# Patient Record
Sex: FEMALE | Race: BLACK OR AFRICAN AMERICAN | NOT HISPANIC OR LATINO | Employment: OTHER | URBAN - METROPOLITAN AREA
[De-identification: names, ages, dates, MRNs, and addresses within clinical notes are randomized per-mention and may not be internally consistent; named-entity substitution may affect disease eponyms.]

---

## 2017-02-06 ENCOUNTER — TELEPHONE (OUTPATIENT)
Dept: NEUROLOGY | Facility: CLINIC | Age: 42
End: 2017-02-06

## 2017-02-06 NOTE — TELEPHONE ENCOUNTER
We received a fax from [facility] (phone [phone] fax [phone]) attention Carolina Neri asking for records on [facility] from June 1st, 2016 and on; There is a signed release; Office visit notes from September when she saw Dr Castaneda faxed accordingly.    Mitzy Collazo MS RN Care Coordinator

## 2017-04-27 ENCOUNTER — OFFICE VISIT (OUTPATIENT)
Dept: NEUROLOGY | Facility: CLINIC | Age: 42
End: 2017-04-27
Attending: PSYCHIATRY & NEUROLOGY
Payer: COMMERCIAL

## 2017-04-27 VITALS
OXYGEN SATURATION: 99 % | BODY MASS INDEX: 41.15 KG/M2 | RESPIRATION RATE: 20 BRPM | HEART RATE: 96 BPM | WEIGHT: 247 LBS | HEIGHT: 65 IN | DIASTOLIC BLOOD PRESSURE: 73 MMHG | SYSTOLIC BLOOD PRESSURE: 114 MMHG

## 2017-04-27 DIAGNOSIS — E55.9 VITAMIN D DEFICIENCY: ICD-10-CM

## 2017-04-27 DIAGNOSIS — G35 MULTIPLE SCLEROSIS (H): Primary | ICD-10-CM

## 2017-04-27 DIAGNOSIS — M62.838 MUSCLE SPASM: ICD-10-CM

## 2017-04-27 DIAGNOSIS — G43.009 MIGRAINE WITHOUT AURA AND WITHOUT STATUS MIGRAINOSUS, NOT INTRACTABLE: ICD-10-CM

## 2017-04-27 DIAGNOSIS — G35 MULTIPLE SCLEROSIS (H): ICD-10-CM

## 2017-04-27 LAB
BASOPHILS # BLD AUTO: 0.1 10E9/L (ref 0–0.2)
BASOPHILS NFR BLD AUTO: 0.6 %
DIFFERENTIAL METHOD BLD: ABNORMAL
EOSINOPHIL # BLD AUTO: 0.3 10E9/L (ref 0–0.7)
EOSINOPHIL NFR BLD AUTO: 3.6 %
ERYTHROCYTE [DISTWIDTH] IN BLOOD BY AUTOMATED COUNT: 13.1 % (ref 10–15)
HCT VFR BLD AUTO: 36.1 % (ref 35–47)
HGB BLD-MCNC: 11.6 G/DL (ref 11.7–15.7)
IMM GRANULOCYTES # BLD: 0 10E9/L (ref 0–0.4)
IMM GRANULOCYTES NFR BLD: 0.3 %
LYMPHOCYTES # BLD AUTO: 3.7 10E9/L (ref 0.8–5.3)
LYMPHOCYTES NFR BLD AUTO: 39.3 %
MCH RBC QN AUTO: 29.5 PG (ref 26.5–33)
MCHC RBC AUTO-ENTMCNC: 32.1 G/DL (ref 31.5–36.5)
MCV RBC AUTO: 92 FL (ref 78–100)
MONOCYTES # BLD AUTO: 0.6 10E9/L (ref 0–1.3)
MONOCYTES NFR BLD AUTO: 6.3 %
NEUTROPHILS # BLD AUTO: 4.7 10E9/L (ref 1.6–8.3)
NEUTROPHILS NFR BLD AUTO: 49.9 %
NRBC # BLD AUTO: 0 10*3/UL
NRBC BLD AUTO-RTO: 0 /100
PLATELET # BLD AUTO: 406 10E9/L (ref 150–450)
RBC # BLD AUTO: 3.93 10E12/L (ref 3.8–5.2)
WBC # BLD AUTO: 9.4 10E9/L (ref 4–11)

## 2017-04-27 PROCEDURE — 82306 VITAMIN D 25 HYDROXY: CPT | Performed by: PSYCHIATRY & NEUROLOGY

## 2017-04-27 PROCEDURE — 85025 COMPLETE CBC W/AUTO DIFF WBC: CPT

## 2017-04-27 PROCEDURE — 40000809 ZZH STATISTIC NO DOCUMENTATION TO SUPPORT CHARGE

## 2017-04-27 RX ORDER — NORTRIPTYLINE HCL 25 MG
CAPSULE ORAL
Qty: 60 CAPSULE | Refills: 5 | Status: SHIPPED | OUTPATIENT
Start: 2017-04-27 | End: 2018-07-09

## 2017-04-27 NOTE — MR AVS SNAPSHOT
After Visit Summary   4/27/2017    Josefina Aldrich    MRN: [id number]           Patient Information     Date Of Birth          1975        Visit Information        Provider Department      4/27/2017 4:30 PM Radames Castaneda MD [facility] Multiple [facility]        Today's Diagnoses     Migraine without aura and without status migrainosus, not intractable    -  1    Vitamin D deficiency        Multiple sclerosis (H)          Care Instructions    1. We will try nortriptyline at 25 mg at bedtime nightly for 2 weeks, can increase to 2 capsules (50 mg) thereafter if headaches persist    2. Labs today (blood work)    3. MRI scans of brain and cervical spine in 6 months and return to clinic after        Follow-ups after your visit        Follow-up notes from your care team     Return in about 6 months (around 10/27/2017).      Your next 10 appointments already scheduled     Apr 27, 2017  6:30 PM CDT   LAB with [facility] ([facility])    [address] [place] [id number] [phone]           Patient must bring picture ID.  Patient should be prepared to give a urine specimen  Please do not eat 10-12 hours before your appointment if you are coming in fasting for labs on lipids, cholesterol, or glucose (sugar).  Pregnant women should follow their Care Team instructions. Water with medications is okay. Do not drink coffee or other fluids.   If you have concerns about taking  your medications, please ask at office or if scheduling via [employer], send a message by clicking on Secure Messaging, Message Your Care Team.            Oct 26, 2017 11:30 AM CDT   (Arrive by 11:15 AM)   MR BRAIN W/O & W CONTRAST with [facility] ([facility])    [address] [place] [address] [phone]           Take your medicines as usual, unless your doctor tells you not to. Bring a  list of your current medicines to your exam (including vitamins, minerals and over-the-counter drugs).  You will be given intravenous contrast for this exam. To prepare:   The day before your exam, drink extra fluids at least six 8-ounce glasses (unless your doctor tells you to restrict your fluids).   Have a blood test (creatinine test) within 30 days of your exam. Go to your clinic or Diagnostic Imaging Department for this test.  The MRI machine uses a strong magnet. Please wear clothes without metal (snaps, zippers). A sweatsuit works well, or we may give you a hospital gown.  Please remove any body piercings and hair extensions before you arrive. You will also remove watches, jewelry, hairpins, wallets, dentures, partial dental plates and hearing aids. You may wear contact lenses, and you may be able to wear your rings. We have a safe place to keep your personal items, but it is safer to leave them at home.   **IMPORTANT** THE INSTRUCTIONS BELOW ARE ONLY FOR THOSE PATIENTS WHO HAVE BEEN TOLD THEY WILL RECEIVE SEDATION OR GENERAL ANESTHESIA DURING THEIR MRI PROCEDURE:  IF YOU WILL RECEIVE SEDATION (take medicine to help you relax during your exam):   You must get the medicine from your doctor before you arrive. Bring the medicine to the exam. Do not take it at home.   Arrive one hour early. Bring someone who can take you home after the test. Your medicine will make you sleepy. After the exam, you may not drive, take a bus or take a taxi by yourself.   No eating 8 hours before your exam. You may have clear liquids up until 4 hours before your exam. (Clear liquids include water, clear tea, black coffee and fruit juice without pulp.)  IF YOU WILL RECEIVE ANESTHESIA (be asleep for your exam):   Arrive 1 1/2 hours early. Bring someone who can take you home after the test. You may not drive, take a bus or take a taxi by yourself.   No eating 8 hours before your exam. You may have clear liquids up until 4 hours before  your exam. (Clear liquids include water, clear tea, black coffee and fruit juice without pulp.)  Please call the Imaging Department at your exam site with any questions.            Oct 26, 2017 12:15 PM CDT   (Arrive by 12:00 PM)   MR CERVICAL SPINE W/O & W CONTRAST with [facility] ([facility])    [address] [facility]  [address] [facility] [address] [phone]           Take your medicines as usual, unless your doctor tells you not to. Bring a list of your current medicines to your exam (including vitamins, minerals and over-the-counter drugs).  You will be given intravenous contrast for this exam. To prepare:   The day before your exam, drink extra fluids at least six 8-ounce glasses (unless your doctor tells you to restrict your fluids).   Have a blood test (creatinine test) within 30 days of your exam. Go to your clinic or Diagnostic Imaging Department for this test.  The MRI machine uses a strong magnet. Please wear clothes without metal (snaps, zippers). A sweatsuit works well, or we may give you a hospital gown.  Please remove any body piercings and hair extensions before you arrive. You will also remove watches, jewelry, hairpins, wallets, dentures, partial dental plates and hearing aids. You may wear contact lenses, and you may be able to wear your rings. We have a safe place to keep your personal items, but it is safer to leave them at home.   **IMPORTANT** THE INSTRUCTIONS BELOW ARE ONLY FOR THOSE PATIENTS WHO HAVE BEEN TOLD THEY WILL RECEIVE SEDATION OR GENERAL ANESTHESIA DURING THEIR MRI PROCEDURE:  IF YOU WILL RECEIVE SEDATION (take medicine to help you relax during your exam):   You must get the medicine from your doctor before you arrive. Bring the medicine to the exam. Do not take it at home.   Arrive one hour early. Bring someone who can take you home after the test. Your medicine will make you sleepy. After the exam, you may not drive,  take a bus or take a taxi by yourself.   No eating 8 hours before your exam. You may have clear liquids up until 4 hours before your exam. (Clear liquids include water, clear tea, black coffee and fruit juice without pulp.)  IF YOU WILL RECEIVE ANESTHESIA (be asleep for your exam):   Arrive 1 1/2 hours early. Bring someone who can take you home after the test. You may not drive, take a bus or take a taxi by yourself.   No eating 8 hours before your exam. You may have clear liquids up until 4 hours before your exam. (Clear liquids include water, clear tea, black coffee and fruit juice without pulp.)  Please call the Imaging Department at your exam site with any questions.            Oct 26, 2017  2:00 PM CDT   (Arrive by 1:45 PM)   Return Multiple Sclerosis with Radames Castaneda MD   [facility] Multiple Sclerosis ([facility])    [address] [place] [address] [phone]              Future tests that were ordered for you today     Open Future Orders        Priority Expected Expires Ordered    MRI Brain w & w/o contrast Routine 10/26/2017 4/27/2018 4/27/2017    MRI Cervical spine w & w/o contrast Routine 10/26/2017 4/27/2018 4/27/2017    Vitamin D Deficiency Screening Routine 4/27/2017 7/27/2017 4/27/2017    CBC with platelets differential Routine 4/27/2017 7/27/2017 4/27/2017            Who to contact     If you have questions or need follow up information about today's clinic visit or your schedule please contact [facility] MULTIPLE SCL[facility]OSIS directly at [phone].  Normal or non-critical lab and imaging results will be communicated to you by MyChart, letter or phone within 4 business days after the clinic has received the results. If you do not hear from us within 7 days, please contact the clinic through MyChart or phone. If you have a critical or abnormal lab result, we will notify you by phone as soon as possible.  Submit refill requests through [employer]  "or call your pharmacy and they will forward the refill request to us. Please allow 3 business days for your refill to be completed.          Additional Information About Your Visit        MyChart Information     In [employer] lets you send messages to your doctor, view your test results, renew your prescriptions, schedule appointments and more. To sign up, go to [email or web address] . Click on \"Log in\" on the left side of the screen, which will take you to the Welcome page. Then click on \"Sign up Now\" on the right side of the page.     You will be asked to enter the access code listed below, as well as some personal information. Please follow the directions to create your username and password.     Your access code is: [email or web address]  Expires: 2017  6:31 AM     Your access code will  in 90 days. If you need help or a new code, please call your [place] clinic or [phone].        Care EveryWhere ID     This is your Care EveryWhere ID. This could be used by other organizations to access your [place] medical records  [id number]        Your Vitals Were     Pulse Respirations Height Pulse Oximetry Breastfeeding? BMI (Body Mass Index)    96 20 1.651 m (5' 5\") 99% No 41.1 kg/m2       Blood Pressure from Last 3 Encounters:   17 114/73   09/15/16 125/44   16 102/67    Weight from Last 3 Encounters:   17 112 kg (247 lb)   09/15/16 127 kg (280 lb)                 Today's Medication Changes          These changes are accurate as of: 17  6:28 PM.  If you have any questions, ask your nurse or doctor.               Start taking these medicines.        Dose/Directions    nortriptyline 25 MG capsule   Commonly known as:  PAMELOR   Used for:  Migraine without aura and without status migrainosus, not intractable   Started by:  Radames Castaneda MD        Take one capsule at bedtime for 2 weeks, can increase to 2 capsules as needed   Quantity:  60 capsule   Refills:  5            Where to get " your medicines      These medications were sent to [facility] - [place] [address]  [address], [facility] [address]     Phone:  [phone]     nortriptyline 25 MG capsule                Primary Care Provider Office Phone # Fax #    Ten Norton [phone]       [facility] [address]  [facility] [address]        Thank you!     Thank you for choosing [facility]ULT[facility] S[facility]  for your care. Our goal is always to provide you with excellent care. Hearing back from our patients is one way we can continue to improve our services. Please take a few minutes to complete the written survey that you may receive in the mail after your visit with us. Thank you!             Your Updated Medication List - Protect others around you: Learn how to safely use, store and throw away your medicines at www.disposemymeds.org.          This list is accurate as of: 4/27/17  6:28 PM.  Always use your most recent med list.                   Brand Name Dispense Instructions for use    * VENTOLIN  (90 BASE) MCG/ACT Inhaler   Generic drug:  albuterol      Inhale 2 puffs into the lungs       * albuterol (2.5 MG/3ML) 0.083% neb solution      Inhale 2.5 mg into the lungs       furosemide 20 MG tablet    LASIX     Take 20 mg by mouth daily       HYDROXYZINE HCL PO      Take 25 mg by mouth       IBUPROFEN PO      Take 800 mg by mouth every 4 hours as needed for moderate pain       lisinopril-hydrochlorothiazide 10-12.5 MG per tablet    PRINZIDE/ZESTORETIC     Take 1 tablet by mouth daily       nortriptyline 25 MG capsule    PAMELOR    60 capsule    Take one capsule at bedtime for 2 weeks, can increase to 2 capsules as needed       SUMAtriptan 50 MG tablet    IMITREX    30 tablet    Take one at onset of severe migraine; can repeat after one hour       TOPAMAX 100 MG tablet   Generic drug:  topiramate      Take 100 mg by mouth daily       VITAMIN  D (CHOLECALCIFEROL) PO      Take 5,000 Units by mouth every 3 days       * Notice:  This list has 2 medication(s) that are the same as other medications prescribed for you. Read the directions carefully, and ask your doctor or other care provider to review them with you.

## 2017-04-27 NOTE — PATIENT INSTRUCTIONS
1. We will try nortriptyline at 25 mg at bedtime nightly for 2 weeks, can increase to 2 capsules (50 mg) thereafter if headaches persist    2. Labs today (blood work)    3. MRI scans of brain and cervical spine in 6 months and return to clinic after

## 2017-04-27 NOTE — LETTER
2017      RE: Josefina Aldrich  [address]  [facility] [address]       Referral source: Established patient      Chief complaint: Multiple sclerosis     History of the Present Illness: Ms. Josefina Aldrich is a 41-year-old woman who returns to the Multiple Sclerosis Clinic today for regularly scheduled follow-up.  This is a patient whom I previously saw in 2016 with what appeared to be new onset of multiple sclerosis.  We initiated disease-modifying therapy with glatiramer acetate.  Overall, she appears to be tolerating this reasonably well.  She does note burning at injection sites but is having relatively minimal injection site reactions.      She denies any new episodic changes in vision, balance, strength or sensation suggestive of new relapse of multiple sclerosis since she was last seen in this clinic.      She is concerned about difficulties with leg cramps and says that she actually fell on a couple of occasions when she developed cramping of leg muscles.  She also has some concerns about muscle tightness at the base of the neck.      She does continue to have bothersome migraine headaches, particularly over the last month.  She did try sumatriptan, but tells me that this was sedating, so she has not been using it.      Past Medical History:  1.  Migraine.   2.  Depression.   3.  Hypertension.   4.  Obstructive sleep apnea.   5.  Asthma.   6.  Status post gastric bypass surgery.   7.  Status post hysterectomy.   8.  Status post  section.      PHYSICAL EXAMINATION:   VITAL SIGNS:  Blood pressure 114/73; pulse 96; weight 112 kg; height 1.65 meters.   GENERAL:  Obese woman who presents to the evaluation alone, awake and alert and in no acute distress.   NEUROLOGIC:   MENTAL STATUS:  Alert and oriented times four.   CRANIAL NERVES:  Visual fields are full to confrontation.  Extraocular movements are intact with no internuclear ophthalmoplegia.  Pupils are round and react to  light and there is no Anish Tanika pupil.  Facial strength and hearing are normal.  Palate elevation and tongue protrusion are normal.   POWER:  Motor examination is characterized by variable persistence with give-way type quality.  With maximal effort, strength appears to be normal bilaterally in deltoids, biceps, triceps, finger interossei, hip flexors, hamstrings and anterior tibialis.  I cannot demonstrate definite normal strength at the right wrist extensors and finger extensors but here again, there is prominent giveway quality.   REFLEXES:  Reflexes are symmetric and within normal limits at biceps, triceps, brachioradialis, knees and ankles.   MOTOR/CEREBELLAR:  There are no tremors, myoclonus or other abnormal movements.  Tone is grossly normal in the limbs.  There is no appendicular ataxia on finger-to-nose testing and rapid alternating movements are normal in the extremities.   GAIT:  The patient is able to ambulate on a flat, level surface without gross loss of postural stability.  Tandem gait is mildly impaired for age.      Assessment/plan:    1.  Multiple sclerosis  She will continue disease-modifying therapy with glatiramer acetate.  We are going to check a vitamin D level today and I will then have the patient return with an MRI scan of the brain in approximately 6 months for follow-up of the radiologic stability of her condition.      2.  Muscle spasms  I instructed the patient to be very diligent about a program of stretching of muscles that are tight or prone to spasms.  Ideally, I would like to see her doing stretching first thing in the morning as well as before bed.  If she is continuing to have this problem, addition of an anti-spasmodic can be considered, but these medications can have side effects including sedation and even muscle weakness.      3.  Migraine  She is currently on topiramate at 100 mg at bedtime for migraine prevention.  I am going to add nortriptyline at 25 mg at bedtime and  increase to 50 mg after 2 weeks as needed and as tolerated.      Radames Castaneda MD   of Neurology  [facility]    Cc:  Ten Norton MD (PCP)  Patient

## 2017-04-28 LAB — DEPRECATED CALCIDIOL+CALCIFEROL SERPL-MC: 24 UG/L (ref 20–75)

## 2017-05-01 NOTE — PROGRESS NOTES
Referral source: Established patient      Chief complaint: Multiple sclerosis     History of the Present Illness: Ms. Josefina Aldrich is a 41-year-old woman who returns to the Multiple Sclerosis Clinic today for regularly scheduled follow-up.  This is a patient whom I previously saw in 2016 with what appeared to be new onset of multiple sclerosis.  We initiated disease-modifying therapy with glatiramer acetate.  Overall, she appears to be tolerating this reasonably well.  She does note burning at injection sites but is having relatively minimal injection site reactions.      She denies any new episodic changes in vision, balance, strength or sensation suggestive of new relapse of multiple sclerosis since she was last seen in this clinic.      She is concerned about difficulties with leg cramps and says that she actually fell on a couple of occasions when she developed cramping of leg muscles.  She also has some concerns about muscle tightness at the base of the neck.      She does continue to have bothersome migraine headaches, particularly over the last month.  She did try sumatriptan, but tells me that this was sedating, so she has not been using it.      Past Medical History:  1.  Migraine.   2.  Depression.   3.  Hypertension.   4.  Obstructive sleep apnea.   5.  Asthma.   6.  Status post gastric bypass surgery.   7.  Status post hysterectomy.   8.  Status post  section.      PHYSICAL EXAMINATION:   VITAL SIGNS:  Blood pressure 114/73; pulse 96; weight 112 kg; height 1.65 meters.   GENERAL:  Obese woman who presents to the evaluation alone, awake and alert and in no acute distress.   NEUROLOGIC:   MENTAL STATUS:  Alert and oriented times four.   CRANIAL NERVES:  Visual fields are full to confrontation.  Extraocular movements are intact with no internuclear ophthalmoplegia.  Pupils are round and react to light and there is no Anish Tanika pupil.  Facial strength and hearing are normal.   Palate elevation and tongue protrusion are normal.   POWER:  Motor examination is characterized by variable persistence with give-way type quality.  With maximal effort, strength appears to be normal bilaterally in deltoids, biceps, triceps, finger interossei, hip flexors, hamstrings and anterior tibialis.  I cannot demonstrate definite normal strength at the right wrist extensors and finger extensors but here again, there is prominent giveway quality.   REFLEXES:  Reflexes are symmetric and within normal limits at biceps, triceps, brachioradialis, knees and ankles.   MOTOR/CEREBELLAR:  There are no tremors, myoclonus or other abnormal movements.  Tone is grossly normal in the limbs.  There is no appendicular ataxia on finger-to-nose testing and rapid alternating movements are normal in the extremities.   GAIT:  The patient is able to ambulate on a flat, level surface without gross loss of postural stability.  Tandem gait is mildly impaired for age.      Assessment/plan:    1.  Multiple sclerosis  She will continue disease-modifying therapy with glatiramer acetate.  We are going to check a vitamin D level today and I will then have the patient return with an MRI scan of the brain in approximately 6 months for follow-up of the radiologic stability of her condition.      2.  Muscle spasms  I instructed the patient to be very diligent about a program of stretching of muscles that are tight or prone to spasms.  Ideally, I would like to see her doing stretching first thing in the morning as well as before bed.  If she is continuing to have this problem, addition of an anti-spasmodic can be considered, but these medications can have side effects including sedation and even muscle weakness.      3.  Migraine  She is currently on topiramate at 100 mg at bedtime for migraine prevention.  I am going to add nortriptyline at 25 mg at bedtime and increase to 50 mg after 2 weeks as needed and as tolerated.         ZACHARY  MD LEW             D: 2017 14:21   T: 2017 14:59   MT: nh      Name:     RACHEL YANG   MRN:      [id number]        Account:      [id number]   :      1975           Service Date: 2017      Document: [id number]

## 2017-09-22 DIAGNOSIS — G35 MULTIPLE SCLEROSIS (H): Primary | ICD-10-CM

## 2017-09-22 RX ORDER — GLATIRAMER 40 MG/ML
40 INJECTION, SOLUTION SUBCUTANEOUS
Qty: 12 SYRINGE | Refills: 11 | Status: SHIPPED | OUTPATIENT
Start: 2017-09-22 | End: 2017-10-18

## 2017-09-22 NOTE — TELEPHONE ENCOUNTER
Received refill request for copaxone from [facility]; Patient was last seen in April and has follow up appointment in October with Dr. Castaneda. There was no active order for copaxone. Order entered and Rx refilled for 1 year per [facility] refill protocol.    Cami Francisco, RN Care Coordinator

## 2017-10-16 ENCOUNTER — TELEPHONE (OUTPATIENT)
Dept: NEUROLOGY | Facility: CLINIC | Age: 42
End: 2017-10-16

## 2017-10-16 DIAGNOSIS — G35 MULTIPLE SCLEROSIS (H): Primary | ICD-10-CM

## 2017-10-16 RX ORDER — DIAZEPAM 5 MG
TABLET ORAL
Qty: 2 TABLET | Refills: 0
Start: 2017-10-16 | End: 2018-01-03

## 2017-10-16 NOTE — TELEPHONE ENCOUNTER
I had a self-reminder to call in sedation for Josefina's MRI for next week; I tried calling her to verify her pharmacy and that she needed the medication, but her number is no longer in service; Dr. Castaneda, are you okay with me calling a prescription for diazepam per protocol to the pharmacy she has previously used? I'm solely asking since it is a controlled substance; Thank you.    Mitzy Collazo, MS RN Care Coordinator

## 2017-10-16 NOTE — TELEPHONE ENCOUNTER
It's fine to prescribe--hopefully she will call if she is not aware of the prescription and finds it necessary.

## 2017-10-18 ENCOUNTER — TELEPHONE (OUTPATIENT)
Dept: NEUROLOGY | Facility: CLINIC | Age: 42
End: 2017-10-18

## 2017-10-18 DIAGNOSIS — G35 MULTIPLE SCLEROSIS (H): ICD-10-CM

## 2017-10-18 RX ORDER — GLATIRAMER 40 MG/ML
40 INJECTION, SOLUTION SUBCUTANEOUS
Qty: 12 SYRINGE | Refills: 11 | Status: SHIPPED | OUTPATIENT
Start: 2017-10-18 | End: 2017-10-23

## 2017-10-18 NOTE — TELEPHONE ENCOUNTER
Received the following message from the triage nurse:    Caller name: Timothy from [facility] [phone]     Description of issue/question:   patient changed insurance [employer] [place] -referral transferred to [facility] specialty pharmacy  [phone]   [facility] is no longer contracted for service     This is regarding Copaxone 40 mg. Checked with our pharmacy and no PA is required. They have requested a new Rx be sent to [facility]. This was done.     Cami Francisco, RN Care Coordinator

## 2017-10-19 DIAGNOSIS — G35 MULTIPLE SCLEROSIS (H): ICD-10-CM

## 2017-10-23 RX ORDER — GLATIRAMER 40 MG/ML
40 INJECTION, SOLUTION SUBCUTANEOUS
Qty: 12 SYRINGE | Refills: 11 | Status: SHIPPED | OUTPATIENT
Start: 2017-10-23 | End: 2018-10-29

## 2017-10-23 NOTE — TELEPHONE ENCOUNTER
Received fax for Copaxone 40mg to be sent to [facility]; This has been sent per [facility] refill protocol.    Mitzy Collazo MS RN Care Coordinator

## 2017-10-26 ENCOUNTER — OFFICE VISIT (OUTPATIENT)
Dept: NEUROLOGY | Facility: CLINIC | Age: 42
End: 2017-10-26
Attending: PSYCHIATRY & NEUROLOGY
Payer: COMMERCIAL

## 2017-10-26 VITALS
SYSTOLIC BLOOD PRESSURE: 94 MMHG | RESPIRATION RATE: 20 BRPM | HEART RATE: 101 BPM | HEIGHT: 65 IN | DIASTOLIC BLOOD PRESSURE: 61 MMHG | BODY MASS INDEX: 44.82 KG/M2 | WEIGHT: 269 LBS | TEMPERATURE: 98.2 F

## 2017-10-26 DIAGNOSIS — R52 DIFFUSE PAIN: ICD-10-CM

## 2017-10-26 DIAGNOSIS — G35 MS (MULTIPLE SCLEROSIS) (H): Primary | ICD-10-CM

## 2017-10-26 DIAGNOSIS — R42 DIZZINESS: ICD-10-CM

## 2017-10-26 DIAGNOSIS — G35 MS (MULTIPLE SCLEROSIS) (H): ICD-10-CM

## 2017-10-26 PROCEDURE — 36415 COLL VENOUS BLD VENIPUNCTURE: CPT | Performed by: PSYCHIATRY & NEUROLOGY

## 2017-10-26 PROCEDURE — 82306 VITAMIN D 25 HYDROXY: CPT | Performed by: PSYCHIATRY & NEUROLOGY

## 2017-10-26 PROCEDURE — 99213 OFFICE O/P EST LOW 20 MIN: CPT | Mod: ZF

## 2017-10-26 RX ORDER — CYCLOBENZAPRINE HCL 10 MG
10 TABLET ORAL 3 TIMES DAILY PRN
COMMUNITY
Start: 2016-11-03 | End: 2018-11-27

## 2017-10-26 RX ORDER — MECLIZINE HYDROCHLORIDE 25 MG/1
25 TABLET ORAL DAILY PRN
COMMUNITY
Start: 2017-10-18 | End: 2018-07-25

## 2017-10-26 ASSESSMENT — PAIN SCALES - GENERAL: PAINLEVEL: EXTREME PAIN (8)

## 2017-10-26 NOTE — LETTER
"10/26/2017      RE: Josefina Adlrich  [address]  [place] [address]       Referral source: Established patient     Chief complaint: Multiple sclerosis     History of the Present Illness:  Ms. Josefina Aldrich is a 42-year-old right-handed woman who is evaluated in the Multiple Sclerosis Clinic today for regularly scheduled follow-up after MRI scans of the brain and cervical spine.     Her history is as per my previous notes.  She was diagnosed with multiple sclerosis in 2016 and started disease-modifying therapy with glatiramer acetate shortly thereafter.  She has had some difficulty with the medication recently as her insurance company required her to transition to the generic glatiramer acetate product, but the drug company did not send the auto-injector that is compatible with that formulation.  Accordingly, she has not taken the medication in about 2 weeks.  We will be sure to get the necessary equipment approved for her.      Otherwise, she reports good compliance with glatiramer acetate.      The patient has had a number of symptoms, particularly over the past 2 weeks.  She was working at a job as a  and began having increased difficulty with posterior neck pain that was precipitated by lifting heavy cans or boxes.  She also describes dizziness that she endorses as \"feeling like her eyes go wobbly\" with associated nausea.  She reports that at times her knees feel that they are \"going out.\"  She complains of spasms in the left arm and a sense of generalized weakness.  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      She continues on topiramate at 125 mg at bedtime for migraine prevention.  I had also given her a prescription for a trial of nortriptyline at her last visit, but she reports that she had bad dreams on this medication and accordingly did not increase the dose " beyond 25 mg.  She is not taking this at present.      On review of systems, she does endorse poor sleep quality.  She has to use multiple pillows to get comfortable.  She does not get much aerobic exercise or physical activity apart from her work.        Past Medical History:  1.  Migraine.   2.  Depression.   3.  Hypertension.   4.  Obstructive sleep apnea.   5.  Asthma.   6.  Status post gastric bypass surgery.   7.  Status post hysterectomy.   8.  Status post  section.      PHYSICAL EXAMINATION:   VITAL SIGNS:  Blood pressure 94/61; pulse 101; height 1.65 meters; weight 122 kg.     GENERAL:  Obese woman who presents to the examination alone, awake and alert and in no acute distress.     MUSCULOSKELETAL:  She is tender to pressure over essentially all of the traditional fibromyalgia trigger points that I tested, including the head of the radius, medial knee and trapezius muscles bilaterally.     NEUROLOGIC:   MENTAL STATUS:  Alert and oriented times four.   CRANIAL NERVES:  Visual fields are full to confrontation.  Extraocular movements are intact with no internuclear ophthalmoplegia.  Facial strength is normal.  Hearing is normal for conversational purposes.  Palate elevation and tongue protrusion are normal.   POWER:  Strength is normal (5/5) in the following muscle/groups bilaterally:  deltoids, biceps, triceps, wrist extensors, hip flexors, hamstrings and anterior tibialis.  The right finger extensors and first dorsal interosseous grade 4/5 with these muscles being normal on the left.   REFLEXES:  Reflexes are roughly symmetric and within normal limits at biceps, triceps, brachioradialis, knees and ankles.   MOTOR/CEREBELLAR:  There are no tremors, myoclonus or other abnormal movements.  Tone is grossly normal in limbs.  There is no appendicular ataxia on finger-to-nose testing and rapid alternating movements are normal in the extremities.   GAIT:  The patient is able to ambulate on a flat, level  surface without loss of postural stability.  She can walk on her toes.  She is able to walk on heels from a standpoint of strength but is moderately unsteady in this posture.  Tandem gait is mildly to moderately impaired for age.      Investigations: I reviewed MRI scans of the brain and cervical spine performed earlier today.  Again, seen are several periventricular and juxtacortical foci of T2 hyperintensity in the white matter of the cerebral hemispheres bilaterally, most prominently posterior to the right lateral ventricle in the parietal deep white matter.  These are unchanged in comparison to earlier MRI of 05/19/2016 and there is no abnormal enhancement pattern with contrast.  MRI scan of the cervical spine continues to show no evidence of demyelinating disease in the cervical cord parenchyma and there is no abnormal enhancement with contrast.      Assessment/plan:    1.  Relapsing-remitting multiple sclerosis  The patient's MRI imaging is entirely stable in comparison to last year.  Accordingly, I do not see any objective evidence that her several complaints are secondary to new active inflammatory demyelination.  She will continue glatiramer acetate and we will see her back in 6 months for a review.     She currently reports that she is taking vitamin D 50,000 international units 3 times weekly.  Vitamin D level today was 37 mcg/L.  I suspect that there is an element of malabsorption in the setting of her previous gastric bypass surgery.  We will advise her to increase her supplement to 4 times weekly.     2.  Diffuse pain  Her history and examination would be consistent with a myofascial pain syndrome in the fibromyalgia spectrum.  As with most patients with this type of presentation, she is physically inactive and has significant sleep disturbance.  I explained to the patient that improvement in this type of discomfort typically requires a graded program of increase in physical activity and treating any  sleep disturbance that is present.  Medications for neuropathic pain can also be helpful as adjuncts, but rarely resolve the problem in isolation.     I have recommended a physical therapy evaluation to the patient to address her neck pain and work on designing an appropriate home maintenance activity program.  I have also suggested to her that she try restarting nortriptyline at 25 mg at bedtime and then increasing to 50 mg after a couple weeks as this may be beneficial in consolidating her sleep.  If she is again unable to tolerate this, other medications such as duloxetine or pregabalin can be considered.      Radames Castaneda MD   of Neurology  [facility]    Cc:  Ten Norton MD (PCP)  Patient

## 2017-10-26 NOTE — PATIENT INSTRUCTIONS
1. We will refer you to physical therapy for evaluation of neck pain and muscle spasms    2. Retry nortriptyline at 25 mg at bedtime nightly for two weeks, then increase to 50 mg thereafter    3. Labs (blood tests) today    4. Return to clinic in 6 months

## 2017-10-26 NOTE — NURSING NOTE
"Chief Complaint   Patient presents with     RECHECK     [facility]- MULTIPLE SCLEROSIS F/U       Initial BP 94/61  Pulse 101  Temp 98.2  F (36.8  C)  Resp 20  Ht 1.651 m (5' 5\")  Wt 122 kg (269 lb)  Breastfeeding? No  BMI 44.76 kg/m2 Estimated body mass index is 44.76 kg/(m^2) as calculated from the following:    Height as of this encounter: 1.651 m (5' 5\").    Weight as of this encounter: 122 kg (269 lb).  Medication Reconciliation: complete     Terry Ascencio, CMA      "

## 2017-10-26 NOTE — MR AVS SNAPSHOT
"              After Visit Summary   10/26/2017    Josefina Aldrich    MRN: [id number]           Patient Information     Date Of Birth          1975        Visit Information        Provider Department      10/26/2017 2:00 PM Radames Castaneda MD [facility] Multiple Sclerosis        Today's Diagnoses     MS (multiple sclerosis) (H)    -  1    Diffuse pain        Dizziness          Care Instructions    1. We will refer you to physical therapy for evaluation of neck pain and muscle spasms    2. Retry nortriptyline at 25 mg at bedtime nightly for two weeks, then increase to 50 mg thereafter    3. Labs (blood tests) today    4. Return to clinic in 6 months          Follow-ups after your visit        Additional Services     PT Referral ([facility])       *This therapy referral will be filtered to a centralized scheduling office at [facility] and the patient will receive a call to schedule an appointment at a [facility] location most convenient for them. *     [facility] provides Physical Therapy evaluation and treatment and many specialty services across the [facility] system.  If requesting a specialty program, please choose from the list below.    If you have not heard from the scheduling office within 2 business days, please call [phone] for all locations, with the exception of [place], please call [phone].  Treatment: Evaluation & Treatment  Special Instructions/Modalities:   Special Programs: None    Please be aware that coverage of these services is subject to the terms and limitations of your health insurance plan.  Call member services at your health plan with any benefit or coverage questions.      **Note to Provider:  If you are referring outside of [facility] for the therapy appointment, please list the name of the location in the \"special instructions\" above, print the referral and give to the patient to schedule the appointment.                " "  Follow-up notes from your care team     Return in about 6 months (around 4/26/2018).      Your next 10 appointments already scheduled     Oct 26, 2017  3:00 PM CDT   Lab with [facility]   [facility] ([facility])    [address] [place] [address]  [address]st [facility] [id number] [phone]            Apr 19, 2018  4:30 PM CDT   (Arrive by 4:15 PM)   Return Multiple Sclerosis with Radames Castaneda MD   [facility] ([facility])    [address] [place] [address]  3rd [facility] [id number] [phone]              Future tests that were ordered for you today     Open Future Orders        Priority Expected Expires Ordered    Vitamin D Deficiency Screening Routine 10/26/2017 1/26/2018 10/26/2017    PT Referral ([place]) Routine 10/26/2017 10/26/2018 10/26/2017            Who to contact     If you have questions or need follow up information about today's clinic visit or your schedule please contact [facility] MULTIPLE SCLEROSIS directly at [phone].  Normal or non-critical lab and imaging results will be communicated to you by tab ticketbrokerhart, letter or phone within 4 business days after the clinic has received the results. If you do not hear from us within 7 days, please contact the clinic through Xamarint or phone. If you have a critical or abnormal lab result, we will notify you by phone as soon as possible.  Submit refill requests through Convrrt or call your pharmacy and they will forward the refill request to us. Please allow 3 business days for your refill to be completed.          Additional Information About Your Visit        Convrrt Information     Convrrt lets you send messages to your doctor, view your test results, renew your prescriptions, schedule appointments and more. To sign up, go to [email or web address] . Click on \"Log in\" on the left side of the screen, which will take you to the Welcome page. Then click on \"Sign up Now\" on the " "right side of the page.     You will be asked to enter the access code listed below, as well as some personal information. Please follow the directions to create your username and password.     Your access code is: [email or web address]  Expires: 1/10/2018  6:31 AM     Your access code will  in 90 days. If you need help or a new code, please call your [place] clinic or [phone].        Care EveryWhere ID     This is your Care EveryWhere ID. This could be used by other organizations to access your [place] medical records  [id number]        Your Vitals Were     Pulse Temperature Respirations Height Breastfeeding? BMI (Body Mass Index)    101 98.2  F (36.8  C) 20 1.651 m (5' 5\") No 44.76 kg/m2       Blood Pressure from Last 3 Encounters:   10/26/17 94/61   17 114/73   09/15/16 125/44    Weight from Last 3 Encounters:   10/26/17 122 kg (269 lb)   17 112 kg (247 lb)   09/15/16 127 kg (280 lb)               Primary Care Provider Office Phone # Fax #    Ten Norton [phone]       [place] [address]        Equal Access to Services     RISHI MORENO AH: Hadii jimmie ku hadasho Soomaali, waaxda luqadaha, qaybta kaalmada adeegyada, waxay fabian patterson. So [facility] [phone].    ATENCIÓN: Si habla español, tiene a barrera disposición servicios gratuitos de asistencia lingüística. Karen al [phone].    We comply with applicable federal civil rights laws and [place] laws. We do not discriminate on the basis of race, color, national origin, age, disability, sex, sexual orientation, or gender identity.            Thank you!     Thank you for choosing [facility] MULTIPLE SCLEROSIS  for your care. Our goal is always to provide you with excellent care. Hearing back from our patients is one way we can continue to improve our services. Please take a few minutes to complete the written survey that you may receive in the mail after your visit with " us. Thank you!             Your Updated Medication List - Protect others around you: Learn how to safely use, store and throw away your medicines at www.disposemymeds.org.          This list is accurate as of: 10/26/17  2:56 PM.  Always use your most recent med list.                   Brand Name Dispense Instructions for use Diagnosis    * VENTOLIN  (90 BASE) MCG/ACT Inhaler   Generic drug:  albuterol      Inhale 2 puffs into the lungs        * albuterol (2.5 MG/3ML) 0.083% neb solution      Inhale 2.5 mg into the lungs        cyclobenzaprine 10 MG tablet    FLEXERIL     Take 10 mg by mouth 3 times daily as needed        diazepam 5 MG tablet    VALIUM    2 tablet    Take 1 tablet 30 minutes prior to MRI, then take 1 tablet at time of MRI if needed. Must have .    Multiple sclerosis (H)       furosemide 20 MG tablet    LASIX     Take 20 mg by mouth daily        Glatiramer Acetate 40 MG/ML Sosy    COPAXONE    12 Syringe    Inject 40 mg Subcutaneous three times a week    Multiple sclerosis (H)       IBUPROFEN PO      Take 800 mg by mouth every 4 hours as needed for moderate pain        lisinopril-hydrochlorothiazide 10-12.5 MG per tablet    PRINZIDE/ZESTORETIC     Take 1 tablet by mouth daily        meclizine 25 MG tablet    ANTIVERT     Take 25 mg by mouth daily as needed        nortriptyline 25 MG capsule    PAMELOR    60 capsule    Take one capsule at bedtime for 2 weeks, can increase to 2 capsules as needed    Migraine without aura and without status migrainosus, not intractable       TOPAMAX 100 MG tablet   Generic drug:  topiramate      Take 100 mg by mouth daily        VITAMIN D (CHOLECALCIFEROL) PO      Take 5,000 Units by mouth every 3 days        * Notice:  This list has 2 medication(s) that are the same as other medications prescribed for you. Read the directions carefully, and ask your doctor or other care provider to review them with you.

## 2017-10-27 LAB — DEPRECATED CALCIDIOL+CALCIFEROL SERPL-MC: 37 UG/L (ref 20–75)

## 2017-10-31 ENCOUNTER — HOSPITAL ENCOUNTER (OUTPATIENT)
Dept: PHYSICAL THERAPY | Facility: CLINIC | Age: 42
Setting detail: THERAPIES SERIES
End: 2017-10-31
Attending: PSYCHIATRY & NEUROLOGY
Payer: COMMERCIAL

## 2017-10-31 DIAGNOSIS — R52 DIFFUSE PAIN: ICD-10-CM

## 2017-10-31 DIAGNOSIS — R42 DIZZINESS: ICD-10-CM

## 2017-10-31 DIAGNOSIS — G35 MS (MULTIPLE SCLEROSIS) (H): ICD-10-CM

## 2017-10-31 PROCEDURE — 97110 THERAPEUTIC EXERCISES: CPT | Mod: GP | Performed by: PHYSICAL THERAPIST

## 2017-10-31 PROCEDURE — 40000719 ZZHC STATISTIC PT DEPARTMENT NEURO VISIT: Performed by: PHYSICAL THERAPIST

## 2017-10-31 PROCEDURE — 97162 PT EVAL MOD COMPLEX 30 MIN: CPT | Mod: GP | Performed by: PHYSICAL THERAPIST

## 2017-10-31 ASSESSMENT — 6 MINUTE WALK TEST (6MWT): TOTAL DISTANCE WALKED (FT): 1293

## 2017-11-01 NOTE — PROGRESS NOTES
10/31/17 0900   Quick Adds   Type of Visit Initial OP PT Evaluation   General Information   Start of Care Date 10/31/17   Referring Physician Dr. Radames Castaneda   Orders Evaluate and Treat as Indicated   Order Date 10/31/17   Medical Diagnosis Multiple Sclerosis, Diffuse pain, Dizziness   Onset of illness/injury or Date of Surgery (June 2015)   Surgical/Medical history reviewed Yes   Pertinent history of current problem (include personal factors and/or comorbidities that impact the POC) In June 2015 was thought to have a CVA but was not recovering so further testing was done and was diagnosed with relapsing remitting MS September of 2015. Found to have lesion in the ponsHas noticed balance has diminished since diagnosis. Reports getting dizzy alot. Used to do tate 3 days a week, used to walk, hot yoga, and was very active. Now gets tired a lot, dizzy with bending over movements. Has difficulties with doing hair due to fatigue in arms and pain in neck and arms. Loses balance when trying to do tate at home, able to complete for about 10-15 mins, but knees will give out and feel off balance after that. Gets shooting pain up legs w/ walking. Single mom for last 20 years. Colpaxzone shots 3x a week for MS management. Was prescribed meclezine for the dizziness, but has been difficult to take due to working and feeling drowziness while on it.  After working a lot recently as a  she had increased pain all over and was also per the patient dx with Fibromyalgia on top of the MS.  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    Prior level of function comment Independent with funcitonal mobility. Works full time. Tries to do some Tate or exercise at home but limited. Has not been doing it recently due to increased work schedule.    Patient role/Employment history Employed   Living environment  Apartment/condo   Home/Community Accessibility Comments Single mom with 2 kids( 20 yr. old and 1 in high school)    Patient/Family Goals Statement improve balance, decrease pain,    General Information Comments There is a workout room in [facility] which has a TM.  Has a stationary bike.    Fall Risk Screen   Fall screen completed by PT   Per patient - Fall 2 or more times in past year? Yes   Per patient - Fall with injury in past year? No   Timed Up and Go score (seconds) 7.15   Is patient a fall risk? Yes   Fall screen comments According to FGA she is at increased risk for falls.    Pain   Pain comments Pain in neck and shoulders, and low back   Cognitive Status Examination   Level of Consciousness alert   Follows Commands and Answers Questions 100% of the time   Palpation   Palpation B upper traps very painful to light palpation R a little tighter than L,  mid traps painful to palpation too but less so compared to upper traps, lumbar paraspinals are sensitive to pal pation   Range of Motion (ROM)   ROM Comment CROM: Rotation to L has full ROM put pain with overpressure, R rotates actively 45 degrees,  Sidebending R limited  and painful and L limited slightly but not painful,  Flexion and extension WNL but patient c/o tightness   Strength   Strength Comments B shoulder abd 4-/5, B shoulder flexion 4-/5 with pain,  B hip flexion R 4-/5 and L 4/5, Knee extension R 4/5 with give L 5/5,Dorsiflexion R 4/5 and L 5/5, Hip abduction R 4-/5 with pain L 4-/5 , Knee R 3+/5 with give/catch and L 4+/5, B hip extension less than 3/5   Bed Mobility   Bed Mobility Comments Independent   Transfer Skills   Transfer Comments Independent   Gait   Gait Comments Ambulates without an AD. Imbalance noted with dynamic balance challengeson the FGA. Mildly increased EDWARD and steps out at times due to imbalance   Gait Special Tests   Gait Special Tests FUNCTIONAL GAIT ASSESSMENT;SIX MINUTE WALK TEST   Gait Special Tests Functional Gait Assessment  Score out of 30   Score out of 30 17   Comments demonstrates at increased risk for falls   Gait Special Tests Six Minute Walk Test   Feet 1293 Feet   Comments For age this is low.    Balance Special Tests   Balance Special Tests Modified CTSIB Conditions   Balance Special Tests Modified CTSIB Conditions   Condition 1, seconds 30 Seconds   Condition 2, seconds 30 Seconds   Condition 4, seconds 20 Seconds   Condition 5, seconds 23 Seconds   Sensory Examination   Sensory Perception Comments Reports some numbness and tingling in B hands.    Muscle Tone   Muscle Tone Comments Assessed tone on R and was normal   Planned Therapy Interventions   Planned Therapy Interventions balance training;gait training;neuromuscular re-education;ROM;strengthening;stretching;manual therapy   Clinical Impression   Criteria for Skilled Therapeutic Interventions Met yes, treatment indicated   PT Diagnosis Pain, Weakness, Decreased standing balance, Decreased activity tolerance   Influenced by the following impairments decreased strength, pain, decreased standing balance   Functional limitations due to impairments impaired balance putting her at increased risk for falls, limited endurance for working on feet, limited endurance for daily activities and exercise.    Clinical Presentation Evolving/Changing   Clinical Presentation Rationale energy level varies with MS dx and pain from fibromyalgia, mCTSIB, FGA, MMT, 6MWT   Clinical Decision Making (Complexity) Moderate complexity   Therapy Frequency 1 time/week   Predicted Duration of Therapy Intervention (days/wks) 90 days   Risk & Benefits of therapy have been explained Yes   Patient, Family & other staff in agreement with plan of care Yes   Clinical Impression Comments Due to lack of time will need to assess dizziness further at next session.    GOALS   PT Eval Goals 1;2;3   Goal 1   Goal Identifier 6MWT   Goal Description Client will demonstrate improved endurance/activity tolreance by  improving socre on 6MWT by 150 ft.    Target Date 01/29/18   Goal 2   Goal Identifier FGA   Goal Description Client will demonstrate improved balance on FGA scoring at least a 24/30 to demonstrate that she is not at increased risk for falls.    Target Date 01/29/18   Goal 3   Goal Identifier HEP    Goal Description Client will have a HEP to address neck pain, general weakness and activity tolerance that she can be performing 4-5x/week.   Target Date 01/29/18   Total Evaluation Time   Total Evaluation Time (Minutes) 50

## 2017-11-02 ENCOUNTER — HOSPITAL ENCOUNTER (OUTPATIENT)
Dept: PHYSICAL THERAPY | Facility: CLINIC | Age: 42
Setting detail: THERAPIES SERIES
End: 2017-11-02
Attending: PSYCHIATRY & NEUROLOGY
Payer: COMMERCIAL

## 2017-11-02 PROCEDURE — 40000719 ZZHC STATISTIC PT DEPARTMENT NEURO VISIT: Performed by: PHYSICAL THERAPIST

## 2017-11-02 PROCEDURE — 97112 NEUROMUSCULAR REEDUCATION: CPT | Mod: GP | Performed by: PHYSICAL THERAPIST

## 2017-11-02 PROCEDURE — 97110 THERAPEUTIC EXERCISES: CPT | Mod: GP | Performed by: PHYSICAL THERAPIST

## 2017-11-02 NOTE — PROGRESS NOTES
"Referral source: Established patient     Chief complaint: Multiple sclerosis     History of the Present Illness:  Ms. Josefina Aldrich is a 42-year-old right-handed woman who is evaluated in the [facility] today for regularly scheduled follow-up after MRI scans of the brain and cervical spine.     Her history is as per my previous notes.  She was diagnosed with multiple sclerosis in 2016 and started disease-modifying therapy with glatiramer acetate shortly thereafter.  She has had some difficulty with the medication recently as her insurance company required her to transition to the generic glatiramer acetate product, but the drug company did not send the auto-injector that is compatible with that formulation.  Accordingly, she has not taken the medication in about 2 weeks.  We will be sure to get the necessary equipment approved for her.      Otherwise, she reports good compliance with glatiramer acetate.      The patient has had a number of symptoms, particularly over the past 2 weeks.  She was working at a job as a  and began having increased difficulty with posterior neck pain that was precipitated by lifting heavy cans or boxes.  She also describes dizziness that she endorses as \"feeling like her eyes go wobbly\" with associated nausea.  She reports that at times her knees feel that they are \"going out.\"  She complains of spasms in the left arm and a sense of generalized weakness.  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      She continues on topiramate at 125 mg at bedtime for migraine prevention.  I had also given her a prescription for a trial of nortriptyline at her last visit, but she reports that she had bad dreams on this medication and accordingly did not increase the dose beyond 25 mg.  She is not taking this at present.      On review of systems, she does endorse poor " sleep quality.  She has to use multiple pillows to get comfortable.  She does not get much aerobic exercise or physical activity apart from her work.      Past Medical History:  1.  Migraine.   2.  Depression.   3.  Hypertension.   4.  Obstructive sleep apnea.   5.  Asthma.   6.  Status post gastric bypass surgery.   7.  Status post hysterectomy.   8.  Status post  section.      PHYSICAL EXAMINATION:   VITAL SIGNS:  Blood pressure 94/61; pulse 101; height 1.65 meters; weight 122 kg.     GENERAL:  Obese woman who presents to the examination alone, awake and alert and in no acute distress.     MUSCULOSKELETAL:  She is tender to pressure over essentially all of the traditional fibromyalgia trigger points that I tested, including the head of the radius, medial knee and trapezius muscles bilaterally.     NEUROLOGIC:   MENTAL STATUS:  Alert and oriented times four.   CRANIAL NERVES:  Visual fields are full to confrontation.  Extraocular movements are intact with no internuclear ophthalmoplegia.  Facial strength is normal.  Hearing is normal for conversational purposes.  Palate elevation and tongue protrusion are normal.   POWER:  Strength is normal (5/5) in the following muscle/groups bilaterally:  deltoids, biceps, triceps, wrist extensors, hip flexors, hamstrings and anterior tibialis.  The right finger extensors and first dorsal interosseous grade 4/5 with these muscles being normal on the left.   REFLEXES:  Reflexes are roughly symmetric and within normal limits at biceps, triceps, brachioradialis, knees and ankles.   MOTOR/CEREBELLAR:  There are no tremors, myoclonus or other abnormal movements.  Tone is grossly normal in limbs.  There is no appendicular ataxia on finger-to-nose testing and rapid alternating movements are normal in the extremities.   GAIT:  The patient is able to ambulate on a flat, level surface without loss of postural stability.  She can walk on her toes.  She is able to walk on heels from  a standpoint of strength but is moderately unsteady in this posture.  Tandem gait is mildly to moderately impaired for age.      Investigations: I reviewed MRI scans of the brain and cervical spine performed earlier today.  Again, seen are several periventricular and juxtacortical foci of T2 hyperintensity in the white matter of the cerebral hemispheres bilaterally, most prominently posterior to the right lateral ventricle in the parietal deep white matter.  These are unchanged in comparison to earlier MRI of 05/19/2016 and there is no abnormal enhancement pattern with contrast.  MRI scan of the cervical spine continues to show no evidence of demyelinating disease in the cervical cord parenchyma and there is no abnormal enhancement with contrast.      Assessment/plan:    1.  Relapsing-remitting multiple sclerosis  The patient's MRI imaging is entirely stable in comparison to last year.  Accordingly, I do not see any objective evidence that her several complaints are secondary to new active inflammatory demyelination.  She will continue glatiramer acetate and we will see her back in 6 months for a review.     She currently reports that she is taking vitamin D 50,000 international units 3 times weekly.  Vitamin D level today was 37 mcg/L.  I suspect that there is an element of malabsorption in the setting of her previous gastric bypass surgery.  We will advise her to increase her supplement to 4 times weekly.     2.  Diffuse pain  Her history and examination would be consistent with a myofascial pain syndrome in the fibromyalgia spectrum.  As with most patients with this type of presentation, she is physically inactive and has significant sleep disturbance.  I explained to the patient that improvement in this type of discomfort typically requires a graded program of increase in physical activity and treating any sleep disturbance that is present.  Medications for neuropathic pain can also be helpful as adjuncts, but  rarely resolve the problem in isolation.     I have recommended a physical therapy evaluation to the patient to address her neck pain and work on designing an appropriate home maintenance activity program.  I have also suggested to her that she try restarting nortriptyline at 25 mg at bedtime and then increasing to 50 mg after a couple weeks as this may be beneficial in consolidating her sleep.  If she is again unable to tolerate this, other medications such as duloxetine or pregabalin can be considered.         ZACHARY HACKETT MD             D: 2017 08:38   T: 2017 09:02   MT: NADYA      Name:     RACHEL YANG   MRN:      [id number]        Account:      [id number]   :      1975           Service Date: 10/26/2017      Document: [id number]

## 2018-01-03 ENCOUNTER — OFFICE VISIT (OUTPATIENT)
Dept: FAMILY MEDICINE | Facility: CLINIC | Age: 43
End: 2018-01-03
Payer: COMMERCIAL

## 2018-01-03 VITALS
WEIGHT: 266.25 LBS | TEMPERATURE: 98.7 F | DIASTOLIC BLOOD PRESSURE: 68 MMHG | SYSTOLIC BLOOD PRESSURE: 104 MMHG | HEART RATE: 115 BPM | OXYGEN SATURATION: 99 % | HEIGHT: 65 IN | BODY MASS INDEX: 44.36 KG/M2

## 2018-01-03 DIAGNOSIS — D51.8 OTHER VITAMIN B12 DEFICIENCY ANEMIA: ICD-10-CM

## 2018-01-03 DIAGNOSIS — Z98.84 BARIATRIC SURGERY STATUS: ICD-10-CM

## 2018-01-03 DIAGNOSIS — G35 MS (MULTIPLE SCLEROSIS) (H): ICD-10-CM

## 2018-01-03 DIAGNOSIS — E55.9 HYPOVITAMINOSIS D: ICD-10-CM

## 2018-01-03 DIAGNOSIS — L30.9 ECZEMA, UNSPECIFIED TYPE: ICD-10-CM

## 2018-01-03 DIAGNOSIS — D64.9 ANEMIA, NORMOCYTIC NORMOCHROMIC: ICD-10-CM

## 2018-01-03 DIAGNOSIS — B00.9 HSV (HERPES SIMPLEX VIRUS) INFECTION: ICD-10-CM

## 2018-01-03 DIAGNOSIS — N28.9 DECREASED RENAL FUNCTION: ICD-10-CM

## 2018-01-03 DIAGNOSIS — D84.9 IMMUNOSUPPRESSION (H): Primary | ICD-10-CM

## 2018-01-03 DIAGNOSIS — Z23 NEED FOR PROPHYLACTIC VACCINATION AND INOCULATION AGAINST INFLUENZA: ICD-10-CM

## 2018-01-03 DIAGNOSIS — E83.41 HYPERMAGNESEMIA: ICD-10-CM

## 2018-01-03 DIAGNOSIS — R25.2 CRAMP OF LIMB: ICD-10-CM

## 2018-01-03 LAB
ALBUMIN SERPL-MCNC: 3.9 G/DL (ref 3.4–5)
ALP SERPL-CCNC: 61 U/L (ref 40–150)
ALT SERPL W P-5'-P-CCNC: 20 U/L (ref 0–50)
ANION GAP SERPL CALCULATED.3IONS-SCNC: 6 MMOL/L (ref 3–14)
AST SERPL W P-5'-P-CCNC: 14 U/L (ref 0–45)
BASOPHILS # BLD AUTO: 0 10E9/L (ref 0–0.2)
BASOPHILS NFR BLD AUTO: 0.4 %
BILIRUB SERPL-MCNC: 0.2 MG/DL (ref 0.2–1.3)
BUN SERPL-MCNC: 22 MG/DL (ref 7–30)
CALCIUM SERPL-MCNC: 9 MG/DL (ref 8.5–10.1)
CHLORIDE SERPL-SCNC: 105 MMOL/L (ref 94–109)
CO2 SERPL-SCNC: 25 MMOL/L (ref 20–32)
CREAT SERPL-MCNC: 1.3 MG/DL (ref 0.52–1.04)
DEPRECATED CALCIDIOL+CALCIFEROL SERPL-MC: 31 UG/L (ref 20–75)
DIFFERENTIAL METHOD BLD: ABNORMAL
EOSINOPHIL # BLD AUTO: 0.3 10E9/L (ref 0–0.7)
EOSINOPHIL NFR BLD AUTO: 5.6 %
ERYTHROCYTE [DISTWIDTH] IN BLOOD BY AUTOMATED COUNT: 12.3 % (ref 10–15)
FERRITIN SERPL-MCNC: 78 NG/ML (ref 12–150)
GFR SERPL CREATININE-BSD FRML MDRD: 45 ML/MIN/1.7M2
GLUCOSE SERPL-MCNC: 96 MG/DL (ref 70–99)
HCT VFR BLD AUTO: 33.1 % (ref 35–47)
HGB BLD-MCNC: 10.6 G/DL (ref 11.7–15.7)
IRON SATN MFR SERPL: 24 % (ref 15–46)
IRON SERPL-MCNC: 70 UG/DL (ref 35–180)
LYMPHOCYTES # BLD AUTO: 1.9 10E9/L (ref 0.8–5.3)
LYMPHOCYTES NFR BLD AUTO: 33.5 %
MAGNESIUM SERPL-MCNC: 2.6 MG/DL (ref 1.6–2.3)
MCH RBC QN AUTO: 30.3 PG (ref 26.5–33)
MCHC RBC AUTO-ENTMCNC: 32 G/DL (ref 31.5–36.5)
MCV RBC AUTO: 95 FL (ref 78–100)
MONOCYTES # BLD AUTO: 0.5 10E9/L (ref 0–1.3)
MONOCYTES NFR BLD AUTO: 9.1 %
NEUTROPHILS # BLD AUTO: 2.9 10E9/L (ref 1.6–8.3)
NEUTROPHILS NFR BLD AUTO: 51.4 %
PHOSPHATE SERPL-MCNC: 2.6 MG/DL (ref 2.5–4.5)
PLATELET # BLD AUTO: 412 10E9/L (ref 150–450)
POTASSIUM SERPL-SCNC: 4.5 MMOL/L (ref 3.4–5.3)
PROT SERPL-MCNC: 8.1 G/DL (ref 6.8–8.8)
PTH-INTACT SERPL-MCNC: 66 PG/ML (ref 12–72)
RBC # BLD AUTO: 3.5 10E12/L (ref 3.8–5.2)
SODIUM SERPL-SCNC: 136 MMOL/L (ref 133–144)
TIBC SERPL-MCNC: 296 UG/DL (ref 240–430)
TSH SERPL DL<=0.005 MIU/L-ACNC: 2.83 MU/L (ref 0.4–4)
VIT B12 SERPL-MCNC: 3122 PG/ML (ref 193–986)
WBC # BLD AUTO: 5.7 10E9/L (ref 4–11)

## 2018-01-03 PROCEDURE — 82607 VITAMIN B-12: CPT | Performed by: PHYSICIAN ASSISTANT

## 2018-01-03 PROCEDURE — 82306 VITAMIN D 25 HYDROXY: CPT | Performed by: PHYSICIAN ASSISTANT

## 2018-01-03 PROCEDURE — 83735 ASSAY OF MAGNESIUM: CPT | Performed by: PHYSICIAN ASSISTANT

## 2018-01-03 PROCEDURE — 99205 OFFICE O/P NEW HI 60 MIN: CPT | Performed by: PHYSICIAN ASSISTANT

## 2018-01-03 PROCEDURE — 83550 IRON BINDING TEST: CPT | Performed by: PHYSICIAN ASSISTANT

## 2018-01-03 PROCEDURE — 80050 GENERAL HEALTH PANEL: CPT | Performed by: PHYSICIAN ASSISTANT

## 2018-01-03 PROCEDURE — 83540 ASSAY OF IRON: CPT | Performed by: PHYSICIAN ASSISTANT

## 2018-01-03 PROCEDURE — 90686 IIV4 VACC NO PRSV 0.5 ML IM: CPT | Performed by: PHYSICIAN ASSISTANT

## 2018-01-03 PROCEDURE — 36415 COLL VENOUS BLD VENIPUNCTURE: CPT | Performed by: PHYSICIAN ASSISTANT

## 2018-01-03 PROCEDURE — 84100 ASSAY OF PHOSPHORUS: CPT | Performed by: PHYSICIAN ASSISTANT

## 2018-01-03 PROCEDURE — 82728 ASSAY OF FERRITIN: CPT | Performed by: PHYSICIAN ASSISTANT

## 2018-01-03 PROCEDURE — 83970 ASSAY OF PARATHORMONE: CPT | Performed by: PHYSICIAN ASSISTANT

## 2018-01-03 PROCEDURE — 90471 IMMUNIZATION ADMIN: CPT | Performed by: PHYSICIAN ASSISTANT

## 2018-01-03 RX ORDER — VALACYCLOVIR HYDROCHLORIDE 1 G/1
1000 TABLET, FILM COATED ORAL 2 TIMES DAILY
Qty: 20 TABLET | Refills: 0 | Status: SHIPPED | OUTPATIENT
Start: 2018-01-03 | End: 2019-03-20

## 2018-01-03 RX ORDER — ACYCLOVIR 400 MG/1
400 TABLET ORAL
COMMUNITY
Start: 2017-12-20 | End: 2018-01-03 | Stop reason: ALTCHOICE

## 2018-01-03 RX ORDER — TOPIRAMATE 25 MG/1
25 TABLET, FILM COATED ORAL AT BEDTIME
Qty: 70 TABLET | Refills: 0 | COMMUNITY
Start: 2018-01-03 | End: 2018-05-31

## 2018-01-03 RX ORDER — TRIAMCINOLONE ACETONIDE 1 MG/G
CREAM TOPICAL
Qty: 30 G | Refills: 1 | Status: SHIPPED | OUTPATIENT
Start: 2018-01-03 | End: 2018-01-25

## 2018-01-03 RX ORDER — ERGOCALCIFEROL 1.25 MG/1
CAPSULE, LIQUID FILLED ORAL
COMMUNITY
Start: 2017-11-01 | End: 2018-01-03

## 2018-01-03 RX ORDER — TOPIRAMATE 100 MG/1
100 TABLET, FILM COATED ORAL DAILY
Qty: 60 TABLET | COMMUNITY
Start: 2018-01-03 | End: 2018-05-31

## 2018-01-03 ASSESSMENT — PATIENT HEALTH QUESTIONNAIRE - PHQ9
5. POOR APPETITE OR OVEREATING: SEVERAL DAYS
SUM OF ALL RESPONSES TO PHQ QUESTIONS 1-9: 4

## 2018-01-03 ASSESSMENT — ANXIETY QUESTIONNAIRES
3. WORRYING TOO MUCH ABOUT DIFFERENT THINGS: SEVERAL DAYS
6. BECOMING EASILY ANNOYED OR IRRITABLE: NOT AT ALL
IF YOU CHECKED OFF ANY PROBLEMS ON THIS QUESTIONNAIRE, HOW DIFFICULT HAVE THESE PROBLEMS MADE IT FOR YOU TO DO YOUR WORK, TAKE CARE OF THINGS AT HOME, OR GET ALONG WITH OTHER PEOPLE: SOMEWHAT DIFFICULT
GAD7 TOTAL SCORE: 4
1. FEELING NERVOUS, ANXIOUS, OR ON EDGE: SEVERAL DAYS
2. NOT BEING ABLE TO STOP OR CONTROL WORRYING: SEVERAL DAYS
7. FEELING AFRAID AS IF SOMETHING AWFUL MIGHT HAPPEN: NOT AT ALL
5. BEING SO RESTLESS THAT IT IS HARD TO SIT STILL: NOT AT ALL

## 2018-01-03 NOTE — NURSING NOTE
"Chief Complaint   Patient presents with     Mouth Lesions     cold sores on lips ~3-4 weeks used OTC cream - not helpful. Has tried acyclovir in past also.        Initial /68  Pulse 115  Temp 98.7  F (37.1  C) (Tympanic)  Ht 5' 5\" (1.651 m)  Wt 266 lb 4 oz (120.8 kg)  SpO2 99%  BMI 44.31 kg/m2 Estimated body mass index is 44.31 kg/(m^2) as calculated from the following:    Height as of this encounter: 5' 5\" (1.651 m).    Weight as of this encounter: 266 lb 4 oz (120.8 kg).  Medication Reconciliation: complete    "

## 2018-01-03 NOTE — PROGRESS NOTES
SUBJECTIVE:   Josefina Aldrich is a 42 year old female who presents to clinic today for the following health issues:    Lesions  Josefina presents to clinic reporting a few cold sores on her lip that she first noticed 3-4 weeks ago. She has been treating them with OTC cream which has been unsuccessful. In the past she has treated her cold sores with acyclovir which has been somewhat successful.     MS Rocha also has a PMH significant for multiple sclerosis. In the same timeframe as her cold sores started to break out she has also noticed an itchy rash on her left had that she has treated with a steroid cream. The cream has helped but the rash continues to persist. In addition she has noticed that her blood pressures have been running low (is on lisinopril-hydrochlorothiazide 10-12.5 mg), she has felt light headed, dizzy, has had decreased appetite, and has had some numbness and tingling in her extremities similar to MS relapses in the past. She denies nausea, vomiting, urinary symptoms, and abnormal bowel movements. She also reports that she has had increased muscle spasms in the last few months. She is seen by MS specialists at [facility].     Of note Josefina has a past surgical history of gastric bypass and has been found to be deficient in Vitamin D and B12 and has also been anemic at times.    Asthma  Josefina has a history of asthma that she reports is flared with exercise and cold air. She has been using her albuterol inhaler to treat her symptoms.    ACT Total Scores 1/3/2018   ACT TOTAL SCORE (Goal Greater than or Equal to 20) 20   In the past 12 months, how many times did you visit the emergency room for your asthma without being admitted to the hospital? 0   In the past 12 months, how many times were you hospitalized overnight because of your asthma? 0       Problem list and histories reviewed & adjusted, as indicated.  Additional history: as documented    Patient Active Problem List   Diagnosis      "Migraine     HTN (hypertension)     Asthma     MS (multiple sclerosis) (H)     Left pontine stroke (H)     Anemia     Backache     Body mass index 45.0-49.9, adult (H)     Cognitive changes     Depression with anxiety     Fever postop     Uterine leiomyoma     Headache     Heavy menses     Hypersomnia with sleep apnea     Iron deficiency anemia     Leukocytosis     Postsurgical nonabsorption     Mild intermittent asthma     Morbid obesity (H)     Myalgia and myositis     Neck pain     Other dyspnea and respiratory abnormality     Pain, neuropathic     Pelvic pain in female     Personal history of allergy to latex     Post concussion syndrome     Right shoulder pain     S/P gastric bypass     S/P hysterectomy     Bariatric surgery status     Vitamin B12 deficiency     Vitamin D deficiency     Vomiting following gastrointestinal surgery     Past Surgical History:   Procedure Laterality Date     GASTRIC BYPASS  2002    \"Trouble with B12 and D\"     HYSTERECTOMY, MONO  2013    cervix gone.  fibroids.  No BSO     TONSILLECTOMY         Social History   Substance Use Topics     Smoking status: Light Tobacco Smoker     Types: Cigars     Smokeless tobacco: Never Used      Comment: NO AS OFTEN AS BEFORE     Alcohol use 1.2 oz/week     2 Standard drinks or equivalent per week      Comment: ON WEEKEND     Family History   Problem Relation Age of Onset     Lupus Paternal Aunt 41     Multiple Sclerosis No family hx of          Current Outpatient Prescriptions   Medication Sig Dispense Refill     Ferrous Sulfate (IRON SUPPLEMENT PO)        Cyanocobalamin (VITAMIN B-12 PO)        topiramate (TOPAMAX) 25 MG tablet Take 1 tablet (25 mg) by mouth At Bedtime (Take with 100 mg to total 125 mg) 70 tablet 0     topiramate (TOPAMAX) 100 MG tablet Take 1 tablet (100 mg) by mouth daily (Take with 25 mg to total 125 mg) 60 tablet      triamcinolone (KENALOG) 0.1 % cream Apply sparingly to affected area three times daily for 14 days. 30 g 1     " valACYclovir (VALTREX) 1000 mg tablet Take 1 tablet (1,000 mg) by mouth 2 times daily for 10 days 20 tablet 0     meclizine (ANTIVERT) 25 MG tablet Take 25 mg by mouth daily as needed       cyclobenzaprine (FLEXERIL) 10 MG tablet Take 10 mg by mouth 3 times daily as needed       Glatiramer Acetate (COPAXONE) 40 MG/ML SOSY Inject 40 mg Subcutaneous three times a week 12 Syringe 11     nortriptyline (PAMELOR) 25 MG capsule Take one capsule at bedtime for 2 weeks, can increase to 2 capsules as needed 60 capsule 5     lisinopril-hydrochlorothiazide (PRINZIDE,ZESTORETIC) 10-12.5 MG per tablet Take 1 tablet by mouth daily       IBUPROFEN PO Take 800 mg by mouth every 4 hours as needed for moderate pain        albuterol (2.5 MG/3ML) 0.083% nebulizer solution Inhale 2.5 mg into the lungs       albuterol (VENTOLIN HFA) 108 (90 BASE) MCG/ACT inhaler Inhale 2 puffs into the lungs       [DISCONTINUED] topiramate (TOPAMAX) 100 MG tablet Take 100 mg by mouth daily        Allergies   Allergen Reactions     Adhesive Tape      Azithromycin Unknown     Latex Hives and Itching     Aspirin Difficulty breathing and Palpitations     Penicillins Cramps, GI Disturbance, Nausea and Vomiting, Rash and Swelling       Reviewed and updated as needed this visit by clinical staff  Tobacco  Allergies  Meds  Problems  Med Hx  Surg Hx  Fam Hx  Soc Hx        Reviewed and updated as needed this visit by Provider  Tobacco  Allergies  Meds  Problems  Med Hx  Surg Hx  Fam Hx  Soc Hx        ROS:  Constitutional, HEENT, cardiovascular, pulmonary, GI, , musculoskeletal, neuro, skin, endocrine and psych systems are negative, except as otherwise noted.    This document serves as a record of the services and decisions personally performed and made by Miya Crump PA-C. It was created on her behalf by Talat Cheney, a trained medical scribe. The creation of this document is based on the provider's statements to the medical  "kym  Talat Cheney 7:57 AM January 3, 2018    OBJECTIVE:   /68  Pulse 115  Temp 98.7  F (37.1  C) (Tympanic)  Ht 5' 5\" (1.651 m)  Wt 266 lb 4 oz (120.8 kg)  SpO2 99%  BMI 44.31 kg/m2  Body mass index is 44.31 kg/(m^2).  GENERAL: healthy, alert and no distress  HENT: ear canals and TM's normal, nose and mouth without ulcers or lesions  NECK: no adenopathy, no asymmetry, masses, or scars, thyroid normal to palpation  RESP: lungs clear to auscultation - no rales, rhonchi or wheezes  CV: regular rates and rhythm, normal S1 S2, no S3 or S4, no murmur, click or rub, no irregular beats  ABDOMEN: soft, nontender, without hepatosplenomegaly or masses, bowel sounds normal  SKIN: early ulcerative formation on right and left side of upper lip, hyperpigmented area along thenar eminence with some pustular formation overlying, otherwise no suspicious lesions or rashes  PSYCH: mentation appears normal, affect normal/bright    Diagnostic Test Results:  Results for orders placed or performed in visit on 01/03/18 (from the past 24 hour(s))   CBC with platelets and differential   Result Value Ref Range    WBC 5.7 4.0 - 11.0 10e9/L    RBC Count 3.50 (L) 3.8 - 5.2 10e12/L    Hemoglobin 10.6 (L) 11.7 - 15.7 g/dL    Hematocrit 33.1 (L) 35.0 - 47.0 %    MCV 95 78 - 100 fl    MCH 30.3 26.5 - 33.0 pg    MCHC 32.0 31.5 - 36.5 g/dL    RDW 12.3 10.0 - 15.0 %    Platelet Count 412 150 - 450 10e9/L    Diff Method Automated Method     % Neutrophils 51.4 %    % Lymphocytes 33.5 %    % Monocytes 9.1 %    % Eosinophils 5.6 %    % Basophils 0.4 %    Absolute Neutrophil 2.9 1.6 - 8.3 10e9/L    Absolute Lymphocytes 1.9 0.8 - 5.3 10e9/L    Absolute Monocytes 0.5 0.0 - 1.3 10e9/L    Absolute Eosinophils 0.3 0.0 - 0.7 10e9/L    Absolute Basophils 0.0 0.0 - 0.2 10e9/L       ASSESSMENT/PLAN:   Josefina was seen today for mouth lesions.    Diagnoses and all orders for this visit:    Immunosuppression (H), MS (multiple sclerosis) " (H)  Stable. Patient is followed by MS specialists at [facility].    Cramp of limb  Stop furosemide due to possibility of causing ion imbalance. Will monitor labs for further diagnostics.  -     Comprehensive metabolic panel  -     Ferritin  -     Magnesium  -     Phosphorus  -     Vitamin D Deficiency  -     TSH with free T4 reflex    Eczema, unspecified type  Start triamcinolone cream to treat eczema of left hand. Follow up if symptoms persist or worsen.   -     triamcinolone (KENALOG) 0.1 % cream; Apply sparingly to affected area three times daily for 14 days.    HSV (herpes simplex virus) infection  Start valacyclovir to treat cold sores and stop acyclovir due to the medication being ineffective for patient.  -     valACYclovir (VALTREX) 1000 mg tablet; Take 1 tablet (1,000 mg) by mouth 2 times daily for 10 days    Bariatric surgery status, Other vitamin B12 deficiency anemia, Hypovitaminosis D  Stable. Will monitor lab values for current status. Discussed with patient possibility of infusion to stabilize levels. Will update patient with results and future plan.   -     Iron and iron binding capacity  -     Vitamin B12  -     CBC with platelets and differential  -     Parathyroid Hormone Intact    Need for prophylactic vaccination and inoculation against influenza  Flu vaccine administered upon exam today.   -     FLU VAC, SPLIT VIRUS IM > 3 YO (QUADRIVALENT) [32417]  -     Vaccine Administration, Initial [35927]      Greater than 60 minutes were spent with the patient. The majority of this time was coordinating care and counseling regarding the above diagnosis .      The information in this document, created by the medical scribe for me, accurately reflects the services I personally performed and the decisions made by me. I have reviewed and approved this document for accuracy prior to leaving the patient care area.  January 3, 2018 7:58 AM    Miya Crump PA-C  [facility]

## 2018-01-03 NOTE — PROGRESS NOTES

## 2018-01-03 NOTE — MR AVS SNAPSHOT
"              After Visit Summary   1/3/2018    Josefina Aldrich    MRN: [id number]           Patient Information     Date Of Birth          1975        Visit Information        Provider Department      1/3/2018 7:40 AM Miya Crump PA-C [facility]        Today's Diagnoses     Immunosuppression (H)    -  1    Cramp of limb        Eczema, unspecified type        MS (multiple sclerosis) (H)        Other vitamin B12 deficiency anemia        HSV (herpes simplex virus) infection        Bariatric surgery status        Hypovitaminosis D           Follow-ups after your visit        Your next 10 appointments already scheduled     Apr 19, 2018  4:30 PM CDT   (Arrive by 4:15 PM)   Return Multiple Sclerosis with Radames Castaneda MD   [facility] Multiple Sclerosis ([facility])    [address] [place] [id number] [phone]              Who to contact     If you have questions or need follow up information about today's clinic visit or your schedule please contact [facility] directly at [phone].  Normal or non-critical lab and imaging results will be communicated to you by MyChart, letter or phone within 4 business days after the clinic has received the results. If you do not hear from us within 7 days, please contact the clinic through OutTrippinhart or phone. If you have a critical or abnormal lab result, we will notify you by phone as soon as possible.  Submit refill requests through [employer]iipa or call your pharmacy and they will forward the refill request to us. Please allow 3 business days for your refill to be completed.          Additional Information About Your Visit        MyChart Information     [employer]a lets you send messages to your doctor, view your test results, renew your prescriptions, schedule appointments and more. To sign up, go to [email or web address] . Click on \"Log in\" on the left side of the screen, " "which will take you to the Welcome page. Then click on \"Sign up Now\" on the right side of the page.     You will be asked to enter the access code listed below, as well as some personal information. Please follow the directions to create your username and password.     Your access code is: [email or web address]  Expires: 1/10/2018  5:31 AM     Your access code will  in 90 days. If you need help or a new code, please call your [place] clinic or [phone].        Care EveryWhere ID     This is your Care EveryWhere ID. This could be used by other organizations to access your [place] medical records  [id number]        Your Vitals Were     Pulse Temperature Height Pulse Oximetry BMI (Body Mass Index)       115 98.7  F (37.1  C) (Tympanic) 5' 5\" (1.651 m) 99% 44.31 kg/m2        Blood Pressure from Last 3 Encounters:   18 104/68   10/26/17 94/61   17 114/73    Weight from Last 3 Encounters:   18 266 lb 4 oz (120.8 kg)   10/26/17 269 lb (122 kg)   17 247 lb (112 kg)              We Performed the Following     CBC with platelets and differential     Comprehensive metabolic panel     Ferritin     Iron and iron binding capacity     Magnesium     Parathyroid Hormone Intact     Phosphorus     TSH with free T4 reflex     Vitamin B12     Vitamin D Deficiency          Today's Medication Changes          These changes are accurate as of: 1/3/18  8:31 AM.  If you have any questions, ask your nurse or doctor.               Start taking these medicines.        Dose/Directions    triamcinolone 0.1 % cream   Commonly known as:  KENALOG   Used for:  Eczema, unspecified type   Started by:  Miya Crump PA-C        Apply sparingly to affected area three times daily for 14 days.   Quantity:  30 g   Refills:  1       valACYclovir 1000 mg tablet   Commonly known as:  VALTREX   Used for:  HSV (herpes simplex virus) infection   Started by:  Miya Crump PA-C        Dose:  1000 mg   Take 1 tablet (1,000 " mg) by mouth 2 times daily for 10 days   Quantity:  20 tablet   Refills:  0         These medicines have changed or have updated prescriptions.        Dose/Directions    * TOPAMAX 25 MG tablet   This may have changed:  Another medication with the same name was changed. Make sure you understand how and when to take each.   Generic drug:  topiramate   Changed by:  Miya Crump PA-C        Dose:  25 mg   Take 1 tablet (25 mg) by mouth At Bedtime (Take with 100 mg to total 125 mg)   Quantity:  70 tablet   Refills:  0       * TOPAMAX 100 MG tablet   This may have changed:  additional instructions   Generic drug:  topiramate   Changed by:  Miya Crump PA-C        Dose:  100 mg   Take 1 tablet (100 mg) by mouth daily (Take with 25 mg to total 125 mg)   Quantity:  60 tablet   Refills:  0       * Notice:  This list has 2 medication(s) that are the same as other medications prescribed for you. Read the directions carefully, and ask your doctor or other care provider to review them with you.      Stop taking these medicines if you haven't already. Please contact your care team if you have questions.     acyclovir 400 MG tablet   Commonly known as:  ZOVIRAX   Stopped by:  Miya Crump PA-C                Where to get your medicines      These medications were sent to [facility] [address] [place] [address] AT [place] [address] & [address] [address]    Hours:  24-hours Phone:  [phone]     triamcinolone 0.1 % cream    valACYclovir 1000 mg tablet                Primary Care Provider Office Phone # Fax #    [place] Norton [phone]       [facility] [address] [place] [id number]        Equal Access to Services     [facility] JOSH AH: Hadii jimmie Serrano, wagoldenda nupur, qaybta kaalnaila alas, mya [place]terson. So [facility] [phone].    ATENCIÓN: Si pablo dunaway barrera  disposición servicios gratuitos de asistencia lingüística. Karen garcia [phone].    We comply with applicable federal civil rights laws and [place] laws. We do not discriminate on the basis of race, color, national origin, age, disability, sex, sexual orientation, or gender identity.            Thank you!     Thank you for choosing [facility]  for your care. Our goal is always to provide you with excellent care. Hearing back from our patients is one way we can continue to improve our services. Please take a few minutes to complete the written survey that you may receive in the mail after your visit with us. Thank you!             Your Updated Medication List - Protect others around you: Learn how to safely use, store and throw away your medicines at www.disposemymeds.org.          This list is accurate as of: 1/3/18  8:31 AM.  Always use your most recent med list.                   Brand Name Dispense Instructions for use Diagnosis    * VENTOLIN  (90 BASE) MCG/ACT Inhaler   Generic drug:  albuterol      Inhale 2 puffs into the lungs        * albuterol (2.5 MG/3ML) 0.083% neb solution      Inhale 2.5 mg into the lungs        cyclobenzaprine 10 MG tablet    FLEXERIL     Take 10 mg by mouth 3 times daily as needed        Glatiramer Acetate 40 MG/ML Sosy    COPAXONE    12 Syringe    Inject 40 mg Subcutaneous three times a week    Multiple sclerosis (H)       IBUPROFEN PO      Take 800 mg by mouth every 4 hours as needed for moderate pain        IRON SUPPLEMENT PO           lisinopril-hydrochlorothiazide 10-12.5 MG per tablet    PRINZIDE/ZESTORETIC     Take 1 tablet by mouth daily        meclizine 25 MG tablet    ANTIVERT     Take 25 mg by mouth daily as needed        nortriptyline 25 MG capsule    PAMELOR    60 capsule    Take one capsule at bedtime for 2 weeks, can increase to 2 capsules as needed    Migraine without aura and without status migrainosus, not intractable       * TOPAMAX 25 MG  tablet   Generic drug:  topiramate     70 tablet    Take 1 tablet (25 mg) by mouth At Bedtime (Take with 100 mg to total 125 mg)        * TOPAMAX 100 MG tablet   Generic drug:  topiramate     60 tablet    Take 1 tablet (100 mg) by mouth daily (Take with 25 mg to total 125 mg)        triamcinolone 0.1 % cream    KENALOG    30 g    Apply sparingly to affected area three times daily for 14 days.    Eczema, unspecified type       valACYclovir 1000 mg tablet    VALTREX    20 tablet    Take 1 tablet (1,000 mg) by mouth 2 times daily for 10 days    HSV (herpes simplex virus) infection       VITAMIN B-12 PO           * Notice:  This list has 4 medication(s) that are the same as other medications prescribed for you. Read the directions carefully, and ask your doctor or other care provider to review them with you.

## 2018-01-04 ASSESSMENT — ANXIETY QUESTIONNAIRES: GAD7 TOTAL SCORE: 4

## 2018-01-04 ASSESSMENT — ASTHMA QUESTIONNAIRES: ACT_TOTALSCORE: 20

## 2018-01-10 ENCOUNTER — MYC MEDICAL ADVICE (OUTPATIENT)
Dept: FAMILY MEDICINE | Facility: CLINIC | Age: 43
End: 2018-01-10

## 2018-01-10 PROBLEM — E83.41 HYPERMAGNESEMIA: Status: ACTIVE | Noted: 2018-01-10

## 2018-01-10 NOTE — TELEPHONE ENCOUNTER
Please see my chart message below     Please advise     Thank you      Amelia Chance RN, BSN  [place] [facility]

## 2018-01-10 NOTE — PROGRESS NOTES
Triage: please inform pt of results and f/u plan    Josefina  I have reviewed your recent labs. Here are the results:    -Liver and gallbladder tests (ALT,AST, Alk phos,bilirubin) are normal.  -Kidney function (GFR) is decreased.  ADVISE: stopping the furosemide as we discussed and rechecking this in 1 month.  -Sodium is normal.  -Potassium is normal.  -Glucose (diabetic screening test) is normal.  -TSH (thyroid stimulating hormone) level is normal which indicates normal thyroid function.  -Hemoglobin is decreased indicating anemia.  Anemia can cause fatigue and, occasionally, light-headedness.  However, your iron levels are normal and your B12 is as well.  I would like to have you repeat a few labs to look into this further (you can do this in 1 month to see how your kidney function has improved.  -White blood cell and platelet counts are normal.  -Ferritin (iron) level is normal.  Continue your oral iron supplement.  -B12 level is too high, please decrease this to once a day (I believe you said you were taking it 3x/daily) and we can recheck in 1 month.  -Vitamin D level is too low with your MS.  I would like you to start taking a daily OTC supplement of Vitamin D3 5000 IU daily.  -Magnesium supplement is a bit on the high side, please stop any supplements of this medication if you are taking them.  -Phosphorus level is normal.  -Parathyroid hormone is normal.    For additional lab test information, labtestsonline.org is an excellent reference.     If you have any questions please do not hesitate to contact our office via phone ([phone]) or MyChart.    Miya Crump, MS, PA-C  [facility]

## 2018-01-12 ENCOUNTER — TELEPHONE (OUTPATIENT)
Dept: FAMILY MEDICINE | Facility: CLINIC | Age: 43
End: 2018-01-12

## 2018-01-12 NOTE — TELEPHONE ENCOUNTER
Reason for Call:  Other returning call    Detailed comments: Pt states was left message to call nurses    --lab results    Phone Number Patient can be reached at: Home number on file [phone] (home)    Best Time: anytime     Can we leave a detailed message on this number? YES    Call taken on 1/12/2018 at 7:50 AM by Mis Rao

## 2018-01-25 ENCOUNTER — MYC MEDICAL ADVICE (OUTPATIENT)
Dept: FAMILY MEDICINE | Facility: CLINIC | Age: 43
End: 2018-01-25

## 2018-01-25 DIAGNOSIS — L98.9 SKIN LESION: Primary | ICD-10-CM

## 2018-01-25 DIAGNOSIS — L30.9 ECZEMA, UNSPECIFIED TYPE: ICD-10-CM

## 2018-01-25 DIAGNOSIS — B00.9 HSV (HERPES SIMPLEX VIRUS) INFECTION: ICD-10-CM

## 2018-01-25 RX ORDER — TRIAMCINOLONE ACETONIDE 1 MG/G
CREAM TOPICAL
Qty: 30 G | Refills: 1 | Status: SHIPPED | OUTPATIENT
Start: 2018-01-25 | End: 2019-07-23

## 2018-01-25 RX ORDER — VALACYCLOVIR HYDROCHLORIDE 500 MG/1
500 TABLET, FILM COATED ORAL DAILY
Qty: 90 TABLET | Refills: 1 | Status: SHIPPED | OUTPATIENT
Start: 2018-01-25 | End: 2018-11-04

## 2018-02-05 ENCOUNTER — MYC MEDICAL ADVICE (OUTPATIENT)
Dept: FAMILY MEDICINE | Facility: CLINIC | Age: 43
End: 2018-02-05

## 2018-02-05 DIAGNOSIS — I10 BENIGN ESSENTIAL HYPERTENSION: Primary | ICD-10-CM

## 2018-02-05 RX ORDER — LISINOPRIL/HYDROCHLOROTHIAZIDE 10-12.5 MG
1 TABLET ORAL DAILY
Qty: 90 TABLET | Refills: 1 | Status: SHIPPED | OUTPATIENT
Start: 2018-02-05 | End: 2018-08-21

## 2018-02-05 NOTE — TELEPHONE ENCOUNTER
Requested Prescriptions   Pending Prescriptions Disp Refills     lisinopril-hydrochlorothiazide (PRINZIDE/ZESTORETIC) 10-12.5 MG per tablet 30 tablet 0     Sig: Take 1 tablet by mouth daily    There is no refill protocol information for this order        Historical-  Last Written Prescription Date:  n/a  Last Fill Quantity: n/a,  # refills: n/a   Last Office Visit with [facility] provider:  1/3/2018   Future Office Visit:     Routing to PCP for further review/recommendations/orders.  Addie Teran RN  [facility] [place]

## 2018-02-15 ENCOUNTER — TELEPHONE (OUTPATIENT)
Dept: FAMILY MEDICINE | Facility: CLINIC | Age: 43
End: 2018-02-15

## 2018-02-15 NOTE — TELEPHONE ENCOUNTER
Vaginal Bleeding (Dysmenorrhea) and cramping    Onset: 2 days     After yoga, bending or push ups, feels a pain or cramping in pelvic area. Also in vaginal area they feel like they have irritation or yeast symptoms and some light pink spotting. Pt just started to be sexually active in the past couple months and hadn't been for over one year. Has not had intercourse for the last month due to these symptoms and last time they had intercourse, they bled and needed a panty liner.        Description:   Duration of bleeding episodes: none  Frequency between periods:  n/a  Describe bleeding/flow:   Clots: no  Number of pads/hour: 0-1- very light tinge of blood  Cramping: mild and after intercourse    Accompanying Signs & Symptoms:  Weakness: no  Lightheadedness: no  Hot flashes: no  Nosebleeds/Easy bruising: no. Slight briusing after injections for MS.  Vaginal Discharge: YES- clear/white. And itching. This started Monday of this week.     History:  No LMP recorded.  Previous normal periods: no, many fibroids and needed the hysterectomy  Contraceptive use: NO  Possibility of Pregnancy: no  Any bleeding after intercourse: YES  Age of first period (menarche): n/a  Abnormal PAP Smears: YES- bacterial infections     Precipitating factors:   n/a    Alleviating factors:  n/a    Therapies Tried and outcome: none     Had hysterectomy about 4-5 years ago through [facility].  Still has ovaries. Pt has MS and Fibromyalgia.     Advised pt to be seen for evaluation and possible swab for any vaginal infections. Scheduled pt with KR for tomorrow afternoon and advised the pt to call with any further sx or concerns. The patient indicates understanding of these issues and agrees with the plan.    Addie Teran RN  [place] [facility]

## 2018-02-16 ENCOUNTER — OFFICE VISIT (OUTPATIENT)
Dept: FAMILY MEDICINE | Facility: CLINIC | Age: 43
End: 2018-02-16
Payer: COMMERCIAL

## 2018-02-16 VITALS
WEIGHT: 260 LBS | OXYGEN SATURATION: 100 % | BODY MASS INDEX: 43.32 KG/M2 | TEMPERATURE: 97.4 F | SYSTOLIC BLOOD PRESSURE: 102 MMHG | DIASTOLIC BLOOD PRESSURE: 60 MMHG | HEIGHT: 65 IN | HEART RATE: 124 BPM

## 2018-02-16 DIAGNOSIS — E55.9 HYPOVITAMINOSIS D: ICD-10-CM

## 2018-02-16 DIAGNOSIS — E83.41 HYPERMAGNESEMIA: ICD-10-CM

## 2018-02-16 DIAGNOSIS — D64.9 ANEMIA, NORMOCYTIC NORMOCHROMIC: ICD-10-CM

## 2018-02-16 DIAGNOSIS — N89.8 VAGINAL DISCHARGE: Primary | ICD-10-CM

## 2018-02-16 DIAGNOSIS — N28.9 DECREASED RENAL FUNCTION: ICD-10-CM

## 2018-02-16 DIAGNOSIS — D51.8 OTHER VITAMIN B12 DEFICIENCY ANEMIA: ICD-10-CM

## 2018-02-16 DIAGNOSIS — R10.2 PELVIC PAIN IN FEMALE: ICD-10-CM

## 2018-02-16 DIAGNOSIS — R42 DIZZINESS: ICD-10-CM

## 2018-02-16 LAB
ALBUMIN UR-MCNC: NEGATIVE MG/DL
APPEARANCE UR: CLEAR
BASOPHILS # BLD AUTO: 0 10E9/L (ref 0–0.2)
BASOPHILS NFR BLD AUTO: 0.4 %
BILIRUB UR QL STRIP: ABNORMAL
COLOR UR AUTO: YELLOW
DIFFERENTIAL METHOD BLD: ABNORMAL
EOSINOPHIL # BLD AUTO: 0.3 10E9/L (ref 0–0.7)
EOSINOPHIL NFR BLD AUTO: 3.7 %
ERYTHROCYTE [DISTWIDTH] IN BLOOD BY AUTOMATED COUNT: 13 % (ref 10–15)
GLUCOSE UR STRIP-MCNC: NEGATIVE MG/DL
HCT VFR BLD AUTO: 33.3 % (ref 35–47)
HGB BLD-MCNC: 10.7 G/DL (ref 11.7–15.7)
HGB UR QL STRIP: NEGATIVE
KETONES UR STRIP-MCNC: ABNORMAL MG/DL
LEUKOCYTE ESTERASE UR QL STRIP: NEGATIVE
LYMPHOCYTES # BLD AUTO: 2.7 10E9/L (ref 0.8–5.3)
LYMPHOCYTES NFR BLD AUTO: 35 %
MCH RBC QN AUTO: 30.7 PG (ref 26.5–33)
MCHC RBC AUTO-ENTMCNC: 32.1 G/DL (ref 31.5–36.5)
MCV RBC AUTO: 96 FL (ref 78–100)
MONOCYTES # BLD AUTO: 0.6 10E9/L (ref 0–1.3)
MONOCYTES NFR BLD AUTO: 7.3 %
NEUTROPHILS # BLD AUTO: 4.1 10E9/L (ref 1.6–8.3)
NEUTROPHILS NFR BLD AUTO: 53.6 %
NITRATE UR QL: NEGATIVE
PH UR STRIP: 5 PH (ref 5–7)
PLATELET # BLD AUTO: 427 10E9/L (ref 150–450)
PLATELET # BLD EST: ABNORMAL 10*3/UL
RBC # BLD AUTO: 3.48 10E12/L (ref 3.8–5.2)
RBC MORPH BLD: ABNORMAL
RETICS # AUTO: 44.4 10E9/L (ref 25–95)
RETICS/RBC NFR AUTO: 1.3 % (ref 0.5–2)
SOURCE: ABNORMAL
SP GR UR STRIP: 1.02 (ref 1–1.03)
SPECIMEN SOURCE: NORMAL
UROBILINOGEN UR STRIP-ACNC: 0.2 EU/DL (ref 0.2–1)
VIT B12 SERPL-MCNC: 1771 PG/ML (ref 193–986)
WBC # BLD AUTO: 7.7 10E9/L (ref 4–11)
WET PREP SPEC: NORMAL

## 2018-02-16 PROCEDURE — 87210 SMEAR WET MOUNT SALINE/INK: CPT | Performed by: PHYSICIAN ASSISTANT

## 2018-02-16 PROCEDURE — 40000847 ZZHCL STATISTIC MORPHOLOGY W/INTERP HISTOLOGY TC 85060: Performed by: PHYSICIAN ASSISTANT

## 2018-02-16 PROCEDURE — 85045 AUTOMATED RETICULOCYTE COUNT: CPT | Performed by: PHYSICIAN ASSISTANT

## 2018-02-16 PROCEDURE — 85025 COMPLETE CBC W/AUTO DIFF WBC: CPT | Performed by: PHYSICIAN ASSISTANT

## 2018-02-16 PROCEDURE — 99000 SPECIMEN HANDLING OFFICE-LAB: CPT | Performed by: PHYSICIAN ASSISTANT

## 2018-02-16 PROCEDURE — 82607 VITAMIN B-12: CPT | Performed by: PHYSICIAN ASSISTANT

## 2018-02-16 PROCEDURE — 99214 OFFICE O/P EST MOD 30 MIN: CPT | Performed by: PHYSICIAN ASSISTANT

## 2018-02-16 PROCEDURE — 81003 URINALYSIS AUTO W/O SCOPE: CPT | Performed by: PHYSICIAN ASSISTANT

## 2018-02-16 PROCEDURE — 80048 BASIC METABOLIC PNL TOTAL CA: CPT | Performed by: PHYSICIAN ASSISTANT

## 2018-02-16 PROCEDURE — 83735 ASSAY OF MAGNESIUM: CPT | Performed by: PHYSICIAN ASSISTANT

## 2018-02-16 PROCEDURE — 82306 VITAMIN D 25 HYDROXY: CPT | Performed by: PHYSICIAN ASSISTANT

## 2018-02-16 PROCEDURE — 85060 BLOOD SMEAR INTERPRETATION: CPT | Performed by: PHYSICIAN ASSISTANT

## 2018-02-16 PROCEDURE — 83021 HEMOGLOBIN CHROMOTOGRAPHY: CPT | Mod: 90 | Performed by: PHYSICIAN ASSISTANT

## 2018-02-16 PROCEDURE — 87491 CHLMYD TRACH DNA AMP PROBE: CPT | Performed by: PHYSICIAN ASSISTANT

## 2018-02-16 PROCEDURE — 93000 ELECTROCARDIOGRAM COMPLETE: CPT | Performed by: PHYSICIAN ASSISTANT

## 2018-02-16 PROCEDURE — 87591 N.GONORRHOEAE DNA AMP PROB: CPT | Performed by: PHYSICIAN ASSISTANT

## 2018-02-16 PROCEDURE — 36415 COLL VENOUS BLD VENIPUNCTURE: CPT | Performed by: PHYSICIAN ASSISTANT

## 2018-02-16 NOTE — PROGRESS NOTES
SUBJECTIVE:                                                    Josefina Aldrich is a 42 year old female who presents to clinic today for the following health issues:    x2 weeks -- LOV 01/03/2018 BP meds decreased - been feeling lightheadedness and nausea.     Vaginal Bleeding (Dysmenorrhea) and cramping     Onset: 2 days      After yoga, bending or push ups, feels a pain or cramping in pelvic area. Also in vaginal area feel like she has irritation or yeast symptoms and some light pink spotting. Pt just started to be sexually active in the past couple months and hadn't been for over one year. Has not had intercourse for the last month due to these symptoms and last time they had intercourse, they bled and used a panty liner.         Description:   Duration of bleeding episodes: none  Frequency between periods:  n/a  Describe bleeding/flow:   Clots: no  Number of pads/hour: 0-1- very light tinge of blood - reports to rare light pink when wiping, has not used a pad.    Cramping: mild and after intercourse    Accompanying Signs & Symptoms:  Weakness: no  Lightheadedness: no  Hot flashes: no  Nosebleeds/Easy bruising: no. Slight briusing after injections for MS.  Vaginal Discharge: YES- clear/white. And itching. This started Monday of this week, no abnormal odor noted.      History:  No LMP recorded.  Previous normal periods: no, many fibroids and needed the hysterectomy  Contraceptive use: NO  Possibility of Pregnancy: no  Any bleeding after intercourse: YES  Age of first period (menarche): n/a  Abnormal PAP Smears: YES- bacterial infections     Precipitating factors:   n/a    Alleviating factors:  n/a     Therapies Tried and outcome: none      Had hysterectomy about 4-5 years ago through [facility].  Still has ovaries. Pt has MS and Fibromyalgia.      Advised pt to be seen for evaluation and possible swab for any vaginal infections. Scheduled pt with KR for tomorrow afternoon and advised the pt to call with any  "further sx or concerns. The patient indicates understanding of these issues and agrees with the plan.     Addie Teran RN  [facility]       Patient reports that she had a hysterectomy done about 4-5 years ago due to fibroids.  She reports that since the hysterectomy she has not had any bleeding.  She reports that in the last 2 months she is sexually active.  Since then, she has had increased vaginal discharge that she says is thin and without an odor.  She will also get occasional light pink tinge of blood when she wipes after going to the bathroom.      She reports that she does not think it is a bowel issue.  She says that the bowels are regular.  She reports that they are easy to pass and are not painful.      In the last two weeks she feels like she has been slightly light headed.  She says that it is not her typical vertigo.  She says that she can just be sitting and then feel light headed.  She has not passed out.    She denies heart palpitations or chest pains with the symptoms.  She does have occasional nausea.      Patient denies painful urination, but does have some tenderness when she \"gets ready to release the urine.\"  She has not noticed any blood in her urine.  She denies frequency of urination.   She had not had fevers or sweats.        Problem list and histories reviewed & adjusted, as indicated.  Additional history: as documented      ROS:  Constitutional, HEENT, cardiovascular, pulmonary, GI, , musculoskeletal, neuro, skin, endocrine and psych systems are negative, except as otherwise noted.    OBJECTIVE:                                                    /60 (BP Location: Left arm, Patient Position: Chair, Cuff Size: Adult Large)  Pulse 124  Temp 97.4  F (36.3  C) (Oral)  Ht 5' 5\" (1.651 m)  Wt 260 lb (117.9 kg)  SpO2 100%  Breastfeeding? No  BMI 43.27 kg/m2  Body mass index is 43.27 kg/(m^2).  GENERAL: healthy, alert and no distress  EYES: Eyes grossly normal to " inspection, PERRL and conjunctivae and sclerae normal  RESP: lungs clear to auscultation - no rales, rhonchi or wheezes  CV: regular rate and rhythm, normal S1 S2, no S3 or S4, no murmur, click or rub, no peripheral edema and peripheral pulses strong  ABDOMEN: soft, nontender, no hepatosplenomegaly, no masses and bowel sounds normal   (female): normal female external genitalia, normal urethral meatus, vaginal mucosa, normal cervix/adnexa/uterus without masses or discharge  MS: no gross musculoskeletal defects noted, no edema  NEURO: Normal strength and tone, mentation intact and speech normal  PSYCH: mentation appears normal, affect normal/bright    Diagnostic Test Results:  Labs - pending     ASSESSMENT/PLAN:                                                      Josefina was seen today for vaginal problem.    Diagnoses and all orders for this visit:    Vaginal discharge  -     Wet prep  -     *UA reflex to Microscopic and Culture ([place] [facility] (except [place] and [place])  -     Chlamydia trachomatis PCR  -     Neisseria gonorrhoeae PCR  -     US Pelvic Complete w Transvaginal; Future    Pelvic pain in female  -     Wet prep  -     *UA reflex to Microscopic and Culture ([place] [facility] (except [place] and [place])  -     Chlamydia trachomatis PCR  -     Neisseria gonorrhoeae PCR  -     US Pelvic Complete w Transvaginal; Future    Dizziness  -     EKG 12-lead complete w/read - Clinics    Hypermagnesemia  -     Magnesium    Hypovitaminosis D  -     Vitamin D Deficiency    Decreased renal function  -     Basic metabolic panel  (Ca, Cl, CO2, Creat, Gluc, K, Na, BUN)    Other vitamin B12 deficiency anemia  -     Vitamin B12    Anemia, normocytic normochromic  -     Blood Morphology Pathologist Review  -     CBC with platelets differential  -     Reticulocyte Count  -     HGB Eval Reflex to ELP or RBC Solubility      - Patient advised to followup if symptoms change or worsen in any way.     - I have discussed the patient's diagnosis, and my plan of treatment with the patient and/or family. Patient is aware to followup if symptoms do not improve.  Patient has been advised to be seen sooner or seek more immediate care if symptoms change or worsen.  Patient agrees with and understands the plan today.     See Patient Instructions        Kalee Valero PA-C    [facility] [place]

## 2018-02-16 NOTE — NURSING NOTE
"Chief Complaint   Patient presents with     Vaginal Problem       Initial /60 (BP Location: Left arm, Patient Position: Chair, Cuff Size: Adult Large)  Pulse 124  Temp 97.4  F (36.3  C) (Oral)  Ht 5' 5\" (1.651 m)  Wt 260 lb (117.9 kg)  SpO2 100%  Breastfeeding? No  BMI 43.27 kg/m2 Estimated body mass index is 43.27 kg/(m^2) as calculated from the following:    Height as of this encounter: 5' 5\" (1.651 m).    Weight as of this encounter: 260 lb (117.9 kg).  Medication Reconciliation: complete   Csaba Mlnarik CMA    "

## 2018-02-16 NOTE — MR AVS SNAPSHOT
After Visit Summary   2/16/2018    Josefina Aldrich    MRN: [id number]           Patient Information     Date Of Birth          1975        Visit Information        Provider Department      2/16/2018 1:40 PM Kalee Valero PA-C [facility]        Today's Diagnoses     Vaginal discharge    -  1    Pelvic pain in female        Dizziness        Hypermagnesemia        Hypovitaminosis D        Decreased renal function        Other vitamin B12 deficiency anemia        Anemia, normocytic normochromic           Follow-ups after your visit        Your next 10 appointments already scheduled     Apr 19, 2018  4:30 PM CDT   (Arrive by 4:15 PM)   Return Multiple Sclerosis with Radames Castaneda MD   [facility] Multiple Sclerosis ([facility] [place])    [address] [place] [address]  [address] [place] [id number] [phone]              Future tests that were ordered for you today     Open Future Orders        Priority Expected Expires Ordered    US Pelvic Complete w Transvaginal Routine  2/16/2019 2/16/2018            Who to contact     If you have questions or need follow up information about today's clinic visit or your schedule please contact [facility] directly at [phone].  Normal or non-critical lab and imaging results will be communicated to you by MyChart, letter or phone within 4 business days after the clinic has received the results. If you do not hear from us within 7 days, please contact the clinic through Cogbookshart or phone. If you have a critical or abnormal lab result, we will notify you by phone as soon as possible.  Submit refill requests through Keywee or call your pharmacy and they will forward the refill request to us. Please allow 3 business days for your refill to be completed.          Additional Information About Your Visit        MyChart Information     Keywee gives you secure access to your electronic health record.  "If you see a primary care provider, you can also send messages to your care team and make appointments. If you have questions, please call your primary care clinic.  If you do not have a primary care provider, please call [phone] and they will assist you.        Care EveryWhere ID     This is your Care EveryWhere ID. This could be used by other organizations to access your [facility] medical records  [id number]        Your Vitals Were     Pulse Temperature Height Pulse Oximetry Breastfeeding? BMI (Body Mass Index)    124 97.4  F (36.3  C) (Oral) 5' 5\" (1.651 m) 100% No 43.27 kg/m2       Blood Pressure from Last 3 Encounters:   02/16/18 102/60   01/03/18 104/68   10/26/17 94/61    Weight from Last 3 Encounters:   02/16/18 260 lb (117.9 kg)   01/03/18 266 lb 4 oz (120.8 kg)   10/26/17 269 lb (122 kg)              We Performed the Following     *UA reflex to Microscopic and Culture ([facility] (except [place] and [place])     Basic metabolic panel  (Ca, Cl, CO2, Creat, Gluc, K, Na, BUN)     Blood Morphology Pathologist Review     CBC with platelets differential     Chlamydia trachomatis PCR     EKG 12-lead complete w/read - Clinics     HGB Eval Reflex to ELP or RBC Solubility     Magnesium     Neisseria gonorrhoeae PCR     Reticulocyte Count     Vitamin B12     Vitamin D Deficiency     Wet prep        Primary Care Provider Office Phone # Fax #    [facility] [phone]       [facility] [address] [place] [address]        Equal Access to Services     RISHI MORENO : Hadii aad ku hadasho Soomaali, waaxda luqadaha, qaybta kaalmada adeegyashabbir, mya patterson. So [facility] [phone].    ATENCIÓN: Si habla español, tiene a barrera disposición servicios gratuitos de asistencia lingüística. Llame al [phone].    We comply with applicable federal civil rights laws and [place] laws. We do not discriminate on the basis of race, color, national " origin, age, disability, sex, sexual orientation, or gender identity.            Thank you!     Thank you for choosing [facility]  for your care. Our goal is always to provide you with excellent care. Hearing back from our patients is one way we can continue to improve our services. Please take a few minutes to complete the written survey that you may receive in the mail after your visit with us. Thank you!             Your Updated Medication List - Protect others around you: Learn how to safely use, store and throw away your medicines at www.disposemymeds.org.          This list is accurate as of 2/16/18  3:07 PM.  Always use your most recent med list.                   Brand Name Dispense Instructions for use Diagnosis    * VENTOLIN  (90 BASE) MCG/ACT Inhaler   Generic drug:  albuterol      Inhale 2 puffs into the lungs        * albuterol (2.5 MG/3ML) 0.083% neb solution      Inhale 2.5 mg into the lungs        cholecalciferol 5000 UNITS Tabs tablet    vitamin D3     Take 1 tablet (5,000 Units) by mouth daily    Hypovitaminosis D       cyclobenzaprine 10 MG tablet    FLEXERIL     Take 10 mg by mouth 3 times daily as needed        Glatiramer Acetate 40 MG/ML Sosy    COPAXONE    12 Syringe    Inject 40 mg Subcutaneous three times a week    Multiple sclerosis (H)       IBUPROFEN PO      Take 800 mg by mouth every 4 hours as needed for moderate pain        IRON SUPPLEMENT PO           lisinopril-hydrochlorothiazide 10-12.5 MG per tablet    PRINZIDE/ZESTORETIC    90 tablet    Take 1 tablet by mouth daily    Benign essential hypertension       meclizine 25 MG tablet    ANTIVERT     Take 25 mg by mouth daily as needed        nortriptyline 25 MG capsule    PAMELOR    60 capsule    Take one capsule at bedtime for 2 weeks, can increase to 2 capsules as needed    Migraine without aura and without status migrainosus, not intractable       * TOPAMAX 25 MG tablet   Generic drug:  topiramate     70 tablet     Take 1 tablet (25 mg) by mouth At Bedtime (Take with 100 mg to total 125 mg)        * TOPAMAX 100 MG tablet   Generic drug:  topiramate     60 tablet    Take 1 tablet (100 mg) by mouth daily (Take with 25 mg to total 125 mg)        triamcinolone 0.1 % cream    KENALOG    30 g    Apply sparingly to affected area three times daily    Eczema, unspecified type       valACYclovir 500 MG tablet    VALTREX    90 tablet    Take 1 tablet (500 mg) by mouth daily    HSV (herpes simplex virus) infection       * Notice:  This list has 4 medication(s) that are the same as other medications prescribed for you. Read the directions carefully, and ask your doctor or other care provider to review them with you.

## 2018-02-17 LAB
ANION GAP SERPL CALCULATED.3IONS-SCNC: 9 MMOL/L (ref 3–14)
BUN SERPL-MCNC: 25 MG/DL (ref 7–30)
CALCIUM SERPL-MCNC: 8.6 MG/DL (ref 8.5–10.1)
CHLORIDE SERPL-SCNC: 108 MMOL/L (ref 94–109)
CO2 SERPL-SCNC: 21 MMOL/L (ref 20–32)
CREAT SERPL-MCNC: 1.26 MG/DL (ref 0.52–1.04)
GFR SERPL CREATININE-BSD FRML MDRD: 47 ML/MIN/1.7M2
GLUCOSE SERPL-MCNC: 81 MG/DL (ref 70–99)
HGB A1 MFR BLD: 96 % (ref 95–97.9)
HGB A2 MFR BLD: 3 % (ref 2–3.5)
HGB C MFR BLD: 0 % (ref 0–0)
HGB E MFR BLD: 0 % (ref 0–0)
HGB F MFR BLD: 1 % (ref 0–2.1)
HGB FRACT BLD ELPH-IMP: NORMAL
HGB OTHER MFR BLD: 0 % (ref 0–0)
HGB S BLD QL SOLY: NORMAL
HGB S MFR BLD: 0 % (ref 0–0)
MAGNESIUM SERPL-MCNC: 2.5 MG/DL (ref 1.6–2.3)
PATH INTERP BLD-IMP: NORMAL
POTASSIUM SERPL-SCNC: 4.2 MMOL/L (ref 3.4–5.3)
SODIUM SERPL-SCNC: 138 MMOL/L (ref 133–144)

## 2018-02-18 LAB
C TRACH DNA SPEC QL NAA+PROBE: NEGATIVE
N GONORRHOEA DNA SPEC QL NAA+PROBE: NEGATIVE
SPECIMEN SOURCE: NORMAL
SPECIMEN SOURCE: NORMAL

## 2018-02-19 LAB
COPATH REPORT: NORMAL
DEPRECATED CALCIDIOL+CALCIFEROL SERPL-MC: 20 UG/L (ref 20–75)

## 2018-02-19 NOTE — PROGRESS NOTES
Malathi Rocha ,    The results from your recent lab work are within normal limits.    -Chlamydia and gonnohrea tests are normal.      Thank you for choosing [facility] for your health care needs,      Kalee Valero PA-C

## 2018-02-22 ENCOUNTER — MYC MEDICAL ADVICE (OUTPATIENT)
Dept: FAMILY MEDICINE | Facility: CLINIC | Age: 43
End: 2018-02-22

## 2018-02-22 ENCOUNTER — HOSPITAL ENCOUNTER (OUTPATIENT)
Dept: ULTRASOUND IMAGING | Facility: CLINIC | Age: 43
Discharge: HOME | End: 2018-02-22
Attending: PHYSICIAN ASSISTANT | Admitting: PHYSICIAN ASSISTANT
Payer: COMMERCIAL

## 2018-02-22 ENCOUNTER — TELEPHONE (OUTPATIENT)
Dept: FAMILY MEDICINE | Facility: CLINIC | Age: 43
End: 2018-02-22

## 2018-02-22 DIAGNOSIS — N89.8 VAGINAL DISCHARGE: ICD-10-CM

## 2018-02-22 DIAGNOSIS — R10.2 PELVIC PAIN IN FEMALE: ICD-10-CM

## 2018-02-22 DIAGNOSIS — R10.2 PELVIC PAIN IN FEMALE: Primary | ICD-10-CM

## 2018-02-22 PROCEDURE — 76856 US EXAM PELVIC COMPLETE: CPT

## 2018-02-22 NOTE — TELEPHONE ENCOUNTER
Patient notified of recent lab results.  She is wanting pelvic ultrasound results.  She said the tech asked her if she was on antibiotics during the procedure which got her worried.    Will route another provider to review today.    Routing to .      TAMARA Dumont, RN, [facility]) [phone]

## 2018-02-22 NOTE — TELEPHONE ENCOUNTER
RN huddled with MANINDER.  She reviewed US results and advised that a simple cyst was seen and complex cyst or small solid collection was seen as well on the left. She said the complex cyst could be hemorrhagic (blood) or from ovulation. She is recommended repeat US in 6 weeks or call sooner if symptoms worsen for possible CT of abdomen and pelvis.        RN called patient back to update her on above. She said pain on left side is worse than when she was in clinic. Rating pain at 7/10. Not taking pain medications due to kidney function.  Huddled with MANINDER again and she is advising CT abdomen/Pelvis with contrast but asked that I call [facility] radiology to verify if they will complete CT scan with contrast with creatinine of 1.26.       [facility] radiology said that she should be okay to go ahead with oral contrast.    CT scheduled for 2/23/2018 at 4 pm.  She needs to check-in at the main entrance at [facility] 15 minutes before appt time.      Patient updated on appointment date and time.  Contrast left at  for her to .      TAMARA Dumont, RN, N  [facility]) [phone]

## 2018-02-22 NOTE — TELEPHONE ENCOUNTER
See 2/22/2018 telephone encounter.    Tessa Wiggins, BS, RN, N  [facility]) [phone]

## 2018-02-22 NOTE — TELEPHONE ENCOUNTER
Reason for Call:  Request for results:    Name of test or procedure: US     Date of test of procedure: Today    Location of the test or procedure: [facility]    OK to leave the result message on voice mail or with a family member? YES    Phone number Patient can be reached at:  Cell number on file:    Telephone Information:   Mobile [phone]       Additional comments: Please call pt back soon. Soon with results. Patient is anxious to get them.    Call taken on 2/22/2018 at 9:24 AM by Cleopatra Solano

## 2018-02-23 ENCOUNTER — HOSPITAL ENCOUNTER (OUTPATIENT)
Dept: CT IMAGING | Facility: CLINIC | Age: 43
Discharge: HOME | End: 2018-02-23
Attending: FAMILY MEDICINE | Admitting: FAMILY MEDICINE
Payer: COMMERCIAL

## 2018-02-23 DIAGNOSIS — R10.2 PELVIC PAIN IN FEMALE: ICD-10-CM

## 2018-02-23 DIAGNOSIS — N89.8 VAGINAL DISCHARGE: ICD-10-CM

## 2018-02-23 PROCEDURE — 25000128 H RX IP 250 OP 636: Performed by: RADIOLOGY

## 2018-02-23 PROCEDURE — 74177 CT ABD & PELVIS W/CONTRAST: CPT

## 2018-02-23 RX ORDER — IOPAMIDOL 755 MG/ML
500 INJECTION, SOLUTION INTRAVASCULAR ONCE
Status: COMPLETED | OUTPATIENT
Start: 2018-02-23 | End: 2018-02-23

## 2018-02-23 RX ADMIN — IOPAMIDOL 100 ML: 755 INJECTION, SOLUTION INTRAVENOUS at 15:59

## 2018-02-23 RX ADMIN — SODIUM CHLORIDE 65 ML: 9 INJECTION, SOLUTION INTRAVENOUS at 15:59

## 2018-02-24 NOTE — PROGRESS NOTES
Please call pt with results below:     IMPRESSION: Unfortunately there are multiple loops of bowel in the  pelvis abutting the urinary bladder and the vaginal cuff. The left ovarian  lesion  described on ultrasound is not evident secondary to this.  If pt still having pain, please have her follow up with ob/gyn.     For additional lab test information, labtestsonline.org is an excellent reference.

## 2018-02-26 ENCOUNTER — MYC MEDICAL ADVICE (OUTPATIENT)
Dept: FAMILY MEDICINE | Facility: CLINIC | Age: 43
End: 2018-02-26

## 2018-02-26 NOTE — TELEPHONE ENCOUNTER
Forwarded to J.  Please review patient's Mychart message and advise.  Tessa Wiggins, RN, BS, PHN

## 2018-02-26 NOTE — TELEPHONE ENCOUNTER
Mychart note sent advising office visit and offering appointment times with LP tomorrow.    Tessa Wiggins, BS, RN, N  [facility]) [phone]

## 2018-02-27 NOTE — TELEPHONE ENCOUNTER
Patient scheduled for 7:40 am tomorrow.  I called her and she is okay with waiting until tomorrow.    TAMARA Dumont, RN, N  [facility]) [phone]

## 2018-02-28 ENCOUNTER — MYC MEDICAL ADVICE (OUTPATIENT)
Dept: FAMILY MEDICINE | Facility: CLINIC | Age: 43
End: 2018-02-28

## 2018-02-28 ENCOUNTER — OFFICE VISIT (OUTPATIENT)
Dept: FAMILY MEDICINE | Facility: CLINIC | Age: 43
End: 2018-02-28
Payer: COMMERCIAL

## 2018-02-28 VITALS
HEART RATE: 109 BPM | WEIGHT: 259.38 LBS | TEMPERATURE: 98 F | HEIGHT: 65 IN | DIASTOLIC BLOOD PRESSURE: 70 MMHG | BODY MASS INDEX: 43.21 KG/M2 | OXYGEN SATURATION: 100 % | SYSTOLIC BLOOD PRESSURE: 112 MMHG

## 2018-02-28 DIAGNOSIS — R10.32 ABDOMINAL PAIN, LEFT LOWER QUADRANT: ICD-10-CM

## 2018-02-28 DIAGNOSIS — D53.9 ANEMIA, MACROCYTIC: ICD-10-CM

## 2018-02-28 DIAGNOSIS — R30.0 DYSURIA: ICD-10-CM

## 2018-02-28 DIAGNOSIS — N83.202 CYST OF LEFT OVARY: Primary | ICD-10-CM

## 2018-02-28 LAB
ALBUMIN UR-MCNC: NEGATIVE MG/DL
APPEARANCE UR: CLEAR
BILIRUB UR QL STRIP: NEGATIVE
COLOR UR AUTO: YELLOW
GLUCOSE UR STRIP-MCNC: NEGATIVE MG/DL
HGB UR QL STRIP: NEGATIVE
KETONES UR STRIP-MCNC: NEGATIVE MG/DL
LEUKOCYTE ESTERASE UR QL STRIP: NEGATIVE
NITRATE UR QL: NEGATIVE
PH UR STRIP: 6.5 PH (ref 5–7)
SOURCE: NORMAL
SP GR UR STRIP: 1.01 (ref 1–1.03)
UROBILINOGEN UR STRIP-ACNC: 0.2 EU/DL (ref 0.2–1)

## 2018-02-28 PROCEDURE — 99214 OFFICE O/P EST MOD 30 MIN: CPT | Performed by: PHYSICIAN ASSISTANT

## 2018-02-28 PROCEDURE — 87086 URINE CULTURE/COLONY COUNT: CPT | Performed by: PHYSICIAN ASSISTANT

## 2018-02-28 PROCEDURE — 81003 URINALYSIS AUTO W/O SCOPE: CPT | Performed by: PHYSICIAN ASSISTANT

## 2018-02-28 RX ORDER — ACETAMINOPHEN 500 MG
1000 TABLET ORAL 3 TIMES DAILY PRN
Qty: 100 TABLET | Refills: 0 | COMMUNITY
Start: 2018-02-28 | End: 2018-11-19

## 2018-02-28 NOTE — NURSING NOTE
"Chief Complaint   Patient presents with     Results     f/u results of CT scan        Initial /70  Pulse 109  Temp 98  F (36.7  C) (Tympanic)  Ht 5' 5\" (1.651 m)  Wt 259 lb 6 oz (117.7 kg)  SpO2 100%  BMI 43.16 kg/m2 Estimated body mass index is 43.16 kg/(m^2) as calculated from the following:    Height as of this encounter: 5' 5\" (1.651 m).    Weight as of this encounter: 259 lb 6 oz (117.7 kg).  Medication Reconciliation: complete    "

## 2018-02-28 NOTE — PROGRESS NOTES
SUBJECTIVE:   Josefina Aldrich is a 42 year old female who presents to clinic today for the following health issues:    Abdominal Pain F/U  Josefina presents to clinic for a follow up of her abdominal/pelvic pain. She was originally seen in clinic on 02/16/18 by Kalee Valero PA-C and reported symptoms of vaginal bleeding, cramping, and lightheadedness. She is status MONO (2013) and reports that until this occurrence she had not had any vaginal bleeding since her surgery. She had a pelvic US performed on 02/22/18 that indicated a cyst on her left ovary and a follow up CT which was unremarkable.    Upon her visit today, she notes that her pain has improved but is still present. She describes the pain as localized in her left/center pelvic area and that it is between her ovary and colon. The pain is intermittent but is still very bothersome to Josefina. In addition, she reports a pulling sensation and a twinge pain with urination however she does not report any burning/stinging in her vagina or urethra with urination. She states that her pain is similar to UTIs and ovarian cysts in the past. She notes that she has not taken any medications to alleviate her pain as she is hesitant to take NSAIDs due to her kidney functioning. She notes that some pain has been alleviated with a rocking motion. Normal bowel movements without blood or mucus. Normal urination without odor or darkness in color. Denies flank pain and history of kidney stones.    Anemia  Josefina has a history of anemia which has been unexplained through a workup in clinic. She reports consistent fatigue but notes that it is possibly due to her MS. She denies knowledge of any blood disease in her family and is open to further workup with heme/onc.     CBC RESULTS:   Recent Labs   Lab Test  02/16/18   1427   WBC  7.7   RBC  3.48*   HGB  10.7*   HCT  33.3*   MCV  96   MCH  30.7   MCHC  32.1   RDW  13.0   PLT  427         Problem list and histories  "reviewed & adjusted, as indicated.  Additional history: as documented    Patient Active Problem List   Diagnosis     Migraine     Asthma     MS (multiple sclerosis) (H)     Left pontine stroke (H)     Anemia     Backache     Body mass index 45.0-49.9, adult (H)     Cognitive changes     Depression with anxiety     Fever postop     Uterine leiomyoma     Headache     Heavy menses     Hypersomnia with sleep apnea     Iron deficiency anemia     Leukocytosis     Postsurgical nonabsorption     Mild intermittent asthma     Morbid obesity (H)     Myalgia and myositis     Neck pain     Other dyspnea and respiratory abnormality     Pain, neuropathic     Pelvic pain in female     Personal history of allergy to latex     Post concussion syndrome     Right shoulder pain     S/P gastric bypass     S/P hysterectomy     Bariatric surgery status     Vitamin B12 deficiency     Vitamin D deficiency     Vomiting following gastrointestinal surgery     Hypermagnesemia     Benign essential hypertension     Past Surgical History:   Procedure Laterality Date     GASTRIC BYPASS  2002    \"Trouble with B12 and D\"     HYSTERECTOMY, MONO  2013    cervix gone.  fibroids.  No BSO     TONSILLECTOMY         Social History   Substance Use Topics     Smoking status: Former Smoker     Types: Cigars     Smokeless tobacco: Never Used     Alcohol use 1.2 oz/week     2 Standard drinks or equivalent per week      Comment: 0-3 drinks per week     Family History   Problem Relation Age of Onset     Lupus Paternal Aunt 41     Multiple Sclerosis No family hx of          Current Outpatient Prescriptions   Medication Sig Dispense Refill     acetaminophen (TYLENOL) 500 MG tablet Take 2 tablets (1,000 mg) by mouth 3 times daily as needed for mild pain 100 tablet 0     lisinopril-hydrochlorothiazide (PRINZIDE/ZESTORETIC) 10-12.5 MG per tablet Take 1 tablet by mouth daily 90 tablet 1     triamcinolone (KENALOG) 0.1 % cream Apply sparingly to affected area three times " daily 30 g 1     valACYclovir (VALTREX) 500 MG tablet Take 1 tablet (500 mg) by mouth daily 90 tablet 1     cholecalciferol (VITAMIN D3) 5000 UNITS TABS tablet Take 1 tablet (5,000 Units) by mouth daily       Ferrous Sulfate (IRON SUPPLEMENT PO)        topiramate (TOPAMAX) 25 MG tablet Take 1 tablet (25 mg) by mouth At Bedtime (Take with 100 mg to total 125 mg) 70 tablet 0     topiramate (TOPAMAX) 100 MG tablet Take 1 tablet (100 mg) by mouth daily (Take with 25 mg to total 125 mg) 60 tablet      meclizine (ANTIVERT) 25 MG tablet Take 25 mg by mouth daily as needed       cyclobenzaprine (FLEXERIL) 10 MG tablet Take 10 mg by mouth 3 times daily as needed       Glatiramer Acetate (COPAXONE) 40 MG/ML SOSY Inject 40 mg Subcutaneous three times a week 12 Syringe 11     nortriptyline (PAMELOR) 25 MG capsule Take one capsule at bedtime for 2 weeks, can increase to 2 capsules as needed 60 capsule 5     IBUPROFEN PO Take 800 mg by mouth every 4 hours as needed for moderate pain        albuterol (2.5 MG/3ML) 0.083% nebulizer solution Inhale 2.5 mg into the lungs       albuterol (VENTOLIN HFA) 108 (90 BASE) MCG/ACT inhaler Inhale 2 puffs into the lungs       Allergies   Allergen Reactions     Adhesive Tape      Azithromycin Unknown     Latex Hives and Itching     Aspirin Difficulty breathing and Palpitations     Penicillins Cramps, GI Disturbance, Nausea and Vomiting, Rash and Swelling       Reviewed and updated as needed this visit by clinical staff  Tobacco  Allergies  Meds  Problems  Med Hx  Surg Hx  Fam Hx  Soc Hx        Reviewed and updated as needed this visit by Provider  Tobacco  Allergies  Meds  Problems  Med Hx  Surg Hx  Fam Hx  Soc Hx          ROS:  Constitutional, HEENT, cardiovascular, pulmonary, GI, , musculoskeletal, neuro, skin, endocrine and psych systems are negative, except as otherwise noted.    This document serves as a record of the services and decisions personally performed and made by  "Miya Crump PA-C. It was created on her behalf by Talat Cheney, a trained medical scribe. The creation of this document is based on the provider's statements to the medical scribe.  Talat Cheney 7:53 AM February 28, 2018    OBJECTIVE:   /70  Pulse 109  Temp 98  F (36.7  C) (Tympanic)  Ht 5' 5\" (1.651 m)  Wt 259 lb 6 oz (117.7 kg)  SpO2 100%  BMI 43.16 kg/m2  Body mass index is 43.16 kg/(m^2).  GENERAL: healthy, alert and no distress  RESP: lungs clear to auscultation - no rales, rhonchi or wheezes  CV: regular rate and rhythm, normal S1 S2, no S3 or S4, no murmur, click or rub, no peripheral edema and peripheral pulses strong  ABDOMEN: milld LLQ and RLQ tenderness without guarding or rebound, suprapubic tenderness, otherwise soft, nontender, no hepatosplenomegaly, no masses and bowel sounds normal, No CVA tenderness  SKIN: no suspicious lesions or rashes  PSYCH: mentation appears normal, affect normal/bright    Diagnostic Test Results:  Results for orders placed or performed in visit on 02/28/18 (from the past 24 hour(s))   *UA reflex to Microscopic and Culture ([facility] and [facility] (except [place] and [place])   Result Value Ref Range    Color Urine Yellow     Appearance Urine Clear     Glucose Urine Negative NEG^Negative mg/dL    Bilirubin Urine Negative NEG^Negative    Ketones Urine Negative NEG^Negative mg/dL    Specific Gravity Urine 1.015 1.003 - 1.035    Blood Urine Negative NEG^Negative    pH Urine 6.5 5.0 - 7.0 pH    Protein Albumin Urine Negative NEG^Negative mg/dL    Urobilinogen Urine 0.2 0.2 - 1.0 EU/dL    Nitrite Urine Negative NEG^Negative    Leukocyte Esterase Urine Negative NEG^Negative    Source Midstream Urine        Abdominal CT from 02/23/18:  IMPRESSION: Unfortunately there are multiple loops of bowel in the  pelvis abutting the urinary bladder and the vaginal cuff. The lesion  described on ultrasound is not evident.    Pelvic US from " 02/22/18:  IMPRESSION:  1. Patient is status post hysterectomy.  2. Normal appearance of the right ovary.  3. Simple ovarian cyst on the left measuring up to 1.8 cm. Separate  ovarian collection measuring up to 1.7 cm that is complex. Uncertain  if this is a complex cyst or a small solid collection. Recommend  repeat ultrasound in 2-3 months.    ASSESSMENT/PLAN:   Josefina was seen today for results.    Diagnoses and all orders for this visit:    Cyst of left ovary, Abdominal pain, left lower quadrant  Start acetaminophen to treat pain associated with ovarian cyst. Will recheck UA and culture due to symptoms associated with urination. Discussed with patient information regarding ovarian cysts and provided consultation regarding US and CT results, patient voiced understanding. Follow up for repeat pelvic US in 4 weeks to confirm resolution of cyst or sooner if pain persists or worsens.   -     acetaminophen (TYLENOL) 500 MG tablet; Take 2 tablets (1,000 mg) by mouth 3 times daily as needed for mild pain  -     *UA reflex to Microscopic and Culture ([place] and [facility] (except [place] and [place])  -     US Pelvic Complete w Transvaginal; Future    Anemia, macrocytic  Referral provided for heme/onc due to patients unexplained anemia, patient in agreement.  -     ONC/HEME ADULT REFERRAL    The information in this document, created by the medical scribe for me, accurately reflects the services I personally performed and the decisions made by me. I have reviewed and approved this document for accuracy prior to leaving the patient care area.  February 28, 2018 7:53 AM    Miya Crump PA-C  [facility] [place]

## 2018-02-28 NOTE — MR AVS SNAPSHOT
After Visit Summary   2/28/2018    Josefina Aldrich    MRN: [id number]           Patient Information     Date Of Birth          1975        Visit Information        Provider Department      2/28/2018 7:40 AM Miya Crump PA-C [facility]        Today's Diagnoses     Cyst of left ovary    -  1    Abdominal pain, left lower quadrant        Anemia, macrocytic          Care Instructions    The Marcio Protocol - Shelley Buckley (look up Dion Talk)          Follow-ups after your visit        Additional Services     ONC/HEME ADULT REFERRAL       Your provider has referred you to: [facility]: Cancer Care/Hematology (All Cancer Related Services) - [phone]   [email or web address]    Please be aware that coverage of these services is subject to the terms and limitations of your health insurance plan.  Call member services at your health plan with any benefit or coverage questions.      Please bring the following with you to your appointment:    (1) Any X-Rays, CTs or MRIs which have been performed.  Contact the facility where they were done to arrange for  prior to your scheduled appointment.   (2) List of current medications  (3) This referral request   (4) Any documents/labs given to you for this referral                  Your next 10 appointments already scheduled     Apr 19, 2018  4:30 PM CDT   (Arrive by 4:15 PM)   Return Multiple Sclerosis with Rdaames Castaneda MD   [facility] Multiple Sclerosis ([facility])    [address] [place] [address]  [address] [place] [id number] [phone]              Future tests that were ordered for you today     Open Future Orders        Priority Expected Expires Ordered    US Pelvic Complete w Transvaginal Routine  2/28/2019 2/28/2018            Who to contact     If you have questions or need follow up information about today's clinic visit or your  "schedule please contact [facility] directly at [phone].  Normal or non-critical lab and imaging results will be communicated to you by MyChart, letter or phone within 4 business days after the clinic has received the results. If you do not hear from us within 7 days, please contact the clinic through [employer] or phone. If you have a critical or abnormal lab result, we will notify you by phone as soon as possible.  Submit refill requests through [employer] or call your pharmacy and they will forward the refill request to us. Please allow 3 business days for your refill to be completed.          Additional Information About Your Visit        [employer] Information     [employer] gives you secure access to your electronic health record. If you see a primary care provider, you can also send messages to your care team and make appointments. If you have questions, please call your primary care clinic.  If you do not have a primary care provider, please call [phone] and they will assist you.        Care EveryWhere ID     This is your Care EveryWhere ID. This could be used by other organizations to access your [facility] medical records  [id number]        Your Vitals Were     Pulse Temperature Height Pulse Oximetry BMI (Body Mass Index)       109 98  F (36.7  C) (Tympanic) 5' 5\" (1.651 m) 100% 43.16 kg/m2        Blood Pressure from Last 3 Encounters:   02/28/18 112/70   02/16/18 102/60   01/03/18 104/68    Weight from Last 3 Encounters:   02/28/18 259 lb 6 oz (117.7 kg)   02/16/18 260 lb (117.9 kg)   01/03/18 266 lb 4 oz (120.8 kg)              We Performed the Following     *UA reflex to Microscopic and Culture ([place] and [facility] (except [place] and [place])     ONC/HEME ADULT REFERRAL          Today's Medication Changes          These changes are accurate as of 2/28/18  8:28 AM.  If you have any questions, ask your nurse or doctor.               Start taking these medicines.        " Dose/Directions    acetaminophen 500 MG tablet   Commonly known as:  TYLENOL   Used for:  Cyst of left ovary   Started by:  Miya Crump PA-C        Dose:  1000 mg   Take 2 tablets (1,000 mg) by mouth 3 times daily as needed for mild pain   Quantity:  100 tablet   Refills:  0            Where to get your medicines      Some of these will need a paper prescription and others can be bought over the counter.  Ask your nurse if you have questions.     You don't need a prescription for these medications     acetaminophen 500 MG tablet                Primary Care Provider Office Phone # Fax #    Miya Crump PA-C [phone]       [address] [address]        Equal Access to Services     RISHI MORENO : Hadii aad ku hadasho Somagen, waaxda luqadaha, qaybta kaalmada adeegyada, mya patterson. So [facility] [phone].    ATENCIÓN: Si habla español, tiene a barrera disposición servicios gratuitos de asistencia lingüística. Llame al [phone].    We comply with applicable federal civil rights laws and [place] laws. We do not discriminate on the basis of race, color, national origin, age, disability, sex, sexual orientation, or gender identity.            Thank you!     Thank you for choosing [facility]  for your care. Our goal is always to provide you with excellent care. Hearing back from our patients is one way we can continue to improve our services. Please take a few minutes to complete the written survey that you may receive in the mail after your visit with us. Thank you!             Your Updated Medication List - Protect others around you: Learn how to safely use, store and throw away your medicines at www.disposemymeds.org.          This list is accurate as of 2/28/18  8:28 AM.  Always use your most recent med list.                   Brand Name Dispense Instructions for use Diagnosis    acetaminophen 500 MG  tablet    TYLENOL    100 tablet    Take 2 tablets (1,000 mg) by mouth 3 times daily as needed for mild pain    Cyst of left ovary       * VENTOLIN  (90 BASE) MCG/ACT Inhaler   Generic drug:  albuterol      Inhale 2 puffs into the lungs        * albuterol (2.5 MG/3ML) 0.083% neb solution      Inhale 2.5 mg into the lungs        cholecalciferol 5000 UNITS Tabs tablet    vitamin D3     Take 1 tablet (5,000 Units) by mouth daily    Hypovitaminosis D       cyclobenzaprine 10 MG tablet    FLEXERIL     Take 10 mg by mouth 3 times daily as needed        Glatiramer Acetate 40 MG/ML Sosy    COPAXONE    12 Syringe    Inject 40 mg Subcutaneous three times a week    Multiple sclerosis (H)       IBUPROFEN PO      Take 800 mg by mouth every 4 hours as needed for moderate pain        IRON SUPPLEMENT PO           lisinopril-hydrochlorothiazide 10-12.5 MG per tablet    PRINZIDE/ZESTORETIC    90 tablet    Take 1 tablet by mouth daily    Benign essential hypertension       meclizine 25 MG tablet    ANTIVERT     Take 25 mg by mouth daily as needed        nortriptyline 25 MG capsule    PAMELOR    60 capsule    Take one capsule at bedtime for 2 weeks, can increase to 2 capsules as needed    Migraine without aura and without status migrainosus, not intractable       * TOPAMAX 25 MG tablet   Generic drug:  topiramate     70 tablet    Take 1 tablet (25 mg) by mouth At Bedtime (Take with 100 mg to total 125 mg)        * TOPAMAX 100 MG tablet   Generic drug:  topiramate     60 tablet    Take 1 tablet (100 mg) by mouth daily (Take with 25 mg to total 125 mg)        triamcinolone 0.1 % cream    KENALOG    30 g    Apply sparingly to affected area three times daily    Eczema, unspecified type       valACYclovir 500 MG tablet    VALTREX    90 tablet    Take 1 tablet (500 mg) by mouth daily    HSV (herpes simplex virus) infection       * Notice:  This list has 4 medication(s) that are the same as other medications prescribed for you. Read the  directions carefully, and ask your doctor or other care provider to review them with you.

## 2018-03-01 LAB
BACTERIA SPEC CULT: NO GROWTH
SPECIMEN SOURCE: NORMAL

## 2018-03-01 NOTE — PROGRESS NOTES
Josefina  I have reviewed your recent labs. Here are the results:    -Urine culture is normal.  There is no need for antibiotics at this point.  If new, worsening or persistent symptoms, then you should call or return for a recheck.     If you have any questions please do not hesitate to contact our office via phone ([phone]) or MyChart.    Miya Crump, MS, PA-C  [facility]

## 2018-03-07 ENCOUNTER — MYC MEDICAL ADVICE (OUTPATIENT)
Dept: FAMILY MEDICINE | Facility: CLINIC | Age: 43
End: 2018-03-07

## 2018-03-07 NOTE — TELEPHONE ENCOUNTER
Please review [employer] message and advise as appropriate.    Thank you,    Damaris Quezada, RN  [employer]

## 2018-03-08 ENCOUNTER — TELEPHONE (OUTPATIENT)
Dept: FAMILY MEDICINE | Facility: CLINIC | Age: 43
End: 2018-03-08

## 2018-03-08 NOTE — TELEPHONE ENCOUNTER
Patient calling to check if the clinic has received her fax re: work release form. She would like a call to notify her. Please advise.  [phone] (home) NONE (work)  Thank you  Gilma Cordova

## 2018-03-08 NOTE — TELEPHONE ENCOUNTER
Faxed form to [facility] [phone] and scanned to chart  Meghan Millan CMA

## 2018-03-13 ENCOUNTER — TELEPHONE (OUTPATIENT)
Dept: ONCOLOGY | Facility: CLINIC | Age: 43
End: 2018-03-13

## 2018-03-13 NOTE — TELEPHONE ENCOUNTER
I called patient and was unable to leave a VM, phone never rolled over.    Records are in [facility] will follow as needed. Christina Dietrich

## 2018-03-28 ENCOUNTER — TELEPHONE (OUTPATIENT)
Dept: PHARMACY | Facility: CLINIC | Age: 43
End: 2018-03-28

## 2018-03-28 NOTE — TELEPHONE ENCOUNTER
Received notification from [facility] that patient has been late to fill her Glatiramer Acetate for MS. She has filled Glatiramer twice in the past year, and previously was getting Copaxone, which she also filled twice in the last year. Last fill of Glatiramer was 1/17/18.     Spoke with patient and she reports that she did get a recent fill of Glatiramer in March. She reports that she kept forgetting to order it, but she had some extra medication at home that she used in the meantime. She administers Glatiramer thee times/week. She states that she does often forget to take her Friday dose because she gets busy after work on Fridays and she will often remember on Saturday but is afraid to take her dose on Saturdays.     I recommended that she still take her dose on Saturday if she remembers, or she could move her third dose of Glatiramer to Saturday every week in order to help her remember to take her medication three times/week. She agreed with this plan.     Patient also asked about burning with Glatiramer injection. She reports that it is more painful with Glatiramer than it was with Copaxone. The burning lasts about 20 minutes then subsides. She has found topiramate to be helpful in reducing the pain (mainly because it makes her sleepy), so she often injects Glatiramer before bedtime with topiramate and this helps. I also recommended that she try Tylenol or ibuprofen 30 minutes before administering Glatiramer.    Will send patient a [employer] message so that she has my phone number for any further questions or if she would like a full [facility] medication review/assessment.    Maggie Mckenzie, Pharm.D.  Medication Therapy Management Resident  Phone: [phone]  Pager: [phone]

## 2018-04-24 ENCOUNTER — NURSE TRIAGE (OUTPATIENT)
Dept: NURSING | Facility: CLINIC | Age: 43
End: 2018-04-24

## 2018-04-24 NOTE — TELEPHONE ENCOUNTER
Had recent imaging at [facility].  Asking if there is a hospital in [place] that she can go to who would be access this. I told her [place] and gave her the address.  Guillermina CARRASCO RN [facility]

## 2018-05-16 ENCOUNTER — ONCOLOGY VISIT (OUTPATIENT)
Dept: ONCOLOGY | Facility: CLINIC | Age: 43
End: 2018-05-16
Attending: INTERNAL MEDICINE
Payer: COMMERCIAL

## 2018-05-16 VITALS
BODY MASS INDEX: 42 KG/M2 | DIASTOLIC BLOOD PRESSURE: 70 MMHG | OXYGEN SATURATION: 100 % | SYSTOLIC BLOOD PRESSURE: 113 MMHG | HEART RATE: 111 BPM | RESPIRATION RATE: 18 BRPM | TEMPERATURE: 98.5 F | HEIGHT: 65 IN | WEIGHT: 252.1 LBS

## 2018-05-16 DIAGNOSIS — D50.0 IRON DEFICIENCY ANEMIA DUE TO CHRONIC BLOOD LOSS: Primary | ICD-10-CM

## 2018-05-16 DIAGNOSIS — Z98.84 S/P GASTRIC BYPASS: ICD-10-CM

## 2018-05-16 PROCEDURE — G0463 HOSPITAL OUTPT CLINIC VISIT: HCPCS | Mod: ZF

## 2018-05-16 PROCEDURE — 99203 OFFICE O/P NEW LOW 30 MIN: CPT | Mod: ZP | Performed by: INTERNAL MEDICINE

## 2018-05-16 ASSESSMENT — PAIN SCALES - GENERAL: PAINLEVEL: MODERATE PAIN (4)

## 2018-05-16 NOTE — PROGRESS NOTES
"    [facility] Hematology Consultation    Outpatient Visit Note:    Patient: Josefina Aldrich  MRN: [id number]  : 1975  INGRID: May 16, 2018    Reason for Consultation:  Josefina Aldrich is a referred by ANDREW Vu for evaluation and treatment of anemia.    History of Present Illness:  Josefina Aldrich is a 42 year old woman with a history of gastric bypass surgery and chronic normocytic anemia.  She reports a long history of menorrhagia since age 14 (menarche at age 12).  She first became aware that she had anemia when she was pregnant with her first child at age 21 and then became worse with her second pregnancy 6 years later.  She then had gastric bypass surgery several years ago as well.  She notes fatigue, feeling cold when others feels warm, and a long history of ice pica.  She starts having ice pica starting at age 18.  She has a near constant craving for ice and sometimes also has a craving for meat.  In reviewing her ferritin level from 2018, she notes that this was drawn at a time when she was having severe abdominal pain from an ovarian cyst.    Otherwise today she is feeling well with no complaints.  She has relapsing/remitting MS which is under good control currently.      Past Medical History:  Past Medical History:   Diagnosis Date     Asthma      Fibroids     s/p hysterectomy     H/O gastric bypass      Hypertension      Migraine     followed by Devika     Obstructive sleep apnea      Pre-diabetes      Relapsing remitting multiple sclerosis (H)     lesion in [place].  Facial tingling initial symptom.  F/B [facility]       Past Surgical History:  Past Surgical History:   Procedure Laterality Date     GASTRIC BYPASS      \"Trouble with B12 and D\"     HYSTERECTOMY, MONO      cervix gone.  fibroids.  No BSO     TONSILLECTOMY         Medications:  Current Outpatient Prescriptions   Medication Sig Dispense Refill     acetaminophen (TYLENOL) 500 MG tablet " Take 2 tablets (1,000 mg) by mouth 3 times daily as needed for mild pain 100 tablet 0     albuterol (2.5 MG/3ML) 0.083% nebulizer solution Inhale 2.5 mg into the lungs       albuterol (VENTOLIN HFA) 108 (90 BASE) MCG/ACT inhaler Inhale 2 puffs into the lungs       cholecalciferol (VITAMIN D3) 5000 UNITS TABS tablet Take 1 tablet (5,000 Units) by mouth daily       Ferrous Sulfate (IRON SUPPLEMENT PO)        Glatiramer Acetate (COPAXONE) 40 MG/ML SOSY Inject 40 mg Subcutaneous three times a week 12 Syringe 11     IBUPROFEN PO Take 800 mg by mouth every 4 hours as needed for moderate pain        lisinopril-hydrochlorothiazide (PRINZIDE/ZESTORETIC) 10-12.5 MG per tablet Take 1 tablet by mouth daily 90 tablet 1     nortriptyline (PAMELOR) 25 MG capsule Take one capsule at bedtime for 2 weeks, can increase to 2 capsules as needed 60 capsule 5     topiramate (TOPAMAX) 100 MG tablet Take 1 tablet (100 mg) by mouth daily (Take with 25 mg to total 125 mg) 60 tablet      topiramate (TOPAMAX) 25 MG tablet Take 1 tablet (25 mg) by mouth At Bedtime (Take with 100 mg to total 125 mg) 70 tablet 0     triamcinolone (KENALOG) 0.1 % cream Apply sparingly to affected area three times daily 30 g 1     valACYclovir (VALTREX) 500 MG tablet Take 1 tablet (500 mg) by mouth daily 90 tablet 1     cyclobenzaprine (FLEXERIL) 10 MG tablet Take 10 mg by mouth 3 times daily as needed       meclizine (ANTIVERT) 25 MG tablet Take 25 mg by mouth daily as needed          Allergies:  Allergies   Allergen Reactions     Adhesive Tape      Azithromycin Unknown     Latex Hives and Itching     Aspirin Difficulty breathing and Palpitations     Penicillins Cramps, GI Disturbance, Nausea and Vomiting, Rash and Swelling       ROS:  A 14 point ROS is negative except as stated in the HPI    Social History:  Denies any tobacco use. No significant alcohol use. Denies any illicit drug use.     Family History:  None of blood disorders    Objective:  Vitals: B/P:  113/70, T: 98.5, P: 111, R: 18, Wt: 252 lbs 1.6 oz  Exam:   Gen: Appears well, no distress  HEENT: no scleral icterus or hemorrhage, no wet purpura, no lymphadenopathy  CV: regular, no murmurs  Pulm: clear  Ext: no edema  Skin: no rashes, petechiae or ecchymoses    Labs:  Ferritin 78 in Jan 2017, Last Hgb was 10.7 in Feb 2018    Imaging:  none    Assessment:  In summary, Josefnia Aldrich is a 42 year old woman with iron deficiency and likely some degree of anemia of chronic inflammation, with a history of gastric bypass surgery and poor PO iron absorption.    Plan:  Majority of today's visit was spent counseling the patient regarding causes and treatment of iron deficiency.  I have recommended IV iron replacement given my suspicion of poor iron absorption.  We discussed risks, benefits and alternatives for IV iron preparations InFed and Injectafer.  She decided she would like to try Injectafer.  She will have a local recheck in about 6 weeks after her infusions.  I did  her that I would not be surprised if her Hgb did not completely normalize given her concurrent MS but I do think her symptoms and pica will improve.    The patient is given our center's contact information and is instructed to call if she should have any further questions or concerns.  Otherwise, we will plan on seeing her back as needed.      Total Time Spent:  I spent a total of 45 minutes face-to-face with Josefina Aldrich during today's office visit.  Over 50% of this time was spent counseling the patient and/or coordinating care regarding anemia and iron deficiency.      Joao Humphrey MD   of Medicine  [employer]

## 2018-05-16 NOTE — NURSING NOTE
"Oncology Rooming Note    May 16, 2018 4:42 PM   Josefina Aldrich is a 42 year old female who presents for:    Chief Complaint   Patient presents with     Oncology Clinic Visit     New patient visit related to Anemia     Initial Vitals: There were no vitals taken for this visit. Estimated body mass index is 43.16 kg/(m^2) as calculated from the following:    Height as of 2/28/18: 1.651 m (5' 5\").    Weight as of 2/28/18: 117.7 kg (259 lb 6 oz). There is no height or weight on file to calculate BSA.  Data Unavailable Comment: Data Unavailable   No LMP recorded. Patient has had a hysterectomy.  Allergies reviewed: Yes  Medications reviewed: Yes    Medications: Medication refills not needed today.  Pharmacy name entered into [employer]:    [id number] - [place], [place] - [address]  [id number] - [place], [place] - [address] [facility] [address]  [facility] - [place], [place] - [address] [id number] - [place], [place] - [address] [address] [facility] [facility] [address] [address]    Clinical concerns: No new concerns. Provider was notified.    10 minutes for nursing intake (face to face time)     Avril Patiño LPN            "

## 2018-05-16 NOTE — MR AVS SNAPSHOT
After Visit Summary   5/16/2018    Josefina Aldrich    MRN: [id number]           Patient Information     Date Of Birth          1975        Visit Information        Provider Department      5/16/2018 5:00 PM Joao Humphrey MD [facility]        Today's Diagnoses     Iron deficiency anemia due to chronic blood loss    -  1    S/P gastric bypass          Care Instructions    Your visit the [facility] was to discuss your long-standing anemia.  I suspect your anemia is caused by low iron (iron deficiency).  This is a very common problem in women, especially after having children.  I have recommend 2 infusions of a medication called Injectafer (iron carboxymaltose) to replace your missing iron.  I expect your anemia to resolve after about 6-8 weeks.  I would recommend repeating a blood count and ferritin check about 6 weeks or so after your infusions.    If you have questions or concerns before your next appointment you can call Dr Humphrey's Care Coordinator, Hien Hernandez at:  [phone]    You can also reach Dr Humphrey through [employer].            Follow-ups after your visit        Your next 10 appointments already scheduled     May 31, 2018  8:00 AM CDT   (Arrive by 7:45 AM)   Return Multiple Sclerosis with Radames Castaneda MD   [facility])    [address]  [address]  [place] [address] [phone]            May 31, 2018  5:00 PM CDT   Infusion 60 with UC ONCOLOGY INFUSION, UC 10 ATC   [facility]    [address]  [address]  [place] [address] [phone]            Jun 07, 2018  5:00 PM CDT   Infusion 60 with UC ONCOLOGY INFUSION, UC 10 ATC   [facility]    [address]  [address]  [place] [address]  "  [phone]              Future tests that were ordered for you today     Open Future Orders        Priority Expected Expires Ordered    *CBC with platelets differential Routine 7/11/2018 5/16/2019 5/16/2018    Ferritin Routine 7/11/2018 5/16/2019 5/16/2018            Who to contact     If you have questions or need follow up information about today's clinic visit or your schedule please contact [facility] directly at [phone].  Normal or non-critical lab and imaging results will be communicated to you by [employer], letter or phone within 4 business days after the clinic has received the results. If you do not hear from us within 7 days, please contact the clinic through [employer] or phone. If you have a critical or abnormal lab result, we will notify you by phone as soon as possible.  Submit refill requests through [facility] or call your pharmacy and they will forward the refill request to us. Please allow 3 business days for your refill to be completed.          Additional Information About Your Visit        [facility] Information     [facility] gives you secure access to your electronic health record. If you see a primary care provider, you can also send messages to your care team and make appointments. If you have questions, please call your primary care clinic.  If you do not have a primary care provider, please call [phone] and they will assist you.        Care EveryWhere ID     This is your Care EveryWhere ID. This could be used by other organizations to access your [place] medical records  [id number]        Your Vitals Were     Pulse Temperature Respirations Height Pulse Oximetry BMI (Body Mass Index)    111 98.5  F (36.9  C) (Oral) 18 1.651 m (5' 5\") 100% 41.95 kg/m2       Blood Pressure from Last 3 Encounters:   05/16/18 113/70   02/28/18 112/70   02/16/18 102/60    Weight from Last 3 Encounters:   05/16/18 114.4 kg (252 lb 1.6 oz)   02/28/18 117.7 kg (259 lb 6 oz)   02/16/18 117.9 " kg (260 lb)               Primary Care Provider Office Phone # Fax #    Miya Crump PA-C [phone] [address] [place] [id number]        Equal Access to Services     RISHI MORENO : Geoff jimmie elliott dixieo Somagen, waaxda luqadaha, qaybta kaalmada adeegyada, mya islas lilia patterson. So [facility] [phone].    ATENCIÓN: Si habla español, tiene a barrera disposición servicios gratuitos de asistencia lingüística. Llame al [phone].    We comply with applicable federal civil rights laws and [place] laws. We do not discriminate on the basis of race, color, national origin, age, disability, sex, sexual orientation, or gender identity.            Thank you!     Thank you for choosing [facility]  for your care. Our goal is always to provide you with excellent care. Hearing back from our patients is one way we can continue to improve our services. Please take a few minutes to complete the written survey that you may receive in the mail after your visit with us. Thank you!             Your Updated Medication List - Protect others around you: Learn how to safely use, store and throw away your medicines at www.disposemymeds.org.          This list is accurate as of 5/16/18  5:28 PM.  Always use your most recent med list.                   Brand Name Dispense Instructions for use Diagnosis    acetaminophen 500 MG tablet    TYLENOL    100 tablet    Take 2 tablets (1,000 mg) by mouth 3 times daily as needed for mild pain    Cyst of left ovary       * VENTOLIN  (90 Base) MCG/ACT Inhaler   Generic drug:  albuterol      Inhale 2 puffs into the lungs        * albuterol (2.5 MG/3ML) 0.083% neb solution      Inhale 2.5 mg into the lungs        cholecalciferol 5000 units Tabs tablet    vitamin D3     Take 1 tablet (5,000 Units) by mouth daily    Hypovitaminosis D       cyclobenzaprine 10 MG tablet    FLEXERIL     Take 10 mg by mouth 3 times daily as needed         Glatiramer Acetate 40 MG/ML Sosy    COPAXONE    12 Syringe    Inject 40 mg Subcutaneous three times a week    Multiple sclerosis (H)       IBUPROFEN PO      Take 800 mg by mouth every 4 hours as needed for moderate pain        IRON SUPPLEMENT PO           lisinopril-hydrochlorothiazide 10-12.5 MG per tablet    PRINZIDE/ZESTORETIC    90 tablet    Take 1 tablet by mouth daily    Benign essential hypertension       meclizine 25 MG tablet    ANTIVERT     Take 25 mg by mouth daily as needed        nortriptyline 25 MG capsule    PAMELOR    60 capsule    Take one capsule at bedtime for 2 weeks, can increase to 2 capsules as needed    Migraine without aura and without status migrainosus, not intractable       * TOPAMAX 25 MG tablet   Generic drug:  topiramate     70 tablet    Take 1 tablet (25 mg) by mouth At Bedtime (Take with 100 mg to total 125 mg)        * TOPAMAX 100 MG tablet   Generic drug:  topiramate     60 tablet    Take 1 tablet (100 mg) by mouth daily (Take with 25 mg to total 125 mg)        triamcinolone 0.1 % cream    KENALOG    30 g    Apply sparingly to affected area three times daily    Eczema, unspecified type       valACYclovir 500 MG tablet    VALTREX    90 tablet    Take 1 tablet (500 mg) by mouth daily    HSV (herpes simplex virus) infection       * Notice:  This list has 4 medication(s) that are the same as other medications prescribed for you. Read the directions carefully, and ask your doctor or other care provider to review them with you.

## 2018-05-16 NOTE — LETTER
2018       RE: Josefina Aldrich  [id number]  [place] [address]     Dear Colleague,    Thank you for referring your patient, Josefina Aldrich, to the [facility]. Please see a copy of my visit note below.        [facility] Hematology Consultation    Outpatient Visit Note:    Patient: Josefina Aldrich  MRN: [id number]  : 1975  INGRID: May 16, 2018    Reason for Consultation:  Josefina Aldrich is a referred by ANDREW Vu for evaluation and treatment of anemia.    History of Present Illness:  Josefina Aldrich is a 42 year old woman with a history of gastric bypass surgery and chronic normocytic anemia.  She reports a long history of menorrhagia since age 14 (menarche at age 12).  She first became aware that she had anemia when she was pregnant with her first child at age 21 and then became worse with her second pregnancy 6 years later.  She then had gastric bypass surgery several years ago as well.  She notes fatigue, feeling cold when others feels warm, and a long history of ice pica.  She starts having ice pica starting at age 18.  She has a near constant craving for ice and sometimes also has a craving for meat.  In reviewing her ferritin level from 2018, she notes that this was drawn at a time when she was having severe abdominal pain from an ovarian cyst.    Otherwise today she is feeling well with no complaints.  She has relapsing/remitting MS which is under good control currently.      Past Medical History:  Past Medical History:   Diagnosis Date     Asthma      Fibroids     s/p hysterectomy     H/O gastric bypass      Hypertension      Migraine     followed by Devika     Obstructive sleep apnea      Pre-diabetes      Relapsing remitting multiple sclerosis (H)     lesion in [place].  Facial tingling initial symptom.  F/B [facility]       Past Surgical History:  Past Surgical History:   Procedure Laterality Date  "    GASTRIC BYPASS  2002    \"Trouble with B12 and D\"     HYSTERECTOMY, MONO  2013    cervix gone.  fibroids.  No BSO     TONSILLECTOMY         Medications:  Current Outpatient Prescriptions   Medication Sig Dispense Refill     acetaminophen (TYLENOL) 500 MG tablet Take 2 tablets (1,000 mg) by mouth 3 times daily as needed for mild pain 100 tablet 0     albuterol (2.5 MG/3ML) 0.083% nebulizer solution Inhale 2.5 mg into the lungs       albuterol (VENTOLIN HFA) 108 (90 BASE) MCG/ACT inhaler Inhale 2 puffs into the lungs       cholecalciferol (VITAMIN D3) 5000 UNITS TABS tablet Take 1 tablet (5,000 Units) by mouth daily       Ferrous Sulfate (IRON SUPPLEMENT PO)        Glatiramer Acetate (COPAXONE) 40 MG/ML SOSY Inject 40 mg Subcutaneous three times a week 12 Syringe 11     IBUPROFEN PO Take 800 mg by mouth every 4 hours as needed for moderate pain        lisinopril-hydrochlorothiazide (PRINZIDE/ZESTORETIC) 10-12.5 MG per tablet Take 1 tablet by mouth daily 90 tablet 1     nortriptyline (PAMELOR) 25 MG capsule Take one capsule at bedtime for 2 weeks, can increase to 2 capsules as needed 60 capsule 5     topiramate (TOPAMAX) 100 MG tablet Take 1 tablet (100 mg) by mouth daily (Take with 25 mg to total 125 mg) 60 tablet      topiramate (TOPAMAX) 25 MG tablet Take 1 tablet (25 mg) by mouth At Bedtime (Take with 100 mg to total 125 mg) 70 tablet 0     triamcinolone (KENALOG) 0.1 % cream Apply sparingly to affected area three times daily 30 g 1     valACYclovir (VALTREX) 500 MG tablet Take 1 tablet (500 mg) by mouth daily 90 tablet 1     cyclobenzaprine (FLEXERIL) 10 MG tablet Take 10 mg by mouth 3 times daily as needed       meclizine (ANTIVERT) 25 MG tablet Take 25 mg by mouth daily as needed          Allergies:  Allergies   Allergen Reactions     Adhesive Tape      Azithromycin Unknown     Latex Hives and Itching     Aspirin Difficulty breathing and Palpitations     Penicillins Cramps, GI Disturbance, Nausea and Vomiting, " Rash and Swelling       ROS:  A 14 point ROS is negative except as stated in the HPI    Social History:  Denies any tobacco use. No significant alcohol use. Denies any illicit drug use.     Family History:  None of blood disorders    Objective:  Vitals: B/P: 113/70, T: 98.5, P: 111, R: 18, Wt: 252 lbs 1.6 oz  Exam:   Gen: Appears well, no distress  HEENT: no scleral icterus or hemorrhage, no wet purpura, no lymphadenopathy  CV: regular, no murmurs  Pulm: clear  Ext: no edema  Skin: no rashes, petechiae or ecchymoses    Labs:  Ferritin 78 in Jan 2017, Last Hgb was 10.7 in Feb 2018    Imaging:  none    Assessment:  In summary, Josefina Aldrich is a 42 year old woman with iron deficiency and likely some degree of anemia of chronic inflammation, with a history of gastric bypass surgery and poor PO iron absorption.    Plan:  Majority of today's visit was spent counseling the patient regarding causes and treatment of iron deficiency.  I have recommended IV iron replacement given my suspicion of poor iron absorption.  We discussed risks, benefits and alternatives for IV iron preparations InFed and Injectafer.  She decided she would like to try Injectafer.  She will have a local recheck in about 6 weeks after her infusions.  I did  her that I would not be surprised if her Hgb did not completely normalize given her concurrent MS but I do think her symptoms and pica will improve.    The patient is given our center's contact information and is instructed to call if she should have any further questions or concerns.  Otherwise, we will plan on seeing her back as needed.      Total Time Spent:  I spent a total of 45 minutes face-to-face with Josefina Aldrich during today's office visit.  Over 50% of this time was spent counseling the patient and/or coordinating care regarding anemia and iron deficiency.      Joao Humphrey MD   of Medicine  [facility]

## 2018-05-16 NOTE — PATIENT INSTRUCTIONS
Your visit the [facility] was to discuss your long-standing anemia.  I suspect your anemia is caused by low iron (iron deficiency).  This is a very common problem in women, especially after having children.  I have recommend 2 infusions of a medication called Injectafer (iron carboxymaltose) to replace your missing iron.  I expect your anemia to resolve after about 6-8 weeks.  I would recommend repeating a blood count and ferritin check about 6 weeks or so after your infusions.    If you have questions or concerns before your next appointment you can call Dr Humphrey's Care Coordinator, Hien Hernandez at:  [phone]    You can also reach Dr Humphrey through [email or web address].

## 2018-05-24 ENCOUNTER — TELEPHONE (OUTPATIENT)
Dept: PHARMACY | Facility: CLINIC | Age: 43
End: 2018-05-24

## 2018-05-24 NOTE — TELEPHONE ENCOUNTER
Attempted to reach patient based on notification from [facility] regarding late refills of Copaxone. Her last fill was on 3/19/18.     She would benefit from meeting with MTM pharmacist to discuss non-adherence since we have now received two notifications from [facility] regarding late fills.    Left message for patient to call me back to schedule an MTM appointment.    Maggie Mckenzie, Pharm.D.  Medication Therapy Management Resident  Phone: [phone]  Pager: [phone]

## 2018-05-24 NOTE — TELEPHONE ENCOUNTER
Patient called back and states that she received her last refill of Glatiramer in April. She states that the March fill was pushed back to April because it was mailed to the wrong address. She says she will be due for her next refill soon. She also states that her adherence has improved after talking with MTM pharmacist last month because if she forget to inject on Fridays, she will do it on Saturdays instead now. She states she missed one dose in March but none in April or May.    Patient states that she continues to have reactions to the Glatiramer injections; these reactions did not occur as severely with Copaxone. She states that the injection administration is painful and causes a golf-ball size whelt, along with itching. She does take the Glatiramer injections out of the refrigerator 20-25 minutes prior to administration and she puts ice on the injection site after administering. In addition, patient mentions that she is using her old Copaxone injector with the Glatiramer syringes because the new injector device did not work for her.     Per [facility], patient has only receive Glatiramer twice this year, on 1/17/18 and 3/20/18, for 12 injections (one month) each. This results in a medication possession ratio (MPR) of 0.49.     Recommendations:   1. Encouraged patient to try using a warm compress on the injection site for a few minutes prior to injection.  2. Encouraged patient to try taking ibuprofen or naproxen 30-60 minutes prior to injection.  3. Encouraged patient to bring her glatiramer syringe and Copaxone injector to her next neurology appointment on 5/31/18 to demonstrate technique with Dr. Castaneda or one of the MS nurses. MTM pharmacist will not be in clinic that day.     Routing encounter to Dr. Castaneda, Mitzy Collazo, and Cami Francisco for review. Would suggest a referral to MTM pharmacist if needed after next week's appointment. It appears as though she is unclear on the  directions or may be missing more doses than she thinks if she is taking the medication 50% of the time.    Maggie Mckenzie, Pharm.D.  Medication Therapy Management Resident  Phone: [phone]  Pager: [phone]

## 2018-05-31 ENCOUNTER — OFFICE VISIT (OUTPATIENT)
Dept: NEUROLOGY | Facility: CLINIC | Age: 43
End: 2018-05-31
Attending: PSYCHIATRY & NEUROLOGY
Payer: COMMERCIAL

## 2018-05-31 VITALS
BODY MASS INDEX: 41.32 KG/M2 | HEART RATE: 82 BPM | DIASTOLIC BLOOD PRESSURE: 74 MMHG | HEIGHT: 65 IN | WEIGHT: 248 LBS | SYSTOLIC BLOOD PRESSURE: 109 MMHG

## 2018-05-31 DIAGNOSIS — M54.2 NECK PAIN: ICD-10-CM

## 2018-05-31 DIAGNOSIS — G43.009 MIGRAINE WITHOUT AURA AND WITHOUT STATUS MIGRAINOSUS, NOT INTRACTABLE: ICD-10-CM

## 2018-05-31 DIAGNOSIS — G35 MULTIPLE SCLEROSIS (H): Primary | ICD-10-CM

## 2018-05-31 DIAGNOSIS — G35 MULTIPLE SCLEROSIS (H): ICD-10-CM

## 2018-05-31 LAB — DEPRECATED CALCIDIOL+CALCIFEROL SERPL-MC: 38 UG/L (ref 20–75)

## 2018-05-31 PROCEDURE — G0463 HOSPITAL OUTPT CLINIC VISIT: HCPCS | Mod: ZF

## 2018-05-31 PROCEDURE — 82306 VITAMIN D 25 HYDROXY: CPT | Performed by: PSYCHIATRY & NEUROLOGY

## 2018-05-31 PROCEDURE — 36415 COLL VENOUS BLD VENIPUNCTURE: CPT | Performed by: PSYCHIATRY & NEUROLOGY

## 2018-05-31 RX ORDER — TOPIRAMATE 100 MG/1
100 TABLET, FILM COATED ORAL DAILY
Qty: 90 TABLET | Refills: 3 | Status: ON HOLD | OUTPATIENT
Start: 2018-05-31 | End: 2018-10-21

## 2018-05-31 RX ORDER — TOPIRAMATE 25 MG/1
25 TABLET, FILM COATED ORAL AT BEDTIME
Qty: 90 TABLET | Refills: 3 | Status: ON HOLD | OUTPATIENT
Start: 2018-05-31 | End: 2018-10-21

## 2018-05-31 NOTE — LETTER
5/31/2018    RE: Josefina Aldrich  [id number]  [place] [address]     Referral source: Established patient     Chief complaint: Multiple sclerosis      History of the Present Illness:  Ms. Josefina Aldrich is a 42-year-old right-handed woman who returns to the Multiple Sclerosis Clinic today for regularly scheduled follow-up.      The patient's history is as per my previous notes.  She initially presented in June 2015 with right-sided numbness and incoordination of the arm.  An MRI scan at an outside hospital demonstrated signal abnormality in the addison that was initially interpreted as an ischemic stroke.  Follow-up imaging the next year, however, did not demonstrate the expected temporal evolution of an infarct and further there was a new enhancing lesion on brain MRI suggestive of demyelinating disease of the type seen in multiple sclerosis.  We subsequently initiated the patient on disease-modifying therapy with glatiramer acetate.      Today, the patient denies any new episodic changes in vision, balance, strength or sensation suggestive of new relapse of multiple sclerosis since she was last seen.  She has switched over to the generic version of glatiramer acetate (from [employer]).  She tells me that she is having more discomfort and worsened injection site reactions with the generic product.  Apparently, the autoinjector that they supplied her never functioned properly and she has been using the autoinjector from the brand name Copaxone formulation to administer the shots.    Symptomatically, at the patient's last appointment here last fall, she described a lot of difficulty with diffuse pain.  I gave her a prescription for nortriptyline, which she is taking at 25 mg at bedtime. She does find this quite helpful in promoting and consolidating sleep.  She has also made a number of lifestyle changes including doing yoga regularly and she has also lost almost 10 kg over the last 6 months  following a modified version of the [place]ahls diet.  She is, however, still having quite a bit of difficulty with pain at the base of the neck.      PHYSICAL EXAMINATION:   VITAL SIGNS:  Blood pressure 109/74; pulse 82; weight 112.5 kg (down from 122 kg in October 2017; height 1.65 meters.   GENERAL:  Morbidly obese woman who presents to the evaluation alone, awake and alert and in no acute distress.     Neurologic examination was deferred today due to time considerations as the patient arrived 40 minutes past the report time for this visit.      Assessment/plan:    1.  Relapsing-remitting multiple sclerosis    The patient appears clinically stable on disease-modifying therapy with glatiramer acetate.  She clearly needs repeat injection training as well as assistance with her injector from [employer], and we will contact the company to arrange this for her.  Her worsened injection site reactions may be due to some incompatibility with the injector that she is using, but may also relate to subtle differences in the products.      I told her, however, that if she is able to tolerate glatiramer acetate and it is effective in controlling her disease, this would really be the best case scenario as this agent is so safe.      We are going to have her return in 6 months with MRI scans of the brain and cervical spine to evaluate the radiologic stability of her condition.  I am going to check a vitamin D level today and we will advise her on supplementation as needed to maintain her level in the goal range of 60-80 mcg per liter.  Currently, she is taking 5000 international units of vitamin D daily.      2.  Diffuse pain  I complemented the patient on the lifestyle changes that she has made.  These do appear to have had a significant positive effect on her symptoms.  We will continue nortriptyline at 25 mg at bedtime to help with her sleep.  I am also going to refer her to physical therapy for an evaluation of her neck discomfort.   If she continues to have pain in this location, a referral to pain management can also be considered in the future, but she would prefer to start with physical therapy for now, which is very reasonable.      3.  Migraine  The patient has had topiramate prescribed by another neurologist.  Currently, she is doing well in this regard and asked that we take over this prescription.  Prescriptions for topiramate to reflect her dose of 125 mg at bedtime were transmitted to the patient's pharmacy with 1 year of refills.      I spent 26 minutes with the patient in the office today, with greater than 50% of this time spent in counseling.      Radames Castaneda MD   of Neurology  [facility]    Cc:  Miya Crump PA-C (PCP)  Patient

## 2018-05-31 NOTE — PATIENT INSTRUCTIONS
1. Blood test (vitamin D) today    2. Continue nortriptyline at 25 mg at bedtime and topiramate 125 mg at bedtime    3. Continue glatiramer acetate--we will contact the company to supply a new autoinjector    4. We will refer you to physical therapy for treatment of neck pain    5. Return in 6 months with MRI scans of the brain and cervical spine

## 2018-05-31 NOTE — MR AVS SNAPSHOT
After Visit Summary   5/31/2018    Josefina Aldrich    MRN: [id number]           Patient Information     Date Of Birth          1975        Visit Information        Provider Department      5/31/2018 8:00 AM Radames Castaneda MD [facility]        Today's Diagnoses     Multiple sclerosis (H)    -  1    Neck pain        Migraine without aura and without status migrainosus, not intractable          Care Instructions    1. Blood test (vitamin D) today    2. Continue nortriptyline at 25 mg at bedtime and topiramate 125 mg at bedtime    3. Continue glatiramer acetate--we will contact the company to supply a new autoinjector    4. We will refer you to physical therapy for treatment of neck pain    5. Return in 6 months with MRI scans of the brain and cervical spine          Follow-ups after your visit        Additional Services     PT Referral ([place])       *This therapy referral will be filtered to a centralized scheduling office at [facility] and the patient will receive a call to schedule an appointment at a [place] location most convenient for them. *     [facility] provides Physical Therapy evaluation and treatment and many specialty services across the [place] system.  If requesting a specialty program, please choose from the list below.    If you have not heard from the scheduling office within 2 business days, please call [phone] for all locations, with the exception of [place], please call [phone] and [facility], please call [phone]  Treatment: Evaluation & Treatment  Special Instructions/Modalities:   Special Programs: None    Please be aware that coverage of these services is subject to the terms and limitations of your health insurance plan.  Call member services at your health plan with any benefit or coverage questions.      **Note to Provider:  If you are referring outside of [place] for the  "therapy appointment, please list the name of the location in the \"special instructions\" above, print the referral and give to the patient to schedule the appointment.                  Follow-up notes from your care team     Return in about 6 months (around 11/30/2018).      Your next 10 appointments already scheduled     Jun 07, 2018  5:00 PM CDT   Infusion 60 with  ONCOLOGY INFUSION, UC 10 ATC   [facility] ([facility])    [address]  [address]  [facility] [id number] [phone]            Nov 29, 2018  8:30 AM CST   MR CERVICAL SPINE W/O & W CONTRAST with [facility]   [facility] ([facility])    [address]  1st Floor  [facility] [id number] [phone]           Take your medicines as usual, unless your doctor tells you not to. Bring a list of your current medicines to your exam (including vitamins, minerals and over-the-counter drugs).  You may or may not receive intravenous (IV) contrast for this exam pending the discretion of the Radiologist.  You do not need to do anything special to prepare.  The MRI machine uses a strong magnet. Please wear clothes without metal (snaps, zippers). A sweatsuit works well, or we may give you a hospital gown.  Please remove any body piercings and hair extensions before you arrive. You will also remove watches, jewelry, hairpins, wallets, dentures, partial dental plates and hearing aids. You may wear contact lenses, and you may be able to wear your rings. We have a safe place to keep your personal items, but it is safer to leave them at home.  **IMPORTANT** THE INSTRUCTIONS BELOW ARE ONLY FOR THOSE PATIENTS WHO HAVE BEEN PRESCRIBED SEDATION OR GENERAL ANESTHESIA DURING THEIR MRI PROCEDURE:  IF YOUR DOCTOR PRESCRIBED ORAL SEDATION (take medicine to help you relax during your exam):   You must get the medicine from your doctor (oral medication) before you arrive. Bring " the medicine to the exam. Do not take it at home. You ll be told when to take it upon arriving for your exam.   Arrive one hour early. Bring someone who can take you home after the test. Your medicine will make you sleepy. After the exam, you may not drive, take a bus or take a taxi by yourself.  IF YOUR DOCTOR PRESCRIBED IV SEDATION:   Arrive one hour early. Bring someone who can take you home after the test. Your medicine will make you sleepy. After the exam, you may not drive, take a bus or take a taxi by yourself.   No eating 6 hours before your exam. You may have clear liquids up until 4 hours before your exam. (Clear liquids include water, clear tea, black coffee and fruit juice without pulp.)  IF YOUR DOCTOR PRESCRIBED ANESTHESIA (be asleep for your exam):   Arrive 1 1/2 hours early. Bring someone who can take you home after the test. You may not drive, take a bus or take a taxi by yourself.   No eating 8 hours before your exam. You may have clear liquids up until 4 hours before your exam. (Clear liquids include water, clear tea, black coffee and fruit juice without pulp.)   You will spend four to five hours in the recovery room.  Please call the Imaging Department at your exam site with any questions.            Nov 29, 2018  9:15 AM CST   MR BRAIN W/O & W CONTRAST with [address] MRI ([facility])    [address] [address] [phone]           Take your medicines as usual, unless your doctor tells you not to. Bring a list of your current medicines to your exam (including vitamins, minerals and over-the-counter drugs).  You may or may not receive intravenous (IV) contrast for this exam pending the discretion of the Radiologist.  You do not need to do anything special to prepare.  The MRI machine uses a strong magnet. Please wear clothes without metal (snaps, zippers). A sweatsuit works well, or we may give you a hospital  gown.  Please remove any body piercings and hair extensions before you arrive. You will also remove watches, jewelry, hairpins, wallets, dentures, partial dental plates and hearing aids. You may wear contact lenses, and you may be able to wear your rings. We have a safe place to keep your personal items, but it is safer to leave them at home.  **IMPORTANT** THE INSTRUCTIONS BELOW ARE ONLY FOR THOSE PATIENTS WHO HAVE BEEN PRESCRIBED SEDATION OR GENERAL ANESTHESIA DURING THEIR MRI PROCEDURE:  IF YOUR DOCTOR PRESCRIBED ORAL SEDATION (take medicine to help you relax during your exam):   You must get the medicine from your doctor (oral medication) before you arrive. Bring the medicine to the exam. Do not take it at home. You ll be told when to take it upon arriving for your exam.   Arrive one hour early. Bring someone who can take you home after the test. Your medicine will make you sleepy. After the exam, you may not drive, take a bus or take a taxi by yourself.  IF YOUR DOCTOR PRESCRIBED IV SEDATION:   Arrive one hour early. Bring someone who can take you home after the test. Your medicine will make you sleepy. After the exam, you may not drive, take a bus or take a taxi by yourself.   No eating 6 hours before your exam. You may have clear liquids up until 4 hours before your exam. (Clear liquids include water, clear tea, black coffee and fruit juice without pulp.)  IF YOUR DOCTOR PRESCRIBED ANESTHESIA (be asleep for your exam):   Arrive 1 1/2 hours early. Bring someone who can take you home after the test. You may not drive, take a bus or take a taxi by yourself.   No eating 8 hours before your exam. You may have clear liquids up until 4 hours before your exam. (Clear liquids include water, clear tea, black coffee and fruit juice without pulp.)   You will spend four to five hours in the recovery room.  Please call the Imaging Department at your exam site with any questions.            Nov 29, 2018 10:00 AM CST    (Arrive by 9:45 AM)   Return Multiple Sclerosis with Radames Castaneda MD   [facility] Multiple Sclerosis ([facility])    [address]  [address] [place] [address] [phone]              Future tests that were ordered for you today     Open Future Orders        Priority Expected Expires Ordered    MRI Brain w & w/o contrast Routine 11/29/2018 5/31/2019 5/31/2018    MRI Cervical spine w & w/o contrast Routine 11/29/2018 5/31/2019 5/31/2018    Vitamin D Deficiency Screening Routine 5/31/2018 6/30/2018 5/31/2018    PT Referral ([place]) Routine 5/31/2018 5/31/2019 5/31/2018            Who to contact     If you have questions or need follow up information about today's clinic visit or your schedule please contact [facility] directly at [phone].  Normal or non-critical lab and imaging results will be communicated to you by MyChart, letter or phone within 4 business days after the clinic has received the results. If you do not hear from us within 7 days, please contact the clinic through [employer] or phone. If you have a critical or abnormal lab result, we will notify you by phone as soon as possible.  Submit refill requests through [employer] or call your pharmacy and they will forward the refill request to us. Please allow 3 business days for your refill to be completed.          Additional Information About Your Visit        [employer] Information     [employer] gives you secure access to your electronic health record. If you see a primary care provider, you can also send messages to your care team and make appointments. If you have questions, please call your primary care clinic.  If you do not have a primary care provider, please call [phone] and they will assist you.        Care EveryWhere ID     This is your Care EveryWhere ID. This could be used by other organizations to access your [place] medical records  [id number]        Your Vitals Were      "Pulse Height BMI (Body Mass Index)             82 1.651 m (5' 5\") 41.27 kg/m2          Blood Pressure from Last 3 Encounters:   05/31/18 109/74   05/16/18 113/70   02/28/18 112/70    Weight from Last 3 Encounters:   05/31/18 112.5 kg (248 lb)   05/16/18 114.4 kg (252 lb 1.6 oz)   02/28/18 117.7 kg (259 lb 6 oz)                 Where to get your medicines      These medications were sent to [facility] [id number] [address] AT [facility] [address] & [address], [facility] [address]    Hours:  24-hours Phone:  [phone]     topiramate 100 MG tablet    topiramate 25 MG tablet          Primary Care Provider Office Phone # Fax #    Miya Crump PA-C [phone]       [address] [place] [address]        Equal Access to Services     [facility] CenterVICTORIA AH: Hadii jimmie ku hadasho Soomaali, waaxda luqadaha, qaybta kaalmada adeegyada, mya rodrigues . So [facility] [phone].    ATENCIÓN: Si habla español, tiene a barrera disposición servicios gratuitos de asistencia lingüística. LlSamaritan [facility] Center [phone].    We comply with applicable federal civil rights laws and [place] laws. We do not discriminate on the basis of race, color, national origin, age, disability, sex, sexual orientation, or gender identity.            Thank you!     Thank you for choosing [facility]  for your care. Our goal is always to provide you with excellent care. Hearing back from our patients is one way we can continue to improve our services. Please take a few minutes to complete the written survey that you may receive in the mail after your visit with us. Thank you!             Your Updated Medication List - Protect others around you: Learn how to safely use, store and throw away your medicines at www.disposemymeds.org.          This list is accurate as of 5/31/18  9:32 AM.  Always use your most recent med list.                   Brand Name " Dispense Instructions for use Diagnosis    acetaminophen 500 MG tablet    TYLENOL    100 tablet    Take 2 tablets (1,000 mg) by mouth 3 times daily as needed for mild pain    Cyst of left ovary       * VENTOLIN  (90 Base) MCG/ACT Inhaler   Generic drug:  albuterol      Inhale 2 puffs into the lungs        * albuterol (2.5 MG/3ML) 0.083% neb solution      Inhale 2.5 mg into the lungs        cholecalciferol 5000 units Tabs tablet    vitamin D3     Take 1 tablet (5,000 Units) by mouth daily    Hypovitaminosis D       cyclobenzaprine 10 MG tablet    FLEXERIL     Take 10 mg by mouth 3 times daily as needed        Glatiramer Acetate 40 MG/ML Sosy    COPAXONE    12 Syringe    Inject 40 mg Subcutaneous three times a week    Multiple sclerosis (H)       IBUPROFEN PO      Take 800 mg by mouth every 4 hours as needed for moderate pain        IRON SUPPLEMENT PO           lisinopril-hydrochlorothiazide 10-12.5 MG per tablet    PRINZIDE/ZESTORETIC    90 tablet    Take 1 tablet by mouth daily    Benign essential hypertension       meclizine 25 MG tablet    ANTIVERT     Take 25 mg by mouth daily as needed        nortriptyline 25 MG capsule    PAMELOR    60 capsule    Take one capsule at bedtime for 2 weeks, can increase to 2 capsules as needed    Migraine without aura and without status migrainosus, not intractable       * topiramate 100 MG tablet    TOPAMAX    90 tablet    Take 1 tablet (100 mg) by mouth daily (Take with 25 mg to total 125 mg)    Migraine without aura and without status migrainosus, not intractable       * topiramate 25 MG tablet    TOPAMAX    90 tablet    Take 1 tablet (25 mg) by mouth At Bedtime (Take with 100 mg to total 125 mg)    Migraine without aura and without status migrainosus, not intractable       triamcinolone 0.1 % cream    KENALOG    30 g    Apply sparingly to affected area three times daily    Eczema, unspecified type       valACYclovir 500 MG tablet    VALTREX    90 tablet    Take 1 tablet  (500 mg) by mouth daily    HSV (herpes simplex virus) infection       * Notice:  This list has 4 medication(s) that are the same as other medications prescribed for you. Read the directions carefully, and ask your doctor or other care provider to review them with you.

## 2018-05-31 NOTE — Clinical Note
5/31/2018       RE: Josefina Aldrich  [id number]  [place] [address]     Dear Colleague,    Thank you for referring your patient, Josefina Aldrich, to the [facility] at [facility]. Please see a copy of my visit note below.    No notes on file    Again, thank you for allowing me to participate in the care of your patient.      Sincerely,    Radames Castaneda MD

## 2018-06-01 NOTE — PROGRESS NOTES
Date of service: May 31, 2018     Referral source: Established patient     Chief complaint: Multiple sclerosis      History of the Present Illness:  Ms. Josefina Aldrich is a 42-year-old right-handed woman who returns to the Multiple Sclerosis Clinic today for regularly scheduled follow-up.      The patient's history is as per my previous notes.  She initially presented in June 2015 with right-sided numbness and incoordination of the arm.  An MRI scan at an outside hospital demonstrated signal abnormality in the addison that was initially interpreted as an ischemic stroke.  Follow-up imaging the next year, however, did not demonstrate the expected temporal evolution of an infarct and further there was a new enhancing lesion on brain MRI suggestive of demyelinating disease of the type seen in multiple sclerosis.  We subsequently initiated the patient on disease-modifying therapy with glatiramer acetate.      Today, the patient denies any new episodic changes in vision, balance, strength or sensation suggestive of new relapse of multiple sclerosis since she was last seen.  She has switched over to the generic version of glatiramer acetate (from [employer]).  She tells me that she is having more discomfort and worsened injection site reactions with the generic product.  Apparently, the autoinjector that they supplied her never functioned properly and she has been using the autoinjector from the brand name Copaxone formulation to administer the shots.    Symptomatically, at the patient's last appointment here last fall, she described a lot of difficulty with diffuse pain.  I gave her a prescription for nortriptyline, which she is taking at 25 mg at bedtime. She does find this quite helpful in promoting and consolidating sleep.  She has also made a number of lifestyle changes including doing yoga regularly and she has also lost almost 10 kg over the last 6 months following a modified version of the [place] diet.  She is,  however, still having quite a bit of difficulty with pain at the base of the neck.      PHYSICAL EXAMINATION:   VITAL SIGNS:  Blood pressure 109/74; pulse 82; weight 112.5 kg (down from 122 kg in October 2017; height 1.65 meters.   GENERAL:  Morbidly obese woman who presents to the evaluation alone, awake and alert and in no acute distress.     Neurologic examination was deferred today due to time considerations as the patient arrived 40 minutes past the report time for this visit.      Assessment/plan:    1.  Relapsing-remitting multiple sclerosis    The patient appears clinically stable on disease-modifying therapy with glatiramer acetate.  She clearly needs repeat injection training as well as assistance with her injector from [employer], and we will contact the company to arrange this for her.  Her worsened injection site reactions may be due to some incompatibility with the injector that she is using, but may also relate to subtle differences in the products.      I told her, however, that if she is able to tolerate glatiramer acetate and it is effective in controlling her disease, this would really be the best case scenario as this agent is so safe.      We are going to have her return in 6 months with MRI scans of the brain and cervical spine to evaluate the radiologic stability of her condition.  I am going to check a vitamin D level today and we will advise her on supplementation as needed to maintain her level in the goal range of 60-80 mcg per liter.  Currently, she is taking 5000 international units of vitamin D daily.      2.  Diffuse pain  I complemented the patient on the lifestyle changes that she has made.  These do appear to have had a significant positive effect on her symptoms.  We will continue nortriptyline at 25 mg at bedtime to help with her sleep.  I am also going to refer her to physical therapy for an evaluation of her neck discomfort.  If she continues to have pain in this location, a referral  to pain management can also be considered in the future, but she would prefer to start with physical therapy for now, which is very reasonable.      3.  Migraine  The patient has had topiramate prescribed by another neurologist.  Currently, she is doing well in this regard and asked that we take over this prescription.  Prescriptions for topiramate to reflect her dose of 125 mg at bedtime were transmitted to the patient's pharmacy with 1 year of refills.      I spent 26 minutes with the patient in the office today, with greater than 50% of this time spent in counseling.      MD ZACHARY Damon MD             D: 2018   T: 2018   MT: AKA      Name:     RACHEL YANG   MRN:      [id number]        Account:      [id number]   :      1975           Service Date: 2018      Document: [id number]

## 2018-06-04 ENCOUNTER — DOCUMENTATION ONLY (OUTPATIENT)
Dept: NEUROLOGY | Facility: CLINIC | Age: 43
End: 2018-06-04

## 2018-06-04 ENCOUNTER — MYC MEDICAL ADVICE (OUTPATIENT)
Dept: NEUROLOGY | Facility: CLINIC | Age: 43
End: 2018-06-04

## 2018-06-04 NOTE — PROGRESS NOTES
Patient was in for an office visit with Dr. Castaneda last Thursday, and patient alerted us to injection site reactions she has been having on glatiramer acetate, and that her injection device was broken; I sent in a new [employer] form for a new whisperject device and nurse training; I called [employer] today, and they confirmed receipt of her form; They have not reached out to her yet, but I did update her phone number with them in their system; The will get a new device shipped out to her, as well as call her to set up nurse injection training.    Mitzy Collazo, MS RN Care Coordinator

## 2018-06-07 ENCOUNTER — TELEPHONE (OUTPATIENT)
Dept: NEUROLOGY | Facility: CLINIC | Age: 43
End: 2018-06-07

## 2018-06-07 ENCOUNTER — MYC MEDICAL ADVICE (OUTPATIENT)
Dept: NEUROLOGY | Facility: CLINIC | Age: 43
End: 2018-06-07

## 2018-06-07 NOTE — TELEPHONE ENCOUNTER
Health Call Center    Phone Message    May a detailed message be left on voicemail: no    Reason for Call: Other: Aileen calling Pt has had a 2 month lapse in medication refill or Rx. Glatiramer Acetate (COPAXONE) 40 MG/ML SOSY. Per Aileen they will be dropping off a Prescription of the meds tomorrow. Per Aileen pt has not been consistent with the medication, last refill was 3 months between doses. Pharmacy wants Dr. Castaneda to be aware     Action Taken: Message routed to:  [facility] ([facility]  Neurology

## 2018-06-12 ENCOUNTER — THERAPY VISIT (OUTPATIENT)
Dept: PHYSICAL THERAPY | Facility: CLINIC | Age: 43
End: 2018-06-12
Payer: COMMERCIAL

## 2018-06-12 DIAGNOSIS — M54.2 NECK PAIN ON LEFT SIDE: Primary | ICD-10-CM

## 2018-06-12 DIAGNOSIS — M54.2 NECK PAIN: ICD-10-CM

## 2018-06-12 PROCEDURE — 97110 THERAPEUTIC EXERCISES: CPT | Mod: GP | Performed by: PHYSICAL THERAPIST

## 2018-06-12 PROCEDURE — 97161 PT EVAL LOW COMPLEX 20 MIN: CPT | Mod: GP | Performed by: PHYSICAL THERAPIST

## 2018-06-12 PROCEDURE — 97035 APP MDLTY 1+ULTRASOUND EA 15: CPT | Mod: GP | Performed by: PHYSICAL THERAPIST

## 2018-06-12 NOTE — MR AVS SNAPSHOT
After Visit Summary   6/12/2018    Josefina Aldrich    MRN: [id number]           Patient Information     Date Of Birth          1975        Visit Information        Provider Department      6/12/2018 6:20 PM Jesús Gary PT [facility]        Today's Diagnoses     Neck pain on left side    -  1    Neck pain           Follow-ups after your visit        Your next 10 appointments already scheduled     Jun 21, 2018  4:20 PM CDT   [facility] Spine with Jesús Gary PT   [facility] ([facility])    [address] [place] [address]   [phone]            Jun 28, 2018  4:20 PM CDT   CAR[facility] Spine with Jesús Gary PT   [facility] ([facility])    [address] [place] [address]   [phone]            Nov 29, 2018  8:30 AM CST   MR CERVICAL SPINE W/O & W CONTRAST with [address] MRI ([facility])    [address] [place] [id number] [phone]           Take your medicines as usual, unless your doctor tells you not to. Bring a list of your current medicines to your exam (including vitamins, minerals and over-the-counter drugs).  You may or may not receive intravenous (IV) contrast for this exam pending the discretion of the Radiologist.  You do not need to do anything special to prepare.  The MRI machine uses a strong magnet. Please wear clothes without metal (snaps, zippers). A sweatsuit works well, or we may give you a hospital gown.  Please remove any body piercings and hair extensions before you arrive. You will also remove watches, jewelry, hairpins, wallets, dentures, partial dental plates and hearing aids. You may wear contact lenses, and you may be able to wear your rings. We have a safe place to keep your personal items, but it is safer to leave them at home.  **IMPORTANT** THE INSTRUCTIONS BELOW ARE ONLY FOR  THOSE PATIENTS WHO HAVE BEEN PRESCRIBED SEDATION OR GENERAL ANESTHESIA DURING THEIR MRI PROCEDURE:  IF YOUR DOCTOR PRESCRIBED ORAL SEDATION (take medicine to help you relax during your exam):   You must get the medicine from your doctor (oral medication) before you arrive. Bring the medicine to the exam. Do not take it at home. You ll be told when to take it upon arriving for your exam.   Arrive one hour early. Bring someone who can take you home after the test. Your medicine will make you sleepy. After the exam, you may not drive, take a bus or take a taxi by yourself.  IF YOUR DOCTOR PRESCRIBED IV SEDATION:   Arrive one hour early. Bring someone who can take you home after the test. Your medicine will make you sleepy. After the exam, you may not drive, take a bus or take a taxi by yourself.   No eating 6 hours before your exam. You may have clear liquids up until 4 hours before your exam. (Clear liquids include water, clear tea, black coffee and fruit juice without pulp.)  IF YOUR DOCTOR PRESCRIBED ANESTHESIA (be asleep for your exam):   Arrive 1 1/2 hours early. Bring someone who can take you home after the test. You may not drive, take a bus or take a taxi by yourself.   No eating 8 hours before your exam. You may have clear liquids up until 4 hours before your exam. (Clear liquids include water, clear tea, black coffee and fruit juice without pulp.)   You will spend four to five hours in the recovery room.  Please call the Imaging Department at your exam site with any questions.            Nov 29, 2018  9:15 AM CST   MR BRAIN W/O & W CONTRAST with [facility] [address] [facility] ([facility])    [address] [place] [id number] [phone]           Take your medicines as usual, unless your doctor tells you not to. Bring a list of your current medicines to your exam (including vitamins, minerals and over-the-counter drugs).  You may or may not receive  intravenous (IV) contrast for this exam pending the discretion of the Radiologist.  You do not need to do anything special to prepare.  The MRI machine uses a strong magnet. Please wear clothes without metal (snaps, zippers). A sweatsuit works well, or we may give you a hospital gown.  Please remove any body piercings and hair extensions before you arrive. You will also remove watches, jewelry, hairpins, wallets, dentures, partial dental plates and hearing aids. You may wear contact lenses, and you may be able to wear your rings. We have a safe place to keep your personal items, but it is safer to leave them at home.  **IMPORTANT** THE INSTRUCTIONS BELOW ARE ONLY FOR THOSE PATIENTS WHO HAVE BEEN PRESCRIBED SEDATION OR GENERAL ANESTHESIA DURING THEIR MRI PROCEDURE:  IF YOUR DOCTOR PRESCRIBED ORAL SEDATION (take medicine to help you relax during your exam):   You must get the medicine from your doctor (oral medication) before you arrive. Bring the medicine to the exam. Do not take it at home. You ll be told when to take it upon arriving for your exam.   Arrive one hour early. Bring someone who can take you home after the test. Your medicine will make you sleepy. After the exam, you may not drive, take a bus or take a taxi by yourself.  IF YOUR DOCTOR PRESCRIBED IV SEDATION:   Arrive one hour early. Bring someone who can take you home after the test. Your medicine will make you sleepy. After the exam, you may not drive, take a bus or take a taxi by yourself.   No eating 6 hours before your exam. You may have clear liquids up until 4 hours before your exam. (Clear liquids include water, clear tea, black coffee and fruit juice without pulp.)  IF YOUR DOCTOR PRESCRIBED ANESTHESIA (be asleep for your exam):   Arrive 1 1/2 hours early. Bring someone who can take you home after the test. You may not drive, take a bus or take a taxi by yourself.   No eating 8 hours before your exam. You may have clear liquids up until 4 hours  before your exam. (Clear liquids include water, clear tea, black coffee and fruit juice without pulp.)   You will spend four to five hours in the recovery room.  Please call the Imaging Department at your exam site with any questions.            Nov 29, 2018 10:00 AM CST   (Arrive by 9:45 AM)   Return Multiple Sclerosis with Radames Castaneda MD   [facility] Multiple Sclerosis ([facility])    [address] [facility]  [address] [place] [address] [phone]              Who to contact     If you have questions or need follow up information about today's clinic visit or your schedule please contact [facility] directly at [phone].  Normal or non-critical lab and imaging results will be communicated to you by MyChart, letter or phone within 4 business days after the clinic has received the results. If you do not hear from us within 7 days, please contact the clinic through [employer] or phone. If you have a critical or abnormal lab result, we will notify you by phone as soon as possible.  Submit refill requests through [employer] or call your pharmacy and they will forward the refill request to us. Please allow 3 business days for your refill to be completed.          Additional Information About Your Visit        [employer] Information     [employer] gives you secure access to your electronic health record. If you see a primary care provider, you can also send messages to your care team and make appointments. If you have questions, please call your primary care clinic.  If you do not have a primary care provider, please call [phone] and they will assist you.        Care EveryWhere ID     This is your Care EveryWhere ID. This could be used by other organizations to access your [facility] medical records  [id number]         Blood Pressure from Last 3 Encounters:   05/31/18 109/74   05/16/18 113/70   02/28/18 112/70    Weight from Last 3 Encounters:   05/31/18  112.5 kg (248 lb)   05/16/18 114.4 kg (252 lb 1.6 oz)   02/28/18 117.7 kg (259 lb 6 oz)              We Performed the Following     HC PT EVAL, LOW COMPLEXITY     [facility] INITIAL EVAL REPORT     PT Referral ([facility])     THERAPEUTIC EXERCISES     ULTRASOUND THERAPY        Primary Care Provider Office Phone # Fax #    Miya Crump PA-C [phone]       [address] [place] [address]        Equal Access to Services     RISHI MORENO : Hadii aad ku hadasho Soomaali, waaxda luqadaha, qaybta kaalmada adeegyada, waxay idiin hayaan adeeg kharadorys rodrigues . So [facility] [phone].    ATENCIÓN: Si habla español, tiene a barrera disposición servicios gratuitos de asistencia lingüística. [facility] [phone].    We comply with applicable federal civil rights laws and [place] laws. We do not discriminate on the basis of race, color, national origin, age, disability, sex, sexual orientation, or gender identity.            Thank you!     Thank you for choosing [facility]  for your care. Our goal is always to provide you with excellent care. Hearing back from our patients is one way we can continue to improve our services. Please take a few minutes to complete the written survey that you may receive in the mail after your visit with us. Thank you!             Your Updated Medication List - Protect others around you: Learn how to safely use, store and throw away your medicines at www.disposemymeds.org.          This list is accurate as of 6/12/18 11:59 PM.  Always use your most recent med list.                   Brand Name Dispense Instructions for use Diagnosis    acetaminophen 500 MG tablet    TYLENOL    100 tablet    Take 2 tablets (1,000 mg) by mouth 3 times daily as needed for mild pain    Cyst of left ovary       * VENTOLIN  (90 Base) MCG/ACT Inhaler   Generic drug:  albuterol      Inhale 2 puffs into the lungs        * albuterol (2.5 MG/3ML) 0.083% neb solution       Inhale 2.5 mg into the lungs        cholecalciferol 5000 units Tabs tablet    vitamin D3     Take 1 tablet (5,000 Units) by mouth daily    Hypovitaminosis D       cyclobenzaprine 10 MG tablet    FLEXERIL     Take 10 mg by mouth 3 times daily as needed        Glatiramer Acetate 40 MG/ML Sosy    COPAXONE    12 Syringe    Inject 40 mg Subcutaneous three times a week    Multiple sclerosis (H)       IBUPROFEN PO      Take 800 mg by mouth every 4 hours as needed for moderate pain        IRON SUPPLEMENT PO           lisinopril-hydrochlorothiazide 10-12.5 MG per tablet    PRINZIDE/ZESTORETIC    90 tablet    Take 1 tablet by mouth daily    Benign essential hypertension       meclizine 25 MG tablet    ANTIVERT     Take 25 mg by mouth daily as needed        nortriptyline 25 MG capsule    PAMELOR    60 capsule    Take one capsule at bedtime for 2 weeks, can increase to 2 capsules as needed    Migraine without aura and without status migrainosus, not intractable       * topiramate 100 MG tablet    TOPAMAX    90 tablet    Take 1 tablet (100 mg) by mouth daily (Take with 25 mg to total 125 mg)    Migraine without aura and without status migrainosus, not intractable       * topiramate 25 MG tablet    TOPAMAX    90 tablet    Take 1 tablet (25 mg) by mouth At Bedtime (Take with 100 mg to total 125 mg)    Migraine without aura and without status migrainosus, not intractable       triamcinolone 0.1 % cream    KENALOG    30 g    Apply sparingly to affected area three times daily    Eczema, unspecified type       valACYclovir 500 MG tablet    VALTREX    90 tablet    Take 1 tablet (500 mg) by mouth daily    HSV (herpes simplex virus) infection       * Notice:  This list has 4 medication(s) that are the same as other medications prescribed for you. Read the directions carefully, and ask your doctor or other care provider to review them with you.
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
## 2018-06-13 ENCOUNTER — TELEPHONE (OUTPATIENT)
Dept: ONCOLOGY | Facility: CLINIC | Age: 43
End: 2018-06-13

## 2018-06-13 PROBLEM — M54.2 NECK PAIN ON LEFT SIDE: Status: ACTIVE | Noted: 2018-06-13

## 2018-06-13 NOTE — TELEPHONE ENCOUNTER
Called patient to follow up on iron infusions. Scheduling had left voicemail for patient to set up infusions, appointment not yet made. Message left with request to call back.    Hien Hernandez RN

## 2018-06-28 ENCOUNTER — THERAPY VISIT (OUTPATIENT)
Dept: PHYSICAL THERAPY | Facility: CLINIC | Age: 43
End: 2018-06-28
Payer: COMMERCIAL

## 2018-06-28 DIAGNOSIS — M54.2 NECK PAIN ON LEFT SIDE: ICD-10-CM

## 2018-06-28 PROCEDURE — 97035 APP MDLTY 1+ULTRASOUND EA 15: CPT | Mod: GP | Performed by: PHYSICAL THERAPIST

## 2018-06-28 PROCEDURE — 97140 MANUAL THERAPY 1/> REGIONS: CPT | Mod: GP | Performed by: PHYSICAL THERAPIST

## 2018-06-28 PROCEDURE — 97110 THERAPEUTIC EXERCISES: CPT | Mod: GP | Performed by: PHYSICAL THERAPIST

## 2018-07-05 ENCOUNTER — THERAPY VISIT (OUTPATIENT)
Dept: PHYSICAL THERAPY | Facility: CLINIC | Age: 43
End: 2018-07-05
Payer: COMMERCIAL

## 2018-07-05 DIAGNOSIS — M54.2 NECK PAIN ON LEFT SIDE: ICD-10-CM

## 2018-07-05 PROCEDURE — 97035 APP MDLTY 1+ULTRASOUND EA 15: CPT | Mod: GP | Performed by: PHYSICAL THERAPIST

## 2018-07-05 PROCEDURE — 97140 MANUAL THERAPY 1/> REGIONS: CPT | Mod: GP | Performed by: PHYSICAL THERAPIST

## 2018-07-05 PROCEDURE — 97110 THERAPEUTIC EXERCISES: CPT | Mod: GP | Performed by: PHYSICAL THERAPIST

## 2018-07-09 DIAGNOSIS — G43.009 MIGRAINE WITHOUT AURA AND WITHOUT STATUS MIGRAINOSUS, NOT INTRACTABLE: ICD-10-CM

## 2018-07-10 RX ORDER — NORTRIPTYLINE HCL 25 MG
CAPSULE ORAL
Qty: 60 CAPSULE | Refills: 5 | Status: ON HOLD | OUTPATIENT
Start: 2018-07-10 | End: 2018-10-21

## 2018-07-10 NOTE — TELEPHONE ENCOUNTER
Received refill request for nortriptyline; This hasn't been refilled in over a year, which is outside of our protocol window; Dr. Castaneda, are you okay with refilling as ordered? Thank you.    Mitzy Collazo, MS RN Care Coordinator

## 2018-07-19 ENCOUNTER — THERAPY VISIT (OUTPATIENT)
Dept: PHYSICAL THERAPY | Facility: CLINIC | Age: 43
End: 2018-07-19
Payer: COMMERCIAL

## 2018-07-19 DIAGNOSIS — M54.2 NECK PAIN ON LEFT SIDE: ICD-10-CM

## 2018-07-19 PROCEDURE — 97110 THERAPEUTIC EXERCISES: CPT | Mod: GP | Performed by: PHYSICAL THERAPIST

## 2018-07-19 PROCEDURE — 97035 APP MDLTY 1+ULTRASOUND EA 15: CPT | Mod: GP | Performed by: PHYSICAL THERAPIST

## 2018-07-19 PROCEDURE — 97140 MANUAL THERAPY 1/> REGIONS: CPT | Mod: GP | Performed by: PHYSICAL THERAPIST

## 2018-07-25 ENCOUNTER — NURSE TRIAGE (OUTPATIENT)
Dept: NURSING | Facility: CLINIC | Age: 43
End: 2018-07-25

## 2018-07-25 ENCOUNTER — OFFICE VISIT (OUTPATIENT)
Dept: FAMILY MEDICINE | Facility: CLINIC | Age: 43
End: 2018-07-25
Payer: COMMERCIAL

## 2018-07-25 VITALS
BODY MASS INDEX: 41.48 KG/M2 | HEIGHT: 65 IN | WEIGHT: 249 LBS | OXYGEN SATURATION: 100 % | TEMPERATURE: 98.3 F | DIASTOLIC BLOOD PRESSURE: 70 MMHG | SYSTOLIC BLOOD PRESSURE: 110 MMHG | HEART RATE: 77 BPM

## 2018-07-25 DIAGNOSIS — F32.0 MILD MAJOR DEPRESSION (H): ICD-10-CM

## 2018-07-25 DIAGNOSIS — F41.9 ANXIETY: ICD-10-CM

## 2018-07-25 DIAGNOSIS — Z51.81 MEDICATION MONITORING ENCOUNTER: ICD-10-CM

## 2018-07-25 DIAGNOSIS — J45.20 MILD INTERMITTENT ASTHMA WITHOUT COMPLICATION: ICD-10-CM

## 2018-07-25 DIAGNOSIS — I10 BENIGN ESSENTIAL HYPERTENSION: ICD-10-CM

## 2018-07-25 DIAGNOSIS — B02.9 HERPES ZOSTER WITHOUT COMPLICATION: Primary | ICD-10-CM

## 2018-07-25 DIAGNOSIS — E66.01 MORBID OBESITY (H): ICD-10-CM

## 2018-07-25 DIAGNOSIS — G35 MS (MULTIPLE SCLEROSIS) (H): ICD-10-CM

## 2018-07-25 PROCEDURE — 99214 OFFICE O/P EST MOD 30 MIN: CPT | Performed by: FAMILY MEDICINE

## 2018-07-25 RX ORDER — VALACYCLOVIR HYDROCHLORIDE 1 G/1
1000 TABLET, FILM COATED ORAL 3 TIMES DAILY
Qty: 30 TABLET | Refills: 0 | Status: SHIPPED | OUTPATIENT
Start: 2018-07-25 | End: 2018-10-11 | Stop reason: DRUGHIGH

## 2018-07-25 ASSESSMENT — ANXIETY QUESTIONNAIRES
IF YOU CHECKED OFF ANY PROBLEMS ON THIS QUESTIONNAIRE, HOW DIFFICULT HAVE THESE PROBLEMS MADE IT FOR YOU TO DO YOUR WORK, TAKE CARE OF THINGS AT HOME, OR GET ALONG WITH OTHER PEOPLE: SOMEWHAT DIFFICULT
2. NOT BEING ABLE TO STOP OR CONTROL WORRYING: MORE THAN HALF THE DAYS
6. BECOMING EASILY ANNOYED OR IRRITABLE: MORE THAN HALF THE DAYS
1. FEELING NERVOUS, ANXIOUS, OR ON EDGE: SEVERAL DAYS
7. FEELING AFRAID AS IF SOMETHING AWFUL MIGHT HAPPEN: NOT AT ALL
3. WORRYING TOO MUCH ABOUT DIFFERENT THINGS: MORE THAN HALF THE DAYS
GAD7 TOTAL SCORE: 10
5. BEING SO RESTLESS THAT IT IS HARD TO SIT STILL: SEVERAL DAYS

## 2018-07-25 ASSESSMENT — PATIENT HEALTH QUESTIONNAIRE - PHQ9: 5. POOR APPETITE OR OVEREATING: MORE THAN HALF THE DAYS

## 2018-07-25 NOTE — TELEPHONE ENCOUNTER
Disposition:  See a provider within 4 hours.  Pt stated her understanding and had no further questions.  Transferred to scheduling.  Hours for UC given if no appt available.     Reason for Disposition    SEVERE pain (e.g., excruciating)    Additional Information    Negative: [1] Widespread rash AND [2] bright red, sunburn-like AND [3] too weak to stand    Negative: Sounds like a life-threatening emergency to the triager    Negative: Painful lump or swelling at opening to anus (rectum)    Negative: Painful lump or swelling at opening to vagina (on labia)    Negative: Painful lump or swelling on scrotum    Negative: Impetigo suspected or diagnosed    Negative: Doesn't match the SYMPTOMS of a boil    Negative: MRSA, questions about (No boil or other skin lesion)    Negative: Widespread red rash    Negative: Black (necrotic) color or blisters develop in wound    Negative: Patient sounds very sick or weak to the triager    Protocols used: BOIL (SKIN ABSCESS)-ADULT-

## 2018-07-25 NOTE — MR AVS SNAPSHOT
After Visit Summary   7/25/2018    Josefina Aldrich    MRN: [id number]           Patient Information     Date Of Birth          1975        Visit Information        Provider Department      7/25/2018 9:20 AM Carlyle Basilio MD [facility]  [place]        Today's Diagnoses     Herpes zoster without complication    -  1    MS (multiple sclerosis) (H)        Mild major depression (H)        Anxiety        Mild intermittent asthma without complication        Benign essential hypertension        Morbid obesity (H)        Medication monitoring encounter          Care Instructions        [facility] - [place]                        To reach your care team during and after hours:   [phone]  To reach our pharmacy:        [phone]    Clinic Hours                        Our clinic hours are:    Monday   7:30 am to 7:00 pm                  Tuesday through Friday 7:30 am to 5:00 pm                             Saturday   8:00 am to 12:00 pm      Sunday   Closed      Pharmacy Hours                        Our pharmacy hours are:    Monday   8:30 am to 7:00 pm       Tuesday to Friday  8:30 am to 6:00 pm                       Saturday    9:00 am to 1:00 pm              Sunday    Closed              There is also information available at our web site:  [email or web address]    If your provider ordered any lab tests and you do not receive the results within 10 business days, please call the clinic.    If you need a medication refill please contact your pharmacy.  Please allow 2-3 business days for your refill to be completed.    Our clinic offers telephone visits and e visits.  Please ask one of your team members to explain more.      Use [employer] (secure email communication and access to your chart) to send your primary care provider a message or make an appointment. Ask someone on your Team how to sign up for [employer].  Immunizations                      Immunization History   Administered  Date(s) Administered     HepB-Adult 08/03/1999     Influenza Vaccine IM 3yrs+ 4 Valent IIV4 01/03/2018     TD (ADULT, 7+) 08/03/1999     TDAP Vaccine (Adacel) 03/13/2013        Health Maintenance                         Health Maintenance Due   Topic Date Due     HIV SCREEN (SYSTEM ASSIGNED)  07/22/1995     Depression Assessment - every 6 months  07/03/2018     Asthma Control Test - every 6 months  07/03/2018                     Shingles (Herpes Zoster)  What is shingles?   Shingles is an infection caused by the same virus that causes chickenpox. This virus is called varicella zoster. You cannot develop shingles unless you have had a previous infection of chickenpox (usually as a child).   Shingles is also called herpes zoster. This infection is most common in people over 50 years old, but young people can have it as well.   How does it occur?   If you have had chickenpox, you are at risk for later developing shingles. After you recover from chickenpox, the chickenpox virus stays in your body. It moves to the roots of your nerve cells (near the spinal cord) and becomes inactive (dormant). Later, if the virus becomes active again, shingles is the name given to the symptoms it causes.   What exactly causes the virus to become active is not known. A weakened immune system seems to allow reactivation of the virus. This may occur with normal aging, immune-suppressing medicines, or another illness, or after major surgery. It can also happen as a complication of cancer or AIDS or treatment of these illnesses. Chronic use of steroid drugs may trigger shingles. The virus may also become active again after the skin is injured or sunburned. Emotional stress seems to be a common trigger as well.   What are the symptoms?   The first sign of shingles is often burning, sharp pain, tingling, or numbness in your skin on one side of your body or face. The most common site is the back or upper abdomen. You may have severe itching or  aching. You also may feel tired and ill with fever, chills, headache, and upset stomach or belly pain.   One to 14 days after you start feeling pain, you will notice a rash of small blisters on reddened skin. Within a few days after they appear, the blisters will turn yellow, then dry and crust over. Over the next 2 weeks the crusts drop off, and the skin continues to heal over the next several days to weeks.   Because shingles usually follows nerve paths, the blisters are usually found in a line, often extending from the back or side around to the belly. The blisters are almost always on just one side of the body. Shingles usually doesn't cross the midline of the body. The rash also may appear on one side of your face or scalp. The painful rash may be in the area of your ear or eye. When shingles occurs on the head or scalp, symptoms can include headaches and weakness of one side of the face, which causes that side of the face to look droopy. The symptoms usually go away eventually, but it may take many months.   In some cases the pain can last for weeks, months, or years, long after the rash heals. This is called postherpetic neuralgia.   Is shingles contagious?   You cannot get shingles from someone else. However, if you have never had chickenpox, you may get chickenpox from close contact with someone who has shingles because the blisters contain chickenpox virus.   If you have shingles, make sure that anyone who has not had chickenpox or the chickenpox shot does not come into contact with your blisters until the blisters are completely dry. Once your blisters are crusted over, they are no longer contagious.   How is it diagnosed?   Your healthcare provider will ask about your medical history and symptoms and will examine you. The diagnosis is usually obvious from the appearance of the skin. To confirm the diagnosis, your provider may order lab tests to look for the virus in fluid from a blister.   How is it  treated?   It is best to start treatment as soon as possible after you notice the rash. See your healthcare provider to discuss treatment with antiviral medicine, such as acyclovir. This medicine is most effective if you start taking it within the first 3 days of the rash. Antiviral medicine may speed your recovery and lessen the chance that the pain will last for a long time.   Your provider may also recommend or prescribe:   medicine for pain   antibacterial salves or lotions to help prevent bacterial infection of the blisters   corticosteroids (if you are over 50).   How long will the effects last?   The rash from shingles will heal in 1 to 3 weeks and the pain or irritation will usually go away in 3 to 5 weeks. When shingles occurs on the head or scalp, the symptoms usually go away eventually, but it may take many months.   If the virus damages a nerve, you may have pain, numbness, or tingling for months or even years after the rash is healed. This is called postherpetic neuralgia. This chronic condition is most likely to occur after a shingles outbreak in people over 50 years old. Taking antiviral medicine as soon as the shingles is diagnosed may help prevent this problem.   How can I take care of myself?   Take a pain-relief medicine such as acetaminophen. Take other medicine as prescribed by your healthcare provider.   Put cool, moist washcloths on the rash.   Rest in bed during the early stages if you have fever and other symptoms.   Try not to let clothing or bed linens rub against the rash and irritate it.   Call your healthcare provider right away if:   You develop worsening pain or fever.   You develop a severe headache, stiff neck, hearing loss, or changes in your ability to think.   The blisters show signs of bacterial infection, such as increasing pain or redness, or milky yellow drainage from the blister sites.   The blisters are close to the eyes or you have pain in your eyes or trouble seeing.    You have trouble walking.   You have trouble breathing or a severe cough.   You have a fever higher than 101.5? F (38.6? C.)   You have a rash involving your eye or difficulty looking at bright light.   The blisters appear infected. Signs or symptoms of infection include:   Your skin is becoming redder or more painful.   You have red streaks from the blisters going toward your heart.   The blister area gets very warm to touch.   Pus or other fluid starts leaking from the blisters.   You have chills, nausea, vomiting, or muscle aches.   How can I help prevent shingles?   If you have never had chickenpox, you can get a shot to help prevent infection with the chickenpox virus.   If you have had chickenpox, a vaccine, called Zostavax, is available for people 60 years of age and older. The vaccine can help prevent or lessen the symptoms of shingles. It cannot be used to treat shingles once you have it.   You can protect your immune system and lessen your chances of getting shingles by trying to keep stress under control, exercising regularly, and eating a healthy diet.     Published by [employer].  This content is reviewed periodically and is subject to change as new health information becomes available. The information is intended to inform and educate and is not a replacement for medical evaluation, advice, diagnosis or treatment by a healthcare professional.   Developed by [employer].   ? 2010 [facility] and/or its affiliates. All Rights Reserved.   Copyright   [employer] 2011                Follow-ups after your visit        Follow-up notes from your care team     Return in about 1 week (around 8/1/2018), or if symptoms worsen or fail to improve, for Routine Visit.      Your next 10 appointments already scheduled     Aug 02, 2018  6:20 PM CDT   CARTER Extremity with Jesús Gary, PT   [facility] ([facility])    [address] [place] [address] [phone]             Nov 29, 2018  8:30 AM CST   MR CERVICAL SPINE W/O & W CONTRAST with UCMR1   [facility] ([facility])    [address]  1st Floor  [facility] [address] [phone]           Take your medicines as usual, unless your doctor tells you not to. Bring a list of your current medicines to your exam (including vitamins, minerals and over-the-counter drugs).  You may or may not receive intravenous (IV) contrast for this exam pending the discretion of the Radiologist.  You do not need to do anything special to prepare.  The MRI machine uses a strong magnet. Please wear clothes without metal (snaps, zippers). A sweatsuit works well, or we may give you a hospital gown.  Please remove any body piercings and hair extensions before you arrive. You will also remove watches, jewelry, hairpins, wallets, dentures, partial dental plates and hearing aids. You may wear contact lenses, and you may be able to wear your rings. We have a safe place to keep your personal items, but it is safer to leave them at home.  **IMPORTANT** THE INSTRUCTIONS BELOW ARE ONLY FOR THOSE PATIENTS WHO HAVE BEEN PRESCRIBED SEDATION OR GENERAL ANESTHESIA DURING THEIR MRI PROCEDURE:  IF YOUR DOCTOR PRESCRIBED ORAL SEDATION (take medicine to help you relax during your exam):   You must get the medicine from your doctor (oral medication) before you arrive. Bring the medicine to the exam. Do not take it at home. You ll be told when to take it upon arriving for your exam.   Arrive one hour early. Bring someone who can take you home after the test. Your medicine will make you sleepy. After the exam, you may not drive, take a bus or take a taxi by yourself.  IF YOUR DOCTOR PRESCRIBED IV SEDATION:   Arrive one hour early. Bring someone who can take you home after the test. Your medicine will make you sleepy. After the exam, you may not drive, take a bus or take a taxi by yourself.   No eating 6 hours  before your exam. You may have clear liquids up until 4 hours before your exam. (Clear liquids include water, clear tea, black coffee and fruit juice without pulp.)  IF YOUR DOCTOR PRESCRIBED ANESTHESIA (be asleep for your exam):   Arrive 1 1/2 hours early. Bring someone who can take you home after the test. You may not drive, take a bus or take a taxi by yourself.   No eating 8 hours before your exam. You may have clear liquids up until 4 hours before your exam. (Clear liquids include water, clear tea, black coffee and fruit juice without pulp.)   You will spend four to five hours in the recovery room.  Please call the Imaging Department at your exam site with any questions.            Nov 29, 2018  9:15 AM CST   MR BRAIN W/O & W CONTRAST with [address] MRI ([facility] [place])    [address] [address] [phone]           Take your medicines as usual, unless your doctor tells you not to. Bring a list of your current medicines to your exam (including vitamins, minerals and over-the-counter drugs).  You may or may not receive intravenous (IV) contrast for this exam pending the discretion of the Radiologist.  You do not need to do anything special to prepare.  The MRI machine uses a strong magnet. Please wear clothes without metal (snaps, zippers). A sweatsuit works well, or we may give you a hospital gown.  Please remove any body piercings and hair extensions before you arrive. You will also remove watches, jewelry, hairpins, wallets, dentures, partial dental plates and hearing aids. You may wear contact lenses, and you may be able to wear your rings. We have a safe place to keep your personal items, but it is safer to leave them at home.  **IMPORTANT** THE INSTRUCTIONS BELOW ARE ONLY FOR THOSE PATIENTS WHO HAVE BEEN PRESCRIBED SEDATION OR GENERAL ANESTHESIA DURING THEIR MRI PROCEDURE:  IF YOUR DOCTOR PRESCRIBED ORAL SEDATION (take  medicine to help you relax during your exam):   You must get the medicine from your doctor (oral medication) before you arrive. Bring the medicine to the exam. Do not take it at home. You ll be told when to take it upon arriving for your exam.   Arrive one hour early. Bring someone who can take you home after the test. Your medicine will make you sleepy. After the exam, you may not drive, take a bus or take a taxi by yourself.  IF YOUR DOCTOR PRESCRIBED IV SEDATION:   Arrive one hour early. Bring someone who can take you home after the test. Your medicine will make you sleepy. After the exam, you may not drive, take a bus or take a taxi by yourself.   No eating 6 hours before your exam. You may have clear liquids up until 4 hours before your exam. (Clear liquids include water, clear tea, black coffee and fruit juice without pulp.)  IF YOUR DOCTOR PRESCRIBED ANESTHESIA (be asleep for your exam):   Arrive 1 1/2 hours early. Bring someone who can take you home after the test. You may not drive, take a bus or take a taxi by yourself.   No eating 8 hours before your exam. You may have clear liquids up until 4 hours before your exam. (Clear liquids include water, clear tea, black coffee and fruit juice without pulp.)   You will spend four to five hours in the recovery room.  Please call the Imaging Department at your exam site with any questions.            Nov 29, 2018 10:00 AM CST   (Arrive by 9:45 AM)   Return Multiple Sclerosis with Radames Castaneda MD   [facility] Multiple Sclerosis ([facility])    [address] [place] [address]  [address] [place] [address] [phone]              Who to contact     If you have questions or need follow up information about today's clinic visit or your schedule please contact [facility] directly at [phone].  Normal or non-critical lab and imaging results will be communicated to you by MyChart, letter or phone within 4 business days  "after the clinic has received the results. If you do not hear from us within 7 days, please contact the clinic through [employer] or phone. If you have a critical or abnormal lab result, we will notify you by phone as soon as possible.  Submit refill requests through [employer] or call your pharmacy and they will forward the refill request to us. Please allow 3 business days for your refill to be completed.          Additional Information About Your Visit        Sitefly Information     [employer] gives you secure access to your electronic health record. If you see a primary care provider, you can also send messages to your care team and make appointments. If you have questions, please call your primary care clinic.  If you do not have a primary care provider, please call [phone] and they will assist you.        Care EveryWhere ID     This is your Care EveryWhere ID. This could be used by other organizations to access your [place] medical records  [id number]        Your Vitals Were     Pulse Temperature Height Pulse Oximetry BMI (Body Mass Index)       77 98.3  F (36.8  C) (Oral) 5' 5\" (1.651 m) 100% 41.44 kg/m2        Blood Pressure from Last 3 Encounters:   07/25/18 110/70   05/31/18 109/74   05/16/18 113/70    Weight from Last 3 Encounters:   07/25/18 249 lb (112.9 kg)   05/31/18 248 lb (112.5 kg)   05/16/18 252 lb 1.6 oz (114.4 kg)              Today, you had the following     No orders found for display         Today's Medication Changes          These changes are accurate as of 7/25/18 10:02 AM.  If you have any questions, ask your nurse or doctor.               These medicines have changed or have updated prescriptions.        Dose/Directions    * valACYclovir 500 MG tablet   Commonly known as:  VALTREX   This may have changed:  Another medication with the same name was added. Make sure you understand how and when to take each.   Used for:  HSV (herpes simplex virus) infection   Changed by:  Carlyle Basilio MD     "    Dose:  500 mg   Take 1 tablet (500 mg) by mouth daily   Quantity:  90 tablet   Refills:  1       * valACYclovir 1000 mg tablet   Commonly known as:  VALTREX   This may have changed:  You were already taking a medication with the same name, and this prescription was added. Make sure you understand how and when to take each.   Used for:  Herpes zoster without complication   Changed by:  Carlyle Basilio MD        Dose:  1000 mg   Take 1 tablet (1,000 mg) by mouth 3 times daily   Quantity:  30 tablet   Refills:  0       * Notice:  This list has 2 medication(s) that are the same as other medications prescribed for you. Read the directions carefully, and ask your doctor or other care provider to review them with you.         Where to get your medicines      These medications were sent to [facility] [address] [place] [address] AT [facility] [address] [address]    Hours:  24-hours Phone:  [phone]     valACYclovir 1000 mg tablet                Primary Care Provider Office Phone # Fax #    Miya Crump PA-C [phone]       [address] [place] [address]        Equal Access to Services     RISHI MORENO AH: Hadii jimmie elliott hadasho Soliaali, waaxda luqadaha, qaybta kaalmada adeegyada, mya patterson. So [facility] [phone].    ATENCIÓN: Si habla español, tiene a barrera disposición servicios gratuitos de asistencia lingüística. [facility] [phone].    We comply with applicable federal civil rights laws and [place] laws. We do not discriminate on the basis of race, color, national origin, age, disability, sex, sexual orientation, or gender identity.            Thank you!     Thank you for choosing [facility]  for your care. Our goal is always to provide you with excellent care. Hearing back from our patients is one way we can continue to improve our services. Please take a few minutes to  complete the written survey that you may receive in the mail after your visit with us. Thank you!             Your Updated Medication List - Protect others around you: Learn how to safely use, store and throw away your medicines at www.disposemymeds.org.          This list is accurate as of 7/25/18 10:02 AM.  Always use your most recent med list.                   Brand Name Dispense Instructions for use Diagnosis    acetaminophen 500 MG tablet    TYLENOL    100 tablet    Take 2 tablets (1,000 mg) by mouth 3 times daily as needed for mild pain    Cyst of left ovary       * VENTOLIN  (90 Base) MCG/ACT Inhaler   Generic drug:  albuterol      Inhale 2 puffs into the lungs        * albuterol (2.5 MG/3ML) 0.083% neb solution      Inhale 2.5 mg into the lungs        cholecalciferol 5000 units Tabs tablet    vitamin D3     Take 1 tablet (5,000 Units) by mouth daily    Hypovitaminosis D       cyclobenzaprine 10 MG tablet    FLEXERIL     Take 10 mg by mouth 3 times daily as needed        Glatiramer Acetate 40 MG/ML Sosy    COPAXONE    12 Syringe    Inject 40 mg Subcutaneous three times a week    Multiple sclerosis (H)       IBUPROFEN PO      Take 800 mg by mouth every 4 hours as needed for moderate pain        IRON SUPPLEMENT PO           lisinopril-hydrochlorothiazide 10-12.5 MG per tablet    PRINZIDE/ZESTORETIC    90 tablet    Take 1 tablet by mouth daily    Benign essential hypertension       nortriptyline 25 MG capsule    PAMELOR    60 capsule    TAKE 1 CAPSULE BY MOUTH AT BEDTIME FOR 2 WEEKS, CAN INCREASE TO 2 CAPSULES AS NEEDED    Migraine without aura and without status migrainosus, not intractable       * topiramate 100 MG tablet    TOPAMAX    90 tablet    Take 1 tablet (100 mg) by mouth daily (Take with 25 mg to total 125 mg)    Migraine without aura and without status migrainosus, not intractable       * topiramate 25 MG tablet    TOPAMAX    90 tablet    Take 1 tablet (25 mg) by mouth At Bedtime (Take with  100 mg to total 125 mg)    Migraine without aura and without status migrainosus, not intractable       triamcinolone 0.1 % cream    KENALOG    30 g    Apply sparingly to affected area three times daily    Eczema, unspecified type       * valACYclovir 500 MG tablet    VALTREX    90 tablet    Take 1 tablet (500 mg) by mouth daily    HSV (herpes simplex virus) infection       * valACYclovir 1000 mg tablet    VALTREX    30 tablet    Take 1 tablet (1,000 mg) by mouth 3 times daily    Herpes zoster without complication       * Notice:  This list has 6 medication(s) that are the same as other medications prescribed for you. Read the directions carefully, and ask your doctor or other care provider to review them with you.

## 2018-07-25 NOTE — PATIENT INSTRUCTIONS
[facility]                        To reach your care team during and after hours:   [phone]  To reach our pharmacy:        [phone]    Clinic Hours                        Our clinic hours are:    Monday   7:30 am to 7:00 pm                  Tuesday through Friday 7:30 am to 5:00 pm                             Saturday   8:00 am to 12:00 pm      Sunday   Closed      Pharmacy Hours                        Our pharmacy hours are:    Monday   8:30 am to 7:00 pm       Tuesday to Friday  8:30 am to 6:00 pm                       Saturday    9:00 am to 1:00 pm              Sunday    Closed              There is also information available at our web site:  [email or web address]    If your provider ordered any lab tests and you do not receive the results within 10 business days, please call the clinic.    If you need a medication refill please contact your pharmacy.  Please allow 2-3 business days for your refill to be completed.    Our clinic offers telephone visits and e visits.  Please ask one of your team members to explain more.      Use [employer] (secure email communication and access to your chart) to send your primary care provider a message or make an appointment. Ask someone on your Team how to sign up for [facility].  Immunizations                      Immunization History   Administered Date(s) Administered     HepB-Adult 08/03/1999     Influenza Vaccine IM 3yrs+ 4 Valent IIV4 01/03/2018     TD (ADULT, 7+) 08/03/1999     TDAP Vaccine (Adacel) 03/13/2013        Health Maintenance                         Health Maintenance Due   Topic Date Due     HIV SCREEN (SYSTEM ASSIGNED)  07/22/1995     Depression Assessment - every 6 months  07/03/2018     Asthma Control Test - every 6 months  07/03/2018                     Shingles (Herpes Zoster)  What is shingles?   Shingles is an infection caused by the same virus that causes chickenpox. This virus is called varicella zoster. You cannot develop  shingles unless you have had a previous infection of chickenpox (usually as a child).   Shingles is also called herpes zoster. This infection is most common in people over 50 years old, but young people can have it as well.   How does it occur?   If you have had chickenpox, you are at risk for later developing shingles. After you recover from chickenpox, the chickenpox virus stays in your body. It moves to the roots of your nerve cells (near the spinal cord) and becomes inactive (dormant). Later, if the virus becomes active again, shingles is the name given to the symptoms it causes.   What exactly causes the virus to become active is not known. A weakened immune system seems to allow reactivation of the virus. This may occur with normal aging, immune-suppressing medicines, or another illness, or after major surgery. It can also happen as a complication of cancer or AIDS or treatment of these illnesses. Chronic use of steroid drugs may trigger shingles. The virus may also become active again after the skin is injured or sunburned. Emotional stress seems to be a common trigger as well.   What are the symptoms?   The first sign of shingles is often burning, sharp pain, tingling, or numbness in your skin on one side of your body or face. The most common site is the back or upper abdomen. You may have severe itching or aching. You also may feel tired and ill with fever, chills, headache, and upset stomach or belly pain.   One to 14 days after you start feeling pain, you will notice a rash of small blisters on reddened skin. Within a few days after they appear, the blisters will turn yellow, then dry and crust over. Over the next 2 weeks the crusts drop off, and the skin continues to heal over the next several days to weeks.   Because shingles usually follows nerve paths, the blisters are usually found in a line, often extending from the back or side around to the belly. The blisters are almost always on just one side of  the body. Shingles usually doesn't cross the midline of the body. The rash also may appear on one side of your face or scalp. The painful rash may be in the area of your ear or eye. When shingles occurs on the head or scalp, symptoms can include headaches and weakness of one side of the face, which causes that side of the face to look droopy. The symptoms usually go away eventually, but it may take many months.   In some cases the pain can last for weeks, months, or years, long after the rash heals. This is called postherpetic neuralgia.   Is shingles contagious?   You cannot get shingles from someone else. However, if you have never had chickenpox, you may get chickenpox from close contact with someone who has shingles because the blisters contain chickenpox virus.   If you have shingles, make sure that anyone who has not had chickenpox or the chickenpox shot does not come into contact with your blisters until the blisters are completely dry. Once your blisters are crusted over, they are no longer contagious.   How is it diagnosed?   Your healthcare provider will ask about your medical history and symptoms and will examine you. The diagnosis is usually obvious from the appearance of the skin. To confirm the diagnosis, your provider may order lab tests to look for the virus in fluid from a blister.   How is it treated?   It is best to start treatment as soon as possible after you notice the rash. See your healthcare provider to discuss treatment with antiviral medicine, such as acyclovir. This medicine is most effective if you start taking it within the first 3 days of the rash. Antiviral medicine may speed your recovery and lessen the chance that the pain will last for a long time.   Your provider may also recommend or prescribe:   medicine for pain   antibacterial salves or lotions to help prevent bacterial infection of the blisters   corticosteroids (if you are over 50).   How long will the effects last?   The  rash from shingles will heal in 1 to 3 weeks and the pain or irritation will usually go away in 3 to 5 weeks. When shingles occurs on the head or scalp, the symptoms usually go away eventually, but it may take many months.   If the virus damages a nerve, you may have pain, numbness, or tingling for months or even years after the rash is healed. This is called postherpetic neuralgia. This chronic condition is most likely to occur after a shingles outbreak in people over 50 years old. Taking antiviral medicine as soon as the shingles is diagnosed may help prevent this problem.   How can I take care of myself?   Take a pain-relief medicine such as acetaminophen. Take other medicine as prescribed by your healthcare provider.   Put cool, moist washcloths on the rash.   Rest in bed during the early stages if you have fever and other symptoms.   Try not to let clothing or bed linens rub against the rash and irritate it.   Call your healthcare provider right away if:   You develop worsening pain or fever.   You develop a severe headache, stiff neck, hearing loss, or changes in your ability to think.   The blisters show signs of bacterial infection, such as increasing pain or redness, or milky yellow drainage from the blister sites.   The blisters are close to the eyes or you have pain in your eyes or trouble seeing.   You have trouble walking.   You have trouble breathing or a severe cough.   You have a fever higher than 101.5? F (38.6? C.)   You have a rash involving your eye or difficulty looking at bright light.   The blisters appear infected. Signs or symptoms of infection include:   Your skin is becoming redder or more painful.   You have red streaks from the blisters going toward your heart.   The blister area gets very warm to touch.   Pus or other fluid starts leaking from the blisters.   You have chills, nausea, vomiting, or muscle aches.   How can I help prevent shingles?   If you have never had chickenpox, you  can get a shot to help prevent infection with the chickenpox virus.   If you have had chickenpox, a vaccine, called Zostavax, is available for people 60 years of age and older. The vaccine can help prevent or lessen the symptoms of shingles. It cannot be used to treat shingles once you have it.   You can protect your immune system and lessen your chances of getting shingles by trying to keep stress under control, exercising regularly, and eating a healthy diet.     Published by [employer].  This content is reviewed periodically and is subject to change as new health information becomes available. The information is intended to inform and educate and is not a replacement for medical evaluation, advice, diagnosis or treatment by a healthcare professional.   Developed by [employer].   ? 2010 [facility] and/or its affiliates. All Rights Reserved.   Copyright   [employer] 2011

## 2018-07-25 NOTE — PROGRESS NOTES
SUBJECTIVE:   Josefina Aldrich is a 43 year old female who presents to clinic today for the following health issues:    Bumps on right side of neck, red and painful - spot was puffed out 2 days ago - tried squeezing it and only got clear liquid but had severe pain shooting up her neck - this morning it's turned into more of a rash with pain from shoulder into right ear and now right side of tongue is feeling numb, burning, tingling and throat is throbbing and chest pain up to throat is painful    All on the right side - no hx shingles - Rt Ear to rt upper chest - burning, tingling, numb, most intense in right neck neck where rash is associated    Depression/Anxiety - PHQ = 5, JUSTICE = 10    Asthma = ACT = 20    Htn    BP Readings from Last 3 Encounters:   07/25/18 110/70   05/31/18 109/74   05/16/18 113/70     Creatinine   Date Value Ref Range Status   02/16/2018 1.26 (H) 0.52 - 1.04 mg/dL Final     Problem list and histories reviewed & adjusted, as indicated.  Additional history: as documented    Reviewed and updated as needed this visit by clinical staff  Tobacco  Allergies  Meds  Med Hx  Surg Hx  Fam Hx  Soc Hx      Reviewed and updated as needed this visit by Provider       body mass index is 41.44 kg/(m^2).    Wt Readings from Last 4 Encounters:   07/25/18 249 lb (112.9 kg)   05/31/18 248 lb (112.5 kg)   05/16/18 252 lb 1.6 oz (114.4 kg)   02/28/18 259 lb 6 oz (117.7 kg)       Health Maintenance    Health Maintenance Due   Topic Date Due     HIV SCREEN (SYSTEM ASSIGNED)  07/22/1995     PHQ-9 Q6 MONTHS  07/03/2018     ASTHMA CONTROL TEST Q6 MOS  07/03/2018       Current Problem List    Patient Active Problem List   Diagnosis     Migraine     Asthma     MS (multiple sclerosis) (H)     Left pontine stroke (H)     Anemia     Backache     Body mass index 45.0-49.9, adult (H)     Cognitive changes     Depression with anxiety     Fever postop     Uterine leiomyoma     Headache     Heavy menses      "Hypersomnia with sleep apnea     Iron deficiency anemia     Leukocytosis     Postsurgical nonabsorption     Mild intermittent asthma     Morbid obesity (H)     Myalgia and myositis     Neck pain     Other dyspnea and respiratory abnormality     Pain, neuropathic     Pelvic pain in female     Personal history of allergy to latex     Post concussion syndrome     Right shoulder pain     S/P gastric bypass     S/P hysterectomy     Bariatric surgery status     Vitamin B12 deficiency     Vitamin D deficiency     Vomiting following gastrointestinal surgery     Hypermagnesemia     Benign essential hypertension     Neck pain on left side     Mild major depression (H)       Past Medical History    Past Medical History:   Diagnosis Date     Asthma      Fibroids     s/p hysterectomy     H/O gastric bypass      Hypertension      Migraine     followed by Devika     Obstructive sleep apnea      Pre-diabetes      Relapsing remitting multiple sclerosis (H) 2015    lesion in [place].  Facial tingling initial symptom.  F/B [facility]       Past Surgical History    Past Surgical History:   Procedure Laterality Date     GASTRIC BYPASS  2002    \"Trouble with B12 and D\"     HYSTERECTOMY, MONO  2013    cervix gone.  fibroids.  No BSO     TONSILLECTOMY         Current Medications    Current Outpatient Prescriptions   Medication Sig Dispense Refill     acetaminophen (TYLENOL) 500 MG tablet Take 2 tablets (1,000 mg) by mouth 3 times daily as needed for mild pain 100 tablet 0     albuterol (2.5 MG/3ML) 0.083% nebulizer solution Inhale 2.5 mg into the lungs       albuterol (VENTOLIN HFA) 108 (90 BASE) MCG/ACT inhaler Inhale 2 puffs into the lungs       cholecalciferol (VITAMIN D3) 5000 UNITS TABS tablet Take 1 tablet (5,000 Units) by mouth daily       cyclobenzaprine (FLEXERIL) 10 MG tablet Take 10 mg by mouth 3 times daily as needed       Ferrous Sulfate (IRON SUPPLEMENT PO)        Glatiramer Acetate (COPAXONE) 40 MG/ML SOSY Inject 40 mg Subcutaneous " three times a week 12 Syringe 11     IBUPROFEN PO Take 800 mg by mouth every 4 hours as needed for moderate pain        lisinopril-hydrochlorothiazide (PRINZIDE/ZESTORETIC) 10-12.5 MG per tablet Take 1 tablet by mouth daily 90 tablet 1     nortriptyline (PAMELOR) 25 MG capsule TAKE 1 CAPSULE BY MOUTH AT BEDTIME FOR 2 WEEKS, CAN INCREASE TO 2 CAPSULES AS NEEDED 60 capsule 5     topiramate (TOPAMAX) 100 MG tablet Take 1 tablet (100 mg) by mouth daily (Take with 25 mg to total 125 mg) 90 tablet 3     topiramate (TOPAMAX) 25 MG tablet Take 1 tablet (25 mg) by mouth At Bedtime (Take with 100 mg to total 125 mg) 90 tablet 3     triamcinolone (KENALOG) 0.1 % cream Apply sparingly to affected area three times daily 30 g 1     valACYclovir (VALTREX) 1000 mg tablet Take 1 tablet (1,000 mg) by mouth 3 times daily 30 tablet 0     valACYclovir (VALTREX) 500 MG tablet Take 1 tablet (500 mg) by mouth daily 90 tablet 1       Allergies    Allergies   Allergen Reactions     Adhesive Tape      Azithromycin Unknown     Latex Hives and Itching     Aspirin Difficulty breathing and Palpitations     Penicillins Cramps, GI Disturbance, Nausea and Vomiting, Rash and Swelling       Immunizations    Immunization History   Administered Date(s) Administered     HepB-Adult 08/03/1999     Influenza Vaccine IM 3yrs+ 4 Valent IIV4 01/03/2018     TD (ADULT, 7+) 08/03/1999     TDAP Vaccine (Adacel) 03/13/2013       Family History    Family History   Problem Relation Age of Onset     Lupus Paternal Aunt 41     Multiple Sclerosis No family hx of        Social History    Social History     Social History     Marital status:      Spouse name: N/A     Number of children: N/A     Years of education: N/A     Occupational History      [employer]     Social History Main Topics     Smoking status: Former Smoker     Types: Cigars     Smokeless tobacco: Never Used     Alcohol use 1.2 oz/week     2 Standard drinks  "or equivalent per week      Comment: 0-3 drinks per week     Drug use: No      Comment: Marijuana when younger      Sexual activity: Yes     Partners: Male     Other Topics Concern     Not on file     Social History Narrative       All above reviewed and updated, all stable unless otherwise noted    Recent labs reviewed    ROS:  CONSTITUTIONAL: NEGATIVE for fever, chills, change in weight  INTEGUMENTARY/SKIN: NEGATIVE for worrisome rashes, moles or lesions  EYES: NEGATIVE for vision changes or irritation  ENT/MOUTH: NEGATIVE for ear, mouth and throat problems  RESP: NEGATIVE for significant cough or SOB  CV: NEGATIVE for chest pain, palpitations or peripheral edema  GI: NEGATIVE for nausea, abdominal pain, heartburn, or change in bowel habits  : NEGATIVE for frequency, dysuria, or hematuria  MUSCULOSKELETAL: NEGATIVE for significant arthralgias or myalgia  NEURO: NEGATIVE for weakness, dizziness or paresthesias  ENDOCRINE: NEGATIVE for temperature intolerance, skin/hair changes  HEME: NEGATIVE for bleeding problems  PSYCHIATRIC: NEGATIVE for changes in mood or affect    OBJECTIVE:                                                    /70  Pulse 77  Temp 98.3  F (36.8  C) (Oral)  Ht 5' 5\" (1.651 m)  Wt 249 lb (112.9 kg)  SpO2 100%  BMI 41.44 kg/m2  Body mass index is 41.44 kg/(m^2).  GENERAL: healthy, alert and no distress  EYES: Eyes grossly normal to inspection, extraocular movements - intact, and PERRL  HENT: ear canals- normal; TMs- normal; Nose- normal; Mouth- no ulcers, no lesions  NECK: no tenderness, no adenopathy, no asymmetry, no masses, no stiffness; thyroid- normal to palpation  RESP: lungs clear to auscultation - no rales, no rhonchi, no wheezes  CV: regular rates and rhythm, normal S1 S2, no S3 or S4 and no murmur, no click or rub -  ABDOMEN: soft, no tenderness, no  hepatosplenomegaly, no masses, normal bowel sounds  MS: extremities- no gross deformities noted, no edema  SKIN: rt anterior " lower neck 3 cm circular area of redness with a vesicular change, scab after trying to express it  NEURO: strength and tone- normal, sensory exam- grossly normal, mentation- intact, speech- normal, reflexes- symmetric  BACK: no CVA tenderness, no paralumbar tenderness  PSYCH: Alert and oriented times 3; speech- coherent , normal rate and volume; able to articulate logical thoughts, able to abstract reason, no tangential thoughts, no hallucinations or delusions, affect- normal  LYMPHATICS: ant. cervical- normal, post. cervical- normal, axillary- normal, supraclavicular- normal, inguinal- normal    DIAGNOSTICS/PROCEDURES:                                                      none      ASSESSMENT/PLAN:                                                        ICD-10-CM    1. Herpes zoster without complication B02.9 valACYclovir (VALTREX) 1000 mg tablet   2. MS (multiple sclerosis) (H) G35    3. Mild major depression (H) F32.0    4. Anxiety F41.9    5. Mild intermittent asthma without complication J45.20    6. Benign essential hypertension I10    7. Morbid obesity (H) E66.01    8. Medication monitoring encounter Z51.81        Discussed treatment/modality options, including risk and benefits, she desires valtrex 1000 mg tid x 10 days, off work, as works at [employer] heme/onc, sx cares reviewed. All diagnosis above reviewed and noted above, otherwise stable.  See [facility] orders for further details.  Follow up in 5 day(s) and as needed.    Return in about 5 days (around 7/30/2018), or if symptoms worsen or fail to improve, for Routine Visit.    Health Maintenance Due   Topic Date Due     HIV SCREEN (SYSTEM ASSIGNED)  07/22/1995     PHQ-9 Q6 MONTHS  07/03/2018     ASTHMA CONTROL TEST Q6 MOS  07/03/2018       See Patient Instructions           Carlyle Basilio MD [address]  [address] [phone] Fax

## 2018-07-26 ASSESSMENT — PATIENT HEALTH QUESTIONNAIRE - PHQ9: SUM OF ALL RESPONSES TO PHQ QUESTIONS 1-9: 5

## 2018-07-26 ASSESSMENT — ASTHMA QUESTIONNAIRES: ACT_TOTALSCORE: 20

## 2018-07-26 ASSESSMENT — ANXIETY QUESTIONNAIRES: GAD7 TOTAL SCORE: 10

## 2018-07-30 ENCOUNTER — OFFICE VISIT (OUTPATIENT)
Dept: FAMILY MEDICINE | Facility: CLINIC | Age: 43
End: 2018-07-30
Payer: COMMERCIAL

## 2018-07-30 VITALS
HEART RATE: 79 BPM | SYSTOLIC BLOOD PRESSURE: 130 MMHG | TEMPERATURE: 98.1 F | BODY MASS INDEX: 41.48 KG/M2 | HEIGHT: 65 IN | OXYGEN SATURATION: 100 % | WEIGHT: 249 LBS | DIASTOLIC BLOOD PRESSURE: 78 MMHG

## 2018-07-30 DIAGNOSIS — B02.9 HERPES ZOSTER WITHOUT COMPLICATION: Primary | ICD-10-CM

## 2018-07-30 DIAGNOSIS — R07.9 CHEST PAIN, UNSPECIFIED TYPE: ICD-10-CM

## 2018-07-30 DIAGNOSIS — G35 MS (MULTIPLE SCLEROSIS) (H): ICD-10-CM

## 2018-07-30 DIAGNOSIS — E66.01 MORBID OBESITY (H): ICD-10-CM

## 2018-07-30 DIAGNOSIS — Z51.81 MEDICATION MONITORING ENCOUNTER: ICD-10-CM

## 2018-07-30 PROCEDURE — 99214 OFFICE O/P EST MOD 30 MIN: CPT | Performed by: FAMILY MEDICINE

## 2018-07-30 PROCEDURE — 93000 ELECTROCARDIOGRAM COMPLETE: CPT | Performed by: FAMILY MEDICINE

## 2018-07-30 NOTE — PROGRESS NOTES
SUBJECTIVE:   Josefina Aldrich is a 43 year old female who presents to clinic today for the following health issues:    7/30    Recheck on shingles - rash fading - pain better - 4/10 - pain not constant - comes in flares.  Extreme fatigue.  Discoloration and itchiness on bottom lip.  Neck very itchiness - on valtrex - increased sleep    MS Flare?    Chest heaviness/sob - no nausea - some night sweats - JERNIGAN - pain with palpation on right upper chest    BP Readings from Last 3 Encounters:   07/30/18 130/78   07/25/18 110/70   05/31/18 109/74     7/25    Bumps on right side of neck, red and painful - spot was puffed out 2 days ago - tried squeezing it and only got clear liquid but had severe pain shooting up her neck - this morning it's turned into more of a rash with pain from shoulder into right ear and now right side of tongue is feeling numb, burning, tingling and throat is throbbing and chest pain up to throat is painful     All on the right side - no hx shingles - Rt Ear to rt upper chest - burning, tingling, numb, most intense in right neck neck where rash is associated     Depression/Anxiety - PHQ = 5, JUSTICE = 10     Asthma = ACT = 20     Htn         BP Readings from Last 3 Encounters:   07/25/18 110/70   05/31/18 109/74   05/16/18 113/70            Creatinine   Date Value Ref Range Status   02/16/2018 1.26 (H) 0.52 - 1.04 mg/dL Final        Problem list and histories reviewed & adjusted, as indicated.  Additional history: as documented    Reviewed and updated as needed this visit by clinical staff  Tobacco  Allergies  Meds  Med Hx  Surg Hx  Fam Hx  Soc Hx      Reviewed and updated as needed this visit by Provider       body mass index is 41.44 kg/(m^2).    Wt Readings from Last 4 Encounters:   07/30/18 249 lb (112.9 kg)   07/25/18 249 lb (112.9 kg)   05/31/18 248 lb (112.5 kg)   05/16/18 252 lb 1.6 oz (114.4 kg)       Health Maintenance    Health Maintenance Due   Topic Date Due     HIV SCREEN  "(SYSTEM ASSIGNED)  07/22/1995       Current Problem List    Patient Active Problem List   Diagnosis     Migraine     Asthma     MS (multiple sclerosis) (H)     Left pontine stroke (H)     Anemia     Backache     Body mass index 45.0-49.9, adult (H)     Cognitive changes     Depression with anxiety     Fever postop     Uterine leiomyoma     Headache     Heavy menses     Hypersomnia with sleep apnea     Iron deficiency anemia     Leukocytosis     Postsurgical nonabsorption     Mild intermittent asthma     Morbid obesity (H)     Myalgia and myositis     Neck pain     Other dyspnea and respiratory abnormality     Pain, neuropathic     Pelvic pain in female     Personal history of allergy to latex     Post concussion syndrome     Right shoulder pain     S/P gastric bypass     S/P hysterectomy     Bariatric surgery status     Vitamin B12 deficiency     Vitamin D deficiency     Vomiting following gastrointestinal surgery     Hypermagnesemia     Benign essential hypertension     Neck pain on left side     Mild major depression (H)       Past Medical History    Past Medical History:   Diagnosis Date     Asthma      Fibroids     s/p hysterectomy     H/O gastric bypass      Hypertension      Migraine     followed by Devika     Obstructive sleep apnea      Pre-diabetes      Relapsing remitting multiple sclerosis (H) 2015    lesion in [place].  Facial tingling initial symptom.  F/B [facility]       Past Surgical History    Past Surgical History:   Procedure Laterality Date     GASTRIC BYPASS  2002    \"Trouble with B12 and D\"     HYSTERECTOMY, MONO  2013    cervix gone.  fibroids.  No BSO     TONSILLECTOMY         Current Medications    Current Outpatient Prescriptions   Medication Sig Dispense Refill     acetaminophen (TYLENOL) 500 MG tablet Take 2 tablets (1,000 mg) by mouth 3 times daily as needed for mild pain 100 tablet 0     albuterol (2.5 MG/3ML) 0.083% nebulizer solution Inhale 2.5 mg into the lungs       albuterol (VENTOLIN HFA) " 108 (90 BASE) MCG/ACT inhaler Inhale 2 puffs into the lungs       cholecalciferol (VITAMIN D3) 5000 UNITS TABS tablet Take 1 tablet (5,000 Units) by mouth daily       cyclobenzaprine (FLEXERIL) 10 MG tablet Take 10 mg by mouth 3 times daily as needed       Ferrous Sulfate (IRON SUPPLEMENT PO)        Glatiramer Acetate (COPAXONE) 40 MG/ML SOSY Inject 40 mg Subcutaneous three times a week 12 Syringe 11     IBUPROFEN PO Take 800 mg by mouth every 4 hours as needed for moderate pain        lisinopril-hydrochlorothiazide (PRINZIDE/ZESTORETIC) 10-12.5 MG per tablet Take 1 tablet by mouth daily 90 tablet 1     nortriptyline (PAMELOR) 25 MG capsule TAKE 1 CAPSULE BY MOUTH AT BEDTIME FOR 2 WEEKS, CAN INCREASE TO 2 CAPSULES AS NEEDED 60 capsule 5     topiramate (TOPAMAX) 100 MG tablet Take 1 tablet (100 mg) by mouth daily (Take with 25 mg to total 125 mg) 90 tablet 3     topiramate (TOPAMAX) 25 MG tablet Take 1 tablet (25 mg) by mouth At Bedtime (Take with 100 mg to total 125 mg) 90 tablet 3     triamcinolone (KENALOG) 0.1 % cream Apply sparingly to affected area three times daily 30 g 1     valACYclovir (VALTREX) 1000 mg tablet Take 1 tablet (1,000 mg) by mouth 3 times daily 30 tablet 0     valACYclovir (VALTREX) 500 MG tablet Take 1 tablet (500 mg) by mouth daily 90 tablet 1       Allergies    Allergies   Allergen Reactions     Adhesive Tape      Azithromycin Unknown     Latex Hives and Itching     Aspirin Difficulty breathing and Palpitations     Penicillins Cramps, GI Disturbance, Nausea and Vomiting, Rash and Swelling       Immunizations    Immunization History   Administered Date(s) Administered     HepB-Adult 08/03/1999     Influenza Vaccine IM 3yrs+ 4 Valent IIV4 01/03/2018     TD (ADULT, 7+) 08/03/1999     TDAP Vaccine (Adacel) 03/13/2013       Family History    Family History   Problem Relation Age of Onset     Lupus Paternal Aunt 41     Multiple Sclerosis No family hx of        Social History    Social History  "    Social History     Marital status:      Spouse name: N/A     Number of children: N/A     Years of education: N/A     Occupational History      [employer]     Social History Main Topics     Smoking status: Former Smoker     Types: Cigars     Smokeless tobacco: Never Used     Alcohol use 1.2 oz/week     2 Standard drinks or equivalent per week      Comment: 0-3 drinks per week     Drug use: No      Comment: Marijuana when younger      Sexual activity: Yes     Partners: Male     Other Topics Concern     Not on file     Social History Narrative       All above reviewed and updated, all stable unless otherwise noted    Recent labs reviewed    ROS:  CONSTITUTIONAL: NEGATIVE for fever, chills, change in weight  INTEGUMENTARY/SKIN: NEGATIVE for worrisome rashes, moles or lesions  EYES: NEGATIVE for vision changes or irritation  ENT/MOUTH: NEGATIVE for ear, mouth and throat problems  RESP: NEGATIVE for significant cough or SOB  CV: NEGATIVE for chest pain, palpitations or peripheral edema  GI: NEGATIVE for nausea, abdominal pain, heartburn, or change in bowel habits  : NEGATIVE for frequency, dysuria, or hematuria  MUSCULOSKELETAL: NEGATIVE for significant arthralgias or myalgia  NEURO: NEGATIVE for weakness, dizziness or paresthesias  ENDOCRINE: NEGATIVE for temperature intolerance, skin/hair changes  HEME: NEGATIVE for bleeding problems  PSYCHIATRIC: NEGATIVE for changes in mood or affect    OBJECTIVE:                                                    /78  Pulse 79  Temp 98.1  F (36.7  C) (Oral)  Ht 5' 5\" (1.651 m)  Wt 249 lb (112.9 kg)  SpO2 100%  BMI 41.44 kg/m2  Body mass index is 41.44 kg/(m^2).  GENERAL: healthy, alert and no distress  EYES: Eyes grossly normal to inspection, extraocular movements - intact, and PERRL  HENT: ear canals- normal; TMs- normal; Nose- normal; Mouth- no ulcers, no lesions  NECK: no tenderness, no adenopathy, no asymmetry, " no masses, no stiffness; thyroid- normal to palpation  RESP: lungs clear to auscultation - no rales, no rhonchi, no wheezes  CV: regular rates and rhythm, normal S1 S2, no S3 or S4 and no murmur, no click or rub -  ABDOMEN: soft, no tenderness, no  hepatosplenomegaly, no masses, normal bowel sounds  MS: extremities- no gross deformities noted, no edema  SKIN: much improved, minimal residual  NEURO: strength and tone- normal, sensory exam- grossly normal, mentation- intact, speech- normal, reflexes- symmetric  BACK: no CVA tenderness, no paralumbar tenderness  PSYCH: Alert and oriented times 3; speech- coherent , normal rate and volume; able to articulate logical thoughts, able to abstract reason, no tangential thoughts, no hallucinations or delusions, affect- normal  LYMPHATICS: ant. cervical- normal, post. cervical- normal, axillary- normal, supraclavicular- normal, inguinal- normal    DIAGNOSTICS/PROCEDURES:                                                      ECG - WNL     ASSESSMENT/PLAN:                                                        ICD-10-CM    1. Herpes zoster without complication B02.9    2. MS (multiple sclerosis) (H) G35    3. Chest pain, unspecified type R07.9 EKG 12-lead complete w/read - Clinics   4. Morbid obesity (H) E66.01    5. Medication monitoring encounter Z51.81        Discussed treatment/modality options, including risk and benefits, she desires finish valtrex, offered [facility] ER/Stress echo, declines, ok to RTW, follow closely, cpx fasting soon. All diagnosis above reviewed and noted above, otherwise stable.  See [facility] orders for further details.  Follow up as needed.    Return in about 1 month (around 8/30/2018), or if symptoms worsen or fail to improve, for Physical Exam.    Health Maintenance Due   Topic Date Due     HIV SCREEN (SYSTEM ASSIGNED)  07/22/1995       See Patient Instructions           Carlyle Basilio MD [facility] PRIOR [address]  [place]  [address]  [phone] Fax

## 2018-07-30 NOTE — PATIENT INSTRUCTIONS
[facility]                        To reach your care team during and after hours:   [phone]  To reach our pharmacy:        [phone]    Clinic Hours                        Our clinic hours are:    Monday   7:30 am to 7:00 pm                  Tuesday through Friday 7:30 am to 5:00 pm                             Saturday   8:00 am to 12:00 pm      Sunday   Closed      Pharmacy Hours                        Our pharmacy hours are:    Monday   8:30 am to 7:00 pm       Tuesday to Friday  8:30 am to 6:00 pm                       Saturday    9:00 am to 1:00 pm              Sunday    Closed              There is also information available at our web site:  [email or web address]    If your provider ordered any lab tests and you do not receive the results within 10 business days, please call the clinic.    If you need a medication refill please contact your pharmacy.  Please allow 2-3 business days for your refill to be completed.    Our clinic offers telephone visits and e visits.  Please ask one of your team members to explain more.      Use [facility] (secure email communication and access to your chart) to send your primary care provider a message or make an appointment. Ask someone on your Team how to sign up for H?REL.  Immunizations                      Immunization History   Administered Date(s) Administered     HepB-Adult 08/03/1999     Influenza Vaccine IM 3yrs+ 4 Valent IIV4 01/03/2018     TD (ADULT, 7+) 08/03/1999     TDAP Vaccine (Adacel) 03/13/2013        Health Maintenance                         Health Maintenance Due   Topic Date Due     HIV SCREEN (SYSTEM ASSIGNED)  07/22/1995

## 2018-07-30 NOTE — MR AVS SNAPSHOT
After Visit Summary   7/30/2018    Josefina Aldrich    MRN: [id number]           Patient Information     Date Of Birth          1975        Visit Information        Provider Department      7/30/2018 10:00 AM Carlyle Basilio MD [facility]        Today's Diagnoses     Herpes zoster without complication    -  1    MS (multiple sclerosis) (H)        Chest pain, unspecified type        Morbid obesity (H)        Medication monitoring encounter          Care Instructions        [facility]                        To reach your care team during and after hours:   [phone]  To reach our pharmacy:        [phone]    Clinic Hours                        Our clinic hours are:    Monday   7:30 am to 7:00 pm                  Tuesday through Friday 7:30 am to 5:00 pm                             Saturday   8:00 am to 12:00 pm      Sunday   Closed      Pharmacy Hours                        Our pharmacy hours are:    Monday   8:30 am to 7:00 pm       Tuesday to Friday  8:30 am to 6:00 pm                       Saturday    9:00 am to 1:00 pm              Sunday    Closed              There is also information available at our web site:  [email or web address]    If your provider ordered any lab tests and you do not receive the results within 10 business days, please call the clinic.    If you need a medication refill please contact your pharmacy.  Please allow 2-3 business days for your refill to be completed.    Our clinic offers telephone visits and e visits.  Please ask one of your team members to explain more.      Use [facility] (secure email communication and access to your chart) to send your primary care provider a message or make an appointment. Ask someone on your Team how to sign up for [facility].  Immunizations                      Immunization History   Administered Date(s) Administered     HepB-Adult 08/03/1999     Influenza Vaccine IM 3yrs+ 4 Valent IIV4 01/03/2018      TD (ADULT, 7+) 08/03/1999     TDAP Vaccine (Adacel) 03/13/2013        Health Maintenance                         Health Maintenance Due   Topic Date Due     HIV SCREEN (SYSTEM ASSIGNED)  07/22/1995               Follow-ups after your visit        Follow-up notes from your care team     Return in about 1 month (around 8/30/2018), or if symptoms worsen or fail to improve, for Physical Exam.      Your next 10 appointments already scheduled     Aug 02, 2018  6:20 PM CDT   CARTER Extremity with Jesús Gary PT   [facility]    [address] [place] [address]   [phone]            Nov 29, 2018  8:30 AM CST   MR CERVICAL SPINE W/O & W CONTRAST with [address] [facility] ([facility])    [address] [address] [phone]           Take your medicines as usual, unless your doctor tells you not to. Bring a list of your current medicines to your exam (including vitamins, minerals and over-the-counter drugs).  You may or may not receive intravenous (IV) contrast for this exam pending the discretion of the Radiologist.  You do not need to do anything special to prepare.  The MRI machine uses a strong magnet. Please wear clothes without metal (snaps, zippers). A sweatsuit works well, or we may give you a hospital gown.  Please remove any body piercings and hair extensions before you arrive. You will also remove watches, jewelry, hairpins, wallets, dentures, partial dental plates and hearing aids. You may wear contact lenses, and you may be able to wear your rings. We have a safe place to keep your personal items, but it is safer to leave them at home.  **IMPORTANT** THE INSTRUCTIONS BELOW ARE ONLY FOR THOSE PATIENTS WHO HAVE BEEN PRESCRIBED SEDATION OR GENERAL ANESTHESIA DURING THEIR MRI PROCEDURE:  IF YOUR DOCTOR PRESCRIBED ORAL SEDATION (take medicine to help you relax during your exam):   You  must get the medicine from your doctor (oral medication) before you arrive. Bring the medicine to the exam. Do not take it at home. You ll be told when to take it upon arriving for your exam.   Arrive one hour early. Bring someone who can take you home after the test. Your medicine will make you sleepy. After the exam, you may not drive, take a bus or take a taxi by yourself.  IF YOUR DOCTOR PRESCRIBED IV SEDATION:   Arrive one hour early. Bring someone who can take you home after the test. Your medicine will make you sleepy. After the exam, you may not drive, take a bus or take a taxi by yourself.   No eating 6 hours before your exam. You may have clear liquids up until 4 hours before your exam. (Clear liquids include water, clear tea, black coffee and fruit juice without pulp.)  IF YOUR DOCTOR PRESCRIBED ANESTHESIA (be asleep for your exam):   Arrive 1 1/2 hours early. Bring someone who can take you home after the test. You may not drive, take a bus or take a taxi by yourself.   No eating 8 hours before your exam. You may have clear liquids up until 4 hours before your exam. (Clear liquids include water, clear tea, black coffee and fruit juice without pulp.)   You will spend four to five hours in the recovery room.  Please call the Imaging Department at your exam site with any questions.            Nov 29, 2018  9:15 AM CST   MR BRAIN W/O & W CONTRAST with [address] [facility] ([facility])    [address] [address] [phone]           Take your medicines as usual, unless your doctor tells you not to. Bring a list of your current medicines to your exam (including vitamins, minerals and over-the-counter drugs).  You may or may not receive intravenous (IV) contrast for this exam pending the discretion of the Radiologist.  You do not need to do anything special to prepare.  The MRI machine uses a strong magnet. Please wear clothes without  metal (snaps, zippers). A sweatsuit works well, or we may give you a hospital gown.  Please remove any body piercings and hair extensions before you arrive. You will also remove watches, jewelry, hairpins, wallets, dentures, partial dental plates and hearing aids. You may wear contact lenses, and you may be able to wear your rings. We have a safe place to keep your personal items, but it is safer to leave them at home.  **IMPORTANT** THE INSTRUCTIONS BELOW ARE ONLY FOR THOSE PATIENTS WHO HAVE BEEN PRESCRIBED SEDATION OR GENERAL ANESTHESIA DURING THEIR MRI PROCEDURE:  IF YOUR DOCTOR PRESCRIBED ORAL SEDATION (take medicine to help you relax during your exam):   You must get the medicine from your doctor (oral medication) before you arrive. Bring the medicine to the exam. Do not take it at home. You ll be told when to take it upon arriving for your exam.   Arrive one hour early. Bring someone who can take you home after the test. Your medicine will make you sleepy. After the exam, you may not drive, take a bus or take a taxi by yourself.  IF YOUR DOCTOR PRESCRIBED IV SEDATION:   Arrive one hour early. Bring someone who can take you home after the test. Your medicine will make you sleepy. After the exam, you may not drive, take a bus or take a taxi by yourself.   No eating 6 hours before your exam. You may have clear liquids up until 4 hours before your exam. (Clear liquids include water, clear tea, black coffee and fruit juice without pulp.)  IF YOUR DOCTOR PRESCRIBED ANESTHESIA (be asleep for your exam):   Arrive 1 1/2 hours early. Bring someone who can take you home after the test. You may not drive, take a bus or take a taxi by yourself.   No eating 8 hours before your exam. You may have clear liquids up until 4 hours before your exam. (Clear liquids include water, clear tea, black coffee and fruit juice without pulp.)   You will spend four to five hours in the recovery room.  Please call the Imaging Department at  "your exam site with any questions.            Nov 29, 2018 10:00 AM CST   (Arrive by 9:45 AM)   Return Multiple Sclerosis with Radames Castaneda MD   [facility] Multiple Sclerosis ([facility])    [address] [facility]  [address] [place] [address] [phone]              Who to contact     If you have questions or need follow up information about today's clinic visit or your schedule please contact [facility] directly at [phone].  Normal or non-critical lab and imaging results will be communicated to you by Therasishart, letter or phone within 4 business days after the clinic has received the results. If you do not hear from us within 7 days, please contact the clinic through VBOXt or phone. If you have a critical or abnormal lab result, we will notify you by phone as soon as possible.  Submit refill requests through CloudTran or call your pharmacy and they will forward the refill request to us. Please allow 3 business days for your refill to be completed.          Additional Information About Your Visit        Therasishart Information     [facility]Tr[employer] gives you secure access to your electronic health record. If you see a primary care provider, you can also send messages to your care team and make appointments. If you have questions, please call your primary care clinic.  If you do not have a primary care provider, please call [phone] and they will assist you.        Care EveryWhere ID     This is your Care EveryWhere ID. This could be used by other organizations to access your [place] medical records  [id number]        Your Vitals Were     Pulse Temperature Height Pulse Oximetry BMI (Body Mass Index)       79 98.1  F (36.7  C) (Oral) 5' 5\" (1.651 m) 100% 41.44 kg/m2        Blood Pressure from Last 3 Encounters:   07/30/18 130/78   07/25/18 110/70   05/31/18 109/74    Weight from Last 3 Encounters:   07/30/18 249 lb (112.9 kg)   07/25/18 249 lb (112.9 kg) "   05/31/18 248 lb (112.5 kg)              We Performed the Following     EKG 12-lead complete w/read - Clinics        Primary Care Provider Office Phone # Fax #    Miya Crump PA-C [phone] [address] [facility] [address]        Equal Access to Services     RISHI MORENO : Hadii aad ku hadasho Soomaali, waaxda luqadaha, qaybta kaalmada adeegyada, waxay idiin hayaan adeeg kharash la'aan . So [facility] [phone].    ATENCIÓN: Si habla español, tiene a barrera disposición servicios gratuitos de asistencia lingüística. Brendaame al [phone].    We comply with applicable federal civil rights laws and [place] laws. We do not discriminate on the basis of race, color, national origin, age, disability, sex, sexual orientation, or gender identity.            Thank you!     Thank you for choosing [facility]  for your care. Our goal is always to provide you with excellent care. Hearing back from our patients is one way we can continue to improve our services. Please take a few minutes to complete the written survey that you may receive in the mail after your visit with us. Thank you!             Your Updated Medication List - Protect others around you: Learn how to safely use, store and throw away your medicines at www.disposemymeds.org.          This list is accurate as of 7/30/18 11:55 AM.  Always use your most recent med list.                   Brand Name Dispense Instructions for use Diagnosis    acetaminophen 500 MG tablet    TYLENOL    100 tablet    Take 2 tablets (1,000 mg) by mouth 3 times daily as needed for mild pain    Cyst of left ovary       * VENTOLIN  (90 Base) MCG/ACT Inhaler   Generic drug:  albuterol      Inhale 2 puffs into the lungs        * albuterol (2.5 MG/3ML) 0.083% neb solution      Inhale 2.5 mg into the lungs        cholecalciferol 5000 units Tabs tablet    vitamin D3     Take 1 tablet (5,000 Units) by mouth daily    Hypovitaminosis D        cyclobenzaprine 10 MG tablet    FLEXERIL     Take 10 mg by mouth 3 times daily as needed        Glatiramer Acetate 40 MG/ML Sosy    COPAXONE    12 Syringe    Inject 40 mg Subcutaneous three times a week    Multiple sclerosis (H)       IBUPROFEN PO      Take 800 mg by mouth every 4 hours as needed for moderate pain        IRON SUPPLEMENT PO           lisinopril-hydrochlorothiazide 10-12.5 MG per tablet    PRINZIDE/ZESTORETIC    90 tablet    Take 1 tablet by mouth daily    Benign essential hypertension       nortriptyline 25 MG capsule    PAMELOR    60 capsule    TAKE 1 CAPSULE BY MOUTH AT BEDTIME FOR 2 WEEKS, CAN INCREASE TO 2 CAPSULES AS NEEDED    Migraine without aura and without status migrainosus, not intractable       * topiramate 100 MG tablet    TOPAMAX    90 tablet    Take 1 tablet (100 mg) by mouth daily (Take with 25 mg to total 125 mg)    Migraine without aura and without status migrainosus, not intractable       * topiramate 25 MG tablet    TOPAMAX    90 tablet    Take 1 tablet (25 mg) by mouth At Bedtime (Take with 100 mg to total 125 mg)    Migraine without aura and without status migrainosus, not intractable       triamcinolone 0.1 % cream    KENALOG    30 g    Apply sparingly to affected area three times daily    Eczema, unspecified type       * valACYclovir 500 MG tablet    VALTREX    90 tablet    Take 1 tablet (500 mg) by mouth daily    HSV (herpes simplex virus) infection       * valACYclovir 1000 mg tablet    VALTREX    30 tablet    Take 1 tablet (1,000 mg) by mouth 3 times daily    Herpes zoster without complication       * Notice:  This list has 6 medication(s) that are the same as other medications prescribed for you. Read the directions carefully, and ask your doctor or other care provider to review them with you.

## 2018-07-30 NOTE — LETTER
[facility]  [address] [place] [address]                                                                                                       [phone]    July 30, 2018    Josefina Lipscombs  [id number]  [place] [address]      To Whom it May Concern:    OK to return to work, shingles has healed.    Please contact me with questions or concerns.      Sincerely,        Carlyle Basilio M.D.

## 2018-07-31 ENCOUNTER — TELEPHONE (OUTPATIENT)
Dept: NEUROLOGY | Facility: CLINIC | Age: 43
End: 2018-07-31

## 2018-07-31 NOTE — TELEPHONE ENCOUNTER
SSDI forms received from patient via fax; I called her, because unfortunately, Dr. Castaneda is not back in the office until 8/23/18; She said that she has court on 9/5/18, and needs to get these forms over to her SSDI a week before that date; I told her that I would start working on the forms and put them in Dr. Castaneda's folder for when he returns, as well as let him know this information; She would like to pick the forms up once they are completed.    Mitzy Collazo, MS RN Care Coordinator

## 2018-07-31 NOTE — TELEPHONE ENCOUNTER
Health Call Center    Phone Message    May a detailed message be left on voicemail: yes    Reason for Call: Form or Letter   Type or form/letter needing completion: Social Security Disability forms  Provider: Dr. Russell  Date form needed: 08/13/18  Once completed: Patient will  at .    Patient will be faxing forms in today, please watch for them and call her when they are ready for . Thank you!    Action Taken: Other: Neurology

## 2018-08-13 ENCOUNTER — TELEPHONE (OUTPATIENT)
Dept: FAMILY MEDICINE | Facility: CLINIC | Age: 43
End: 2018-08-13

## 2018-08-13 ENCOUNTER — OFFICE VISIT (OUTPATIENT)
Dept: URGENT CARE | Facility: URGENT CARE | Age: 43
End: 2018-08-13
Payer: COMMERCIAL

## 2018-08-13 ENCOUNTER — RADIANT APPOINTMENT (OUTPATIENT)
Dept: GENERAL RADIOLOGY | Facility: CLINIC | Age: 43
End: 2018-08-13
Attending: FAMILY MEDICINE
Payer: COMMERCIAL

## 2018-08-13 VITALS
DIASTOLIC BLOOD PRESSURE: 78 MMHG | WEIGHT: 251.3 LBS | HEART RATE: 88 BPM | SYSTOLIC BLOOD PRESSURE: 119 MMHG | BODY MASS INDEX: 41.82 KG/M2 | RESPIRATION RATE: 16 BRPM | OXYGEN SATURATION: 99 % | TEMPERATURE: 98 F

## 2018-08-13 DIAGNOSIS — J04.0 LARYNGITIS: ICD-10-CM

## 2018-08-13 DIAGNOSIS — Z87.01 HISTORY OF PNEUMONIA: ICD-10-CM

## 2018-08-13 DIAGNOSIS — R07.89 CHEST TIGHTNESS: ICD-10-CM

## 2018-08-13 DIAGNOSIS — R05.9 COUGH: ICD-10-CM

## 2018-08-13 DIAGNOSIS — R06.02 SOB (SHORTNESS OF BREATH): Primary | ICD-10-CM

## 2018-08-13 DIAGNOSIS — Z87.09 HISTORY OF ASTHMA: ICD-10-CM

## 2018-08-13 DIAGNOSIS — R07.1 PAINFUL RESPIRATION: ICD-10-CM

## 2018-08-13 DIAGNOSIS — J30.2 SEASONAL ALLERGIC RHINITIS, UNSPECIFIED CHRONICITY, UNSPECIFIED TRIGGER: ICD-10-CM

## 2018-08-13 LAB
D DIMER PPP FEU-MCNC: 0.2 UG/ML FEU (ref 0–0.5)
WBC # BLD AUTO: 10.8 10E9/L (ref 4–11)

## 2018-08-13 PROCEDURE — 99215 OFFICE O/P EST HI 40 MIN: CPT | Mod: 25 | Performed by: FAMILY MEDICINE

## 2018-08-13 PROCEDURE — 71046 X-RAY EXAM CHEST 2 VIEWS: CPT | Mod: FY

## 2018-08-13 PROCEDURE — 85048 AUTOMATED LEUKOCYTE COUNT: CPT | Performed by: FAMILY MEDICINE

## 2018-08-13 PROCEDURE — 36415 COLL VENOUS BLD VENIPUNCTURE: CPT | Performed by: FAMILY MEDICINE

## 2018-08-13 PROCEDURE — 94640 AIRWAY INHALATION TREATMENT: CPT | Performed by: FAMILY MEDICINE

## 2018-08-13 PROCEDURE — 85379 FIBRIN DEGRADATION QUANT: CPT | Performed by: FAMILY MEDICINE

## 2018-08-13 RX ORDER — PREDNISONE 20 MG/1
TABLET ORAL
Qty: 2 TABLET | Refills: 0
Start: 2018-08-13 | End: 2018-08-14

## 2018-08-13 RX ORDER — PREDNISONE 20 MG/1
20 TABLET ORAL 2 TIMES DAILY
Qty: 8 TABLET | Refills: 0 | Status: SHIPPED | OUTPATIENT
Start: 2018-08-14 | End: 2018-08-18

## 2018-08-13 RX ORDER — ALBUTEROL SULFATE 0.83 MG/ML
SOLUTION RESPIRATORY (INHALATION)
Qty: 1 VIAL | Refills: 0
Start: 2018-08-13 | End: 2019-03-20

## 2018-08-13 RX ORDER — ALBUTEROL SULFATE 90 UG/1
2 AEROSOL, METERED RESPIRATORY (INHALATION) EVERY 6 HOURS PRN
Qty: 1 INHALER | Refills: 0 | Status: SHIPPED | OUTPATIENT
Start: 2018-08-13 | End: 2019-03-20

## 2018-08-13 RX ORDER — ALBUTEROL SULFATE 0.83 MG/ML
2.5 SOLUTION RESPIRATORY (INHALATION) EVERY 6 HOURS PRN
Qty: 25 VIAL | Refills: 1 | Status: SHIPPED | OUTPATIENT
Start: 2018-08-13 | End: 2019-03-20

## 2018-08-13 NOTE — PROGRESS NOTES
"SUBJECTIVE: Josefina Aldrich is a 43 year old female presenting with a chief complaint of SOB, tight chest and painful respiration.  Also a slight cough with horse voice  Onset of symptoms was 1 day(s) ago.  Course of illness is worsening.    Severity moderate  Current and Associated symptoms: weakness  Treatment measures tried include alb neb and inhaler.  Predisposing factors include HX of asthma and HX of Pneumonia.    Past Medical History:   Diagnosis Date     Asthma      Fibroids     s/p hysterectomy     H/O gastric bypass      Hypertension      Migraine     followed by Devika     Obstructive sleep apnea      Pre-diabetes      Relapsing remitting multiple sclerosis (H) 2015    lesion in [place].  Facial tingling initial symptom.  F/B [facility]       Past Surgical History:   Procedure Laterality Date     GASTRIC BYPASS  2002    \"Trouble with B12 and D\"     HYSTERECTOMY, MONO  2013    cervix gone.  fibroids.  No BSO     TONSILLECTOMY         Family History   Problem Relation Age of Onset     Lupus Paternal Aunt 41     Multiple Sclerosis No family hx of        Social History   Substance Use Topics     Smoking status: Former Smoker     Types: Cigars     Smokeless tobacco: Never Used     Alcohol use 1.2 oz/week     2 Standard drinks or equivalent per week      Comment: 0-3 drinks per week     Review Of Systems  Skin: negative  Eyes: negative  Ears/Nose/Throat: as above  Respiratory: Shortness of breath and Cough  Cardiovascular: negative  Gastrointestinal: negative  Genitourinary: negative  Musculoskeletal: negative  Neurologic: negative  Psychiatric: negative  Hematologic/Lymphatic/Immunologic: negative  Endocrine: negative      OBJECTIVE:  /78  Pulse 88  Temp 98  F (36.7  C) (Oral)  Resp 16  Wt 251 lb 4.8 oz (114 kg)  SpO2 99%  BMI 41.82 kg/m2   GENERAL APPEARANCE: healthy, alert and no distress  EYES: EOMI,  PERRL, conjunctiva clear  HENT: ear canals and TM's normal.  Nose and mouth without ulcers, " erythema or lesions  NECK: supple, nontender, no lymphadenopathy  RESP: lungs clear to auscultation - no rales, rhonchi or wheezes  CV: regular rates and rhythm, normal S1 S2, no murmur noted  ABDOMEN:  soft, nontender, no HSM or masses and bowel sounds normal  SKIN: no suspicious lesions or rashes    Xray without acute findings, read by Onur Serrano D.O.    Pt felt improved after neb tx and prednisone      ICD-10-CM    1. SOB (shortness of breath) R06.02 D dimer, quantitative     WBC count     albuterol (2.5 MG/3ML) 0.083% neb solution     INHALATION/NEBULIZER TREATMENT, INITIAL     predniSONE (DELTASONE) 20 MG tablet     ALBUTEROL UNIT DOSE, 1 MG     XR Chest 2 Views     albuterol (2.5 MG/3ML) 0.083% neb solution     predniSONE (DELTASONE) 20 MG tablet     albuterol (PROAIR HFA) 108 (90 Base) MCG/ACT inhaler   2. Chest tightness R07.89 D dimer, quantitative     WBC count     predniSONE (DELTASONE) 20 MG tablet     ALBUTEROL UNIT DOSE, 1 MG     XR Chest 2 Views     albuterol (2.5 MG/3ML) 0.083% neb solution     predniSONE (DELTASONE) 20 MG tablet     albuterol (PROAIR HFA) 108 (90 Base) MCG/ACT inhaler   3. Painful respiration R07.1 D dimer, quantitative     WBC count     predniSONE (DELTASONE) 20 MG tablet     XR Chest 2 Views   4. Laryngitis J04.0 WBC count     XR Chest 2 Views   5. Cough R05 WBC count     XR Chest 2 Views     albuterol (2.5 MG/3ML) 0.083% neb solution     predniSONE (DELTASONE) 20 MG tablet     albuterol (PROAIR HFA) 108 (90 Base) MCG/ACT inhaler   6. History of asthma Z87.09 albuterol (2.5 MG/3ML) 0.083% neb solution     INHALATION/NEBULIZER TREATMENT, INITIAL     predniSONE (DELTASONE) 20 MG tablet     XR Chest 2 Views     albuterol (2.5 MG/3ML) 0.083% neb solution     predniSONE (DELTASONE) 20 MG tablet     albuterol (PROAIR HFA) 108 (90 Base) MCG/ACT inhaler   7. History of pneumonia Z87.01 XR Chest 2 Views   8. Seasonal allergic rhinitis, unspecified chronicity, unspecified trigger J30.2 XR  Chest 2 Views     Considered PE/Pneumonia both r/o with exam and testing.  ED if worse

## 2018-08-13 NOTE — MR AVS SNAPSHOT
After Visit Summary   8/13/2018    Josefina Aldrich    MRN: [id number]           Patient Information     Date Of Birth          1975        Visit Information        Provider Department      8/13/2018 1:00 PM Onur Serrano,  [facility]        Today's Diagnoses     SOB (shortness of breath)    -  1    Chest tightness        Painful respiration        Laryngitis        Cough        History of asthma        History of pneumonia        Seasonal allergic rhinitis, unspecified chronicity, unspecified trigger           Follow-ups after your visit        Your next 10 appointments already scheduled     Aug 21, 2018  6:20 PM CDT   CARTER Extremity with Jesús Gary PT   [facility] [place] ([facility] [place])    [address]  [place] [address] [phone]            Nov 29, 2018  8:30 AM CST   MR CERVICAL SPINE W/O & W CONTRAST with [address] [facility] ([facility])    [address] [address] [phone]           Take your medicines as usual, unless your doctor tells you not to. Bring a list of your current medicines to your exam (including vitamins, minerals and over-the-counter drugs).  You may or may not receive intravenous (IV) contrast for this exam pending the discretion of the Radiologist.  You do not need to do anything special to prepare.  The MRI machine uses a strong magnet. Please wear clothes without metal (snaps, zippers). A sweatsuit works well, or we may give you a hospital gown.  Please remove any body piercings and hair extensions before you arrive. You will also remove watches, jewelry, hairpins, wallets, dentures, partial dental plates and hearing aids. You may wear contact lenses, and you may be able to wear your rings. We have a safe place to keep your personal items, but it is safer to leave them at home.  **IMPORTANT** THE INSTRUCTIONS BELOW  ARE ONLY FOR THOSE PATIENTS WHO HAVE BEEN PRESCRIBED SEDATION OR GENERAL ANESTHESIA DURING THEIR MRI PROCEDURE:  IF YOUR DOCTOR PRESCRIBED ORAL SEDATION (take medicine to help you relax during your exam):   You must get the medicine from your doctor (oral medication) before you arrive. Bring the medicine to the exam. Do not take it at home. You ll be told when to take it upon arriving for your exam.   Arrive one hour early. Bring someone who can take you home after the test. Your medicine will make you sleepy. After the exam, you may not drive, take a bus or take a taxi by yourself.  IF YOUR DOCTOR PRESCRIBED IV SEDATION:   Arrive one hour early. Bring someone who can take you home after the test. Your medicine will make you sleepy. After the exam, you may not drive, take a bus or take a taxi by yourself.   No eating 6 hours before your exam. You may have clear liquids up until 4 hours before your exam. (Clear liquids include water, clear tea, black coffee and fruit juice without pulp.)  IF YOUR DOCTOR PRESCRIBED ANESTHESIA (be asleep for your exam):   Arrive 1 1/2 hours early. Bring someone who can take you home after the test. You may not drive, take a bus or take a taxi by yourself.   No eating 8 hours before your exam. You may have clear liquids up until 4 hours before your exam. (Clear liquids include water, clear tea, black coffee and fruit juice without pulp.)   You will spend four to five hours in the recovery room.  Please call the Imaging Department at your exam site with any questions.            Nov 29, 2018  9:15 AM CST   MR BRAIN W/O & W CONTRAST with [address] [facility] ([facility])    [address] [address] [phone]           Take your medicines as usual, unless your doctor tells you not to. Bring a list of your current medicines to your exam (including vitamins, minerals and over-the-counter drugs).  You may or may  not receive intravenous (IV) contrast for this exam pending the discretion of the Radiologist.  You do not need to do anything special to prepare.  The MRI machine uses a strong magnet. Please wear clothes without metal (snaps, zippers). A sweatsuit works well, or we may give you a hospital gown.  Please remove any body piercings and hair extensions before you arrive. You will also remove watches, jewelry, hairpins, wallets, dentures, partial dental plates and hearing aids. You may wear contact lenses, and you may be able to wear your rings. We have a safe place to keep your personal items, but it is safer to leave them at home.  **IMPORTANT** THE INSTRUCTIONS BELOW ARE ONLY FOR THOSE PATIENTS WHO HAVE BEEN PRESCRIBED SEDATION OR GENERAL ANESTHESIA DURING THEIR MRI PROCEDURE:  IF YOUR DOCTOR PRESCRIBED ORAL SEDATION (take medicine to help you relax during your exam):   You must get the medicine from your doctor (oral medication) before you arrive. Bring the medicine to the exam. Do not take it at home. You ll be told when to take it upon arriving for your exam.   Arrive one hour early. Bring someone who can take you home after the test. Your medicine will make you sleepy. After the exam, you may not drive, take a bus or take a taxi by yourself.  IF YOUR DOCTOR PRESCRIBED IV SEDATION:   Arrive one hour early. Bring someone who can take you home after the test. Your medicine will make you sleepy. After the exam, you may not drive, take a bus or take a taxi by yourself.   No eating 6 hours before your exam. You may have clear liquids up until 4 hours before your exam. (Clear liquids include water, clear tea, black coffee and fruit juice without pulp.)  IF YOUR DOCTOR PRESCRIBED ANESTHESIA (be asleep for your exam):   Arrive 1 1/2 hours early. Bring someone who can take you home after the test. You may not drive, take a bus or take a taxi by yourself.   No eating 8 hours before your exam. You may have clear liquids up  until 4 hours before your exam. (Clear liquids include water, clear tea, black coffee and fruit juice without pulp.)   You will spend four to five hours in the recovery room.  Please call the Imaging Department at your exam site with any questions.            Nov 29, 2018 10:00 AM CST   (Arrive by 9:45 AM)   Return Multiple Sclerosis with Radames Castaneda MD   [facility] Multiple [facility]clerosis ([facility])    [address] [facility] [address]  [address] [place] [address] [phone]              Who to contact     If you have questions or need follow up information about today's clinic visit or your schedule please contact [facility] directly at [phone].  Normal or non-critical lab and imaging results will be communicated to you by Ecriohart, letter or phone within 4 business days after the clinic has received the results. If you do not hear from us within 7 days, please contact the clinic through Ecriohart or phone. If you have a critical or abnormal lab result, we will notify you by phone as soon as possible.  Submit refill requests through Newsc[employer] or call your pharmacy and they will forward the refill request to us. Please allow 3 business days for your refill to be completed.          Additional Information About Your Visit        Newscron Information     New[employer]ron gives you secure access to your electronic health record. If you see a primary care provider, you can also send messages to your care team and make appointments. If you have questions, please call your primary care clinic.  If you do not have a primary care provider, please call [phone] and they will assist you.        Care EveryWhere ID     This is your Care EveryWhere ID. This could be used by other organizations to access your [facility] medical records  [id number]        Your Vitals Were     Pulse Temperature Respirations Pulse Oximetry BMI (Body Mass Index)       88 98  F (36.7  C) (Oral) 16  99% 41.82 kg/m2        Blood Pressure from Last 3 Encounters:   08/13/18 119/78   07/30/18 130/78   07/25/18 110/70    Weight from Last 3 Encounters:   08/13/18 251 lb 4.8 oz (114 kg)   07/30/18 249 lb (112.9 kg)   07/25/18 249 lb (112.9 kg)              We Performed the Following     ALBUTEROL UNIT DOSE, 1 MG     D dimer, quantitative     INHALATION/NEBULIZER TREATMENT, INITIAL     WBC count     XR Chest 2 Views          Today's Medication Changes          These changes are accurate as of 8/13/18  2:35 PM.  If you have any questions, ask your nurse or doctor.               Start taking these medicines.        Dose/Directions    * predniSONE 20 MG tablet   Commonly known as:  DELTASONE   Used for:  SOB (shortness of breath), Chest tightness, Painful respiration, History of asthma   Started by:  Onur Serrano DO        2 tabs po in clinic   Quantity:  2 tablet   Refills:  0       * predniSONE 20 MG tablet   Commonly known as:  DELTASONE   Used for:  SOB (shortness of breath), Chest tightness, History of asthma, Cough   Started by:  Onur Serrano DO        Dose:  20 mg   Start taking on:  8/14/2018   Take 1 tablet (20 mg) by mouth 2 times daily for 4 days   Quantity:  8 tablet   Refills:  0       * Notice:  This list has 2 medication(s) that are the same as other medications prescribed for you. Read the directions carefully, and ask your doctor or other care provider to review them with you.      These medicines have changed or have updated prescriptions.        Dose/Directions    * VENTOLIN  (90 Base) MCG/ACT inhaler   This may have changed:  Another medication with the same name was added. Make sure you understand how and when to take each.   Generic drug:  albuterol   Changed by:  Onur Serrano DO        Dose:  2 puff   Inhale 2 puffs into the lungs   Refills:  0       * albuterol (2.5 MG/3ML) 0.083% neb solution   This may have changed:  Another medication with the same name was added. Make sure  you understand how and when to take each.   Changed by:  Onur Serrano DO        Dose:  2.5 mg   Inhale 2.5 mg into the lungs   Refills:  0       * albuterol (2.5 MG/3ML) 0.083% neb solution   This may have changed:  You were already taking a medication with the same name, and this prescription was added. Make sure you understand how and when to take each.   Used for:  SOB (shortness of breath), History of asthma   Changed by:  Onur Serrano DO        1 neb in clinic   Quantity:  1 vial   Refills:  0       * albuterol (2.5 MG/3ML) 0.083% neb solution   This may have changed:  You were already taking a medication with the same name, and this prescription was added. Make sure you understand how and when to take each.   Used for:  SOB (shortness of breath), Chest tightness, History of asthma, Cough   Changed by:  Onur Serrano DO        Dose:  2.5 mg   Take 1 vial (2.5 mg) by nebulization every 6 hours as needed for shortness of breath / dyspnea or wheezing   Quantity:  25 vial   Refills:  1       * albuterol 108 (90 Base) MCG/ACT inhaler   Commonly known as:  PROAIR HFA   This may have changed:  You were already taking a medication with the same name, and this prescription was added. Make sure you understand how and when to take each.   Used for:  SOB (shortness of breath), Chest tightness, Cough, History of asthma   Changed by:  Onur Serrano DO        Dose:  2 puff   Inhale 2 puffs into the lungs every 6 hours as needed   Quantity:  1 Inhaler   Refills:  0       * Notice:  This list has 5 medication(s) that are the same as other medications prescribed for you. Read the directions carefully, and ask your doctor or other care provider to review them with you.         Where to get your medicines      These medications were sent to [facility] [address] [address] AT [place] [address] & [address] [address]    Hours:  24-hours Phone:   [phone]     albuterol (2.5 MG/3ML) 0.083% neb solution    albuterol 108 (90 Base) MCG/ACT inhaler    predniSONE 20 MG tablet         Some of these will need a paper prescription and others can be bought over the counter.  Ask your nurse if you have questions.     You don't need a prescription for these medications     albuterol (2.5 MG/3ML) 0.083% neb solution    predniSONE 20 MG tablet                Primary Care Provider Office Phone # Fax #    Miya Crump PA-C [phone] [address] [facility] [address]        Equal Access to Services     RAFAELA MORENO : Hadii jimmie Serrano, waaxda nupur, qaybta kaalmada evette, mya patterson. So [facility] [phone].    ATENCIÓN: Si habla español, tiene a barrera disposición servicios gratuitos de asistencia lingüística. Ll[facility] [phone].    We comply with applicable federal civil rights laws and [place] laws. We do not discriminate on the basis of race, color, national origin, age, disability, sex, sexual orientation, or gender identity.            Thank you!     Thank you for choosing [facility]  for your care. Our goal is always to provide you with excellent care. Hearing back from our patients is one way we can continue to improve our services. Please take a few minutes to complete the written survey that you may receive in the mail after your visit with us. Thank you!             Your Updated Medication List - Protect others around you: Learn how to safely use, store and throw away your medicines at www.disposemymeds.org.          This list is accurate as of 8/13/18  2:35 PM.  Always use your most recent med list.                   Brand Name Dispense Instructions for use Diagnosis    acetaminophen 500 MG tablet    TYLENOL    100 tablet    Take 2 tablets (1,000 mg) by mouth 3 times daily as needed for mild pain    Cyst of left ovary       * VENTOLIN  (90  Base) MCG/ACT inhaler   Generic drug:  albuterol      Inhale 2 puffs into the lungs        * albuterol (2.5 MG/3ML) 0.083% neb solution      Inhale 2.5 mg into the lungs        * albuterol (2.5 MG/3ML) 0.083% neb solution     1 vial    1 neb in clinic    SOB (shortness of breath), History of asthma       * albuterol (2.5 MG/3ML) 0.083% neb solution     25 vial    Take 1 vial (2.5 mg) by nebulization every 6 hours as needed for shortness of breath / dyspnea or wheezing    SOB (shortness of breath), Chest tightness, History of asthma, Cough       * albuterol 108 (90 Base) MCG/ACT inhaler    PROAIR HFA    1 Inhaler    Inhale 2 puffs into the lungs every 6 hours as needed    SOB (shortness of breath), Chest tightness, Cough, History of asthma       cholecalciferol 5000 units Tabs tablet    vitamin D3     Take 1 tablet (5,000 Units) by mouth daily    Hypovitaminosis D       cyclobenzaprine 10 MG tablet    FLEXERIL     Take 10 mg by mouth 3 times daily as needed        Glatiramer Acetate 40 MG/ML Sosy    COPAXONE    12 Syringe    Inject 40 mg Subcutaneous three times a week    Multiple sclerosis (H)       IBUPROFEN PO      Take 800 mg by mouth every 4 hours as needed for moderate pain        IRON SUPPLEMENT PO           lisinopril-hydrochlorothiazide 10-12.5 MG per tablet    PRINZIDE/ZESTORETIC    90 tablet    Take 1 tablet by mouth daily    Benign essential hypertension       nortriptyline 25 MG capsule    PAMELOR    60 capsule    TAKE 1 CAPSULE BY MOUTH AT BEDTIME FOR 2 WEEKS, CAN INCREASE TO 2 CAPSULES AS NEEDED    Migraine without aura and without status migrainosus, not intractable       * predniSONE 20 MG tablet    DELTASONE    2 tablet    2 tabs po in clinic    SOB (shortness of breath), Chest tightness, Painful respiration, History of asthma       * predniSONE 20 MG tablet   Start taking on:  8/14/2018    DELTASONE    8 tablet    Take 1 tablet (20 mg) by mouth 2 times daily for 4 days    SOB (shortness of breath),  Chest tightness, History of asthma, Cough       * topiramate 100 MG tablet    TOPAMAX    90 tablet    Take 1 tablet (100 mg) by mouth daily (Take with 25 mg to total 125 mg)    Migraine without aura and without status migrainosus, not intractable       * topiramate 25 MG tablet    TOPAMAX    90 tablet    Take 1 tablet (25 mg) by mouth At Bedtime (Take with 100 mg to total 125 mg)    Migraine without aura and without status migrainosus, not intractable       triamcinolone 0.1 % cream    KENALOG    30 g    Apply sparingly to affected area three times daily    Eczema, unspecified type       * valACYclovir 500 MG tablet    VALTREX    90 tablet    Take 1 tablet (500 mg) by mouth daily    HSV (herpes simplex virus) infection       * valACYclovir 1000 mg tablet    VALTREX    30 tablet    Take 1 tablet (1,000 mg) by mouth 3 times daily    Herpes zoster without complication       * Notice:  This list has 11 medication(s) that are the same as other medications prescribed for you. Read the directions carefully, and ask your doctor or other care provider to review them with you.

## 2018-08-13 NOTE — TELEPHONE ENCOUNTER
Beti is having problems with her chest.  She has history of  asthma. She has been using her inhaler. Used neb machine from her son's and this didn't help.  She does not have a cold.  NO fever.  We talked about air quality right now may be the issue.   Exhausted, hoarse voice,  Chest tight. Chest too tight to cough.   I advised UC NOW and she will got to  now  Carla Rivera RN- Triage [employer]

## 2018-08-21 ENCOUNTER — THERAPY VISIT (OUTPATIENT)
Dept: PHYSICAL THERAPY | Facility: CLINIC | Age: 43
End: 2018-08-21
Payer: COMMERCIAL

## 2018-08-21 DIAGNOSIS — M54.2 NECK PAIN ON LEFT SIDE: ICD-10-CM

## 2018-08-21 DIAGNOSIS — I10 BENIGN ESSENTIAL HYPERTENSION: ICD-10-CM

## 2018-08-21 PROCEDURE — 97110 THERAPEUTIC EXERCISES: CPT | Mod: GP | Performed by: PHYSICAL THERAPIST

## 2018-08-21 PROCEDURE — 97140 MANUAL THERAPY 1/> REGIONS: CPT | Mod: GP | Performed by: PHYSICAL THERAPIST

## 2018-08-21 PROCEDURE — 97035 APP MDLTY 1+ULTRASOUND EA 15: CPT | Mod: GP | Performed by: PHYSICAL THERAPIST

## 2018-08-22 NOTE — TELEPHONE ENCOUNTER
"Requested Prescriptions   Pending Prescriptions Disp Refills     lisinopril-hydrochlorothiazide (PRINZIDE/ZESTORETIC) 10-12.5 MG per tablet [Pharmacy Med Name: LISINOPRIL-HCTZ 10/12.5MG TABLETS]  Last Written Prescription Date:  2/5/18  Last Fill Quantity: 90,  # refills: 1   Last office visit: 7/30/2018 with prescribing provider:  Praful   Future Office Visit:     90 tablet 0     Sig: TAKE 1 TABLET BY MOUTH DAILY    Diuretics (Including Combos) Protocol Failed    8/21/2018  8:21 PM         Failed - Normal serum creatinine on file in past 12 months    Recent Labs   Lab Test  02/16/18   1427   CR  1.26*             Passed - Blood pressure under 140/90 in past 12 months    BP Readings from Last 3 Encounters:   08/13/18 119/78   07/30/18 130/78   07/25/18 110/70                Passed - Recent (12 mo) or future (30 days) visit within the authorizing provider's specialty    Patient had office visit in the last 12 months or has a visit in the next 30 days with authorizing provider or within the authorizing provider's specialty.  See \"Patient Info\" tab in inbasket, or \"Choose Columns\" in Meds & Orders section of the refill encounter.           Passed - Patient is age 18 or older       Passed - No active pregancy on record       Passed - Normal serum potassium on file in past 12 months    Recent Labs   Lab Test  02/16/18   1427   POTASSIUM  4.2                   Passed - Normal serum sodium on file in past 12 months    Recent Labs   Lab Test  02/16/18   1427   NA  138             Passed - No positive pregnancy test in past 12 months          "

## 2018-08-23 RX ORDER — LISINOPRIL/HYDROCHLOROTHIAZIDE 10-12.5 MG
TABLET ORAL
Qty: 90 TABLET | Refills: 1 | Status: SHIPPED | OUTPATIENT
Start: 2018-08-23 | End: 2019-02-19

## 2018-08-23 NOTE — TELEPHONE ENCOUNTER
Routing refill request to provider for review/approval because:  Labs out of range:  Creatinine 1.26    Susannah Smith RN  [employer] Triage

## 2018-08-30 NOTE — TELEPHONE ENCOUNTER
Paperwork has been filled out and signed by Dr. Castaneda; When she and I talked, she wanted to pick this up; I left [facility] for Josefina letting her know that this paperwork will be in our neurology clinic for pickup; I am also scanning a copy in to her chart.    Mitzy Collazo, MS RN Care Coordinator

## 2018-10-01 ENCOUNTER — MYC MEDICAL ADVICE (OUTPATIENT)
Dept: FAMILY MEDICINE | Facility: CLINIC | Age: 43
End: 2018-10-01

## 2018-10-01 NOTE — TELEPHONE ENCOUNTER
Please see my chart message below     Please review and advise     Thank you     Amelia Chance RN, BSN  [place] Triage

## 2018-10-04 ENCOUNTER — DOCUMENTATION ONLY (OUTPATIENT)
Dept: NEUROLOGY | Facility: CLINIC | Age: 43
End: 2018-10-04

## 2018-10-04 NOTE — PROGRESS NOTES
Received short term disability paperwork from [facility] to be filled out on the patient; There was no attached JUANA, nor do I see one scanned in to the media tab on her; I will scan a copy of the paperwork in to her chart; I will put the paperwork in the mail to her home to submit since we do not have a copy of a signed release.    Mitzy Collazo, MS RN Care Coordinator

## 2018-10-08 ENCOUNTER — TRANSFERRED RECORDS (OUTPATIENT)
Dept: HEALTH INFORMATION MANAGEMENT | Facility: CLINIC | Age: 43
End: 2018-10-08

## 2018-10-11 ENCOUNTER — OFFICE VISIT (OUTPATIENT)
Dept: NEUROLOGY | Facility: CLINIC | Age: 43
End: 2018-10-11
Attending: PSYCHIATRY & NEUROLOGY
Payer: COMMERCIAL

## 2018-10-11 VITALS
HEIGHT: 65 IN | WEIGHT: 250.3 LBS | SYSTOLIC BLOOD PRESSURE: 110 MMHG | OXYGEN SATURATION: 100 % | HEART RATE: 79 BPM | DIASTOLIC BLOOD PRESSURE: 71 MMHG | BODY MASS INDEX: 41.7 KG/M2

## 2018-10-11 DIAGNOSIS — G43.011 INTRACTABLE MIGRAINE WITHOUT AURA AND WITH STATUS MIGRAINOSUS: Primary | ICD-10-CM

## 2018-10-11 DIAGNOSIS — R53.82 CHRONIC FATIGUE, UNSPECIFIED: ICD-10-CM

## 2018-10-11 DIAGNOSIS — G35 MULTIPLE SCLEROSIS (H): ICD-10-CM

## 2018-10-11 PROCEDURE — G0463 HOSPITAL OUTPT CLINIC VISIT: HCPCS | Mod: ZF

## 2018-10-11 RX ORDER — SUMATRIPTAN 50 MG/1
TABLET, FILM COATED ORAL
Qty: 30 TABLET | Refills: 0 | Status: SHIPPED | OUTPATIENT
Start: 2018-10-11 | End: 2018-10-26

## 2018-10-11 RX ORDER — PROCHLORPERAZINE MALEATE 10 MG
10 TABLET ORAL EVERY 6 HOURS PRN
Qty: 20 TABLET | Refills: 1 | Status: SHIPPED | OUTPATIENT
Start: 2018-10-11 | End: 2018-11-19

## 2018-10-11 RX ORDER — PREDNISONE 20 MG/1
TABLET ORAL
Qty: 18 TABLET | Refills: 0 | Status: ON HOLD | OUTPATIENT
Start: 2018-10-11 | End: 2018-10-21

## 2018-10-11 RX ORDER — NAPROXEN 500 MG/1
500 TABLET ORAL 2 TIMES DAILY PRN
Qty: 60 TABLET | Refills: 1 | Status: SHIPPED | OUTPATIENT
Start: 2018-10-11 | End: 2018-11-19

## 2018-10-11 ASSESSMENT — PAIN SCALES - GENERAL: PAINLEVEL: WORST PAIN (10)

## 2018-10-11 NOTE — PATIENT INSTRUCTIONS
1. We will try the following strategy for headache:    A) Take sumatriptan 50 mg along with 500 mg of naproxen and 10 mg of prochlorperazine as soon as you can; if headache has not resolved, take another 50 mg of sumatriptan after one hour    B) Take 60 mg of prednisone (three tablets) daily for five days, then two tablets the next day, and one tablet the day after that and then stop    2. Subsequently, you can take 500 mg of naproxen twice daily for up to two weeks.    3. You can also use sumatriptan 50 mg at onset of severe headache no more than twice weekly, can repeat once after one hour if needed    4. Return to clinic as scheduled in November but do not need to repeat MRI scans

## 2018-10-11 NOTE — LETTER
RE: Josefina Aldrich  [address]  [place] [address]     Date of service: October 11, 2018     Referral source: Established patient.      Chief complaint: Migraine and multiple neurologic symptoms.      History of the Present Illness: Ms. Josefina Aldrich is a 43-year-old woman who returns to the [facility] today for follow-up, primarily to discuss ongoing migraine headache.     The patient's history is as per my previous notes.  She presented in June 2015 with symptoms of demyelinating disease when she developed right-sided numbness and incoordination of the arm.  This was initially felt to be due to an ischemic stroke, but subsequent MRI imaging did not reveal the expected temporal evolution of an infarct and additionally there was further accumulation of lesions suggestive of demyelinating disease of the type seen in multiple sclerosis.  The patient was started on disease-modifying therapy with glatiramer acetate.      As a separate issue, she also has longstanding difficulty with migraine.  She is currently taking topiramate at 125 mg at bedtime for migraine prevention (does not take a morning dose due to excessive sedation) as well as nortriptyline 25 mg at bedtime.  Addition of the latter medication had made a substantial difference in the frequency of her headaches, reducing occurrence of severe headache from more days than not to only every several months, per her report.  However, she is currently experiencing a severe migraine that started on 09/26/2018.  She identifies work stress as a potential trigger for her headaches as she was recently assigned to work in a new area at her job at [employer] and this has been stressful for her.  Currently, she describes bifrontal, throbbing head pain associated with photophobia, phonophobia and nausea but no vomiting.  In conjunction with this, she has multiple subjective neurologic symptoms including  numbness of the extremities, diplopia, sense of increased weakness of the right side of the body, and gait instability with a tendency to fall toward the right.      She did go to the Emergency Department at [facility] on  and underwent MRI scans of the brain and cervical spine.  Unfortunately, I do not have those images available for review today and the radiologist at [facility] also did not have access to our previous images.  In any event, there were no enhancing lesions or restricted diffusion noted suggestive of new active inflammatory demyelination or other acute intracranial process.  The patient did receive olanzapine in the [facility] Emergency Department with some improvement in her headache, but this has now recurred and continues to persist.      Symptomatically, the patient also notes that she has prominent fatigue.  She acknowledges that her sleep has been significantly disrupted by current migraine episode.      Past Medical History:  1.  Migraine.   2.  Depression.   3.  Hypertension.   4.  Obstructive sleep apnea.   5.  Asthma.   6.  Status post gastric bypass surgery.   7.  Status post hysterectomy.   8.  Status post  section.      Physical examination performed by the learner working with me in the clinic today was pertinent for diffuse discomfort and prominent give way type weakness on the right side.      Assessment/plan:    1.  Status migrainosus  The patient presents with a 2-week history of intractable migraine headache.  She also has a number of subjective neurologic symptoms in association with this problem, which could be migraine epiphenomena or alternatively residua of previous demyelinating lesions.  We will attempt to abort the current headache episode as follows:  I have given the patient prescriptions for prochlorperazine 10 mg, sumatriptan 50 mg and naproxen 500 mg.  She should take one of each of these medicines at the above dose and  then repeat 50 mg of sumatriptan after 1 hour if headache is not resolved.  At the same time, she will start prednisone 60 mg daily for 5 days with a short oral taper.  Subsequently, she can continue using naproxen 500 mg twice daily for up to 2 weeks as well as using sumatriptan 50 mg at the onset of severe headache and repeating after 1 hour, limiting use of this medication to 2 days per week or less.      My hope is that a combination of the above measures will resolve her headache within the next week.  If it does not, I think that she is going to need further parenteral therapies and we will discuss with her at that time whether we should proceed with direct admission to hospital for dihydroergotamine protocol.  My hope, however, is that the above treatments will be effective in relieving her current symptoms.      2.  Multiple sclerosis  We will obtain images from MRI scans of the brain and cervical spine performed and at [facility] for comparison to scans performed here in October 2017.  I will see her back for a review as currently scheduled at the end of November.  We can cancel the MRI scans that had been previously scheduled on that date as these were done recently.      3.  Fatigue  We will reassess the patient's fatigue after the current headache cycle is broken.  If she continues to have significant problems with fatigue, we can consider adding a stimulant medication as well.     Radames Castaneda MD   of Neurology  [facility]    Cc:  Miya Crump PA-C (PCP)  Patient

## 2018-10-11 NOTE — PROGRESS NOTES
"[facility], [place]   Neurology Progress Note  Josefina Aldrich  [id number]  10/11/2018    Brief Summary: ***    Subjective:  ***    Objective   /71 (BP Location: Right arm, Patient Position: Chair, Cuff Size: Thigh)  Pulse 79  Ht 1.651 m (5' 5\")  Wt 113.5 kg (250 lb 4.8 oz)  SpO2 100%  BMI 41.65 kg/m2  General: pt laying comfortably in bed, breathing easily on ra, in NAD ***  HEENT: no oral ulcers ***  Chest: cta ***  Heart: rrr  Abdomen: soft, nt, nd, +BS  Ext: no edema ***  Skin: no rashes  Neuro:   -MS: alert, speech fluent and coherent  -CN: vff, perrla, eomi, facial sensation and movement intact b/l, hearing intact to conversation, palate raises symmetrically, shoulder shrug and scm intact, tongue midline  -Motor: tone and bulk normal ***  --LUE: 5/5 shoulder abd, arm flex/ext, wrist flex/ext, finger flex/ext/abd/add  --RUE: 5/5 shoulder abd, arm flex/ext, wrist flex/ext, finger flex/ext/abd/add  --LLE: 5/5 hip flexor, leg flex/ext, plantar/dorsiflexion  --RLE: 5/5 hip flexor, leg flex/ext, plantar/dorsiflexion  -Reflexes: normoactive and symmetric at achilles, patella, brachioradialis, and biceps. Toes downgoing b/l ***  -Sensory: intact to light touch and pinprick in all extremities  -Coordination: intact to FNF, H2S b/l  -Gait: deferred      Investigations    {  Press F2 for choices                        :[id number]}    ***    Impression:  Josefina Aldrich is a 43 year old year old female with h/o *** who presents for f/u of ***    #***:    Recommendations:   ***    Patient was seen and discussed with attending neurologist,  ***    Irwin Prather MD  Neurology G4  [phone]        "

## 2018-10-11 NOTE — PROGRESS NOTES
Date of service: October 11, 2018     Referral source: Established patient.      Chief complaint: Migraine and multiple neurologic symptoms.     Assisted by: Irwin Prather MD, PGY-4 neurology resident     History of the Present Illness: Ms. Josefina Aldrich is a 43-year-old woman who returns to the [facility] today for follow-up, primarily to discuss ongoing migraine headache. I was assisted in this evaluation today by Dr. Prather, and his documentation should be considered integral to this note.    The patient's history is as per my previous notes.  She presented in June 2015 with symptoms of demyelinating disease when she developed right-sided numbness and incoordination of the arm.  This was initially felt to be due to an ischemic stroke, but subsequent MRI imaging did not reveal the expected temporal evolution of an infarct and additionally there was further accumulation of lesions suggestive of demyelinating disease of the type seen in multiple sclerosis.  The patient was started on disease-modifying therapy with glatiramer acetate.      As a separate issue, she also has longstanding difficulty with migraine.  She is currently taking topiramate at 125 mg at bedtime for migraine prevention (does not take a morning dose due to excessive sedation) as well as nortriptyline 25 mg at bedtime.  Addition of the latter medication had made a substantial difference in the frequency of her headaches, reducing occurrence of severe headache from more days than not to only every several months, per her report.  However, she is currently experiencing a severe migraine that started on 09/26/2018.  She identifies work stress as a potential trigger for her headaches as she was recently assigned to work in a new area at her job at [employer] and this has been stressful for her.  Currently, she describes bifrontal, throbbing head pain associated with photophobia, phonophobia and nausea but no  vomiting.  In conjunction with this, she has multiple subjective neurologic symptoms including numbness of the extremities, diplopia, sense of increased weakness of the right side of the body, and gait instability with a tendency to fall toward the right.      She did go to the Emergency Department at [facility] on  and underwent MRI scans of the brain and cervical spine.  Unfortunately, I do not have those images available for review today and the radiologist at [facility] also did not have access to our previous images.  In any event, there were no enhancing lesions or restricted diffusion noted suggestive of new active inflammatory demyelination or other acute intracranial process.  The patient did receive olanzapine in the [facility] Emergency Department with some improvement in her headache, but this has now recurred and continues to persist.      Symptomatically, the patient also notes that she has prominent fatigue.  She acknowledges that her sleep has been significantly disrupted by current migraine episode.      Past Medical History:  1.  Migraine.   2.  Depression.   3.  Hypertension.   4.  Obstructive sleep apnea.   5.  Asthma.   6.  Status post gastric bypass surgery.   7.  Status post hysterectomy.   8.  Status post  section.      Physical examination is as per Dr. Prather and is pertinent for diffuse discomfort and prominent give way type weakness on the right side.      Assessment/plan:    1.  Status migrainosus  The patient presents with a 2-week history of intractable migraine headache.  She also has a number of subjective neurologic symptoms in association with this problem, which could be migraine epiphenomena or alternatively residua of previous demyelinating lesions.  We will attempt to abort the current headache episode as follows:  I have given the patient prescriptions for prochlorperazine 10 mg, sumatriptan 50 mg and naproxen 500 mg.  She should  take one of each of these medicines at the above dose and then repeat 50 mg of sumatriptan after 1 hour if headache is not resolved.  At the same time, she will start prednisone 60 mg daily for 5 days with a short oral taper.  Subsequently, she can continue using naproxen 500 mg twice daily for up to 2 weeks as well as using sumatriptan 50 mg at the onset of severe headache and repeating after 1 hour, limiting use of this medication to 2 days per week or less.      My hope is that a combination of the above measures will resolve her headache within the next week.  If it does not, I think that she is going to need further parenteral therapies and we will discuss with her at that time whether we should proceed with direct admission to hospital for dihydroergotamine protocol.  My hope, however, is that the above treatments will be effective in relieving her current symptoms.      2.  Multiple sclerosis  We will obtain images from MRI scans of the brain and cervical spine performed and at [facility] for comparison to scans performed here in 2017.  I will see her back for a review as currently scheduled at the end of November.  We can cancel the MRI scans that had been previously scheduled on that date as these were done recently.      3.  Fatigue  We will reassess the patient's fatigue after the current headache cycle is broken.  If she continues to have significant problems with fatigue, we can consider adding a stimulant medication as well.        MD ZACHARY Damon MD             D: 10/11/2018   T: 10/11/2018   MT: AKA      Name:     RACHEL YANG   MRN:      [id number]        Account:      [id number]   :      1975           Service Date: 10/11/2018      Document: [id number]

## 2018-10-11 NOTE — MR AVS SNAPSHOT
After Visit Summary   10/11/2018    Josefina Aldrich    MRN: [id number]           Patient Information     Date Of Birth          1975        Visit Information        Provider Department      10/11/2018 10:30 AM Radames Castaneda MD [facility]        Today's Diagnoses     Intractable migraine without aura and with status migrainosus    -  1      Care Instructions    1. We will try the following strategy for headache:    A) Take sumatriptan 50 mg along with 500 mg of naproxen and 10 mg of prochlorperazine as soon as you can; if headache has not resolved, take another 50 mg of sumatriptan after one hour    B) Take 60 mg of prednisone (three tablets) daily for five days, then two tablets the next day, and one tablet the day after that and then stop    2. Subsequently, you can take 500 mg of naproxen twice daily for up to two weeks.    3. You can also use sumatriptan 50 mg at onset of severe headache no more than twice weekly, can repeat once after one hour if needed    4. Return to clinic as scheduled in November but do not need to repeat MRI scans          Follow-ups after your visit        Your next 10 appointments already scheduled     Nov 29, 2018  8:30 AM CST   MR CERVICAL SPINE W/O & W CONTRAST with [address] [facility] ([facility])    [address] [address] [phone]           How do I prepare for my exam? (Food and drink instructions) **If you will be receiving sedation or general anesthesia, please see special notes below.**  How do I prepare for my exam? (Other instructions) Take your medicines as usual, unless your doctor tells you not to. You may or may not receive intravenous (IV) contrast for this exam pending the discretion of the Radiologist.  You do not need to do anything special to prepare.  **If you will be receiving sedation or general anesthesia, please see special notes  below.**  What should I wear: The MRI machine uses a strong magnet. Please wear clothes without metal (snaps, zippers). A sweatsuit works well, or we may give you a hospital gown. Please remove any body piercings and hair extensions before you arrive. You will also remove watches, jewelry, hairpins, wallets, dentures, partial dental plates and hearing aids. You may wear contact lenses, and you may be able to wear your rings. We have a safe place to keep your personal items, but it is safer to leave them at home.  How long does the exam take: Most tests take 30 to 60 minutes.  HOWEVER, IF YOUR DOCTOR PRESCRIBES ANESTHESIA please plan on spending four to five hours in the recovery room.  What should I bring:  Bring a list of your current medicines to your exam (including vitamins, minerals and over-the-counter drugs).  Do I need a :  **If you will be receiving sedation or general anesthesia, please see special notes below.**  What should I do after the exam: No Restrictions, You may resume normal activities.  What is this test: MRI (magnetic resonance imaging) uses a strong magnet and radio waves to look inside the body. An MRA (magnetic resonance angiogram) does the same thing, but it lets us look at your blood vessels. A computer turns the radio waves into pictures showing cross sections of the body, much like slices of bread. This helps us see any problems more clearly. You may receive fluid (called  contrast ) before or during your scan. The fluid helps us see the pictures better. We give the fluid through an IV (small needle in your arm).  Who should I call with questions:  Please call the Imaging Department at your exam site with any questions. Directions, parking instructions, and other information is available on our website, [email or web address].  How do I prepare if I m having sedation or anesthesia? **IMPORTANT** THE INSTRUCTIONS BELOW ARE ONLY FOR THOSE PATIENTS WHO HAVE BEEN TOLD THEY WILL RECEIVE  SEDATION OR GENERAL ANESTHESIA DURING THEIR MRI PROCEDURE:  IF YOU WILL RECEIVE SEDATION (take medicine to help you relax during your exam): You must get the medicine from your doctor before you arrive. Bring the medicine to the exam. Do not take it at home. Arrive one hour early. Bring someone who can take you home after the test. Your medicine will make you sleepy. After the exam, you may not drive, take a bus or take a taxi by yourself. No eating 8 hours before your exam. You may have clear liquids up until 4 hours before your exam. (Clear liquids include water, clear tea, black coffee and fruit juice without pulp.)  IF YOU WILL RECEIVE ANESTHESIA (be asleep for your exam): Arrive 1 1/2 hours early. Bring someone who can take you home after the test. You may not drive, take a bus or take a taxi by yourself. No eating 8 hours before your exam. You may have clear liquids up until 4 hours before your exam. (Clear liquids include water, clear tea, black coffee and fruit juice without pulp.)            Nov 29, 2018  9:15 AM CST   MR BRAIN W/O & W CONTRAST with [address] MRI ([facility])    [address] [address] [phone]           How do I prepare for my exam? (Food and drink instructions) **If you will be receiving sedation or general anesthesia, please see special notes below.**  How do I prepare for my exam? (Other instructions) Take your medicines as usual, unless your doctor tells you not to. You may or may not receive intravenous (IV) contrast for this exam pending the discretion of the Radiologist.  You do not need to do anything special to prepare.  **If you will be receiving sedation or general anesthesia, please see special notes below.**  What should I wear: The MRI machine uses a strong magnet. Please wear clothes without metal (snaps, zippers). A sweatsuit works well, or we may give you a hospital gown. Please remove  any body piercings and hair extensions before you arrive. You will also remove watches, jewelry, hairpins, wallets, dentures, partial dental plates and hearing aids. You may wear contact lenses, and you may be able to wear your rings. We have a safe place to keep your personal items, but it is safer to leave them at home.  How long does the exam take: Most tests take 30 to 60 minutes.  HOWEVER, IF YOUR DOCTOR PRESCRIBES ANESTHESIA please plan on spending four to five hours in the recovery room.  What should I bring:  Bring a list of your current medicines to your exam (including vitamins, minerals and over-the-counter drugs).  Do I need a :  **If you will be receiving sedation or general anesthesia, please see special notes below.**  What should I do after the exam: No Restrictions, You may resume normal activities.  What is this test: MRI (magnetic resonance imaging) uses a strong magnet and radio waves to look inside the body. An MRA (magnetic resonance angiogram) does the same thing, but it lets us look at your blood vessels. A computer turns the radio waves into pictures showing cross sections of the body, much like slices of bread. This helps us see any problems more clearly. You may receive fluid (called  contrast ) before or during your scan. The fluid helps us see the pictures better. We give the fluid through an IV (small needle in your arm).  Who should I call with questions:  Please call the Imaging Department at your exam site with any questions. Directions, parking instructions, and other information is available on our website, [email or web address].  How do I prepare if I m having sedation or anesthesia? **IMPORTANT** THE INSTRUCTIONS BELOW ARE ONLY FOR THOSE PATIENTS WHO HAVE BEEN TOLD THEY WILL RECEIVE SEDATION OR GENERAL ANESTHESIA DURING THEIR MRI PROCEDURE:  IF YOU WILL RECEIVE SEDATION (take medicine to help you relax during your exam): You must get the medicine from your doctor before you  arrive. Bring the medicine to the exam. Do not take it at home. Arrive one hour early. Bring someone who can take you home after the test. Your medicine will make you sleepy. After the exam, you may not drive, take a bus or take a taxi by yourself. No eating 8 hours before your exam. You may have clear liquids up until 4 hours before your exam. (Clear liquids include water, clear tea, black coffee and fruit juice without pulp.)  IF YOU WILL RECEIVE ANESTHESIA (be asleep for your exam): Arrive 1 1/2 hours early. Bring someone who can take you home after the test. You may not drive, take a bus or take a taxi by yourself. No eating 8 hours before your exam. You may have clear liquids up until 4 hours before your exam. (Clear liquids include water, clear tea, black coffee and fruit juice without pulp.)            Nov 29, 2018 10:00 AM CST   (Arrive by 9:45 AM)   Return Multiple Sclerosis with Radames Castaneda MD   [facility] ([facility])    [address] [place] [address]  [address] [place] [address] [phone]              Who to contact     If you have questions or need follow up information about today's clinic visit or your schedule please contact [facility] directly at [phone].  Normal or non-critical lab and imaging results will be communicated to you by MyChart, letter or phone within 4 business days after the clinic has received the results. If you do not hear from us within 7 days, please contact the clinic through MyChart or phone. If you have a critical or abnormal lab result, we will notify you by phone as soon as possible.  Submit refill requests through [employer] or call your pharmacy and they will forward the refill request to us. Please allow 3 business days for your refill to be completed.          Additional Information About Your Visit        [employer] Information     [employer] gives you secure access to your electronic health record. If  "you see a primary care provider, you can also send messages to your care team and make appointments. If you have questions, please call your primary care clinic.  If you do not have a primary care provider, please call [phone] and they will assist you.        Care EveryWhere ID     This is your Care EveryWhere ID. This could be used by other organizations to access your [facility] medical records  [id number]        Your Vitals Were     Pulse Height Pulse Oximetry BMI (Body Mass Index)          79 1.651 m (5' 5\") 100% 41.65 kg/m2         Blood Pressure from Last 3 Encounters:   10/11/18 110/71   08/13/18 119/78   07/30/18 130/78    Weight from Last 3 Encounters:   10/11/18 113.5 kg (250 lb 4.8 oz)   08/13/18 114 kg (251 lb 4.8 oz)   07/30/18 112.9 kg (249 lb)              Today, you had the following     No orders found for display         Today's Medication Changes          These changes are accurate as of 10/11/18 12:00 PM.  If you have any questions, ask your nurse or doctor.               Start taking these medicines.        Dose/Directions    naproxen 500 MG tablet   Commonly known as:  NAPROSYN   Used for:  Intractable migraine without aura and with status migrainosus   Started by:  Radames Castaneda MD        Dose:  500 mg   Take 1 tablet (500 mg) by mouth 2 times daily as needed for moderate pain   Quantity:  60 tablet   Refills:  1       predniSONE 20 MG tablet   Commonly known as:  DELTASONE   Used for:  Intractable migraine without aura and with status migrainosus   Started by:  Radames Castaneda MD        Take three tablets (60 mg) daily x 5 days, then 2 tablets for one day, then 1 tablet for one day, then stop   Quantity:  18 tablet   Refills:  0       prochlorperazine 10 MG tablet   Commonly known as:  COMPAZINE   Used for:  Intractable migraine without aura and with status migrainosus   Started by:  Radames Castaneda MD        Dose:  10 mg   Take 1 tablet (10 mg) by mouth every 6 hours " as needed for nausea or vomiting   Quantity:  20 tablet   Refills:  1       SUMAtriptan 50 MG tablet   Commonly known as:  IMITREX   Used for:  Intractable migraine without aura and with status migrainosus   Started by:  Radames Castaneda MD        Take one table now for intractable headache and repeat x 1 in one hour if headache persists; do not use more than 2 days/week   Quantity:  30 tablet   Refills:  0         These medicines have changed or have updated prescriptions.        Dose/Directions    valACYclovir 500 MG tablet   Commonly known as:  VALTREX   This may have changed:  Another medication with the same name was removed. Continue taking this medication, and follow the directions you see here.   Used for:  HSV (herpes simplex virus) infection   Changed by:  Radames Castaneda MD        Dose:  500 mg   Take 1 tablet (500 mg) by mouth daily   Quantity:  90 tablet   Refills:  1            Where to get your medicines      [facility] [address] [place] [address] AT [facility] [address] [address]    Hours:  24-hours Phone:  [phone]     naproxen 500 MG tablet    predniSONE 20 MG tablet    prochlorperazine 10 MG tablet    SUMAtriptan 50 MG tablet                Primary Care Provider Office Phone # Fax #    Miya Crump PA-C [phone]       [address] [place] [id number]        Equal Access to Services     [facility] CenterVICTORIA AH: Hadii aad ku hadasho Soomaali, waaxda luqadaha, qaybta kaalmada adeegyada, mya pricein hayelisa rodrigues . So [facility] [phone].    ATENCIÓN: Si habla español, tiene a barrera disposición servicios gratuitos de asistencia lingüística. Karen al [phone].    We comply with applicable federal civil rights laws and [place] laws. We do not discriminate on the basis of race, color, national origin, age, disability, sex, sexual orientation, or gender  identity.            Thank you!     Thank you for choosing [facility]  for your care. Our goal is always to provide you with excellent care. Hearing back from our patients is one way we can continue to improve our services. Please take a few minutes to complete the written survey that you may receive in the mail after your visit with us. Thank you!             Your Updated Medication List - Protect others around you: Learn how to safely use, store and throw away your medicines at www.disposemymeds.org.          This list is accurate as of 10/11/18 12:00 PM.  Always use your most recent med list.                   Brand Name Dispense Instructions for use Diagnosis    acetaminophen 500 MG tablet    TYLENOL    100 tablet    Take 2 tablets (1,000 mg) by mouth 3 times daily as needed for mild pain    Cyst of left ovary       * VENTOLIN  (90 Base) MCG/ACT inhaler   Generic drug:  albuterol      Inhale 2 puffs into the lungs        * albuterol (2.5 MG/3ML) 0.083% neb solution      Inhale 2.5 mg into the lungs        cholecalciferol 5000 units Tabs tablet    vitamin D3     Take 1 tablet (5,000 Units) by mouth daily    Hypovitaminosis D       cyclobenzaprine 10 MG tablet    FLEXERIL     Take 10 mg by mouth 3 times daily as needed        Glatiramer Acetate 40 MG/ML Sosy    COPAXONE    12 Syringe    Inject 40 mg Subcutaneous three times a week    Multiple sclerosis (H)       IBUPROFEN PO      Take 800 mg by mouth every 4 hours as needed for moderate pain        IRON SUPPLEMENT PO           lisinopril-hydrochlorothiazide 10-12.5 MG per tablet    PRINZIDE/ZESTORETIC    90 tablet    TAKE 1 TABLET BY MOUTH DAILY    Benign essential hypertension       naproxen 500 MG tablet    NAPROSYN    60 tablet    Take 1 tablet (500 mg) by mouth 2 times daily as needed for moderate pain    Intractable migraine without aura and with status migrainosus       nortriptyline 25 MG capsule    PAMELOR    60 capsule    TAKE 1  CAPSULE BY MOUTH AT BEDTIME FOR 2 WEEKS, CAN INCREASE TO 2 CAPSULES AS NEEDED    Migraine without aura and without status migrainosus, not intractable       predniSONE 20 MG tablet    DELTASONE    18 tablet    Take three tablets (60 mg) daily x 5 days, then 2 tablets for one day, then 1 tablet for one day, then stop    Intractable migraine without aura and with status migrainosus       prochlorperazine 10 MG tablet    COMPAZINE    20 tablet    Take 1 tablet (10 mg) by mouth every 6 hours as needed for nausea or vomiting    Intractable migraine without aura and with status migrainosus       SUMAtriptan 50 MG tablet    IMITREX    30 tablet    Take one table now for intractable headache and repeat x 1 in one hour if headache persists; do not use more than 2 days/week    Intractable migraine without aura and with status migrainosus       * topiramate 100 MG tablet    TOPAMAX    90 tablet    Take 1 tablet (100 mg) by mouth daily (Take with 25 mg to total 125 mg)    Migraine without aura and without status migrainosus, not intractable       * topiramate 25 MG tablet    TOPAMAX    90 tablet    Take 1 tablet (25 mg) by mouth At Bedtime (Take with 100 mg to total 125 mg)    Migraine without aura and without status migrainosus, not intractable       triamcinolone 0.1 % cream    KENALOG    30 g    Apply sparingly to affected area three times daily    Eczema, unspecified type       valACYclovir 500 MG tablet    VALTREX    90 tablet    Take 1 tablet (500 mg) by mouth daily    HSV (herpes simplex virus) infection       * Notice:  This list has 4 medication(s) that are the same as other medications prescribed for you. Read the directions carefully, and ask your doctor or other care provider to review them with you.

## 2018-10-11 NOTE — Clinical Note
"10/11/2018      RE: Josefina Aldrich  [address] [place] [id number]       [facility], [place]   Neurology Progress Note  Josefina Aldrich  [id number]  10/11/2018    Brief Summary: ***    Subjective:  ***    Objective   /71 (BP Location: Right arm, Patient Position: Chair, Cuff Size: Thigh)  Pulse 79  Ht 1.651 m (5' 5\")  Wt 113.5 kg (250 lb 4.8 oz)  SpO2 100%  BMI 41.65 kg/m2  General: pt laying comfortably in bed, breathing easily on ra, in NAD ***  HEENT: no oral ulcers ***  Chest: cta ***  Heart: rrr  Abdomen: soft, nt, nd, +BS  Ext: no edema ***  Skin: no rashes  Neuro:   -MS: alert, speech fluent and coherent  -CN: vff, perrla, eomi, facial sensation and movement intact b/l, hearing intact to conversation, palate raises symmetrically, shoulder shrug and scm intact, tongue midline  -Motor: tone and bulk normal ***  --LUE: 5/5 shoulder abd, arm flex/ext, wrist flex/ext, finger flex/ext/abd/add  --RUE: 5/5 shoulder abd, arm flex/ext, wrist flex/ext, finger flex/ext/abd/add  --LLE: 5/5 hip flexor, leg flex/ext, plantar/dorsiflexion  --RLE: 5/5 hip flexor, leg flex/ext, plantar/dorsiflexion  -Reflexes: normoactive and symmetric at achilles, patella, brachioradialis, and biceps. Toes downgoing b/l ***  -Sensory: intact to light touch and pinprick in all extremities  -Coordination: intact to FNF, H2S b/l  -Gait: deferred      Investigations    {  Press F2 for choices                        :[id number]}    ***    Impression:  Josefina Aldrich is a 43 year old year old female with h/o *** who presents for f/u of ***    #***:    Recommendations:   ***    Patient was seen and discussed with attending neurologist,  ***    Irwin Prather MD  Neurology G4  [phone]          Date of service: October 11, 2018     Referral source: Established patient.      Chief complaint: Migraine and multiple neurologic symptoms.      History of the Present " Illness: Ms. Josefina Aldrich is a 43-year-old woman who returns to the [facility] today for follow-up, primarily to discuss ongoing migraine headache.     The patient's history is as per my previous notes.  She presented in June 2015 with symptoms of demyelinating disease when she developed right-sided numbness and incoordination of the arm.  This was initially felt to be due to an ischemic stroke, but subsequent MRI imaging did not reveal the expected temporal evolution of an infarct and additionally there was further accumulation of lesions suggestive of demyelinating disease of the type seen in multiple sclerosis.  The patient was started on disease-modifying therapy with glatiramer acetate.      As a separate issue, she also has longstanding difficulty with migraine.  She is currently taking topiramate at 125 mg at bedtime for migraine prevention (does not take a morning dose due to excessive sedation) as well as nortriptyline 25 mg at bedtime.  Addition of the latter medication had made a substantial difference in the frequency of her headaches, reducing occurrence of severe headache from more days than not to only every several months, per her report.  However, she is currently experiencing a severe migraine that started on 09/26/2018.  She identifies work stress as a potential trigger for her headaches as she was recently assigned to work in a new area at her job at [employer] and this has been stressful for her.  Currently, she describes bifrontal, throbbing head pain associated with photophobia, phonophobia and nausea but no vomiting.  In conjunction with this, she has multiple subjective neurologic symptoms including numbness of the extremities, diplopia, sense of increased weakness of the right side of the body, and gait instability with a tendency to fall toward the right.      She did go to the Emergency Department at [facility] on October 8  and underwent MRI scans of the brain and cervical spine.  Unfortunately, I do not have those images available for review today and the radiologist at [facility] also did not have access to our previous images.  In any event, there were no enhancing lesions or restricted diffusion noted suggestive of new active inflammatory demyelination or other acute intracranial process.  The patient did receive olanzapine in the [facility] Emergency Department with some improvement in her headache, but this has now recurred and continues to persist.      Symptomatically, the patient also notes that she has prominent fatigue.  She acknowledges that her sleep has been significantly disrupted by current migraine episode.      Past Medical History:  1.  Migraine.   2.  Depression.   3.  Hypertension.   4.  Obstructive sleep apnea.   5.  Asthma.   6.  Status post gastric bypass surgery.   7.  Status post hysterectomy.   8.  Status post  section.      Physical examination performed by the learner working with me in the clinic today was pertinent for diffuse discomfort and prominent give way type weakness on the right side.      Assessment/plan:    1.  Status migrainosus  The patient presents with a 2-week history of intractable migraine headache.  She also has a number of subjective neurologic symptoms in association with this problem, which could be migraine epiphenomena or alternatively residua of previous demyelinating lesions.  We will attempt to abort the current headache episode as follows:  I have given the patient prescriptions for prochlorperazine 10 mg, sumatriptan 50 mg and naproxen 500 mg.  She should take one of each of these medicines at the above dose and then repeat 50 mg of sumatriptan after 1 hour if headache is not resolved.  At the same time, she will start prednisone 60 mg daily for 5 days with a short oral taper.  Subsequently, she can continue using naproxen 500 mg twice daily for up to 2 weeks  as well as using sumatriptan 50 mg at the onset of severe headache and repeating after 1 hour, limiting use of this medication to 2 days per week or less.      My hope is that a combination of the above measures will resolve her headache within the next week.  If it does not, I think that she is going to need further parenteral therapies and we will discuss with her at that time whether we should proceed with direct admission to hospital for dihydroergotamine protocol.  My hope, however, is that the above treatments will be effective in relieving her current symptoms.      2.  Multiple sclerosis  We will obtain images from MRI scans of the brain and cervical spine performed and at [facility] for comparison to scans performed here in 2017.  I will see her back for a review as currently scheduled at the end of November.  We can cancel the MRI scans that had been previously scheduled on that date as these were done recently.      3.  Fatigue  We will reassess the patient's fatigue after the current headache cycle is broken.  If she continues to have significant problems with fatigue, we can consider adding a stimulant medication as well.     I spent 15 minutes with the patient in the office today, with greater than 50% of this time spent in counseling regarding treatment and follow up plan.     MD RADAMES Damon MD             D: 10/11/2018   T: 10/11/2018   MT: AKA      Name:     RACHEL YANG   MRN:      [id number]        Account:      [id number]   :      1975           Service Date: 10/11/2018      Document: [id number]        Radames Castaneda MD

## 2018-10-18 NOTE — PROGRESS NOTES
We received a signed authorization from [facility] to disclose patient health information; I have now faxed the short term disability paperwork directly to [facility] at [phone] per patient request.    Mitzy Collazo MS RN Care Coordinator

## 2018-10-19 ENCOUNTER — HOSPITAL ENCOUNTER (INPATIENT)
Facility: CLINIC | Age: 43
LOS: 2 days | Discharge: HOME | End: 2018-10-21
Attending: PSYCHIATRY & NEUROLOGY | Admitting: PSYCHIATRY & NEUROLOGY
Payer: COMMERCIAL

## 2018-10-19 DIAGNOSIS — G43.109 MIGRAINE WITH AURA AND WITHOUT STATUS MIGRAINOSUS, NOT INTRACTABLE: Primary | ICD-10-CM

## 2018-10-19 LAB
CREAT SERPL-MCNC: 1.08 MG/DL (ref 0.52–1.04)
ERYTHROCYTE [DISTWIDTH] IN BLOOD BY AUTOMATED COUNT: 13.7 % (ref 10–15)
GFR SERPL CREATININE-BSD FRML MDRD: 56 ML/MIN/1.7M2
HCT VFR BLD AUTO: 33.5 % (ref 35–47)
HGB BLD-MCNC: 10.4 G/DL (ref 11.7–15.7)
MCH RBC QN AUTO: 30.1 PG (ref 26.5–33)
MCHC RBC AUTO-ENTMCNC: 31 G/DL (ref 31.5–36.5)
MCV RBC AUTO: 97 FL (ref 78–100)
PLATELET # BLD AUTO: 439 10E9/L (ref 150–450)
RBC # BLD AUTO: 3.46 10E12/L (ref 3.8–5.2)
WBC # BLD AUTO: 12.6 10E9/L (ref 4–11)

## 2018-10-19 PROCEDURE — 25000132 ZZH RX MED GY IP 250 OP 250 PS 637: Performed by: INTERNAL MEDICINE

## 2018-10-19 PROCEDURE — 12000003 ZZH R&B CRITICAL UMMC

## 2018-10-19 PROCEDURE — 40000141 ZZH STATISTIC PERIPHERAL IV START W/O US GUIDANCE

## 2018-10-19 PROCEDURE — 93010 ELECTROCARDIOGRAM REPORT: CPT | Performed by: INTERNAL MEDICINE

## 2018-10-19 PROCEDURE — 85027 COMPLETE CBC AUTOMATED: CPT | Performed by: INTERNAL MEDICINE

## 2018-10-19 PROCEDURE — 82565 ASSAY OF CREATININE: CPT | Performed by: INTERNAL MEDICINE

## 2018-10-19 PROCEDURE — 25000128 H RX IP 250 OP 636: Performed by: INTERNAL MEDICINE

## 2018-10-19 PROCEDURE — 40000065 ZZH STATISTIC EKG NON-CHARGEABLE

## 2018-10-19 PROCEDURE — 36415 COLL VENOUS BLD VENIPUNCTURE: CPT | Performed by: INTERNAL MEDICINE

## 2018-10-19 RX ORDER — TOPIRAMATE 25 MG/1
25 TABLET, FILM COATED ORAL AT BEDTIME
Status: DISCONTINUED | OUTPATIENT
Start: 2018-10-19 | End: 2018-10-20

## 2018-10-19 RX ORDER — AMOXICILLIN 250 MG
2 CAPSULE ORAL 2 TIMES DAILY PRN
Status: DISCONTINUED | OUTPATIENT
Start: 2018-10-19 | End: 2018-10-21 | Stop reason: HOSPADM

## 2018-10-19 RX ORDER — FERROUS SULFATE 325(65) MG
325 TABLET ORAL 2 TIMES DAILY WITH MEALS
Status: DISCONTINUED | OUTPATIENT
Start: 2018-10-19 | End: 2018-10-21 | Stop reason: HOSPADM

## 2018-10-19 RX ORDER — LISINOPRIL/HYDROCHLOROTHIAZIDE 10-12.5 MG
1 TABLET ORAL DAILY
Status: DISCONTINUED | OUTPATIENT
Start: 2018-10-20 | End: 2018-10-21 | Stop reason: HOSPADM

## 2018-10-19 RX ORDER — TRIAMCINOLONE ACETONIDE 1 MG/G
CREAM TOPICAL 2 TIMES DAILY PRN
Status: DISCONTINUED | OUTPATIENT
Start: 2018-10-19 | End: 2018-10-21 | Stop reason: HOSPADM

## 2018-10-19 RX ORDER — VALACYCLOVIR HYDROCHLORIDE 500 MG/1
500 TABLET, FILM COATED ORAL DAILY
Status: DISCONTINUED | OUTPATIENT
Start: 2018-10-20 | End: 2018-10-21 | Stop reason: HOSPADM

## 2018-10-19 RX ORDER — NALOXONE HYDROCHLORIDE 0.4 MG/ML
.1-.4 INJECTION, SOLUTION INTRAMUSCULAR; INTRAVENOUS; SUBCUTANEOUS
Status: DISCONTINUED | OUTPATIENT
Start: 2018-10-19 | End: 2018-10-21 | Stop reason: HOSPADM

## 2018-10-19 RX ORDER — METOCLOPRAMIDE HYDROCHLORIDE 5 MG/ML
10 INJECTION INTRAMUSCULAR; INTRAVENOUS EVERY 6 HOURS
Status: DISCONTINUED | OUTPATIENT
Start: 2018-10-19 | End: 2018-10-19

## 2018-10-19 RX ORDER — ALBUTEROL SULFATE 0.83 MG/ML
2.5 SOLUTION RESPIRATORY (INHALATION) EVERY 4 HOURS PRN
Status: DISCONTINUED | OUTPATIENT
Start: 2018-10-19 | End: 2018-10-21 | Stop reason: HOSPADM

## 2018-10-19 RX ORDER — DIHYDROERGOTAMINE MESYLATE 1 MG/ML
1 INJECTION, SOLUTION INTRAMUSCULAR; INTRAVENOUS; SUBCUTANEOUS ONCE
Status: DISCONTINUED | OUTPATIENT
Start: 2018-10-19 | End: 2018-10-19

## 2018-10-19 RX ORDER — PROCHLORPERAZINE MALEATE 10 MG
10 TABLET ORAL EVERY 6 HOURS PRN
Status: DISCONTINUED | OUTPATIENT
Start: 2018-10-19 | End: 2018-10-21 | Stop reason: HOSPADM

## 2018-10-19 RX ORDER — ALBUTEROL SULFATE 90 UG/1
2 AEROSOL, METERED RESPIRATORY (INHALATION) EVERY 4 HOURS PRN
Status: DISCONTINUED | OUTPATIENT
Start: 2018-10-19 | End: 2018-10-21 | Stop reason: HOSPADM

## 2018-10-19 RX ORDER — IBUPROFEN 200 MG
800 TABLET ORAL EVERY 8 HOURS PRN
Status: DISCONTINUED | OUTPATIENT
Start: 2018-10-19 | End: 2018-10-21 | Stop reason: HOSPADM

## 2018-10-19 RX ORDER — SODIUM CHLORIDE 9 MG/ML
INJECTION, SOLUTION INTRAVENOUS CONTINUOUS
Status: DISCONTINUED | OUTPATIENT
Start: 2018-10-19 | End: 2018-10-21 | Stop reason: HOSPADM

## 2018-10-19 RX ORDER — DIHYDROERGOTAMINE MESYLATE 1 MG/ML
0.5 INJECTION, SOLUTION INTRAMUSCULAR; INTRAVENOUS; SUBCUTANEOUS ONCE
Status: COMPLETED | OUTPATIENT
Start: 2018-10-19 | End: 2018-10-19

## 2018-10-19 RX ORDER — DIPHENHYDRAMINE HYDROCHLORIDE 50 MG/ML
25 INJECTION INTRAMUSCULAR; INTRAVENOUS ONCE
Status: COMPLETED | OUTPATIENT
Start: 2018-10-19 | End: 2018-10-19

## 2018-10-19 RX ORDER — TOPIRAMATE 100 MG/1
100 TABLET, FILM COATED ORAL DAILY
Status: DISCONTINUED | OUTPATIENT
Start: 2018-10-20 | End: 2018-10-21 | Stop reason: HOSPADM

## 2018-10-19 RX ORDER — DIHYDROERGOTAMINE MESYLATE 1 MG/ML
0.5 INJECTION, SOLUTION INTRAMUSCULAR; INTRAVENOUS; SUBCUTANEOUS ONCE
Status: COMPLETED | OUTPATIENT
Start: 2018-10-20 | End: 2018-10-20

## 2018-10-19 RX ORDER — NORTRIPTYLINE HCL 25 MG
25 CAPSULE ORAL AT BEDTIME
Status: DISCONTINUED | OUTPATIENT
Start: 2018-10-19 | End: 2018-10-20

## 2018-10-19 RX ORDER — POLYETHYLENE GLYCOL 3350 17 G/17G
17 POWDER, FOR SOLUTION ORAL DAILY PRN
Status: DISCONTINUED | OUTPATIENT
Start: 2018-10-19 | End: 2018-10-21 | Stop reason: HOSPADM

## 2018-10-19 RX ORDER — ONDANSETRON 4 MG/1
4 TABLET, ORALLY DISINTEGRATING ORAL EVERY 6 HOURS PRN
Status: DISCONTINUED | OUTPATIENT
Start: 2018-10-19 | End: 2018-10-21 | Stop reason: HOSPADM

## 2018-10-19 RX ORDER — CYCLOBENZAPRINE HCL 10 MG
10 TABLET ORAL 3 TIMES DAILY PRN
Status: DISCONTINUED | OUTPATIENT
Start: 2018-10-19 | End: 2018-10-21 | Stop reason: HOSPADM

## 2018-10-19 RX ORDER — ONDANSETRON 2 MG/ML
4 INJECTION INTRAMUSCULAR; INTRAVENOUS EVERY 6 HOURS PRN
Status: DISCONTINUED | OUTPATIENT
Start: 2018-10-19 | End: 2018-10-21 | Stop reason: HOSPADM

## 2018-10-19 RX ORDER — ACETAMINOPHEN 500 MG
1000 TABLET ORAL 3 TIMES DAILY PRN
Status: DISCONTINUED | OUTPATIENT
Start: 2018-10-19 | End: 2018-10-21 | Stop reason: HOSPADM

## 2018-10-19 RX ORDER — AMOXICILLIN 250 MG
1 CAPSULE ORAL 2 TIMES DAILY PRN
Status: DISCONTINUED | OUTPATIENT
Start: 2018-10-19 | End: 2018-10-21 | Stop reason: HOSPADM

## 2018-10-19 RX ORDER — METOCLOPRAMIDE HYDROCHLORIDE 5 MG/ML
10 INJECTION INTRAMUSCULAR; INTRAVENOUS DAILY PRN
Status: DISCONTINUED | OUTPATIENT
Start: 2018-10-19 | End: 2018-10-21 | Stop reason: HOSPADM

## 2018-10-19 RX ADMIN — FERROUS SULFATE TAB 325 MG (65 MG ELEMENTAL FE) 325 MG: 325 (65 FE) TAB at 23:12

## 2018-10-19 RX ADMIN — METOCLOPRAMIDE 10 MG: 5 INJECTION, SOLUTION INTRAMUSCULAR; INTRAVENOUS at 23:13

## 2018-10-19 RX ADMIN — NORTRIPTYLINE HYDROCHLORIDE 25 MG: 25 CAPSULE ORAL at 23:12

## 2018-10-19 RX ADMIN — DIPHENHYDRAMINE HYDROCHLORIDE 25 MG: 50 INJECTION, SOLUTION INTRAMUSCULAR; INTRAVENOUS at 23:21

## 2018-10-19 RX ADMIN — DIHYDROERGOTAMINE MESYLATE 0.5 MG: 1 INJECTION, SOLUTION INTRAMUSCULAR; INTRAVENOUS; SUBCUTANEOUS at 23:25

## 2018-10-19 RX ADMIN — TOPIRAMATE 25 MG: 25 TABLET, FILM COATED ORAL at 23:12

## 2018-10-19 RX ADMIN — SODIUM CHLORIDE: 9 INJECTION, SOLUTION INTRAVENOUS at 22:05

## 2018-10-19 RX ADMIN — ENOXAPARIN SODIUM 40 MG: 40 INJECTION SUBCUTANEOUS at 23:11

## 2018-10-19 NOTE — LETTER
[address] [address]  Phone: [phone]    October 21, 2018        Josefina Aldrich  [address] [id number]  [facility] [facility] [address]          To whom it may concern:    RE: Josefina Aldrich    Patient may return to work 10/24/2018 with the following:  No restrictions    Please contact me for questions or concerns.      Sincerely,        Ozzie Rawls DO  Resident, Department of Neurology  [facility]

## 2018-10-19 NOTE — IP AVS SNAPSHOT
[address] [address]    Phone:  [phone]                                       After Visit Summary   10/19/2018    Josefina Aldrich    MRN: [id number]           After Visit Summary Signature Page     I have received my discharge instructions, and my questions have been answered. I have discussed any challenges I see with this plan with the nurse or doctor.    ..........................................................................................................................................  Patient/Patient Representative Signature      ..........................................................................................................................................  Patient Representative Print Name and Relationship to Patient    ..................................................               ................................................  Date                                   Time    ..........................................................................................................................................  Reviewed by Signature/Title    ...................................................              ..............................................  Date                                               Time          22EPIC Rev 08/18

## 2018-10-19 NOTE — IP AVS SNAPSHOT
MRN:[id number]                      After Visit Summary   10/19/2018    Josefina Aldrich    MRN: [id number]           Thank you!     Thank you for choosing [facility] for your care. Our goal is always to provide you with excellent care. Hearing back from our patients is one way we can continue to improve our services. Please take a few minutes to complete the written survey that you may receive in the mail after you visit with us. Thank you!        Patient Information     Date Of Birth          1975        Designated Caregiver       Most Recent Value    Caregiver    Will someone help with your care after discharge? no      About your hospital stay     You were admitted on:  October 19, 2018 You last received care in the:  [address] [facility] [place]    You were discharged on:  October 21, 2018        Reason for your hospital stay       You were admitted and treated for refractory migraine headache                  Who to Call     For medical emergencies, please call 911.  For non-urgent questions about your medical care, please call your primary care provider or clinic, [phone]          Attending Provider     Provider Specialty    Brenda Serrano MD Neurology       Primary Care Provider Office Phone # Fax #    Miya Crump PA-C [phone]      After Care Instructions     Activity       Your activity upon discharge: activity as tolerated, stay home from work until Wednesday 10/24/2018            Diet       Follow this diet upon discharge: Orders Placed This Encounter      Advance Diet as Tolerated: Regular Diet Adult            Discharge Instructions       1. Please note a change in your medications. Topiramate should be taken 100mg in the AM and 50mg at bedtime. Your dose of nortriptyline was increased to 50mg at bedtime.  2. You were prescribed two new temporary medications. Clonazepam should be taken at bedtime to help you sleep while on steroids. The Medrol  Dose-Pack should help with your headaches.  3. Contact your PCP or neurologist if your headaches worsen or you notice new MS symptoms.                  Follow-up Appointments     [facility] Follow-up and recommended labs and tests       Follow up with primary care provider, Miya Crump, within 7 days to evaluate medication change and for hospital follow- up.  No follow up labs or test are needed.    Follow up with Dr Castaneda as already scheduled      Appointments on [place] and/or [facility] (with [facility] or [facility] provider or service). Call [phone] if you haven't heard regarding these appointments within 7 days of discharge.                  Your next 10 appointments already scheduled     Nov 29, 2018  8:30 AM CST   MR CERVICAL SPINE W/O & W CONTRAST with [address] MRI ([facility])    [address] [place] [address] [phone]           How do I prepare for my exam? (Food and drink instructions) **If you will be receiving sedation or general anesthesia, please see special notes below.**  How do I prepare for my exam? (Other instructions) Take your medicines as usual, unless your doctor tells you not to. You may or may not receive intravenous (IV) contrast for this exam pending the discretion of the Radiologist.  You do not need to do anything special to prepare.  **If you will be receiving sedation or general anesthesia, please see special notes below.**  What should I wear: The MRI machine uses a strong magnet. Please wear clothes without metal (snaps, zippers). A sweatsuit works well, or we may give you a hospital gown. Please remove any body piercings and hair extensions before you arrive. You will also remove watches, jewelry, hairpins, wallets, dentures, partial dental plates and hearing aids. You may wear contact lenses, and you may be able to wear your rings. We have a safe place to keep your personal items,  but it is safer to leave them at home.  How long does the exam take: Most tests take 30 to 60 minutes.  HOWEVER, IF YOUR DOCTOR PRESCRIBES ANESTHESIA please plan on spending four to five hours in the recovery room.  What should I bring:  Bring a list of your current medicines to your exam (including vitamins, minerals and over-the-counter drugs).  Do I need a :  **If you will be receiving sedation or general anesthesia, please see special notes below.**  What should I do after the exam: No Restrictions, You may resume normal activities.  What is this test: MRI (magnetic resonance imaging) uses a strong magnet and radio waves to look inside the body. An MRA (magnetic resonance angiogram) does the same thing, but it lets us look at your blood vessels. A computer turns the radio waves into pictures showing cross sections of the body, much like slices of bread. This helps us see any problems more clearly. You may receive fluid (called  contrast ) before or during your scan. The fluid helps us see the pictures better. We give the fluid through an IV (small needle in your arm).  Who should I call with questions:  Please call the Imaging Department at your exam site with any questions. Directions, parking instructions, and other information is available on our website, [email or web address].  How do I prepare if I m having sedation or anesthesia? **IMPORTANT** THE INSTRUCTIONS BELOW ARE ONLY FOR THOSE PATIENTS WHO HAVE BEEN TOLD THEY WILL RECEIVE SEDATION OR GENERAL ANESTHESIA DURING THEIR MRI PROCEDURE:  IF YOU WILL RECEIVE SEDATION (take medicine to help you relax during your exam): You must get the medicine from your doctor before you arrive. Bring the medicine to the exam. Do not take it at home. Arrive one hour early. Bring someone who can take you home after the test. Your medicine will make you sleepy. After the exam, you may not drive, take a bus or take a taxi by yourself. No eating 8 hours before your exam.  You may have clear liquids up until 4 hours before your exam. (Clear liquids include water, clear tea, black coffee and fruit juice without pulp.)  IF YOU WILL RECEIVE ANESTHESIA (be asleep for your exam): Arrive 1 1/2 hours early. Bring someone who can take you home after the test. You may not drive, take a bus or take a taxi by yourself. No eating 8 hours before your exam. You may have clear liquids up until 4 hours before your exam. (Clear liquids include water, clear tea, black coffee and fruit juice without pulp.)            Nov 29, 2018  9:15 AM CST   MR BRAIN W/O & W CONTRAST with [address] MRI ([facility])    [address] [facility]  1st Floor  [facility] [address] [phone]           How do I prepare for my exam? (Food and drink instructions) **If you will be receiving sedation or general anesthesia, please see special notes below.**  How do I prepare for my exam? (Other instructions) Take your medicines as usual, unless your doctor tells you not to. You may or may not receive intravenous (IV) contrast for this exam pending the discretion of the Radiologist.  You do not need to do anything special to prepare.  **If you will be receiving sedation or general anesthesia, please see special notes below.**  What should I wear: The MRI machine uses a strong magnet. Please wear clothes without metal (snaps, zippers). A sweatsuit works well, or we may give you a hospital gown. Please remove any body piercings and hair extensions before you arrive. You will also remove watches, jewelry, hairpins, wallets, dentures, partial dental plates and hearing aids. You may wear contact lenses, and you may be able to wear your rings. We have a safe place to keep your personal items, but it is safer to leave them at home.  How long does the exam take: Most tests take 30 to 60 minutes.  HOWEVER, IF YOUR DOCTOR PRESCRIBES ANESTHESIA please plan on spending four to five hours in  the recovery room.  What should I bring:  Bring a list of your current medicines to your exam (including vitamins, minerals and over-the-counter drugs).  Do I need a :  **If you will be receiving sedation or general anesthesia, please see special notes below.**  What should I do after the exam: No Restrictions, You may resume normal activities.  What is this test: MRI (magnetic resonance imaging) uses a strong magnet and radio waves to look inside the body. An MRA (magnetic resonance angiogram) does the same thing, but it lets us look at your blood vessels. A computer turns the radio waves into pictures showing cross sections of the body, much like slices of bread. This helps us see any problems more clearly. You may receive fluid (called  contrast ) before or during your scan. The fluid helps us see the pictures better. We give the fluid through an IV (small needle in your arm).  Who should I call with questions:  Please call the Imaging Department at your exam site with any questions. Directions, parking instructions, and other information is available on our website, [email or web address].  How do I prepare if I m having sedation or anesthesia? **IMPORTANT** THE INSTRUCTIONS BELOW ARE ONLY FOR THOSE PATIENTS WHO HAVE BEEN TOLD THEY WILL RECEIVE SEDATION OR GENERAL ANESTHESIA DURING THEIR MRI PROCEDURE:  IF YOU WILL RECEIVE SEDATION (take medicine to help you relax during your exam): You must get the medicine from your doctor before you arrive. Bring the medicine to the exam. Do not take it at home. Arrive one hour early. Bring someone who can take you home after the test. Your medicine will make you sleepy. After the exam, you may not drive, take a bus or take a taxi by yourself. No eating 8 hours before your exam. You may have clear liquids up until 4 hours before your exam. (Clear liquids include water, clear tea, black coffee and fruit juice without pulp.)  IF YOU WILL RECEIVE ANESTHESIA (be asleep for  "your exam): Arrive 1 1/2 hours early. Bring someone who can take you home after the test. You may not drive, take a bus or take a taxi by yourself. No eating 8 hours before your exam. You may have clear liquids up until 4 hours before your exam. (Clear liquids include water, clear tea, black coffee and fruit juice without pulp.)            Nov 29, 2018 10:00 AM CST   (Arrive by 9:45 AM)   Return Multiple Sclerosis with Radames Castaneda MD   [facility] Multiple Sclerosis ([facility] [place])    [address] [place] [address]  [address] [place] [address] [phone]              Pending Results     Date and Time Order Name Status Description    10/19/2018 2040 EKG 12-lead, tracing only Preliminary             Statement of Approval     Ordered          10/21/18 1203  I have reviewed and agree with all the recommendations and orders detailed in this document.  EFFECTIVE NOW     Approved and electronically signed by:  Ozzie Rawls MD             Admission Information     Date & Time Provider Department Dept. Phone    10/19/2018 Brenda Serrano MD [address] [facility] [place] [phone]      Your Vitals Were     Blood Pressure Pulse Temperature Respirations Height Weight    129/86 (BP Location: Left arm) 69 98.2  F (36.8  C) (Oral) 16 1.651 m (5' 5\") 116.8 kg (257 lb 8 oz)    Pulse Oximetry BMI (Body Mass Index)                97% 42.85 kg/m2          MyChart Information     [facility] [employer] gives you secure access to your electronic health record. If you see a primary care provider, you can also send messages to your care team and make appointments. If you have questions, please call your primary care clinic.  If you do not have a primary care provider, please call [phone] and they will assist you.        Care EveryWhere ID     This is your Care EveryWhere ID. This could be used by other organizations to access your [facility] medical records  [id number]        Equal Access to Services     RISHI MORENO" AH: Hadii jimmie retanashivamo Soliaali, waaxda luqadaha, qaybta kaalmada adeshadia, mya albertin hayaaariadne burtonmaranda islas laTraceelisa patterson. So [facility] [phone].    ATENCIÓN: Si habla español, tiene a barrera disposición servicios gratuitos de asistencia lingüística. Llame al [phone].    We comply with applicable federal civil rights laws and [place] laws. We do not discriminate on the basis of race, color, national origin, age, disability, sex, sexual orientation, or gender identity.               Review of your medicines      START taking        Dose / Directions    clonazePAM 0.5 MG tablet   Commonly known as:  klonoPIN   Used for:  Migraine with aura and without status migrainosus, not intractable        Dose:  0.25-0.5 mg   Take 0.5-1 tablets (0.25-0.5 mg) by mouth 2 times daily as needed for anxiety   Quantity:  3 tablet   Refills:  0       methylPREDNISolone 4 MG tablet   Commonly known as:  MEDROL DOSEPAK   Used for:  Migraine with aura and without status migrainosus, not intractable        Follow package instructions   Quantity:  21 tablet   Refills:  0         CONTINUE these medicines which may have CHANGED, or have new prescriptions. If we are uncertain of the size of tablets/capsules you have at home, strength may be listed as something that might have changed.        Dose / Directions    nortriptyline 50 MG capsule   Commonly known as:  PAMELOR   This may have changed:    - medication strength  - See the new instructions.   Used for:  Migraine with aura and without status migrainosus, not intractable        Dose:  50 mg   Take 1 capsule (50 mg) by mouth At Bedtime   Quantity:  90 capsule   Refills:  3       * topiramate 100 MG tablet   Commonly known as:  TOPAMAX   This may have changed:  additional instructions   Used for:  Migraine with aura and without status migrainosus, not intractable        Dose:  100 mg   Take 1 tablet (100 mg) by mouth daily   Quantity:  60 tablet   Refills:  3       * topiramate 50 MG tablet    Commonly known as:  TOPAMAX   This may have changed:    - medication strength  - how much to take  - additional instructions   Used for:  Migraine with aura and without status migrainosus, not intractable        Dose:  50 mg   Take 1 tablet (50 mg) by mouth At Bedtime   Quantity:  60 tablet   Refills:  3       * Notice:  This list has 2 medication(s) that are the same as other medications prescribed for you. Read the directions carefully, and ask your doctor or other care provider to review them with you.      CONTINUE these medicines which have NOT CHANGED        Dose / Directions    acetaminophen 500 MG tablet   Commonly known as:  TYLENOL   Used for:  Cyst of left ovary        Dose:  1000 mg   Take 2 tablets (1,000 mg) by mouth 3 times daily as needed for mild pain   Quantity:  100 tablet   Refills:  0       * VENTOLIN  (90 Base) MCG/ACT inhaler   Generic drug:  albuterol        Dose:  2 puff   Inhale 2 puffs into the lungs   Refills:  0       * albuterol (2.5 MG/3ML) 0.083% neb solution        Dose:  2.5 mg   Inhale 2.5 mg into the lungs   Refills:  0       cholecalciferol 5000 units Tabs tablet   Commonly known as:  vitamin D3   Used for:  Hypovitaminosis D        Dose:  5000 Units   Take 1 tablet (5,000 Units) by mouth daily   Refills:  0       cyclobenzaprine 10 MG tablet   Commonly known as:  FLEXERIL        Dose:  10 mg   Take 10 mg by mouth 3 times daily as needed   Refills:  0       Glatiramer Acetate 40 MG/ML Sosy   Commonly known as:  COPAXONE   Used for:  Multiple sclerosis (H)        Dose:  40 mg   Inject 40 mg Subcutaneous three times a week   Quantity:  12 Syringe   Refills:  11       IBUPROFEN PO        Dose:  800 mg   Take 800 mg by mouth every 4 hours as needed for moderate pain   Refills:  0       IRON SUPPLEMENT PO        Refills:  0       lisinopril-hydrochlorothiazide 10-12.5 MG per tablet   Commonly known as:  PRINZIDE/ZESTORETIC   Used for:  Benign essential hypertension         TAKE 1 TABLET BY MOUTH DAILY   Quantity:  90 tablet   Refills:  1       naproxen 500 MG tablet   Commonly known as:  NAPROSYN   Used for:  Intractable migraine without aura and with status migrainosus        Dose:  500 mg   Take 1 tablet (500 mg) by mouth 2 times daily as needed for moderate pain   Quantity:  60 tablet   Refills:  1       prochlorperazine 10 MG tablet   Commonly known as:  COMPAZINE   Used for:  Intractable migraine without aura and with status migrainosus        Dose:  10 mg   Take 1 tablet (10 mg) by mouth every 6 hours as needed for nausea or vomiting   Quantity:  20 tablet   Refills:  1       SUMAtriptan 50 MG tablet   Commonly known as:  IMITREX   Used for:  Intractable migraine without aura and with status migrainosus        Take one table now for intractable headache and repeat x 1 in one hour if headache persists; do not use more than 2 days/week   Quantity:  30 tablet   Refills:  0       triamcinolone 0.1 % cream   Commonly known as:  KENALOG   Used for:  Eczema, unspecified type        Apply sparingly to affected area three times daily   Quantity:  30 g   Refills:  1       valACYclovir 500 MG tablet   Commonly known as:  VALTREX   Used for:  HSV (herpes simplex virus) infection        Dose:  500 mg   Take 1 tablet (500 mg) by mouth daily   Quantity:  90 tablet   Refills:  1       * Notice:  This list has 2 medication(s) that are the same as other medications prescribed for you. Read the directions carefully, and ask your doctor or other care provider to review them with you.      STOP taking     predniSONE 20 MG tablet   Commonly known as:  DELTASONE                Where to get your medicines      These medications were sent to [facility] - [place], [place] - [address] [facility]  [address] [facility] [address]     Phone:  [phone]     methylPREDNISolone 4 MG tablet    nortriptyline 50 MG capsule    topiramate 100 MG tablet    topiramate 50 MG tablet          Some of these will need a paper prescription and others can be bought over the counter. Ask your nurse if you have questions.     Bring a paper prescription for each of these medications     clonazePAM 0.5 MG tablet                Protect others around you: Learn how to safely use, store and throw away your medicines at www.disposemymeds.org.             Medication List: This is a list of all your medications and when to take them. Check marks below indicate your daily home schedule. Keep this list as a reference.      Medications           Morning Afternoon Evening Bedtime As Needed    acetaminophen 500 MG tablet   Commonly known as:  TYLENOL   Take 2 tablets (1,000 mg) by mouth 3 times daily as needed for mild pain                                * VENTOLIN  (90 Base) MCG/ACT inhaler   Inhale 2 puffs into the lungs   Generic drug:  albuterol                                * albuterol (2.5 MG/3ML) 0.083% neb solution   Inhale 2.5 mg into the lungs                                cholecalciferol 5000 units Tabs tablet   Commonly known as:  vitamin D3   Take 1 tablet (5,000 Units) by mouth daily                                clonazePAM 0.5 MG tablet   Commonly known as:  klonoPIN   Take 0.5-1 tablets (0.25-0.5 mg) by mouth 2 times daily as needed for anxiety   Last time this was given:  1 mg on 10/20/2018 10:29 PM                                cyclobenzaprine 10 MG tablet   Commonly known as:  FLEXERIL   Take 10 mg by mouth 3 times daily as needed                                Glatiramer Acetate 40 MG/ML Sosy   Commonly known as:  COPAXONE   Inject 40 mg Subcutaneous three times a week                                IBUPROFEN PO   Take 800 mg by mouth every 4 hours as needed for moderate pain   Last time this was given:  800 mg on 10/21/2018  8:42 AM                                IRON SUPPLEMENT PO   Last time this was given:  325 mg on 10/21/2018  8:39 AM                                 lisinopril-hydrochlorothiazide 10-12.5 MG per tablet   Commonly known as:  PRINZIDE/ZESTORETIC   TAKE 1 TABLET BY MOUTH DAILY   Last time this was given:  1 tablet on 10/21/2018  8:39 AM                                methylPREDNISolone 4 MG tablet   Commonly known as:  MEDROL DOSEPAK   Follow package instructions                                naproxen 500 MG tablet   Commonly known as:  NAPROSYN   Take 1 tablet (500 mg) by mouth 2 times daily as needed for moderate pain                                nortriptyline 50 MG capsule   Commonly known as:  PAMELOR   Take 1 capsule (50 mg) by mouth At Bedtime   Last time this was given:  50 mg on 10/20/2018 10:29 PM                                prochlorperazine 10 MG tablet   Commonly known as:  COMPAZINE   Take 1 tablet (10 mg) by mouth every 6 hours as needed for nausea or vomiting                                SUMAtriptan 50 MG tablet   Commonly known as:  IMITREX   Take one table now for intractable headache and repeat x 1 in one hour if headache persists; do not use more than 2 days/week                                * topiramate 100 MG tablet   Commonly known as:  TOPAMAX   Take 1 tablet (100 mg) by mouth daily   Last time this was given:  100 mg on 10/20/2018 10:32 PM                                * topiramate 50 MG tablet   Commonly known as:  TOPAMAX   Take 1 tablet (50 mg) by mouth At Bedtime   Last time this was given:  100 mg on 10/20/2018 10:32 PM                                triamcinolone 0.1 % cream   Commonly known as:  KENALOG   Apply sparingly to affected area three times daily                                valACYclovir 500 MG tablet   Commonly known as:  VALTREX   Take 1 tablet (500 mg) by mouth daily   Last time this was given:  500 mg on 10/21/2018  8:39 AM                                * Notice:  This list has 4 medication(s) that are the same as other medications prescribed for you. Read the directions carefully, and ask your doctor or  other care provider to review them with you.

## 2018-10-20 LAB
ALBUMIN SERPL-MCNC: 3.3 G/DL (ref 3.4–5)
ALP SERPL-CCNC: 49 U/L (ref 40–150)
ALT SERPL W P-5'-P-CCNC: 24 U/L (ref 0–50)
AMMONIA PLAS-SCNC: 24 UMOL/L (ref 10–50)
ANION GAP SERPL CALCULATED.3IONS-SCNC: 9 MMOL/L (ref 3–14)
AST SERPL W P-5'-P-CCNC: 18 U/L (ref 0–45)
BILIRUB SERPL-MCNC: 0.2 MG/DL (ref 0.2–1.3)
BUN SERPL-MCNC: 17 MG/DL (ref 7–30)
CALCIUM SERPL-MCNC: 8.4 MG/DL (ref 8.5–10.1)
CHLORIDE SERPL-SCNC: 111 MMOL/L (ref 94–109)
CO2 SERPL-SCNC: 21 MMOL/L (ref 20–32)
CREAT SERPL-MCNC: 0.89 MG/DL (ref 0.52–1.04)
GFR SERPL CREATININE-BSD FRML MDRD: 70 ML/MIN/1.7M2
GLUCOSE SERPL-MCNC: 92 MG/DL (ref 70–99)
POTASSIUM SERPL-SCNC: 3.8 MMOL/L (ref 3.4–5.3)
PROT SERPL-MCNC: 7.1 G/DL (ref 6.8–8.8)
SODIUM SERPL-SCNC: 142 MMOL/L (ref 133–144)

## 2018-10-20 PROCEDURE — 25000132 ZZH RX MED GY IP 250 OP 250 PS 637: Performed by: INTERNAL MEDICINE

## 2018-10-20 PROCEDURE — 80053 COMPREHEN METABOLIC PANEL: CPT | Performed by: INTERNAL MEDICINE

## 2018-10-20 PROCEDURE — 25000128 H RX IP 250 OP 636: Performed by: INTERNAL MEDICINE

## 2018-10-20 PROCEDURE — 36415 COLL VENOUS BLD VENIPUNCTURE: CPT | Performed by: INTERNAL MEDICINE

## 2018-10-20 PROCEDURE — 25000125 ZZHC RX 250: Performed by: STUDENT IN AN ORGANIZED HEALTH CARE EDUCATION/TRAINING PROGRAM

## 2018-10-20 PROCEDURE — 25000132 ZZH RX MED GY IP 250 OP 250 PS 637: Performed by: STUDENT IN AN ORGANIZED HEALTH CARE EDUCATION/TRAINING PROGRAM

## 2018-10-20 PROCEDURE — 40000556 ZZH STATISTIC PERIPHERAL IV START W US GUIDANCE

## 2018-10-20 PROCEDURE — 25000128 H RX IP 250 OP 636: Performed by: STUDENT IN AN ORGANIZED HEALTH CARE EDUCATION/TRAINING PROGRAM

## 2018-10-20 PROCEDURE — 12000008 ZZH R&B INTERMEDIATE UMMC

## 2018-10-20 PROCEDURE — 82140 ASSAY OF AMMONIA: CPT | Performed by: INTERNAL MEDICINE

## 2018-10-20 RX ORDER — GLATIRAMER ACETATE 20 MG/ML
40 INJECTION, SOLUTION SUBCUTANEOUS
Status: DISCONTINUED | OUTPATIENT
Start: 2018-10-21 | End: 2018-10-21 | Stop reason: HOSPADM

## 2018-10-20 RX ORDER — TOPIRAMATE 50 MG/1
50 TABLET, FILM COATED ORAL AT BEDTIME
Status: DISCONTINUED | OUTPATIENT
Start: 2018-10-20 | End: 2018-10-21 | Stop reason: HOSPADM

## 2018-10-20 RX ORDER — VALPROIC ACID 250 MG/1
500 CAPSULE, LIQUID FILLED ORAL ONCE
Status: DISCONTINUED | OUTPATIENT
Start: 2018-10-20 | End: 2018-10-20

## 2018-10-20 RX ORDER — DIPHENHYDRAMINE HCL 25 MG
25 CAPSULE ORAL ONCE
Status: DISCONTINUED | OUTPATIENT
Start: 2018-10-20 | End: 2018-10-20

## 2018-10-20 RX ORDER — DIPHENHYDRAMINE HYDROCHLORIDE 50 MG/ML
25 INJECTION INTRAMUSCULAR; INTRAVENOUS ONCE
Status: COMPLETED | OUTPATIENT
Start: 2018-10-20 | End: 2018-10-20

## 2018-10-20 RX ORDER — NORTRIPTYLINE HCL 25 MG
50 CAPSULE ORAL AT BEDTIME
Status: DISCONTINUED | OUTPATIENT
Start: 2018-10-20 | End: 2018-10-21 | Stop reason: HOSPADM

## 2018-10-20 RX ORDER — CLONAZEPAM 1 MG/1
1 TABLET ORAL AT BEDTIME
Status: COMPLETED | OUTPATIENT
Start: 2018-10-20 | End: 2018-10-20

## 2018-10-20 RX ORDER — DIHYDROERGOTAMINE MESYLATE 1 MG/ML
1 INJECTION, SOLUTION INTRAMUSCULAR; INTRAVENOUS; SUBCUTANEOUS ONCE
Status: COMPLETED | OUTPATIENT
Start: 2018-10-20 | End: 2018-10-20

## 2018-10-20 RX ADMIN — HYDROCORTISONE SODIUM SUCCINATE 125 MG: 100 INJECTION, POWDER, FOR SOLUTION INTRAMUSCULAR; INTRAVENOUS at 16:51

## 2018-10-20 RX ADMIN — FERROUS SULFATE TAB 325 MG (65 MG ELEMENTAL FE) 325 MG: 325 (65 FE) TAB at 09:56

## 2018-10-20 RX ADMIN — DIHYDROERGOTAMINE MESYLATE 0.5 MG: 1 INJECTION INTRAMUSCULAR; INTRAVENOUS; SUBCUTANEOUS at 01:39

## 2018-10-20 RX ADMIN — ENOXAPARIN SODIUM 40 MG: 40 INJECTION SUBCUTANEOUS at 21:27

## 2018-10-20 RX ADMIN — FERROUS SULFATE TAB 325 MG (65 MG ELEMENTAL FE) 325 MG: 325 (65 FE) TAB at 21:27

## 2018-10-20 RX ADMIN — LISINOPRIL AND HYDROCHLOROTHIAZIDE 1 TABLET: 12.5; 1 TABLET ORAL at 10:01

## 2018-10-20 RX ADMIN — SODIUM CHLORIDE: 9 INJECTION, SOLUTION INTRAVENOUS at 08:34

## 2018-10-20 RX ADMIN — DIHYDROERGOTAMINE MESYLATE 1 MG: 1 INJECTION, SOLUTION INTRAMUSCULAR; INTRAVENOUS; SUBCUTANEOUS at 07:09

## 2018-10-20 RX ADMIN — TOPIRAMATE 100 MG: 100 TABLET, FILM COATED ORAL at 22:32

## 2018-10-20 RX ADMIN — IBUPROFEN 800 MG: 200 TABLET, FILM COATED ORAL at 14:45

## 2018-10-20 RX ADMIN — CHOLECALCIFEROL CAP 125 MCG (5000 UNIT) 5000 UNITS: 125 CAP at 09:56

## 2018-10-20 RX ADMIN — METOCLOPRAMIDE 10 MG: 5 INJECTION, SOLUTION INTRAMUSCULAR; INTRAVENOUS at 07:15

## 2018-10-20 RX ADMIN — VALPROATE SODIUM 500 MG: 100 INJECTION, SOLUTION INTRAVENOUS at 14:37

## 2018-10-20 RX ADMIN — DIPHENHYDRAMINE HYDROCHLORIDE 25 MG: 50 INJECTION, SOLUTION INTRAMUSCULAR; INTRAVENOUS at 14:32

## 2018-10-20 RX ADMIN — VALACYCLOVIR HYDROCHLORIDE 500 MG: 500 TABLET, FILM COATED ORAL at 10:03

## 2018-10-20 RX ADMIN — NORTRIPTYLINE HYDROCHLORIDE 50 MG: 25 CAPSULE ORAL at 22:29

## 2018-10-20 RX ADMIN — CLONAZEPAM 1 MG: 1 TABLET ORAL at 22:29

## 2018-10-20 RX ADMIN — DIPHENHYDRAMINE HYDROCHLORIDE 25 MG: 50 INJECTION, SOLUTION INTRAMUSCULAR; INTRAVENOUS at 01:39

## 2018-10-20 RX ADMIN — OMEPRAZOLE 20 MG: 20 CAPSULE, DELAYED RELEASE ORAL at 09:55

## 2018-10-20 ASSESSMENT — ACTIVITIES OF DAILY LIVING (ADL)
ADLS_ACUITY_SCORE: 11
ADLS_ACUITY_SCORE: 15
ADLS_ACUITY_SCORE: 11

## 2018-10-20 NOTE — PROGRESS NOTES
SPIRITUAL HEALTH SERVICES  SPIRITUAL ASSESSMENT Progress Note  [facility] ([place] [address]   ON-CALL VISIT    REFERRAL SOURCE: Epic consult order      I visited patient, Josefina Aldrich, as on-call . Josefina welcomed the visit and asked for prayer. She said that she was a member of a non[employer].  She explained that she is a working, single mother with two children living at home, a daughter age 15 and a son age 21. In particular, she requested prayer for relief of her headaches, which have been interfering with duties at work and at home.  I offered prayer, which she accepted and said she appreciated. I provided information on how to access Spiritual Health/ services, if desired.    PLAN: No on-call follow-up planned. Will respond to  visit requests, if received.    Fan Santamaria  Chaplain Resident  Pager [phone]

## 2018-10-20 NOTE — PROGRESS NOTES
"[facility]  General Neurology Progress Note    Patient Name:  Josefina Aldrich  MRN:  [id number]    :  1975  Date of Service:  2018    Patient Summary:  Ms Josefina Aldrich is a 43 year old female with a PMHx of relapsing remitting MS (on Copaxone), migraines (on nortriptyline and topiramate) and bronchial asthma who is presenting with a persistent migraine headache and blurry vision for the last 3 weeks.     Interval history: No acute events overnight. She reports that her headaches have somewhat improved and are about a 7/10 today. She notes no new symptoms.     Physical Examination   Vitals: /78  Pulse 79  Temp 97.2  F (36.2  C) (Oral)  Resp 16  Ht 1.651 m (5' 5\")  Wt 116.8 kg (257 lb 8 oz)  SpO2 99%  BMI 42.85 kg/m2  General: Adult, in NAD, cooperative  HEENT: NC/AT, no icterus, op pink and moist  Cardiac: RRR  Chest: Normal work of breathing  Abdomen: Soft  Extremities: No LE swelling.  Skin: No rash or lesion.   Psych: Mood pleasant, affect congruent    Neuro:  Mental status: Awake, alert, attentive, oriented. Speech is fluent, no dysarthria.  Cranial nerves: CN2-12 tested and no significant findings appreciated. Eyes conjugate, PERRL, EOMI, face symmetric, reports facial sensation is reduced on the right side, shoulder shrug strong, tongue/uvula midline.   Motor: Tone within normal. Drift in RUE with 4+/5 strength with , elbow flexion/extension as well as 4+/5 strength in RLE hip flexors.No atrophy or twitches.   Reflexes: Normoreflexic and symmetric biceps, patellar, and achilles. Toes down-going.  Sensory: Reduced to light touch and pinprick on the right side  Coordination: FNF no dysmetria  Gait: Not tested.    Investigations   WBC - 12.6  Hgb - 10.4  Platelet - 439    Na: 142  K: 3.8  Cl: 11  HCO3: 21  Cr: 0.89 BUN: 17  Ammonia: 24    Assessment and Plan:  Ms Josefina Aldrich is a 43 year old female with a PMHx of " RRMS, migraines, bronchial asthma and HTN who presented with a persistent migraine headache over the last 3 weeks.     #Status migrainosus  The patient presents with a 3-week history of intractable migraine headache. She also has a number of subjective neurologic symptoms in association with this problem, including numbness and weakness which likely representing residua of previous demyelinating lesions as she reports that those symptoms were present prior but worsened with the onset of the headache. Do not suspect these to be new demyelinating lesions given no new symptoms and recent MRI on 10/3 which did not show any enhancing lesions.  - Received DHE protocol yesterday with some minimal relief  - Plan for 25mg of Benadryl IV with 500mg of Depacon and 125mg of Hydrocortisone x1 and assess response  - If headache continues will try Toradol 15mg with Phenergan 12.5mg IV q6 x 24 hours  - 1mg of Clonazepam PO at bedtime  - Increase Nortiptyline to 50mg HS   - Increase AM Topomax to 50mg qam and continue 100mg at bedtime  - IV fluids     #Multiple Sclerosis  - Has follow up with Dr Castaneda on 11/29  - Continue Copaxone every other day (patient has brought home supply)  - Continue vitamin D    #Bronchial asthma     #HTN  - Continue PTA lisinopril-hydrochlorothiazide    #Herpes Zoster  - Continue Valtrex    Diet: Regular  IVF: 100cc/hr  DVT ppx: On Lovenox  Code: Full      Disposition Plan   Expected discharge in 1-2 days to prior living arrangement once headaches are aborted.     Entered: Chloé Bliss 10/20/2018, 1:13 PM     Patient was seen and discussed with Dr. Serrano.     Chloé Bliss  Neurology PGY2  P: [phone]

## 2018-10-20 NOTE — PLAN OF CARE
Problem: Patient Care Overview  Goal: Plan of Care/Patient Progress Review  Outcome: No Change  Admit for migraine headache.  Tolerated test dose of DHE at 2330 with some improvement in headache.  Denies nausea.  VSS.  No chest pain.  To receive second dose of DHE at 0030.  IV fluids at 100ml/hour.  Has voided. Up with SBA.  History of falls.  Has diagnosis of MS.  Right arm and leg less than left.  Has N/T right hand and numbness right foot.  Continue DHE protocol.
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
## 2018-10-20 NOTE — PLAN OF CARE
Problem: Patient Care Overview  Goal: Plan of Care/Patient Progress Review  Pt here w/MS exacerbation, vs ex hypotensive @ times, neuros include: a/o x4, 5/5 throughout w/R side slightly weaker, decreased sensation on R side of face, N/T and decreased sensation of R hand and R foot, blurry and double vision, persistent HA w/some improvement. Pt up ad laney, regular diet, voiding spont, no BM this shift, PIV running NS @ 75ml, pt's SO in and out throughout the day, supportive and helpful w/cares. Continue to monitor per MD orders.

## 2018-10-20 NOTE — PLAN OF CARE
"Problem: Patient Care Overview  Goal: Plan of Care/Patient Progress Review  Outcome: No Change  /84 (BP Location: Right arm)  Pulse 69  Temp 97  F (36.1  C) (Axillary)  Resp 18  Ht 1.651 m (5' 5\")  Wt 116.8 kg (257 lb 8 oz)  SpO2 99%  BMI 42.85 kg/m2  VS: VSS on RA, slightly soft BPs  Neuro: Hx of MS, numbness/tingling in both R-side extremeties (hand/foot) with some slight weakness (4/5). Reports blurred/double vision    DHE admin: @ 1230 patient reported worsening \"pain and tightness in my throat\" MD notified, one time benadryl 25mg ordered with next dose of DHE - given at 0139 during which time patient reported the pain/tightness was subsiding. After two doses of DHE patient reports minor relief from headache that is charatcerized by stabbing pain behind the eyes that radiates over and to the back of head accompanied by light sensitivity, denies nausea.    Respiratory: WDL  GI: WDL  : WDL  IV: PIVx1 RUE 100ml NS/hr LUE PIV removed d/t minor pain and coolness in old IV site  Skin: WDL  Pain: Headache somewhat relieved by DHE  Activity: SBA with gait belt, calls appropriately  See flow sheets for further details and assessments.  Plan of Care: continue to monitor, notify MD of significant changes.        "

## 2018-10-21 VITALS
WEIGHT: 257.5 LBS | HEART RATE: 69 BPM | OXYGEN SATURATION: 97 % | DIASTOLIC BLOOD PRESSURE: 86 MMHG | TEMPERATURE: 98.2 F | BODY MASS INDEX: 42.9 KG/M2 | HEIGHT: 65 IN | SYSTOLIC BLOOD PRESSURE: 129 MMHG | RESPIRATION RATE: 16 BRPM

## 2018-10-21 PROCEDURE — 25000128 H RX IP 250 OP 636: Performed by: STUDENT IN AN ORGANIZED HEALTH CARE EDUCATION/TRAINING PROGRAM

## 2018-10-21 PROCEDURE — 25000132 ZZH RX MED GY IP 250 OP 250 PS 637: Performed by: INTERNAL MEDICINE

## 2018-10-21 RX ORDER — NORTRIPTYLINE HYDROCHLORIDE 50 MG/1
50 CAPSULE ORAL AT BEDTIME
Qty: 90 CAPSULE | Refills: 3 | Status: SHIPPED | OUTPATIENT
Start: 2018-10-21 | End: 2020-02-20

## 2018-10-21 RX ORDER — TOPIRAMATE 50 MG/1
50 TABLET, FILM COATED ORAL AT BEDTIME
Qty: 60 TABLET | Refills: 3 | Status: SHIPPED | OUTPATIENT
Start: 2018-10-21 | End: 2018-10-24

## 2018-10-21 RX ORDER — TOPIRAMATE 100 MG/1
100 TABLET, FILM COATED ORAL DAILY
Qty: 60 TABLET | Refills: 3 | Status: SHIPPED | OUTPATIENT
Start: 2018-10-21 | End: 2018-10-24

## 2018-10-21 RX ORDER — METHYLPREDNISOLONE 4 MG
TABLET, DOSE PACK ORAL
Qty: 21 TABLET | Refills: 0 | Status: SHIPPED | OUTPATIENT
Start: 2018-10-21 | End: 2018-10-30

## 2018-10-21 RX ORDER — CLONAZEPAM 0.5 MG/1
0.25-0.5 TABLET ORAL 2 TIMES DAILY PRN
Qty: 3 TABLET | Refills: 0 | Status: SHIPPED | OUTPATIENT
Start: 2018-10-21 | End: 2018-10-30

## 2018-10-21 RX ADMIN — OMEPRAZOLE 20 MG: 20 CAPSULE, DELAYED RELEASE ORAL at 08:39

## 2018-10-21 RX ADMIN — IBUPROFEN 800 MG: 200 TABLET, FILM COATED ORAL at 08:42

## 2018-10-21 RX ADMIN — CHOLECALCIFEROL CAP 125 MCG (5000 UNIT) 5000 UNITS: 125 CAP at 08:39

## 2018-10-21 RX ADMIN — FERROUS SULFATE TAB 325 MG (65 MG ELEMENTAL FE) 325 MG: 325 (65 FE) TAB at 08:39

## 2018-10-21 RX ADMIN — LISINOPRIL AND HYDROCHLOROTHIAZIDE 1 TABLET: 12.5; 1 TABLET ORAL at 08:39

## 2018-10-21 RX ADMIN — GLATIRAMER ACETATE 40 MG: 20 INJECTION, SOLUTION SUBCUTANEOUS at 08:42

## 2018-10-21 RX ADMIN — IBUPROFEN 800 MG: 200 TABLET, FILM COATED ORAL at 00:16

## 2018-10-21 RX ADMIN — VALACYCLOVIR HYDROCHLORIDE 500 MG: 500 TABLET, FILM COATED ORAL at 08:39

## 2018-10-21 RX ADMIN — SODIUM CHLORIDE: 9 INJECTION, SOLUTION INTRAVENOUS at 02:00

## 2018-10-21 ASSESSMENT — ACTIVITIES OF DAILY LIVING (ADL)
ADLS_ACUITY_SCORE: 11

## 2018-10-21 NOTE — PROGRESS NOTES
"[facility], [place]   Neurology Daily Note    Josefina ASAEL Aldrich  [id number]  10/21/2018    Subjective Data:  Ms Venkata Aldrich is feeling better this morning.  She reports some improvement in her headache, but still notes its presence.  She states that the headache is somewhat different than her usual headache and that it is primarily retro-orbital and exacerbated by eye movements.  She continues to complain of diplopia, further describing this as to clear images superimposed on each other rather than \"blurriness\".  She thinks that her right sided weakness is improving and the paresthesias she was noticing in her right fingertips and perioral areas are improving as well.    Objective Data:   /83 (BP Location: Left arm)  Pulse 79  Temp 97.5  F (36.4  C) (Oral)  Resp 16  Ht 1.651 m (5' 5\")  Wt 116.8 kg (257 lb 8 oz)  SpO2 98%  BMI 42.85 kg/m2  SpO2 data collected on RA     Neurologic:  - alert and oriented; language appropriate, follows commands  - visual fields intact by confrontation; pupils 5 mm symmetric, round and reactive to light; EOMI without nystagmus; no KELSI; no RAPD; diplopia resolves when one eye is closed  - no facial asymmetry; tongue movements full; palate rise symmetric; no dysarthria  - sensation grossly intact to light touch throughout  - muscle tone and bulk symmetric and appropriate; strength 5/5 on the left side, equivocal weakness on the right side with significant giveaway weakness in the upper and lower extremities, although she is able to achieve full strength for a very brief period of time  - DTR 2+ and symmetric throughout; no clonus; plantar reflex down  - finger-nose-finger intact BL    Constitutional: NAD, WDWN, cooperative with examination and interview  Cardiovascular: regular rhythm without murmurs  Respiratory: clear to auscultation without wheezes or rales  Chest: symmetric chest rise, no accessory muscle use  Abdominal: BS normal " active x 4  Extremities: no clubbing of digits, no pitting edema    Labs:  Recent Labs   Lab Test  10/20/18   0738  10/19/18   2155  08/13/18   1326  02/16/18   1427  01/03/18   0840   HGB   --   10.4*   --   10.7*  10.6*   WBC   --   12.6*  10.8  7.7  5.7   PLT   --   439   --   427  412   NA  142   --    --   138  136   POTASSIUM  3.8   --    --   4.2  4.5   CR  0.89  1.08*   --   1.26*  1.30*   BUN  17   --    --   25  22   GLC  92   --    --   81  96       Imaging: MRI report from 10/8 reviewed in the chart    Assessment and Plan:  Ms Venkata Aldrich is a 43-year-old woman with history of multiple sclerosis and migraines currently admitted to the neurology service for treatment of status migrainosus.  She is also complaining of weakness which is thought to be secondary to her multiple sclerosis as an MRI on October 3 failed to show evidence of new demyelinating lesions.    #status migrainosus: Minimal relief on DHE protocol, combination of diphenhydramine, valproic acid, and hydrocortisone attempted yesterday with moderate relief.  She would like to work on transitioning to an oral regimen to permit her discharge.  -Continue nortriptyline 50 mg at bedtime  - Continue topiramate 50 mg in the morning and 100 mg at night  -Repeat combination of diphenhydramine, valproic acid, and hydrocortisone today  - IV fluids    #Multiple sclerosis  - Continue every other day Copaxone  -Continue vitamin D supplement  -Follow-up in clinic with Dr. Castaneda 11/29    #Asthma-no chronic treatments, no recent exacerbation, currently doing well from respiratory standpoint    #Hypertension  -Continue lisinopril-hydrochlorothiazide    #Herpes zoster-continue  valacyclovir    FEN: Electrolytes replaced PRN, regular diet, normal saline 100 mL/h  Prophylaxis: SCD, subcutaneous enoxaparin  Code Status: Full code    Dispo: Anticipate discharge home in 0-1 days pending adequate relief of headache symptoms.    This patient will be seen and  discussed with staff neurologist, Dr. Serrano, during morning rounds.  The plan as described above may change after rounds.  Please see attending attestation for final plan.    Ozzie Rawls, DO  PGY4 Neurology   Neurology Service  10/21/2018    Note dictated with the assistance of dragon and may contain word replacement or grammatical errors.

## 2018-10-21 NOTE — PLAN OF CARE
Problem: Patient Care Overview  Goal: Plan of Care/Patient Progress Review  Outcome: Improving  Headache improving, but not gone.  IV fluids at 75ml/hour  Vision slightly blurred but not double.  Still photophobic.  Continue POC.

## 2018-10-21 NOTE — DISCHARGE SUMMARY
[facility]  NEUROLOGY DISCHARGE SUMMARY    Patient Name:  Josefina Aldrich  MRN:  [id number]      :  1975      Date of Admission:  10/19/2018  Date of Discharge::  10/21/2018  1:39 PM  Admitting Physician:  Brenda Serrano MD  Discharge Physician:  Ozzie Rawls DO PGY4 Neurology  Attending Physician:            Brenda Serrano MD   Primary Care Provider:    Miya Crump  Discharge Disposition:    Discharged to home    Admission Diagnoses:  1. Status migrainosus  2. Multiple sclerosis    Discharge Diagnoses:    1. Status migrainosus  2. Multiple sclerosis    Brief HPI:   Ms Venkata Aldrich is a 42yo woman with history of multiple sclerosis admitted to the neurology service for management of status migrainosus.     Please see H&P dated 10/19/2018 for further details.    Hospital Course:  Ms Venkata Aldrich was admitted to the neurology service 10/19 for treatment of status migrainosus.  On the first day she was treated with dihydroergotamine, however she did not receive significant relief from this medication.  Following day she was treated with a combination of diphenhydramine, valproic acid, and hydrocortisone by IV.  This combination provided moderate relief.  At that point she determined that her headache had been improved to the point of being able to discharged home.  Her doses of nortriptyline and topiramate were increased.  She was discharged home on a Medrol Dosepak and advised to follow-up in clinic with Dr. Castaneda on  as previously scheduled.  She felt that her multiple sclerosis symptoms that had worsened acutely had improved to near baseline at the time for discharge.    Pertinent Investigations:  No imaging was indicated during this admission.  Basic laboratory values were not suggestive of acute metabolic or medical problems.    Consultations:    No consultations during this admission    Discharge Medications:  Discharge  Medication List as of 10/21/2018  1:16 PM      START taking these medications    Details   clonazePAM (KLONOPIN) 0.5 MG tablet Take 0.5-1 tablets (0.25-0.5 mg) by mouth 2 times daily as needed for anxiety, Disp-3 tablet, R-0, Local Print      methylPREDNISolone (MEDROL DOSEPAK) 4 MG tablet Follow package instructions, Disp-21 tablet, R-0, E-Prescribe         CONTINUE these medications which have CHANGED    Details   nortriptyline (PAMELOR) 50 MG capsule Take 1 capsule (50 mg) by mouth At Bedtime, Disp-90 capsule, R-3, E-Prescribe      !! topiramate (TOPAMAX) 100 MG tablet Take 1 tablet (100 mg) by mouth daily, Disp-60 tablet, R-3, E-Prescribe      !! topiramate (TOPAMAX) 50 MG tablet Take 1 tablet (50 mg) by mouth At Bedtime, Disp-60 tablet, R-3, E-Prescribe       !! - Potential duplicate medications found. Please discuss with provider.      CONTINUE these medications which have NOT CHANGED    Details   acetaminophen (TYLENOL) 500 MG tablet Take 2 tablets (1,000 mg) by mouth 3 times daily as needed for mild pain, Disp-100 tablet, R-0, OTC      albuterol (2.5 MG/3ML) 0.083% nebulizer solution Inhale 2.5 mg into the lungs, Historical      albuterol (VENTOLIN HFA) 108 (90 BASE) MCG/ACT inhaler Inhale 2 puffs into the lungs, Historical      cholecalciferol (VITAMIN D3) 5000 UNITS TABS tablet Take 1 tablet (5,000 Units) by mouth daily, OTC      cyclobenzaprine (FLEXERIL) 10 MG tablet Take 10 mg by mouth 3 times daily as needed, Historical      Ferrous Sulfate (IRON SUPPLEMENT PO) Historical      Glatiramer Acetate (COPAXONE) 40 MG/ML SOSY Inject 40 mg Subcutaneous three times a week, Disp-12 Syringe, R-11, E-Prescribe      IBUPROFEN PO Take 800 mg by mouth every 4 hours as needed for moderate pain , Historical      lisinopril-hydrochlorothiazide (PRINZIDE/ZESTORETIC) 10-12.5 MG per tablet TAKE 1 TABLET BY MOUTH DAILY, Disp-90 tablet, R-1, E-Prescribe      naproxen (NAPROSYN) 500 MG tablet Take 1 tablet (500 mg) by mouth  "2 times daily as needed for moderate pain, Disp-60 tablet, R-1, E-Prescribe      prochlorperazine (COMPAZINE) 10 MG tablet Take 1 tablet (10 mg) by mouth every 6 hours as needed for nausea or vomiting, Disp-20 tablet, R-1, E-Prescribe      SUMAtriptan (IMITREX) 50 MG tablet Take one table now for intractable headache and repeat x 1 in one hour if headache persists; do not use more than 2 days/week, Disp-30 tablet, R-0, E-Prescribe      triamcinolone (KENALOG) 0.1 % cream Apply sparingly to affected area three times dailyDisp-30 g, [id number]      valACYclovir (VALTREX) 500 MG tablet Take 1 tablet (500 mg) by mouth daily, Disp-90 tablet, R-1, E-Prescribe         STOP taking these medications       predniSONE (DELTASONE) 20 MG tablet Comments:   Reason for Stopping:               Discharge physical examination:   Vitals: /86 (BP Location: Left arm)  Pulse 69  Temp 98.2  F (36.8  C) (Oral)  Resp 16  Ht 1.651 m (5' 5\")  Wt 116.8 kg (257 lb 8 oz)  SpO2 97%  BMI 42.85 kg/m2  Neurologic:  - alert and oriented; language appropriate, follows commands  - visual fields intact by confrontation; pupils 5 mm symmetric, round and reactive to light; EOMI without nystagmus; no KELSI; no RAPD; diplopia resolves when one eye is closed  - no facial asymmetry; tongue movements full; palate rise symmetric; no dysarthria  - sensation grossly intact to light touch throughout  - muscle tone and bulk symmetric and appropriate; strength 5/5 on the left side, equivocal weakness on the right side with significant giveaway weakness in the upper and lower extremities, although she is able to achieve full strength for a very brief period of time  - DTR 2+ and symmetric throughout; no clonus; plantar reflex down  - finger-nose-finger intact BL     Constitutional: NAD, WDWN, cooperative with examination and interview  Cardiovascular: regular rhythm without murmurs  Respiratory: clear to auscultation without wheezes or rales  Chest: " symmetric chest rise, no accessory muscle use  Abdominal: BS normal active x 4  Extremities: no clubbing of digits, no pitting edema    Discharge follow up and instructions:    1.  Diet:   Regular  2.  Activity:  Activity as tolerated, okay to return to work on 10/23  3.  Restrictions: None  4.  Follow up:  Follow up with Dr. Castaneda on 11/29 as scheduled, PCP in 7 days    This patient was seen and discussed with attending neurologist, Dr. Zach Rawls, DO   PGY4 Neurology    CC:  Tonya    I saw and evaluated the patient with the resident and agree with the assessment and plan. I was present for examination.    Brenda Serrano MD  Chief, Multiple Sclerosis Division  Department of Neurology  [facility] [place]

## 2018-10-21 NOTE — PLAN OF CARE
"Problem: Patient Care Overview  Goal: Plan of Care/Patient Progress Review  Outcome: No Change  /83 (BP Location: Left arm)  Pulse 79  Temp 97.5  F (36.4  C) (Oral)  Resp 16  Ht 1.651 m (5' 5\")  Wt 116.8 kg (257 lb 8 oz)  SpO2 98%  BMI 42.85 kg/m2  VS: VSS on RA  Neuro: Hx of MS, numbness/tingling in both R-side extremeties (hand/foot) with some slight weakness (4/5). Headache has remained the same overnight, still photophobic. Vision has improved slightly blurred and no longer double.  IV: PIVx1 RUE 100ml NS/hr   Pain: Headache is tolerable with medications  Activity: SBA with gait belt, calls appropriately  See flow sheets for further details and assessments.  Plan of Care: continue to monitor, notify MD of significant changes.      "

## 2018-10-21 NOTE — PLAN OF CARE
Problem: Patient Care Overview  Goal: Plan of Care/Patient Progress Review  Outcome: Adequate for Discharge Date Met: 10/21/18  Pt here for MS exacerbation, vss, neuros include: a/o x4, 5/5 throughout, photophobia and blurred vision w/improvement, N/T in R hand (fingertips) and RLE (toes). PIV running NS @ 75ml/hr, regular diet, up ad laney, voiding spont, BM this morning, received x1 dose of PRN Ibuprofen for persistent HA w/relief. RN discussed discharge education w/pt and her mother, transport to bring pt down to pharmacy and front door where mother is taking her home.     *RN gave pt her medication that she brought from home.

## 2018-10-22 ENCOUNTER — TELEPHONE (OUTPATIENT)
Dept: FAMILY MEDICINE | Facility: CLINIC | Age: 43
End: 2018-10-22

## 2018-10-22 ENCOUNTER — PATIENT OUTREACH (OUTPATIENT)
Dept: CARE COORDINATION | Facility: CLINIC | Age: 43
End: 2018-10-22

## 2018-10-22 LAB — INTERPRETATION ECG - MUSE: NORMAL

## 2018-10-22 NOTE — TELEPHONE ENCOUNTER
"ED / Discharge Outreach Protocol    Patient Contact    Attempt # 1    Was call answered?  Yes.  \"May I please speak with Josefina\"  Is patient available?   Yes    Hospital/TCU/ED for chronic condition Discharge Protocol    \"Hi, my name is Ritika Teran, a registered nurse, and I am calling from [facility].  I am calling to follow up and see how things are going for you after your recent emergency visit/hospital/TCU stay.\"    Tell me how you are doing now that you are home?\" pt is doing well. To return to work this Thursday 10/25/18. Pt unsure if they will be ready by then.  Advised pt to be seen this week to determine if they need an extension of this letter, or if they are feeling better.       Discharge Instructions    \"Let's review your discharge instructions.  What is/are the follow-up recommendations?  Pt. Response: take medications as prescribed with increase in topamax, nortriptyline, prednisone etc. Follow up with PCP if needed.      \"Has an appointment with your primary care provider been scheduled?\"   No (schedule appointment)    \"When you see the provider, I would recommend that you bring your medications with you.\"    Medications    \"Tell me what changed about your medicines when you discharged?\"    Changes to chronic meds?    2 or more - Epic MTM referral needed. Pt went over meds with triage.     \"What questions do you have about your medications?\"    None     New diagnoses of heart failure, COPD, diabetes, or MI?    No              Medication reconciliation completed? Yes  Was MTM referral placed (*Make sure to put transitions as reason for referral)?   No    Call Summary    \"What questions or concerns do you have about your recent visit and your follow-up care?\"     none    \"If you have questions or things don't continue to improve, we encourage you contact us through the main clinic number (give number).  Even if the clinic is not open, triage nurses are available 24/7 to help you.     We would " "like you to know that our clinic has extended hours (provide information).  We also have urgent care (provide details on closest location and hours/contact info)\"      \"Thank you for your time and take care!\"       Addie Teran RN  [facility]          "

## 2018-10-22 NOTE — TELEPHONE ENCOUNTER
Patient discharged from [facility] for inpatient hospital stay on 10/21 for migraine with aura and w/o status migrainosus, not intractable.    Please contact patient to follow up; no appointment scheduled at this time.    ER / IP:  0/1    Care Coordination:  fletcher Miller

## 2018-10-24 ENCOUNTER — OFFICE VISIT (OUTPATIENT)
Dept: FAMILY MEDICINE | Facility: CLINIC | Age: 43
End: 2018-10-24
Payer: COMMERCIAL

## 2018-10-24 VITALS
WEIGHT: 258 LBS | DIASTOLIC BLOOD PRESSURE: 66 MMHG | SYSTOLIC BLOOD PRESSURE: 112 MMHG | TEMPERATURE: 98.6 F | HEART RATE: 111 BPM | BODY MASS INDEX: 42.93 KG/M2 | OXYGEN SATURATION: 99 %

## 2018-10-24 DIAGNOSIS — G35 MS (MULTIPLE SCLEROSIS) (H): ICD-10-CM

## 2018-10-24 DIAGNOSIS — Z23 NEED FOR PROPHYLACTIC VACCINATION AND INOCULATION AGAINST INFLUENZA: ICD-10-CM

## 2018-10-24 DIAGNOSIS — G43.109 MIGRAINE WITH AURA AND WITHOUT STATUS MIGRAINOSUS, NOT INTRACTABLE: ICD-10-CM

## 2018-10-24 DIAGNOSIS — H53.2 DIPLOPIA: ICD-10-CM

## 2018-10-24 DIAGNOSIS — R29.898 DECREASED GRIP STRENGTH OF RIGHT HAND: ICD-10-CM

## 2018-10-24 DIAGNOSIS — R20.0 NUMBNESS OF RIGHT FOOT: ICD-10-CM

## 2018-10-24 DIAGNOSIS — G43.901 STATUS MIGRAINOSUS: Primary | ICD-10-CM

## 2018-10-24 PROCEDURE — 96372 THER/PROPH/DIAG INJ SC/IM: CPT | Performed by: PHYSICIAN ASSISTANT

## 2018-10-24 PROCEDURE — 90686 IIV4 VACC NO PRSV 0.5 ML IM: CPT | Performed by: PHYSICIAN ASSISTANT

## 2018-10-24 PROCEDURE — 90471 IMMUNIZATION ADMIN: CPT | Performed by: PHYSICIAN ASSISTANT

## 2018-10-24 PROCEDURE — 99496 TRANSJ CARE MGMT HIGH F2F 7D: CPT | Performed by: PHYSICIAN ASSISTANT

## 2018-10-24 RX ORDER — TOPIRAMATE 100 MG/1
100 TABLET, FILM COATED ORAL AT BEDTIME
Qty: 60 TABLET | Refills: 3
Start: 2018-10-24 | End: 2020-09-08

## 2018-10-24 RX ORDER — TOPIRAMATE 50 MG/1
50 TABLET, FILM COATED ORAL AT BEDTIME
Qty: 60 TABLET | Refills: 3
Start: 2018-10-24 | End: 2020-09-08

## 2018-10-24 RX ORDER — KETOROLAC TROMETHAMINE 30 MG/ML
60 INJECTION, SOLUTION INTRAMUSCULAR; INTRAVENOUS ONCE
Qty: 2 ML | Refills: 0 | OUTPATIENT
Start: 2018-10-24 | End: 2019-03-20

## 2018-10-24 NOTE — PROGRESS NOTES
SUBJECTIVE:   Josefina VYAS Venkata Aldrich is a 43 year old female who presents to clinic today for the following health issues:          Hospital Follow-up Visit:    Hospital/Nursing Home/IP Rehab Facility: [facility]  Date of Admission: 10/19/18  Date of Discharge: 10/21/18  Reason(s) for Admission: migraine and multiple sclerois            Problems taking medications regularly:  None       Medication changes since discharge: None       Problems adhering to non-medication therapy:  None    Summary of hospitalization:  [facility]   Ms Venkata Aldrich was admitted to the neurology service 10/19 for treatment of status migrainosus.  On the first day she was treated with dihydroergotamine, however she did not receive significant relief from this medication.  Following day she was treated with a combination of diphenhydramine, valproic acid, and hydrocortisone by IV.  This combination provided moderate relief.  At that point she determined that her headache had been improved to the point of being able to discharged home.  Her doses of nortriptyline and topiramate were increased.  She was discharged home on a Medrol Dosepak and advised to follow-up in clinic with Dr. Castaneda on 11/29 as previously scheduled.  She felt that her multiple sclerosis symptoms that had worsened acutely had improved to near baseline at the time for discharge.    Diagnostic Tests/Treatments reviewed.  Follow up needed: none  Other Healthcare Providers Involved in Patient s Care:         Specialist appointment - Neurology on 11/29 with Dr. Castaneda  Update since discharge: worsened/unchanged    Post Discharge Medication Reconciliation: discharge medications reconciled and changed, per note/orders (see AVS).  Plan of care communicated with patient     Coding guidelines for this visit:  Type of Medical   Decision Making Face-to-Face Visit       within 7 Days of discharge Face-to-Face Visit        within 14 days of  discharge   Moderate Complexity [id number]   High Complexity [id number]          Patient present today still experiencing severe migrainous sx and wearing sunglasses as light is extremely painful. Tense HA started 3 weeks ago behind her eyes and has migrated to the occiput. Despite the HA her biggest concern is her double vision and her hand/foot numbness/weakness. Foot numbness is increasing. Pt states she was informed in the hospital her weakened  strength and foot numbness is not related to her MS. Double vision is causing her trouble when looking at her phone, reading a piece of paper and while driving. She reports she has 3 1/2 days left of medrol dospak prescribed from Hospital.  Topamax was increases from 125 mg to 150 mg (50 mg in the morning and 100 mg at night) Pt is not doing well with this regimen. Topamax makes her sleepy and she cannot drive after taking it affecting her ADL's. Nortriptyline was increased from 25 mg to 50 mg. She has Imatrex however, it does not work. Naproxen has cause nausea in the past but usually resolves after eating crackers. Denies having Toradol injection in the past. MRI on 10/16/18 found no abnormal findings except those consistent with clinical Hx of demyelinating disease. Pt is following up with Neurology on 11/29 who is not repeating an MRI. Pt has not been back to work since 10/9/2018. Pt reports she is overall, just exhausted.     Anxiety  Patient was started on Clonazepam 0.25-0.5 mg 2x/day upon discharge. To early to tell if medication is tolerated well.       Problem list and histories reviewed & adjusted, as indicated.  Additional history: as documented    Patient Active Problem List   Diagnosis     Migraine     Asthma     MS (multiple sclerosis) (H)     Left pontine stroke (H)     Anemia     Backache     Body mass index 45.0-49.9, adult (H)     Cognitive changes     Depression with anxiety     Fever postop     Uterine leiomyoma     Headache     Heavy menses  "    Hypersomnia with sleep apnea     Iron deficiency anemia     Leukocytosis     Postsurgical nonabsorption     Mild intermittent asthma     Morbid obesity (H)     Myalgia and myositis     Neck pain     Other dyspnea and respiratory abnormality     Pain, neuropathic     Pelvic pain in female     Personal history of allergy to latex     Post concussion syndrome     Right shoulder pain     S/P gastric bypass     S/P hysterectomy     Bariatric surgery status     Vitamin B12 deficiency     Vitamin D deficiency     Vomiting following gastrointestinal surgery     Hypermagnesemia     Benign essential hypertension     Neck pain on left side     Mild major depression (H)     Past Surgical History:   Procedure Laterality Date     GASTRIC BYPASS  2002    \"Trouble with B12 and D\"     HYSTERECTOMY, MONO  2013    cervix gone.  fibroids.  No BSO     TONSILLECTOMY         Social History   Substance Use Topics     Smoking status: Former Smoker     Types: Cigars     Smokeless tobacco: Never Used     Alcohol use 1.2 oz/week     2 Standard drinks or equivalent per week      Comment: 0-3 drinks per week     Family History   Problem Relation Age of Onset     Lupus Paternal Aunt 41     Multiple Sclerosis No family hx of          Current Outpatient Prescriptions   Medication Sig Dispense Refill     acetaminophen (TYLENOL) 500 MG tablet Take 2 tablets (1,000 mg) by mouth 3 times daily as needed for mild pain 100 tablet 0     albuterol (2.5 MG/3ML) 0.083% nebulizer solution Inhale 2.5 mg into the lungs       albuterol (VENTOLIN HFA) 108 (90 BASE) MCG/ACT inhaler Inhale 2 puffs into the lungs       cholecalciferol (VITAMIN D3) 5000 UNITS TABS tablet Take 1 tablet (5,000 Units) by mouth daily       clonazePAM (KLONOPIN) 0.5 MG tablet Take 0.5-1 tablets (0.25-0.5 mg) by mouth 2 times daily as needed for anxiety 3 tablet 0     cyclobenzaprine (FLEXERIL) 10 MG tablet Take 10 mg by mouth 3 times daily as needed       Ferrous Sulfate (IRON " SUPPLEMENT PO)        Glatiramer Acetate (COPAXONE) 40 MG/ML SOSY Inject 40 mg Subcutaneous three times a week 12 Syringe 11     IBUPROFEN PO Take 800 mg by mouth every 4 hours as needed for moderate pain        ketorolac (TORADOL) 60 MG/2ML SOLN injection Inject 2 mLs (60 mg) into the muscle once for 1 dose 2 mL 0     lisinopril-hydrochlorothiazide (PRINZIDE/ZESTORETIC) 10-12.5 MG per tablet TAKE 1 TABLET BY MOUTH DAILY 90 tablet 1     methylPREDNISolone (MEDROL DOSEPAK) 4 MG tablet Follow package instructions 21 tablet 0     naproxen (NAPROSYN) 500 MG tablet Take 1 tablet (500 mg) by mouth 2 times daily as needed for moderate pain 60 tablet 1     nortriptyline (PAMELOR) 50 MG capsule Take 1 capsule (50 mg) by mouth At Bedtime 90 capsule 3     prochlorperazine (COMPAZINE) 10 MG tablet Take 1 tablet (10 mg) by mouth every 6 hours as needed for nausea or vomiting 20 tablet 1     SUMAtriptan (IMITREX) 50 MG tablet Take one table now for intractable headache and repeat x 1 in one hour if headache persists; do not use more than 2 days/week 30 tablet 0     topiramate (TOPAMAX) 100 MG tablet Take 1 tablet (100 mg) by mouth At Bedtime (take with 50 mg to total 150 mg nightly) 60 tablet 3     topiramate (TOPAMAX) 50 MG tablet Take 1 tablet (50 mg) by mouth At Bedtime (take with 100 mg to total 150 mg nightly) 60 tablet 3     triamcinolone (KENALOG) 0.1 % cream Apply sparingly to affected area three times daily 30 g 1     valACYclovir (VALTREX) 500 MG tablet Take 1 tablet (500 mg) by mouth daily 90 tablet 1     [DISCONTINUED] topiramate (TOPAMAX) 100 MG tablet Take 1 tablet (100 mg) by mouth daily 60 tablet 3     [DISCONTINUED] topiramate (TOPAMAX) 50 MG tablet Take 1 tablet (50 mg) by mouth At Bedtime 60 tablet 3     Allergies   Allergen Reactions     Aspirin Difficulty breathing, Palpitations and Other (See Comments)     Adhesive Tape      Azithromycin Unknown     Latex Hives, Itching and Rash     Penicillins Cramps, GI  Disturbance, Nausea and Vomiting, Rash, Swelling, Other (See Comments) and Hives       Reviewed and updated as needed this visit by clinical staff  Tobacco  Allergies  Meds       Reviewed and updated as needed this visit by Provider  Allergies  Meds         ROS:  Constitutional, HEENT, cardiovascular, pulmonary, GI, , musculoskeletal, neuro, skin, endocrine and psych systems are negative, except as otherwise noted.    This document serves as a record of the services and decisions personally performed and made by ANDREW Thurston. It was created on her behalf by Sonia Mendes, a trained medical scribe. The creation of this document is based on the provider's statements to the medical scribe.  Sonia Mendes October 24, 2018 12:29 PM      OBJECTIVE:     /66  Pulse 111  Temp 98.6  F (37  C) (Oral)  Wt 258 lb (117 kg)  SpO2 99%  BMI 42.93 kg/m2  Body mass index is 42.93 kg/(m^2).  GENERAL: healthy, alert and no distress  EYES: Eye exam not performed secondary to visual sensitivity to light  MS: decreased  strength on right  SKIN: no suspicious lesions or rashes  NEURO: Normal strength and tone, mentation intact and speech normal  PSYCH: mentation appears normal, affect normal/bright      Diagnostic Test Results:  none     ASSESSMENT/PLAN:     Josefina was seen today for hospital f/u.    Diagnoses and all orders for this visit:    Status migrainosus, MS (multiple sclerosis) (H), Diplopia  Toradol injection administered today by MA. Go home and sleep. Fatigue worsening MS symptoms.   -     ketorolac (TORADOL) 60 MG/2ML SOLN injection; Inject 2 mLs (60 mg) into the muscle once for 1 dose    Migraine with aura and without status migrainosus, not intractable   Topiromate take all 150mg at night. Go home and sleep. Audiobooks only; no tv or screens just ocular relaxation. Keep me posted within 2 days if HA does not go away.  -     topiramate (TOPAMAX) 100 MG tablet; Take 1 tablet (100 mg) by mouth At  Bedtime (take with 50 mg to total 150 mg nightly)  -     topiramate (TOPAMAX) 50 MG tablet; Take 1 tablet (50 mg) by mouth At Bedtime (take with 100 mg to total 150 mg nightly)  -     KETOROLAC TROMETHAMINE 15MG  -     INJECTION INTRAMUSCULAR OR SUB-Q    Decreased  strength of right hand, Numbness of right foot  Monitoring if sx will resolve with resolution of HA.     Need for prophylactic vaccination and inoculation against influenza  Administered in clinic today by MA.  -     FLU VACCINE, SPLIT VIRUS, IM (QUADRIVALENT) [98001]- >3 YRS  -     Vaccine Administration, Initial [74671]    Work note given today.    Return in about 2 days (around 10/26/2018) for with update (mychart or call).    The information in this document, created by the medical scribe for me, accurately reflects the services I personally performed and the decisions made by me. I have reviewed and approved this document for accuracy prior to leaving the patient care area.  October 24, 2018 12:29 PM      Miya Crump PA-C  [facility]

## 2018-10-24 NOTE — LETTER
[facility]  [address] [place] [id number]                                                                                                       [phone]    October 24, 2018    Josefina Lipscombs  [id number] [id number]  [facility] [address]      To Whom it May Concern:    The above patient is unable to attend work for 10/9/2018-10/26/2018 due to a medical issue.  This may be extended pending response to treatment.  Please contact me with questions or concerns.      Sincerely,    Miya Crump PA-C  
not examined

## 2018-10-24 NOTE — MR AVS SNAPSHOT
After Visit Summary   10/24/2018    Josefina Aldrich    MRN: [id number]           Patient Information     Date Of Birth          1975        Visit Information        Provider Department      10/24/2018 11:40 AM Miya Crump PA-C [facility]        Today's Diagnoses     Status migrainosus    -  1    MS (multiple sclerosis) (H)        Diplopia        Migraine with aura and without status migrainosus, not intractable        Decreased  strength of right hand        Numbness of right foot           Follow-ups after your visit        Follow-up notes from your care team     Return in about 2 days (around 10/26/2018) for with update (mychart or call).      Your next 10 appointments already scheduled     Nov 29, 2018  8:30 AM CST   MR CERVICAL SPINE W/O & W CONTRAST with [address] [facility] [place] [facility] ([facility])    [address] [place] [id number] [phone]           How do I prepare for my exam? (Food and drink instructions) **If you will be receiving sedation or general anesthesia, please see special notes below.**  How do I prepare for my exam? (Other instructions) Take your medicines as usual, unless your doctor tells you not to. You may or may not receive intravenous (IV) contrast for this exam pending the discretion of the Radiologist.  You do not need to do anything special to prepare.  **If you will be receiving sedation or general anesthesia, please see special notes below.**  What should I wear: The MRI machine uses a strong magnet. Please wear clothes without metal (snaps, zippers). A sweatsuit works well, or we may give you a hospital gown. Please remove any body piercings and hair extensions before you arrive. You will also remove watches, jewelry, hairpins, wallets, dentures, partial dental plates and hearing aids. You may wear contact lenses, and you may be able to wear your rings. We have a  safe place to keep your personal items, but it is safer to leave them at home.  How long does the exam take: Most tests take 30 to 60 minutes.  HOWEVER, IF YOUR DOCTOR PRESCRIBES ANESTHESIA please plan on spending four to five hours in the recovery room.  What should I bring:  Bring a list of your current medicines to your exam (including vitamins, minerals and over-the-counter drugs).  Do I need a :  **If you will be receiving sedation or general anesthesia, please see special notes below.**  What should I do after the exam: No Restrictions, You may resume normal activities.  What is this test: MRI (magnetic resonance imaging) uses a strong magnet and radio waves to look inside the body. An MRA (magnetic resonance angiogram) does the same thing, but it lets us look at your blood vessels. A computer turns the radio waves into pictures showing cross sections of the body, much like slices of bread. This helps us see any problems more clearly. You may receive fluid (called  contrast ) before or during your scan. The fluid helps us see the pictures better. We give the fluid through an IV (small needle in your arm).  Who should I call with questions:  Please call the Imaging Department at your exam site with any questions. Directions, parking instructions, and other information is available on our website, [email or web address].  How do I prepare if I m having sedation or anesthesia? **IMPORTANT** THE INSTRUCTIONS BELOW ARE ONLY FOR THOSE PATIENTS WHO HAVE BEEN TOLD THEY WILL RECEIVE SEDATION OR GENERAL ANESTHESIA DURING THEIR MRI PROCEDURE:  IF YOU WILL RECEIVE SEDATION (take medicine to help you relax during your exam): You must get the medicine from your doctor before you arrive. Bring the medicine to the exam. Do not take it at home. Arrive one hour early. Bring someone who can take you home after the test. Your medicine will make you sleepy. After the exam, you may not drive, take a bus or take a taxi by  yourself. No eating 8 hours before your exam. You may have clear liquids up until 4 hours before your exam. (Clear liquids include water, clear tea, black coffee and fruit juice without pulp.)  IF YOU WILL RECEIVE ANESTHESIA (be asleep for your exam): Arrive 1 1/2 hours early. Bring someone who can take you home after the test. You may not drive, take a bus or take a taxi by yourself. No eating 8 hours before your exam. You may have clear liquids up until 4 hours before your exam. (Clear liquids include water, clear tea, black coffee and fruit juice without pulp.)            Nov 29, 2018  9:15 AM CST   MR BRAIN W/O & W CONTRAST with [address] [facility] ([facility])    [address] [address] [phone]           How do I prepare for my exam? (Food and drink instructions) **If you will be receiving sedation or general anesthesia, please see special notes below.**  How do I prepare for my exam? (Other instructions) Take your medicines as usual, unless your doctor tells you not to. You may or may not receive intravenous (IV) contrast for this exam pending the discretion of the Radiologist.  You do not need to do anything special to prepare.  **If you will be receiving sedation or general anesthesia, please see special notes below.**  What should I wear: The MRI machine uses a strong magnet. Please wear clothes without metal (snaps, zippers). A sweatsuit works well, or we may give you a hospital gown. Please remove any body piercings and hair extensions before you arrive. You will also remove watches, jewelry, hairpins, wallets, dentures, partial dental plates and hearing aids. You may wear contact lenses, and you may be able to wear your rings. We have a safe place to keep your personal items, but it is safer to leave them at home.  How long does the exam take: Most tests take 30 to 60 minutes.  HOWEVER, IF YOUR DOCTOR PRESCRIBES ANESTHESIA  please plan on spending four to five hours in the recovery room.  What should I bring:  Bring a list of your current medicines to your exam (including vitamins, minerals and over-the-counter drugs).  Do I need a :  **If you will be receiving sedation or general anesthesia, please see special notes below.**  What should I do after the exam: No Restrictions, You may resume normal activities.  What is this test: MRI (magnetic resonance imaging) uses a strong magnet and radio waves to look inside the body. An MRA (magnetic resonance angiogram) does the same thing, but it lets us look at your blood vessels. A computer turns the radio waves into pictures showing cross sections of the body, much like slices of bread. This helps us see any problems more clearly. You may receive fluid (called  contrast ) before or during your scan. The fluid helps us see the pictures better. We give the fluid through an IV (small needle in your arm).  Who should I call with questions:  Please call the Imaging Department at your exam site with any questions. Directions, parking instructions, and other information is available on our website, [email or web address].  How do I prepare if I m having sedation or anesthesia? **IMPORTANT** THE INSTRUCTIONS BELOW ARE ONLY FOR THOSE PATIENTS WHO HAVE BEEN TOLD THEY WILL RECEIVE SEDATION OR GENERAL ANESTHESIA DURING THEIR MRI PROCEDURE:  IF YOU WILL RECEIVE SEDATION (take medicine to help you relax during your exam): You must get the medicine from your doctor before you arrive. Bring the medicine to the exam. Do not take it at home. Arrive one hour early. Bring someone who can take you home after the test. Your medicine will make you sleepy. After the exam, you may not drive, take a bus or take a taxi by yourself. No eating 8 hours before your exam. You may have clear liquids up until 4 hours before your exam. (Clear liquids include water, clear tea, black coffee and fruit juice without pulp.)  IF  YOU WILL RECEIVE ANESTHESIA (be asleep for your exam): Arrive 1 1/2 hours early. Bring someone who can take you home after the test. You may not drive, take a bus or take a taxi by yourself. No eating 8 hours before your exam. You may have clear liquids up until 4 hours before your exam. (Clear liquids include water, clear tea, black coffee and fruit juice without pulp.)            Nov 29, 2018 10:00 AM CST   (Arrive by 9:45 AM)   Return Multiple Sclerosis with Radames Castaneda MD   [facility] Multiple [facility]clerosis ([facility])    [address] [address]  [address] [place] [address] [phone]              Who to contact     If you have questions or need follow up information about today's clinic visit or your schedule please contact [facility] directly at [phone].  Normal or non-critical lab and imaging results will be communicated to you by MyChart, letter or phone within 4 business days after the clinic has received the results. If you do not hear from us within 7 days, please contact the clinic through [employer] or phone. If you have a critical or abnormal lab result, we will notify you by phone as soon as possible.  Submit refill requests through [employer] or call your pharmacy and they will forward the refill request to us. Please allow 3 business days for your refill to be completed.          Additional Information About Your Visit        MyChart Information     [employer] gives you secure access to your electronic health record. If you see a primary care provider, you can also send messages to your care team and make appointments. If you have questions, please call your primary care clinic.  If you do not have a primary care provider, please call [phone] and they will assist you.        Care EveryWhere ID     This is your Care EveryWhere ID. This could be used by other organizations to access your [place] medical records  [id number]        Your Vitals  Were     Pulse Temperature Pulse Oximetry BMI (Body Mass Index)          111 98.6  F (37  C) (Oral) 99% 42.93 kg/m2         Blood Pressure from Last 3 Encounters:   10/24/18 112/66   10/21/18 129/86   10/11/18 110/71    Weight from Last 3 Encounters:   10/24/18 258 lb (117 kg)   10/19/18 257 lb 8 oz (116.8 kg)   10/11/18 250 lb 4.8 oz (113.5 kg)              Today, you had the following     No orders found for display         Today's Medication Changes          These changes are accurate as of 10/24/18 12:33 PM.  If you have any questions, ask your nurse or doctor.               Start taking these medicines.        Dose/Directions    ketorolac 60 MG/2ML Soln injection   Commonly known as:  TORADOL   Used for:  Diplopia, Status migrainosus, MS (multiple sclerosis) (H)   Started by:  Miya Crump PA-C        Dose:  60 mg   Inject 2 mLs (60 mg) into the muscle once for 1 dose   Quantity:  2 mL   Refills:  0         These medicines have changed or have updated prescriptions.        Dose/Directions    * topiramate 100 MG tablet   Commonly known as:  TOPAMAX   This may have changed:    - when to take this  - additional instructions   Used for:  Migraine with aura and without status migrainosus, not intractable   Changed by:  Miya Crump PA-C        Dose:  100 mg   Take 1 tablet (100 mg) by mouth At Bedtime (take with 50 mg to total 150 mg nightly)   Quantity:  60 tablet   Refills:  3       * topiramate 50 MG tablet   Commonly known as:  TOPAMAX   This may have changed:  additional instructions   Used for:  Migraine with aura and without status migrainosus, not intractable   Changed by:  Miya Crump PA-C        Dose:  50 mg   Take 1 tablet (50 mg) by mouth At Bedtime (take with 100 mg to total 150 mg nightly)   Quantity:  60 tablet   Refills:  3       * Notice:  This list has 2 medication(s) that are the same as other medications prescribed for you. Read the directions carefully, and ask your  doctor or other care provider to review them with you.         Where to get your medicines      Some of these will need a paper prescription and others can be bought over the counter.  Ask your nurse if you have questions.     You don't need a prescription for these medications     ketorolac 60 MG/2ML Soln injection    topiramate 100 MG tablet    topiramate 50 MG tablet                Primary Care Provider Office Phone # Fax #    Miya Crump PA-C [phone]       [address] [place] [address]        Equal Access to Services     [facility]: Hadii aad ku hadasho Soomaali, waaxda luqadaha, qaybta kaalmada adeegyada, mya rodrigues . [facility] [phone].    ATENCIÓN: Si habla español, tiene a barrera disposición servicios gratuitos de asistencia lingüística. [facility] [phone].    We comply with applicable federal civil rights laws and [place] laws. We do not discriminate on the basis of race, color, national origin, age, disability, sex, sexual orientation, or gender identity.            Thank you!     Thank you for choosing [facility]  for your care. Our goal is always to provide you with excellent care. Hearing back from our patients is one way we can continue to improve our services. Please take a few minutes to complete the written survey that you may receive in the mail after your visit with us. Thank you!             Your Updated Medication List - Protect others around you: Learn how to safely use, store and throw away your medicines at www.disposemymeds.org.          This list is accurate as of 10/24/18 12:33 PM.  Always use your most recent med list.                   Brand Name Dispense Instructions for use Diagnosis    acetaminophen 500 MG tablet    TYLENOL    100 tablet    Take 2 tablets (1,000 mg) by mouth 3 times daily as needed for mild pain    Cyst of left ovary       * VENTOLIN  (90 Base) MCG/ACT inhaler   Generic  drug:  albuterol      Inhale 2 puffs into the lungs        * albuterol (2.5 MG/3ML) 0.083% neb solution      Inhale 2.5 mg into the lungs        cholecalciferol 5000 units Tabs tablet    vitamin D3     Take 1 tablet (5,000 Units) by mouth daily    Hypovitaminosis D       clonazePAM 0.5 MG tablet    klonoPIN    3 tablet    Take 0.5-1 tablets (0.25-0.5 mg) by mouth 2 times daily as needed for anxiety    Migraine with aura and without status migrainosus, not intractable       cyclobenzaprine 10 MG tablet    FLEXERIL     Take 10 mg by mouth 3 times daily as needed        Glatiramer Acetate 40 MG/ML Sosy    COPAXONE    12 Syringe    Inject 40 mg Subcutaneous three times a week    Multiple sclerosis (H)       IBUPROFEN PO      Take 800 mg by mouth every 4 hours as needed for moderate pain        IRON SUPPLEMENT PO           ketorolac 60 MG/2ML Soln injection    TORADOL    2 mL    Inject 2 mLs (60 mg) into the muscle once for 1 dose    Diplopia, Status migrainosus, MS (multiple sclerosis) (H)       lisinopril-hydrochlorothiazide 10-12.5 MG per tablet    PRINZIDE/ZESTORETIC    90 tablet    TAKE 1 TABLET BY MOUTH DAILY    Benign essential hypertension       methylPREDNISolone 4 MG tablet    MEDROL DOSEPAK    21 tablet    Follow package instructions    Migraine with aura and without status migrainosus, not intractable       naproxen 500 MG tablet    NAPROSYN    60 tablet    Take 1 tablet (500 mg) by mouth 2 times daily as needed for moderate pain    Intractable migraine without aura and with status migrainosus       nortriptyline 50 MG capsule    PAMELOR    90 capsule    Take 1 capsule (50 mg) by mouth At Bedtime    Migraine with aura and without status migrainosus, not intractable       prochlorperazine 10 MG tablet    COMPAZINE    20 tablet    Take 1 tablet (10 mg) by mouth every 6 hours as needed for nausea or vomiting    Intractable migraine without aura and with status migrainosus       SUMAtriptan 50 MG tablet     IMITREX    30 tablet    Take one table now for intractable headache and repeat x 1 in one hour if headache persists; do not use more than 2 days/week    Intractable migraine without aura and with status migrainosus       * topiramate 100 MG tablet    TOPAMAX    60 tablet    Take 1 tablet (100 mg) by mouth At Bedtime (take with 50 mg to total 150 mg nightly)    Migraine with aura and without status migrainosus, not intractable       * topiramate 50 MG tablet    TOPAMAX    60 tablet    Take 1 tablet (50 mg) by mouth At Bedtime (take with 100 mg to total 150 mg nightly)    Migraine with aura and without status migrainosus, not intractable       triamcinolone 0.1 % cream    KENALOG    30 g    Apply sparingly to affected area three times daily    Eczema, unspecified type       valACYclovir 500 MG tablet    VALTREX    90 tablet    Take 1 tablet (500 mg) by mouth daily    HSV (herpes simplex virus) infection       * Notice:  This list has 4 medication(s) that are the same as other medications prescribed for you. Read the directions carefully, and ask your doctor or other care provider to review them with you.

## 2018-10-26 ENCOUNTER — APPOINTMENT (OUTPATIENT)
Dept: MRI IMAGING | Facility: CLINIC | Age: 43
End: 2018-10-26
Attending: EMERGENCY MEDICINE
Payer: COMMERCIAL

## 2018-10-26 ENCOUNTER — HOSPITAL ENCOUNTER (EMERGENCY)
Facility: CLINIC | Age: 43
Discharge: HOME | End: 2018-10-26
Attending: EMERGENCY MEDICINE | Admitting: EMERGENCY MEDICINE
Payer: COMMERCIAL

## 2018-10-26 ENCOUNTER — MYC MEDICAL ADVICE (OUTPATIENT)
Dept: FAMILY MEDICINE | Facility: CLINIC | Age: 43
End: 2018-10-26

## 2018-10-26 VITALS
TEMPERATURE: 97.8 F | RESPIRATION RATE: 8 BRPM | SYSTOLIC BLOOD PRESSURE: 116 MMHG | WEIGHT: 253 LBS | BODY MASS INDEX: 40.66 KG/M2 | OXYGEN SATURATION: 92 % | DIASTOLIC BLOOD PRESSURE: 64 MMHG | HEIGHT: 66 IN

## 2018-10-26 DIAGNOSIS — G35 MULTIPLE SCLEROSIS (H): ICD-10-CM

## 2018-10-26 DIAGNOSIS — R51.9 NONINTRACTABLE EPISODIC HEADACHE, UNSPECIFIED HEADACHE TYPE: ICD-10-CM

## 2018-10-26 DIAGNOSIS — R53.1 RIGHT SIDED WEAKNESS: ICD-10-CM

## 2018-10-26 DIAGNOSIS — H53.2 DIPLOPIA: ICD-10-CM

## 2018-10-26 LAB
ANION GAP SERPL CALCULATED.3IONS-SCNC: 7 MMOL/L (ref 3–14)
BASOPHILS # BLD AUTO: 0 10E9/L (ref 0–0.2)
BASOPHILS NFR BLD AUTO: 0.3 %
BUN SERPL-MCNC: 17 MG/DL (ref 7–30)
CALCIUM SERPL-MCNC: 8.4 MG/DL (ref 8.5–10.1)
CHLORIDE SERPL-SCNC: 110 MMOL/L (ref 94–109)
CO2 SERPL-SCNC: 23 MMOL/L (ref 20–32)
COHGB MFR BLD: 1.1 % (ref 0–2)
CREAT SERPL-MCNC: 1.07 MG/DL (ref 0.52–1.04)
DIFFERENTIAL METHOD BLD: ABNORMAL
EOSINOPHIL # BLD AUTO: 0.1 10E9/L (ref 0–0.7)
EOSINOPHIL NFR BLD AUTO: 1.2 %
ERYTHROCYTE [DISTWIDTH] IN BLOOD BY AUTOMATED COUNT: 13.5 % (ref 10–15)
GFR SERPL CREATININE-BSD FRML MDRD: 56 ML/MIN/1.7M2
GLUCOSE BLDC GLUCOMTR-MCNC: 124 MG/DL (ref 70–99)
GLUCOSE SERPL-MCNC: 87 MG/DL (ref 70–99)
HCG SERPL QL: NEGATIVE
HCT VFR BLD AUTO: 37.8 % (ref 35–47)
HGB BLD-MCNC: 11.6 G/DL (ref 11.7–15.7)
IMM GRANULOCYTES # BLD: 0.1 10E9/L (ref 0–0.4)
IMM GRANULOCYTES NFR BLD: 0.7 %
LYMPHOCYTES # BLD AUTO: 3.5 10E9/L (ref 0.8–5.3)
LYMPHOCYTES NFR BLD AUTO: 30.1 %
MCH RBC QN AUTO: 30.4 PG (ref 26.5–33)
MCHC RBC AUTO-ENTMCNC: 30.7 G/DL (ref 31.5–36.5)
MCV RBC AUTO: 99 FL (ref 78–100)
MONOCYTES # BLD AUTO: 0.8 10E9/L (ref 0–1.3)
MONOCYTES NFR BLD AUTO: 6.5 %
NEUTROPHILS # BLD AUTO: 7.1 10E9/L (ref 1.6–8.3)
NEUTROPHILS NFR BLD AUTO: 61.2 %
NRBC # BLD AUTO: 0 10*3/UL
NRBC BLD AUTO-RTO: 0 /100
PLATELET # BLD AUTO: 446 10E9/L (ref 150–450)
POTASSIUM SERPL-SCNC: 3.9 MMOL/L (ref 3.4–5.3)
RBC # BLD AUTO: 3.82 10E12/L (ref 3.8–5.2)
SODIUM SERPL-SCNC: 140 MMOL/L (ref 133–144)
WBC # BLD AUTO: 11.7 10E9/L (ref 4–11)

## 2018-10-26 PROCEDURE — 80048 BASIC METABOLIC PNL TOTAL CA: CPT | Performed by: EMERGENCY MEDICINE

## 2018-10-26 PROCEDURE — 00000146 ZZHCL STATISTIC GLUCOSE BY METER IP

## 2018-10-26 PROCEDURE — 70549 MR ANGIOGRAPH NECK W/O&W/DYE: CPT

## 2018-10-26 PROCEDURE — 70553 MRI BRAIN STEM W/O & W/DYE: CPT

## 2018-10-26 PROCEDURE — 25500064 ZZH RX 255 OP 636: Performed by: EMERGENCY MEDICINE

## 2018-10-26 PROCEDURE — 96374 THER/PROPH/DIAG INJ IV PUSH: CPT | Mod: 59

## 2018-10-26 PROCEDURE — 96375 TX/PRO/DX INJ NEW DRUG ADDON: CPT

## 2018-10-26 PROCEDURE — A9585 GADOBUTROL INJECTION: HCPCS | Performed by: EMERGENCY MEDICINE

## 2018-10-26 PROCEDURE — 70544 MR ANGIOGRAPHY HEAD W/O DYE: CPT

## 2018-10-26 PROCEDURE — 96361 HYDRATE IV INFUSION ADD-ON: CPT

## 2018-10-26 PROCEDURE — 36415 COLL VENOUS BLD VENIPUNCTURE: CPT | Performed by: EMERGENCY MEDICINE

## 2018-10-26 PROCEDURE — 70546 MR ANGIOGRAPH HEAD W/O&W/DYE: CPT

## 2018-10-26 PROCEDURE — 82375 ASSAY CARBOXYHB QUANT: CPT | Performed by: EMERGENCY MEDICINE

## 2018-10-26 PROCEDURE — 84703 CHORIONIC GONADOTROPIN ASSAY: CPT | Performed by: EMERGENCY MEDICINE

## 2018-10-26 PROCEDURE — 99285 EMERGENCY DEPT VISIT HI MDM: CPT | Mod: 25

## 2018-10-26 PROCEDURE — 25000128 H RX IP 250 OP 636: Performed by: EMERGENCY MEDICINE

## 2018-10-26 PROCEDURE — 85025 COMPLETE CBC W/AUTO DIFF WBC: CPT | Performed by: EMERGENCY MEDICINE

## 2018-10-26 RX ORDER — DIPHENHYDRAMINE HYDROCHLORIDE 50 MG/ML
25 INJECTION INTRAMUSCULAR; INTRAVENOUS ONCE
Status: COMPLETED | OUTPATIENT
Start: 2018-10-26 | End: 2018-10-26

## 2018-10-26 RX ORDER — METOCLOPRAMIDE 10 MG/1
10 TABLET ORAL 4 TIMES DAILY PRN
Qty: 10 TABLET | Refills: 0 | Status: SHIPPED | OUTPATIENT
Start: 2018-10-26 | End: 2018-11-27

## 2018-10-26 RX ORDER — SODIUM CHLORIDE 9 MG/ML
1000 INJECTION, SOLUTION INTRAVENOUS CONTINUOUS
Status: DISCONTINUED | OUTPATIENT
Start: 2018-10-26 | End: 2018-10-26 | Stop reason: HOSPADM

## 2018-10-26 RX ORDER — UBIDECARENONE 75 MG
100 CAPSULE ORAL DAILY
COMMUNITY
End: 2019-06-12

## 2018-10-26 RX ORDER — GADOBUTROL 604.72 MG/ML
10 INJECTION INTRAVENOUS ONCE
Status: COMPLETED | OUTPATIENT
Start: 2018-10-26 | End: 2018-10-26

## 2018-10-26 RX ORDER — KETOROLAC TROMETHAMINE 30 MG/ML
30 INJECTION, SOLUTION INTRAMUSCULAR; INTRAVENOUS ONCE
Status: COMPLETED | OUTPATIENT
Start: 2018-10-26 | End: 2018-10-26

## 2018-10-26 RX ORDER — METOCLOPRAMIDE HYDROCHLORIDE 5 MG/ML
10 INJECTION INTRAMUSCULAR; INTRAVENOUS ONCE
Status: COMPLETED | OUTPATIENT
Start: 2018-10-26 | End: 2018-10-26

## 2018-10-26 RX ORDER — LORAZEPAM 2 MG/ML
1 INJECTION INTRAMUSCULAR ONCE
Status: COMPLETED | OUTPATIENT
Start: 2018-10-26 | End: 2018-10-26

## 2018-10-26 RX ADMIN — KETOROLAC TROMETHAMINE 30 MG: 30 INJECTION, SOLUTION INTRAMUSCULAR at 17:21

## 2018-10-26 RX ADMIN — METOCLOPRAMIDE 10 MG: 5 INJECTION, SOLUTION INTRAMUSCULAR; INTRAVENOUS at 17:23

## 2018-10-26 RX ADMIN — SODIUM CHLORIDE 1000 ML: 9 INJECTION, SOLUTION INTRAVENOUS at 17:18

## 2018-10-26 RX ADMIN — LORAZEPAM 1 MG: 2 INJECTION INTRAMUSCULAR; INTRAVENOUS at 17:23

## 2018-10-26 RX ADMIN — GADOBUTROL 10 ML: 604.72 INJECTION INTRAVENOUS at 18:52

## 2018-10-26 RX ADMIN — DIPHENHYDRAMINE HYDROCHLORIDE 25 MG: 50 INJECTION, SOLUTION INTRAMUSCULAR; INTRAVENOUS at 17:19

## 2018-10-26 ASSESSMENT — ENCOUNTER SYMPTOMS
HEADACHES: 1
PHOTOPHOBIA: 1
WEAKNESS: 1
NUMBNESS: 1
FEVER: 0

## 2018-10-26 NOTE — LETTER
October 26, 2018      To Whom It May Concern:      Josefina Aldrich was seen in our Emergency Department today, 10/26/18.  I expect her condition to improve over the next 2-4 days.  She may return to work/school when improved.    Sincerely,        Ankit Esposito MD

## 2018-10-26 NOTE — ED AVS SNAPSHOT
[facility] Emergency Department    [address]    [facility] [address]    Phone:  [phone]    Fax:  [phone]                                       Josefina Aldrich   MRN: [id number]    Department:  [facility] Emergency Department   Date of Visit:  10/26/2018           After Visit Summary Signature Page     I have received my discharge instructions, and my questions have been answered. I have discussed any challenges I see with this plan with the nurse or doctor.    ..........................................................................................................................................  Patient/Patient Representative Signature      ..........................................................................................................................................  Patient Representative Print Name and Relationship to Patient    ..................................................               ................................................  Date                                   Time    ..........................................................................................................................................  Reviewed by Signature/Title    ...................................................              ..............................................  Date                                               Time          22EPIC Rev 08/18

## 2018-10-26 NOTE — TELEPHONE ENCOUNTER
Please see mychart message and advise.      patient was seen on 10/24/18 with these symptoms- has appointment with neurology on 11/29/18  Called patient and sent mychart message  patient states that she is out of work until Monday per provider (Miya Crump)  Advised patient to go to ER for evaluation  Advised that Miya is out until Tuesday- we cannot extend her work note without an evaluation by another provider  Advised that I will send this message to Bryce- she may check her messages from home but I cannot guarantee this  patient states that she will have someone drive her to the ER for evaluation    Guillermina Taylor,RN BSN  [facility]  [phone]

## 2018-10-26 NOTE — ED TRIAGE NOTES
"Severe headache for the past 31/2 weeks.  History of MS.  Hospitalized for these headache and discharged on Monday on oral meds,  Wednesday saw PMD and was given a shot of Toradol that was helpful \"for awhile\"  Headache returned associated with right arm and lip numbness.  Patient alert and oriented x3.  Airway, breathing and circulation intact.  "

## 2018-10-26 NOTE — ED PROVIDER NOTES
History     Chief Complaint:  Headache    HPI   Josefina Aldrich is a 43 year old female with history of MS, migraines, among others, who presents with left-sided headache for the past 3 weeks. No recent head injury or trauma of late. Her headaches in the past are usually felt on both sides of her head, but is now localized to the left side of her head and can be felt behind her left eye. She has seen a neurologist for headaches before. On 10/19-10/21 the patient was admitted at [facility] for intractable headaches and had unremarkable MRI studies obtained. It was noted that she received DHE, which did not help her symptoms. She was provided with hydrocortisone and valproic acid. Her nortriptyline dosage was also increased. The patient reports having numbness and tingling in her lips with her headaches, as well as double and blurred vision, and photophobia. For the past one week, she has noticed right hand weakness with numbness and tingling that has made it difficult for her to write. She also notes numbness and tingling in her right foot for the past week. She saw her primary care provider two days ago, where she received a Toradol injection, which made her feel better. When she had return of her symptoms, she called her primary care and her provider was not in office today, therefore her clinic referred her here. She also tried contacting her neurologist, who did not answer either. She has not had any fevers of late.     Allergies:  Aspirin  Azithromycin  Latex  Penicillins      Medications:      albuterol nebulizer solution  Albuterol inhaler  clonazePAM (KLONOPIN) 0.5 MG tablet  cyclobenzaprine (FLEXERIL) 10 MG tablet  Glatiramer Acetate (COPAXONE) 40 MG/ML SOSY  lisinopril-hydrochlorothiazide  methylPREDNISolone (MEDROL DOSEPAK) 4 MG tablet  nortriptyline (PAMELOR) 50 MG capsule  prochlorperazine (COMPAZINE) 10 MG tablet  topiramate (TOPAMAX) 100 MG tablet  triamcinolone (KENALOG) 0.1 % cream  valACYclovir  "(VALTREX) 500 MG tablet    Past Medical History:    Mild major depression   Hypertension  Migraine   Asthma   MS  Left pontine stroke   Morbid obesity   Uterine leiomyoma   ISAAC  Fibroids     Past Surgical History:    Gastric bypass   Hysterectomy   Tonsillectomy     Family History:    Lupus - paternal aunt     Social History:  The patient presented to the ED alone.  Smoking Status: former  Smokeless Tobacco: never  Alcohol Use: yes    Marital Status:   [4]     Review of Systems   Constitutional: Negative for fever.   Eyes: Positive for photophobia and visual disturbance.   Neurological: Positive for weakness, numbness and headaches.   All other systems reviewed and are negative.    Physical Exam     Patient Vitals for the past 24 hrs:   BP Temp Temp src Heart Rate Resp SpO2 Height Weight   10/26/18 2000 116/64 - - 70 8 92 % - -   10/26/18 1945 117/73 - - 69 23 100 % - -   10/26/18 1930 118/68 - - 71 23 100 % - -   10/26/18 1915 121/74 - - 71 20 100 % - -   10/26/18 1730 105/77 - - 78 - - - -   10/26/18 1721 - - - 80 20 100 % - -   10/26/18 1716 - - - 75 - 100 % - -   10/26/18 1715 113/64 - - - - - - -   10/26/18 1645 122/81 - - - - - - -   10/26/18 1631 - - - 80 21 99 % - -   10/26/18 1630 119/67 - - - - - - -   10/26/18 1629 - - - 84 24 99 % - -   10/26/18 1624 - 97.8  F (36.6  C) Temporal - - - - -   10/26/18 1614 121/78 - Oral 88 16 100 % 1.664 m (5' 5.5\") 114.8 kg (253 lb)      Physical Exam  Constitutional:  Appears well-developed and well-nourished. Alert. Conversant. Non toxic.  HENT:   Head: Atraumatic. No depressed skull fracture, [place] Eyes, Hidalgo's sign, or hemotympanum. Face normal. TMs normal  Nose: Nose normal.  Mouth/Throat: Oral mucosa is clear and moist. no trismus. Pharynx normal. Tonsils symmetric. No tonsillar enlargement, erythema, or exudate.  Eyes: Conjunctivae normal. EOM normal. No dysconjugate gaze, but pt endorses transient diplopia (split second) triggered by lateral gaze in " both horizontal directions, but not vertical.  Pupils equal, round, and reactive to light. No scleral icterus.   Neck: Normal range of motion. Neck supple. No tracheal deviation present.   Cardiovascular: Normal rate, regular rhythm. No gallop. No friction rub. No murmur heard. Symmetric radial artery pulses   Pulmonary/Chest: Effort normal. No stridor. No respiratory distress. No wheezes. No rales. No rhonchi . No tenderness.   Abdominal: Soft. Bowel sounds normal. No distension. No mass. No tenderness. No rebound. No guarding.   Musculoskeletal:   RUE: Normal range of motion. No tenderness. No deformity  LUE: Normal range of motion. No tenderness. No deformity  RLE: Normal range of motion. No edema. No tenderness. No deformity  LLE: Normal range of motion. No edema. No tenderness. No deformity  Lymph: No cervical adenopathy.   Neurological: Alert and oriented to person, place, and time. Mental status normal. Attention normal.  Alert and oriented x3.  GCS 15. Memory normal. Speech fluent. Cognition normal.    Cranial Nerves intact II-XII except I did not formally test gag or visual acuity.  EOMI. Palate elevates symmetrically and tongue protrudes in the midline.    Strength:   5/5 bilaterally in the trapezius, 5/5 bilaterally in the deltoid, 5/5 bilaterally in the biceps, 5/5 bilaterally in the triceps, 5/5 bilaterally in thegrip, 5/5 bilaterally thumb opposition, 5/5 bilaterally finger abduction  5/5 bilaterally in the psoas, 5/5 bilaterally in the quadriceps, 5/5 bilaterally in the hamstring, 5/5 bilaterally in the gastrocnemius, 5/5 bilaterally in the tibialis anterior    Sensation intact to light touch in the left upper extremity- C5-T1.  Subjective numbness involving the right upper extremity, in particular the forearm and hand in a nondermatomal pattern.  Sensation intact to light touch in left lower extremity (L4-S1).  Subjective numbness in the right lower extremity.     Finger to nose and coordination  normal. Gait normal.   Skin: Skin is warm and dry. No rash noted. No pallor. Normal capillary refill.  Psychiatric:  Normal mood. Normal affect.       Emergency Department Course     Imaging:  Radiology findings were communicated with the patient who voiced understanding of the findings.    MR Brain w/o & w Contrast  1. Cerebral volume loss and cerebral white matter changes consistent  with the patient's history of multiple sclerosis. At least 2 new small  white matter lesions are noted in the posterior aspect of the left  frontal lobe consistent with progression of disease. There is no  abnormal contrast enhancement in any of the white matter lesions to  suggest any areas of active demyelination.  2. Otherwise, normal brain MRI. No evidence for acute intracranial  pathology.  Reading per radiology    MR Neck w/o & w Contrast Angiogram  Normal MR angiogram of the neck.  Reading per radiology    MR Head w/o Contrast Angiogram  Normal MR angiogram of the head.    Reading per radiology     MRV Brain wo & w Contrast  Normal MR venogram of the head.  Reading per radiology    Laboratory:  Laboratory findings were communicated with the patient who voiced understanding of the findings.    Carbon Monoxide: 1.1  Glucose by meter: 124 (H)  CBC: WBC 11.7 (H), HGB 11.6 (L),   BMP: Creat 1.07 (H), Ca 8.4 (L), Cl 110 (H), o/w WNL   HCG Qualitative: negative     Interventions:  1718 NS, 1 L, IV   1719 Benadryl, 25 mg, IV  1721 Toradol 30 mg IV   1723 Lorazepam, 1 mg, IV   1723 Reglan 10 mg IV      Emergency Department Course:  Nursing notes and vitals reviewed.  I entered the room.  I performed an exam of the patient as documented above.     IV was inserted and blood was drawn for laboratory testing, results above.    The patient was sent for MRI studies while in the emergency department, results above.     The patient received the above intervention(s).     2019 the patient was rechecked and updated regarding the results of  the laboratory and imaging studies.      2049 I spoke with Dr. Bermudez of the neurology service from [facility] regarding patient's presentation, findings, and plan of care.     I discussed the treatment plan with the patient. They expressed understanding of this plan and consented to discharge. They will be discharged home with instructions for care and follow up. In addition, the patient will return to the emergency department if their symptoms worsen, if new symptoms arise or if there is any concern. All questions were answered.      Impression & Plan      Medical Decision Making:  Josefina Aldrich is a 43 year old female who presents to the emergency department today for evaluation of headache, associated with diplopia and right-sided numbness/weakness.  Patient has a complex past history including known multiple sclerosis, with prior symptoms involving the right side of her body during an MS flare, also known frequent migraine headaches with recent ER visits to [place] and hospitalization at the [facility] for intractable migraines.    In terms of her neurologic symptoms we did perform MRI to look for a new MS flare or other causes of numbness such as stroke, tumor.  MRI shows some apparent MS lesions in her left frontal lobe that had not been present on imaging last year.  I discussed these with her neurologist and they do not think they represent an active MS flare and would not initiate any burst of steroids or change in medications based on symptoms tonight.  No other acute findings on MRI/MRA/MRV.  Her neurologic symptoms are actually improved, along with improvement in her headache pain.  Discussed with neurology and they discussed that old MS symptoms sometimes will flare during acute pain.  With no active signs of enhancing lesions on MRI tonight, unclear that this truly represents an MS flare.  No other recent infection symptoms or UTI symptoms to cause an MS exacerbation.    In  terms of the headache, she does have a history of frequent migraines and has basically been having a migraine headache since the first week of October.  Differential was broad including subarachnoid, aneurysm, mass, infection.  No recent trauma.  Carbon monoxide level normal.  MRA/MRV showed no acute vascular catastrophe.  No mass, bleed, or stroke noted MRI of her brain.  No associated fever or neck stiffness so very low suspicion for meningitis at this point we feel the risks associated with LP would outweigh the remote potential benefit.  Patient's headache was tremendously improved with migraine cocktail given here in the ER, in fact more improved tonight than a headache was while she was in the hospital receiving medications at the [facility] last week.  She is feeling much better and feels comfortable going home.  We will try her on a prescription for oral Reglan, although we discussed this may not work as well for her headache taken orally as it does IV.  She needs close outpatient follow-up with her neurologist.      Diagnosis:    ICD-10-CM    1. Nonintractable episodic headache, unspecified headache type R51    2. Right sided weakness R53.1    3. Diplopia H53.2    4. Multiple sclerosis (H) G35      Disposition:   The patient was discharged to home.    Discharge Medications:  New Prescriptions    METOCLOPRAMIDE (REGLAN) 10 MG TABLET    Take 1 tablet (10 mg) by mouth 4 times daily as needed (nausea or headache)     Scribe Disclosure:  I, Kirit Deng, am serving as a scribe at 4:47 PM on 10/26/2018 to document services personally performed by Ankit Esposito MD, based on my observations and the provider's statements to me.   [facility] EMERGENCY DEPARTMENT       Ankit Esposito MD  10/27/18 [id number]

## 2018-10-26 NOTE — LETTER
October 26, 2018      To Whom It May Concern:      Josefina Aldrich was seen in our Emergency Department today, 10/26/18.  I expect her condition to improve over the next 3 days.  She may return to work/school when improved.    Sincerely,        AMBER Esposito MD

## 2018-10-26 NOTE — ED AVS SNAPSHOT
[facility]    [address]    [facility] [address]    Phone:  [phone]    Fax:  [phone]                                       Josefina Aldrich   MRN: [id number]    Department:  [facility]   Date of Visit:  10/26/2018           Patient Information     Date Of Birth          1975        Your diagnoses for this visit were:     Nonintractable episodic headache, unspecified headache type     Right sided weakness     Diplopia     Multiple sclerosis (H)        You were seen by Ankit Esposito MD.      Follow-up Information     Follow up with Radames Castaneda MD In 3 days.    Specialty:  Psychiatry & Neurology - Neurology    Contact information:    [address] [address]  - [id number]  [facility] [address]  [phone]          Discharge Instructions       Discharge Instructions  Headache    You were seen today for a headache. Headaches may be caused by many different things such as muscle tension, sinus inflammation, anxiety and stress, having too little sleep, too much alcohol, some medical conditions or injury. You may have a migraine, which is caused by changes in the blood vessels in your head.  At this time your doctor does not find that your headache is a sign of anything dangerous or life-threatening.  However, sometimes the signs of serious illness do not show up right away.  If you have new or worse symptoms, you may need to be seen again in the emergency department or by your primary doctor.      Return to the Emergency Department if:    You get a fever of 101 F or higher.    Your headache gets much worse.    You get a stiff neck with your headache.    You get a new headache that is different or worse than headaches you have had before.    You are vomiting and can t keep food or water down.    You have blurry or double vision or other problems with your eyes.    You have a new weakness on one side of your  body.    You have difficulty with balance which is new.    You or your family thinks you are confused.    You have a seizure or convulsion.    What can I do to help myself?    Pain medications - You may take a pain medication such as Tylenol  (acetaminophen), Advil , Nuprin  (ibuprofen) or Aleve  (naproxen).  If you have been given a narcotic such as Vicodin  (hydrocodone with acetaminophen), Percocet  (oxycodone with acetaminophen), codeine, or a muscle relaxant such as Flexeril  (cyclobenzaprine) or Soma  (carisoprodol), do not drive for four hours after you have taken it. If the narcotic contains Tylenol  (acetaminophen), do not take Tylenol  with it. All narcotics will cause constipation, so eat a high fiber diet.        Take a pain reliever as soon as you notice symptoms.  Starting medications as soon as you start to have symptoms may lessen the amount of pain you have.    Relaxing in a quiet, dark room may help.    Get enough sleep and eat meals regularly.    Schedule an appointment with your primary physician as instructed, or at least within 1 week.    You may need to watch for certain foods or other things which may trigger your headaches.  Keeping a journal of your headaches and possible triggers may help you and your primary doctor to identify things which you should avoid which may be causing your headaches.  If you were given a prescription for medicine here today, be sure to read all of the information (including the package insert) that comes with your prescription.  This will include important information about the medicine, its side effects, and any warnings that you need to know about.  The pharmacist who fills the prescription can provide more information and answer questions you may have about the medicine.  If you have questions or concerns that the pharmacist cannot address, please call or return to the Emergency Department.   Opioid Medication Information    Pain medications are among the most  commonly prescribed medicines, so we are including this information for all our patients. If you did not receive pain medication or get a prescription for pain medicine, you can ignore it.     You may have been given a prescription for an opioid (narcotic) pain medicine and/or have received a pain medicine while here in the Emergency Department. These medicines can make you drowsy or impaired. You must not drive, operate dangerous equipment, or engage in any other dangerous activities while taking these medications. If you drive while taking these medications, you could be arrested for DUI, or driving under the influence. Do not drink any alcohol while you are taking these medications.     Opioid pain medications can cause addiction. If you have a history of chemical dependency of any type, you are at a higher risk of becoming addicted to pain medications.  Only take these prescribed medications to treat your pain when all other options have been tried. Take it for as short a time and as few doses as possible. Store your pain pills in a secure place, as they are frequently stolen and provide a dangerous opportunity for children or visitors in your house to start abusing these powerful medications. We will not replace any lost or stolen medicine.  As soon as your pain is better, you should flush all your remaining medication.     Many prescription pain medications contain Tylenol  (acetaminophen), including Vicodin , Tylenol #3 , Norco , Lortab , and Percocet .  You should not take any extra pills of Tylenol  if you are using these prescription medications or you can get very sick.  Do not ever take more than 3000 mg of acetaminophen in any 24 hour period.    All opioids tend to cause constipation. Drink plenty of water and eat foods that have a lot of fiber, such as fruits, vegetables, prune juice, apple juice and high fiber cereal.  Take a laxative if you don t move your bowels at least every other day. Miralax ,  Milk of Magnesia, Colace , or Senna  can be used to keep you regular.      Remember that you can always come back to the Emergency Department if you are not able to see your regular doctor in the amount of time listed above, if you get any new symptoms, or if there is anything that worries you.          Your next 10 appointments already scheduled     Nov 29, 2018  8:30 AM CST   MR CERVICAL SPINE W/O & W CONTRAST with [address] MRI ([facility])    [address] [address] [phone]           How do I prepare for my exam? (Food and drink instructions) **If you will be receiving sedation or general anesthesia, please see special notes below.**  How do I prepare for my exam? (Other instructions) Take your medicines as usual, unless your doctor tells you not to. You may or may not receive intravenous (IV) contrast for this exam pending the discretion of the Radiologist.  You do not need to do anything special to prepare.  **If you will be receiving sedation or general anesthesia, please see special notes below.**  What should I wear: The MRI machine uses a strong magnet. Please wear clothes without metal (snaps, zippers). A sweatsuit works well, or we may give you a hospital gown. Please remove any body piercings and hair extensions before you arrive. You will also remove watches, jewelry, hairpins, wallets, dentures, partial dental plates and hearing aids. You may wear contact lenses, and you may be able to wear your rings. We have a safe place to keep your personal items, but it is safer to leave them at home.  How long does the exam take: Most tests take 30 to 60 minutes.  HOWEVER, IF YOUR DOCTOR PRESCRIBES ANESTHESIA please plan on spending four to five hours in the recovery room.  What should I bring:  Bring a list of your current medicines to your exam (including vitamins, minerals and over-the-counter drugs).  Do I need a :   **If you will be receiving sedation or general anesthesia, please see special notes below.**  What should I do after the exam: No Restrictions, You may resume normal activities.  What is this test: MRI (magnetic resonance imaging) uses a strong magnet and radio waves to look inside the body. An MRA (magnetic resonance angiogram) does the same thing, but it lets us look at your blood vessels. A computer turns the radio waves into pictures showing cross sections of the body, much like slices of bread. This helps us see any problems more clearly. You may receive fluid (called  contrast ) before or during your scan. The fluid helps us see the pictures better. We give the fluid through an IV (small needle in your arm).  Who should I call with questions:  Please call the Imaging Department at your exam site with any questions. Directions, parking instructions, and other information is available on our website, [email or web address].  How do I prepare if I m having sedation or anesthesia? **IMPORTANT** THE INSTRUCTIONS BELOW ARE ONLY FOR THOSE PATIENTS WHO HAVE BEEN TOLD THEY WILL RECEIVE SEDATION OR GENERAL ANESTHESIA DURING THEIR MRI PROCEDURE:  IF YOU WILL RECEIVE SEDATION (take medicine to help you relax during your exam): You must get the medicine from your doctor before you arrive. Bring the medicine to the exam. Do not take it at home. Arrive one hour early. Bring someone who can take you home after the test. Your medicine will make you sleepy. After the exam, you may not drive, take a bus or take a taxi by yourself. No eating 8 hours before your exam. You may have clear liquids up until 4 hours before your exam. (Clear liquids include water, clear tea, black coffee and fruit juice without pulp.)  IF YOU WILL RECEIVE ANESTHESIA (be asleep for your exam): Arrive 1 1/2 hours early. Bring someone who can take you home after the test. You may not drive, take a bus or take a taxi by yourself. No eating 8 hours before  your exam. You may have clear liquids up until 4 hours before your exam. (Clear liquids include water, clear tea, black coffee and fruit juice without pulp.)            Nov 29, 2018  9:15 AM CST   MR BRAIN W/O & W CONTRAST with [address] MRI ([facility] [place])    [address] [address] [phone]           How do I prepare for my exam? (Food and drink instructions) **If you will be receiving sedation or general anesthesia, please see special notes below.**  How do I prepare for my exam? (Other instructions) Take your medicines as usual, unless your doctor tells you not to. You may or may not receive intravenous (IV) contrast for this exam pending the discretion of the Radiologist.  You do not need to do anything special to prepare.  **If you will be receiving sedation or general anesthesia, please see special notes below.**  What should I wear: The MRI machine uses a strong magnet. Please wear clothes without metal (snaps, zippers). A sweatsuit works well, or we may give you a hospital gown. Please remove any body piercings and hair extensions before you arrive. You will also remove watches, jewelry, hairpins, wallets, dentures, partial dental plates and hearing aids. You may wear contact lenses, and you may be able to wear your rings. We have a safe place to keep your personal items, but it is safer to leave them at home.  How long does the exam take: Most tests take 30 to 60 minutes.  HOWEVER, IF YOUR DOCTOR PRESCRIBES ANESTHESIA please plan on spending four to five hours in the recovery room.  What should I bring:  Bring a list of your current medicines to your exam (including vitamins, minerals and over-the-counter drugs).  Do I need a :  **If you will be receiving sedation or general anesthesia, please see special notes below.**  What should I do after the exam: No Restrictions, You may resume normal activities.  What is this  test: MRI (magnetic resonance imaging) uses a strong magnet and radio waves to look inside the body. An MRA (magnetic resonance angiogram) does the same thing, but it lets us look at your blood vessels. A computer turns the radio waves into pictures showing cross sections of the body, much like slices of bread. This helps us see any problems more clearly. You may receive fluid (called  contrast ) before or during your scan. The fluid helps us see the pictures better. We give the fluid through an IV (small needle in your arm).  Who should I call with questions:  Please call the Imaging Department at your exam site with any questions. Directions, parking instructions, and other information is available on our website, [email or web address].  How do I prepare if I m having sedation or anesthesia? **IMPORTANT** THE INSTRUCTIONS BELOW ARE ONLY FOR THOSE PATIENTS WHO HAVE BEEN TOLD THEY WILL RECEIVE SEDATION OR GENERAL ANESTHESIA DURING THEIR MRI PROCEDURE:  IF YOU WILL RECEIVE SEDATION (take medicine to help you relax during your exam): You must get the medicine from your doctor before you arrive. Bring the medicine to the exam. Do not take it at home. Arrive one hour early. Bring someone who can take you home after the test. Your medicine will make you sleepy. After the exam, you may not drive, take a bus or take a taxi by yourself. No eating 8 hours before your exam. You may have clear liquids up until 4 hours before your exam. (Clear liquids include water, clear tea, black coffee and fruit juice without pulp.)  IF YOU WILL RECEIVE ANESTHESIA (be asleep for your exam): Arrive 1 1/2 hours early. Bring someone who can take you home after the test. You may not drive, take a bus or take a taxi by yourself. No eating 8 hours before your exam. You may have clear liquids up until 4 hours before your exam. (Clear liquids include water, clear tea, black coffee and fruit juice without pulp.)            Nov 29, 2018 10:00 AM CST    (Arrive by 9:45 AM)   Return Multiple Sclerosis with Radames Castaneda MD   [facility] Multiple Sclerosis ([facility])    [address] [facility]  [address] [place] [address] [phone]              24 Hour Appointment Hotline       To make an appointment at any [facility], call [phone] ([phone]). If you don't have a family doctor or clinic, we will help you find one. [facility] are conveniently located to serve the needs of you and your family.             Review of your medicines      START taking        Dose / Directions Last dose taken    metoclopramide 10 MG tablet   Commonly known as:  REGLAN   Dose:  10 mg   Quantity:  10 tablet        Take 1 tablet (10 mg) by mouth 4 times daily as needed (nausea or headache)   Refills:  0          Our records show that you are taking the medicines listed below. If these are incorrect, please call your family doctor or clinic.        Dose / Directions Last dose taken    acetaminophen 500 MG tablet   Commonly known as:  TYLENOL   Dose:  1000 mg   Quantity:  100 tablet        Take 2 tablets (1,000 mg) by mouth 3 times daily as needed for mild pain   Refills:  0        * VENTOLIN  (90 Base) MCG/ACT inhaler   Dose:  2 puff   Generic drug:  albuterol        Inhale 2 puffs into the lungs   Refills:  0        * albuterol (2.5 MG/3ML) 0.083% neb solution   Dose:  2.5 mg        Inhale 2.5 mg into the lungs   Refills:  0        cholecalciferol 5000 units Tabs tablet   Commonly known as:  vitamin D3   Dose:  5000 Units        Take 1 tablet (5,000 Units) by mouth daily   Refills:  0        clonazePAM 0.5 MG tablet   Commonly known as:  klonoPIN   Dose:  0.25-0.5 mg   Quantity:  3 tablet        Take 0.5-1 tablets (0.25-0.5 mg) by mouth 2 times daily as needed for anxiety   Refills:  0        cyanocolbalamin 100 MCG tablet   Commonly known as:  vitamin  B-12   Dose:  100 mcg        Take 100 mcg by mouth daily   Refills:   0        cyclobenzaprine 10 MG tablet   Commonly known as:  FLEXERIL   Dose:  10 mg        Take 10 mg by mouth 3 times daily as needed   Refills:  0        Glatiramer Acetate 40 MG/ML Sosy   Commonly known as:  COPAXONE   Dose:  40 mg   Quantity:  12 Syringe        Inject 40 mg Subcutaneous three times a week   Refills:  11        IBUPROFEN PO   Dose:  800 mg        Take 800 mg by mouth every 4 hours as needed for moderate pain   Refills:  0        IRON SUPPLEMENT PO        Refills:  0        lisinopril-hydrochlorothiazide 10-12.5 MG per tablet   Commonly known as:  PRINZIDE/ZESTORETIC   Quantity:  90 tablet        TAKE 1 TABLET BY MOUTH DAILY   Refills:  1        methylPREDNISolone 4 MG tablet   Commonly known as:  MEDROL DOSEPAK   Quantity:  21 tablet        Follow package instructions   Refills:  0        naproxen 500 MG tablet   Commonly known as:  NAPROSYN   Dose:  500 mg   Quantity:  60 tablet        Take 1 tablet (500 mg) by mouth 2 times daily as needed for moderate pain   Refills:  1        nortriptyline 50 MG capsule   Commonly known as:  PAMELOR   Dose:  50 mg   Quantity:  90 capsule        Take 1 capsule (50 mg) by mouth At Bedtime   Refills:  3        prochlorperazine 10 MG tablet   Commonly known as:  COMPAZINE   Dose:  10 mg   Quantity:  20 tablet        Take 1 tablet (10 mg) by mouth every 6 hours as needed for nausea or vomiting   Refills:  1        * topiramate 100 MG tablet   Commonly known as:  TOPAMAX   Dose:  100 mg   Quantity:  60 tablet        Take 1 tablet (100 mg) by mouth At Bedtime (take with 50 mg to total 150 mg nightly)   Refills:  3        * topiramate 50 MG tablet   Commonly known as:  TOPAMAX   Dose:  50 mg   Quantity:  60 tablet        Take 1 tablet (50 mg) by mouth At Bedtime (take with 100 mg to total 150 mg nightly)   Refills:  3        triamcinolone 0.1 % cream   Commonly known as:  KENALOG   Quantity:  30 g        Apply sparingly to affected area three times daily   Refills:   1        valACYclovir 500 MG tablet   Commonly known as:  VALTREX   Dose:  500 mg   Quantity:  90 tablet        Take 1 tablet (500 mg) by mouth daily   Refills:  1        * Notice:  This list has 4 medication(s) that are the same as other medications prescribed for you. Read the directions carefully, and ask your doctor or other care provider to review them with you.            Prescriptions were sent or printed at these locations (1 Prescription)                   Other Prescriptions                Printed at Department/Unit printer (1 of 1)         metoclopramide (REGLAN) 10 MG tablet                Procedures and tests performed during your visit     Basic metabolic panel    CBC with platelets differential    Carbon monoxide    Glucose by meter    HCG qualitative Blood    MR Brain w/o & w Contrast    MR Head w/o Contrast Angiogram    MR Neck w/o & w Contrast Angiogram    MRV Brain wo & w Contrast      Orders Needing Specimen Collection     None      Pending Results     Date and Time Order Name Status Description    10/26/2018 1703 MRV Brain wo & w Contrast Preliminary     10/26/2018 1703 MR Neck w/o & w Contrast Angiogram Preliminary     10/26/2018 1703 MR Head w/o Contrast Angiogram Preliminary     10/26/2018 1703 MR Brain w/o & w Contrast Preliminary             Pending Culture Results     No orders found from 10/24/2018 to 10/27/2018.            Pending Results Instructions     If you had any lab results that were not finalized at the time of your Discharge, you can call the ED Lab Result RN at [phone]. You will be contacted by this team for any positive Lab results or changes in treatment. The nurses are available 7 days a week from 10A to 6:30P.  You can leave a message 24 hours per day and they will return your call.        Test Results From Your Hospital Stay        10/26/2018  4:18 PM      Component Results     Component Value Ref Range & Units Status    Glucose 124 (H) 70 - 99 mg/dL Final          10/26/2018  7:22 PM      Narrative     MRI OF THE BRAIN WITHOUT AND WITH CONTRAST 10/26/2018 7:03 PM     COMPARISON: Brain MR 10/26/2017.    HISTORY: Headache, diplopia, right side weakness.    TECHNIQUE: Axial diffusion-weighted with ADC map, axial T2-weighted  with fat saturation, axial T1-weighted, axial turboFLAIR and coronal  T1-weighted images of the brain were acquired without intravenous  contrast.  Following intravenous administration of gadolinium (10 mL  Gadavist), axial T1-weighted images of the brain were acquired.     FINDINGS: There is mild diffuse cerebral volume loss. There are a few  scattered focal areas of abnormal T2 signal hyperintensity in the  cerebral white matter bilaterally that are consistent with the  patient's history of multiple sclerosis. There are at least 2 new  small lesions in the deep white matter at the posterior aspect of the  left frontal lobe (denoted with arrows on the axial T2 FLAIR images).  Remaining lesions are stable in size and number. There is no abnormal  contrast enhancement in any of the white matter lesions to suggest any  areas of active white matter pathology.    The ventricles and basal cisterns are within normal limits in  configuration given the degree of cerebral volume loss.  There is no  midline shift.  There are no extra-axial fluid collections.  There is  no evidence for stroke or acute intracranial hemorrhage.  There is no  abnormal contrast enhancement in the brain or its coverings.    There is no sinusitis or mastoiditis.        Impression     IMPRESSION:  1. Cerebral volume loss and cerebral white matter changes consistent  with the patient's history of multiple sclerosis. At least 2 new small  white matter lesions are noted in the posterior aspect of the left  frontal lobe consistent with progression of disease. There is no  abnormal contrast enhancement in any of the white matter lesions to  suggest any areas of active demyelination.  2.  Otherwise, normal brain MRI. No evidence for acute intracranial  pathology.         10/26/2018  7:21 PM      Narrative     MR ANGIOGRAM OF THE HEAD WITHOUT CONTRAST   10/26/2018 7:03 PM     COMPARISON: None    HISTORY: Headache.     TECHNIQUE: 3D time-of-flight MR angiogram of the head without  contrast. MIP reconstruction of all MR angiographic data was  performed.    FINDINGS:  The bilateral distal internal carotid, basilar, bilateral  anterior cerebral, bilateral middle cerebral and bilateral posterior  cerebral arteries are patent and unremarkable with no evidence for  cerebral artery stenosis or aneurysm. The posterior communicating  arteries bilaterally are patent and unremarkable.         Impression     IMPRESSION:  Normal MR angiogram of the head.             10/26/2018  7:21 PM      Narrative     MRA NECK WITHOUT AND WITH CONTRAST  10/26/2018 7:04 PM     COMPARISON: None.    HISTORY: Headache.     TECHNIQUE: 2D time-of-flight MR angiogram of the neck without contrast  and 3D MR angiogram of the neck with 10 mL Gadavist gadolinium IV  contrast. MIP reconstruction of all MR angiographic data was  performed. Estimates of carotid stenoses are made relative to the  distal internal carotid artery diameters except as noted.    FINDINGS:    Carotids: The common carotid arteries bilaterally are widely patent.  The cervical internal carotid arteries bilaterally are patent without  stenosis.    Vertebrals: The vertebral arteries bilaterally are patent without  stenosis and demonstrate antegrade flow.    Aortic arch: The arteries as they arise from the aortic arch are  normally arranged with no evidence for stenosis.        Impression     IMPRESSION: Normal MR angiogram of the neck.         10/26/2018  7:30 PM      Component Results     Component Value Ref Range & Units Status    Sodium 140 133 - 144 mmol/L Final    Potassium 3.9 3.4 - 5.3 mmol/L Final    Chloride 110 (H) 94 - 109 mmol/L Final    Carbon Dioxide 23 20 -  32 mmol/L Final    Anion Gap 7 3 - 14 mmol/L Final    Glucose 87 70 - 99 mg/dL Final    Urea Nitrogen 17 7 - 30 mg/dL Final    Creatinine 1.07 (H) 0.52 - 1.04 mg/dL Final    GFR Estimate 56 (L) >60 mL/min/1.7m2 Final    Non  GFR Calc    GFR Estimate If Black 68 >60 mL/min/1.7m2 Final    African American GFR Calc    Calcium 8.4 (L) 8.5 - 10.1 mg/dL Final         10/26/2018  7:13 PM      Component Results     Component Value Ref Range & Units Status    WBC 11.7 (H) 4.0 - 11.0 10e9/L Final    RBC Count 3.82 3.8 - 5.2 10e12/L Final    Hemoglobin 11.6 (L) 11.7 - 15.7 g/dL Final    Hematocrit 37.8 35.0 - 47.0 % Final    MCV 99 78 - 100 fl Final    MCH 30.4 26.5 - 33.0 pg Final    MCHC 30.7 (L) 31.5 - 36.5 g/dL Final    RDW 13.5 10.0 - 15.0 % Final    Platelet Count 446 150 - 450 10e9/L Final    Diff Method Automated Method  Final    % Neutrophils 61.2 % Final    % Lymphocytes 30.1 % Final    % Monocytes 6.5 % Final    % Eosinophils 1.2 % Final    % Basophils 0.3 % Final    % Immature Granulocytes 0.7 % Final    Nucleated RBCs 0 0 /100 Final    Absolute Neutrophil 7.1 1.6 - 8.3 10e9/L Final    Absolute Lymphocytes 3.5 0.8 - 5.3 10e9/L Final    Absolute Monocytes 0.8 0.0 - 1.3 10e9/L Final    Absolute Eosinophils 0.1 0.0 - 0.7 10e9/L Final    Absolute Basophils 0.0 0.0 - 0.2 10e9/L Final    Abs Immature Granulocytes 0.1 0 - 0.4 10e9/L Final    Absolute Nucleated RBC 0.0  Final         10/26/2018  7:45 PM      Component Results     Component Value Ref Range & Units Status    HCG Qualitative Serum Negative NEG^Negative Final    This test is for screening purposes.  Results should be interpreted along with   the clinical picture.  Confirmation testing is available if warranted by   ordering [id number], HCG Quantitative Pregnancy.           10/26/2018  5:40 PM      Component Results     Component Value Ref Range & Units Status    Carbon Monoxide 1.1 0 - 2 % Final         10/26/2018  7:20 PM      Narrative     MR  VENOGRAM OF THE HEAD WITHOUT AND WITH CONTRAST  10/26/2018 7:04 PM     HISTORY: Headache, right weakness, diplopia.     TECHNIQUE: 2D TOF and 2D phase contrast MR venogram without and with  10 mL Gadavist gadolinium intravenous contrast material.    COMPARISON: None.    FINDINGS:  The dural venous sinuses are widely patent. The cortical  veins over the cerebral convexities appear patent.  The deep cerebral  veins are patent.        Impression     IMPRESSION: Normal MR venogram of the head.                Clinical Quality Measure: Blood Pressure Screening     Your blood pressure was checked while you were in the emergency department today. The last reading we obtained was  BP: 116/64 . Please read the guidelines below about what these numbers mean and what you should do about them.  If your systolic blood pressure (the top number) is less than 120 and your diastolic blood pressure (the bottom number) is less than 80, then your blood pressure is normal. There is nothing more that you need to do about it.  If your systolic blood pressure (the top number) is 120-139 or your diastolic blood pressure (the bottom number) is 80-89, your blood pressure may be higher than it should be. You should have your blood pressure rechecked within a year by a primary care provider.  If your systolic blood pressure (the top number) is 140 or greater or your diastolic blood pressure (the bottom number) is 90 or greater, you may have high blood pressure. High blood pressure is treatable, but if left untreated over time it can put you at risk for heart attack, stroke, or kidney failure. You should have your blood pressure rechecked by a primary care provider within the next 4 weeks.  If your provider in the emergency department today gave you specific instructions to follow-up with your doctor or provider even sooner than that, you should follow that instruction and not wait for up to 4 weeks for your follow-up visit.        Thank you for  choosing [facility]       Thank you for choosing [facility] for your care. Our goal is always to provide you with excellent care. Hearing back from our patients is one way we can continue to improve our services. Please take a few minutes to complete the written survey that you may receive in the mail after you visit with us. Thank you!        [email or web address] Information     [employer] gives you secure access to your electronic health record. If you see a primary care provider, you can also send messages to your care team and make appointments. If you have questions, please call your primary care clinic.  If you do not have a primary care provider, please call [phone] and they will assist you.        Care EveryWhere ID     This is your Care EveryWhere ID. This could be used by other organizations to access your [facility] medical records  [id number]        Equal Access to Services     RISHI MORENO : Geoff Serrano, angelo espitia, parvez alas, mya patterson. So [facility] [phone].    ATENCIÓN: Si habla español, tiene a barrera disposición servicios gratuitos de asistencia lingüística. Llame al [phone].    We comply with applicable federal civil rights laws and [place] laws. We do not discriminate on the basis of race, color, national origin, age, disability, sex, sexual orientation, or gender identity.            After Visit Summary       This is your record. Keep this with you and show to your community pharmacist(s) and doctor(s) at your next visit.

## 2018-10-27 NOTE — PLAN OF CARE
Called by outside ED regarding MRI findings in patient with headache.  No evidence of active MS plaques with contrast enhancement but there are a few new lesions since last scan one year prior.  Patient improving with pain control.  Would recommend UA as subclinical UTI's can mimic old MS plaques.  Will update patient's primary MS doctor with results as MRI changes may change long term management but would not treat acutely with steroids.      Yesi Bermudez,DO  Neurology

## 2018-10-29 ENCOUNTER — TELEPHONE (OUTPATIENT)
Dept: FAMILY MEDICINE | Facility: CLINIC | Age: 43
End: 2018-10-29

## 2018-10-29 DIAGNOSIS — G35 MULTIPLE SCLEROSIS (H): ICD-10-CM

## 2018-10-29 RX ORDER — GLATIRAMER 40 MG/ML
40 INJECTION, SOLUTION SUBCUTANEOUS
Qty: 12 SYRINGE | Refills: 11 | Status: SHIPPED | OUTPATIENT
Start: 2018-10-29 | End: 2019-06-12

## 2018-10-29 NOTE — TELEPHONE ENCOUNTER
Received refill request for glatiramer acetate from [facility]; Patient was last seen in October and has follow up appointment in November with Dr. Castaneda; Refilled for 1 year per MS refill protocol.    Mitzy Collazo MS RN Care Coordinator

## 2018-10-30 ENCOUNTER — TELEPHONE (OUTPATIENT)
Dept: FAMILY MEDICINE | Facility: CLINIC | Age: 43
End: 2018-10-30

## 2018-10-30 ENCOUNTER — OFFICE VISIT (OUTPATIENT)
Dept: FAMILY MEDICINE | Facility: CLINIC | Age: 43
End: 2018-10-30
Payer: COMMERCIAL

## 2018-10-30 VITALS
DIASTOLIC BLOOD PRESSURE: 80 MMHG | HEART RATE: 118 BPM | WEIGHT: 254 LBS | SYSTOLIC BLOOD PRESSURE: 128 MMHG | BODY MASS INDEX: 41.62 KG/M2 | OXYGEN SATURATION: 100 % | TEMPERATURE: 99.2 F

## 2018-10-30 DIAGNOSIS — G35 MS (MULTIPLE SCLEROSIS) (H): Primary | ICD-10-CM

## 2018-10-30 DIAGNOSIS — G44.219 EPISODIC TENSION-TYPE HEADACHE, NOT INTRACTABLE: Primary | ICD-10-CM

## 2018-10-30 DIAGNOSIS — G44.219 EPISODIC TENSION-TYPE HEADACHE, NOT INTRACTABLE: ICD-10-CM

## 2018-10-30 DIAGNOSIS — R20.0 NUMBNESS OF RIGHT HAND: ICD-10-CM

## 2018-10-30 PROCEDURE — 99214 OFFICE O/P EST MOD 30 MIN: CPT | Performed by: PHYSICIAN ASSISTANT

## 2018-10-30 RX ORDER — BUTALBITAL, ACETAMINOPHEN AND CAFFEINE 50; 325; 40 MG/1; MG/1; MG/1
1 TABLET ORAL EVERY 4 HOURS PRN
Qty: 30 TABLET | Refills: 0 | Status: SHIPPED | OUTPATIENT
Start: 2018-10-30 | End: 2018-11-01

## 2018-10-30 RX ORDER — PREDNISONE 20 MG/1
TABLET ORAL
Qty: 33 TABLET | Refills: 0 | Status: SHIPPED | OUTPATIENT
Start: 2018-10-30 | End: 2018-11-19

## 2018-10-30 NOTE — TELEPHONE ENCOUNTER
Prior Authorization Retail Medication Request    Medication/Dose: butalbital/acetaminophen/caff (-40mg)  ICD code (if different than what is on RX):    Previously Tried and Failed:  Toradol 60mg, metoclopramide 10mg, nortripyline 50mg, naproxen 500mg, ibuprofen, tylenol, prochlorperazine 10mg, topiramate 100 and 50 mg, sumatriptan 50mg  Rationale:  Have not be able to get rid of headache    Insurance Name:  [employer] [place]  Insurance ID:  [id number]      Pharmacy Information (if different than what is on RX)  Name:    Phone:

## 2018-10-30 NOTE — PROGRESS NOTES
SUBJECTIVE:   Josefina Alrdich is a 43 year old female who presents to clinic today for the following health issues.      Hospital Follow-up Visit:    Hospital/Nursing Home/IP Rehab Facility: [facility]  Date of Admission: 10/26/18  Date of Discharge: 10/26/18  Reason(s) for Admission: Headache            Problems taking medications regularly:  None       Medication changes since discharge: Added Reglan 10 mg PRN       Problems adhering to non-medication therapy:  None    Summary of hospitalization:  [facility] discharge summary reviewed  Josefina Aldrich is a 43 year old female who presents to the emergency department today for evaluation of headache, associated with diplopia and right-sided numbness/weakness.  Patient has a complex past history including known multiple sclerosis, with prior symptoms involving the right side of her body during an MS flare, also known frequent migraine headaches with recent ER visits to [facility] and hospitalization at the [facility] for intractable migraines.     In terms of her neurologic symptoms we did perform MRI to look for a new MS flare or other causes of numbness such as stroke, tumor.  MRI shows some apparent MS lesions in her left frontal lobe that had not been present on imaging last year.  I discussed these with her neurologist and they do not think they represent an active MS flare and would not initiate any burst of steroids or change in medications based on symptoms tonight.  No other acute findings on MRI/MRA/MRV.  Her neurologic symptoms are actually improved, along with improvement in her headache pain.  Discussed with neurology and they discussed that old MS symptoms sometimes will flare during acute pain.  With no active signs of enhancing lesions on MRI tonight, unclear that this truly represents an MS flare.  No other recent infection symptoms or UTI symptoms to cause an MS exacerbation.     In terms of the headache, she  does have a history of frequent migraines and has basically been having a migraine headache since the first week of October.  Differential was broad including subarachnoid, aneurysm, mass, infection.  No recent trauma.  Carbon monoxide level normal.  MRA/MRV showed no acute vascular catastrophe.  No mass, bleed, or stroke noted MRI of her brain.  No associated fever or neck stiffness so very low suspicion for meningitis at this point we feel the risks associated with LP would outweigh the remote potential benefit.  Patient's headache was tremendously improved with migraine cocktail given here in the ER, in fact more improved tonight than a headache was while she was [facility].  She is feeling much better and feels comfortable going home.  We will try her on a prescription for oral Reglan, although we discussed this may not work as well for her headache taken orally as it does IV.  She needs close outpatient follow-up with her neurologist.    Diagnostic Tests/Treatments reviewed.  Follow up needed: none  Other Healthcare Providers Involved in Patient s Care:         None  Update since discharge: worsened. Headaches persist.    Post Discharge Medication Reconciliation: Reglan 10 mg added PRN.  Plan of care communicated with patient     Coding guidelines for this visit:  Type of Medical   Decision Making Face-to-Face Visit       within 7 Days of discharge Face-to-Face Visit        within 14 days of discharge   Moderate Complexity [id number]   High Complexity [id number]            Patient has been struggling for 3+ weeks with a HA and numbness in her right hand. Pt has a Hx of MS and a recent MRI showed two new lesions on left frontal lobe when compared to 10/2017. She believes her headaches are not being taken seriously. She needs to wear sunglasses as light is painful. Her frequent and recent ER visits that have all provided unremarkable work-ups. She is  frustrated with discharge medication as they do not work as well as the medications she is given in the hospital. On Friday at the hospital she was given a cocktail (included Benadryl, Toradol, Lorazepam and NS) that provided a significant amount of relief. She was sent home on Reglan 10 mg that is providing zero relief as a solo agent. She has been following Dr. Castaneda at Neurology who is convinced her migrainous sx are not related to her MS and recommended to see a HA specialist. Pt was extremely frustrated with this recommendation and she feels she is being ignored; she states this is more than just a HA.  The HA's are worsening everyday and they are present when she wakes up in the morning. The loss of feeling in her right hand is also increasing daily. Pt's work is giving her a difficult time regarding missing work and they are requesting many documents for proof of ER visits and physician follow ups.         Problem list and histories reviewed & adjusted, as indicated.  Additional history: as documented    Patient Active Problem List   Diagnosis     Migraine     Asthma     MS (multiple sclerosis) (H)     Anemia     Backache     Body mass index 45.0-49.9, adult (H)     Cognitive changes     Depression with anxiety     Fever postop     Uterine leiomyoma     Headache     Heavy menses     Hypersomnia with sleep apnea     Iron deficiency anemia     Leukocytosis     Postsurgical nonabsorption     Mild intermittent asthma     Morbid obesity (H)     Myalgia and myositis     Neck pain     Other dyspnea and respiratory abnormality     Pain, neuropathic     Pelvic pain in female     Personal history of allergy to latex     Post concussion syndrome     Right shoulder pain     S/P gastric bypass     S/P hysterectomy     Bariatric surgery status     Vitamin B12 deficiency     Vitamin D deficiency     Vomiting following gastrointestinal surgery     Hypermagnesemia     Benign essential hypertension     Neck pain on left  "side     Mild major depression (H)     Past Surgical History:   Procedure Laterality Date     GASTRIC BYPASS  2002    \"Trouble with B12 and D\"     HYSTERECTOMY, MONO  2013    cervix gone.  fibroids.  No BSO     TONSILLECTOMY         Social History   Substance Use Topics     Smoking status: Former Smoker     Types: Cigars     Smokeless tobacco: Never Used     Alcohol use 1.2 oz/week     2 Standard drinks or equivalent per week      Comment: 0-3 drinks per week     Family History   Problem Relation Age of Onset     Lupus Paternal Aunt 41     Multiple Sclerosis No family hx of          Current Outpatient Prescriptions   Medication Sig Dispense Refill     acetaminophen (TYLENOL) 500 MG tablet Take 2 tablets (1,000 mg) by mouth 3 times daily as needed for mild pain 100 tablet 0     albuterol (2.5 MG/3ML) 0.083% nebulizer solution Inhale 2.5 mg into the lungs       albuterol (VENTOLIN HFA) 108 (90 BASE) MCG/ACT inhaler Inhale 2 puffs into the lungs       butalbital-acetaminophen-caffeine (FIORICET/ESGIC) -40 MG per tablet Take 1 tablet by mouth every 4 hours as needed for headaches (no more than 2 tablets daily) 30 tablet 0     cholecalciferol (VITAMIN D3) 5000 UNITS TABS tablet Take 1 tablet (5,000 Units) by mouth daily       cyanocolbalamin (VITAMIN  B-12) 100 MCG tablet Take 100 mcg by mouth daily       cyclobenzaprine (FLEXERIL) 10 MG tablet Take 10 mg by mouth 3 times daily as needed       Ferrous Sulfate (IRON SUPPLEMENT PO)        Glatiramer Acetate (COPAXONE) 40 MG/ML SOSY Inject 40 mg Subcutaneous three times a week 12 Syringe 11     IBUPROFEN PO Take 800 mg by mouth every 4 hours as needed for moderate pain        lisinopril-hydrochlorothiazide (PRINZIDE/ZESTORETIC) 10-12.5 MG per tablet TAKE 1 TABLET BY MOUTH DAILY 90 tablet 1     metoclopramide (REGLAN) 10 MG tablet Take 1 tablet (10 mg) by mouth 4 times daily as needed (nausea or headache) 10 tablet 0     naproxen (NAPROSYN) 500 MG tablet Take 1 tablet " (500 mg) by mouth 2 times daily as needed for moderate pain 60 tablet 1     nortriptyline (PAMELOR) 50 MG capsule Take 1 capsule (50 mg) by mouth At Bedtime 90 capsule 3     predniSONE (DELTASONE) 20 MG tablet Take 60 mg daily for 5 days, then 40 mg daily for 5 days, then 20 mg daily for 5 days, then 10 mg for 6 days 33 tablet 0     prochlorperazine (COMPAZINE) 10 MG tablet Take 1 tablet (10 mg) by mouth every 6 hours as needed for nausea or vomiting 20 tablet 1     topiramate (TOPAMAX) 100 MG tablet Take 1 tablet (100 mg) by mouth At Bedtime (take with 50 mg to total 150 mg nightly) 60 tablet 3     topiramate (TOPAMAX) 50 MG tablet Take 1 tablet (50 mg) by mouth At Bedtime (take with 100 mg to total 150 mg nightly) 60 tablet 3     triamcinolone (KENALOG) 0.1 % cream Apply sparingly to affected area three times daily 30 g 1     valACYclovir (VALTREX) 500 MG tablet Take 1 tablet (500 mg) by mouth daily 90 tablet 1     Allergies   Allergen Reactions     Aspirin Difficulty breathing, Palpitations and Other (See Comments)     Adhesive Tape      Azithromycin Unknown     Latex Hives, Itching and Rash     Penicillins Cramps, GI Disturbance, Nausea and Vomiting, Rash, Swelling, Other (See Comments) and Hives     Reviewed and updated as needed this visit by clinical staff  Tobacco  Allergies  Meds  Problems  Med Hx  Surg Hx  Soc Hx      Reviewed and updated as needed this visit by Provider  Tobacco  Allergies  Meds  Problems  Med Hx  Surg Hx  Soc Hx      ROS:  Constitutional, HEENT, cardiovascular, pulmonary, GI, , musculoskeletal, neuro, skin, endocrine and psych systems are negative, except as otherwise noted.    This document serves as a record of the services and decisions personally performed and made by Miya Crump, MS, PA-C. It was created on her behalf by Sonia Mendes, a trained medical scribe. The creation of this document is based on the provider's statements to the medical scribe.  Sonia  Vaughn October 30, 2018 11:15 AM    OBJECTIVE:   /80  Pulse 118  Temp 99.2  F (37.3  C) (Oral)  Wt 254 lb (115.2 kg)  SpO2 100%  BMI 41.62 kg/m2  Body mass index is 41.62 kg/(m^2).    GENERAL: healthy, alert and no distress, sunglasses on due to photosensitivity  MS: no gross musculoskeletal defects noted, no edema  SKIN: no suspicious lesions or rashes  NEURO: Normal strength and tone, mentation intact and speech normal  PSYCH: mentation appears normal, affect normal/bright, tearful throughout exam    Diagnostic Test Results:  none     No results found for this or any previous visit (from the past 24 hour(s)).  ASSESSMENT/PLAN:   Josefina was seen today for er f/u.    Diagnoses and all orders for this visit:    MS (multiple sclerosis) (H), Numbness of right hand  Start high dose steroid taper for relief. Referred to Dr. Chong at [facility] for new MS consultation.  Work not provided today through Friday 11/2, will extend if needed.   -     NEUROLOGY ADULT REFERRAL  -     predniSONE (DELTASONE) 20 MG tablet; Take 60 mg daily for 5 days, then 40 mg daily for 5 days, then 20 mg daily for 5 days, then 10 mg for 6 days    Episodic tension-type headache, not intractable  Start high dose steroid taper for HA relief. Start Fioricet PRN for severe HA's. Discussed the strength of Fioricet and the importance of staying at home after taking it and making sure it is in a safe secure place out of reach of someone who will abuse it - patient voiced verbal understanding.   -     predniSONE (DELTASONE) 20 MG tablet; Take 60 mg daily for 5 days, then 40 mg daily for 5 days, then 20 mg daily for 5 days, then 10 mg for 6 days  -     butalbital-acetaminophen-caffeine (FIORICET/ESGIC) -40 MG per tablet; Take 1 tablet by mouth every 4 hours as needed for headaches (no more than 2 tablets daily)    Greater than 25 minutes were spent with the patient. The majority of this time was coordinating care and  counseling regarding the above diagnosis .    Return in about 12 days (around 11/11/2018) for neurology followup.    The information in this document, created by the medical scribe for me, accurately reflects the services I personally performed and the decisions made by me. I have reviewed and approved this document for accuracy prior to leaving the patient care area.  October 30, 2018 11:16 AM      Miya Crump MS, PANingC  [facility]

## 2018-10-30 NOTE — LETTER
[facility]  [address] [place] [address]                                                                                                       [phone]    October 30, 2018    Josefina Aldrich  [id number]  [place] [id number]      To Whom it May Concern:    The above patient is unable to attend work 10/30/2018-11/2/2018 due to a medical issue, this may be extended depending on symptom response. Please contact me with questions or concerns.      Sincerely,    Miya Crump PA-C

## 2018-10-30 NOTE — MR AVS SNAPSHOT
After Visit Summary   10/30/2018    Josefina Aldrich    MRN: [id number]           Patient Information     Date Of Birth          1975        Visit Information        Provider Department      10/30/2018 10:40 AM Miya Crump PA-C [facility]        Today's Diagnoses     MS (multiple sclerosis) (H)    -  1    Numbness of right hand        Episodic tension-type headache, not intractable           Follow-ups after your visit        Additional Services     NEUROLOGY ADULT REFERRAL       Your provider has referred you for the following:   Consult at [facility]: [facility] [phone]   [email or web address] - Dr. Gary Chong    Please be aware that coverage of these services is subject to the terms and limitations of your health insurance plan.  Call member services at your health plan with any benefit or coverage questions.      Please bring the following with you to your appointment:    (1) Any X-Rays, CTs or MRIs which have been performed.  Contact the facility where they were done to arrange for  prior to your scheduled appointment.    (2) List of current medications  (3) This referral request   (4) Any documents/labs given to you for this referral                  Your next 10 appointments already scheduled     Nov 01, 2018 10:00 AM CDT   (Arrive by 9:45 AM)   Return Multiple Sclerosis with Radames Castaneda MD   [facility] Multiple Sclerosis ([facility])    [address] [place] [address]  [address] [place] [address] [id number] [phone]            Nov 29, 2018  8:30 AM CST   MR CERVICAL SPINE W/O & W CONTRAST with [address] [facility] ([facility])    [address] [place] [address]  1st Floor  [facility] [id number] [phone]           How do I prepare for my exam? (Food and drink instructions) **If you will be receiving sedation or general  anesthesia, please see special notes below.**  How do I prepare for my exam? (Other instructions) Take your medicines as usual, unless your doctor tells you not to. You may or may not receive intravenous (IV) contrast for this exam pending the discretion of the Radiologist.  You do not need to do anything special to prepare.  **If you will be receiving sedation or general anesthesia, please see special notes below.**  What should I wear: The MRI machine uses a strong magnet. Please wear clothes without metal (snaps, zippers). A sweatsuit works well, or we may give you a hospital gown. Please remove any body piercings and hair extensions before you arrive. You will also remove watches, jewelry, hairpins, wallets, dentures, partial dental plates and hearing aids. You may wear contact lenses, and you may be able to wear your rings. We have a safe place to keep your personal items, but it is safer to leave them at home.  How long does the exam take: Most tests take 30 to 60 minutes.  HOWEVER, IF YOUR DOCTOR PRESCRIBES ANESTHESIA please plan on spending four to five hours in the recovery room.  What should I bring:  Bring a list of your current medicines to your exam (including vitamins, minerals and over-the-counter drugs).  Do I need a :  **If you will be receiving sedation or general anesthesia, please see special notes below.**  What should I do after the exam: No Restrictions, You may resume normal activities.  What is this test: MRI (magnetic resonance imaging) uses a strong magnet and radio waves to look inside the body. An MRA (magnetic resonance angiogram) does the same thing, but it lets us look at your blood vessels. A computer turns the radio waves into pictures showing cross sections of the body, much like slices of bread. This helps us see any problems more clearly. You may receive fluid (called  contrast ) before or during your scan. The fluid helps us see the pictures better. We give the fluid  through an IV (small needle in your arm).  Who should I call with questions:  Please call the Imaging Department at your exam site with any questions. Directions, parking instructions, and other information is available on our website, [email or web address].  How do I prepare if I m having sedation or anesthesia? **IMPORTANT** THE INSTRUCTIONS BELOW ARE ONLY FOR THOSE PATIENTS WHO HAVE BEEN TOLD THEY WILL RECEIVE SEDATION OR GENERAL ANESTHESIA DURING THEIR MRI PROCEDURE:  IF YOU WILL RECEIVE SEDATION (take medicine to help you relax during your exam): You must get the medicine from your doctor before you arrive. Bring the medicine to the exam. Do not take it at home. Arrive one hour early. Bring someone who can take you home after the test. Your medicine will make you sleepy. After the exam, you may not drive, take a bus or take a taxi by yourself. No eating 8 hours before your exam. You may have clear liquids up until 4 hours before your exam. (Clear liquids include water, clear tea, black coffee and fruit juice without pulp.)  IF YOU WILL RECEIVE ANESTHESIA (be asleep for your exam): Arrive 1 1/2 hours early. Bring someone who can take you home after the test. You may not drive, take a bus or take a taxi by yourself. No eating 8 hours before your exam. You may have clear liquids up until 4 hours before your exam. (Clear liquids include water, clear tea, black coffee and fruit juice without pulp.)            Nov 29, 2018  9:15 AM CST   MR BRAIN W/O & W CONTRAST with [address] [facility] ([facility])    [address] [id number] [phone]           How do I prepare for my exam? (Food and drink instructions) **If you will be receiving sedation or general anesthesia, please see special notes below.**  How do I prepare for my exam? (Other instructions) Take your medicines as usual, unless your doctor tells you not to. You may or may not  receive intravenous (IV) contrast for this exam pending the discretion of the Radiologist.  You do not need to do anything special to prepare.  **If you will be receiving sedation or general anesthesia, please see special notes below.**  What should I wear: The MRI machine uses a strong magnet. Please wear clothes without metal (snaps, zippers). A sweatsuit works well, or we may give you a hospital gown. Please remove any body piercings and hair extensions before you arrive. You will also remove watches, jewelry, hairpins, wallets, dentures, partial dental plates and hearing aids. You may wear contact lenses, and you may be able to wear your rings. We have a safe place to keep your personal items, but it is safer to leave them at home.  How long does the exam take: Most tests take 30 to 60 minutes.  HOWEVER, IF YOUR DOCTOR PRESCRIBES ANESTHESIA please plan on spending four to five hours in the recovery room.  What should I bring:  Bring a list of your current medicines to your exam (including vitamins, minerals and over-the-counter drugs).  Do I need a :  **If you will be receiving sedation or general anesthesia, please see special notes below.**  What should I do after the exam: No Restrictions, You may resume normal activities.  What is this test: MRI (magnetic resonance imaging) uses a strong magnet and radio waves to look inside the body. An MRA (magnetic resonance angiogram) does the same thing, but it lets us look at your blood vessels. A computer turns the radio waves into pictures showing cross sections of the body, much like slices of bread. This helps us see any problems more clearly. You may receive fluid (called  contrast ) before or during your scan. The fluid helps us see the pictures better. We give the fluid through an IV (small needle in your arm).  Who should I call with questions:  Please call the Imaging Department at your exam site with any questions. Directions, parking instructions, and  other information is available on our website, [email or web address].  How do I prepare if I m having sedation or anesthesia? **IMPORTANT** THE INSTRUCTIONS BELOW ARE ONLY FOR THOSE PATIENTS WHO HAVE BEEN TOLD THEY WILL RECEIVE SEDATION OR GENERAL ANESTHESIA DURING THEIR MRI PROCEDURE:  IF YOU WILL RECEIVE SEDATION (take medicine to help you relax during your exam): You must get the medicine from your doctor before you arrive. Bring the medicine to the exam. Do not take it at home. Arrive one hour early. Bring someone who can take you home after the test. Your medicine will make you sleepy. After the exam, you may not drive, take a bus or take a taxi by yourself. No eating 8 hours before your exam. You may have clear liquids up until 4 hours before your exam. (Clear liquids include water, clear tea, black coffee and fruit juice without pulp.)  IF YOU WILL RECEIVE ANESTHESIA (be asleep for your exam): Arrive 1 1/2 hours early. Bring someone who can take you home after the test. You may not drive, take a bus or take a taxi by yourself. No eating 8 hours before your exam. You may have clear liquids up until 4 hours before your exam. (Clear liquids include water, clear tea, black coffee and fruit juice without pulp.)            Nov 29, 2018 10:00 AM CST   (Arrive by 9:45 AM)   Return Multiple Sclerosis with Radames Castaneda MD   [facility] Multiple Sclerosis ([facility])    [address] [place] [address]  [address] [place] [id number] [phone]              Who to contact     If you have questions or need follow up information about today's clinic visit or your schedule please contact [facility] directly at [phone].  Normal or non-critical lab and imaging results will be communicated to you by MyChart, letter or phone within 4 business days after the clinic has received the results. If you do not hear from us within 7 days, please contact the clinic through [facility] or  phone. If you have a critical or abnormal lab result, we will notify you by phone as soon as possible.  Submit refill requests through [employer] or call your pharmacy and they will forward the refill request to us. Please allow 3 business days for your refill to be completed.          Additional Information About Your Visit        [place] Information     [facility] gives you secure access to your electronic health record. If you see a primary care provider, you can also send messages to your care team and make appointments. If you have questions, please call your primary care clinic.  If you do not have a primary care provider, please call [phone] and they will assist you.        Care EveryWhere ID     This is your Care EveryWhere ID. This could be used by other organizations to access your Mobile medical records  [id number]        Your Vitals Were     Pulse Temperature Pulse Oximetry BMI (Body Mass Index)          118 99.2  F (37.3  C) (Oral) 100% 41.62 kg/m2         Blood Pressure from Last 3 Encounters:   10/30/18 128/80   10/26/18 116/64   10/24/18 112/66    Weight from Last 3 Encounters:   10/30/18 254 lb (115.2 kg)   10/26/18 253 lb (114.8 kg)   10/24/18 258 lb (117 kg)              We Performed the Following     NEUROLOGY ADULT REFERRAL          Today's Medication Changes          These changes are accurate as of 10/30/18 11:18 AM.  If you have any questions, ask your nurse or doctor.               Start taking these medicines.        Dose/Directions    butalbital-acetaminophen-caffeine -40 MG per tablet   Commonly known as:  FIORICET/ESGIC   Used for:  Episodic tension-type headache, not intractable   Started by:  Miya Crump PA-C        Dose:  1 tablet   Take 1 tablet by mouth every 4 hours as needed for headaches (no more than 2 tablets daily)   Quantity:  30 tablet   Refills:  0       predniSONE 20 MG tablet   Commonly known as:  DELTASONE   Used for:  MS (multiple sclerosis) (H),  Numbness of right hand, Episodic tension-type headache, not intractable   Started by:  Miya Crump PA-C        Take 60 mg daily for 5 days, then 40 mg daily for 5 days, then 20 mg daily for 5 days, then 10 mg for 6 days   Quantity:  33 tablet   Refills:  0            Where to get your medicines      These medications were sent to [facility] [id number] [place] [address] AT [facility] [address] [address], [facility] [address]    Hours:  24-hours Phone:  [phone]     predniSONE 20 MG tablet         Some of these will need a paper prescription and others can be bought over the counter.  Ask your nurse if you have questions.     Bring a paper prescription for each of these medications     butalbital-acetaminophen-caffeine -40 MG per tablet                Primary Care Provider Office Phone # Fax #    Miya Crump PA-C [phone]       [address] [place] [address]        Equal Access to Services     RAFAELA MORENO AH: Hadii aad ku hadasho Soomaali, waaxda luqadaha, qaybta kaalmada adeegyada, waxay idiin haydebn wicho rodrigues . So [facility] [phone].    ATENCIÓN: Si habla español, tiene a barrera disposición servicios gratuitos de asistencia lingüística. [facility] [phone].    We comply with applicable federal civil rights laws and [place] laws. We do not discriminate on the basis of race, color, national origin, age, disability, sex, sexual orientation, or gender identity.            Thank you!     Thank you for choosing [facility]  for your care. Our goal is always to provide you with excellent care. Hearing back from our patients is one way we can continue to improve our services. Please take a few minutes to complete the written survey that you may receive in the mail after your visit with us. Thank you!             Your Updated Medication List - Protect others around you: Learn how to safely use, store  and throw away your medicines at www.disposemymeds.org.          This list is accurate as of 10/30/18 11:18 AM.  Always use your most recent med list.                   Brand Name Dispense Instructions for use Diagnosis    acetaminophen 500 MG tablet    TYLENOL    100 tablet    Take 2 tablets (1,000 mg) by mouth 3 times daily as needed for mild pain    Cyst of left ovary       * VENTOLIN  (90 Base) MCG/ACT inhaler   Generic drug:  albuterol      Inhale 2 puffs into the lungs        * albuterol (2.5 MG/3ML) 0.083% neb solution      Inhale 2.5 mg into the lungs        butalbital-acetaminophen-caffeine -40 MG per tablet    FIORICET/ESGIC    30 tablet    Take 1 tablet by mouth every 4 hours as needed for headaches (no more than 2 tablets daily)    Episodic tension-type headache, not intractable       cholecalciferol 5000 units Tabs tablet    vitamin D3     Take 1 tablet (5,000 Units) by mouth daily    Hypovitaminosis D       cyanocolbalamin 100 MCG tablet    vitamin  B-12     Take 100 mcg by mouth daily        cyclobenzaprine 10 MG tablet    FLEXERIL     Take 10 mg by mouth 3 times daily as needed        Glatiramer Acetate 40 MG/ML Sosy    COPAXONE    12 Syringe    Inject 40 mg Subcutaneous three times a week    Multiple sclerosis (H)       IBUPROFEN PO      Take 800 mg by mouth every 4 hours as needed for moderate pain        IRON SUPPLEMENT PO           lisinopril-hydrochlorothiazide 10-12.5 MG per tablet    PRINZIDE/ZESTORETIC    90 tablet    TAKE 1 TABLET BY MOUTH DAILY    Benign essential hypertension       metoclopramide 10 MG tablet    REGLAN    10 tablet    Take 1 tablet (10 mg) by mouth 4 times daily as needed (nausea or headache)        naproxen 500 MG tablet    NAPROSYN    60 tablet    Take 1 tablet (500 mg) by mouth 2 times daily as needed for moderate pain    Intractable migraine without aura and with status migrainosus       nortriptyline 50 MG capsule    PAMELOR    90 capsule    Take 1  capsule (50 mg) by mouth At Bedtime    Migraine with aura and without status migrainosus, not intractable       predniSONE 20 MG tablet    DELTASONE    33 tablet    Take 60 mg daily for 5 days, then 40 mg daily for 5 days, then 20 mg daily for 5 days, then 10 mg for 6 days    MS (multiple sclerosis) (H), Numbness of right hand, Episodic tension-type headache, not intractable       prochlorperazine 10 MG tablet    COMPAZINE    20 tablet    Take 1 tablet (10 mg) by mouth every 6 hours as needed for nausea or vomiting    Intractable migraine without aura and with status migrainosus       * topiramate 100 MG tablet    TOPAMAX    60 tablet    Take 1 tablet (100 mg) by mouth At Bedtime (take with 50 mg to total 150 mg nightly)    Migraine with aura and without status migrainosus, not intractable       * topiramate 50 MG tablet    TOPAMAX    60 tablet    Take 1 tablet (50 mg) by mouth At Bedtime (take with 100 mg to total 150 mg nightly)    Migraine with aura and without status migrainosus, not intractable       triamcinolone 0.1 % cream    KENALOG    30 g    Apply sparingly to affected area three times daily    Eczema, unspecified type       valACYclovir 500 MG tablet    VALTREX    90 tablet    Take 1 tablet (500 mg) by mouth daily    HSV (herpes simplex virus) infection       * Notice:  This list has 4 medication(s) that are the same as other medications prescribed for you. Read the directions carefully, and ask your doctor or other care provider to review them with you.

## 2018-10-30 NOTE — TELEPHONE ENCOUNTER
PLease advise what diagnosis qualifies her for IVD.  I do not see one listed.  
Patient is Failing the Vascular/DM metrics due to potentially not being prescribed Aspirin/Statin. Please complete/sign an order to satisfy IVD/DM  Quality measure. Lynda Elkin CMA     Metrics  Percentage of patients (Ages 18-75 years) with a diagnosis of IVD, who meet all of the criteria listed below, in the measurement period.       The most recent Blood Pressure in the measurement period has a systolic value of less than 140 and a diastolic value of less than 90 (both values must be less than).     The patient is on a statin medication unless contraindication or valid exception is documented.   o LDL values again part of evaluation, with the addition of Statin Medication     any patient age 18 to 20 years = LDL PASS     any patient taking a Statin in measurement period = LDL PASS     any patient with a documented medical exception = LDL PASS     Special case: if there is no LDL data recorded and patient does not meet any of the criteria above, LDL FAIL   o For all other patients evaluate:   o age 21 - 75 years and LDL < 40 = LDL PASS   o age 21 - 75 years and LDL > 39 = LDL FAIL (unless they meet #2 or #3 above)       Patient is currently a non?tobacco user.   Patient is on daily aspirin OR an accepted contraindication (any date).     (1) IVD on ASA  The percent of patients age 18 to 75 seen in the previous week (reporting period), who have Ischemic  Vascular Disease (IVD) - aka CAD/ASCVD/CVD on their problem list with an active order for any of three  ASA medications or an order for ASA not prescribed intentionally. Orders without an end date are  considered active.     (2) DM On Statin  The percent of patients age 18 to 75 seen in the previous week (reporting period), who have Diabetes(DM)  active on the problem list with active orders for medication GPI classifications as statin or an active order for  statin not prescribed intentionally. (Medications without an end date are considered active.)    
today

## 2018-10-31 ENCOUNTER — MYC MEDICAL ADVICE (OUTPATIENT)
Dept: FAMILY MEDICINE | Facility: CLINIC | Age: 43
End: 2018-10-31

## 2018-10-31 NOTE — TELEPHONE ENCOUNTER
Routing to [facility] for review.  Addie Teran RN  [facility]

## 2018-10-31 NOTE — TELEPHONE ENCOUNTER
Central Prior Authorization Team   Phone: [phone]    PA Initiation    Medication: butalbital/acetaminophen/caff (-40mg)  Insurance Company: [employer] [place] - Phone [phone] Fax [phone]  Pharmacy Filling the Rx: [facility] [id number] [facility] [address] [facility] [address] [facility] [address]  Filling Pharmacy Phone: [phone]  Filling Pharmacy Fax: [phone]  Start Date: 10/31/2018

## 2018-11-01 ENCOUNTER — OFFICE VISIT (OUTPATIENT)
Dept: NEUROLOGY | Facility: CLINIC | Age: 43
End: 2018-11-01
Attending: PSYCHIATRY & NEUROLOGY
Payer: COMMERCIAL

## 2018-11-01 VITALS
DIASTOLIC BLOOD PRESSURE: 77 MMHG | OXYGEN SATURATION: 99 % | HEART RATE: 91 BPM | HEIGHT: 66 IN | TEMPERATURE: 98.5 F | SYSTOLIC BLOOD PRESSURE: 121 MMHG | RESPIRATION RATE: 16 BRPM | WEIGHT: 254 LBS | BODY MASS INDEX: 40.82 KG/M2

## 2018-11-01 DIAGNOSIS — G43.711 INTRACTABLE CHRONIC MIGRAINE WITHOUT AURA AND WITH STATUS MIGRAINOSUS: Primary | ICD-10-CM

## 2018-11-01 DIAGNOSIS — G35 MULTIPLE SCLEROSIS (H): ICD-10-CM

## 2018-11-01 PROBLEM — R10.32 LLQ PAIN: Status: ACTIVE | Noted: 2018-04-25

## 2018-11-01 PROCEDURE — G0463 HOSPITAL OUTPT CLINIC VISIT: HCPCS | Mod: ZF

## 2018-11-01 RX ORDER — SUMATRIPTAN 50 MG/1
TABLET, FILM COATED ORAL
Qty: 30 TABLET | Refills: 1 | Status: SHIPPED | OUTPATIENT
Start: 2018-11-01 | End: 2018-11-30

## 2018-11-01 ASSESSMENT — PAIN SCALES - GENERAL: PAINLEVEL: WORST PAIN (10)

## 2018-11-01 NOTE — TELEPHONE ENCOUNTER
I have them with me at the hospital.  I will finish them and fax them today.      Miya Crump MS, PA-C

## 2018-11-01 NOTE — NURSING NOTE
"Chief Complaint   Patient presents with     MS     UMP RETURN MULTIPLE SCLEROSIS- ED F/U SEVERE MIGRAINE AND SPINAL PAIN     /77  Pulse 91  Temp 98.5  F (36.9  C) (Oral)  Resp 16  Ht 1.664 m (5' 5.5\")  Wt 115.2 kg (254 lb)  SpO2 99%  Breastfeeding? No  BMI 41.62 kg/m2    Iain Guillaume, EMT    "

## 2018-11-01 NOTE — TELEPHONE ENCOUNTER
PRIOR AUTHORIZATION DENIED    Medication: butalbital/acetaminophen/caff (-40mg)-DENIED    Denial Date: 11/1/2018    Denial Rational: PATIENT NEEDS TO HAVE TRIED/FAILED THREE FORMULARIES - IBUPROFEN, INDOMETHACIN IR CAP, KETOPROFEN CAP, BUTALBITAL/APAP TAB.  PATIENT HAS TRIED/FAILED ONE - IBUPROFEN.    Appeal Information:  IF PATIENT IS UNABLE TO TRY/FAIL ALTERNATIVE(S) PLEASE SUPPLY PA TEAM WITH A LETTER OF MEDICAL NECESSITY WITH CLINICAL REASON.

## 2018-11-01 NOTE — TELEPHONE ENCOUNTER
Pt got the medication below their mother bought it for them.  Medication/Dose: butalbital/acetaminophen/caff (-40mg)    Addie Teran RN  [facility]

## 2018-11-01 NOTE — TELEPHONE ENCOUNTER
Will try to push through butalbital-apap without caffeine to see if this works for pt.  Encourage Josefina to take this with caffeine for maximum effect.      Miya Crump, MS, PA-C

## 2018-11-01 NOTE — PROGRESS NOTES
"Neurology Clinic - Return Visit    Interval hx:  44 yo woman presents for f/u regarding RRMS. Last seen by Dr. Castaneda 04/2017. She is here alone.    Has had persistent headache for the last month, left-sided, \"stabbing\" quality, associated w/ photopobia, no nausea/vomiting. Had headaches in the past, but never lasted this long. Relieved by rest & darkness. Was hospitalized for status migrainosus 10/19 - 10/21. Treated w/ DHE w/out benefit, then received cocktail of IV bendryl, valproate and hydocortisone which proveded moderate reilief. Nortriptyline and topiramate were increased & she was discharged w/ medrol dosepak. Was subsequently seen in ED for migraine 10/26, MRI brain + MRA/MRV was obtained and showed new white matter lesions in R hemisphere but not contrast enhancement though, so was not treated w/ steroids. Was recently prescribed fioracet by primary doctor, which has helped with headaches and helps w/ sleep. Has not been back to work for the past 3 weeks due to headache.    She does feel weaker on the right side for the past month, which has fluctuated, including strength in R hand and R leg. Also has numbness and tingling on right side. Associated w/ severe left-sided headache. Also having issues w/ short-term memory (eg forgetting recent conversations & word-finding issues).     Continues to take capaxone injections. She does get stinging after injecting, but no other issues. Did miss medication for 3-month period a couple months ago due to problem w/ injector.    ROS: 10-point ROS negative except as stated above.    Problem list:  Patient Active Problem List   Diagnosis     Migraine     Asthma     MS (multiple sclerosis) (H)     Anemia     Backache     Body mass index 45.0-49.9, adult (H)     Cognitive changes     Depression with anxiety     Fever postop     Uterine leiomyoma     Headache     Heavy menses     Hypersomnia with sleep apnea     Iron deficiency anemia     Leukocytosis     Postsurgical " nonabsorption     Mild intermittent asthma     Morbid obesity (H)     Myalgia and myositis     Neck pain     Other dyspnea and respiratory abnormality     Pain, neuropathic     Pelvic pain in female     Personal history of allergy to latex     Post concussion syndrome     Right shoulder pain     S/P gastric bypass     S/P hysterectomy     Bariatric surgery status     Vitamin B12 deficiency     Vitamin D deficiency     Vomiting following gastrointestinal surgery     Hypermagnesemia     Benign essential hypertension     Neck pain on left side     Mild major depression (H)       Medications:    Current Outpatient Prescriptions on File Prior to Visit:  acetaminophen (TYLENOL) 500 MG tablet Take 2 tablets (1,000 mg) by mouth 3 times daily as needed for mild pain   albuterol (2.5 MG/3ML) 0.083% nebulizer solution Inhale 2.5 mg into the lungs   albuterol (VENTOLIN HFA) 108 (90 BASE) MCG/ACT inhaler Inhale 2 puffs into the lungs   butalbital-acetaminophen-caffeine (FIORICET/ESGIC) -40 MG per tablet Take 1 tablet by mouth every 4 hours as needed for headaches (no more than 2 tablets daily)   cholecalciferol (VITAMIN D3) 5000 UNITS TABS tablet Take 1 tablet (5,000 Units) by mouth daily   cyanocolbalamin (VITAMIN  B-12) 100 MCG tablet Take 100 mcg by mouth daily   cyclobenzaprine (FLEXERIL) 10 MG tablet Take 10 mg by mouth 3 times daily as needed   Ferrous Sulfate (IRON SUPPLEMENT PO)    Glatiramer Acetate (COPAXONE) 40 MG/ML SOSY Inject 40 mg Subcutaneous three times a week   IBUPROFEN PO Take 800 mg by mouth every 4 hours as needed for moderate pain    lisinopril-hydrochlorothiazide (PRINZIDE/ZESTORETIC) 10-12.5 MG per tablet TAKE 1 TABLET BY MOUTH DAILY   metoclopramide (REGLAN) 10 MG tablet Take 1 tablet (10 mg) by mouth 4 times daily as needed (nausea or headache)   naproxen (NAPROSYN) 500 MG tablet Take 1 tablet (500 mg) by mouth 2 times daily as needed for moderate pain   nortriptyline (PAMELOR) 50 MG capsule Take  1 capsule (50 mg) by mouth At Bedtime   predniSONE (DELTASONE) 20 MG tablet Take 60 mg daily for 5 days, then 40 mg daily for 5 days, then 20 mg daily for 5 days, then 10 mg for 6 days   prochlorperazine (COMPAZINE) 10 MG tablet Take 1 tablet (10 mg) by mouth every 6 hours as needed for nausea or vomiting   topiramate (TOPAMAX) 100 MG tablet Take 1 tablet (100 mg) by mouth At Bedtime (take with 50 mg to total 150 mg nightly)   topiramate (TOPAMAX) 50 MG tablet Take 1 tablet (50 mg) by mouth At Bedtime (take with 100 mg to total 150 mg nightly)   triamcinolone (KENALOG) 0.1 % cream Apply sparingly to affected area three times daily   valACYclovir (VALTREX) 500 MG tablet Take 1 tablet (500 mg) by mouth daily     No current facility-administered medications on file prior to visit.     Physical exam:  There were no vitals taken for this visit.    GEN: no acute distress  CV: regular pulse  Resp: no labored breathing  Abd: soft  Ext: no ankle edema    Neuro:   Mental status: awake, fluent speech, follows commands. 2/5 mini-cog (clock 0/2, word recall 2/3).  Cranial nerves: PERRL, EOMI, no nystagmus, VF intact, sensation to LT intact in V1-3 distribution, symmetric facial expressions, no dysarthria, tongue protrusion midline, symmetric shoulder shrug.  Motor: normal tone, when extends arms forward the R arm moves up and down (but no consistant pronator or downward drift). There is some giveaway weakness on R-side, but strength otherwise normal when patient gives full effot. Able to stand from seated position with arms across chest.  Sensory: Negative Rhomberg.  Reflexes: 2+ symmetric biceps, brachioradialis and patellars.  Coordination: FNF without dysmetria. Finger-tapping normal b/l. Slower with foot-tapping on R side.  Gait: essentially normal gait; does veer to the right but then able to prevent herself from hitting the wall/mild astasia-abasia.    Pertinent studies:  MRI brain reviewed from 10/2018: a couple new white  matter lesions seen in left frontal region (when compared to MRI 10/2017), but not enhancing.    Impression & Recommendations:    # Relapsing remitting multiple sclerosis.   Diagnosed in 2016, maintained on glatiramir acetate since diagnosis. Recent MRI brain does show new white matter lesions, which are not acute, but likely developed over the past year. Has had some fluctuating R-sided weakness/numbness and cognitive issues, but suspect this is due to pseudoflare and migraine rather than true MS flare. We briefly discussed the possibility of changing disease-modifying therapy. She will return to clinic 10/29 to discuss further.  - continue copaxone for now.  - RTC 11/29 to discuss possibly switching DMT.    # Chronic daily headaches.  For the past month. Likely migraine + rebound headache. Very severe and preventing her from working for the last couple weeks. Multiple recent ED visits/hospitalization for this recently (see HPI).   - For headache prevention:    - continue nortriptyline 50 mg at bedtime   - change topiramate to 50 mg in the morning and 100 mg in the evening   - To treat acute headache:   -compazine & sumatriptan for acute treatment.   -Stop Fioricet since this can contribute to rebound headaches.  - Referral to headache specialist Dr. Shena Menjivar for further evaluation.    Patient seen & discussed w/ Staff Dr. Castaneda.    Precious Gonzalez  G4 Neurology    Attending physician: I saw and evaluated the patient with the resident and I agree with her findings and plan of care as documented above. Please see my note for additional details.    BLADIMIR Castaneda MD

## 2018-11-01 NOTE — PATIENT INSTRUCTIONS
1. For headache prevention:  Continue nortriptyline 50 mg at bedtime  Change topiramate to 50 mg in the morning and 100 mg in the evening    2. To treat acute headache:  -Take prochlorperazine (Compazine) 10 mg every six hours as needed  -You can also take 50 mg of sumatriptan up to twice daily (separate doses by at least one hour), but do not take 2-3 days/week  -Stop Fioricet (butalbital/acetaminophen caffeine)    3. I am referring you to Dr. Shena Menjivar for evaluation of chronic headache    4. Return to this clinic as scheduled on 11-29; you do not need to do MRI scans as these were recently completed.    5. Continue glatiramer aceate

## 2018-11-01 NOTE — LETTER
11/1/2018      RE: Josefina Aldrich  [id number]  [place] [address]     Date of service: November 1, 2018     Referral source: Established patient     Chief complaint: Headache.      Assisted by: Precious Gonzalez MD, PGY-4 Neurology resident      History of the Present Illness: Ms. Josefina Aldrich is a 43-year-old woman who returns to the clinic again today regarding ongoing difficulty with headache.  I was assisted in this evaluation today by Dr. Gonzalez, and her documentation should be considered integral to this note.      The patient's history is as per my previous notes.  She initially presented with symptoms of demyelinating disease in June 2015 with right-sided numbness and incoordination of the arm.  This was initially felt to be a cerebrovascular event, but follow-up neuroimaging did not show the expected temporal evolution of an infarct and further demonstrated accumulation of lesions suggestive of demyelinating disease of the type seen in multiple sclerosis.  Currently, the patient is on disease-modifying therapy with glatiramer acetate.      The patient's acute symptomatic complaint today continues to be headache.  When I last saw her on 10/11/2018, we gave her a week-long course of corticosteroids as well as prescriptions for sumatriptan and prochlorperazine.  She reported that these interventions improved her headache but did not resolve it.  She subsequently was admitted to the [facility] from 10/19/2018 to 10/21/2018.  Initially she was treated with 0.5 mg of IV dihydroergotamine, but did not perceive any benefit and did not receive further doses of this medication.  She was subsequently treated with IV diphenhydramine, valproic acid and hydrocortisone with some improvement in her headache.      However, following discharge, she continued to experience headache symptoms.  She was evaluated by her primary care provider on  10/24/2018 and given a prescription for a butalbital containing combination analgesic.  She was further seen in the Emergency Department at [facility] on 10/26/2018, again complaining of headache as well as right-sided numbness and subjective weakness.  MRI scan of the brain on that date did not demonstrate any evidence of active inflammatory demyelination, although there were 2 new small white matter lesions noted in the deep white matter of the left frontal lobe that were new in comparison to an earlier brain MRI from 2017.  MR angiography of the head and neck and MR venogram were normal.  The patient was advised to follow up with neurology as an outpatient, and presents today for that purpose.     Past Medical History:  1.  Migraine.   2.  Depression.   3.  Hypertension.   4.  Obstructive sleep apnea.   5.  Asthma.   6.  Status post gastric bypass surgery.   7.  Status post hysterectomy.   8.  Status post  section.      Physical examination is as per Dr. Gonzalez with pertinent findings including give-way type weakness on the right side, slowed rapid alternating movements in the right foot and gait with astasia-abasia features.      Assessment/plan:    1.  Intractable chronic migraine with ongoing headache  Currently, the patient is taking 50 mg nortriptyline at bedtime and 150 mg of topiramate at bedtime for migraine prevention.  Both of these medications were increased at the time of her discharge from the hospital.  I explained to the patient that unfortunately these and other migraine preventive medications do take time to become biologically effective, and I do not think that we can make conclusions about the efficacy of these therapies as of yet.  I also suggested to her that we split her topiramate dose with 50 mg in the morning and 100 mg at bedtime to try to provide a more consistent steady state of the medication in her body.     Regarding migraine abortive treatments, I told the  patient that I do not think butalbital-containing combination analgesics are advisable.  These medications are notoriously associated with development of rebound headache syndrome and she is at very high risk for same.  Further, she noted only quite transient relief with attempted use of these medications. I have refilled her prescriptions for prochlorperazine and sumatriptan, also cautioning her that she should not use sumatriptan more than 2 days per week.     I also wonder if the patient would benefit from second-line therapies for migraine prevention such as one of the new CGRP agonists or possibly botulinum toxin injections.  I explained to her that the frequency and severity of her headaches at present is such that the MS Clinic is not going to be the most appropriate setting for her headache care.  I am going to refer her internally to a headache expert for further evaluation and management.     The patient relates that she is unable to work at present due to ongoing headache and associated symptoms as above. I have provided her with a work excuse through 11/29/2018 as needed, although she is clear to return earlier if she feels able to do so.    2.  Multiple sclerosis  Time did not permit further discussion of the MRI findings and implications regarding long-term therapy.  She is scheduled to return to this clinic on 11/29 for regular scheduled follow-up of multiple sclerosis and we will need to discuss possible alteration in disease-modifying therapy at that time.     Radames Castaneda MD   of Neurology  [facility]    Cc:  Miya Crump PA-C (PCP)  Patient

## 2018-11-02 NOTE — TELEPHONE ENCOUNTER
Completed forms faxed back to [employer] at [phone].   Originals sent to be scanned.     Anju Miller

## 2018-11-04 DIAGNOSIS — B00.9 HSV (HERPES SIMPLEX VIRUS) INFECTION: ICD-10-CM

## 2018-11-05 RX ORDER — VALACYCLOVIR HYDROCHLORIDE 500 MG/1
TABLET, FILM COATED ORAL
Qty: 90 TABLET | Refills: 0 | Status: SHIPPED | OUTPATIENT
Start: 2018-11-05 | End: 2019-02-15

## 2018-11-05 NOTE — TELEPHONE ENCOUNTER
Routing refill request to provider for review/approval because:  Labs out of range:  Please review below.  Addie Teran RN  [place] Triage

## 2018-11-05 NOTE — TELEPHONE ENCOUNTER
"Requested Prescriptions   Pending Prescriptions Disp Refills     valACYclovir (VALTREX) 500 MG tablet [Pharmacy Med Name: VALACYCLOVIR 500MG TABLETS] 90 tablet 0      Last Written Prescription Date:  1.25.18  Last Fill Quantity: 90,  # refills: 1   Last Office Visit: 10/30/2018   Future Office Visit:      Sig: TAKE 1 TABLET BY MOUTH DAILY    Antivirals for Herpes Protocol Failed    11/4/2018  6:24 PM       Failed - Normal serum creatinine on file in past 12 months    Recent Labs   Lab Test  10/26/18   1626   CR  1.07*            Passed - Patient is age 12 or older       Passed - Recent (12 mo) or future (30 days) visit within the authorizing provider's specialty    Patient had office visit in the last 12 months or has a visit in the next 30 days with authorizing provider or within the authorizing provider's specialty.  See \"Patient Info\" tab in inbasket, or \"Choose Columns\" in Meds & Orders section of the refill encounter.                "

## 2018-11-08 NOTE — PROGRESS NOTES
Date of service: November 1, 2018     Referral source: Established patient     Chief complaint: Headache.      Assisted by: Precious Gonzalez MD, PGY-4 Neurology resident      History of the Present Illness: Ms. Josefina Aldrich is a 43-year-old woman who returns to the clinic again today regarding ongoing difficulty with headache.  I was assisted in this evaluation today by Dr. Gonzalez, and her documentation should be considered integral to this note.      The patient's history is as per my previous notes.  She initially presented with symptoms of demyelinating disease in June 2015 with right-sided numbness and incoordination of the arm.  This was initially felt to be a cerebrovascular event, but follow-up neuroimaging did not show the expected temporal evolution of an infarct and further demonstrated accumulation of lesions suggestive of demyelinating disease of the type seen in multiple sclerosis.  Currently, the patient is on disease-modifying therapy with glatiramer acetate.      The patient's acute symptomatic complaint today continues to be headache.  When I last saw her on 10/11/2018, we gave her a week-long course of corticosteroids as well as prescriptions for sumatriptan and prochlorperazine.  She reported that these interventions improved her headache but did not resolve it.  She subsequently was admitted to the [facility] Neurology Service from 10/19/2018 to 10/21/2018.  Initially she was treated with 0.5 mg of IV dihydroergotamine, but did not perceive any benefit and did not receive further doses of this medication.  She was subsequently treated with IV diphenhydramine, valproic acid and hydrocortisone with some improvement in her headache.      However, following discharge, she continued to experience headache symptoms.  She was evaluated by her primary care provider on 10/24/2018 and given a prescription for a butalbital containing combination analgesic.  She was further  seen in the Emergency Department at [facility] on 10/26/2018, again complaining of headache as well as right-sided numbness and subjective weakness.  MRI scan of the brain on that date did not demonstrate any evidence of active inflammatory demyelination, although there were 2 new small white matter lesions noted in the deep white matter of the left frontal lobe that were new in comparison to an earlier brain MRI from 2017.  MR angiography of the head and neck and MR venogram were normal.  The patient was advised to follow up with neurology as an outpatient, and presents today for that purpose.     Past Medical History:  1.  Migraine.   2.  Depression.   3.  Hypertension.   4.  Obstructive sleep apnea.   5.  Asthma.   6.  Status post gastric bypass surgery.   7.  Status post hysterectomy.   8.  Status post  section.      Physical examination is as per Dr. Gonzalez with pertinent findings including give-way type weakness on the right side, slowed rapid alternating movements in the right foot and gait with astasia-abasia features.      Assessment/plan:    1.  Intractable chronic migraine with ongoing headache  Currently, the patient is taking 50 mg nortriptyline at bedtime and 150 mg of topiramate at bedtime for migraine prevention.  Both of these medications were increased at the time of her discharge from the hospital.  I explained to the patient that unfortunately these and other migraine preventive medications do take time to become biologically effective, and I do not think that we can make conclusions about the efficacy of these therapies as of yet.  I also suggested to her that we split her topiramate dose with 50 mg in the morning and 100 mg at bedtime to try to provide a more consistent steady state of the medication in her body.     Regarding migraine abortive treatments, I told the patient that I do not think butalbital-containing combination analgesics are advisable.  These medications  are notoriously associated with development of rebound headache syndrome and she is at very high risk for same.  Further, she noted only quite transient relief with attempted use of these medications. I have refilled her prescriptions for prochlorperazine and sumatriptan, also cautioning her that she should not use sumatriptan more than 2 days per week.     I also wonder if the patient would benefit from second-line therapies for migraine prevention such as one of the new CGRP agonists or possibly botulinum toxin injections.  I explained to her that the frequency and severity of her headaches at present is such that the MS Clinic is not going to be the most appropriate setting for her headache care.  I am going to refer her internally to a headache expert for further evaluation and management.     The patient relates that she is unable to work at present due to ongoing headache and associated symptoms as above. I have provided her with a work excuse through 2018 as needed, although she is clear to return earlier if she feels able to do so.    2.  Multiple sclerosis  Time did not permit further discussion of the MRI findings and implications regarding long-term therapy.  She is scheduled to return to this clinic on  for regular scheduled follow-up of multiple sclerosis and we will need to discuss possible alteration in disease-modifying therapy at that time.         ZACHARY HACKETT MD             D: 2018   T: 2018   MT: NADYA      Name:     RACHEL YANG   MRN:      [id number]        Account:      [id number]   :      1975           Service Date: 2018      Document: [id number]

## 2018-11-11 NOTE — PROGRESS NOTES
"[facility], [place]   Neurology Progress Note  Josefina Aldrich  [id number]  10/11/2018    Subjective:  The patient has been dealing with a near continuous migraine for the past two weeks. She describes this HA as severe, bifrontal, stabbing quality, and associated with photo and phonophobia, nausea but not vomiting. This HA was somewhat typical in quality but unusual in its severity and duration. Most concerningly, it was also associated with increased numbness of her R finger tips and toes that eventually progressed in the legs. This caused her walking to be worse with occasional listing to the  and has had two falls. She also complained of diplopia with extreme lateral gaze in either direction, and a \"vibrating pressure\" in her back when she walks. Because she works [place] [employer] [place] was taken to the ED there 10/8. She underwent MR of her Brain and C spine with and without contrast that reportedly did not show any enhancing lesions. Her headache was then treated with olanzapine. This provided relief for about one day before slowly recurring. She is currently taking  at bedtime and nortyrptiline 25mg at bedtime. Nortryptiline was associated with dramatically reduced frequency: from daily to rare in the past 5mo. TOP was associated with dramatically reduced severity. She notes that prednisone has helped in the past. Sumatriptan supposedly caused drowsiness in the past. She believes the stress at work has been a major trigger for her.    She also complains of worsening neuropathic pain in the R foot. She also reports constant fatigue.    Objective   /71 (BP Location: Right arm, Patient Position: Chair, Cuff Size: Thigh)  Pulse 79  Ht 1.651 m (5' 5\")  Wt 113.5 kg (250 lb 4.8 oz)  SpO2 100%  BMI 41.65 kg/m2  General: pt laying comfortably in bed, breathing easily on ra, in NAD   HEENT: no oral ulcers   Chest: cta   Heart: rrr  Abdomen: soft, nt, nd, +BS  Ext: no edema "   Skin: no rashes  Neuro:   -MS: alert, speech fluent and coherent  -CN: vff, L eye 20/20, R eye 20/25 with mild red desaturation, perrla, eomi, facial sensation and movement intact b/l, hearing intact to conversation, palate raises symmetrically, shoulder shrug and scm intact, tongue midline  -Motor: tone and bulk normal. Collapsing weakness is noted with nearly every muscle group tested  --LUE: 5/5 shoulder abd, arm flex/ext, wrist flex/ext, finger flex/ext/abd/add  --RUE: 5/5 shoulder abd, arm flex/ext, wrist flex/ext, finger flex/ext/abd/add  --LLE: 5/5 hip flexor, leg flex/ext, plantar/dorsiflexion  --RLE: 5/5 hip flexor, leg flex/ext, plantar/dorsiflexion  -Reflexes: normoactive and symmetric at achilles, patella, brachioradialis, and biceps. Toes downgoing b/l   -Sensory: intact to light touch and pinprick in all extremities  -Coordination: intact to FNF, H2S b/l  -Gait: normal station, stride length, and arm swing      Investigations    MR Brain WWO 10/8  Impression:   1. There are a total of 7 lesions with increased T2/FLAIR signal in the periventricular white matter. These findings are consistent with the clinical history of demyelinating disease.  2. No abnormal foci of contrast enhancement intracranially.  3. Normal orbits.  MR C Spine 10/8  Impression:    1. No cervical disc herniation or stenosis. No abnormal signal in the spinal cord.  2. No abnormal contrast enhancement within the cervical spinal cord or vertebrae.  Vitamin D (5/2018) 38  Impression:  Josefina Aldrich is a 43 year old year old female with h/o migraines and relapsing remitting MS who presents for f/u     #Relapsing Remitting Multiple Sclerosis: stable on current glatiramer acetate. Continue this and vitamin D.     #Status Migrainosos: fortunately, no new demyelinating lesions demonstrated on recent MRI. Will try to abort current headache with compazine 10mg, sumatriptan 50 (+50mg prn if inadequate response after 1h), and  naproxen. With this, she will take a prednisone taper. If this regimen is ineffective, will offer admission for [facility] protocol.    Recommendations:   -continue glatiramer acetate and vitamin D  -compazine 10, sumatriptan 50 (+50prn), naproxen + steroid taper  -steroid taper: prednisone 60mg x5d then 40mg, then 20mg, then stop    Patient was seen and discussed with attending neurologist, Dr. Tonya Prather MD  Neurology G4  [phone]    Attending physician: I saw and evaluated the patient with the resident and I agree with his findings and plan of care as documented above. Please see my note for additional details.    BLADIMIR Castaneda MD

## 2018-11-19 ENCOUNTER — OFFICE VISIT (OUTPATIENT)
Dept: FAMILY MEDICINE | Facility: CLINIC | Age: 43
End: 2018-11-19
Payer: COMMERCIAL

## 2018-11-19 VITALS
BODY MASS INDEX: 42.11 KG/M2 | TEMPERATURE: 98 F | HEART RATE: 71 BPM | SYSTOLIC BLOOD PRESSURE: 122 MMHG | WEIGHT: 262 LBS | DIASTOLIC BLOOD PRESSURE: 68 MMHG | OXYGEN SATURATION: 100 % | HEIGHT: 66 IN

## 2018-11-19 DIAGNOSIS — R10.2 PELVIC PAIN IN FEMALE: Primary | ICD-10-CM

## 2018-11-19 DIAGNOSIS — Z87.42 HISTORY OF OVARIAN CYST: ICD-10-CM

## 2018-11-19 DIAGNOSIS — R10.31 RLQ ABDOMINAL PAIN: ICD-10-CM

## 2018-11-19 PROCEDURE — 99214 OFFICE O/P EST MOD 30 MIN: CPT | Performed by: PHYSICIAN ASSISTANT

## 2018-11-19 NOTE — MR AVS SNAPSHOT
After Visit Summary   11/19/2018    Josefina Aldrich    MRN: [id number]           Patient Information     Date Of Birth          1975        Visit Information        Provider Department      11/19/2018 10:40 AM Kalee Valero PA-C [facility]        Today's Diagnoses     Pelvic pain in female    -  1    RLQ abdominal pain        History of ovarian cyst          Care Instructions    Your provider has ordered a procedure for you to be done at  [facility] - [address], [address], [place]    Check in at the .    *Please understand that you are being worked in to their schedule and there may be a wait.  They will get to your exam as quickly as they can.*                                                                                                                                                                      After your exam, your physician will be contacted by the Radiologist with the results.  This usually takes about 45 minutes after the completion of the exam.  You will then be notified of the results by your physician.    Your exam is scheduled for:  Tuesday 11/20 at 3:20, check in at 3:05pm    Special Instructions for your exam include:  US prep Pelvis or OB US:  Drink 32 oz of water one hour before scheduled exam.  Do not empty your bladder before the test.  If you find you are unable to hold your bladder before your test time, try to urinate a small amount and then drink another glass of fluid.  Allow approximately one hour for the exam.  NOTE:  Pelvis and early pregnancy ultrasounds usually include additional images with a transducer that is placed in your vagina.  You will be allowed to empty your bladder before this portion of the scan.  The special transducer provides excellent visualization of the pelvic organs and small fetuses.  The procedure is similar to a pelvic exam and takes approximately 10 to 12  minutes.    *Please check in with the Admitting staff 15 minutes prior to your scheduled exam.*            Follow-ups after your visit        Your next 10 appointments already scheduled     Nov 20, 2018  3:20 PM CST   US PELVIC COMPLETE W TRANSVAGINAL with [id number]   [facility] ([facility])    [address]  [facility] [place] [id number] [phone]           How do I prepare for my exam? (Food and drink instructions) Adults: Drink four 8-ounce glasses of fluid an hour before your exam. If you need to empty your bladder before your exam, try to release only a little urine. Then, drink another glass of fluid.  Children: * Children who are potty trained up to 6 years old should drink at least 2 cups (16 oz) of water/non-carbonated beverage 30 minutes prior to the exam. * Children who are 6-10 years should drink at least 3 cups (24 oz) of water/non-carbonated beverage 45 minutes prior to the exam. * Children who are 10 years or older should drink at least 4 cups (32 oz) of water/non-carbonated beverage 45 minutes prior to the exam.  If your child is very uncomfortable or has an urgent need to pee, please notify a technologist; they will try to find out how much longer the wait may be and provide instructions to help relieve the pressure.  What should I wear: Wear comfortable clothes.  How long does the exam take: Most ultrasounds take 30 to 60 minutes.  What should I bring: Bring a list of your medicines, including vitamins, minerals and over-the-counter drugs. It is safest to leave personal items at home.  Do I need a :  No  is needed.  What do I need to tell my doctor: Tell your doctor about any allergies you may have.  What should I do after the exam: No restrictions, You may resume normal activities.  What is this test: An ultrasound uses sound waves to make pictures of the body. Sound waves do not cause pain. The only discomfort may be the  pressure of the wand against your skin or full bladder.  Who should I call with questions: If you have any questions, please call the Imaging Department where you will have your exam. Directions, parking instructions, and other information is available on our website, [email or web address].            Nov 29, 2018 10:00 AM CST   (Arrive by 9:45 AM)   Return Multiple Sclerosis with Radames Castaneda MD   [facility] Multiple Sclerosis ([facility])    [address]  [address]  [facility] [id number] [phone]            Nov 30, 2018  7:00 AM CST   (Arrive by 6:45 AM)   New Patient Visit with Shena Menjivar MD   [facility] Neurology ([facility])    [address]  3rd [address]  [facility] [id number] [phone]              Future tests that were ordered for you today     Open Future Orders        Priority Expected Expires Ordered    US Pelvic Complete w Transvaginal Routine  11/19/2019 11/19/2018            Who to contact     If you have questions or need follow up information about today's clinic visit or your schedule please contact [facility] directly at [phone].  Normal or non-critical lab and imaging results will be communicated to you by MyChart, letter or phone within 4 business days after the clinic has received the results. If you do not hear from us within 7 days, please contact the clinic through MyChart or phone. If you have a critical or abnormal lab result, we will notify you by phone as soon as possible.  Submit refill requests through [employer] or call your pharmacy and they will forward the refill request to us. Please allow 3 business days for your refill to be completed.          Additional Information About Your Visit        [employer] Information     [employer] gives you secure access to your electronic health record. If you see a primary care provider, you can also send messages to your care team and make  "appointments. If you have questions, please call your primary care clinic.  If you do not have a primary care provider, please call [phone] and they will assist you.        Care EveryWhere ID     This is your Care EveryWhere ID. This could be used by other organizations to access your [facility] medical records  [id number]        Your Vitals Were     Pulse Temperature Height Pulse Oximetry Breastfeeding? BMI (Body Mass Index)    71 98  F (36.7  C) (Oral) 5' 5.5\" (1.664 m) 100% No 42.94 kg/m2       Blood Pressure from Last 3 Encounters:   11/19/18 122/68   11/01/18 121/77   10/30/18 128/80    Weight from Last 3 Encounters:   11/19/18 262 lb (118.8 kg)   11/01/18 254 lb (115.2 kg)   10/30/18 254 lb (115.2 kg)                 Today's Medication Changes          These changes are accurate as of 11/19/18 11:44 AM.  If you have any questions, ask your nurse or doctor.               Stop taking these medicines if you haven't already. Please contact your care team if you have questions.     IBUPROFEN PO   Stopped by:  Kalee Valero PA-C                    Primary Care Provider Office Phone # Fax #    Miya Opal Crump PA-C [phone]       [facility] [facility] [address]        Equal Access to Services     RAFAELA MORENO : Hadaline alvares Somagen, waaxda luqadaha, qaybta kaalmashabbir alas, mya patterson. So [facility] [phone].    ATENCIÓN: Si habla español, tiene a barrera disposición servicios gratuitos de asistencia lingüística. Karen al [phone].    We comply with applicable federal civil rights laws and [place] laws. We do not discriminate on the basis of race, color, national origin, age, disability, sex, sexual orientation, or gender identity.            Thank you!     Thank you for choosing [facility]  for your care. Our goal is always to provide you with excellent care. Hearing back from our patients is one way we can continue " to improve our services. Please take a few minutes to complete the written survey that you may receive in the mail after your visit with us. Thank you!             Your Updated Medication List - Protect others around you: Learn how to safely use, store and throw away your medicines at www.disposemymeds.org.          This list is accurate as of 11/19/18 11:44 AM.  Always use your most recent med list.                   Brand Name Dispense Instructions for use Diagnosis    * VENTOLIN  (90 Base) MCG/ACT inhaler   Generic drug:  albuterol      Inhale 2 puffs into the lungs        * albuterol (2.5 MG/3ML) 0.083% neb solution      Inhale 2.5 mg into the lungs        butalbital-acetaminophen  MG Tabs    PHRENILIN    60 each    Take 2 tablets by mouth every 4 hours as needed (headache)    Episodic tension-type headache, not intractable       cholecalciferol 5000 units Tabs tablet    vitamin D3     Take 1 tablet (5,000 Units) by mouth daily    Hypovitaminosis D       cyanocolbalamin 100 MCG tablet    vitamin  B-12     Take 100 mcg by mouth daily        cyclobenzaprine 10 MG tablet    FLEXERIL     Take 10 mg by mouth 3 times daily as needed        Glatiramer Acetate 40 MG/ML Sosy    COPAXONE    12 Syringe    Inject 40 mg Subcutaneous three times a week    Multiple sclerosis (H)       IRON SUPPLEMENT PO           lisinopril-hydrochlorothiazide 10-12.5 MG per tablet    PRINZIDE/ZESTORETIC    90 tablet    TAKE 1 TABLET BY MOUTH DAILY    Benign essential hypertension       metoclopramide 10 MG tablet    REGLAN    10 tablet    Take 1 tablet (10 mg) by mouth 4 times daily as needed (nausea or headache)        nortriptyline 50 MG capsule    PAMELOR    90 capsule    Take 1 capsule (50 mg) by mouth At Bedtime    Migraine with aura and without status migrainosus, not intractable       SUMAtriptan 50 MG tablet    IMITREX    30 tablet    Take 50 mg up to twice daily as needed for headache; separate doses by at least one hour  and do not use more than 3 days/week    Intractable chronic migraine without aura and with status migrainosus       * topiramate 100 MG tablet    TOPAMAX    60 tablet    Take 1 tablet (100 mg) by mouth At Bedtime (take with 50 mg to total 150 mg nightly)    Migraine with aura and without status migrainosus, not intractable       * topiramate 50 MG tablet    TOPAMAX    60 tablet    Take 1 tablet (50 mg) by mouth At Bedtime (take with 100 mg to total 150 mg nightly)    Migraine with aura and without status migrainosus, not intractable       triamcinolone 0.1 % cream    KENALOG    30 g    Apply sparingly to affected area three times daily    Eczema, unspecified type       valACYclovir 500 MG tablet    VALTREX    90 tablet    TAKE 1 TABLET BY MOUTH DAILY    HSV (herpes simplex virus) infection       * Notice:  This list has 4 medication(s) that are the same as other medications prescribed for you. Read the directions carefully, and ask your doctor or other care provider to review them with you.

## 2018-11-19 NOTE — PROGRESS NOTES
SUBJECTIVE:                                                    Josefina Aldrich is a 43 year old female who presents to clinic today for the following health issues:    Patient stated the pain is back - has been noticing cramping every so often. Today with intercourse, the pain became severe (tugging, pulling in the uterus).   Pain has subsided to a dull pain currently, intermittently     Patient stated she has seen ALFONSO MESA for this previously (SEE 02/16/2018 OV with ALFONSO VALLADARES)     ABDOMINAL PAIN     Onset: 2 Days    Description:   Character: Dull ache  Location: right lower quadrant  Radiation: None    Intensity: mild, moderate    Progression of Symptoms:  same and intermittent    Accompanying Signs & Symptoms:  Fever/Chills?: no   Gas/Bloating: YES- occasional gas  Nausea: no   Vomitting: no   Diarrhea?: no   Constipation:no   Dysuria or Hematuria: no    History:   Trauma: no   Previous similar pain: YES   Previous tests done: US 02/2018 showed ovarian cysts    Precipitating factors:   Does the pain change with:     Food: no      BM: no     Urination: no     Alleviating factors:  None    Therapies Tried and outcome: None    LMP:  not applicable (hysterectomy)       DENIES: CP, SOB, Difficulty Breathing, Dizziness/Lightheadedness, Numbness/Tingling, Vision/Hearing Changes, N/V     Patient states she was advised that ALFONSO MESA wanted to see patient and do additional imaging if pain came back.      Advised OV - scheduled for 11/19/2018     Advised patient that if new or worsening symptoms appear (reviewed new & worsening symptoms) to call the clinic or be seen in the the ER  Patient stated an understanding and agreed with plan.     Susannah Selby RN  [place] [facility]    Patient states that she had a fluttery feeling on the right side near ovary early last week, but no pain otherwise.  Friday night she had intercourse and experienced 10/10 pain on the right side.  After intercourse the pain continued for a  couple of hours at 10/10 and she thought she smelt a blood smell, but there was no bleeding with intercourse or after.    She says that right now she has a dull pain, but this morning she did not have any pain in the area at all.  She says certain movements will cause a flare up of pain.  She says that the pain does kind of radiate into the middle pelvic area.    Patient does have a history of ovarian cysts.        She is not having any new or abnormal vaginal discharge.  She has not noted any odor either.  She is not having nausea or vomiting.  She is not having any burning or painful urination.      ULTRASOUND PELVIC COMPLETE WITH TRANSVAGINAL 2/22/2018 8:50 AM     HISTORY: 42-year-old woman with pelvic pain and vaginal discharge.     COMPARISON: None     TECHNIQUE: Transabdominal and grayscale images obtained overlying the  pelvis. Transvaginal images were required to better evaluate the  ovaries and indication for inadequate bladder fullness.     FINDINGS: Patient is status post hysterectomy. The right ovary is 3.1  x 2.2 x 1.6 cm. Blood flow is confirmed within the right ovary.     The left ovary is 2.5 x 2.2 x 2.2 cm. Simple cyst is noted within the  left ovary measuring 1.8 x 1.6 x 1.8 cm. A heterogeneous complex  cystic collection is noted within the ovary measuring 1.7 x 1.2 x 0.8  cm. No free fluid.         IMPRESSION:  1. Patient is status post hysterectomy.  2. Normal appearance of the right ovary.  3. Simple ovarian cyst on the left measuring up to 1.8 cm. Separate  ovarian collection measuring up to 1.7 cm that is complex. Uncertain  if this is a complex cyst or a small solid collection. Recommend  repeat ultrasound in 2-3 months.     RODOLFO LONDON MD    Problem list and histories reviewed & adjusted, as indicated.  Additional history: as documented      ROS:  Constitutional, HEENT, cardiovascular, pulmonary, GI, , musculoskeletal, neuro, skin, endocrine and psych systems are negative, except as  "otherwise noted.    OBJECTIVE:                                                    /68 (BP Location: Right arm, Patient Position: Chair, Cuff Size: Adult Large)  Pulse 71  Temp 98  F (36.7  C) (Oral)  Ht 5' 5.5\" (1.664 m)  Wt 262 lb (118.8 kg)  SpO2 100%  Breastfeeding? No  BMI 42.94 kg/m2  Body mass index is 42.94 kg/(m^2).  GENERAL: healthy, alert and no distress  EYES: Eyes grossly normal to inspection, PERRL and conjunctivae and sclerae normal  ABDOMEN: Mild tenderness to palpation in the right lower quadrant/pelvic area, soft, no hepatosplenomegaly, no masses and bowel sounds normal  MS: no gross musculoskeletal defects noted, no edema  NEURO: Normal strength and tone, mentation intact and speech normal  PSYCH: mentation appears normal, affect normal/bright    Diagnostic Test Results:  none      ASSESSMENT/PLAN:                                                      Josefina was seen today for abdominal pain.    Diagnoses and all orders for this visit:    Pelvic pain in female  -     US Pelvic Complete w Transvaginal; Future    RLQ abdominal pain  -     US Pelvic Complete w Transvaginal; Future    History of ovarian cyst  -     US Pelvic Complete w Transvaginal; Future      Patient was seen today for RLQ pain, that she feels is most consistent with her history of ovarian cysts.  She has not had any fevers, chills, sweats, malaise, N/V, urinary symptoms or vaginal symptoms.  Will have patient go for pelvic ultrasound.  Consideration of pelvic exam today and wet prep, but patient declined.  Patient agrees to followup if not improving, she should seek more immediate care if symptoms change or worsen in any way.        Followup: Return in about 1 day (around 11/20/2018) for Advised Imaging, please be seen sooner if needed.     -- I have discussed the patient's diagnosis, and my plan of treatment with the patient and/or family. Patient is aware to followup if symptoms do not improve.  Patient has been " advised to be seen sooner or seek more immediate care if symptoms change or worsen.  Patient agrees with and understands the plan today.     See Patient Instructions        Kalee Valero PA-C    [facility] [place]

## 2018-11-19 NOTE — PATIENT INSTRUCTIONS
Your provider has ordered a procedure for you to be done at  [facility] - [address], [address], [place]    Check in at the .    *Please understand that you are being worked in to their schedule and there may be a wait.  They will get to your exam as quickly as they can.*                                                                                                                                                                      After your exam, your physician will be contacted by the Radiologist with the results.  This usually takes about 45 minutes after the completion of the exam.  You will then be notified of the results by your physician.    Your exam is scheduled for:  Tuesday 11/20 at 3:20, check in at 3:05pm    Special Instructions for your exam include:  US prep Pelvis or OB US:  Drink 32 oz of water one hour before scheduled exam.  Do not empty your bladder before the test.  If you find you are unable to hold your bladder before your test time, try to urinate a small amount and then drink another glass of fluid.  Allow approximately one hour for the exam.  NOTE:  Pelvis and early pregnancy ultrasounds usually include additional images with a transducer that is placed in your vagina.  You will be allowed to empty your bladder before this portion of the scan.  The special transducer provides excellent visualization of the pelvic organs and small fetuses.  The procedure is similar to a pelvic exam and takes approximately 10 to 12 minutes.    *Please check in with the Admitting staff 15 minutes prior to your scheduled exam.*

## 2018-11-20 ENCOUNTER — HOSPITAL ENCOUNTER (OUTPATIENT)
Dept: ULTRASOUND IMAGING | Facility: CLINIC | Age: 43
Discharge: HOME | End: 2018-11-20
Attending: PHYSICIAN ASSISTANT | Admitting: PHYSICIAN ASSISTANT
Payer: COMMERCIAL

## 2018-11-20 DIAGNOSIS — R10.2 PELVIC PAIN IN FEMALE: ICD-10-CM

## 2018-11-20 DIAGNOSIS — R10.31 RLQ ABDOMINAL PAIN: ICD-10-CM

## 2018-11-20 DIAGNOSIS — Z87.42 HISTORY OF OVARIAN CYST: ICD-10-CM

## 2018-11-20 PROCEDURE — 76856 US EXAM PELVIC COMPLETE: CPT

## 2018-11-23 NOTE — PROGRESS NOTES
Note to staff: Please call the patient to explain results.    The results from your recent pelvic ultrasound show a right ovarian cyst that is most characteristic of a dominate follicle.  Please followup if your symptoms are not improving.        Thank you for choosing [facility] for your health care needs,      Kalee Valero PA-C

## 2018-11-27 ENCOUNTER — OFFICE VISIT (OUTPATIENT)
Dept: FAMILY MEDICINE | Facility: CLINIC | Age: 43
End: 2018-11-27
Payer: COMMERCIAL

## 2018-11-27 VITALS
HEIGHT: 66 IN | BODY MASS INDEX: 41.95 KG/M2 | HEART RATE: 86 BPM | OXYGEN SATURATION: 97 % | DIASTOLIC BLOOD PRESSURE: 72 MMHG | SYSTOLIC BLOOD PRESSURE: 120 MMHG | WEIGHT: 261 LBS | TEMPERATURE: 98.7 F

## 2018-11-27 DIAGNOSIS — G35 MS (MULTIPLE SCLEROSIS) (H): ICD-10-CM

## 2018-11-27 DIAGNOSIS — B96.89 BACTERIAL VAGINOSIS: ICD-10-CM

## 2018-11-27 DIAGNOSIS — R10.2 PELVIC PRESSURE IN FEMALE: ICD-10-CM

## 2018-11-27 DIAGNOSIS — R10.31 RLQ ABDOMINAL PAIN: Primary | ICD-10-CM

## 2018-11-27 DIAGNOSIS — N76.0 BACTERIAL VAGINOSIS: ICD-10-CM

## 2018-11-27 LAB
ALBUMIN UR-MCNC: NEGATIVE MG/DL
APPEARANCE UR: CLEAR
BILIRUB UR QL STRIP: NEGATIVE
COLOR UR AUTO: YELLOW
GLUCOSE UR STRIP-MCNC: NEGATIVE MG/DL
HGB UR QL STRIP: NEGATIVE
KETONES UR STRIP-MCNC: NEGATIVE MG/DL
LEUKOCYTE ESTERASE UR QL STRIP: NEGATIVE
NITRATE UR QL: NEGATIVE
PH UR STRIP: 6.5 PH (ref 5–7)
SOURCE: NORMAL
SP GR UR STRIP: >1.03 (ref 1–1.03)
SPECIMEN SOURCE: ABNORMAL
UROBILINOGEN UR STRIP-ACNC: 0.2 EU/DL (ref 0.2–1)
WET PREP SPEC: ABNORMAL

## 2018-11-27 PROCEDURE — 87210 SMEAR WET MOUNT SALINE/INK: CPT | Performed by: PHYSICIAN ASSISTANT

## 2018-11-27 PROCEDURE — 81003 URINALYSIS AUTO W/O SCOPE: CPT | Performed by: PHYSICIAN ASSISTANT

## 2018-11-27 PROCEDURE — 87491 CHLMYD TRACH DNA AMP PROBE: CPT | Performed by: PHYSICIAN ASSISTANT

## 2018-11-27 PROCEDURE — 87591 N.GONORRHOEAE DNA AMP PROB: CPT | Performed by: PHYSICIAN ASSISTANT

## 2018-11-27 PROCEDURE — 99214 OFFICE O/P EST MOD 30 MIN: CPT | Performed by: PHYSICIAN ASSISTANT

## 2018-11-27 RX ORDER — METRONIDAZOLE 7.5 MG/G
1 GEL VAGINAL AT BEDTIME
Qty: 70 G | Refills: 0 | Status: SHIPPED | OUTPATIENT
Start: 2018-11-27 | End: 2019-03-20

## 2018-11-27 NOTE — PATIENT INSTRUCTIONS
Bacterial Vaginosis    You have a vaginal infection called bacterial vaginosis (BV). Both good and bad bacteria are present in a healthy vagina. BV occurs when these bacteria get out of balance. The number of bad bacteria increase. And the number of good bacteria decrease. Although BV is associated with sexual activity, it is not a sexually transmitted disease.  BV may or may not cause symptoms. If symptoms do occur, they can include:    Thin, gray, milky-white, or sometimes green discharge    Unpleasant odor or  fishy  smell    Itching, burning, or pain in or around the vagina  It is not known what causes BV, but certain factors can make the problem more likely. This can include:    Douching    Having sex with a new partner    Having sex with more than one partner  BV will sometimes go away on its own. But treatment is usually recommended. This is because untreated BV can increase the risk of more serious health problems such as:    Pelvic inflammatory disease (PID)     delivery (giving birth to a baby early if you re pregnant)    HIV and certain other sexually transmitted diseases (STDs)    Infection after surgery on the reproductive organs  Home care  General care    BV is most often treated with medicines called antibiotics. These may be given as pills or as a vaginal cream. If antibiotics are prescribed, be sure to use them exactly as directed. Also, be sure to complete all of the medicine, even if your symptoms go away.    Don't douche or having sex during treatment.    If you have sex with a female partner, ask your healthcare provider if she should also be treated.  Prevention    Don't douche.    Don't have sex. If you do have sex, then take steps to lower your risk:  ? Use condoms when having sex.  ? Limit the number of sexual partners you have.  Follow-up care  Follow up with your healthcare provider, or as advised.  When to seek medical advice  Call your healthcare provider right away if:    You  have a fever of 100.4 F (38 C) or higher, or as directed by your provider.    Your symptoms worsen, or they don t go away within a few days of starting treatment.    You have new pain in the lower belly or pelvic region.    You have side effects that bother you or a reaction to the pills or cream you re prescribed.    You or any partners you have sex with have new symptoms, such as a rash, joint pain, or sores.  Date Last Reviewed: 10/1/2017    5411-3793 [employer]. [address] [place] [address]. All rights reserved. This information is not intended as a substitute for professional medical care. Always follow your healthcare professional's instructions.

## 2018-11-27 NOTE — PROGRESS NOTES
"  SUBJECTIVE:                                                    Josefina Aldrich is a 43 year old female who presents to clinic today for the following health issues:    Recheck form LOV 11/19/2018 - Had pelvic US 11/20/2018.     Still having the pain - did see a cyst on US.     Patient is here for a followup regarding the right lower quadrant pain and to discuss the ultrasound results.  She states that the pain she is experiencing is still intermittent.  She rates it as a 3/10, but says it can worsen.  She says that the intensity of the pain is much better, but she can still feel some pressure.    She has not had intercourse since her last office visit.  Patient also denies any fevers, chills or sweats and she says she has not had nausea or vomiting.  Patient has not noted any increased or abnormal appearing vaginal discharge.      Problem list and histories reviewed & adjusted, as indicated.  Additional history: as documented      ROS:  Constitutional, HEENT, cardiovascular, pulmonary, GI, , musculoskeletal, neuro, skin, endocrine and psych systems are negative, except as otherwise noted.    OBJECTIVE:                                                    /72 (BP Location: Left arm, Patient Position: Chair, Cuff Size: Adult Large)  Pulse 86  Temp 98.7  F (37.1  C) (Oral)  Ht 5' 5.5\" (1.664 m)  Wt 261 lb (118.4 kg)  SpO2 97%  Breastfeeding? No  BMI 42.77 kg/m2  Body mass index is 42.77 kg/(m^2).  GENERAL: healthy, alert and no distress  EYES: Eyes grossly normal to inspection, PERRL and conjunctivae and sclerae normal  ABDOMEN: Mild tenderness noted to palpation, no guarding or rebound tenderness seen, soft, no hepatosplenomegaly, no masses and bowel sounds normal  MS: no gross musculoskeletal defects noted, no edema  NEURO: Normal strength and tone, mentation intact and speech normal  PSYCH: mentation appears normal, affect normal/bright    Diagnostic Test Results:  Results for orders placed or " performed in visit on 11/27/18 (from the past 24 hour(s))   Wet prep   Result Value Ref Range    Specimen Description Vagina     Wet Prep Clue cells seen (A)     Wet Prep No yeast seen     Wet Prep No Trichomonas seen    *UA reflex to Microscopic and Culture ([facility] (except [place] and [place])   Result Value Ref Range    Color Urine Yellow     Appearance Urine Clear     Glucose Urine Negative NEG^Negative mg/dL    Bilirubin Urine Negative NEG^Negative    Ketones Urine Negative NEG^Negative mg/dL    Specific Gravity Urine >1.030 1.003 - 1.035    Blood Urine Negative NEG^Negative    pH Urine 6.5 5.0 - 7.0 pH    Protein Albumin Urine Negative NEG^Negative mg/dL    Urobilinogen Urine 0.2 0.2 - 1.0 EU/dL    Nitrite Urine Negative NEG^Negative    Leukocyte Esterase Urine Negative NEG^Negative    Source Midstream Urine         ASSESSMENT/PLAN:                                                      Josefina was seen today for recheck.    Diagnoses and all orders for this visit:    RLQ abdominal pain  -     Wet prep  -     *UA reflex to Microscopic and Culture ([facility] (except [place] and [place])  -     Chlamydia trachomatis PCR  -     Neisseria gonorrhoeae PCR    Pelvic pressure in female  -     Wet prep  -     *UA reflex to Microscopic and Culture ([facility] (except [place] and [place])  -     Chlamydia trachomatis PCR  -     Neisseria gonorrhoeae PCR    Bacterial vaginosis  -     metroNIDAZOLE (METROGEL) 0.75 % vaginal gel; Place 1 applicator (5 g) vaginally At Bedtime for 5 days    MS (multiple sclerosis) (H)    Patient was seen today for improving, but still present RLQ pain and pelvic pressure.  UA today is unremarkable, but the wet prep does show clue cells - BV.  Treatment for BV has been reviewed and advised.  Patient will start the vaginal cream, and has been advised to avoid alcohol and intercourse while taking this medication.       Followup:  Return in about 1 week (around 12/4/2018) for If not improving, please be seen sooner if needed.    -- I have discussed the patient's diagnosis, and my plan of treatment with the patient and/or family. Patient is aware to followup if symptoms do not improve.  Patient has been advised to be seen sooner or seek more immediate care if symptoms change or worsen.  Patient agrees with and understands the plan today.     See Patient Instructions        Kalee Valero PA-C    [facility] [place]

## 2018-11-27 NOTE — MR AVS SNAPSHOT
After Visit Summary   2018    Josefina Aldrich    MRN: [id number]           Patient Information     Date Of Birth          1975        Visit Information        Provider Department      2018 3:40 PM Kalee Valero PA-C [facility] [place]        Today's Diagnoses     RLQ abdominal pain    -  1    Pelvic pressure in female        Bacterial vaginosis        MS (multiple sclerosis) (H)          Care Instructions      Bacterial Vaginosis    You have a vaginal infection called bacterial vaginosis (BV). Both good and bad bacteria are present in a healthy vagina. BV occurs when these bacteria get out of balance. The number of bad bacteria increase. And the number of good bacteria decrease. Although BV is associated with sexual activity, it is not a sexually transmitted disease.  BV may or may not cause symptoms. If symptoms do occur, they can include:    Thin, gray, milky-white, or sometimes green discharge    Unpleasant odor or  fishy  smell    Itching, burning, or pain in or around the vagina  It is not known what causes BV, but certain factors can make the problem more likely. This can include:    Douching    Having sex with a new partner    Having sex with more than one partner  BV will sometimes go away on its own. But treatment is usually recommended. This is because untreated BV can increase the risk of more serious health problems such as:    Pelvic inflammatory disease (PID)     delivery (giving birth to a baby early if you re pregnant)    HIV and certain other sexually transmitted diseases (STDs)    Infection after surgery on the reproductive organs  Home care  General care    BV is most often treated with medicines called antibiotics. These may be given as pills or as a vaginal cream. If antibiotics are prescribed, be sure to use them exactly as directed. Also, be sure to complete all of the medicine, even if your symptoms go away.    Don't douche or  having sex during treatment.    If you have sex with a female partner, ask your healthcare provider if she should also be treated.  Prevention    Don't douche.    Don't have sex. If you do have sex, then take steps to lower your risk:  ? Use condoms when having sex.  ? Limit the number of sexual partners you have.  Follow-up care  Follow up with your healthcare provider, or as advised.  When to seek medical advice  Call your healthcare provider right away if:    You have a fever of 100.4 F (38 C) or higher, or as directed by your provider.    Your symptoms worsen, or they don t go away within a few days of starting treatment.    You have new pain in the lower belly or pelvic region.    You have side effects that bother you or a reaction to the pills or cream you re prescribed.    You or any partners you have sex with have new symptoms, such as a rash, joint pain, or sores.  Date Last Reviewed: 10/1/2017    7650-7051 [employer]. [address] [place] [address]. All rights reserved. This information is not intended as a substitute for professional medical care. Always follow your healthcare professional's instructions.                Follow-ups after your visit        Follow-up notes from your care team     Return in about 1 week (around 12/4/2018) for If not improving, please be seen sooner if needed.      Your next 10 appointments already scheduled     Nov 29, 2018 10:00 AM CST   (Arrive by 9:45 AM)   Return Multiple Sclerosis with Radames Castaneda MD   [facility] Multiple Sclerosis ([facility])    [address]  [address] [place] [id number] [phone]            Nov 30, 2018  7:00 AM CST   (Arrive by 6:45 AM)   New Patient Visit with Shena Menjivar MD   [facility] Neurology ([facility])    [address]  3rd Floor  [facility] [id number] [phone]              Who to contact     If you have questions or  "need follow up information about today's clinic visit or your schedule please contact [facility] directly at [phone].  Normal or non-critical lab and imaging results will be communicated to you by Envoimoinscherhart, letter or phone within 4 business days after the clinic has received the results. If you do not hear from us within 7 days, please contact the clinic through Advanovat or phone. If you have a critical or abnormal lab result, we will notify you by phone as soon as possible.  Submit refill requests through SheerID or call your pharmacy and they will forward the refill request to us. Please allow 3 business days for your refill to be completed.          Additional Information About Your Visit        SheerID Information     [employer] gives you secure access to your electronic health record. If you see a primary care provider, you can also send messages to your care team and make appointments. If you have questions, please call your primary care clinic.  If you do not have a primary care provider, please call [phone] and they will assist you.        Care EveryWhere ID     This is your Care EveryWhere ID. This could be used by other organizations to access your [place] medical records  [id number]        Your Vitals Were     Pulse Temperature Height Pulse Oximetry Breastfeeding? BMI (Body Mass Index)    86 98.7  F (37.1  C) (Oral) 5' 5.5\" (1.664 m) 97% No 42.77 kg/m2       Blood Pressure from Last 3 Encounters:   11/27/18 120/72   11/19/18 122/68   11/01/18 121/77    Weight from Last 3 Encounters:   11/27/18 261 lb (118.4 kg)   11/19/18 262 lb (118.8 kg)   11/01/18 254 lb (115.2 kg)              We Performed the Following     *UA reflex to Microscopic and Culture ([facility] (except [place] and [place])     Chlamydia trachomatis PCR     Neisseria gonorrhoeae PCR     Wet prep          Today's Medication Changes          These changes are accurate as of 11/27/18  4:56 PM.  If " you have any questions, ask your nurse or doctor.               Start taking these medicines.        Dose/Directions    metroNIDAZOLE 0.75 % vaginal gel   Commonly known as:  METROGEL   Used for:  Bacterial vaginosis   Started by:  Kalee Valero PA-C        Dose:  1 applicator   Place 1 applicator (5 g) vaginally At Bedtime for 5 days   Quantity:  70 g   Refills:  0         Stop taking these medicines if you haven't already. Please contact your care team if you have questions.     metoclopramide 10 MG tablet   Commonly known as:  REGLAN   Stopped by:  Kalee Valero PA-C                Where to get your medicines      These medications were sent to [facility] [address] [place] [address] AT [facility] [address] [address]    Hours:  24-hours Phone:  [phone]     metroNIDAZOLE 0.75 % vaginal gel                Primary Care Provider Office Phone # Fax #    Miya Opal Crump PA-C [phone]       [address] [place] [address]        Equal Access to Services     [facility] CenterVICTORIA AH: Hadii aad ku hadasho Soomaali, waaxda luqadaha, qaybta kaalmada adeegyada, mya rodrigues . So [facility] [phone].    ATENCIÓN: Si habla español, tiene a barrera disposición servicios gratuitos de asistencia lingüística. [facility] [phone].    We comply with applicable federal civil rights laws and [place] laws. We do not discriminate on the basis of race, color, national origin, age, disability, sex, sexual orientation, or gender identity.            Thank you!     Thank you for choosing [facility]  for your care. Our goal is always to provide you with excellent care. Hearing back from our patients is one way we can continue to improve our services. Please take a few minutes to complete the written survey that you may receive in the mail after your visit with us. Thank you!             Your  Updated Medication List - Protect others around you: Learn how to safely use, store and throw away your medicines at www.disposemymeds.org.          This list is accurate as of 11/27/18  4:56 PM.  Always use your most recent med list.                   Brand Name Dispense Instructions for use Diagnosis    * VENTOLIN  (90 Base) MCG/ACT inhaler   Generic drug:  albuterol      Inhale 2 puffs into the lungs        * albuterol (2.5 MG/3ML) 0.083% neb solution    PROVENTIL     Inhale 2.5 mg into the lungs        butalbital-acetaminophen  MG Tabs    PHRENILIN    60 each    Take 2 tablets by mouth every 4 hours as needed (headache)    Episodic tension-type headache, not intractable       cholecalciferol 5000 units Tabs tablet    vitamin D3     Take 1 tablet (5,000 Units) by mouth daily    Hypovitaminosis D       Glatiramer Acetate 40 MG/ML Sosy    COPAXONE    12 Syringe    Inject 40 mg Subcutaneous three times a week    Multiple sclerosis (H)       IRON SUPPLEMENT PO           lisinopril-hydrochlorothiazide 10-12.5 MG per tablet    PRINZIDE/ZESTORETIC    90 tablet    TAKE 1 TABLET BY MOUTH DAILY    Benign essential hypertension       metroNIDAZOLE 0.75 % vaginal gel    METROGEL    70 g    Place 1 applicator (5 g) vaginally At Bedtime for 5 days    Bacterial vaginosis       nortriptyline 50 MG capsule    PAMELOR    90 capsule    Take 1 capsule (50 mg) by mouth At Bedtime    Migraine with aura and without status migrainosus, not intractable       SUMAtriptan 50 MG tablet    IMITREX    30 tablet    Take 50 mg up to twice daily as needed for headache; separate doses by at least one hour and do not use more than 3 days/week    Intractable chronic migraine without aura and with status migrainosus       * topiramate 100 MG tablet    TOPAMAX    60 tablet    Take 1 tablet (100 mg) by mouth At Bedtime (take with 50 mg to total 150 mg nightly)    Migraine with aura and without status migrainosus, not intractable       *  topiramate 50 MG tablet    TOPAMAX    60 tablet    Take 1 tablet (50 mg) by mouth At Bedtime (take with 100 mg to total 150 mg nightly)    Migraine with aura and without status migrainosus, not intractable       triamcinolone 0.1 % external cream    KENALOG    30 g    Apply sparingly to affected area three times daily    Eczema, unspecified type       valACYclovir 500 MG tablet    VALTREX    90 tablet    TAKE 1 TABLET BY MOUTH DAILY    HSV (herpes simplex virus) infection       vitamin B-12 100 MCG tablet    CYANOCOBALAMIN     Take 100 mcg by mouth daily        * Notice:  This list has 4 medication(s) that are the same as other medications prescribed for you. Read the directions carefully, and ask your doctor or other care provider to review them with you.

## 2018-11-29 ENCOUNTER — TELEPHONE (OUTPATIENT)
Dept: NEUROLOGY | Facility: CLINIC | Age: 43
End: 2018-11-29

## 2018-11-29 ENCOUNTER — OFFICE VISIT (OUTPATIENT)
Dept: NEUROLOGY | Facility: CLINIC | Age: 43
End: 2018-11-29
Attending: PSYCHIATRY & NEUROLOGY
Payer: COMMERCIAL

## 2018-11-29 VITALS
SYSTOLIC BLOOD PRESSURE: 115 MMHG | DIASTOLIC BLOOD PRESSURE: 77 MMHG | HEIGHT: 66 IN | WEIGHT: 252.6 LBS | HEART RATE: 102 BPM | BODY MASS INDEX: 40.6 KG/M2

## 2018-11-29 DIAGNOSIS — G43.711 INTRACTABLE CHRONIC MIGRAINE WITHOUT AURA AND WITH STATUS MIGRAINOSUS: ICD-10-CM

## 2018-11-29 DIAGNOSIS — G35 MS (MULTIPLE SCLEROSIS) (H): Primary | ICD-10-CM

## 2018-11-29 PROCEDURE — G0463 HOSPITAL OUTPT CLINIC VISIT: HCPCS | Mod: ZF

## 2018-11-29 ASSESSMENT — PAIN SCALES - GENERAL: PAINLEVEL: MILD PAIN (3)

## 2018-11-29 NOTE — TELEPHONE ENCOUNTER
Prior Authorization Specialty Medication Request    Medication/Dose: Aubagio 14mg  ICD code (if different than what is on RX):  Relapsing Remitting Multiple Sclerosis, G35  Previously Tried and Failed:  Copaxone     Important Lab Values: n/a  Rationale: Initiation of alternate disease modifying therapy for demyelinating disease, please approve.    Insurance Name: [facility]  Insurance ID: [id number]  Insurance Phone Number: [phone]    Pharmacy Information (if different than what is on RX)  Name:  n/a  Phone:  n/a

## 2018-11-29 NOTE — TELEPHONE ENCOUNTER
PA Initiation    Medication: Aubagio 14mg  Insurance Company: [employer] - Phone [phone] Fax [phone]  [facility] - [place], [place] - [facility] [address]  Filling Pharmacy Phone: [phone]  Filling Pharmacy Fax:    Start Date: 11/29/2018

## 2018-11-29 NOTE — MR AVS SNAPSHOT
After Visit Summary   11/29/2018    Josefina Aldrich    MRN: [id number]           Patient Information     Date Of Birth          1975        Visit Information        Provider Department      11/29/2018 10:00 AM Radames Castaneda MD [facility]        Today's Diagnoses     MS (multiple sclerosis) (H)    -  1      Care Instructions    1. We filled out the forms to initiate Aubagio today    2. You will need blood tests monthly for the first six months on this medication and then periodically thereafter    3. Return to clinic in 6 months with MRI scans of the brain and cervical spine          Follow-ups after your visit        Follow-up notes from your care team     Return in about 6 months (around 5/29/2019).      Your next 10 appointments already scheduled     Nov 30, 2018  7:00 AM CST   (Arrive by 6:45 AM)   New Patient Visit with Shena Menjivar MD   [facility] Neurology ([facility])    [address] [place] [address]  3rd [facility] [id number] [phone]            May 30, 2019  8:00 AM CDT   MR BRAIN W/O & W CONTRAST with [facility] [facility] ([facility])    [address] [address] [phone]           How do I prepare for my exam? (Food and drink instructions) **If you will be receiving sedation or general anesthesia, please see special notes below.**  How do I prepare for my exam? (Other instructions) Take your medicines as usual, unless your doctor tells you not to. You may or may not receive intravenous (IV) contrast for this exam pending the discretion of the Radiologist.  You do not need to do anything special to prepare.  **If you will be receiving sedation or general anesthesia, please see special notes below.**  What should I wear: The MRI machine uses a strong magnet. Please wear clothes without metal (snaps, zippers). A sweatsuit works well, or we  may give you a hospital gown. Please remove any body piercings and hair extensions before you arrive. You will also remove watches, jewelry, hairpins, wallets, dentures, partial dental plates and hearing aids. You may wear contact lenses, and you may be able to wear your rings. We have a safe place to keep your personal items, but it is safer to leave them at home.  How long does the exam take: Most tests take 30 to 60 minutes.  HOWEVER, IF YOUR DOCTOR PRESCRIBES ANESTHESIA please plan on spending four to five hours in the recovery room.  What should I bring:  Bring a list of your current medicines to your exam (including vitamins, minerals and over-the-counter drugs).  Do I need a :  **If you will be receiving sedation or general anesthesia, please see special notes below.**  What should I do after the exam: No Restrictions, You may resume normal activities.  What is this test: MRI (magnetic resonance imaging) uses a strong magnet and radio waves to look inside the body. An MRA (magnetic resonance angiogram) does the same thing, but it lets us look at your blood vessels. A computer turns the radio waves into pictures showing cross sections of the body, much like slices of bread. This helps us see any problems more clearly. You may receive fluid (called  contrast ) before or during your scan. The fluid helps us see the pictures better. We give the fluid through an IV (small needle in your arm).  Who should I call with questions:  Please call the Imaging Department at your exam site with any questions. Directions, parking instructions, and other information is available on our website, [email or web address].  How do I prepare if I m having sedation or anesthesia? **IMPORTANT** THE INSTRUCTIONS BELOW ARE ONLY FOR THOSE PATIENTS WHO HAVE BEEN TOLD THEY WILL RECEIVE SEDATION OR GENERAL ANESTHESIA DURING THEIR MRI PROCEDURE:  IF YOU WILL RECEIVE SEDATION (take medicine to help you relax during your exam): You must  get the medicine from your doctor before you arrive. Bring the medicine to the exam. Do not take it at home. Arrive one hour early. Bring someone who can take you home after the test. Your medicine will make you sleepy. After the exam, you may not drive, take a bus or take a taxi by yourself. No eating 8 hours before your exam. You may have clear liquids up until 4 hours before your exam. (Clear liquids include water, clear tea, black coffee and fruit juice without pulp.)  IF YOU WILL RECEIVE ANESTHESIA (be asleep for your exam): Arrive 1 1/2 hours early. Bring someone who can take you home after the test. You may not drive, take a bus or take a taxi by yourself. No eating 8 hours before your exam. You may have clear liquids up until 4 hours before your exam. (Clear liquids include water, clear tea, black coffee and fruit juice without pulp.)            May 30, 2019  9:00 AM CDT   MR CERVICAL SPINE W/O & W CONTRAST with [address] [facility] ([facility])    [address] [place] [id number] [phone]           How do I prepare for my exam? (Food and drink instructions) **If you will be receiving sedation or general anesthesia, please see special notes below.**  How do I prepare for my exam? (Other instructions) Take your medicines as usual, unless your doctor tells you not to. You may or may not receive intravenous (IV) contrast for this exam pending the discretion of the Radiologist.  You do not need to do anything special to prepare.  **If you will be receiving sedation or general anesthesia, please see special notes below.**  What should I wear: The MRI machine uses a strong magnet. Please wear clothes without metal (snaps, zippers). A sweatsuit works well, or we may give you a hospital gown. Please remove any body piercings and hair extensions before you arrive. You will also remove watches, jewelry, hairpins, wallets, dentures, partial dental  plates and hearing aids. You may wear contact lenses, and you may be able to wear your rings. We have a safe place to keep your personal items, but it is safer to leave them at home.  How long does the exam take: Most tests take 30 to 60 minutes.  HOWEVER, IF YOUR DOCTOR PRESCRIBES ANESTHESIA please plan on spending four to five hours in the recovery room.  What should I bring:  Bring a list of your current medicines to your exam (including vitamins, minerals and over-the-counter drugs).  Do I need a :  **If you will be receiving sedation or general anesthesia, please see special notes below.**  What should I do after the exam: No Restrictions, You may resume normal activities.  What is this test: MRI (magnetic resonance imaging) uses a strong magnet and radio waves to look inside the body. An MRA (magnetic resonance angiogram) does the same thing, but it lets us look at your blood vessels. A computer turns the radio waves into pictures showing cross sections of the body, much like slices of bread. This helps us see any problems more clearly. You may receive fluid (called  contrast ) before or during your scan. The fluid helps us see the pictures better. We give the fluid through an IV (small needle in your arm).  Who should I call with questions:  Please call the Imaging Department at your exam site with any questions. Directions, parking instructions, and other information is available on our website, [email or web address].  How do I prepare if I m having sedation or anesthesia? **IMPORTANT** THE INSTRUCTIONS BELOW ARE ONLY FOR THOSE PATIENTS WHO HAVE BEEN TOLD THEY WILL RECEIVE SEDATION OR GENERAL ANESTHESIA DURING THEIR MRI PROCEDURE:  IF YOU WILL RECEIVE SEDATION (take medicine to help you relax during your exam): You must get the medicine from your doctor before you arrive. Bring the medicine to the exam. Do not take it at home. Arrive one hour early. Bring someone who can take you home after the test.  Your medicine will make you sleepy. After the exam, you may not drive, take a bus or take a taxi by yourself. No eating 8 hours before your exam. You may have clear liquids up until 4 hours before your exam. (Clear liquids include water, clear tea, black coffee and fruit juice without pulp.)  IF YOU WILL RECEIVE ANESTHESIA (be asleep for your exam): Arrive 1 1/2 hours early. Bring someone who can take you home after the test. You may not drive, take a bus or take a taxi by yourself. No eating 8 hours before your exam. You may have clear liquids up until 4 hours before your exam. (Clear liquids include water, clear tea, black coffee and fruit juice without pulp.)            May 30, 2019 10:30 AM CDT   (Arrive by 10:15 AM)   Return Multiple Sclerosis with Radames Castaneda MD   [facility] Multiple Sclerosis ([facility])    [address] [place] [address] [phone]              Future tests that were ordered for you today     Open Future Orders        Priority Expected Expires Ordered    MRI Brain w & w/o contrast Routine 5/30/2019 11/29/2019 11/29/2018    MRI Cervical spine w & w/o contrast Routine 5/30/2019 11/29/2019 11/29/2018            Who to contact     If you have questions or need follow up information about today's clinic visit or your schedule please contact [facility] MULTIPLE SCLEROSIS directly at [phone].  Normal or non-critical lab and imaging results will be communicated to you by MyChart, letter or phone within 4 business days after the clinic has received the results. If you do not hear from us within 7 days, please contact the clinic through [employer] or phone. If you have a critical or abnormal lab result, we will notify you by phone as soon as possible.  Submit refill requests through [facility] or call your pharmacy and they will forward the refill request to us. Please allow 3 business days for your refill to be completed.           "Additional Information About Your Visit        MyChart Information     [facility] gives you secure access to your electronic health record. If you see a primary care provider, you can also send messages to your care team and make appointments. If you have questions, please call your primary care clinic.  If you do not have a primary care provider, please call [phone] and they will assist you.        Care EveryWhere ID     This is your Care EveryWhere ID. This could be used by other organizations to access your [facility] medical records  [id number]        Your Vitals Were     Pulse Height BMI (Body Mass Index)             102 1.664 m (5' 5.5\") 41.4 kg/m2          Blood Pressure from Last 3 Encounters:   11/29/18 115/77   11/27/18 120/72   11/19/18 122/68    Weight from Last 3 Encounters:   11/29/18 114.6 kg (252 lb 9.6 oz)   11/27/18 118.4 kg (261 lb)   11/19/18 118.8 kg (262 lb)               Primary Care Provider Office Phone # Fax #    Miya Crump PA-C [phone]       [address] [place] [address]        Equal Access to Services     RISHI MORENO AH: Hadii aad ku hadasho Soomaali, waaxda luqadaha, qaybta kaalmada adeegyada, mya patterson. So [facility] [phone].    ATENCIÓN: Si habla español, tiene a barrera disposición servicios gratuitos de asistencia lingüística. LlGalion Community Hospital [phone].    We comply with applicable federal civil rights laws and [place] laws. We do not discriminate on the basis of race, color, national origin, age, disability, sex, sexual orientation, or gender identity.            Thank you!     Thank you for choosing [facility]  for your care. Our goal is always to provide you with excellent care. Hearing back from our patients is one way we can continue to improve our services. Please take a few minutes to complete the written survey that you may receive in the mail after your visit with us. Thank you!           "   Your Updated Medication List - Protect others around you: Learn how to safely use, store and throw away your medicines at www.disposemymeds.org.          This list is accurate as of 11/29/18 11:33 AM.  Always use your most recent med list.                   Brand Name Dispense Instructions for use Diagnosis    * VENTOLIN  (90 Base) MCG/ACT inhaler   Generic drug:  albuterol      Inhale 2 puffs into the lungs        * albuterol (2.5 MG/3ML) 0.083% neb solution    PROVENTIL     Inhale 2.5 mg into the lungs        butalbital-acetaminophen  MG Tabs    PHRENILIN    60 each    Take 2 tablets by mouth every 4 hours as needed (headache)    Episodic tension-type headache, not intractable       cholecalciferol 5000 units Tabs tablet    vitamin D3     Take 1 tablet (5,000 Units) by mouth daily    Hypovitaminosis D       Glatiramer Acetate 40 MG/ML Sosy    COPAXONE    12 Syringe    Inject 40 mg Subcutaneous three times a week    Multiple sclerosis (H)       IRON SUPPLEMENT PO           lisinopril-hydrochlorothiazide 10-12.5 MG per tablet    PRINZIDE/ZESTORETIC    90 tablet    TAKE 1 TABLET BY MOUTH DAILY    Benign essential hypertension       metroNIDAZOLE 0.75 % vaginal gel    METROGEL    70 g    Place 1 applicator (5 g) vaginally At Bedtime for 5 days    Bacterial vaginosis       nortriptyline 50 MG capsule    PAMELOR    90 capsule    Take 1 capsule (50 mg) by mouth At Bedtime    Migraine with aura and without status migrainosus, not intractable       SUMAtriptan 50 MG tablet    IMITREX    30 tablet    Take 50 mg up to twice daily as needed for headache; separate doses by at least one hour and do not use more than 3 days/week    Intractable chronic migraine without aura and with status migrainosus       * topiramate 100 MG tablet    TOPAMAX    60 tablet    Take 1 tablet (100 mg) by mouth At Bedtime (take with 50 mg to total 150 mg nightly)    Migraine with aura and without status migrainosus, not intractable        * topiramate 50 MG tablet    TOPAMAX    60 tablet    Take 1 tablet (50 mg) by mouth At Bedtime (take with 100 mg to total 150 mg nightly)    Migraine with aura and without status migrainosus, not intractable       triamcinolone 0.1 % external cream    KENALOG    30 g    Apply sparingly to affected area three times daily    Eczema, unspecified type       valACYclovir 500 MG tablet    VALTREX    90 tablet    TAKE 1 TABLET BY MOUTH DAILY    HSV (herpes simplex virus) infection       vitamin B-12 100 MCG tablet    CYANOCOBALAMIN     Take 100 mcg by mouth daily        * Notice:  This list has 4 medication(s) that are the same as other medications prescribed for you. Read the directions carefully, and ask your doctor or other care provider to review them with you.

## 2018-11-29 NOTE — LETTER
11/29/2018      RE: Josefina Aldrich  [address]  [facility] [address]     Date of service: November 29, 2018     Referral source: Established patient.      Chief complaint: Multiple sclerosis.      History of the Present Illness: Ms. Josefina Aldrich is a 43-year-old woman who returns to the Multiple Sclerosis Clinic today for scheduled follow-up, primarily to discuss disease-modifying therapies for multiple sclerosis.      The patient's history is as per my previous notes.  She initially presented with symptoms of demyelinating disease in June 2015 when she developed right-sided numbness and incoordination of the arm.  Initially, this was suspected to be due to a stroke, but follow-up imaging did not show expected temporal evolution of a cerebral infarct and also demonstrated accumulation of additional lesions suggestive of demyelinating disease of the type seen in multiple sclerosis.  The patient was placed on disease-modifying therapy with glatiramer acetate.      As discussed in several recent visits, she has been struggling with migraine headache with ongoing exacerbation of symptoms since September 26.  She was admitted to the [facility]rology Service from 10/19 to 10/21/2018 with status migrainosus, at which time she was placed on an increased dose of nortriptyline at 50 mg at bedtime as well as topiramate 150 mg daily.  When I last saw the patient at the beginning of this month, I told her that we should allow additional time for the changes in medication to show some effect prior to making additional changes in preventive medications.  I did provide her with refills of prochlorperazine and sumatriptan at that time.      Today, fortunately the patient reports that her headache has significantly improved.  She does have residual head pains that she is currently rating 3 on a scale of 1/10.  She notes that this may worsen with activity, but overall  this is substantially better than it was 4 weeks ago.      The primary reason for this visit today is that an MRI performed at the time of one of her visits to the Emergency Department for evaluation of headache demonstrated new accumulation of T2 hyperintense lesions in the deep white matter of the left hemisphere in comparison to an MRI done a year previously.  This presumably reflects interval active inflammatory demyelination.  I should also note that the patient has been complaining of increased right-sided motor symptoms over the past couple of months as well.  As the worsening of these symptoms has corresponded with onset of migraine headache and the lesions seen on MRI were not enhancing, this may reflect symptoms of an old demyelinating lesion rather than new clinical disease activity of multiple sclerosis, however, the latter possibility cannot be excluded.      PHYSICAL EXAMINATION:   VITAL SIGNS:  Blood pressure 115/77; pulse 102; weight 114.6 kg; height 1.66 meters.   GENERAL:  Obese woman who presents to the evaluation alone, awake and alert and in no acute distress.      Assessment/plan:    1.  Relapsing-remitting multiple sclerosis  As above, there has been definite radiologic progression of the patient's condition over the past year.  I am uncertain if there has been any true clinical disease activity from MS, but it is possible that she has some increase in symptoms in this regard as well.  Accordingly, I told the patient that I would recommend a change in disease-modifying therapy.   We discussed multiple options for disease-modifying treatment today.  I told her that I likely would avoid beta interferon as well as fingolimod as both of these medications have been associated with an increase in frequency and severity of headaches.     We discussed oral medications including dimethyl fumarate and teriflunomide in detail.  Teriflunomide is a once daily oral medication for multiple sclerosis.  I  generally consider this to be in the same tier of efficacy as platform injectable drugs.  This agent is a known human teratogen, however, the patient has previously had a hysterectomy and this is not a concern in her case.  Common nuisance side effects of this medication include nausea, which is typically mild, as well as frequent occurrence of hair thinning.  Liver function test monitoring is recommended monthly during the first 6 months on the medication and then periodically thereafter.  Additionally, less than 2% of patients in a clinical trial developed large-fiber peripheral neuropathy in association with this medication.     Dimethyl fumarate is a twice daily medication, which does present compliance concerns in general compared to a once daily medication.  Common side effects of this medication include flushing as well as incidence of bothersome gastrointestinal side effects in a substantial minority of patients upon transition to the full treatment dose of the medication, which can occasionally be limiting to tolerance.  Additionally, this medication has rarely been associated with a serious opportunistic infection of the brain called progressive multifocal leukoencephalopathy (PML).     We also briefly discussed IV medications.  The best established of  these therapies is natalizumab, which would be a highly efficacious therapy for multiple sclerosis.  The primary concern with this medication is associated risk of PML, although this risk is quite remote (less than 0.1%) in SABINO negative patients.  Obviously, the every 4-week IV infusion schedule is somewhat of a negative as regards convenience.  I discussed with the patient that if she were SABINO seronegative and strongly desired a highly potent therapy, this would be an option for her as well.     At the present time, the patient indicates that she would prefer a trial of teriflunomide, particularly given the safety concerns about opportunistic brain  infections, which have not been linked to this medication.  I think that this is a very reasonable option in her case and we did go ahead and fill out the forms to initiate that medication today.  She is aware that she will need monthly laboratory visits as discussed above.     I will see her back in 6 months with MRI scans of the brain and cervical spine prior to that visit, mainly to make sure that there is no evidence of active inflammatory demyelination at that time as evidenced by presence of gadolinium enhancing lesions.     2.  Chronic migraine  The patient has an appointment with Dr. Shena Menjivar at the [facility] later this week and will discuss ongoing treatment of her migraine headaches at that time.      We discussed a return to work.  She would like to attempt part-time return to work approximately 4 hours per day next week, and if that goes well, return to full-time work the week after.  I did provide her with a letter and completed short-term disability forms to this effect today.      I spent 40 minutes with the patient in the office today, with greater than 50% of this time spent in counseling regarding the above issues.     Radames Castaneda MD   of Neurology  [facility]    Cc:  Kalee Valero PA-C (Primary care)  Patient

## 2018-11-29 NOTE — TELEPHONE ENCOUNTER
Start form faxed to [facility] via fax# [phone].    Start form copied x2 - 1 copy sent to scanning, 1 copy profiled at pharmacy for future fills.

## 2018-11-29 NOTE — PATIENT INSTRUCTIONS
1. We filled out the forms to initiate Aubagio today    2. You will need blood tests monthly for the first six months on this medication and then periodically thereafter    3. Return to clinic in 6 months with MRI scans of the brain and cervical spine

## 2018-11-30 ENCOUNTER — OFFICE VISIT (OUTPATIENT)
Dept: NEUROLOGY | Facility: CLINIC | Age: 43
End: 2018-11-30
Payer: COMMERCIAL

## 2018-11-30 VITALS
OXYGEN SATURATION: 99 % | BODY MASS INDEX: 40.6 KG/M2 | SYSTOLIC BLOOD PRESSURE: 111 MMHG | HEIGHT: 66 IN | WEIGHT: 252.6 LBS | DIASTOLIC BLOOD PRESSURE: 71 MMHG | HEART RATE: 115 BPM

## 2018-11-30 DIAGNOSIS — G43.711 INTRACTABLE CHRONIC MIGRAINE WITHOUT AURA AND WITH STATUS MIGRAINOSUS: ICD-10-CM

## 2018-11-30 RX ORDER — PROCHLORPERAZINE MALEATE 10 MG
10 TABLET ORAL EVERY 6 HOURS PRN
Qty: 20 TABLET | Refills: 3 | Status: SHIPPED | OUTPATIENT
Start: 2018-11-30 | End: 2019-06-12

## 2018-11-30 RX ORDER — SUMATRIPTAN 100 MG/1
TABLET, FILM COATED ORAL
Qty: 18 TABLET | Refills: 3 | Status: SHIPPED | OUTPATIENT
Start: 2018-11-30

## 2018-11-30 ASSESSMENT — PAIN SCALES - GENERAL: PAINLEVEL: MODERATE PAIN (4)

## 2018-11-30 NOTE — TELEPHONE ENCOUNTER
Prior Authorization Approval    Authorization Effective Date: 11/29/2018  Authorization Expiration Date: 11/29/2019  Medication: Aubagio   Approved Dose/Quantity: 14mg tablets - 1 tablet daily  Reference #: [facility] key# [id number]   Insurance Company: [employer] - Phone [phone] Fax [phone]  Expected CoPay: $3     CoPay Card Available: No   Foundation Assistance Needed:    Which Pharmacy is filling the prescription (Not needed for infusion/clinic administered): [facility] - [place], [place] - [facility] [address]  Pharmacy Notified: Yes  Patient Notified:  Yes

## 2018-11-30 NOTE — NURSING NOTE
Chief Complaint   Patient presents with     New Patient     [facility] NEW MIGRAINES       Cathy Muñoz, EMT

## 2018-11-30 NOTE — TELEPHONE ENCOUNTER
Pt told pharmacy she has nearly 2 weeks of glatiramer left so will fill Aubagio closer to the date she runs out.

## 2018-11-30 NOTE — MR AVS SNAPSHOT
After Visit Summary   11/30/2018    Josefina Aldrich    MRN: [id number]           Patient Information     Date Of Birth          1975        Visit Information        Provider Department      11/30/2018 7:00 AM Shena Menjivar MD [facility] Neurology        Today's Diagnoses     Intractable chronic migraine without aura and with status migrainosus           Follow-ups after your visit        Follow-up notes from your care team     Return in about 3 months (around 2/28/2019).      Your next 10 appointments already scheduled     Mar 01, 2019  7:00 AM CST   (Arrive by 6:45 AM)   Return Visit with Shena Menjivar MD   [facility] Neurology ([facility])    [address]  3rd Floor  [facility] [id number] [phone]            May 30, 2019  8:00 AM CDT   MR BRAIN W/O & W CONTRAST with [address] MRI ([facility])    [address] [id number] [phone]           How do I prepare for my exam? (Food and drink instructions) **If you will be receiving sedation or general anesthesia, please see special notes below.**  How do I prepare for my exam? (Other instructions) Take your medicines as usual, unless your doctor tells you not to. You may or may not receive intravenous (IV) contrast for this exam pending the discretion of the Radiologist.  You do not need to do anything special to prepare.  **If you will be receiving sedation or general anesthesia, please see special notes below.**  What should I wear: The MRI machine uses a strong magnet. Please wear clothes without metal (snaps, zippers). A sweatsuit works well, or we may give you a hospital gown. Please remove any body piercings and hair extensions before you arrive. You will also remove watches, jewelry, hairpins, wallets, dentures, partial dental plates and hearing aids. You may wear contact lenses, and you may be able to  wear your rings. We have a safe place to keep your personal items, but it is safer to leave them at home.  How long does the exam take: Most tests take 30 to 60 minutes.  HOWEVER, IF YOUR DOCTOR PRESCRIBES ANESTHESIA please plan on spending four to five hours in the recovery room.  What should I bring:  Bring a list of your current medicines to your exam (including vitamins, minerals and over-the-counter drugs).  Do I need a :  **If you will be receiving sedation or general anesthesia, please see special notes below.**  What should I do after the exam: No Restrictions, You may resume normal activities.  What is this test: MRI (magnetic resonance imaging) uses a strong magnet and radio waves to look inside the body. An MRA (magnetic resonance angiogram) does the same thing, but it lets us look at your blood vessels. A computer turns the radio waves into pictures showing cross sections of the body, much like slices of bread. This helps us see any problems more clearly. You may receive fluid (called  contrast ) before or during your scan. The fluid helps us see the pictures better. We give the fluid through an IV (small needle in your arm).  Who should I call with questions:  Please call the Imaging Department at your exam site with any questions. Directions, parking instructions, and other information is available on our website, [email or web address].  How do I prepare if I m having sedation or anesthesia? **IMPORTANT** THE INSTRUCTIONS BELOW ARE ONLY FOR THOSE PATIENTS WHO HAVE BEEN TOLD THEY WILL RECEIVE SEDATION OR GENERAL ANESTHESIA DURING THEIR MRI PROCEDURE:  IF YOU WILL RECEIVE SEDATION (take medicine to help you relax during your exam): You must get the medicine from your doctor before you arrive. Bring the medicine to the exam. Do not take it at home. Arrive one hour early. Bring someone who can take you home after the test. Your medicine will make you sleepy. After the exam, you may not drive, take a  bus or take a taxi by yourself. No eating 8 hours before your exam. You may have clear liquids up until 4 hours before your exam. (Clear liquids include water, clear tea, black coffee and fruit juice without pulp.)  IF YOU WILL RECEIVE ANESTHESIA (be asleep for your exam): Arrive 1 1/2 hours early. Bring someone who can take you home after the test. You may not drive, take a bus or take a taxi by yourself. No eating 8 hours before your exam. You may have clear liquids up until 4 hours before your exam. (Clear liquids include water, clear tea, black coffee and fruit juice without pulp.)            May 30, 2019  9:00 AM CDT   MR CERVICAL SPINE W/O & W CONTRAST with [address] MRI ([facility] [place])    [address] [place] [id number] [phone]           How do I prepare for my exam? (Food and drink instructions) **If you will be receiving sedation or general anesthesia, please see special notes below.**  How do I prepare for my exam? (Other instructions) Take your medicines as usual, unless your doctor tells you not to. You may or may not receive intravenous (IV) contrast for this exam pending the discretion of the Radiologist.  You do not need to do anything special to prepare.  **If you will be receiving sedation or general anesthesia, please see special notes below.**  What should I wear: The MRI machine uses a strong magnet. Please wear clothes without metal (snaps, zippers). A sweatsuit works well, or we may give you a hospital gown. Please remove any body piercings and hair extensions before you arrive. You will also remove watches, jewelry, hairpins, wallets, dentures, partial dental plates and hearing aids. You may wear contact lenses, and you may be able to wear your rings. We have a safe place to keep your personal items, but it is safer to leave them at home.  How long does the exam take: Most tests take 30 to 60 minutes.  HOWEVER, IF  YOUR DOCTOR PRESCRIBES ANESTHESIA please plan on spending four to five hours in the recovery room.  What should I bring:  Bring a list of your current medicines to your exam (including vitamins, minerals and over-the-counter drugs).  Do I need a :  **If you will be receiving sedation or general anesthesia, please see special notes below.**  What should I do after the exam: No Restrictions, You may resume normal activities.  What is this test: MRI (magnetic resonance imaging) uses a strong magnet and radio waves to look inside the body. An MRA (magnetic resonance angiogram) does the same thing, but it lets us look at your blood vessels. A computer turns the radio waves into pictures showing cross sections of the body, much like slices of bread. This helps us see any problems more clearly. You may receive fluid (called  contrast ) before or during your scan. The fluid helps us see the pictures better. We give the fluid through an IV (small needle in your arm).  Who should I call with questions:  Please call the Imaging Department at your exam site with any questions. Directions, parking instructions, and other information is available on our website, [email or web address].  How do I prepare if I m having sedation or anesthesia? **IMPORTANT** THE INSTRUCTIONS BELOW ARE ONLY FOR THOSE PATIENTS WHO HAVE BEEN TOLD THEY WILL RECEIVE SEDATION OR GENERAL ANESTHESIA DURING THEIR MRI PROCEDURE:  IF YOU WILL RECEIVE SEDATION (take medicine to help you relax during your exam): You must get the medicine from your doctor before you arrive. Bring the medicine to the exam. Do not take it at home. Arrive one hour early. Bring someone who can take you home after the test. Your medicine will make you sleepy. After the exam, you may not drive, take a bus or take a taxi by yourself. No eating 8 hours before your exam. You may have clear liquids up until 4 hours before your exam. (Clear liquids include water, clear tea, black coffee  and fruit juice without pulp.)  IF YOU WILL RECEIVE ANESTHESIA (be asleep for your exam): Arrive 1 1/2 hours early. Bring someone who can take you home after the test. You may not drive, take a bus or take a taxi by yourself. No eating 8 hours before your exam. You may have clear liquids up until 4 hours before your exam. (Clear liquids include water, clear tea, black coffee and fruit juice without pulp.)            May 30, 2019 10:30 AM CDT   (Arrive by 10:15 AM)   Return Multiple Sclerosis with Radames Castaneda MD   [facility] Multiple Sclerosis ([facility] [place])    [address] [facility]  [address] [place] [id number] [phone]              Future tests that were ordered for you today     Open Future Orders        Priority Expected Expires Ordered    MRI Brain w & w/o contrast Routine 5/30/2019 11/29/2019 11/29/2018    MRI Cervical spine w & w/o contrast Routine 5/30/2019 11/29/2019 11/29/2018            Who to contact     Please call your clinic at [phone] to:    Ask questions about your health    Make or cancel appointments    Discuss your medicines    Learn about your test results    Speak to your doctor            Additional Information About Your Visit        [employer] Information     [employer] gives you secure access to your electronic health record. If you see a primary care provider, you can also send messages to your care team and make appointments. If you have questions, please call your primary care clinic.  If you do not have a primary care provider, please call [phone] and they will assist you.      [employer] is an electronic gateway that provides easy, online access to your medical records. With [employer], you can request a clinic appointment, read your test results, renew a prescription or communicate with your care team.     To access your existing account, please contact your [facility] or call [phone] for assistance.       "  Care EveryWhere ID     This is your Care EveryWhere ID. This could be used by other organizations to access your [place] medical records  [id number]        Your Vitals Were     Pulse Height Pulse Oximetry BMI (Body Mass Index)          115 1.664 m (5' 5.5\") 99% 41.4 kg/m2         Blood Pressure from Last 3 Encounters:   11/30/18 111/71   11/29/18 115/77   11/27/18 120/72    Weight from Last 3 Encounters:   11/30/18 114.6 kg (252 lb 9.6 oz)   11/29/18 114.6 kg (252 lb 9.6 oz)   11/27/18 118.4 kg (261 lb)              We Performed the Following     NEUROLOGY ADULT REFERRAL          Today's Medication Changes          These changes are accurate as of 11/30/18  7:57 AM.  If you have any questions, ask your nurse or doctor.               Start taking these medicines.        Dose/Directions    Botulinum Toxin Type A 200 units injection   Commonly known as:  BOTOX   Used for:  Intractable chronic migraine without aura and with status migrainosus   Started by:  Shena Menjivar MD        Dose:  200 Units   Inject 200 Units into the muscle every 3 months   Quantity:  1 each   Refills:  11       prochlorperazine 10 MG tablet   Commonly known as:  COMPAZINE   Used for:  Intractable chronic migraine without aura and with status migrainosus   Started by:  Shena Menjivar MD        Dose:  10 mg   Take 1 tablet (10 mg) by mouth every 6 hours as needed for nausea or vomiting   Quantity:  20 tablet   Refills:  3         These medicines have changed or have updated prescriptions.        Dose/Directions    SUMAtriptan 100 MG tablet   Commonly known as:  IMITREX   This may have changed:  medication strength   Used for:  Intractable chronic migraine without aura and with status migrainosus   Changed by:  Shena Menjivar MD        Take 50 mg up to twice daily as needed for headache; separate doses by at least one hour and do not use more than 3 days/week   Quantity:  18 tablet   Refills:  3            Where to get your " medicines      These medications were sent to [id number] - [place], [place] - [address], [place] [address]    Hours:  24-hours Phone:  [phone]     prochlorperazine 10 MG tablet    SUMAtriptan 100 MG tablet         Some of these will need a paper prescription and others can be bought over the counter.  Ask your nurse if you have questions.     You don't need a prescription for these medications     Botulinum Toxin Type A 200 units injection                Primary Care Provider Office Phone # Fax #    Miya Crump PA-C [phone]       [address] [place] [id number]        Equal Access to Services     RISHI MORENO : Hadii aad ku hadasho Soomaali, waaxda luqadaha, qaybta kaalmada adeegyada, mya patterson. So [facility] [phone].    ATENCIÓN: Si habla español, tiene a barrera disposición servicios gratuitos de asistencia lingüística. [facility] [phone].    We comply with applicable federal civil rights laws and [place] laws. We do not discriminate on the basis of race, color, national origin, age, disability, sex, sexual orientation, or gender identity.            Thank you!     Thank you for choosing [facility] NEUROLOGY  for your care. Our goal is always to provide you with excellent care. Hearing back from our patients is one way we can continue to improve our services. Please take a few minutes to complete the written survey that you may receive in the mail after your visit with us. Thank you!             Your Updated Medication List - Protect others around you: Learn how to safely use, store and throw away your medicines at www.disposemymeds.org.          This list is accurate as of 11/30/18  7:57 AM.  Always use your most recent med list.                   Brand Name Dispense Instructions for use Diagnosis    * VENTOLIN  (90 Base) MCG/ACT inhaler   Generic drug:   albuterol      Inhale 2 puffs into the lungs        * albuterol (2.5 MG/3ML) 0.083% neb solution    PROVENTIL     Inhale 2.5 mg into the lungs        Botulinum Toxin Type A 200 units injection    BOTOX    1 each    Inject 200 Units into the muscle every 3 months    Intractable chronic migraine without aura and with status migrainosus       butalbital-acetaminophen  MG Tabs    PHRENILIN    60 each    Take 2 tablets by mouth every 4 hours as needed (headache)    Episodic tension-type headache, not intractable       cholecalciferol 5000 units Tabs tablet    vitamin D3     Take 1 tablet (5,000 Units) by mouth daily    Hypovitaminosis D       Glatiramer Acetate 40 MG/ML Sosy    COPAXONE    12 Syringe    Inject 40 mg Subcutaneous three times a week    Multiple sclerosis (H)       IRON SUPPLEMENT PO           lisinopril-hydrochlorothiazide 10-12.5 MG tablet    PRINZIDE/ZESTORETIC    90 tablet    TAKE 1 TABLET BY MOUTH DAILY    Benign essential hypertension       metroNIDAZOLE 0.75 % vaginal gel    METROGEL    70 g    Place 1 applicator (5 g) vaginally At Bedtime for 5 days    Bacterial vaginosis       nortriptyline 50 MG capsule    PAMELOR    90 capsule    Take 1 capsule (50 mg) by mouth At Bedtime    Migraine with aura and without status migrainosus, not intractable       prochlorperazine 10 MG tablet    COMPAZINE    20 tablet    Take 1 tablet (10 mg) by mouth every 6 hours as needed for nausea or vomiting    Intractable chronic migraine without aura and with status migrainosus       SUMAtriptan 100 MG tablet    IMITREX    18 tablet    Take 50 mg up to twice daily as needed for headache; separate doses by at least one hour and do not use more than 3 days/week    Intractable chronic migraine without aura and with status migrainosus       * topiramate 100 MG tablet    TOPAMAX    60 tablet    Take 1 tablet (100 mg) by mouth At Bedtime (take with 50 mg to total 150 mg nightly)    Migraine with aura and without status  migrainosus, not intractable       * topiramate 50 MG tablet    TOPAMAX    60 tablet    Take 1 tablet (50 mg) by mouth At Bedtime (take with 100 mg to total 150 mg nightly)    Migraine with aura and without status migrainosus, not intractable       triamcinolone 0.1 % external cream    KENALOG    30 g    Apply sparingly to affected area three times daily    Eczema, unspecified type       valACYclovir 500 MG tablet    VALTREX    90 tablet    TAKE 1 TABLET BY MOUTH DAILY    HSV (herpes simplex virus) infection       vitamin B-12 100 MCG tablet    CYANOCOBALAMIN     Take 100 mcg by mouth daily        * Notice:  This list has 4 medication(s) that are the same as other medications prescribed for you. Read the directions carefully, and ask your doctor or other care provider to review them with you.

## 2018-11-30 NOTE — PROGRESS NOTES
Malathi Rocha ,    The results from your recent lab work are within normal limits.    -Chlamydia and gonnohrea tests are normal.      Thank you for choosing [facility] for your health care needs,      Kalee Valero PA-C
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
## 2018-12-04 NOTE — PROGRESS NOTES
Date of service: November 29, 2018     Referral source: Established patient.      Chief complaint: Multiple sclerosis.      History of the Present Illness: Ms. Josefina Aldrich is a 43-year-old woman who returns to the Multiple Sclerosis Clinic today for scheduled follow-up, primarily to discuss disease-modifying therapies for multiple sclerosis.      The patient's history is as per my previous notes.  She initially presented with symptoms of demyelinating disease in June 2015 when she developed right-sided numbness and incoordination of the arm.  Initially, this was suspected to be due to a stroke, but follow-up imaging did not show expected temporal evolution of a cerebral infarct and also demonstrated accumulation of additional lesions suggestive of demyelinating disease of the type seen in multiple sclerosis.  The patient was placed on disease-modifying therapy with glatiramer acetate.      As discussed in several recent visits, she has been struggling with migraine headache with ongoing exacerbation of symptoms since September 26.  She was admitted to the [facility] Neurology Service from 10/19 to 10/21/2018 with status migrainosus, at which time she was placed on an increased dose of nortriptyline at 50 mg at bedtime as well as topiramate 150 mg daily.  When I last saw the patient at the beginning of this month, I told her that we should allow additional time for the changes in medication to show some effect prior to making additional changes in preventive medications.  I did provide her with refills of prochlorperazine and sumatriptan at that time.      Today, fortunately the patient reports that her headache has significantly improved.  She does have residual head pains that she is currently rating 3 on a scale of 1/10.  She notes that this may worsen with activity, but overall this is substantially better than it was 4 weeks ago.      The primary reason for this visit today  is that an MRI performed at the time of one of her visits to the Emergency Department for evaluation of headache demonstrated new accumulation of T2 hyperintense lesions in the deep white matter of the left hemisphere in comparison to an MRI done a year previously.  This presumably reflects interval active inflammatory demyelination.  I should also note that the patient has been complaining of increased right-sided motor symptoms over the past couple of months as well.  As the worsening of these symptoms has corresponded with onset of migraine headache and the lesions seen on MRI were not enhancing, this may reflect symptoms of an old demyelinating lesion rather than new clinical disease activity of multiple sclerosis, however, the latter possibility cannot be excluded.      PHYSICAL EXAMINATION:   VITAL SIGNS:  Blood pressure 115/77; pulse 102; weight 114.6 kg; height 1.66 meters.   GENERAL:  Obese woman who presents to the evaluation alone, awake and alert and in no acute distress.      Assessment/plan:    1.  Relapsing-remitting multiple sclerosis  As above, there has been definite radiologic progression of the patient's condition over the past year.  I am uncertain if there has been any true clinical disease activity from MS, but it is possible that she has some increase in symptoms in this regard as well.  Accordingly, I told the patient that I would recommend a change in disease-modifying therapy.   We discussed multiple options for disease-modifying treatment today.  I told her that I likely would avoid beta interferon as well as fingolimod as both of these medications have been associated with an increase in frequency and severity of headaches.     We discussed oral medications including dimethyl fumarate and teriflunomide in detail.  Teriflunomide is a once daily oral medication for multiple sclerosis.  I generally consider this to be in the same tier of efficacy as platform injectable drugs.  This agent is  a known human teratogen, however, the patient has previously had a hysterectomy and this is not a concern in her case.  Common nuisance side effects of this medication include nausea, which is typically mild, as well as frequent occurrence of hair thinning.  Liver function test monitoring is recommended monthly during the first 6 months on the medication and then periodically thereafter.  Additionally, less than 2% of patients in a clinical trial developed large-fiber peripheral neuropathy in association with this medication.     Dimethyl fumarate is a twice daily medication, which does present compliance concerns in general compared to a once daily medication.  Common side effects of this medication include flushing as well as incidence of bothersome gastrointestinal side effects in a substantial minority of patients upon transition to the full treatment dose of the medication, which can occasionally be limiting to tolerance.  Additionally, this medication has rarely been associated with a serious opportunistic infection of the brain called progressive multifocal leukoencephalopathy (PML).     We also briefly discussed IV medications.  The best established of  these therapies is natalizumab, which would be a highly efficacious therapy for multiple sclerosis.  The primary concern with this medication is associated risk of PML, although this risk is quite remote (less than 0.1%) in SABINO negative patients.  Obviously, the every 4-week IV infusion schedule is somewhat of a negative as regards convenience.  I discussed with the patient that if she were SABINO seronegative and strongly desired a highly potent therapy, this would be an option for her as well.     At the present time, the patient indicates that she would prefer a trial of teriflunomide, particularly given the safety concerns about opportunistic brain infections, which have not been linked to this medication.  I think that this is a very reasonable option in her  case and we did go ahead and fill out the forms to initiate that medication today.  She is aware that she will need monthly laboratory visits as discussed above.     I will see her back in 6 months with MRI scans of the brain and cervical spine prior to that visit, mainly to make sure that there is no evidence of active inflammatory demyelination at that time as evidenced by presence of gadolinium enhancing lesions.     2.  Chronic migraine  The patient has an appointment with Dr. Shena Menjivar at the Headache Clinic later this week and will discuss ongoing treatment of her migraine headaches at that time.      We discussed a return to work.  She would like to attempt part-time return to work approximately 4 hours per day next week, and if that goes well, return to full-time work the week after.  I did provide her with a letter and completed short-term disability forms to this effect today.      I spent 40 minutes with the patient in the office today, with greater than 50% of this time spent in counseling regarding the above issues.         ZACHARY HACKETT MD             D: 2018   T: 2018   MT: NADYA      Name:     RACHEL YANG   MRN:      [id number]        Account:      [id number]   :      1975           Service Date: 2018      Document: [id number]

## 2018-12-07 ENCOUNTER — TELEPHONE (OUTPATIENT)
Dept: FAMILY MEDICINE | Facility: CLINIC | Age: 43
End: 2018-12-07

## 2018-12-07 NOTE — TELEPHONE ENCOUNTER
Patient is calling stating has poss spider bites and would like a call back to discuss concerns if any, no other symptoms other then itching. Please call to discuss. Thank you.

## 2018-12-07 NOTE — TELEPHONE ENCOUNTER
Possible insect bites. Itching. Red       Duration: bites for about a week now    Description (location/character/radiation): scabbed over and not raised anymore. Two that still itch. One on right arm and one on right side of abdomen. These areas are approx the size of a mosquito bite. When scratching this am on arm- there are red stripes from the areas. Pt does not know if this was from scratching or if there is something more going on.     Intensity:  mild    Accompanying signs and symptoms: itching, warm sensation when they rub or scratch the area, night sweats for the past week or so- but pt is on metrogel as well from [facility]    History (similar episodes/previous evaluation): None    Precipitating or alleviating factors: None    Therapies tried and outcome: eucerin cream helps a little       Sending itchy skin note as well regarding sx. Advised pt that they should call with any further sx or concerns. If sx worsen or change- fever/chills, be seen ASAP.     The patient indicates understanding of these issues and agrees with the plan.  Addie Teran RN  [facility]

## 2018-12-10 ENCOUNTER — OFFICE VISIT (OUTPATIENT)
Dept: FAMILY MEDICINE | Facility: CLINIC | Age: 43
End: 2018-12-10
Payer: COMMERCIAL

## 2018-12-10 VITALS
HEIGHT: 66 IN | DIASTOLIC BLOOD PRESSURE: 64 MMHG | BODY MASS INDEX: 42.59 KG/M2 | OXYGEN SATURATION: 99 % | HEART RATE: 115 BPM | WEIGHT: 265 LBS | TEMPERATURE: 98.7 F | SYSTOLIC BLOOD PRESSURE: 120 MMHG

## 2018-12-10 DIAGNOSIS — G35 MS (MULTIPLE SCLEROSIS) (H): ICD-10-CM

## 2018-12-10 DIAGNOSIS — Z11.4 ENCOUNTER FOR SCREENING FOR HIV: ICD-10-CM

## 2018-12-10 DIAGNOSIS — D50.9 IRON DEFICIENCY ANEMIA, UNSPECIFIED IRON DEFICIENCY ANEMIA TYPE: ICD-10-CM

## 2018-12-10 DIAGNOSIS — Z79.899 MEDICATION MANAGEMENT: Primary | ICD-10-CM

## 2018-12-10 LAB
BASOPHILS # BLD AUTO: 0 10E9/L (ref 0–0.2)
BASOPHILS NFR BLD AUTO: 0.4 %
DIFFERENTIAL METHOD BLD: ABNORMAL
EOSINOPHIL # BLD AUTO: 0.2 10E9/L (ref 0–0.7)
EOSINOPHIL NFR BLD AUTO: 2.4 %
ERYTHROCYTE [DISTWIDTH] IN BLOOD BY AUTOMATED COUNT: 12.3 % (ref 10–15)
HCT VFR BLD AUTO: 38.7 % (ref 35–47)
HGB BLD-MCNC: 11.9 G/DL (ref 11.7–15.7)
LYMPHOCYTES # BLD AUTO: 3.7 10E9/L (ref 0.8–5.3)
LYMPHOCYTES NFR BLD AUTO: 45.5 %
MCH RBC QN AUTO: 29.8 PG (ref 26.5–33)
MCHC RBC AUTO-ENTMCNC: 30.7 G/DL (ref 31.5–36.5)
MCV RBC AUTO: 97 FL (ref 78–100)
MONOCYTES # BLD AUTO: 0.7 10E9/L (ref 0–1.3)
MONOCYTES NFR BLD AUTO: 8.3 %
NEUTROPHILS # BLD AUTO: 3.5 10E9/L (ref 1.6–8.3)
NEUTROPHILS NFR BLD AUTO: 43.4 %
PLATELET # BLD AUTO: 454 10E9/L (ref 150–450)
RBC # BLD AUTO: 4 10E12/L (ref 3.8–5.2)
WBC # BLD AUTO: 8.1 10E9/L (ref 4–11)

## 2018-12-10 PROCEDURE — 85025 COMPLETE CBC W/AUTO DIFF WBC: CPT | Performed by: PHYSICIAN ASSISTANT

## 2018-12-10 PROCEDURE — 80053 COMPREHEN METABOLIC PANEL: CPT | Performed by: PHYSICIAN ASSISTANT

## 2018-12-10 PROCEDURE — 87389 HIV-1 AG W/HIV-1&-2 AB AG IA: CPT | Performed by: PHYSICIAN ASSISTANT

## 2018-12-10 PROCEDURE — 36415 COLL VENOUS BLD VENIPUNCTURE: CPT | Performed by: PHYSICIAN ASSISTANT

## 2018-12-10 PROCEDURE — 99213 OFFICE O/P EST LOW 20 MIN: CPT | Performed by: PHYSICIAN ASSISTANT

## 2018-12-10 ASSESSMENT — MIFFLIN-ST. JEOR: SCORE: 1875.84

## 2018-12-10 NOTE — PROGRESS NOTES
"  SUBJECTIVE:                                                    Josefina Aldrich is a 43 year old female who presents to clinic today for the following health issues:    Needs labs checked for liver and kidney function prior to starting new medication for MS - Abugio.     Patient is considering starting a new medication for her MS, but is concerned of the potential risks associated with this medication.  She was informed that liver function can decline, along with the WBC count and she could loose her hair.  She is here today to get baseline labs done before starting the medication.  She has standing orders from her neurologist for a hepatic panel, but per patient, he did not recommend baseline labs, which is why she is here today.      Problem list and histories reviewed & adjusted, as indicated.  Additional history: as documented      ROS:  Constitutional, HEENT, cardiovascular, pulmonary, GI, , musculoskeletal, neuro, skin, endocrine and psych systems are negative, except as otherwise noted.    OBJECTIVE:                                                    /64 (BP Location: Right arm, Patient Position: Chair, Cuff Size: Adult Large)   Pulse 115   Temp 98.7  F (37.1  C) (Oral)   Ht 1.664 m (5' 5.5\")   Wt 120.2 kg (265 lb)   SpO2 99%   BMI 43.43 kg/m     Body mass index is 43.43 kg/m .  GENERAL: healthy, alert and no distress  EYES: Eyes grossly normal to inspection, PERRL and conjunctivae and sclerae normal  MS: no gross musculoskeletal defects noted, no edema  NEURO: Normal strength and tone, mentation intact and speech normal  PSYCH: mentation appears normal, affect normal/bright    Diagnostic Test Results:  Labs - pending     ASSESSMENT/PLAN:                                                      Josefina was seen today for blood draw.    Diagnoses and all orders for this visit:    Medication management  -     Comprehensive metabolic panel  -     CBC with platelets differential    MS (multiple " sclerosis) (H)  -     Comprehensive metabolic panel  -     CBC with platelets differential    Iron deficiency anemia, unspecified iron deficiency anemia type  -     CBC with platelets differential    Encounter for screening for HIV  -     HIV Antigen Antibody Combo      Labs have been ordered for patient to gain a baseline before starting a new MS medication.  Patient has been advised to followup with monthly lab work as advised by neurology, should she decide to start the new medication.  Patient should be seen sooner if needed.     Followup: Return in about 4 weeks (around 1/7/2019) for Specialty followup, please be seen sooner if needed.    -- I have discussed the patient's diagnosis, and my plan of treatment with the patient and/or family. Patient is aware to followup if symptoms do not improve.  Patient has been advised to be seen sooner or seek more immediate care if symptoms change or worsen.  Patient agrees with and understands the plan today.     See Patient Instructions        Kalee Valero PA-C    [facility]3] [place]

## 2018-12-11 LAB
ALBUMIN SERPL-MCNC: 4 G/DL (ref 3.4–5)
ALP SERPL-CCNC: 59 U/L (ref 40–150)
ALT SERPL W P-5'-P-CCNC: 19 U/L (ref 0–50)
ANION GAP SERPL CALCULATED.3IONS-SCNC: 10 MMOL/L (ref 3–14)
AST SERPL W P-5'-P-CCNC: 16 U/L (ref 0–45)
BILIRUB SERPL-MCNC: 0.2 MG/DL (ref 0.2–1.3)
BUN SERPL-MCNC: 15 MG/DL (ref 7–30)
CALCIUM SERPL-MCNC: 9.4 MG/DL (ref 8.5–10.1)
CHLORIDE SERPL-SCNC: 107 MMOL/L (ref 94–109)
CO2 SERPL-SCNC: 21 MMOL/L (ref 20–32)
CREAT SERPL-MCNC: 1.14 MG/DL (ref 0.52–1.04)
GFR SERPL CREATININE-BSD FRML MDRD: 52 ML/MIN/1.7M2
GLUCOSE SERPL-MCNC: 71 MG/DL (ref 70–99)
HIV 1+2 AB+HIV1 P24 AG SERPL QL IA: NONREACTIVE
POTASSIUM SERPL-SCNC: 3.7 MMOL/L (ref 3.4–5.3)
PROT SERPL-MCNC: 7.8 G/DL (ref 6.8–8.8)
SODIUM SERPL-SCNC: 138 MMOL/L (ref 133–144)

## 2018-12-13 ENCOUNTER — MYC MEDICAL ADVICE (OUTPATIENT)
Dept: FAMILY MEDICINE | Facility: CLINIC | Age: 43
End: 2018-12-13

## 2018-12-17 NOTE — RESULT ENCOUNTER NOTE
Malathi Rocha ,    The results from your recent lab work appear fine and stable to those in the past.      Thank you for choosing [facility] for your health care needs,      Kalee Valero PA-C

## 2019-01-02 ENCOUNTER — MYC MEDICAL ADVICE (OUTPATIENT)
Dept: FAMILY MEDICINE | Facility: CLINIC | Age: 44
End: 2019-01-02

## 2019-01-02 NOTE — TELEPHONE ENCOUNTER
Please see my chart message below     Please review and advise     Thank you     Amelia Chance RN, BSN  [facility]

## 2019-01-03 NOTE — TELEPHONE ENCOUNTER
mycharted patient to see how she would like to  this application.     Form at my desk.    Susan Ahmadi MA

## 2019-01-14 ENCOUNTER — MYC MEDICAL ADVICE (OUTPATIENT)
Dept: FAMILY MEDICINE | Facility: CLINIC | Age: 44
End: 2019-01-14

## 2019-01-14 NOTE — TELEPHONE ENCOUNTER
Call received from Doreen at Dr. Chong's (neurology) office.  Patient is scheduled to see him on 1/16/2019. She has already rescheduled once and they have been trying to reach her for one week to confirm she is going to be coming to the appointment.  If not they would like to use the appointment for another patient.      LP advised that I send Zeptorhart message to patient.  FootballScouthart message sent.      Please update Doreen at [phone].    TAMARA Dumont, RN, N  [facility]) [phone]

## 2019-01-14 NOTE — TELEPHONE ENCOUNTER
Patient replied to Climateminder message and will be at 1/16/2019 appointment with Dr. Chong.  Doreen updated.    Tessa Wiggins, BS, RN, PHN  [facility]) [phone]

## 2019-01-21 ENCOUNTER — APPOINTMENT (OUTPATIENT)
Dept: GENERAL RADIOLOGY | Facility: CLINIC | Age: 44
End: 2019-01-21
Payer: COMMERCIAL

## 2019-01-21 ENCOUNTER — HOSPITAL ENCOUNTER (EMERGENCY)
Facility: CLINIC | Age: 44
Discharge: HOME | End: 2019-01-22
Attending: PHYSICIAN ASSISTANT | Admitting: PHYSICIAN ASSISTANT
Payer: COMMERCIAL

## 2019-01-21 DIAGNOSIS — R25.2 CRAMPS OF LOWER EXTREMITY: ICD-10-CM

## 2019-01-21 DIAGNOSIS — G35 MULTIPLE SCLEROSIS (H): ICD-10-CM

## 2019-01-21 LAB
BASOPHILS # BLD AUTO: 0.1 10E9/L (ref 0–0.2)
BASOPHILS NFR BLD AUTO: 0.6 %
DIFFERENTIAL METHOD BLD: ABNORMAL
EOSINOPHIL # BLD AUTO: 0.2 10E9/L (ref 0–0.7)
EOSINOPHIL NFR BLD AUTO: 2.1 %
ERYTHROCYTE [DISTWIDTH] IN BLOOD BY AUTOMATED COUNT: 12.5 % (ref 10–15)
HCT VFR BLD AUTO: 33.9 % (ref 35–47)
HGB BLD-MCNC: 11 G/DL (ref 11.7–15.7)
IMM GRANULOCYTES # BLD: 0 10E9/L (ref 0–0.4)
IMM GRANULOCYTES NFR BLD: 0.2 %
LYMPHOCYTES # BLD AUTO: 3.2 10E9/L (ref 0.8–5.3)
LYMPHOCYTES NFR BLD AUTO: 32.6 %
MCH RBC QN AUTO: 30.3 PG (ref 26.5–33)
MCHC RBC AUTO-ENTMCNC: 32.4 G/DL (ref 31.5–36.5)
MCV RBC AUTO: 93 FL (ref 78–100)
MONOCYTES # BLD AUTO: 0.6 10E9/L (ref 0–1.3)
MONOCYTES NFR BLD AUTO: 6.1 %
NEUTROPHILS # BLD AUTO: 5.7 10E9/L (ref 1.6–8.3)
NEUTROPHILS NFR BLD AUTO: 58.4 %
NRBC # BLD AUTO: 0 10*3/UL
NRBC BLD AUTO-RTO: 0 /100
PLATELET # BLD AUTO: 339 10E9/L (ref 150–450)
RBC # BLD AUTO: 3.63 10E12/L (ref 3.8–5.2)
WBC # BLD AUTO: 9.7 10E9/L (ref 4–11)

## 2019-01-21 PROCEDURE — 93005 ELECTROCARDIOGRAM TRACING: CPT

## 2019-01-21 PROCEDURE — 85025 COMPLETE CBC W/AUTO DIFF WBC: CPT | Performed by: PHYSICIAN ASSISTANT

## 2019-01-21 PROCEDURE — 99285 EMERGENCY DEPT VISIT HI MDM: CPT | Mod: 25

## 2019-01-21 PROCEDURE — 96360 HYDRATION IV INFUSION INIT: CPT

## 2019-01-21 PROCEDURE — 71046 X-RAY EXAM CHEST 2 VIEWS: CPT

## 2019-01-21 PROCEDURE — 80048 BASIC METABOLIC PNL TOTAL CA: CPT | Performed by: PHYSICIAN ASSISTANT

## 2019-01-21 PROCEDURE — 84484 ASSAY OF TROPONIN QUANT: CPT | Performed by: PHYSICIAN ASSISTANT

## 2019-01-21 RX ORDER — BACLOFEN 10 MG/1
10 TABLET ORAL ONCE
Status: COMPLETED | OUTPATIENT
Start: 2019-01-21 | End: 2019-01-22

## 2019-01-21 ASSESSMENT — ENCOUNTER SYMPTOMS
LIGHT-HEADEDNESS: 1
EYE ITCHING: 1
SORE THROAT: 1
SHORTNESS OF BREATH: 1
NUMBNESS: 1
WEAKNESS: 1
DIZZINESS: 1
ARTHRALGIAS: 1
FATIGUE: 1
MYALGIAS: 1

## 2019-01-21 NOTE — ED AVS SNAPSHOT
[facility] Emergency Department  [address]  [facility] [address]  Phone:  [phone]  Fax:  [phone]                                    Josefina Aldrich   MRN: [id number]    Department:  [facility] Emergency Department   Date of Visit:  1/21/2019           After Visit Summary Signature Page    I have received my discharge instructions, and my questions have been answered. I have discussed any challenges I see with this plan with the nurse or doctor.    ..........................................................................................................................................  Patient/Patient Representative Signature      ..........................................................................................................................................  Patient Representative Print Name and Relationship to Patient    ..................................................               ................................................  Date                                   Time    ..........................................................................................................................................  Reviewed by Signature/Title    ...................................................              ..............................................  Date                                               Time          22EPIC Rev 08/18

## 2019-01-21 NOTE — LETTER
January 22, 2019      To Whom It May Concern:      Josefina Aldrich was seen in our Emergency Department today, 01/22/19.  I expect her condition to improve over the next 1-2 days.  She may return to work when improved.    Sincerely,        Susannah Saenz RN

## 2019-01-22 ENCOUNTER — MYC MEDICAL ADVICE (OUTPATIENT)
Dept: FAMILY MEDICINE | Facility: CLINIC | Age: 44
End: 2019-01-22

## 2019-01-22 VITALS
BODY MASS INDEX: 41.3 KG/M2 | SYSTOLIC BLOOD PRESSURE: 133 MMHG | RESPIRATION RATE: 16 BRPM | WEIGHT: 252 LBS | HEART RATE: 76 BPM | OXYGEN SATURATION: 100 % | DIASTOLIC BLOOD PRESSURE: 79 MMHG | TEMPERATURE: 98 F

## 2019-01-22 LAB
ALBUMIN UR-MCNC: NEGATIVE MG/DL
ANION GAP SERPL CALCULATED.3IONS-SCNC: 10 MMOL/L (ref 3–14)
APPEARANCE UR: ABNORMAL
BACTERIA #/AREA URNS HPF: ABNORMAL /HPF
BILIRUB UR QL STRIP: NEGATIVE
BUN SERPL-MCNC: 14 MG/DL (ref 7–30)
CALCIUM SERPL-MCNC: 8.5 MG/DL (ref 8.5–10.1)
CHLORIDE SERPL-SCNC: 109 MMOL/L (ref 94–109)
CO2 SERPL-SCNC: 18 MMOL/L (ref 20–32)
COLOR UR AUTO: YELLOW
CREAT SERPL-MCNC: 0.91 MG/DL (ref 0.52–1.04)
GFR SERPL CREATININE-BSD FRML MDRD: 78 ML/MIN/{1.73_M2}
GLUCOSE SERPL-MCNC: 107 MG/DL (ref 70–99)
GLUCOSE UR STRIP-MCNC: NEGATIVE MG/DL
HGB UR QL STRIP: NEGATIVE
INTERPRETATION ECG - MUSE: NORMAL
KETONES UR STRIP-MCNC: NEGATIVE MG/DL
LEUKOCYTE ESTERASE UR QL STRIP: NEGATIVE
MUCOUS THREADS #/AREA URNS LPF: PRESENT /LPF
NITRATE UR QL: NEGATIVE
PH UR STRIP: 6 PH (ref 5–7)
POTASSIUM SERPL-SCNC: 3.5 MMOL/L (ref 3.4–5.3)
RBC #/AREA URNS AUTO: 1 /HPF (ref 0–2)
SODIUM SERPL-SCNC: 137 MMOL/L (ref 133–144)
SOURCE: ABNORMAL
SP GR UR STRIP: 1.02 (ref 1–1.03)
SQUAMOUS #/AREA URNS AUTO: 2 /HPF (ref 0–1)
TROPONIN I SERPL-MCNC: <0.015 UG/L (ref 0–0.04)
UROBILINOGEN UR STRIP-MCNC: 0 MG/DL (ref 0–2)
WBC #/AREA URNS AUTO: 1 /HPF (ref 0–5)

## 2019-01-22 PROCEDURE — 25000132 ZZH RX MED GY IP 250 OP 250 PS 637: Performed by: PHYSICIAN ASSISTANT

## 2019-01-22 PROCEDURE — 81001 URINALYSIS AUTO W/SCOPE: CPT | Performed by: PHYSICIAN ASSISTANT

## 2019-01-22 PROCEDURE — 25000128 H RX IP 250 OP 636: Performed by: PHYSICIAN ASSISTANT

## 2019-01-22 PROCEDURE — 96360 HYDRATION IV INFUSION INIT: CPT

## 2019-01-22 RX ORDER — BACLOFEN 10 MG/1
10 TABLET ORAL 3 TIMES DAILY
Qty: 30 TABLET | Refills: 0 | Status: SHIPPED | OUTPATIENT
Start: 2019-01-22 | End: 2019-03-20

## 2019-01-22 RX ADMIN — SODIUM CHLORIDE 1000 ML: 9 INJECTION, SOLUTION INTRAVENOUS at 00:10

## 2019-01-22 RX ADMIN — BACLOFEN 10 MG: 10 TABLET ORAL at 00:09

## 2019-01-22 NOTE — TELEPHONE ENCOUNTER
Huddled with LP and she advised follow up with neurology if muscle cramps are related to her MS.    Patient notified via Image Socket.      Tessa Wiggins, BS, RN, N  [facility]) [phone]

## 2019-01-22 NOTE — ED PROVIDER NOTES
History     Chief Complaint:  Leg Pain    HPI   Josefina Aldrich is a 43 year old female wth a history of MS who presents to the emergency department today with leg pain. 2 hours ago patient started having severe leg spasm that radiates up to her abdomen. The pain has continued since it started 2 hours ago. She reports right foot numbness and tingling. Patient reports general myalgias and weakness. Patient reports sore throat and eye itchiness. Patient reports shortness of breath worse with exertion, light-headedness, and dizziness. Patient called her MS specialist who told her to come to the ED because she likely has an infection. Patient has been fasting with her Scientology for the last 3 weeks.     Allergies:  Aspirin  Adhesive Tape  Azithromycin  Latex  Penicillins    Medications:    Albuterol (2.5 Mg/3ml) 0.083% Neb Solution  Albuterol (Proair Hfa) 108 (90 Base) Mcg/act Inhaler  Albuterol (Ventolin Hfa) 108 (90 Base) Mcg/act Inhaler  Botulinum Toxin Type A (Botox) 200 Units Injection  Butalbital-acetaminophen (Phrenilin)  Mg Tabs  Cholecalciferol (Vitamin D3) 5000 Units Tabs Tablet  Cyanocolbalamin (Vitamin  B-12) 100 Mcg Tablet  Ferrous Sulfate (Iron Supplement Po)  Glatiramer Acetate (Copaxone) 40 Mg/ml Sosy  Lisinopril-hydrochlorothiazide (Prinzide/Zestoretic) 10-12.5 Mg Per Tablet  Nortriptyline (Pamelor) 50 Mg Capsule  Prochlorperazine (Compazine) 10 Mg Tablet  Sumatriptan (Imitrex) 100 Mg Tablet  Topiramate (Topamax) 100 Mg Tablet  Triamcinolone (Kenalog) 0.1 % Cream  Valacyclovir (Valtrex) 1000 Mg Tablet     Past Medical History:    Asthma   Fibroids   H/O gastric bypass   Hypertension   Migraine   Obstructive sleep apnea   Pre-diabetes   Relapsing remitting multiple sclerosis (H)   Mild major depression (H)  Neck pain on left side  LLQ pain  Benign essential hypertension  Hypermagnesemia  Migraine  Asthma  MS (multiple sclerosis) (H)  Anemia  Body mass index 45.0-49.9, adult (H)  Postsurgical  nonabsorption  Morbid obesity (H)  Vomiting following gastrointestinal surgery  Vitamin B12 deficiency  Right shoulder pain  Fever postop  Leukocytosis  S/P hysterectomy  Uterine leiomyoma  Personal history of allergy to latex  Post concussion syndrome  Cognitive changes  Neck pain  Pain, neuropathic  Heavy menses  Pelvic pain in female  Mild intermittent asthma  Other dyspnea and respiratory abnormality  S/P gastric bypass  Myalgia and myositis  Vitamin D deficiency  Backache  Depression with anxiety  Headache  Hypersomnia with sleep apnea  Iron deficiency anemia  Bariatric surgery status    Past Surgical History:    Gastric bypass  Hysterectomy  Tonsillectomy     Family History:    Lupus     Social History:  The patient was accompanied to the ED by mother.  Smoking Status: Former  Smokeless Tobacco: Never  Alcohol Use: Yes   Marital Status:      Review of Systems   Constitutional: Positive for fatigue.   HENT: Positive for sore throat.    Eyes: Positive for itching.   Respiratory: Positive for shortness of breath.    Musculoskeletal: Positive for arthralgias (bilateral leg pain radiating up ) and myalgias.   Neurological: Positive for dizziness, weakness, light-headedness and numbness (right leg).   All other systems reviewed and are negative.      Physical Exam     Patient Vitals for the past 24 hrs:   BP Temp Temp src Pulse Heart Rate Resp SpO2 Weight   01/22/19 0045 -- -- -- -- -- -- 100 % --   01/22/19 0030 133/79 -- -- 76 -- -- 100 % --   01/22/19 0015 -- -- -- -- -- -- 100 % --   01/21/19 2330 118/66 -- -- 86 -- -- 100 % --   01/21/19 2306 (!) 137/93 98  F (36.7  C) Temporal 96 96 16 99 % 114.3 kg (252 lb)      Physical Exam  Constitutional: mild distress due to pain. Non-toxic appearing.   Head: No external signs of trauma noted to head or face.   Eyes: Pupils are equal, round, and reactive to light. Conjunctiva normal.   ENT: MMM. Oropharynx without significant erythema. No tonsillar enlargement or  exudates. Normal voice.   Neck: Normal ROM.    Cardiovascular: Normal rate, regular rhythm, and intact distal pulses.    Respiratory: Effort normal. No respiratory distress. Lungs clear to auscultation bilaterally.   GI: Soft. There is no tenderness. There is no rebound.   Musculoskeletal: No deformities appreciated. Normal ROM. No lower extremity edema noted.  No significant tenderness to palpation to bilateral lower extremities. No joint swelling noted.   Neurological: Alert and Oriented x 3. Speech normal. Moves all extremities equally. CN II-XII intact. 5/5 strength of bilateral lower extremities, including plantarflexion/dorsiflexion, knee flex/ext, hip flexion. Sensation is intact to light touch in bilateral lower extremities.   Psychiatric: Appropriate mood, affect, and behavior.   Skin: Skin is warm and dry. no rash or skin lesions noted.      Emergency Department Course     ECG:  Indication: leg numbness  Completed at 2339.  Read at 2341.   Normal sinus rhythm   Normal ECG    Rate 84 bpm. WY interval 146. QRS duration 78. QT/QTc 360/425. P-R-T axes 23 19 13.     Imaging:  Radiology findings were communicated with the patient and family who voiced understanding of the findings.  XR Chest 2 Views   Final Result   IMPRESSION: No infiltrates or other acute findings. Heart size is   within normal limits.      MARTHA STEWART MD      Report per radiology      Laboratory:  Laboratory findings were communicated with the patient and family who voiced understanding of the findings.  Troponin (Collected 2345): <0.015  UA: slightly cloudy, yellow urine with few bacteria, 2 Squamous Epithelial, mucous present o/w WNL   BMP: 18 CO2, 107 Glucose, WNL (Creatinine 0.91)   CBC: AWNL (WBC 9.7, HGB 11.0, )     Interventions:  0009: Lioresal 10 mg PO   0010: 0.9% NaCl 1L IV Bolus     Emergency Department Course:  Nursing notes and vitals reviewed.  2320: I performed an exam of the patient as documented above.   IV was  inserted and blood was drawn for laboratory testing, results above.  The patient was sent for a Chest XR while in the emergency department, results above.   The patient provided a urine sample here in the emergency department. This was sent for laboratory testing, findings above.  0055: Findings and plan explained to the Patient and mother. Patient discharged home with instructions regarding supportive care, medications, and reasons to return. The importance of close follow-up was reviewed. The patient was prescribed baclofen.   I personally reviewed the laboratory and imaging results with the Patient and mother and answered all related questions prior to discharge.     Impression & Plan    Medical Decision Makin year old female with history of MS presenting with pain in bilateral lower extremities. A broad work-up was initiated given her complicated history with MS. EKG did not reveal any evidence of ischemia and her troponin is negative. CXR did not reveal any evidence of pneumonia. Urinalysis does not show any evidence of infection. The remainder of her labs are unrevealing. She does not have any swelling in her legs to suggest DVT. There is no rash or skin lesions to suggest infection such as cellulitis. She did get significant relief with baclofen here in the ED. I considered whether she potentially needed steroids for an MS exacerbation, but because she does not have any findings on exam, I do not think there is an indication for steroids at this time. She is feeling significantly improved at this time and given her negative work-up thus far, I think she is appropriate for discharge home with close follow-up with her primary MS physician. I will prescribe her some baclofen to go home with. She was instructed to return to the ED for any new or worsening symptoms, including worsening pain, any new neurologic symptoms including numbness, tingling, weakness, or other concerns.       Diagnosis:    ICD-10-CM     1. Cramps of lower extremity R25.2    2. Multiple sclerosis (H) G35        Disposition:  discharged to home    Discharge Medications:     Medication List      Started    baclofen 10 MG tablet  Commonly known as:  LIORESAL  10 mg, Oral, 3 TIMES DAILY            Scribe Disclosure:  I, Jeanie Joy, am serving as a scribe at 12:22 AM on 1/22/2019 to document services personally performed by Radha Barbosa PA-C based on my observations and the provider's statements to me.    1/21/2019   [facility] EMERGENCY DEPARTMENT       Radha Barbosa PA-C  01/22/19 0146

## 2019-02-12 ENCOUNTER — TRANSFERRED RECORDS (OUTPATIENT)
Dept: HEALTH INFORMATION MANAGEMENT | Facility: CLINIC | Age: 44
End: 2019-02-12

## 2019-02-12 LAB
ALT SERPL-CCNC: 16 IU/L (ref 0–32)
AST SERPL-CCNC: 22 IU/L (ref 0–40)

## 2019-02-15 DIAGNOSIS — B00.9 HSV (HERPES SIMPLEX VIRUS) INFECTION: ICD-10-CM

## 2019-02-15 RX ORDER — VALACYCLOVIR HYDROCHLORIDE 500 MG/1
TABLET, FILM COATED ORAL
Qty: 90 TABLET | Refills: 0 | Status: SHIPPED | OUTPATIENT
Start: 2019-02-15 | End: 2019-05-31

## 2019-02-15 NOTE — TELEPHONE ENCOUNTER
"Requested Prescriptions   Pending Prescriptions Disp Refills     valACYclovir (VALTREX) 500 MG tablet [Pharmacy Med Name: VALACYCLOVIR 500MG TABLETS] 90 tablet 0     Sig: TAKE 1 TABLET BY MOUTH DAILY    Antivirals for Herpes Protocol Passed - 2/15/2019  5:18 AM       Passed - Patient is age 12 or older       Passed - Recent (12 mo) or future (30 days) visit within the authorizing provider's specialty    Patient had office visit in the last 12 months or has a visit in the next 30 days with authorizing provider or within the authorizing provider's specialty.  See \"Patient Info\" tab in inbasket, or \"Choose Columns\" in Meds & Orders section of the refill encounter.         Last Written Prescription Date:  11/5/2018  Last Fill Quantity: 90,  # refills: 0   Last office visit: 12/10/2018 with prescribing provider:  RADHA Valero  Future Office Visit:        Passed - Medication is active on med list       Passed - Normal serum creatinine on file in past 12 months    Recent Labs   Lab Test 01/21/19  2345   CR 0.91               "

## 2019-02-15 NOTE — TELEPHONE ENCOUNTER
Prescription approved per [facility] Refill Protocol.  Addie Teran RN  [facility]

## 2019-02-19 DIAGNOSIS — I10 BENIGN ESSENTIAL HYPERTENSION: ICD-10-CM

## 2019-02-19 RX ORDER — LISINOPRIL/HYDROCHLOROTHIAZIDE 10-12.5 MG
TABLET ORAL
Qty: 90 TABLET | Refills: 2 | Status: SHIPPED | OUTPATIENT
Start: 2019-02-19 | End: 2019-10-22

## 2019-02-19 NOTE — TELEPHONE ENCOUNTER
Prescription approved per [facility] Refill Protocol.  Nella Pelayo RN   [facility] - Triage

## 2019-02-19 NOTE — TELEPHONE ENCOUNTER
"Requested Prescriptions   Pending Prescriptions Disp Refills     lisinopril-hydrochlorothiazide (PRINZIDE/ZESTORETIC) 10-12.5 MG tablet [Pharmacy Med Name: LISINOPRIL-HCTZ 10/12.5MG TABLETS] 90 tablet 0      Last Written Prescription Date:  8/23/2018  Last Fill Quantity: 90,  # refills: 1   Last office visit: 12/10/2018 with prescribing provider:     Future Office Visit:         Sig: TAKE 1 TABLET BY MOUTH DAILY    Diuretics (Including Combos) Protocol Passed - 2/19/2019 12:37 PM       Passed - Blood pressure under 140/90 in past 12 months    BP Readings from Last 3 Encounters:   01/22/19 133/79   12/10/18 120/64   11/30/18 111/71                Passed - Recent (12 mo) or future (30 days) visit within the authorizing provider's specialty    Patient had office visit in the last 12 months or has a visit in the next 30 days with authorizing provider or within the authorizing provider's specialty.  See \"Patient Info\" tab in inbasket, or \"Choose Columns\" in Meds & Orders section of the refill encounter.             Passed - Medication is active on med list       Passed - Patient is age 18 or older       Passed - No active pregancy on record       Passed - Normal serum creatinine on file in past 12 months    Recent Labs   Lab Test 01/21/19  2345   CR 0.91             Passed - Normal serum potassium on file in past 12 months    Recent Labs   Lab Test 01/21/19  2345   POTASSIUM 3.5                   Passed - Normal serum sodium on file in past 12 months    Recent Labs   Lab Test 01/21/19  2345                Passed - No positive pregnancy test in past 12 months        "

## 2019-03-03 DIAGNOSIS — G43.009 MIGRAINE WITHOUT AURA AND WITHOUT STATUS MIGRAINOSUS, NOT INTRACTABLE: ICD-10-CM

## 2019-03-04 RX ORDER — TOPIRAMATE 25 MG/1
TABLET, FILM COATED ORAL
Qty: 90 TABLET | Refills: 0 | OUTPATIENT
Start: 2019-03-04

## 2019-03-04 NOTE — TELEPHONE ENCOUNTER
Received refill request for topiramate from the patient's pharmacy, however, the patient has transitioned her neurologic care over to Dr. Chong at [facility]; Medication refill denied and pharmacy advised to redirect request to new neurologist.    Mitzy Collazo, MS RN Care Coordinator

## 2019-03-20 ENCOUNTER — OFFICE VISIT (OUTPATIENT)
Dept: FAMILY MEDICINE | Facility: CLINIC | Age: 44
End: 2019-03-20
Payer: COMMERCIAL

## 2019-03-20 VITALS
HEIGHT: 66 IN | BODY MASS INDEX: 41.62 KG/M2 | SYSTOLIC BLOOD PRESSURE: 126 MMHG | HEART RATE: 97 BPM | OXYGEN SATURATION: 99 % | TEMPERATURE: 98.3 F | WEIGHT: 259 LBS | DIASTOLIC BLOOD PRESSURE: 80 MMHG

## 2019-03-20 DIAGNOSIS — R10.2 PELVIC PAIN IN FEMALE: ICD-10-CM

## 2019-03-20 DIAGNOSIS — N64.52 NIPPLE DISCHARGE IN FEMALE: Primary | ICD-10-CM

## 2019-03-20 PROCEDURE — 99214 OFFICE O/P EST MOD 30 MIN: CPT | Performed by: PHYSICIAN ASSISTANT

## 2019-03-20 RX ORDER — BACLOFEN 20 MG/1
TABLET ORAL
Refills: 10 | COMMUNITY
Start: 2019-03-07 | End: 2019-04-08

## 2019-03-20 RX ORDER — GABAPENTIN 300 MG/1
CAPSULE ORAL
Refills: 3 | COMMUNITY
Start: 2019-02-14 | End: 2019-04-08

## 2019-03-20 ASSESSMENT — MIFFLIN-ST. JEOR: SCORE: 1848.63

## 2019-03-20 ASSESSMENT — ANXIETY QUESTIONNAIRES
IF YOU CHECKED OFF ANY PROBLEMS ON THIS QUESTIONNAIRE, HOW DIFFICULT HAVE THESE PROBLEMS MADE IT FOR YOU TO DO YOUR WORK, TAKE CARE OF THINGS AT HOME, OR GET ALONG WITH OTHER PEOPLE: SOMEWHAT DIFFICULT
GAD7 TOTAL SCORE: 5
5. BEING SO RESTLESS THAT IT IS HARD TO SIT STILL: NOT AT ALL
6. BECOMING EASILY ANNOYED OR IRRITABLE: SEVERAL DAYS
2. NOT BEING ABLE TO STOP OR CONTROL WORRYING: SEVERAL DAYS
7. FEELING AFRAID AS IF SOMETHING AWFUL MIGHT HAPPEN: NOT AT ALL
3. WORRYING TOO MUCH ABOUT DIFFERENT THINGS: SEVERAL DAYS
1. FEELING NERVOUS, ANXIOUS, OR ON EDGE: SEVERAL DAYS

## 2019-03-20 ASSESSMENT — PATIENT HEALTH QUESTIONNAIRE - PHQ9
5. POOR APPETITE OR OVEREATING: SEVERAL DAYS
SUM OF ALL RESPONSES TO PHQ QUESTIONS 1-9: 4

## 2019-03-20 NOTE — LETTER
My Asthma Action Plan  Name: Josefina Aldrich   YOB: 1975  Date: 3/20/2019   My doctor: Kalee Valero PA-C   My clinic: [facility] [place]        My Control Medicine: None  My Rescue Medicine: Albuterol nebulizer solution PRN  Albuterol (Proair/Ventolin/Proventil) inhaler prn   My Asthma Severity: intermittent  Avoid your asthma triggers: Pt is aware of triggers               GREEN ZONE   Good Control    I feel good    No cough or wheeze    Can work, sleep and play without asthma symptoms       Take your asthma control medicine every day.     1. If exercise triggers your asthma, take your rescue medication    15 minutes before exercise or sports, and    During exercise if you have asthma symptoms  2. Spacer to use with inhaler: If you have a spacer, make sure to use it with your inhaler             YELLOW ZONE Getting Worse  I have ANY of these:    I do not feel good    Cough or wheeze    Chest feels tight    Wake up at night   1. Keep taking your Green Zone medications  2. Start taking your rescue medicine:    every 20 minutes for up to 1 hour. Then every 4 hours for 24-48 hours.  3. If you stay in the Yellow Zone for more than 12-24 hours, contact your doctor.  4. If you do not return to the Green Zone in 12-24 hours or you get worse, start taking your oral steroid medicine if prescribed by your provider.           RED ZONE Medical Alert - Get Help  I have ANY of these:    I feel awful    Medicine is not helping    Breathing getting harder    Trouble walking or talking    Nose opens wide to breathe       1. Take your rescue medicine NOW  2. If your provider has prescribed an oral steroid medicine, start taking it NOW  3. Call your doctor NOW  4. If you are still in the Red Zone after 20 minutes and you have not reached your doctor:    Take your rescue medicine again and    Call 911 or go to the emergency room right away    See your regular doctor within 2 weeks of an Emergency  Room or Urgent Care visit for follow-up treatment.          Annual Reminders:  Meet with Asthma Educator,  Flu Shot in the Fall, consider Pneumonia Vaccination for patients with asthma (aged 19 and older).    Pharmacy: [facility] [id number] [facility] [address]                      Asthma Triggers  How To Control Things That Make Your Asthma Worse    Triggers are things that make your asthma worse.  Look at the list below to help you find your triggers and what you can do about them.  You can help prevent asthma flare-ups by staying away from your triggers.      Trigger                                                          What you can do   Cigarette Smoke  Tobacco smoke can make asthma worse. Do not allow smoking in your home, car or around you.  Be sure no one smokes at a child s day care or school.  If you smoke, ask your health care provider for ways to help you quit.  Ask family members to quit too.  Ask your health care provider for a referral to Quit Plan to help you quit smoking, or call 2-477-147-PLAN.     Colds, Flu, Bronchitis  These are common triggers of asthma. Wash your hands often.  Don t touch your eyes, nose or mouth.  Get a flu shot every year.     Dust Mites  These are tiny bugs that live in cloth or carpet. They are too small to see. Wash sheets and blankets in hot water every week.   Encase pillows and mattress in dust mite proof covers.  Avoid having carpet if you can. If you have carpet, vacuum weekly.   Use a dust mask and HEPA vacuum.   Pollen and Outdoor Mold  Some people are allergic to trees, grass, or weed pollen, or molds. Try to keep your windows closed.  Limit time out doors when pollen count is high.   Ask you health care provider about taking medicine during allergy season.     Animal Dander  Some people are allergic to skin flakes, urine or saliva from pets with fur or feathers. Keep pets with fur or feathers out of your  home.    If you can t keep the pet outdoors, then keep the pet out of your bedroom.  Keep the bedroom door closed.  Keep pets off cloth furniture and away from stuffed toys.     Mice, Rats, and Cockroaches  Some people are allergic to the waste from these pests.   Cover food and garbage.  Clean up spills and food crumbs.  Store grease in the refrigerator.   Keep food out of the bedroom.   Indoor Mold  This can be a trigger if your home has high moisture. Fix leaking faucets, pipes, or other sources of water.   Clean moldy surfaces.  Dehumidify basement if it is damp and smelly.   Smoke, Strong Odors, and Sprays  These can reduce air quality. Stay away from strong odors and sprays, such as perfume, powder, hair spray, paints, smoke incense, paint, cleaning products, candles and new carpet.   Exercise or Sports  Some people with asthma have this trigger. Be active!  Ask your doctor about taking medicine before sports or exercise to prevent symptoms.    Warm up for 5-10 minutes before and after sports or exercise.     Other Triggers of Asthma  Cold air:  Cover your nose and mouth with a scarf.  Sometimes laughing or crying can be a trigger.  Some medicines and food can trigger asthma.

## 2019-03-20 NOTE — PROGRESS NOTES
"  SUBJECTIVE:                                                    Josefina Aldrich is a 43 year old female who presents to clinic today for the following health issues:      Concern - Nipple Soreness  Onset: x2.5 weeks    Description:   Started with Sensitivity when taking bra off - x1 week started to feel more like tenderness during mesnses - had hysterectomy approx 5-6 years ago. Pain in ovaries, then gas pain and couple not pass gas - a tugging pulling feeling in ovary area-still has pain L lower pelvic    Intensity: 7/10    Progression of Symptoms:  same    Accompanying Signs & Symptoms:  Urine is darker - is on a lot of medication - started liver thistle and increased magnesium    Previous history of similar problem:   Just with menses prior to Hysterectomy    Precipitating factors:   Worsened by: When used to have menses clear thick discharge would come out of nipple when she would squeeze nipple.   Tender spot on outside of Right breast currently - 4 days ago got clear discharge out of nipple and felt cramping in L pelvic area. Left breast is no longer sore    Alleviating factors:  Improved by: nothing    Therapies Tried and outcome: Ibuprofen this morning    Patient reports that when she was getting her period she could squeeze her nipple and get a clear nipple drainage.  She has not been getting a period for several years as she had a hysterectomy, but she still has her ovaries.  She says that she recently she had nipple soreness for the past 1.5 weeks and to relieve the pain she squeezed the nipples and she got drainage from the right side.     She says she noticed a \"little bump\" in the right axilla area as well.      She is having ovarian pain that comes in the middle of her pelvis and moves to both sides, but then concentrates in the left side ovary.  She says that she woke up from sleep to go to the bathroom and when she got up to go to the bathroom the pain was bad enough to cause her to limp.  " "    She says that the left lower quadrant/pelvic pain has continued and become more consistent in character.   She says that the pain is about a 7/10.  Pain has been present since Saturday, and that was the same day she got discharge from the nipple.      She denies urinary symptoms of burning or urgency.  She also denies any nausea/vomiting.  She has not had fevers, chills or sweats.  She has not had any unexplained weight loss.    She has not had any breast skin changes or rashes.       Problem list and histories reviewed & adjusted, as indicated.  Additional history: as documented      ROS:  Constitutional, HEENT, cardiovascular, pulmonary, GI, , musculoskeletal, neuro, skin, endocrine and psych systems are negative, except as otherwise noted.    OBJECTIVE:                                                    /80 (BP Location: Left arm, Patient Position: Chair, Cuff Size: Adult Large)   Pulse 97   Temp 98.3  F (36.8  C) (Oral)   Ht 1.664 m (5' 5.5\")   Wt 117.5 kg (259 lb)   SpO2 99%   Breastfeeding? No   BMI 42.44 kg/m    Body mass index is 42.44 kg/m .  GENERAL: healthy, alert and no distress  EYES: Eyes grossly normal to inspection, PERRL and conjunctivae and sclerae normal  BREAST: normal without masses, tenderness or nipple discharge and no palpable axillary masses or adenopathy  ABDOMEN: Mild tenderness in the lower left pelvic area, soft, no hepatosplenomegaly, no masses and bowel sounds normal  MS: no gross musculoskeletal defects noted, no edema  SKIN: no suspicious lesions or rashes  NEURO: Normal strength and tone, mentation intact and speech normal  PSYCH: mentation appears normal, affect normal/bright    Diagnostic Test Results:  No results found for this or any previous visit (from the past 24 hour(s)).     ASSESSMENT/PLAN:                                                      Josefina was seen today for breast problem.    Diagnoses and all orders for this visit:    Nipple discharge in " female  -     MA Diagnostic Digital Bilateral; Future  -     US Breast Bilateral Complete 4 Quadrants; Future    Pelvic pain in female  -     US Pelvic Complete w Transvaginal; Future      Patient advised to followup with further imaging.  Patient to seek more immediate care if symptoms change or worsen in any way.      Followup: Return in about 1 week (around 3/27/2019) for Advised Imaging, please be seen sooner if needed.    -- I have discussed the patient's diagnosis, and my plan of treatment with the patient and/or family. Patient is aware to followup if symptoms do not improve.  Patient has been advised to be seen sooner or seek more immediate care if symptoms change or worsen.  Patient agrees with and understands the plan today.     See Patient Instructions        Kalee Valero PA-C    [facility] [place]

## 2019-03-21 ASSESSMENT — ANXIETY QUESTIONNAIRES: GAD7 TOTAL SCORE: 5

## 2019-03-21 ASSESSMENT — ASTHMA QUESTIONNAIRES: ACT_TOTALSCORE: 14

## 2019-03-26 ENCOUNTER — HOSPITAL ENCOUNTER (OUTPATIENT)
Dept: MAMMOGRAPHY | Facility: CLINIC | Age: 44
End: 2019-03-26
Attending: PHYSICIAN ASSISTANT
Payer: COMMERCIAL

## 2019-03-26 ENCOUNTER — HOSPITAL ENCOUNTER (OUTPATIENT)
Dept: MAMMOGRAPHY | Facility: CLINIC | Age: 44
Discharge: HOME | End: 2019-03-26
Attending: PHYSICIAN ASSISTANT | Admitting: PHYSICIAN ASSISTANT
Payer: COMMERCIAL

## 2019-03-26 DIAGNOSIS — N64.52 NIPPLE DISCHARGE IN FEMALE: ICD-10-CM

## 2019-03-26 PROCEDURE — 76642 ULTRASOUND BREAST LIMITED: CPT | Mod: 50

## 2019-03-26 PROCEDURE — 77066 DX MAMMO INCL CAD BI: CPT

## 2019-03-27 ENCOUNTER — HOSPITAL ENCOUNTER (OUTPATIENT)
Dept: ULTRASOUND IMAGING | Facility: CLINIC | Age: 44
Discharge: HOME | End: 2019-03-27
Attending: PHYSICIAN ASSISTANT | Admitting: PHYSICIAN ASSISTANT
Payer: COMMERCIAL

## 2019-03-27 DIAGNOSIS — R10.2 PELVIC PAIN IN FEMALE: ICD-10-CM

## 2019-03-27 PROCEDURE — 76830 TRANSVAGINAL US NON-OB: CPT

## 2019-03-29 DIAGNOSIS — G35 MULTIPLE SCLEROSIS (H): ICD-10-CM

## 2019-03-29 NOTE — RESULT ENCOUNTER NOTE
Malathi Rocha ,    The results from your recent pelvic ultrasound are within normal limits.        Thank you for choosing [facility] for your health care needs,      Klaee Valero PA-C

## 2019-04-03 ENCOUNTER — OFFICE VISIT (OUTPATIENT)
Dept: FAMILY MEDICINE | Facility: CLINIC | Age: 44
End: 2019-04-03
Payer: COMMERCIAL

## 2019-04-03 VITALS
DIASTOLIC BLOOD PRESSURE: 75 MMHG | WEIGHT: 257 LBS | HEART RATE: 84 BPM | OXYGEN SATURATION: 97 % | BODY MASS INDEX: 41.3 KG/M2 | TEMPERATURE: 98.2 F | HEIGHT: 66 IN | SYSTOLIC BLOOD PRESSURE: 111 MMHG

## 2019-04-03 DIAGNOSIS — R10.2 PELVIC PAIN IN FEMALE: ICD-10-CM

## 2019-04-03 DIAGNOSIS — R10.32 LEFT LOWER QUADRANT PAIN: Primary | ICD-10-CM

## 2019-04-03 DIAGNOSIS — G35 MULTIPLE SCLEROSIS (H): ICD-10-CM

## 2019-04-03 DIAGNOSIS — Z87.19 HISTORY OF DIVERTICULOSIS: ICD-10-CM

## 2019-04-03 LAB
BASOPHILS # BLD AUTO: 0 10E9/L (ref 0–0.2)
BASOPHILS NFR BLD AUTO: 0.4 %
DIFFERENTIAL METHOD BLD: NORMAL
EOSINOPHIL # BLD AUTO: 0.1 10E9/L (ref 0–0.7)
EOSINOPHIL NFR BLD AUTO: 2.4 %
ERYTHROCYTE [DISTWIDTH] IN BLOOD BY AUTOMATED COUNT: 12.8 % (ref 10–15)
HCT VFR BLD AUTO: 37.5 % (ref 35–47)
HGB BLD-MCNC: 12.2 G/DL (ref 11.7–15.7)
LYMPHOCYTES # BLD AUTO: 2.6 10E9/L (ref 0.8–5.3)
LYMPHOCYTES NFR BLD AUTO: 47.9 %
MCH RBC QN AUTO: 30 PG (ref 26.5–33)
MCHC RBC AUTO-ENTMCNC: 32.5 G/DL (ref 31.5–36.5)
MCV RBC AUTO: 92 FL (ref 78–100)
MONOCYTES # BLD AUTO: 0.5 10E9/L (ref 0–1.3)
MONOCYTES NFR BLD AUTO: 8.6 %
NEUTROPHILS # BLD AUTO: 2.2 10E9/L (ref 1.6–8.3)
NEUTROPHILS NFR BLD AUTO: 40.7 %
PLATELET # BLD AUTO: 396 10E9/L (ref 150–450)
RBC # BLD AUTO: 4.06 10E12/L (ref 3.8–5.2)
WBC # BLD AUTO: 5.5 10E9/L (ref 4–11)

## 2019-04-03 PROCEDURE — 36415 COLL VENOUS BLD VENIPUNCTURE: CPT | Performed by: PHYSICIAN ASSISTANT

## 2019-04-03 PROCEDURE — 85025 COMPLETE CBC W/AUTO DIFF WBC: CPT | Performed by: PHYSICIAN ASSISTANT

## 2019-04-03 PROCEDURE — 99214 OFFICE O/P EST MOD 30 MIN: CPT | Performed by: PHYSICIAN ASSISTANT

## 2019-04-03 ASSESSMENT — MIFFLIN-ST. JEOR: SCORE: 1839.55

## 2019-04-03 NOTE — PROGRESS NOTES
"  SUBJECTIVE:                                                    Josefina Aldrich is a 43 year old female who presents to clinic today for the following health issues:    Discuss Mammo and CT results from 03/26/2019.  Patient understands that her recent imaging studies are within normal limits.       Patient is here because she is wondering why she is still having pain     She is not having any new symptoms, but the left lower abdominal/pelvic pain has continued.  Patient has not had fevers or chills and has not been nauseous or vomiting.    Patient reports that her bowels are soft.  She does feel like she has more gassy issues.    Patient did have a colonoscopy about 6 years ago.  She was told it was normal, but she did have some diverticula.  She was told she would occasionally need to use stool softeners.       Problem list and histories reviewed & adjusted, as indicated.  Additional history: as documented      ROS:  Constitutional, HEENT, cardiovascular, pulmonary, GI, , musculoskeletal, neuro, skin, endocrine and psych systems are negative, except as otherwise noted.    OBJECTIVE:                                                    /75 (BP Location: Left arm, Patient Position: Chair, Cuff Size: Adult Large)   Pulse 84   Temp 98.2  F (36.8  C) (Oral)   Ht 1.664 m (5' 5.5\")   Wt 116.6 kg (257 lb)   SpO2 97%   Breastfeeding? No   BMI 42.12 kg/m    Body mass index is 42.12 kg/m .  GENERAL: healthy, alert and no distress  EYES: Eyes grossly normal to inspection, PERRL and conjunctivae and sclerae normal  ABDOMEN: soft, nontender, no hepatosplenomegaly, no masses and bowel sounds normal  MS: no gross musculoskeletal defects noted, no edema  NEURO: Normal strength and tone, mentation intact and speech normal  PSYCH: mentation appears normal, affect normal/bright    Diagnostic Test Results:  Imaging - pending     ASSESSMENT/PLAN:                                                      Josefina was seen " today for results.    Diagnoses and all orders for this visit:    Left lower quadrant pain  -     Cancel: CT Abdomen w/o Contrast; Future  -     CBC with platelets differential  -     CT Abdomen Pelvis w/o Contrast; Future    Pelvic pain in female  -     Cancel: CT Abdomen w/o Contrast; Future  -     CBC with platelets differential  -     CT Abdomen Pelvis w/o Contrast; Future    History of diverticulosis  -     Cancel: CT Abdomen w/o Contrast; Future  -     CBC with platelets differential  -     CT Abdomen Pelvis w/o Contrast; Future    Multiple sclerosis (H)      CT ordered for further workup as patient is still having symptoms of pain, despite a normal appearing ultrasound.  CBC is pending today as well.      Followup: Return in about 1 week (around 4/10/2019) for Advised Imaging, please be seen sooner if needed.    -- I have discussed the patient's diagnosis, and my plan of treatment with the patient and/or family. Patient is aware to followup if symptoms do not improve.  Patient has been advised to be seen sooner or seek more immediate care if symptoms change or worsen.  Patient agrees with and understands the plan today.     See Patient Instructions        Kalee Valero PA-C    [facility] [place]

## 2019-04-04 ASSESSMENT — ASTHMA QUESTIONNAIRES: ACT_TOTALSCORE: 15

## 2019-04-04 NOTE — RESULT ENCOUNTER NOTE
Malathi Rocha ,    The results from your recent lab work are within normal limits.    -Normal red blood cell (hgb) levels, normal white blood cell count and normal platelet levels.      Thank you for choosing [facility] for your health care needs,      Kalee Valero PA-C

## 2019-04-08 ENCOUNTER — TELEPHONE (OUTPATIENT)
Dept: FAMILY MEDICINE | Facility: CLINIC | Age: 44
End: 2019-04-08

## 2019-04-08 ENCOUNTER — OFFICE VISIT (OUTPATIENT)
Dept: FAMILY MEDICINE | Facility: CLINIC | Age: 44
End: 2019-04-08
Payer: COMMERCIAL

## 2019-04-08 ENCOUNTER — HOSPITAL ENCOUNTER (OUTPATIENT)
Dept: CT IMAGING | Facility: CLINIC | Age: 44
Discharge: HOME | End: 2019-04-08
Attending: PHYSICIAN ASSISTANT | Admitting: PHYSICIAN ASSISTANT
Payer: COMMERCIAL

## 2019-04-08 VITALS
BODY MASS INDEX: 41.14 KG/M2 | DIASTOLIC BLOOD PRESSURE: 70 MMHG | HEART RATE: 105 BPM | WEIGHT: 256 LBS | OXYGEN SATURATION: 100 % | TEMPERATURE: 98.4 F | SYSTOLIC BLOOD PRESSURE: 118 MMHG | HEIGHT: 66 IN

## 2019-04-08 DIAGNOSIS — D84.9 IMMUNOSUPPRESSION (H): ICD-10-CM

## 2019-04-08 DIAGNOSIS — R10.32 LLQ ABDOMINAL PAIN: Primary | ICD-10-CM

## 2019-04-08 DIAGNOSIS — R10.32 LLQ ABDOMINAL PAIN: ICD-10-CM

## 2019-04-08 DIAGNOSIS — M79.605 PAIN OF LEFT LOWER EXTREMITY: ICD-10-CM

## 2019-04-08 DIAGNOSIS — E66.01 MORBID OBESITY (H): ICD-10-CM

## 2019-04-08 DIAGNOSIS — G35 MS (MULTIPLE SCLEROSIS) (H): ICD-10-CM

## 2019-04-08 DIAGNOSIS — F32.0 MILD MAJOR DEPRESSION (H): ICD-10-CM

## 2019-04-08 LAB
ALBUMIN UR-MCNC: NEGATIVE MG/DL
APPEARANCE UR: CLEAR
BASOPHILS # BLD AUTO: 0 10E9/L (ref 0–0.2)
BASOPHILS NFR BLD AUTO: 0.1 %
BILIRUB UR QL STRIP: NEGATIVE
COLOR UR AUTO: YELLOW
CRP SERPL-MCNC: <2.9 MG/L (ref 0–8)
DIFFERENTIAL METHOD BLD: ABNORMAL
EOSINOPHIL # BLD AUTO: 0.1 10E9/L (ref 0–0.7)
EOSINOPHIL NFR BLD AUTO: 1.8 %
ERYTHROCYTE [DISTWIDTH] IN BLOOD BY AUTOMATED COUNT: 13 % (ref 10–15)
ERYTHROCYTE [SEDIMENTATION RATE] IN BLOOD BY WESTERGREN METHOD: 40 MM/H (ref 0–20)
GLUCOSE UR STRIP-MCNC: NEGATIVE MG/DL
HCT VFR BLD AUTO: 37.4 % (ref 35–47)
HGB BLD-MCNC: 12.1 G/DL (ref 11.7–15.7)
HGB UR QL STRIP: NEGATIVE
KETONES UR STRIP-MCNC: NEGATIVE MG/DL
LEUKOCYTE ESTERASE UR QL STRIP: NEGATIVE
LYMPHOCYTES # BLD AUTO: 3 10E9/L (ref 0.8–5.3)
LYMPHOCYTES NFR BLD AUTO: 44.5 %
MCH RBC QN AUTO: 30.1 PG (ref 26.5–33)
MCHC RBC AUTO-ENTMCNC: 32.4 G/DL (ref 31.5–36.5)
MCV RBC AUTO: 93 FL (ref 78–100)
MONOCYTES # BLD AUTO: 0.5 10E9/L (ref 0–1.3)
MONOCYTES NFR BLD AUTO: 7.8 %
NEUTROPHILS # BLD AUTO: 3.1 10E9/L (ref 1.6–8.3)
NEUTROPHILS NFR BLD AUTO: 45.8 %
NITRATE UR QL: NEGATIVE
PH UR STRIP: 5.5 PH (ref 5–7)
PLATELET # BLD AUTO: 453 10E9/L (ref 150–450)
RBC # BLD AUTO: 4.02 10E12/L (ref 3.8–5.2)
SOURCE: NORMAL
SP GR UR STRIP: 1.01 (ref 1–1.03)
UROBILINOGEN UR STRIP-ACNC: 0.2 EU/DL (ref 0.2–1)
WBC # BLD AUTO: 6.8 10E9/L (ref 4–11)

## 2019-04-08 PROCEDURE — 99215 OFFICE O/P EST HI 40 MIN: CPT | Performed by: PHYSICIAN ASSISTANT

## 2019-04-08 PROCEDURE — 85652 RBC SED RATE AUTOMATED: CPT | Performed by: PHYSICIAN ASSISTANT

## 2019-04-08 PROCEDURE — 25000128 H RX IP 250 OP 636: Performed by: PHYSICIAN ASSISTANT

## 2019-04-08 PROCEDURE — 81003 URINALYSIS AUTO W/O SCOPE: CPT | Performed by: PHYSICIAN ASSISTANT

## 2019-04-08 PROCEDURE — 86140 C-REACTIVE PROTEIN: CPT | Performed by: PHYSICIAN ASSISTANT

## 2019-04-08 PROCEDURE — 36415 COLL VENOUS BLD VENIPUNCTURE: CPT | Performed by: PHYSICIAN ASSISTANT

## 2019-04-08 PROCEDURE — 85025 COMPLETE CBC W/AUTO DIFF WBC: CPT | Performed by: PHYSICIAN ASSISTANT

## 2019-04-08 PROCEDURE — 74177 CT ABD & PELVIS W/CONTRAST: CPT

## 2019-04-08 PROCEDURE — 80053 COMPREHEN METABOLIC PANEL: CPT | Performed by: PHYSICIAN ASSISTANT

## 2019-04-08 RX ORDER — GABAPENTIN 300 MG/1
900 CAPSULE ORAL AT BEDTIME
Qty: 90 CAPSULE | Refills: 3 | Status: SHIPPED | OUTPATIENT
Start: 2019-04-08 | End: 2020-06-15

## 2019-04-08 RX ORDER — HYDROCODONE BITARTRATE AND ACETAMINOPHEN 5; 325 MG/1; MG/1
1 TABLET ORAL EVERY 6 HOURS PRN
Qty: 20 TABLET | Refills: 0 | Status: SHIPPED | OUTPATIENT
Start: 2019-04-08 | End: 2019-06-12

## 2019-04-08 RX ORDER — BACLOFEN 20 MG/1
20 TABLET ORAL AT BEDTIME
Refills: 10
Start: 2019-04-08 | End: 2019-07-31

## 2019-04-08 RX ORDER — METRONIDAZOLE 500 MG/1
500 TABLET ORAL 3 TIMES DAILY
Qty: 30 TABLET | Refills: 0 | Status: SHIPPED | OUTPATIENT
Start: 2019-04-08 | End: 2019-06-12

## 2019-04-08 RX ORDER — CIPROFLOXACIN 500 MG/1
500 TABLET, FILM COATED ORAL 2 TIMES DAILY
Qty: 20 TABLET | Refills: 0 | Status: SHIPPED | OUTPATIENT
Start: 2019-04-08 | End: 2019-06-12

## 2019-04-08 RX ORDER — IOPAMIDOL 755 MG/ML
500 INJECTION, SOLUTION INTRAVASCULAR ONCE
Status: COMPLETED | OUTPATIENT
Start: 2019-04-08 | End: 2019-04-08

## 2019-04-08 RX ADMIN — SODIUM CHLORIDE 65 ML: 9 INJECTION, SOLUTION INTRAVENOUS at 14:46

## 2019-04-08 RX ADMIN — IOPAMIDOL 100 ML: 755 INJECTION, SOLUTION INTRAVENOUS at 14:38

## 2019-04-08 ASSESSMENT — MIFFLIN-ST. JEOR: SCORE: 1835.02

## 2019-04-08 NOTE — TELEPHONE ENCOUNTER
The patient indicates understanding of these issues and agrees with the plan. Scheduled with Miya for this Thursday.  Also called and postponed infusion per below.     Letter written and put at . Also wrote result letter letter for pt.     Both letters at .    Addie Teran RN  [facility]

## 2019-04-08 NOTE — PROGRESS NOTES
SUBJECTIVE:   Josefina Aldrich is a 43 year old female who presents to clinic today for the following health issues:    Musculoskeletal problem/pain      Duration: 3-4 days     Description  Location: Left Leg to under the left ribs.     Intensity:  9/10    Accompanying signs and symptoms: numbness and tingling- in the left foot and left hand, swelling in the left leg. Sleeping is becoming difficult. Body aches, fatigue     History  Previous similar problem: YES- episodes of MS   Previous evaluation: pelvic ultrasound for the abdominal pain. Was seen in office 4/3/2019    Precipitating or alleviating factors:  Trauma or overuse: no   Aggravating factors include: walking and turning     Therapies tried and outcome: gabapentin and baclofen, detox water.     Patient presents to clinic with left leg pain that worsened 3-4 days ago, but has been present for months. Pain also seems to radiate to lower back area and worsened over the weekend. Pain keeps her up at night and she has not had a good rest since Thursday. She reports pain also makes it difficult for her to walk and almost fell the other day. She has been taking 20 mg baclofen at night and 300 mg gabapentin BID for pain. She only takes half tablet baclofen during the day since this medication makes her fatigued. She will take a full tablet during the day if she is at home. Has not done EMG testing of lower extremities.  Has not had any recent L-spine imaging.     Abdominal pain  Patient also presents with persistent left lower abdominal pain. She reports pain moves from left to right and can radiate to her lower back. Pain keeps her up at night. She reports she has been feeling very lethargic, which could be due to both leg and abdominal pain. BM are slightly painful and stools are typically soft. She also states she feels a tugging when she urinates. She has had multiple US pelvic done due to concern of ovarian etiology. US results on 3/27 showed small  "follicles, but were within normal limits. She recalls during last US, when the probe was moved to one side, she had such severe pain where she was \"climbing up the wall.\" She has not done CT yet, despite one being ordered during her last OV on 04/03/2019. She reports she never received a call to schedule for one and was not aware of this study being ordered.  She had colonoscopy done in the past (~7 years ago) and was told she had diverticulosis at that time. Mother has hx of twisted bowels. Denies urinary burning and vaginal bleeding.  She still has gall bladder and appendix. Denies blood in stool. Denies recent travel.     MS  Patient has hx of MS that was diagnosed in 2015. She recently transitioned to Dr. Chong at [facility].  She is scheduled to start Tysabri infusions on Wednesday (04/10/2019).   She has previously been on Copaxone.    Problem list and histories reviewed & adjusted, as indicated.  Additional history: as documented    Patient Active Problem List   Diagnosis     Migraine     Asthma     MS (multiple sclerosis) (H)     Anemia     Backache     Body mass index 45.0-49.9, adult (H)     Cognitive changes     Depression with anxiety     Fever postop     Uterine leiomyoma     Headache     Heavy menses     Hypersomnia with sleep apnea     Iron deficiency anemia     Leukocytosis     Postsurgical nonabsorption     Mild intermittent asthma     Morbid obesity (H)     Myalgia and myositis     Neck pain     Other dyspnea and respiratory abnormality     Pain, neuropathic     Pelvic pain in female     Personal history of allergy to latex     Post concussion syndrome     Right shoulder pain     S/P gastric bypass     S/P hysterectomy     Bariatric surgery status     Vitamin B12 deficiency     Vitamin D deficiency     Vomiting following gastrointestinal surgery     Hypermagnesemia     Benign essential hypertension     Neck pain on left side     Mild major depression (H)     LLQ pain     " "Multiple sclerosis (H)     Immunosuppression (H)     Past Surgical History:   Procedure Laterality Date     GASTRIC BYPASS  2002    \"Trouble with B12 and D\"     HYSTERECTOMY, MONO  2013    cervix gone.  fibroids.  No BSO     TONSILLECTOMY         Social History     Tobacco Use     Smoking status: Former Smoker     Types: Cigars     Smokeless tobacco: Never Used   Substance Use Topics     Alcohol use: Yes     Alcohol/week: 1.2 oz     Types: 2 Standard drinks or equivalent per week     Comment: 0-3 drinks per week     Family History   Problem Relation Age of Onset     Lupus Paternal Aunt 41     Multiple Sclerosis No family hx of          Current Outpatient Medications   Medication Sig Dispense Refill     albuterol (2.5 MG/3ML) 0.083% nebulizer solution Inhale 2.5 mg into the lungs       albuterol (VENTOLIN HFA) 108 (90 BASE) MCG/ACT inhaler Inhale 2 puffs into the lungs       baclofen (LIORESAL) 20 MG tablet Take 1 tablet (20 mg) by mouth At Bedtime  10     Botulinum Toxin Type A (BOTOX) 200 units injection Inject 200 Units into the muscle every 3 months 1 each 11     cholecalciferol (VITAMIN D3) 5000 UNITS TABS tablet Take 1 tablet (5,000 Units) by mouth daily       ciprofloxacin (CIPRO) 500 MG tablet Take 1 tablet (500 mg) by mouth 2 times daily for 10 days 20 tablet 0     cyanocolbalamin (VITAMIN  B-12) 100 MCG tablet Take 100 mcg by mouth daily       Ferrous Sulfate (IRON SUPPLEMENT PO)        gabapentin (NEURONTIN) 300 MG capsule Take 3 capsules (900 mg) by mouth At Bedtime 90 capsule 3     Glatiramer Acetate (COPAXONE) 40 MG/ML SOSY Inject 40 mg Subcutaneous three times a week 12 Syringe 11     HYDROcodone-acetaminophen (NORCO) 5-325 MG tablet Take 1 tablet by mouth every 6 hours as needed for pain 20 tablet 0     lisinopril-hydrochlorothiazide (PRINZIDE/ZESTORETIC) 10-12.5 MG tablet TAKE 1 TABLET BY MOUTH DAILY 90 tablet 2     metroNIDAZOLE (FLAGYL) 500 MG tablet Take 1 tablet (500 mg) by mouth 3 times daily for " 10 days 30 tablet 0     natalizumab (TYSABRI) 300 MG/15ML injection Infusion       nortriptyline (PAMELOR) 50 MG capsule Take 1 capsule (50 mg) by mouth At Bedtime 90 capsule 3     prochlorperazine (COMPAZINE) 10 MG tablet Take 1 tablet (10 mg) by mouth every 6 hours as needed for nausea or vomiting 20 tablet 3     SUMAtriptan (IMITREX) 100 MG tablet Take 50 mg up to twice daily as needed for headache; separate doses by at least one hour and do not use more than 3 days/week 18 tablet 3     topiramate (TOPAMAX) 100 MG tablet Take 1 tablet (100 mg) by mouth At Bedtime (take with 50 mg to total 150 mg nightly) 60 tablet 3     topiramate (TOPAMAX) 50 MG tablet Take 1 tablet (50 mg) by mouth At Bedtime (take with 100 mg to total 150 mg nightly) 60 tablet 3     triamcinolone (KENALOG) 0.1 % cream Apply sparingly to affected area three times daily 30 g 1     valACYclovir (VALTREX) 500 MG tablet TAKE 1 TABLET BY MOUTH DAILY 90 tablet 0     Allergies   Allergen Reactions     Aspirin Difficulty breathing, Palpitations and Other (See Comments)     Adhesive Tape      Azithromycin Unknown     Latex Hives, Itching and Rash     Penicillins Cramps, GI Disturbance, Nausea and Vomiting, Rash, Swelling, Other (See Comments) and Hives       Reviewed and updated as needed this visit by clinical staff  Tobacco  Allergies  Med Hx  Surg Hx  Fam Hx  Soc Hx      Reviewed and updated as needed this visit by Provider  Tobacco  Allergies  Meds  Problems  Med Hx  Surg Hx  Fam Hx         ROS:  Constitutional, HEENT, cardiovascular, pulmonary, GI, , musculoskeletal, neuro, skin, endocrine and psych systems are negative, except as otherwise noted.    This document serves as a record of the services and decisions personally performed and made by Miya Crump PA-C. It was created on her behalf by Leonie Bond, a trained medical scribe. The creation of this document is based on the provider's statements to the medical scribe.  Leonie Bond 12:02 AM  "April 8, 2019  OBJECTIVE:   /70 (BP Location: Right arm, Cuff Size: Adult Large)   Pulse 105   Temp 98.4  F (36.9  C) (Oral)   Ht 1.664 m (5' 5.5\")   Wt 116.1 kg (256 lb)   SpO2 100%   BMI 41.95 kg/m   Body mass index is 41.95 kg/m .      GENERAL: healthy, alert and no distress  EYES: Eyes grossly normal to inspection, PERRL and conjunctivae and sclerae normal  HENT: ear canals and TM's normal and nose and mouth without ulcers or lesions  NECK: no adenopathy  RESP: lungs clear to auscultation - no rales, rhonchi or wheezes  CV: regular rate and rhythm, normal S1 S2, no S3 or S4, no murmur, click or rub, no peripheral edema and peripheral pulses strong  ABDOMEN: LLQ significant tenderness with guarding, no suprapubic, RUQ and RLQ nontender, soft, no hepatosplenomegaly, no masses and bowel sounds normal  MS: no gross musculoskeletal defects noted, no edema  SKIN: no suspicious lesions or rashes  NEURO: Normal strength and tone, mentation intact and speech normal  PSYCH: mentation appears normal, affect normal/bright    Diagnostic Test Results:  Results for orders placed or performed in visit on 04/08/19 (from the past 24 hour(s))   CBC with platelets and differential   Result Value Ref Range    WBC 6.8 4.0 - 11.0 10e9/L    RBC Count 4.02 3.8 - 5.2 10e12/L    Hemoglobin 12.1 11.7 - 15.7 g/dL    Hematocrit 37.4 35.0 - 47.0 %    MCV 93 78 - 100 fl    MCH 30.1 26.5 - 33.0 pg    MCHC 32.4 31.5 - 36.5 g/dL    RDW 13.0 10.0 - 15.0 %    Platelet Count 453 (H) 150 - 450 10e9/L    % Neutrophils 45.8 %    % Lymphocytes 44.5 %    % Monocytes 7.8 %    % Eosinophils 1.8 %    % Basophils 0.1 %    Absolute Neutrophil 3.1 1.6 - 8.3 10e9/L    Absolute Lymphocytes 3.0 0.8 - 5.3 10e9/L    Absolute Monocytes 0.5 0.0 - 1.3 10e9/L    Absolute Eosinophils 0.1 0.0 - 0.7 10e9/L    Absolute Basophils 0.0 0.0 - 0.2 10e9/L    Diff Method Automated Method    ESR: Erythrocyte sedimentation rate   Result Value Ref Range    Sed Rate 40 " (H) 0 - 20 mm/h   CRP, inflammation   Result Value Ref Range    CRP Inflammation <2.9 0.0 - 8.0 mg/L   *UA reflex to Microscopic and Culture ([place] and [facility] (except [place] and [place])   Result Value Ref Range    Color Urine Yellow     Appearance Urine Clear     Glucose Urine Negative NEG^Negative mg/dL    Bilirubin Urine Negative NEG^Negative    Ketones Urine Negative NEG^Negative mg/dL    Specific Gravity Urine 1.015 1.003 - 1.035    Blood Urine Negative NEG^Negative    pH Urine 5.5 5.0 - 7.0 pH    Protein Albumin Urine Negative NEG^Negative mg/dL    Urobilinogen Urine 0.2 0.2 - 1.0 EU/dL    Nitrite Urine Negative NEG^Negative    Leukocyte Esterase Urine Negative NEG^Negative    Source Midstream Urine      Recent Results (from the past 744 hour(s))   MA Diagnostic Digital Bilateral    Narrative    US BREAST BILATERAL LIMITED 1-3 QUADRANTS,   MA DIAGNOSTIC DIGITAL BILATERAL -  3/26/2019    HISTORY:  Right axillary lump. Bilateral nipple tenderness and right  clear nipple discharge.    COMPARISON:  None.    BREAST DENSITY: Scattered fibroglandular densities.    FINDINGS:  Standard bilateral diagnostic views were obtained,  including tomosynthesis. There is an area of glandular asymmetry in  the upper left breast without associated calcifications. The pattern  of glandular tissue is otherwise symmetric and there is no other  mammographic abnormality.    Further evaluation was performed with targeted ultrasound. In the  upper left breast, corresponding to the area of glandular asymmetry  seen on mammogram, there is benign glandular tissue without suspicious  mass lesion. In the area of the right axillary lump felt by the  patient, only normal soft tissue is seen. Finally, in the retroareolar  region of the right breast, there is no ultrasound abnormality to  explain clear nipple discharge.      Impression    IMPRESSION: BI-RADS CATEGORY: 1 -  Negative  No evidence of malignancy. Results were discussed  with the patient  during her appointment. As long as her physical examination remains  stable, annual screening mammography would be recommended.    RECOMMENDED FOLLOW-UP: Annual Mammography.    CAROLINA JACQUES MD   US Breast Bilateral Limited 1-3 Quadrants    Narrative    US BREAST BILATERAL LIMITED 1-3 QUADRANTS,   MA DIAGNOSTIC DIGITAL BILATERAL -  3/26/2019    HISTORY:  Right axillary lump. Bilateral nipple tenderness and right  clear nipple discharge.    COMPARISON:  None.    BREAST DENSITY: Scattered fibroglandular densities.    FINDINGS:  Standard bilateral diagnostic views were obtained,  including tomosynthesis. There is an area of glandular asymmetry in  the upper left breast without associated calcifications. The pattern  of glandular tissue is otherwise symmetric and there is no other  mammographic abnormality.    Further evaluation was performed with targeted ultrasound. In the  upper left breast, corresponding to the area of glandular asymmetry  seen on mammogram, there is benign glandular tissue without suspicious  mass lesion. In the area of the right axillary lump felt by the  patient, only normal soft tissue is seen. Finally, in the retroareolar  region of the right breast, there is no ultrasound abnormality to  explain clear nipple discharge.      Impression    IMPRESSION: BI-RADS CATEGORY: 1 -  Negative  No evidence of malignancy. Results were discussed with the patient  during her appointment. As long as her physical examination remains  stable, annual screening mammography would be recommended.    RECOMMENDED FOLLOW-UP: Annual Mammography.    CAROLINA JACQUES MD   US Pelvic Complete w Transvaginal    Narrative    ULTRASOUND PELVIC COMPLETE WITH TRANSVAGINAL IMAGING  3/27/2019 6:00  PM     INDICATION: Pelvic pain in female.    COMPARISON: 11/20/2018.    FINDINGS: Transabdominal and transvaginal imaging were performed.  Uterus is absent. Both ovaries are visualized and within normal limits  containing  small follicles. No suspicious ovarian or adnexal masses.  No free fluid.      Impression    IMPRESSION:  1. No acute findings.  2. Hysterectomy.    MARTHA STEWART MD   CT Abdomen Pelvis w Contrast    Narrative    CT ABDOMEN AND PELVIS WITH CONTRAST   4/8/2019 2:49 PM     HISTORY: Abdominal pain, diverticulitis suspected. Left lower quadrant  pain, diverticulitis suspected.      TECHNIQUE: CT abdomen and pelvis with 100mL Isovue-370 IV. Radiation  dose for this scan was reduced using automated exposure control,  adjustment of the mA and/or kV according to patient size, or iterative  reconstruction technique.     COMPARISON: 2/23/2018    FINDINGS:  Lung bases: Lung bases are clear. No pleural effusion or pericardial  effusion.    Abdomen: Postoperative changes of gastric bypass. The spleen, kidneys,  adrenal glands, liver, gallbladder and pancreas show no focal  abnormality. No intrahepatic or extrahepatic biliary dilatation. No  intraperitoneal free air or free fluid. Hepatic and portal veins are  patent.    Small and large bowel are normal in caliber without evidence of  obstruction. There are multiple loops of small bowel in the pelvis  limiting evaluation of the ovaries. Bladder is decompressed. The  appendix is normal. No abdominal aortic aneurysm. No abdominal or  pelvic lymphadenopathy.    Bones: No suspicious bony lesions.      Impression    IMPRESSION:   1. No acute findings in the abdomen or pelvis.  2. Changes of gastric bypass.    Findings were discussed with Miya Crump PA-C on 4/8/2019 at 3:50 PM.      ASHLEY NICOLE DO       ASSESSMENT/PLAN:   Josefina was seen today for fatigue.    Diagnoses and all orders for this visit:    LLQ abdominal pain  STAT CT performed today, unremarkable.  Due to significant symptoms on exam will empirically treat due to suspected subclinical diverticulitis. Pt is immunocompromised as well and may not mount a typical WBC.  Educated patient about diverticulitis. Start  1 tablet Norco every 6 hours as needed for pain (advised this is a short term RX for SEVERE pain). Start Cipro & Flagyl per instructions.  [place] diet encouraged.  F/U with me Thursday for recheck.    -     CT Abdomen Pelvis w Contrast; Future  -     CBC with platelets and differential  -     ESR: Erythrocyte sedimentation rate  -     CRP, inflammation  -     Comprehensive metabolic panel  -     *UA reflex to Microscopic and Culture ([place] and [facility] (except [place] and [place])  -     HYDROcodone-acetaminophen (NORCO) 5-325 MG tablet; Take 1 tablet by mouth every 6 hours as needed for pain  -     ciprofloxacin (CIPRO) 500 MG tablet; Take 1 tablet (500 mg) by mouth 2 times daily for 10 days  -     metroNIDAZOLE (FLAGYL) 500 MG tablet; Take 1 tablet (500 mg) by mouth 3 times daily for 10 days    Pain of left lower extremity  Unclear etiology.  Pain could be due to MS or possible herniated disk. Increase gabapentin to TID. Continue 20 mg baclofen at bedtime. Referred for MRI lumbar spine. She will schedule.   -     gabapentin (NEURONTIN) 300 MG capsule; Take 3 capsules (900 mg) by mouth At Bedtime  -     baclofen (LIORESAL) 20 MG tablet; Take 1 tablet (20 mg) by mouth At Bedtime  -     MR Lumbar Spine w Contrast; Future    MS (multiple sclerosis) (H)  Hold first Tysabri infusion until current abdominal pain episode resolved.  -     natalizumab (TYSABRI) 300 MG/15ML injection; Infusion    Return in about 5 days (around 4/13/2019) for recheck.    The information in this document, created by the medical scribe for me, accurately reflects the services I personally performed and the decisions made by me. I have reviewed and approved this document for accuracy prior to leaving the patient care area.  April 8, 2019 12:32 PM    Miya Crump PA-C  [facility] [place]

## 2019-04-08 NOTE — LETTER
[facility] [place] [address]  [place] [address]  Phone: [phone]    04/08/19    Josefina VYAS Venkata [place]  [address]   [place] [facility] [address]      Josefina:     Please call patient and advise that CT scan does NOT show diverticulitis, however, due to her symptoms I am going to treat her empirically.  Cipro/flagyl sent to pharmacy.  Will get one dose in of each today.    Encourage bland diet.     I'd like to see her on Thursday OR Saturday for f/u.  I'll keep her off of work until that time.  Write letter for work if needed please.     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    Sincerely,      Miya Crump PA-C

## 2019-04-08 NOTE — LETTER
[facility]  [address] [place] [id number]                                                                                                       [phone]    April 8, 2019    Josefina Lipscombs  [address] [place] [address]   [facility] [address]      To Whom it May Concern:    The above patient is unable to attend work from 4/8/19 to 4/11/19 due to a medical issue. Please contact me with questions or concerns.      Sincerely,    Miya Crump PA-C

## 2019-04-08 NOTE — RESULT ENCOUNTER NOTE
The patient was called with results.    Miya Crump, MS, PA-C  [facility]

## 2019-04-08 NOTE — TELEPHONE ENCOUNTER
Please call patient and advise that CT scan does NOT show diverticulitis, however, due to her symptoms I am going to treat her empirically.  Cipro/flagyl sent to pharmacy.  Will get one dose in of each today.    Encourage bland diet.    I'd like to see her on Thursday OR Saturday for f/u.  I'll keep her off of work until that time.  Write letter for work if needed please.    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      Miya Crump MS, PA-C

## 2019-04-09 LAB
ALBUMIN SERPL-MCNC: 3.8 G/DL (ref 3.4–5)
ALP SERPL-CCNC: 62 U/L (ref 40–150)
ALT SERPL W P-5'-P-CCNC: 22 U/L (ref 0–50)
ANION GAP SERPL CALCULATED.3IONS-SCNC: 9 MMOL/L (ref 3–14)
AST SERPL W P-5'-P-CCNC: 22 U/L (ref 0–45)
BILIRUB SERPL-MCNC: 0.2 MG/DL (ref 0.2–1.3)
BUN SERPL-MCNC: 16 MG/DL (ref 7–30)
CALCIUM SERPL-MCNC: 8.8 MG/DL (ref 8.5–10.1)
CHLORIDE SERPL-SCNC: 107 MMOL/L (ref 94–109)
CO2 SERPL-SCNC: 21 MMOL/L (ref 20–32)
CREAT SERPL-MCNC: 1.12 MG/DL (ref 0.52–1.04)
GFR SERPL CREATININE-BSD FRML MDRD: 61 ML/MIN/{1.73_M2}
GLUCOSE SERPL-MCNC: 94 MG/DL (ref 70–99)
POTASSIUM SERPL-SCNC: 3.7 MMOL/L (ref 3.4–5.3)
PROT SERPL-MCNC: 8 G/DL (ref 6.8–8.8)
SODIUM SERPL-SCNC: 137 MMOL/L (ref 133–144)

## 2019-04-09 NOTE — RESULT ENCOUNTER NOTE
Josefina  Here are your recent results.  Overall, your labs are quite normal.  Your kidney function is mildly decreased which is likely from dehydration/not eating.  Your white count does not indicate a significant infection.  A very non specific inflammatory marker (ESR) is elevated which is indicative of an inflammatory process.  We will continue your current plan and follow up Thursday as scheduled.  If you have any questions please do not hesitate to contact our office via phone ([phone]) or MyChart.    Miya Crump, MS, PA-C  [facility]

## 2019-04-11 ENCOUNTER — OFFICE VISIT (OUTPATIENT)
Dept: FAMILY MEDICINE | Facility: CLINIC | Age: 44
End: 2019-04-11
Payer: COMMERCIAL

## 2019-04-11 ENCOUNTER — OFFICE VISIT (OUTPATIENT)
Dept: SURGERY | Facility: CLINIC | Age: 44
End: 2019-04-11
Payer: COMMERCIAL

## 2019-04-11 ENCOUNTER — TELEPHONE (OUTPATIENT)
Dept: FAMILY MEDICINE | Facility: CLINIC | Age: 44
End: 2019-04-11

## 2019-04-11 VITALS
DIASTOLIC BLOOD PRESSURE: 66 MMHG | OXYGEN SATURATION: 100 % | RESPIRATION RATE: 16 BRPM | HEIGHT: 66 IN | BODY MASS INDEX: 41.95 KG/M2 | SYSTOLIC BLOOD PRESSURE: 104 MMHG | HEART RATE: 78 BPM | WEIGHT: 261 LBS

## 2019-04-11 VITALS
DIASTOLIC BLOOD PRESSURE: 62 MMHG | WEIGHT: 261 LBS | SYSTOLIC BLOOD PRESSURE: 98 MMHG | BODY MASS INDEX: 41.95 KG/M2 | HEART RATE: 104 BPM | HEIGHT: 66 IN | OXYGEN SATURATION: 100 % | TEMPERATURE: 97.9 F

## 2019-04-11 DIAGNOSIS — R10.32 ABDOMINAL PAIN, LEFT LOWER QUADRANT: Primary | ICD-10-CM

## 2019-04-11 DIAGNOSIS — R94.4 ABNORMAL RENAL FUNCTION TEST: ICD-10-CM

## 2019-04-11 DIAGNOSIS — K59.00 CONSTIPATION, UNSPECIFIED CONSTIPATION TYPE: Primary | ICD-10-CM

## 2019-04-11 DIAGNOSIS — M79.605 PAIN OF LEFT LOWER EXTREMITY: ICD-10-CM

## 2019-04-11 DIAGNOSIS — R10.32 LLQ ABDOMINAL PAIN: ICD-10-CM

## 2019-04-11 DIAGNOSIS — R39.89 ABNORMAL URINE COLOR: ICD-10-CM

## 2019-04-11 DIAGNOSIS — R10.32 LLQ ABDOMINAL PAIN: Primary | ICD-10-CM

## 2019-04-11 LAB
ALBUMIN UR-MCNC: NEGATIVE MG/DL
APPEARANCE UR: CLEAR
BACTERIA #/AREA URNS HPF: ABNORMAL /HPF
BASOPHILS # BLD AUTO: 0 10E9/L (ref 0–0.2)
BASOPHILS NFR BLD AUTO: 0.3 %
BILIRUB UR QL STRIP: NEGATIVE
COLOR UR AUTO: YELLOW
DIFFERENTIAL METHOD BLD: NORMAL
EOSINOPHIL # BLD AUTO: 0.2 10E9/L (ref 0–0.7)
EOSINOPHIL NFR BLD AUTO: 2.4 %
ERYTHROCYTE [DISTWIDTH] IN BLOOD BY AUTOMATED COUNT: 13.1 % (ref 10–15)
ERYTHROCYTE [SEDIMENTATION RATE] IN BLOOD BY WESTERGREN METHOD: 37 MM/H (ref 0–20)
GLUCOSE UR STRIP-MCNC: NEGATIVE MG/DL
HCT VFR BLD AUTO: 36.9 % (ref 35–47)
HGB BLD-MCNC: 12.2 G/DL (ref 11.7–15.7)
HGB UR QL STRIP: NEGATIVE
KETONES UR STRIP-MCNC: NEGATIVE MG/DL
LEUKOCYTE ESTERASE UR QL STRIP: ABNORMAL
LYMPHOCYTES # BLD AUTO: 2.7 10E9/L (ref 0.8–5.3)
LYMPHOCYTES NFR BLD AUTO: 42.7 %
MCH RBC QN AUTO: 30.7 PG (ref 26.5–33)
MCHC RBC AUTO-ENTMCNC: 33.1 G/DL (ref 31.5–36.5)
MCV RBC AUTO: 93 FL (ref 78–100)
MONOCYTES # BLD AUTO: 0.5 10E9/L (ref 0–1.3)
MONOCYTES NFR BLD AUTO: 7.3 %
NEUTROPHILS # BLD AUTO: 3 10E9/L (ref 1.6–8.3)
NEUTROPHILS NFR BLD AUTO: 47.3 %
NITRATE UR QL: NEGATIVE
NON-SQ EPI CELLS #/AREA URNS LPF: ABNORMAL /LPF
PH UR STRIP: 5 PH (ref 5–7)
PLATELET # BLD AUTO: 430 10E9/L (ref 150–450)
RBC # BLD AUTO: 3.98 10E12/L (ref 3.8–5.2)
RBC #/AREA URNS AUTO: ABNORMAL /HPF
SOURCE: ABNORMAL
SP GR UR STRIP: 1.02 (ref 1–1.03)
UROBILINOGEN UR STRIP-ACNC: 0.2 EU/DL (ref 0.2–1)
WBC # BLD AUTO: 6.3 10E9/L (ref 4–11)
WBC #/AREA URNS AUTO: ABNORMAL /HPF

## 2019-04-11 PROCEDURE — 99214 OFFICE O/P EST MOD 30 MIN: CPT | Performed by: PHYSICIAN ASSISTANT

## 2019-04-11 PROCEDURE — 36415 COLL VENOUS BLD VENIPUNCTURE: CPT | Performed by: PHYSICIAN ASSISTANT

## 2019-04-11 PROCEDURE — 85025 COMPLETE CBC W/AUTO DIFF WBC: CPT | Performed by: PHYSICIAN ASSISTANT

## 2019-04-11 PROCEDURE — 81001 URINALYSIS AUTO W/SCOPE: CPT | Performed by: PHYSICIAN ASSISTANT

## 2019-04-11 PROCEDURE — 80053 COMPREHEN METABOLIC PANEL: CPT | Performed by: PHYSICIAN ASSISTANT

## 2019-04-11 PROCEDURE — 99244 OFF/OP CNSLTJ NEW/EST MOD 40: CPT | Performed by: SURGERY

## 2019-04-11 PROCEDURE — 87086 URINE CULTURE/COLONY COUNT: CPT | Performed by: PHYSICIAN ASSISTANT

## 2019-04-11 PROCEDURE — 85652 RBC SED RATE AUTOMATED: CPT | Performed by: PHYSICIAN ASSISTANT

## 2019-04-11 RX ORDER — ASPIRIN 81 MG
100 TABLET, DELAYED RELEASE (ENTERIC COATED) ORAL 2 TIMES DAILY
COMMUNITY
Start: 2019-04-11 | End: 2020-05-01

## 2019-04-11 ASSESSMENT — MIFFLIN-ST. JEOR
SCORE: 1857.7
SCORE: 1857.7

## 2019-04-11 NOTE — LETTER
[facility]  [address] [place] [id number] [phone]    April 11, 2019    Josefina Lipscombs  [address] [place] [address]  [facility] [address]      To Whom it May Concern:    The above patient is unable to attend work through 4/122019 due to a medical issue.   Please contact me with questions or concerns.      Sincerely,    Miya Crump PA-C

## 2019-04-11 NOTE — TELEPHONE ENCOUNTER
Letter written.  Surgery recommended OBGYN evaluation.  Does she have a provider?  If not, please place referral to [facility] in [place]    Please have her f/u with me on Monday - schedule 40 minute appt.      Miya Crump MS, PA-C
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
## 2019-04-11 NOTE — LETTER
"2019       Re: Josefina Hastings Aldrich - 1975    Assessment: Left lower quadrant abdominal pain of unclear etiology.  She reports that her symptoms are substantially better after having a bowel movement but that she feels they are more likely related to her ovary as symptoms seem to increase along with breast tenderness.  This would support the theory that ovulation/ovarian changes within a heavily scarred pelvis may be producing her symptoms.  Clonic problems are also a possibility such as irritable bowel/spastic colon or even diverticulosis    Plan:This point, I would recommend that she be seen by GYN to consider suppressing ovulation.  If this produces a favorable response, she would then need to consider options for perpetuating symptom relief in the future possibly including oophorectomy.  Would also consider repeat colonoscopy.  If symptoms are relieved with bowel prep and no colon or ovarian problems are identified, she may then be a candidate for medical treatment of irritable bowel.At this point, I can see no clear surgical indication and she is quite happy not to \"go under the knife\" again    Chief Complaint:  Abdominal pain, left lower quadrant     History is obtained from the patient and electronic health record  History of Present Illness:  This patient is a 43 year old female with a significant past medical history of chronic relapsing abdominal pain, migraine headaches, GYN surgeries including hysterectomy, no oophorectomy, gastric bypass, depression, MS, obesity, ISAAC, asthma who presents with the following condition: LLQ abdominal pain. Over the last 6 months she has had 2 pelvic US, 6 MRI scans. She has had 2 abdominal CT scans in the last year.  I cannot find record of any colonoscopy although a colonoscopy as referred to in the notes that reportedly showed diverticulosis.  There is a record of an EGD in the chart however.  She reports that she takes a magnesium based laxative " about once a week and when she has a bowel movement that produces substantial if not complete relief of her pelvic symptoms.     Review of Systems:  V: NEGATIVE for chest pain, palpitations or peripheral edema  C: NEGATIVE for fever, chills, change in weight  E/M: NEGATIVE for ear, mouth and throat problems  R: NEGATIVE for significant cough or SOB    Physical Exam:  All vitals have been reviewed  No data found.  [unfilled]  Neck: skin normal and no stridor     Chest / Breast:Nl resp effort     Abdomen: scars noted hypogastric vertical, multiple laparoscopic scars from laparoscopic gastric bypass soft, rounded and obese but non-distended, tenderness noted in the left lower quadrant and suprapubic region, voluntary guarding absent, no masses palpated and hernia absent, there is some voluntary firmness in the left lower quadrant     Data:  All laboratory data reviewed        Results for orders placed or performed in visit on 04/11/19   *UA reflex to Microscopic and Culture ([place] and [facility] (except [place] and [place])   Result Value Ref Range     Color Urine Yellow       Appearance Urine Clear       Glucose Urine Negative NEG^Negative mg/dL     Bilirubin Urine Negative NEG^Negative     Ketones Urine Negative NEG^Negative mg/dL     Specific Gravity Urine 1.025 1.003 - 1.035     Blood Urine Negative NEG^Negative     pH Urine 5.0 5.0 - 7.0 pH     Protein Albumin Urine Negative NEG^Negative mg/dL     Urobilinogen Urine 0.2 0.2 - 1.0 EU/dL     Nitrite Urine Negative NEG^Negative     Leukocyte Esterase Urine Small (A) NEG^Negative     Source Midstream Urine     ESR: Erythrocyte sedimentation rate   Result Value Ref Range     Sed Rate 37 (H) 0 - 20 mm/h   CBC with platelets and differential   Result Value Ref Range     WBC 6.3 4.0 - 11.0 10e9/L     RBC Count 3.98 3.8 - 5.2 10e12/L     Hemoglobin 12.2 11.7 - 15.7 g/dL     Hematocrit 36.9 35.0 - 47.0 %     MCV 93 78 - 100 fl     MCH 30.7 26.5 - 33.0 pg     MCHC  33.1 31.5 - 36.5 g/dL     RDW 13.1 10.0 - 15.0 %     Platelet Count 430 150 - 450 10e9/L     % Neutrophils 47.3 %     % Lymphocytes 42.7 %     % Monocytes 7.3 %     % Eosinophils 2.4 %     % Basophils 0.3 %     Absolute Neutrophil 3.0 1.6 - 8.3 10e9/L     Absolute Lymphocytes 2.7 0.8 - 5.3 10e9/L     Absolute Monocytes 0.5 0.0 - 1.3 10e9/L     Absolute Eosinophils 0.2 0.0 - 0.7 10e9/L     Absolute Basophils 0.0 0.0 - 0.2 10e9/L     Diff Method Automated Method     Urine Microscopic   Result Value Ref Range     WBC Urine 0 - 5 OTO5^0 - 5 /HPF     RBC Urine O - 2 OTO2^O - 2 /HPF     Squamous Epithelial /LPF Urine Moderate (A) FEW^Few /LPF     Bacteria Urine Few (A) NEG^Negative /HPF       Attestation:  I have reviewed today's vital signs, notes, medications, labs and imaging.  Amount of time performed on this consult: 45 minutes.       Bill Finch MD

## 2019-04-11 NOTE — TELEPHONE ENCOUNTER
Returned call to patient.  Advised patient not all labs are final and some are still in process.  Patient still have symptoms and will need note for work tomorrow as she is not able to go back to work tomorrow.    Routing to provider for review and advise as appropriate.    TAMARA PuentesN, RN  [employer] [facility]

## 2019-04-11 NOTE — TELEPHONE ENCOUNTER
Reason for Call:  Request for results:    Name of test or procedure: labs from 04/11/19    Date of test of procedure: 4/11/19    Location of the test or procedure: [facility]     OK to leave the result message on voice mail or with a family member? YES    Phone number Patient can be reached at:  Cell number on file:    Telephone Information:   Mobile [phone]       Additional comments: Patient would also like a call back with information regarding when she can return to work. States Miya Crump PA-C was waiting for lab results before a work note can be written but pt needs to tell her supervisor as soon as possible.    Call taken on 4/11/2019 at 4:07 PM by Natasha HERRERA

## 2019-04-11 NOTE — PROGRESS NOTES
[facility] Consultation    Josefina Aldrich MRN# [id number]   Age: 43 year old YOB: 1975       Reason for consult: Abdominal pain, left lower quadrant       Requesting physician: Miya Crump PAC                   Assessment and Plan:   Assessment:   Left lower quadrant abdominal pain of unclear etiology.  She reports that her symptoms are substantially better after having a bowel movement but that she feels they are more likely related to her ovary as symptoms seem to increase along with breast tenderness.  This would support the theory that ovulation/ovarian changes within a heavily scarred pelvis may be producing her symptoms.    Clonic problems are also a possibility such as irritable bowel/spastic colon or even diverticulosis  Patient Active Problem List    Diagnosis Date Noted     Immunosuppression (H) 04/08/2019     Priority: Medium     Multiple sclerosis (H) 03/29/2019     Priority: Medium     Mild major depression (H) 07/25/2018     Priority: Medium     Neck pain on left side 06/13/2018     Priority: Medium     LLQ pain 04/25/2018     Priority: Medium     Benign essential hypertension 02/05/2018     Priority: Medium     Hypermagnesemia 01/10/2018     Priority: Medium     Migraine 09/16/2016     Priority: Medium     Asthma 09/16/2016     Priority: Medium     MS (multiple sclerosis) (H) 09/16/2016     Priority: Medium     Anemia 06/10/2015     Priority: Medium     Body mass index 45.0-49.9, adult (H) 04/27/2014     Priority: Medium     Postsurgical nonabsorption 04/27/2014     Priority: Medium     Morbid obesity (H) 04/27/2014     Priority: Medium     Vomiting following gastrointestinal surgery 04/11/2014     Priority: Medium     Vitamin B12 deficiency 04/01/2014     Priority: Medium     Right shoulder pain 01/24/2014     Priority: Medium     Fever postop 03/19/2013     Priority: Medium     Leukocytosis 03/19/2013     Priority: Medium     S/P hysterectomy 03/19/2013     Priority:  "Medium     Uterine leiomyoma 03/16/2013     Priority: Medium     Personal history of allergy to latex 03/13/2013     Priority: Medium     Post concussion syndrome 02/26/2013     Priority: Medium     Cognitive changes 11/19/2012     Priority: Medium     Neck pain 11/19/2012     Priority: Medium     Pain, neuropathic 10/24/2012     Priority: Medium     Heavy menses 11/25/2011     Priority: Medium     Pelvic pain in female 11/25/2011     Priority: Medium     Mild intermittent asthma 06/08/2011     Priority: Medium     Other dyspnea and respiratory abnormality 03/31/2010     Priority: Medium     S/P gastric bypass 03/30/2010     Priority: Medium     Myalgia and myositis 03/16/2010     Priority: Medium     Vitamin D deficiency 02/25/2010     Priority: Medium     Backache 02/24/2010     Priority: Medium     Depression with anxiety 02/24/2010     Priority: Medium     Headache 02/24/2010     Priority: Medium     Hypersomnia with sleep apnea 02/24/2010     Priority: Medium     Iron deficiency anemia 02/24/2010     Priority: Medium     Bariatric surgery status 01/01/2001     Priority: Medium           Plan:   This point, I would recommend that she be seen by GYN to consider suppressing ovulation.  If this produces a favorable response, she would then need to consider options for perpetuating symptom relief in the future possibly including oophorectomy.  Would also consider repeat colonoscopy.  If symptoms are relieved with bowel prep and no colon or ovarian problems are identified, she may then be a candidate for medical treatment of irritable bowel.  At this point, I can see no clear surgical indication and she is quite happy not to \"go under the knife\" again            Chief Complaint:   Abdominal pain, left lower quadrant     History is obtained from the patient and electronic health record         History of Present Illness:   This patient is a 43 year old female with a significant past medical history of chronic " "relapsing abdominal pain, migraine headaches, GYN surgeries including hysterectomy, no oophorectomy, gastric bypass, depression, MS, obesity, ISAAC, asthma who presents with the following condition: LLQ abdominal pain. Over the last 6 months she has had 2 pelvic US, 6 MRI scans. She has had 2 abdominal CT scans in the last year.  I cannot find record of any colonoscopy although a colonoscopy as referred to in the notes that reportedly showed diverticulosis.  There is a record of an EGD in the chart however.  She reports that she takes a magnesium based laxative about once a week and when she has a bowel movement that produces substantial if not complete relief of her pelvic symptoms.          Past Medical History:     Past Medical History:   Diagnosis Date     Asthma      Fibroids     s/p hysterectomy     H/O gastric bypass      Hypertension      Migraine     followed by Devika     Obstructive sleep apnea      Pre-diabetes      Relapsing remitting multiple sclerosis (H) 2015    lesion in [place].  Facial tingling initial symptom.  F/B [facility]             Past Surgical History:     Past Surgical History:   Procedure Laterality Date     GASTRIC BYPASS  2002    \"Trouble with B12 and D\"     HYSTERECTOMY, MONO  2013    cervix gone.  fibroids.  No BSO     TONSILLECTOMY               Social History:     Social History     Tobacco Use     Smoking status: Former Smoker     Types: Cigars     Smokeless tobacco: Never Used   Substance Use Topics     Alcohol use: Yes     Alcohol/week: 1.2 oz     Types: 2 Standard drinks or equivalent per week     Comment: 0-3 drinks per week             Family History:     Family History   Problem Relation Age of Onset     Lupus Paternal Aunt 41     Multiple Sclerosis No family hx of              Immunizations:     VACCINE/DOSE   Diptheria   DPT   DTAP   HBIG   Hepatitis A   Hepatitis B   HIB   Influenza   Measles   Meningococcal   MMR   Mumps   Pneumococcal   Polio   Rubella   Small Pox   TDAP "   Varicella   Zoster             Allergies:     Allergies   Allergen Reactions     Aspirin Difficulty breathing, Palpitations and Other (See Comments)     Adhesive Tape      Azithromycin Unknown     Latex Hives, Itching and Rash     Penicillins Cramps, GI Disturbance, Nausea and Vomiting, Rash, Swelling, Other (See Comments) and Hives             Medications:     Current Outpatient Medications   Medication Sig     albuterol (2.5 MG/3ML) 0.083% nebulizer solution Inhale 2.5 mg into the lungs     albuterol (VENTOLIN HFA) 108 (90 BASE) MCG/ACT inhaler Inhale 2 puffs into the lungs     baclofen (LIORESAL) 20 MG tablet Take 1 tablet (20 mg) by mouth At Bedtime     Botulinum Toxin Type A (BOTOX) 200 units injection Inject 200 Units into the muscle every 3 months     cholecalciferol (VITAMIN D3) 5000 UNITS TABS tablet Take 1 tablet (5,000 Units) by mouth daily     ciprofloxacin (CIPRO) 500 MG tablet Take 1 tablet (500 mg) by mouth 2 times daily for 10 days     cyanocolbalamin (VITAMIN  B-12) 100 MCG tablet Take 100 mcg by mouth daily     docusate sodium (COLACE) 100 MG tablet Take 1 tablet (100 mg) by mouth 2 times daily     Ferrous Sulfate (IRON SUPPLEMENT PO)      gabapentin (NEURONTIN) 300 MG capsule Take 3 capsules (900 mg) by mouth At Bedtime     Glatiramer Acetate (COPAXONE) 40 MG/ML SOSY Inject 40 mg Subcutaneous three times a week     HYDROcodone-acetaminophen (NORCO) 5-325 MG tablet Take 1 tablet by mouth every 6 hours as needed for pain     lisinopril-hydrochlorothiazide (PRINZIDE/ZESTORETIC) 10-12.5 MG tablet TAKE 1 TABLET BY MOUTH DAILY     metroNIDAZOLE (FLAGYL) 500 MG tablet Take 1 tablet (500 mg) by mouth 3 times daily for 10 days     natalizumab (TYSABRI) 300 MG/15ML injection Infusion     nortriptyline (PAMELOR) 50 MG capsule Take 1 capsule (50 mg) by mouth At Bedtime     prochlorperazine (COMPAZINE) 10 MG tablet Take 1 tablet (10 mg) by mouth every 6 hours as needed for nausea or vomiting     SUMAtriptan  (IMITREX) 100 MG tablet Take 50 mg up to twice daily as needed for headache; separate doses by at least one hour and do not use more than 3 days/week     topiramate (TOPAMAX) 100 MG tablet Take 1 tablet (100 mg) by mouth At Bedtime (take with 50 mg to total 150 mg nightly)     topiramate (TOPAMAX) 50 MG tablet Take 1 tablet (50 mg) by mouth At Bedtime (take with 100 mg to total 150 mg nightly)     triamcinolone (KENALOG) 0.1 % cream Apply sparingly to affected area three times daily     valACYclovir (VALTREX) 500 MG tablet TAKE 1 TABLET BY MOUTH DAILY     No current facility-administered medications for this visit.              Review of Systems:   CV: NEGATIVE for chest pain, palpitations or peripheral edema  C: NEGATIVE for fever, chills, change in weight  E/M: NEGATIVE for ear, mouth and throat problems  R: NEGATIVE for significant cough or SOB          Physical Exam:   All vitals have been reviewed  No data found.  [unfilled]  Neck:   skin normal and no stridor     Chest / Breast:   Nl resp effort     Abdomen:   scars noted hypogastric vertical, multiple laparoscopic scars from laparoscopic gastric bypass soft, rounded and obese but non-distended, tenderness noted in the left lower quadrant and suprapubic region, voluntary guarding absent, no masses palpated and hernia absent, there is some voluntary firmness in the left lower quadrant             Data:   All laboratory data reviewed  Results for orders placed or performed in visit on 04/11/19   *UA reflex to Microscopic and Culture ([place] and [facility] (except [place] and [place])   Result Value Ref Range    Color Urine Yellow     Appearance Urine Clear     Glucose Urine Negative NEG^Negative mg/dL    Bilirubin Urine Negative NEG^Negative    Ketones Urine Negative NEG^Negative mg/dL    Specific Gravity Urine 1.025 1.003 - 1.035    Blood Urine Negative NEG^Negative    pH Urine 5.0 5.0 - 7.0 pH    Protein Albumin Urine Negative NEG^Negative mg/dL     Urobilinogen Urine 0.2 0.2 - 1.0 EU/dL    Nitrite Urine Negative NEG^Negative    Leukocyte Esterase Urine Small (A) NEG^Negative    Source Midstream Urine    ESR: Erythrocyte sedimentation rate   Result Value Ref Range    Sed Rate 37 (H) 0 - 20 mm/h   CBC with platelets and differential   Result Value Ref Range    WBC 6.3 4.0 - 11.0 10e9/L    RBC Count 3.98 3.8 - 5.2 10e12/L    Hemoglobin 12.2 11.7 - 15.7 g/dL    Hematocrit 36.9 35.0 - 47.0 %    MCV 93 78 - 100 fl    MCH 30.7 26.5 - 33.0 pg    MCHC 33.1 31.5 - 36.5 g/dL    RDW 13.1 10.0 - 15.0 %    Platelet Count 430 150 - 450 10e9/L    % Neutrophils 47.3 %    % Lymphocytes 42.7 %    % Monocytes 7.3 %    % Eosinophils 2.4 %    % Basophils 0.3 %    Absolute Neutrophil 3.0 1.6 - 8.3 10e9/L    Absolute Lymphocytes 2.7 0.8 - 5.3 10e9/L    Absolute Monocytes 0.5 0.0 - 1.3 10e9/L    Absolute Eosinophils 0.2 0.0 - 0.7 10e9/L    Absolute Basophils 0.0 0.0 - 0.2 10e9/L    Diff Method Automated Method    Urine Microscopic   Result Value Ref Range    WBC Urine 0 - 5 OTO5^0 - 5 /HPF    RBC Urine O - 2 OTO2^O - 2 /HPF    Squamous Epithelial /LPF Urine Moderate (A) FEW^Few /LPF    Bacteria Urine Few (A) NEG^Negative /HPF          Attestation:  I have reviewed today's vital signs, notes, medications, labs and imaging.  Amount of time performed on this consult: 45 minutes.    Bill Finch MD     Please route or send letter to:  Primary Care Provider (PCP) and Include Progress Note

## 2019-04-12 LAB
ALBUMIN SERPL-MCNC: 3.7 G/DL (ref 3.4–5)
ALP SERPL-CCNC: 55 U/L (ref 40–150)
ALT SERPL W P-5'-P-CCNC: 19 U/L (ref 0–50)
ANION GAP SERPL CALCULATED.3IONS-SCNC: 6 MMOL/L (ref 3–14)
AST SERPL W P-5'-P-CCNC: 19 U/L (ref 0–45)
BACTERIA SPEC CULT: NO GROWTH
BILIRUB SERPL-MCNC: 0.2 MG/DL (ref 0.2–1.3)
BUN SERPL-MCNC: 15 MG/DL (ref 7–30)
CALCIUM SERPL-MCNC: 8.7 MG/DL (ref 8.5–10.1)
CHLORIDE SERPL-SCNC: 109 MMOL/L (ref 94–109)
CO2 SERPL-SCNC: 22 MMOL/L (ref 20–32)
CREAT SERPL-MCNC: 1.05 MG/DL (ref 0.52–1.04)
GFR SERPL CREATININE-BSD FRML MDRD: 66 ML/MIN/{1.73_M2}
GLUCOSE SERPL-MCNC: 85 MG/DL (ref 70–99)
POTASSIUM SERPL-SCNC: 3.7 MMOL/L (ref 3.4–5.3)
PROT SERPL-MCNC: 7.7 G/DL (ref 6.8–8.8)
SODIUM SERPL-SCNC: 137 MMOL/L (ref 133–144)
SPECIMEN SOURCE: NORMAL

## 2019-04-12 ASSESSMENT — ASTHMA QUESTIONNAIRES: ACT_TOTALSCORE: 17

## 2019-04-12 NOTE — TELEPHONE ENCOUNTER
Called patient @ [phone]    Advised of ALFONSO MESA message below -   Patient stated an understanding and agreed with plan.  Patient requesting to  letter and referral at  - left at .     Patient would like to know if she should still be taking Cipro, Flagyl as prescribed - should she keep taking these until she sees OBGYN?    Also, Leaving encounter open for documentation after patient picks up.     Routing to PCP for further review/recommendations/orders.    Susannah Selby RN  [facility]

## 2019-04-12 NOTE — TELEPHONE ENCOUNTER
Yes, I would like her to continue taking these, I will re-evaluate on Monday.    Her lab results show improvement in her previously VERY mildly decreased kidney function.  This is very reassuring.    She should continue to eat a bland diet, use her stool softner daily to achieve a daily soft BM, if this frequency leads to diarrhea she should decrease her use of this supplement until she achieves a daily soft BM.    Reschedule MRI lumbar spine (had to cancel d/t weather) to evaluate LLE pain.      Miya Crump MS, PA-C

## 2019-04-12 NOTE — TELEPHONE ENCOUNTER
The patient indicates understanding of these issues and agrees with the plan.  Pt already picked up the forms at the  but will keep encounter open for TC to review.  Addie Teran RN  [facility]

## 2019-04-24 ENCOUNTER — OFFICE VISIT (OUTPATIENT)
Dept: OBGYN | Facility: CLINIC | Age: 44
End: 2019-04-24
Payer: COMMERCIAL

## 2019-04-24 VITALS — WEIGHT: 262 LBS | BODY MASS INDEX: 42.94 KG/M2 | DIASTOLIC BLOOD PRESSURE: 70 MMHG | SYSTOLIC BLOOD PRESSURE: 118 MMHG

## 2019-04-24 DIAGNOSIS — R10.84 ABDOMINAL PAIN, GENERALIZED: Primary | ICD-10-CM

## 2019-04-24 PROCEDURE — 99203 OFFICE O/P NEW LOW 30 MIN: CPT | Performed by: OBSTETRICS & GYNECOLOGY

## 2019-04-24 NOTE — NURSING NOTE
"Chief Complaint   Patient presents with     Pelvic Pain     referred by Miya Crump  4/11/2019  lower left ovary side        Initial /70 (BP Location: Right arm, Patient Position: Chair, Cuff Size: Adult Large)   Wt 118.8 kg (262 lb)   Breastfeeding? No   BMI 42.94 kg/m   Estimated body mass index is 42.94 kg/m  as calculated from the following:    Height as of 4/11/19: 1.664 m (5' 5.5\").    Weight as of this encounter: 118.8 kg (262 lb).  BP completed using cuff size: large    Questioned patient about current smoking habits.  Pt. quit smoking some time ago.      No obstetric history on file.    The following HM Due: NONE      Sonia Kathleen, [place] on 4/24/2019 at 2:15 PM         "

## 2019-04-24 NOTE — PROGRESS NOTES
"SUBJECTIVE:  Josefinapatricia Aldrich is a 43 year old,  female,  P2  who presents for LLQ pain.Left lower quadrant abdominal pain of unclear etiology.  She reports that her symptoms are substantially better after having a bowel movement . Daily pain. Recent pelvic ultrasound and CT scan normal.    US 3/27/2019     FINDINGS: Transabdominal and transvaginal imaging were performed.  Uterus is absent. Both ovaries are visualized and within normal limits  containing small follicles. No suspicious ovarian or adnexal masses.  No free fluid.                                                                      IMPRESSION:  1. No acute findings.  2. Hysterectomy.    Family History   Problem Relation Age of Onset     Lupus Paternal Aunt 41     Multiple Sclerosis No family hx of        Past Medical History:   Diagnosis Date     Asthma      Fibroids     s/p hysterectomy     H/O gastric bypass      Hypertension      Migraine     followed by Devika     Obstructive sleep apnea      Pre-diabetes      Relapsing remitting multiple sclerosis (H)     lesion in [place].  Facial tingling initial symptom.  F/B [facility]                                          Past Surgical History:   Procedure Laterality Date     GASTRIC BYPASS      \"Trouble with B12 and D\"     HYSTERECTOMY, MONO      cervix gone.  fibroids.  No BSO     TONSILLECTOMY         Current Outpatient Medications   Medication     baclofen (LIORESAL) 20 MG tablet     cholecalciferol (VITAMIN D3) 5000 UNITS TABS tablet     cyanocolbalamin (VITAMIN  B-12) 100 MCG tablet     docusate sodium (COLACE) 100 MG tablet     Ferrous Sulfate (IRON SUPPLEMENT PO)     gabapentin (NEURONTIN) 300 MG capsule     Glatiramer Acetate (COPAXONE) 40 MG/ML SOSY     lisinopril-hydrochlorothiazide (PRINZIDE/ZESTORETIC) 10-12.5 MG tablet     natalizumab (TYSABRI) 300 MG/15ML injection     nortriptyline (PAMELOR) 50 MG capsule     SUMAtriptan (IMITREX) 100 MG tablet     topiramate (TOPAMAX) 100 " MG tablet     topiramate (TOPAMAX) 50 MG tablet     triamcinolone (KENALOG) 0.1 % cream     valACYclovir (VALTREX) 500 MG tablet     albuterol (2.5 MG/3ML) 0.083% nebulizer solution     albuterol (VENTOLIN HFA) 108 (90 BASE) MCG/ACT inhaler     Botulinum Toxin Type A (BOTOX) 200 units injection     prochlorperazine (COMPAZINE) 10 MG tablet     No current facility-administered medications for this visit.          Allergies   Allergen Reactions     Aspirin Difficulty breathing, Palpitations and Other (See Comments)     Adhesive Tape      Azithromycin Unknown     Latex Hives, Itching and Rash     Penicillins Cramps, GI Disturbance, Nausea and Vomiting, Rash, Swelling, Other (See Comments) and Hives                                                   Social History     Tobacco Use     Smoking status: Former Smoker     Types: Cigars     Smokeless tobacco: Never Used   Substance Use Topics     Alcohol use: Yes     Alcohol/week: 1.2 oz     Types: 2 Standard drinks or equivalent per week     Comment: 0-3 drinks per week                      Review of Systems    CONSTITUTIONAL:NEGATIVE  EYES: NEGATIVE  ENT/MOUTH: NEGATIVE  RESP: NEGATIVE  CV: NEGATIVE  GI: NEGATIVE  : NEGATIVE  MUSCULOSKELATAL: NEGATIVE  INTEGUMENTARY/SKIN: NEGATIVE  BREAST: NEGATIVE  NEURO: NEGATIVE.      OBJECTIVE:  /70 (BP Location: Right arm, Patient Position: Chair, Cuff Size: Adult Large)   Wt 118.8 kg (262 lb)   Breastfeeding? No   BMI 42.94 kg/m    Pelvis: normal external genitalia, normal groin lymphatics, normal urethral meatus, normal vaginal mucosa and normal adnexa, no masses or tenderness  General appearance: Healthy. No distress. Morbid obesity  Abdomen: BS active. Soft, non-tender, no masses or organomegaly.     Skin: Normal.  Mental Status: cooperative, normal affect, no gross thought process defects.  Extremities: Normal           ASSESSMENT:  Abdominal pain of undetermined etiology    PLAN:    1) Doubt GYN etiology of pain given  surgically absent uterus, normal pelvic ultrasound and CT scan. Referred to PCP. Total time spent was 30 minutes. 30 minutes of face to face time spent counseling and or coordination of care regarding above .

## 2019-04-25 ENCOUNTER — PATIENT OUTREACH (OUTPATIENT)
Dept: ONCOLOGY | Facility: CLINIC | Age: 44
End: 2019-04-25

## 2019-04-25 ENCOUNTER — TRANSFERRED RECORDS (OUTPATIENT)
Dept: HEALTH INFORMATION MANAGEMENT | Facility: CLINIC | Age: 44
End: 2019-04-25

## 2019-04-25 NOTE — PROGRESS NOTES
Dr. Gary Chong's nurse, Leonie called into [facility] today verifying that this patient had an appointment for infusion here. Currently the patient does not have any appointments scheduled here. However, there was an appointment that was made for 4/22 infusion chair but the patient canceled it. Leonie wanted to verify that the patient has started her Tysabri infusions. There is an active therapy plan for Tysabri at this time. Leonie states she will call the patient to follow-up.    Mellissa Guillaume RN, BSN, [facility], LAc  Patient Care Coordinator  [facility]  [phone]

## 2019-05-01 ENCOUNTER — INFUSION THERAPY VISIT (OUTPATIENT)
Dept: INFUSION THERAPY | Facility: CLINIC | Age: 44
End: 2019-05-01
Attending: PSYCHIATRY & NEUROLOGY
Payer: COMMERCIAL

## 2019-05-01 ENCOUNTER — OFFICE VISIT (OUTPATIENT)
Dept: FAMILY MEDICINE | Facility: CLINIC | Age: 44
End: 2019-05-01
Payer: COMMERCIAL

## 2019-05-01 VITALS
DIASTOLIC BLOOD PRESSURE: 66 MMHG | BODY MASS INDEX: 42.91 KG/M2 | HEART RATE: 89 BPM | SYSTOLIC BLOOD PRESSURE: 118 MMHG | HEIGHT: 66 IN | OXYGEN SATURATION: 100 % | WEIGHT: 267 LBS | TEMPERATURE: 98.2 F

## 2019-05-01 VITALS
OXYGEN SATURATION: 100 % | HEART RATE: 88 BPM | TEMPERATURE: 98.3 F | SYSTOLIC BLOOD PRESSURE: 114 MMHG | DIASTOLIC BLOOD PRESSURE: 64 MMHG

## 2019-05-01 DIAGNOSIS — G35 MULTIPLE SCLEROSIS (H): Primary | ICD-10-CM

## 2019-05-01 DIAGNOSIS — Z02.89 ENCOUNTER FOR COMPLETION OF FORM WITH PATIENT: Primary | ICD-10-CM

## 2019-05-01 DIAGNOSIS — G35 MS (MULTIPLE SCLEROSIS) (H): ICD-10-CM

## 2019-05-01 DIAGNOSIS — M79.604 BILATERAL LEG PAIN: ICD-10-CM

## 2019-05-01 DIAGNOSIS — M79.605 BILATERAL LEG PAIN: ICD-10-CM

## 2019-05-01 DIAGNOSIS — R26.9 GAIT DISTURBANCE: ICD-10-CM

## 2019-05-01 DIAGNOSIS — R53.1 WEAKNESS: ICD-10-CM

## 2019-05-01 PROCEDURE — 99214 OFFICE O/P EST MOD 30 MIN: CPT | Performed by: PHYSICIAN ASSISTANT

## 2019-05-01 PROCEDURE — 25000128 H RX IP 250 OP 636: Performed by: PSYCHIATRY & NEUROLOGY

## 2019-05-01 PROCEDURE — 25800030 ZZH RX IP 258 OP 636: Performed by: PSYCHIATRY & NEUROLOGY

## 2019-05-01 PROCEDURE — 96365 THER/PROPH/DIAG IV INF INIT: CPT

## 2019-05-01 RX ADMIN — NATALIZUMAB 300 MG: 300 INJECTION INTRAVENOUS at 13:15

## 2019-05-01 RX ADMIN — SODIUM CHLORIDE 250 ML: 9 INJECTION, SOLUTION INTRAVENOUS at 13:15

## 2019-05-01 ASSESSMENT — MIFFLIN-ST. JEOR: SCORE: 1884.91

## 2019-05-01 NOTE — PROGRESS NOTES
Infusion Nursing Note:  Josefina Aldrich presents today for Tysabri, DOSE 1.    Patient seen by provider today: No   present during visit today: Not Applicable.    Note: 20 minutes into infusion, pt c/o pressure on left side of head.  Tysabri stopped, saline flushing x 5-10 minutes.  Pressure decreased, Tysabri restarted at usual rate and remainder of dose infused.    Intravenous Access:  Peripheral IV placed.    Treatment Conditions:  Tysabri pre-infusion checklist completed via touch program.  Pt provided with Tysabri booklet, which she read and asked questions. Nervouse about allergic reaction, asked if se had Benadryl available if she reacted.  Reassured we were prepared to react to an infusion reaction, should she have one.    Post Infusion Assessment:  Patient tolerated infusion without incident.  Patient observed for 60 minutes post Tysabri per protocol.  Site patent and intact, free from redness, edema or discomfort.  No evidence of extravasations.  Access discontinued per protocol.       Discharge Plan:   Discharge instructions reviewed with: Patient.  Patient discharged in stable condition accompanied by: self.  She will be seeing Dr Chong prior to next infusion date.  Departure Mode: Ambulatory.    Radha Lee RN

## 2019-05-01 NOTE — PROGRESS NOTES
SUBJECTIVE:   Josefina Aldrich is a 43 year old female who presents to clinic today for the following health issues:    FMLA Forms  -MS    Josefina has still not returned to work since her recent MS flare that started 4/8/19 due to worsening leg pain. Her legs get weaker as she walks, this happens very quickly (10-12 minutes). While grocery shopping, she walked into the store normally, but as she walked through the store her legs gradually got weaker. By the time she was at check out her legs were dragging and her daughter needed to help hold her up. Does not want to walk with a cane. Leg pain wakes her from sleep. Has not rescheduled missed MRI appointment yet.     She is receiving her first Tysabri infusion today at 12 pm. She is attending PT 2x/[facility] at [facility]. Has had two therapy sessions so far and is unsure of effectiveness at this point but is positive about it. [facility] increased dosage of baclofen and gabapentin. Next neurology appointment is 5/16/2019.        Problem list and histories reviewed & adjusted, as indicated.  Additional history: as documented    Patient Active Problem List   Diagnosis     Migraine     Asthma     MS (multiple sclerosis) (H)     Anemia     Backache     Body mass index 45.0-49.9, adult (H)     Cognitive changes     Depression with anxiety     Fever postop     Uterine leiomyoma     Headache     Heavy menses     Hypersomnia with sleep apnea     Iron deficiency anemia     Leukocytosis     Postsurgical nonabsorption     Mild intermittent asthma     Morbid obesity (H)     Myalgia and myositis     Neck pain     Other dyspnea and respiratory abnormality     Pain, neuropathic     Pelvic pain in female     Personal history of allergy to latex     Post concussion syndrome     Right shoulder pain     S/P gastric bypass     S/P hysterectomy     Bariatric surgery status     Vitamin B12 deficiency     Vitamin D deficiency     Vomiting following  "gastrointestinal surgery     Hypermagnesemia     Benign essential hypertension     Neck pain on left side     Mild major depression (H)     LLQ pain     Multiple sclerosis (H)     Immunosuppression (H)     Past Surgical History:   Procedure Laterality Date     GASTRIC BYPASS  2002    \"Trouble with B12 and D\"     HYSTERECTOMY, MONO  2013    cervix gone.  fibroids.  No BSO     TONSILLECTOMY         Social History     Tobacco Use     Smoking status: Former Smoker     Types: Cigars     Smokeless tobacco: Never Used   Substance Use Topics     Alcohol use: Yes     Alcohol/week: 1.2 oz     Types: 2 Standard drinks or equivalent per week     Comment: 0-3 drinks per week     Family History   Problem Relation Age of Onset     Lupus Paternal Aunt 41     Multiple Sclerosis No family hx of          Current Outpatient Medications   Medication Sig Dispense Refill     albuterol (2.5 MG/3ML) 0.083% nebulizer solution Inhale 2.5 mg into the lungs       albuterol (VENTOLIN HFA) 108 (90 BASE) MCG/ACT inhaler Inhale 2 puffs into the lungs       baclofen (LIORESAL) 20 MG tablet Take 1 tablet (20 mg) by mouth At Bedtime  10     cholecalciferol (VITAMIN D3) 5000 UNITS TABS tablet Take 1 tablet (5,000 Units) by mouth daily       cyanocolbalamin (VITAMIN  B-12) 100 MCG tablet Take 100 mcg by mouth daily       docusate sodium (COLACE) 100 MG tablet Take 1 tablet (100 mg) by mouth 2 times daily       Ferrous Sulfate (IRON SUPPLEMENT PO)        gabapentin (NEURONTIN) 300 MG capsule Take 3 capsules (900 mg) by mouth At Bedtime 90 capsule 3     Glatiramer Acetate (COPAXONE) 40 MG/ML SOSY Inject 40 mg Subcutaneous three times a week 12 Syringe 11     lisinopril-hydrochlorothiazide (PRINZIDE/ZESTORETIC) 10-12.5 MG tablet TAKE 1 TABLET BY MOUTH DAILY 90 tablet 2     natalizumab (TYSABRI) 300 MG/15ML injection Infusion       nortriptyline (PAMELOR) 50 MG capsule Take 1 capsule (50 mg) by mouth At Bedtime 90 capsule 3     SUMAtriptan (IMITREX) 100 MG " tablet Take 50 mg up to twice daily as needed for headache; separate doses by at least one hour and do not use more than 3 days/week 18 tablet 3     topiramate (TOPAMAX) 100 MG tablet Take 1 tablet (100 mg) by mouth At Bedtime (take with 50 mg to total 150 mg nightly) 60 tablet 3     topiramate (TOPAMAX) 50 MG tablet Take 1 tablet (50 mg) by mouth At Bedtime (take with 100 mg to total 150 mg nightly) 60 tablet 3     triamcinolone (KENALOG) 0.1 % cream Apply sparingly to affected area three times daily 30 g 1     valACYclovir (VALTREX) 500 MG tablet TAKE 1 TABLET BY MOUTH DAILY 90 tablet 0     Botulinum Toxin Type A (BOTOX) 200 units injection Inject 200 Units into the muscle every 3 months (Patient not taking: Reported on 4/24/2019) 1 each 11     prochlorperazine (COMPAZINE) 10 MG tablet Take 1 tablet (10 mg) by mouth every 6 hours as needed for nausea or vomiting (Patient not taking: Reported on 4/24/2019) 20 tablet 3     Allergies   Allergen Reactions     Aspirin Difficulty breathing, Palpitations and Other (See Comments)     Adhesive Tape      Azithromycin Unknown     Latex Hives, Itching and Rash     Penicillins Cramps, GI Disturbance, Nausea and Vomiting, Rash, Swelling, Other (See Comments) and Hives       Reviewed and updated as needed this visit by clinical staff  Tobacco  Allergies  Meds  Problems  Med Hx  Surg Hx  Fam Hx  Soc Hx        Reviewed and updated as needed this visit by Provider  Tobacco  Allergies  Meds  Problems  Med Hx  Surg Hx  Fam Hx         ROS:  Constitutional, HEENT, cardiovascular, pulmonary, GI, , musculoskeletal, neuro, skin, endocrine and psych systems are negative, except as otherwise noted.    This document serves as a record of the services and decisions personally performed and made by ANDREW Thurston. It was created on her behalf by Sonia Mendes, a trained medical scribe. The creation of this document is based on the provider's statements to the medical  "carolynn.  Sonia Mendes May 1, 2019 10:15 AM      OBJECTIVE:   /66 (BP Location: Left arm, Cuff Size: Adult Large)   Pulse 89   Temp 98.2  F (36.8  C) (Oral)   Ht 1.664 m (5' 5.5\")   Wt 121.1 kg (267 lb)   SpO2 100%   BMI 43.76 kg/m   Body mass index is 43.76 kg/m .    GENERAL: healthy, alert and no distress  MS: no gross musculoskeletal defects noted, no edema  SKIN: no suspicious lesions or rashes  NEURO: Normal strength and tone, mentation intact and speech normal  PSYCH: mentation appears normal, affect normal/bright    Diagnostic Test Results:  none   ASSESSMENT/PLAN:   Josefina was seen today for forms.    Diagnoses and all orders for this visit:    Encounter for completion of form with patient, MS (multiple sclerosis) (H), Gait disturbance, Bilateral leg pain, Weakness  FMLA forms completed today. Continue following with Neurology. Encouraged Josefina to reschedule her missed MRI appointment.     Greater than 25 minutes were spent with the patient. The majority of this time was coordinating care and counseling regarding the above diagnosis .    Return in about 1 week (around 5/8/2019) for for MRI lumbar spine.    The information in this document, created by the medical scribe for me, accurately reflects the services I personally performed and the decisions made by me. I have reviewed and approved this document for accuracy prior to leaving the patient care area.  May 1, 2019 10:33 AM      Miya Crump PA-C  [facility]  [facility]      "

## 2019-05-02 ASSESSMENT — ASTHMA QUESTIONNAIRES: ACT_TOTALSCORE: 22

## 2019-05-07 ENCOUNTER — TELEPHONE (OUTPATIENT)
Dept: FAMILY MEDICINE | Facility: CLINIC | Age: 44
End: 2019-05-07

## 2019-05-07 ENCOUNTER — APPOINTMENT (OUTPATIENT)
Dept: CT IMAGING | Facility: CLINIC | Age: 44
End: 2019-05-07
Attending: EMERGENCY MEDICINE
Payer: COMMERCIAL

## 2019-05-07 ENCOUNTER — HOSPITAL ENCOUNTER (EMERGENCY)
Facility: CLINIC | Age: 44
Discharge: HOME | End: 2019-05-07
Attending: EMERGENCY MEDICINE | Admitting: EMERGENCY MEDICINE
Payer: COMMERCIAL

## 2019-05-07 VITALS
OXYGEN SATURATION: 100 % | RESPIRATION RATE: 18 BRPM | HEART RATE: 80 BPM | DIASTOLIC BLOOD PRESSURE: 78 MMHG | TEMPERATURE: 98 F | SYSTOLIC BLOOD PRESSURE: 130 MMHG

## 2019-05-07 DIAGNOSIS — R10.13 EPIGASTRIC PAIN: ICD-10-CM

## 2019-05-07 LAB
ALBUMIN SERPL-MCNC: 3.3 G/DL (ref 3.4–5)
ALP SERPL-CCNC: 64 U/L (ref 40–150)
ALT SERPL W P-5'-P-CCNC: 24 U/L (ref 0–50)
ANION GAP SERPL CALCULATED.3IONS-SCNC: 6 MMOL/L (ref 3–14)
AST SERPL W P-5'-P-CCNC: 21 U/L (ref 0–45)
BASOPHILS # BLD AUTO: 0.1 10E9/L (ref 0–0.2)
BASOPHILS NFR BLD AUTO: 1 %
BILIRUB SERPL-MCNC: 0.1 MG/DL (ref 0.2–1.3)
BUN SERPL-MCNC: 12 MG/DL (ref 7–30)
CALCIUM SERPL-MCNC: 8.3 MG/DL (ref 8.5–10.1)
CHLORIDE SERPL-SCNC: 109 MMOL/L (ref 94–109)
CO2 SERPL-SCNC: 23 MMOL/L (ref 20–32)
CREAT SERPL-MCNC: 0.87 MG/DL (ref 0.52–1.04)
DIFFERENTIAL METHOD BLD: ABNORMAL
EOSINOPHIL # BLD AUTO: 0.3 10E9/L (ref 0–0.7)
EOSINOPHIL NFR BLD AUTO: 3 %
ERYTHROCYTE [DISTWIDTH] IN BLOOD BY AUTOMATED COUNT: 13 % (ref 10–15)
GFR SERPL CREATININE-BSD FRML MDRD: 83 ML/MIN/{1.73_M2}
GLUCOSE SERPL-MCNC: 85 MG/DL (ref 70–99)
HCT VFR BLD AUTO: 36.5 % (ref 35–47)
HGB BLD-MCNC: 11.4 G/DL (ref 11.7–15.7)
LIPASE SERPL-CCNC: 94 U/L (ref 73–393)
LYMPHOCYTES # BLD AUTO: 6.6 10E9/L (ref 0.8–5.3)
LYMPHOCYTES NFR BLD AUTO: 66 %
MCH RBC QN AUTO: 29.8 PG (ref 26.5–33)
MCHC RBC AUTO-ENTMCNC: 31.2 G/DL (ref 31.5–36.5)
MCV RBC AUTO: 96 FL (ref 78–100)
MONOCYTES # BLD AUTO: 0.6 10E9/L (ref 0–1.3)
MONOCYTES NFR BLD AUTO: 6 %
NEUTROPHILS # BLD AUTO: 2.4 10E9/L (ref 1.6–8.3)
NEUTROPHILS NFR BLD AUTO: 24 %
PLATELET # BLD AUTO: 346 10E9/L (ref 150–450)
PLATELET # BLD EST: ABNORMAL 10*3/UL
POTASSIUM SERPL-SCNC: 4.3 MMOL/L (ref 3.4–5.3)
PROT SERPL-MCNC: 7.2 G/DL (ref 6.8–8.8)
RBC # BLD AUTO: 3.82 10E12/L (ref 3.8–5.2)
RBC MORPH BLD: ABNORMAL
SODIUM SERPL-SCNC: 138 MMOL/L (ref 133–144)
VARIANT LYMPHS BLD QL SMEAR: PRESENT
WBC # BLD AUTO: 10 10E9/L (ref 4–11)

## 2019-05-07 PROCEDURE — 25000132 ZZH RX MED GY IP 250 OP 250 PS 637: Performed by: EMERGENCY MEDICINE

## 2019-05-07 PROCEDURE — 25000128 H RX IP 250 OP 636: Performed by: EMERGENCY MEDICINE

## 2019-05-07 PROCEDURE — 25000125 ZZHC RX 250: Performed by: EMERGENCY MEDICINE

## 2019-05-07 PROCEDURE — 96374 THER/PROPH/DIAG INJ IV PUSH: CPT | Mod: 59

## 2019-05-07 PROCEDURE — 99285 EMERGENCY DEPT VISIT HI MDM: CPT | Mod: 25

## 2019-05-07 PROCEDURE — 83690 ASSAY OF LIPASE: CPT | Performed by: EMERGENCY MEDICINE

## 2019-05-07 PROCEDURE — 85025 COMPLETE CBC W/AUTO DIFF WBC: CPT | Performed by: EMERGENCY MEDICINE

## 2019-05-07 PROCEDURE — 96361 HYDRATE IV INFUSION ADD-ON: CPT

## 2019-05-07 PROCEDURE — 80053 COMPREHEN METABOLIC PANEL: CPT | Performed by: EMERGENCY MEDICINE

## 2019-05-07 PROCEDURE — 74177 CT ABD & PELVIS W/CONTRAST: CPT

## 2019-05-07 RX ORDER — MORPHINE SULFATE 4 MG/ML
4 INJECTION, SOLUTION INTRAMUSCULAR; INTRAVENOUS
Status: DISCONTINUED | OUTPATIENT
Start: 2019-05-07 | End: 2019-05-07 | Stop reason: HOSPADM

## 2019-05-07 RX ORDER — ONDANSETRON 2 MG/ML
4 INJECTION INTRAMUSCULAR; INTRAVENOUS
Status: DISCONTINUED | OUTPATIENT
Start: 2019-05-07 | End: 2019-05-07 | Stop reason: HOSPADM

## 2019-05-07 RX ORDER — ONDANSETRON 4 MG/1
4 TABLET, ORALLY DISINTEGRATING ORAL EVERY 8 HOURS PRN
Qty: 10 TABLET | Refills: 0 | Status: SHIPPED | OUTPATIENT
Start: 2019-05-07 | End: 2019-06-12

## 2019-05-07 RX ORDER — IOPAMIDOL 755 MG/ML
500 INJECTION, SOLUTION INTRAVASCULAR ONCE
Status: COMPLETED | OUTPATIENT
Start: 2019-05-07 | End: 2019-05-07

## 2019-05-07 RX ADMIN — LIDOCAINE HYDROCHLORIDE 30 ML: 20 SOLUTION ORAL; TOPICAL at 12:32

## 2019-05-07 RX ADMIN — SODIUM CHLORIDE 1000 ML: 9 INJECTION, SOLUTION INTRAVENOUS at 12:32

## 2019-05-07 RX ADMIN — FAMOTIDINE 20 MG: 10 INJECTION, SOLUTION INTRAVENOUS at 14:29

## 2019-05-07 RX ADMIN — SODIUM CHLORIDE 65 ML: 9 INJECTION, SOLUTION INTRAVENOUS at 13:21

## 2019-05-07 RX ADMIN — IOPAMIDOL 100 ML: 755 INJECTION, SOLUTION INTRAVENOUS at 13:21

## 2019-05-07 ASSESSMENT — ENCOUNTER SYMPTOMS
ABDOMINAL PAIN: 1
HEMATURIA: 0
DYSURIA: 0
FREQUENCY: 0
FEVER: 0
NAUSEA: 0
VOMITING: 0

## 2019-05-07 NOTE — ED NOTES
Pt c/o left sided abdominal pain since last night after dinner. Pt states pain worse with food intake. Pt states recently had CT as PCP concerned for Diverticulitis but CT negative. Pt states pain dull since but worse last night. Pt ABCs intact and A&Ox4. Pt Hx of MS and started first infusion 5/1/19.

## 2019-05-07 NOTE — ED PROVIDER NOTES
History     Chief Complaint:  Abdominal Pain     HPI   Josefina Aldrich is a 43 year old female who presents for evaluation of abdominal pain. She notes a history of left lower quadrant abdominal pain approximately 1 month ago for which she underwent unremarkable CT and lab evaluation.  She then saw OB/GYN who did not feel this was OB/GYN related, and prior to that had an unremarkable pelvic ultrasound showing only hysterectomy in the past.  In the last 2 days, the pain recurred and is worse, including to the left upper quadrant where she feels bloating and intermittent discomfort.  She presents today with continued concerns.  She denies any hematuria, dysuria, frequency, nausea, vomiting, fever, or any other concerns.    Allergies:  Aspirin  Azithromycin  Latex  Penicillins      Medications:    albuterol (2.5 MG/3ML) 0.083% nebulizer solution  albuterol (VENTOLIN HFA) 108 (90 BASE) MCG/ACT inhaler  baclofen (LIORESAL) 20 MG tablet  Botulinum Toxin Type A (BOTOX) 200 units injection  cholecalciferol (VITAMIN D3) 5000 UNITS TABS tablet  cyanocolbalamin (VITAMIN  B-12) 100 MCG tablet  docusate sodium (COLACE) 100 MG tablet  Ferrous Sulfate (IRON SUPPLEMENT PO)  gabapentin (NEURONTIN) 300 MG capsule  Glatiramer Acetate (COPAXONE) 40 MG/ML SOSY  lisinopril-hydrochlorothiazide (PRINZIDE/ZESTORETIC) 10-12.5 MG tablet  natalizumab (TYSABRI) 300 MG/15ML injection  nortriptyline (PAMELOR) 50 MG capsule  prochlorperazine (COMPAZINE) 10 MG tablet  SUMAtriptan (IMITREX) 100 MG tablet  topiramate (TOPAMAX) 100 MG tablet  topiramate (TOPAMAX) 50 MG tablet  triamcinolone (KENALOG) 0.1 % cream  valACYclovir (VALTREX) 500 MG tablet    Past Medical History:    Asthma  Fibroids  Hypertension   Migraine headaches   Obstructive sleep apnea  Prediabetes   Relapsing remitting multiple sclerosis   Depression with anxiety     Past Surgical History:    Gastric bypass   Hysterectomy   Tonsillectomy     Family History:    History  reviewed. No pertinent family history.     Social History:  Tobacco use:    Former smoker   Alcohol use:    Positive   Marital status:         Review of Systems   Constitutional: Negative for fever.   Gastrointestinal: Positive for abdominal pain. Negative for nausea and vomiting.   Genitourinary: Negative for dysuria, frequency and hematuria.   All other systems reviewed and are negative.      Physical Exam   First Vitals:  BP: 116/67  Heart Rate: 79  Temp: 98  F (36.7  C)  Resp: 18  SpO2: 99 %    Physical Exam  Constitutional: Alert, attentive  HENT:    Nose: Nose normal.    Mouth/Throat: Oropharynx is clear, mucous membranes are moist  Eyes:  EOM are normal.    CV:  regular rate and rhythm; no murmurs, rubs or gallups  Chest: Effort normal and breath sounds normal.   GI:   Epigastrium - no tenderness, no guarding   RUQ - no tenderness or Torres's sign   RLQ - No tenderness, no guarding, no Rovsing's sign   Suprapubic area - no tenderness, no guarding    LLQ - moderate tenderness, no guarding   LUQ - no tenderness, no guarding   No distension. Normal bowel sounds  MSK: Normal range of motion.   Neurological: Alert, attentive  Skin: Skin is warm and dry.      Emergency Department Course     Imaging:  Radiographic findings were communicated with the patient who voiced understanding of the findings.    CT Abdomen Pelvis w Contrast:  IMPRESSION: No acute changes in the abdomen or pelvis to account for patient's symptoms. Postop changes from gastric bypass surgery. No bowel obstruction, diverticulitis or appendicitis. Abdominal and pelvic organs are within normal limits.  Preliminary report per radiology.     Laboratory:  CBC: HGB 11.4 low, o/w WNL (WBC 10.0, )  CMP: Calcium 8.3 low, Bilirubin total 0.1 low, Albumin 3.3 low, o/w WNL (Creatinine 0.87)   Lipase: 94      Interventions:  1232 NS 1,000 mL IV   1232 GI cocktail 30 mL PO    Emergency Department Course:  Nursing notes and vitals reviewed.  1157:  "I performed an exam of the patient as documented above.     1350: I updated and reassessed the patient.     I personally reviewed the laboratory results with the patient and answered all related questions prior to discharge.     Findings and plan explained to the Patient. Patient discharged home with instructions regarding supportive care, medications, and reasons to return. The importance of close follow-up was reviewed. The patient was prescribed Zofran ODT and Zantac.       Impression & Plan      Medical Decision Making:  This is a 43-year-old female who presents for evaluation of now recurrent epigastric and left upper quadrant pain that is distinctly postprandial in nature.  Given the strong predominance of left-sided abdominal symptoms, biliary colic seems very unlikely.  Considering the differential includes diverticulitis, irritable bowel syndrome, gastritis, bowel obstruction, among others.  Screening labs show no acute abnormality and CT is negative for any concerning pathology.  Given she has had some GERD symptoms recently, and is under significant stress, and has a history of \"ulcer disease,\" I will provide Zantac with plan for GI follow-up and consideration of endoscopy symptoms do not resolve.  She should follow-up with primary care in 2 3 days, with GI in 1 to 2 weeks, and return immediately for worse pain, fever, vomiting, or any other concerns.    Diagnosis:    ICD-10-CM   1. Epigastric pain R10.13       Disposition:  Discharged to home with Zofran ODT and Zantac.     Discharge Medications:  ondansetron 4 MG ODT tab  Commonly known as:  ZOFRAN ODT  4 mg, Oral, EVERY 8 HOURS PRN     ranitidine 150 MG tablet  Commonly known as:  ZANTAC  150 mg, Oral, 2 TIMES DAILY            IBrenton, am serving as a scribe at 11:57 AM on 5/7/2019 to document services personally performed by Dr. Bates, based on my observations and the provider's statements to me.    [facility] EMERGENCY " [facility]       [facility] lamas MD  05/07/19 2667

## 2019-05-07 NOTE — TELEPHONE ENCOUNTER
"ABDOMINAL   PAIN     Onset: Last night about 7 pm    Description:   Character: Sharp and Cramping  Location: left lower quadrant  Radiation: Pelvic region to the front and lower back to the flank    Intensity: severe, 10/10.    Progression of Symptoms:  constant    Accompanying Signs & Symptoms:  Fever/Chills?: YES- chills  Gas/Bloating: YES- bloating, passing small amount of flatus  Nausea: YES  Vomitting: YES- tried, but only vomited small amount of water  Diarrhea?: no   Constipation:YES- very small BM  Dysuria or Hematuria: no    History:   Trauma: no   Previous similar pain: YES   Previous tests done: CT    Precipitating factors:   Does the pain change with:     Food: YES- worse     BM: YES- some relief for a short amount of time    Urination: YES- but pain will return    Alleviating factors:  Heating pad    Therapies Tried and outcome: Andrews with noemi    LMP:  not applicable      Patient had a salad with apple cider vinegear last night for dinner and then began feeling about 20 minutes later.    Patient states her abdomen is not symmetrical and \"seems puffy\".  Advised patient to go to ED for pain management and possible imaging.      TAMARA PuentesN, RN  [employer] [facility]    "

## 2019-05-07 NOTE — ED AVS SNAPSHOT
[facility] Emergency Department  [address]  [facility] [address]  Phone:  [phone]  Fax:  [phone]                                    Josefina Aldrich   MRN: [id number]    Department:  [facility] Emergency Department   Date of Visit:  5/7/2019           After Visit Summary Signature Page    I have received my discharge instructions, and my questions have been answered. I have discussed any challenges I see with this plan with the nurse or doctor.    ..........................................................................................................................................  Patient/Patient Representative Signature      ..........................................................................................................................................  Patient Representative Print Name and Relationship to Patient    ..................................................               ................................................  Date                                   Time    ..........................................................................................................................................  Reviewed by Signature/Title    ...................................................              ..............................................  Date                                               Time          22EPIC Rev 08/18

## 2019-05-16 ENCOUNTER — TRANSFERRED RECORDS (OUTPATIENT)
Dept: HEALTH INFORMATION MANAGEMENT | Facility: CLINIC | Age: 44
End: 2019-05-16

## 2019-05-28 ENCOUNTER — OFFICE VISIT (OUTPATIENT)
Dept: URGENT CARE | Facility: URGENT CARE | Age: 44
End: 2019-05-28
Payer: COMMERCIAL

## 2019-05-28 ENCOUNTER — TELEPHONE (OUTPATIENT)
Dept: FAMILY MEDICINE | Facility: CLINIC | Age: 44
End: 2019-05-28

## 2019-05-28 ENCOUNTER — E-VISIT (OUTPATIENT)
Dept: FAMILY MEDICINE | Facility: CLINIC | Age: 44
End: 2019-05-28
Payer: COMMERCIAL

## 2019-05-28 VITALS
TEMPERATURE: 98.4 F | WEIGHT: 258.8 LBS | RESPIRATION RATE: 18 BRPM | OXYGEN SATURATION: 100 % | SYSTOLIC BLOOD PRESSURE: 119 MMHG | BODY MASS INDEX: 42.41 KG/M2 | HEART RATE: 71 BPM | DIASTOLIC BLOOD PRESSURE: 73 MMHG

## 2019-05-28 DIAGNOSIS — R06.02 SOB (SHORTNESS OF BREATH): Primary | ICD-10-CM

## 2019-05-28 DIAGNOSIS — R06.2 WHEEZING: ICD-10-CM

## 2019-05-28 DIAGNOSIS — J30.9 ALLERGIC RHINITIS, UNSPECIFIED SEASONALITY, UNSPECIFIED TRIGGER: ICD-10-CM

## 2019-05-28 DIAGNOSIS — J45.901 EXACERBATION OF ASTHMA, UNSPECIFIED ASTHMA SEVERITY, UNSPECIFIED WHETHER PERSISTENT: Primary | ICD-10-CM

## 2019-05-28 PROCEDURE — 94640 AIRWAY INHALATION TREATMENT: CPT | Performed by: PHYSICIAN ASSISTANT

## 2019-05-28 PROCEDURE — 99207 ZZC NO BILLABLE SERVICE THIS VISIT: CPT | Performed by: PHYSICIAN ASSISTANT

## 2019-05-28 PROCEDURE — 99214 OFFICE O/P EST MOD 30 MIN: CPT | Mod: 25 | Performed by: PHYSICIAN ASSISTANT

## 2019-05-28 RX ORDER — IPRATROPIUM BROMIDE AND ALBUTEROL SULFATE 2.5; .5 MG/3ML; MG/3ML
3 SOLUTION RESPIRATORY (INHALATION) ONCE
Status: COMPLETED | OUTPATIENT
Start: 2019-05-28 | End: 2019-05-28

## 2019-05-28 RX ORDER — PREDNISONE 20 MG/1
TABLET ORAL
Qty: 10 TABLET | Refills: 0 | Status: SHIPPED | OUTPATIENT
Start: 2019-05-28 | End: 2019-06-04

## 2019-05-28 RX ADMIN — IPRATROPIUM BROMIDE AND ALBUTEROL SULFATE 3 ML: 2.5; .5 SOLUTION RESPIRATORY (INHALATION) at 15:14

## 2019-05-28 NOTE — PROGRESS NOTES
"Patient presents with:  Urgent Care: Pt states uri, asthma flare up, sob, dark green mucus, cough  sxs 4x days     SUBJECTIVE:   Josefina Aldrich is a 43 year old female presenting with a chief complaint of cough with wheezing and shortness of breath onset 4 days ago.  States that she had some purulent sputum with cough in the morning today.  Was out of town visiting family, forgot her allergy medication.    Did have her inhalers with her.  Her last nebulizer treatment was this morning.      SH:   with [facility].    Treatment measures tried include tried neb this morning, also \"bronchaid pills\" and dayquil.  Predisposing factors include allergies and asthma.    Of note is that she is scheduled for her monthly infusion of Tysabri for her MS tomorrow.      Past Medical History:   Diagnosis Date     Asthma      Fibroids     s/p hysterectomy     H/O gastric bypass      Hypertension      Migraine     followed by Devika     Obstructive sleep apnea      Pre-diabetes      Relapsing remitting multiple sclerosis (H) 2015    lesion in [place].  Facial tingling initial symptom.  F/B [facility]     Patient Active Problem List   Diagnosis     Migraine     Asthma     MS (multiple sclerosis) (H)     Anemia     Backache     Body mass index 45.0-49.9, adult (H)     Cognitive changes     Depression with anxiety     Fever postop     Uterine leiomyoma     Headache     Heavy menses     Hypersomnia with sleep apnea     Iron deficiency anemia     Leukocytosis     Postsurgical nonabsorption     Mild intermittent asthma     Morbid obesity (H)     Myalgia and myositis     Neck pain     Other dyspnea and respiratory abnormality     Pain, neuropathic     Pelvic pain in female     Personal history of allergy to latex     Post concussion syndrome     Right shoulder pain     S/P gastric bypass     S/P hysterectomy     Bariatric surgery status     Vitamin B12 deficiency     Vitamin D deficiency     Vomiting following gastrointestinal " surgery     Hypermagnesemia     Benign essential hypertension     Neck pain on left side     Mild major depression (H)     LLQ pain     Multiple sclerosis (H)     Immunosuppression (H)     Social History     Tobacco Use     Smoking status: Former Smoker     Types: Cigars     Smokeless tobacco: Never Used   Substance Use Topics     Alcohol use: Yes     Alcohol/week: 1.2 oz     Types: 2 Standard drinks or equivalent per week     Comment: 0-3 drinks per week       ROS:  CONSTITUTIONAL:NEGATIVE for fever, chills, change in weight  INTEGUMENTARY/SKIN: NEGATIVE for worrisome rashes, moles or lesions  EYES: NEGATIVE for vision changes or irritation  ENT/MOUTH: as per HPI  RESP:as per HPI  CV: NEGATIVE for chest pain, palpitations or peripheral edema  GI: NEGATIVE for nausea, abdominal pain, heartburn, or change in bowel habits  MUSCULOSKELETAL: NEGATIVE for significant arthralgias or myalgia  NEURO: NEGATIVE for weakness, dizziness or paresthesias  HEME/ALLERGY/IMMUNE: NEGATIVE for bleeding problems    OBJECTIVE  :/73   Pulse 71   Temp 98.4  F (36.9  C) (Oral)   Resp 18   Wt 117.4 kg (258 lb 12.8 oz)   SpO2 100%   BMI 42.41 kg/m    GENERAL APPEARANCE: healthy, alert and no distress  EYES: EOMI,  PERRL, conjunctiva clear  HENT: ear canals and TM's normal.  Nose with boggy blue turbinates and CLEAR coryza and mouth without ulcers, erythema or lesions  NECK: supple, nontender, no lymphadenopathy  RESP: wheezing throughout which improves but does not completely clear with neb treatment in clinic  CV: regular rates and rhythm, normal S1 S2, no murmur noted  ABDOMEN:  soft, nontender, no HSM or masses and bowel sounds normal  NEURO: Normal strength and tone, sensory exam grossly normal,  normal speech and mentation  SKIN: no suspicious lesions or rashes    (J45.901) Exacerbation of asthma, unspecified asthma severity, unspecified whether persistent  (primary encounter diagnosis)  Comment:   Plan: predniSONE  (DELTASONE) 20 MG tablet            (R06.2) Wheezing  Comment:   Plan: INHALATION/NEBULIZER TREATMENT, INITIAL,         ipratropium - albuterol 0.5 mg/2.5 mg/3 mL         (DUONEB) neb solution 3 mL            (J30.9) Allergic rhinitis, unspecified seasonality, unspecified trigger  Comment:   Plan: stay on cetirizine 10mg every day.      Postpone tysabri infusion a few days.      Follow up with Miya Crump should symptoms persist or worsen.  Patient expresses understanding and agreement with the assessment and plan as above.

## 2019-05-28 NOTE — TELEPHONE ENCOUNTER
I would like pt to be seen d/t difficulty breathing.  Please schedule today or have seen at Urgent care.      Miya Crump MS, PA-C

## 2019-05-28 NOTE — TELEPHONE ENCOUNTER
Called #   Telephone Information:   Mobile [phone]     Advised pt on the information below     Patient stated an understanding and agreed with plan.      Amelia Chance RN, BSN  [facility]

## 2019-05-28 NOTE — TELEPHONE ENCOUNTER
Detailed message left with information noted below from provider and number to return call with any questions.  TAMARA PuentesN, RN  [employer] [facility]

## 2019-05-28 NOTE — PATIENT INSTRUCTIONS
(J45.901) Exacerbation of asthma, unspecified asthma severity, unspecified whether persistent  (primary encounter diagnosis)  Comment:   Plan: predniSONE (DELTASONE) 20 MG tablet            (R06.2) Wheezing  Comment:   Plan: INHALATION/NEBULIZER TREATMENT, INITIAL,         ipratropium - albuterol 0.5 mg/2.5 mg/3 mL         (DUONEB) neb solution 3 mL            (J30.9) Allergic rhinitis, unspecified seasonality, unspecified trigger  Comment:   Plan: stay on cetirizine 10mg every day.      Postpone tysabri infusion a few days.      Follow up with Miya Crump should symptoms persist or worsen.

## 2019-05-28 NOTE — TELEPHONE ENCOUNTER
Reason for call:  Patient reporting a symptom    Symptom or request: Having a cough with thick green mucus which is causing her asthma flare up. She is also have watery eyes and a runny nose and would like to know is she needs to come in.    Patient also states that she is having an infusion tomorrow and needs to know if she could still do it.    Duration (how long have symptoms been present): since Saturday 05/25    Have you been treated for this before? No    Additional comments:     Phone Number patient can be reached at:  Home number on file [phone] (home)    Best Time:      Can we leave a detailed message on this number:  YES    Call taken on 5/28/2019 at 6:35 AM by Renae Villalta

## 2019-05-28 NOTE — TELEPHONE ENCOUNTER
Returned call to patient.  Patient started an E-Visit with PCP.  Patient stated she called infusion clinic and was advised to contact PCP.  Patient has been using inhaler with little relief.  Patient has been using an OTC bronchaid which is more helpful than the inhaler.    Patient nose is having clear nasal discharge, but coughing up green muscous-very productive.    Patient [phone] (work #), ok to leave a detailed message (off at 1200 from work)    Routing to provider for review and advise as appropriate.  Pharmacy is pended.    TAMARA PuentesN, RN  [employer] [facility]

## 2019-05-31 DIAGNOSIS — B00.9 HSV (HERPES SIMPLEX VIRUS) INFECTION: ICD-10-CM

## 2019-05-31 NOTE — TELEPHONE ENCOUNTER
"Requested Prescriptions   Pending Prescriptions Disp Refills     valACYclovir (VALTREX) 500 MG tablet [Pharmacy Med Name: VALACYCLOVIR 500MG TABLETS] 90 tablet 0     Sig: TAKE 1 TABLET BY MOUTH DAILY       Antivirals for Herpes Protocol Passed - 5/31/2019  6:12 AM        Passed - Patient is age 12 or older        Passed - Recent (12 mo) or future (30 days) visit within the authorizing provider's specialty     Patient had office visit in the last 12 months or has a visit in the next 30 days with authorizing provider or within the authorizing provider's specialty.  See \"Patient Info\" tab in inbasket, or \"Choose Columns\" in Meds & Orders section of the refill encounter.              Passed - Medication is active on med list        Passed - Normal serum creatinine on file in past 12 months     Recent Labs   Lab Test 05/07/19  1219   CR 0.87             Last Written Prescription Date:  02/15/2019  Last Fill Quantity: 90,  # refills: 0   Last office visit: 5/1/2019 with prescribing provider:  no   Future Office Visit:      "

## 2019-06-02 RX ORDER — VALACYCLOVIR HYDROCHLORIDE 500 MG/1
TABLET, FILM COATED ORAL
Qty: 90 TABLET | Refills: 0 | Status: SHIPPED | OUTPATIENT
Start: 2019-06-02 | End: 2019-08-28

## 2019-06-03 ENCOUNTER — TELEPHONE (OUTPATIENT)
Dept: FAMILY MEDICINE | Facility: CLINIC | Age: 44
End: 2019-06-03

## 2019-06-03 NOTE — TELEPHONE ENCOUNTER
Acute Illness   Acute illness concerns: Asthma exacerbation  Onset: Urgent care follow up    Fever: no    Chills/Sweats: no    Headache (location?): no    Sinus Pressure:no    Conjunctivitis:  no    Ear Pain: no    Rhinorrhea: no    Congestion: no    Sore Throat: no     Cough: no    Wheeze: no    Decreased Appetite: no    Nausea: no    Vomiting: no    Diarrhea:  no    Dysuria/Freq.: no    Fatigue/Achiness: no    Sick/Strep Exposure: no     Therapies Tried and outcome: Prednisone helped and Albuterol helped and allergy medicine helps      Patient only uses Albuterol as needed. She is not on a daily maintenance inhaler and she will discuss this at her upcoming visit.         RN advised patient to follow up as scheduled on 6/6/2019 and to call for sooner appointment if symptoms change or worsen in any way.    Patient verbalized understanding and agreed with plan.      Tessa Wiggins, BS, RN, N  [facility]) [phone]

## 2019-06-04 ENCOUNTER — INFUSION THERAPY VISIT (OUTPATIENT)
Dept: INFUSION THERAPY | Facility: CLINIC | Age: 44
End: 2019-06-04
Attending: PSYCHIATRY & NEUROLOGY
Payer: COMMERCIAL

## 2019-06-04 VITALS
RESPIRATION RATE: 16 BRPM | DIASTOLIC BLOOD PRESSURE: 73 MMHG | OXYGEN SATURATION: 100 % | WEIGHT: 258.5 LBS | SYSTOLIC BLOOD PRESSURE: 110 MMHG | BODY MASS INDEX: 42.36 KG/M2 | TEMPERATURE: 98.7 F | HEART RATE: 73 BPM

## 2019-06-04 DIAGNOSIS — G35 MULTIPLE SCLEROSIS (H): Primary | ICD-10-CM

## 2019-06-04 PROCEDURE — 25000128 H RX IP 250 OP 636: Performed by: PSYCHIATRY & NEUROLOGY

## 2019-06-04 PROCEDURE — 96365 THER/PROPH/DIAG IV INF INIT: CPT

## 2019-06-04 PROCEDURE — 25800030 ZZH RX IP 258 OP 636: Performed by: PSYCHIATRY & NEUROLOGY

## 2019-06-04 RX ADMIN — SODIUM CHLORIDE 250 ML: 9 INJECTION, SOLUTION INTRAVENOUS at 13:27

## 2019-06-04 RX ADMIN — NATALIZUMAB 300 MG: 300 INJECTION INTRAVENOUS at 13:27

## 2019-06-04 ASSESSMENT — PAIN SCALES - GENERAL: PAINLEVEL: MILD PAIN (3)

## 2019-06-04 NOTE — PROGRESS NOTES
Infusion Nursing Note:  Josefina Aldrich presents today for Tysabri.    Patient seen by provider today: No   present during visit today: Not Applicable.    Note: Patient finished a course of prednisone yesterday for an asthma exacerbation. Still having minimal residual SOB, particularly at night. Discussed recent prednisone with Dr Chong's staff:   OK to proceed with Tysabri infusion today.  TONY Hadley RN/ Dr Chong/ JOAN Alfred RN    Pt developed slight headache just as Tysabri infusion was finishing. NS infused, warm pack to forehead; headache improved.    Intravenous Access:  Peripheral IV placed.    Treatment Conditions:  Tysabri pre-infusion checklist completed via touch program.    Post Infusion Assessment:  Patient tolerated infusion without incident.  Blood return noted pre and post infusion.  Site patent and intact, free from redness, edema or discomfort.  No evidence of extravasations.  Access discontinued per protocol.     Discharge Plan:   Discharge instructions reviewed with: Patient.  Patient and/or family verbalized understanding of discharge instructions and all questions answered.  AVS to patient via [employer].  Patient will return 7/2 for next appointment.   Patient discharged in stable condition accompanied by: self.  Departure Mode: Ambulatory.    Leonie Alfred RN

## 2019-06-04 NOTE — PATIENT INSTRUCTIONS
EDUCATION POST BIOLOGICAL INFUSION  Call the triage nurse at your clinic or seek medical attention if you have chills and/or temperature greater than or equal to 100.5, uncontrolled nausea/vomiting, diarrhea, constipation, dizziness, shortness of breath, chest pain, heart palpitations, weakness or any other new or concerning symptoms, questions or concerns.  You can not have any live virus vaccines prior to or during treatment or up to 6 months post infusion.  If you have an upcoming surgery, medical procedure or dental procedure during treatment, this should be discussed with your ordering physician and your surgeon/dentist.  If you are having any concerning symptom, if you are unsure if you should get your next infusion or wish to speak to a provider before your next infusion, please call your care coordinator or triage nurse at your clinic to notify them so we can adequately serve you.

## 2019-06-10 NOTE — PROGRESS NOTES
Subjective     Josefina ASAEL Venkata Aldrich is a 43 year old female who presents to clinic today for the following health issues:    HPI     Acute Illness - taken by Tessa CARRASCO RN on 6/03/2019   Acute illness concerns: Asthma exacerbation  Onset: Urgent care follow up    Fever: no    Chills/Sweats: no    Headache (location?): no    Sinus Pressure:no    Conjunctivitis:  no    Ear Pain: no    Rhinorrhea: no    Congestion: no    Sore Throat: no      Cough: no    Wheeze: no    Decreased Appetite: no    Nausea: no    Vomiting: no    Diarrhea:  no    Dysuria/Freq.: no    Fatigue/Achiness: no    Sick/Strep Exposure: no      Therapies Tried and outcome: Prednisone helped and Albuterol helped and allergy medicine helps     Patient only uses Albuterol as needed. She is not on a daily maintenance inhaler and she will discuss this at her upcoming visit.     Patient reports that she has not been on a daily inhaler for her asthma in a long time.  She says that she has been using her albuterol inhaler about 3-4 days a week in the last 2 weeks.    She says that grass and pollens and allergies are what typically triggers her asthma.      Patient Active Problem List   Diagnosis     Migraine     Asthma     MS (multiple sclerosis) (H)     Anemia     Backache     Body mass index 45.0-49.9, adult (H)     Cognitive changes     Depression with anxiety     Fever postop     Uterine leiomyoma     Headache     Heavy menses     Hypersomnia with sleep apnea     Iron deficiency anemia     Leukocytosis     Postsurgical nonabsorption     Mild intermittent asthma     Morbid obesity (H)     Myalgia and myositis     Neck pain     Other dyspnea and respiratory abnormality     Pain, neuropathic     Pelvic pain in female     Personal history of allergy to latex     Post concussion syndrome     Right shoulder pain     S/P gastric bypass     S/P hysterectomy     Bariatric surgery status     Vitamin B12 deficiency     Vitamin D deficiency     Vomiting following  "gastrointestinal surgery     Hypermagnesemia     Benign essential hypertension     Neck pain on left side     Mild major depression (H)     LLQ pain     Multiple sclerosis (H)     Immunosuppression (H)     Past Surgical History:   Procedure Laterality Date     GASTRIC BYPASS  2002    \"Trouble with B12 and D\"     HYSTERECTOMY, MONO  2013    cervix gone.  fibroids.  No BSO     TONSILLECTOMY         Social History     Tobacco Use     Smoking status: Former Smoker     Types: Cigars     Smokeless tobacco: Never Used   Substance Use Topics     Alcohol use: Yes     Alcohol/week: 1.2 oz     Types: 2 Standard drinks or equivalent per week     Comment: 0-3 drinks per week     Family History   Problem Relation Age of Onset     Lupus Paternal Aunt 41     Multiple Sclerosis No family hx of          Current Outpatient Medications   Medication Sig Dispense Refill     albuterol (2.5 MG/3ML) 0.083% nebulizer solution Inhale 2.5 mg into the lungs       albuterol (VENTOLIN HFA) 108 (90 BASE) MCG/ACT inhaler Inhale 2 puffs into the lungs       baclofen (LIORESAL) 20 MG tablet Take 1 tablet (20 mg) by mouth At Bedtime  10     cholecalciferol (VITAMIN D3) 5000 UNITS TABS tablet Take 1 tablet (5,000 Units) by mouth daily       docusate sodium (COLACE) 100 MG tablet Take 1 tablet (100 mg) by mouth 2 times daily       doxycycline hyclate (VIBRA-TABS) 100 MG tablet Take 1 tablet (100 mg) by mouth 2 times daily For 7 days 14 tablet 0     Ferrous Sulfate (IRON SUPPLEMENT PO)        gabapentin (NEURONTIN) 300 MG capsule Take 3 capsules (900 mg) by mouth At Bedtime 90 capsule 3     lisinopril-hydrochlorothiazide (PRINZIDE/ZESTORETIC) 10-12.5 MG tablet TAKE 1 TABLET BY MOUTH DAILY 90 tablet 2     montelukast (SINGULAIR) 10 MG tablet Take 1 tablet (10 mg) by mouth At Bedtime 30 tablet 1     natalizumab (TYSABRI) 300 MG/15ML injection Infusion       nortriptyline (PAMELOR) 50 MG capsule Take 1 capsule (50 mg) by mouth At Bedtime 90 capsule 3     " ranitidine (ZANTAC) 150 MG tablet Take 1 tablet (150 mg) by mouth 2 times daily for 10 days 20 tablet 0     SUMAtriptan (IMITREX) 100 MG tablet Take 50 mg up to twice daily as needed for headache; separate doses by at least one hour and do not use more than 3 days/week 18 tablet 3     topiramate (TOPAMAX) 100 MG tablet Take 1 tablet (100 mg) by mouth At Bedtime (take with 50 mg to total 150 mg nightly) 60 tablet 3     topiramate (TOPAMAX) 50 MG tablet Take 1 tablet (50 mg) by mouth At Bedtime (take with 100 mg to total 150 mg nightly) 60 tablet 3     triamcinolone (KENALOG) 0.1 % cream Apply sparingly to affected area three times daily 30 g 1     valACYclovir (VALTREX) 500 MG tablet TAKE 1 TABLET BY MOUTH DAILY 90 tablet 0     Allergies   Allergen Reactions     Aspirin Difficulty breathing, Palpitations and Other (See Comments)     Adhesive Tape      Azithromycin Unknown     Latex Hives, Itching and Rash     Penicillins Cramps, GI Disturbance, Nausea and Vomiting, Rash, Swelling, Other (See Comments) and Hives     Recent Labs   Lab Test 05/07/19  1219 04/11/19  1138 04/08/19  1236  01/03/18  0840 09/08/16  1702   ALT 24 19 22   < > 20 25   CR 0.87 1.05* 1.12*   < > 1.30*  --    GFRESTIMATED 83 66 61   < > 45*  --    GFRESTBLACK >90 76 70   < > 55*  --    POTASSIUM 4.3 3.7 3.7   < > 4.5  --    TSH  --   --   --   --  2.83 1.90    < > = values in this interval not displayed.      BP Readings from Last 3 Encounters:   06/12/19 100/60   06/04/19 110/73   05/28/19 119/73    Wt Readings from Last 3 Encounters:   06/12/19 117.7 kg (259 lb 8 oz)   06/04/19 117.3 kg (258 lb 8 oz)   05/28/19 117.4 kg (258 lb 12.8 oz)         Reviewed and updated as needed this visit by Provider  Tobacco  Allergies  Meds  Problems  Med Hx  Surg Hx  Fam Hx         Review of Systems   ROS COMP: Constitutional, HEENT, cardiovascular, pulmonary, GI, , musculoskeletal, neuro, skin, endocrine and psych systems are negative, except as  "otherwise noted.      Objective    /60   Pulse 114   Temp 98.2  F (36.8  C) (Oral)   Ht 1.664 m (5' 5.5\")   Wt 117.7 kg (259 lb 8 oz)   SpO2 99%   Breastfeeding? No   BMI 42.53 kg/m    Body mass index is 42.53 kg/m .  Physical Exam   GENERAL: healthy, alert and no distress  EYES: Eyes grossly normal to inspection, PERRL and conjunctivae and sclerae normal  HENT: ear canals and TM's normal, nose and mouth without ulcers or lesions  RESP: lungs clear to auscultation - no rales, rhonchi or wheezes, transient crackles in the left lung  CV: regular rate and rhythm, normal S1 S2, no S3 or S4, no murmur, click or rub, no peripheral edema and peripheral pulses strong  MS: no gross musculoskeletal defects noted, no edema  NEURO: Normal strength and tone, mentation intact and speech normal  PSYCH: mentation appears normal, affect normal/bright    Diagnostic Test Results:  Labs reviewed in Epic        Assessment & Plan     1. Mild intermittent extrinsic asthma without complication    - montelukast (SINGULAIR) 10 MG tablet; Take 1 tablet (10 mg) by mouth At Bedtime  Dispense: 30 tablet; Refill: 1    2. Bronchitis    - doxycycline hyclate (VIBRA-TABS) 100 MG tablet; Take 1 tablet (100 mg) by mouth 2 times daily For 7 days  Dispense: 14 tablet; Refill: 0    3. MS (multiple sclerosis) (H)      Will have patient start treatment for bronchitis as symptoms are still present and she is immunocompromised.    Also, patient to start Singulair for allergy induced asthma.  She has been advised to take Zyrtec in the am and to take the Singulair in the pm.       BMI:   Estimated body mass index is 42.53 kg/m  as calculated from the following:    Height as of this encounter: 1.664 m (5' 5.5\").    Weight as of this encounter: 117.7 kg (259 lb 8 oz).   Weight management plan: Discussed healthy diet and exercise guidelines        See Patient Instructions    Return in about 2 weeks (around 6/26/2019) for medication re-check, please " be seen sooner if needed.    -- I have discussed the patient's diagnosis, and my plan of treatment with the patient and/or family. Patient is aware to followup if symptoms do not improve.  Patient has been advised to be seen sooner or seek more immediate care if symptoms change or worsen.  Patient agrees with and understands the plan today.     Kalee aVlero PA-C  [facility]

## 2019-06-12 ENCOUNTER — OFFICE VISIT (OUTPATIENT)
Dept: FAMILY MEDICINE | Facility: CLINIC | Age: 44
End: 2019-06-12
Payer: COMMERCIAL

## 2019-06-12 VITALS
DIASTOLIC BLOOD PRESSURE: 60 MMHG | BODY MASS INDEX: 41.71 KG/M2 | HEART RATE: 114 BPM | OXYGEN SATURATION: 99 % | SYSTOLIC BLOOD PRESSURE: 100 MMHG | WEIGHT: 259.5 LBS | HEIGHT: 66 IN | TEMPERATURE: 98.2 F

## 2019-06-12 DIAGNOSIS — G35 MS (MULTIPLE SCLEROSIS) (H): ICD-10-CM

## 2019-06-12 DIAGNOSIS — J40 BRONCHITIS: ICD-10-CM

## 2019-06-12 DIAGNOSIS — J45.20 MILD INTERMITTENT EXTRINSIC ASTHMA WITHOUT COMPLICATION: Primary | ICD-10-CM

## 2019-06-12 PROCEDURE — 99214 OFFICE O/P EST MOD 30 MIN: CPT | Performed by: PHYSICIAN ASSISTANT

## 2019-06-12 RX ORDER — DOXYCYCLINE HYCLATE 100 MG
100 TABLET ORAL 2 TIMES DAILY
Qty: 14 TABLET | Refills: 0 | Status: SHIPPED | OUTPATIENT
Start: 2019-06-12 | End: 2019-07-08

## 2019-06-12 RX ORDER — MONTELUKAST SODIUM 10 MG/1
10 TABLET ORAL AT BEDTIME
Qty: 30 TABLET | Refills: 1 | Status: SHIPPED | OUTPATIENT
Start: 2019-06-12 | End: 2019-07-08

## 2019-06-12 ASSESSMENT — MIFFLIN-ST. JEOR: SCORE: 1850.89

## 2019-06-13 ASSESSMENT — ASTHMA QUESTIONNAIRES: ACT_TOTALSCORE: 17

## 2019-07-02 ENCOUNTER — HOSPITAL ENCOUNTER (OUTPATIENT)
Facility: CLINIC | Age: 44
Setting detail: SPECIMEN
Discharge: HOME | End: 2019-07-02
Attending: PSYCHIATRY & NEUROLOGY | Admitting: PSYCHIATRY & NEUROLOGY
Payer: COMMERCIAL

## 2019-07-02 ENCOUNTER — TRANSFERRED RECORDS (OUTPATIENT)
Dept: HEALTH INFORMATION MANAGEMENT | Facility: CLINIC | Age: 44
End: 2019-07-02

## 2019-07-02 ENCOUNTER — INFUSION THERAPY VISIT (OUTPATIENT)
Dept: INFUSION THERAPY | Facility: CLINIC | Age: 44
End: 2019-07-02
Attending: PHYSICIAN ASSISTANT
Payer: COMMERCIAL

## 2019-07-02 VITALS
DIASTOLIC BLOOD PRESSURE: 79 MMHG | HEART RATE: 75 BPM | OXYGEN SATURATION: 100 % | TEMPERATURE: 97.9 F | SYSTOLIC BLOOD PRESSURE: 119 MMHG

## 2019-07-02 DIAGNOSIS — G35 MULTIPLE SCLEROSIS (H): Primary | ICD-10-CM

## 2019-07-02 DIAGNOSIS — G35 MS (MULTIPLE SCLEROSIS) (H): ICD-10-CM

## 2019-07-02 LAB
ALBUMIN SERPL-MCNC: 3.8 G/DL (ref 3.4–5)
ALP SERPL-CCNC: 65 U/L (ref 40–150)
ALT SERPL W P-5'-P-CCNC: 22 U/L (ref 0–50)
AST SERPL W P-5'-P-CCNC: 24 U/L (ref 0–45)
BILIRUB DIRECT SERPL-MCNC: <0.1 MG/DL (ref 0–0.2)
BILIRUB SERPL-MCNC: 0.4 MG/DL (ref 0.2–1.3)
PROT SERPL-MCNC: 7.4 G/DL (ref 6.8–8.8)

## 2019-07-02 PROCEDURE — 36415 COLL VENOUS BLD VENIPUNCTURE: CPT

## 2019-07-02 PROCEDURE — 25000128 H RX IP 250 OP 636: Performed by: PSYCHIATRY & NEUROLOGY

## 2019-07-02 PROCEDURE — 96365 THER/PROPH/DIAG IV INF INIT: CPT

## 2019-07-02 PROCEDURE — 25800030 ZZH RX IP 258 OP 636: Performed by: PSYCHIATRY & NEUROLOGY

## 2019-07-02 PROCEDURE — 40000975 ZZHCL STATISTIC JC VIR AB INDEX INHIB: Performed by: PSYCHIATRY & NEUROLOGY

## 2019-07-02 PROCEDURE — 80076 HEPATIC FUNCTION PANEL: CPT | Performed by: PSYCHIATRY & NEUROLOGY

## 2019-07-02 RX ADMIN — NATALIZUMAB 300 MG: 300 INJECTION INTRAVENOUS at 13:10

## 2019-07-02 RX ADMIN — SODIUM CHLORIDE 250 ML: 9 INJECTION, SOLUTION INTRAVENOUS at 14:10

## 2019-07-02 NOTE — PROGRESS NOTES
Infusion Nursing Note:  Josefina lAdrich presents today for Tysabri.    Patient seen by provider today: No   present during visit today: Not Applicable.    Note: Pt reports a mild headache with each Tysabri infusion she's had so far.  Starting off infusion today with a headache already.  Reports some feelings of chest heaviness with past two infusions, but does not last long and it was less noticeable with most recent infusion.    Intravenous Access:  Labs drawn without difficulty--SABINO Virus and Hepatic panel drawn.  To be faxed to Dr Chong as ordered  Peripheral IV placed.    Treatment Conditions:  Tysabri pre-infusion checklist completed via touch program.      Post Infusion Assessment:  Patient tolerated infusion without incident.  Patient observed for 60 minutes post Tysabri per protocol.  Blood return noted pre and post infusion.  Site patent and intact, free from redness, edema or discomfort.  No evidence of extravasations.  Access discontinued per protocol.       Discharge Plan:   Discharge instructions reviewed with: Patient.  Patient and/or family verbalized understanding of discharge instructions and all questions answered.  AVS to patient via [employer].  Patient will return 7/30/19 for next dose of Tysabri for next appointment.   Patient discharged in stable condition accompanied by: .  Departure Mode: Ambulatory.    Megan Higuera RN

## 2019-07-08 ENCOUNTER — MYC MEDICAL ADVICE (OUTPATIENT)
Dept: FAMILY MEDICINE | Facility: CLINIC | Age: 44
End: 2019-07-08

## 2019-07-08 ENCOUNTER — OFFICE VISIT (OUTPATIENT)
Dept: FAMILY MEDICINE | Facility: CLINIC | Age: 44
End: 2019-07-08
Payer: COMMERCIAL

## 2019-07-08 VITALS
WEIGHT: 251 LBS | OXYGEN SATURATION: 99 % | TEMPERATURE: 97.9 F | BODY MASS INDEX: 40.34 KG/M2 | HEIGHT: 66 IN | HEART RATE: 101 BPM | SYSTOLIC BLOOD PRESSURE: 118 MMHG | DIASTOLIC BLOOD PRESSURE: 78 MMHG

## 2019-07-08 DIAGNOSIS — B96.89 BACTERIAL VAGINOSIS: Primary | ICD-10-CM

## 2019-07-08 DIAGNOSIS — N76.0 BACTERIAL VAGINOSIS: Primary | ICD-10-CM

## 2019-07-08 DIAGNOSIS — N89.8 VAGINAL DISCHARGE: ICD-10-CM

## 2019-07-08 DIAGNOSIS — J45.30 MILD PERSISTENT ASTHMA, UNSPECIFIED WHETHER COMPLICATED: ICD-10-CM

## 2019-07-08 DIAGNOSIS — Z11.3 SCREEN FOR STD (SEXUALLY TRANSMITTED DISEASE): ICD-10-CM

## 2019-07-08 LAB
LAB SCANNED RESULT: NORMAL
SPECIMEN SOURCE: ABNORMAL
WET PREP SPEC: ABNORMAL

## 2019-07-08 PROCEDURE — 99214 OFFICE O/P EST MOD 30 MIN: CPT | Performed by: FAMILY MEDICINE

## 2019-07-08 PROCEDURE — 86780 TREPONEMA PALLIDUM: CPT | Performed by: FAMILY MEDICINE

## 2019-07-08 PROCEDURE — 87591 N.GONORRHOEAE DNA AMP PROB: CPT | Performed by: FAMILY MEDICINE

## 2019-07-08 PROCEDURE — 87210 SMEAR WET MOUNT SALINE/INK: CPT | Performed by: FAMILY MEDICINE

## 2019-07-08 PROCEDURE — 87389 HIV-1 AG W/HIV-1&-2 AB AG IA: CPT | Performed by: FAMILY MEDICINE

## 2019-07-08 PROCEDURE — 36415 COLL VENOUS BLD VENIPUNCTURE: CPT | Performed by: FAMILY MEDICINE

## 2019-07-08 PROCEDURE — 87491 CHLMYD TRACH DNA AMP PROBE: CPT | Performed by: FAMILY MEDICINE

## 2019-07-08 RX ORDER — MONTELUKAST SODIUM 10 MG/1
10 TABLET ORAL AT BEDTIME
Qty: 90 TABLET | Refills: 3 | Status: SHIPPED | OUTPATIENT
Start: 2019-07-08 | End: 2020-07-28

## 2019-07-08 RX ORDER — ALBUTEROL SULFATE 0.83 MG/ML
2.5 SOLUTION RESPIRATORY (INHALATION) EVERY 6 HOURS PRN
Qty: 1 BOX | Refills: 3 | Status: SHIPPED | OUTPATIENT
Start: 2019-07-08 | End: 2020-12-11

## 2019-07-08 RX ORDER — METRONIDAZOLE 500 MG/1
500 TABLET ORAL 2 TIMES DAILY
Qty: 14 TABLET | Refills: 0 | Status: SHIPPED | OUTPATIENT
Start: 2019-07-08 | End: 2019-07-23

## 2019-07-08 ASSESSMENT — MIFFLIN-ST. JEOR: SCORE: 1812.34

## 2019-07-08 NOTE — PROGRESS NOTES
"Subjective     Josefina Aldrich is a 43 year old female who presents to clinic today for the following health issues:    HPI   Hypertension Follow-up      Do you check your blood pressure regularly outside of the clinic? No     Are you following a low salt diet? Yes    Are your blood pressures ever more than 140 on the top number (systolic) OR more   than 90 on the bottom number (diastolic), for example 140/90? No  Asthma Follow-Up    Was ACT completed today?    Yes    ACT Total Scores 6/12/2019   ACT TOTAL SCORE (Goal Greater than or Equal to 20) 17   In the past 12 months, how many times did you visit the emergency room for your asthma without being admitted to the hospital? 0   In the past 12 months, how many times were you hospitalized overnight because of your asthma? 0       How many days per week do you miss taking your asthma controller medication?  daily    Please describe any recent triggers for your asthma: hot weather    Have you had any Emergency Room Visits, Urgent Care Visits, or Hospital Admissions since your last office visit?  No        Pt would like a neb machine to use at home      Reviewed and updated as needed this visit by provider:  Surg Hx         Review of Systems   Constitutional, HEENT, cardiovascular, pulmonary, GI, , musculoskeletal, neuro, skin, endocrine and psych systems are negative, except as otherwise noted.          Objective     /78   Pulse 101   Temp 97.9  F (36.6  C) (Oral)   Ht 1.664 m (5' 5.5\")   Wt 113.9 kg (251 lb)   SpO2 99%   BMI 41.13 kg/m   Body mass index is 41.13 kg/m .  Physical Exam   GENERAL: healthy, alert, well nourished, well hydrated, no distress  EYES: Eyes grossly normal to inspection, extraocular movements - intact, and PERRL  HENT: ear canals- normal; TMs- normal; Nose- normal; Mouth- no ulcers, no lesions  NECK: no tenderness, no adenopathy, no asymmetry, no masses, no stiffness; thyroid- normal to palpation  RESP: lungs clear to " auscultation - no rales, no rhonchi, no wheezes  CV: regular rates and rhythm, normal S1 S2, no S3 or S4 and no murmur, no click or rub -  ABDOMEN: soft, nonspecific lower tenderness, no  hepatosplenomegaly, no masses, normal bowel sounds  MS: extremities- no gross deformities noted, no edema  SKIN: no suspicious lesions, no rashes  NEURO: strength and tone- normal, sensory exam- grossly normal, mentation- intact, speech- normal, reflexes- symmetric  BACK: no CVA tenderness, no paralumbar tenderness  PSYCH: Alert and oriented times 3; speech- coherent , normal rate and volume; able to articulate logical thoughts, able to abstract reason, no tangential thoughts, no hallucinations or delusions, affect- normal  LYMPHATICS: ant. cervical- normal, post. cervical- normal, axillary- normal, supraclavicular- normal, inguinal- normal    Diagnostic Test Results  Results for orders placed or performed in visit on 07/08/19 (from the past 24 hour(s))   Wet prep   Result Value Ref Range    Specimen Description Vagina     Wet Prep Clue cells seen (A)     Wet Prep No Trichomonas seen     Wet Prep No yeast seen     Wet Prep WBC'S seen      Labs drawn and in process:   Unresulted Labs Ordered in the Past 30 Days of this Admission     Date and Time Order Name Status Description    7/8/2019 1649 TREPONEMA ABS W REFLEX TO RPR AND TITER In process     7/8/2019 1649 HIV ANTIGEN ANTIBODY COMBO In process     7/8/2019 1605 NEISSERIA GONORRHOEAE PCR In process     7/8/2019 1605 CHLAMYDIA TRACHOMATIS PCR In process                 Assessment & Plan     Josefina was seen today for recheck medication and std.    Diagnoses and all orders for this visit:    Bacterial vaginosis  -     metroNIDAZOLE (FLAGYL) 500 MG tablet; Take 1 tablet (500 mg) by mouth 2 times daily for 7 days    Mild persistent asthma, unspecified whether complicated - partial improvement but still having symptoms -  Continue singular and QVAR, albuterol prn   -     montelukast  "(SINGULAIR) 10 MG tablet; Take 1 tablet (10 mg) by mouth At Bedtime  -     order for DME; Equipment being ordered: Nebulizer with supplies  -     beclomethasone HFA (QVAR REDIHALER) 80 MCG/ACT inhaler; Inhale 1-2 puffs into the lungs 2 times daily  -     albuterol (PROVENTIL) (2.5 MG/3ML) 0.083% neb solution; Take 1 vial (2.5 mg) by nebulization every 6 hours as needed for shortness of breath / dyspnea or wheezing    Vaginal discharge  -     Wet prep    Screen for STD (sexually transmitted disease)  -     Chlamydia trachomatis PCR  -     Neisseria gonorrhoeae PCR  -     Wet prep  -     HIV Antigen Antibody Combo  -     Treponema Abs w Reflex to RPR and Titer           BMI:   Estimated body mass index is 42.53 kg/m  as calculated from the following:    Height as of 6/12/19: 1.664 m (5' 5.5\").    Weight as of 6/12/19: 117.7 kg (259 lb 8 oz).   Weight management plan: Discussed healthy diet and exercise guidelines        See Patient Instructions    No follow-ups on file.          Stefan Lee MD   Pager - [phone]  [facility]    "

## 2019-07-09 ENCOUNTER — TELEPHONE (OUTPATIENT)
Dept: FAMILY MEDICINE | Facility: CLINIC | Age: 44
End: 2019-07-09

## 2019-07-09 DIAGNOSIS — J45.30 MILD PERSISTENT ASTHMA, UNSPECIFIED WHETHER COMPLICATED: Primary | ICD-10-CM

## 2019-07-09 LAB — HIV 1+2 AB+HIV1 P24 AG SERPL QL IA: NONREACTIVE

## 2019-07-09 NOTE — TELEPHONE ENCOUNTER
Pulmicort sent to pharmacy.  Please see if this is covered by her insurance and advise pt of change.      Miya Crump MS, PA-C

## 2019-07-09 NOTE — TELEPHONE ENCOUNTER
Received notice from [facility]([phone], [phone]) that Qvar RediHaler medication in dose 80MCG/ACT is not covered by patients insurance.     Routing to provider to see if provider would like a PA started, and if so any reasoning that should be put in PA. Or if provider would like to change therapy, please send in a new prescription.     Routing to Prescribing provider    Susan Ahmadi MA    covermymeds key: [id number]

## 2019-07-10 LAB
C TRACH DNA SPEC QL NAA+PROBE: NEGATIVE
N GONORRHOEA DNA SPEC QL NAA+PROBE: NEGATIVE
SPECIMEN SOURCE: NORMAL
SPECIMEN SOURCE: NORMAL
T PALLIDUM AB SER QL: NONREACTIVE

## 2019-07-10 NOTE — TELEPHONE ENCOUNTER
Pt was advised of message below, Pulmicort fill. Patient stated an understanding and agreed with plan.    Pt inquired about Lab results, noted not all are in yet.    Dion Gongora RN   [facility]

## 2019-07-10 NOTE — TELEPHONE ENCOUNTER
Spoke with pharmacy, they stated that patients inhaler would be covered if the sig was changed to 2 puffs daily instead of twice daily.     Routing to  for script with new sig to be sent.    Susan Ahmadi MA

## 2019-07-10 NOTE — TELEPHONE ENCOUNTER
Attempt # 1    Called #   Telephone Information:   Mobile [phone]     Left a non detailed VM     Dion Gongora RN   [facility]

## 2019-07-10 NOTE — TELEPHONE ENCOUNTER
Attempt #1  Called patient @ [phone] - Left a non-detailed message to call back and speak with any triage nurse.    Susannah Selby RN  [facility]

## 2019-07-11 NOTE — RESULT ENCOUNTER NOTE
Dear Claudia,    Here is a summary of your recent test results:  -Chlamydia and gonnohrea tests are normal.  -No signs of HIV by lab tests.   -No signs of syphilis.      For additional lab test information, labtestsonline.org is an excellent reference.    In addition, here is a list of due or overdue Health Maintenance reminders:  Preventive Care Visit due     Please call us at [phone] (or use [place]) to address the above recommendations if needed.           Thank you very much for trusting me and [facility].     Healthy regards,  Stefan Lee MD

## 2019-07-23 ENCOUNTER — HOSPITAL ENCOUNTER (OUTPATIENT)
Facility: CLINIC | Age: 44
Setting detail: OBSERVATION
Discharge: HOME | End: 2019-07-24
Attending: EMERGENCY MEDICINE | Admitting: INTERNAL MEDICINE
Payer: COMMERCIAL

## 2019-07-23 ENCOUNTER — APPOINTMENT (OUTPATIENT)
Dept: MRI IMAGING | Facility: CLINIC | Age: 44
End: 2019-07-23
Attending: EMERGENCY MEDICINE
Payer: COMMERCIAL

## 2019-07-23 DIAGNOSIS — M62.81 GENERALIZED MUSCLE WEAKNESS: ICD-10-CM

## 2019-07-23 DIAGNOSIS — M79.10 MYALGIA: ICD-10-CM

## 2019-07-23 DIAGNOSIS — G35 RELAPSING REMITTING MULTIPLE SCLEROSIS (H): Primary | ICD-10-CM

## 2019-07-23 DIAGNOSIS — M54.2 NECK PAIN: ICD-10-CM

## 2019-07-23 DIAGNOSIS — G35 MULTIPLE SCLEROSIS (H): ICD-10-CM

## 2019-07-23 DIAGNOSIS — R29.898 WEAKNESS OF RIGHT LOWER EXTREMITY: ICD-10-CM

## 2019-07-23 DIAGNOSIS — R51.9 NONINTRACTABLE HEADACHE, UNSPECIFIED CHRONICITY PATTERN, UNSPECIFIED HEADACHE TYPE: ICD-10-CM

## 2019-07-23 LAB
ALBUMIN SERPL-MCNC: 4.1 G/DL (ref 3.4–5)
ALBUMIN UR-MCNC: NEGATIVE MG/DL
ALP SERPL-CCNC: 81 U/L (ref 40–150)
ALT SERPL W P-5'-P-CCNC: 29 U/L (ref 0–50)
ANION GAP SERPL CALCULATED.3IONS-SCNC: 8 MMOL/L (ref 3–14)
APPEARANCE UR: CLEAR
AST SERPL W P-5'-P-CCNC: 25 U/L (ref 0–45)
BASOPHILS # BLD AUTO: 0.1 10E9/L (ref 0–0.2)
BASOPHILS NFR BLD AUTO: 0.6 %
BILIRUB SERPL-MCNC: 0.3 MG/DL (ref 0.2–1.3)
BILIRUB UR QL STRIP: NEGATIVE
BUN SERPL-MCNC: 11 MG/DL (ref 7–30)
CALCIUM SERPL-MCNC: 9.2 MG/DL (ref 8.5–10.1)
CHLORIDE SERPL-SCNC: 109 MMOL/L (ref 94–109)
CO2 SERPL-SCNC: 20 MMOL/L (ref 20–32)
COLOR UR AUTO: ABNORMAL
CREAT SERPL-MCNC: 0.96 MG/DL (ref 0.52–1.04)
DIFFERENTIAL METHOD BLD: ABNORMAL
EOSINOPHIL # BLD AUTO: 0.3 10E9/L (ref 0–0.7)
EOSINOPHIL NFR BLD AUTO: 2 %
ERYTHROCYTE [DISTWIDTH] IN BLOOD BY AUTOMATED COUNT: 13.5 % (ref 10–15)
GFR SERPL CREATININE-BSD FRML MDRD: 72 ML/MIN/{1.73_M2}
GLUCOSE BLDC GLUCOMTR-MCNC: 126 MG/DL (ref 70–99)
GLUCOSE SERPL-MCNC: 122 MG/DL (ref 70–99)
GLUCOSE UR STRIP-MCNC: NEGATIVE MG/DL
HCT VFR BLD AUTO: 39.2 % (ref 35–47)
HGB BLD-MCNC: 12.3 G/DL (ref 11.7–15.7)
HGB UR QL STRIP: NEGATIVE
IMM GRANULOCYTES # BLD: 0.1 10E9/L (ref 0–0.4)
IMM GRANULOCYTES NFR BLD: 0.3 %
KETONES UR STRIP-MCNC: ABNORMAL MG/DL
LEUKOCYTE ESTERASE UR QL STRIP: NEGATIVE
LYMPHOCYTES # BLD AUTO: 6.4 10E9/L (ref 0.8–5.3)
LYMPHOCYTES NFR BLD AUTO: 43.5 %
MCH RBC QN AUTO: 29.9 PG (ref 26.5–33)
MCHC RBC AUTO-ENTMCNC: 31.4 G/DL (ref 31.5–36.5)
MCV RBC AUTO: 95 FL (ref 78–100)
MONOCYTES # BLD AUTO: 0.8 10E9/L (ref 0–1.3)
MONOCYTES NFR BLD AUTO: 5.3 %
NEUTROPHILS # BLD AUTO: 7.1 10E9/L (ref 1.6–8.3)
NEUTROPHILS NFR BLD AUTO: 48.3 %
NITRATE UR QL: NEGATIVE
NRBC # BLD AUTO: 0.1 10*3/UL
NRBC BLD AUTO-RTO: 1 /100
PH UR STRIP: 5.5 PH (ref 5–7)
PLATELET # BLD AUTO: 366 10E9/L (ref 150–450)
PLATELET # BLD EST: ABNORMAL 10*3/UL
POTASSIUM SERPL-SCNC: 3.6 MMOL/L (ref 3.4–5.3)
PROT SERPL-MCNC: 8.5 G/DL (ref 6.8–8.8)
RBC # BLD AUTO: 4.11 10E12/L (ref 3.8–5.2)
RBC #/AREA URNS AUTO: <1 /HPF (ref 0–2)
RBC MORPH BLD: ABNORMAL
SODIUM SERPL-SCNC: 137 MMOL/L (ref 133–144)
SOURCE: ABNORMAL
SP GR UR STRIP: 1.03 (ref 1–1.03)
SQUAMOUS #/AREA URNS AUTO: 3 /HPF (ref 0–1)
UROBILINOGEN UR STRIP-MCNC: NORMAL MG/DL (ref 0–2)
WBC # BLD AUTO: 14.7 10E9/L (ref 4–11)
WBC #/AREA URNS AUTO: <1 /HPF (ref 0–5)

## 2019-07-23 PROCEDURE — 25000128 H RX IP 250 OP 636: Performed by: INTERNAL MEDICINE

## 2019-07-23 PROCEDURE — 70553 MRI BRAIN STEM W/O & W/DYE: CPT

## 2019-07-23 PROCEDURE — 96375 TX/PRO/DX INJ NEW DRUG ADDON: CPT

## 2019-07-23 PROCEDURE — 25000128 H RX IP 250 OP 636: Performed by: EMERGENCY MEDICINE

## 2019-07-23 PROCEDURE — 25500064 ZZH RX 255 OP 636: Performed by: EMERGENCY MEDICINE

## 2019-07-23 PROCEDURE — 25000132 ZZH RX MED GY IP 250 OP 250 PS 637: Performed by: INTERNAL MEDICINE

## 2019-07-23 PROCEDURE — 93005 ELECTROCARDIOGRAM TRACING: CPT

## 2019-07-23 PROCEDURE — 96374 THER/PROPH/DIAG INJ IV PUSH: CPT | Mod: 59

## 2019-07-23 PROCEDURE — A9585 GADOBUTROL INJECTION: HCPCS | Performed by: EMERGENCY MEDICINE

## 2019-07-23 PROCEDURE — 99285 EMERGENCY DEPT VISIT HI MDM: CPT | Mod: 25

## 2019-07-23 PROCEDURE — 99220 ZZC INITIAL OBSERVATION CARE,LEVL III: CPT | Performed by: INTERNAL MEDICINE

## 2019-07-23 PROCEDURE — G0378 HOSPITAL OBSERVATION PER HR: HCPCS

## 2019-07-23 PROCEDURE — 85025 COMPLETE CBC W/AUTO DIFF WBC: CPT | Performed by: EMERGENCY MEDICINE

## 2019-07-23 PROCEDURE — 00000146 ZZHCL STATISTIC GLUCOSE BY METER IP

## 2019-07-23 PROCEDURE — 80053 COMPREHEN METABOLIC PANEL: CPT | Performed by: EMERGENCY MEDICINE

## 2019-07-23 PROCEDURE — 81001 URINALYSIS AUTO W/SCOPE: CPT | Performed by: EMERGENCY MEDICINE

## 2019-07-23 PROCEDURE — 25800030 ZZH RX IP 258 OP 636: Performed by: INTERNAL MEDICINE

## 2019-07-23 PROCEDURE — 25000132 ZZH RX MED GY IP 250 OP 250 PS 637: Performed by: EMERGENCY MEDICINE

## 2019-07-23 RX ORDER — ONDANSETRON 4 MG/1
4 TABLET, ORALLY DISINTEGRATING ORAL EVERY 6 HOURS PRN
Status: DISCONTINUED | OUTPATIENT
Start: 2019-07-23 | End: 2019-07-24 | Stop reason: HOSPADM

## 2019-07-23 RX ORDER — ACETAMINOPHEN 500 MG
1000 TABLET ORAL EVERY 6 HOURS PRN
Status: DISCONTINUED | OUTPATIENT
Start: 2019-07-23 | End: 2019-07-24 | Stop reason: HOSPADM

## 2019-07-23 RX ORDER — LISINOPRIL/HYDROCHLOROTHIAZIDE 10-12.5 MG
1 TABLET ORAL DAILY
Status: DISCONTINUED | OUTPATIENT
Start: 2019-07-24 | End: 2019-07-24 | Stop reason: HOSPADM

## 2019-07-23 RX ORDER — NALOXONE HYDROCHLORIDE 0.4 MG/ML
.1-.4 INJECTION, SOLUTION INTRAMUSCULAR; INTRAVENOUS; SUBCUTANEOUS
Status: DISCONTINUED | OUTPATIENT
Start: 2019-07-23 | End: 2019-07-24 | Stop reason: HOSPADM

## 2019-07-23 RX ORDER — GABAPENTIN 300 MG/1
900 CAPSULE ORAL AT BEDTIME
Status: DISCONTINUED | OUTPATIENT
Start: 2019-07-23 | End: 2019-07-24 | Stop reason: HOSPADM

## 2019-07-23 RX ORDER — ACETAMINOPHEN 325 MG/1
650 TABLET ORAL ONCE
Status: COMPLETED | OUTPATIENT
Start: 2019-07-23 | End: 2019-07-23

## 2019-07-23 RX ORDER — ALBUTEROL SULFATE 0.83 MG/ML
2.5 SOLUTION RESPIRATORY (INHALATION) EVERY 6 HOURS PRN
Status: DISCONTINUED | OUTPATIENT
Start: 2019-07-23 | End: 2019-07-24 | Stop reason: HOSPADM

## 2019-07-23 RX ORDER — SUMATRIPTAN 50 MG/1
50 TABLET, FILM COATED ORAL DAILY PRN
Status: DISCONTINUED | OUTPATIENT
Start: 2019-07-23 | End: 2019-07-24 | Stop reason: HOSPADM

## 2019-07-23 RX ORDER — MONTELUKAST SODIUM 10 MG/1
10 TABLET ORAL AT BEDTIME
Status: DISCONTINUED | OUTPATIENT
Start: 2019-07-23 | End: 2019-07-24 | Stop reason: HOSPADM

## 2019-07-23 RX ORDER — BACLOFEN 10 MG/1
20 TABLET ORAL AT BEDTIME
Status: DISCONTINUED | OUTPATIENT
Start: 2019-07-23 | End: 2019-07-24 | Stop reason: HOSPADM

## 2019-07-23 RX ORDER — ONDANSETRON 2 MG/ML
4 INJECTION INTRAMUSCULAR; INTRAVENOUS ONCE
Status: COMPLETED | OUTPATIENT
Start: 2019-07-23 | End: 2019-07-23

## 2019-07-23 RX ORDER — ONDANSETRON 2 MG/ML
4 INJECTION INTRAMUSCULAR; INTRAVENOUS EVERY 6 HOURS PRN
Status: DISCONTINUED | OUTPATIENT
Start: 2019-07-23 | End: 2019-07-24 | Stop reason: HOSPADM

## 2019-07-23 RX ORDER — DIPHENHYDRAMINE HYDROCHLORIDE 50 MG/ML
25 INJECTION INTRAMUSCULAR; INTRAVENOUS ONCE
Status: COMPLETED | OUTPATIENT
Start: 2019-07-23 | End: 2019-07-23

## 2019-07-23 RX ORDER — TRIAMCINOLONE ACETONIDE 1 MG/G
CREAM TOPICAL 3 TIMES DAILY PRN
COMMUNITY
End: 2021-06-15

## 2019-07-23 RX ORDER — METOCLOPRAMIDE HYDROCHLORIDE 5 MG/ML
5 INJECTION INTRAMUSCULAR; INTRAVENOUS ONCE
Status: DISCONTINUED | OUTPATIENT
Start: 2019-07-23 | End: 2019-07-23

## 2019-07-23 RX ORDER — VALACYCLOVIR HYDROCHLORIDE 500 MG/1
500 TABLET, FILM COATED ORAL DAILY
Status: DISCONTINUED | OUTPATIENT
Start: 2019-07-24 | End: 2019-07-24 | Stop reason: HOSPADM

## 2019-07-23 RX ORDER — METOCLOPRAMIDE HYDROCHLORIDE 5 MG/ML
10 INJECTION INTRAMUSCULAR; INTRAVENOUS ONCE
Status: COMPLETED | OUTPATIENT
Start: 2019-07-23 | End: 2019-07-23

## 2019-07-23 RX ORDER — GADOBUTROL 604.72 MG/ML
15 INJECTION INTRAVENOUS ONCE
Status: COMPLETED | OUTPATIENT
Start: 2019-07-23 | End: 2019-07-23

## 2019-07-23 RX ORDER — NORTRIPTYLINE HYDROCHLORIDE 50 MG/1
50 CAPSULE ORAL AT BEDTIME
Status: DISCONTINUED | OUTPATIENT
Start: 2019-07-23 | End: 2019-07-24 | Stop reason: HOSPADM

## 2019-07-23 RX ADMIN — GABAPENTIN 900 MG: 300 CAPSULE ORAL at 22:26

## 2019-07-23 RX ADMIN — NORTRIPTYLINE HYDROCHLORIDE 50 MG: 50 CAPSULE ORAL at 22:26

## 2019-07-23 RX ADMIN — SODIUM CHLORIDE 500 ML: 9 INJECTION, SOLUTION INTRAVENOUS at 16:48

## 2019-07-23 RX ADMIN — METOCLOPRAMIDE 10 MG: 5 INJECTION, SOLUTION INTRAMUSCULAR; INTRAVENOUS at 16:48

## 2019-07-23 RX ADMIN — DIPHENHYDRAMINE HYDROCHLORIDE 25 MG: 50 INJECTION, SOLUTION INTRAMUSCULAR; INTRAVENOUS at 16:48

## 2019-07-23 RX ADMIN — MONTELUKAST 10 MG: 10 TABLET, FILM COATED ORAL at 22:26

## 2019-07-23 RX ADMIN — TOPIRAMATE 150 MG: 50 TABLET ORAL at 22:26

## 2019-07-23 RX ADMIN — ACETAMINOPHEN 650 MG: 325 TABLET, FILM COATED ORAL at 16:48

## 2019-07-23 RX ADMIN — GADOBUTROL 11 ML: 604.72 INJECTION INTRAVENOUS at 17:38

## 2019-07-23 RX ADMIN — SODIUM CHLORIDE 1000 MG: 9 INJECTION, SOLUTION INTRAVENOUS at 22:25

## 2019-07-23 RX ADMIN — ONDANSETRON HYDROCHLORIDE 4 MG: 2 INJECTION, SOLUTION INTRAMUSCULAR; INTRAVENOUS at 16:47

## 2019-07-23 RX ADMIN — BACLOFEN 20 MG: 10 TABLET ORAL at 22:26

## 2019-07-23 ASSESSMENT — ENCOUNTER SYMPTOMS
WEAKNESS: 1
NUMBNESS: 1
CONSTIPATION: 0
DIARRHEA: 0
DYSURIA: 0
FATIGUE: 1
FREQUENCY: 0
HEADACHES: 1
BACK PAIN: 1
HEMATURIA: 0

## 2019-07-23 ASSESSMENT — MIFFLIN-ST. JEOR: SCORE: 1804.85

## 2019-07-23 NOTE — ED PROVIDER NOTES
History     Chief Complaint:  Eye Problem and Headache    HPI:   The history is provided by the patient.      Josefina Aldrich is a 44 year old female with a history of hypertension, pre-diabetes, relapsing remitting multiple sclerosis, immunosuppression status, and migraines who presents with progressively worsening weakness, headache, and pain from her head to her lumbar spine over the past few days, along with numbness, in her hands and feet, blurred vision, and lightheadedness beginning today, prompting her to visit the ED. The patient describes these symptoms as an MS flare up. She reports left leg pain and right leg weakness over the past few days, that has traveled to her spine. Upon waking today, she endorses fatigue and full body aches. Starting 3 hours ago she developed light headedness, felt flushed, had bilateral blurred vision, and had numbness in her hands and feet, so she visited [facility] ED. While there, she reports worsening of her symptoms and a long wait time, so she left and came here. While here, she endorses continued blurriness in her right eye, right leg weakness, back pain, migraine, and a cold sensation in her hands and feet. She also reports losing her balance 1 day ago and fell on her left arm. She also describes baseline strength in her right thigh 2 weeks ago. She notes her last MS flare up was a few months ago and that she had leg spasms during this episode. She denies recently hitting her head or loss of consciousness. She also denies the possibility of pregnancy or any urinary or bowel changes. The patient regularly sees Dr. Chong, Neurology.     Allergies:  Aspirin  Adhesive tape  Azithromycin  Latex  Penicillins     Medications:    Albuterol  Baclofen  Budesonide  Colace  Gabapentin  Prinzide  Singulair   Natalizumab   Nortriptyline   Imitrex  Topamax   Valtrex      Past Medical History:    Asthma   Fibroids   Hypertension  Migraine   ISAAC  Pre-diabetes   Relapsing remitting  multiple sclerosis   Depression with anxiety  Vitamin D deficiency   Vitamin B12 deficiency   Immunosuppression      Past Surgical History:    Gastric bypass  Hysterectomy   Tonsillectomy     Family History:    History reviewed. No pertinent family history.      Social History:  PCP: Miya Crump   Marital Status:    Smoking status: former smoker  Alcohol use: positive, 0-3 drinks per week  Drug use: Negative       Review of Systems   Constitutional: Positive for fatigue.   Eyes: Positive for visual disturbance.   Gastrointestinal: Negative for constipation and diarrhea.   Genitourinary: Negative for dysuria, frequency, hematuria and urgency.   Musculoskeletal: Positive for back pain.   Neurological: Positive for weakness, numbness and headaches. Negative for syncope.   All other systems reviewed and are negative.    Physical Exam     Patient Vitals for the past 24 hrs:   BP Temp Pulse Heart Rate Resp SpO2 Weight   07/23/19 2100 -- -- -- 70 16 -- --   07/23/19 1950 -- -- -- 80 17 -- --   07/23/19 1845 93/52 -- 71 75 -- 98 % --   07/23/19 1830 95/56 -- 74 76 -- 100 % --   07/23/19 1815 94/54 -- 72 73 -- 100 % --   07/23/19 1800 98/57 -- 72 73 -- 99 % --   07/23/19 1631 -- -- 102 102 22 92 % --   07/23/19 1628 138/88 -- -- -- -- -- --   07/23/19 1627 -- 98  F (36.7  C) -- -- -- -- 114 kg (251 lb 5.2 oz)        Physical Exam  Gen: in NAD  HEENT:  mmm, 5mm bilaterally, EOMI, no nystagmus, acuity grossly normal  Neck: supple, no abnormal swelling or tenderness  Lungs:  CTAB,  no resp distress  CV: rrr, no m/r/g, ppi  Abd: soft, nontender, nondistended, no rebound/masses/guarding/hsm  Ext: no peripheral edema  Skin: warm, dry, well perfused, no rashes/bruising/lesions on exposed skin  Neuro: alert, CN 2-12 intct and symmetric, 5/5  and BUE motor, LLE 5/5 throughout, RLE 4/5 thigh flex, otherwise 5/5.  SILT intact all ext, spech clear, coordination grossly intact  Psych: Normal mood, normal  affect      Emergency Department Course     ECG (16:33:34):  Rate 99 bpm. NH interval 138. QRS duration 80. QT/QTc 342/438. P-R-T axes 33 21 19. Normal sinus rhythm. Normal ECG. Agree with computer interpretation.  Interpreted at 1639 by Giovanni Jara.    Imaging:  Radiographic findings were communicated with the patient who voiced understanding of the findings.    MR Brain w/o & w Contrast  1. Stable scattered intracranial T2 and T2 FLAIR hyperintense white  matter lesions, compatible with the patient's history of demyelinating  disease.  2. No definite new, enhancing, or diffusion restricting intracranial  lesions.  As read by Radiology.     Laboratory:  Glucose by meter: 126  CBC: WBC 14.7, o/w WNL (HGB 12.3, )   CMP: glucose 122, o/w WNL    UA: ketones trace, squamous epithelial 3 (H), o/w negative    Interventions:  1647: Zofran 4 mg, IV  1648:  mLs IV Bolus   1648: Reglan 10 mg, IV  1648: Benadryl 25 mg, IV  1648: acetaminophen 650 mg, PO     Emergency Department Course:  1625: Nursing notes and vitals reviewed. I performed an exam of the patient as documented above.     IV inserted. Medicine administered as documented above. Blood drawn. This was sent to the lab for further testing, results above. The patient provided a urine sample here in the emergency department. This was sent for laboratory testing, findings above.      The patient was sent for a brain, thoracic spine, and lumbar spine MRI while in the emergency department, findings above.     1915: I consulted with Dr. Norton of [facility], regarding the patient's history and presentation here in the emergency department.    2000: I rechecked the patient and discussed the results of her workup thus far.     2056: I consulted with Radiology regarding the patient's history and presentation here in the ED.     2120:  I consulted with Dr. Norris of the hospitalist services. They are in agreement to accept the patient for  admission.    Findings and plan explained to the Patient who consents to admission. Discussed the patient with Dr. Norris, who will admit the patient to an observation bed for further monitoring, evaluation, and treatment.      Impression & Plan      Medical Decision Making:  Complex patient presenting with multiple complaints.  History of multiple sclerosis followed by [facility] of neurology on monthly infusions as well has a history of pontine stroke, migraine headaches.  Symptoms gradually worsening over the last few days low suspicion based on time course description of her presenting complaint neurologic examination this is an acute hemorrhagic or ischemic stroke.  Also less likely to be a space-occupying lesion.  Spinal cord etiology seems less likely as well.  Blood tests urinalysis unremarkable here MRI as noted above.  Given the identifiable deficit in the right lower extremity specifically right thigh flexion discussed with neurology and plan for MRI of the cervical and thoracic spine and then if negative steroids for presumed MS flare.  Because of the timing of the MRI with and without contrast of the brain and need for with and without contrast images of the cervical and thoracic spine radiology recommended at least 8 hours  the gadolinium use and so was admitted to observation until those MRIs can be completed.  We will give her the first dose of steroids here in the ED for presumed MS flare.  Patient comfortable and agreeable to plan.  Critical Care time:  none    Diagnosis:    ICD-10-CM    1. Multiple sclerosis (H) G35    2. Generalized muscle weakness M62.81    3. Myalgia M79.10    4. Weakness of right lower extremity R29.898        Disposition:  Admitted to Lucille Nath, am serving as a scribe at 4:25 PM on 7/23/2019 to document services personally performed by Giovanni Jara MD based on my observations and the provider's statements to me.       Lucille Villareal  7/23/2019    [facility] EMERGENCY DEPARTMENT       Giovanni Jara MD  07/24/19 7719

## 2019-07-24 VITALS
WEIGHT: 252.2 LBS | RESPIRATION RATE: 16 BRPM | TEMPERATURE: 97.8 F | DIASTOLIC BLOOD PRESSURE: 70 MMHG | HEART RATE: 96 BPM | SYSTOLIC BLOOD PRESSURE: 117 MMHG | HEIGHT: 65 IN | BODY MASS INDEX: 42.02 KG/M2 | OXYGEN SATURATION: 96 %

## 2019-07-24 LAB — INTERPRETATION ECG - MUSE: NORMAL

## 2019-07-24 PROCEDURE — 25000132 ZZH RX MED GY IP 250 OP 250 PS 637: Performed by: INTERNAL MEDICINE

## 2019-07-24 PROCEDURE — 99217 ZZC OBSERVATION CARE DISCHARGE: CPT | Performed by: HOSPITALIST

## 2019-07-24 PROCEDURE — G0378 HOSPITAL OBSERVATION PER HR: HCPCS

## 2019-07-24 RX ORDER — METHYLPREDNISOLONE 4 MG
TABLET, DOSE PACK ORAL
Qty: 21 TABLET | Refills: 0 | Status: SHIPPED | OUTPATIENT
Start: 2019-07-24 | End: 2019-07-31

## 2019-07-24 RX ADMIN — LISINOPRIL AND HYDROCHLOROTHIAZIDE 1 TABLET: 12.5; 1 TABLET ORAL at 08:50

## 2019-07-24 NOTE — PLAN OF CARE
PRIMARY DIAGNOSIS: MS flare  OUTPATIENT/OBSERVATION GOALS TO BE MET BEFORE DISCHARGE:  1. ADLs back to baseline: Yes    2. Activity and level of assistance: Up with standby assistance.    3. Pain status: Improved-controlled with oral pain medications.    4. Return to near baseline physical activity: Yes     Discharge Planner Nurse   Safe discharge environment identified: Yes  Barriers to discharge: No        Entered by: Sebastián Kimball 07/24/2019        Please review provider order for any additional goals.   Nurse to notify provider when observation goals have been met and patient is ready for discharge.    VSS. Patient is alert and orientated. Headache has improved since last night. SL. MRI cervical and thoracic planned for this morning. Will continue to monitor.

## 2019-07-24 NOTE — PLAN OF CARE
Patient's After Visit Summary was reviewed with patient.   Patient verbalized understanding of After Visit Summary, recommended follow up and was given an opportunity to ask questions.   Discharge medications sent home with patient/family: script sent to outside pharmacy.     Discharged by herself. Drove to the Emergency Room herself yesterday.   Wheelchair provided downstairs.     OBSERVATION patient END time: 1114

## 2019-07-24 NOTE — ED NOTES
[facility]  ED Nurse Handoff Report    Josefina Aldrich is a 44 year old female   ED Chief complaint: Eye Problem and Headache  . ED Diagnosis:   Final diagnoses:   Multiple sclerosis (H)   Generalized muscle weakness   Myalgia   Weakness of right lower extremity     Allergies:   Allergies   Allergen Reactions     Aspirin Difficulty breathing, Palpitations and Other (See Comments)     Adhesive Tape      Azithromycin Unknown     Latex Hives, Itching and Rash     Penicillins Cramps, GI Disturbance, Nausea and Vomiting, Rash, Swelling, Other (See Comments) and Hives       Code Status: Prior  Activity level - Baseline/Home:  Independent. Activity Level - Current:   Stand by Assist. Lift room needed: No. Bariatric: No   Needed: No   Isolation: No. Infection: Not Applicable.     Vital Signs:   Vitals:    07/23/19 1830 07/23/19 1845 07/23/19 1950 07/23/19 2100   BP: 95/56 93/52     Pulse: 74 71     Resp:   17 16   Temp:       SpO2: 100% 98%     Weight:           Cardiac Rhythm:  ,      Pain level:    Patient confused: No. Patient Falls Risk: Yes.   Elimination Status: Has voided   Patient Report - Initial Complaint: MS flare up; migraine, back pain, numbness in hands, blurred vision. Focused Assessment: Cognitive - Cognitive/Neuro/Behavioral WDL: -WDL except   Neuro - Additional Documentation: Headache Assessment (Row)   Additional Neuro Documentation - Headache: Nausea; Other (comment) (neck discomfort)   Tests Performed: MRI, Labs, UA. Abnormal Results:   MR Brain w/o & w Contrast   Preliminary Result   IMPRESSION:   1. Stable scattered intracranial T2 and T2 FLAIR hyperintense white   matter lesions, compatible with the patient's history of demyelinating   disease.   2. No definite new, enhancing, or diffusion restricting intracranial   lesions.              Labs Ordered and Resulted from Time of ED Arrival Up to the Time of Departure from the ED   CBC WITH PLATELETS DIFFERENTIAL -  Abnormal; Notable for the following components:       Result Value    WBC 14.7 (*)     MCHC 31.4 (*)     Nucleated RBCs 1 (*)     Absolute Lymphocytes 6.4 (*)     All other components within normal limits   COMPREHENSIVE METABOLIC PANEL - Abnormal; Notable for the following components:    Glucose 122 (*)     All other components within normal limits   GLUCOSE BY METER - Abnormal; Notable for the following components:    Glucose 126 (*)     All other components within normal limits   ROUTINE UA WITH MICROSCOPIC - Abnormal; Notable for the following components:    Ketones Urine Trace (*)     Squamous Epithelial /HPF Urine 3 (*)     All other components within normal limits     .   Treatments provided: Fluids, Reglan, Tylenol, Benadryl   Family Comments: NA  OBS brochure/video discussed/provided to patient:  Yes  ED Medications:   Medications   methylPREDNISolone sodium succinate (solu-MEDROL) 1,000 mg in sodium chloride 0.9 % 250 mL intermittent infusion (has no administration in time range)   metoclopramide (REGLAN) injection 5 mg (has no administration in time range)   0.9% sodium chloride BOLUS (500 mLs Intravenous New Bag 7/23/19 1648)   metoclopramide (REGLAN) injection 10 mg (10 mg Intravenous Given 7/23/19 1648)   diphenhydrAMINE (BENADRYL) injection 25 mg (25 mg Intravenous Given 7/23/19 1648)   ondansetron (ZOFRAN) injection 4 mg (4 mg Intravenous Given 7/23/19 1647)   acetaminophen (TYLENOL) tablet 650 mg (650 mg Oral Given 7/23/19 1648)   gadobutrol (GADAVIST) injection 15 mL (11 mLs Intravenous Given 7/23/19 1738)     Drips infusing:  No  For the majority of the shift, the patient's behavior Green. Interventions performed were NA.     Severe Sepsis OR Septic Shock Diagnosis Present: No      ED Nurse Name/Phone Number: Carmen AMBER Villafuerte,   9:33 PM  RECEIVING UNIT ED HANDOFF REVIEW    Above ED Nurse Handoff Report was reviewed: Yes  Reviewed by: Radha Cohn on July 23, 2019 at 9:38 PM

## 2019-07-24 NOTE — PROGRESS NOTES
ROOM # 229    Living Situation: Apartment with children  Facility name:  : [facility] [phone]    Activity level at baseline: Ind  Activity level on admit: SBA      Patient registered to observation; given Patient Bill of Rights; given the opportunity to ask questions about observation status and their plan of care.  Patient has been oriented to the observation room, bathroom and call light is in place.    Discussed discharge goals and expectations with patient/family.

## 2019-07-24 NOTE — DISCHARGE SUMMARY
[facility]  Hospitalist Discharge Summary       Date of Admission:  7/23/2019  Date of Discharge:  7/24/2019  Discharging Provider: Raoul Joyner MD      Discharge Diagnoses   MS flare, migraine headache    Follow-ups Needed After Discharge   Follow-up Appointments     Follow-up and recommended labs and tests       Follow up with your Neurology Clinic tomorrow as previously scheduled             Unresulted Labs Ordered in the Past 30 Days of this Admission     No orders found for last 31 day(s).      These results will be followed up by None    Discharge Disposition   Discharged to home  Condition at discharge: Stable    Hospital Course    Josefina Aldrich is a 44 year old female with PMH including relapsing remitting MS on monthly Natalizumab infusions, obesity s/p gastric bypass, HTN, asthma, prediabetes, and migraines who presents with numerous symptoms developing over the past 1 week.  She reports a bad MS flare 2 to 3 months ago which she had very painful lower leg spasms.  Her baclofen and gabapentin was increased and with improvement in symptoms.  She also started on natalizumab infusions monthly and she said she has been doing better since then.  For the past 1 week she has developed numbness of her right hand and right lower leg.  She is also felt increased fatigue and worsening of her chronic spine pain.  She describes the pain as a shooting pain going from her head down to her lower spine and back up.  This is a chronic issue that is acutely worsened.  She said a diffuse headache for multiple days now.  She is felt overall fatigued and she thinks she is having an acute MS flare.  Today she developed blurriness of her right eye and was very off balance walking into walls while at work and she fell yesterday and landed on her left arm.  Denies any fevers, chills, cough, shortness of breath, chest pain, dysuria.  She initially went to [facility] emergency department, but was not seen for 2  hours and she felt her symptoms were getting worse so she came here to [facility].     History obtained from patient, medical record, and from Dr. Jara in the emergency department.  Blood pressure 109/72.  Pulse 70.  Temperature 98.  Oxygen 98% on room air.  CBC unremarkable except for white blood cell count 14.7.  CMP unremarkable.  Urinalysis without pyuria.  Brain MRI showed stable chronic lesions consistent with demyelinating disease without any new acute lesions.  Headache improved following dose of acetaminophen, IV Benadryl, IV Reglan, and Zofran.  Case was discussed with neurology from the ED who recommended 1 g IV Solu-Medrol for possible MS flare along with obtaining thoracic and cervical spine MRI with and without contrast.  This will need to be done over the next 8 hours given recent contrast load for brain MRI per radiology.  Admit observation.  Patient was given IV solumedrol and this morning her symptoms are almost resolved. Her headache is almost gone. She is ambulating without difficulty. She would like to discharge home. Her brain MRI did not show any new changes. I have cancelled the cervical/thoracic spine MRIs as well as the Neurology consult. On her last admission to the  for similar symptoms she was discharged on a Medrol dose pack. I will do this as well. She already has a previously scheduled appt with Neurology for tomorrow. Patient agrees with plan.    Consultations This Hospital Stay   NEUROLOGY IP CONSULT    Code Status   Full Code    Time Spent on this Encounter   I, Raoul Joyner, personally saw the patient today and spent greater than 30 minutes discharging this patient.       Raoul Joyner MD  [facility]  ______________________________________________________________________    Physical Exam   Vital Signs: Temp: 97.8  F (36.6  C) Temp src: Oral BP: 117/70 Pulse: 96 Heart Rate: 76 Resp: 16 SpO2: 96 % O2 Device: None (Room air)    Weight: 252 lbs 3.2  oz  Constitutional: awake, alert, cooperative, no apparent distress, and appears stated age  Eyes: Lids and lashes normal, pupils equal, round and reactive to light, extra ocular muscles intact, sclera clear, conjunctiva normal  Respiratory: No increased work of breathing, good air exchange, clear to auscultation bilaterally, no crackles or wheezing  Cardiovascular: Normal apical impulse, regular rate and rhythm, normal S1 and S2, no S3 or S4, and no murmur noted  GI: No scars, normal bowel sounds, soft, non-distended, non-tender, no masses palpated, no hepatosplenomegally  Skin: no bruising or bleeding  Musculoskeletal: no lower extremity pitting edema present       Primary Care Physician   Miya Crump    Discharge Orders      Reason for your hospital stay    Migraine headache, MS flare     Follow-up and recommended labs and tests     Follow up with your Neurology Clinic tomorrow as previously scheduled     Activity    Your activity upon discharge: activity as tolerated     Diet    Follow this diet upon discharge: Orders Placed This Encounter      Regular Diet Adult       Significant Results and Procedures   Most Recent 3 CBC's:  Recent Labs   Lab Test 07/23/19  1629 05/07/19  1219 04/11/19  1138   WBC 14.7* 10.0 6.3   HGB 12.3 11.4* 12.2   MCV 95 96 93    346 430     Most Recent 3 BMP's:  Recent Labs   Lab Test 07/23/19  1629 05/07/19  1219 04/11/19  1138    138 137   POTASSIUM 3.6 4.3 3.7   CHLORIDE 109 109 109   CO2 20 23 22   BUN 11 12 15   CR 0.96 0.87 1.05*   ANIONGAP 8 6 6   MILA 9.2 8.3* 8.7   * 85 85   ,   Results for orders placed or performed during the hospital encounter of 07/23/19   MR Brain w/o & w Contrast    Narrative    MRI BRAIN WITHOUT AND WITH CONTRAST  7/23/2019 5:49 PM     HISTORY:  History of multiple sclerosis and pontine stroke,  generalized weakness, right lower extremity proximal thigh flexion  weakness, blurred vision, suspect multiple sclerosis flare.      TECHNIQUE: Multiplanar, multisequence MRI of the brain without and  with 11 mL Gadavist.    COMPARISON: MRI head 10/26/2018.    FINDINGS: There is no restricted diffusion to suggest acute infarct.  There is T2 shine-through associated with a lesion in the left centrum  semiovale (series 2 image 84). No definite change in the previous  demonstrated multiple bilateral juxtacortical and periventricular  nonenhancing, non-restricting T2 FLAIR hyperintense signal  abnormalities, the most prominent of which is in the right parietal  corona radiata. No definite infratentorial lesions are identified. No  definite enhancing lesions are seen.  No other change.      Impression    IMPRESSION:  1. Stable scattered intracranial T2 and T2 FLAIR hyperintense white  matter lesions, compatible with the patient's history of demyelinating  disease.  2. No definite new, enhancing, or diffusion restricting intracranial  lesions.      SHAHID PEÑA MD       Discharge Medications   Current Discharge Medication List      START taking these medications    Details   methylPREDNISolone (MEDROL DOSEPAK) 4 MG tablet therapy pack Follow Package Directions  Qty: 21 tablet, Refills: 0    Associated Diagnoses: Relapsing remitting multiple sclerosis (H); Nonintractable headache, unspecified chronicity pattern, unspecified headache type; Neck pain         CONTINUE these medications which have NOT CHANGED    Details   albuterol (PROVENTIL) (2.5 MG/3ML) 0.083% neb solution Take 1 vial (2.5 mg) by nebulization every 6 hours as needed for shortness of breath / dyspnea or wheezing  Qty: 1 Box, Refills: 3    Associated Diagnoses: Mild persistent asthma, unspecified whether complicated      albuterol (VENTOLIN HFA) 108 (90 BASE) MCG/ACT inhaler Inhale 2 puffs into the lungs every 6 hours as needed       baclofen (LIORESAL) 20 MG tablet Take 1 tablet (20 mg) by mouth At Bedtime  Refills: 10    Associated Diagnoses: Pain of left lower extremity       budesonide (PULMICORT FLEXHALER) 90 MCG/ACT inhaler Inhale 2 puffs into the lungs daily  Qty: 1 each, Refills: 1    Associated Diagnoses: Mild persistent asthma, unspecified whether complicated      cholecalciferol (VITAMIN D3) 5000 UNITS TABS tablet Take 1 tablet (5,000 Units) by mouth daily    Associated Diagnoses: Hypovitaminosis D      docusate sodium (COLACE) 100 MG tablet Take 1 tablet (100 mg) by mouth 2 times daily    Associated Diagnoses: Constipation, unspecified constipation type      Ferrous Sulfate (IRON SUPPLEMENT PO) Take 325 mg by mouth daily       gabapentin (NEURONTIN) 300 MG capsule Take 3 capsules (900 mg) by mouth At Bedtime  Qty: 90 capsule, Refills: 3    Associated Diagnoses: Pain of left lower extremity      lisinopril-hydrochlorothiazide (PRINZIDE/ZESTORETIC) 10-12.5 MG tablet TAKE 1 TABLET BY MOUTH DAILY  Qty: 90 tablet, Refills: 2    Associated Diagnoses: Benign essential hypertension      montelukast (SINGULAIR) 10 MG tablet Take 1 tablet (10 mg) by mouth At Bedtime  Qty: 90 tablet, Refills: 3    Associated Diagnoses: Mild persistent asthma, unspecified whether complicated      natalizumab (TYSABRI) 300 MG/15ML injection Infusion    Associated Diagnoses: MS (multiple sclerosis) (H)      nortriptyline (PAMELOR) 50 MG capsule Take 1 capsule (50 mg) by mouth At Bedtime  Qty: 90 capsule, Refills: 3    Associated Diagnoses: Migraine with aura and without status migrainosus, not intractable      SUMAtriptan (IMITREX) 100 MG tablet Take 50 mg up to twice daily as needed for headache; separate doses by at least one hour and do not use more than 3 days/week  Qty: 18 tablet, Refills: 3    Associated Diagnoses: Intractable chronic migraine without aura and with status migrainosus      !! topiramate (TOPAMAX) 100 MG tablet Take 1 tablet (100 mg) by mouth At Bedtime (take with 50 mg to total 150 mg nightly)  Qty: 60 tablet, Refills: 3    Associated Diagnoses: Migraine with aura and without status  migrainosus, not intractable      !! topiramate (TOPAMAX) 50 MG tablet Take 1 tablet (50 mg) by mouth At Bedtime (take with 100 mg to total 150 mg nightly)  Qty: 60 tablet, Refills: 3    Associated Diagnoses: Migraine with aura and without status migrainosus, not intractable      triamcinolone (KENALOG) 0.1 % external cream Apply topically 3 times daily as needed for irritation      valACYclovir (VALTREX) 500 MG tablet TAKE 1 TABLET BY MOUTH DAILY  Qty: 90 tablet, Refills: 0    Associated Diagnoses: HSV (herpes simplex virus) infection      order for DME Equipment being ordered: Nebulizer with supplies  Qty: 1 Device, Refills: 0    Associated Diagnoses: Mild persistent asthma, unspecified whether complicated       !! - Potential duplicate medications found. Please discuss with provider.        Allergies   Allergies   Allergen Reactions     Aspirin Difficulty breathing, Palpitations and Other (See Comments)     Adhesive Tape      Azithromycin Unknown     Latex Hives, Itching and Rash     Penicillins Cramps, GI Disturbance, Nausea and Vomiting, Rash, Swelling, Other (See Comments) and Hives

## 2019-07-24 NOTE — PHARMACY-ADMISSION MEDICATION HISTORY
Admission medication history interview status for this patient is complete. See [facility] admission navigator for allergy information, prior to admission medications and immunization status.     Medication history interview source(s):Patient  Medication history resources (including written lists, pill bottles, clinic record):None    Changes made to PTA medication list:  Added: iron, ventolin MDI  Deleted: none  Changed: kenalog cream to prn    Actions taken by pharmacist (provider contacted, etc):None     Additional medication history information:None    Medication reconciliation/reorder completed by provider prior to medication history? No    For patients on insulin therapy: no (Yes/No)   Lantus/levemir/NPH/Mix 70/30 dose: ___ in AM/PM or twice daily   Sliding scale Novolog Y/N   If Yes, do you have a baseline novolog pre-meal dose: ______units with meals   Patients eat three meals a day: Y/N ---  How many episodes of hypoglycemia (low blood glucose) do you have weekly: ---   How many missed doses do you have a week: ---  How many times do you check your blood glucose per day: ---  Any Barriers to therapy: cost of medications/comfortable with giving injections (if applicable)/ comfortable and confident with current diabetes regimen ---      Prior to Admission medications    Medication Sig Last Dose Taking? Auth Provider   albuterol (PROVENTIL) (2.5 MG/3ML) 0.083% neb solution Take 1 vial (2.5 mg) by nebulization every 6 hours as needed for shortness of breath / dyspnea or wheezing  Yes David Lee MD   albuterol (VENTOLIN HFA) 108 (90 BASE) MCG/ACT inhaler Inhale 2 puffs into the lungs every 6 hours as needed   Yes Reported, Patient   baclofen (LIORESAL) 20 MG tablet Take 1 tablet (20 mg) by mouth At Bedtime 7/22/2019 at Unknown time Yes Miya Crump PA-C   budesonide (PULMICORT FLEXHALER) 90 MCG/ACT inhaler Inhale 2 puffs into the lungs daily 7/22/2019 at Unknown time Yes Miya Crump PA-C    cholecalciferol (VITAMIN D3) 5000 UNITS TABS tablet Take 1 tablet (5,000 Units) by mouth daily 7/23/2019 at Unknown time Yes Miya Crump PA-C   docusate sodium (COLACE) 100 MG tablet Take 1 tablet (100 mg) by mouth 2 times daily 7/23/2019 at am Yes Miya Crump PA-C   Ferrous Sulfate (IRON SUPPLEMENT PO) Take 325 mg by mouth daily  7/23/2019 at am Yes Reported, Patient   gabapentin (NEURONTIN) 300 MG capsule Take 3 capsules (900 mg) by mouth At Bedtime 7/22/2019 at Unknown time Yes Miya Crump PA-C   lisinopril-hydrochlorothiazide (PRINZIDE/ZESTORETIC) 10-12.5 MG tablet TAKE 1 TABLET BY MOUTH DAILY 7/23/2019 at am Yes Miya Crump PA-C   montelukast (SINGULAIR) 10 MG tablet Take 1 tablet (10 mg) by mouth At Bedtime 7/22/2019 at Unknown time Yes David Lee MD   natalizumab (TYSABRI) 300 MG/15ML injection Infusion due 7-30-19 Yes Miya Crump PA-C   nortriptyline (PAMELOR) 50 MG capsule Take 1 capsule (50 mg) by mouth At Bedtime 7/22/2019 at Unknown time Yes Ozzie Rawls MD   SUMAtriptan (IMITREX) 100 MG tablet Take 50 mg up to twice daily as needed for headache; separate doses by at least one hour and do not use more than 3 days/week  Yes Shena Menjivar MD   topiramate (TOPAMAX) 100 MG tablet Take 1 tablet (100 mg) by mouth At Bedtime (take with 50 mg to total 150 mg nightly) 7/22/2019 at Unknown time Yes Miya Crump PA-C   topiramate (TOPAMAX) 50 MG tablet Take 1 tablet (50 mg) by mouth At Bedtime (take with 100 mg to total 150 mg nightly) 7/22/2019 at Unknown time Yes Miya Crump PA-C   triamcinolone (KENALOG) 0.1 % external cream Apply topically 3 times daily as needed for irritation prn Yes Unknown, Entered By History   valACYclovir (VALTREX) 500 MG tablet TAKE 1 TABLET BY MOUTH DAILY 7/23/2019 at am Yes Miya Crump PA-C   order for DME Equipment being ordered: Nebulizer with supplies   David Lee MD

## 2019-07-24 NOTE — H&P
[facility]  Hospitalist Admission Note  Name: Josefina Aldrich    MRN: [id number]  YOB: 1975    Age: 44 year old  Date of admission: 7/23/2019  Primary care provider: Miya Crump    Chief Complaint: Headache, numbness, weakness    Assessment and Plan:   Relapsing remitting multiple sclerosis with suspected acute flare: History of relapsing remitting multiple sclerosis and follows closely with neurologist Dr. Chong.  Reports a bad flare 2 or 3 months ago.  Says she has been doing better with monthly natalizumab infusions, until this past 1 week when she developed multiple symptoms.  Primary symptoms of diffuse headache, shooting pains from cervical spine down to lumbar spine, right hand and lower leg numbness, right leg weakness, problems with coordination running into walls, overall fatigue, and right eye blurriness.  Symptoms similar to previous MS flares per patient.  Brain MRI does not show any new lesions.  Case was discussed with neurology from the ED who recommended 1 g IV Solu-Medrol for possible MS flare along with obtaining thoracic and cervical spine MRI with and without contrast.  This will need to be done over the next 8 hours given recent contrast load for brain MRI per radiology.  -To receive 1 g IV Solu-Medrol now, hold on further dosing until assessing clinical response and seen by neurology  -Obtain cervical and thoracic spine MRI with and without contrast  -Neurology consult for the morning  -Continue her PTA baclofen, gabapentin, and nortriptyline  -Continue PTA Valtrex    Leukocytosis: WBC elevated at 14.7.  Afebrile and no infectious symptoms.  Urinalysis without pyuria.    Prediabetes: Not currently on any medications for this.  Expect some mild hyperglycemia with large dose of IV Solu-Medrol.    HTN: Resume PTA lisinopril-HCTZ 10-12.5 mg daily tomorrow    Asthma: Currently no respiratory symptoms and no wheezing.  Albuterol nebs as needed.  Resume  Pulmicort at home.  Resume Singulair.    Obesity: History of gastric bypass surgery.  BMI 41.    Migraines: Chronic issue for patient.  Now with severe headache somewhat improved with Zofran, Benadryl, acetaminophen, and Reglan.  Steroids should help if secondary to MS flare.  -Acetaminophen, Imitrex as needed, steroids as above, and IV Compazine as needed  -Continue her PTA Topamax    DVT Prophylaxis: Low Risk/Ambulatory with no VTE prophylaxis indicated  Code Status: Full Code  FEN: Regular diet  Discharge Dispo: Home  Estimated Disch Date / # of Days until Disch: Admit observation to obtain cervical and thoracic spine MRI over next 8 hours and for neurology consult in the morning.  Anticipate discharge within 48 hours if symptoms improving with dose of IV steroids      History of Present Illness:  Josefina Aldrich is a 44 year old female with PMH including relapsing remitting MS on monthly Natalizumab infusions, obesity s/p gastric bypass, HTN, asthma, prediabetes, and migraines who presents with numerous symptoms developing over the past 1 week.  She reports a bad MS flare 2 to 3 months ago which she had very painful lower leg spasms.  Her baclofen and gabapentin was increased and with improvement in symptoms.  She also started on natalizumab infusions monthly and she said she has been doing better since then.  For the past 1 week she has developed numbness of her right hand and right lower leg.  She is also felt increased fatigue and worsening of her chronic spine pain.  She describes the pain as a shooting pain going from her head down to her lower spine and back up.  This is a chronic issue that is acutely worsened.  She said a diffuse headache for multiple days now.  She is felt overall fatigued and she thinks she is having an acute MS flare.  Today she developed blurriness of her right eye and was very off balance walking into walls while at work and she fell yesterday and landed on her left arm.   "Denies any fevers, chills, cough, shortness of breath, chest pain, dysuria.  She initially went to [facility] emergency department, but was not seen for 2 hours and she felt her symptoms were getting worse so she came here to [facility].    History obtained from patient, medical record, and from Dr. Jara in the emergency department.  Blood pressure 109/72.  Pulse 70.  Temperature 98.  Oxygen 98% on room air.  CBC unremarkable except for white blood cell count 14.7.  CMP unremarkable.  Urinalysis without pyuria.  Brain MRI showed stable chronic lesions consistent with demyelinating disease without any new acute lesions.  Headache improved following dose of acetaminophen, IV Benadryl, IV Reglan, and Zofran.  Case was discussed with neurology from the ED who recommended 1 g IV Solu-Medrol for possible MS flare along with obtaining thoracic and cervical spine MRI with and without contrast.  This will need to be done over the next 8 hours given recent contrast load for brain MRI per radiology.  Admit observation.     Past Medical History reviewed:  Past Medical History:   Diagnosis Date     Asthma      Fibroids     s/p hysterectomy     H/O gastric bypass      Hypertension      Migraine     followed by Devika     Obstructive sleep apnea      Pre-diabetes      Relapsing remitting multiple sclerosis (H) 2015    lesion in [place].  Facial tingling initial symptom.  F/B [facility]     Past Surgical History reviewed:  Past Surgical History:   Procedure Laterality Date     GASTRIC BYPASS  2002    \"Trouble with B12 and D\"     HYSTERECTOMY, MONO  2013    cervix gone.  fibroids.  without BSO     TONSILLECTOMY       Social History reviewed:  Social History     Tobacco Use     Smoking status: Former Smoker     Types: Cigars     Smokeless tobacco: Never Used   Substance Use Topics     Alcohol use: Yes     Alcohol/week: 1.2 oz     Types: 2 Standard drinks or equivalent per week     Comment: 0-3 drinks per week     Social History     Social " History Narrative     Not on file     Family History reviewed:  Family History   Problem Relation Age of Onset     Lupus Paternal Aunt 41     Multiple Sclerosis No family hx of      Allergies:  Allergies   Allergen Reactions     Aspirin Difficulty breathing, Palpitations and Other (See Comments)     Adhesive Tape      Azithromycin Unknown     Latex Hives, Itching and Rash     Penicillins Cramps, GI Disturbance, Nausea and Vomiting, Rash, Swelling, Other (See Comments) and Hives     Medications:  Prior to Admission medications    Medication Sig Last Dose Taking? Auth Provider   albuterol (PROVENTIL) (2.5 MG/3ML) 0.083% neb solution Take 1 vial (2.5 mg) by nebulization every 6 hours as needed for shortness of breath / dyspnea or wheezing  Yes David Lee MD   albuterol (VENTOLIN HFA) 108 (90 BASE) MCG/ACT inhaler Inhale 2 puffs into the lungs every 6 hours as needed   Yes Reported, Patient   baclofen (LIORESAL) 20 MG tablet Take 1 tablet (20 mg) by mouth At Bedtime 7/22/2019 at Unknown time Yes Miya Crump PA-C   budesonide (PULMICORT FLEXHALER) 90 MCG/ACT inhaler Inhale 2 puffs into the lungs daily 7/22/2019 at Unknown time Yes Miya Crump PA-C   cholecalciferol (VITAMIN D3) 5000 UNITS TABS tablet Take 1 tablet (5,000 Units) by mouth daily 7/23/2019 at Unknown time Yes Miya Crump PA-C   docusate sodium (COLACE) 100 MG tablet Take 1 tablet (100 mg) by mouth 2 times daily 7/23/2019 at am Yes Miya Crump PA-C   Ferrous Sulfate (IRON SUPPLEMENT PO) Take 325 mg by mouth daily  7/23/2019 at am Yes Reported, Patient   gabapentin (NEURONTIN) 300 MG capsule Take 3 capsules (900 mg) by mouth At Bedtime 7/22/2019 at Unknown time Yes Miya Crump PA-C   lisinopril-hydrochlorothiazide (PRINZIDE/ZESTORETIC) 10-12.5 MG tablet TAKE 1 TABLET BY MOUTH DAILY 7/23/2019 at am Yes Miya Crump PA-C   montelukast (SINGULAIR) 10 MG tablet Take 1 tablet (10 mg) by mouth At Bedtime  7/22/2019 at Unknown time Yes David Lee MD   natalizumab (TYSABRI) 300 MG/15ML injection Infusion due 7-30-19 Yes Miya Crump PA-C   nortriptyline (PAMELOR) 50 MG capsule Take 1 capsule (50 mg) by mouth At Bedtime 7/22/2019 at Unknown time Yes Ozzie Rawls MD   SUMAtriptan (IMITREX) 100 MG tablet Take 50 mg up to twice daily as needed for headache; separate doses by at least one hour and do not use more than 3 days/week  Yes Shena Menjivar MD   topiramate (TOPAMAX) 100 MG tablet Take 1 tablet (100 mg) by mouth At Bedtime (take with 50 mg to total 150 mg nightly) 7/22/2019 at Unknown time Yes Miya Crump PA-C   topiramate (TOPAMAX) 50 MG tablet Take 1 tablet (50 mg) by mouth At Bedtime (take with 100 mg to total 150 mg nightly) 7/22/2019 at Unknown time Yes Miya Crump PA-C   triamcinolone (KENALOG) 0.1 % external cream Apply topically 3 times daily as needed for irritation prn Yes Unknown, Entered By History   valACYclovir (VALTREX) 500 MG tablet TAKE 1 TABLET BY MOUTH DAILY 7/23/2019 at am Yes Miya Crump PA-C   order for DME Equipment being ordered: Nebulizer with supplies   David Lee MD     Review of Systems:  A Comprehensive greater than 10 system review of systems was carried out.  Pertinent positives and negatives are noted above.  Otherwise negative.     Physical Exam:  Blood pressure 109/72, pulse 82, temperature 98  F (36.7  C), resp. rate 12, weight 114 kg (251 lb 5.2 oz), SpO2 98 %, not currently breastfeeding.  Wt Readings from Last 1 Encounters:   07/23/19 114 kg (251 lb 5.2 oz)     Exam:  Constitutional: Awake, NAD  Eyes: sclera white, PERRL, EOM intact  HEENT: atraumatic, MMM  Respiratory: no respiratory distress, lungs cta bilaterally, no crackles or wheeze  Cardiovascular: RRR.  No murmur   GI: non-tender, not distended, bowel sounds present  Skin: no rash or lesions, acyanotic  Musculoskeletal/extremities: atraumatic, no major  deformities. No edema  Neurologic: A&Ox3, speech clear, strength symmetric bilaterally in arms and legs, decreased light touch sensation in right lower leg and right hand compared to the left side  Psychiatric: calm, cooperative, normal affect    Lab and imaging data personally reviewed:  Labs:  Recent Labs   Lab 07/23/19  1629   WBC 14.7*   HGB 12.3   HCT 39.2   MCV 95        Recent Labs   Lab 07/23/19  1629      POTASSIUM 3.6   CHLORIDE 109   CO2 20   ANIONGAP 8   *   BUN 11   CR 0.96   GFRESTIMATED 72   GFRESTBLACK 84   IMLA 9.2   PROTTOTAL 8.5   ALBUMIN 4.1   BILITOTAL 0.3   ALKPHOS 81   AST 25   ALT 29     Recent Labs   Lab 07/23/19  1948   COLOR Light Yellow   APPEARANCE Clear   URINEGLC Negative   URINEBILI Negative   URINEKETONE Trace*   SG 1.028   UBLD Negative   URINEPH 5.5   PROTEIN Negative   NITRITE Negative   LEUKEST Negative   RBCU <1   WBCU <1       EKG: NSR    Imaging:  Recent Results (from the past 24 hour(s))   MR Brain w/o & w Contrast    Narrative    MRI BRAIN WITHOUT AND WITH CONTRAST  7/23/2019 5:49 PM     HISTORY:  History of multiple sclerosis and pontine stroke,  generalized weakness, right lower extremity proximal thigh flexion  weakness, blurred vision, suspect multiple sclerosis flare.     TECHNIQUE: Multiplanar, multisequence MRI of the brain without and  with 11 mL Gadavist.    COMPARISON: MRI head 10/26/2018.    FINDINGS: There is no restricted diffusion to suggest acute infarct.  There is T2 shine-through associated with a lesion in the left centrum  semiovale (series 2 image 84). No definite change in the previous  demonstrated multiple bilateral juxtacortical and periventricular  nonenhancing, non-restricting T2 FLAIR hyperintense signal  abnormalities, the most prominent of which is in the right parietal  corona radiata. No definite infratentorial lesions are identified. No  definite enhancing lesions are seen.  No other change.      Impression     IMPRESSION:  1. Stable scattered intracranial T2 and T2 FLAIR hyperintense white  matter lesions, compatible with the patient's history of demyelinating  disease.  2. No definite new, enhancing, or diffusion restricting intracranial  lesions.             Alton Norris MD  Hospitalist  [facility]

## 2019-07-24 NOTE — PLAN OF CARE
PRIMARY DIAGNOSIS: MS flare  OUTPATIENT/OBSERVATION GOALS TO BE MET BEFORE DISCHARGE:  ADLs back to baseline: Yes    Activity and level of assistance: Up with standby assistance.    Pain status: Improved-controlled with oral pain medications.    Return to near baseline physical activity: Yes     Discharge Planner Nurse   Safe discharge environment identified: Yes  Barriers to discharge: Yes       Entered by: Radha Cohn 07/24/2019 4:21 AM    VSS, pt is A&Ox4, up SBA, headache has improved, SL, neuro consult today, will continue to monitor       Please review provider order for any additional goals.   Nurse to notify provider when observation goals have been met and patient is ready for discharge.

## 2019-07-24 NOTE — PLAN OF CARE
PRIMARY DIAGNOSIS:  MS flare  OUTPATIENT/OBSERVATION GOALS TO BE MET BEFORE DISCHARGE:  ADLs back to baseline: Yes    Activity and level of assistance: Up with standby assistance.    Pain status: Improved-controlled with oral pain medications.    Return to near baseline physical activity: Yes     Discharge Planner Nurse   Safe discharge environment identified: Yes  Barriers to discharge: Yes       Entered by: Radha Cohn 07/23/2019 10:44 PM    VSS, pt is A&Ox4, up SBA, complaining of a headache, having some numbness and tingling in lower extremities which is baseline, IV solu-medrol infusing, tolerating a regular diet, neuro consult in, will continue to monitor       Please review provider order for any additional goals.   Nurse to notify provider when observation goals have been met and patient is ready for discharge.

## 2019-07-25 ENCOUNTER — TRANSFERRED RECORDS (OUTPATIENT)
Dept: HEALTH INFORMATION MANAGEMENT | Facility: CLINIC | Age: 44
End: 2019-07-25

## 2019-07-25 ENCOUNTER — TELEPHONE (OUTPATIENT)
Dept: FAMILY MEDICINE | Facility: CLINIC | Age: 44
End: 2019-07-25

## 2019-07-25 NOTE — TELEPHONE ENCOUNTER
Patient discharged from [facility] for inpatient hospital stay on 7/24 for MS, relapsing remitting.    Please contact patient to follow up; no appointment scheduled at this time.    ER / IP:  2/0    Care Coordination:  fletcher Miller

## 2019-07-25 NOTE — TELEPHONE ENCOUNTER
"Called patient @ [phone] (home) -     ED/Discharge Protocol    \"Hi, my name is Susannah Selby, a registered nurse, and I am calling on behalf of CARLA Crump's office at [place].  I am calling to follow up and see how things are going for you after your recent visit.\"    \"I see that you were in the (ER/UC/IP) on [facility] for inpatient hospital stay on 7/24 for MS, relapsing remitting.    How are you doing now that you are home?\" Doing a lot better, still have a slight headache.     Is patient experiencing symptoms that may require a hospital visit?  No    Discharge Instructions    \"Let's review your discharge instructions.  What is/are the follow-up recommendations?  Pt. Response: Follow-up with Neurology, Pool Therapy    \"Were you instructed to make a follow-up appointment?\"  Pt. Response: Unsure - stated it was unsure if LP wanted to see patient. Patient was told by the ED provider to leave that up to Miya.      OV scheduled for 7/31/19    \"When you see the provider, I would recommend that you bring your discharge instructions with you.    Medications    \"How many new medications are you on since your hospitalization/ED visit?\"    0-1  \"How many of your current medicines changed (dose, timing, name, etc.) while you were in the hospital/ED visit?\"   0-1    Post Discharge Medication Reconciliation Status: discharge medications reconciled, continue medications without change.      \"Do you have questions about your medications?\"   No  \"Were you newly diagnosed with heart failure, COPD, diabetes or did you have a heart attack?\"   No  For patients on insulin: \"Did you start on insulin in the hospital or did you have your insulin dose changed?\"   No    Medication reconciliation completed? Yes    Was MTM referral placed (*Make sure to put transitions as reason for referral)?   No    Call Summary    \"Do you have any questions or concerns about your condition or care plan at the moment?\"    No  Triage nurse " "advice given: Advised patient that if new or worsening symptoms appear (reviewed new & worsening symptoms) to call the clinic or be seen in the the ER  Patient stated an understanding and agreed with plan.    Patient was in ER 6 in the past year (assess appropriateness of ER visits.)      \"If you have questions or things don't continue to improve, we encourage you contact us through the main clinic number,  [phone].  Even if the clinic is not open, triage nurses are available 24/7 to help you.     We would like you to know that our clinic has extended hours (provide information).  We also have urgent care (provide details on closest location and hours/contact info)\"      \"Thank you for your time and take care!\"        Susannah Selby RN  [place] Triage  "

## 2019-07-30 ENCOUNTER — INFUSION THERAPY VISIT (OUTPATIENT)
Dept: INFUSION THERAPY | Facility: CLINIC | Age: 44
End: 2019-07-30
Attending: PHYSICIAN ASSISTANT
Payer: COMMERCIAL

## 2019-07-30 ENCOUNTER — HOSPITAL ENCOUNTER (OUTPATIENT)
Facility: CLINIC | Age: 44
Setting detail: SPECIMEN
Discharge: HOME | End: 2019-07-30
Attending: PSYCHIATRY & NEUROLOGY | Admitting: PSYCHIATRY & NEUROLOGY
Payer: COMMERCIAL

## 2019-07-30 VITALS — OXYGEN SATURATION: 100 % | SYSTOLIC BLOOD PRESSURE: 122 MMHG | DIASTOLIC BLOOD PRESSURE: 83 MMHG | HEART RATE: 81 BPM

## 2019-07-30 DIAGNOSIS — G35 MS (MULTIPLE SCLEROSIS) (H): ICD-10-CM

## 2019-07-30 DIAGNOSIS — G35 MULTIPLE SCLEROSIS (H): Primary | ICD-10-CM

## 2019-07-30 LAB
ALT SERPL W P-5'-P-CCNC: 21 U/L (ref 0–50)
AST SERPL W P-5'-P-CCNC: 17 U/L (ref 0–45)
BASOPHILS # BLD AUTO: 0.1 10E9/L (ref 0–0.2)
BASOPHILS NFR BLD AUTO: 0.5 %
DIFFERENTIAL METHOD BLD: ABNORMAL
EOSINOPHIL # BLD AUTO: 0.2 10E9/L (ref 0–0.7)
EOSINOPHIL NFR BLD AUTO: 1.5 %
ERYTHROCYTE [DISTWIDTH] IN BLOOD BY AUTOMATED COUNT: 13.7 % (ref 10–15)
HCT VFR BLD AUTO: 35.3 % (ref 35–47)
HGB BLD-MCNC: 11.2 G/DL (ref 11.7–15.7)
IMM GRANULOCYTES # BLD: 0.1 10E9/L (ref 0–0.4)
IMM GRANULOCYTES NFR BLD: 0.5 %
LYMPHOCYTES # BLD AUTO: 7 10E9/L (ref 0.8–5.3)
LYMPHOCYTES NFR BLD AUTO: 50 %
MCH RBC QN AUTO: 30.4 PG (ref 26.5–33)
MCHC RBC AUTO-ENTMCNC: 31.7 G/DL (ref 31.5–36.5)
MCV RBC AUTO: 96 FL (ref 78–100)
MONOCYTES # BLD AUTO: 0.7 10E9/L (ref 0–1.3)
MONOCYTES NFR BLD AUTO: 4.7 %
NEUTROPHILS # BLD AUTO: 6 10E9/L (ref 1.6–8.3)
NEUTROPHILS NFR BLD AUTO: 42.8 %
NRBC # BLD AUTO: 0 10*3/UL
NRBC BLD AUTO-RTO: 0 /100
PLATELET # BLD AUTO: 383 10E9/L (ref 150–450)
RBC # BLD AUTO: 3.68 10E12/L (ref 3.8–5.2)
WBC # BLD AUTO: 13.9 10E9/L (ref 4–11)

## 2019-07-30 PROCEDURE — 85025 COMPLETE CBC W/AUTO DIFF WBC: CPT | Performed by: PSYCHIATRY & NEUROLOGY

## 2019-07-30 PROCEDURE — 84460 ALANINE AMINO (ALT) (SGPT): CPT | Performed by: PSYCHIATRY & NEUROLOGY

## 2019-07-30 PROCEDURE — 25000128 H RX IP 250 OP 636: Performed by: PSYCHIATRY & NEUROLOGY

## 2019-07-30 PROCEDURE — 96365 THER/PROPH/DIAG IV INF INIT: CPT

## 2019-07-30 PROCEDURE — 25800030 ZZH RX IP 258 OP 636: Performed by: PSYCHIATRY & NEUROLOGY

## 2019-07-30 PROCEDURE — 84450 TRANSFERASE (AST) (SGOT): CPT | Performed by: PSYCHIATRY & NEUROLOGY

## 2019-07-30 PROCEDURE — 36415 COLL VENOUS BLD VENIPUNCTURE: CPT

## 2019-07-30 RX ADMIN — NATALIZUMAB 300 MG: 300 INJECTION INTRAVENOUS at 13:42

## 2019-07-30 RX ADMIN — SODIUM CHLORIDE 250 ML: 9 INJECTION, SOLUTION INTRAVENOUS at 13:42

## 2019-07-30 NOTE — PROGRESS NOTES
"Infusion Nursing Note:  Josefina Aldrich presents today for Tysabri.    Patient seen by provider today: No   present during visit today: Not Applicable.    Note: Pt was recently hospitalized for about a day and a half last week with \"flare\" of MS.  Was given IV steroids and sent home on a medrol dose pack which she has not completed.  States her symptoms of leg pain/cramping/weakness have greatly improved.  Pt states she spoke with Dr Chong to see if she should come today for her Tysabri infusion or wait a week.  Pt was told to stay on schedule.    Intravenous Access:  Labs drawn without difficulty -- lab to fax results to Dr Chong  Peripheral IV placed.    Treatment Conditions:  Tysabri pre-infusion checklist completed via touch program.      Post Infusion Assessment:  Patient tolerated infusion without incident.  Patient observed for 60 minutes post Tysabri per protocol.  Site patent and intact, free from redness, edema or discomfort.  No evidence of extravasations.  Access discontinued per protocol.       Discharge Plan:   Discharge instructions reviewed with: Patient.  Patient and/or family verbalized understanding of discharge instructions and all questions answered.  AVS to patient via Telestream.  Patient will return 8/27/19 for Tysabri for next appointment.   Patient discharged in stable condition accompanied by: self.  Departure Mode: Ambulatory.    Megan Higuera RN                        "

## 2019-07-31 ENCOUNTER — TELEPHONE (OUTPATIENT)
Dept: FAMILY MEDICINE | Facility: CLINIC | Age: 44
End: 2019-07-31

## 2019-07-31 ENCOUNTER — OFFICE VISIT (OUTPATIENT)
Dept: FAMILY MEDICINE | Facility: CLINIC | Age: 44
End: 2019-07-31
Payer: COMMERCIAL

## 2019-07-31 VITALS
HEIGHT: 65 IN | SYSTOLIC BLOOD PRESSURE: 126 MMHG | HEART RATE: 94 BPM | DIASTOLIC BLOOD PRESSURE: 74 MMHG | TEMPERATURE: 97.3 F | WEIGHT: 250 LBS | BODY MASS INDEX: 41.65 KG/M2 | RESPIRATION RATE: 14 BRPM | OXYGEN SATURATION: 99 %

## 2019-07-31 DIAGNOSIS — Z79.899 POLYPHARMACY: ICD-10-CM

## 2019-07-31 DIAGNOSIS — G35 RELAPSING REMITTING MULTIPLE SCLEROSIS (H): Primary | ICD-10-CM

## 2019-07-31 DIAGNOSIS — M62.838 MUSCLE SPASM: ICD-10-CM

## 2019-07-31 DIAGNOSIS — R51.9 NONINTRACTABLE HEADACHE, UNSPECIFIED CHRONICITY PATTERN, UNSPECIFIED HEADACHE TYPE: ICD-10-CM

## 2019-07-31 DIAGNOSIS — R53.83 OTHER FATIGUE: ICD-10-CM

## 2019-07-31 DIAGNOSIS — M79.605 PAIN OF LEFT LOWER EXTREMITY: ICD-10-CM

## 2019-07-31 PROCEDURE — 99495 TRANSJ CARE MGMT MOD F2F 14D: CPT | Performed by: PHYSICIAN ASSISTANT

## 2019-07-31 RX ORDER — BACLOFEN 20 MG/1
TABLET ORAL
Refills: 10
Start: 2019-07-31 | End: 2021-03-16

## 2019-07-31 RX ORDER — MODAFINIL 100 MG/1
100-200 TABLET ORAL DAILY
Qty: 60 TABLET | Refills: 5 | Status: SHIPPED | OUTPATIENT
Start: 2019-07-31 | End: 2019-08-05

## 2019-07-31 ASSESSMENT — MIFFLIN-ST. JEOR: SCORE: 1794.87

## 2019-07-31 NOTE — TELEPHONE ENCOUNTER
Called [facility] at [phone] behalf of Miya Crump requesting medical records for any visits after 5/16/19 for a follow up appointment  she has today.     They will fax over information  from last couple of visits.    Too much to do all visits after 5/16/19 per representative at [facility]      Guillermina Garcia

## 2019-07-31 NOTE — PROGRESS NOTES
"Subjective     Josefina N Venkata Aldrich is a 44 year old female who presents to clinic today for the following health issues:    [facility]       Hospital Follow-up Visit:    Hospital/Nursing Home/IP Rehab Facility: [facility]  Date of Admission: 07/23/2019  Date of Discharge: 07/24/2019  Reason(s) for Admission: MS Flare and Migraine            Problems taking medications regularly:  None       Medication changes since discharge: None       Problems adhering to non-medication therapy:  None    Summary of hospitalization:  [facility] discharge summary reviewed  Diagnostic Tests/Treatments reviewed.  Follow up needed: Neurology  Other Healthcare Providers Involved in Patient s Care:         Specialist appointment - Neurology  Update since discharge: improved.     Post Discharge Medication Reconciliation: discharge medications reconciled, continue medications without change.  Plan of care communicated with patient     Coding guidelines for this visit:  Type of Medical   Decision Making Face-to-Face Visit       within 7 Days of discharge Face-to-Face Visit        within 14 days of discharge   Moderate Complexity [id number]   High Complexity [id number]          MRI 6/23 at [facility] - comparison showed no new lesions from MRI in 2018    Since discharge pt reports she felt \"really good\". Went back to work on Monday 8/29. Received her monthly Tysabri injection yesterday 7/30/2019. Sxs returned and reports extreme exhaustion since yesterday (7/30) morning. Mornings are usually good but by 10 am her body starts to hurt and fatigue sets in. Goes to bed at 6 pm and wakes at 5 am. She is compliant with all medications. Has not discussed MS related fatigue medications with Dr. Chong. Has an appointment with Giovanni Daniel CNP, neurology to fill out long term disability papers. Drinking lots of water and eating a healthy diet. Has lost weight. Woke up 4 times last night due to toe spasms.     Baclofen daily regimen " "(20 mg tablets)    1 tablet in the morning before work   1 tablet at noon   0.5 tables at 2 pm   1 tablet at 5 pm   2 tablet at bedtime   *Cannot take a full tablet at 2 pm because pills make her drowsy and she needs to drive home.         Patient Active Problem List   Diagnosis     Migraine     Asthma     MS (multiple sclerosis) (H)     Anemia     Backache     Body mass index 45.0-49.9, adult (H)     Cognitive changes     Depression with anxiety     Fever postop     Uterine leiomyoma     Headache     Heavy menses     Hypersomnia with sleep apnea     Iron deficiency anemia     Leukocytosis     Postsurgical nonabsorption     Mild intermittent asthma     Morbid obesity (H)     Myalgia and myositis     Neck pain     Other dyspnea and respiratory abnormality     Pain, neuropathic     Pelvic pain in female     Personal history of allergy to latex     Post concussion syndrome     Right shoulder pain     S/P gastric bypass     S/P hysterectomy     Bariatric surgery status     Vitamin B12 deficiency     Vitamin D deficiency     Vomiting following gastrointestinal surgery     Hypermagnesemia     Benign essential hypertension     Neck pain on left side     Mild major depression (H)     LLQ pain     Multiple sclerosis (H)     Immunosuppression (H)     Relapsing remitting multiple sclerosis (H)     Past Surgical History:   Procedure Laterality Date     GASTRIC BYPASS  2002    \"Trouble with B12 and D\"     HYSTERECTOMY, MONO  2013    cervix gone.  fibroids.  without BSO     TONSILLECTOMY         Social History     Tobacco Use     Smoking status: Former Smoker     Types: Cigars     Smokeless tobacco: Never Used   Substance Use Topics     Alcohol use: Yes     Alcohol/week: 1.2 oz     Types: 2 Standard drinks or equivalent per week     Comment: 0-3 drinks per week     Family History   Problem Relation Age of Onset     Lupus Paternal Aunt 41     Multiple Sclerosis No family hx of          Current Outpatient Medications   Medication " Sig Dispense Refill     albuterol (PROVENTIL) (2.5 MG/3ML) 0.083% neb solution Take 1 vial (2.5 mg) by nebulization every 6 hours as needed for shortness of breath / dyspnea or wheezing 1 Box 3     albuterol (VENTOLIN HFA) 108 (90 BASE) MCG/ACT inhaler Inhale 2 puffs into the lungs every 6 hours as needed        baclofen (LIORESAL) 20 MG tablet Take 2 tablets (40 mg) by mouth At Bedtime AND 1 tablet (20 mg) every morning AND 1 tablet (20 mg) daily (with lunch) AND 0.5 tablets (10 mg) See Admin Instructions. (In the afternoon)  10     budesonide (PULMICORT FLEXHALER) 90 MCG/ACT inhaler Inhale 2 puffs into the lungs daily 1 each 1     cholecalciferol (VITAMIN D3) 5000 UNITS TABS tablet Take 1 tablet (5,000 Units) by mouth daily       docusate sodium (COLACE) 100 MG tablet Take 1 tablet (100 mg) by mouth 2 times daily       Ferrous Sulfate (IRON SUPPLEMENT PO) Take 325 mg by mouth daily        gabapentin (NEURONTIN) 300 MG capsule Take 3 capsules (900 mg) by mouth At Bedtime 90 capsule 3     lisinopril-hydrochlorothiazide (PRINZIDE/ZESTORETIC) 10-12.5 MG tablet TAKE 1 TABLET BY MOUTH DAILY 90 tablet 2     modafinil (PROVIGIL) 100 MG tablet Take 1-2 tablets (100-200 mg) by mouth daily For wakefulness 60 tablet 5     montelukast (SINGULAIR) 10 MG tablet Take 1 tablet (10 mg) by mouth At Bedtime 90 tablet 3     natalizumab (TYSABRI) 300 MG/15ML injection Infusion       nortriptyline (PAMELOR) 50 MG capsule Take 1 capsule (50 mg) by mouth At Bedtime 90 capsule 3     order for DME Equipment being ordered: Nebulizer with supplies 1 Device 0     SUMAtriptan (IMITREX) 100 MG tablet Take 50 mg up to twice daily as needed for headache; separate doses by at least one hour and do not use more than 3 days/week 18 tablet 3     topiramate (TOPAMAX) 100 MG tablet Take 1 tablet (100 mg) by mouth At Bedtime (take with 50 mg to total 150 mg nightly) 60 tablet 3     topiramate (TOPAMAX) 50 MG tablet Take 1 tablet (50 mg) by mouth At  "Bedtime (take with 100 mg to total 150 mg nightly) 60 tablet 3     triamcinolone (KENALOG) 0.1 % external cream Apply topically 3 times daily as needed for irritation       valACYclovir (VALTREX) 500 MG tablet TAKE 1 TABLET BY MOUTH DAILY 90 tablet 0     Allergies   Allergen Reactions     Aspirin Difficulty breathing, Palpitations and Other (See Comments)     Adhesive Tape      Azithromycin Unknown     Latex Hives, Itching and Rash     Penicillins Cramps, GI Disturbance, Nausea and Vomiting, Rash, Swelling, Other (See Comments) and Hives       Reviewed and updated as needed this visit by Provider  Tobacco  Allergies  Meds  Problems  Med Hx  Surg Hx  Fam Hx         Review of Systems   ROS COMP: Constitutional, HEENT, cardiovascular, pulmonary, GI, , musculoskeletal, neuro, skin, endocrine and psych systems are negative, except as otherwise noted.    This document serves as a record of the services and decisions personally performed and made by ANDREW Thurston. It was created on her behalf by Sonia Mendes, a trained medical scribe. The creation of this document is based on the provider's statements to the medical scribe.  Sonia Mendes July 31, 2019 4:01 PM        Objective    /74   Pulse 94   Temp 97.3  F (36.3  C) (Tympanic)   Resp 14   Ht 1.651 m (5' 5\")   Wt 113.4 kg (250 lb)   SpO2 99%   BMI 41.60 kg/m    Body mass index is 41.6 kg/m .  Physical Exam   GENERAL: healthy, alert and no distress  RESP: lungs clear to auscultation - no rales, rhonchi or wheezes  CV: regular rate and rhythm, normal S1 S2, no S3 or S4, no murmur, click or rub, no peripheral edema and peripheral pulses strong  ABDOMEN: soft, nontender, no hepatosplenomegaly, no masses and bowel sounds normal  MS: no gross musculoskeletal defects noted, no edema  SKIN: no suspicious lesions or rashes  NEURO: Normal strength and tone, mentation intact and speech normal  PSYCH: mentation appears normal, affect " normal/bright    Diagnostic Test Results:  Labs reviewed in Epic  none         Assessment & Plan     Josefina was seen today for hospital f/u.    Diagnoses and all orders for this visit:    Relapsing remitting multiple sclerosis (H), Other fatigue  Start trial of Provigil 100 mg to promote wakefulness. If sxs worsen or persist RTC for further evaluation. Encouraged cardiovascular exercise once feeling better. Encouraged pt to create a sxs diary.  -     modafinil (PROVIGIL) 100 MG tablet; Take 1-2 tablets (100-200 mg) by mouth daily For wakefulness    Pain of left lower extremity, Muscle spasm  Controlled with current therapy.  Managed by neurology  -     baclofen (LIORESAL) 20 MG tablet; Take 2 tablets (40 mg) by mouth At Bedtime AND 1 tablet (20 mg) every morning AND 1 tablet (20 mg) daily (with lunch) AND 0.5 tablets (10 mg) See Admin Instructions. (In the afternoon)    Nonintractable headache, unspecified chronicity pattern, unspecified headache type  Continue following with neurology. Recommended asking at next neurology visit to discuss MS related fatigue medications. Encouraged pt to create a sxs diary.    Polypharmacy  MTM pharmacy referral given today.   -     MED THERAPY MANAGE REFERRAL; Future      Return in about 3 months (around 10/31/2019) for Physical Exam, Lab Work (40 minutes).    The information in this document, created by the medical scribe for me, accurately reflects the services I personally performed and the decisions made by me. I have reviewed and approved this document for accuracy prior to leaving the patient care area.  July 31, 2019 4:06 PM      Miya Crump PA-C  [facility]

## 2019-07-31 NOTE — TELEPHONE ENCOUNTER
Received notes from [facility] of Neurology.       Guillermnia Garcia

## 2019-08-01 ENCOUNTER — TELEPHONE (OUTPATIENT)
Dept: FAMILY MEDICINE | Facility: CLINIC | Age: 44
End: 2019-08-01

## 2019-08-01 DIAGNOSIS — R53.83 OTHER FATIGUE: ICD-10-CM

## 2019-08-01 DIAGNOSIS — G35 MULTIPLE SCLEROSIS (H): Primary | ICD-10-CM

## 2019-08-01 DIAGNOSIS — G35 RELAPSING REMITTING MULTIPLE SCLEROSIS (H): ICD-10-CM

## 2019-08-01 ASSESSMENT — ASTHMA QUESTIONNAIRES: ACT_TOTALSCORE: 22

## 2019-08-01 NOTE — TELEPHONE ENCOUNTER
Prior Authorization Retail Medication Request    Medication/Dose: Modafinil 100mg tablet   ICD code (if different than what is on RX):    Relapsing remitting multiple sclerosis (H) [G35]  - Primary       Other fatigue [R53.83]       Previously Tried and Failed:  none  Rationale:  Patient has multiple sclerosis related fatigue that is severe and debilitating.     Insurance Name:  [employer]  Insurance ID:  [id number]      Pharmacy Information (if different than what is on RX)  [facility]   Phone:  [phone]

## 2019-08-01 NOTE — TELEPHONE ENCOUNTER
Received notice from [facility] pharmacy that Modafinil medication in dose 100 mg is not covered by patients insurance.     Routing to provider to see if provider would like a PA started, and if so any reasoning that should be put in PA. Or if provider would like to change therapy, please send in a new prescription.     Routing to Prescribing provider    Susan Ahmadi MA      Pt ID: [id number]

## 2019-08-05 ENCOUNTER — TELEPHONE (OUTPATIENT)
Dept: FAMILY MEDICINE | Facility: CLINIC | Age: 44
End: 2019-08-05

## 2019-08-05 RX ORDER — AMANTADINE HYDROCHLORIDE 100 MG/1
100 CAPSULE, GELATIN COATED ORAL 2 TIMES DAILY
Qty: 60 CAPSULE | Refills: 1 | Status: SHIPPED | OUTPATIENT
Start: 2019-08-05 | End: 2019-08-20

## 2019-08-05 NOTE — TELEPHONE ENCOUNTER
PRIOR AUTHORIZATION DENIED    Medication: Modafinil 100mg tablet     Denial Date: 8/5/2019    Denial Rational: Patient must have a history of trial & failure to the formulary alternative(s) or have a contraindication or intolerance to the formulary alternatives: amantadine        Appeal Information:

## 2019-08-05 NOTE — TELEPHONE ENCOUNTER
Patients insurance prefers amantadine.  Please advise pt to try this RX and if not working RTC to discuss alternative.  Sent to pharmacy.      Miya Crump MS, PA-C

## 2019-08-05 NOTE — TELEPHONE ENCOUNTER
Claudia is calling to let Miya Crump know that she was unable to get the Modafinil Rx. It was first too expensive and then her insurance said a PA was needed.    Pt phone: [phone], ok to leave detailed message.    Flor Campa  Patient Representative

## 2019-08-05 NOTE — TELEPHONE ENCOUNTER
Prior Authorization Retail Medication Request    Medication/Dose: Modafinil 100mg tablet     ePA from [facility]    Go to [email or web address]     Key: [id number]   Last name:  Jennifer BROOKS 1975

## 2019-08-05 NOTE — TELEPHONE ENCOUNTER
Called and informed patient of providers message and new medication. She understood and would contact us if it was still not improving.     Susan Ahmadi MA

## 2019-08-05 NOTE — TELEPHONE ENCOUNTER
PA Initiation    Medication: Modafinil 100mg tablet   Insurance Company: [employer] - Phone [phone] Fax [phone]  Pharm[facility] Rx[id number] [place] [address]  Filling Pharmacy Phone: [phone]  Filling Pharmacy Fax:    Start Date: 8/5/2019    Central Prior Authorization Team   Phone: [phone]

## 2019-08-05 NOTE — TELEPHONE ENCOUNTER
MTM referral from: [facility] visit (referral by provider)    MTM referral outreach attempt #2 on August 5, 2019 at 1:53 PM      Outcome: Patient not reachable after several attempts, will route to MTM Pharmacist/Provider as an FYI. Thank you for the referral.    Katya Gutierrez, MTM Coordinator

## 2019-08-07 ENCOUNTER — TELEPHONE (OUTPATIENT)
Dept: FAMILY MEDICINE | Facility: CLINIC | Age: 44
End: 2019-08-07

## 2019-08-07 ENCOUNTER — NURSE TRIAGE (OUTPATIENT)
Dept: FAMILY MEDICINE | Facility: CLINIC | Age: 44
End: 2019-08-07

## 2019-08-07 NOTE — TELEPHONE ENCOUNTER
This PA was just submitted a few days ago, however patient had an allergic reaction to the alternative. Now we would like to submit this PA with the new information.           Prior Authorization Retail Medication Request     Medication/Dose: Modafinil 100mg tablet   ICD code (if different than what is on RX):    Relapsing remitting multiple sclerosis (H) [G35]  - Primary       Other fatigue [R53.83]       Previously Tried and Failed:  Patient was prescribed and tried the alternative given by insurance company of amantadine 100mg, patient had allergic reaction today 8/7/2019, tongue tingling, facial swelling, and lip swelling.   Rationale:  Patient has multiple sclerosis related fatigue that is severe and debilitating.      Insurance Name:  [employer]age  Insurance ID:  [id number]        Pharmacy Information (if different than what is on RX)  [facility]:  [facility]   Phone:  [phone]

## 2019-08-07 NOTE — TELEPHONE ENCOUNTER
Took amantadine at 1 pm then noticed about an hour later face was warm and then noticed her upper lip was swollen, then she took benadryl then went to bed at 430 then woke up at 730 pm and noticed facial swelling and both lips swollen as with tingling sensation in tongue     Pt then took her other medications and went back to sleep     This morning pt is feeling better swelling has gone down but her face still feels warm.   Tingling in the tongue is still present     Denies: problems swallowing, breathing, wheezing, sore throat    She has been very thirsty since then     Rn advised pt to take a Claritin or a zyrtec will discuss with PCP as well     Patient stated an understanding and agreed with plan.      [facility] [phone] ok        Please review and advise     Thank you     Amelia Chance RN, BSN  [facility]

## 2019-08-07 NOTE — TELEPHONE ENCOUNTER
Medication Appeal Initiation    We have initiated an appeal for the requested medication:  Medication: Modafinil 100mg tablets  Appeal Start Date:  8/7/2019  Insurance Company: [employer] - Phone [phone] Fax [phone]  Comments:  Appeal initiated with new information, allergic reaction to alternative, [facility]

## 2019-08-07 NOTE — TELEPHONE ENCOUNTER
Message handled by Nurse Triage with [facility] - provider name: Catherine MESA - have pt monitor symptoms, ok to take calritin or a zyertec during the day and benadryl at night, we will start the PA on the other medication today      Called # below     Advised on the information above and reviewed worsening symptoms with pt and if they happen she needs to go to the ER   '  Patient stated an understanding and agreed with plan.    Amelia Chance RN, BSN  [facility]         .

## 2019-08-08 ENCOUNTER — TRANSFERRED RECORDS (OUTPATIENT)
Dept: HEALTH INFORMATION MANAGEMENT | Facility: CLINIC | Age: 44
End: 2019-08-08

## 2019-08-08 NOTE — TELEPHONE ENCOUNTER
MEDICATION APPEAL APPROVED    Medication: Modafinil 100mg tablets  Authorization Effective Date: 8/7/2019  Authorization Expiration Date: 8/7/2020  Approved Dose/Quantity: 60  Reference #: [id number]   Insurance Company: [employer] [phone] Fax [phone]  Which Pharmacy is filling the prescription (Not needed for infusion/clinic administered):      **Instructed pharmacy to notify patient when script is ready to /ship.**

## 2019-08-20 ENCOUNTER — HOSPITAL ENCOUNTER (EMERGENCY)
Facility: CLINIC | Age: 44
Discharge: HOME | End: 2019-08-20
Attending: EMERGENCY MEDICINE | Admitting: EMERGENCY MEDICINE
Payer: COMMERCIAL

## 2019-08-20 ENCOUNTER — MYC MEDICAL ADVICE (OUTPATIENT)
Dept: FAMILY MEDICINE | Facility: CLINIC | Age: 44
End: 2019-08-20

## 2019-08-20 VITALS
DIASTOLIC BLOOD PRESSURE: 76 MMHG | TEMPERATURE: 98.2 F | WEIGHT: 251 LBS | RESPIRATION RATE: 16 BRPM | OXYGEN SATURATION: 100 % | SYSTOLIC BLOOD PRESSURE: 127 MMHG | BODY MASS INDEX: 41.82 KG/M2 | HEIGHT: 65 IN

## 2019-08-20 DIAGNOSIS — R51.9 NONINTRACTABLE EPISODIC HEADACHE, UNSPECIFIED HEADACHE TYPE: ICD-10-CM

## 2019-08-20 LAB
ANION GAP SERPL CALCULATED.3IONS-SCNC: 4 MMOL/L (ref 3–14)
BUN SERPL-MCNC: 9 MG/DL (ref 7–30)
CALCIUM SERPL-MCNC: 9.3 MG/DL (ref 8.5–10.1)
CHLORIDE SERPL-SCNC: 112 MMOL/L (ref 94–109)
CO2 SERPL-SCNC: 23 MMOL/L (ref 20–32)
CREAT SERPL-MCNC: 1.04 MG/DL (ref 0.52–1.04)
ERYTHROCYTE [DISTWIDTH] IN BLOOD BY AUTOMATED COUNT: 14.2 % (ref 10–15)
GFR SERPL CREATININE-BSD FRML MDRD: 66 ML/MIN/{1.73_M2}
GLUCOSE SERPL-MCNC: 78 MG/DL (ref 70–99)
HCG SERPL QL: NEGATIVE
HCT VFR BLD AUTO: 37.7 % (ref 35–47)
HGB BLD-MCNC: 11.8 G/DL (ref 11.7–15.7)
MCH RBC QN AUTO: 30.3 PG (ref 26.5–33)
MCHC RBC AUTO-ENTMCNC: 31.3 G/DL (ref 31.5–36.5)
MCV RBC AUTO: 97 FL (ref 78–100)
PLATELET # BLD AUTO: 361 10E9/L (ref 150–450)
POTASSIUM SERPL-SCNC: 3.9 MMOL/L (ref 3.4–5.3)
RBC # BLD AUTO: 3.9 10E12/L (ref 3.8–5.2)
SODIUM SERPL-SCNC: 139 MMOL/L (ref 133–144)
WBC # BLD AUTO: 7.4 10E9/L (ref 4–11)

## 2019-08-20 PROCEDURE — 80048 BASIC METABOLIC PNL TOTAL CA: CPT | Performed by: EMERGENCY MEDICINE

## 2019-08-20 PROCEDURE — 96361 HYDRATE IV INFUSION ADD-ON: CPT

## 2019-08-20 PROCEDURE — 96376 TX/PRO/DX INJ SAME DRUG ADON: CPT

## 2019-08-20 PROCEDURE — 96374 THER/PROPH/DIAG INJ IV PUSH: CPT

## 2019-08-20 PROCEDURE — 85027 COMPLETE CBC AUTOMATED: CPT | Performed by: EMERGENCY MEDICINE

## 2019-08-20 PROCEDURE — 99284 EMERGENCY DEPT VISIT MOD MDM: CPT | Mod: 25

## 2019-08-20 PROCEDURE — 25000128 H RX IP 250 OP 636: Performed by: EMERGENCY MEDICINE

## 2019-08-20 PROCEDURE — 96375 TX/PRO/DX INJ NEW DRUG ADDON: CPT

## 2019-08-20 PROCEDURE — 84703 CHORIONIC GONADOTROPIN ASSAY: CPT | Performed by: EMERGENCY MEDICINE

## 2019-08-20 RX ORDER — DIPHENHYDRAMINE HYDROCHLORIDE 50 MG/ML
25 INJECTION INTRAMUSCULAR; INTRAVENOUS ONCE
Status: COMPLETED | OUTPATIENT
Start: 2019-08-20 | End: 2019-08-20

## 2019-08-20 RX ORDER — METOCLOPRAMIDE HYDROCHLORIDE 5 MG/ML
10 INJECTION INTRAMUSCULAR; INTRAVENOUS ONCE
Status: COMPLETED | OUTPATIENT
Start: 2019-08-20 | End: 2019-08-20

## 2019-08-20 RX ORDER — MODAFINIL 100 MG/1
100 TABLET ORAL DAILY
COMMUNITY
End: 2020-03-13

## 2019-08-20 RX ORDER — METOCLOPRAMIDE 10 MG/1
10 TABLET ORAL 4 TIMES DAILY PRN
Qty: 15 TABLET | Refills: 0 | Status: SHIPPED | OUTPATIENT
Start: 2019-08-20 | End: 2019-12-23

## 2019-08-20 RX ORDER — TETRACAINE HYDROCHLORIDE 5 MG/ML
SOLUTION OPHTHALMIC
Status: DISCONTINUED
Start: 2019-08-20 | End: 2019-08-20 | Stop reason: HOSPADM

## 2019-08-20 RX ADMIN — DIPHENHYDRAMINE HYDROCHLORIDE 25 MG: 50 INJECTION, SOLUTION INTRAMUSCULAR; INTRAVENOUS at 10:42

## 2019-08-20 RX ADMIN — SODIUM CHLORIDE 1000 ML: 9 INJECTION, SOLUTION INTRAVENOUS at 09:38

## 2019-08-20 RX ADMIN — DIPHENHYDRAMINE HYDROCHLORIDE 25 MG: 50 INJECTION, SOLUTION INTRAMUSCULAR; INTRAVENOUS at 09:42

## 2019-08-20 RX ADMIN — METOCLOPRAMIDE 10 MG: 5 INJECTION, SOLUTION INTRAMUSCULAR; INTRAVENOUS at 10:43

## 2019-08-20 RX ADMIN — PROCHLORPERAZINE EDISYLATE 10 MG: 5 INJECTION, SOLUTION INTRAMUSCULAR; INTRAVENOUS at 09:42

## 2019-08-20 ASSESSMENT — MIFFLIN-ST. JEOR: SCORE: 1799.41

## 2019-08-20 ASSESSMENT — ENCOUNTER SYMPTOMS
FATIGUE: 1
NUMBNESS: 0
MYALGIAS: 1
HEADACHES: 1
NAUSEA: 1
EYE PAIN: 0
FEVER: 0

## 2019-08-20 NOTE — ED AVS SNAPSHOT
[facility]  [address]  [facility] [address]  Phone:  [phone]  Fax:  [phone]                                    Josefina Aldrich   MRN: [id number]    Department:  [facility]   Date of Visit:  8/20/2019           After Visit Summary Signature Page    I have received my discharge instructions, and my questions have been answered. I have discussed any challenges I see with this plan with the nurse or doctor.    ..........................................................................................................................................  Patient/Patient Representative Signature      ..........................................................................................................................................  Patient Representative Print Name and Relationship to Patient    ..................................................               ................................................  Date                                   Time    ..........................................................................................................................................  Reviewed by Signature/Title    ...................................................              ..............................................  Date                                               Time          22EPIC Rev 08/18

## 2019-08-20 NOTE — TELEPHONE ENCOUNTER
Called #   Telephone Information:   Mobile [phone]       Pt stated she does not recall that she poked or rub her eye - she     Pt stated she has a headache that is very bad - it is pounding and she gets a funny taste in her mouth, she also is unbalanced/dizziness   She also states that her tongue gets tingling sometimes with this headache and she is having blurred vision in the left eye     Denies: falls, head injury, vomiting     RN advised pt go to the ER - have someone drive her     Patient stated an understanding and agreed with plan.    Amelia Chance RN, BSN  [facility]

## 2019-08-20 NOTE — ED TRIAGE NOTES
Mid-frontal headache and nausea not responding to prescribed medications taken at home.  History of MS.  Symptoms include feeling increased headache, left peripheral vision blurriness, increased fatigue and pain in legs.

## 2019-08-20 NOTE — ED PROVIDER NOTES
History     Chief Complaint:  Headache      HPI   Josefina Aldrich is a 44 year old female with a history of MS for which she gets Tysabri 300 mg/15mL infusions Q4 weeks with most recent MRI below, migraines, hypertension, and neuropathic pain who presents to the emergency department with her daughter for evaluation of a headache. The patient reports that she's had intermittent migraines for the past three days, however, since yesterday it's been constant and has worsened from 5/10 to 8/10 with accompanied nausea. The headache starts in her forehead and radiates to the back of her head and down her spine. Moving and turning her head worsens the headache. She's also noticed redness to her left eye that has been spreading with associated blurry vision, particularly in her peripheral. She has no associated eye pain. She also states that she has more spasticity and in her hands and bilateral legs with pain and swelling. Additionally, she has been very fatigued and has episodes of suddenly falling asleep. She's taken Tylenol and Topamax with no improvement. Her next neurology visit is next month. She has had similar headaches in which MRI revealed no active MS lesions. Patient denies fevers, numbness, or eye pain.    MRI BRAIN WITHOUT AND WITH CONTRAST  7/23/2019 5:49 PM   IMPRESSION:  1. Stable scattered intracranial T2 and T2 FLAIR hyperintense white  matter lesions, compatible with the patient's history of demyelinating  disease.  2. No definite new, enhancing, or diffusion restricting intracranial  lesions.    Allergies:  Aspirin  Azithromycin  Penicillins       Medications:    Albuterol  Amantadine  Symmetrel  Lioresal  Budesonide  Colace  Neurontin  Prinzide  Singulair  Tysabri  Pamelor  Imitrex  Topamax  Kenalog  Valtrex      Past Medical History:    Asthma  Fibroids  Hypertension  Migraines  ISAAC  Pre-diabetes  Relapsing remitting MS  Iron deficiency  "anemia  Hypersomnia  Immunosuppression  Depression  Morbid obesity  Vitamin B12 deficiency  Leukocytosis  Uterine leiomyoma  Post concussion syndrome  Cognitive changes  Neuropathic pain  Heavy menses  Dyspnea and respiratory abnormality  Myalgias and myositis     Past Surgical History:    Gastric bypass  Hysterectomy  Tonsillectomy      Family History:    Lupus     Social History:  Tobacco Use: Former cigar smoker  Alcohol Use: Yes  PCP: Miya Crump  Marital Status:        Review of Systems   Constitutional: Positive for fatigue. Negative for fever.   Eyes: Positive for visual disturbance. Negative for pain.   Gastrointestinal: Positive for nausea.   Musculoskeletal: Positive for gait problem and myalgias.   Neurological: Positive for headaches. Negative for numbness.   All other systems reviewed and are negative.      Physical Exam     Patient Vitals for the past 24 hrs:   BP Temp Temp src Heart Rate Resp SpO2 Height Weight   08/20/19 0924 127/76 98.2  F (36.8  C) Oral 81 16 100 % 1.651 m (5' 5\") 113.9 kg (251 lb)     Physical Exam    General:   Pleasant, age appropriate.     Resting comfortably in the bed.  HEENT:    Oropharynx is moist, without lesions or trismus.  Eyes:    PERRL, EOMs intact.     Visual fields intact     Conjunctiva normal     Left eye:       IOP 9      No hyphema or hypopyon.      Wood's lamp with fluorescein:        Negative Norberto's test, no signs of open globe.       No dendritic lesions.       No cells or flare suggestive of iritis.      Fundoscopic exam: no retinal hemorrhages or pallor.       No optic disc edema/papilledema.       No defect to the retina suggestive of detachment.       No vitreous hemorrhage or retinal pallor  Neck:    Supple, no meningismus.     CV:     Regular rate and rhythm.      No murmurs, rubs or gallops.     No peripheral edema.  PULM:    Clear to auscultation bilateral.       No respiratory distress.      Good air exchange.  ABD:    Soft, " non-tender, non-distended.      No rebound, guarding or rigidity.  MSK:     No gross deformity to all four extremities.   LYMPH:   No cervical lymphadenopathy.  NEURO:   Alert & O x 3.     CN II-XII intact, speech is clear with no aphasia.       Finger to nose within normal limits.  No pronator drift.       Strength is 5/5 in all 4 extremities.  Sensation is intact.       Normal muscular tone, no tremor.     No clonus, down-going Babinski bilateral  Skin:    Warm, dry and intact.    Psych:    Mood is good and affect is appropriate.      Emergency Department Course   Laboratory:  CBC: WBC: 7.4, HGB: 11.8, PLT: 361  BMP: Chloride: 112 (H), o/w WNL (Creatinine: 1.04)    HCG qualitative: Negative    Interventions:  09 0.9% Sodium Chloride BOLUS 1000 mLs IV   0942 Compazine 10 mg IV  0942 Benadryl 25 mg IV  1042 Benadryl 25 mg IV  1043 Reglan 10 mg IV    Emergency Department Course:  913 Nursing notes and vitals reviewed. I performed an exam of the patient as documented above.     IV inserted. Medicine administered as documented above. Blood drawn. This was sent to the lab for further testing, results above.    1025 I rechecked the patient and discussed the results of her workup thus far.     1137 I rechecked the patient.    Findings and plan explained to the Patient. Patient discharged home with instructions regarding supportive care, medications, and reasons to return. The importance of close follow-up was reviewed. The patient was prescribed Reglan.     I personally reviewed the laboratory results with the Patient and answered all related questions prior to discharge.     Impression & Plan    Medical Decision Makin-year-old female with a history of relapsing-remitting multiple sclerosis who presented to the ED with primary complaints of headache.  She does have a history of migraines for which this feels similar with the exception of subjective complaints of lower leg spasticity, blurred vision of her left  eye and paresthesias to the tongue.  In regards to her blurred vision, she has no evidence of acute angle-closure glaucoma.  Intraocular pressure is 9.  She has no features to suggest acute optic neuritis.  She is on a medication called Tysabri for her MS which is known to cause acute retinal necrosis.  There are no findings on her ocular examination to suggest this particular pathology.    She has no clinical signs of meningitis or concerns for intracranial hemorrhage/mass or dural sinus thrombosis.  She was given analgesics with remarkable improvement of her headache.  With treatment of her headache, her vision changes, lower extremity subjective spasticity and tongue paresthesias have completely resolved.  She had previous MRIs during episodes of headache which revealed no active lesions.  At this point, I do not feel there is any need for repeat MRI or emergent neurology consultation.  Patient feels improved and will be discharged home.  She will be provided Reglan to use as needed for headache.  She is to follow-up with primary neurologist as soon as possible and return to the ED for any worsening symptoms.    Diagnosis:    ICD-10-CM    1. Nonintractable episodic headache, unspecified headache type R51        Disposition:  Discharged to home    Discharge Medications:  New Prescriptions    METOCLOPRAMIDE (REGLAN) 10 MG TABLET    Take 1 tablet (10 mg) by mouth 4 times daily as needed (headache or nausea)     Scribe Disclosure:  I, Andrés Marie, am serving as a scribe on 8/20/2019 at 9:13 AM to personally document services performed by Madhu Ellison MD based on my observations and the provider's statements to me.     Andrés Marie  8/20/2019   [facility] EMERGENCY DEPARTMENT       Madhu Cuenca MD  08/20/19 2901

## 2019-08-23 ENCOUNTER — TELEPHONE (OUTPATIENT)
Dept: FAMILY MEDICINE | Facility: CLINIC | Age: 44
End: 2019-08-23

## 2019-08-23 NOTE — TELEPHONE ENCOUNTER
ACT Total Scores 5/1/2019 6/12/2019 7/31/2019   ACT TOTAL SCORE (Goal Greater than or Equal to 20) 22 17 22   In the past 12 months, how many times did you visit the emergency room for your asthma without being admitted to the hospital? 0 0 0   In the past 12 months, how many times were you hospitalized overnight because of your asthma? 0 0 0

## 2019-08-27 ENCOUNTER — INFUSION THERAPY VISIT (OUTPATIENT)
Dept: INFUSION THERAPY | Facility: CLINIC | Age: 44
End: 2019-08-27
Attending: PSYCHIATRY & NEUROLOGY
Payer: COMMERCIAL

## 2019-08-27 ENCOUNTER — HOSPITAL ENCOUNTER (OUTPATIENT)
Facility: CLINIC | Age: 44
Setting detail: SPECIMEN
Discharge: HOME | End: 2019-08-27
Attending: PSYCHIATRY & NEUROLOGY | Admitting: PSYCHIATRY & NEUROLOGY
Payer: COMMERCIAL

## 2019-08-27 VITALS — TEMPERATURE: 98.3 F | RESPIRATION RATE: 16 BRPM | DIASTOLIC BLOOD PRESSURE: 65 MMHG | SYSTOLIC BLOOD PRESSURE: 128 MMHG

## 2019-08-27 DIAGNOSIS — G35 MULTIPLE SCLEROSIS (H): Primary | ICD-10-CM

## 2019-08-27 LAB
ALBUMIN SERPL-MCNC: 3.7 G/DL (ref 3.4–5)
ALP SERPL-CCNC: 63 U/L (ref 40–150)
ALT SERPL W P-5'-P-CCNC: 21 U/L (ref 0–50)
AST SERPL W P-5'-P-CCNC: 23 U/L (ref 0–45)
BILIRUB DIRECT SERPL-MCNC: <0.1 MG/DL (ref 0–0.2)
BILIRUB SERPL-MCNC: 0.2 MG/DL (ref 0.2–1.3)
PROT SERPL-MCNC: 7.4 G/DL (ref 6.8–8.8)

## 2019-08-27 PROCEDURE — 80076 HEPATIC FUNCTION PANEL: CPT | Performed by: PSYCHIATRY & NEUROLOGY

## 2019-08-27 PROCEDURE — 25800030 ZZH RX IP 258 OP 636: Performed by: PSYCHIATRY & NEUROLOGY

## 2019-08-27 PROCEDURE — 96365 THER/PROPH/DIAG IV INF INIT: CPT

## 2019-08-27 PROCEDURE — 36415 COLL VENOUS BLD VENIPUNCTURE: CPT

## 2019-08-27 PROCEDURE — 25000128 H RX IP 250 OP 636: Performed by: PSYCHIATRY & NEUROLOGY

## 2019-08-27 RX ADMIN — NATALIZUMAB 300 MG: 300 INJECTION INTRAVENOUS at 13:21

## 2019-08-27 RX ADMIN — SODIUM CHLORIDE 250 ML: 9 INJECTION, SOLUTION INTRAVENOUS at 13:21

## 2019-08-27 NOTE — PROGRESS NOTES
Infusion Nursing Note:  Josefina Lipscombs presents today for Tysabri.    Patient seen by provider today: No   present during visit today: Not Applicable.    Note: took tylenol before arrival - has a migraine.  By end of infusion, headache had improved.    Intravenous Access:  Lab draw site R AC, Needle type piv, Gauge 24.  Peripheral IV placed.    Treatment Conditions:  Tysabri pre-infusion checklist completed via touch program.      Post Infusion Assessment:  Patient tolerated infusion without incident.  Patient observed for 60 minutes post Tysabri per protocol.  Site patent and intact, free from redness, edema or discomfort.  No evidence of extravasations.  Access discontinued per protocol.       Discharge Plan:   Discharge instructions reviewed with: Patient.  Patient and/or family verbalized understanding of discharge instructions and all questions answered.  AVS to patient via [employer].  Patient will return 9/24 for next appointment.   Patient discharged in stable condition accompanied by: self.  Departure Mode: Ambulatory.    Radha Lee, RN, RN

## 2019-08-28 DIAGNOSIS — B00.9 HSV (HERPES SIMPLEX VIRUS) INFECTION: ICD-10-CM

## 2019-08-28 RX ORDER — VALACYCLOVIR HYDROCHLORIDE 500 MG/1
TABLET, FILM COATED ORAL
Qty: 90 TABLET | Refills: 1 | Status: SHIPPED | OUTPATIENT
Start: 2019-08-28 | End: 2020-03-16

## 2019-08-28 NOTE — TELEPHONE ENCOUNTER
"Requested Prescriptions   Pending Prescriptions Disp Refills     valACYclovir (VALTREX) 500 MG tablet [Pharmacy Med Name: VALACYCLOVIR 500MG TABLETS] 90 tablet 0     Sig: TAKE 1 TABLET BY MOUTH EVERY DAY       Antivirals for Herpes Protocol Passed - 8/28/2019  7:23 AM        Passed - Patient is age 12 or older        Passed - Recent (12 mo) or future (30 days) visit within the authorizing provider's specialty     Patient had office visit in the last 12 months or has a visit in the next 30 days with authorizing provider or within the authorizing provider's specialty.  See \"Patient Info\" tab in inbasket, or \"Choose Columns\" in Meds & Orders section of the refill encounter.              Passed - Medication is active on med list        Passed - Normal serum creatinine on file in past 12 months     Recent Labs   Lab Test 08/20/19  0934   CR 1.04             valACYclovir (VALTREX) 500 MG tablet  Last Written Prescription Date:  6-2-19  Last Fill Quantity: 90,  # refills: 0   Last office visit: 7/31/2019 with prescribing provider:  LEONORA Crump   Future Office Visit:      "

## 2019-08-29 ENCOUNTER — MYC MEDICAL ADVICE (OUTPATIENT)
Dept: FAMILY MEDICINE | Facility: CLINIC | Age: 44
End: 2019-08-29

## 2019-09-04 ENCOUNTER — E-VISIT (OUTPATIENT)
Dept: FAMILY MEDICINE | Facility: CLINIC | Age: 44
End: 2019-09-04
Payer: COMMERCIAL

## 2019-09-04 ENCOUNTER — MYC MEDICAL ADVICE (OUTPATIENT)
Dept: FAMILY MEDICINE | Facility: CLINIC | Age: 44
End: 2019-09-04

## 2019-09-04 DIAGNOSIS — J20.9 ACUTE BRONCHITIS WITH COEXISTING CONDITION REQUIRING PROPHYLACTIC TREATMENT: ICD-10-CM

## 2019-09-04 DIAGNOSIS — J01.90 ACUTE SINUSITIS WITH COEXISTING CONDITION REQUIRING PROPHYLACTIC TREATMENT: Primary | ICD-10-CM

## 2019-09-04 PROCEDURE — 99444 ZZC PHYSICIAN ONLINE EVALUATION & MANAGEMENT SERVICE: CPT | Performed by: PHYSICIAN ASSISTANT

## 2019-09-04 RX ORDER — DOXYCYCLINE HYCLATE 100 MG
100 TABLET ORAL 2 TIMES DAILY
Qty: 20 TABLET | Refills: 0 | Status: SHIPPED | OUTPATIENT
Start: 2019-09-04 | End: 2019-11-06

## 2019-09-05 NOTE — TELEPHONE ENCOUNTER
Copying message and adding to other [employer] message.    Dion Gongora RN   [facility]

## 2019-09-10 ENCOUNTER — TELEPHONE (OUTPATIENT)
Dept: FAMILY MEDICINE | Facility: CLINIC | Age: 44
End: 2019-09-10

## 2019-09-10 NOTE — TELEPHONE ENCOUNTER
"Patient was returning call to clinic regarding My Chart message.  Patient stated she did not start her antibiotic as \"no one can tell me if I'm allergic to it\".  Patient states her symptoms have not improved.  Per provider patient can be scheduled tomorrow 9/11/2019 in the 1120 slot.      During telephone call, the clinic lost telephone and Internet and became disconnected.  This writer traveled to another clinic and called patient back and advised of the disconnection and to call the [facility].  Waiting for call back.    After speaking with the patient again she stated she did start taking the doxycycline but started feeling very nauseous and throwing up and was not sure if it was an allergic reaction and after not taking it for 1 full day, patient began to feel better.    Patient states she really prefers to see her PCP but is unable to leave in the middle of the day due to \"job is on the line already\".  A same day acute spot was taken for 40 minutes for 9/11/2019 at 1500.    Routing to provider as TAMARA SmithN, RN  [employer] Triage    "

## 2019-09-11 ENCOUNTER — ANCILLARY PROCEDURE (OUTPATIENT)
Dept: GENERAL RADIOLOGY | Facility: CLINIC | Age: 44
End: 2019-09-11
Attending: PHYSICIAN ASSISTANT
Payer: COMMERCIAL

## 2019-09-11 ENCOUNTER — OFFICE VISIT (OUTPATIENT)
Dept: FAMILY MEDICINE | Facility: CLINIC | Age: 44
End: 2019-09-11
Payer: COMMERCIAL

## 2019-09-11 VITALS
DIASTOLIC BLOOD PRESSURE: 70 MMHG | SYSTOLIC BLOOD PRESSURE: 106 MMHG | HEIGHT: 65 IN | OXYGEN SATURATION: 98 % | WEIGHT: 256 LBS | TEMPERATURE: 98.7 F | BODY MASS INDEX: 42.65 KG/M2 | HEART RATE: 91 BPM

## 2019-09-11 DIAGNOSIS — D84.9 IMMUNOSUPPRESSION (H): ICD-10-CM

## 2019-09-11 DIAGNOSIS — R06.02 SOB (SHORTNESS OF BREATH): ICD-10-CM

## 2019-09-11 DIAGNOSIS — J01.00 ACUTE MAXILLARY SINUSITIS, RECURRENCE NOT SPECIFIED: ICD-10-CM

## 2019-09-11 DIAGNOSIS — J45.21 MILD INTERMITTENT ASTHMA WITH ACUTE EXACERBATION: ICD-10-CM

## 2019-09-11 DIAGNOSIS — J20.9 ACUTE BRONCHITIS WITH COEXISTING CONDITION REQUIRING PROPHYLACTIC TREATMENT: Primary | ICD-10-CM

## 2019-09-11 PROCEDURE — 71046 X-RAY EXAM CHEST 2 VIEWS: CPT | Mod: FY

## 2019-09-11 PROCEDURE — 99214 OFFICE O/P EST MOD 30 MIN: CPT | Performed by: PHYSICIAN ASSISTANT

## 2019-09-11 RX ORDER — FLUTICASONE PROPIONATE 50 MCG
2 SPRAY, SUSPENSION (ML) NASAL DAILY
Qty: 16 G | Refills: 0 | Status: SHIPPED | OUTPATIENT
Start: 2019-09-11 | End: 2019-11-06

## 2019-09-11 RX ORDER — CEFDINIR 300 MG/1
300 CAPSULE ORAL 2 TIMES DAILY
Qty: 20 CAPSULE | Refills: 0 | Status: SHIPPED | OUTPATIENT
Start: 2019-09-11 | End: 2019-11-06

## 2019-09-11 ASSESSMENT — ANXIETY QUESTIONNAIRES
3. WORRYING TOO MUCH ABOUT DIFFERENT THINGS: SEVERAL DAYS
6. BECOMING EASILY ANNOYED OR IRRITABLE: SEVERAL DAYS
IF YOU CHECKED OFF ANY PROBLEMS ON THIS QUESTIONNAIRE, HOW DIFFICULT HAVE THESE PROBLEMS MADE IT FOR YOU TO DO YOUR WORK, TAKE CARE OF THINGS AT HOME, OR GET ALONG WITH OTHER PEOPLE: NOT DIFFICULT AT ALL
5. BEING SO RESTLESS THAT IT IS HARD TO SIT STILL: NOT AT ALL
7. FEELING AFRAID AS IF SOMETHING AWFUL MIGHT HAPPEN: NOT AT ALL
2. NOT BEING ABLE TO STOP OR CONTROL WORRYING: SEVERAL DAYS
GAD7 TOTAL SCORE: 4
1. FEELING NERVOUS, ANXIOUS, OR ON EDGE: NOT AT ALL

## 2019-09-11 ASSESSMENT — PATIENT HEALTH QUESTIONNAIRE - PHQ9
5. POOR APPETITE OR OVEREATING: SEVERAL DAYS
SUM OF ALL RESPONSES TO PHQ QUESTIONS 1-9: 7

## 2019-09-11 ASSESSMENT — MIFFLIN-ST. JEOR: SCORE: 1822.09

## 2019-09-11 NOTE — PROGRESS NOTES
Subjective     Josefina VYAS Venkata Aldrich is a 44 year old female who presents to clinic today for the following health issues:    HPI   Acute Illness   Acute illness concerns: cold symptoms/allergic reaction   Onset: x7 days     Fever: no    Chills/Sweats: no    Headache (location?): YES    Sinus Pressure:YES    Conjunctivitis:  no    Ear Pain: YES    Rhinorrhea: YES    Congestion: YES    Sore Throat: YES  Abdominal pain left side   Chest tightness    Cough: YES-non-productive    Wheeze: YES- using inhaler a lot more     Decreased Appetite: YES    Nausea: YES not anymore since stopping doxycycline , pt took one dose     Vomiting:once     Diarrhea:  no    Dysuria/Freq.: no    Fatigue/Achiness: YES    Sick/Strep Exposure: no     Therapies Tried and outcome: doxycycline (single dose) - stomach cramps (these have resolved)- and mucinex  - slight relief     Patient reports that she feels as though she is having an asthma exacerbation.  She is using her albuterol inhaler more frequently and feels as though that this does help.  She continues her daily controller inhaler.  Of note, she is immunosuppressed and is getting to TySabri infusions monthly for her multiple sclerosis.    Patient Active Problem List   Diagnosis     Migraine     Asthma     MS (multiple sclerosis) (H)     Anemia     Backache     Body mass index 45.0-49.9, adult (H)     Cognitive changes     Depression with anxiety     Fever postop     Uterine leiomyoma     Headache     Heavy menses     Hypersomnia with sleep apnea     Iron deficiency anemia     Leukocytosis     Postsurgical nonabsorption     Mild intermittent asthma     Morbid obesity (H)     Myalgia and myositis     Neck pain     Other dyspnea and respiratory abnormality     Pain, neuropathic     Pelvic pain in female     Personal history of allergy to latex     Post concussion syndrome     Right shoulder pain     S/P gastric bypass     S/P hysterectomy     Bariatric surgery status     Vitamin B12  "deficiency     Vitamin D deficiency     Vomiting following gastrointestinal surgery     Hypermagnesemia     Benign essential hypertension     Neck pain on left side     Mild major depression (H)     LLQ pain     Multiple sclerosis (H)     Immunosuppression (H)     Relapsing remitting multiple sclerosis (H)     Past Surgical History:   Procedure Laterality Date     GASTRIC BYPASS  2002    \"Trouble with B12 and D\"     HYSTERECTOMY, MONO  2013    cervix gone.  fibroids.  without BSO     TONSILLECTOMY         Social History     Tobacco Use     Smoking status: Former Smoker     Types: Cigars     Smokeless tobacco: Never Used   Substance Use Topics     Alcohol use: Yes     Alcohol/week: 1.2 oz     Types: 2 Standard drinks or equivalent per week     Comment: 0-3 drinks per week     Family History   Problem Relation Age of Onset     Lupus Paternal Aunt 41     Multiple Sclerosis No family hx of          Current Outpatient Medications   Medication Sig Dispense Refill     albuterol (PROVENTIL) (2.5 MG/3ML) 0.083% neb solution Take 1 vial (2.5 mg) by nebulization every 6 hours as needed for shortness of breath / dyspnea or wheezing 1 Box 3     albuterol (VENTOLIN HFA) 108 (90 BASE) MCG/ACT inhaler Inhale 2 puffs into the lungs every 6 hours as needed        baclofen (LIORESAL) 20 MG tablet Take 2 tablets (40 mg) by mouth At Bedtime AND 1 tablet (20 mg) every morning AND 1 tablet (20 mg) daily (with lunch) AND 0.5 tablets (10 mg) See Admin Instructions. (In the afternoon)  10     budesonide (PULMICORT FLEXHALER) 90 MCG/ACT inhaler Inhale 2 puffs into the lungs daily 1 each 1     cefdinir (OMNICEF) 300 MG capsule Take 1 capsule (300 mg) by mouth 2 times daily for 10 days 20 capsule 0     cholecalciferol (VITAMIN D3) 5000 UNITS TABS tablet Take 1 tablet (5,000 Units) by mouth daily       docusate sodium (COLACE) 100 MG tablet Take 1 tablet (100 mg) by mouth 2 times daily       Ferrous Sulfate (IRON SUPPLEMENT PO) Take 325 mg by " mouth daily        fluticasone (FLONASE) 50 MCG/ACT nasal spray Spray 2 sprays into both nostrils daily for 14 days 16 g 0     gabapentin (NEURONTIN) 300 MG capsule Take 3 capsules (900 mg) by mouth At Bedtime 90 capsule 3     lisinopril-hydrochlorothiazide (PRINZIDE/ZESTORETIC) 10-12.5 MG tablet TAKE 1 TABLET BY MOUTH DAILY 90 tablet 2     metoclopramide (REGLAN) 10 MG tablet Take 1 tablet (10 mg) by mouth 4 times daily as needed (headache or nausea) 15 tablet 0     modafinil (PROVIGIL) 100 MG tablet Take 100 mg by mouth daily       montelukast (SINGULAIR) 10 MG tablet Take 1 tablet (10 mg) by mouth At Bedtime 90 tablet 3     natalizumab (TYSABRI) 300 MG/15ML injection Infusion       nortriptyline (PAMELOR) 50 MG capsule Take 1 capsule (50 mg) by mouth At Bedtime 90 capsule 3     order for DME Equipment being ordered: Nebulizer with supplies 1 Device 0     SUMAtriptan (IMITREX) 100 MG tablet Take 50 mg up to twice daily as needed for headache; separate doses by at least one hour and do not use more than 3 days/week 18 tablet 3     topiramate (TOPAMAX) 100 MG tablet Take 1 tablet (100 mg) by mouth At Bedtime (take with 50 mg to total 150 mg nightly) 60 tablet 3     topiramate (TOPAMAX) 50 MG tablet Take 1 tablet (50 mg) by mouth At Bedtime (take with 100 mg to total 150 mg nightly) 60 tablet 3     triamcinolone (KENALOG) 0.1 % external cream Apply topically 3 times daily as needed for irritation       valACYclovir (VALTREX) 500 MG tablet TAKE 1 TABLET BY MOUTH EVERY DAY 90 tablet 1     doxycycline hyclate (VIBRA-TABS) 100 MG tablet Take 1 tablet (100 mg) by mouth 2 times daily for 10 days 20 tablet 0     Allergies   Allergen Reactions     Aspirin Difficulty breathing, Palpitations and Other (See Comments)     Adhesive Tape      Amantadine Swelling     Azithromycin Angioedema     Latex Hives, Itching and Rash     Penicillins Other (See Comments), Nausea and Vomiting, Hives, Swelling, Rash, Cramps and GI Disturbance  "    Amoxicillin OK         Reviewed and updated as needed this visit by Provider  Tobacco  Allergies  Meds  Problems  Med Hx  Surg Hx  Fam Hx         Review of Systems   ROS COMP: Constitutional, HEENT, cardiovascular, pulmonary, GI, , musculoskeletal, neuro, skin, endocrine and psych systems are negative, except as otherwise noted.      Objective    /70   Pulse 91   Temp 98.7  F (37.1  C) (Oral)   Ht 1.651 m (5' 5\")   Wt 116.1 kg (256 lb)   SpO2 98%   BMI 42.60 kg/m    Body mass index is 42.6 kg/m .  Physical Exam   GENERAL: healthy, alert and no distress  EYES: Eyes grossly normal to inspection, PERRL and conjunctivae and sclerae normal  HENT: ear canals and TM's normal, nose and mouth without ulcers or lesions, thick postnasal drainage  NECK: no adenopathy, no asymmetry, masses, or scars and thyroid normal to palpation  Respiratory: Clear to auscultation bilaterally, no rales, wheezes.  Prolonged expiratory phase noted  CV: regular rate and rhythm, normal S1 S2, no S3 or S4, no murmur, click or rub, no peripheral edema and peripheral pulses strong  MS: no gross musculoskeletal defects noted, no edema  SKIN: no suspicious lesions or rashes  NEURO: Normal strength and tone, mentation intact and speech normal  PSYCH: mentation appears normal, affect normal/bright    Diagnostic Test Results:  No results found for this or any previous visit (from the past 24 hour(s)).  Recent Results (from the past 744 hour(s))   XR Chest 2 Views    Narrative    CHEST TWO VIEWS  9/11/2019 3:27 PM     HISTORY: 44-year-old woman with shortness of breath.       Impression    IMPRESSION: Since January 21, 2019, heart size is normal. No pleural  effusion, pneumothorax, or abnormal area of consolidation.    RODOLFO LONDON MD             Assessment & Plan     Josefina was seen today for follow up and cold symptoms.    Diagnoses and all orders for this visit:    Acute bronchitis with coexisting condition requiring " "prophylactic treatment,   SOB (shortness of breath)  Immunosuppression (H)  Mild intermittent asthma with acute exacerbation  Acute maxillary sinusitis, recurrence not specified  Symptoms consistent with sinusitis and suspected bronchitis.  No wheezes and thus, will not treat her with steroids due to her already underlying immunosuppression.  Patient informed of warning signs and when to seek urgent care.  She voiced understanding and agreement.  -     cefdinir (OMNICEF) 300 MG capsule; Take 1 capsule (300 mg) by mouth 2 times daily for 10 days  -     XR Chest 2 Views; Future  -     fluticasone (FLONASE) 50 MCG/ACT nasal spray; Spray 2 sprays into both nostrils daily for 14 days         BMI:   Estimated body mass index is 42.6 kg/m  as calculated from the following:    Height as of this encounter: 1.651 m (5' 5\").    Weight as of this encounter: 116.1 kg (256 lb).   Weight management plan: Discussed healthy diet and exercise guidelines            Return in about 1 week (around 9/18/2019).    Miya Crump PA-C  [facility]      "

## 2019-09-12 ASSESSMENT — ANXIETY QUESTIONNAIRES: GAD7 TOTAL SCORE: 4

## 2019-09-23 ENCOUNTER — TRANSFERRED RECORDS (OUTPATIENT)
Dept: HEALTH INFORMATION MANAGEMENT | Facility: CLINIC | Age: 44
End: 2019-09-23

## 2019-09-24 ENCOUNTER — INFUSION THERAPY VISIT (OUTPATIENT)
Dept: INFUSION THERAPY | Facility: CLINIC | Age: 44
End: 2019-09-24
Attending: PSYCHIATRY & NEUROLOGY
Payer: COMMERCIAL

## 2019-09-24 ENCOUNTER — MEDICAL CORRESPONDENCE (OUTPATIENT)
Dept: HEALTH INFORMATION MANAGEMENT | Facility: CLINIC | Age: 44
End: 2019-09-24

## 2019-09-24 VITALS
HEART RATE: 74 BPM | DIASTOLIC BLOOD PRESSURE: 71 MMHG | SYSTOLIC BLOOD PRESSURE: 103 MMHG | RESPIRATION RATE: 16 BRPM | OXYGEN SATURATION: 100 % | TEMPERATURE: 98.5 F

## 2019-09-24 DIAGNOSIS — G35 MULTIPLE SCLEROSIS (H): Primary | ICD-10-CM

## 2019-09-24 PROCEDURE — 25000128 H RX IP 250 OP 636: Performed by: PSYCHIATRY & NEUROLOGY

## 2019-09-24 PROCEDURE — 25800030 ZZH RX IP 258 OP 636: Performed by: PSYCHIATRY & NEUROLOGY

## 2019-09-24 PROCEDURE — 96365 THER/PROPH/DIAG IV INF INIT: CPT

## 2019-09-24 RX ADMIN — SODIUM CHLORIDE 250 ML: 9 INJECTION, SOLUTION INTRAVENOUS at 13:45

## 2019-09-24 RX ADMIN — NATALIZUMAB 300 MG: 300 INJECTION INTRAVENOUS at 13:44

## 2019-09-24 NOTE — PROGRESS NOTES
Infusion Nursing Note:  Josefina Aldrich presents today for tysabri.    Patient seen by provider today: No   present during visit today: Not Applicable.    Note: Dr Chong's office notified that orders are completed and we need new ones for next month    Intravenous Access:  Peripheral IV placed.    Treatment Conditions:  Tysabri pre-infusion checklist completed via touch program.      Post Infusion Assessment:  Patient tolerated infusion without incident.  Patient observed for 60 minutes post Tysabri per protocol.  Site patent and intact, free from redness, edema or discomfort.  No evidence of extravasations.  Access discontinued per protocol.       Discharge Plan:   Discharge instructions reviewed with: Patient.  Patient and/or family verbalized understanding of discharge instructions and all questions answered.  AVS to patient via [employer].  Patient will return in 4 weeks for next appointment.   Patient discharged in stable condition accompanied by: self.  Departure Mode: Ambulatory.    Radha Lee, RN, RN

## 2019-10-01 ENCOUNTER — HEALTH MAINTENANCE LETTER (OUTPATIENT)
Age: 44
End: 2019-10-01

## 2019-10-02 ENCOUNTER — MYC MEDICAL ADVICE (OUTPATIENT)
Dept: FAMILY MEDICINE | Facility: CLINIC | Age: 44
End: 2019-10-02

## 2019-10-02 DIAGNOSIS — M62.838 MUSCLE SPASM: Primary | ICD-10-CM

## 2019-10-02 NOTE — TELEPHONE ENCOUNTER
Routing to PCP for review of order and sign if agree. See [employer] message for reference.    Dion Gongora RN   [facility]

## 2019-10-07 ENCOUNTER — TRANSFERRED RECORDS (OUTPATIENT)
Dept: HEALTH INFORMATION MANAGEMENT | Facility: CLINIC | Age: 44
End: 2019-10-07

## 2019-10-14 DIAGNOSIS — G35 MULTIPLE SCLEROSIS (H): Primary | ICD-10-CM

## 2019-10-22 ENCOUNTER — HOSPITAL ENCOUNTER (OUTPATIENT)
Facility: CLINIC | Age: 44
Setting detail: SPECIMEN
Discharge: HOME | End: 2019-10-22
Attending: PSYCHIATRY & NEUROLOGY | Admitting: PSYCHIATRY & NEUROLOGY
Payer: COMMERCIAL

## 2019-10-22 ENCOUNTER — INFUSION THERAPY VISIT (OUTPATIENT)
Dept: INFUSION THERAPY | Facility: CLINIC | Age: 44
End: 2019-10-22
Attending: PSYCHIATRY & NEUROLOGY
Payer: COMMERCIAL

## 2019-10-22 VITALS
SYSTOLIC BLOOD PRESSURE: 110 MMHG | OXYGEN SATURATION: 100 % | TEMPERATURE: 98.3 F | HEART RATE: 76 BPM | RESPIRATION RATE: 16 BRPM | DIASTOLIC BLOOD PRESSURE: 76 MMHG

## 2019-10-22 DIAGNOSIS — G35 MULTIPLE SCLEROSIS (H): Primary | ICD-10-CM

## 2019-10-22 DIAGNOSIS — I10 BENIGN ESSENTIAL HYPERTENSION: ICD-10-CM

## 2019-10-22 DIAGNOSIS — G35 MS (MULTIPLE SCLEROSIS) (H): ICD-10-CM

## 2019-10-22 LAB
ALT SERPL W P-5'-P-CCNC: 25 U/L (ref 0–50)
AST SERPL W P-5'-P-CCNC: 22 U/L (ref 0–45)
BASOPHILS # BLD AUTO: 0.1 10E9/L (ref 0–0.2)
BASOPHILS NFR BLD AUTO: 0.7 %
DIFFERENTIAL METHOD BLD: NORMAL
EOSINOPHIL # BLD AUTO: 0.2 10E9/L (ref 0–0.7)
EOSINOPHIL NFR BLD AUTO: 2.4 %
ERYTHROCYTE [DISTWIDTH] IN BLOOD BY AUTOMATED COUNT: 13.4 % (ref 10–15)
HCT VFR BLD AUTO: 37.5 % (ref 35–47)
HGB BLD-MCNC: 12.2 G/DL (ref 11.7–15.7)
IMM GRANULOCYTES # BLD: 0 10E9/L (ref 0–0.4)
IMM GRANULOCYTES NFR BLD: 0.4 %
LYMPHOCYTES # BLD AUTO: 4.8 10E9/L (ref 0.8–5.3)
LYMPHOCYTES NFR BLD AUTO: 54.2 %
MCH RBC QN AUTO: 31 PG (ref 26.5–33)
MCHC RBC AUTO-ENTMCNC: 32.5 G/DL (ref 31.5–36.5)
MCV RBC AUTO: 95 FL (ref 78–100)
MONOCYTES # BLD AUTO: 0.5 10E9/L (ref 0–1.3)
MONOCYTES NFR BLD AUTO: 6.1 %
NEUTROPHILS # BLD AUTO: 3.2 10E9/L (ref 1.6–8.3)
NEUTROPHILS NFR BLD AUTO: 36.2 %
NRBC # BLD AUTO: 0 10*3/UL
NRBC BLD AUTO-RTO: 0 /100
PLATELET # BLD AUTO: 369 10E9/L (ref 150–450)
RBC # BLD AUTO: 3.94 10E12/L (ref 3.8–5.2)
WBC # BLD AUTO: 8.9 10E9/L (ref 4–11)

## 2019-10-22 PROCEDURE — 85025 COMPLETE CBC W/AUTO DIFF WBC: CPT | Performed by: PSYCHIATRY & NEUROLOGY

## 2019-10-22 PROCEDURE — 84460 ALANINE AMINO (ALT) (SGPT): CPT | Performed by: PSYCHIATRY & NEUROLOGY

## 2019-10-22 PROCEDURE — 96365 THER/PROPH/DIAG IV INF INIT: CPT

## 2019-10-22 PROCEDURE — 84450 TRANSFERASE (AST) (SGOT): CPT | Performed by: PSYCHIATRY & NEUROLOGY

## 2019-10-22 PROCEDURE — 25800030 ZZH RX IP 258 OP 636: Performed by: PSYCHIATRY & NEUROLOGY

## 2019-10-22 PROCEDURE — 25000128 H RX IP 250 OP 636: Performed by: PSYCHIATRY & NEUROLOGY

## 2019-10-22 PROCEDURE — 36415 COLL VENOUS BLD VENIPUNCTURE: CPT

## 2019-10-22 PROCEDURE — 40000975 ZZHCL STATISTIC JC VIR AB INDEX INHIB: Performed by: PSYCHIATRY & NEUROLOGY

## 2019-10-22 RX ADMIN — SODIUM CHLORIDE 250 ML: 9 INJECTION, SOLUTION INTRAVENOUS at 13:31

## 2019-10-22 RX ADMIN — NATALIZUMAB 300 MG: 300 INJECTION INTRAVENOUS at 13:31

## 2019-10-22 NOTE — PROGRESS NOTES
Infusion Nursing Note:  Josefina Aldrich presents today for Tysabri.    Patient seen by provider today: No   present during visit today: Not Applicable.    Note: Labs ordered (CBC, AST, ALT, JCV and fax form sent to lab to have results faxed to Dr. Chong) per orders.    Intravenous Access:  Peripheral IV placed.    Treatment Conditions:  Tysabri pre-infusion checklist completed via touch program.      Post Infusion Assessment:  Patient tolerated infusion without incident.  Patient observed for 60 minutes post Tysabri per protocol.  Site patent and intact, free from redness, edema or discomfort.  No evidence of extravasations.  Access discontinued per protocol.       Discharge Plan:   Discharge instructions reviewed with: Patient.  Patient and/or family verbalized understanding of discharge instructions and all questions answered.  AVS to patient via [employer].  Patient will return 11/22/19 for next appointment.   Patient discharged in stable condition accompanied by: self.  Departure Mode: Ambulatory.    Damaris Collazo RN

## 2019-10-22 NOTE — TELEPHONE ENCOUNTER
"LISINOPRIL-HCTZ 10/12.5MG TABLETS  Last Written Prescription Date:  2/19/2019  Last Fill Quantity: 90,  # refills: 2   Last office visit: 9/11/2019 with prescribing provider:  Miya Crump PA-C   Future Office Visit:  NA    Requested Prescriptions   Pending Prescriptions Disp Refills     lisinopril-hydrochlorothiazide (PRINZIDE/ZESTORETIC) 10-12.5 MG tablet [Pharmacy Med Name: LISINOPRIL-HCTZ 10/12.5MG TABLETS] 90 tablet 0     Sig: TAKE 1 TABLET BY MOUTH DAILY       Diuretics (Including Combos) Protocol Passed - 10/22/2019  9:50 AM        Passed - Blood pressure under 140/90 in past 12 months     BP Readings from Last 3 Encounters:   09/24/19 103/71   09/11/19 106/70   08/27/19 128/65                 Passed - Recent (12 mo) or future (30 days) visit within the authorizing provider's specialty     Patient has had an office visit with the authorizing provider or a provider within the authorizing providers department within the previous 12 mos or has a future within next 30 days. See \"Patient Info\" tab in inbasket, or \"Choose Columns\" in Meds & Orders section of the refill encounter.              Passed - Medication is active on med list        Passed - Patient is age 18 or older        Passed - No active pregancy on record        Passed - Normal serum creatinine on file in past 12 months     Recent Labs   Lab Test 08/20/19  0934   CR 1.04              Passed - Normal serum potassium on file in past 12 months     Recent Labs   Lab Test 08/20/19  0934   POTASSIUM 3.9                    Passed - Normal serum sodium on file in past 12 months     Recent Labs   Lab Test 08/20/19  0934                 Passed - No positive pregnancy test in past 12 months          "

## 2019-10-23 RX ORDER — LISINOPRIL/HYDROCHLOROTHIAZIDE 10-12.5 MG
TABLET ORAL
Qty: 30 TABLET | Refills: 0 | Status: SHIPPED | OUTPATIENT
Start: 2019-10-23 | End: 2019-11-25

## 2019-10-23 NOTE — TELEPHONE ENCOUNTER
Prescription approved per [facility] [place].  30 day supply given with note that physical and labs are due.      Tessa Wiggins, BS, RN, PHN  [facility]  Ph) [phone]

## 2019-11-01 LAB — LAB SCANNED RESULT: NORMAL

## 2019-11-04 ENCOUNTER — E-VISIT (OUTPATIENT)
Dept: FAMILY MEDICINE | Facility: CLINIC | Age: 44
End: 2019-11-04
Payer: COMMERCIAL

## 2019-11-04 ENCOUNTER — HOSPITAL ENCOUNTER (EMERGENCY)
Facility: CLINIC | Age: 44
Discharge: HOME | End: 2019-11-04
Attending: PHYSICIAN ASSISTANT | Admitting: PHYSICIAN ASSISTANT
Payer: COMMERCIAL

## 2019-11-04 VITALS
OXYGEN SATURATION: 100 % | RESPIRATION RATE: 18 BRPM | SYSTOLIC BLOOD PRESSURE: 128 MMHG | DIASTOLIC BLOOD PRESSURE: 85 MMHG | TEMPERATURE: 98.6 F | HEART RATE: 71 BPM

## 2019-11-04 DIAGNOSIS — R10.32 ABDOMINAL PAIN, LEFT LOWER QUADRANT: ICD-10-CM

## 2019-11-04 DIAGNOSIS — K59.00 CONSTIPATION: ICD-10-CM

## 2019-11-04 DIAGNOSIS — Z53.9 ERRONEOUS ENCOUNTER--DISREGARD: Primary | ICD-10-CM

## 2019-11-04 LAB
ALBUMIN SERPL-MCNC: 3.6 G/DL (ref 3.4–5)
ALBUMIN UR-MCNC: NEGATIVE MG/DL
ALP SERPL-CCNC: 65 U/L (ref 40–150)
ALT SERPL W P-5'-P-CCNC: 25 U/L (ref 0–50)
ANION GAP SERPL CALCULATED.3IONS-SCNC: 6 MMOL/L (ref 3–14)
APPEARANCE UR: CLEAR
AST SERPL W P-5'-P-CCNC: 24 U/L (ref 0–45)
BASOPHILS # BLD AUTO: 0.1 10E9/L (ref 0–0.2)
BASOPHILS NFR BLD AUTO: 0.6 %
BILIRUB SERPL-MCNC: 0.2 MG/DL (ref 0.2–1.3)
BILIRUB UR QL STRIP: NEGATIVE
BUN SERPL-MCNC: 15 MG/DL (ref 7–30)
CALCIUM SERPL-MCNC: 8.9 MG/DL (ref 8.5–10.1)
CHLORIDE SERPL-SCNC: 108 MMOL/L (ref 94–109)
CO2 SERPL-SCNC: 25 MMOL/L (ref 20–32)
COLOR UR AUTO: ABNORMAL
CREAT SERPL-MCNC: 0.91 MG/DL (ref 0.52–1.04)
DIFFERENTIAL METHOD BLD: ABNORMAL
EOSINOPHIL # BLD AUTO: 0.2 10E9/L (ref 0–0.7)
EOSINOPHIL NFR BLD AUTO: 1.7 %
ERYTHROCYTE [DISTWIDTH] IN BLOOD BY AUTOMATED COUNT: 13.5 % (ref 10–15)
GFR SERPL CREATININE-BSD FRML MDRD: 77 ML/MIN/{1.73_M2}
GLUCOSE SERPL-MCNC: 94 MG/DL (ref 70–99)
GLUCOSE UR STRIP-MCNC: NEGATIVE MG/DL
HCG UR QL: NEGATIVE
HCT VFR BLD AUTO: 35.8 % (ref 35–47)
HGB BLD-MCNC: 11.5 G/DL (ref 11.7–15.7)
HGB UR QL STRIP: NEGATIVE
IMM GRANULOCYTES # BLD: 0.1 10E9/L (ref 0–0.4)
IMM GRANULOCYTES NFR BLD: 0.5 %
KETONES UR STRIP-MCNC: NEGATIVE MG/DL
LEUKOCYTE ESTERASE UR QL STRIP: NEGATIVE
LIPASE SERPL-CCNC: 113 U/L (ref 73–393)
LYMPHOCYTES # BLD AUTO: 5.4 10E9/L (ref 0.8–5.3)
LYMPHOCYTES NFR BLD AUTO: 51.9 %
MCH RBC QN AUTO: 30.4 PG (ref 26.5–33)
MCHC RBC AUTO-ENTMCNC: 32.1 G/DL (ref 31.5–36.5)
MCV RBC AUTO: 95 FL (ref 78–100)
MONOCYTES # BLD AUTO: 0.7 10E9/L (ref 0–1.3)
MONOCYTES NFR BLD AUTO: 6.6 %
MUCOUS THREADS #/AREA URNS LPF: PRESENT /LPF
NEUTROPHILS # BLD AUTO: 3.8 10E9/L (ref 1.6–8.3)
NEUTROPHILS NFR BLD AUTO: 37.7 %
NITRATE UR QL: NEGATIVE
NRBC # BLD AUTO: 0.1 10*3/UL
NRBC BLD AUTO-RTO: 1 /100
PH UR STRIP: 7 PH (ref 5–7)
PLATELET # BLD AUTO: 328 10E9/L (ref 150–450)
PLATELET # BLD EST: ABNORMAL 10*3/UL
POTASSIUM SERPL-SCNC: 3.9 MMOL/L (ref 3.4–5.3)
PROT SERPL-MCNC: 7.2 G/DL (ref 6.8–8.8)
RBC # BLD AUTO: 3.78 10E12/L (ref 3.8–5.2)
RBC #/AREA URNS AUTO: 1 /HPF (ref 0–2)
RBC MORPH BLD: ABNORMAL
SODIUM SERPL-SCNC: 139 MMOL/L (ref 133–144)
SOURCE: ABNORMAL
SP GR UR STRIP: 1.02 (ref 1–1.03)
SQUAMOUS #/AREA URNS AUTO: 1 /HPF (ref 0–1)
UROBILINOGEN UR STRIP-MCNC: NORMAL MG/DL (ref 0–2)
WBC # BLD AUTO: 10.1 10E9/L (ref 4–11)
WBC #/AREA URNS AUTO: 1 /HPF (ref 0–5)

## 2019-11-04 PROCEDURE — 83690 ASSAY OF LIPASE: CPT | Performed by: PHYSICIAN ASSISTANT

## 2019-11-04 PROCEDURE — 81001 URINALYSIS AUTO W/SCOPE: CPT | Performed by: EMERGENCY MEDICINE

## 2019-11-04 PROCEDURE — 85025 COMPLETE CBC W/AUTO DIFF WBC: CPT | Performed by: PHYSICIAN ASSISTANT

## 2019-11-04 PROCEDURE — 81025 URINE PREGNANCY TEST: CPT | Performed by: EMERGENCY MEDICINE

## 2019-11-04 PROCEDURE — 80053 COMPREHEN METABOLIC PANEL: CPT | Performed by: PHYSICIAN ASSISTANT

## 2019-11-04 PROCEDURE — 99283 EMERGENCY DEPT VISIT LOW MDM: CPT

## 2019-11-04 PROCEDURE — 25000132 ZZH RX MED GY IP 250 OP 250 PS 637: Performed by: PHYSICIAN ASSISTANT

## 2019-11-04 RX ORDER — ACETAMINOPHEN 325 MG/1
650 TABLET ORAL ONCE
Status: COMPLETED | OUTPATIENT
Start: 2019-11-04 | End: 2019-11-04

## 2019-11-04 RX ORDER — KETOROLAC TROMETHAMINE 15 MG/ML
15 INJECTION, SOLUTION INTRAMUSCULAR; INTRAVENOUS ONCE
Status: DISCONTINUED | OUTPATIENT
Start: 2019-11-04 | End: 2019-11-04

## 2019-11-04 RX ADMIN — ACETAMINOPHEN 650 MG: 325 TABLET, FILM COATED ORAL at 17:18

## 2019-11-04 ASSESSMENT — ENCOUNTER SYMPTOMS
BACK PAIN: 1
FEVER: 0
ABDOMINAL PAIN: 1
HEMATURIA: 0
DYSURIA: 0
CHILLS: 1
CONSTIPATION: 1

## 2019-11-04 NOTE — ED PROVIDER NOTES
History     Chief Complaint:  Abdominal Pain and Back Pain    HPI   Josefina Aldrich is a 44 year old female with a history of hypertension, pre-diabetes, and relapsing remitting multiple sclerosis who presents to the emergency department today for evaluation of abdominal pain. The patient reports four days of a sharp, piercing, left sided abdominal pain that worsened significantly today, now burning and stabbing in quality, radiating into her back, and ranked at a constant 10/10. In addition to the pain, the patient reports chills and constipation, which she has attempted to manage with senokot with minimal relief. The patient endorses a history of similar abdominal pain and had negative workups in the past. She denies dysuria, hematuria, urine color change, fever, or personal history of kidney stones.     Allergies:  Aspirin  Adhesive tape  Amantadine  Azithromycin  Latex  Penicillins     Medications:    Proventil  Ventolin  Baclofen  Plumicort  Colace  Neurontin  Prinizide  Provigil  Singulair  Tysabri  Pamelor  Imitrex  Topamax  Kenalog  Valtrex    Past Medical History:    Asthma  Fibroids  Hypertension  Migraine  Obstructive sleep apnea  Pre-diabetes  Relapsing remitting multiple sclerosis  Anemia  Depression with anxiety  Uterine leiomyoma  Hypersomnia  Post surgical nonabsorption  Morbid obesity  Respiratory abnormality  Post concussion syndrome  Vitamin B12 and D deficiencies  Hypermagnesemia  Acute pontine stroke  AC separation  Denies history of kidney stones    Past Surgical History:    Gastric bypass  Hysterectomy  Tonsillectomy    Family History:    Lupus  Kidney stones    Social History:  Smoking Status: Former Smoker   Types: Cigars  Smokeless Tobacco: Never Used  Alcohol Use: Positive  Drug Use: Negative  PCP: Miya Crump  Marital Status:        Review of Systems   Constitutional: Positive for chills. Negative for fever.   Gastrointestinal: Positive for abdominal pain and  constipation.   Genitourinary: Negative for dysuria and hematuria.   Musculoskeletal: Positive for back pain.   All other systems reviewed and are negative.    Physical Exam     Patient Vitals for the past 24 hrs:   BP Temp Temp src Pulse Resp SpO2   11/04/19 1720 128/85 98.6  F (37  C) Oral 71 18 100 %   11/04/19 1357 (!) 140/112 98.2  F (36.8  C) Oral 84 20 100 %     Physical Exam  General: Alert, interactive. No distress. Resting comfortably.   Head:  Scalp is atraumatic.  Eyes:  EOM intact. The pupils are equal, round, and reactive to light. No scleral icterus.   ENT:                                      Ears:  The external ears are normal.  Nose:  The external nose is normal.  Throat:  The oropharynx is normal. Mucus membranes are moist.                 Neck:  Normal range of motion. There is no rigidity.   CV:  Regular rate and rhythm. No murmur. 2+ radial pulses  Resp:  Breath sounds are clear bilaterally. Non-labored, no retractions or accessory muscle use.  GI:  Abdomen is soft, no distension, tenderness to the LLQ without rebound or guarding. No CVA tenderness.   MS:  Normal range of motion.   Skin:  Warm and dry.   Neuro:  Strength and sensation grossly intact.   Psych:  Awake. Alert.  Appropriate interactions.     Emergency Department Course     Laboratory:  Laboratory findings were communicated with the patient who voiced understanding of the findings.    UA with Microscopic: Mucous present (A), o/w WNL   HCG Qualitative: negative    CBC: WBC 10.1, HGB 11.5 (L),   CMP: WNL (Creatinine 0.91)  Lipase: 113    Interventions:   1718 Tylenol 650 mg Oral    Emergency Department Course:    1404 The patient provided a urine sample here in the emergency department. This was sent for laboratory testing, findings above.    1529 Nursing notes and vitals reviewed.    1534 I performed an exam of the patient as documented above.     1624 IV was inserted and blood was drawn for laboratory testing, results  above.    1650 Patient rechecked and updated. Patient resting comfortably.     Findings and plan explained to the Patient. Patient discharged home with instructions regarding supportive care, medications, and reasons to return. The importance of close follow-up was reviewed.     Impression & Plan      Medical Decision Making:  Josefina Aldrich is a 44 year old female who presents to the emergency department today for evaluation of left sided abdominal pain. Vitals normal on arrival besides slight hypertension. BP trended down throughout her stay. Patient has a history of similar abdominal pain in the past and had a CT scan that was unremarkable at that time. Differential included diverticulitis, UTI/pyelonephritis, bowel obstruction, ectopic pregnancy, ovarian cyst, torsion, among others.  Patient is not pregnant. UA with no sign of infection.  Urine with no evidence of blood to suspect kidney stone and signs and symptoms not supportive of renal colic.  Blood work unremarkable without evidence of leukocytosis to suggest infectious source or other acute findings. No СВЕТЛАНА. On recheck, patient resting comfortable.  I discussed with patient option for CT versus home with abdominal recheck with primary care provider in 24-48 hours.  Patient wishes to defer CT imaging at this time as she notes pain is very similar to her past abdominal pain.  She notes recent constipation and wonders if this is contributing to symptoms.  I recommended drinking plenty of fluids, increasing fiber, and strict return precautions.  Plan to return to emergency department for fevers, worsening abdominal pain, black/bloody stool or any other new/concerning symptoms.  Patient agrees with this plan all questions and concerns addressed prior to discharge home.    Diagnosis:    ICD-10-CM    1. Abdominal pain, left lower quadrant R10.32 CBC with platelets differential   2. Constipation K59.00      Disposition:   The patient is discharged to  home.    Scribe Disclosure:  I, Inga Leyva, am serving as a scribe at 3:51 PM on 11/4/2019 to document services personally performed by Leonie Encarnacion PA-C based on my observations and the provider's statements to me.    [facility] EMERGENCY DEPARTMENT       Leonie Encarnacion PA-C  11/04/19 1087

## 2019-11-04 NOTE — ED AVS SNAPSHOT
[facility] Emergency Department  [address]  [facility] [address]  Phone:  [phone]  Fax:  [phone]                                    Josefina Aldrich   MRN: [id number]    Department:  [facility] Emergency Department   Date of Visit:  11/4/2019           After Visit Summary Signature Page    I have received my discharge instructions, and my questions have been answered. I have discussed any challenges I see with this plan with the nurse or doctor.    ..........................................................................................................................................  Patient/Patient Representative Signature      ..........................................................................................................................................  Patient Representative Print Name and Relationship to Patient    ..................................................               ................................................  Date                                   Time    ..........................................................................................................................................  Reviewed by Signature/Title    ...................................................              ..............................................  Date                                               Time          22EPIC Rev 08/18

## 2019-11-04 NOTE — ED TRIAGE NOTES
Arrives with 3 days of left lower quadrant and low back pain, describes as shooting and stabbing, alert and oriented, denies N/V/D, ABCs intact.

## 2019-11-04 NOTE — DISCHARGE INSTRUCTIONS
*Get plenty of rest and avoid strenuous activities.  *Take medications as prescribed.  Tylenol  for pain.    *Follow-up with your doctor for a recheck within 12-36 hours.  *Return to the ER if you develop fever, worsening pain, faint or feel like you will faint or become worse in any way.       Discharge Instructions  Abdominal Pain    Abdominal pain (belly pain) can be caused by many things. Your evaluation today does not show the exact cause for your pain. Your provider today has decided that it is unlikely your pain is due to a life threatening problem, or a problem requiring surgery or hospital admission. Sometimes those problems cannot be found right away, so it is very important that you follow up as directed.  Sometimes only the changes which occur over time allow the cause of your pain to be found.    Generally, every Emergency Department visit should have a follow-up clinic visit with either a primary or a specialty clinic/provider. Please follow-up as instructed by your emergency provider today. With abdominal pain, we often recommend very close follow-up, such as the following day.    ADULTS:  Return to the Emergency Department right away if:    You get an oral temperature above 102oF or as directed by your provider.  You have blood in your stools. This may be bright red or appear as black, tarry stools.    You keep vomiting (throwing up) or cannot drink liquids.  You see blood when you vomit.   You cannot have a bowel movement or you cannot pass gas.  Your stomach gets bloated or bigger.  Your skin or the whites of your eyes look yellow.  You faint.  You have bloody, frequent or painful urination (peeing).  You have new symptoms or anything that worries you.    CHILDREN:  Return to the Emergency Department right away if your child has any of the above-listed symptoms or the following:    Pushes your hand away or screams/cries when his/her belly is touched.  You notice your child is very fussy or  weak.  Your child is very tired and is too tired to eat or drink.  Your child is dehydrated.  Signs of dehydration can be:  Significant change in the amount of wet diapers/urine.  Your infant or child starts to have dry mouth and lips, or no saliva (spit) or tears.    PREGNANT WOMEN:  Return to the Emergency Department right away if you have any of the above-listed symptoms or the following:    You have bleeding, leaking fluid or passing tissue from the vagina.  You have worse pain or cramping, or pain in your shoulder or back.  You have vomiting that will not stop.  You have a temperature of 100oF or more.  Your baby is not moving as much as usual.  You faint.  You get a bad headache with or without eye problems and abdominal pain.  You have a seizure.  You have unusual discharge from your vagina and abdominal pain.    Abdominal pain is pretty common during pregnancy.  Your pain may or may not be related to your pregnancy. You should follow-up closely with your OB provider so they can evaluate you and your baby.  Until you follow-up with your regular provider, do the following:     Avoid sex and do not put anything in your vagina.  Drink clear fluids.  Only take medications approved by your provider.    MORE INFORMATION:    Appendicitis:  A possible cause of abdominal pain in any person who still has their appendix is acute appendicitis. Appendicitis is often hard to diagnose.  Testing does not always rule out early appendicitis or other causes of abdominal pain. Close follow-up with your provider and re-evaluations may be needed to figure out the reason for your abdominal pain.    Follow-up:  It is very important that you make an appointment with your clinic and go to the appointment.  If you do not follow-up with your primary provider, it may result in missing an important development which could result in permanent injury or disability and/or lasting pain.  If there is any problem keeping your appointment, call  "your provider or return to the Emergency Department.    Medications:  Take your medications as directed by your provider today.  Before using over-the-counter medications, ask your provider and make sure to take the medications as directed.  If you have any questions about medications, ask your provider.    Diet:  Resume your normal diet as much as possible, but do not eat fried, fatty or spicy foods while you have pain.  Do not drink alcohol or have caffeine.  Do not smoke tobacco.    Probiotics: If you have been given an antibiotic, you may want to also take a probiotic pill or eat yogurt with live cultures. Probiotics have \"good bacteria\" to help your intestines stay healthy. Studies have shown that probiotics help prevent diarrhea (loose stools) and other intestine problems (including C. diff infection) when you take antibiotics. You can buy these without a prescription in the pharmacy section of the store.     If you were given a prescription for medicine here today, be sure to read all of the information (including the package insert) that comes with your prescription.  This will include important information about the medicine, its side effects, and any warnings that you need to know about.  The pharmacist who fills the prescription can provide more information and answer questions you may have about the medicine.  If you have questions or concerns that the pharmacist cannot address, please call or return to the Emergency Department.       Remember that you can always come back to the Emergency Department if you are not able to see your regular provider in the amount of time listed above, if you get any new symptoms, or if there is anything that worries you.   "

## 2019-11-05 ENCOUNTER — TELEPHONE (OUTPATIENT)
Dept: FAMILY MEDICINE | Facility: CLINIC | Age: 44
End: 2019-11-05

## 2019-11-05 NOTE — PROGRESS NOTES
SUBJECTIVE:   Josefina Aldrich is a 44 year old female who presents to clinic today for the following health issues:    ED/UC Followup:    Facility:  [facility]   Date of visit: 11/04/2019  Reason for visit: Abdominal Pain and Back Pain   Current Status: Not much change since the hospital visit. Has tried Metamucil, Senakot and Miralax. She states she has only had one bowel movement in the past two days.      Josefina developed abdominal pain on 11/02 which she initially thought may be related to premenstrual cramping (typical for her). The pain was somewhat alleviated with passing of a BM. Initial BM with steatorhea. She switched her diet from solid to liquids to help improve her abdominal pain without alleviation. She had stopped her Baclofen at night approximately two weeks ago as she thought this was contributing to her constipation.     She presented to the emergency department on 11/04 for evaluation of left sided abdominal pain. She has a history of similar abdominal pain. Past abdominal CT was unremarkable - differential included diverticulitis, UTI/pyelonephritis, bowel obstruction, ectopic pregnancy, ovarian cyst, torsion, among others. Patient was not pregnant. UA with no sign of infection. Urine with no evidence of blood to suspect kidney stone and signs and symptoms were not supportive of renal colic. Blood work was unremarkable without evidence of leukocytosis to suggest infectious source or other acute findings. No СВЕТЛАНА.     Prior to discharge, she was offered to perform a repeat abdominal CT vs home with recheck with primary care provider in 24-48 hours. She wished to defer CT imaging at the time as she noted that her abdominal pain was very similar to her past abdominal pain. She did mention recent constipation and wondered if this was contributing to symptoms. She was advised to drink plenty of fluids, increasing fiber, and start Metamucil, Senakot, or Miralax.     Since  "discharge, the patient notes no improvement of symptoms. Discontinued Doculax. She has tried Senakot and alternating Metamucil (started yesterday) with Miralax without improvement in symptoms. Had one small BM yesterday (no mucous or blood). Denies fever today. Reports ongoing flatulence, bloating, tenesmus, constipation, and increased urinary frequency. Reports LLQ pain that radiates to her lower back that is exacerbated with sitting and walking. Of note, she was started on Mg supplements two weeks ago.    Problem list and histories reviewed & adjusted, as indicated.  Additional history: as documented    Patient Active Problem List   Diagnosis     Migraine     Asthma     MS (multiple sclerosis) (H)     Anemia     Backache     Body mass index 45.0-49.9, adult (H)     Cognitive changes     Depression with anxiety     Fever postop     Uterine leiomyoma     Headache     Heavy menses     Hypersomnia with sleep apnea     Iron deficiency anemia     Leukocytosis     Postsurgical nonabsorption     Mild intermittent asthma     Morbid obesity (H)     Myalgia and myositis     Neck pain     Other dyspnea and respiratory abnormality     Pain, neuropathic     Pelvic pain in female     Personal history of allergy to latex     Post concussion syndrome     Right shoulder pain     S/P gastric bypass     S/P hysterectomy     Bariatric surgery status     Vitamin B12 deficiency     Vitamin D deficiency     Vomiting following gastrointestinal surgery     Hypermagnesemia     Benign essential hypertension     Neck pain on left side     Mild major depression (H)     LLQ pain     Multiple sclerosis (H)     Immunosuppression (H)     Relapsing remitting multiple sclerosis (H)     Past Surgical History:   Procedure Laterality Date     GASTRIC BYPASS  2002    \"Trouble with B12 and D\"     HYSTERECTOMY, MONO  2013    cervix gone.  fibroids.  without BSO     TONSILLECTOMY         Social History     Tobacco Use     Smoking status: Former Smoker     " Types: Cigars     Smokeless tobacco: Never Used   Substance Use Topics     Alcohol use: Yes     Alcohol/week: 2.0 standard drinks     Types: 2 Standard drinks or equivalent per week     Comment: 0-3 drinks per week     Family History   Problem Relation Age of Onset     Lupus Paternal Aunt 41     Multiple Sclerosis No family hx of          Current Outpatient Medications   Medication Sig Dispense Refill     albuterol (PROVENTIL) (2.5 MG/3ML) 0.083% neb solution Take 1 vial (2.5 mg) by nebulization every 6 hours as needed for shortness of breath / dyspnea or wheezing 1 Box 3     albuterol (VENTOLIN HFA) 108 (90 BASE) MCG/ACT inhaler Inhale 2 puffs into the lungs every 6 hours as needed        baclofen (LIORESAL) 20 MG tablet Take 2 tablets (40 mg) by mouth At Bedtime AND 1 tablet (20 mg) every morning AND 1 tablet (20 mg) daily (with lunch) AND 0.5 tablets (10 mg) See Admin Instructions. (In the afternoon)  10     budesonide (PULMICORT FLEXHALER) 90 MCG/ACT inhaler Inhale 2 puffs into the lungs daily 1 each 1     cholecalciferol (VITAMIN D3) 5000 UNITS TABS tablet Take 1 tablet (5,000 Units) by mouth daily       docusate sodium (COLACE) 100 MG tablet Take 1 tablet (100 mg) by mouth 2 times daily       Ferrous Sulfate (IRON SUPPLEMENT PO) Take 325 mg by mouth daily        gabapentin (NEURONTIN) 300 MG capsule Take 3 capsules (900 mg) by mouth At Bedtime 90 capsule 3     lisinopril-hydrochlorothiazide (PRINZIDE/ZESTORETIC) 10-12.5 MG tablet TAKE 1 TABLET BY MOUTH DAILY 30 tablet 0     magnesium citrate solution Take 296 mLs by mouth once for 1 dose 296 mL 0     MAGNESIUM PO        metoclopramide (REGLAN) 10 MG tablet Take 1 tablet (10 mg) by mouth 4 times daily as needed (headache or nausea) 15 tablet 0     modafinil (PROVIGIL) 100 MG tablet Take 100 mg by mouth daily       montelukast (SINGULAIR) 10 MG tablet Take 1 tablet (10 mg) by mouth At Bedtime 90 tablet 3     natalizumab (TYSABRI) 300 MG/15ML injection Infusion        nortriptyline (PAMELOR) 50 MG capsule Take 1 capsule (50 mg) by mouth At Bedtime 90 capsule 3     order for DME Equipment being ordered: Nebulizer with supplies 1 Device 0     SUMAtriptan (IMITREX) 100 MG tablet Take 50 mg up to twice daily as needed for headache; separate doses by at least one hour and do not use more than 3 days/week 18 tablet 3     topiramate (TOPAMAX) 100 MG tablet Take 1 tablet (100 mg) by mouth At Bedtime (take with 50 mg to total 150 mg nightly) 60 tablet 3     topiramate (TOPAMAX) 50 MG tablet Take 1 tablet (50 mg) by mouth At Bedtime (take with 100 mg to total 150 mg nightly) 60 tablet 3     triamcinolone (KENALOG) 0.1 % external cream Apply topically 3 times daily as needed for irritation       valACYclovir (VALTREX) 500 MG tablet TAKE 1 TABLET BY MOUTH EVERY DAY 90 tablet 1     Allergies   Allergen Reactions     Aspirin Difficulty breathing, Palpitations and Other (See Comments)     Adhesive Tape      Amantadine Swelling     Azithromycin Angioedema     Latex Hives, Itching and Rash     Penicillins Other (See Comments), Nausea and Vomiting, Hives, Swelling, Rash, Cramps and GI Disturbance     Amoxicillin OK       Reviewed and updated as needed this visit by clinical staff  Tobacco  Allergies  Meds  Problems  Med Hx  Surg Hx  Fam Hx  Soc Hx        Reviewed and updated as needed this visit by Provider  Tobacco  Allergies  Meds  Problems  Med Hx  Surg Hx  Fam Hx         ROS:  Constitutional, HEENT, cardiovascular, pulmonary, GI, , musculoskeletal, neuro, skin, endocrine and psych systems are negative, except as otherwise noted.    This document serves as a record of the services and decisions personally performed and made by Miya Crump, MS, PA-C. It was created on her behalf by Sriram Walton, a trained medical scribe. The creation of this document is based the provider's statements to the medical scribe.  Sriram Walton November 6, 2019 2:26 PM  OBJECTIVE:   /60 (BP  "Location: Right arm, Cuff Size: Adult Large)   Pulse 107   Temp 98.9  F (37.2  C) (Oral)   Ht 1.651 m (5' 5\")   Wt 116.1 kg (256 lb)   SpO2 99%   BMI 42.60 kg/m   Body mass index is 42.6 kg/m .   EXAM:  GENERAL: healthy, alert and no distress  EYES: Eyes grossly normal to inspection, and conjunctivae and sclerae normal  RESP: lungs clear to auscultation - no rales, rhonchi or wheezes  CV: regular rate and rhythm, normal S1 S2, no S3 or S4, no murmur, click or rub, no peripheral edema and peripheral pulses strong  ABDOMEN: LLQ tenderness with guarding, otherwise, no rebound tenderness, soft, no hepatosplenomegaly, no masses and bowel sounds normal  MS: no gross musculoskeletal defects noted, no edema  SKIN: no suspicious lesions or rashes  NEURO: Normal strength and tone, mentation intact and speech normal  PSYCH: mentation appears normal, affect normal/bright    Diagnostic Test Results:  Results for orders placed or performed in visit on 11/06/19 (from the past 24 hour(s))   *UA reflex to Microscopic and Culture ([place] [facility] (except [place] and [place])   Result Value Ref Range    Color Urine Yellow     Appearance Urine Clear     Glucose Urine Negative NEG^Negative mg/dL    Bilirubin Urine Negative NEG^Negative    Ketones Urine Negative NEG^Negative mg/dL    Specific Gravity Urine 1.015 1.003 - 1.035    Blood Urine Negative NEG^Negative    pH Urine 6.5 5.0 - 7.0 pH    Protein Albumin Urine Negative NEG^Negative mg/dL    Urobilinogen Urine 0.2 0.2 - 1.0 EU/dL    Nitrite Urine Negative NEG^Negative    Leukocyte Esterase Urine Negative NEG^Negative    Source Midstream Urine      CT Abdomen and Pelvis (05/07/19):  IMPRESSION: No acute changes in the abdomen or pelvis to account for patient's symptoms. Postop changes from gastric bypass surgery. No bowel obstruction, diverticulitis or appendicitis. Abdominal and pelvic organs are within normal limits.    ASSESSMENT/PLAN:   Josefina was seen today " for er f/u.    Diagnoses and all orders for this visit:    Constipation, unspecified constipation type  LLQ abdominal pain  Since discharge, the patient continues to experience LLQ pain that has not improved. She has mild constipation despite trying Senakot, Miralax, and Metamucil - which is likely the etiology. UA today was normal. CT Abdomen/Pelvis from 05/07/19 was reviewed today and seemed unremarkable. I recommended she continue to take Miralax everyday in her smoothies as she had been doing, continue Senakot, and to take an OTC magnesium citrate solution today. If BM becomes watery, then hold Senakot. If she does not have a large volume BM, we could consider a repeat abdominal CT as recommended in the ED. Will continue to monitor symptoms for now. She will contact the clinic if not improving or worsening.   -     magnesium citrate solution; Take 296 mLs by mouth once for 1 dose    Urinary frequency  UA performed today was normal. Increased frequency likely caused by increase pressure on bladder from constipation.  -     *UA reflex to Microscopic and Culture ([facility] (except [place] and [place])    Return if symptoms worsen or fail to improve.    The information in this document, created by the medical scribe for me, accurately reflects the services I personally performed and the decisions made by me. I have reviewed and approved this document for accuracy prior to leaving the patient care area.    Miya Crump PA-C  [place] [facility] [place]

## 2019-11-05 NOTE — TELEPHONE ENCOUNTER
Patient calling to schedule a follow up hospitalization with her PCP, Miya Crump sometime this week. Advised to schedule by 11/7/19. No openings. Please advise  [phone] (home)   Ok to leave detailed message: yes  Thank you  Gilma Cordova

## 2019-11-05 NOTE — TELEPHONE ENCOUNTER
Routing to [facility] for Malia to review.      Tessa Wiggins, BS, RN, PHN  [facility]  Ph) [phone]

## 2019-11-06 ENCOUNTER — OFFICE VISIT (OUTPATIENT)
Dept: FAMILY MEDICINE | Facility: CLINIC | Age: 44
End: 2019-11-06
Payer: COMMERCIAL

## 2019-11-06 VITALS
SYSTOLIC BLOOD PRESSURE: 106 MMHG | BODY MASS INDEX: 42.65 KG/M2 | WEIGHT: 256 LBS | OXYGEN SATURATION: 99 % | HEART RATE: 107 BPM | DIASTOLIC BLOOD PRESSURE: 60 MMHG | TEMPERATURE: 98.9 F | HEIGHT: 65 IN

## 2019-11-06 DIAGNOSIS — R10.32 LLQ ABDOMINAL PAIN: ICD-10-CM

## 2019-11-06 DIAGNOSIS — K59.00 CONSTIPATION, UNSPECIFIED CONSTIPATION TYPE: Primary | ICD-10-CM

## 2019-11-06 DIAGNOSIS — R35.0 URINARY FREQUENCY: ICD-10-CM

## 2019-11-06 PROCEDURE — 81003 URINALYSIS AUTO W/O SCOPE: CPT | Performed by: PHYSICIAN ASSISTANT

## 2019-11-06 PROCEDURE — 99214 OFFICE O/P EST MOD 30 MIN: CPT | Performed by: PHYSICIAN ASSISTANT

## 2019-11-06 ASSESSMENT — MIFFLIN-ST. JEOR: SCORE: 1822.09

## 2019-11-12 ENCOUNTER — TRANSFERRED RECORDS (OUTPATIENT)
Dept: HEALTH INFORMATION MANAGEMENT | Facility: CLINIC | Age: 44
End: 2019-11-12

## 2019-11-22 ENCOUNTER — INFUSION THERAPY VISIT (OUTPATIENT)
Dept: INFUSION THERAPY | Facility: CLINIC | Age: 44
End: 2019-11-22
Attending: PSYCHIATRY & NEUROLOGY
Payer: COMMERCIAL

## 2019-11-22 VITALS
TEMPERATURE: 97 F | HEART RATE: 78 BPM | OXYGEN SATURATION: 99 % | DIASTOLIC BLOOD PRESSURE: 62 MMHG | SYSTOLIC BLOOD PRESSURE: 106 MMHG | RESPIRATION RATE: 16 BRPM

## 2019-11-22 DIAGNOSIS — G35 MULTIPLE SCLEROSIS (H): Primary | ICD-10-CM

## 2019-11-22 PROCEDURE — 25800030 ZZH RX IP 258 OP 636: Performed by: PSYCHIATRY & NEUROLOGY

## 2019-11-22 PROCEDURE — 96365 THER/PROPH/DIAG IV INF INIT: CPT

## 2019-11-22 PROCEDURE — 25000128 H RX IP 250 OP 636: Performed by: PSYCHIATRY & NEUROLOGY

## 2019-11-22 RX ADMIN — NATALIZUMAB 300 MG: 300 INJECTION INTRAVENOUS at 13:37

## 2019-11-22 RX ADMIN — SODIUM CHLORIDE 250 ML: 9 INJECTION, SOLUTION INTRAVENOUS at 13:35

## 2019-11-22 NOTE — PROGRESS NOTES
Infusion Nursing Note:  Josefina Aldrich presents today for Tysabri.    Patient seen by provider today: No   present during visit today: Not Applicable.    Note: Tysabri checklist completed today and 60 minute observation period after Medication..    Intravenous Access:  Peripheral IV placed.    Treatment Conditions:  Tysabri pre-infusion checklist completed via touch program.      Post Infusion Assessment:  Patient tolerated infusion without incident.  Blood return noted pre and post infusion.  Site patent and intact, free from redness, edema or discomfort.  No evidence of extravasations.  Access discontinued per protocol.       Discharge Plan:   Patient declined prescription refills.  Discharge instructions reviewed with: Patient.  Patient and/or family verbalized understanding of discharge instructions and all questions answered.  Copy of AVS reviewed with patient and/or family.  Patient will return 12/20/19 for next appointment.  Patient discharged in stable condition accompanied by: self.  Departure Mode: Ambulatory.    Thi Diaz RN

## 2019-11-25 ENCOUNTER — MYC REFILL (OUTPATIENT)
Dept: FAMILY MEDICINE | Facility: CLINIC | Age: 44
End: 2019-11-25

## 2019-11-25 DIAGNOSIS — I10 BENIGN ESSENTIAL HYPERTENSION: ICD-10-CM

## 2019-11-25 DIAGNOSIS — B00.9 HSV (HERPES SIMPLEX VIRUS) INFECTION: ICD-10-CM

## 2019-11-25 RX ORDER — LISINOPRIL/HYDROCHLOROTHIAZIDE 10-12.5 MG
1 TABLET ORAL DAILY
Qty: 30 TABLET | Refills: 6 | Status: SHIPPED | OUTPATIENT
Start: 2019-11-25 | End: 2019-12-30

## 2019-11-25 RX ORDER — VALACYCLOVIR HYDROCHLORIDE 500 MG/1
500 TABLET, FILM COATED ORAL DAILY
Qty: 90 TABLET | Refills: 1 | Status: CANCELLED | OUTPATIENT
Start: 2019-11-25

## 2019-11-25 NOTE — TELEPHONE ENCOUNTER
"Requested Prescriptions   Pending Prescriptions Disp Refills     valACYclovir (VALTREX) 500 MG tablet              Last Written Prescription Date:  8.28.19  Last Fill Quantity: 90 tablet,  # refills: 1   Last office visit: 11/6/2019 with prescribing provider:  ANDREW Thurston               Future Office Visit:       90 tablet 1     Sig: Take 1 tablet (500 mg) by mouth daily       Antivirals for Herpes Protocol Passed - 11/25/2019  1:08 PM        Passed - Patient is age 12 or older        Passed - Recent (12 mo) or future (30 days) visit within the authorizing provider's specialty     Patient has had an office visit with the authorizing provider or a provider within the authorizing providers department within the previous 12 mos or has a future within next 30 days. See \"Patient Info\" tab in inbasket, or \"Choose Columns\" in Meds & Orders section of the refill encounter.              Passed - Medication is active on med list        Passed - Normal serum creatinine on file in past 12 months     Recent Labs   Lab Test 11/04/19  1624   CR 0.91             lisinopril-hydrochlorothiazide (PRINZIDE/ZESTORETIC) 10-12.5 MG tablet          Last Written Prescription Date:  10.23.19  Last Fill Quantity: 30 tablet,  # refills: 0   Last office visit: 11/6/2019 with prescribing provider:  ANDREW Thurston             Future Office Visit:       30 tablet 0     Sig: Take 1 tablet by mouth daily       Diuretics (Including Combos) Protocol Passed - 11/25/2019  1:08 PM        Passed - Blood pressure under 140/90 in past 12 months     BP Readings from Last 3 Encounters:   11/22/19 106/62   11/06/19 106/60   11/04/19 128/85                 Passed - Recent (12 mo) or future (30 days) visit within the authorizing provider's specialty     Patient has had an office visit with the authorizing provider or a provider within the authorizing providers department within the previous 12 mos or has a future within next 30 days. See \"Patient Info\" tab " "in inbasket, or \"Choose Columns\" in Meds & Orders section of the refill encounter.              Passed - Medication is active on med list        Passed - Patient is age 18 or older        Passed - No active pregancy on record        Passed - Normal serum creatinine on file in past 12 months     Recent Labs   Lab Test 11/04/19  1624   CR 0.91              Passed - Normal serum potassium on file in past 12 months     Recent Labs   Lab Test 11/04/19  1624   POTASSIUM 3.9                    Passed - Normal serum sodium on file in past 12 months     Recent Labs   Lab Test 11/04/19  1624                 Passed - No positive pregnancy test in past 12 months        "

## 2019-11-25 NOTE — TELEPHONE ENCOUNTER
"Requested Prescriptions   Pending Prescriptions Disp Refills     lisinopril-hydrochlorothiazide (PRINZIDE/ZESTORETIC) 10-12.5 MG tablet [Pharmacy Med Name: LISINOPRIL-HCTZ 10/12.5MG TABLETS]        Last Written Prescription Date:  10.23.19  Last Fill Quantity: 30 tablet,  # refills: 0   Last office visit: 11/6/2019 with prescribing provider:  ANDREW Thurston             Future Office Visit:       30 tablet 0     Sig: TAKE 1 TABLET BY MOUTH EVERY DAY. SEE PRESCRIBER FOR FURTHER REFILLS.       Diuretics (Including Combos) Protocol Passed - 11/25/2019 11:40 AM        Passed - Blood pressure under 140/90 in past 12 months     BP Readings from Last 3 Encounters:   11/22/19 106/62   11/06/19 106/60   11/04/19 128/85                 Passed - Recent (12 mo) or future (30 days) visit within the authorizing provider's specialty     Patient has had an office visit with the authorizing provider or a provider within the authorizing providers department within the previous 12 mos or has a future within next 30 days. See \"Patient Info\" tab in inbasket, or \"Choose Columns\" in Meds & Orders section of the refill encounter.              Passed - Medication is active on med list        Passed - Patient is age 18 or older        Passed - No active pregancy on record        Passed - Normal serum creatinine on file in past 12 months     Recent Labs   Lab Test 11/04/19  1624   CR 0.91              Passed - Normal serum potassium on file in past 12 months     Recent Labs   Lab Test 11/04/19  1624   POTASSIUM 3.9                    Passed - Normal serum sodium on file in past 12 months     Recent Labs   Lab Test 11/04/19  1624                 Passed - No positive pregnancy test in past 12 months        "

## 2019-11-26 RX ORDER — LISINOPRIL/HYDROCHLOROTHIAZIDE 10-12.5 MG
1 TABLET ORAL DAILY
Qty: 90 TABLET | Refills: 3 | Status: SHIPPED | OUTPATIENT
Start: 2019-11-26 | End: 2020-09-08

## 2019-12-20 ENCOUNTER — INFUSION THERAPY VISIT (OUTPATIENT)
Dept: INFUSION THERAPY | Facility: CLINIC | Age: 44
End: 2019-12-20
Attending: PSYCHIATRY & NEUROLOGY
Payer: COMMERCIAL

## 2019-12-20 VITALS
TEMPERATURE: 99.1 F | OXYGEN SATURATION: 98 % | RESPIRATION RATE: 16 BRPM | HEART RATE: 75 BPM | DIASTOLIC BLOOD PRESSURE: 66 MMHG | SYSTOLIC BLOOD PRESSURE: 93 MMHG

## 2019-12-20 DIAGNOSIS — G35 MULTIPLE SCLEROSIS (H): Primary | ICD-10-CM

## 2019-12-20 PROCEDURE — 25000128 H RX IP 250 OP 636: Performed by: PSYCHIATRY & NEUROLOGY

## 2019-12-20 PROCEDURE — 25800030 ZZH RX IP 258 OP 636: Performed by: PSYCHIATRY & NEUROLOGY

## 2019-12-20 PROCEDURE — 96365 THER/PROPH/DIAG IV INF INIT: CPT

## 2019-12-20 RX ADMIN — SODIUM CHLORIDE 250 ML: 9 INJECTION, SOLUTION INTRAVENOUS at 13:51

## 2019-12-20 RX ADMIN — NATALIZUMAB 300 MG: 300 INJECTION INTRAVENOUS at 13:45

## 2019-12-21 DIAGNOSIS — G43.011 INTRACTABLE MIGRAINE WITHOUT AURA AND WITH STATUS MIGRAINOSUS: ICD-10-CM

## 2019-12-23 ENCOUNTER — OFFICE VISIT (OUTPATIENT)
Dept: FAMILY MEDICINE | Facility: CLINIC | Age: 44
End: 2019-12-23
Payer: COMMERCIAL

## 2019-12-23 ENCOUNTER — MYC MEDICAL ADVICE (OUTPATIENT)
Dept: FAMILY MEDICINE | Facility: CLINIC | Age: 44
End: 2019-12-23

## 2019-12-23 ENCOUNTER — E-VISIT (OUTPATIENT)
Dept: FAMILY MEDICINE | Facility: CLINIC | Age: 44
End: 2019-12-23

## 2019-12-23 VITALS
SYSTOLIC BLOOD PRESSURE: 104 MMHG | BODY MASS INDEX: 42.32 KG/M2 | OXYGEN SATURATION: 100 % | HEART RATE: 112 BPM | HEIGHT: 65 IN | WEIGHT: 254 LBS | TEMPERATURE: 99.2 F | DIASTOLIC BLOOD PRESSURE: 60 MMHG

## 2019-12-23 DIAGNOSIS — R06.02 SHORTNESS OF BREATH: ICD-10-CM

## 2019-12-23 DIAGNOSIS — R05.9 COUGH: Primary | ICD-10-CM

## 2019-12-23 DIAGNOSIS — Z53.9 ERRONEOUS ENCOUNTER--DISREGARD: Primary | ICD-10-CM

## 2019-12-23 DIAGNOSIS — J45.901 ASTHMA WITH ACUTE EXACERBATION, UNSPECIFIED ASTHMA SEVERITY, UNSPECIFIED WHETHER PERSISTENT: ICD-10-CM

## 2019-12-23 LAB
FLUAV+FLUBV AG SPEC QL: NEGATIVE
FLUAV+FLUBV AG SPEC QL: NEGATIVE
SPECIMEN SOURCE: NORMAL

## 2019-12-23 PROCEDURE — 93000 ELECTROCARDIOGRAM COMPLETE: CPT | Performed by: NURSE PRACTITIONER

## 2019-12-23 PROCEDURE — 99214 OFFICE O/P EST MOD 30 MIN: CPT | Performed by: NURSE PRACTITIONER

## 2019-12-23 PROCEDURE — 87804 INFLUENZA ASSAY W/OPTIC: CPT | Performed by: NURSE PRACTITIONER

## 2019-12-23 RX ORDER — PREDNISONE 20 MG/1
TABLET ORAL
Qty: 20 TABLET | Refills: 0 | Status: SHIPPED | OUTPATIENT
Start: 2019-12-23 | End: 2019-12-27

## 2019-12-23 RX ORDER — NAPROXEN 500 MG/1
TABLET ORAL
Qty: 60 TABLET | Refills: 1 | OUTPATIENT
Start: 2019-12-23

## 2019-12-23 ASSESSMENT — MIFFLIN-ST. JEOR: SCORE: 1813.02

## 2019-12-23 NOTE — TELEPHONE ENCOUNTER
Please try to get pt scheduled today at  for evaluation, I'd like to have someone lay eyes on her/listen to her lungs!      Miya Crump MBA, MS, PA-C

## 2019-12-23 NOTE — TELEPHONE ENCOUNTER
Received refill request for Naproxen, however, the patient is no longer under Dr. Castaneda's care; Rx denied and advised pharmacy to redirect to new neurologist.    Mitzy Collazo, MS RN Care Coordinator

## 2019-12-23 NOTE — LETTER
[facility]  [address] [place] [id number] [phone]    December 23, 2019    oJsefina Lipscombs  [address] [place] [address] [address]  [facility] [id number]      To Whom it May Concern:    The above patient is unable to attend work for 2 days due to a medical illness. Please contact me with questions or concerns.      Sincerely,        Leonie Collazo, LEROY-BC

## 2019-12-23 NOTE — PROGRESS NOTES
Subjective   Josefina Aldrich is a 44 year old female who presents to clinic today for the following health issues:    HPI     E-visit started -   Question: Please describe your symptoms.   Answer:   My chest is tight and my asthma is flared up. I have a non productive cough, runny nose.  Drainage in the back of my throat. Body aches , no fever , slight chills. No diarrhea no nausea slight  constipation .     Question: Have you had these symptoms before?   Answer:   Yes     Question: How long have you been having these symptoms?   Answer:   Just today     Question: Please list any medications you are currently taking for this condition.   Answer:   Mucinex , albuterol inhalers , Tylenol     Question: Please describe any probable cause for these symptoms.   Answer:   Hard to breath , do normal functions without being exhausted, hard to focus at work     Question: Wrap up   Answer:       Question: Anything else you would like to add?   Answer:   I feel that I would benefit from prednisone to help with my breathing     Question: Are you pregnant or breastfeeding?   Answer:   I am confident that I am neither       Acute Illness   Acute illness concerns: Cough  Onset: yesterday 5:30p    Fever: YES- low grade 99.2-O    Chills/Sweats: YES- cold - cannot warm up    Headache (location?): YES- slight    Sinus Pressure:YES    Conjunctivitis:  YES- watery    Ear Pain: YES- pressure    Rhinorrhea: YES- clear-yellow    Congestion: YES- chest tight    Sore Throat: YES- burning, itching,      Cough: YES-tickle cough, got worse - painters at work made it worse.     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 She has been exposed  "to influenza. Reports tight chest with shortness of breath and wheeze.      Reviewed and updated as needed this visit by provider:  Tobacco  Allergies  Meds  Problems  Med Hx  Surg Hx  Fam Hx       Review of Systems   Constitutional, HEENT, cardiovascular, pulmonary, GI, , musculoskeletal, neuro, skin, endocrine and psych systems are negative, except as otherwise noted in the HPI.        Objective   /60 (BP Location: Right arm, Patient Position: Chair, Cuff Size: Adult Large)   Pulse 112   Temp 99.2  F (37.3  C) (Oral)   Ht 1.651 m (5' 5\")   Wt 115.2 kg (254 lb)   SpO2 100%   Breastfeeding No   BMI 42.27 kg/m   Body mass index is 42.27 kg/m .  Physical Exam   GENERAL: healthy, alert, well nourished, well hydrated, no distress but appears ill  EYES: Eyes grossly normal to inspection, extraocular movements - intact, and PERRL  HENT: ear canals- normal; TMs- normal; Nose- normal; Mouth- no ulcers, no lesions  NECK: no tenderness, no adenopathy, no asymmetry, no masses, no stiffness; thyroid- normal to palpation  RESP: lungs clear to auscultation - no rales, no rhonchi, mild scattered wheezes  CV: regular rates and rhythm, normal S1 S2, no S3 or S4 and no murmur, no click or rub -  ABDOMEN: soft, no tenderness, no  hepatosplenomegaly, no masses, normal bowel sounds  SKIN: no suspicious lesions, no rashes  PSYCH: Alert and oriented times 3; speech- coherent , normal rate and volume; able to articulate logical thoughts, able to abstract reason, no tangential thoughts, no hallucinations or delusions, affect- normal  LYMPHATICS: ant. cervical- normal, post. cervical- normal, axillary- normal, supraclavicular- normal, inguinal- normal    Diagnostic Test Results  EKG - appears normal, NSR, normal axis, normal intervals, no acute ST/T changes c/w ischemia, no LVH by voltage criteria      Assessment & Plan   Josefina was seen today for cough and generalized body aches.    Diagnoses and all orders for this " visit:    Cough  Asthma with acute exacerbation, unspecified asthma severity, unspecified whether persistent  Influenza negative.   EKG WNL.  Wheezing otherwise normal lung exam no need for imaging today.   Exam currently consistent with asthma exacerbation and/or viral illness.   Steroid taper.  Close follow up this week and I encourage presenting to ER if any worsening or new symptoms.  Josefina verbalizes understanding of plan of care and is in agreement.   -     predniSONE (DELTASONE) 20 MG tablet; Take 3 tablets (60 mg) by mouth daily for 3 days, THEN 2 tablets (40 mg) daily for 3 days, THEN 1 tablet (20 mg) daily for 3 days, THEN 0.5 tablets (10 mg) daily for 4 days.  -     Influenza A/B antigen    Shortness of breath  -     EKG 12-lead complete w/read - Clinics    See Patient Instructions    Return in about 4 days (around 12/27/2019) for Recheck.     Leonie Collazo, LEROY-BC     [address] [address]  [email or web address]   Office: [phone]

## 2019-12-23 NOTE — TELEPHONE ENCOUNTER
Called patient @  [phone] -     Advised of ANDREW MESA message below - Patient stated an understanding and agreed with plan.  Next 5 appointments (look out 90 days)    Dec 23, 2019  5:50 PM CST  SHORT with MARTÍNEZ [facility] ([facility]) [address] [place] [id number] [phone]        Routing to PCP to close encounter    Susannah Selby RN  [facility]

## 2019-12-24 NOTE — PATIENT INSTRUCTIONS
"  Patient Education     Asthma (Adult)  Asthma is a disease where the medium and  small air passages within the lung go into spasm and restrict the flow of air. Inflammation and swelling of the airways cause further blockage. During an acute asthma attack, these factors cause trouble breathing, wheezing, cough and chest tightness.    An asthma attack can be triggered by many things. Common triggers include infections such as the common cold, bronchitis, and pneumonia. Irritants such as smoke or pollutants in the air, very cold air, emotional upset, and exercise can also trigger an attack. In many adults with asthma, allergies to dust, mold, pollen and animal dander can cause an asthma attack. Skipping doses of daily asthma medicine can also bring on an asthma attack.  Asthma can be controlled using the proper medicines prescribed by your healthcare provider and avoiding exposure to known triggers including allergens and irritants.  Home care    Take prescribed medicine exactly at the times advised. If you need medicine such as from a hand held inhaler or aerosol breathing machine more than every 4 hours, contact your healthcare provider or seek immediate medical attention. If prescribed an antibiotic or prednisone, take all of the medicine as prescribed, even if you are feeling better after a few days.    Don't smoke. Avoid being exposed to the smoke of others.    Some people with asthma have worsening of their symptoms when they take aspirin and non-steroidal or fever-reducing medicines like ibuprofen and naproxen. Talk to your healthcare provider if you think this may apply to you.  Follow-up care  Follow up with your healthcare provider, or as advised. Always bring all of your current medicines to any appointments with your healthcare provider. Also bring a complete list of medicines even those not taken for asthma. If you don't already have one, talk to your healthcare provider about developing your own \"Asthma " "Action Plan.\"  A pneumococcal (pneumonia) vaccine and yearly flu shot (every fall) are recommended. Ask your doctor about this.  When to seek medical advice  Call your healthcare provider right away if any of these occur:     Increased wheezing or shortness of breath    Need to use your inhalers more often than usual without relief    Fever of 100.4 F (38 C) or higher, or as directed by your healthcare provider    Coughing up lots of dark-colored or bloody sputum (mucus)    Chest pain with each breath    If you use a peak flow meter as part of an Asthma Action Plan, and you are still in the yellow zone (50% to 80%) 15 minutes after using inhaler medicine.  Call 911  Call 911 if any of the following occur    Trouble walking or talking because of shortness of breath    If you use a peak flow meter as part of an Asthma Action Plan and you are still in the red zone (less than 50%) 15 minutes after using inhaler medicine    Lips or fingernails turning gray or blue  Date Last Reviewed: 5/1/2017 2000-2018 [employer]. [address] [place] [address], [place] [address]. All rights reserved. This information is not intended as a substitute for professional medical care. Always follow your healthcare professional's instructions.           "

## 2019-12-24 NOTE — RESULT ENCOUNTER NOTE
Results discussed directly with patient while patient was present. Any further details documented in the note.   MARTÍNEZ Michael CNP

## 2019-12-25 ENCOUNTER — NURSE TRIAGE (OUTPATIENT)
Dept: NURSING | Facility: CLINIC | Age: 44
End: 2019-12-25

## 2019-12-25 ENCOUNTER — APPOINTMENT (OUTPATIENT)
Dept: GENERAL RADIOLOGY | Facility: CLINIC | Age: 44
End: 2019-12-25
Attending: EMERGENCY MEDICINE
Payer: COMMERCIAL

## 2019-12-25 ENCOUNTER — APPOINTMENT (OUTPATIENT)
Dept: CT IMAGING | Facility: CLINIC | Age: 44
End: 2019-12-25
Attending: EMERGENCY MEDICINE
Payer: COMMERCIAL

## 2019-12-25 ENCOUNTER — HOSPITAL ENCOUNTER (EMERGENCY)
Facility: CLINIC | Age: 44
Discharge: HOME | End: 2019-12-25
Attending: EMERGENCY MEDICINE | Admitting: EMERGENCY MEDICINE
Payer: COMMERCIAL

## 2019-12-25 VITALS
SYSTOLIC BLOOD PRESSURE: 126 MMHG | DIASTOLIC BLOOD PRESSURE: 67 MMHG | HEART RATE: 78 BPM | WEIGHT: 240.96 LBS | TEMPERATURE: 98.3 F | OXYGEN SATURATION: 97 % | RESPIRATION RATE: 18 BRPM | BODY MASS INDEX: 40.1 KG/M2

## 2019-12-25 DIAGNOSIS — J18.9 PNEUMONIA OF RIGHT MIDDLE LOBE DUE TO INFECTIOUS ORGANISM: ICD-10-CM

## 2019-12-25 DIAGNOSIS — J18.9 PNEUMONIA OF RIGHT UPPER LOBE DUE TO INFECTIOUS ORGANISM: ICD-10-CM

## 2019-12-25 LAB
ALBUMIN UR-MCNC: 10 MG/DL
ANION GAP SERPL CALCULATED.3IONS-SCNC: 5 MMOL/L (ref 3–14)
APPEARANCE UR: CLEAR
BASOPHILS # BLD AUTO: 0 10E9/L (ref 0–0.2)
BASOPHILS NFR BLD AUTO: 0.4 %
BILIRUB UR QL STRIP: NEGATIVE
BUN SERPL-MCNC: 11 MG/DL (ref 7–30)
CALCIUM SERPL-MCNC: 8.5 MG/DL (ref 8.5–10.1)
CHLORIDE SERPL-SCNC: 106 MMOL/L (ref 94–109)
CO2 SERPL-SCNC: 25 MMOL/L (ref 20–32)
COLOR UR AUTO: ABNORMAL
CREAT SERPL-MCNC: 0.77 MG/DL (ref 0.52–1.04)
DEPRECATED S PYO AG THROAT QL EIA: NORMAL
DIFFERENTIAL METHOD BLD: ABNORMAL
EOSINOPHIL # BLD AUTO: 0 10E9/L (ref 0–0.7)
EOSINOPHIL NFR BLD AUTO: 0.2 %
ERYTHROCYTE [DISTWIDTH] IN BLOOD BY AUTOMATED COUNT: 13.9 % (ref 10–15)
GFR SERPL CREATININE-BSD FRML MDRD: >90 ML/MIN/{1.73_M2}
GLUCOSE SERPL-MCNC: 117 MG/DL (ref 70–99)
GLUCOSE UR STRIP-MCNC: NEGATIVE MG/DL
HCT VFR BLD AUTO: 35.7 % (ref 35–47)
HGB BLD-MCNC: 11.3 G/DL (ref 11.7–15.7)
HGB UR QL STRIP: NEGATIVE
IMM GRANULOCYTES # BLD: 0.1 10E9/L (ref 0–0.4)
IMM GRANULOCYTES NFR BLD: 0.7 %
KETONES UR STRIP-MCNC: NEGATIVE MG/DL
LEUKOCYTE ESTERASE UR QL STRIP: NEGATIVE
LYMPHOCYTES # BLD AUTO: 2 10E9/L (ref 0.8–5.3)
LYMPHOCYTES NFR BLD AUTO: 18.7 %
MCH RBC QN AUTO: 30.3 PG (ref 26.5–33)
MCHC RBC AUTO-ENTMCNC: 31.7 G/DL (ref 31.5–36.5)
MCV RBC AUTO: 96 FL (ref 78–100)
MONOCYTES # BLD AUTO: 0.9 10E9/L (ref 0–1.3)
MONOCYTES NFR BLD AUTO: 7.9 %
MUCOUS THREADS #/AREA URNS LPF: PRESENT /LPF
NEUTROPHILS # BLD AUTO: 7.8 10E9/L (ref 1.6–8.3)
NEUTROPHILS NFR BLD AUTO: 72.1 %
NITRATE UR QL: NEGATIVE
NRBC # BLD AUTO: 0 10*3/UL
NRBC BLD AUTO-RTO: 0 /100
PH UR STRIP: 6 PH (ref 5–7)
PLATELET # BLD AUTO: 314 10E9/L (ref 150–450)
POTASSIUM SERPL-SCNC: 3.7 MMOL/L (ref 3.4–5.3)
RBC # BLD AUTO: 3.73 10E12/L (ref 3.8–5.2)
RBC #/AREA URNS AUTO: <1 /HPF (ref 0–2)
SODIUM SERPL-SCNC: 136 MMOL/L (ref 133–144)
SOURCE: ABNORMAL
SP GR UR STRIP: 1.02 (ref 1–1.03)
SPECIMEN SOURCE: NORMAL
SQUAMOUS #/AREA URNS AUTO: 1 /HPF (ref 0–1)
TROPONIN I SERPL-MCNC: <0.015 UG/L (ref 0–0.04)
UROBILINOGEN UR STRIP-MCNC: NORMAL MG/DL (ref 0–2)
WBC # BLD AUTO: 10.8 10E9/L (ref 4–11)
WBC #/AREA URNS AUTO: <1 /HPF (ref 0–5)

## 2019-12-25 PROCEDURE — 71275 CT ANGIOGRAPHY CHEST: CPT

## 2019-12-25 PROCEDURE — 81001 URINALYSIS AUTO W/SCOPE: CPT | Performed by: EMERGENCY MEDICINE

## 2019-12-25 PROCEDURE — 25000132 ZZH RX MED GY IP 250 OP 250 PS 637: Performed by: EMERGENCY MEDICINE

## 2019-12-25 PROCEDURE — 25000128 H RX IP 250 OP 636: Performed by: EMERGENCY MEDICINE

## 2019-12-25 PROCEDURE — 99285 EMERGENCY DEPT VISIT HI MDM: CPT | Mod: 25

## 2019-12-25 PROCEDURE — 80048 BASIC METABOLIC PNL TOTAL CA: CPT | Performed by: EMERGENCY MEDICINE

## 2019-12-25 PROCEDURE — 87086 URINE CULTURE/COLONY COUNT: CPT | Performed by: EMERGENCY MEDICINE

## 2019-12-25 PROCEDURE — 84484 ASSAY OF TROPONIN QUANT: CPT | Performed by: EMERGENCY MEDICINE

## 2019-12-25 PROCEDURE — 71046 X-RAY EXAM CHEST 2 VIEWS: CPT

## 2019-12-25 PROCEDURE — 85025 COMPLETE CBC W/AUTO DIFF WBC: CPT | Performed by: EMERGENCY MEDICINE

## 2019-12-25 PROCEDURE — 25000125 ZZHC RX 250: Performed by: EMERGENCY MEDICINE

## 2019-12-25 PROCEDURE — 93005 ELECTROCARDIOGRAM TRACING: CPT

## 2019-12-25 PROCEDURE — 87081 CULTURE SCREEN ONLY: CPT | Performed by: EMERGENCY MEDICINE

## 2019-12-25 PROCEDURE — 87880 STREP A ASSAY W/OPTIC: CPT | Performed by: EMERGENCY MEDICINE

## 2019-12-25 RX ORDER — BACLOFEN 20 MG/1
20 TABLET ORAL ONCE
Status: COMPLETED | OUTPATIENT
Start: 2019-12-25 | End: 2019-12-25

## 2019-12-25 RX ORDER — DOXYCYCLINE 100 MG/1
100 CAPSULE ORAL 2 TIMES DAILY
Qty: 14 CAPSULE | Refills: 0 | Status: SHIPPED | OUTPATIENT
Start: 2019-12-25 | End: 2020-01-06

## 2019-12-25 RX ORDER — IOPAMIDOL 755 MG/ML
500 INJECTION, SOLUTION INTRAVASCULAR ONCE
Status: COMPLETED | OUTPATIENT
Start: 2019-12-25 | End: 2019-12-25

## 2019-12-25 RX ADMIN — IOPAMIDOL 85 ML: 755 INJECTION, SOLUTION INTRAVENOUS at 17:11

## 2019-12-25 RX ADMIN — BACLOFEN 20 MG: 20 TABLET ORAL at 17:04

## 2019-12-25 RX ADMIN — SODIUM CHLORIDE 64 ML: 9 INJECTION, SOLUTION INTRAVENOUS at 17:21

## 2019-12-25 ASSESSMENT — ENCOUNTER SYMPTOMS
SORE THROAT: 1
FREQUENCY: 0
MYALGIAS: 1
DIAPHORESIS: 1
DYSURIA: 0
HEMATURIA: 0
CHILLS: 1
COUGH: 1

## 2019-12-25 NOTE — ED PROVIDER NOTES
History     Chief Complaint:  Chest Wall Pain    The history is provided by the patient.      Josefina Aldrich is a 44 year old female, with history of MS, asthma, migraines, hypertension amongst others as noted below, who presents alone for evaluation of chest wall pain and associated cough, chills and generalized malaise that began two days ago. Of note, patient was evaluated for similar symptoms by her PCP two days ago, the day of onset, and was started on Prednisone taper. Patient states that symptoms have persisted and due to worsening symptoms, patient was prompted to present.     Here, patient states she was at work at the time of onset of a cough that was so severe, she was prescribed inhalers at that time by her provider, had a negative flu test and recommended to follow up in clinic after work. Patient had another negative flu test at that time. Patient reports that her symptoms were bearable yesterday, but this morning, she began to experience chills, diaphoresis, increased generalized malaise and productive cough with yellow sputum. Patient called her provider this morning and was referred to the ED. Patient also notes a sore throat, but denies any urinary symptoms. Patient's last infusion was 5 days ago for her MS.    Allergies:  Aspirin  Cephalexin  Amantadine  Azithromycin  Penicillins     Medications:    Albuterol neb solution  Ventolin inhaler  Baclofen  Pulmicort flexhaler  Colace  Neurontin  Lisinopril-hydrochlorothiazide  Provigil  Singular  Tysabri  Deltasone  Imitrex  Topamax    Past Medical History:    Asthma  Fibroids  Hypertension  Migraine  ISAAC  Pre-diabetes  Relapsing remitting MS    Past Surgical History:    Gastric bypass  Hysterectomy, MONO  Tonsillectomy    Family History:    History reviewed. No pertinent family history.       Social History:  The patient was accompanied to the ED alone.  Smoking Status: Former  Smokeless Tobacco: No  Alcohol Use: Yes  Drug Use: Former     Marital Status:   [4]     Review of Systems   Constitutional: Positive for chills and diaphoresis.   HENT: Positive for sore throat.    Respiratory: Positive for cough.    Cardiovascular: Positive for chest pain.   Genitourinary: Negative for dysuria, frequency, hematuria and urgency.   Musculoskeletal: Positive for myalgias (generalized).   All other systems reviewed and are negative.      Physical Exam     Patient Vitals for the past 24 hrs:   BP Temp Temp src Pulse Resp SpO2 Weight   12/25/19 1400 -- -- -- -- -- 98 % --   12/25/19 1345 -- -- -- -- -- 99 % --   12/25/19 1330 119/61 -- -- 91 -- 99 % --   12/25/19 1306 134/81 98.3  F (36.8  C) Temporal 98 18 98 % 109.3 kg (240 lb 15.4 oz)       Physical Exam  Constitutional: Vital signs reviewed as above.   Head: No external signs of trauma noted.  Eyes: Pupils are equal, round, and reactive to light.   Neck: No JVD noted  Cardiovascular: Normal rate, regular rhythm and normal heart sounds.  No murmur heard. Equal B/L peripheral pulses.  Pulmonary/Chest: No respiratory distress. Patient has no wheezes. Patient has right sided crackles.   Gastrointestinal: Soft. There is no tenderness.   Musculoskeletal/Extremities: No edema noted. Normal tone.  Neurological: Patient is alert and oriented to person, place, and time.   Skin: Skin is warm and dry. There is no diaphoresis noted.   Psychiatric: The patient appears calm.      Emergency Department Course     ECG:  ECG taken at 1300, ECG read at 1315 by Dr. Selby, Do  Normal sinus rhythm  Normal ECG  No significant change compared to EKG dated 12/23/19  Rate 96 bpm. ME interval 142. QRS duration 82. QT/QTc 328/414. P-R-T axes 46 21 28.      Imaging:  Radiology findings were communicated with the patient who voiced understanding of the findings.    Chest XR, PA & LAT:  IMPRESSION: Small wedge of atelectasis in the right upper lobe.  Reading per radiology.     CT Chest Pulmonary Embolism w Contrast:  IMPRESSION:  1.   Airspace disease most prominent in right upper lobe and right middle lobe with mild right hilar and mediastinal adenopathy likely multifocal pneumonia. Follow-up noncontrast CT recommended in 3 months to ensure resolution and exclude other underlying   pathology.  2.  No pulmonary embolus, aortic aneurysm, or dissection.  Reading per radiology.     Laboratory:  Laboratory findings were communicated with the patient who voiced understanding of the findings.    Rapid Strep Test: Negative  Strep Culture: Pending      CBC: HGB 11.3 (L) o/w WNL (WBC 10.8, )   BMP: Glucose 117 (H) o/w WNL (Creatinine 0.77)  Troponin (Collected 1319): <0.015     UA with Microscopic: Protein Albumin Urine 10 (A), Mucous Urine Present (A) o/w WNL   Urine Culture: Pending    Interventions:  1704 Baclofen 20 mg IV    Emergency Department Course:  Past medical records, nursing notes, and vitals reviewed.    EKG obtained in the ED, see results above.     IV was inserted and blood was drawn for laboratory testing, results above.     (1336)   I performed an exam of the patient as documented above. History obtained from patient.    A throat swab was obtained for laboratory testing, findings above.      The patient provided a urine sample here in the emergency department. This was sent for laboratory testing, findings above.     The patient was sent for a Chest XR, PA & LAT while in the emergency department, results above.      The patient was sent for a CT Chest Pulmonary Embolism w Contrast  while in the emergency department, results above.      (1817)   I rechecked the patient and discussed the results of her workup thus far. Discussed plan of care and patient will be discharged.     Findings and plan explained to the Patient. Patient discharged home with instructions regarding supportive care, medications, and reasons to return. The importance of close follow-up was reviewed. The patient was prescribed Vibramycin.    I personally  reviewed the laboratory and imaging results with the Patient and answered all related questions prior to discharge.     Impression & Plan     Medical Decision Making:  This 44-year-old female patient presents the ED due to right-sided chest wall pain and cough.  Please see the HPI and exam for specifics.  The patient notes that a couple days ago she had some degree of dyspnea but was afebrile.  She was evaluated in clinic and was given prescriptions for bronchodilators and prednisone.  The patient symptoms did not seem to improve and in fact she notes worsening symptoms including shaking chills and malaise prompting her visit today.  Ultimately the patient was found to have a right-sided pneumonia.  I will add antibiotic therapy for this though because I have prescribed doxycycline I will encourage the patient to not take her iron and magnesium supplementation as these may decrease absorption of the doxycycline.  She may restart these once she finishes her antibiotic therapy.  She should follow-up in her clinic and return to the ED if she has worsening symptoms.  Anticipatory guidance given prior to discharge.    Diagnosis:    ICD-10-CM    1. Pneumonia of right middle lobe due to infectious organism (H) J18.1    2. Pneumonia of right upper lobe due to infectious organism (H) J18.1        Disposition:  Discharged to home.    Discharge Medications:  New Prescriptions    DOXYCYCLINE HYCLATE (VIBRAMYCIN) 100 MG CAPSULE    Take 1 capsule (100 mg) by mouth 2 times daily for 7 days       Scribe Disclosure:  Missy GUERRERO, am serving as a scribe at 1:14 PM on 12/25/2019 to document services personally performed by Randy Selby DO based on my observations and the provider's statements to me.   12/25/2019   [facility] EMERGENCY DEPARTMENT       Randy Selby DO  12/26/19 1002

## 2019-12-25 NOTE — TELEPHONE ENCOUNTER
She will wait until she has the energy to drive herself to the ER. She said she doesn't have anyone she can call to ask for a ride to the ER. She said her asthma feels tight. She is on prednisone.  France Wilson RN-[facility] Nurse Advisors      Reason for Disposition    [1] Difficulty breathing AND [2] not severe AND [3] not from stuffy nose (e.g., not relieved by cleaning out the nose)    Additional Information    Negative: Severe difficulty breathing (e.g., struggling for each breath, speaks in single words)    Negative: Bluish (or gray) lips or face now    Negative: Shock suspected (e.g., cold/pale/clammy skin, too weak to stand, low BP, rapid pulse)    Negative: Sounds like a life-threatening emergency to the triager    Negative: [1] Sounds like a cold AND [2] no fever    Negative: [1] Cough with sputum AND [2] no fever    Negative: [1] Dry cough (no sputum) sputum AND [2] no fever    Negative: [1] Throat pain AND [2] no fever    Negative: Influenza vaccine reaction is suspected    Negative: Chest pain  (Exception: MILD central chest pain, present only when coughing)    Negative: [1] Headache AND [2] stiff neck (can't touch chin to chest)    Negative: Fever > 104 F (40 C)    Protocols used: INFLUENZA - SEASONAL-A-

## 2019-12-25 NOTE — ED AVS SNAPSHOT
[facility] Emergency Department  [address]  [facility] [address]  Phone:  [phone]  Fax:  [phone]                                    Josefina Aldrich   MRN: [id number]    Department:  [facility] Emergency Department   Date of Visit:  12/25/2019           After Visit Summary Signature Page    I have received my discharge instructions, and my questions have been answered. I have discussed any challenges I see with this plan with the nurse or doctor.    ..........................................................................................................................................  Patient/Patient Representative Signature      ..........................................................................................................................................  Patient Representative Print Name and Relationship to Patient    ..................................................               ................................................  Date                                   Time    ..........................................................................................................................................  Reviewed by Signature/Title    ...................................................              ..............................................  Date                                               Time          22EPIC Rev 08/18

## 2019-12-25 NOTE — LETTER
December 25, 2019      To Whom It May Concern:      Josefina ASAEL Venkata Aldrich was seen in our Emergency Department today, 12/25/19.  I expect her condition to improve over the next three days.  She may return to work on 12/28 if symptoms have improved.    Sincerely,        Susannah Alvarez RN, BSN

## 2019-12-26 LAB
BACTERIA SPEC CULT: NORMAL
INTERPRETATION ECG - MUSE: NORMAL
Lab: NORMAL
SPECIMEN SOURCE: NORMAL

## 2019-12-26 NOTE — PROGRESS NOTES
"Subjective   Josefina VYAS Venkata Aldrich is a 44 year old female who presents to clinic today for the following health issues:    HPI   ED/UC Followup:    Facility:  [facility]  Date of visit: 12/25/2019  Reason for visit: Chest Wall pain  Current Status:   Pneumonia of right middle lobe due to infectious organism (H) +1 more     Went to ED 12/25/19 had CXR and CT to rule out PE. Has right upper lobe pneumonia. Steroids were stopped and she was started on Doxycycline. Has some improving of symptoms still feels very fatigued.     Reviewed and updated as needed this visit by provider:  Tobacco  Allergies  Meds  Problems  Med Hx  Surg Hx  Fam Hx         Review of Systems   Constitutional, HEENT, cardiovascular, pulmonary, GI, , musculoskeletal, neuro, skin, endocrine and psych systems are negative, except as otherwise noted in the HPI.          Objective   /76   Pulse 88   Temp 99  F (37.2  C) (Tympanic)   Ht 1.651 m (5' 5\")   Wt 109.3 kg (241 lb)   SpO2 98%   Breastfeeding No   BMI 40.10 kg/m   Body mass index is 40.1 kg/m .  Physical Exam   GENERAL: healthy, alert, well nourished, well hydrated, no distress  EYES: Eyes grossly normal to inspection, extraocular movements - intact, and PERRL  HENT: ear canals- normal; TMs- normal; Nose- normal; Mouth- no ulcers, no lesions  NECK: no tenderness, no adenopathy, no asymmetry, no masses, no stiffness; thyroid- normal to palpation  RESP: lungs clear to auscultation - no rales, no rhonchi, no wheezes mild decrease of breath sounds right lung field  CV: regular rates and rhythm, normal S1 S2, no S3 or S4 and no murmur, no click or rub -  ABDOMEN: soft, no tenderness, no  hepatosplenomegaly, no masses, normal bowel sounds  LYMPHATICS: ant. cervical- normal, post. cervical- normal, axillary- normal, supraclavicular- normal, inguinal- normal    Diagnostic Test Results  CBC normal except elevated lymphocyctes  Mono -  Negative      Assessment & Plan "   Josefina was seen today for recheck.    Diagnoses and all orders for this visit:    Pneumonia of right upper lobe due to infectious organism (H)  Is showing improvement.  Continue antibiotic medication therapy.   Nebulizer every 4-6 hours.   Will have her follow up Monday to ensure still improving with repeat CXR at that time.  She is to be seen sooner if symptoms worsen.   -     CBC with platelets and differential  -     Cancel: XR Chest 2 Views; Future  -     XR Chest 2 Views; Future    MS (multiple sclerosis) (H)    Elevated lymphocytes  Negative mono.  -     Mononucleosis screen    See Patient Instructions    Return in about 3 days (around 12/30/2019) for Recheck.     Leonie Collazo, LEROY-BC     [address] [address]  [email or web address]   Office: [phone]

## 2019-12-26 NOTE — RESULT ENCOUNTER NOTE
Emergency Dept/Urgent Care discharge antibiotic (if prescribed): Doxycycline 100 mg PO tablet, 1 tablet (100 mg) by mouth 2 times daily for 7 days  Date of Rx (if applicable):  12/25/19  No changes in treatment per Urine culture protocol.

## 2019-12-26 NOTE — DISCHARGE INSTRUCTIONS
You were found to have pneumonia.  I will prescribe antibiotics.  Please do not take iron or magnesium-containing supplementation while on this antibiotic.  Please follow-up with your primary care clinic.  Return to the ED if you have intractable fevers, intractable vomiting, concerning shortness of breath, or other symptoms that concern you.

## 2019-12-27 ENCOUNTER — OFFICE VISIT (OUTPATIENT)
Dept: FAMILY MEDICINE | Facility: CLINIC | Age: 44
End: 2019-12-27
Payer: COMMERCIAL

## 2019-12-27 VITALS
WEIGHT: 241 LBS | OXYGEN SATURATION: 98 % | HEIGHT: 65 IN | BODY MASS INDEX: 40.15 KG/M2 | HEART RATE: 88 BPM | SYSTOLIC BLOOD PRESSURE: 128 MMHG | TEMPERATURE: 99 F | DIASTOLIC BLOOD PRESSURE: 76 MMHG

## 2019-12-27 DIAGNOSIS — J18.9 PNEUMONIA OF RIGHT UPPER LOBE DUE TO INFECTIOUS ORGANISM: Primary | ICD-10-CM

## 2019-12-27 DIAGNOSIS — G35 MS (MULTIPLE SCLEROSIS) (H): ICD-10-CM

## 2019-12-27 DIAGNOSIS — D72.820 ELEVATED LYMPHOCYTES: ICD-10-CM

## 2019-12-27 LAB
BACTERIA SPEC CULT: NORMAL
BASOPHILS # BLD AUTO: 0 10E9/L (ref 0–0.2)
BASOPHILS NFR BLD AUTO: 0.4 %
DIFFERENTIAL METHOD BLD: ABNORMAL
EOSINOPHIL # BLD AUTO: 0.1 10E9/L (ref 0–0.7)
EOSINOPHIL NFR BLD AUTO: 1.2 %
ERYTHROCYTE [DISTWIDTH] IN BLOOD BY AUTOMATED COUNT: 13.9 % (ref 10–15)
HCT VFR BLD AUTO: 36.6 % (ref 35–47)
HETEROPH AB SER QL: NEGATIVE
HGB BLD-MCNC: 11.7 G/DL (ref 11.7–15.7)
LYMPHOCYTES # BLD AUTO: 6.2 10E9/L (ref 0.8–5.3)
LYMPHOCYTES NFR BLD AUTO: 57.9 %
Lab: NORMAL
MCH RBC QN AUTO: 30.3 PG (ref 26.5–33)
MCHC RBC AUTO-ENTMCNC: 32 G/DL (ref 31.5–36.5)
MCV RBC AUTO: 95 FL (ref 78–100)
MONOCYTES # BLD AUTO: 1 10E9/L (ref 0–1.3)
MONOCYTES NFR BLD AUTO: 9.2 %
NEUTROPHILS # BLD AUTO: 3.4 10E9/L (ref 1.6–8.3)
NEUTROPHILS NFR BLD AUTO: 31.3 %
PLATELET # BLD AUTO: 334 10E9/L (ref 150–450)
RBC # BLD AUTO: 3.86 10E12/L (ref 3.8–5.2)
SPECIMEN SOURCE: NORMAL
WBC # BLD AUTO: 10.8 10E9/L (ref 4–11)

## 2019-12-27 PROCEDURE — 99213 OFFICE O/P EST LOW 20 MIN: CPT | Performed by: NURSE PRACTITIONER

## 2019-12-27 PROCEDURE — 36415 COLL VENOUS BLD VENIPUNCTURE: CPT | Performed by: NURSE PRACTITIONER

## 2019-12-27 PROCEDURE — 85025 COMPLETE CBC W/AUTO DIFF WBC: CPT | Performed by: NURSE PRACTITIONER

## 2019-12-27 PROCEDURE — 86308 HETEROPHILE ANTIBODY SCREEN: CPT | Performed by: NURSE PRACTITIONER

## 2019-12-27 ASSESSMENT — MIFFLIN-ST. JEOR: SCORE: 1754.05

## 2019-12-27 NOTE — LETTER
[facility]  [address] [place] [address]                                                                                                       [phone]    December 27, 2019    Josefina Lipscombs  [id number] [place] [address]   [facility] [address]      To Whom it May Concern:    The above patient is unable to attend work for 3 more days due to a medical illness; next office visit 12/30/2019.  Please contact me with questions or concerns.      Sincerely,    LEROY Michael-BC

## 2019-12-27 NOTE — PATIENT INSTRUCTIONS
Patient Education     Treating Pneumonia  Pneumonia is an infection of one or both of the lungs. Pneumonia:    Is usually caused by either a virus or a bacteria    Can be very serious, especially in infants, young children, and older adults. It s also serious for those with other long-term health problems or weakened immune systems.    Is sometimes treated at home and sometimes in the hospital    Antibiotic medicines  Antibiotics may be prescribed for pneumonia caused by bacteria. They may be pills (oral medicines), or shots (injections). Or they may be given by IV (intravenously) into a vein. If you are taking oral medicines at home:    Fill your prescription and start taking your medicine as soon as you can.    You will likely start to feel better in a day or 2, but don t stop taking the antibiotic.    Use a pill organizer to help you remember to take your medicine.    Let your healthcare provider know if you have side effects.    Take your medicine exactly as directed on the label. Talk to your provider or pharmacist if you have any questions.  Antiviral medicines  Antiviral medicine may be prescribed for pneumonia caused by a virus. For example, antiviral medicine may be prescribed for pneumonia caused by the flu virus. Antibiotics do not work against viruses. If you are taking antiviral medicine at home:    Fill your prescription and start taking your medicine as soon as you can.    Talk with your provider or pharmacist about possible side effects.    Take the medicine exactly as instructed.  To relieve symptoms  There are many medicines that can help relieve symptoms of pneumonia. Some are prescription and some are over-the-counter.  Your healthcare provider may recommend:    Acetaminophen or ibuprofen to lower your fever and to lessen headache or other pain    Cough medicine to loosen mucus or to reduce coughing  Make sure you check with your healthcare provider or pharmacist before taking any  over-the-counter medicines.  Special treatments  If you are hospitalized for pneumonia, you may have other therapies, including:    Inhaled medicines to help with breathing or chest congestion    Supplemental oxygen to increase low oxygen levels  Drink fluids and eat healthy  You should eat healthy to help your body fight the infection. Drinking a lot of fluids helps to replace fluids lost from fever and to loosen mucus in your chest.    Diet. Make healthy food choices, including fruits and vegetables, lean meats and other proteins, 100% whole grain and low- or no-fat dairy products.    Fluids. Drink at least 6 to 8 tall glasses a day. Water and 100% fruit or vegetable juice are best.  Get plenty of rest and sleep  You may be more tired than usual for a while. It is important to get enough sleep at night. It s also important to rest during the day. Talk with your healthcare provider if coughing or other symptoms are interfering with your sleep.  Preventing the spread of germs  The best thing you can do to prevent spreading germs is to wash your hands often. You should:    Rub your hand with soap and water for 20 to 30 seconds.    Clean in between your fingers, the backs of your hands, and around your nails.    Dry your hands on a separate towel or use paper towels.  You should also:    Keep alcohol-based hand  nearby.    Make sure you also clean surfaces that you touch. Use a product that kills all types of germs.    Stay away from others until you are feeling better.  When to call your healthcare provider  Call your healthcare provider if you have any of the following:    Symptoms get worse    Fever continues    Shortness of breath gets worse    Increased mucus or mucus that is darker in color    Coughing gets worse    Lips or fingers are bluish in color    Side effects from your medicine   Date Last Reviewed: 12/1/2016 2000-2018 [employer]. [address] [place] [address], [place] [address]. All  rights reserved. This information is not intended as a substitute for professional medical care. Always follow your healthcare professional's instructions.           Patient Education     Pleurisy  You have pain in your chest. Your healthcare provider has told you that you have pleurisy, or pleuritis. Pleurisy is swelling (inflammation) of the pleura. The pleura are two layers of thin smooth tissue that surround the lungs and line the chest.  What are the symptoms of pleurisy?     Pleurisy is inflammation of the pleura. The pleura cover the lungs and line the chest.   Pleurisy usually causes sharp chest pain. It is usually worse when you take a deep breath, cough, or sneeze.  What causes pleurisy?  Many things can cause pleurisy. A common cause is a viral infection like the flu or pneumonia. Serious lung problems that can cause it include:    A blood clot in the lung (pulmonary embolism)    Air between the pleura (pneumothorax)  Serious heart problems that can cause pleurisy include:    Heart attack    Inflammation of the covering of the heart (pericarditis)  How is pleurisy diagnosed?  Your healthcare provider examines you and asks you about your symptoms and health history. He or she will first check you for the serious causes of chest pain. You may have:    Lab tests    Imaging tests such as chest X-ray, CT scan, or ultrasound    ECG  How is pleurisy treated?  Treatment depends on what is causing the pleurisy. Serious conditions are treated in the hospital. You may need medicines to decrease the inflammation and pain.     Call 911  Call 911 if any of these occur:    Trouble breathing    Chest pain that gets worse  Call your healthcare provider  Call your healthcare provider right away if you have:    A fever of 100.4 F (38 C) or higher, or as directed by your healthcare provider   Date Last Reviewed: 11/1/2016 2000-2018 [employer]. [address] [place] [address], [place] [address]. All rights reserved.  This information is not intended as a substitute for professional medical care. Always follow your healthcare professional's instructions.

## 2019-12-27 NOTE — RESULT ENCOUNTER NOTE
Final Beta strep group A r/o culture is NEGATIVE for Group A streptococcus.    No treatment or change in treatment per [place] Strep protocol.

## 2019-12-30 ENCOUNTER — OFFICE VISIT (OUTPATIENT)
Dept: PEDIATRICS | Facility: CLINIC | Age: 44
End: 2019-12-30
Payer: COMMERCIAL

## 2019-12-30 ENCOUNTER — OFFICE VISIT (OUTPATIENT)
Dept: FAMILY MEDICINE | Facility: CLINIC | Age: 44
End: 2019-12-30
Payer: COMMERCIAL

## 2019-12-30 VITALS
HEIGHT: 65 IN | TEMPERATURE: 97.8 F | WEIGHT: 251 LBS | HEART RATE: 71 BPM | OXYGEN SATURATION: 100 % | DIASTOLIC BLOOD PRESSURE: 72 MMHG | BODY MASS INDEX: 41.82 KG/M2 | SYSTOLIC BLOOD PRESSURE: 118 MMHG

## 2019-12-30 VITALS
BODY MASS INDEX: 41.82 KG/M2 | TEMPERATURE: 98.2 F | HEART RATE: 95 BPM | OXYGEN SATURATION: 99 % | DIASTOLIC BLOOD PRESSURE: 72 MMHG | HEIGHT: 65 IN | SYSTOLIC BLOOD PRESSURE: 120 MMHG | WEIGHT: 251 LBS

## 2019-12-30 DIAGNOSIS — R10.84 ABDOMINAL PAIN, GENERALIZED: ICD-10-CM

## 2019-12-30 DIAGNOSIS — R68.89 ILL FEELING: ICD-10-CM

## 2019-12-30 DIAGNOSIS — R42 LIGHTHEADEDNESS: ICD-10-CM

## 2019-12-30 DIAGNOSIS — G35 MS (MULTIPLE SCLEROSIS) (H): ICD-10-CM

## 2019-12-30 DIAGNOSIS — R53.83 OTHER FATIGUE: ICD-10-CM

## 2019-12-30 DIAGNOSIS — R19.5 LOOSE STOOLS: ICD-10-CM

## 2019-12-30 DIAGNOSIS — R11.0 NAUSEA: ICD-10-CM

## 2019-12-30 DIAGNOSIS — J18.9 PNEUMONIA OF RIGHT UPPER LOBE DUE TO INFECTIOUS ORGANISM: Primary | ICD-10-CM

## 2019-12-30 LAB
ALBUMIN SERPL-MCNC: 3.7 G/DL (ref 3.4–5)
ALP SERPL-CCNC: 64 U/L (ref 40–150)
ALT SERPL W P-5'-P-CCNC: 29 U/L (ref 0–50)
ANION GAP SERPL CALCULATED.3IONS-SCNC: 8 MMOL/L (ref 3–14)
AST SERPL W P-5'-P-CCNC: 23 U/L (ref 0–45)
BASOPHILS # BLD AUTO: 0 10E9/L (ref 0–0.2)
BASOPHILS NFR BLD AUTO: 0 %
BILIRUB SERPL-MCNC: 0.3 MG/DL (ref 0.2–1.3)
BUN SERPL-MCNC: 11 MG/DL (ref 7–30)
CALCIUM SERPL-MCNC: 8.5 MG/DL (ref 8.5–10.1)
CHLORIDE SERPL-SCNC: 110 MMOL/L (ref 94–109)
CO2 SERPL-SCNC: 21 MMOL/L (ref 20–32)
CREAT SERPL-MCNC: 0.8 MG/DL (ref 0.52–1.04)
DIFFERENTIAL METHOD BLD: ABNORMAL
ELLIPTOCYTES BLD QL SMEAR: SLIGHT
EOSINOPHIL # BLD AUTO: 0.2 10E9/L (ref 0–0.7)
EOSINOPHIL NFR BLD AUTO: 2 %
ERYTHROCYTE [DISTWIDTH] IN BLOOD BY AUTOMATED COUNT: 13.3 % (ref 10–15)
GFR SERPL CREATININE-BSD FRML MDRD: >90 ML/MIN/{1.73_M2}
GLUCOSE SERPL-MCNC: 93 MG/DL (ref 70–99)
HCT VFR BLD AUTO: 37.9 % (ref 35–47)
HGB BLD-MCNC: 12 G/DL (ref 11.7–15.7)
LYMPHOCYTES # BLD AUTO: 5.9 10E9/L (ref 0.8–5.3)
LYMPHOCYTES NFR BLD AUTO: 56 %
MAGNESIUM SERPL-MCNC: 2.3 MG/DL (ref 1.6–2.3)
MCH RBC QN AUTO: 30.1 PG (ref 26.5–33)
MCHC RBC AUTO-ENTMCNC: 31.7 G/DL (ref 31.5–36.5)
MCV RBC AUTO: 95 FL (ref 78–100)
MONOCYTES # BLD AUTO: 0.4 10E9/L (ref 0–1.3)
MONOCYTES NFR BLD AUTO: 4 %
NEUTROPHILS # BLD AUTO: 4 10E9/L (ref 1.6–8.3)
NEUTROPHILS NFR BLD AUTO: 38 %
NRBC # BLD AUTO: 0.1 10*3/UL
NRBC BLD AUTO-RTO: 1 /100
PLATELET # BLD AUTO: 328 10E9/L (ref 150–450)
PLATELET # BLD EST: ABNORMAL 10*3/UL
POTASSIUM SERPL-SCNC: 3.7 MMOL/L (ref 3.4–5.3)
PROCALCITONIN SERPL-MCNC: <0.05 NG/ML
PROT SERPL-MCNC: 7.9 G/DL (ref 6.8–8.8)
RBC # BLD AUTO: 3.99 10E12/L (ref 3.8–5.2)
SODIUM SERPL-SCNC: 139 MMOL/L (ref 133–144)
WBC # BLD AUTO: 10.5 10E9/L (ref 4–11)

## 2019-12-30 PROCEDURE — 84145 PROCALCITONIN (PCT): CPT | Performed by: PHYSICIAN ASSISTANT

## 2019-12-30 PROCEDURE — 83735 ASSAY OF MAGNESIUM: CPT | Performed by: PHYSICIAN ASSISTANT

## 2019-12-30 PROCEDURE — 99215 OFFICE O/P EST HI 40 MIN: CPT | Mod: 25 | Performed by: PHYSICIAN ASSISTANT

## 2019-12-30 PROCEDURE — 85025 COMPLETE CBC W/AUTO DIFF WBC: CPT | Performed by: PHYSICIAN ASSISTANT

## 2019-12-30 PROCEDURE — 36415 COLL VENOUS BLD VENIPUNCTURE: CPT | Performed by: PHYSICIAN ASSISTANT

## 2019-12-30 PROCEDURE — 80053 COMPREHEN METABOLIC PANEL: CPT | Performed by: PHYSICIAN ASSISTANT

## 2019-12-30 PROCEDURE — 99207 REFERRAL TO ACUTE AND DIAGNOSTIC SERVICES: CPT | Performed by: NURSE PRACTITIONER

## 2019-12-30 PROCEDURE — 96360 HYDRATION IV INFUSION INIT: CPT | Performed by: PHYSICIAN ASSISTANT

## 2019-12-30 RX ORDER — IPRATROPIUM BROMIDE AND ALBUTEROL SULFATE 2.5; .5 MG/3ML; MG/3ML
1 SOLUTION RESPIRATORY (INHALATION) 3 TIMES DAILY PRN
Qty: 30 VIAL | Refills: 1 | Status: SHIPPED | OUTPATIENT
Start: 2019-12-30 | End: 2020-02-19

## 2019-12-30 RX ORDER — ONDANSETRON 4 MG/1
4 TABLET, ORALLY DISINTEGRATING ORAL EVERY 8 HOURS PRN
Qty: 20 TABLET | Refills: 0 | Status: SHIPPED | OUTPATIENT
Start: 2019-12-30 | End: 2020-05-01

## 2019-12-30 RX ORDER — SODIUM CHLORIDE, SODIUM LACTATE, POTASSIUM CHLORIDE, CALCIUM CHLORIDE 600; 310; 30; 20 MG/100ML; MG/100ML; MG/100ML; MG/100ML
INJECTION, SOLUTION INTRAVENOUS ONCE
Status: COMPLETED | OUTPATIENT
Start: 2019-12-30 | End: 2019-12-30

## 2019-12-30 RX ORDER — ONDANSETRON 4 MG/1
4 TABLET, ORALLY DISINTEGRATING ORAL ONCE
Status: COMPLETED | OUTPATIENT
Start: 2019-12-30 | End: 2019-12-30

## 2019-12-30 RX ADMIN — SODIUM CHLORIDE, SODIUM LACTATE, POTASSIUM CHLORIDE, CALCIUM CHLORIDE: 600; 310; 30; 20 INJECTION, SOLUTION INTRAVENOUS at 16:00

## 2019-12-30 RX ADMIN — ONDANSETRON 4 MG: 4 TABLET, ORALLY DISINTEGRATING ORAL at 15:38

## 2019-12-30 ASSESSMENT — MIFFLIN-ST. JEOR
SCORE: 1799.41
SCORE: 1799.41

## 2019-12-30 NOTE — LETTER
[facility]  [address] [place] [id number]                                                                                                       [phone]    December 30, 2019    Josefina Lipscombs  [id number] [place] [address]   [facility] [id number]      To Whom it May Concern:    The above patient is unable to attend work 12/24/2019 - 1/3/2020 due to a medical issue. Please contact me with questions or concerns.      Sincerely,    Miya Crump PA-C

## 2019-12-30 NOTE — PROGRESS NOTES
SUBJECTIVE:   Josefina Aldrich is a 44 year old female who presents to clinic today for the following health issues:    Was seen by Leonie Collazo CNP, in clinic today to f/u pneumonia.  Had seen her on 12/27 as well in f/u for pneumonia. Was in ER 12/25/2019.  CXR showed RUL infiltrate and CT negative for PE. Steroids were stopped and she was started on Doxycycline. Has some improving of symptoms still feels very fatigued.  Pt is immunocompromised on DMARD (Tysabri infusions).  Also has asthma.     Woke from a HA yesterday at 6am, has not had headaches for quite some time.  Tried topamax and tylenol without relief.  Finally resolved with falling asleep.      Lightheaded since yesterday, especially when leaning over at waist.  Did not take BP meds today.      Decreased appetite with nausea for >4 days.  Today ate 11am biscuit and eggs and 5p quinoa, couple of shrimp and zucchini - 1 hour later abdominal cramping and gurgling increased.  Had increased urge for BM that was loose and light brown (not watery).  Last substantial meal was 12/24.  Abdominal discomfort worse after doxycycline administration.  Taking probiotic.  Coughing a lot, mostly at night with slight bark.  Using inhaler/neb twice daily.  BM's have been abnormal first of the day little pellets, after drinking some water they are soft after eating.   Cold and shaking after BM's    Feeling weaker each day.       Problem list and histories reviewed & adjusted, as indicated.  Additional history: as documented    Patient Active Problem List   Diagnosis     Migraine     Asthma     MS (multiple sclerosis) (H)     Anemia     Backache     Body mass index 45.0-49.9, adult (H)     Cognitive changes     Depression with anxiety     Fever postop     Uterine leiomyoma     Headache     Heavy menses     Hypersomnia with sleep apnea     Iron deficiency anemia     Leukocytosis     Postsurgical nonabsorption     Mild intermittent asthma     Morbid obesity (H)      "Myalgia and myositis     Neck pain     Other dyspnea and respiratory abnormality     Pain, neuropathic     Pelvic pain in female     Personal history of allergy to latex     Post concussion syndrome     Right shoulder pain     S/P gastric bypass     S/P hysterectomy     Bariatric surgery status     Vitamin B12 deficiency     Vitamin D deficiency     Vomiting following gastrointestinal surgery     Hypermagnesemia     Benign essential hypertension     Neck pain on left side     Mild major depression (H)     LLQ pain     Multiple sclerosis (H)     Immunosuppression (H)     Relapsing remitting multiple sclerosis (H)     Past Surgical History:   Procedure Laterality Date     GASTRIC BYPASS  2002    \"Trouble with B12 and D\"     HYSTERECTOMY, MONO  2013    cervix gone.  fibroids.  without BSO     TONSILLECTOMY         Social History     Tobacco Use     Smoking status: Former Smoker     Types: Cigars     Smokeless tobacco: Never Used   Substance Use Topics     Alcohol use: Yes     Alcohol/week: 2.0 standard drinks     Types: 2 Standard drinks or equivalent per week     Comment: 0-3 drinks per week     Family History   Problem Relation Age of Onset     Lupus Paternal Aunt 41     Multiple Sclerosis No family hx of          Current Outpatient Medications   Medication Sig Dispense Refill     albuterol (PROVENTIL) (2.5 MG/3ML) 0.083% neb solution Take 1 vial (2.5 mg) by nebulization every 6 hours as needed for shortness of breath / dyspnea or wheezing 1 Box 3     albuterol (VENTOLIN HFA) 108 (90 BASE) MCG/ACT inhaler Inhale 2 puffs into the lungs every 6 hours as needed        baclofen (LIORESAL) 20 MG tablet Take 2 tablets (40 mg) by mouth At Bedtime AND 1 tablet (20 mg) every morning AND 1 tablet (20 mg) daily (with lunch) AND 0.5 tablets (10 mg) See Admin Instructions. (In the afternoon)  10     budesonide (PULMICORT FLEXHALER) 90 MCG/ACT inhaler Inhale 2 puffs into the lungs daily 1 each 1     cholecalciferol (VITAMIN D3) " 5000 UNITS TABS tablet Take 1 tablet (5,000 Units) by mouth daily       docusate sodium (COLACE) 100 MG tablet Take 1 tablet (100 mg) by mouth 2 times daily       doxycycline hyclate (VIBRAMYCIN) 100 MG capsule Take 1 capsule (100 mg) by mouth 2 times daily for 7 days 14 capsule 0     Ferrous Sulfate (IRON SUPPLEMENT PO) Take 325 mg by mouth daily        gabapentin (NEURONTIN) 300 MG capsule Take 3 capsules (900 mg) by mouth At Bedtime 90 capsule 3     ipratropium - albuterol 0.5 mg/2.5 mg/3 mL (DUONEB) 0.5-2.5 (3) MG/3ML neb solution Take 1 vial (3 mLs) by nebulization 3 times daily as needed for shortness of breath / dyspnea or wheezing 30 vial 1     lisinopril-hydrochlorothiazide (PRINZIDE/ZESTORETIC) 10-12.5 MG tablet Take 1 tablet by mouth daily 90 tablet 3     MAGNESIUM PO        modafinil (PROVIGIL) 100 MG tablet Take 100 mg by mouth daily       montelukast (SINGULAIR) 10 MG tablet Take 1 tablet (10 mg) by mouth At Bedtime 90 tablet 3     natalizumab (TYSABRI) 300 MG/15ML injection Infusion       nortriptyline (PAMELOR) 50 MG capsule Take 1 capsule (50 mg) by mouth At Bedtime 90 capsule 3     ondansetron (ZOFRAN-ODT) 4 MG ODT tab Take 1 tablet (4 mg) by mouth every 8 hours as needed for nausea 20 tablet 0     order for DME Equipment being ordered: Nebulizer with supplies 1 Device 0     Probiotic Product (PROBIOTIC PO)        SUMAtriptan (IMITREX) 100 MG tablet Take 50 mg up to twice daily as needed for headache; separate doses by at least one hour and do not use more than 3 days/week 18 tablet 3     topiramate (TOPAMAX) 100 MG tablet Take 1 tablet (100 mg) by mouth At Bedtime (take with 50 mg to total 150 mg nightly) 60 tablet 3     topiramate (TOPAMAX) 50 MG tablet Take 1 tablet (50 mg) by mouth At Bedtime (take with 100 mg to total 150 mg nightly) 60 tablet 3     triamcinolone (KENALOG) 0.1 % external cream Apply topically 3 times daily as needed for irritation       valACYclovir (VALTREX) 500 MG tablet  "TAKE 1 TABLET BY MOUTH EVERY DAY 90 tablet 1     Allergies   Allergen Reactions     Aspirin Difficulty breathing, Palpitations and Other (See Comments)     Cephalexin Swelling     Adhesive Tape      Amantadine Swelling     Azithromycin Angioedema     Latex Hives, Itching and Rash     Penicillins Other (See Comments), Nausea and Vomiting, Hives, Swelling, Rash, Cramps and GI Disturbance     Amoxicillin OK       Reviewed and updated as needed this visit by clinical staff  Tobacco  Allergies  Meds  Problems  Med Hx  Surg Hx  Fam Hx  Soc Hx        Reviewed and updated as needed this visit by Provider  Tobacco  Allergies  Meds  Problems  Med Hx  Surg Hx  Fam Hx         ROS:  Constitutional, HEENT, cardiovascular, pulmonary, GI, , musculoskeletal, neuro, skin, endocrine and psych systems are negative, except as otherwise noted.    OBJECTIVE:   /72 (BP Location: Left arm, Cuff Size: Adult Large)   Pulse 71   Temp 97.8  F (36.6  C) (Oral)   Ht 1.651 m (5' 5\")   Wt 113.9 kg (251 lb)   SpO2 100%   BMI 41.77 kg/m   Body mass index is 41.77 kg/m .    GENERAL: healthy, alert and no distress  EYES: Eyes grossly normal to inspection, PERRL and conjunctivae and sclerae normal  HENT: ear canals and TM's normal and nose and mouth without ulcers or lesions  NECK: no adenopathy  RESP: lungs clear to auscultation - no rales, rhonchi or wheezes  CV: regular rate and rhythm, normal S1 S2, no S3 or S4, no murmur, click or rub, no peripheral edema and peripheral pulses strong  MS: no gross musculoskeletal defects noted, no edema  SKIN: no suspicious lesions or rashes  NEURO: Normal strength and tone, mentation intact and speech normal  PSYCH: mentation appears normal, affect normal/bright      ASSESSMENT/PLAN:   Diagnoses and all orders for this visit:    Pneumonia of right upper lobe due to infectious organism (H)  Loose stools  Ill feeling  Lightheadedness  Other fatigue  MS (multiple sclerosis) (H)  Abdominal " pain, generalized  Multifactorial symptoms likely secondary to dehydration.  Markedly improved after 1 L of lactated Ringer's infusion and administration of ondansetron.  Home prescription for ondansetron and DuoNeb solution sent to pharmacy.  Encouraged use of nebulizer 3 times daily to increase expectoration and assist with mucus congestion.  Patient to follow-up with Leonie Collazo in 1 week or sooner if needed.  Patient voiced understanding and agreement  -     sodium chloride (PF) 0.9% PF flush 3 mL  -     IV access  -     Comprehensive metabolic panel  -     CBC with platelets and differential  -     Procalcitonin  -     Magnesium  -     ondansetron (ZOFRAN-ODT) ODT tab 4 mg  -     lactated ringers infusion      Return in about 1 week (around 1/6/2020).     [address] [address]  [email or web address]   Office: [phone]           Miya Crump MS, PACARMEN

## 2019-12-30 NOTE — PROGRESS NOTES
"Subjective   Josefina N Venkata Aldrich is a 44 year old female who presents to clinic today for the following health issues:    HPI     Recheck pneumonia from LOV 12/27/2019. Was in ER 12/25/2019.    Woke from a HA yesterday at 6am. Lightheaded since then as well. Did not take BP meds today   Has been light headed   Decreased appetite - ate 11am biscuit and eggs and 5p quinoa, couple of shrimp and zucchini - 1 hour later   BM's have been abnormal first of the day little pellets, after drinking some water they are soft after eating.   Cold and shaking after BM's  Nibbling on crackers and drinking lots of water and tea  Taking antibiotic and a probiotic with it.  Feeling weaker each day.     Nebs 2-3x per day    Patient describes headache, weakness, lightheadedness, nausea, fatigue, abdominal cramping and increase in BM/denies diarrhea, and still feeling shortness of breath. Known MS complicated by current right upper lobe pneumonia. Has been taking her antibiotics and using nebulizer.     Reviewed and updated as needed this visit by provider:  Tobacco  Allergies  Meds  Problems  Med Hx  Surg Hx  Fam Hx       Review of Systems   Constitutional, HEENT, cardiovascular, pulmonary, GI, , musculoskeletal, neuro, skin, endocrine and psych systems are negative, except as otherwise noted in the HPI.       Objective   /72 (BP Location: Right arm, Patient Position: Chair, Cuff Size: Adult Large)   Pulse 95   Temp 98.2  F (36.8  C) (Oral)   Ht 1.651 m (5' 5\")   Wt 113.9 kg (251 lb)   SpO2 99%   Breastfeeding No   BMI 41.77 kg/m   Body mass index is 41.77 kg/m .  Physical Exam   GENERAL: healthy, alert, well nourished, well hydrated, no distress  EYES: Eyes grossly normal to inspection, extraocular movements - intact, and PERRL  HENT: ear canals- normal; TMs- normal; Nose- normal; Mouth- no ulcers, no lesions  NECK: no tenderness, no adenopathy, no asymmetry, no masses, no stiffness; thyroid- normal to " palpation  RESP: lungs clear to auscultation - no rales, no rhonchi, no wheezes; intermittent hacking cough  CV: regular rates and rhythm, normal S1 S2, no S3 or S4 and no murmur, no click or rub -  ABDOMEN: soft, no tenderness, no  hepatosplenomegaly, no masses, normal bowel sounds  NEURO: mentation- intact, speech- normal, negative romberg    Transfer to Acute and Diagnostic Services  I have contacted the staff at the [facility] at [phone] to confirm patient acceptance.  Special Needs: None    Discussed transition to Acute & Diagnostic Services Clinic, and patient agrees with next level of care.  Patient transportation will be provided by the patient.        Assessment & Plan   Josefina was seen today for recheck.    Diagnoses and all orders for this visit:    Pneumonia of right upper lobe due to infectious organism (H)   MS (multiple sclerosis) (H)  Lightheadedness   Nausea   Other fatigue  Abdominal pain, generalized  Although reassuring exam patient feels unwell, has nausea and feels weak/lightheaded. Her course is complicated  with immunocompromise and pneumonia.  Would like her assessed for dehydration and worsening infection but would like her not in the ER. Unable to obtain results of labs in a timely manner or IV hydration here in clinic.   Discussed with patient the Acute an Diagnostic services to check labs and offer fluid hydration and she agrees.  Call placed to Miya Crump and she agrees to patient referral.   Address discussed and written information provided patient to proceed to Acute and diagnostic services now. .   See is to follow up as discussed and to ER for any emergent symptoms.   Out of work the rest of this week.  Josefina verbalizes understanding of plan of care and is in agreement.    -     Cancel: XR Chest 2 Views; Future  -     Referral to Acute and Diagnostic Services (Day of diagnostic / First order acute); Future    See Patient  Instructions    Return in about 1 week (around 1/6/2020).     Leonie Collazo, FNP-BC     [address] [address]  [email or web address]   Office: [phone]

## 2019-12-30 NOTE — NURSING NOTE
Assessed patient after Zofran administration and patient notes decreased nausea symptoms and felt medication was very effective    Ayesha HYLTON - Registered Nurse  [facility]  Acute and Diagnostic Services

## 2019-12-30 NOTE — Clinical Note
Riki you for your kind referral.  Pia felt markedly improved with infusion of 1 L of lactated Ringer's and administration of ondansetron.  Sent her home with home ondansetron and transitioned her from albuterol nebulizers to DuoNeb solution.  Her plan is to follow-up with you next week on Monday or sooner if needed.  All labs show a trend toward improvement, however, procalcitonin is still pending.Miya Crump MBA, MS, PA-[facility]

## 2019-12-31 ENCOUNTER — MYC MEDICAL ADVICE (OUTPATIENT)
Dept: FAMILY MEDICINE | Facility: CLINIC | Age: 44
End: 2019-12-31

## 2019-12-31 DIAGNOSIS — E55.9 HYPOVITAMINOSIS D: ICD-10-CM

## 2019-12-31 DIAGNOSIS — G35 MULTIPLE SCLEROSIS (H): Primary | ICD-10-CM

## 2019-12-31 NOTE — TELEPHONE ENCOUNTER
Please see my chart message below     Please review and advise     thank you     Amelia Chance RN, BSN  [facility]

## 2019-12-31 NOTE — RESULT ENCOUNTER NOTE
Josefina  Here are your recent results.  Great news!  Your labs are all improving/normal.  I hope you continue to feel better!      Happy New Year!    If you have any questions please do not hesitate to contact our office via phone ([phone]) or [employer].    Miya Crump, MS, PA-C  [facility]

## 2019-12-31 NOTE — TELEPHONE ENCOUNTER
Please see My Chart message below and advise as appropriate.  Pharmacy and medication are pended for review.    TAMARA SolomonN, RN  [facility]

## 2020-01-02 ENCOUNTER — MYC MEDICAL ADVICE (OUTPATIENT)
Dept: FAMILY MEDICINE | Facility: CLINIC | Age: 45
End: 2020-01-02

## 2020-01-02 NOTE — TELEPHONE ENCOUNTER
Completed forms faxed back to [employer]  at [phone].   Originals sent to be scanned.       Guillermina Garcia

## 2020-01-02 NOTE — TELEPHONE ENCOUNTER
[facility], placed in providers in basket.    Susan Ahmadi MA

## 2020-01-02 NOTE — TELEPHONE ENCOUNTER
Completed forms faxed back to [facility] [place]   Originals sent to be scanned.   Please see additional encounter for details.    Guillermina Garcia

## 2020-01-03 ENCOUNTER — MYC MEDICAL ADVICE (OUTPATIENT)
Dept: FAMILY MEDICINE | Facility: CLINIC | Age: 45
End: 2020-01-03

## 2020-01-03 NOTE — TELEPHONE ENCOUNTER
Routing to PCP for further review/recommendations/orders.    Susannah Selby RN  [facility]

## 2020-01-06 ENCOUNTER — MYC MEDICAL ADVICE (OUTPATIENT)
Dept: FAMILY MEDICINE | Facility: CLINIC | Age: 45
End: 2020-01-06

## 2020-01-06 ENCOUNTER — ANCILLARY PROCEDURE (OUTPATIENT)
Dept: GENERAL RADIOLOGY | Facility: CLINIC | Age: 45
End: 2020-01-06
Attending: NURSE PRACTITIONER
Payer: COMMERCIAL

## 2020-01-06 ENCOUNTER — OFFICE VISIT (OUTPATIENT)
Dept: FAMILY MEDICINE | Facility: CLINIC | Age: 45
End: 2020-01-06
Payer: COMMERCIAL

## 2020-01-06 VITALS
HEIGHT: 65 IN | TEMPERATURE: 98.9 F | WEIGHT: 255 LBS | BODY MASS INDEX: 42.49 KG/M2 | HEART RATE: 105 BPM | DIASTOLIC BLOOD PRESSURE: 60 MMHG | SYSTOLIC BLOOD PRESSURE: 107 MMHG | OXYGEN SATURATION: 100 %

## 2020-01-06 DIAGNOSIS — J18.9 PNEUMONIA OF RIGHT UPPER LOBE DUE TO INFECTIOUS ORGANISM: ICD-10-CM

## 2020-01-06 DIAGNOSIS — R06.02 SHORTNESS OF BREATH: ICD-10-CM

## 2020-01-06 DIAGNOSIS — G35 MULTIPLE SCLEROSIS (H): ICD-10-CM

## 2020-01-06 DIAGNOSIS — R05.9 COUGH: ICD-10-CM

## 2020-01-06 DIAGNOSIS — D84.9 IMMUNOSUPPRESSION (H): ICD-10-CM

## 2020-01-06 DIAGNOSIS — R05.9 COUGH: Primary | ICD-10-CM

## 2020-01-06 PROCEDURE — 71046 X-RAY EXAM CHEST 2 VIEWS: CPT | Mod: FY

## 2020-01-06 PROCEDURE — 99214 OFFICE O/P EST MOD 30 MIN: CPT | Performed by: NURSE PRACTITIONER

## 2020-01-06 RX ORDER — PREDNISONE 20 MG/1
TABLET ORAL
Qty: 20 TABLET | Refills: 0 | Status: SHIPPED | OUTPATIENT
Start: 2020-01-06 | End: 2020-01-22

## 2020-01-06 ASSESSMENT — MIFFLIN-ST. JEOR: SCORE: 1817.55

## 2020-01-06 NOTE — LETTER
[facility]  [address] [place] [id number] [phone]    January 6, 2020    Josefina Aldrich  [address] [facility]   [place] [address]      To Whom it May Concern:    The above patient is able to attend work for part days only until an undisclosed amount of time due to a medical issue. [place] paperwork to follow. Please contact me with questions or concerns.      Sincerely,    LEROY Michael-BC

## 2020-01-06 NOTE — PROGRESS NOTES
"Subjective   Josefina N Venkata Aldrich is a 44 year old female who presents to clinic today for the following health issues:    HPI     Sore throat - this morning around 8:30a - tickle prior to that - the harder it is to catch breath the sorer her throat gets.  Completed antibiotic 4 days ago.   Body hurts - back to were it was at the beginning and very tired.   Shortness of breath - hard to catch her breath - very winded.  Has been using nebs last was 12p and inhaler.   Drinking hot tea - after drinks it gets spits/coughs up yellow sputum.   Middle of chest feels very congested.    Reviewed and updated as needed this visit by provider:  Tobacco  Allergies  Meds  Problems  Med Hx  Surg Hx  Fam Hx         Review of Systems   Constitutional, HEENT, cardiovascular, pulmonary, GI, , musculoskeletal, neuro, skin, endocrine and psych systems are negative, except as otherwise noted in the HPI.          Objective   /60 (BP Location: Left arm, Patient Position: Chair, Cuff Size: Adult Large)   Pulse 105   Temp 98.9  F (37.2  C) (Oral)   Ht 1.651 m (5' 5\")   Wt 115.7 kg (255 lb)   SpO2 100%   Breastfeeding No   BMI 42.43 kg/m   Body mass index is 42.43 kg/m .  Physical Exam   GENERAL: healthy, alert, well nourished, well hydrated, no distress  EYES: Eyes grossly normal to inspection, extraocular movements - intact, and PERRL  HENT: ear canals- normal; TMs- normal; Nose- normal; Mouth- no ulcers, no lesions  NECK: no tenderness, no adenopathy, no asymmetry, no masses, no stiffness; thyroid- normal to palpation  RESP: lungs clear to auscultation - no rales, no rhonchi, faint intermittent wheezes  CV: regular rates and rhythm, normal S1 S2, no S3 or S4 and no murmur, no click or rub -  ABDOMEN: soft, no tenderness, no  hepatosplenomegaly, no masses, normal bowel sounds    Diagnostic Test Results  CXR - unremarkable      Assessment & Plan   Josefina was seen today for recheck.    Diagnoses and all orders for " this visit:    Cough  Pneumonia of right upper lobe due to infectious organism (H)  Reassuring exam and reassuring CXR-awaiting official radiology read.   Due to symptoms will restart a steroid taper.  TO ER if any worsening.   Follow up Friday.  Josefina verbalizes understanding of plan of care and is in agreement.   -     XR Chest 2 Views  -     predniSONE (DELTASONE) 20 MG tablet; Take 3 tabs by mouth daily x 3 days, then 2 tabs daily x 3 days, then 1 tab daily x 3 days, then 1/2 tab daily x 3 days.    Shortness of breath   Steroids taper.  Decrease work days.  TO ER with any worsening.   -     XR Chest 2 Views  -     predniSONE (DELTASONE) 20 MG tablet; Take 3 tabs by mouth daily x 3 days, then 2 tabs daily x 3 days, then 1 tab daily x 3 days, then 1/2 tab daily x 3 days.    Multiple sclerosis (H)  Immunosuppression (H)  Will take longer to recover from pneumonia.   FMLA paperwork to decrease work shifts to 4 hours daily; anticipated for the next few weeks.   -     predniSONE (DELTASONE) 20 MG tablet; Take 3 tabs by mouth daily x 3 days, then 2 tabs daily x 3 days, then 1 tab daily x 3 days, then 1/2 tab daily x 3 days.    See Patient Instructions    Return in about 4 days (around 1/10/2020).     LEROY Anderson-[address] [address]  [email or web address]   Office: [phone]

## 2020-01-06 NOTE — TELEPHONE ENCOUNTER
Patient does have pneumonia currently.     Satomi Message sent  Awaiting response    Susannah Selby RN  [facility] [place]

## 2020-01-06 NOTE — TELEPHONE ENCOUNTER
Lesli Response from patient:   The men s help for about 2 hours and then I m super short of breath afterwards. I feel worse today than yesterday. My throat hurts as well. I m not producing any mucous though. My chest is starting to hurt again like the first day at the ER. it s real tight and I m winded like before I started antibiotics.  And my body is dragging and aching like when I saw you in acute care. I m thinking I pushed myself to hard I was doing ok at 5 then every hour I got worse more tired , achey short of breath    Routing to PCP for further review/recommendations/orders.    Susannah Selby RN  [facility]

## 2020-01-08 ENCOUNTER — TELEPHONE (OUTPATIENT)
Dept: FAMILY MEDICINE | Facility: CLINIC | Age: 45
End: 2020-01-08

## 2020-01-08 NOTE — TELEPHONE ENCOUNTER
Reason for Call:  Form, our goal is to have forms completed with 72 hours, however, some forms may require a visit or additional information.    Type of letter, form or note:  FMLA    Who is the form from?: Insurance comp    Where did the form come from: form was faxed in    What clinic location was the form placed at?: [place]    Where the form was placed: Form was given to provider and completed.    What number is listed as a contact on the form?: [phone]       Additional comments: Faxed to [phone] - on 01/07/2020    Call taken on 1/8/2020 at 4:10 PM by Serena Sales CMA    Forms sent to scan

## 2020-01-10 ENCOUNTER — OFFICE VISIT (OUTPATIENT)
Dept: FAMILY MEDICINE | Facility: CLINIC | Age: 45
End: 2020-01-10
Payer: COMMERCIAL

## 2020-01-10 VITALS
HEIGHT: 65 IN | HEART RATE: 91 BPM | BODY MASS INDEX: 42.49 KG/M2 | OXYGEN SATURATION: 99 % | SYSTOLIC BLOOD PRESSURE: 124 MMHG | DIASTOLIC BLOOD PRESSURE: 76 MMHG | WEIGHT: 255 LBS | TEMPERATURE: 98.4 F

## 2020-01-10 DIAGNOSIS — R05.9 COUGH: Primary | ICD-10-CM

## 2020-01-10 DIAGNOSIS — G35 MULTIPLE SCLEROSIS (H): ICD-10-CM

## 2020-01-10 DIAGNOSIS — J18.9 PNEUMONIA OF RIGHT UPPER LOBE DUE TO INFECTIOUS ORGANISM: ICD-10-CM

## 2020-01-10 PROCEDURE — 99213 OFFICE O/P EST LOW 20 MIN: CPT | Performed by: NURSE PRACTITIONER

## 2020-01-10 ASSESSMENT — MIFFLIN-ST. JEOR: SCORE: 1817.55

## 2020-01-10 NOTE — PROGRESS NOTES
"Subjective   Josefina ASAEL Venkata Aldrich is a 44 year old female who presents to clinic today for the following health issues:    HPI     Recheck from LOV 01/06/2020 - Pneumonia.   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 Says can taste paint.    Improving symptoms however admits continued to be easily fatigued.  Patient has MS and known right upper lobe pneumonia improving on recent x-ray.  I did fill out [place] paperwork for her to be at work for only 4 hours a day at this time until completely recovered.  She reports trigger of painting in the office flaring up her lung symptoms.  Continues on steroid taper.       Reviewed and updated as needed this visit by provider:  Tobacco  Allergies  Meds  Problems  Med Hx  Surg Hx  Fam Hx         Review of Systems   Constitutional, HEENT, cardiovascular, pulmonary, GI, , musculoskeletal, neuro, skin, endocrine and psych systems are negative, except as otherwise noted in the HPI.          Objective   /76 (BP Location: Left arm, Patient Position: Chair, Cuff Size: Adult Large)   Pulse 91   Temp 98.4  F (36.9  C) (Oral)   Ht 1.651 m (5' 5\")   Wt 115.7 kg (255 lb)   SpO2 99%   Breastfeeding No   BMI 42.43 kg/m   Body mass index is 42.43 kg/m .  Physical Exam   GENERAL: healthy, alert, well nourished, well hydrated, no distress  NECK: no tenderness, no adenopathy, no asymmetry, no masses, no stiffness; thyroid- normal to palpation  RESP: lungs clear to auscultation - no rales, no rhonchi, faint intermittent wheezes, rare hacking cough  CV: regular rates and rhythm, normal S1 S2, no S3 or S4 and no murmur, no click or rub -  ABDOMEN: soft, no tenderness, no  hepatosplenomegaly, no masses, normal bowel sounds      Assessment & Plan   Josefina was seen today for recheck.    Diagnoses and all orders for this visit:    Cough  Pneumonia of right upper lobe due to infectious organism (H)  Markedly improved.  Continue with at home remedies including " continuation of steroids.    Josefina verbalizes understanding of plan of care and is in agreement.     Multiple sclerosis (H) she has it that is what her daughter probably has  Continue daily work at 4 hours a day until fully recovered  Follow-up in 2 weeks or sooner if needed.      See Patient Instructions    Return in about 2 weeks (around 1/24/2020) for Recheck.     Leonie Collazo, FNP-BC     [address] [address]  [email or web address]   Office: [phone]

## 2020-01-16 ENCOUNTER — HOSPITAL ENCOUNTER (OUTPATIENT)
Facility: CLINIC | Age: 45
Setting detail: SPECIMEN
Discharge: HOME | End: 2020-01-16
Attending: PSYCHIATRY & NEUROLOGY | Admitting: PSYCHIATRY & NEUROLOGY
Payer: COMMERCIAL

## 2020-01-16 ENCOUNTER — MYC MEDICAL ADVICE (OUTPATIENT)
Dept: FAMILY MEDICINE | Facility: CLINIC | Age: 45
End: 2020-01-16

## 2020-01-16 ENCOUNTER — INFUSION THERAPY VISIT (OUTPATIENT)
Dept: INFUSION THERAPY | Facility: CLINIC | Age: 45
End: 2020-01-16
Attending: PSYCHIATRY & NEUROLOGY
Payer: COMMERCIAL

## 2020-01-16 DIAGNOSIS — G35 MULTIPLE SCLEROSIS (H): Primary | ICD-10-CM

## 2020-01-16 DIAGNOSIS — G35 MS (MULTIPLE SCLEROSIS) (H): ICD-10-CM

## 2020-01-16 DIAGNOSIS — J45.901 ASTHMA WITH ACUTE EXACERBATION, UNSPECIFIED ASTHMA SEVERITY, UNSPECIFIED WHETHER PERSISTENT: Primary | ICD-10-CM

## 2020-01-16 PROCEDURE — 96365 THER/PROPH/DIAG IV INF INIT: CPT

## 2020-01-16 PROCEDURE — 25000128 H RX IP 250 OP 636: Performed by: PSYCHIATRY & NEUROLOGY

## 2020-01-16 PROCEDURE — 40000975 ZZHCL STATISTIC JC VIR AB INDEX INHIB: Performed by: PSYCHIATRY & NEUROLOGY

## 2020-01-16 PROCEDURE — 25800030 ZZH RX IP 258 OP 636: Performed by: PSYCHIATRY & NEUROLOGY

## 2020-01-16 RX ADMIN — NATALIZUMAB 300 MG: 300 INJECTION INTRAVENOUS at 13:33

## 2020-01-16 RX ADMIN — SODIUM CHLORIDE 250 ML: 9 INJECTION, SOLUTION INTRAVENOUS at 14:27

## 2020-01-16 NOTE — LETTER
[facility] [place]  [address] [place] [address]                                                                                                       [phone]    January 16, 2020    Josefina Lipscombs  [id number] [place] [address]   [facility] [address]      To Whom it May Concern:    The above patient is under my professional care.  She has a medical condition that prohibits her from exposure to paint and strong fumes.  All efforts should be made to avoid the situations that could exacerbate her underlying chronic medical condition. Please contact me with questions or concerns.      Sincerely,    Miya Crump PA-C

## 2020-01-16 NOTE — PROGRESS NOTES
Infusion Nursing Note:  Josefina Lipscombs presents today for Tysabri.    Patient seen by provider today: No   present during visit today: Not Applicable.    Note: N/A.    Intravenous Access:  Labs drawn without difficulty.  Peripheral IV placed.    Treatment Conditions:  Tysabri pre-infusion checklist completed via touch program.      Post Infusion Assessment:  Patient tolerated infusion without incident.  Patient observed for 60 minutes post Tysabri per protocol.  Blood return noted pre and post infusion.  Site patent and intact, free from redness, edema or discomfort.  No evidence of extravasations.  Access discontinued per protocol.       Discharge Plan:   Discharge instructions reviewed with: Patient.  Patient and/or family verbalized understanding of discharge instructions and all questions answered.  AVS to patient via [employer].  Patient will return in one month for next dose of Tysabri.  Pt will schedule this appt at her earliest convenience for next appointment.   Patient discharged in stable condition accompanied by: self.  Departure Mode: Ambulatory.    Megan Higuera RN

## 2020-01-22 ENCOUNTER — OFFICE VISIT (OUTPATIENT)
Dept: FAMILY MEDICINE | Facility: CLINIC | Age: 45
End: 2020-01-22
Payer: COMMERCIAL

## 2020-01-22 ENCOUNTER — TELEPHONE (OUTPATIENT)
Dept: FAMILY MEDICINE | Facility: CLINIC | Age: 45
End: 2020-01-22

## 2020-01-22 VITALS
HEART RATE: 82 BPM | HEIGHT: 65 IN | TEMPERATURE: 98.4 F | OXYGEN SATURATION: 99 % | DIASTOLIC BLOOD PRESSURE: 70 MMHG | SYSTOLIC BLOOD PRESSURE: 118 MMHG | BODY MASS INDEX: 42.49 KG/M2 | WEIGHT: 255 LBS

## 2020-01-22 DIAGNOSIS — D84.9 IMMUNOSUPPRESSION (H): ICD-10-CM

## 2020-01-22 DIAGNOSIS — J45.20 MILD INTERMITTENT ASTHMA, UNSPECIFIED WHETHER COMPLICATED: ICD-10-CM

## 2020-01-22 DIAGNOSIS — R06.02 SHORTNESS OF BREATH: ICD-10-CM

## 2020-01-22 DIAGNOSIS — J18.9 PNEUMONIA OF RIGHT UPPER LOBE DUE TO INFECTIOUS ORGANISM: Primary | ICD-10-CM

## 2020-01-22 DIAGNOSIS — G35 MULTIPLE SCLEROSIS (H): ICD-10-CM

## 2020-01-22 DIAGNOSIS — R10.11 RUQ ABDOMINAL PAIN: ICD-10-CM

## 2020-01-22 PROCEDURE — 99214 OFFICE O/P EST MOD 30 MIN: CPT | Performed by: NURSE PRACTITIONER

## 2020-01-22 ASSESSMENT — MIFFLIN-ST. JEOR: SCORE: 1817.55

## 2020-01-22 NOTE — LETTER
[facility]  [address] [place] [address]                                                                                                       [phone]    January 22, 2020    Josefina Aldrich  [address]   [facility] [address]      To Whom it May Concern:    The above patient is able to increase work now to 6 hours daily only due to her ongoing medical issue. We will reevaluate this in 2 weeks. Please contact me with questions or concerns.      Sincerely,    Leonie Collazo, LEROY-BC / ch

## 2020-01-22 NOTE — PROGRESS NOTES
"Subjective   Josefina N Venkata Aldrich is a 44 year old female who presents to clinic today for the following health issues:    HPI     Recheck pneumonia - LOV 01/10/2020. Getting better, still feel winded with conversations and walking. Nebs 3x per day morning, 1p and 1 before bed - feels tight before bed. No more pain or wheezing.  Still very tired. Voice is coming back. Does have a little more energy.   Walked up 3 flights of stairs stopping to rest at the 2nd and 3rd.   Cough - is better. Painting again at work, did move desk. Paint until 9:30a then put fans on.  Supposed to be done by Monday.   Has been leaving leaving at 11am.  [place] horses and spasms at night -- has been stretching  Has been feeling lightheaded / dizzy x1 week off and on all day. Has to sit after getting ready before leavign for work, also when cooking dinner gets lightheaded.    Requesting to increase hours starting Monday to 6 hours to build stamina up. Not ready for 8 hours but thinks can do 6 hours.    Patient also reporting intermittent right upper quadrant discomfort for the last few weeks.  Unknown triggers. Unknown if it is worse after eating.  Feels like discomfort radiates a bit to her back.  Denies history of gallbladder disease.    Reviewed and updated as needed this visit by provider:  Tobacco  Allergies  Meds  Problems  Med Hx  Surg Hx  Fam Hx         Review of Systems   Constitutional, HEENT, cardiovascular, pulmonary, GI, , musculoskeletal, neuro, skin, endocrine and psych systems are negative, except as otherwise noted in the HPI.       Objective   /70 (BP Location: Left arm, Patient Position: Chair, Cuff Size: Adult Large)   Pulse 82   Temp 98.4  F (36.9  C) (Oral)   Ht 1.651 m (5' 5\")   Wt 115.7 kg (255 lb)   SpO2 99%   Breastfeeding No   BMI 42.43 kg/m   Body mass index is 42.43 kg/m .  Physical Exam   GENERAL: healthy, alert, well nourished, well hydrated, no distress  NECK: no tenderness, no " adenopathy, no asymmetry, no masses, no stiffness; thyroid- normal to palpation  RESP: lungs clear to auscultation - no rales, no rhonchi, intermittent wheeze   CV: regular rates and rhythm, normal S1 S2, no S3 or S4 and no murmur, no click or rub -  ABDOMEN: soft, mild tenderness with deep palpation RUQ, other quadrants WNL, no  hepatosplenomegaly, no masses, normal bowel sounds    Diagnostic Test Results  Pending      Assessment & Plan   Josefina was seen today for recheck.    Diagnoses and all orders for this visit:    Pneumonia of right upper lobe due to infectious organism (H)  Shortness of breath  Mild intermittent asthma   Pneumonia resolved on last xray.   Continued fatigue and intermittent shortness of breath however this is improving.    Okay to increase her workday to 6 hours daily also advised she is not within an area that exposures her to paint fumes that will exacerbate her condition.  Note written and FMLA paperwork will be filled out.  Follow-up in 2 weeks or sooner if needed.    Multiple sclerosis (H)  Immunosuppression (H)  Prolonged current illness is understandable given her history of asthma, history of pneumonia, as well as immune compromise/MS.      RUQ abdominal pain  Nonspecific right upper quadrant pain.  I encouraged her to journal to search for triggers.  Reassuring exam today.  Abdominal ultrasound ordered.  Patient is to proceed for imaging acutely if symptoms worsen.  -     US Abdomen Complete; Future    See Patient Instructions    Return in about 2 weeks (around 2/5/2020) for Recheck.     Leonie Collazo, LEROY-[address] [address]  [email or web address]   Office: [phone]

## 2020-01-22 NOTE — PATIENT INSTRUCTIONS
Patient Education     Unknown Causes of Abdominal Pain (Female)    The exact cause of your belly (abdominal) pain is not clear. This does not mean that this is something to worry about. Everyone likes to know the exact cause of the problem. But sometimes with belly pain, there is no clear-cut cause, and this could be a good thing. The good news is that your symptoms can be treated, and you will feel better.   Your condition does not seem serious now. But sometimes the signs of a serious problem may take more time to appear. For this reason, it is important for you to watch for any new symptoms, problems, or worsening of your condition.  Over the next few days, the abdominal pain may come and go. Or it may be constant. Other common symptoms can include nausea and vomiting. Sometimes it can be difficult to tell if you feel nauseous. You may just feel bad and not connect that feeling to nausea. Constipation, diarrhea, and a fever may go along with the pain.  The pain may continue even if treated correctly over the following days. Depending on how things go, sometimes the cause can become clear and may need more or different treatment. Additional evaluations, medicines, or tests may also be needed.  Home care  Your healthcare provider may prescribe medicine for pain, symptoms, or an infection.  Follow the healthcare provider's instructions for taking these medicines.  General care    Rest as much as you can until your next exam. No strenuous activities.    Try to find positions that ease discomfort. A small pillow placed on the abdomen may help relieve pain.    Something warm on your abdomen (such as a heating pad) may help, but be careful not to burn yourself.  Diet    Don t force yourself to eat, especially if having cramps, vomiting, or diarrhea.    Water is important so you don't get dehydrated. Soup may also be good. Sports drinks may also help, especially if they are not too acidic. Don't drink sugary drinks as  this can make things worse. Take liquids in small amounts. Don t guzzle them.    Caffeine sometimes makes the pain and cramping worse.    Don t take dairy products if you have vomiting or diarrhea.    Don't eat large amounts at a time. Wait a few minutes between bites.    Eat a diet low in fiber (called a low-residue diet). Foods allowed include refined breads, white rice, fruit and vegetable juices without pulp, tender meats. These foods will pass more easily through the intestine.    Don t have whole-grain foods, whole fruits and vegetables, meats, seeds and nuts, fried or fatty foods, dairy, alcohol and spicy foods until your symptoms go away.  Follow-up care  Follow up with your healthcare provider, or as advised, if your pain does not begin to improve in the next 24 hours.  Call 911  Call 911 if any of these occur:    Trouble breathing    Confusion    Fainting or loss of consciousness    Rapid heart rate    Seizure  When to seek medical advice  Call your healthcare provider right away if any of these occur:    Pain gets worse or moves to the right lower abdomen    New or worsening vomiting or diarrhea    Swelling of the abdomen    Unable to pass stool for more than 3 days    Fever of 100.4 F (38 C) or higher, or as directed by your healthcare provider.    Blood in vomit or bowel movements (dark red or black color)    Yellow color of eyes and skin (jaundice)    Weakness, dizziness    Chest, arm, back, neck, or jaw pain    Unexpected vaginal bleeding or missed period    Can't keep down liquids or water and you are getting dehydrated  Date Last Reviewed: 6/1/2018 2000-2019 [employer]. [address] [place] [address], [place] [address]. All rights reserved. This information is not intended as a substitute for professional medical care. Always follow your healthcare professional's instructions.

## 2020-01-22 NOTE — TELEPHONE ENCOUNTER
Reason for Call:  Form, our goal is to have forms completed with 72 hours, however, some forms may require a visit or additional information.    Type of letter, form or note:  FMLA    Who is the form from?: Insurance comp    Where did the form come from: form was faxed in    What clinic location was the form placed at?: [place]    Where the form was placed: In Folder on Serena's desk Box/Folder    What number is listed as a contact on the form?: [phone]       Additional comments: Fax when completed to [phone]    Call taken on 1/22/2020 at 5:05 PM by Serena Sales CMA

## 2020-01-24 NOTE — TELEPHONE ENCOUNTER
Reason for Call:  Other Forms- work ability    Detailed comments: Pt called this morning and would Leonie Collazo to re-write her return to work ability. The hours need to go from 4 to 6 hours of her being able to work. Please re-write this and fax the form to [facility]. Thank you.    Phone Number Patient can be reached at: Home number on file [phone] (home)    Best Time:     Can we leave a detailed message on this number? YES    Call taken on 1/24/2020 at 11:12 AM by Odette Van

## 2020-01-24 NOTE — TELEPHONE ENCOUNTER
FLMA forms completed by provider, given to MA to print OV notes from 01/10/2020 and 01/22/2020.    All forms and OV notes have been faxed to [phone].    Called pt to let her know all have been faxed.    FLMA forms sent to earl.    Serena Mccann CMA

## 2020-01-27 ENCOUNTER — TELEPHONE (OUTPATIENT)
Dept: FAMILY MEDICINE | Facility: CLINIC | Age: 45
End: 2020-01-27

## 2020-01-27 LAB — LAB SCANNED RESULT: NORMAL

## 2020-01-27 NOTE — TELEPHONE ENCOUNTER
Reason for Call:  Form, our goal is to have forms completed with 72 hours, however, some forms may require a visit or additional information.    Type of letter, form or note:  FMLA -- Work ability    Who is the form from?: Employee - [employer] (if other please explain)    Where did the form come from: form was faxed in    What clinic location was the form placed at?: [place]    Where the form was placed: Given to physician    What number is listed as a contact on the form?: Pt;s [phone] -- call when faxed       Additional comments: Fax to [phone]    Call taken on 1/27/2020 at 9:42 AM by Serena Sales CMA

## 2020-01-27 NOTE — TELEPHONE ENCOUNTER
Forms completed by provider and given to CMA.   Form faxed to [phone] and sent to scan.    Called pt and let her know form was completed and faxed. [phone]    Serena Mccann [facility]

## 2020-01-30 ENCOUNTER — MYC MEDICAL ADVICE (OUTPATIENT)
Dept: FAMILY MEDICINE | Facility: CLINIC | Age: 45
End: 2020-01-30

## 2020-02-04 ENCOUNTER — TELEPHONE (OUTPATIENT)
Dept: FAMILY MEDICINE | Facility: CLINIC | Age: 45
End: 2020-02-04

## 2020-02-04 ENCOUNTER — HOSPITAL ENCOUNTER (OUTPATIENT)
Dept: ULTRASOUND IMAGING | Facility: CLINIC | Age: 45
Discharge: HOME | End: 2020-02-04
Attending: NURSE PRACTITIONER | Admitting: NURSE PRACTITIONER
Payer: COMMERCIAL

## 2020-02-04 ENCOUNTER — MYC MEDICAL ADVICE (OUTPATIENT)
Dept: FAMILY MEDICINE | Facility: CLINIC | Age: 45
End: 2020-02-04

## 2020-02-04 DIAGNOSIS — R10.11 RUQ ABDOMINAL PAIN: ICD-10-CM

## 2020-02-04 PROCEDURE — 76700 US EXAM ABDOM COMPLETE: CPT

## 2020-02-04 NOTE — TELEPHONE ENCOUNTER
Reason for call:  Form   Our goal is to have forms completed within 72 hours, however some forms may require a visit or additional information.     Who is the form from? [employer]  Where did the form come from? form was faxed in  What clinic location was the form placed at? [place]   Where was the form placed? Given to physician  Type of letter, form or note: Critical [employer] sustaining medical device      Susan Ahmadi MA

## 2020-02-04 NOTE — TELEPHONE ENCOUNTER
Reason for call:  Form   Our goal is to have forms completed within 72 hours, however some forms may require a visit or additional information.     Who is the form from? [employer]  Where did the form come from? Patient or family brought in     What clinic location was the form placed at? [place]   Where was the form placed? Given to physician  What number is listed as a contact on the form? [phone]    Type of letter, form or note: Disability Parking Certification     Phone number to reach patient:  Cell number on file:    Telephone Information:   Mobile [phone]       Can we leave a detailed message on this number?  YES

## 2020-02-04 NOTE — TELEPHONE ENCOUNTER
[employer] message sent.    TAMARA Dumont, RN, PHN  [facility]  Office: [phone]  Fax: [phone]

## 2020-02-05 NOTE — TELEPHONE ENCOUNTER
Patient has not responded to last 2 Catapooolt Messages    Called patient @ [phone] -     Patient stated the symptoms have improved - nose has not been bleeding. Just having the nasal dryness, facial congestion, pressure.     Acute Illness   Acute illness concerns: Head Congestion, Facial Pressure  Onset: 1/30/20    Fever: no    Chills/Sweats: no    Headache (location?): YES- frontal, forehead and radiates to behind eyes    Sinus Pressure:YES- tender, post-nasal drainage and teeth pain    Conjunctivitis:  no    Ear Pain: no    Rhinorrhea: no     Congestion: YES- head    Sore Throat: no     Cough: No, but spits yellow sputum    Wheeze: no, using neb daily    Decreased Appetite: no    Nausea: no    Vomiting: no    Diarrhea:  no    Dysuria/Freq.: no    Fatigue/Achiness: YES- achiness    Sick/Strep Exposure: no      Therapies Tried and outcome: Neb - does help    DENIES: CP, SOB, Difficulty Breathing, Numbness/Tingling, Vision/Hearing Changes, N/V, Palpitations    Pharmacy: [facility] - [place], [place]    Routing to PCP for further review/recommendations/orders.    Susannah Selby RN  [facility]

## 2020-02-06 ENCOUNTER — OFFICE VISIT (OUTPATIENT)
Dept: FAMILY MEDICINE | Facility: CLINIC | Age: 45
End: 2020-02-06
Payer: COMMERCIAL

## 2020-02-06 VITALS
WEIGHT: 251 LBS | HEART RATE: 86 BPM | HEIGHT: 65 IN | SYSTOLIC BLOOD PRESSURE: 98 MMHG | OXYGEN SATURATION: 100 % | TEMPERATURE: 98.2 F | BODY MASS INDEX: 41.82 KG/M2 | DIASTOLIC BLOOD PRESSURE: 60 MMHG

## 2020-02-06 DIAGNOSIS — Z13.21 ENCOUNTER FOR VITAMIN DEFICIENCY SCREENING: ICD-10-CM

## 2020-02-06 DIAGNOSIS — J45.20 MILD INTERMITTENT ASTHMA, UNSPECIFIED WHETHER COMPLICATED: ICD-10-CM

## 2020-02-06 DIAGNOSIS — R09.81 NASAL CONGESTION: ICD-10-CM

## 2020-02-06 DIAGNOSIS — G35 MULTIPLE SCLEROSIS (H): Primary | ICD-10-CM

## 2020-02-06 DIAGNOSIS — Z98.84 S/P GASTRIC BYPASS: ICD-10-CM

## 2020-02-06 DIAGNOSIS — K76.0 FATTY LIVER: ICD-10-CM

## 2020-02-06 PROCEDURE — 99214 OFFICE O/P EST MOD 30 MIN: CPT | Performed by: PHYSICIAN ASSISTANT

## 2020-02-06 RX ORDER — GUAIFENESIN 600 MG/1
1200 TABLET, EXTENDED RELEASE ORAL 2 TIMES DAILY
COMMUNITY
Start: 2020-02-06 | End: 2020-02-20

## 2020-02-06 RX ORDER — CETIRIZINE HYDROCHLORIDE 10 MG/1
10 TABLET ORAL EVERY EVENING
Qty: 90 TABLET | Refills: 1 | COMMUNITY
Start: 2020-02-06 | End: 2020-05-01

## 2020-02-06 RX ORDER — ECHINACEA PURPUREA EXTRACT 125 MG
1 TABLET ORAL DAILY PRN
COMMUNITY
Start: 2020-02-06 | End: 2020-05-01

## 2020-02-06 ASSESSMENT — MIFFLIN-ST. JEOR: SCORE: 1799.41

## 2020-02-06 NOTE — TELEPHONE ENCOUNTER
Called patient @ # below -     Advised of recommendation below - Patient stated an understanding and agreed with plan.      Susannah Selby RN  [facility]

## 2020-02-06 NOTE — PROGRESS NOTES
SUBJECTIVE:   Josefina Aldrich is a 44 year old female who presents to clinic today for the following health issues:    Follow up on Pneumonia  Hx of MS and immunocompromised. 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 Patient gets moved from [id number] depending on where they paint.     7 days ago, patient woke up and left nostril was dry and bloody but not bleeding - just packed with dry blood. 6 days ago she woke up and had a slight headache on the left side that goes down the middle of head to behind the eye. Patient is having pain and pressure on the side of her left nostril and 6 days ago it was also with dry blood. When she blew it, leftover blood came out but went clear after 1-2 minutes. Wears glasses, but gets the headaches with and without glasses. Last antibiotic was doxycycline in December. When she blows her nose, her nose starts blowing out clear nasal discharge. As the day goes on, her nose gets dry and gets boogers. Still having tightness with chest on exertion, but no longer having chest pain. Was told to use a nasal saline spray today and she plans to pick this up today. Using duoneb nebulizer one time a night from 3 times a day. Switched from pulmicort to advair for a month in January 16th for a one month course. She has only been taking advair once a day as opposed to twice a day. Taking singulair at night. Not using OTC antihistamine.     Workability form dated 1/27 from Leonie Collazo is for 6 hours from January 23rd to today. Patient is working 6 hours now. She is supposed to go back to full-time on Monday. She can contact the clinic if she does not feel ready. Still gets fatigued, but this is getting better and is starting to do exercises.     Weight  Patient had stopped medication from learning she had a fatty liver recently. Still having LLQ but not much. She is more gassy than having pain. Taking stool softener at  night, which helps with her gassiness. Hx of gastric bypass. Patient says she desires to lose weight but doesn't particularly know how to. Lowest weight after bypass was 180 lbs in 2002. Gained weight, first after a pregnancy and secondly after a hysterectomy. Has not seen a bariatric surgeon in at least 10 years after bypass. Patient feels the effects of the gastric bypass, where if she eats too much she vomits and has other GI symptoms. Hasn't taking iron supplements for two weeks because she wasn't able to afford it. Takes multiple vitamins, including a super B-complex vitamin 1 month ago. 3 months ago, the nurse practitioner told patient to start on B-vitamins for fatigue and anemia.     Patient was told that she needed SABINO Virus checked every 3 months. Last test was 1/16/20, which was negative.         Problem list and histories reviewed & adjusted, as indicated.  Additional history: as documented    Patient Active Problem List   Diagnosis     Migraine     Asthma     Anemia     Backache     Body mass index 45.0-49.9, adult (H)     Cognitive changes     Depression with anxiety     Fever postop     Uterine leiomyoma     Headache     Heavy menses     Hypersomnia with sleep apnea     Iron deficiency anemia     Leukocytosis     Postsurgical nonabsorption     Mild intermittent asthma     Morbid obesity (H)     Myalgia and myositis     Other dyspnea and respiratory abnormality     Pain, neuropathic     Pelvic pain in female     Personal history of allergy to latex     Post concussion syndrome     Right shoulder pain     S/P gastric bypass     S/P hysterectomy     Bariatric surgery status     Vitamin B12 deficiency     Vitamin D deficiency     Vomiting following gastrointestinal surgery     Hypermagnesemia     Benign essential hypertension     Neck pain on left side     Mild major depression (H)     LLQ pain     Multiple sclerosis (H)     Immunosuppression (H)     Past Surgical History:   Procedure Laterality Date      "GASTRIC BYPASS  2002    \"Trouble with B12 and D\"     HYSTERECTOMY, MONO  2013    cervix gone.  fibroids.  without BSO     TONSILLECTOMY         Social History     Tobacco Use     Smoking status: Former Smoker     Types: Cigars     Smokeless tobacco: Never Used   Substance Use Topics     Alcohol use: Yes     Alcohol/week: 2.0 standard drinks     Types: 2 Standard drinks or equivalent per week     Comment: 0-3 drinks per week     Family History   Problem Relation Age of Onset     Lupus Paternal Aunt 41     Multiple Sclerosis No family hx of          Current Outpatient Medications   Medication Sig Dispense Refill     albuterol (PROVENTIL) (2.5 MG/3ML) 0.083% neb solution Take 1 vial (2.5 mg) by nebulization every 6 hours as needed for shortness of breath / dyspnea or wheezing 1 Box 3     albuterol (VENTOLIN HFA) 108 (90 BASE) MCG/ACT inhaler Inhale 2 puffs into the lungs every 6 hours as needed        baclofen (LIORESAL) 20 MG tablet Take 2 tablets (40 mg) by mouth At Bedtime AND 1 tablet (20 mg) every morning AND 1 tablet (20 mg) daily (with lunch) AND 0.5 tablets (10 mg) See Admin Instructions. (In the afternoon)  10     budesonide (PULMICORT FLEXHALER) 90 MCG/ACT inhaler Inhale 2 puffs into the lungs daily 1 each 1     cetirizine (ZYRTEC) 10 MG tablet Take 1 tablet (10 mg) by mouth every evening 90 tablet 1     cholecalciferol (VITAMIN D3) 5000 UNITS TABS tablet Take 1 tablet (5,000 Units) by mouth daily       docusate sodium (COLACE) 100 MG tablet Take 1 tablet (100 mg) by mouth 2 times daily       Ferrous Sulfate (IRON SUPPLEMENT PO) Take 325 mg by mouth daily        fluticasone-salmeterol (ADVAIR) 500-50 MCG/DOSE inhaler Inhale 1 puff into the lungs every 12 hours 1 Inhaler 0     gabapentin (NEURONTIN) 300 MG capsule Take 3 capsules (900 mg) by mouth At Bedtime 90 capsule 3     guaiFENesin (MUCINEX) 600 MG 12 hr tablet Take 2 tablets (1,200 mg) by mouth 2 times daily       ipratropium - albuterol 0.5 mg/2.5 mg/3 " mL (DUONEB) 0.5-2.5 (3) MG/3ML neb solution Take 1 vial (3 mLs) by nebulization 3 times daily as needed for shortness of breath / dyspnea or wheezing 30 vial 1     lisinopril-hydrochlorothiazide (PRINZIDE/ZESTORETIC) 10-12.5 MG tablet Take 1 tablet by mouth daily 90 tablet 3     MAGNESIUM PO        modafinil (PROVIGIL) 100 MG tablet Take 100 mg by mouth daily       montelukast (SINGULAIR) 10 MG tablet Take 1 tablet (10 mg) by mouth At Bedtime 90 tablet 3     natalizumab (TYSABRI) 300 MG/15ML injection Infusion       nortriptyline (PAMELOR) 50 MG capsule Take 1 capsule (50 mg) by mouth At Bedtime 90 capsule 3     ondansetron (ZOFRAN-ODT) 4 MG ODT tab Take 1 tablet (4 mg) by mouth every 8 hours as needed for nausea 20 tablet 0     order for DME Equipment being ordered: Nebulizer with supplies 1 Device 0     Probiotic Product (PROBIOTIC PO)        sodium chloride (OCEAN) 0.65 % nasal spray Spray 1 spray in nostril daily as needed for congestion       SUMAtriptan (IMITREX) 100 MG tablet Take 50 mg up to twice daily as needed for headache; separate doses by at least one hour and do not use more than 3 days/week 18 tablet 3     topiramate (TOPAMAX) 100 MG tablet Take 1 tablet (100 mg) by mouth At Bedtime (take with 50 mg to total 150 mg nightly) 60 tablet 3     topiramate (TOPAMAX) 50 MG tablet Take 1 tablet (50 mg) by mouth At Bedtime (take with 100 mg to total 150 mg nightly) 60 tablet 3     triamcinolone (KENALOG) 0.1 % external cream Apply topically 3 times daily as needed for irritation       valACYclovir (VALTREX) 500 MG tablet TAKE 1 TABLET BY MOUTH EVERY DAY 90 tablet 1     vitamin D3 (CHOLECALCIFEROL) 23097 units (250 mcg) capsule Take 1 capsule (10,000 Units) by mouth daily 90 capsule 3     Allergies   Allergen Reactions     Aspirin Difficulty breathing, Palpitations and Other (See Comments)     Cephalexin Swelling     Adhesive Tape      Amantadine Swelling     Azithromycin Angioedema     Latex Hives, Itching  "and Rash     Penicillins Other (See Comments), Nausea and Vomiting, Hives, Swelling, Rash, Cramps and GI Disturbance     Amoxicillin OK       Reviewed and updated as needed this visit by clinical staff  Tobacco  Allergies  Meds  Med Hx  Surg Hx  Fam Hx  Soc Hx        This document serves as a record of the services and decisions personally performed and made by Miya Crump PA-C.. It was created on his behalf by Ignacio Grayson, a trained medical scribe. The creation of this document is based the provider's statements to the medical scribe.  Ignacio Grayson February 6, 2020 1:30 PM   OBJECTIVE:   BP 98/60 (BP Location: Right arm, Cuff Size: Adult Large)   Pulse 86   Temp 98.2  F (36.8  C) (Oral)   Ht 1.651 m (5' 5\")   Wt 113.9 kg (251 lb)   SpO2 100%   BMI 41.77 kg/m   Body mass index is 41.77 kg/m .      GENERAL: healthy, alert and no distress  EYES: Eyes grossly normal to inspection, PERRL and conjunctivae and sclerae normal  HENT: Maxillary sinus tenderness. ear canals and TM's normal and nose and mouth without ulcers or lesions  NECK: no adenopathy  RESP: Coarse breath sounds in right upper lobe that cleared with cough. lungs clear to auscultation - no rales, rhonchi or wheezes  CV: regular rate and rhythm, normal S1 S2, no S3 or S4, no murmur, click or rub, no peripheral edema and peripheral pulses strong  MS: no gross musculoskeletal defects noted, no edema  SKIN: no suspicious lesions or rashes  NEURO: Normal strength and tone, mentation intact and speech normal  PSYCH: mentation appears normal, affect normal/bright    Diagnostic Test Results:  none  ASSESSMENT/PLAN:   Josefina was seen today for recheck.    Diagnoses and all orders for this visit:    Multiple sclerosis (H)    Fatty liver  Body mass index 45.0-49.9, adult (H)  S/P gastric bypass  -     BARIATRIC ADULT REFERRAL  Educated patient about the incidental finding of a fatty liver. Recommended follow-up with bariatric surgeon because she has not " seen one in years and it can help her with her goal to lose weight. Discussed dietary changes she should make.     Nasal congestion  Patient will start using a nasal saline spray and mucinex daily. Also told patient to apply vaseline or aquaphor in the nostrils. Told patient to avoid use of tylenol and use ibuprofen instead for pain. She will contact the clinic in 4 days if symptoms do not improve for consideration of antibiotics.   -     guaiFENesin (MUCINEX) 600 MG 12 hr tablet; Take 2 tablets (1,200 mg) by mouth 2 times daily  -     sodium chloride (OCEAN) 0.65 % nasal spray; Spray 1 spray in nostril daily as needed for congestion    Mild intermittent asthma, unspecified whether complicated  Exacerbated due to paint fumes exposure. Educated patient to increase advair to twice a day.  Once done with Advair plan to resume pulmicort.  -     cetirizine (ZYRTEC) 10 MG tablet; Take 1 tablet (10 mg) by mouth every evening    Encounter for vitamin deficiency screening  Taking Vit B12 & Matthew B as was told by Neurology CNP that she should supplement due to fatigue.  Doesn't think these were checked prior to this guidance.  -     Vitamin B12; Future  -     Vitamin B6; Future    Return in about 4 days (around 2/10/2020) for send symptom update.    The information in this document, created by the medical scribe for me, accurately reflects the services I personally performed and the decisions made by me. I have reviewed and approved this document for accuracy prior to leaving the patient care area .  Miya Crump PA-C.  February 6, 2020 1:55 PM      [address] [address]  [email or web address]   Office: [phone]           Miya Crump MS, ALFONSO

## 2020-02-06 NOTE — TELEPHONE ENCOUNTER
Recommend saline nasal spray, sinus rinse and mucinex OTC (generic equivalent).  If symptoms do not improve or persist into next week we should see her in the clinic.

## 2020-02-07 ENCOUNTER — TELEPHONE (OUTPATIENT)
Dept: SURGERY | Facility: CLINIC | Age: 45
End: 2020-02-07

## 2020-02-07 ENCOUNTER — MYC MEDICAL ADVICE (OUTPATIENT)
Dept: FAMILY MEDICINE | Facility: CLINIC | Age: 45
End: 2020-02-07

## 2020-02-07 ASSESSMENT — ASTHMA QUESTIONNAIRES: ACT_TOTALSCORE: 6

## 2020-02-07 NOTE — TELEPHONE ENCOUNTER
Pt has WLS in 2002 unsure which one at [facility]. Would like possibly re-establish care, having weight gain  [phone]  **ok to leave detailed message

## 2020-02-07 NOTE — TELEPHONE ENCOUNTER
Routing to PCP for further review/recommendations/orders.    Susannah Selby RN  [facility]

## 2020-02-07 NOTE — LETTER
[facility]  [address] [place] [id number]                                                                                                       [phone]    February 7, 2020    Josefina [place]  [address] [facility]   [facility] [address]      To Whom it May Concern:    The above patient is under my professional care.  She is still recovering from a medical condition and I am extending her hourly work limitation due to persistent symptoms.  She should be limited to working 6 hours per day through 2/21/2020 to allow for additional recovery time.      Please contact me with questions or concerns.      Sincerely,    Miya Crump PA-C

## 2020-02-08 ENCOUNTER — MYC MEDICAL ADVICE (OUTPATIENT)
Dept: FAMILY MEDICINE | Facility: CLINIC | Age: 45
End: 2020-02-08

## 2020-02-08 ENCOUNTER — OFFICE VISIT (OUTPATIENT)
Dept: FAMILY MEDICINE | Facility: CLINIC | Age: 45
End: 2020-02-08
Payer: COMMERCIAL

## 2020-02-08 VITALS
HEART RATE: 88 BPM | DIASTOLIC BLOOD PRESSURE: 80 MMHG | BODY MASS INDEX: 41.32 KG/M2 | OXYGEN SATURATION: 100 % | TEMPERATURE: 97.3 F | WEIGHT: 248 LBS | SYSTOLIC BLOOD PRESSURE: 126 MMHG | HEIGHT: 65 IN

## 2020-02-08 DIAGNOSIS — Z20.828 EXPOSURE TO INFLUENZA: ICD-10-CM

## 2020-02-08 DIAGNOSIS — J06.9 VIRAL URI WITH COUGH: Primary | ICD-10-CM

## 2020-02-08 LAB
FLUAV+FLUBV AG SPEC QL: NEGATIVE
FLUAV+FLUBV AG SPEC QL: NEGATIVE
SPECIMEN SOURCE: NORMAL

## 2020-02-08 PROCEDURE — 87804 INFLUENZA ASSAY W/OPTIC: CPT | Mod: 59 | Performed by: NURSE PRACTITIONER

## 2020-02-08 PROCEDURE — 99213 OFFICE O/P EST LOW 20 MIN: CPT | Performed by: NURSE PRACTITIONER

## 2020-02-08 RX ORDER — PREDNISONE 20 MG/1
TABLET ORAL
Qty: 20 TABLET | Refills: 0 | Status: SHIPPED | OUTPATIENT
Start: 2020-02-08 | End: 2020-02-21

## 2020-02-08 ASSESSMENT — MIFFLIN-ST. JEOR: SCORE: 1785.8

## 2020-02-08 NOTE — PROGRESS NOTES
"Subjective   Josefina VYAS Venkata Aldrich is a 44 year old female who presents to clinic today for the following health issues:    HPI   Acute Illness   Acute illness concerns: cough   Onset: x 1 day     Fever: no     Chills/Sweats: YES    Headache (location?): YES    Sinus Pressure:YES    Conjunctivitis:  no    Ear Pain: no    Rhinorrhea: YES    Congestion: YES    Sore Throat: YES- itchy      Cough: YES-productive of yellow sputum, SOB.     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   Pt reports MS, having spasms of the legs     Feels like catching something new. Exposure to Flu at work. Increased fatigue. Woke up in the night SOB and had yellow mucus. Used nebulizer at that time and that helped and was able to fall back asleep, woke up this am with SOB again and lost voice. Was thinking about going back to work full days this week but now not sure. Wants note for work. Feels SOB today but O2 is good. No fevers thus far. Does have chills. Sinus symptoms starting as well. Recent pneumonia. Has been using Advair and nebs which are helpful when used.     Reviewed and updated as needed this visit by provider:  Tobacco  Allergies  Meds  Problems  Med Hx  Surg Hx  Fam Hx         Review of Systems   Constitutional, HEENT, cardiovascular, pulmonary, GI, , musculoskeletal, neuro, skin, endocrine and psych systems are negative, except as otherwise noted per HPI.      Objective   /80   Pulse 88   Temp 97.3  F (36.3  C) (Tympanic)   Ht 1.651 m (5' 5\")   Wt 112.5 kg (248 lb)   SpO2 100%   Breastfeeding No   BMI 41.27 kg/m   Body mass index is 41.27 kg/m .  Physical Exam   GENERAL: healthy, alert, well nourished, well hydrated, no distress  HENT: ear canals- normal; TMs- normal; Nose- normal; Mouth- no " ulcers, no lesions  NECK: no tenderness, no adenopathy, no asymmetry, no masses, no stiffness; thyroid- normal to palpation  RESP: lungs clear to auscultation - no rales, no rhonchi, no wheezes  CV: regular rates and rhythm, normal S1 S2, no S3 or S4 and no murmur, no click or rub -  MS: extremities- no gross deformities noted, no edema    Lung exam normal so decided not to xray today given several recent xray and CT.        Assessment & Plan   Josefina was seen today for cough.    Diagnoses and all orders for this visit:    Viral URI with cough  -     predniSONE (DELTASONE) 20 MG tablet; Take 3 tabs by mouth daily x 3 days, then 2 tabs daily x 3 days, then 1 tab daily x 3 days, then 1/2 tab daily x 3 days.    Exposure to influenza  -     Influenza A/B antigen      Flu negative, don't suspect given lack of fever. Patient to call if fever develops.  Continue supportive cares, mucinex, nebulizer.  Given prednisone due to tightness and SOB.  Follow up appointment made for Monday in case symptoms worsening.        See Patient Instructions    Return in about 2 days (around 2/10/2020) for If acute symptoms are persistenting or worsening..            LEONIE Serrano     [address] [address]  [email or web address]   Office: [phone]

## 2020-02-08 NOTE — LETTER
February 8, 2020      Josefina Aldrich  [id number]   [place] [facility] [address]        To Whom It May Concern:    Josefina Aldrich was seen in our clinic. She would like to extend 6 hour workdays for an additional 2 weeks. Please let us know if further documentation needed. This should be extended due to serious illness/chronic medical condition.       Sincerely,        Leonie Simmons, CNP  [phone]

## 2020-02-09 ENCOUNTER — MYC MEDICAL ADVICE (OUTPATIENT)
Dept: FAMILY MEDICINE | Facility: CLINIC | Age: 45
End: 2020-02-09

## 2020-02-10 NOTE — TELEPHONE ENCOUNTER
Possible to have a false negative test but can't be totally sure. Could be flu like illness and not influenza but worry about another pneumonia given worsening symptoms. I would recommend f/u in clinic tomorrow if symptoms not starting to improve. Steroid typically takes 2 days to take effect so might just be starting to work at this point as well.

## 2020-02-10 NOTE — TELEPHONE ENCOUNTER
Forwarded to Leonie Simmons.  Please review patient's Mychart message and advise.    Tessa Wiggins, BS, RN, PHN  [facility]) [phone]

## 2020-02-10 NOTE — TELEPHONE ENCOUNTER
Patient called, says letter needs to go with the paper work from [facility].  Let patient know that we have not received paper work from [facility].  ( She says it should have come over on Friday the 7th)    Josefina will call [facility] to have them refax the paper work to us       Guillermina Garcia

## 2020-02-10 NOTE — TELEPHONE ENCOUNTER
Adayana message sent to patient.      TAMARA Dumont, RN, PHN  [facility]  Office: [phone]  Fax: [phone]

## 2020-02-10 NOTE — TELEPHONE ENCOUNTER
GFR Estimate   Date Value Ref Range Status   12/30/2019 >90 >60 mL/min/[1.73_m2] Final     Comment:     Non  GFR Calc  Starting 12/18/2018, serum creatinine based estimated GFR (eGFR) will be   calculated using the Chronic Kidney Disease Epidemiology Collaboration   (CKD-EPI) equation.       GFR Estimate If Black   Date Value Ref Range Status   12/30/2019 >90 >60 mL/min/[1.73_m2] Final     Comment:      GFR Calc  Starting 12/18/2018, serum creatinine based estimated GFR (eGFR) will be   calculated using the Chronic Kidney Disease Epidemiology Collaboration   (CKD-EPI) equation.       Patient Active Problem List   Diagnosis     Migraine     Asthma     Anemia     Backache     Body mass index 45.0-49.9, adult (H)     Cognitive changes     Depression with anxiety     Fever postop     Uterine leiomyoma     Headache     Heavy menses     Hypersomnia with sleep apnea     Iron deficiency anemia     Leukocytosis     Postsurgical nonabsorption     Mild intermittent asthma     Morbid obesity (H)     Myalgia and myositis     Other dyspnea and respiratory abnormality     Pain, neuropathic     Pelvic pain in female     Personal history of allergy to latex     Post concussion syndrome     Right shoulder pain     S/P gastric bypass     S/P hysterectomy     Bariatric surgery status     Vitamin B12 deficiency     Vitamin D deficiency     Vomiting following gastrointestinal surgery     Hypermagnesemia     Benign essential hypertension     Neck pain on left side     Mild major depression (H)     LLQ pain     Multiple sclerosis (H)     Immunosuppression (H)        Allergies   Allergen Reactions     Aspirin Difficulty breathing, Palpitations and Other (See Comments)     Cephalexin Swelling     Adhesive Tape      Amantadine Swelling     Azithromycin Angioedema     Latex Hives, Itching and Rash     Penicillins Other (See Comments), Nausea and Vomiting, Hives, Swelling, Rash, Cramps and GI Disturbance      Amoxicillin OK       MyChart Message sent      Susannah Selby RN  [facility]

## 2020-02-10 NOTE — TELEPHONE ENCOUNTER
Next 5 appointments (look out 90 days)    Feb 11, 2020  2:00 PM CST  [facility] with Mi[facility] Crump [facility]-C  [facility] [address] [place] [id number] [phone]        [employer] [facility] Message sent  Awaiting response      Susannah Selby RN  [facility]

## 2020-02-10 NOTE — TELEPHONE ENCOUNTER
Letter printed and at south coordinators desk.     Called and left patient a message.  Please find out how she would like the letter delivered when she calls back.       Guillermina Garcia

## 2020-02-10 NOTE — TELEPHONE ENCOUNTER
Patient was seen by Leonie Simmons on 2/8/2020.    TAMARA Dumont, RN, PHN  [facility]  Office: [phone]  Fax: [phone]

## 2020-02-10 NOTE — TELEPHONE ENCOUNTER
Spoke with patient regarding transferring care. I will message our PA's regarding patient and concerns. PA's will then let know if we accept patient as a transfer. I will contact patient once I have an answer. Patient is good with plan.

## 2020-02-10 NOTE — PROGRESS NOTES
SUBJECTIVE:   Josefina Aldrich is a 44 year old female who presents to clinic today for the following health issues:    Acute bronchitis  Patient was seen with NP Leonie Simmons on 02/08/2020 for URI and cough. She was started on taper of Prednisone and states compliance with it. She is taking the Advair x2 a day and nebulizer (duoneb) ~3 times daily PRN. Has been taking Zyrtec as well. Today she continues to have SOB and overall feels worse.  Spitting up sputum.  Denies fevers.      Flu swab on 2/8/2020 negative.  Pneumonia was confirmed 12/25/2019 on CT scan of chest and treated with doxycycline.  CXR 1/6/2020 showed improvement of infiltrate.      CT CHEST 12/25/2019  IMPRESSION:  1.  Airspace disease most prominent in right upper lobe and right middle lobe with mild right hilar and mediastinal adenopathy likely multifocal pneumonia. Follow-up noncontrast CT recommended in 3 months to ensure resolution and exclude other underlying   pathology.  2.  No pulmonary embolus, aortic aneurysm, or dissection.     Dark Urine  She continues to have yellow color urine despite staying hydrated, wondering if B12 supplement. Reports her back pain has been constant since 2/10/2020. Back pain is worsened from constant sitting. Last antibiotic 12/25/19 of Doxycyline caused her angioedema.         Problem list and histories reviewed & adjusted, as indicated.  Additional history: as documented    Patient Active Problem List   Diagnosis     Migraine     Asthma     Anemia     Backache     Body mass index 45.0-49.9, adult (H)     Cognitive changes     Depression with anxiety     Fever postop     Uterine leiomyoma     Headache     Heavy menses     Hypersomnia with sleep apnea     Iron deficiency anemia     Leukocytosis     Postsurgical nonabsorption     Mild intermittent asthma     Morbid obesity (H)     Myalgia and myositis     Other dyspnea and respiratory abnormality     Pain, neuropathic     Pelvic pain in female     Personal  "history of allergy to latex     Post concussion syndrome     Right shoulder pain     S/P gastric bypass     S/P hysterectomy     Bariatric surgery status     Vitamin B12 deficiency     Vitamin D deficiency     Vomiting following gastrointestinal surgery     Hypermagnesemia     Benign essential hypertension     Neck pain on left side     Mild major depression (H)     LLQ pain     Multiple sclerosis (H)     Immunosuppression (H)     Immunocompromised patient (H)     Past Surgical History:   Procedure Laterality Date     GASTRIC BYPASS  2002    \"Trouble with B12 and D\"     HYSTERECTOMY, MONO  2013    cervix gone.  fibroids.  without BSO     TONSILLECTOMY         Social History     Tobacco Use     Smoking status: Former Smoker     Types: Cigars     Smokeless tobacco: Never Used   Substance Use Topics     Alcohol use: Yes     Alcohol/week: 2.0 standard drinks     Types: 2 Standard drinks or equivalent per week     Comment: 0-3 drinks per week     Family History   Problem Relation Age of Onset     Lupus Paternal Aunt 41     Multiple Sclerosis No family hx of          Current Outpatient Medications   Medication Sig Dispense Refill     albuterol (PROVENTIL) (2.5 MG/3ML) 0.083% neb solution Take 1 vial (2.5 mg) by nebulization every 6 hours as needed for shortness of breath / dyspnea or wheezing 1 Box 3     albuterol (VENTOLIN HFA) 108 (90 BASE) MCG/ACT inhaler Inhale 2 puffs into the lungs every 6 hours as needed        baclofen (LIORESAL) 20 MG tablet Take 2 tablets (40 mg) by mouth At Bedtime AND 1 tablet (20 mg) every morning AND 1 tablet (20 mg) daily (with lunch) AND 0.5 tablets (10 mg) See Admin Instructions. (In the afternoon)  10     cetirizine (ZYRTEC) 10 MG tablet Take 1 tablet (10 mg) by mouth every evening 90 tablet 1     cholecalciferol (VITAMIN D3) 5000 UNITS TABS tablet Take 1 tablet (5,000 Units) by mouth daily       docusate sodium (COLACE) 100 MG tablet Take 1 tablet (100 mg) by mouth 2 times daily       " Ferrous Sulfate (IRON SUPPLEMENT PO) Take 325 mg by mouth daily        fluticasone-salmeterol (ADVAIR) 500-50 MCG/DOSE inhaler Inhale 1 puff into the lungs every 12 hours 1 Inhaler 0     gabapentin (NEURONTIN) 300 MG capsule Take 3 capsules (900 mg) by mouth At Bedtime 90 capsule 3     guaiFENesin (MUCINEX) 600 MG 12 hr tablet Take 2 tablets (1,200 mg) by mouth 2 times daily       ipratropium - albuterol 0.5 mg/2.5 mg/3 mL (DUONEB) 0.5-2.5 (3) MG/3ML neb solution Take 1 vial (3 mLs) by nebulization 3 times daily as needed for shortness of breath / dyspnea or wheezing 30 vial 1     levofloxacin (LEVAQUIN) 750 MG tablet Take 1 tablet (750 mg) by mouth daily for 7 days 7 tablet 0     lisinopril-hydrochlorothiazide (PRINZIDE/ZESTORETIC) 10-12.5 MG tablet Take 1 tablet by mouth daily 90 tablet 3     MAGNESIUM PO        modafinil (PROVIGIL) 100 MG tablet Take 100 mg by mouth daily       montelukast (SINGULAIR) 10 MG tablet Take 1 tablet (10 mg) by mouth At Bedtime 90 tablet 3     natalizumab (TYSABRI) 300 MG/15ML injection Infusion       nortriptyline (PAMELOR) 50 MG capsule Take 1 capsule (50 mg) by mouth At Bedtime 90 capsule 3     ondansetron (ZOFRAN-ODT) 4 MG ODT tab Take 1 tablet (4 mg) by mouth every 8 hours as needed for nausea 20 tablet 0     order for DME Equipment being ordered: Nebulizer with supplies 1 Device 0     predniSONE (DELTASONE) 20 MG tablet Take 3 tabs by mouth daily x 3 days, then 2 tabs daily x 3 days, then 1 tab daily x 3 days, then 1/2 tab daily x 3 days. 20 tablet 0     Probiotic Product (PROBIOTIC PO)        sodium chloride (OCEAN) 0.65 % nasal spray Spray 1 spray in nostril daily as needed for congestion       SUMAtriptan (IMITREX) 100 MG tablet Take 50 mg up to twice daily as needed for headache; separate doses by at least one hour and do not use more than 3 days/week 18 tablet 3     topiramate (TOPAMAX) 100 MG tablet Take 1 tablet (100 mg) by mouth At Bedtime (take with 50 mg to total 150 mg  "nightly) 60 tablet 3     topiramate (TOPAMAX) 50 MG tablet Take 1 tablet (50 mg) by mouth At Bedtime (take with 100 mg to total 150 mg nightly) 60 tablet 3     triamcinolone (KENALOG) 0.1 % external cream Apply topically 3 times daily as needed for irritation       valACYclovir (VALTREX) 500 MG tablet TAKE 1 TABLET BY MOUTH EVERY DAY 90 tablet 1     vitamin D3 (CHOLECALCIFEROL) 99933 units (250 mcg) capsule Take 1 capsule (10,000 Units) by mouth daily 90 capsule 3     Allergies   Allergen Reactions     Aspirin Difficulty breathing, Palpitations and Other (See Comments)     Cephalexin Swelling     Adhesive Tape      Amantadine Swelling     Azithromycin Angioedema     Latex Hives, Itching and Rash     Penicillins Other (See Comments), Nausea and Vomiting, Hives, Swelling, Rash, Cramps and GI Disturbance     Amoxicillin OK       Reviewed and updated as needed this visit by clinical staff  Tobacco  Allergies  Meds  Problems  Med Hx  Surg Hx  Fam Hx  Soc Hx        Reviewed and updated as needed this visit by Provider  Tobacco  Allergies  Meds  Problems  Med Hx  Surg Hx  Fam Hx         ROS:  Constitutional, HEENT, cardiovascular, pulmonary, GI, , musculoskeletal, neuro, skin, endocrine and psych systems are negative, except as otherwise noted.    This document serves as a record of the services and decisions personally performed and made by Miya Crump PA-C. It was created on her behalf by Blaze Ryder, a trained medical scribe. The creation of this document is based on the provider's statements to the medical scribe.  Blaze Ryder 1:22 PM February 10, 2020  OBJECTIVE:   /64 (BP Location: Left arm, Cuff Size: Adult Large)   Pulse 98   Temp 98.9  F (37.2  C) (Oral)   Ht 1.651 m (5' 5\")   Wt 112.5 kg (248 lb)   SpO2 97%   BMI 41.27 kg/m   Body mass index is 41.27 kg/m .    Physical Exam  GENERAL: healthy, alert and no distress  EYES: Eyes grossly normal to inspection, PERRL and conjunctivae and " sclerae normal  HENT: ear canals and TM's normal and nose and mouth without ulcers or lesions  NECK: no adenopathy  RESP: lungs clear to auscultation - no rales, rhonchi, slight wheeze bilateral lower lobes, difficulty with deep inspiration.    CV: regular rate and rhythm, normal S1 S2, no S3 or S4, no murmur, click or rub, no peripheral edema and peripheral pulses strong  MS: no gross musculoskeletal defects noted, no edema  SKIN: no suspicious lesions or rashes  NEURO: Normal strength and tone, mentation intact and speech normal  PSYCH: mentation appears normal, affect normal/bright    Diagnostic Test Results:  Results for orders placed or performed in visit on 02/11/20 (from the past 24 hour(s))   *UA reflex to Microscopic and Culture ([place] [facility] (except [place] and [place])   Result Value Ref Range    Color Urine Yellow     Appearance Urine Clear     Glucose Urine Negative NEG^Negative mg/dL    Bilirubin Urine Negative NEG^Negative    Ketones Urine Negative NEG^Negative mg/dL    Specific Gravity Urine 1.020 1.003 - 1.035    Blood Urine Negative NEG^Negative    pH Urine 7.0 5.0 - 7.0 pH    Protein Albumin Urine Negative NEG^Negative mg/dL    Urobilinogen Urine 0.2 0.2 - 1.0 EU/dL    Nitrite Urine Negative NEG^Negative    Leukocyte Esterase Urine Negative NEG^Negative    Source Midstream Urine      Recent Results (from the past 744 hour(s))   US Abdomen Complete    Narrative    ULTRASOUND ABDOMEN COMPLETE February 4, 2020 9:07 AM     HISTORY: Right upper quadrant abdominal pain.    COMPARISON: CT abdomen and pelvis 5/7/2019.    FINDINGS:    Gallbladder: Normal with no cholelithiasis, wall thickening or focal  tenderness.      Bile ducts: CHD is normal diameter. No intrahepatic biliary  dilatation.    Liver: Fatty infiltration of the liver. Liver length is 17.6 cm.    Pancreas: Partially obscured by overlying bowel gas, but grossly  unremarkable.     Spleen: Normal.     Right kidney: Normal.      Left kidney: Normal.    Aorta and IVC: Not specifically assessed.       Impression    IMPRESSION:    1. Fatty liver. Enlarged liver.  2. Normal appearance of the gallbladder. No gallstones identified.    JONATHAN MANNING MD   XR Chest 2 Views    Narrative    CHEST TWO VIEWS  2/11/2020 3:03 PM    HISTORY: Cough    COMPARISON: 1/6/2020    FINDINGS: Heart size and pulmonary vascularity within normal limits.  No focal infiltrates. Visualized bones unremarkable. Postoperative  changes left upper abdomen.    SALVATORE ORTIZ MD       ASSESSMENT/PLAN:   Josefina was seen today for recheck.    Diagnoses and all orders for this visit:    Immunocompromised patient (H)  Acute bronchitis with symptoms > 10 days  Acute bronchitis with coexisting condition requiring prophylactic treatment  Cough  Persistent. Chest Xray on 2/11/2020 was unremarkable. Last antibiotic on 2/25/19 was Doxycyline.  Started on 750 mg of Levaquin to prevent progression of symptoms and treat suspected quiescent pneumonia, especially in light of her immunosuppression. Keep me informed if symptoms worsen or not improved in the interim of course.  -     CBC with platelets and differential  -     levofloxacin (LEVAQUIN) 750 MG tablet; Take 1 tablet (750 mg) by mouth daily for 7 days  -     XR Chest 2 Views; Future    Dark urine  Symptomatic. UA reassuring. No acute concerns.  Await culture  -     Urine Culture Aerobic Bacterial  -     *UA reflex to Microscopic and Culture ([place] [facility] (except [place] and [place])    Encounter for completion of form with patient  [employer] form filled out for patient's work to work only 6 hours per day.      Return in about 2 days (around 2/13/2020) for call if not improving/worsening - may do CT if not iimproving.     [address] [address]  [email or web address]  [email or web address]   Office: [phone]           Miya Crump, MS, PA-C

## 2020-02-11 ENCOUNTER — OFFICE VISIT (OUTPATIENT)
Dept: FAMILY MEDICINE | Facility: CLINIC | Age: 45
End: 2020-02-11
Payer: COMMERCIAL

## 2020-02-11 ENCOUNTER — ANCILLARY PROCEDURE (OUTPATIENT)
Dept: GENERAL RADIOLOGY | Facility: CLINIC | Age: 45
End: 2020-02-11
Attending: PHYSICIAN ASSISTANT
Payer: COMMERCIAL

## 2020-02-11 VITALS
HEART RATE: 98 BPM | TEMPERATURE: 98.9 F | OXYGEN SATURATION: 97 % | DIASTOLIC BLOOD PRESSURE: 64 MMHG | SYSTOLIC BLOOD PRESSURE: 116 MMHG | WEIGHT: 248 LBS | HEIGHT: 65 IN | BODY MASS INDEX: 41.32 KG/M2

## 2020-02-11 DIAGNOSIS — R05.9 COUGH: ICD-10-CM

## 2020-02-11 DIAGNOSIS — J20.9 ACUTE BRONCHITIS WITH COEXISTING CONDITION REQUIRING PROPHYLACTIC TREATMENT: ICD-10-CM

## 2020-02-11 DIAGNOSIS — J20.9 ACUTE BRONCHITIS WITH SYMPTOMS > 10 DAYS: ICD-10-CM

## 2020-02-11 DIAGNOSIS — R82.998 DARK URINE: ICD-10-CM

## 2020-02-11 DIAGNOSIS — Z02.89 ENCOUNTER FOR COMPLETION OF FORM WITH PATIENT: ICD-10-CM

## 2020-02-11 DIAGNOSIS — D84.9 IMMUNOCOMPROMISED PATIENT (H): Primary | ICD-10-CM

## 2020-02-11 LAB
ALBUMIN UR-MCNC: NEGATIVE MG/DL
APPEARANCE UR: CLEAR
BILIRUB UR QL STRIP: NEGATIVE
COLOR UR AUTO: YELLOW
GLUCOSE UR STRIP-MCNC: NEGATIVE MG/DL
HGB UR QL STRIP: NEGATIVE
KETONES UR STRIP-MCNC: NEGATIVE MG/DL
LEUKOCYTE ESTERASE UR QL STRIP: NEGATIVE
NITRATE UR QL: NEGATIVE
PH UR STRIP: 7 PH (ref 5–7)
SOURCE: NORMAL
SP GR UR STRIP: 1.02 (ref 1–1.03)
UROBILINOGEN UR STRIP-ACNC: 0.2 EU/DL (ref 0.2–1)

## 2020-02-11 PROCEDURE — 81003 URINALYSIS AUTO W/O SCOPE: CPT | Performed by: PHYSICIAN ASSISTANT

## 2020-02-11 PROCEDURE — 99214 OFFICE O/P EST MOD 30 MIN: CPT | Performed by: PHYSICIAN ASSISTANT

## 2020-02-11 PROCEDURE — 87086 URINE CULTURE/COLONY COUNT: CPT | Performed by: PHYSICIAN ASSISTANT

## 2020-02-11 PROCEDURE — 71046 X-RAY EXAM CHEST 2 VIEWS: CPT | Mod: FY

## 2020-02-11 RX ORDER — LEVOFLOXACIN 750 MG/1
750 TABLET, FILM COATED ORAL DAILY
Qty: 7 TABLET | Refills: 0 | Status: SHIPPED | OUTPATIENT
Start: 2020-02-11 | End: 2020-02-20

## 2020-02-11 ASSESSMENT — ANXIETY QUESTIONNAIRES
2. NOT BEING ABLE TO STOP OR CONTROL WORRYING: MORE THAN HALF THE DAYS
3. WORRYING TOO MUCH ABOUT DIFFERENT THINGS: MORE THAN HALF THE DAYS
IF YOU CHECKED OFF ANY PROBLEMS ON THIS QUESTIONNAIRE, HOW DIFFICULT HAVE THESE PROBLEMS MADE IT FOR YOU TO DO YOUR WORK, TAKE CARE OF THINGS AT HOME, OR GET ALONG WITH OTHER PEOPLE: SOMEWHAT DIFFICULT
6. BECOMING EASILY ANNOYED OR IRRITABLE: SEVERAL DAYS
7. FEELING AFRAID AS IF SOMETHING AWFUL MIGHT HAPPEN: SEVERAL DAYS
GAD7 TOTAL SCORE: 8
1. FEELING NERVOUS, ANXIOUS, OR ON EDGE: NOT AT ALL
5. BEING SO RESTLESS THAT IT IS HARD TO SIT STILL: NOT AT ALL

## 2020-02-11 ASSESSMENT — PATIENT HEALTH QUESTIONNAIRE - PHQ9
SUM OF ALL RESPONSES TO PHQ QUESTIONS 1-9: 4
5. POOR APPETITE OR OVEREATING: MORE THAN HALF THE DAYS

## 2020-02-11 ASSESSMENT — MIFFLIN-ST. JEOR: SCORE: 1785.8

## 2020-02-11 NOTE — RESULT ENCOUNTER NOTE
Results discussed directly with patient while patient was present. Any further details documented in the note.   Miya Crump PA-C

## 2020-02-11 NOTE — TELEPHONE ENCOUNTER
We have received the paperwork from [facility], I have placed in providers basket. Patient is scheduled for a OV today with Miya as well.     Susan Ahmadi MA

## 2020-02-12 ENCOUNTER — MYC MEDICAL ADVICE (OUTPATIENT)
Dept: FAMILY MEDICINE | Facility: CLINIC | Age: 45
End: 2020-02-12

## 2020-02-12 LAB
BACTERIA SPEC CULT: NO GROWTH
SPECIMEN SOURCE: NORMAL

## 2020-02-12 ASSESSMENT — ANXIETY QUESTIONNAIRES: GAD7 TOTAL SCORE: 8

## 2020-02-12 NOTE — TELEPHONE ENCOUNTER
Letter, paperwork from [facility] and all office visits from 1/11-2/11/2020 faxed to [facility] at [phone]    Originals sent to be scanned.       Guillermina Garcia

## 2020-02-13 NOTE — RESULT ENCOUNTER NOTE
Josefina  I have reviewed your recent labs. Here are the results:    -Urine culture is normal.  There is no need for antibiotics at this point (for your urine, keep taking your Levaquin for your chest!).  If new, worsening or persistent symptoms occur, then you should call or return for a recheck.      If you have any questions please do not hesitate to contact our office via phone ([phone]) or Drop â€™til you [place].    Miya Crump, MS, PA-C  [facility] [place]

## 2020-02-17 ENCOUNTER — MYC MEDICAL ADVICE (OUTPATIENT)
Dept: FAMILY MEDICINE | Facility: CLINIC | Age: 45
End: 2020-02-17

## 2020-02-17 DIAGNOSIS — J18.9 PNEUMONIA OF RIGHT UPPER LOBE DUE TO INFECTIOUS ORGANISM: ICD-10-CM

## 2020-02-17 DIAGNOSIS — G47.00 INSOMNIA, UNSPECIFIED TYPE: Primary | ICD-10-CM

## 2020-02-17 NOTE — TELEPHONE ENCOUNTER
Forwarded to LP.  Please review patient's Mychart message and advise.    Tessa Wiggins, BS, RN, PHN  [facility]) [phone]

## 2020-02-18 NOTE — TELEPHONE ENCOUNTER
Noted, will await further information.    Dion Gongora RN   [facility] Triage

## 2020-02-19 RX ORDER — IPRATROPIUM BROMIDE AND ALBUTEROL SULFATE 2.5; .5 MG/3ML; MG/3ML
SOLUTION RESPIRATORY (INHALATION)
Qty: 90 ML | Refills: 0 | Status: SHIPPED | OUTPATIENT
Start: 2020-02-19 | End: 2020-03-20

## 2020-02-19 NOTE — PROGRESS NOTES
"  SUBJECTIVE:   Josefina Aldrich is a 44 year old female who presents to clinic today for the following health issues:    Follow up   Return to work Forms     Pneumonia/URI interval history  12/25/2019 - CT confirmed Pneumonia treated with doxycycline.  1/6/2020 - CXR showed improvement of infiltrate  1/16/2020 - persistent chest tightness, changed her from pulmicort to Advair 500/50 x 1 month (mychart message)  2/6/2020 - Persistent nasal congestion - Added certirizine daily nad nasal saline  2/8/2020 - URI - treated with prednisone taper & nebulizer.  2/11/2020 - Persistent URI - suspected pneumonia - CXR normal - started Levaquin and advised to finish prednisone taper.    Noticed improvement by Friday.  Energy level improved.   Productive cough has tapered off.  Overall feeling quite a bit better.  Is still continuing on the Advair 500/50.  Using nebulizer twice daily scheduled but does not necessarily feel that she \"needs \"this.    Insomnia-  Started melatonin 10 mg.  Still waking in the middle of the night.  Nortriptyline causes nightmares - hasn't taken in over a month.  Did not feel that this was helping her headaches much at all.            Problem list and histories reviewed & adjusted, as indicated.  Additional history: as documented    Patient Active Problem List   Diagnosis     Migraine     Asthma     Anemia     Backache     Body mass index 45.0-49.9, adult (H)     Cognitive changes     Depression with anxiety     Fever postop     Uterine leiomyoma     Headache     Heavy menses     Hypersomnia with sleep apnea     Iron deficiency anemia     Leukocytosis     Postsurgical nonabsorption     Mild intermittent asthma     Morbid obesity (H)     Myalgia and myositis     Other dyspnea and respiratory abnormality     Pain, neuropathic     Pelvic pain in female     Personal history of allergy to latex     Post concussion syndrome     Right shoulder pain     S/P gastric bypass     S/P hysterectomy     " "Bariatric surgery status     Vitamin B12 deficiency     Vitamin D deficiency     Vomiting following gastrointestinal surgery     Hypermagnesemia     Benign essential hypertension     Neck pain on left side     Mild major depression (H)     LLQ pain     Multiple sclerosis (H)     Immunosuppression (H)     Immunocompromised patient (H)     Past Surgical History:   Procedure Laterality Date     GASTRIC BYPASS      \"Trouble with B12 and D\"     HYSTERECTOMY, MONO      cervix gone.  fibroids.  without BSO     TONSILLECTOMY         Social History     Tobacco Use     Smoking status: Former Smoker     Packs/day: 0.50     Years: 2.00     Pack years: 1.00     Types: Cigars     Start date: 2011     Last attempt to quit: 2013     Years since quittin.6     Smokeless tobacco: Never Used   Substance Use Topics     Alcohol use: Yes     Alcohol/week: 2.0 standard drinks     Comment: 0-3 drinks per week     Family History   Problem Relation Age of Onset     Lupus Paternal Aunt 41     Diabetes Paternal Grandmother      Cerebrovascular Disease Paternal Grandmother      Depression Paternal Grandmother      Substance Abuse Paternal Grandmother      Diabetes Maternal Grandmother      Hyperlipidemia Maternal Grandmother      Hypertension Mother      Hyperlipidemia Mother      Obesity Mother      Cerebrovascular Disease Maternal Grandfather      Obesity Father      Multiple Sclerosis No family hx of          Current Outpatient Medications   Medication Sig Dispense Refill     albuterol (PROVENTIL) (2.5 MG/3ML) 0.083% neb solution Take 1 vial (2.5 mg) by nebulization every 6 hours as needed for shortness of breath / dyspnea or wheezing 1 Box 3     albuterol (VENTOLIN HFA) 108 (90 BASE) MCG/ACT inhaler Inhale 2 puffs into the lungs every 6 hours as needed        baclofen (LIORESAL) 20 MG tablet Take 2 tablets (40 mg) by mouth At Bedtime AND 1 tablet (20 mg) every morning AND 1 tablet (20 mg) daily (with lunch) AND 0.5 tablets " (10 mg) See Admin Instructions. (In the afternoon)  10     cetirizine (ZYRTEC) 10 MG tablet Take 1 tablet (10 mg) by mouth every evening 90 tablet 1     cholecalciferol (VITAMIN D3) 5000 UNITS TABS tablet Take 1 tablet (5,000 Units) by mouth daily       docusate sodium (COLACE) 100 MG tablet Take 1 tablet (100 mg) by mouth 2 times daily       Ferrous Sulfate (IRON SUPPLEMENT PO) Take 325 mg by mouth daily        fluticasone-salmeterol (ADVAIR) 500-50 MCG/DOSE inhaler Inhale 1 puff into the lungs every 12 hours 1 Inhaler 0     gabapentin (NEURONTIN) 300 MG capsule Take 3 capsules (900 mg) by mouth At Bedtime 90 capsule 3     IPRATROPIUM - ALBUTEROL 0.5 MG/2.5 MG/3 ML 0.5-2.5 (3) MG/3ML IN neb solution USE 1 VIAL VIA NEBULIZER THREE TIMES DAILY AS NEEDED FOR SHORTNESS OF BREATH/DYSPNEA OR WHEEZING 90 mL 0     lisinopril-hydrochlorothiazide (PRINZIDE/ZESTORETIC) 10-12.5 MG tablet Take 1 tablet by mouth daily 90 tablet 3     MAGNESIUM PO        modafinil (PROVIGIL) 100 MG tablet Take 100 mg by mouth daily       montelukast (SINGULAIR) 10 MG tablet Take 1 tablet (10 mg) by mouth At Bedtime 90 tablet 3     natalizumab (TYSABRI) 300 MG/15ML injection Infusion       ondansetron (ZOFRAN-ODT) 4 MG ODT tab Take 1 tablet (4 mg) by mouth every 8 hours as needed for nausea 20 tablet 0     order for DME Equipment being ordered: Nebulizer with supplies 1 Device 0     predniSONE (DELTASONE) 20 MG tablet Take 3 tabs by mouth daily x 3 days, then 2 tabs daily x 3 days, then 1 tab daily x 3 days, then 1/2 tab daily x 3 days. 20 tablet 0     Probiotic Product (PROBIOTIC PO)        sodium chloride (OCEAN) 0.65 % nasal spray Spray 1 spray in nostril daily as needed for congestion       SUMAtriptan (IMITREX) 100 MG tablet Take 50 mg up to twice daily as needed for headache; separate doses by at least one hour and do not use more than 3 days/week 18 tablet 3     topiramate (TOPAMAX) 100 MG tablet Take 1 tablet (100 mg) by mouth At Bedtime  "(take with 50 mg to total 150 mg nightly) 60 tablet 3     topiramate (TOPAMAX) 50 MG tablet Take 1 tablet (50 mg) by mouth At Bedtime (take with 100 mg to total 150 mg nightly) 60 tablet 3     traZODone 50 MG PO tablet Take 1 tablet (50 mg) by mouth At Bedtime 90 tablet 0     triamcinolone (KENALOG) 0.1 % external cream Apply topically 3 times daily as needed for irritation       valACYclovir (VALTREX) 500 MG tablet TAKE 1 TABLET BY MOUTH EVERY DAY 90 tablet 1     vitamin D3 (CHOLECALCIFEROL) 03444 units (250 mcg) capsule Take 1 capsule (10,000 Units) by mouth daily 90 capsule 3     Allergies   Allergen Reactions     Aspirin Difficulty breathing, Palpitations and Other (See Comments)     Cephalexin Swelling     Adhesive Tape      Amantadine Swelling     Azithromycin Angioedema     Latex Hives, Itching and Rash     Penicillins Other (See Comments), Nausea and Vomiting, Hives, Swelling, Rash, Cramps and GI Disturbance     Amoxicillin OK       Reviewed and updated as needed this visit by clinical staff  Tobacco  Allergies  Meds  Problems  Med Hx  Surg Hx  Fam Hx  Soc Hx        Reviewed and updated as needed this visit by Provider  Tobacco  Allergies  Meds  Problems  Med Hx  Surg Hx  Fam Hx         ROS:  Constitutional, HEENT, cardiovascular, pulmonary, GI, , musculoskeletal, neuro, skin, endocrine and psych systems are negative, except as otherwise noted.      OBJECTIVE:   /68 (BP Location: Left arm, Patient Position: Chair, Cuff Size: Adult Large)   Pulse 86   Temp 98.6  F (37  C) (Oral)   Ht 1.651 m (5' 5\")   Wt 115.2 kg (254 lb)   SpO2 100%   Breastfeeding No   BMI 42.27 kg/m   Body mass index is 42.27 kg/m .    GENERAL: healthy, alert and no distress  EYES: Eyes grossly normal to inspection, PERRL and conjunctivae and sclerae normal  HENT: ear canals and TM's normal and nose and mouth without ulcers or lesions  NECK: no adenopathy  RESP: lungs clear to auscultation - no rales, rhonchi " or wheezes  CV: regular rate and rhythm, normal S1 S2, no S3 or S4, no murmur, click or rub, no peripheral edema and peripheral pulses strong  MS: no gross musculoskeletal defects noted, no edema  SKIN: no suspicious lesions or rashes  NEURO: Normal strength and tone, mentation intact and speech normal  PSYCH: mentation appears normal, affect normal/bright    Diagnostic Test Results:  none   ASSESSMENT/PLAN:   Josefina was seen today for recheck.    Diagnoses and all orders for this visit:    Encounter for completion of form with patient  Pneumonia due to infectious organism, unspecified laterality, unspecified part of lung  Markedly improved.  Finish prednisone taper and complete Advair 500/[facility].  If asthma symptoms flare with this change she will contact the clinic.  Form filled out to return to work with some lifting restrictions but no further hourly restrictions on Monday 2/24/2020.    Insomnia, unspecified type  Suboptimally controlled.  Trial of trazodone 50 mg at bedtime.  Patient will keep me informed of her response.  -     traZODone 50 MG PO tablet; Take 1 tablet (50 mg) by mouth At Bedtime      Return if symptoms worsen or fail to improve.     [address] [address]  [email or web address]   Office: [phone]           Miya Crump MS, PANingC

## 2020-02-19 NOTE — TELEPHONE ENCOUNTER
A 30 day supply is given, patient is due for an office visit.  Patient has appointment scheduled for 2/20/2020 with provider.  REJI Puentes, RN  [employer] Triage

## 2020-02-19 NOTE — TELEPHONE ENCOUNTER
Reason for Call:  Other prescription    Detailed comments: Josefina is calling about her refill request for ipratropium. She says she only has enough to get her through this week and then she is completely out.    Phone Number Patient can be reached at: Cell number on file:    Telephone Information:   Mobile [phone]     Best Time: Anytime    Can we leave a detailed message on this number? YES    Call taken on 2/19/2020 at 9:20 AM by Di Gonzalez

## 2020-02-20 ENCOUNTER — OFFICE VISIT (OUTPATIENT)
Dept: FAMILY MEDICINE | Facility: CLINIC | Age: 45
End: 2020-02-20
Payer: COMMERCIAL

## 2020-02-20 VITALS
OXYGEN SATURATION: 100 % | DIASTOLIC BLOOD PRESSURE: 68 MMHG | SYSTOLIC BLOOD PRESSURE: 112 MMHG | HEART RATE: 86 BPM | WEIGHT: 254 LBS | BODY MASS INDEX: 42.32 KG/M2 | TEMPERATURE: 98.6 F | HEIGHT: 65 IN

## 2020-02-20 DIAGNOSIS — Z02.89 ENCOUNTER FOR COMPLETION OF FORM WITH PATIENT: Primary | ICD-10-CM

## 2020-02-20 DIAGNOSIS — J18.9 PNEUMONIA DUE TO INFECTIOUS ORGANISM, UNSPECIFIED LATERALITY, UNSPECIFIED PART OF LUNG: ICD-10-CM

## 2020-02-20 DIAGNOSIS — G47.00 INSOMNIA, UNSPECIFIED TYPE: ICD-10-CM

## 2020-02-20 PROCEDURE — 99214 OFFICE O/P EST MOD 30 MIN: CPT | Performed by: PHYSICIAN ASSISTANT

## 2020-02-20 RX ORDER — TRAZODONE HYDROCHLORIDE 50 MG/1
50 TABLET, FILM COATED ORAL AT BEDTIME
Qty: 90 TABLET | Refills: 0 | Status: SHIPPED | OUTPATIENT
Start: 2020-02-20 | End: 2020-04-17

## 2020-02-20 ASSESSMENT — MIFFLIN-ST. JEOR: SCORE: 1813.02

## 2020-02-20 NOTE — PROGRESS NOTES
[facility] report of work ability form faxed to [facility] [employer] at [phone]    Copy given to patient   Original sent to be scanned      Guillermina Garcia

## 2020-02-21 ENCOUNTER — HOSPITAL ENCOUNTER (OUTPATIENT)
Facility: CLINIC | Age: 45
Setting detail: SPECIMEN
Discharge: HOME | End: 2020-02-21
Attending: PHYSICIAN ASSISTANT | Admitting: PHYSICIAN ASSISTANT
Payer: COMMERCIAL

## 2020-02-21 ENCOUNTER — INFUSION THERAPY VISIT (OUTPATIENT)
Dept: INFUSION THERAPY | Facility: CLINIC | Age: 45
End: 2020-02-21
Attending: PHYSICIAN ASSISTANT
Payer: COMMERCIAL

## 2020-02-21 VITALS
SYSTOLIC BLOOD PRESSURE: 124 MMHG | DIASTOLIC BLOOD PRESSURE: 56 MMHG | HEART RATE: 74 BPM | RESPIRATION RATE: 18 BRPM | TEMPERATURE: 98.7 F

## 2020-02-21 DIAGNOSIS — G35 MULTIPLE SCLEROSIS (H): ICD-10-CM

## 2020-02-21 DIAGNOSIS — Z13.21 ENCOUNTER FOR VITAMIN DEFICIENCY SCREENING: ICD-10-CM

## 2020-02-21 DIAGNOSIS — J20.9 ACUTE BRONCHITIS WITH SYMPTOMS > 10 DAYS: Primary | ICD-10-CM

## 2020-02-21 LAB
BASOPHILS # BLD AUTO: 0 10E9/L (ref 0–0.2)
BASOPHILS NFR BLD AUTO: 0 %
DIFFERENTIAL METHOD BLD: ABNORMAL
EOSINOPHIL # BLD AUTO: 0.5 10E9/L (ref 0–0.7)
EOSINOPHIL NFR BLD AUTO: 3 %
ERYTHROCYTE [DISTWIDTH] IN BLOOD BY AUTOMATED COUNT: 14.3 % (ref 10–15)
HCT VFR BLD AUTO: 36.5 % (ref 35–47)
HGB BLD-MCNC: 12 G/DL (ref 11.7–15.7)
LYMPHOCYTES # BLD AUTO: 8.4 10E9/L (ref 0.8–5.3)
LYMPHOCYTES NFR BLD AUTO: 51 %
MCH RBC QN AUTO: 30.8 PG (ref 26.5–33)
MCHC RBC AUTO-ENTMCNC: 32.9 G/DL (ref 31.5–36.5)
MCV RBC AUTO: 94 FL (ref 78–100)
MONOCYTES # BLD AUTO: 1.3 10E9/L (ref 0–1.3)
MONOCYTES NFR BLD AUTO: 8 %
MYELOCYTES # BLD: 0.2 10E9/L
MYELOCYTES NFR BLD MANUAL: 1 %
NEUTROPHILS # BLD AUTO: 6.1 10E9/L (ref 1.6–8.3)
NEUTROPHILS NFR BLD AUTO: 37 %
NRBC # BLD AUTO: 0.7 10*3/UL
NRBC BLD AUTO-RTO: 4 /100
PLATELET # BLD AUTO: 381 10E9/L (ref 150–450)
PLATELET # BLD EST: ABNORMAL 10*3/UL
RBC # BLD AUTO: 3.89 10E12/L (ref 3.8–5.2)
RBC MORPH BLD: ABNORMAL
SMUDGE CELLS BLD QL SMEAR: PRESENT
VIT B12 SERPL-MCNC: 1622 PG/ML (ref 193–986)
WBC # BLD AUTO: 16.4 10E9/L (ref 4–11)

## 2020-02-21 PROCEDURE — 96365 THER/PROPH/DIAG IV INF INIT: CPT

## 2020-02-21 PROCEDURE — 25000128 H RX IP 250 OP 636: Performed by: PSYCHIATRY & NEUROLOGY

## 2020-02-21 PROCEDURE — 25800030 ZZH RX IP 258 OP 636: Performed by: PSYCHIATRY & NEUROLOGY

## 2020-02-21 PROCEDURE — 85025 COMPLETE CBC W/AUTO DIFF WBC: CPT | Performed by: PHYSICIAN ASSISTANT

## 2020-02-21 PROCEDURE — 84207 ASSAY OF VITAMIN B-6: CPT | Performed by: PHYSICIAN ASSISTANT

## 2020-02-21 PROCEDURE — 36415 COLL VENOUS BLD VENIPUNCTURE: CPT

## 2020-02-21 PROCEDURE — 82607 VITAMIN B-12: CPT | Performed by: PHYSICIAN ASSISTANT

## 2020-02-21 RX ADMIN — NATALIZUMAB 300 MG: 300 INJECTION INTRAVENOUS at 13:08

## 2020-02-21 RX ADMIN — SODIUM CHLORIDE 250 ML: 9 INJECTION, SOLUTION INTRAVENOUS at 13:08

## 2020-02-21 NOTE — PROGRESS NOTES
Infusion Nursing Note:  Josefina Aldrich presents today for Tysabri.    Patient seen by provider today: No   present during visit today: Not Applicable.    Note: N/A.    Intravenous Access:  Peripheral IV placed.    Treatment Conditions:  Tysabri pre-infusion checklist completed via touch program.      Post Infusion Assessment:  Patient tolerated infusion without incident.  Patient observed for 60 minutes post Tysabri per protocol.  Blood return noted pre and post infusion.  Site patent and intact, free from redness, edema or discomfort.  No evidence of extravasations.  Access discontinued per protocol.       Discharge Plan:   Patient declined prescription refills.  Discharge instructions reviewed with: Patient.  Patient verbalized understanding of discharge instructions and all questions answered.  AVS to patient via clypdT.  Patient will return 3/20/20 for next appointment.   Patient discharged in stable condition accompanied by: self.  Departure Mode: Ambulatory.    Susannah Day RN

## 2020-02-24 ENCOUNTER — MYC MEDICAL ADVICE (OUTPATIENT)
Dept: FAMILY MEDICINE | Facility: CLINIC | Age: 45
End: 2020-02-24

## 2020-02-24 LAB — VIT B6 SERPL-MCNC: 226.7 NMOL/L (ref 20–125)

## 2020-02-24 NOTE — RESULT ENCOUNTER NOTE
Josefina  Here are your recent results.  You definitely DO NOT need a Vitamin B supplement.  Please stop your  Vitamin B12 supplement!   If you have any questions please do not hesitate to contact our office via phone ([phone]) or [facility].    Miya Crump, MS, PA-C  [facility] [place]

## 2020-02-25 NOTE — TELEPHONE ENCOUNTER
Forwarded to LP.  Please review patient's Mychart message and advise.    Tessa Wiggins, BS, RN, PHN  [facility]) [phone]

## 2020-03-04 ENCOUNTER — MYC MEDICAL ADVICE (OUTPATIENT)
Dept: FAMILY MEDICINE | Facility: CLINIC | Age: 45
End: 2020-03-04

## 2020-03-04 NOTE — TELEPHONE ENCOUNTER
Routing to PCP for further review/recommendations/orders.    Susannah Selby RN  [facility]

## 2020-03-04 NOTE — LETTER
[facility] [place]  [address] [place] [id number]                                                                                                       [phone]    March 5, 2020    Josefina [place]  [id number] [place] [address]   [facility] [address]      To Whom it May Concern:    The above patient is unable my professional care.  She suffers from a medical condition that can be exacerbated by bright lights.  Please accomodate movement/adjustment of lighting to avoid exacerbations.      Please contact me with questions or concerns.      Sincerely,    Miya Crump PA-C

## 2020-03-11 ENCOUNTER — TELEPHONE (OUTPATIENT)
Dept: SURGERY | Facility: CLINIC | Age: 45
End: 2020-03-11

## 2020-03-11 NOTE — TELEPHONE ENCOUNTER
lvm for pt. Please fill out questionnaire prior to appt tomorrow,. It is located in [employer]. When you sign in, click the yellow health tab in the top left corner, then questionnaires and it will be located there    Mellissa Tello MA'

## 2020-03-13 ENCOUNTER — OFFICE VISIT (OUTPATIENT)
Dept: FAMILY MEDICINE | Facility: CLINIC | Age: 45
End: 2020-03-13
Payer: COMMERCIAL

## 2020-03-13 VITALS
HEART RATE: 92 BPM | HEIGHT: 65 IN | TEMPERATURE: 98.6 F | SYSTOLIC BLOOD PRESSURE: 128 MMHG | DIASTOLIC BLOOD PRESSURE: 72 MMHG | OXYGEN SATURATION: 97 % | BODY MASS INDEX: 41.99 KG/M2 | WEIGHT: 252 LBS

## 2020-03-13 DIAGNOSIS — Z87.01 HISTORY OF BACTERIAL PNEUMONIA: Primary | ICD-10-CM

## 2020-03-13 DIAGNOSIS — D84.9 IMMUNOCOMPROMISED PATIENT (H): ICD-10-CM

## 2020-03-13 PROCEDURE — 99214 OFFICE O/P EST MOD 30 MIN: CPT | Performed by: NURSE PRACTITIONER

## 2020-03-13 ASSESSMENT — ASTHMA QUESTIONNAIRES
ACT_TOTALSCORE: 18
QUESTION_1 LAST FOUR WEEKS HOW MUCH OF THE TIME DID YOUR ASTHMA KEEP YOU FROM GETTING AS MUCH DONE AT WORK, SCHOOL OR AT HOME: A LITTLE OF THE TIME
QUESTION_5 LAST FOUR WEEKS HOW WOULD YOU RATE YOUR ASTHMA CONTROL: SOMEWHAT CONTROLLED
QUESTION_4 LAST FOUR WEEKS HOW OFTEN HAVE YOU USED YOUR RESCUE INHALER OR NEBULIZER MEDICATION (SUCH AS ALBUTEROL): TWO OR THREE TIMES PER WEEK
QUESTION_2 LAST FOUR WEEKS HOW OFTEN HAVE YOU HAD SHORTNESS OF BREATH: THREE TO SIX TIMES A WEEK
QUESTION_3 LAST FOUR WEEKS HOW OFTEN DID YOUR ASTHMA SYMPTOMS (WHEEZING, COUGHING, SHORTNESS OF BREATH, CHEST TIGHTNESS OR PAIN) WAKE YOU UP AT NIGHT OR EARLIER THAN USUAL IN THE MORNING: NOT AT ALL

## 2020-03-13 ASSESSMENT — MIFFLIN-ST. JEOR: SCORE: 1803.94

## 2020-03-13 NOTE — PROGRESS NOTES
"Subjective   Josefina ASAEL Aldrich is a 44 year old female who presents to clinic today for the following health issues:    HPI   Acute Illness   Acute illness concerns: ***  Onset: ***    Fever: { :091855::\"no\"}    Chills/Sweats: { :135153::\"no\"}    Headache (location?): { :788087::\"no\"}    Sinus Pressure:{.:922240::\"no\"}    Conjunctivitis:  {.:733718::\"no\"}    Ear Pain: {.:321900::\"no\"}    Rhinorrhea: { :966540::\"no\"}    Congestion: { :705954::\"no\"}    Sore Throat: { :283918::\"no\"}     Cough: {.:619441::\"no\"}    Wheeze: { :598734::\"no\"}    Decreased Appetite: { :159254::\"no\"}    Nausea: { :653865::\"no\"}    Vomiting: { :025409::\"no\"}    Diarrhea:  { :280097::\"no\"}    Dysuria/Freq.: { :160098::\"no\"}    Fatigue/Achiness: { :834737::\"no\"}    Sick/Strep Exposure: { :280219::\"no\"}     Therapies Tried and outcome: ***      Reviewed and updated as needed this visit by provider:         Review of Systems   Constitutional, HEENT, cardiovascular, pulmonary, GI, , musculoskeletal, neuro, skin, endocrine and psych systems are negative, except as otherwise noted per HPI.      Objective   There were no vitals taken for this visit. There is no height or weight on file to calculate BMI.  Physical Exam   {.:677051::\"GENERAL: healthy, alert, well nourished, well hydrated, no distress\",\"HENT: ear canals- normal; TMs- normal; Nose- normal; Mouth- no ulcers, no lesions\",\"NECK: no tenderness, no adenopathy, no asymmetry, no masses, no stiffness; thyroid- normal to palpation\",\"RESP: lungs clear to auscultation - no rales, no rhonchi, no wheezes\",\"CV: regular rates and rhythm, normal S1 S2, no S3 or S4 and no murmur, no click or rub -\",\"ABDOMEN: soft, no tenderness, no  hepatosplenomegaly, no masses, normal bowel sounds\"}    {DIAGNOSTIC TEST RESULTS (Optional):148692::\"Diagnostic Test Results\"}      Assessment & Plan   There are no diagnoses linked to this encounter.       BMI:   Estimated body mass index is 42.27 kg/m  as calculated " "from the following:    Height as of 2/20/20: 1.651 m (5' 5\").    Weight as of 2/20/20: 115.2 kg (254 lb).   {Weight Management Plan needed for ACO:082091}        See Patient Instructions    No follow-ups on file.            LEONIE Serrano     [address] [address]  [email or web address]   Office: [phone]           "

## 2020-03-14 ASSESSMENT — ASTHMA QUESTIONNAIRES: ACT_TOTALSCORE: 18

## 2020-03-16 ENCOUNTER — MYC MEDICAL ADVICE (OUTPATIENT)
Dept: FAMILY MEDICINE | Facility: CLINIC | Age: 45
End: 2020-03-16

## 2020-03-16 ENCOUNTER — HOSPITAL ENCOUNTER (OUTPATIENT)
Dept: CT IMAGING | Facility: CLINIC | Age: 45
Discharge: HOME | End: 2020-03-16
Attending: NURSE PRACTITIONER | Admitting: NURSE PRACTITIONER
Payer: COMMERCIAL

## 2020-03-16 DIAGNOSIS — Z87.01 HISTORY OF BACTERIAL PNEUMONIA: ICD-10-CM

## 2020-03-16 DIAGNOSIS — D84.9 IMMUNOCOMPROMISED PATIENT (H): ICD-10-CM

## 2020-03-16 PROCEDURE — 25000125 ZZHC RX 250: Performed by: NURSE PRACTITIONER

## 2020-03-16 PROCEDURE — 25000128 H RX IP 250 OP 636: Performed by: NURSE PRACTITIONER

## 2020-03-16 PROCEDURE — 71260 CT THORAX DX C+: CPT

## 2020-03-16 RX ORDER — IOPAMIDOL 755 MG/ML
500 INJECTION, SOLUTION INTRAVASCULAR ONCE
Status: COMPLETED | OUTPATIENT
Start: 2020-03-16 | End: 2020-03-16

## 2020-03-16 RX ADMIN — SODIUM CHLORIDE 60 ML: 9 INJECTION, SOLUTION INTRAVENOUS at 15:59

## 2020-03-16 RX ADMIN — IOPAMIDOL 79 ML: 755 INJECTION, SOLUTION INTRAVENOUS at 15:59

## 2020-03-16 NOTE — TELEPHONE ENCOUNTER
Routing to PCP for further review/recommendations/orders.    Susannah Selby RN  [facility]

## 2020-03-16 NOTE — LETTER
[facility] [place] [address]  [facility] [id number] [phone]       March 16, 2020    Josefina Hastings [place]  [address]   [place] [facility] [address]    To Whom it May Concern:    The above patient is under my professional care.  Due to underlying immunosuppression, she should be allowed to work from home in light of the current COVID-19 outbreak.  This should be for at least the next 4 weeks, but could be extended based on circumstances at that time.      Please contact me with questions or concerns.      Sincerely,    Miya Crump PA-C

## 2020-03-16 NOTE — PROGRESS NOTES
Subjective     Josefina Aldrich is a 44 year old female who presents to clinic today for the following health issues:    HPI   Asthma Follow-Up        Acute Illness   Acute illness concerns: Ongoing Cough  Onset: on/off since December with history of pneumonia not on CXR but on CT    Fever: no    Chills/Sweats: no    Headache (location?): YES    Sinus Pressure:no    Conjunctivitis:  no    Ear Pain: no    Rhinorrhea: no    Congestion: no    Sore Throat: no     Cough: YES-non-productive, with shortness of breath, improving over time, waxing and waning over time-overall cough improved but still fatigue and some wheezing and chest heaviness persists.    Wheeze: YES    Decreased Appetite: no     Nausea: no     Vomiting: no    Diarrhea:  no    Dysuria/Freq.: no    Fatigue/Achiness: no-Better overrall    Sick/Strep Exposure: no     Therapies Tried and outcome: Patient here today for asthma follow-up and follow-up of ongoing cough.  Patient has been dealing with cough and pneumonia recovery for couple of months.  Initially was seen in December.  Initial x-ray did not show pneumonia was treated with prednisone and other remedies a couple of times.  CT was eventually done and found pneumonia which was then treated.  She has had a slow recovery but does feel overall better.  Due to history of immunocompromise and MS want to be extra cautious especially with upcoming infusion and ongoing risk of coronavirus.  Patient states she does feel better but has shortness of breath occasionally some wheezing and generally just does not have her baseline energy level back.  Feels a bit heavy tight in the chest at least some of the day.      Patient Active Problem List   Diagnosis     Migraine     Asthma     Anemia     Backache     Body mass index 45.0-49.9, adult (H)     Cognitive changes     Depression with anxiety     Fever postop     Uterine leiomyoma     Headache     Heavy menses     Hypersomnia with sleep apnea     Iron  "deficiency anemia     Leukocytosis     Postsurgical nonabsorption     Mild intermittent asthma     Morbid obesity (H)     Myalgia and myositis     Other dyspnea and respiratory abnormality     Pain, neuropathic     Pelvic pain in female     Personal history of allergy to latex     Post concussion syndrome     Right shoulder pain     S/P gastric bypass     S/P hysterectomy     Bariatric surgery status     Vitamin B12 deficiency     Vitamin D deficiency     Vomiting following gastrointestinal surgery     Hypermagnesemia     Benign essential hypertension     Neck pain on left side     Mild major depression (H)     LLQ pain     Multiple sclerosis (H)     Immunosuppression (H)     Immunocompromised patient (H)     Past Surgical History:   Procedure Laterality Date     GASTRIC BYPASS      \"Trouble with B12 and D\"     HYSTERECTOMY, MONO      cervix gone.  fibroids.  without BSO     TONSILLECTOMY         Social History     Tobacco Use     Smoking status: Former Smoker     Packs/day: 0.50     Years: 2.00     Pack years: 1.00     Types: Cigars     Start date: 2011     Last attempt to quit: 2013     Years since quittin.7     Smokeless tobacco: Never Used   Substance Use Topics     Alcohol use: Yes     Alcohol/week: 2.0 standard drinks     Comment: 0-3 drinks per week     Family History   Problem Relation Age of Onset     Lupus Paternal Aunt 41     Diabetes Paternal Grandmother      Cerebrovascular Disease Paternal Grandmother      Depression Paternal Grandmother      Substance Abuse Paternal Grandmother      Diabetes Maternal Grandmother      Hyperlipidemia Maternal Grandmother      Hypertension Mother      Hyperlipidemia Mother      Obesity Mother      Cerebrovascular Disease Maternal Grandfather      Obesity Father      Multiple Sclerosis No family hx of          Current Outpatient Medications   Medication Sig Dispense Refill     cetirizine (ZYRTEC) 10 MG tablet Take 1 tablet (10 mg) by mouth every evening " 90 tablet 1     cholecalciferol (VITAMIN D3) 5000 UNITS TABS tablet Take 1 tablet (5,000 Units) by mouth daily       fluticasone-salmeterol (ADVAIR) 500-50 MCG/DOSE inhaler Inhale 1 puff into the lungs every 12 hours 1 Inhaler 0     gabapentin (NEURONTIN) 300 MG capsule Take 3 capsules (900 mg) by mouth At Bedtime 90 capsule 3     IPRATROPIUM - ALBUTEROL 0.5 MG/2.5 MG/3 ML 0.5-2.5 (3) MG/3ML IN neb solution USE 1 VIAL VIA NEBULIZER THREE TIMES DAILY AS NEEDED FOR SHORTNESS OF BREATH/DYSPNEA OR WHEEZING 90 mL 0     lisinopril-hydrochlorothiazide (PRINZIDE/ZESTORETIC) 10-12.5 MG tablet Take 1 tablet by mouth daily 90 tablet 3     montelukast (SINGULAIR) 10 MG tablet Take 1 tablet (10 mg) by mouth At Bedtime 90 tablet 3     Probiotic Product (PROBIOTIC PO)        albuterol (PROVENTIL) (2.5 MG/3ML) 0.083% neb solution Take 1 vial (2.5 mg) by nebulization every 6 hours as needed for shortness of breath / dyspnea or wheezing 1 Box 3     albuterol (VENTOLIN HFA) 108 (90 BASE) MCG/ACT inhaler Inhale 2 puffs into the lungs every 6 hours as needed        baclofen (LIORESAL) 20 MG tablet Take 2 tablets (40 mg) by mouth At Bedtime AND 1 tablet (20 mg) every morning AND 1 tablet (20 mg) daily (with lunch) AND 0.5 tablets (10 mg) See Admin Instructions. (In the afternoon)  10     docusate sodium (COLACE) 100 MG tablet Take 1 tablet (100 mg) by mouth 2 times daily       Ferrous Sulfate (IRON SUPPLEMENT PO) Take 325 mg by mouth daily        MAGNESIUM PO        natalizumab (TYSABRI) 300 MG/15ML injection Infusion       ondansetron (ZOFRAN-ODT) 4 MG ODT tab Take 1 tablet (4 mg) by mouth every 8 hours as needed for nausea 20 tablet 0     order for DME Equipment being ordered: Nebulizer with supplies 1 Device 0     sodium chloride (OCEAN) 0.65 % nasal spray Spray 1 spray in nostril daily as needed for congestion       SUMAtriptan (IMITREX) 100 MG tablet Take 50 mg up to twice daily as needed for headache; separate doses by at least one  "hour and do not use more than 3 days/week 18 tablet 3     topiramate (TOPAMAX) 100 MG tablet Take 1 tablet (100 mg) by mouth At Bedtime (take with 50 mg to total 150 mg nightly) 60 tablet 3     topiramate (TOPAMAX) 50 MG tablet Take 1 tablet (50 mg) by mouth At Bedtime (take with 100 mg to total 150 mg nightly) 60 tablet 3     traZODone 50 MG PO tablet Take 1 tablet (50 mg) by mouth At Bedtime 90 tablet 0     triamcinolone (KENALOG) 0.1 % external cream Apply topically 3 times daily as needed for irritation       valACYclovir (VALTREX) 500 MG tablet TAKE 1 TABLET BY MOUTH EVERY DAY 90 tablet 0     vitamin D3 (CHOLECALCIFEROL) 11311 units (250 mcg) capsule Take 1 capsule (10,000 Units) by mouth daily 90 capsule 3     Allergies   Allergen Reactions     Aspirin Difficulty breathing, Palpitations and Other (See Comments)     Cephalexin Swelling     Adhesive Tape      Amantadine Swelling     Azithromycin Angioedema     Latex Hives, Itching and Rash     Penicillins Other (See Comments), Nausea and Vomiting, Hives, Swelling, Rash, Cramps and GI Disturbance     Amoxicillin OK       Reviewed and updated as needed this visit by Provider         Review of Systems   ROS COMP: Constitutional, HEENT, cardiovascular, pulmonary, GI, , musculoskeletal, neuro, skin, endocrine and psych systems are negative, except as otherwise noted.      Objective    /72   Pulse 92   Temp 98.6  F (37  C) (Tympanic)   Ht 1.651 m (5' 5\")   Wt 114.3 kg (252 lb)   SpO2 97%   Breastfeeding No   BMI 41.93 kg/m    Body mass index is 41.93 kg/m .  Physical Exam   GENERAL: healthy, alert and no distress  EYES: Eyes grossly normal to inspection, PERRL and conjunctivae and sclerae normal  HENT: ear canals and TM's normal, nose and mouth without ulcers or lesions  NECK: moderate anterior cervical lymphadenopathy, no asymmetry, masses, or scars and thyroid normal to palpation  RESP: lungs clear to auscultation - no rales, rhonchi or " "wheezes  BREAST: normal without masses, tenderness or nipple discharge and no palpable axillary masses or adenopathy  CV: regular rate and rhythm, normal S1 S2, no S3 or S4, no murmur, click or rub, no peripheral edema and peripheral pulses strong  MS: no gross musculoskeletal defects noted, no edema  SKIN: no suspicious lesions or rashes  NEURO: Normal strength and tone, mentation intact and speech normal  PSYCH: mentation appears normal, affect normal/bright    Diagnostic Test Results:  Labs reviewed in Epic        Assessment & Plan     Josefina was seen today for recheck.    Diagnoses and all orders for this visit:    History of bacterial pneumonia  Doing better overall butt still some tightness and fatigue that are persistent. Given upcoming infusion and ongoing symptoms recommend CT for further evaluation and to make sure no ongoing pneumonia.   -     CT Chest w Contrast; Future    Immunocompromised patient (H)  Patient due to get MS infusion soon. Will have her discuss with neurologist.  -     CT Chest w Contrast; Future         BMI:   Estimated body mass index is 41.93 kg/m  as calculated from the following:    Height as of this encounter: 1.651 m (5' 5\").    Weight as of this encounter: 114.3 kg (252 lb).   Weight management plan: Discussed healthy diet and exercise guidelines            Return in about 3 months (around 6/13/2020).    Leonie Simmons, [facility] [place]    "

## 2020-03-17 ENCOUNTER — MYC MEDICAL ADVICE (OUTPATIENT)
Dept: FAMILY MEDICINE | Facility: CLINIC | Age: 45
End: 2020-03-17

## 2020-03-17 NOTE — TELEPHONE ENCOUNTER
Reason for call:  Form   Our goal is to have forms completed within 72 hours, however some forms may require a visit or additional information.     Who is the form from? Insurance comp  Where did the form come from? form was faxed in  What clinic location was the form placed at? [place]   Where was the form placed? Given to physician    Type of letter, form or note: Insurance Paperwork     Susan Ahmadi MA

## 2020-03-17 NOTE — TELEPHONE ENCOUNTER
Routing to Leonie Simmons.      Tessa Wiggins, BS, RN, PHN  [facility]  Office: [phone]  Fax: [phone]

## 2020-03-18 ENCOUNTER — TELEPHONE (OUTPATIENT)
Dept: FAMILY MEDICINE | Facility: CLINIC | Age: 45
End: 2020-03-18

## 2020-03-18 NOTE — TELEPHONE ENCOUNTER
BUDESONIDE/formerol IS symbicort. Please check with pharmacy    advair not effective      Miya Crump MBA, MS, PA-C

## 2020-03-18 NOTE — TELEPHONE ENCOUNTER
Received notice from [facility] pharmacy that Budesonide/Form 160-4.5MCG (120 INH) medication is not covered by patients insurance.     Alternatives: Advair HFA, Breo Ellipta, Dulera, & Symbicort.     Routing to provider to see if provider would like a PA started, and if so any reasoning that should be put in PA. Or if provider would like to change therapy, please send in a new prescription.     Routing to Prescribing provider    Susan Ahmadi MA

## 2020-03-18 NOTE — TELEPHONE ENCOUNTER
Call to check with pharmacy, and the symbicort did go through and does NOT need a prior auth.    Susan Ahmadi MA

## 2020-03-19 ENCOUNTER — NURSE TRIAGE (OUTPATIENT)
Dept: FAMILY MEDICINE | Facility: CLINIC | Age: 45
End: 2020-03-19

## 2020-03-19 ENCOUNTER — OFFICE VISIT (OUTPATIENT)
Dept: FAMILY MEDICINE | Facility: CLINIC | Age: 45
End: 2020-03-19
Payer: COMMERCIAL

## 2020-03-19 VITALS
HEART RATE: 85 BPM | DIASTOLIC BLOOD PRESSURE: 76 MMHG | SYSTOLIC BLOOD PRESSURE: 118 MMHG | BODY MASS INDEX: 42.49 KG/M2 | TEMPERATURE: 98.9 F | OXYGEN SATURATION: 100 % | WEIGHT: 255 LBS | HEIGHT: 65 IN

## 2020-03-19 DIAGNOSIS — R10.32 LLQ ABDOMINAL PAIN: ICD-10-CM

## 2020-03-19 DIAGNOSIS — N76.0 BACTERIAL VAGINOSIS: Primary | ICD-10-CM

## 2020-03-19 DIAGNOSIS — R10.9 ABDOMINAL PAIN, UNSPECIFIED ABDOMINAL LOCATION: ICD-10-CM

## 2020-03-19 DIAGNOSIS — B96.89 BACTERIAL VAGINOSIS: Primary | ICD-10-CM

## 2020-03-19 LAB
ALBUMIN UR-MCNC: NEGATIVE MG/DL
APPEARANCE UR: CLEAR
BACTERIA #/AREA URNS HPF: ABNORMAL /HPF
BASOPHILS # BLD AUTO: 0 10E9/L (ref 0–0.2)
BASOPHILS NFR BLD AUTO: 0.3 %
BILIRUB UR QL STRIP: NEGATIVE
COLOR UR AUTO: YELLOW
CRP SERPL-MCNC: <2.9 MG/L (ref 0–8)
DIFFERENTIAL METHOD BLD: ABNORMAL
EOSINOPHIL # BLD AUTO: 0.2 10E9/L (ref 0–0.7)
EOSINOPHIL NFR BLD AUTO: 2.1 %
ERYTHROCYTE [DISTWIDTH] IN BLOOD BY AUTOMATED COUNT: 13.7 % (ref 10–15)
ERYTHROCYTE [SEDIMENTATION RATE] IN BLOOD BY WESTERGREN METHOD: 25 MM/H (ref 0–20)
GLUCOSE UR STRIP-MCNC: NEGATIVE MG/DL
HCT VFR BLD AUTO: 37.4 % (ref 35–47)
HGB BLD-MCNC: 12 G/DL (ref 11.7–15.7)
HGB UR QL STRIP: NEGATIVE
KETONES UR STRIP-MCNC: NEGATIVE MG/DL
LEUKOCYTE ESTERASE UR QL STRIP: NEGATIVE
LYMPHOCYTES # BLD AUTO: 4.8 10E9/L (ref 0.8–5.3)
LYMPHOCYTES NFR BLD AUTO: 42.9 %
MCH RBC QN AUTO: 30.8 PG (ref 26.5–33)
MCHC RBC AUTO-ENTMCNC: 32.1 G/DL (ref 31.5–36.5)
MCV RBC AUTO: 96 FL (ref 78–100)
MONOCYTES # BLD AUTO: 0.9 10E9/L (ref 0–1.3)
MONOCYTES NFR BLD AUTO: 8.3 %
NEUTROPHILS # BLD AUTO: 5.2 10E9/L (ref 1.6–8.3)
NEUTROPHILS NFR BLD AUTO: 46.4 %
NITRATE UR QL: NEGATIVE
NON-SQ EPI CELLS #/AREA URNS LPF: ABNORMAL /LPF
PH UR STRIP: 8 PH (ref 5–7)
PLATELET # BLD AUTO: 395 10E9/L (ref 150–450)
RBC # BLD AUTO: 3.9 10E12/L (ref 3.8–5.2)
RBC #/AREA URNS AUTO: ABNORMAL /HPF
SOURCE: ABNORMAL
SP GR UR STRIP: 1.02 (ref 1–1.03)
SPECIMEN SOURCE: ABNORMAL
UROBILINOGEN UR STRIP-ACNC: 0.2 EU/DL (ref 0.2–1)
WBC # BLD AUTO: 11.2 10E9/L (ref 4–11)
WBC #/AREA URNS AUTO: ABNORMAL /HPF
WET PREP SPEC: ABNORMAL

## 2020-03-19 PROCEDURE — 87210 SMEAR WET MOUNT SALINE/INK: CPT | Performed by: PHYSICIAN ASSISTANT

## 2020-03-19 PROCEDURE — 81001 URINALYSIS AUTO W/SCOPE: CPT | Performed by: PHYSICIAN ASSISTANT

## 2020-03-19 PROCEDURE — 85025 COMPLETE CBC W/AUTO DIFF WBC: CPT | Performed by: PHYSICIAN ASSISTANT

## 2020-03-19 PROCEDURE — 85652 RBC SED RATE AUTOMATED: CPT | Performed by: PHYSICIAN ASSISTANT

## 2020-03-19 PROCEDURE — 87086 URINE CULTURE/COLONY COUNT: CPT | Performed by: PHYSICIAN ASSISTANT

## 2020-03-19 PROCEDURE — 86140 C-REACTIVE PROTEIN: CPT | Performed by: PHYSICIAN ASSISTANT

## 2020-03-19 PROCEDURE — 99214 OFFICE O/P EST MOD 30 MIN: CPT | Performed by: PHYSICIAN ASSISTANT

## 2020-03-19 PROCEDURE — 36415 COLL VENOUS BLD VENIPUNCTURE: CPT | Performed by: PHYSICIAN ASSISTANT

## 2020-03-19 RX ORDER — METRONIDAZOLE 500 MG/1
500 TABLET ORAL 2 TIMES DAILY
Qty: 14 TABLET | Refills: 0 | Status: SHIPPED | OUTPATIENT
Start: 2020-03-19 | End: 2020-05-01

## 2020-03-19 ASSESSMENT — MIFFLIN-ST. JEOR: SCORE: 1817.55

## 2020-03-19 NOTE — TELEPHONE ENCOUNTER
Patient scheduled for 11:20 today with Miya Crump PA-C.   Patient states that she did have another BM since writer last spoke with her. She continues to have pulling feeling in her abdomen. No severe pain. Radha Bal RN

## 2020-03-19 NOTE — PROGRESS NOTES
SUBJECTIVE:   Josefina Aldrich is a 44 year old female who presents to clinic today for the following health issues:    Abdominal Pain    Duration: Started yesterday 3/18/2020 6 pm, 16 hours ago.   Description (location/character/radiation): Starts lower center abdomen. Travels to left side to colon. Feeling really gasey. Eventually comes out. BM about an hour ago. Improved the pain a little. Drinking smoothie coming back again. Gets hot all over when pain intensifies and gets sweaty. Sudden  Constant pulling pain that increase in severity. Has spikes of cramping like really bad menstrual cramps. Last time lasted a week and a half for 2 weeks.        Associated flank pain: None  Intensity:  Right now 4/10 goes up to 7/10  Accompanying signs and symptoms: Struggles with constipation. Did have BM yesterday that was soft. Yesterday stool was dark brown. Today is a light brown.        Fever/Chills: no        Gas/Bloating: YES       Nausea/vomitting: no        Diarrhea: no        Dysuria or Hematuria: Not pain but a slight pressure   History (previous similar pain/trauma/previous testing): Had this before. Dr. Valero ordered pelvic CT last year. Had follow up ordered by Dr. Crump thinks that it was in November. Last time this type of pain was last November  Precipitating or alleviating factors:       Pain worse with eating/BM/urination: not really        Pain relieved by BM: BM made pain slightly better. BM was very productive. An hour later the pain came back. When pain came back started to get gasey.  Therapies tried and outcome: increasing fluids- slightly effective and miralax- slightly effective   LMP:  not applicable        Problem list and histories reviewed & adjusted, as indicated.  Additional history: as documented    Patient Active Problem List   Diagnosis     Migraine     Asthma     Anemia     Backache     Body mass index 45.0-49.9, adult (H)     Cognitive changes     Depression with anxiety      "Fever postop     Uterine leiomyoma     Headache     Heavy menses     Hypersomnia with sleep apnea     Iron deficiency anemia     Leukocytosis     Postsurgical nonabsorption     Mild intermittent asthma     Morbid obesity (H)     Myalgia and myositis     Other dyspnea and respiratory abnormality     Pain, neuropathic     Pelvic pain in female     Personal history of allergy to latex     Post concussion syndrome     Right shoulder pain     S/P gastric bypass     S/P hysterectomy     Bariatric surgery status     Vitamin B12 deficiency     Vitamin D deficiency     Vomiting following gastrointestinal surgery     Hypermagnesemia     Benign essential hypertension     Neck pain on left side     Mild major depression (H)     LLQ pain     Multiple sclerosis (H)     Immunosuppression (H)     Immunocompromised patient (H)     Past Surgical History:   Procedure Laterality Date     GASTRIC BYPASS      \"Trouble with B12 and D\"     HYSTERECTOMY, MONO      cervix gone.  fibroids.  without BSO     TONSILLECTOMY         Social History     Tobacco Use     Smoking status: Former Smoker     Packs/day: 0.50     Years: 2.00     Pack years: 1.00     Types: Cigars     Start date: 2011     Last attempt to quit: 2013     Years since quittin.7     Smokeless tobacco: Never Used   Substance Use Topics     Alcohol use: Yes     Alcohol/week: 2.0 standard drinks     Comment: 0-3 drinks per week     Family History   Problem Relation Age of Onset     Lupus Paternal Aunt 41     Diabetes Paternal Grandmother      Cerebrovascular Disease Paternal Grandmother      Depression Paternal Grandmother      Substance Abuse Paternal Grandmother      Diabetes Maternal Grandmother      Hyperlipidemia Maternal Grandmother      Hypertension Mother      Hyperlipidemia Mother      Obesity Mother      Cerebrovascular Disease Maternal Grandfather      Obesity Father      Multiple Sclerosis No family hx of          Current Outpatient Medications "   Medication Sig Dispense Refill     albuterol (PROVENTIL) (2.5 MG/3ML) 0.083% neb solution Take 1 vial (2.5 mg) by nebulization every 6 hours as needed for shortness of breath / dyspnea or wheezing 1 Box 3     albuterol (VENTOLIN HFA) 108 (90 BASE) MCG/ACT inhaler Inhale 2 puffs into the lungs every 6 hours as needed        baclofen (LIORESAL) 20 MG tablet Take 2 tablets (40 mg) by mouth At Bedtime AND 1 tablet (20 mg) every morning AND 1 tablet (20 mg) daily (with lunch) AND 0.5 tablets (10 mg) See Admin Instructions. (In the afternoon)  10     budesonide-formoterol (SYMBICORT) 160-4.5 MCG/ACT Inhaler Inhale 2 puffs into the lungs 2 times daily 1 Inhaler 1     cetirizine (ZYRTEC) 10 MG tablet Take 1 tablet (10 mg) by mouth every evening 90 tablet 1     cholecalciferol (VITAMIN D3) 5000 UNITS TABS tablet Take 1 tablet (5,000 Units) by mouth daily       docusate sodium (COLACE) 100 MG tablet Take 1 tablet (100 mg) by mouth 2 times daily       Ferrous Sulfate (IRON SUPPLEMENT PO) Take 325 mg by mouth daily        gabapentin (NEURONTIN) 300 MG capsule Take 3 capsules (900 mg) by mouth At Bedtime 90 capsule 3     IPRATROPIUM - ALBUTEROL 0.5 MG/2.5 MG/3 ML 0.5-2.5 (3) MG/3ML IN neb solution USE 1 VIAL VIA NEBULIZER THREE TIMES DAILY AS NEEDED FOR SHORTNESS OF BREATH/DYSPNEA OR WHEEZING 90 mL 0     lisinopril-hydrochlorothiazide (PRINZIDE/ZESTORETIC) 10-12.5 MG tablet Take 1 tablet by mouth daily 90 tablet 3     MAGNESIUM PO        metroNIDAZOLE (FLAGYL) 500 MG tablet Take 1 tablet (500 mg) by mouth 2 times daily for 7 days 14 tablet 0     montelukast (SINGULAIR) 10 MG tablet Take 1 tablet (10 mg) by mouth At Bedtime 90 tablet 3     natalizumab (TYSABRI) 300 MG/15ML injection Infusion       ondansetron (ZOFRAN-ODT) 4 MG ODT tab Take 1 tablet (4 mg) by mouth every 8 hours as needed for nausea 20 tablet 0     order for DME Equipment being ordered: Nebulizer with supplies 1 Device 0     Probiotic Product (PROBIOTIC PO)     "    sodium chloride (OCEAN) 0.65 % nasal spray Spray 1 spray in nostril daily as needed for congestion       SUMAtriptan (IMITREX) 100 MG tablet Take 50 mg up to twice daily as needed for headache; separate doses by at least one hour and do not use more than 3 days/week 18 tablet 3     topiramate (TOPAMAX) 100 MG tablet Take 1 tablet (100 mg) by mouth At Bedtime (take with 50 mg to total 150 mg nightly) 60 tablet 3     topiramate (TOPAMAX) 50 MG tablet Take 1 tablet (50 mg) by mouth At Bedtime (take with 100 mg to total 150 mg nightly) 60 tablet 3     traZODone 50 MG PO tablet Take 1 tablet (50 mg) by mouth At Bedtime 90 tablet 0     triamcinolone (KENALOG) 0.1 % external cream Apply topically 3 times daily as needed for irritation       valACYclovir (VALTREX) 500 MG tablet TAKE 1 TABLET BY MOUTH EVERY DAY 90 tablet 0     vitamin D3 (CHOLECALCIFEROL) 98685 units (250 mcg) capsule Take 1 capsule (10,000 Units) by mouth daily 90 capsule 3     Allergies   Allergen Reactions     Aspirin Difficulty breathing, Palpitations and Other (See Comments)     Cephalexin Swelling     Adhesive Tape      Amantadine Swelling     Azithromycin Angioedema     Latex Hives, Itching and Rash     Penicillins Other (See Comments), Nausea and Vomiting, Hives, Swelling, Rash, Cramps and GI Disturbance     Amoxicillin OK       Reviewed and updated as needed this visit by clinical staff  Tobacco  Allergies  Meds  Problems  Med Hx  Surg Hx  Fam Hx  Soc Hx        Reviewed and updated as needed this visit by Provider  Tobacco  Allergies  Meds  Problems  Med Hx  Surg Hx  Fam Hx         ROS:  Constitutional, HEENT, cardiovascular, pulmonary, GI, , musculoskeletal, neuro, skin, endocrine and psych systems are negative, except as otherwise noted.      OBJECTIVE:   /76 (BP Location: Right arm, Cuff Size: Adult Large)   Pulse 85   Temp 98.9  F (37.2  C) (Oral)   Ht 1.651 m (5' 5\")   Wt 115.7 kg (255 lb)   SpO2 100%   BMI 42.43 " kg/m   Body mass index is 42.43 kg/m .    GENERAL: healthy, alert and no distress  EYES: Eyes grossly normal to inspection, PERRL and conjunctivae and sclerae normal  HENT: ear canals and TM's normal and nose and mouth without ulcers or lesions  NECK: no adenopathy  RESP: lungs clear to auscultation - no rales, rhonchi or wheezes  CV: regular rate and rhythm, normal S1 S2, no S3 or S4, no murmur, click or rub, no peripheral edema and peripheral pulses strong  ABD: soft, LLQ tenderness (no guarding, rebound), hyperactive bowel sounds  : normal external genitalia, white vaginal discharge, no odor, uterus absent, no adnexal tenderness  MS: no gross musculoskeletal defects noted, no edema  SKIN: no suspicious lesions or rashes  NEURO: Normal strength and tone, mentation intact and speech normal  PSYCH: mentation appears normal, affect normal/bright    Diagnostic Test Results:  Results for orders placed or performed in visit on 03/19/20 (from the past 24 hour(s))   UA with Microscopic   Result Value Ref Range    Color Urine Yellow     Appearance Urine Clear     Glucose Urine Negative NEG^Negative mg/dL    Bilirubin Urine Negative NEG^Negative    Ketones Urine Negative NEG^Negative mg/dL    Specific Gravity Urine 1.025 1.003 - 1.035    pH Urine 8.0 (H) 5.0 - 7.0 pH    Protein Albumin Urine Negative NEG^Negative mg/dL    Urobilinogen Urine 0.2 0.2 - 1.0 EU/dL    Nitrite Urine Negative NEG^Negative    Blood Urine Negative NEG^Negative    Leukocyte Esterase Urine Negative NEG^Negative    Source Midstream Urine     WBC Urine 0 - 5 OTO5^0 - 5 /HPF    RBC Urine O - 2 OTO2^O - 2 /HPF    Squamous Epithelial /LPF Urine Few FEW^Few /LPF    Bacteria Urine Few (A) NEG^Negative /HPF   Wet prep    Specimen: Vagina   Result Value Ref Range    Specimen Description Vagina     Wet Prep Clue cells seen (A)     Wet Prep No Trichomonas seen     Wet Prep No yeast seen    CBC with platelets and differential   Result Value Ref Range    WBC  11.2 (H) 4.0 - 11.0 10e9/L    RBC Count 3.90 3.8 - 5.2 10e12/L    Hemoglobin 12.0 11.7 - 15.7 g/dL    Hematocrit 37.4 35.0 - 47.0 %    MCV 96 78 - 100 fl    MCH 30.8 26.5 - 33.0 pg    MCHC 32.1 31.5 - 36.5 g/dL    RDW 13.7 10.0 - 15.0 %    Platelet Count 395 150 - 450 10e9/L    % Neutrophils 46.4 %    % Lymphocytes 42.9 %    % Monocytes 8.3 %    % Eosinophils 2.1 %    % Basophils 0.3 %    Absolute Neutrophil 5.2 1.6 - 8.3 10e9/L    Absolute Lymphocytes 4.8 0.8 - 5.3 10e9/L    Absolute Monocytes 0.9 0.0 - 1.3 10e9/L    Absolute Eosinophils 0.2 0.0 - 0.7 10e9/L    Absolute Basophils 0.0 0.0 - 0.2 10e9/L    Diff Method Automated Method    ESR: Erythrocyte sedimentation rate   Result Value Ref Range    Sed Rate 25 (H) 0 - 20 mm/h     ASSESSMENT/PLAN:   Josefina was seen today for abdominal pain.    Diagnoses and all orders for this visit:    Bacterial vaginosis, Abdominal pain, unspecified abdominal location,   LLQ abdominal pain  Suspect BV as source, however DDx still includes diverticulitis and other intra-abdominal sources.  If not improving would consider evaluation at [facility].  Pt will contact clinic if needed.  -     metroNIDAZOLE (FLAGYL) 500 MG tablet; Take 1 tablet (500 mg) by mouth 2 times daily for 7 days  -     UA with Microscopic  -     Urine Culture Aerobic Bacterial  -     Wet prep  -     CBC with platelets and differential  -     ESR: Erythrocyte sedimentation rate  -     CRP, inflammation        Patient Instructions     Patient Education     Bacterial Vaginosis    You have a vaginal infection called bacterial vaginosis (BV). Both good and bad bacteria are present in a healthy vagina. BV occurs when these bacteria get out of balance. The number of bad bacteria increase. And the number of good bacteria decrease. Although BV is associated with sexual activity, it is not a sexually transmitted disease.  BV may or may not cause symptoms. If symptoms do occur, they can include:  Thin, gray, milky-white, or  sometimes green discharge  Unpleasant odor or  fishy  smell  Itching, burning, or pain in or around the vagina  It is not known what causes BV, but certain factors can make the problem more likely. This can include:  Douching  Having sex with a new partner  Having sex with more than one partner  BV will sometimes go away on its own. But treatment is usually recommended. This is because untreated BV can increase the risk of more serious health problems such as:  Pelvic inflammatory disease (PID)   delivery (giving birth to a baby early if you re pregnant)  HIV and certain other sexually transmitted diseases (STDs)  Infection after surgery on the reproductive organs  Home care  General care  BV is most often treated with medicines called antibiotics. These may be given as pills or as a vaginal cream. If antibiotics are prescribed, be sure to use them exactly as directed. Also, be sure to complete all of the medicine, even if your symptoms go away.  Don't douche or having sex during treatment.  If you have sex with a female partner, ask your healthcare provider if she should also be treated.  Prevention  Don't douche.  Don't have sex. If you do have sex, then take steps to lower your risk:  Use condoms when having sex.  Limit the number of sexual partners you have.  Follow-up care  Follow up with your healthcare provider, or as advised.  When to seek medical advice  Call your healthcare provider right away if:  You have a fever of 100.4 F (38 C) or higher, or as directed by your provider.  Your symptoms worsen, or they don t go away within a few days of starting treatment.  You have new pain in the lower belly or pelvic region.  You have side effects that bother you or a reaction to the pills or cream you re prescribed.  You or any partners you have sex with have new symptoms, such as a rash, joint pain, or sores.  Date Last Reviewed: 10/1/2017    9171-3955 [employer]. [address],  [place] [address]. All rights reserved. This information is not intended as a substitute for professional medical care. Always follow your healthcare professional's instructions.               Return in about 1 day (around 3/20/2020) for contact clinic if symptoms worsening.     [address] [address]  [email or web address]   Office: [phone]           Miya Crump MS, PACARMEN

## 2020-03-19 NOTE — PATIENT INSTRUCTIONS
Patient Education     Bacterial Vaginosis    You have a vaginal infection called bacterial vaginosis (BV). Both good and bad bacteria are present in a healthy vagina. BV occurs when these bacteria get out of balance. The number of bad bacteria increase. And the number of good bacteria decrease. Although BV is associated with sexual activity, it is not a sexually transmitted disease.  BV may or may not cause symptoms. If symptoms do occur, they can include:    Thin, gray, milky-white, or sometimes green discharge    Unpleasant odor or  fishy  smell    Itching, burning, or pain in or around the vagina  It is not known what causes BV, but certain factors can make the problem more likely. This can include:    Douching    Having sex with a new partner    Having sex with more than one partner  BV will sometimes go away on its own. But treatment is usually recommended. This is because untreated BV can increase the risk of more serious health problems such as:    Pelvic inflammatory disease (PID)     delivery (giving birth to a baby early if you re pregnant)    HIV and certain other sexually transmitted diseases (STDs)    Infection after surgery on the reproductive organs  Home care  General care    BV is most often treated with medicines called antibiotics. These may be given as pills or as a vaginal cream. If antibiotics are prescribed, be sure to use them exactly as directed. Also, be sure to complete all of the medicine, even if your symptoms go away.    Don't douche or having sex during treatment.    If you have sex with a female partner, ask your healthcare provider if she should also be treated.  Prevention    Don't douche.    Don't have sex. If you do have sex, then take steps to lower your risk:  ? Use condoms when having sex.  ? Limit the number of sexual partners you have.  Follow-up care  Follow up with your healthcare provider, or as advised.  When to seek medical advice  Call your healthcare provider  right away if:    You have a fever of 100.4 F (38 C) or higher, or as directed by your provider.    Your symptoms worsen, or they don t go away within a few days of starting treatment.    You have new pain in the lower belly or pelvic region.    You have side effects that bother you or a reaction to the pills or cream you re prescribed.    You or any partners you have sex with have new symptoms, such as a rash, joint pain, or sores.  Date Last Reviewed: 10/1/2017    4462-3245 [employer]. [address] [place] [address]. All rights reserved. This information is not intended as a substitute for professional medical care. Always follow your healthcare professional's instructions.

## 2020-03-19 NOTE — TELEPHONE ENCOUNTER
Patient states that constant, pulling abdominal pain started last evening around 6 pm. Starts lower center of abdomen and radiates to the left. When intensifies feels like the severe menstrual cramps she used to have before her hysterectomy. Patient states that the pain did wake her up a couple of times last night. She is afraid that it is going in same direction as previous 2 episodes. At time of call pain is 4/7.   Patient has no recent travel, no respiratory symptoms and has not been around anyone who has been sick. Patient has been working from home this week.  Protocol advises UC or appointment with provider approval.  Please advise. Triage to call the patient back.  Patient verbalizes understanding that for severe pain to go to ER.    Additional Information    Negative: Passed out (i.e., fainted, collapsed and was not responding)    Negative: Shock suspected (e.g., cold/pale/clammy skin, too weak to stand, low BP, rapid pulse)    Negative: Sounds like a life-threatening emergency to the triager    Negative: Chest pain    Negative: Pain is mainly in upper abdomen (if needed ask: 'is it mainly above the belly button?')    Negative: Abdominal pain and pregnant > 20 weeks    Negative: Abdominal pain and pregnant < 20 weeks    Negative: SEVERE abdominal pain (e.g., excruciating)    Negative: Vomiting red blood or black (coffee ground) material    Negative: Bloody, black, or tarry bowel movements    Negative: Vomiting and abdomen looks much more swollen than usual    Negative: White of the eyes have turned yellow (i.e., jaundice)    Negative: Blood in urine (red, pink, or tea-colored)    Negative: Fever > 103 F (39.4 C)    Negative: Fever > 101 F (38.3 C) and over 60 years of age    Negative: Fever > 100.0 F (37.8 C) and has diabetes mellitus or a weak immune system (e.g., HIV positive, cancer chemotherapy, organ transplant, splenectomy, chronic steroids)    Negative: Fever > 100.0 F (37.8 C) and bedridden (e.g.,  "nursing home patient, stroke, chronic illness, recovering from surgery)    Negative: Pregnant or could be pregnant (i.e., missed last menstrual period)    Constant abdominal pain lasting > 2 hours    Negative: Vomiting bile (green color)    Negative: Patient sounds very sick or weak to the triager    Answer Assessment - Initial Assessment Questions  1. LOCATION: \"Where does it hurt?\"       Starts lower center abdomen.  2. RADIATION: \"Does the pain shoot anywhere else?\" (e.g., chest, back)      Travels to left side to colon  3. ONSET: \"When did the pain begin?\" (e.g., minutes, hours or days ago)       Started yesterday 6 pm, 16 hours ago. Feeling really gasey. Eventually comes out. BM about an hour ago. Improved the pain a little. Drinking smoothie coming back again. Gets hot all over when pain intensifies and gets sweaty.   4. SUDDEN: \"Gradual or sudden onset?\"      Sudden.  5. PATTERN \"Does the pain come and go, or is it constant?\"     - If constant: \"Is it getting better, staying the same, or worsening?\"       (Note: Constant means the pain never goes away completely; most serious pain is constant and it progresses)      - If intermittent: \"How long does it last?\" \"Do you have pain now?\"      (Note: Intermittent means the pain goes away completely between bouts)      Constant pulling pain that increase in severity. Has spikes of cramping like really bad menstrual cramps. Last time lasted a week and a half for 2 weeks.   6. SEVERITY: \"How bad is the pain?\"  (e.g., Scale 1-10; mild, moderate, or severe)    - MILD (1-3): doesn't interfere with normal activities, abdomen soft and not tender to touch     - MODERATE (4-7): interferes with normal activities or awakens from sleep, tender to touch     - SEVERE (8-10): excruciating pain, doubled over, unable to do any normal activities       Right now 4/10 goes up to 7/10  7. RECURRENT SYMPTOM: \"Have you ever had this type of abdominal pain before?\" If so, ask: \"When was " "the last time?\" and \"What happened that time?\"       Had this before. Dr. Valero ordered pelvic CT last year. Had follow up ordered by Dr. Crump thinks that it was in November. Last time this type of pain was last November 8. CAUSE: \"What do you think is causing the abdominal pain?\"      Not sure, maybe ovulation. Patient has had a hysterectomy  9. RELIEVING/AGGRAVATING FACTORS: \"What makes it better or worse?\" (e.g., movement, antacids, bowel movement)      BM made pain slightly better. BM was very productive. An hour later the pain came back. When pain came back started to get gasey.   10. OTHER SYMPTOMS: \"Has there been any vomiting, diarrhea, constipation, or urine problems?\"        Struggles with constipation. Did have BM yesterday that was soft. Yesterday stool was dark brown. Today is a light brown.   11. PREGNANCY: \"Is there any chance you are pregnant?\" \"When was your last menstrual period?\"        NA hysterectomy    Protocols used: ABDOMINAL PAIN - FEMALE-A-OH  Radha Bal RN      "

## 2020-03-20 ENCOUNTER — MYC MEDICAL ADVICE (OUTPATIENT)
Dept: FAMILY MEDICINE | Facility: CLINIC | Age: 45
End: 2020-03-20

## 2020-03-20 ENCOUNTER — OFFICE VISIT (OUTPATIENT)
Dept: PEDIATRICS | Facility: CLINIC | Age: 45
End: 2020-03-20
Payer: COMMERCIAL

## 2020-03-20 ENCOUNTER — HOSPITAL ENCOUNTER (OUTPATIENT)
Dept: CT IMAGING | Facility: CLINIC | Age: 45
Discharge: HOME | End: 2020-03-20
Attending: PHYSICIAN ASSISTANT | Admitting: PHYSICIAN ASSISTANT
Payer: COMMERCIAL

## 2020-03-20 ENCOUNTER — INFUSION THERAPY VISIT (OUTPATIENT)
Dept: INFUSION THERAPY | Facility: CLINIC | Age: 45
End: 2020-03-20
Attending: PSYCHIATRY & NEUROLOGY
Payer: COMMERCIAL

## 2020-03-20 VITALS
DIASTOLIC BLOOD PRESSURE: 81 MMHG | HEART RATE: 71 BPM | WEIGHT: 255 LBS | RESPIRATION RATE: 18 BRPM | SYSTOLIC BLOOD PRESSURE: 116 MMHG | TEMPERATURE: 97.9 F | BODY MASS INDEX: 42.43 KG/M2 | OXYGEN SATURATION: 99 %

## 2020-03-20 VITALS
DIASTOLIC BLOOD PRESSURE: 75 MMHG | SYSTOLIC BLOOD PRESSURE: 112 MMHG | HEART RATE: 69 BPM | TEMPERATURE: 97.7 F | RESPIRATION RATE: 16 BRPM | OXYGEN SATURATION: 100 %

## 2020-03-20 DIAGNOSIS — R10.32 LLQ ABDOMINAL PAIN: ICD-10-CM

## 2020-03-20 DIAGNOSIS — G35 MULTIPLE SCLEROSIS (H): Primary | ICD-10-CM

## 2020-03-20 DIAGNOSIS — J18.9 PNEUMONIA OF RIGHT UPPER LOBE DUE TO INFECTIOUS ORGANISM: ICD-10-CM

## 2020-03-20 DIAGNOSIS — N83.202 CYST OF LEFT OVARY: Primary | ICD-10-CM

## 2020-03-20 LAB
BACTERIA SPEC CULT: NO GROWTH
SPECIMEN SOURCE: NORMAL

## 2020-03-20 PROCEDURE — 74177 CT ABD & PELVIS W/CONTRAST: CPT

## 2020-03-20 PROCEDURE — 25000128 H RX IP 250 OP 636: Performed by: PSYCHIATRY & NEUROLOGY

## 2020-03-20 PROCEDURE — 99215 OFFICE O/P EST HI 40 MIN: CPT | Performed by: PHYSICIAN ASSISTANT

## 2020-03-20 PROCEDURE — 96365 THER/PROPH/DIAG IV INF INIT: CPT

## 2020-03-20 PROCEDURE — 25000128 H RX IP 250 OP 636: Performed by: PHYSICIAN ASSISTANT

## 2020-03-20 PROCEDURE — 25800030 ZZH RX IP 258 OP 636: Performed by: PSYCHIATRY & NEUROLOGY

## 2020-03-20 RX ORDER — HYDROCODONE BITARTRATE AND ACETAMINOPHEN 5; 325 MG/1; MG/1
1 TABLET ORAL EVERY 6 HOURS PRN
Qty: 20 TABLET | Refills: 0 | Status: SHIPPED | OUTPATIENT
Start: 2020-03-20 | End: 2020-05-01

## 2020-03-20 RX ORDER — IPRATROPIUM BROMIDE AND ALBUTEROL SULFATE 2.5; .5 MG/3ML; MG/3ML
1 SOLUTION RESPIRATORY (INHALATION) EVERY 4 HOURS PRN
Qty: 90 ML | Refills: 0 | Status: SHIPPED | OUTPATIENT
Start: 2020-03-20 | End: 2020-08-06

## 2020-03-20 RX ORDER — IOPAMIDOL 755 MG/ML
100 INJECTION, SOLUTION INTRAVASCULAR ONCE
Status: COMPLETED | OUTPATIENT
Start: 2020-03-20 | End: 2020-03-20

## 2020-03-20 RX ADMIN — IOPAMIDOL 100 ML: 755 INJECTION, SOLUTION INTRAVENOUS at 12:16

## 2020-03-20 RX ADMIN — SODIUM CHLORIDE 250 ML: 9 INJECTION, SOLUTION INTRAVENOUS at 09:14

## 2020-03-20 RX ADMIN — NATALIZUMAB 300 MG: 300 INJECTION INTRAVENOUS at 09:14

## 2020-03-20 NOTE — PROGRESS NOTES
ADDENDUM: RECEIVED [employer] MESSAGE THAT PAIN WORSENING.  REFER TO [facility] FOR FURTHER EVALUATION:     PROCEED DIRECTLY TO:     [facility]  [address], [address], [place] [address]    Phone is [phone], Fax is [phone]

## 2020-03-20 NOTE — TELEPHONE ENCOUNTER
Routing to PCP for further review/recommendations/orders.    Susannah Selby RN  [facility]

## 2020-03-20 NOTE — PROGRESS NOTES
"  SUBJECTIVE:   Josefina Aldrich is a 44 year old female who presents to clinic today for the following health issues:    ABDOMINAL   PAIN   Onset: X 3 days  Description:   Character: Sharp, Stabbing and Cramping  Location: left lower quadrant  Radiation: None  Intensity: moderate  Progression of Symptoms:  improving and intermittent  Accompanying Signs & Symptoms:  Fever/Chills?: no   Gas/Bloating: YES  Nausea: no   Vomitting: no   Diarrhea?: no   Constipation:no   Dysuria or Hematuria: no  History:   Trauma: no   Previous similar pain: YES   Previous tests done: x-ray, CT and Colonoscopy  Precipitating factors:   Does the pain change with:     Food: no      BM: YES- pain improves but only briefly     Urination: YES- after urinating can cause a \"cramping sensation.\"  Alleviating factors:  Laying down  Therapies Tried and outcome: last night used heating pad, this helped with sleep, IBU PRN  LMP:  Hysterectomy 2012-13 approx.     Currently on metronidazole for BV - 2 doses so far.      Problem list and histories reviewed & adjusted, as indicated.  Additional history: as documented    Patient Active Problem List   Diagnosis     Migraine     Asthma     Anemia     Backache     Body mass index 45.0-49.9, adult (H)     Cognitive changes     Depression with anxiety     Fever postop     Uterine leiomyoma     Headache     Heavy menses     Hypersomnia with sleep apnea     Iron deficiency anemia     Leukocytosis     Postsurgical nonabsorption     Mild intermittent asthma     Morbid obesity (H)     Myalgia and myositis     Other dyspnea and respiratory abnormality     Pain, neuropathic     Pelvic pain in female     Personal history of allergy to latex     Post concussion syndrome     Right shoulder pain     S/P gastric bypass     S/P hysterectomy     Bariatric surgery status     Vitamin B12 deficiency     Vitamin D deficiency     Vomiting following gastrointestinal surgery     Hypermagnesemia     Benign essential " "hypertension     Neck pain on left side     Mild major depression (H)     LLQ pain     Multiple sclerosis (H)     Immunosuppression (H)     Immunocompromised patient (H)     Past Surgical History:   Procedure Laterality Date     GASTRIC BYPASS      \"Trouble with B12 and D\"     HYSTERECTOMY, MONO      cervix gone.  fibroids.  without BSO     TONSILLECTOMY         Social History     Tobacco Use     Smoking status: Former Smoker     Packs/day: 0.50     Years: 2.00     Pack years: 1.00     Types: Cigars     Start date: 2011     Last attempt to quit: 2013     Years since quittin.7     Smokeless tobacco: Never Used   Substance Use Topics     Alcohol use: Not Currently     Alcohol/week: 2.0 standard drinks     Comment: 0-3 drinks per week     Family History   Problem Relation Age of Onset     Lupus Paternal Aunt 41     Diabetes Paternal Grandmother      Cerebrovascular Disease Paternal Grandmother      Depression Paternal Grandmother      Substance Abuse Paternal Grandmother      Diabetes Maternal Grandmother      Hyperlipidemia Maternal Grandmother      Hypertension Mother      Hyperlipidemia Mother      Obesity Mother      Cerebrovascular Disease Maternal Grandfather      Obesity Father      Multiple Sclerosis No family hx of          Current Outpatient Medications   Medication Sig Dispense Refill     HYDROcodone-acetaminophen (NORCO) 5-325 MG tablet Take 1 tablet by mouth every 6 hours as needed for pain 20 tablet 0     albuterol (PROVENTIL) (2.5 MG/3ML) 0.083% neb solution Take 1 vial (2.5 mg) by nebulization every 6 hours as needed for shortness of breath / dyspnea or wheezing 1 Box 3     albuterol (VENTOLIN HFA) 108 (90 BASE) MCG/ACT inhaler Inhale 2 puffs into the lungs every 6 hours as needed        baclofen (LIORESAL) 20 MG tablet Take 2 tablets (40 mg) by mouth At Bedtime AND 1 tablet (20 mg) every morning AND 1 tablet (20 mg) daily (with lunch) AND 0.5 tablets (10 mg) See Admin Instructions. " (In the afternoon)  10     budesonide-formoterol (SYMBICORT) 160-4.5 MCG/ACT Inhaler Inhale 2 puffs into the lungs 2 times daily 1 Inhaler 1     cetirizine (ZYRTEC) 10 MG tablet Take 1 tablet (10 mg) by mouth every evening 90 tablet 1     cholecalciferol (VITAMIN D3) 5000 UNITS TABS tablet Take 1 tablet (5,000 Units) by mouth daily       docusate sodium (COLACE) 100 MG tablet Take 1 tablet (100 mg) by mouth 2 times daily       Ferrous Sulfate (IRON SUPPLEMENT PO) Take 325 mg by mouth daily        gabapentin (NEURONTIN) 300 MG capsule Take 3 capsules (900 mg) by mouth At Bedtime 90 capsule 3     ipratropium - albuterol 0.5 mg/2.5 mg/3 mL (DUONEB) 0.5-2.5 (3) MG/3ML neb solution Take 1 vial (3 mLs) by nebulization every 4 hours as needed for shortness of breath / dyspnea or wheezing (severe asthma symptoms) 90 mL 0     lisinopril-hydrochlorothiazide (PRINZIDE/ZESTORETIC) 10-12.5 MG tablet Take 1 tablet by mouth daily 90 tablet 3     MAGNESIUM PO        metroNIDAZOLE (FLAGYL) 500 MG tablet Take 1 tablet (500 mg) by mouth 2 times daily for 7 days 14 tablet 0     montelukast (SINGULAIR) 10 MG tablet Take 1 tablet (10 mg) by mouth At Bedtime 90 tablet 3     natalizumab (TYSABRI) 300 MG/15ML injection Infusion       ondansetron (ZOFRAN-ODT) 4 MG ODT tab Take 1 tablet (4 mg) by mouth every 8 hours as needed for nausea 20 tablet 0     order for DME Equipment being ordered: Nebulizer with supplies 1 Device 0     Probiotic Product (PROBIOTIC PO)        sodium chloride (OCEAN) 0.65 % nasal spray Spray 1 spray in nostril daily as needed for congestion       SUMAtriptan (IMITREX) 100 MG tablet Take 50 mg up to twice daily as needed for headache; separate doses by at least one hour and do not use more than 3 days/week 18 tablet 3     topiramate (TOPAMAX) 100 MG tablet Take 1 tablet (100 mg) by mouth At Bedtime (take with 50 mg to total 150 mg nightly) 60 tablet 3     topiramate (TOPAMAX) 50 MG tablet Take 1 tablet (50 mg) by mouth  At Bedtime (take with 100 mg to total 150 mg nightly) 60 tablet 3     traZODone 50 MG PO tablet Take 1 tablet (50 mg) by mouth At Bedtime 90 tablet 0     triamcinolone (KENALOG) 0.1 % external cream Apply topically 3 times daily as needed for irritation       valACYclovir (VALTREX) 500 MG tablet TAKE 1 TABLET BY MOUTH EVERY DAY 90 tablet 0     vitamin D3 (CHOLECALCIFEROL) 83731 units (250 mcg) capsule Take 1 capsule (10,000 Units) by mouth daily 90 capsule 3     Allergies   Allergen Reactions     Aspirin Difficulty breathing, Palpitations and Other (See Comments)     Cephalexin Swelling     Adhesive Tape      Amantadine Swelling     Azithromycin Angioedema     Latex Hives, Itching and Rash     Penicillins Other (See Comments), Nausea and Vomiting, Hives, Swelling, Rash, Cramps and GI Disturbance     Amoxicillin OK       Reviewed and updated as needed this visit by clinical staff  Tobacco  Allergies  Meds  Problems  Med Hx  Surg Hx  Fam Hx  Soc Hx        Reviewed and updated as needed this visit by Provider  Tobacco  Allergies  Meds  Problems  Med Hx  Surg Hx  Fam Hx         ROS:  Constitutional, HEENT, cardiovascular, pulmonary, GI, , musculoskeletal, neuro, skin, endocrine and psych systems are negative, except as otherwise noted.      OBJECTIVE:   /81 (Patient Position: Chair)   Pulse 71   Temp 97.9  F (36.6  C) (Oral)   Resp 18   Wt 115.7 kg (255 lb)   SpO2 99%   BMI 42.43 kg/m   Body mass index is 42.43 kg/m .    GENERAL: healthy, alert and no distress  EYES: Eyes grossly normal to inspection, PERRL and conjunctivae and sclerae normal  RESP: lungs clear to auscultation - no rales, rhonchi or wheezes  CV: regular rate and rhythm, normal S1 S2, no S3 or S4, no murmur, click or rub, no peripheral edema and peripheral pulses strong  ABDOMEN: soft, nontender, no hepatosplenomegaly, no masses and bowel sounds normal  MS: no gross musculoskeletal defects noted, no edema  SKIN: no suspicious  lesions or rashes  NEURO: Normal strength and tone, mentation intact and speech normal  PSYCH: mentation appears normal, affect normal/bright    Diagnostic Test Results:  Results for orders placed or performed in visit on 03/19/20 (from the past 48 hour(s))   UA with Microscopic   Result Value Ref Range    Color Urine Yellow     Appearance Urine Clear     Glucose Urine Negative NEG^Negative mg/dL    Bilirubin Urine Negative NEG^Negative    Ketones Urine Negative NEG^Negative mg/dL    Specific Gravity Urine 1.025 1.003 - 1.035    pH Urine 8.0 (H) 5.0 - 7.0 pH    Protein Albumin Urine Negative NEG^Negative mg/dL    Urobilinogen Urine 0.2 0.2 - 1.0 EU/dL    Nitrite Urine Negative NEG^Negative    Blood Urine Negative NEG^Negative    Leukocyte Esterase Urine Negative NEG^Negative    Source Midstream Urine     WBC Urine 0 - 5 OTO5^0 - 5 /HPF    RBC Urine O - 2 OTO2^O - 2 /HPF    Squamous Epithelial /LPF Urine Few FEW^Few /LPF    Bacteria Urine Few (A) NEG^Negative /HPF   Urine Culture Aerobic Bacterial    Specimen: Midstream Urine   Result Value Ref Range    Specimen Description Midstream Urine     Culture Micro No growth    Wet prep    Specimen: Vagina   Result Value Ref Range    Specimen Description Vagina     Wet Prep Clue cells seen (A)     Wet Prep No Trichomonas seen     Wet Prep No yeast seen    CBC with platelets and differential   Result Value Ref Range    WBC 11.2 (H) 4.0 - 11.0 10e9/L    RBC Count 3.90 3.8 - 5.2 10e12/L    Hemoglobin 12.0 11.7 - 15.7 g/dL    Hematocrit 37.4 35.0 - 47.0 %    MCV 96 78 - 100 fl    MCH 30.8 26.5 - 33.0 pg    MCHC 32.1 31.5 - 36.5 g/dL    RDW 13.7 10.0 - 15.0 %    Platelet Count 395 150 - 450 10e9/L    % Neutrophils 46.4 %    % Lymphocytes 42.9 %    % Monocytes 8.3 %    % Eosinophils 2.1 %    % Basophils 0.3 %    Absolute Neutrophil 5.2 1.6 - 8.3 10e9/L    Absolute Lymphocytes 4.8 0.8 - 5.3 10e9/L    Absolute Monocytes 0.9 0.0 - 1.3 10e9/L    Absolute Eosinophils 0.2 0.0 - 0.7  10e9/L    Absolute Basophils 0.0 0.0 - 0.2 10e9/L    Diff Method Automated Method    ESR: Erythrocyte sedimentation rate   Result Value Ref Range    Sed Rate 25 (H) 0 - 20 mm/h   CRP, inflammation   Result Value Ref Range    CRP Inflammation <2.9 0.0 - 8.0 mg/L     Recent Results (from the past 744 hour(s))   CT Chest w Contrast    Narrative    CT CHEST WITH CONTRAST 3/16/2020 4:12 PM    CLINICAL HISTORY: Dyspnea, chronic, negative or nondiagnostic x-ray.  Immunocompromised patient (H). History of bacterial pneumonia.    TECHNIQUE: CT chest with IV contrast. Multiplanar reformats were  obtained. Dose reduction techniques were used.    CONTRAST: 79 mL Isovue-370    COMPARISON: 12/25/2019, 9/10/2016.    FINDINGS:   LUNGS AND PLEURA: 4 mm nodule in the left major fissure (series 5,  image 103) is unchanged compared to 9/10/2016 and further follow-up is  unnecessary. No other pulmonary nodule or mass. No airspace  consolidation. No pneumothorax or pleural effusion. Central airways  appear normal.    MEDIASTINUM/AXILLAE: No mediastinal, hilar, or axillary adenopathy.  Thyroid gland is unremarkable.    UPPER ABDOMEN: Prior gastric bypass surgery.    MUSCULOSKELETAL: Unremarkable.      Impression    IMPRESSION:   1.  No significant abnormality demonstrated in the chest.    CHANEL HOPSON MD   CT Abdomen Pelvis w Contrast    Narrative    CT ABDOMEN AND PELVIS WITH CONTRAST 3/20/2020 12:36 PM    CLINICAL HISTORY: Left lower quadrant abdomen pain, diverticulitis  suspected. Status post benign hysterectomy.    TECHNIQUE: CT scan of the abdomen and pelvis was performed following  injection of IV contrast. Multiplanar reformats were obtained. Dose  reduction techniques were used.    CONTRAST: 100 mL Isovue-370.    COMPARISON: Ultrasound 2/4/2020, CT 5/7/2019.    FINDINGS:   LOWER CHEST: Normal.    HEPATOBILIARY: The liver is enlarged, measuring 19.8 cm in length. No  focal liver lesions. Gallbladder is  unremarkable.    PANCREAS: Normal.    SPLEEN: Normal.    ADRENAL GLANDS: Normal.    KIDNEYS/BLADDER: Normal.    BOWEL: The appendix is normal. No evidence of bowel obstruction or  free intraperitoneal air. No evidence of diverticulosis or  diverticulitis. There has been prior gastric bypass surgery.    PELVIC ORGANS: Hysterectomy. There is a rounded cystic lesion in the  pelvis that measures 3.9 x 3.5 cm (series 3, image 74). There is a  small amount of associated pelvic free fluid.    ADDITIONAL FINDINGS: No pathologically enlarged abdominal,  retroperitoneal, or pelvic lymph nodes.    MUSCULOSKELETAL: Normal.      Impression    IMPRESSION:   1.  Rounded cystic lesion in the pelvis measures 3.9 x 3.5 cm and  associated with a small amount of pelvic free fluid. This is likely an  ovarian cyst. There is a small amount of associated pelvic free fluid.  2.  Mild hepatomegaly with probable mild fatty infiltration.    CHANEL HOPSON MD       ASSESSMENT/PLAN:   Josefina was seen today for abdominal pain.    Diagnoses and all orders for this visit:    Cyst of left ovary  LLQ abdominal pain  STAT CT shows large cyst - suspect left ovarian cyst.  Curbsided OBGYN who felt that therapeutic measures were advised.  Continue heating pad/ibuprofen and will give hydrocodone for severe pain.  Complete flagyl for BV.  Plan repeat transvaginal ultrasound in 4-6 weeks to ensure resolution.  If symptoms worsening prior to that time we will treat appropriately.  Pt voiced understanding and agreement.  -     CT Abdomen Pelvis w Contrast; Future  -     Referral to Acute and Diagnostic Services (Day of diagnostic / First order acute)  -     sodium chloride (PF) 0.9% PF flush 3 mL  -     IV access  -     US Pelvic Complete w Transvaginal; Future  -     HYDROcodone-acetaminophen (NORCO) 5-325 MG tablet; Take 1 tablet by mouth every 6 hours as needed for pain          Return in about 4 weeks (around 4/17/2020) for repeat  ultrasound.      Miya Crump PA-C  [facility]

## 2020-03-20 NOTE — NURSING NOTE
Pt was in [facility] this morning for her monthly Tysabri infusion, communicating with PCP via MyChart regarding abd pain.    I spoke with infusion room nurse asking that they keep IV in as pt will be coming to [facility] and likely be getting a CT w/ contrast done.   Writer picked up pt via WC and transported pt from infusion center to [facility].    Pt has a 22g in right AC that was placed in infusion room. Dressing is CD&I. IV flushes well. Called CT tech who said they can/will use the 22g.    Selina Lopez, RN on 3/20/2020 at 12:08 PM

## 2020-03-20 NOTE — TELEPHONE ENCOUNTER
"Requested Prescriptions   Pending Prescriptions Disp Refills     ipratropium - albuterol 0.5 mg/2.5 mg/3 mL (DUONEB) 0.5-2.5 (3) MG/3ML neb solution  Last Written Prescription Date:  02/19/2020  Last Fill Quantity: 90ml,  # refills: 0   Last office visit: 3/19/2020 with prescribing provider:  Theron   Future Office Visit:   Next 5 appointments (look out 90 days)    Mar 20, 2020 12:00 PM CDT  Diagnostic Visit with Miya Crump PA-C  [facility] ([facility]) [address]  [address]  [facility] [address] [phone]        90 mL 0       Short-Acting Beta Agonist Inhalers Protocol  Failed - 3/20/2020  9:44 AM        Failed - Asthma control assessment score within normal limits in last 6 months     Please review ACT score.           Passed - Patient is age 12 or older        Passed - Medication is active on med list        Passed - Recent (6 mo) or future (30 days) visit within the authorizing provider's specialty     Patient had office visit in the last 6 months or has a visit in the next 30 days with authorizing provider or within the authorizing provider's specialty.  See \"Patient Info\" tab in inbasket, or \"Choose Columns\" in Meds & Orders section of the refill encounter.           Asthma Nebs Protocol Failed - 3/20/2020  9:44 AM        Failed - Asthma control assessment score within normal limits in last 6 months     Please review ACT score.           Passed - Patient is age 4 years or older        Passed - Medication is active on med list        Passed - Recent (6 mo) or future (30 days) visit within the authorizing provider's specialty     Patient had office visit in the last 6 months or has a visit in the next 30 days with authorizing provider or within the authorizing provider's specialty.  See \"Patient Info\" tab in inbasket, or \"Choose Columns\" in Meds & Orders section of the refill encounter.               "

## 2020-03-20 NOTE — PROGRESS NOTES
Infusion Nursing Note:  Josefina N Hastingsomid Aldrich presents today for Tysabri.    Patient seen by provider today: No   present during visit today: Not Applicable.    Note: Patient having constant pelvic abdominal pain, started on Flagyl for bacterial vaginosis. Patient going to CT scan following infusion, MA from her provider's office in the [address] [place] [address] came to bring her. Patient said she was told they are ruling out diverticulitis. Okay to proceed with treatment per Tysabri checklist and pharmacy.    Intravenous Access:  Peripheral IV placed.    Treatment Conditions:  Tysabri pre-infusion checklist completed via touch program.      Post Infusion Assessment:  Patient tolerated infusion without incident.  Patient observed for 60 minutes post Tysabri per protocol.  Site patent and intact, free from redness, edema or discomfort.  No evidence of extravasations.  Access discontinued per protocol.       Discharge Plan:     Patient will return 4/17 for next appointment.  Patient discharged in stable condition accompanied by: medical assistant.  Departure Mode: Wheelchair. Patient is ambulatory, medical assistant came to show her where to go for her appt in [address] [place] [address].     Megan Langford RN

## 2020-04-02 ENCOUNTER — TRANSFERRED RECORDS (OUTPATIENT)
Dept: HEALTH INFORMATION MANAGEMENT | Facility: CLINIC | Age: 45
End: 2020-04-02

## 2020-04-08 ENCOUNTER — MYC MEDICAL ADVICE (OUTPATIENT)
Dept: FAMILY MEDICINE | Facility: CLINIC | Age: 45
End: 2020-04-08

## 2020-04-08 NOTE — LETTER
[facility] [address]IOR [address]  [place] [facility] [id number] [phone]       April 8, 2020    Josefina Hastings [place]  [address]   [place] [facility] [address]    To Whom it May Concern:    The above patient is under my professional care.  Due to underlying immunosuppression, she should be allowed to work from home in light of the current COVID-19 outbreak.  At this time, we will have this in place until 5/4/2020, however, it may be extended based on circumstances at that time.        Sincerely,    Miya Crump PA-C

## 2020-04-08 NOTE — TELEPHONE ENCOUNTER
Please see my chart message below     Please review and advise if an extension is okay    Amelia Chance RN, BSN  [facility]

## 2020-04-09 NOTE — TELEPHONE ENCOUNTER
Forwarding to PCP to review and advise of [employer] message.    Dion Gongora RN   [facility]

## 2020-04-17 ENCOUNTER — MYC MEDICAL ADVICE (OUTPATIENT)
Dept: FAMILY MEDICINE | Facility: CLINIC | Age: 45
End: 2020-04-17

## 2020-04-17 ENCOUNTER — HOSPITAL ENCOUNTER (OUTPATIENT)
Facility: CLINIC | Age: 45
Setting detail: SPECIMEN
Discharge: HOME | End: 2020-04-17
Attending: PSYCHIATRY & NEUROLOGY | Admitting: PSYCHIATRY & NEUROLOGY
Payer: COMMERCIAL

## 2020-04-17 ENCOUNTER — INFUSION THERAPY VISIT (OUTPATIENT)
Dept: INFUSION THERAPY | Facility: CLINIC | Age: 45
End: 2020-04-17
Attending: PSYCHIATRY & NEUROLOGY
Payer: COMMERCIAL

## 2020-04-17 ENCOUNTER — MEDICAL CORRESPONDENCE (OUTPATIENT)
Dept: HEALTH INFORMATION MANAGEMENT | Facility: CLINIC | Age: 45
End: 2020-04-17

## 2020-04-17 VITALS
RESPIRATION RATE: 16 BRPM | OXYGEN SATURATION: 100 % | BODY MASS INDEX: 43.43 KG/M2 | SYSTOLIC BLOOD PRESSURE: 119 MMHG | TEMPERATURE: 97.9 F | HEART RATE: 68 BPM | DIASTOLIC BLOOD PRESSURE: 82 MMHG | WEIGHT: 261 LBS

## 2020-04-17 DIAGNOSIS — G47.00 INSOMNIA, UNSPECIFIED TYPE: ICD-10-CM

## 2020-04-17 DIAGNOSIS — G35 MULTIPLE SCLEROSIS (H): Primary | ICD-10-CM

## 2020-04-17 DIAGNOSIS — G35 MS (MULTIPLE SCLEROSIS) (H): ICD-10-CM

## 2020-04-17 LAB
ALT SERPL W P-5'-P-CCNC: 37 U/L (ref 0–50)
AST SERPL W P-5'-P-CCNC: 25 U/L (ref 0–45)
BASOPHILS # BLD AUTO: 0.1 10E9/L (ref 0–0.2)
BASOPHILS NFR BLD AUTO: 0.8 %
DIFFERENTIAL METHOD BLD: ABNORMAL
EOSINOPHIL # BLD AUTO: 0.2 10E9/L (ref 0–0.7)
EOSINOPHIL NFR BLD AUTO: 2.3 %
ERYTHROCYTE [DISTWIDTH] IN BLOOD BY AUTOMATED COUNT: 13.4 % (ref 10–15)
HCT VFR BLD AUTO: 38.8 % (ref 35–47)
HGB BLD-MCNC: 11.9 G/DL (ref 11.7–15.7)
IMM GRANULOCYTES # BLD: 0.1 10E9/L (ref 0–0.4)
IMM GRANULOCYTES NFR BLD: 0.7 %
LYMPHOCYTES # BLD AUTO: 4.2 10E9/L (ref 0.8–5.3)
LYMPHOCYTES NFR BLD AUTO: 46.6 %
MCH RBC QN AUTO: 30.2 PG (ref 26.5–33)
MCHC RBC AUTO-ENTMCNC: 30.7 G/DL (ref 31.5–36.5)
MCV RBC AUTO: 99 FL (ref 78–100)
MONOCYTES # BLD AUTO: 0.7 10E9/L (ref 0–1.3)
MONOCYTES NFR BLD AUTO: 8.4 %
NEUTROPHILS # BLD AUTO: 3.5 10E9/L (ref 1.6–8.3)
NEUTROPHILS NFR BLD AUTO: 40.2 %
NRBC # BLD AUTO: 0.1 10*3/UL
NRBC BLD AUTO-RTO: 1 /100
PLATELET # BLD AUTO: 330 10E9/L (ref 150–450)
RBC # BLD AUTO: 3.94 10E12/L (ref 3.8–5.2)
WBC # BLD AUTO: 8.8 10E9/L (ref 4–11)

## 2020-04-17 PROCEDURE — 40000975 ZZHCL STATISTIC JC VIR AB INDEX INHIB: Performed by: PSYCHIATRY & NEUROLOGY

## 2020-04-17 PROCEDURE — 25800030 ZZH RX IP 258 OP 636: Performed by: PSYCHIATRY & NEUROLOGY

## 2020-04-17 PROCEDURE — 85025 COMPLETE CBC W/AUTO DIFF WBC: CPT | Performed by: PSYCHIATRY & NEUROLOGY

## 2020-04-17 PROCEDURE — 84460 ALANINE AMINO (ALT) (SGPT): CPT | Performed by: PSYCHIATRY & NEUROLOGY

## 2020-04-17 PROCEDURE — 25000128 H RX IP 250 OP 636: Performed by: PSYCHIATRY & NEUROLOGY

## 2020-04-17 PROCEDURE — 96365 THER/PROPH/DIAG IV INF INIT: CPT

## 2020-04-17 PROCEDURE — 84450 TRANSFERASE (AST) (SGOT): CPT | Performed by: PSYCHIATRY & NEUROLOGY

## 2020-04-17 RX ORDER — HEPARIN SODIUM (PORCINE) LOCK FLUSH IV SOLN 100 UNIT/ML 100 UNIT/ML
5 SOLUTION INTRAVENOUS
Status: CANCELLED | OUTPATIENT
Start: 2020-04-17

## 2020-04-17 RX ORDER — HEPARIN SODIUM,PORCINE 10 UNIT/ML
5 VIAL (ML) INTRAVENOUS
Status: CANCELLED | OUTPATIENT
Start: 2020-04-17

## 2020-04-17 RX ORDER — HEPARIN SODIUM,PORCINE 10 UNIT/ML
5 VIAL (ML) INTRAVENOUS
Status: CANCELLED | OUTPATIENT
Start: 2020-05-15

## 2020-04-17 RX ORDER — HEPARIN SODIUM (PORCINE) LOCK FLUSH IV SOLN 100 UNIT/ML 100 UNIT/ML
5 SOLUTION INTRAVENOUS
Status: CANCELLED | OUTPATIENT
Start: 2020-05-15

## 2020-04-17 RX ORDER — TRAZODONE HYDROCHLORIDE 50 MG/1
TABLET, FILM COATED ORAL
Qty: 90 TABLET | Refills: 1 | Status: SHIPPED | OUTPATIENT
Start: 2020-04-17 | End: 2020-09-08

## 2020-04-17 RX ADMIN — SODIUM CHLORIDE 250 ML: 9 INJECTION, SOLUTION INTRAVENOUS at 12:19

## 2020-04-17 RX ADMIN — NATALIZUMAB 300 MG: 300 INJECTION INTRAVENOUS at 12:19

## 2020-04-17 NOTE — TELEPHONE ENCOUNTER
"Requested Prescriptions   Pending Prescriptions Disp Refills     traZODone (DESYREL) 50 MG tablet [Pharmacy Med Name: TRAZODONE 50MG TABLETS] 30 tablet      Sig: TAKE 1 TABLET BY MOUTH AT BEDTIME   Last Written Prescription Date:  2/20/2020  Last Fill Quantity: 90,  # refills: 0   Last office visit: 3/19/2020 with prescribing provider:  Theron   Future Office Visit:      Serotonin Modulators Passed - 4/17/2020  9:12 AM        Passed - Recent (12 mo) or future (30 days) visit within the authorizing provider's specialty     Patient has had an office visit with the authorizing provider or a provider within the authorizing providers department within the previous 12 mos or has a future within next 30 days. See \"Patient Info\" tab in inbasket, or \"Choose Columns\" in Meds & Orders section of the refill encounter.              Passed - Medication is active on med list        Passed - Patient is age 18 or older        Passed - No active pregnancy on record        Passed - No positive pregnancy test in past 12 months           Filled per [facility] protocol.     TAMARA SolomonN, RN  [facility]  "

## 2020-04-17 NOTE — PROGRESS NOTES
Infusion Nursing Note:  Josefina Aldrich presents today for Tysabri and labs.    Patient seen by provider today: No   present during visit today: Not Applicable.    Note: Patient feeling well today. Reports strength mildly improved.  Tysabri checklist complete. Patient likes to receive entire bag of 250 ml NS after infusion.     Intravenous Access:  Lab draw site RAC, Needle type angiocath, Gauge 22.  Labs drawn without difficulty.  Peripheral IV placed.    Treatment Conditions:  CBC, AST, ALT, SABINO virus sent.      Post Infusion Assessment:  Patient tolerated infusion without incident.  Patient observed for 60 minutes post Tysabri per protocol.  Site patent and intact, free from redness, edema or discomfort.  No evidence of extravasations.  Access discontinued per protocol.       Discharge Plan:   Patient declined prescription refills.  Copy of AVS reviewed with patient and/or family.  Patient will return 5/15 for next appointment.  Patient discharged in stable condition accompanied by: self.  Departure Mode: Ambulatory.    Megan Langford RN

## 2020-04-23 ENCOUNTER — MEDICAL CORRESPONDENCE (OUTPATIENT)
Dept: HEALTH INFORMATION MANAGEMENT | Facility: CLINIC | Age: 45
End: 2020-04-23

## 2020-04-24 ENCOUNTER — MYC MEDICAL ADVICE (OUTPATIENT)
Dept: FAMILY MEDICINE | Facility: CLINIC | Age: 45
End: 2020-04-24

## 2020-04-24 DIAGNOSIS — R09.81 NASAL CONGESTION: Primary | ICD-10-CM

## 2020-04-24 LAB — LAB SCANNED RESULT: NORMAL

## 2020-04-24 RX ORDER — FLUTICASONE PROPIONATE 50 MCG
1 SPRAY, SUSPENSION (ML) NASAL DAILY
Qty: 16 G | Refills: 0 | Status: SHIPPED | OUTPATIENT
Start: 2020-04-24 | End: 2020-05-01

## 2020-04-24 NOTE — TELEPHONE ENCOUNTER
Routing to PCP for further review/recommendations/orders.    Susannah Selby RN  [facility]

## 2020-04-24 NOTE — TELEPHONE ENCOUNTER
Flonase sent to pharmacy per patient request.  [employer] message sent.      TAMARA Dumont, RN, PHN  [facility]  Office: [phone]  Fax: [phone]

## 2020-04-24 NOTE — TELEPHONE ENCOUNTER
[employer] message sent.      TAMARA Dumont, RN, PHN  [facility]  Office: [phone]  Fax: [phone]

## 2020-05-01 ENCOUNTER — VIRTUAL VISIT (OUTPATIENT)
Dept: FAMILY MEDICINE | Facility: CLINIC | Age: 45
End: 2020-05-01
Payer: COMMERCIAL

## 2020-05-01 DIAGNOSIS — J45.21 MILD INTERMITTENT ASTHMA WITH ACUTE EXACERBATION: Primary | ICD-10-CM

## 2020-05-01 DIAGNOSIS — J30.2 SEASONAL ALLERGIC RHINITIS, UNSPECIFIED TRIGGER: ICD-10-CM

## 2020-05-01 DIAGNOSIS — R09.81 NASAL CONGESTION: ICD-10-CM

## 2020-05-01 PROCEDURE — 99214 OFFICE O/P EST MOD 30 MIN: CPT | Mod: GT | Performed by: PHYSICIAN ASSISTANT

## 2020-05-01 RX ORDER — AZELASTINE 1 MG/ML
1 SPRAY, METERED NASAL 2 TIMES DAILY
Qty: 30 ML | Refills: 3 | Status: SHIPPED | OUTPATIENT
Start: 2020-05-01 | End: 2020-09-08

## 2020-05-01 RX ORDER — FERROUS SULFATE 325(65) MG
325 TABLET ORAL
COMMUNITY
End: 2021-01-27

## 2020-05-01 RX ORDER — CETIRIZINE HYDROCHLORIDE 10 MG/1
10 TABLET ORAL 2 TIMES DAILY
Qty: 90 TABLET | Refills: 1 | Status: SHIPPED | OUTPATIENT
Start: 2020-05-01 | End: 2020-07-20

## 2020-05-01 RX ORDER — FLUTICASONE PROPIONATE 50 MCG
1 SPRAY, SUSPENSION (ML) NASAL DAILY
Qty: 16 G | Refills: 5 | Status: SHIPPED | OUTPATIENT
Start: 2020-05-01 | End: 2020-09-08

## 2020-05-01 RX ORDER — VITAMIN E 268 MG
CAPSULE ORAL DAILY
COMMUNITY
End: 2021-06-25

## 2020-05-01 NOTE — LETTER
[facility] [address]  [place] [facility] [id number] [phone]       May 1, 2020    Josefina Hastings [place]  [address]   [place] [facility] [address]    To Whom it May Concern:       The above patient is under my professional care.  Due to underlying immunosuppression, she should be allowed to work from home in light of the current COVID-19 outbreak.  At this time, we will have this in place until 7/31/2020, however, it may be extended based on circumstances at that time.        Sincerely,    Miya Crump PA-C

## 2020-05-01 NOTE — PROGRESS NOTES
"Josefina Aldrich is a 44 year old female who is being evaluated via a billable video visit.      The patient has been notified of following:     \"This video visit will be conducted via a call between you and your physician/provider. We have found that certain health care needs can be provided without the need for an in-person physical exam.  This service lets us provide the care you need with a video conversation.  If a prescription is necessary we can send it directly to your pharmacy.  If lab work is needed we can place an order for that and you can then stop by our lab to have the test done at a later time.    Video visits are billed at different rates depending on your insurance coverage.  Please reach out to your insurance provider with any questions.    If during the course of the call the physician/provider feels a video visit is not appropriate, you will not be charged for this service.\"    Patient has given verbal consent for Video visit? Yes    How would you like to obtain your AVS? Lesli    Patient would like the video invitation sent by: Text to cell phone: [phone]    Will anyone else be joining your video visit? No      Subjective     Josefina Aldrich is a 44 year old female who presents to clinic today for the following health issues:    HPI       Does state asthma has not gotten worse or better since last visit and completing the ACT.  Allergies  Really bad right now.  Taking Singulair, zyrtec daily and Flonase once daily.  Does still get pressure by base of nose and itchy eyes.  Wondering if needing something stronger.  Having mostly chest symptoms.  Not a lot of nasal drainage d/t Flonase.      Taking Medication as prescribed: yes    Side Effects:  Dry nostrils     Medication Helping Symptoms:  Yes    Dose feel better since [facility] ---       Video Start Time: 9:48    Asthma Follow-Up  Symbicort BID, needing duoneb BID for the last week.  Thinks due to allergies.    If she " "goes outside or for a walk or has patio door open chest will get time and feels chest starts tightening up. After taking neb will spitting up light yellow sputum and then is good.  Was ACT completed today?  No      Do you have a cough?  YES    Are you experiencing any wheezing in your chest?  YES    Do you have any shortness of breath?  YES     How often are you using a short-acting (rescue) inhaler or nebulizer, such as Albuterol?  2-3 times per day    How many days per week do you miss taking your asthma controller medication?  0    Please describe any recent triggers for your asthma: dust mites    Have you had any Emergency Room Visits, Urgent Care Visits, or Hospital Admissions since your last office visit?  No      Patient Active Problem List   Diagnosis     Migraine     Asthma     Anemia     Backache     Body mass index 45.0-49.9, adult (H)     Cognitive changes     Depression with anxiety     Fever postop     Uterine leiomyoma     Headache     Heavy menses     Hypersomnia with sleep apnea     Iron deficiency anemia     Leukocytosis     Postsurgical nonabsorption     Mild intermittent asthma     Morbid obesity (H)     Myalgia and myositis     Other dyspnea and respiratory abnormality     Pain, neuropathic     Pelvic pain in female     Personal history of allergy to latex     Post concussion syndrome     Right shoulder pain     S/P gastric bypass     S/P hysterectomy     Bariatric surgery status     Vitamin B12 deficiency     Vitamin D deficiency     Vomiting following gastrointestinal surgery     Hypermagnesemia     Benign essential hypertension     Neck pain on left side     Mild major depression (H)     LLQ pain     Multiple sclerosis (H)     Immunosuppression (H)     Immunocompromised patient (H)     Past Surgical History:   Procedure Laterality Date     GASTRIC BYPASS  2002    \"Trouble with B12 and D\"     HYSTERECTOMY, MONO  2013    cervix gone.  fibroids.  without BSO     TONSILLECTOMY         Social " History     Tobacco Use     Smoking status: Former Smoker     Packs/day: 0.50     Years: 2.00     Pack years: 1.00     Types: Cigars     Start date: 2011     Last attempt to quit: 2013     Years since quittin.8     Smokeless tobacco: Never Used   Substance Use Topics     Alcohol use: Not Currently     Alcohol/week: 2.0 standard drinks     Comment: 0-3 drinks per week     Family History   Problem Relation Age of Onset     Lupus Paternal Aunt 41     Diabetes Paternal Grandmother      Cerebrovascular Disease Paternal Grandmother      Depression Paternal Grandmother      Substance Abuse Paternal Grandmother      Diabetes Maternal Grandmother      Hyperlipidemia Maternal Grandmother      Hypertension Mother      Hyperlipidemia Mother      Obesity Mother      Cerebrovascular Disease Maternal Grandfather      Obesity Father      Multiple Sclerosis No family hx of          Current Outpatient Medications   Medication Sig Dispense Refill     albuterol (PROVENTIL) (2.5 MG/3ML) 0.083% neb solution Take 1 vial (2.5 mg) by nebulization every 6 hours as needed for shortness of breath / dyspnea or wheezing 1 Box 3     albuterol (VENTOLIN HFA) 108 (90 BASE) MCG/ACT inhaler Inhale 2 puffs into the lungs every 6 hours as needed        azelastine (ASTELIN) 0.1 % nasal spray Spray 1 spray into both nostrils 2 times daily (nasal antihistamine) 30 mL 3     baclofen (LIORESAL) 20 MG tablet Take 2 tablets (40 mg) by mouth At Bedtime AND 1 tablet (20 mg) every morning AND 1 tablet (20 mg) daily (with lunch) AND 0.5 tablets (10 mg) See Admin Instructions. (In the afternoon)  10     budesonide-formoterol (SYMBICORT) 160-4.5 MCG/ACT Inhaler Inhale 2 puffs into the lungs 2 times daily 1 Inhaler 1     cetirizine (ZYRTEC) 10 MG tablet Take 1 tablet (10 mg) by mouth 2 times daily 90 tablet 1     ferrous fumarate-vitamin C ER (ILAN-SEQUELS) 65-25 MG CR tablet Take 1 tablet by mouth 3 times daily (with meals)       ferrous sulfate  (FEROSUL) 325 (65 Fe) MG tablet Take 325 mg by mouth daily (with breakfast)       fluticasone (FLONASE) 50 MCG/ACT nasal spray Spray 1 spray into both nostrils daily 16 g 5     gabapentin (NEURONTIN) 300 MG capsule Take 3 capsules (900 mg) by mouth At Bedtime (Patient taking differently: Take 600 mg by mouth At Bedtime ) 90 capsule 3     ipratropium - albuterol 0.5 mg/2.5 mg/3 mL (DUONEB) 0.5-2.5 (3) MG/3ML neb solution Take 1 vial (3 mLs) by nebulization every 4 hours as needed for shortness of breath / dyspnea or wheezing (severe asthma symptoms) 90 mL 0     ketotifen (ZADITOR) 0.025 % ophthalmic solution Place 1 drop into both eyes 2 times daily as needed for itching 1 Bottle 3     lisinopril-hydrochlorothiazide (PRINZIDE/ZESTORETIC) 10-12.5 MG tablet Take 1 tablet by mouth daily 90 tablet 3     MAGNESIUM PO        montelukast (SINGULAIR) 10 MG tablet Take 1 tablet (10 mg) by mouth At Bedtime 90 tablet 3     natalizumab (TYSABRI) 300 MG/15ML injection Infusion       order for DME Equipment being ordered: Nebulizer with supplies 1 Device 0     Probiotic Product (PROBIOTIC PO)        SUMAtriptan (IMITREX) 100 MG tablet Take 50 mg up to twice daily as needed for headache; separate doses by at least one hour and do not use more than 3 days/week 18 tablet 3     topiramate (TOPAMAX) 100 MG tablet Take 1 tablet (100 mg) by mouth At Bedtime (take with 50 mg to total 150 mg nightly) 60 tablet 3     topiramate (TOPAMAX) 50 MG tablet Take 1 tablet (50 mg) by mouth At Bedtime (take with 100 mg to total 150 mg nightly) 60 tablet 3     traZODone (DESYREL) 50 MG tablet TAKE 1 TABLET BY MOUTH AT BEDTIME 90 tablet 1     triamcinolone (KENALOG) 0.1 % external cream Apply topically 3 times daily as needed for irritation       valACYclovir (VALTREX) 500 MG tablet TAKE 1 TABLET BY MOUTH EVERY DAY 90 tablet 0     vitamin D3 (CHOLECALCIFEROL) 83696 units (250 mcg) capsule Take 1 capsule (10,000 Units) by mouth daily 90 capsule 3      "vitamin E (TOCOPHEROL) 400 units (360 mg) capsule Take by mouth daily       Allergies   Allergen Reactions     Aspirin Difficulty breathing, Palpitations and Other (See Comments)     Cephalexin Swelling     Adhesive Tape      Amantadine Swelling     Azithromycin Angioedema     Latex Hives, Itching and Rash     Penicillins Other (See Comments), Nausea and Vomiting, Hives, Swelling, Rash, Cramps and GI Disturbance     Amoxicillin OK       Reviewed and updated as needed this visit by Provider         Review of Systems   ROS COMP: Constitutional, HEENT, cardiovascular, pulmonary, GI, , musculoskeletal, neuro, skin, endocrine and psych systems are negative, except as otherwise noted.      Objective    There were no vitals taken for this visit.  Estimated body mass index is 43.43 kg/m  as calculated from the following:    Height as of 3/19/20: 1.651 m (5' 5\").    Weight as of 4/17/20: 118.4 kg (261 lb).  Physical Exam     GENERAL: healthy, alert and no distress  EYES: Eyes grossly normal to inspection, conjunctivae and sclerae normal  HENT: normal cephalic/atraumatic.  External ears, nose and mouth without ulcers or lesions.  NECK: no asymmetry, masses or scars  RESP: no audible wheeze, cough, or visible cyanosis.  No visible retractions or increased work of breathing.  Able to speak fully in complete sentences.  MS: no gross musculoskeletal defects noted.  Normal range of motion.  NEURO: Cranial nerves grossly intact, mentation intact and speech normal  PSYCH: mentation appears normal, affect normal/bright, judgement and insight intact, normal speech and appearance well-groomed      Diagnostic Test Results:  Labs reviewed in Epic        Assessment & Plan     Josefina was seen today for recheck medication.    Diagnoses and all orders for this visit:    Mild intermittent asthma with acute exacerbation  Nasal congestion  Seasonal allergic rhinitis, unspecified trigger  Suboptimally controlled.  Continue controller inhaler " and PRN nebulizer.  Continue Singulair.  Add azelastine nasal spray, increase certirizine to BID and add ketoifen.  Pt will keep us informed if not improving worsening.  Work from home note renewed today d/t underlying immunosuppression.  -     fluticasone (FLONASE) 50 MCG/ACT nasal spray; Spray 1 spray into both nostrils daily  -     azelastine (ASTELIN) 0.1 % nasal spray; Spray 1 spray into both nostrils 2 times daily (nasal antihistamine)  -     ketotifen (ZADITOR) 0.025 % ophthalmic solution; Place 1 drop into both eyes 2 times daily as needed for itching  -     cetirizine (ZYRTEC) 10 MG tablet; Take 1 tablet (10 mg) by mouth 2 times daily         Return in about 2 weeks (around 5/15/2020) for mychart update and ACT .    Miya Crump PA-C  [facility]      Video-Visit Details    Type of service:  Video Visit    Video End Time:10:08 AM    Originating Location (pt. Location): Home    Distant Location (provider location):  [facility]     Platform used for Video Visit: [facility]    Return in about 2 weeks (around 5/15/2020) for mychart update and ACT .

## 2020-05-07 ENCOUNTER — TELEPHONE (OUTPATIENT)
Dept: FAMILY MEDICINE | Facility: CLINIC | Age: 45
End: 2020-05-07

## 2020-05-07 NOTE — TELEPHONE ENCOUNTER
Need to discuss this paperwork as I was understanding that the pt was working from home.  This looks to be a disability request.      Please schedule video visit when able.      Miya Crump MBA, MS, PA-C

## 2020-05-07 NOTE — TELEPHONE ENCOUNTER
Reason for call:  Form   Our goal is to have forms completed within 72 hours, however some forms may require a visit or additional information.     Who is the form from? Insurance comp  Where did the form come from? form was faxed in  What clinic location [place] the [place] at[place]   Where was the form placed? Given to physician    Type of letter, form or note: disability [employer]

## 2020-05-08 ENCOUNTER — VIRTUAL VISIT (OUTPATIENT)
Dept: FAMILY MEDICINE | Facility: CLINIC | Age: 45
End: 2020-05-08
Payer: COMMERCIAL

## 2020-05-08 ENCOUNTER — MYC MEDICAL ADVICE (OUTPATIENT)
Dept: FAMILY MEDICINE | Facility: CLINIC | Age: 45
End: 2020-05-08

## 2020-05-08 DIAGNOSIS — D84.9 IMMUNOSUPPRESSION (H): ICD-10-CM

## 2020-05-08 DIAGNOSIS — Z02.89 ENCOUNTER FOR COMPLETION OF FORM WITH PATIENT: Primary | ICD-10-CM

## 2020-05-08 DIAGNOSIS — G35 MULTIPLE SCLEROSIS (H): ICD-10-CM

## 2020-05-08 PROCEDURE — 99214 OFFICE O/P EST MOD 30 MIN: CPT | Mod: GT | Performed by: PHYSICIAN ASSISTANT

## 2020-05-08 ASSESSMENT — ASTHMA QUESTIONNAIRES
QUESTION_4 LAST FOUR WEEKS HOW OFTEN HAVE YOU USED YOUR RESCUE INHALER OR NEBULIZER MEDICATION (SUCH AS ALBUTEROL): ONCE A WEEK OR LESS
QUESTION_3 LAST FOUR WEEKS HOW OFTEN DID YOUR ASTHMA SYMPTOMS (WHEEZING, COUGHING, SHORTNESS OF BREATH, CHEST TIGHTNESS OR PAIN) WAKE YOU UP AT NIGHT OR EARLIER THAN USUAL IN THE MORNING: ONCE OR TWICE
QUESTION_1 LAST FOUR WEEKS HOW MUCH OF THE TIME DID YOUR ASTHMA KEEP YOU FROM GETTING AS MUCH DONE AT WORK, SCHOOL OR AT HOME: A LITTLE OF THE TIME
QUESTION_2 LAST FOUR WEEKS HOW OFTEN HAVE YOU HAD SHORTNESS OF BREATH: ONCE OR TWICE A WEEK
ACUTE_EXACERBATION_TODAY: NO
QUESTION_5 LAST FOUR WEEKS HOW WOULD YOU RATE YOUR ASTHMA CONTROL: WELL CONTROLLED
ACT_TOTALSCORE: 20

## 2020-05-08 NOTE — TELEPHONE ENCOUNTER
Routing to PCP for further review/recommendations/orders.    Susannah Selby RN  [facility]

## 2020-05-09 ASSESSMENT — ASTHMA QUESTIONNAIRES: ACT_TOTALSCORE: 20

## 2020-05-12 ENCOUNTER — MYC MEDICAL ADVICE (OUTPATIENT)
Dept: FAMILY MEDICINE | Facility: CLINIC | Age: 45
End: 2020-05-12

## 2020-05-12 NOTE — TELEPHONE ENCOUNTER
Please see my chart message below     Please review and advise     Thank you     Amelia Chance RN, BSN  [facility] [place] [facility]

## 2020-05-15 ENCOUNTER — INFUSION THERAPY VISIT (OUTPATIENT)
Dept: INFUSION THERAPY | Facility: CLINIC | Age: 45
End: 2020-05-15
Attending: PSYCHIATRY & NEUROLOGY
Payer: COMMERCIAL

## 2020-05-15 VITALS
RESPIRATION RATE: 16 BRPM | HEART RATE: 59 BPM | TEMPERATURE: 97.8 F | DIASTOLIC BLOOD PRESSURE: 62 MMHG | SYSTOLIC BLOOD PRESSURE: 96 MMHG | OXYGEN SATURATION: 100 %

## 2020-05-15 DIAGNOSIS — G35 MULTIPLE SCLEROSIS (H): Primary | ICD-10-CM

## 2020-05-15 PROCEDURE — 25000128 H RX IP 250 OP 636: Performed by: PSYCHIATRY & NEUROLOGY

## 2020-05-15 PROCEDURE — 96365 THER/PROPH/DIAG IV INF INIT: CPT

## 2020-05-15 PROCEDURE — 25800030 ZZH RX IP 258 OP 636: Performed by: PSYCHIATRY & NEUROLOGY

## 2020-05-15 RX ORDER — HEPARIN SODIUM,PORCINE 10 UNIT/ML
5 VIAL (ML) INTRAVENOUS
Status: CANCELLED | OUTPATIENT
Start: 2020-06-12

## 2020-05-15 RX ORDER — HEPARIN SODIUM (PORCINE) LOCK FLUSH IV SOLN 100 UNIT/ML 100 UNIT/ML
5 SOLUTION INTRAVENOUS
Status: CANCELLED | OUTPATIENT
Start: 2020-06-12

## 2020-05-15 RX ADMIN — SODIUM CHLORIDE 250 ML: 9 INJECTION, SOLUTION INTRAVENOUS at 08:43

## 2020-05-15 RX ADMIN — NATALIZUMAB 300 MG: 300 INJECTION INTRAVENOUS at 08:44

## 2020-05-15 ASSESSMENT — PAIN SCALES - GENERAL: PAINLEVEL: NO PAIN (0)

## 2020-05-15 NOTE — PROGRESS NOTES
Infusion Nursing Note:  Josefina Aldrich presents today for Tysabri.    Patient seen by provider today: No   present during visit today: Not Applicable.    Note: Feels like Tysabri is effective.  Does get headaches after her Tysabri and getting additional fluids seems to decrease this.  Labs from last infusion faxed to Dr. Chong    Intravenous Access:  Peripheral IV placed.    Treatment Conditions:  Tysabri pre-infusion checklist completed via touch program.      Post Infusion Assessment:  Patient tolerated infusion without incident.  Patient observed for 60 minutes post Thysabri per protocol.  Blood return noted pre and post infusion.  Site patent and intact, free from redness, edema or discomfort.  No evidence of extravasations.  Access discontinued per protocol.       Discharge Plan:   Discharge instructions reviewed with: Patient.  Patient discharged in stable condition accompanied by: self.  Departure Mode: Ambulatory.  Next infusion scheduled for 6/12/2020.    BECKY RIVERA RN

## 2020-05-24 DIAGNOSIS — J45.30 MILD PERSISTENT ASTHMA WITHOUT COMPLICATION: ICD-10-CM

## 2020-05-26 ENCOUNTER — MYC MEDICAL ADVICE (OUTPATIENT)
Dept: FAMILY MEDICINE | Facility: CLINIC | Age: 45
End: 2020-05-26

## 2020-05-26 RX ORDER — BUDESONIDE AND FORMOTEROL FUMARATE DIHYDRATE 160; 4.5 UG/1; UG/1
AEROSOL RESPIRATORY (INHALATION)
Qty: 10.2 G | Refills: 1 | Status: SHIPPED | OUTPATIENT
Start: 2020-05-26 | End: 2020-08-11

## 2020-05-26 NOTE — TELEPHONE ENCOUNTER
"Requested Prescriptions   Pending Prescriptions Disp Refills     ipratropium - albuterol 0.5 mg/2.5 mg/3 mL (DUONEB) 0.5-2.5 (3) MG/3ML neb solution [Pharmacy Med Name: IPRATROPI/ALB 0.5/3MG INH SL 30X3ML]          Last Written Prescription Date:  12.30.19  Last Fill Quantity: 30 vial,  # refills: 1   Last office visit: 12/30/2019 with prescribing provider:  ANDREW Thurston           Future Office Visit:   Next 5 appointments (look out 90 days)    Feb 20, 2020 11:20 AM CST  [facility] [address] [place] [address] [id number] [phone]            90 mL      Sig: USE 1 VIAL VIA NEBULIZER THREE TIMES DAILY AS NEEDED FOR SHORTNESS OF BREATH/DYSPNEA OR WHEEZING       Short-Acting Beta Agonist Inhalers Protocol  Failed - 2/17/2020  8:38 AM        Failed - Asthma control assessment score within normal limits in last 6 months     Please review ACT score.       ACT Total Scores 6/12/2019 7/31/2019 2/6/2020   ACT TOTAL SCORE (Goal Greater than or Equal to 20) 17 22 6   In the past 12 months, how many times did you visit the emergency room for your asthma without being admitted to the hospital? 0 0 0   In the past 12 months, how many times were you hospitalized overnight because of your asthma? 0 0 0                 Passed - Patient is age 12 or older        Passed - Medication is active on med list        Passed - Recent (6 mo) or future (30 days) visit within the authorizing provider's specialty     Patient had office visit in the last 6 months or has a visit in the next 30 days with authorizing provider or within the authorizing provider's specialty.  See \"Patient Info\" tab in inbasket, or \"Choose Columns\" in Meds & Orders section of the refill encounter.            " Lmom for pt to return call to schedule appt for medication refills     Pt last office visit was 10/04/18

## 2020-05-27 ENCOUNTER — VIRTUAL VISIT (OUTPATIENT)
Dept: FAMILY MEDICINE | Facility: CLINIC | Age: 45
End: 2020-05-27
Payer: COMMERCIAL

## 2020-05-27 DIAGNOSIS — R06.09 DYSPNEA ON EXERTION: ICD-10-CM

## 2020-05-27 DIAGNOSIS — Z02.89 ENCOUNTER FOR COMPLETION OF FORM WITH PATIENT: Primary | ICD-10-CM

## 2020-05-27 DIAGNOSIS — D84.9 IMMUNOSUPPRESSION (H): ICD-10-CM

## 2020-05-27 DIAGNOSIS — G35 MULTIPLE SCLEROSIS (H): ICD-10-CM

## 2020-05-27 DIAGNOSIS — J45.30 MILD PERSISTENT ASTHMA WITHOUT COMPLICATION: ICD-10-CM

## 2020-05-27 PROCEDURE — 99214 OFFICE O/P EST MOD 30 MIN: CPT | Mod: GT | Performed by: NURSE PRACTITIONER

## 2020-05-27 NOTE — TELEPHONE ENCOUNTER
Please see my chart message below     Please review and advise     Thank you     Amelia Chance RN, BSN  [place] [facility]

## 2020-05-27 NOTE — PROGRESS NOTES
"Josefina Aldrich is a 44 year old female who is being evaluated via a billable video visit.      The patient has been notified of following:     \"This video visit will be conducted via a call between you and your physician/provider. We have found that certain health care needs can be provided without the need for an in-person physical exam.  This service lets us provide the care you need with a video conversation.  If a prescription is necessary we can send it directly to your pharmacy.  If lab work is needed we can place an order for that and you can then stop by our lab to have the test done at a later time.    Video visits are billed at different rates depending on your insurance coverage.  Please reach out to your insurance provider with any questions.    If during the course of the call the physician/provider feels a video visit is not appropriate, you will not be charged for this service.\"    Patient has given verbal consent for Video visit? Yes    How would you like to obtain your AVS? Lesli    Patient would like the video invitation sent by: Text to cell phone: [phone]    Will anyone else be joining your video visit? No      Subjective     Josefina Aldrich is a 44 year old female who presents today via video visit for the following health issues:    HPI    FMLA forms     Has MS and asthma. Pneumonia in 12/2019 and she has had dyspnea with exertion since.  Has been working from home during COVID due to being high risk. Needs paperwork completed for return to work date.     Job title:  Patient .   Suppose to return to work June 1st.  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   Needs the official paperwork to clarify going back to work June 15th.    Current options are move to desk with 3 feet space away from others.     We are waiting for form to get to our office.   Here are the " "questions patient reports:  What is the current course of treatment-stay home during COVID given high risk?  MS/Immuncompromise/SOB/Asthma-Allergies/Asthma Neb, two nasal sprays and allergy-sending to pul  Support Transitional return to work-yes if accommodations met  Daily SOB since pneumonia-frequency during day? In 8 hours sitting?4 hours walk? 2 hours Standing? 2 hours  Lift and carry? Pounds 10 pounds max, frequency? Not constant 1 hour total in a day  Push and pull? Pounds 10 pounds frequency not constant 4 hours total in day   Overhead reach no restriction  Keyboard-no restriction  Bend/stoop-occasionally   Squat/crouch-occasionally  Climb-none due to SOB    Are there any other restrictions? Minimize exposure to ill patients given very high risk medical conditions. Minimize face to face patient interactions.   How long current through 7/31/20.  Permanent yes or no? unknown likely just due to pandemic  If accommodations are feasible when to return? Yes-can return June 15th    Video Start Time: 8:48 AM    Reviewed and updated as needed this visit by Provider  Tobacco  Allergies  Meds  Problems  Med Hx  Surg Hx  Fam Hx         Review of Systems   Constitutional, HEENT, cardiovascular, pulmonary, GI, , musculoskeletal, neuro, skin, endocrine and psych systems are negative, except as otherwise noted in the HPI.      Objective    There were no vitals taken for this visit.  Estimated body mass index is 43.43 kg/m  as calculated from the following:    Height as of 3/19/20: 1.651 m (5' 5\").    Weight as of 4/17/20: 118.4 kg (261 lb).  Physical Exam     GENERAL: Healthy, alert and no distress  EYES: Eyes grossly normal to inspection.  No discharge or erythema, or obvious scleral/conjunctival abnormalities.  RESP: No audible wheeze, cough, or visible cyanosis.  No visible retractions or increased work of breathing.    SKIN: Visible skin clear. No significant rash, abnormal pigmentation or lesions.  NEURO: " Cranial nerves grossly intact.  Mentation and speech appropriate for age.  PSYCH: Mentation appears normal, affect normal/bright, judgement and insight intact, normal speech and appearance well-groomed.      Assessment & Plan     Josefina was seen today for forms.    Diagnoses and all orders for this visit:    Encounter for completion of form with patient  Multiple sclerosis (H)  Immunosuppression (H)  Mild persistent asthma without complication  Forms will be completed once we receive them at clinic. It is reasonable to continue to stay out of clinic at this time.   Accommodations need to be in place also when she returns to work to minimize her exposure.   Follow up in 4 weeks.   Josefina verbalizes understanding of plan of care and is in agreement.   -     PULMONARY MEDICINE REFERRAL    Dyspnea on exertion  Will send to pulmonology since she has continued SOB with exertion since her pneumonia in December.   Red flag symptoms discussed and if these occur present to the emergency room or call 911.  Follow up in 4 weeks.   Josefina verbalizes understanding of plan of care and is in agreement.   -     PULMONARY MEDICINE REFERRAL      Return in about 4 weeks (around 6/24/2020) for Recheck.    ELENA Michael  [facility]      Video-Visit Details    Type of service:  Video Visit    Video End Time:9:15 AM    Originating Location (pt. Location): Home    Distant Location (provider location):  [facility]     Platform used for Video Visit: [employer]    Return in about 4 weeks (around 6/24/2020) for Recheck.         ELENA Michael

## 2020-05-28 ENCOUNTER — TELEPHONE (OUTPATIENT)
Dept: FAMILY MEDICINE | Facility: CLINIC | Age: 45
End: 2020-05-28

## 2020-05-28 NOTE — TELEPHONE ENCOUNTER
Leonie Collazo did the visit for these forms, but she virtual out of office this week.    Leonie, Wondering if you would like these forms emailed to you or if you have a fax to send them to?     She needs these completed prior to June 1st.     Guillermina placed forms in Providers folder up front.     Susan Ahmadi MA

## 2020-05-28 NOTE — TELEPHONE ENCOUNTER
NO e-mail will not work. I will need to find time to come to come to clinic to do this. With drive time and completion that will be an hour of time. I will have to look at what my schedule will allow and block some time I think.    Feel free to call me to discuss.         Leonie Collazo, FNP-BC

## 2020-05-28 NOTE — TELEPHONE ENCOUNTER
Reason for Call:  Form, our goal is to have forms completed with 72 hours, however, some forms may require a visit or additional information.    Type of letter, form or note:  FMLA     Who is the form from?: [employer] (if other please explain)    Where did the form come from: form was faxed in    What clinic location was the form placed at?: [place]    Where the form was placed: Given to MA/RN    What number is listed as a contact on the form?: [phone]       Additional comments: Forms given to Leonie Lee MA     Call taken on 5/28/2020 at 8:28 AM by Ashlee Garcia

## 2020-05-29 ENCOUNTER — TELEPHONE (OUTPATIENT)
Dept: FAMILY MEDICINE | Facility: CLINIC | Age: 45
End: 2020-05-29

## 2020-05-29 ENCOUNTER — MYC MEDICAL ADVICE (OUTPATIENT)
Dept: FAMILY MEDICINE | Facility: CLINIC | Age: 45
End: 2020-05-29

## 2020-05-29 NOTE — TELEPHONE ENCOUNTER
See 5/28/20 telephone encounter  MyChart Message sent    Susannah Selby RN  [facility]

## 2020-05-29 NOTE — TELEPHONE ENCOUNTER
Reason for Call:  Other Medical Records     Detailed comments: Josefina needs her asthma, pneumonia, lung related visit notes sent to the pulmonologist.     Please fax to [facility]    Fax #: [phone] ATTN MR    Dr. Gee     Visit is on Monday. Please fax ASAP     Phone Number Patient can be reached at: Home number on file [phone] (home)    Best Time: anytime    Can we leave a detailed message on this number? YES    Call taken on 5/29/2020 at 10:00 AM by Carmen Stroud

## 2020-06-01 ENCOUNTER — TRANSFERRED RECORDS (OUTPATIENT)
Dept: HEALTH INFORMATION MANAGEMENT | Facility: CLINIC | Age: 45
End: 2020-06-01

## 2020-06-04 ENCOUNTER — MYC MEDICAL ADVICE (OUTPATIENT)
Dept: FAMILY MEDICINE | Facility: CLINIC | Age: 45
End: 2020-06-04

## 2020-06-04 DIAGNOSIS — H10.31 ACUTE CONJUNCTIVITIS OF RIGHT EYE, UNSPECIFIED ACUTE CONJUNCTIVITIS TYPE: Primary | ICD-10-CM

## 2020-06-04 RX ORDER — GENTAMICIN SULFATE 3 MG/ML
1 SOLUTION/ DROPS OPHTHALMIC EVERY 4 HOURS
Qty: 2 ML | Refills: 0 | Status: SHIPPED | OUTPATIENT
Start: 2020-06-04 | End: 2020-08-03

## 2020-06-04 NOTE — TELEPHONE ENCOUNTER
Disp  Refills  Start  End  TRISH    ketotifen (ZADITOR) 0.025 % ophthalmic solution  1 Bottle  3  5/1/2020   --    Sig - Route: Place 1 drop into both eyes 2 times daily as needed for itching - Both Eyes      Wisht Message sent    Susannah Selby RN  [facility]

## 2020-06-04 NOTE — TELEPHONE ENCOUNTER
Routing to PCP for further review/recommendations/orders.  Advise garamycin? (pended)    Susannah Selby RN  [facility]

## 2020-06-04 NOTE — TELEPHONE ENCOUNTER
Please review further with patient - allergy issues    With green discharge - garamycin     If any allergy sx zyrtec 10 mg daily

## 2020-06-07 ENCOUNTER — MYC MEDICAL ADVICE (OUTPATIENT)
Dept: FAMILY MEDICINE | Facility: CLINIC | Age: 45
End: 2020-06-07

## 2020-06-07 DIAGNOSIS — H10.9 CONJUNCTIVITIS: Primary | ICD-10-CM

## 2020-06-08 ENCOUNTER — MYC MEDICAL ADVICE (OUTPATIENT)
Dept: FAMILY MEDICINE | Facility: CLINIC | Age: 45
End: 2020-06-08

## 2020-06-08 RX ORDER — POLYMYXIN B SULFATE AND TRIMETHOPRIM 1; 10000 MG/ML; [USP'U]/ML
1-2 SOLUTION OPHTHALMIC EVERY 4 HOURS
Qty: 10 ML | Refills: 0 | Status: SHIPPED | OUTPATIENT
Start: 2020-06-08 | End: 2020-08-03

## 2020-06-08 NOTE — TELEPHONE ENCOUNTER
Please see my chart message below     Please review and advise     Please note pt has sent 2 my chart messages dated for 6/8/2020 with pictures of her eye.     Please review and advise     Thank you     Amelia Chance RN, BSN  [facility]

## 2020-06-08 NOTE — TELEPHONE ENCOUNTER
Called patient @ [phone] -     Patient noted the sx are in her R eye.   Rx sent    Susannah Selby RN  [facility]

## 2020-06-08 NOTE — TELEPHONE ENCOUNTER
Please see other mychart encounter with notes. I am closing this one.         Leonie Collazo, FNP-BC

## 2020-06-08 NOTE — TELEPHONE ENCOUNTER
Please see my chart message below     Please see other my chart message dated from today 6/8/2020 and 6/7/2020    Thank you     Amelia Chance RN, BSN  [facility]

## 2020-06-08 NOTE — TELEPHONE ENCOUNTER
Please also clarify which eye is affecting-she reports both left and right on separate occasion- is it both or just one? According to picture today it is just one? [email or web address]    Thanks,      Leonie Collazo, FNP-BC

## 2020-06-08 NOTE — TELEPHONE ENCOUNTER
I will switch her drops that is just fine- but if she continues to have symptoms or worsens she needs seen or video visit please. Please confirm pharmacy and send. She is also trying to go back to work next week and is high risk for COVID infection-autoimmune disease MS. I am am fine with COVID testing. If she wants lets get this ordered for a symptomatic patient.       Leonie Collazo, FNP-BC

## 2020-06-08 NOTE — TELEPHONE ENCOUNTER
Please see my chart message below     Please review and advised on this my chart as with the one dated 6/7/2020    Thank you     Amelia Chance RN, BSN  [facility]

## 2020-06-11 ENCOUNTER — TELEPHONE (OUTPATIENT)
Dept: FAMILY MEDICINE | Facility: CLINIC | Age: 45
End: 2020-06-11

## 2020-06-11 NOTE — TELEPHONE ENCOUNTER
Date Forms was received: June 11, 2020    Forms received by: Fax    Purpose of Form:  Return to work    When the form is due:  asap    How the form needs to be returned for patient:  Fax    Form currently placed  Given to Leonie Miller

## 2020-06-11 NOTE — TELEPHONE ENCOUNTER
Completed forms faxed back to [employer] at [phone].   Originals sent to be scanned.     Anju Miller

## 2020-06-12 ENCOUNTER — INFUSION THERAPY VISIT (OUTPATIENT)
Dept: INFUSION THERAPY | Facility: CLINIC | Age: 45
End: 2020-06-12
Attending: PSYCHIATRY & NEUROLOGY
Payer: COMMERCIAL

## 2020-06-12 VITALS
RESPIRATION RATE: 16 BRPM | SYSTOLIC BLOOD PRESSURE: 104 MMHG | DIASTOLIC BLOOD PRESSURE: 71 MMHG | HEART RATE: 67 BPM | TEMPERATURE: 97.8 F | OXYGEN SATURATION: 100 %

## 2020-06-12 DIAGNOSIS — D50.0 IRON DEFICIENCY ANEMIA DUE TO CHRONIC BLOOD LOSS: Primary | ICD-10-CM

## 2020-06-12 DIAGNOSIS — G35 MULTIPLE SCLEROSIS (H): ICD-10-CM

## 2020-06-12 PROCEDURE — 25000128 H RX IP 250 OP 636: Performed by: PSYCHIATRY & NEUROLOGY

## 2020-06-12 PROCEDURE — 25800030 ZZH RX IP 258 OP 636: Performed by: PSYCHIATRY & NEUROLOGY

## 2020-06-12 PROCEDURE — 96365 THER/PROPH/DIAG IV INF INIT: CPT

## 2020-06-12 RX ORDER — HEPARIN SODIUM,PORCINE 10 UNIT/ML
5 VIAL (ML) INTRAVENOUS
Status: CANCELLED | OUTPATIENT
Start: 2020-07-10

## 2020-06-12 RX ORDER — HEPARIN SODIUM (PORCINE) LOCK FLUSH IV SOLN 100 UNIT/ML 100 UNIT/ML
5 SOLUTION INTRAVENOUS
Status: CANCELLED | OUTPATIENT
Start: 2020-07-10

## 2020-06-12 RX ADMIN — NATALIZUMAB 300 MG: 300 INJECTION INTRAVENOUS at 12:58

## 2020-06-12 RX ADMIN — SODIUM CHLORIDE 250 ML: 9 INJECTION, SOLUTION INTRAVENOUS at 13:06

## 2020-06-12 NOTE — PROGRESS NOTES
Infusion Nursing Note:  Josefina Aldrich presents today for tysabri.    Patient seen by provider today: No   present during visit today: Not Applicable.    Note: N/A.    Intravenous Access:  Peripheral IV placed.    Treatment Conditions:  Tysabri pre-infusion checklist completed via touch program.      Post Infusion Assessment:  Patient tolerated infusion without incident.  Patient observed for 60 minutes post Tysabri per protocol.  Site patent and intact, free from redness, edema or discomfort.  No evidence of extravasations.  Access discontinued per protocol.       Discharge Plan:   Discharge instructions reviewed with: Patient.  Patient and/or family verbalized understanding of discharge instructions and all questions answered.  AVS to patient via [employer].  Patient will return 7/10 for next appointment.   Patient discharged in stable condition accompanied by: self.  Departure Mode: Ambulatory.    Radha Lee RN

## 2020-06-15 DIAGNOSIS — M79.605 PAIN OF LEFT LOWER EXTREMITY: ICD-10-CM

## 2020-06-15 NOTE — TELEPHONE ENCOUNTER
gabapentin (NEURONTIN) 300 MG capsule          Last Written Prescription Date:  4/8/19  Last Fill Quantity: 90 tablet,  # refills: 3   Last office visit: 5/27/2020 with prescribing provider:  ANDREW Thurston     Future Office Visit:        Routing refill request to provider for review/approval because:  Drug not on the [facility], [facility] or [facility] refill protocol or controlled substance

## 2020-06-16 RX ORDER — GABAPENTIN 300 MG/1
600 CAPSULE ORAL AT BEDTIME
Qty: 180 CAPSULE | Refills: 1 | Status: SHIPPED | OUTPATIENT
Start: 2020-06-16 | End: 2020-09-08

## 2020-06-23 DIAGNOSIS — N89.8 VAGINAL ITCHING: ICD-10-CM

## 2020-06-23 RX ORDER — FLUCONAZOLE 150 MG/1
TABLET ORAL
Qty: 1 TABLET | Refills: 0 | Status: SHIPPED | OUTPATIENT
Start: 2020-06-23 | End: 2020-08-11

## 2020-06-23 NOTE — TELEPHONE ENCOUNTER
Routing refill request to provider for review/approval because:  Drug not active on patient's medication list    Huma RIVERS RN  EP Triage

## 2020-07-10 ENCOUNTER — INFUSION THERAPY VISIT (OUTPATIENT)
Dept: INFUSION THERAPY | Facility: CLINIC | Age: 45
End: 2020-07-10
Attending: PSYCHIATRY & NEUROLOGY
Payer: COMMERCIAL

## 2020-07-10 VITALS
RESPIRATION RATE: 16 BRPM | OXYGEN SATURATION: 100 % | SYSTOLIC BLOOD PRESSURE: 100 MMHG | DIASTOLIC BLOOD PRESSURE: 69 MMHG | TEMPERATURE: 99.6 F | HEART RATE: 73 BPM

## 2020-07-10 DIAGNOSIS — G35 MULTIPLE SCLEROSIS (H): Primary | ICD-10-CM

## 2020-07-10 PROCEDURE — 96365 THER/PROPH/DIAG IV INF INIT: CPT

## 2020-07-10 PROCEDURE — 25000128 H RX IP 250 OP 636: Performed by: PSYCHIATRY & NEUROLOGY

## 2020-07-10 PROCEDURE — 25800030 ZZH RX IP 258 OP 636: Performed by: PSYCHIATRY & NEUROLOGY

## 2020-07-10 PROCEDURE — 99207 ZZC NO CHARGE NURSE ONLY: CPT

## 2020-07-10 RX ORDER — HEPARIN SODIUM,PORCINE 10 UNIT/ML
5 VIAL (ML) INTRAVENOUS
Status: CANCELLED | OUTPATIENT
Start: 2020-07-15

## 2020-07-10 RX ORDER — HEPARIN SODIUM (PORCINE) LOCK FLUSH IV SOLN 100 UNIT/ML 100 UNIT/ML
5 SOLUTION INTRAVENOUS
Status: CANCELLED | OUTPATIENT
Start: 2020-07-15

## 2020-07-10 RX ADMIN — SODIUM CHLORIDE 250 ML: 9 INJECTION, SOLUTION INTRAVENOUS at 13:47

## 2020-07-10 RX ADMIN — NATALIZUMAB 300 MG: 300 INJECTION INTRAVENOUS at 13:54

## 2020-07-10 NOTE — PROGRESS NOTES
Infusion Nursing Note:  Josefina Aldrich presents today for Tysabri.    Patient seen by provider today: No   present during visit today: Not Applicable.    Note:  Patient reports is feeling well and offers no new complaints or issues.  Patient discussed that Tysabri is helping to manage her MS. Patient denies fevers, chills or signs of infection.    Intravenous Access:  Peripheral IV placed.  PIV site C/D/I.  PIV flushes well + excellent blood return.  **Patient requests to have labs drawn at next visit (AST and SABINO virus). She does not want drawn today**.    Treatment Conditions:  Tysabri pre-infusion checklist completed via touch program.      Post Infusion Assessment:  Patient tolerated infusion without incident.  Patient observed for 60 minutes post Tysabri infusion per protocol.  Blood return noted pre and post infusion.  Site patent and intact, free from redness, edema or discomfort.  No evidence of extravasations.  Access discontinued per protocol.   Temp = 99.6 1 hour post completion of Tysabri.  Baseline temp = 98.7 and 99.0.  Patient reports it is normal for her to run a low grade temperature after completion of her Tysabri.  Patient reports is feeling well and offers no complaints.  Writer instructed patient to contact physician if temperature continues to elevate or with any other problems or concerns.  Patient verbalized understanding of plan.      Discharge Plan:   Discharge instructions reviewed with: Patient.  Patient discharged in stable condition accompanied by: self.  Departure Mode: Ambulatory.  8/7/20: Labs + Tysabri infusion.    Ami Selby RN, BSN, OCN on 7/10/2020 at 3:18 PM

## 2020-07-12 DIAGNOSIS — B00.9 HSV (HERPES SIMPLEX VIRUS) INFECTION: ICD-10-CM

## 2020-07-14 RX ORDER — VALACYCLOVIR HYDROCHLORIDE 500 MG/1
TABLET, FILM COATED ORAL
Qty: 90 TABLET | Refills: 3 | Status: SHIPPED | OUTPATIENT
Start: 2020-07-14 | End: 2020-09-08

## 2020-07-16 ENCOUNTER — MYC MEDICAL ADVICE (OUTPATIENT)
Dept: FAMILY MEDICINE | Facility: CLINIC | Age: 45
End: 2020-07-16

## 2020-07-16 NOTE — TELEPHONE ENCOUNTER
Please see "[employer] message and advise. Thank you very much.    Valeria Aldana RN, BSN  [facility]

## 2020-07-17 ENCOUNTER — E-VISIT (OUTPATIENT)
Dept: FAMILY MEDICINE | Facility: CLINIC | Age: 45
End: 2020-07-17
Payer: COMMERCIAL

## 2020-07-17 DIAGNOSIS — H10.30 ACUTE BACTERIAL CONJUNCTIVITIS, UNSPECIFIED LATERALITY: Primary | ICD-10-CM

## 2020-07-17 PROCEDURE — 99207 ZZC NON-BILLABLE SERV PER CHARTING: CPT | Performed by: NURSE PRACTITIONER

## 2020-07-17 RX ORDER — OFLOXACIN 3 MG/ML
1-2 SOLUTION/ DROPS OPHTHALMIC EVERY 4 HOURS
Qty: 10 ML | Refills: 0 | Status: SHIPPED | OUTPATIENT
Start: 2020-07-17 | End: 2020-09-08

## 2020-07-17 NOTE — PATIENT INSTRUCTIONS
Thank you for choosing us for your care. I have placed an order for a prescription so that you can start treatment. View your full visit summary for details by clicking on the link below. Your pharmacist will able to address any questions you may have about the medication.     If you re not feeling better within 2-3 days, please schedule an appointment.  You can schedule an appointment right here in [facility], or call [phone]  If the visit is for the same symptoms as your e-visit, we ll refund the cost of your e-visit if seen within seven days.    If not helpful would recommend trying allergy eye drop as a next step-Ketotifen over the counter

## 2020-07-27 ENCOUNTER — MYC MEDICAL ADVICE (OUTPATIENT)
Dept: FAMILY MEDICINE | Facility: CLINIC | Age: 45
End: 2020-07-27

## 2020-07-27 NOTE — TELEPHONE ENCOUNTER
Letter faxed to [phone] per patients request.  Also mailed copy to her home.      Guillermina Garcia

## 2020-07-27 NOTE — LETTER
[facility] [address] [id number] [phone]        July 27, 2020      Josefina VYAS Venkata Aldrich  [id number]  [place] [address]      Dear Josefina,    As your health care provider, I am concerned that your age and/or underlying medical condition puts you at particularly high risk for serious complications should you contract a COVID-19 infection.     Specifically, you have the following conditions/diagnoses, included as high risk conditions per the Centers for Disease Control and Prevention at CDC.gov.     Autoimmune condition-MS  Asthma    COVID-19 is a new disease and there is limited information regarding risk factors for severe disease. Based on currently available information and clinical expertise, older adults and people of any age who have serious underlying medical conditions might be at higher risk for severe illness from COVID-19.     It is my medical opinion that you should avoid close contact with others and work from home if possible during this COVID-19 pandemic. If working it is very important to have good hand hygiene, wear a mask, and have 6 foot distance from co-workers. This is indefinitely until we are out of the danger of this pandemic.     MARTÍNEZ Michael [facility]

## 2020-07-28 DIAGNOSIS — J45.30 MILD PERSISTENT ASTHMA, UNSPECIFIED WHETHER COMPLICATED: ICD-10-CM

## 2020-07-28 RX ORDER — MONTELUKAST SODIUM 10 MG/1
TABLET ORAL
Qty: 90 TABLET | Refills: 0 | Status: SHIPPED | OUTPATIENT
Start: 2020-07-28 | End: 2020-09-08

## 2020-07-29 ENCOUNTER — ANCILLARY PROCEDURE (OUTPATIENT)
Dept: GENERAL RADIOLOGY | Facility: CLINIC | Age: 45
End: 2020-07-29
Attending: NURSE PRACTITIONER
Payer: COMMERCIAL

## 2020-07-29 ENCOUNTER — NURSE TRIAGE (OUTPATIENT)
Dept: FAMILY MEDICINE | Facility: CLINIC | Age: 45
End: 2020-07-29

## 2020-07-29 ENCOUNTER — OFFICE VISIT (OUTPATIENT)
Dept: URGENT CARE | Facility: URGENT CARE | Age: 45
End: 2020-07-29
Payer: COMMERCIAL

## 2020-07-29 VITALS
TEMPERATURE: 97.8 F | WEIGHT: 266 LBS | HEART RATE: 91 BPM | SYSTOLIC BLOOD PRESSURE: 115 MMHG | RESPIRATION RATE: 16 BRPM | BODY MASS INDEX: 44.26 KG/M2 | OXYGEN SATURATION: 100 % | DIASTOLIC BLOOD PRESSURE: 70 MMHG

## 2020-07-29 DIAGNOSIS — R07.1 CHEST PAIN ON BREATHING: ICD-10-CM

## 2020-07-29 DIAGNOSIS — R07.1 CHEST PAIN ON BREATHING: Primary | ICD-10-CM

## 2020-07-29 DIAGNOSIS — J45.41 MODERATE PERSISTENT ASTHMA WITH EXACERBATION: ICD-10-CM

## 2020-07-29 LAB
BASOPHILS # BLD AUTO: 0 10E9/L (ref 0–0.2)
BASOPHILS NFR BLD AUTO: 0.3 %
DIFFERENTIAL METHOD BLD: ABNORMAL
EOSINOPHIL # BLD AUTO: 0.2 10E9/L (ref 0–0.7)
EOSINOPHIL NFR BLD AUTO: 1.7 %
ERYTHROCYTE [DISTWIDTH] IN BLOOD BY AUTOMATED COUNT: 13.2 % (ref 10–15)
HCT VFR BLD AUTO: 37.5 % (ref 35–47)
HGB BLD-MCNC: 11.6 G/DL (ref 11.7–15.7)
LYMPHOCYTES # BLD AUTO: 5.9 10E9/L (ref 0.8–5.3)
LYMPHOCYTES NFR BLD AUTO: 53.7 %
MCH RBC QN AUTO: 30.1 PG (ref 26.5–33)
MCHC RBC AUTO-ENTMCNC: 30.9 G/DL (ref 31.5–36.5)
MCV RBC AUTO: 97 FL (ref 78–100)
MONOCYTES # BLD AUTO: 0.9 10E9/L (ref 0–1.3)
MONOCYTES NFR BLD AUTO: 8 %
NEUTROPHILS # BLD AUTO: 4 10E9/L (ref 1.6–8.3)
NEUTROPHILS NFR BLD AUTO: 36.3 %
PLATELET # BLD AUTO: 346 10E9/L (ref 150–450)
RBC # BLD AUTO: 3.86 10E12/L (ref 3.8–5.2)
TROPONIN I SERPL-MCNC: <0.015 UG/L (ref 0–0.04)
WBC # BLD AUTO: 10.9 10E9/L (ref 4–11)

## 2020-07-29 PROCEDURE — 93000 ELECTROCARDIOGRAM COMPLETE: CPT | Performed by: NURSE PRACTITIONER

## 2020-07-29 PROCEDURE — U0003 INFECTIOUS AGENT DETECTION BY NUCLEIC ACID (DNA OR RNA); SEVERE ACUTE RESPIRATORY SYNDROME CORONAVIRUS 2 (SARS-COV-2) (CORONAVIRUS DISEASE [COVID-19]), AMPLIFIED PROBE TECHNIQUE, MAKING USE OF HIGH THROUGHPUT TECHNOLOGIES AS DESCRIBED BY CMS-2020-01-R: HCPCS | Performed by: NURSE PRACTITIONER

## 2020-07-29 PROCEDURE — 71046 X-RAY EXAM CHEST 2 VIEWS: CPT

## 2020-07-29 PROCEDURE — 36415 COLL VENOUS BLD VENIPUNCTURE: CPT | Performed by: NURSE PRACTITIONER

## 2020-07-29 PROCEDURE — 85025 COMPLETE CBC W/AUTO DIFF WBC: CPT | Performed by: NURSE PRACTITIONER

## 2020-07-29 PROCEDURE — 99214 OFFICE O/P EST MOD 30 MIN: CPT | Performed by: NURSE PRACTITIONER

## 2020-07-29 PROCEDURE — 84484 ASSAY OF TROPONIN QUANT: CPT | Performed by: NURSE PRACTITIONER

## 2020-07-29 RX ORDER — PREDNISONE 10 MG/1
TABLET ORAL
Qty: 30 TABLET | Refills: 0 | Status: SHIPPED | OUTPATIENT
Start: 2020-07-29 | End: 2020-09-08

## 2020-07-29 NOTE — TELEPHONE ENCOUNTER
Spoke with patient on the phone who presents with chest tightness and chest pain on the upper L. Side of chest. Denies any radiation to other areas such as neck, jaw, arms, or back.     Patient states her asthma symptoms have been exacerbated and has needed to use her rescue inhaler more, which does alleviate her symptoms.     Patient states her chest pain/tightness is constant and full, but with deep breaths patient gets sharp pain. The pain overall is moderate per patient statement.    Patient states last in December of 2019 she was diagnosed with Pneumonia which patient stated her symptoms are very similar to back in December.     Patient denies any fevers, currently struggling to breath, numbness or tingling, nasal congestion, cough, sinus pain, ear pain or ache, dizziness, nausea, vomiting, sweating.     Patient states she has a history of MS as well. RN advised with patients symptoms that she needs to be evaluated today by a provider. There are no openings at the [facility], [facility] or [facility] Clinics. RN advised [facility] at this time. Patient will go to [facility] today. Patient verbalized understanding and agrees with plan.      RN advised if symptoms worsen in the mean time she needs to call 911 or go to ED. Patient verbalized understanding and agrees with plan.       Additional Information    Negative: SEVERE chest pain    Negative: Pain also present in shoulder(s) or arm(s) or jaw    Negative: Difficulty breathing    Negative: Cocaine use within last 3 days    Negative: History of prior 'blood clot' in leg or lungs (i.e., deep vein thrombosis, pulmonary embolism)    Negative: Recent illness requiring prolonged bed rest (i.e., immobilization)    Negative: Followed an injury to chest    Negative: Severe difficulty breathing (e.g., struggling for each breath, speaks in single words)    Negative: Passed out (i.e., fainted, collapsed and was not responding)    Negative: Sounds like a life-threatening emergency to the triager     "Negative: Visible sweat on face or sweat dripping down face    Negative: Chest pain lasting longer than 5 minutes and ANY of the following:* Over 50 years old* Over 30 years old and at least one cardiac risk factor (i.e., high blood pressure, diabetes, high cholesterol, obesity, smoker or strong family history of heart disease)* Pain is crushing, pressure-like, or heavy * Took nitroglycerin and chest pain was not relieved* History of heart disease (i.e., angina, heart attack, bypass surgery, angioplasty, CHF)    Negative: Hip or leg fracture in past 2 months (e.g, or had cast on leg or ankle)    Negative: Major surgery in the past month    Negative: Recent long-distance travel with prolonged time in car, bus, plane, or train (i.e., within past 2 weeks; 6 or more hours duration)    Negative: Heart beating irregularly or very rapidly    Chest pain lasting longer than 5 minutes    Answer Assessment - Initial Assessment Questions  1. LOCATION: \"Where does it hurt?\"        L upper chest. Patient went to work today and seemed that her asthma symptoms were triggered and she took her inhaler and after 20 minutes she has been okay.  When patients asthma flares up, she gets a raspy voice and the tightness in the upper L. Side of chest. Patient had pneumonia back in Dec of 2019 and is concerned of this. No fever, no cough, no headaches, no earaches, no sore throat.   2. RADIATION: \"Does the pain go anywhere else?\" (e.g., into neck, jaw, arms, back)      No, per patient statement.   3. ONSET: \"When did the chest pain begin?\" (Minutes, hours or days)       This morning. When patient woke up this morning.   4. PATTERN \"Does the pain come and go, or has it been constant since it started?\"  \"Does it get worse with exertion?\"       Constant  5. DURATION: \"How long does it last\" (e.g., seconds, minutes, hours)      Patient has full pain in the chest, but sharp pain when breathing in deeply. Patient has been having a constant pain.  " "  6. SEVERITY: \"How bad is the pain?\"  (e.g., Scale 1-10; mild, moderate, or severe)     - MILD (1-3): doesn't interfere with normal activities      - MODERATE (4-7): interferes with normal activities or awakens from sleep     - SEVERE (8-10): excruciating pain, unable to do any normal activities        The chest pain is Moderate. Along with tightness. Sharp pain  7. CARDIAC RISK FACTORS: \"Do you have any history of heart problems or risk factors for heart disease?\" (e.g., prior heart attack, angina; high blood pressure, diabetes, being overweight, high cholesterol, smoking, or strong family history of heart disease)  Patient had a stroke in 2015, but was told it was not a full stroke per patient and related to her diagnosis of MS.   8. PULMONARY RISK FACTORS: \"Do you have any history of lung disease?\"  (e.g., blood clots in lung, asthma, emphysema, birth control pills)      Patient has asthma. No other history besides asthma per patient statement  9. CAUSE: \"What do you think is causing the chest pain?\"      Unknown.   10. OTHER SYMPTOMS: \"Do you have any other symptoms?\" (e.g., dizziness, nausea, vomiting, sweating, fever, difficulty breathing, cough)   None, other than patient has intermittent difficulties with breathing. Patient has an inhaler for asthma.   11. PREGNANCY: \"Is there any chance you are pregnant?\" \"When was your last menstrual period?\"       No.    Protocols used: CHEST PAIN-A-OH    Valeria Aldana RN, BSN  [facility]    "

## 2020-07-29 NOTE — PATIENT INSTRUCTIONS
"Discharge Instructions for COVID-19 Patients  You have--or may have--COVID-19. Please follow the instructions listed below.   If you have a weakened immune system, discuss with your doctor any other actions you need to take.  How can I protect others?  If you have symptoms (fever, cough, body aches or trouble breathing):    Stay home and away from others (self-isolate) until:  ? At least 10 days have passed since your symptoms started. And   ? You've had no fever--and no medicine that reduces fever--for 3 full days (72 hours). And   ? Your other symptoms have resolved (gotten better).  If you don't show symptoms, but testing showed that you have COVID-19:    Stay home and away from others (self-isolate) until at least 10 days have passed since the date of your first positive COVID-19 test.  During this time    Stay in your own room, even for meals. Use your own bathroom if you can.    Stay away from others in your home. No hugging, kissing or shaking hands. No visitors.    Don't go to work, school or anywhere else.    Clean \"high touch\" surfaces often (doorknobs, counters, handles). Use household cleaning spray or wipes. You'll find a full list of  on the EPA website: www.epa.gov/pesticide-registration/list-n-disinfectants-use-against-sars-cov-2.    Cover your mouth and nose with a mask, tissue or wash cloth to avoid spreading germs.    Wash your hands and face often. Use soap and water.    Caregivers in these groups are at risk for severe illness due to COVID-19:  ? People 65 years and older  ? People who live in a nursing home or long-term care facility  ? People with chronic disease (lung, heart, cancer, diabetes, kidney, liver, immunologic)  ? People who have a weakened immune system, including those who:    Are in cancer treatment    Take medicine that weakens the immune system, such as corticosteroids    Had a bone marrow or organ transplant    Have an immune deficiency    Have poorly controlled HIV or " AIDS    Are obese (body mass index of 40 or higher)    Smoke regularly    Caregivers should wear gloves while washing dishes, handling laundry and cleaning bedrooms and bathrooms.    Use caution when washing and drying laundry: Don't shake dirty laundry and use the warmest water setting that you can.    For more tips on managing your health at home, go to www.cdc.gov/coronavirus/2019-ncov/downloads/10Things.pdf.  How can I take care of myself at home?  1. Get lots of rest. Drink extra fluids (unless a doctor has told you not to).  2. Take Tylenol (acetaminophen) for fever or pain. If you have liver or kidney problems, ask your family doctor if it's okay to take Tylenol.     Adults can take either:  ? 650 mg (two 325 mg pills) every 4 to 6 hours, or   ? 1,000 mg (two 500 mg pills) every 8 hours as needed.  ? Note: Don't take more than 3,000 mg in one day. Acetaminophen is found in many medicines (both prescribed and over-the-counter medicines). Read all labels to be sure you don't take too much.   For children, check the Tylenol bottle for the right dose. The dose is based on the child's age or weight.  3. If you have other health problems (like cancer, heart failure, an organ transplant or severe kidney disease): Call your specialty clinic if you don't feel better in the next 2 days.  4. Know when to call 911. Emergency warning signs include:  ? Trouble breathing or shortness of breath  ? Pain or pressure in the chest that doesn't go away  ? Feeling confused like you haven't felt before, or not being able to wake up  ? Bluish-colored lips or face  5. Your doctor may have prescribed a blood thinner medicine. Follow their instructions.  Where can I get more information?     [employer] - About COVID-19:   [email or web address]    CDC - What to Do If You're Sick: www.cdc.gov/coronavirus/2019-ncov/about/steps-when-sick.html    CDC - Ending Home Isolation:  www.cdc.gov/coronavirus/2019-ncov/hcp/disposition-in-home-patients.html    CDC - Caring for Someone: www.cdc.gov/coronavirus/2019-ncov/if-you-are-sick/care-for-someone.html    [facility] - Interim Guidance for Hospital Discharge to Home: www.health.[facility].[place]/diseases/coronavirus/hcp/hospdischarge.pdf    [facility] clinical trials (COVID-19 research studies): clinicalaffairs.[facility]/um-clinical-trials    Below are the COVID-19 hotlines at the [place] Department of Health ([facility]). Interpreters are available.  ? For health questions: Call 705-112-7304 or 1-972.955.2702 (7 a.m. to 7 p.m.)  ? For questions about schools and childcare: Call 768-729-2936 or 1-128.381.1742 (7 a.m. to 7 p.m.)    For informational purposes only. Not to replace the advice of your health care provider. Clinically reviewed by the Infection Prevention Team.Copyright   2020 [facility]. All rights reserved. [employer] [id number] - 06/20.      Patient Education     Asthma (Adult)  Asthma is a disease where the medium and  small air passages within the lung go into spasm and restrict the flow of air. Inflammation and swelling of the airways cause further blockage. During an acute asthma attack, these factors cause trouble breathing, wheezing, cough and chest tightness.    An asthma attack can be triggered by many things. Common triggers include infections such as the common cold, bronchitis, and pneumonia. Irritants such as smoke or pollutants in the air, very cold air, emotional upset, and exercise can also trigger an attack. In many adults with asthma, allergies to dust, mold, pollen and animal dander can cause an asthma attack. Skipping doses of daily asthma medicine can also bring on an asthma attack.  Asthma can be controlled using the proper medicines prescribed by your healthcare provider and avoiding exposure to known triggers including allergens and irritants.  Home care    Take prescribed medicine exactly at the times  "advised. If you need medicine such as from a hand held inhaler or aerosol breathing machine more than every 4 hours, contact your healthcare provider or seek immediate medical attention. If prescribed an antibiotic or prednisone, take all of the medicine as prescribed, even if you are feeling better after a few days.    Don't smoke. Avoid being exposed to the smoke of others.    Some people with asthma have worsening of their symptoms when they take aspirin and non-steroidal or fever-reducing medicines like ibuprofen and naproxen. Talk to your healthcare provider if you think this may apply to you.  Follow-up care  Follow up with your healthcare provider, or as advised. Always bring all of your current medicines to any appointments with your healthcare provider. Also bring a complete list of medicines even those not taken for asthma. If you don't already have one, talk to your healthcare provider about developing your own \"Asthma Action Plan.\"  A pneumococcal (pneumonia) vaccine and yearly flu shot (every fall) are recommended. Ask your doctor about this.  When to seek medical advice  Call your healthcare provider right away if any of these occur:     Increased wheezing or shortness of breath    Need to use your inhalers more often than usual without relief    Fever of 100.4 F (38 C) or higher, or as directed by your healthcare provider    Coughing up lots of dark-colored or bloody sputum (mucus)    Chest pain with each breath    If you use a peak flow meter as part of an Asthma Action Plan, and you are still in the yellow zone (50% to 80%) 15 minutes after using inhaler medicine.  Call 911  Call 911 if any of the following occur    Trouble walking or talking because of shortness of breath    If you use a peak flow meter as part of an Asthma Action Plan and you are still in the red zone (less than 50%) 15 minutes after using inhaler medicine    Lips or fingernails turning gray or blue  Date Last Reviewed: " 5/1/2017 2000-2019 [employer]. [address] [place] [address], [place] [address]. All rights reserved. This information is not intended as a substitute for professional medical care. Always follow your healthcare professional's instructions.

## 2020-07-29 NOTE — LETTER
July 29, 2020      To Whom It May Concern:      Josefina Aldrich was seen in our Urgent Care today, 07/29/20.  She has a COVID-19 test pending and should result in 2-3 days.  Please excuse her from work until her COVID results. She may return to work is this is negative.     Sincerely,        MARTÍNEZ Mead CNP

## 2020-07-29 NOTE — PROGRESS NOTES
SUBJECTIVE:   Josefina Aldrich is a 45 year old female presenting with a chief complaint of wheezing and chest tightness.   Onset of symptoms was 1 day(s) ago.  Course of illness is worsening.    Severity moderate  Current and Associated symptoms: chest tightness  Treatment measures tried include tea.  Chest tightness has been present since going up the stairs at work this am.  It is located midsternal (more to the left) and is constant and dull, but when she takes a deep breath it is sharp.  Feels similar to past episode of pneumonia.  She also has a history of asthma and had some wheezing last night and used her neb machine with relief.   Predisposing factors include seasonal allergies and HX of asthma.  Denies lower extremity swelling, denies personal history of blood clots.  Has family history of MI's at age 40-55 in paternal grandfather and uncles.  She works as a patient care coordinator, denies known COVID exposure. She has a neb machine at home. Denies fever, chills, or frequent cough.        Past Medical History:   Diagnosis Date     Asthma     mild persistent - Dr Gee     Cerebral infarction (H) 2015     Depressive disorder      Fibroids     s/p hysterectomy     H/O gastric bypass      Hypertension      Migraine     followed by Devika     Obstructive sleep apnea      Pre-diabetes      Relapsing remitting multiple sclerosis (H) 2015    lesion in [place].  Facial tingling initial symptom.  F/B [facility]     Current Outpatient Medications   Medication Sig Dispense Refill     albuterol (PROVENTIL) (2.5 MG/3ML) 0.083% neb solution Take 1 vial (2.5 mg) by nebulization every 6 hours as needed for shortness of breath / dyspnea or wheezing 1 Box 3     albuterol (VENTOLIN HFA) 108 (90 BASE) MCG/ACT inhaler Inhale 2 puffs into the lungs every 6 hours as needed        azelastine (ASTELIN) 0.1 % nasal spray Spray 1 spray into both nostrils 2 times daily (nasal antihistamine) 30 mL 3     B Complex-C (VITAMIN B COMPLEX  W/VITAMIN C) TABS tablet Take 1 tablet by mouth daily       baclofen (LIORESAL) 20 MG tablet Take 2 tablets (40 mg) by mouth At Bedtime AND 1 tablet (20 mg) every morning AND 1 tablet (20 mg) daily (with lunch) AND 0.5 tablets (10 mg) See Admin Instructions. (In the afternoon)  10     cetirizine (ZYRTEC) 10 MG tablet TAKE 1 TABLET BY MOUTH TWICE DAILY 180 tablet 1     ferrous sulfate (FEROSUL) 325 (65 Fe) MG tablet Take 325 mg by mouth daily (with breakfast)       fluconazole (DIFLUCAN) 150 MG tablet TAKE 1 TABLET BY MOUTH ONCE FOR 1 DOSE 1 tablet 0     fluticasone (FLONASE) 50 MCG/ACT nasal spray Spray 1 spray into both nostrils daily 16 g 5     gabapentin (NEURONTIN) 300 MG capsule Take 2 capsules (600 mg) by mouth At Bedtime 180 capsule 1     ipratropium - albuterol 0.5 mg/2.5 mg/3 mL (DUONEB) 0.5-2.5 (3) MG/3ML neb solution Take 1 vial (3 mLs) by nebulization every 4 hours as needed for shortness of breath / dyspnea or wheezing (severe asthma symptoms) 90 mL 0     ketotifen (ZADITOR) 0.025 % ophthalmic solution Place 1 drop into both eyes 2 times daily as needed for itching 1 Bottle 3     lisinopril-hydrochlorothiazide (PRINZIDE/ZESTORETIC) 10-12.5 MG tablet Take 1 tablet by mouth daily 90 tablet 3     MAGNESIUM PO        montelukast (SINGULAIR) 10 MG tablet TAKE 1 TABLET BY MOUTH AT BEDTIME 90 tablet 0     natalizumab (TYSABRI) 300 MG/15ML injection Infusion       ofloxacin (OCUFLOX) 0.3 % ophthalmic solution Place 1-2 drops into both eyes every 4 hours 10 mL 0     order for DME Equipment being ordered: Nebulizer with supplies 1 Device 0     Probiotic Product (PROBIOTIC PO)        SUMAtriptan (IMITREX) 100 MG tablet Take 50 mg up to twice daily as needed for headache; separate doses by at least one hour and do not use more than 3 days/week 18 tablet 3     SYMBICORT 160-4.5 MCG/ACT Inhaler INHALE 2 PUFFS INTO LUNGS TWICE DAILY 10.2 g 1     topiramate (TOPAMAX) 100 MG tablet Take 1 tablet (100 mg) by mouth At  Bedtime (take with 50 mg to total 150 mg nightly) 60 tablet 3     topiramate (TOPAMAX) 50 MG tablet Take 1 tablet (50 mg) by mouth At Bedtime (take with 100 mg to total 150 mg nightly) 60 tablet 3     traZODone (DESYREL) 50 MG tablet TAKE 1 TABLET BY MOUTH AT BEDTIME 90 tablet 1     triamcinolone (KENALOG) 0.1 % external cream Apply topically 3 times daily as needed for irritation       trimethoprim-polymyxin b (POLYTRIM) 64802-1.1 UNIT/ML-% ophthalmic solution Place 1-2 drops into the right eye every 4 hours For 7-10 days 10 mL 0     valACYclovir (VALTREX) 500 MG tablet TAKE 1 TABLET BY MOUTH EVERY DAY 90 tablet 3     vitamin D3 (CHOLECALCIFEROL) 73769 units (250 mcg) capsule Take 1 capsule (10,000 Units) by mouth daily 90 capsule 3     vitamin E (TOCOPHEROL) 400 units (360 mg) capsule Take by mouth daily       Social History     Tobacco Use     Smoking status: Former Smoker     Packs/day: 0.50     Years: 2.00     Pack years: 1.00     Types: Cigars     Start date: 2011     Last attempt to quit: 2013     Years since quittin.0     Smokeless tobacco: Never Used   Substance Use Topics     Alcohol use: Not Currently     Alcohol/week: 2.0 standard drinks     Comment: 0-3 drinks per week       ROS:  Review of systems negative except as stated above.    OBJECTIVE:  /70 (BP Location: Right arm, Patient Position: Chair, Cuff Size: Adult Large)   Pulse 91   Temp 97.8  F (36.6  C) (Oral)   Resp 16   Wt 120.7 kg (266 lb)   SpO2 100%   BMI 44.26 kg/m    GENERAL APPEARANCE: healthy, alert and no distress  EYES: EOMI,  PERRL, conjunctiva clear  HENT: ear canals and TM's normal.  Nose and mouth without ulcers, erythema or lesions  NECK: supple, nontender, no lymphadenopathy  RESP: lungs clear to auscultation - scant expiratory wheezing in bases and decreased lung sounds in bases.   CV: regular rates and rhythm, normal S1 S2, no murmur noted  ABDOMEN:  soft, nontender, no HSM or masses and bowel sounds  normal  SKIN: no suspicious lesions or rashes         EKG Interpretation:      Interpreted by MARTÍNEZ Mead CNP  Time reviewed: 1845  Symptoms at time of EKG: chest tightness   Rhythm: Normal sinus   Rate: Normal  Axis: Normal  Ectopy: None  Conduction: Normal  ST Segments/ T Waves: No ST-T wave changes and No acute ischemic changes  Q Waves: None  Comparison to prior: Unchanged    Clinical Impression: normal EKG        ASSESSMENT:  Asthma exacerbation    Wells Score: 0.  Did not pursue PE work-up further, VSS, afebrile. CBC with no leukocytosis. Chest x-ray did not show obvious pneumonia or acute abnormalities on initial read, radiology read pending. Troponin negative.  COVID obtained. This is likely an acute asthma exacerbation, but she was instructed to not return to work until COVID resulted and symptoms improved.     PLAN:  -COVID 19 pending  -note for excuse from work until COVID results and symptoms improve  -prednisone taper 40 mg daily  for 3 days, 30 mg daily for 3 days, 20 mg daily for 3 days, 10 mg daily for 3 days, then stop.  -use duonebs as needed  -return if worsening  See orders in Epic      MARTNÍEZ Dalton, CNP

## 2020-07-31 ENCOUNTER — MYC MEDICAL ADVICE (OUTPATIENT)
Dept: FAMILY MEDICINE | Facility: CLINIC | Age: 45
End: 2020-07-31

## 2020-07-31 LAB
SARS-COV-2 RNA SPEC QL NAA+PROBE: NOT DETECTED
SPECIMEN SOURCE: NORMAL

## 2020-08-03 ENCOUNTER — VIRTUAL VISIT (OUTPATIENT)
Dept: FAMILY MEDICINE | Facility: CLINIC | Age: 45
End: 2020-08-03
Payer: COMMERCIAL

## 2020-08-03 DIAGNOSIS — G35 MULTIPLE SCLEROSIS (H): ICD-10-CM

## 2020-08-03 DIAGNOSIS — D84.9 IMMUNOSUPPRESSION (H): ICD-10-CM

## 2020-08-03 DIAGNOSIS — R07.9 CHEST PAIN, UNSPECIFIED TYPE: Primary | ICD-10-CM

## 2020-08-03 DIAGNOSIS — J45.41 MODERATE PERSISTENT ASTHMA WITH ACUTE EXACERBATION: ICD-10-CM

## 2020-08-03 PROCEDURE — 99214 OFFICE O/P EST MOD 30 MIN: CPT | Mod: TEL | Performed by: NURSE PRACTITIONER

## 2020-08-03 NOTE — PROGRESS NOTES
"Josefina Aldrich is a 45 year old female who is being evaluated via a billable video visit.      The patient has been notified of following:     \"This video visit will be conducted via a call between you and your physician/provider. We have found that certain health care needs can be provided without the need for an in-person physical exam.  This service lets us provide the care you need with a video conversation.  If a prescription is necessary we can send it directly to your pharmacy.  If lab work is needed we can place an order for that and you can then stop by our lab to have the test done at a later time.    Video visits are billed at different rates depending on your insurance coverage.  Please reach out to your insurance provider with any questions.    If during the course of the call the physician/provider feels a video visit is not appropriate, you will not be charged for this service.\"    Patient has given verbal consent for Video visit? Yes  How would you like to obtain your AVS? MyChart  If you are dropped from the video visit, the video invite should be resent to: Text cell phone - [phone]  Will anyone else be joining your video visit? No      Subjective     Josefina Aldrich is a 45 year old female who presents today via video visit for the following health issues:    [facility]    ED/UC Followup:    Facility:  [facility]  Date of visit: 07/29/2020  Reason for visit: Asthma - pain in chest left side - had when was diagnosed with pneumonia  Current Status: Covid was negative, said did not see pneumonia on Xray - Covid was negative  Taking Prednisone taper - is slightly better. Tightness still there and pain - worse when laying. Nebs BID-Duoneb      Video Start Time attempted: 10:13 AM no connection; switched to telephone.     Follow up UC visit: 7/29/20    ASSESSMENT:  Asthma exacerbation   Wells Score: 0.  Did not pursue PE work-up further, VSS, afebrile. CBC with no leukocytosis. Chest x-ray " did not show obvious pneumonia or acute abnormalities on initial read, radiology read pending. Troponin negative.  COVID obtained. This is likely an acute asthma exacerbation, but she was instructed to not return to work until COVID resulted and symptoms improved.    PLAN:  -COVID 19 pending  -note for excuse from work until COVID results and symptoms improve  -prednisone taper 40 mg daily  for 3 days, 30 mg daily for 3 days, 20 mg daily for 3 days, 10 mg daily for 3 days, then stop.  -use duonebs as needed  -return if worsening  See orders in Epic        MARTÍNEZ Dalton CNP    Having improvement but feels she should have more. On steroids for 12 days- day 1 was wednesday. Dose 40 mg down to 10 mg. Currently 30 mg.   Has seen pulmonology in June started on Spiriva did not tolerate made her more short of breath. She called to inform intolerance and was started on steroids for 7 days. She was encouraged to continue the Spiriva but she has stopped it due to the side effects of SOB. Out of work currently due to work not being unable to accommodate her restrictions to be 6 feet apart from others. She is extremely high risk with MS during this pandemic. 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     Patient Active Problem List   Diagnosis     Migraine     Asthma     Anemia     Backache     Body mass index 45.0-49.9, adult (H)     Cognitive changes     Depression with anxiety     Fever postop     Uterine leiomyoma     Headache     Heavy menses     Hypersomnia with sleep apnea     Iron deficiency anemia     Leukocytosis     Postsurgical nonabsorption     Mild intermittent asthma     Morbid obesity (H)     Myalgia and myositis     Other dyspnea and respiratory abnormality     Pain, neuropathic     Pelvic pain in female     Personal history of allergy to latex     Post concussion syndrome     Right shoulder pain     S/P gastric bypass     S/P hysterectomy     Bariatric surgery status  "    Vitamin B12 deficiency     Vitamin D deficiency     Vomiting following gastrointestinal surgery     Hypermagnesemia     Benign essential hypertension     Neck pain on left side     Mild major depression (H)     LLQ pain     Multiple sclerosis (H)     Immunosuppression (H)     Immunocompromised patient (H)     Past Surgical History:   Procedure Laterality Date     GASTRIC BYPASS      \"Trouble with B12 and D\"     HYSTERECTOMY, MONO      cervix gone.  fibroids.  without BSO     TONSILLECTOMY         Social History     Tobacco Use     Smoking status: Former Smoker     Packs/day: 0.50     Years: 2.00     Pack years: 1.00     Types: Cigars     Start date: 2011     Last attempt to quit: 2013     Years since quittin.0     Smokeless tobacco: Never Used   Substance Use Topics     Alcohol use: Not Currently     Alcohol/week: 2.0 standard drinks     Comment: 0-3 drinks per week     Family History   Problem Relation Age of Onset     Lupus Paternal Aunt 41     Diabetes Paternal Grandmother      Cerebrovascular Disease Paternal Grandmother      Depression Paternal Grandmother      Substance Abuse Paternal Grandmother      Diabetes Maternal Grandmother      Hyperlipidemia Maternal Grandmother      Hypertension Mother      Hyperlipidemia Mother      Obesity Mother      Cerebrovascular Disease Maternal Grandfather      Obesity Father      Multiple Sclerosis No family hx of          Current Outpatient Medications   Medication Sig Dispense Refill     albuterol (PROVENTIL) (2.5 MG/3ML) 0.083% neb solution Take 1 vial (2.5 mg) by nebulization every 6 hours as needed for shortness of breath / dyspnea or wheezing 1 Box 3     albuterol (VENTOLIN HFA) 108 (90 BASE) MCG/ACT inhaler Inhale 2 puffs into the lungs every 6 hours as needed        azelastine (ASTELIN) 0.1 % nasal spray Spray 1 spray into both nostrils 2 times daily (nasal antihistamine) 30 mL 3     B Complex-C (VITAMIN B COMPLEX W/VITAMIN C) TABS tablet Take " 1 tablet by mouth daily       baclofen (LIORESAL) 20 MG tablet Take 2 tablets (40 mg) by mouth At Bedtime AND 1 tablet (20 mg) every morning AND 1 tablet (20 mg) daily (with lunch) AND 0.5 tablets (10 mg) See Admin Instructions. (In the afternoon)  10     cetirizine (ZYRTEC) 10 MG tablet TAKE 1 TABLET BY MOUTH TWICE DAILY 180 tablet 1     ferrous sulfate (FEROSUL) 325 (65 Fe) MG tablet Take 325 mg by mouth daily (with breakfast)       fluconazole (DIFLUCAN) 150 MG tablet TAKE 1 TABLET BY MOUTH ONCE FOR 1 DOSE 1 tablet 0     fluticasone (FLONASE) 50 MCG/ACT nasal spray Spray 1 spray into both nostrils daily 16 g 5     gabapentin (NEURONTIN) 300 MG capsule Take 2 capsules (600 mg) by mouth At Bedtime 180 capsule 1     ipratropium - albuterol 0.5 mg/2.5 mg/3 mL (DUONEB) 0.5-2.5 (3) MG/3ML neb solution Take 1 vial (3 mLs) by nebulization every 4 hours as needed for shortness of breath / dyspnea or wheezing (severe asthma symptoms) 90 mL 0     ketotifen (ZADITOR) 0.025 % ophthalmic solution Place 1 drop into both eyes 2 times daily as needed for itching 1 Bottle 3     lisinopril-hydrochlorothiazide (PRINZIDE/ZESTORETIC) 10-12.5 MG tablet Take 1 tablet by mouth daily 90 tablet 3     MAGNESIUM PO        montelukast (SINGULAIR) 10 MG tablet TAKE 1 TABLET BY MOUTH AT BEDTIME 90 tablet 0     natalizumab (TYSABRI) 300 MG/15ML injection Infusion       ofloxacin (OCUFLOX) 0.3 % ophthalmic solution Place 1-2 drops into both eyes every 4 hours 10 mL 0     order for DME Equipment being ordered: Nebulizer with supplies 1 Device 0     predniSONE (DELTASONE) 10 MG tablet Take 4 tabs (40 mg total) daily for 3 days, then take 3 tabs (30 mg total) daily for 3 days, then take 2 tabs (20 mg total) daily for 2 days, then take 1 tab (10 mg total) daily for 3 days, then stop. 30 tablet 0     SUMAtriptan (IMITREX) 100 MG tablet Take 50 mg up to twice daily as needed for headache; separate doses by at least one hour and do not use more than 3  days/week 18 tablet 3     SYMBICORT 160-4.5 MCG/ACT Inhaler INHALE 2 PUFFS INTO LUNGS TWICE DAILY 10.2 g 1     topiramate (TOPAMAX) 100 MG tablet Take 1 tablet (100 mg) by mouth At Bedtime (take with 50 mg to total 150 mg nightly) 60 tablet 3     topiramate (TOPAMAX) 50 MG tablet Take 1 tablet (50 mg) by mouth At Bedtime (take with 100 mg to total 150 mg nightly) 60 tablet 3     traZODone (DESYREL) 50 MG tablet TAKE 1 TABLET BY MOUTH AT BEDTIME 90 tablet 1     triamcinolone (KENALOG) 0.1 % external cream Apply topically 3 times daily as needed for irritation       valACYclovir (VALTREX) 500 MG tablet TAKE 1 TABLET BY MOUTH EVERY DAY 90 tablet 3     vitamin D3 (CHOLECALCIFEROL) 38557 units (250 mcg) capsule Take 1 capsule (10,000 Units) by mouth daily 90 capsule 3     vitamin E (TOCOPHEROL) 400 units (360 mg) capsule Take by mouth daily       Probiotic Product (PROBIOTIC PO)        Allergies   Allergen Reactions     Aspirin Difficulty breathing, Palpitations and Other (See Comments)     Cephalexin Swelling     Adhesive Tape      Amantadine Swelling     Azithromycin Angioedema     Latex Hives, Itching and Rash     Penicillins Other (See Comments), Nausea and Vomiting, Hives, Swelling, Rash, Cramps and GI Disturbance     Amoxicillin OK       Reviewed and updated as needed this visit by Provider  Tobacco  Allergies  Meds  Problems  Med Hx  Surg Hx  Fam Hx         Review of Systems   Constitutional, HEENT, cardiovascular, pulmonary, GI, , musculoskeletal, neuro, skin, endocrine and psych systems are negative, except as otherwise noted in the HPI.      Objective        healthy, alert and no distress  PSYCH: Alert and oriented times 3; coherent speech, normal   rate and volume, able to articulate logical thoughts, able   to abstract reason, no tangential thoughts, no hallucinations   or delusions  Her affect is normal and pleasant  RESP: No cough, no audible wheezing, able to talk in full sentences  Remainder of  exam unable to be completed due to telephone visits        Assessment & Plan     Diagnoses and all orders for this visit:  Chest pain, unspecified type  Moderate persistent asthma with acute exacerbation  Immunosuppression (H)  Multiple sclerosis (H)  Reassuring telephone exam today; no comments or audible respiratory distress noted.   Continue steroids.   Reach out to pulmonology and ask about a different inhaler choice.   1 week in person follow up.   Red flag symptoms discussed and if these occur present to the emergency room or call 911.  Josefina verbalizes understanding of plan of care and is in agreement.     Return in about 1 week (around 8/10/2020) for Recheck in person OV with me next week.        Leonie Collazo, [facility]        Phone:25  minutes 53 seconds

## 2020-08-04 ENCOUNTER — MYC MEDICAL ADVICE (OUTPATIENT)
Dept: FAMILY MEDICINE | Facility: CLINIC | Age: 45
End: 2020-08-04

## 2020-08-04 NOTE — TELEPHONE ENCOUNTER
Reason for Call:  Form, our goal is to have forms completed with 72 hours, however, some forms may require a visit or additional information.    Type of letter, form or note:  Disability and FLMA    Who is the form from?: Insurance comp    Where did the form come from: form was faxed in    What clinic location was the form placed at?: [place]    Where the form was placed: Given to physician - Leonie Collazo - was in clinic in the afternoon    What number is listed as a contact on the form?: [phone]       Additional comments: Complete within 3 business days - fax when completed to [phone]    Call taken on 8/4/2020 at 2:14 PM by Serena Mccann CMA

## 2020-08-05 ENCOUNTER — TELEPHONE (OUTPATIENT)
Dept: FAMILY MEDICINE | Facility: CLINIC | Age: 45
End: 2020-08-05

## 2020-08-05 NOTE — TELEPHONE ENCOUNTER
I just filled one out yesterday it is on [employer]'s desk is this a duplicate.    PLease let me know.      Leonie Collazo, FNP-BC

## 2020-08-05 NOTE — TELEPHONE ENCOUNTER
Confirmed with [facility] forms are different, provider coming in to clinic to complete and sign.    Susan Ahmadi MA

## 2020-08-05 NOTE — TELEPHONE ENCOUNTER
Forms were completed and faxed along with Office visit notes and lab results from 7/29 and 8/3    Susan Ahmadi MA

## 2020-08-05 NOTE — TELEPHONE ENCOUNTER
Reason for Call:  Form, our goal is to have forms completed with 72 hours, however, some forms may require a visit or additional information.    Type of letter, form or note:  Short Term Disability     Who is the form from?: [employer]  (if other please explain)    Where did the form come from: form was faxed in    What clinic location was the form placed at?: [place]    Where the form was placed: Placed in Leonie Collazo's bin up front     What number is listed as a contact on the form?: [phone]       Additional comments: Additional forms, due back by 8/12 to [employer]     Call taken on 8/5/2020 at 10:02 AM by Ashlee Garcia

## 2020-08-05 NOTE — TELEPHONE ENCOUNTER
Completed forms faxed back to [facility] at [phone].   Originals sent to be scanned.     Anju Miller

## 2020-08-07 ENCOUNTER — HOSPITAL ENCOUNTER (OUTPATIENT)
Facility: CLINIC | Age: 45
Setting detail: SPECIMEN
Discharge: HOME | End: 2020-08-07
Attending: PSYCHIATRY & NEUROLOGY | Admitting: PSYCHIATRY & NEUROLOGY
Payer: COMMERCIAL

## 2020-08-07 ENCOUNTER — INFUSION THERAPY VISIT (OUTPATIENT)
Dept: INFUSION THERAPY | Facility: CLINIC | Age: 45
End: 2020-08-07
Attending: PSYCHIATRY & NEUROLOGY
Payer: COMMERCIAL

## 2020-08-07 VITALS
RESPIRATION RATE: 16 BRPM | OXYGEN SATURATION: 99 % | DIASTOLIC BLOOD PRESSURE: 82 MMHG | TEMPERATURE: 98.3 F | HEART RATE: 68 BPM | SYSTOLIC BLOOD PRESSURE: 127 MMHG

## 2020-08-07 DIAGNOSIS — G35 MULTIPLE SCLEROSIS (H): Primary | ICD-10-CM

## 2020-08-07 LAB — AST SERPL W P-5'-P-CCNC: 21 U/L (ref 0–45)

## 2020-08-07 PROCEDURE — 25800030 ZZH RX IP 258 OP 636: Performed by: PSYCHIATRY & NEUROLOGY

## 2020-08-07 PROCEDURE — 96365 THER/PROPH/DIAG IV INF INIT: CPT

## 2020-08-07 PROCEDURE — 36415 COLL VENOUS BLD VENIPUNCTURE: CPT

## 2020-08-07 PROCEDURE — 40000975 ZZHCL STATISTIC JC VIR AB INDEX INHIB: Performed by: PSYCHIATRY & NEUROLOGY

## 2020-08-07 PROCEDURE — 25000128 H RX IP 250 OP 636: Performed by: PSYCHIATRY & NEUROLOGY

## 2020-08-07 PROCEDURE — 84450 TRANSFERASE (AST) (SGOT): CPT | Performed by: PSYCHIATRY & NEUROLOGY

## 2020-08-07 RX ORDER — HEPARIN SODIUM,PORCINE 10 UNIT/ML
5 VIAL (ML) INTRAVENOUS
Status: CANCELLED | OUTPATIENT
Start: 2020-09-04

## 2020-08-07 RX ORDER — HEPARIN SODIUM (PORCINE) LOCK FLUSH IV SOLN 100 UNIT/ML 100 UNIT/ML
5 SOLUTION INTRAVENOUS
Status: CANCELLED | OUTPATIENT
Start: 2020-09-04

## 2020-08-07 RX ADMIN — NATALIZUMAB 300 MG: 300 INJECTION INTRAVENOUS at 08:25

## 2020-08-07 RX ADMIN — SODIUM CHLORIDE 250 ML: 9 INJECTION, SOLUTION INTRAVENOUS at 08:25

## 2020-08-07 NOTE — PROGRESS NOTES
Infusion Nursing Note:  Josefina Aldrich presents today for Tysabri.    Patient seen by provider today: No   present during visit today: Not Applicable.    Note: N/A.    Intravenous Access:  Labs drawn without difficulty.  Peripheral IV placed.    Treatment Conditions:  Tysabri pre-infusion checklist completed via touch program.      Post Infusion Assessment:  Patient tolerated infusion without incident.  Patient observed for 60 minutes post Tysabri per protocol.  Blood return noted pre and post infusion.  Site patent and intact, free from redness, edema or discomfort.  No evidence of extravasations.  Access discontinued per protocol.       Discharge Plan:   Discharge instructions reviewed with: Patient.  Patient and/or family verbalized understanding of discharge instructions and all questions answered.  AVS to patient via [facility].  Patient will return 9/4/2020 for next appointment.   Patient discharged in stable condition accompanied by: self.  Departure Mode: Ambulatory.    Faby Carrera RN

## 2020-08-11 ENCOUNTER — OFFICE VISIT (OUTPATIENT)
Dept: FAMILY MEDICINE | Facility: CLINIC | Age: 45
End: 2020-08-11
Payer: COMMERCIAL

## 2020-08-11 VITALS
SYSTOLIC BLOOD PRESSURE: 125 MMHG | DIASTOLIC BLOOD PRESSURE: 80 MMHG | OXYGEN SATURATION: 100 % | HEIGHT: 65 IN | BODY MASS INDEX: 45.32 KG/M2 | HEART RATE: 82 BPM | WEIGHT: 272 LBS | TEMPERATURE: 98 F

## 2020-08-11 DIAGNOSIS — J45.30 MILD PERSISTENT ASTHMA WITHOUT COMPLICATION: Primary | ICD-10-CM

## 2020-08-11 DIAGNOSIS — R93.5 ABNORMAL CT SCAN, PELVIS: ICD-10-CM

## 2020-08-11 DIAGNOSIS — R10.2 PELVIC PAIN: ICD-10-CM

## 2020-08-11 DIAGNOSIS — E66.01 MORBID OBESITY WITH BMI OF 45.0-49.9, ADULT (H): ICD-10-CM

## 2020-08-11 PROCEDURE — 99214 OFFICE O/P EST MOD 30 MIN: CPT | Performed by: NURSE PRACTITIONER

## 2020-08-11 RX ORDER — BUDESONIDE AND FORMOTEROL FUMARATE DIHYDRATE 160; 4.5 UG/1; UG/1
2 AEROSOL RESPIRATORY (INHALATION) 2 TIMES DAILY
Qty: 10.2 G | Refills: 5 | Status: SHIPPED | OUTPATIENT
Start: 2020-08-11 | End: 2020-08-12

## 2020-08-11 ASSESSMENT — MIFFLIN-ST. JEOR: SCORE: 1889.66

## 2020-08-11 NOTE — PROGRESS NOTES
"Subjective   Josefina N Venkata Aldrich is a 45 year old female who presents to clinic today for the following health issues:    HPI   Currently has asthma exacerbation is on steroids. COVID negative.   Was seen in Urgent Care 07/29/2020 for chest discomfort- also had LOV was virtual 08/03/2020 -- needs lungs listened to.  Last dose of prednisone tomorrow.  Pt states the SOB has gotten better but over exerting she still has it.Walked around [employer] yesterday - started feeling some dyspnea. Did use inhaler and got some relief.  She reports [facility] not returning calls to discuss change in inhaler. Had been prescribed Spiriva but stopped-she reports she also stopped her Symbicort at that time. So she is currently not using any daily asthma inhaler. Reviewed [facility] notes. The plan was adding Spiriva to the Symbicort she was not to stop the Symbicort.       She also reports intermittent pelvic pain.  CT ab/pelvix 3/20/20  IMPRESSION:   1.  Rounded cystic lesion in the pelvis measures 3.9 x 3.5 cm and  associated with a small amount of pelvic free fluid. This is likely an  ovarian cyst. There is a small amount of associated pelvic free fluid.  2.  Mild hepatomegaly with probable mild fatty infiltration.  CT in March.     Knows she needs to lose weight for her overall health. Has upcoming appt with a nutritionist.    Reviewed and updated as needed this visit by provider:  Tobacco  Allergies  Meds  Problems  Med Hx  Surg Hx  Fam Hx         Review of Systems   Constitutional, HEENT, cardiovascular, pulmonary, GI, , musculoskeletal, neuro, skin, endocrine and psych systems are negative, except as otherwise noted in the HPI.          Objective   /80 (BP Location: Left arm, Patient Position: Chair, Cuff Size: Adult Large)   Pulse 82   Temp 98  F (36.7  C) (Tympanic)   Ht 1.651 m (5' 5\")   Wt 123.4 kg (272 lb)   SpO2 100%   Breastfeeding No   BMI 45.26 kg/m   Body mass index is 45.26 kg/m .  Physical Exam "   GENERAL: healthy,obese, alert, well nourished, well hydrated, no distress  NECK: no tenderness, no adenopathy, no asymmetry, no masses, no stiffness; thyroid- normal to palpation  RESP: lungs clear to auscultation - no rales, no rhonchi, no wheezes  CV: regular rates and rhythm, normal S1 S2, no S3 or S4 and no murmur, no click or rub -  ABDOMEN: soft, obese, no tenderness, no  hepatosplenomegaly, no masses, normal bowel sounds        Assessment & Plan   Josefina was seen today for recheck.    Diagnoses and all orders for this visit:    Mild persistent asthma without complication  Symptoms greatly improved.   Restart Symbicort.   Close follow up.   Encourage and recommend weight loss for improvement of symptoms.   -     budesonide-formoterol (SYMBICORT) 160-4.5 MCG/ACT Inhaler; Inhale 2 puffs into the lungs 2 times daily    Morbid obesity with BMI of 45.0-49.9, adult (H)  Encourage and recommend weight loss for improvement of overall health. She has upcoming appt with nutrition.    Pelvic pain  Abnormal CT scan, pelvis  Will repeat US at this time.   Likely needs GYN referral.   Red flag symptoms discussed and if these occur present to the emergency room or call 911.  Josefina verbalizes understanding of plan of care and is in agreement.   -     US Pelvic Complete w Transvaginal; Future    See Patient Instructions    Return for Wellness exam fasting labs.     LEROY Anderson-[address] [address]  [email or web address]   Office: [phone]

## 2020-08-12 RX ORDER — BUDESONIDE AND FORMOTEROL FUMARATE DIHYDRATE 160; 4.5 UG/1; UG/1
2 AEROSOL RESPIRATORY (INHALATION) 2 TIMES DAILY
Qty: 10.2 G | Refills: 5 | Status: SHIPPED | OUTPATIENT
Start: 2020-08-12 | End: 2020-09-08

## 2020-08-14 LAB — LAB SCANNED RESULT: NORMAL

## 2020-08-19 ENCOUNTER — ANCILLARY PROCEDURE (OUTPATIENT)
Dept: ULTRASOUND IMAGING | Facility: CLINIC | Age: 45
End: 2020-08-19
Attending: NURSE PRACTITIONER
Payer: COMMERCIAL

## 2020-08-19 DIAGNOSIS — R93.5 ABNORMAL CT SCAN, PELVIS: ICD-10-CM

## 2020-08-19 DIAGNOSIS — R10.2 PELVIC PAIN: ICD-10-CM

## 2020-08-19 PROCEDURE — 76856 US EXAM PELVIC COMPLETE: CPT | Performed by: OBSTETRICS & GYNECOLOGY

## 2020-08-19 PROCEDURE — 76830 TRANSVAGINAL US NON-OB: CPT | Performed by: OBSTETRICS & GYNECOLOGY

## 2020-08-20 NOTE — RESULT ENCOUNTER NOTE
Dear Josefina,    Here is a summary of your recent test results: US looks good.     The previously noted pelvic cyst noted on the CT scan on 3/20/2020 is no longer seen on today's study.  The left ovarian cyst noted today is most consistent with a corpus luteum cyst.  May consider repeat ultrasound in 6-8 weeks if clinically relevant.     Musa Giles MD  Obstetrics & Gynecology  [facility]       Can repeat US in 6-8 weeks if needed. Could also send to GYN if pelvic pain continues. Please let me know what you would like to do.       Thank you for choosing [facility].  It was an honor and a privilege to participate in your care.       Healthy regards,    Leonie Collazo, LEROY  [facility]

## 2020-08-21 ENCOUNTER — MYC MEDICAL ADVICE (OUTPATIENT)
Dept: FAMILY MEDICINE | Facility: CLINIC | Age: 45
End: 2020-08-21

## 2020-08-21 DIAGNOSIS — N83.209 OVARIAN CYST: Primary | ICD-10-CM

## 2020-08-21 NOTE — TELEPHONE ENCOUNTER
8/19/20 US Result Note:   The previously noted pelvic cyst noted on the CT scan on 3/20/2020 is no longer seen on today's study.   The left ovarian cyst noted today is most consistent with a corpus luteum cyst.  May consider repeat ultrasound in 6-8 weeks if clinically relevant.       Musa Giles MD   Obstetrics & Gynecology   [facility]       Can repeat US in 6-8 weeks if needed. Could also send to GYN if pelvic pain continues. Please let me know what you would like to do.      Referral Placed.   Lesli sent      Susannah Selby RN  [facility]  [place]

## 2020-08-27 ENCOUNTER — HOSPITAL ENCOUNTER (OUTPATIENT)
Dept: MAMMOGRAPHY | Facility: CLINIC | Age: 45
Discharge: HOME | End: 2020-08-27
Attending: PHYSICIAN ASSISTANT | Admitting: PHYSICIAN ASSISTANT
Payer: COMMERCIAL

## 2020-08-27 DIAGNOSIS — Z12.31 VISIT FOR SCREENING MAMMOGRAM: ICD-10-CM

## 2020-08-27 PROCEDURE — 77063 BREAST TOMOSYNTHESIS BI: CPT

## 2020-08-27 NOTE — RESULT ENCOUNTER NOTE
Dear Josefina,    Here is a summary of your recent test results:    -Mammogram was normal.  ADVISE: rechecking in 1 year.    In addition, here is a list of due or overdue Health Maintenance reminders:    Preventive Care Visit due on 1977  Kidney Microalbumin Urine Test due on 1977  Asthma Action Plan - yearly due on 03/20/2020  Depression Assessment due on 08/11/2020  Flu Vaccine(1) due on 09/01/2020    Please call us at [phone] (or use [employer]) to address the above recommendations or have any questions.    Thank you for choosing [facility].  It was an honor and a privilege to participate in your care today.         Healthy regards,    LEROY Anderson

## 2020-09-04 ENCOUNTER — INFUSION THERAPY VISIT (OUTPATIENT)
Dept: INFUSION THERAPY | Facility: CLINIC | Age: 45
End: 2020-09-04
Attending: PSYCHIATRY & NEUROLOGY
Payer: COMMERCIAL

## 2020-09-04 VITALS
OXYGEN SATURATION: 99 % | DIASTOLIC BLOOD PRESSURE: 80 MMHG | TEMPERATURE: 98.6 F | SYSTOLIC BLOOD PRESSURE: 122 MMHG | HEART RATE: 70 BPM

## 2020-09-04 DIAGNOSIS — G35 MULTIPLE SCLEROSIS (H): Primary | ICD-10-CM

## 2020-09-04 PROCEDURE — 25800030 ZZH RX IP 258 OP 636: Performed by: PSYCHIATRY & NEUROLOGY

## 2020-09-04 PROCEDURE — 25000128 H RX IP 250 OP 636: Performed by: PSYCHIATRY & NEUROLOGY

## 2020-09-04 PROCEDURE — 96365 THER/PROPH/DIAG IV INF INIT: CPT

## 2020-09-04 RX ORDER — HEPARIN SODIUM (PORCINE) LOCK FLUSH IV SOLN 100 UNIT/ML 100 UNIT/ML
5 SOLUTION INTRAVENOUS
Status: CANCELLED | OUTPATIENT
Start: 2020-10-02

## 2020-09-04 RX ORDER — HEPARIN SODIUM,PORCINE 10 UNIT/ML
5 VIAL (ML) INTRAVENOUS
Status: CANCELLED | OUTPATIENT
Start: 2020-10-02

## 2020-09-04 RX ADMIN — SODIUM CHLORIDE 250 ML: 9 INJECTION, SOLUTION INTRAVENOUS at 09:24

## 2020-09-04 RX ADMIN — NATALIZUMAB 300 MG: 300 INJECTION INTRAVENOUS at 08:25

## 2020-09-04 NOTE — PROGRESS NOTES
Infusion Nursing Note:  Josefina Aldrich presents today for Tysabri.    Patient seen by provider today: No   present during visit today: Not Applicable.    Note: NA    Intravenous Access:  Peripheral IV placed.    Treatment Conditions:  Tysabri pre-infusion checklist completed via touch program.      Post Infusion Assessment:  Patient tolerated infusion without incident.  Patient observed for 60 minutes post Tysabri per protocol.  Blood return noted pre and post infusion.  Site patent and intact, free from redness, edema or discomfort.  No evidence of extravasations.  Access discontinued per protocol.       Discharge Plan:   Discharge instructions reviewed with: Patient.  Patient and/or family verbalized understanding of discharge instructions and all questions answered.  AVS to patient via [employer].  Patient will return in one month for next dose of Tysabri.  Pt will schedule this appt at her earliest convenience for next appointment.   Patient discharged in stable condition accompanied by: self.  Departure Mode: Ambulatory.    Megan Higuera RN

## 2020-09-08 ENCOUNTER — OFFICE VISIT (OUTPATIENT)
Dept: FAMILY MEDICINE | Facility: CLINIC | Age: 45
End: 2020-09-08
Payer: COMMERCIAL

## 2020-09-08 VITALS
OXYGEN SATURATION: 100 % | TEMPERATURE: 98.8 F | HEIGHT: 65 IN | SYSTOLIC BLOOD PRESSURE: 100 MMHG | BODY MASS INDEX: 45.65 KG/M2 | HEART RATE: 102 BPM | WEIGHT: 274 LBS | DIASTOLIC BLOOD PRESSURE: 66 MMHG

## 2020-09-08 DIAGNOSIS — J45.40 MODERATE PERSISTENT ASTHMA WITHOUT COMPLICATION: ICD-10-CM

## 2020-09-08 DIAGNOSIS — K91.2 POSTSURGICAL NONABSORPTION: ICD-10-CM

## 2020-09-08 DIAGNOSIS — N83.292 COMPLEX CYST OF LEFT OVARY: ICD-10-CM

## 2020-09-08 DIAGNOSIS — G47.10 HYPERSOMNIA WITH SLEEP APNEA: ICD-10-CM

## 2020-09-08 DIAGNOSIS — G43.109 MIGRAINE WITH AURA AND WITHOUT STATUS MIGRAINOSUS, NOT INTRACTABLE: ICD-10-CM

## 2020-09-08 DIAGNOSIS — G35 MULTIPLE SCLEROSIS (H): ICD-10-CM

## 2020-09-08 DIAGNOSIS — Z90.710 S/P HYSTERECTOMY: ICD-10-CM

## 2020-09-08 DIAGNOSIS — D84.9 IMMUNOSUPPRESSION (H): ICD-10-CM

## 2020-09-08 DIAGNOSIS — E53.8 VITAMIN B12 DEFICIENCY: ICD-10-CM

## 2020-09-08 DIAGNOSIS — M54.2 CERVICALGIA: ICD-10-CM

## 2020-09-08 DIAGNOSIS — G47.00 INSOMNIA, UNSPECIFIED TYPE: ICD-10-CM

## 2020-09-08 DIAGNOSIS — M54.2 NECK PAIN ON LEFT SIDE: ICD-10-CM

## 2020-09-08 DIAGNOSIS — J30.2 SEASONAL ALLERGIC RHINITIS, UNSPECIFIED TRIGGER: ICD-10-CM

## 2020-09-08 DIAGNOSIS — J98.01 BRONCHOSPASM: ICD-10-CM

## 2020-09-08 DIAGNOSIS — R09.81 NASAL CONGESTION: ICD-10-CM

## 2020-09-08 DIAGNOSIS — I10 BENIGN ESSENTIAL HYPERTENSION: ICD-10-CM

## 2020-09-08 DIAGNOSIS — R10.2 PELVIC PAIN IN FEMALE: ICD-10-CM

## 2020-09-08 DIAGNOSIS — Z02.89 ENCOUNTER FOR COMPLETION OF FORM WITH PATIENT: ICD-10-CM

## 2020-09-08 DIAGNOSIS — G47.30 HYPERSOMNIA WITH SLEEP APNEA: ICD-10-CM

## 2020-09-08 DIAGNOSIS — Z00.00 ROUTINE GENERAL MEDICAL EXAMINATION AT A HEALTH CARE FACILITY: Primary | ICD-10-CM

## 2020-09-08 DIAGNOSIS — Z13.220 LIPID SCREENING: ICD-10-CM

## 2020-09-08 DIAGNOSIS — B00.9 HSV (HERPES SIMPLEX VIRUS) INFECTION: ICD-10-CM

## 2020-09-08 DIAGNOSIS — Z98.84 S/P GASTRIC BYPASS: ICD-10-CM

## 2020-09-08 DIAGNOSIS — M79.605 PAIN OF LEFT LOWER EXTREMITY: ICD-10-CM

## 2020-09-08 DIAGNOSIS — J98.6 DIAPHRAGM DYSFUNCTION: ICD-10-CM

## 2020-09-08 PROBLEM — R10.32 LLQ PAIN: Status: RESOLVED | Noted: 2018-04-25 | Resolved: 2020-09-08

## 2020-09-08 PROBLEM — F32.0 MILD MAJOR DEPRESSION (H): Status: RESOLVED | Noted: 2018-07-25 | Resolved: 2020-09-08

## 2020-09-08 PROCEDURE — 99215 OFFICE O/P EST HI 40 MIN: CPT | Mod: 25 | Performed by: PHYSICIAN ASSISTANT

## 2020-09-08 PROCEDURE — 99396 PREV VISIT EST AGE 40-64: CPT | Performed by: PHYSICIAN ASSISTANT

## 2020-09-08 RX ORDER — FLUTICASONE PROPIONATE 50 MCG
1 SPRAY, SUSPENSION (ML) NASAL DAILY
Qty: 16 G | Refills: 5 | Status: SHIPPED | OUTPATIENT
Start: 2020-09-08 | End: 2021-03-16

## 2020-09-08 RX ORDER — PSEUDOEPHEDRINE HCL 30 MG
500 TABLET ORAL
COMMUNITY
Start: 2020-08-21 | End: 2021-06-25

## 2020-09-08 RX ORDER — VALACYCLOVIR HYDROCHLORIDE 500 MG/1
500 TABLET, FILM COATED ORAL DAILY
Qty: 90 TABLET | Refills: 3 | Status: SHIPPED | OUTPATIENT
Start: 2020-09-08 | End: 2020-10-22

## 2020-09-08 RX ORDER — IPRATROPIUM BROMIDE AND ALBUTEROL SULFATE 2.5; .5 MG/3ML; MG/3ML
SOLUTION RESPIRATORY (INHALATION)
Qty: 180 ML | Refills: 1 | Status: SHIPPED | OUTPATIENT
Start: 2020-09-08 | End: 2021-09-09

## 2020-09-08 RX ORDER — GABAPENTIN 300 MG/1
600 CAPSULE ORAL AT BEDTIME
Qty: 180 CAPSULE | Refills: 1 | Status: SHIPPED | OUTPATIENT
Start: 2020-09-08 | End: 2020-10-22

## 2020-09-08 RX ORDER — AZELASTINE 1 MG/ML
1 SPRAY, METERED NASAL 2 TIMES DAILY
Qty: 30 ML | Refills: 3 | Status: SHIPPED | OUTPATIENT
Start: 2020-09-08 | End: 2021-03-16

## 2020-09-08 RX ORDER — MONTELUKAST SODIUM 10 MG/1
1 TABLET ORAL AT BEDTIME
Qty: 90 TABLET | Refills: 1 | Status: SHIPPED | OUTPATIENT
Start: 2020-09-08 | End: 2021-03-16

## 2020-09-08 RX ORDER — TRAZODONE HYDROCHLORIDE 50 MG/1
50 TABLET, FILM COATED ORAL AT BEDTIME
Qty: 90 TABLET | Refills: 1 | Status: SHIPPED | OUTPATIENT
Start: 2020-09-08 | End: 2021-03-16

## 2020-09-08 RX ORDER — CETIRIZINE HYDROCHLORIDE 10 MG/1
10 TABLET ORAL DAILY
Qty: 180 TABLET | Refills: 1 | COMMUNITY
Start: 2020-09-08 | End: 2021-03-16

## 2020-09-08 RX ORDER — BUDESONIDE AND FORMOTEROL FUMARATE DIHYDRATE 160; 4.5 UG/1; UG/1
2 AEROSOL RESPIRATORY (INHALATION) 2 TIMES DAILY
Qty: 10.2 G | Refills: 5 | Status: SHIPPED | OUTPATIENT
Start: 2020-09-08 | End: 2021-03-16

## 2020-09-08 RX ORDER — NATALIZUMAB 300 MG/15ML
300 INJECTION INTRAVENOUS
Start: 2020-09-08 | End: 2021-03-16

## 2020-09-08 RX ORDER — PEDI MULTIVIT NO.25/FOLIC ACID 300 MCG
2 TABLET,CHEWABLE ORAL
COMMUNITY
Start: 2020-08-21 | End: 2021-09-09

## 2020-09-08 RX ORDER — TOPIRAMATE 100 MG/1
100 TABLET, FILM COATED ORAL AT BEDTIME
Qty: 60 TABLET | Refills: 3 | Status: SHIPPED | OUTPATIENT
Start: 2020-09-08 | End: 2021-03-16

## 2020-09-08 RX ORDER — TOPIRAMATE 50 MG/1
50 TABLET, FILM COATED ORAL AT BEDTIME
Qty: 60 TABLET | Refills: 3 | Status: SHIPPED | OUTPATIENT
Start: 2020-09-08 | End: 2021-03-16

## 2020-09-08 ASSESSMENT — ANXIETY QUESTIONNAIRES
2. NOT BEING ABLE TO STOP OR CONTROL WORRYING: NOT AT ALL
7. FEELING AFRAID AS IF SOMETHING AWFUL MIGHT HAPPEN: NOT AT ALL
1. FEELING NERVOUS, ANXIOUS, OR ON EDGE: NOT AT ALL
IF YOU CHECKED OFF ANY PROBLEMS ON THIS QUESTIONNAIRE, HOW DIFFICULT HAVE THESE PROBLEMS MADE IT FOR YOU TO DO YOUR WORK, TAKE CARE OF THINGS AT HOME, OR GET ALONG WITH OTHER PEOPLE: SOMEWHAT DIFFICULT
GAD7 TOTAL SCORE: 2
5. BEING SO RESTLESS THAT IT IS HARD TO SIT STILL: NOT AT ALL
3. WORRYING TOO MUCH ABOUT DIFFERENT THINGS: SEVERAL DAYS
6. BECOMING EASILY ANNOYED OR IRRITABLE: NOT AT ALL

## 2020-09-08 ASSESSMENT — PATIENT HEALTH QUESTIONNAIRE - PHQ9
SUM OF ALL RESPONSES TO PHQ QUESTIONS 1-9: 8
5. POOR APPETITE OR OVEREATING: SEVERAL DAYS

## 2020-09-08 ASSESSMENT — MIFFLIN-ST. JEOR: SCORE: 1898.74
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
## 2020-09-09 ENCOUNTER — TELEPHONE (OUTPATIENT)
Dept: FAMILY MEDICINE | Facility: CLINIC | Age: 45
End: 2020-09-09

## 2020-09-09 ASSESSMENT — ANXIETY QUESTIONNAIRES: GAD7 TOTAL SCORE: 2

## 2020-09-09 ASSESSMENT — ASTHMA QUESTIONNAIRES: ACT_TOTALSCORE: 12

## 2020-09-10 ENCOUNTER — MYC MEDICAL ADVICE (OUTPATIENT)
Dept: FAMILY MEDICINE | Facility: CLINIC | Age: 45
End: 2020-09-10

## 2020-09-11 ENCOUNTER — MYC MEDICAL ADVICE (OUTPATIENT)
Dept: FAMILY MEDICINE | Facility: CLINIC | Age: 45
End: 2020-09-11

## 2020-09-11 NOTE — TELEPHONE ENCOUNTER
Let's do a phone or video visit to complete this form.  Please ensure she faxes so it is available at the time of appt.      Miya Crump MBA, MS, PA-C

## 2020-09-11 NOTE — TELEPHONE ENCOUNTER
Routing to PCP for further review/recommendations/orders.    Susannah Selby RN  [facility]

## 2020-09-11 NOTE — TELEPHONE ENCOUNTER
Next 5 appointments (look out 90 days)    Sep 11, 2020  4:00 PM CDT  [place] with Talat Morelos NP  [facility] [address] [place] [id number]  [phone]   Sep 15, 2020  1:30 PM CDT  [facility] [address] [place] [id number]  [phone]        MyChart sent  Awaiting response    Susannah Selby RN  [facility]

## 2020-09-14 ENCOUNTER — HOSPITAL ENCOUNTER (OUTPATIENT)
Dept: CARDIAC REHAB | Facility: CLINIC | Age: 45
End: 2020-09-14
Attending: PHYSICIAN ASSISTANT
Payer: COMMERCIAL

## 2020-09-14 DIAGNOSIS — J45.40 MODERATE PERSISTENT ASTHMA WITHOUT COMPLICATION: ICD-10-CM

## 2020-09-14 PROCEDURE — 40000244 ZZH STATISTIC VISIT PULM REHAB

## 2020-09-14 PROCEDURE — G0237 THERAPEUTIC PROCD STRG ENDUR: HCPCS

## 2020-09-14 PROCEDURE — G0238 OTH RESP PROC, INDIV: HCPCS

## 2020-09-15 ENCOUNTER — OFFICE VISIT (OUTPATIENT)
Dept: FAMILY MEDICINE | Facility: CLINIC | Age: 45
End: 2020-09-15
Payer: COMMERCIAL

## 2020-09-15 VITALS
TEMPERATURE: 98 F | HEART RATE: 82 BPM | DIASTOLIC BLOOD PRESSURE: 79 MMHG | OXYGEN SATURATION: 100 % | SYSTOLIC BLOOD PRESSURE: 110 MMHG | HEIGHT: 65 IN | BODY MASS INDEX: 45.98 KG/M2 | WEIGHT: 276 LBS

## 2020-09-15 DIAGNOSIS — D84.9 IMMUNOSUPPRESSION (H): ICD-10-CM

## 2020-09-15 DIAGNOSIS — Z02.89 ENCOUNTER FOR COMPLETION OF FORM WITH PATIENT: Primary | ICD-10-CM

## 2020-09-15 DIAGNOSIS — I10 BENIGN ESSENTIAL HYPERTENSION: ICD-10-CM

## 2020-09-15 DIAGNOSIS — G35 MULTIPLE SCLEROSIS (H): ICD-10-CM

## 2020-09-15 DIAGNOSIS — J45.41 MODERATE PERSISTENT ASTHMA WITH ACUTE EXACERBATION: ICD-10-CM

## 2020-09-15 PROCEDURE — 99214 OFFICE O/P EST MOD 30 MIN: CPT | Performed by: NURSE PRACTITIONER

## 2020-09-15 ASSESSMENT — MIFFLIN-ST. JEOR: SCORE: 1907.81

## 2020-09-15 NOTE — PROGRESS NOTES
"Subjective   Josefina VYAS Venkata Aldrich is a 45 year old female who presents to clinic today for the following health issues:    HPI     Return to work letter. HR said that supervisor reached out and stated there is an area she can be by herself to work;  they were not able to provide 6 feet distance before so therefore she has not been working.  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 Hours will be 630 to 3PM.  She is excited to get back to work.  Is doing quite well.  She was seen by pulmonology and she is also now in pulmonary rehab twice weekly.  He has worked hard on being more active is walking daily.  She has been able to come off of her blood pressure medicine great blood pressure noted today.  Will need her schedule as follows starting 9-21-20:  Hours are 630 to 3pm.  Tuesday and Thursday pulm rehab at 230; will work 630 to 130.  Once monthly MS infusion ON fridays at 1 pm will work 640 to 1200.       Reviewed and updated as needed this visit by provider:  Tobacco  Allergies  Meds  Problems  Med Hx  Surg Hx  Fam Hx         Review of Systems   Constitutional, HEENT, cardiovascular, pulmonary, GI, , musculoskeletal, neuro, skin, endocrine and psych systems are negative, except as otherwise noted in the HPI.          Objective   /79 (BP Location: Right arm, Patient Position: Chair, Cuff Size: Adult Large)   Pulse 82   Temp 98  F (36.7  C) (Tympanic)   Ht 1.651 m (5' 5\")   Wt 125.2 kg (276 lb)   SpO2 100%   Breastfeeding No   BMI 45.93 kg/m   Body mass index is 45.93 kg/m .  Physical Exam   GENERAL: healthy, alert, well nourished, well hydrated, no distress  RESP: lungs clear to auscultation - no rales, no rhonchi, no wheezes  CV: regular rates and rhythm, normal S1 S2, no S3 or S4 and no murmur, no click or rub -  ABDOMEN: soft, no tenderness, no  hepatosplenomegaly, no masses, normal bowel sounds  PSYCH: Alert and " oriented times 3; speech- coherent , normal rate and volume; able to articulate logical thoughts, able to abstract reason, no tangential thoughts, no hallucinations or delusions, affect- normal        Assessment & Plan   Josefina was seen today for forms.    Diagnoses and all orders for this visit:    Encounter for completion of form with patient  During the COVID pandemic it is important for her to have extra precautions as she is high risk for morbidity and mortality with her health.Will have patient return in 6 months to reassess where we are at with her health in regards to the current pandemic.  Return to work form will be completed today and faxed to her employer.      Multiple sclerosis (H)  Immunosuppression (H)  Follows with neurology once monthly infusions.    Moderate persistent asthma with acute exacerbation  Doing quite well is currently in pulmonary rehab twice a week.    Benign essential hypertension  She has recently come off of her blood pressure medication and she is doing well with normal blood pressure today.  Encouraged her to continue to work on her activity and healthy diet with a goal of weight loss.  Tinea to monitor.  -     Albumin Random Urine Quantitative with Creat Ratio; Future      See Patient Instructions    Return in about 6 months (around 3/15/2021) for Recheck.     Leonie Collazo, LERO[address] [address]  [email or web address]  [email or web address]   Office: [phone]

## 2020-09-16 PROBLEM — Z02.89 ENCOUNTER FOR COMPLETION OF FORM WITH PATIENT: Status: ACTIVE | Noted: 2020-09-16

## 2020-09-16 NOTE — PROGRESS NOTES
Completed forms faxed back to [facility] at [phone].   Originals sent to be scanned.       Guillermina Garcia

## 2020-09-17 ENCOUNTER — MYC MEDICAL ADVICE (OUTPATIENT)
Dept: FAMILY MEDICINE | Facility: CLINIC | Age: 45
End: 2020-09-17

## 2020-09-17 NOTE — TELEPHONE ENCOUNTER
Please see My Chart message below and advise as appropriate.  TAMARA SolomonN, RN  [employer] [facility]

## 2020-09-17 NOTE — TELEPHONE ENCOUNTER
Form was faxed yesterday, copy made and placed up front for the patient.      Guillermina Garcia

## 2020-09-21 ENCOUNTER — HOSPITAL ENCOUNTER (OUTPATIENT)
Dept: CARDIAC REHAB | Facility: CLINIC | Age: 45
End: 2020-09-21
Attending: PHYSICIAN ASSISTANT
Payer: COMMERCIAL

## 2020-09-21 PROCEDURE — 40000244 ZZH STATISTIC VISIT PULM REHAB

## 2020-09-21 PROCEDURE — G0238 OTH RESP PROC, INDIV: HCPCS

## 2020-09-21 PROCEDURE — G0237 THERAPEUTIC PROCD STRG ENDUR: HCPCS

## 2020-09-24 ENCOUNTER — HOSPITAL ENCOUNTER (OUTPATIENT)
Dept: CARDIAC REHAB | Facility: CLINIC | Age: 45
End: 2020-09-24
Attending: PHYSICIAN ASSISTANT
Payer: COMMERCIAL

## 2020-09-24 PROCEDURE — 40000244 ZZH STATISTIC VISIT PULM REHAB

## 2020-09-24 PROCEDURE — G0239 OTH RESP PROC, GROUP: HCPCS

## 2020-09-25 ENCOUNTER — MEDICAL CORRESPONDENCE (OUTPATIENT)
Dept: HEALTH INFORMATION MANAGEMENT | Facility: CLINIC | Age: 45
End: 2020-09-25

## 2020-09-29 ENCOUNTER — HOSPITAL ENCOUNTER (OUTPATIENT)
Dept: CARDIAC REHAB | Facility: CLINIC | Age: 45
End: 2020-09-29
Attending: PHYSICIAN ASSISTANT
Payer: COMMERCIAL

## 2020-09-29 PROCEDURE — 40000244 ZZH STATISTIC VISIT PULM REHAB

## 2020-09-29 PROCEDURE — G0239 OTH RESP PROC, GROUP: HCPCS

## 2020-10-01 ENCOUNTER — HOSPITAL ENCOUNTER (OUTPATIENT)
Dept: CARDIAC REHAB | Facility: CLINIC | Age: 45
End: 2020-10-01
Attending: PHYSICIAN ASSISTANT
Payer: MEDICAID

## 2020-10-01 PROCEDURE — 999N000193 HC STATISTIC VISIT PULM REHAB

## 2020-10-01 PROCEDURE — G0239 OTH RESP PROC, GROUP: HCPCS

## 2020-10-02 ENCOUNTER — INFUSION THERAPY VISIT (OUTPATIENT)
Dept: INFUSION THERAPY | Facility: CLINIC | Age: 45
End: 2020-10-02
Attending: PSYCHIATRY & NEUROLOGY
Payer: MEDICAID

## 2020-10-02 ENCOUNTER — HOSPITAL ENCOUNTER (OUTPATIENT)
Facility: CLINIC | Age: 45
Setting detail: SPECIMEN
Discharge: HOME | End: 2020-10-02
Attending: PSYCHIATRY & NEUROLOGY | Admitting: PSYCHIATRY & NEUROLOGY
Payer: MEDICAID

## 2020-10-02 VITALS
OXYGEN SATURATION: 100 % | SYSTOLIC BLOOD PRESSURE: 117 MMHG | DIASTOLIC BLOOD PRESSURE: 79 MMHG | HEART RATE: 67 BPM | TEMPERATURE: 98.2 F

## 2020-10-02 DIAGNOSIS — G35 MULTIPLE SCLEROSIS (H): Primary | ICD-10-CM

## 2020-10-02 LAB
ALT SERPL W P-5'-P-CCNC: 33 U/L (ref 0–50)
AST SERPL W P-5'-P-CCNC: 33 U/L (ref 0–45)
BASOPHILS # BLD AUTO: 0.1 10E9/L (ref 0–0.2)
BASOPHILS NFR BLD AUTO: 0.7 %
DIFFERENTIAL METHOD BLD: ABNORMAL
EOSINOPHIL # BLD AUTO: 0.2 10E9/L (ref 0–0.7)
EOSINOPHIL NFR BLD AUTO: 2.5 %
ERYTHROCYTE [DISTWIDTH] IN BLOOD BY AUTOMATED COUNT: 13.2 % (ref 10–15)
HCT VFR BLD AUTO: 40.9 % (ref 35–47)
HGB BLD-MCNC: 12.6 G/DL (ref 11.7–15.7)
IMM GRANULOCYTES # BLD: 0 10E9/L (ref 0–0.4)
IMM GRANULOCYTES NFR BLD: 0.4 %
LYMPHOCYTES # BLD AUTO: 4.8 10E9/L (ref 0.8–5.3)
LYMPHOCYTES NFR BLD AUTO: 53.5 %
MCH RBC QN AUTO: 30.4 PG (ref 26.5–33)
MCHC RBC AUTO-ENTMCNC: 30.8 G/DL (ref 31.5–36.5)
MCV RBC AUTO: 99 FL (ref 78–100)
MONOCYTES # BLD AUTO: 0.7 10E9/L (ref 0–1.3)
MONOCYTES NFR BLD AUTO: 8.3 %
NEUTROPHILS # BLD AUTO: 3.1 10E9/L (ref 1.6–8.3)
NEUTROPHILS NFR BLD AUTO: 34.6 %
NRBC # BLD AUTO: 0 10*3/UL
NRBC BLD AUTO-RTO: 0 /100
PLATELET # BLD AUTO: 351 10E9/L (ref 150–450)
RBC # BLD AUTO: 4.14 10E12/L (ref 3.8–5.2)
WBC # BLD AUTO: 8.9 10E9/L (ref 4–11)

## 2020-10-02 PROCEDURE — 86359 T CELLS TOTAL COUNT: CPT | Performed by: PSYCHIATRY & NEUROLOGY

## 2020-10-02 PROCEDURE — 250N000011 HC RX IP 250 OP 636: Performed by: PSYCHIATRY & NEUROLOGY

## 2020-10-02 PROCEDURE — 84460 ALANINE AMINO (ALT) (SGPT): CPT | Performed by: PSYCHIATRY & NEUROLOGY

## 2020-10-02 PROCEDURE — 96365 THER/PROPH/DIAG IV INF INIT: CPT

## 2020-10-02 PROCEDURE — 36415 COLL VENOUS BLD VENIPUNCTURE: CPT

## 2020-10-02 PROCEDURE — 999N001133 HC STATISTIC JC VIR AB INDEX INHIB: Performed by: PSYCHIATRY & NEUROLOGY

## 2020-10-02 PROCEDURE — 86360 T CELL ABSOLUTE COUNT/RATIO: CPT | Performed by: PSYCHIATRY & NEUROLOGY

## 2020-10-02 PROCEDURE — 85025 COMPLETE CBC W/AUTO DIFF WBC: CPT | Performed by: PSYCHIATRY & NEUROLOGY

## 2020-10-02 PROCEDURE — 258N000003 HC RX IP 258 OP 636: Performed by: PSYCHIATRY & NEUROLOGY

## 2020-10-02 PROCEDURE — 86357 NK CELLS TOTAL COUNT: CPT | Performed by: PSYCHIATRY & NEUROLOGY

## 2020-10-02 PROCEDURE — 86355 B CELLS TOTAL COUNT: CPT | Performed by: PSYCHIATRY & NEUROLOGY

## 2020-10-02 PROCEDURE — 84450 TRANSFERASE (AST) (SGOT): CPT | Performed by: PSYCHIATRY & NEUROLOGY

## 2020-10-02 RX ORDER — HEPARIN SODIUM (PORCINE) LOCK FLUSH IV SOLN 100 UNIT/ML 100 UNIT/ML
5 SOLUTION INTRAVENOUS
Status: CANCELLED | OUTPATIENT
Start: 2020-10-30

## 2020-10-02 RX ORDER — HEPARIN SODIUM,PORCINE 10 UNIT/ML
5 VIAL (ML) INTRAVENOUS
Status: CANCELLED | OUTPATIENT
Start: 2020-10-30

## 2020-10-02 RX ADMIN — SODIUM CHLORIDE 250 ML: 9 INJECTION, SOLUTION INTRAVENOUS at 12:34

## 2020-10-02 RX ADMIN — NATALIZUMAB 300 MG: 300 INJECTION INTRAVENOUS at 12:34

## 2020-10-02 NOTE — PROGRESS NOTES
"Infusion Nursing Note:  Josefina ASAEL KwanHastingsomid Aldrich presents today for Tysabri, labs.    Patient seen by provider today: No   present during visit today: Not Applicable.    Note: Pt requesting labs from ANDREW Thurston, to be drawn today.  Orders are for lipid panel, but pt has not been fasting.  She will make a \"lab only\" appt for a day or two prior to her f/u visit with ANDREW Thurston    Intravenous Access:  Labs drawn without difficulty.  Peripheral IV placed.    Treatment Conditions:  Tysabri pre-infusion checklist completed via touch program.      Post Infusion Assessment:  Patient tolerated infusion without incident.  Patient observed for 60 minutes post Tysabri per protocol.  Site patent and intact, free from redness, edema or discomfort.  No evidence of extravasations.  Access discontinued per protocol.       Discharge Plan:   Discharge instructions reviewed with: Patient.  Patient and/or family verbalized understanding of discharge instructions and all questions answered.  AVS to patient via [employer].  Patient will return 10/30/20 for next dose of Tysabri for next appointment.   Patient discharged in stable condition accompanied by: self.  Departure Mode: Ambulatory.    Megan Higuera RN                      "

## 2020-10-03 LAB
CD19 CELLS # BLD: 1325 CELLS/UL (ref 107–698)
CD19 CELLS NFR BLD: 27 % (ref 6–27)
CD3 CELLS # BLD: 2930 CELLS/UL (ref 603–2990)
CD3 CELLS NFR BLD: 60 % (ref 49–84)
CD3+CD4+ CELLS # BLD: 1880 CELLS/UL (ref 441–2156)
CD3+CD4+ CELLS NFR BLD: 39 % (ref 28–63)
CD3+CD4+ CELLS/CD3+CD8+ CLL BLD: 1.95 % (ref 1.4–2.6)
CD3+CD8+ CELLS # BLD: 986 CELLS/UL (ref 125–1312)
CD3+CD8+ CELLS NFR BLD: 20 % (ref 10–40)
CD3-CD16+CD56+ CELLS # BLD: 533 CELLS/UL (ref 95–640)
CD3-CD16+CD56+ CELLS NFR BLD: 11 % (ref 4–25)
IFC SPECIMEN: ABNORMAL

## 2020-10-06 ENCOUNTER — HOSPITAL ENCOUNTER (OUTPATIENT)
Dept: CARDIAC REHAB | Facility: CLINIC | Age: 45
End: 2020-10-06
Attending: PHYSICIAN ASSISTANT
Payer: MEDICAID

## 2020-10-06 PROCEDURE — G0239 OTH RESP PROC, GROUP: HCPCS

## 2020-10-06 PROCEDURE — 999N000193 HC STATISTIC VISIT PULM REHAB

## 2020-10-08 ENCOUNTER — THERAPY VISIT (OUTPATIENT)
Dept: PHYSICAL THERAPY | Facility: CLINIC | Age: 45
End: 2020-10-08
Payer: MEDICAID

## 2020-10-08 ENCOUNTER — HOSPITAL ENCOUNTER (OUTPATIENT)
Dept: CARDIAC REHAB | Facility: CLINIC | Age: 45
End: 2020-10-08
Attending: PHYSICIAN ASSISTANT
Payer: MEDICAID

## 2020-10-08 DIAGNOSIS — M54.2 CERVICALGIA: ICD-10-CM

## 2020-10-08 PROCEDURE — 999N000193 HC STATISTIC VISIT PULM REHAB

## 2020-10-08 PROCEDURE — 97161 PT EVAL LOW COMPLEX 20 MIN: CPT | Mod: GP | Performed by: PHYSICAL THERAPIST

## 2020-10-08 PROCEDURE — G0239 OTH RESP PROC, GROUP: HCPCS

## 2020-10-08 PROCEDURE — 97110 THERAPEUTIC EXERCISES: CPT | Mod: GP | Performed by: PHYSICAL THERAPIST

## 2020-10-08 NOTE — PROGRESS NOTES
[facility] Initial Evaluation  Subjective:  Physical Therapy Initial Evaluation   10/8/2020   Precautions/Restrictions/MD instructions: PT eval and treat   Therapist Impression:   Pt is a 45 y/o female, with 2 year history of neck pain. Pt presents with pain, decreased ROM/moblity, poor posture and decreased strength consistent with postural dysfunction and fibromyalgia. These impairments limit their ability to sleep, dressing and bathing. Skilled PT services necessary in order to reduce impairments and improve independent function.    Subjective:   Chief Complaint: 2 years ago was diagnosed with M.S. and fibromyalgia and during COVID stay at home pain in neck has worsened  DOI/onset: 8/15/2020  DOS: none  Location: Neck ,shoulders and into thoracic and lumbar spine Quality: sharp, achy most of the time   Frequency: constant Radiates: down the spine, right sided into fingertips, and down into toes  Pain scale: Rest 7/10 Activity 10/10    Sleeping: waking several times/night due to pain    Exacerbated by: sleeping, lifting >10 lbs  Relieved by: heating, warm showers, massage, ice  Progression: wosre   Previous Treatment: PT  Effect of prior treatment: helpful for short term relief    PMH and/or surgical history: c sections, tonsils, gastric bypass   Imaging: none recently (due for MRI soon - for M.S.)    Occupation: patient care coordinator    Job duties: computer work;walking   Current HEP/exercise regimen: walking (10-15 min)  Patient's goals: able to sleep throughout the night   Medications: please refer to medical chart (baclofen, gabapentin)   General health as reported by patient: fair (MS/fibro)    Return to MD: as needed   Red Flags: MS, fibromyalgia, stroke (2015)       HPI                    Objective:  CERVICAL:    Posture: slight forward head, rounded shoulders    Neurological:    Motor Deficit:  Myotomes L R   C4 (shoulder elevation) 5/5 5/5   C5 (shoulder abduction) 5/5 4-/5   C6  (elbow flexion) 5/5 4-/5   C7 (elbow extension) 5/5 4-/5   C8 (thumb extension) 5/5 4-/5   T1 (finger add/abd) 5/5 4-/5    Strength (lb) nt nt     Sensory Deficit, Reflexes, Dural Signs: slight reduction in light touch in C5-C7 on right    AROM: (Major, Moderate, Minimal or Nil loss)  Movement Loss Sriram Mod Min Nil Pain   Protrusion   X  none   Flexion  X   Base of skull into thoracic    Retraction  X   No pain    Extension  X   No pain   Left Rotation    X No pain   Right Rotation  X   Pain on left   Left Side Bending   X  none   Right Side bending   X  none       Other Tests: - Supine: Deep Neck Flexor Activation: poor    Palpation: B UTs, levators and subocciptal         System    Physical Exam    General     ROS    Assessment/Plan:    Patient is a 45 year old female with cervical complaints.    Patient has the following significant findings with corresponding treatment plan.                Diagnosis 1:  Neck pain  Pain -  hot/cold therapy, US, mechanical traction, manual therapy, self management, education, directional preference exercise and home program  Decreased ROM/flexibility - manual therapy and therapeutic exercise  Decreased strength - therapeutic exercise and therapeutic activities  Impaired balance - neuro re-education and therapeutic activities  Decreased proprioception - neuro re-education and therapeutic activities  Inflammation - cold therapy and self management/home program  Impaired muscle performance - neuro re-education  Decreased function - therapeutic activities  Impaired posture - neuro re-education  Instability -  Therapeutic Activity  Therapeutic Exercise    Therapy Evaluation Codes:     Cumulative Therapy Evaluation is: Low complexity.    Previous and current functional limitations:  (See Goal Flow Sheet for this information)    Short term and Long term goals: (See Goal Flow Sheet for this information)     Communication ability:  Patient appears to be able to clearly communicate and  understand verbal and written communication and follow directions correctly.  Treatment Explanation - The following has been discussed with the patient:   RX ordered/plan of care  Anticipated outcomes  Possible risks and side effects  This patient would benefit from PT intervention to resume normal activities.   Rehab potential is good.    Frequency:  1 X week, once daily  Duration:  for 6 weeks  Discharge Plan:  Achieve all LTG.  Independent in home treatment program.  Reach maximal therapeutic benefit.    Please refer to the daily flowsheet for treatment today, total treatment time and time spent performing 1:1 timed codes.

## 2020-10-08 NOTE — LETTER
DEPARTMENT OF HEALTH AND HUMAN SERVICES  CENTERS FOR MEDICARE & MEDICAID SERVICES    PLAN/UPDATED PLAN OF PROGRESS FOR OUTPATIENT REHABILITATION       PATIENTS NAME:  Josefina Miguel   : 1975  PROVIDER NUMBER:    [id number]  [id number]  PROVID[facility] NAME: [facility]  MEDICAL RECORD NUMBER: [id number]   START OF CARE DATE:  SOC Date: 10/08/20   TYPE:  PT  PRIMARY/TREATMENT DIAGNOSIS: Cervicalgia  VISITS FROM START OF CARE:  Rxs Used: 1     Physical Therapy Initial Evaluation   10/8/2020   Precautions/Restrictions/MD instructions: PT eval and treat   Therapist Impression:   Pt is a 45 y/o female, with 2 year history of neck pain. Pt presents with pain, decreased ROM/moblity, poor posture and decreased strength consistent with postural dysfunction and fibromyalgia. These impairments limit their ability to sleep, dressing and bathing. Skilled PT services necessary in order to reduce impairments and improve independent function.  Subjective: Chief Complaint: 2 years ago was diagnosed with M.S. and fibromyalgia and during COVID stay at home pain in neck has worsened  DOI/onset: 8/15/2020  DOS: none  Location: Neck ,shoulders and into thoracic and lumbar spine Quality: sharp, achy most of the time   Frequency: constant Radiates: down the spine, right sided into fingertips, and down into toes  Pain scale: Rest 7/10 Activity 10/10    Sleeping: waking several times/night due to pain    Exacerbated by: sleeping, lifting >10 lbs  Relieved by: heating, warm showers, massage, ice  Progression: wosre   Previous Treatment: PT  Effect of prior treatment: helpful for short term relief    PMH and/or surgical history: c sections, tonsils, gastric bypass   Imaging: none recently (due for MRI soon - for M.S.)    Occupation: patient care coordinator    Job duties: computer work;walking   Current HEP/exercise regimen: walking (10-15 min)  Patient's goals: able to sleep throughout the night    Medications: please refer to medical chart (baclofen, gabapentin)   General health as reported by patient: fair (MS/fibro)      PATIENTS NAME:  Josefina Miguel   : 1975    Return to MD: as needed   Red Flags: MS, fibromyalgia, stroke (2015)     Objective:  CERVICAL:  Posture: slight forward head, rounded shoulders  Neurological:  Motor Deficit:  Myotomes L R   C4 (shoulder elevation) 5/5 5/5   C5 (shoulder abduction) 5/5 4-/5   C6 (elbow flexion) 5/5 4-/5   C7 (elbow extension) 5/5 4-/5   C8 (thumb extension) / 4-/5   T1 (finger add/abd) / 4-/5    Strength (lb) nt nt     Sensory Deficit, Reflexes, Dural Signs: slight reduction in light touch in C5-C7 on right  AROM: (Major, Moderate, Minimal or Nil loss)  Movement Loss Sriram Mod Min Nil Pain   Protrusion   X  none   Flexion  X   Base of skull into thoracic    Retraction  X   No pain    Extension  X   No pain   Left Rotation    X No pain   Right Rotation  X   Pain on left   Left Side Bending   X  none   Right Side bending   X  none     Other Tests: - Supine: Deep Neck Flexor Activation: poor  Palpation: B UTs, levators and subocciptal     Assessment/Plan:    Patient is a 45 year old female with cervical complaints.    Patient has the following significant findings with corresponding treatment plan.                Diagnosis 1:  Neck pain  Pain -  hot/cold therapy, US, mechanical traction, manual therapy, self management, education, directional preference exercise and home program  Decreased ROM/flexibility - manual therapy and therapeutic exercise  Decreased strength - therapeutic exercise and therapeutic activities  Impaired balance - neuro re-education and therapeutic activities  Decreased proprioception - neuro re-education and therapeutic activities  Inflammation - cold therapy and self management/home program  Impaired muscle performance - neuro re-education  Decreased function - therapeutic activities  Impaired posture - neuro  "re-education  PATIENTS NAME:  Josefina Miguel   : 1975    Instability -  Therapeutic Activity  Therapeutic Exercise    Therapy Evaluation Codes:     Cumulative Therapy Evaluation is: Low complexity.  Previous and current functional limitations:  (See Goal Flow Sheet for this information)    Short term and Long term goals: (See Goal Flow Sheet for this information)   Communication ability:  Patient appears to be able to clearly communicate and understand verbal and written communication and follow directions correctly.  Treatment Explanation - The following has been discussed with the patient:   RX ordered/plan of care  Anticipated outcomes  Possible risks and side effects  This patient would benefit from PT intervention to resume normal activities.   Rehab potential is good.  Frequency:  1 X week, once daily  Duration:  for 6 weeks  Discharge Plan:  Achieve all LTG.  Independent in home treatment program.  Reach maximal therapeutic benefit.      Caregiver Signature/Credentials _____________________________ Date ________       Treating Provider: Nidhi Dunbar, PT, SCS     I have reviewed and certified the need for these services and plan of treatment while under my care.      PHYSICIAN'S SIGNATURE:   ______________________________  Date___________                          Miya MORALES    Certification period:  Beginning of Cert date period: 10/08/20 to  End of Cert period date: 20     Functional Level Progress Report: Please see attached \"Goal Flow sheet for Functional level.\"    ____X____ Continue Services or       ________ DC Services                Service dates: From  SOC Date: 10/08/20 date to present                         "

## 2020-10-11 LAB — LAB SCANNED RESULT: NORMAL

## 2020-10-13 ENCOUNTER — HOSPITAL ENCOUNTER (OUTPATIENT)
Dept: CARDIAC REHAB | Facility: CLINIC | Age: 45
End: 2020-10-13
Attending: PHYSICIAN ASSISTANT
Payer: MEDICAID

## 2020-10-13 PROCEDURE — G0239 OTH RESP PROC, GROUP: HCPCS

## 2020-10-13 PROCEDURE — 999N000193 HC STATISTIC VISIT PULM REHAB

## 2020-10-15 ENCOUNTER — THERAPY VISIT (OUTPATIENT)
Dept: PHYSICAL THERAPY | Facility: CLINIC | Age: 45
End: 2020-10-15
Payer: MEDICAID

## 2020-10-15 ENCOUNTER — HOSPITAL ENCOUNTER (OUTPATIENT)
Dept: CARDIAC REHAB | Facility: CLINIC | Age: 45
End: 2020-10-15
Attending: PHYSICIAN ASSISTANT
Payer: MEDICAID

## 2020-10-15 DIAGNOSIS — M54.2 CERVICALGIA: Primary | ICD-10-CM

## 2020-10-15 PROCEDURE — 97035 APP MDLTY 1+ULTRASOUND EA 15: CPT | Mod: GP | Performed by: PHYSICAL THERAPIST

## 2020-10-15 PROCEDURE — 999N000193 HC STATISTIC VISIT PULM REHAB

## 2020-10-15 PROCEDURE — 97140 MANUAL THERAPY 1/> REGIONS: CPT | Mod: GP | Performed by: PHYSICAL THERAPIST

## 2020-10-15 PROCEDURE — G0239 OTH RESP PROC, GROUP: HCPCS

## 2020-10-15 PROCEDURE — 97110 THERAPEUTIC EXERCISES: CPT | Mod: GP | Performed by: PHYSICAL THERAPIST

## 2020-10-19 ENCOUNTER — HOSPITAL ENCOUNTER (OUTPATIENT)
Dept: CARDIAC REHAB | Facility: CLINIC | Age: 45
End: 2020-10-19
Attending: PHYSICIAN ASSISTANT
Payer: MEDICAID

## 2020-10-19 PROCEDURE — 999N000193 HC STATISTIC VISIT PULM REHAB

## 2020-10-19 PROCEDURE — G0238 OTH RESP PROC, INDIV: HCPCS

## 2020-10-19 PROCEDURE — G0237 THERAPEUTIC PROCD STRG ENDUR: HCPCS

## 2020-10-21 NOTE — PROGRESS NOTES
"  SUBJECTIVE:   Josefina Aldrich is a 45 year old female who presents to clinic today for the following health issues:    Disability Forms        Neck pain  Persistent and nearly daily - right sided neck pain that radiates to arm & causes numbness/tingling.  Doing PHYSICAL THERAPY once weekly.   Gabapentin 600 mg helpful.  Next MRI was supposed to be planned for November but has been postponed to the spring for MS follow up due to COVID risk  Hoping to see if any cspine pathology for neck as well.  Physical therapy has been helpful.  Continues with home exercises.  She is very nervous about the continued right arm.  Wondering about options to determine if there is any nerve damage in this location.    Multiple Sclerosis  Follows with Dr. Chong  June last OV with LUIS MIGUEL Gao MRI of brain & Cspine  Tysabri infusions monthly.  Not taking baclofen for leg spasms - causes hand tremor.     Work restrictions for asthma/MS  Was supposed to go back to work 9/21/2020 - work could not accommodate restrictions.   Currently approved for STD through 27 October 2020.  She needs a letter today stating that her restrictions are still in place.  Her employer was unable to days apart 6 feet despite strong recommendations when she returned to work in June 2020.  Since that time she has not been back to work due to their inability to accommodate these restrictions.  Other will set up an operative would have required her to be on her feet all day which she is unable to tolerate due to leg pain and spasms.  She has also been offered customer facing positions which are not ideal due to her immunosuppression in light of the current pandemic.     She recently found out that her insurance has been canceled through her employer.  She has been told that she is \"no longer an active employee \".  She has not been micro from her physician to her knowledge.      Asthma  Continues on Symbicort and albuterol as needed.  Also utilizing " "montelukast.  Has been doing pulmonary rehab and feels that this is helpful.  Since the weather has turned cold she has needed her albuterol more often.  Describes the asthma exacerbation as a heaviness in her chest.  This is not as severe as her exacerbations earlier this year.      Problem list and histories reviewed & adjusted, as indicated.  Additional history: as documented    Patient Active Problem List   Diagnosis     Migraine     Asthma     Body mass index 45.0-49.9, adult (H)     Cognitive changes     Hypersomnia with sleep apnea     Iron deficiency anemia     Postsurgical nonabsorption     Mild intermittent asthma     Neck pain     Pain, neuropathic     Pelvic pain in female     S/P gastric bypass      S/P total hysterectomy  for fibroids - ovaries remain     Vitamin B12 deficiency     Hypermagnesemia     Benign essential hypertension     Neck pain on left side     Multiple sclerosis (H)     Immunosuppression (H)     Encounter for completion of form with patient     Past Surgical History:   Procedure Laterality Date     GASTRIC BYPASS      \"Trouble with B12 and D\"     HYSTERECTOMY, MONO      cervix gone.  fibroids.  without BSO     TONSILLECTOMY         Social History     Tobacco Use     Smoking status: Former Smoker     Packs/day: 0.50     Years: 2.00     Pack years: 1.00     Types: Cigars     Start date: 2011     Quit date: 2013     Years since quittin.3     Smokeless tobacco: Never Used   Substance Use Topics     Alcohol use: Yes     Alcohol/week: 2.0 standard drinks     Comment: 0-3 drinks per week     Family History   Problem Relation Age of Onset     Lupus Paternal Aunt 41     Diabetes Paternal Grandmother      Cerebrovascular Disease Paternal Grandmother      Depression Paternal Grandmother      Substance Abuse Paternal Grandmother      Diabetes Maternal Grandmother      Hyperlipidemia Maternal Grandmother      Hypertension Mother      Hyperlipidemia Mother      Obesity " Mother      Cerebrovascular Disease Maternal Grandfather      Obesity Father      Multiple Sclerosis No family hx of          Current Outpatient Medications   Medication Sig Dispense Refill     albuterol (PROVENTIL) (2.5 MG/3ML) 0.083% neb solution Take 1 vial (2.5 mg) by nebulization every 6 hours as needed for shortness of breath / dyspnea or wheezing 1 Box 3     albuterol (VENTOLIN HFA) 108 (90 BASE) MCG/ACT inhaler Inhale 2 puffs into the lungs every 6 hours as needed        azelastine (ASTELIN) 0.1 % nasal spray Spray 1 spray into both nostrils 2 times daily (nasal antihistamine) 30 mL 3     B Complex-C (VITAMIN B COMPLEX W/VITAMIN C) TABS tablet Take 1 tablet by mouth daily       baclofen (LIORESAL) 20 MG tablet Take 2 tablets (40 mg) by mouth At Bedtime AND 1 tablet (20 mg) every morning AND 1 tablet (20 mg) daily (with lunch) AND 0.5 tablets (10 mg) See Admin Instructions. (In the afternoon)  10     budesonide-formoterol (SYMBICORT) 160-4.5 MCG/ACT Inhaler Inhale 2 puffs into the lungs 2 times daily 10.2 g 5     calcium citrate 250 MG TABS Take 500 mg by mouth       cetirizine (ZYRTEC) 10 MG tablet Take 1 tablet (10 mg) by mouth daily 180 tablet 1     childrens multivitamin w/iron (FLINTSTONES COMPLETE) 18 MG chewable tablet Take 2 tablets by mouth       diazepam (VALIUM) 10 MG tablet Take 1 tablet (10 mg) by mouth once for 1 dose (take 30 minutes prior to MRI) 1 tablet 0     ferrous sulfate (FEROSUL) 325 (65 Fe) MG tablet Take 325 mg by mouth daily (with breakfast)       fluticasone (FLONASE) 50 MCG/ACT nasal spray Spray 1 spray into both nostrils daily 16 g 5     gabapentin (NEURONTIN) 300 MG capsule Take 2 capsules (600 mg) by mouth At Bedtime 180 capsule 3     ipratropium - albuterol 0.5 mg/2.5 mg/3 mL (DUONEB) 0.5-2.5 (3) MG/3ML neb solution NEBULIZE ONE VIAL EVERY 4 HOURS AS NEEDED FOR SHORTNESS OF BREATH OR WHEEZING 180 mL 1     ketotifen (ZADITOR) 0.025 % ophthalmic solution Place 1 drop into both  eyes 2 times daily as needed for itching 1 Bottle 3     MAGNESIUM PO        montelukast (SINGULAIR) 10 MG tablet Take 1 tablet (10 mg) by mouth At Bedtime 90 tablet 1     natalizumab (TYSABRI) 300 MG/15ML injection Inject 15 mLs (300 mg) into the vein every 30 days Infusion       SUMAtriptan (IMITREX) 100 MG tablet Take 50 mg up to twice daily as needed for headache; separate doses by at least one hour and do not use more than 3 days/week 18 tablet 3     topiramate (TOPAMAX) 100 MG tablet Take 1 tablet (100 mg) by mouth At Bedtime (take with 50 mg to total 150 mg nightly) 60 tablet 3     topiramate (TOPAMAX) 50 MG tablet Take 1 tablet (50 mg) by mouth At Bedtime (take with 100 mg to total 150 mg nightly) 60 tablet 3     traZODone (DESYREL) 50 MG tablet Take 1 tablet (50 mg) by mouth At Bedtime 90 tablet 1     triamcinolone (KENALOG) 0.1 % external cream Apply topically 3 times daily as needed for irritation       valACYclovir (VALTREX) 500 MG tablet Take 1 tablet (500 mg) by mouth daily 30 tablet 1     vitamin D3 (CHOLECALCIFEROL) 68639 units (250 mcg) capsule Take 1 capsule (10,000 Units) by mouth daily 90 capsule 3     vitamin E (TOCOPHEROL) 400 units (360 mg) capsule Take by mouth daily       Allergies   Allergen Reactions     Aspirin Difficulty breathing, Palpitations and Other (See Comments)     Cephalexin Swelling     Adhesive Tape      Amantadine Swelling     Azithromycin Angioedema     Latex Hives, Itching and Rash     Penicillins Other (See Comments), Nausea and Vomiting, Hives, Swelling, Rash, Cramps and GI Disturbance     Amoxicillin OK       Reviewed and updated as needed this visit by clinical staff  Tobacco  Allergies  Meds  Problems  Med Hx  Surg Hx  Fam Hx  Soc Hx        Reviewed and updated as needed this visit by Provider  Tobacco  Allergies  Meds  Problems  Med Hx  Surg Hx  Fam Hx          ROS:  Constitutional, HEENT, cardiovascular, pulmonary, GI, , musculoskeletal, neuro, skin,  "endocrine and psych systems are negative, except as otherwise noted.    OBJECTIVE:   /72 (BP Location: Left arm, Cuff Size: Adult Regular)   Pulse 92   Temp 97.5  F (36.4  C) (Tympanic)   Ht 1.651 m (5' 5\")   Wt 124.3 kg (274 lb)   SpO2 99%   BMI 45.60 kg/m   Body mass index is 45.6 kg/m .      GENERAL: healthy, alert and no distress  EYES: Eyes grossly normal to inspection, PERRL and conjunctivae and sclerae normal  HENT: ear canals and TM's normal and nose and mouth without ulcers or lesions  NECK: no adenopathy  RESP: lungs clear to auscultation - no rales, rhonchi or wheezes  CV: regular rate and rhythm, normal S1 S2, no S3 or S4, no murmur, click or rub, no peripheral edema and peripheral pulses strong  MS: no gross musculoskeletal defects noted, no edema  SKIN: no suspicious lesions or rashes  NEURO: Normal strength and tone, mentation intact and speech normal  PSYCH: mentation appears normal, affect normal/bright    Diagnostic Test Results:  none   ASSESSMENT/PLAN:   Josefina was seen today for forms.    Diagnoses and all orders for this visit:    Multiple sclerosis (H)  Encounter for completion of form with patient  Wrote new work restriction letter reiterating the previous work restrictions.    Pain of left lower extremity  Refill today  -     gabapentin (NEURONTIN) 300 MG capsule; Take 2 capsules (600 mg) by mouth At Bedtime    Cervical radiculopathy  Persistent and bothersome cervicalgia with right radicular symptoms.  Due to the fact that her neurologist would like to postpone her MRI I will order a noncontrast MRI to evaluate for any structural pathology.  Patient is claustrophobic we will send over single dose of diazepam to be taken prior to scan.  -     MR Cervical Spine w/o Contrast; Future  -     diazepam (VALIUM) 10 MG tablet; Take 1 tablet (10 mg) by mouth once for 1 dose (take 30 minutes prior to MRI)    HSV (herpes simplex virus) infection  Refill needed today  -     valACYclovir " (VALTREX) 500 MG tablet; Take 1 tablet (500 mg) by mouth daily      Return in about 1 week (around 10/29/2020) for mri.     [address] [address]  [email or web address]   Office: [phone]           Miya Crump MS, PA-C

## 2020-10-22 ENCOUNTER — MYC MEDICAL ADVICE (OUTPATIENT)
Dept: FAMILY MEDICINE | Facility: CLINIC | Age: 45
End: 2020-10-22

## 2020-10-22 ENCOUNTER — HOSPITAL ENCOUNTER (OUTPATIENT)
Dept: CARDIAC REHAB | Facility: CLINIC | Age: 45
End: 2020-10-22
Attending: PHYSICIAN ASSISTANT
Payer: MEDICAID

## 2020-10-22 ENCOUNTER — THERAPY VISIT (OUTPATIENT)
Dept: PHYSICAL THERAPY | Facility: CLINIC | Age: 45
End: 2020-10-22
Payer: MEDICAID

## 2020-10-22 ENCOUNTER — OFFICE VISIT (OUTPATIENT)
Dept: FAMILY MEDICINE | Facility: CLINIC | Age: 45
End: 2020-10-22
Payer: MEDICAID

## 2020-10-22 VITALS
TEMPERATURE: 97.5 F | SYSTOLIC BLOOD PRESSURE: 122 MMHG | OXYGEN SATURATION: 99 % | BODY MASS INDEX: 45.65 KG/M2 | DIASTOLIC BLOOD PRESSURE: 72 MMHG | WEIGHT: 274 LBS | HEIGHT: 65 IN | HEART RATE: 92 BPM

## 2020-10-22 DIAGNOSIS — M79.605 PAIN OF LEFT LOWER EXTREMITY: ICD-10-CM

## 2020-10-22 DIAGNOSIS — M54.2 CERVICALGIA: Primary | ICD-10-CM

## 2020-10-22 DIAGNOSIS — B00.9 HSV (HERPES SIMPLEX VIRUS) INFECTION: ICD-10-CM

## 2020-10-22 DIAGNOSIS — G35 MULTIPLE SCLEROSIS (H): Primary | ICD-10-CM

## 2020-10-22 DIAGNOSIS — Z02.89 ENCOUNTER FOR COMPLETION OF FORM WITH PATIENT: ICD-10-CM

## 2020-10-22 DIAGNOSIS — M54.12 CERVICAL RADICULOPATHY: ICD-10-CM

## 2020-10-22 PROCEDURE — 99214 OFFICE O/P EST MOD 30 MIN: CPT | Performed by: PHYSICIAN ASSISTANT

## 2020-10-22 PROCEDURE — 999N000193 HC STATISTIC VISIT PULM REHAB

## 2020-10-22 PROCEDURE — G0239 OTH RESP PROC, GROUP: HCPCS

## 2020-10-22 PROCEDURE — 97110 THERAPEUTIC EXERCISES: CPT | Mod: GP | Performed by: PHYSICAL THERAPIST

## 2020-10-22 PROCEDURE — 97035 APP MDLTY 1+ULTRASOUND EA 15: CPT | Mod: GP | Performed by: PHYSICAL THERAPIST

## 2020-10-22 PROCEDURE — 97140 MANUAL THERAPY 1/> REGIONS: CPT | Mod: GP | Performed by: PHYSICAL THERAPIST

## 2020-10-22 RX ORDER — GABAPENTIN 300 MG/1
600 CAPSULE ORAL AT BEDTIME
Qty: 180 CAPSULE | Refills: 1 | Status: SHIPPED | OUTPATIENT
Start: 2020-10-22 | End: 2020-10-22

## 2020-10-22 RX ORDER — DIAZEPAM 10 MG
10 TABLET ORAL ONCE
Qty: 1 TABLET | Refills: 0 | Status: SHIPPED | OUTPATIENT
Start: 2020-10-22 | End: 2020-10-22

## 2020-10-22 RX ORDER — GABAPENTIN 300 MG/1
600 CAPSULE ORAL AT BEDTIME
Qty: 180 CAPSULE | Refills: 3 | Status: SHIPPED | OUTPATIENT
Start: 2020-10-22 | End: 2021-03-31

## 2020-10-22 RX ORDER — VALACYCLOVIR HYDROCHLORIDE 500 MG/1
500 TABLET, FILM COATED ORAL DAILY
Qty: 30 TABLET | Refills: 1 | Status: SHIPPED | OUTPATIENT
Start: 2020-10-22 | End: 2021-08-05

## 2020-10-22 ASSESSMENT — MIFFLIN-ST. JEOR: SCORE: 1898.74

## 2020-10-22 NOTE — LETTER
[facility] [address]IOR [address]  [place]IOR [facility] [address] [phone]       October 22, 2020    Josefina VYAS [place]  [id number] [place] [address] [place]   [place] [facility] [id number]    To Whom it May Concern:    The above patient is under my professional care.  Due to underlying immunosuppression, asthma, multiple sclerosis, and neck pain she continues to have restrictions that require social distancing and mask compliance in the workplace.  These restrictions will be indefinite with until the pandemic restrictions have eased.  She also continues to have limitations of 10 lbs lifting and 20 lbs push/pulling with occasional walking/standing allowed.  Continuous sitting okay.      Sincerely,    Miya Crump PA-C

## 2020-10-23 ASSESSMENT — ASTHMA QUESTIONNAIRES: ACT_TOTALSCORE: 13

## 2020-10-23 NOTE — TELEPHONE ENCOUNTER
Writer printed letter and faxed to number [phone]    Dion VYAS RN   [facility]

## 2020-10-26 ENCOUNTER — MYC MEDICAL ADVICE (OUTPATIENT)
Dept: FAMILY MEDICINE | Facility: CLINIC | Age: 45
End: 2020-10-26

## 2020-10-27 ENCOUNTER — HOSPITAL ENCOUNTER (OUTPATIENT)
Dept: CARDIAC REHAB | Facility: CLINIC | Age: 45
End: 2020-10-27
Attending: PHYSICIAN ASSISTANT
Payer: MEDICAID

## 2020-10-27 PROCEDURE — G0239 OTH RESP PROC, GROUP: HCPCS

## 2020-10-27 PROCEDURE — 999N000193 HC STATISTIC VISIT PULM REHAB

## 2020-10-29 ENCOUNTER — HOSPITAL ENCOUNTER (OUTPATIENT)
Dept: CARDIAC REHAB | Facility: CLINIC | Age: 45
End: 2020-10-29
Attending: PHYSICIAN ASSISTANT
Payer: MEDICAID

## 2020-10-29 PROCEDURE — 999N000193 HC STATISTIC VISIT PULM REHAB

## 2020-10-29 PROCEDURE — G0239 OTH RESP PROC, GROUP: HCPCS

## 2020-10-30 ENCOUNTER — THERAPY VISIT (OUTPATIENT)
Dept: PHYSICAL THERAPY | Facility: CLINIC | Age: 45
End: 2020-10-30
Payer: MEDICAID

## 2020-10-30 ENCOUNTER — INFUSION THERAPY VISIT (OUTPATIENT)
Dept: INFUSION THERAPY | Facility: CLINIC | Age: 45
End: 2020-10-30
Attending: PSYCHIATRY & NEUROLOGY
Payer: MEDICAID

## 2020-10-30 VITALS
RESPIRATION RATE: 16 BRPM | OXYGEN SATURATION: 100 % | SYSTOLIC BLOOD PRESSURE: 155 MMHG | DIASTOLIC BLOOD PRESSURE: 83 MMHG | TEMPERATURE: 99.9 F | HEART RATE: 66 BPM

## 2020-10-30 DIAGNOSIS — M54.2 CERVICALGIA: Primary | ICD-10-CM

## 2020-10-30 DIAGNOSIS — G35 MULTIPLE SCLEROSIS (H): Primary | ICD-10-CM

## 2020-10-30 PROCEDURE — 97035 APP MDLTY 1+ULTRASOUND EA 15: CPT | Mod: GP | Performed by: PHYSICAL THERAPIST

## 2020-10-30 PROCEDURE — 258N000003 HC RX IP 258 OP 636: Performed by: PSYCHIATRY & NEUROLOGY

## 2020-10-30 PROCEDURE — 96365 THER/PROPH/DIAG IV INF INIT: CPT

## 2020-10-30 PROCEDURE — 97110 THERAPEUTIC EXERCISES: CPT | Mod: GP | Performed by: PHYSICAL THERAPIST

## 2020-10-30 PROCEDURE — 97140 MANUAL THERAPY 1/> REGIONS: CPT | Mod: GP | Performed by: PHYSICAL THERAPIST

## 2020-10-30 PROCEDURE — 250N000011 HC RX IP 250 OP 636: Performed by: PSYCHIATRY & NEUROLOGY

## 2020-10-30 RX ORDER — HEPARIN SODIUM,PORCINE 10 UNIT/ML
5 VIAL (ML) INTRAVENOUS
Status: CANCELLED | OUTPATIENT
Start: 2020-11-27

## 2020-10-30 RX ORDER — HEPARIN SODIUM (PORCINE) LOCK FLUSH IV SOLN 100 UNIT/ML 100 UNIT/ML
5 SOLUTION INTRAVENOUS
Status: CANCELLED | OUTPATIENT
Start: 2020-11-27

## 2020-10-30 RX ADMIN — SODIUM CHLORIDE 250 ML: 9 INJECTION, SOLUTION INTRAVENOUS at 14:57

## 2020-10-30 RX ADMIN — NATALIZUMAB 300 MG: 300 INJECTION INTRAVENOUS at 14:57

## 2020-10-30 NOTE — PROGRESS NOTES
Infusion Nursing Note:  Josefina Lipscombs presents today for Tysabri.    Patient seen by provider today: No   present during visit today: Not Applicable.    Note: Feels like Tysabri is effective.  Denies recent MS flares.  Waited for around 2 hours for tysabri orders to be signed.  Patient denies recent SE to Tysabri.    Intravenous Access:  Peripheral IV placed.    Treatment Conditions:  Tysabri pre-infusion checklist completed via touch program.      Post Infusion Assessment:  Patient tolerated infusion without incident.  Patient observed for 30 minutes post Tysabri.  Patient didn't wait the full 60 minutes because she needed to get her daughter.  Blood return noted pre and post infusion.  Site patent and intact, free from redness, edema or discomfort.  No evidence of extravasations.  Access discontinued per protocol.       Discharge Plan:   Discharge instructions reviewed with: Patient.  Patient discharged in stable condition accompanied by: self.  Next infusion scheduled for 11/27/2020.    BECKY RIVERA RN

## 2020-11-03 ENCOUNTER — HOSPITAL ENCOUNTER (OUTPATIENT)
Dept: CARDIAC REHAB | Facility: CLINIC | Age: 45
End: 2020-11-03
Attending: PHYSICIAN ASSISTANT
Payer: MEDICAID

## 2020-11-03 PROCEDURE — G0239 OTH RESP PROC, GROUP: HCPCS

## 2020-11-03 PROCEDURE — 999N000193 HC STATISTIC VISIT PULM REHAB

## 2020-11-04 ENCOUNTER — HOSPITAL ENCOUNTER (OUTPATIENT)
Dept: MRI IMAGING | Facility: CLINIC | Age: 45
Discharge: HOME | End: 2020-11-04
Attending: PHYSICIAN ASSISTANT | Admitting: PHYSICIAN ASSISTANT
Payer: MEDICAID

## 2020-11-04 DIAGNOSIS — M54.12 CERVICAL RADICULOPATHY: ICD-10-CM

## 2020-11-04 PROCEDURE — 72141 MRI NECK SPINE W/O DYE: CPT

## 2020-11-04 NOTE — RESULT ENCOUNTER NOTE
Josefina  Here are your recent results.  Great news!  Your cervical spine MRI does not show any evidence of disc pathology or vertebral issues.  At this time, I would continue with physical therapy and if not improving we can have you see a medical spine specialist.      If you have any questions please do not hesitate to contact our office via phone ([phone]) or MyChart.    Miya Crump MS, PA-C  [facility]

## 2020-11-05 ENCOUNTER — HOSPITAL ENCOUNTER (OUTPATIENT)
Dept: CARDIAC REHAB | Facility: CLINIC | Age: 45
End: 2020-11-05
Attending: PHYSICIAN ASSISTANT
Payer: MEDICAID

## 2020-11-05 PROCEDURE — G0239 OTH RESP PROC, GROUP: HCPCS

## 2020-11-05 PROCEDURE — 999N000193 HC STATISTIC VISIT PULM REHAB

## 2020-11-06 ENCOUNTER — THERAPY VISIT (OUTPATIENT)
Dept: PHYSICAL THERAPY | Facility: CLINIC | Age: 45
End: 2020-11-06
Payer: MEDICAID

## 2020-11-06 DIAGNOSIS — M54.2 CERVICALGIA: Primary | ICD-10-CM

## 2020-11-06 PROCEDURE — 97140 MANUAL THERAPY 1/> REGIONS: CPT | Mod: GP | Performed by: PHYSICAL THERAPIST

## 2020-11-06 PROCEDURE — 97110 THERAPEUTIC EXERCISES: CPT | Mod: GP | Performed by: PHYSICAL THERAPIST

## 2020-11-06 PROCEDURE — 97035 APP MDLTY 1+ULTRASOUND EA 15: CPT | Mod: GP | Performed by: PHYSICAL THERAPIST

## 2020-11-12 ENCOUNTER — TRANSFERRED RECORDS (OUTPATIENT)
Dept: HEALTH INFORMATION MANAGEMENT | Facility: CLINIC | Age: 45
End: 2020-11-12

## 2020-11-12 ENCOUNTER — MYC MEDICAL ADVICE (OUTPATIENT)
Dept: FAMILY MEDICINE | Facility: CLINIC | Age: 45
End: 2020-11-12

## 2020-11-12 NOTE — TELEPHONE ENCOUNTER
Routing to PCP to review and advise of mychart message.    Dion VYAS RN   [facility] - [facility]

## 2020-11-13 ENCOUNTER — THERAPY VISIT (OUTPATIENT)
Dept: PHYSICAL THERAPY | Facility: CLINIC | Age: 45
End: 2020-11-13
Payer: MEDICAID

## 2020-11-13 DIAGNOSIS — M54.2 CERVICALGIA: Primary | ICD-10-CM

## 2020-11-13 PROCEDURE — 97140 MANUAL THERAPY 1/> REGIONS: CPT | Mod: GP | Performed by: PHYSICAL THERAPIST

## 2020-11-13 PROCEDURE — 97110 THERAPEUTIC EXERCISES: CPT | Mod: GP | Performed by: PHYSICAL THERAPIST

## 2020-11-13 PROCEDURE — 97035 APP MDLTY 1+ULTRASOUND EA 15: CPT | Mod: GP | Performed by: PHYSICAL THERAPIST

## 2020-11-17 ENCOUNTER — MYC MEDICAL ADVICE (OUTPATIENT)
Dept: FAMILY MEDICINE | Facility: CLINIC | Age: 45
End: 2020-11-17

## 2020-11-18 ENCOUNTER — MYC MEDICAL ADVICE (OUTPATIENT)
Dept: FAMILY MEDICINE | Facility: CLINIC | Age: 45
End: 2020-11-18

## 2020-11-19 NOTE — TELEPHONE ENCOUNTER
[facility] message received, routing to PCP to review and advise.    Dion VYAS RN   [facility] - [facility]

## 2020-11-20 ENCOUNTER — MYC MEDICAL ADVICE (OUTPATIENT)
Dept: FAMILY MEDICINE | Facility: CLINIC | Age: 45
End: 2020-11-20

## 2020-11-20 NOTE — TELEPHONE ENCOUNTER
Forms printed, set on PCP desk for review    Routing encounter.    Dion VYAS RN   [facility] - [facility] [place]

## 2020-11-27 ENCOUNTER — TELEPHONE (OUTPATIENT)
Dept: FAMILY MEDICINE | Facility: CLINIC | Age: 45
End: 2020-11-27

## 2020-11-27 ENCOUNTER — THERAPY VISIT (OUTPATIENT)
Dept: PHYSICAL THERAPY | Facility: CLINIC | Age: 45
End: 2020-11-27
Payer: MEDICAID

## 2020-11-27 ENCOUNTER — INFUSION THERAPY VISIT (OUTPATIENT)
Dept: INFUSION THERAPY | Facility: CLINIC | Age: 45
End: 2020-11-27
Attending: PSYCHIATRY & NEUROLOGY
Payer: MEDICAID

## 2020-11-27 VITALS
TEMPERATURE: 97.6 F | RESPIRATION RATE: 16 BRPM | HEART RATE: 65 BPM | DIASTOLIC BLOOD PRESSURE: 81 MMHG | OXYGEN SATURATION: 100 % | SYSTOLIC BLOOD PRESSURE: 134 MMHG

## 2020-11-27 DIAGNOSIS — M54.2 CERVICALGIA: Primary | ICD-10-CM

## 2020-11-27 DIAGNOSIS — G35 MULTIPLE SCLEROSIS (H): Primary | ICD-10-CM

## 2020-11-27 LAB
ALT SERPL W P-5'-P-CCNC: 23 U/L (ref 0–50)
AST SERPL W P-5'-P-CCNC: 22 U/L (ref 0–45)
BASOPHILS # BLD AUTO: 0.1 10E9/L (ref 0–0.2)
BASOPHILS NFR BLD AUTO: 0.6 %
CD19 CELLS # BLD: 1277 CELLS/UL (ref 107–698)
CD19 CELLS NFR BLD: 27 % (ref 6–27)
CD3 CELLS # BLD: 2951 CELLS/UL (ref 603–2990)
CD3 CELLS NFR BLD: 62 % (ref 49–84)
CD3+CD4+ CELLS # BLD: 1856 CELLS/UL (ref 441–2156)
CD3+CD4+ CELLS NFR BLD: 39 % (ref 28–63)
CD3+CD4+ CELLS/CD3+CD8+ CLL BLD: 1.77 % (ref 1.4–2.6)
CD3+CD8+ CELLS # BLD: 1044 CELLS/UL (ref 125–1312)
CD3+CD8+ CELLS NFR BLD: 22 % (ref 10–40)
CD3-CD16+CD56+ CELLS # BLD: 493 CELLS/UL (ref 95–640)
CD3-CD16+CD56+ CELLS NFR BLD: 10 % (ref 4–25)
DIFFERENTIAL METHOD BLD: ABNORMAL
EOSINOPHIL # BLD AUTO: 0.2 10E9/L (ref 0–0.7)
EOSINOPHIL NFR BLD AUTO: 2 %
ERYTHROCYTE [DISTWIDTH] IN BLOOD BY AUTOMATED COUNT: 13.2 % (ref 10–15)
HCT VFR BLD AUTO: 39.5 % (ref 35–47)
HGB BLD-MCNC: 12.2 G/DL (ref 11.7–15.7)
IFC SPECIMEN: ABNORMAL
IMM GRANULOCYTES # BLD: 0 10E9/L (ref 0–0.4)
IMM GRANULOCYTES NFR BLD: 0.4 %
LYMPHOCYTES # BLD AUTO: 4.7 10E9/L (ref 0.8–5.3)
LYMPHOCYTES NFR BLD AUTO: 52.8 %
MCH RBC QN AUTO: 29.9 PG (ref 26.5–33)
MCHC RBC AUTO-ENTMCNC: 30.9 G/DL (ref 31.5–36.5)
MCV RBC AUTO: 97 FL (ref 78–100)
MONOCYTES # BLD AUTO: 0.7 10E9/L (ref 0–1.3)
MONOCYTES NFR BLD AUTO: 7.5 %
NEUTROPHILS # BLD AUTO: 3.3 10E9/L (ref 1.6–8.3)
NEUTROPHILS NFR BLD AUTO: 36.7 %
NRBC # BLD AUTO: 0 10*3/UL
NRBC BLD AUTO-RTO: 0 /100
PLATELET # BLD AUTO: 329 10E9/L (ref 150–450)
RBC # BLD AUTO: 4.08 10E12/L (ref 3.8–5.2)
WBC # BLD AUTO: 9 10E9/L (ref 4–11)

## 2020-11-27 PROCEDURE — 84450 TRANSFERASE (AST) (SGOT): CPT | Performed by: PSYCHIATRY & NEUROLOGY

## 2020-11-27 PROCEDURE — 86359 T CELLS TOTAL COUNT: CPT | Performed by: PSYCHIATRY & NEUROLOGY

## 2020-11-27 PROCEDURE — 250N000011 HC RX IP 250 OP 636: Performed by: PSYCHIATRY & NEUROLOGY

## 2020-11-27 PROCEDURE — 97110 THERAPEUTIC EXERCISES: CPT | Mod: GP | Performed by: PHYSICAL THERAPIST

## 2020-11-27 PROCEDURE — 85025 COMPLETE CBC W/AUTO DIFF WBC: CPT | Performed by: PSYCHIATRY & NEUROLOGY

## 2020-11-27 PROCEDURE — 36415 COLL VENOUS BLD VENIPUNCTURE: CPT

## 2020-11-27 PROCEDURE — 86357 NK CELLS TOTAL COUNT: CPT | Performed by: PSYCHIATRY & NEUROLOGY

## 2020-11-27 PROCEDURE — 97140 MANUAL THERAPY 1/> REGIONS: CPT | Mod: GP | Performed by: PHYSICAL THERAPIST

## 2020-11-27 PROCEDURE — 258N000003 HC RX IP 258 OP 636: Performed by: PSYCHIATRY & NEUROLOGY

## 2020-11-27 PROCEDURE — 86355 B CELLS TOTAL COUNT: CPT | Performed by: PSYCHIATRY & NEUROLOGY

## 2020-11-27 PROCEDURE — 86360 T CELL ABSOLUTE COUNT/RATIO: CPT | Performed by: PSYCHIATRY & NEUROLOGY

## 2020-11-27 PROCEDURE — G0008 ADMIN INFLUENZA VIRUS VAC: HCPCS | Performed by: PSYCHIATRY & NEUROLOGY

## 2020-11-27 PROCEDURE — 84460 ALANINE AMINO (ALT) (SGPT): CPT | Performed by: PSYCHIATRY & NEUROLOGY

## 2020-11-27 PROCEDURE — 90686 IIV4 VACC NO PRSV 0.5 ML IM: CPT | Performed by: PSYCHIATRY & NEUROLOGY

## 2020-11-27 PROCEDURE — 96365 THER/PROPH/DIAG IV INF INIT: CPT

## 2020-11-27 PROCEDURE — 97035 APP MDLTY 1+ULTRASOUND EA 15: CPT | Mod: GP | Performed by: PHYSICAL THERAPIST

## 2020-11-27 RX ORDER — HEPARIN SODIUM,PORCINE 10 UNIT/ML
5 VIAL (ML) INTRAVENOUS
Status: CANCELLED | OUTPATIENT
Start: 2020-12-25

## 2020-11-27 RX ORDER — HEPARIN SODIUM (PORCINE) LOCK FLUSH IV SOLN 100 UNIT/ML 100 UNIT/ML
5 SOLUTION INTRAVENOUS
Status: CANCELLED | OUTPATIENT
Start: 2020-12-25

## 2020-11-27 RX ORDER — HEPARIN SODIUM (PORCINE) LOCK FLUSH IV SOLN 100 UNIT/ML 100 UNIT/ML
5 SOLUTION INTRAVENOUS
Status: DISCONTINUED | OUTPATIENT
Start: 2020-11-27 | End: 2020-11-27 | Stop reason: HOSPADM

## 2020-11-27 RX ORDER — HEPARIN SODIUM,PORCINE 10 UNIT/ML
5 VIAL (ML) INTRAVENOUS
Status: DISCONTINUED | OUTPATIENT
Start: 2020-11-27 | End: 2020-11-27 | Stop reason: HOSPADM

## 2020-11-27 RX ADMIN — NATALIZUMAB 300 MG: 300 INJECTION INTRAVENOUS at 13:59

## 2020-11-27 RX ADMIN — INFLUENZA A VIRUS A/GUANGDONG-MAONAN/SWL1536/2019 CNIC-1909 (H1N1) ANTIGEN (FORMALDEHYDE INACTIVATED), INFLUENZA A VIRUS A/HONG KONG/2671/2019 (H3N2) ANTIGEN (FORMALDEHYDE INACTIVATED), INFLUENZA B VIRUS B/PHUKET/3073/2013 ANTIGEN (FORMALDEHYDE INACTIVATED), AND INFLUENZA B VIRUS B/WASHINGTON/02/2019 ANTIGEN (FORMALDEHYDE INACTIVATED) 0.5 ML: 15; 15; 15; 15 INJECTION, SUSPENSION INTRAMUSCULAR at 14:57

## 2020-11-27 RX ADMIN — SODIUM CHLORIDE 250 ML: 9 INJECTION, SOLUTION INTRAVENOUS at 13:59

## 2020-11-27 NOTE — TELEPHONE ENCOUNTER
Reason for Call:  Form, our goal is to have forms completed with 72 hours, however, some forms may require a visit or additional information.    Type of letter, form or note:  medical / [facility] notes    Who is the form from?: [facility]TER (if other please explain)    Where did the form come from: form was faxed in    What clinic location was the form placed at?: [place]    Where the form was placed: Given to physician    What number is listed as a contact on the form?: Fax to [phone]       Additional comments:     Call taken on 11/27/2020 at 1:44 PM by Charlette Brownlee LPN

## 2020-11-27 NOTE — PROGRESS NOTES
Infusion Nursing Note:  Josefina Aldrich presents today for Tysabri.    Patient seen by provider today: No   present during visit today: Not Applicable.    Note: Flu shot given today.    Intravenous Access:  Labs drawn without difficulty.  Peripheral IV placed.    Treatment Conditions:  Tysabri pre-infusion checklist completed via touch program.      Post Infusion Assessment:  Patient tolerated infusion without incident.  Patient observed for 60 minutes post Tysabri per protocol.  Site patent and intact, free from redness, edema or discomfort.  No evidence of extravasations.  Access discontinued per protocol.       Dis[facility] Plan:   [facility]  Patient will return 12/24 for next appointment.   Patient discharged in stable condition accompanied by: self.  Departure Mode: Ambulatory.    Carmen Medina RN

## 2020-11-30 ENCOUNTER — MEDICAL CORRESPONDENCE (OUTPATIENT)
Dept: HEALTH INFORMATION MANAGEMENT | Facility: CLINIC | Age: 45
End: 2020-11-30

## 2020-11-30 ENCOUNTER — HOSPITAL ENCOUNTER (OUTPATIENT)
Facility: CLINIC | Age: 45
Setting detail: SPECIMEN
End: 2020-11-30
Attending: PSYCHIATRY & NEUROLOGY
Payer: MEDICAID

## 2020-11-30 NOTE — TELEPHONE ENCOUNTER
Completed forms faxed back to [facility]  at [phone].   Originals sent to be scanned.       Guillermina Garcia

## 2020-12-01 ENCOUNTER — MYC MEDICAL ADVICE (OUTPATIENT)
Dept: FAMILY MEDICINE | Facility: CLINIC | Age: 45
End: 2020-12-01

## 2020-12-01 ENCOUNTER — E-VISIT (OUTPATIENT)
Dept: FAMILY MEDICINE | Facility: CLINIC | Age: 45
End: 2020-12-01
Payer: MEDICAID

## 2020-12-01 DIAGNOSIS — H66.001 NON-RECURRENT ACUTE SUPPURATIVE OTITIS MEDIA OF RIGHT EAR WITHOUT SPONTANEOUS RUPTURE OF TYMPANIC MEMBRANE: Primary | ICD-10-CM

## 2020-12-01 PROCEDURE — 99421 OL DIG E/M SVC 5-10 MIN: CPT | Performed by: PHYSICIAN ASSISTANT

## 2020-12-01 RX ORDER — CEFDINIR 300 MG/1
300 CAPSULE ORAL 2 TIMES DAILY
Qty: 20 CAPSULE | Refills: 0 | Status: SHIPPED | OUTPATIENT
Start: 2020-12-01 | End: 2020-12-11

## 2020-12-01 NOTE — PATIENT INSTRUCTIONS
Thank you for choosing us for your care. I have placed an order for a prescription so that you can start treatment. View your full visit summary for details by clicking on the link below. Your pharmacist will able to address any questions you may have about the medication.     If you're not feeling better within 5-7 days, please schedule an appointment.  You can schedule an appointment right here in [facility], or call [phone]  If the visit is for the same symptoms as your e-visit, we'll refund the cost of your e-visit if seen within seven days.

## 2020-12-11 ENCOUNTER — MYC MEDICAL ADVICE (OUTPATIENT)
Dept: FAMILY MEDICINE | Facility: CLINIC | Age: 45
End: 2020-12-11

## 2020-12-11 ENCOUNTER — TELEPHONE (OUTPATIENT)
Dept: FAMILY MEDICINE | Facility: CLINIC | Age: 45
End: 2020-12-11

## 2020-12-11 DIAGNOSIS — J45.40 MODERATE PERSISTENT ASTHMA WITHOUT COMPLICATION: Primary | ICD-10-CM

## 2020-12-11 NOTE — TELEPHONE ENCOUNTER
Date Forms was received: December 11, 2020    Forms received by: Fax    Purpose of Form:  [employer] provider statement    When the form is due:  ASAP    How the form needs to be returned for patient:  Fax    Form currently placed  LP inbox

## 2020-12-14 ENCOUNTER — TELEPHONE (OUTPATIENT)
Dept: FAMILY MEDICINE | Facility: CLINIC | Age: 45
End: 2020-12-14

## 2020-12-14 NOTE — TELEPHONE ENCOUNTER
Central Prior Authorization Team   Phone: [phone]      PA NOT NEEDED    Medication: Budesonide/form 160/4.5 mcg (120 INH )-PA NOT NEEDED  Insurance Company:    Pharmacy Filling the Rx: [id number] - [place], [place] - [address]  Filling Pharmacy Phone: [phone]  Filling Pharmacy Fax:    Start Date: 12/14/2020    Insurance prefers Brand, called pharmacy.  Pharmacy got a paid claim for Brand.  Pharmacy will notify patient when medication is ready.

## 2020-12-14 NOTE — TELEPHONE ENCOUNTER
Prior Authorization Retail Medication Request    Medication/Dose: Budesonide/form 160/4.5 mcg (120 INH )  ICD code (if different than what is on RX):    Previously Tried and Failed:    Rationale:      Insurance Name:    Insurance ID:        Pharmacy Information (if different than what is on RX)  Name:  [facility]   Phone:  [phone]  Charlette Brownlee LPN

## 2020-12-22 ASSESSMENT — ASTHMA QUESTIONNAIRES: ACT_TOTALSCORE: 15

## 2020-12-24 ENCOUNTER — INFUSION THERAPY VISIT (OUTPATIENT)
Dept: INFUSION THERAPY | Facility: CLINIC | Age: 45
End: 2020-12-24
Attending: PSYCHIATRY & NEUROLOGY
Payer: MEDICAID

## 2020-12-24 VITALS
HEART RATE: 74 BPM | SYSTOLIC BLOOD PRESSURE: 124 MMHG | TEMPERATURE: 98.5 F | RESPIRATION RATE: 16 BRPM | DIASTOLIC BLOOD PRESSURE: 83 MMHG | OXYGEN SATURATION: 99 %

## 2020-12-24 DIAGNOSIS — G35 MULTIPLE SCLEROSIS (H): Primary | ICD-10-CM

## 2020-12-24 PROCEDURE — 258N000003 HC RX IP 258 OP 636: Performed by: PSYCHIATRY & NEUROLOGY

## 2020-12-24 PROCEDURE — 96365 THER/PROPH/DIAG IV INF INIT: CPT

## 2020-12-24 PROCEDURE — 250N000011 HC RX IP 250 OP 636: Performed by: PSYCHIATRY & NEUROLOGY

## 2020-12-24 PROCEDURE — 96361 HYDRATE IV INFUSION ADD-ON: CPT

## 2020-12-24 RX ORDER — HEPARIN SODIUM (PORCINE) LOCK FLUSH IV SOLN 100 UNIT/ML 100 UNIT/ML
5 SOLUTION INTRAVENOUS
Status: CANCELLED | OUTPATIENT
Start: 2020-12-25

## 2020-12-24 RX ORDER — HEPARIN SODIUM,PORCINE 10 UNIT/ML
5 VIAL (ML) INTRAVENOUS
Status: CANCELLED | OUTPATIENT
Start: 2020-12-25

## 2020-12-24 RX ADMIN — SODIUM CHLORIDE 250 ML: 9 INJECTION, SOLUTION INTRAVENOUS at 08:37

## 2020-12-24 RX ADMIN — NATALIZUMAB 300 MG: 300 INJECTION INTRAVENOUS at 08:34

## 2020-12-25 DIAGNOSIS — J45.40 MODERATE PERSISTENT ASTHMA WITHOUT COMPLICATION: ICD-10-CM

## 2020-12-27 RX ORDER — MONTELUKAST SODIUM 10 MG/1
TABLET ORAL
Qty: 90 TABLET | Refills: 1 | OUTPATIENT
Start: 2020-12-27

## 2020-12-31 ENCOUNTER — THERAPY VISIT (OUTPATIENT)
Dept: PHYSICAL THERAPY | Facility: CLINIC | Age: 45
End: 2020-12-31
Payer: MEDICAID

## 2020-12-31 DIAGNOSIS — M54.2 CERVICALGIA: Primary | ICD-10-CM

## 2020-12-31 PROCEDURE — 97140 MANUAL THERAPY 1/> REGIONS: CPT | Mod: GP | Performed by: PHYSICAL THERAPIST

## 2020-12-31 PROCEDURE — 97035 APP MDLTY 1+ULTRASOUND EA 15: CPT | Mod: GP | Performed by: PHYSICAL THERAPIST

## 2020-12-31 PROCEDURE — 97110 THERAPEUTIC EXERCISES: CPT | Mod: GP | Performed by: PHYSICAL THERAPIST

## 2020-12-31 NOTE — LETTER
DEPARTMENT OF HEALTH AND HUMAN SERVICES  CENTERS FOR MEDICARE & MEDICAID SERVICES    PLAN/UPDATED PLAN OF PROGRESS FOR OUTPATIENT REHABILITATION     PATIENTS NAME:  Josefina Miguel   : 1975  PROVIDER NUMBER:    [id number]  HICN:   [id number]  PROVIDER NAME: [facility] [place]  MEDICAL RECORD NUMBER: [id number]   START OF CARE DATE:  SOC Date: 10/08/20   TYPE:  PT  PRIMARY/TREATMENT DIAGNOSIS: Cervicalgia  VISITS FROM START OF CARE:  Rxs Used: 8     PROGRESS  REPORT  Progress reporting period is from 10/8/2020 to 2020.       SUBJECTIVE  Subjective: Overall neck has still been bothering her. Trying to use lumbar support for posture when working at computer at home. Stretches still feel very good throughout the day. Sleeping is now starting to be slightly affected (7 hrs instead of 8.5 hrs).     Current Pain level: 5/10.     Initial Pain level: 8/10.   Changes in function:  Yes, but slight setback in last month (back working full time computer at home)  Adverse reaction to treatment or activity: None    OBJECTIVE  Changes noted in objective findings:  Yes, but slight setback this past month.  Objective: Cervical AROM: flexion=slight limit (central neck);  extension=slight limit (no pain, feels good); R rot=slight limit (left sided pain); L rot= slight limit (pain on left side). Left UT trigger point noted. Tolerates US and ISTM well again today. Good review of HEP. Added low rows; tolerated well. Recommended theracane for home.      ASSESSMENT/PLAN  Updated problem list and treatment plan: Diagnosis 1:  Neck pain; fibromyalgia and posture dysfunction  Pain -  hot/cold therapy, US, manual therapy, self management, education, directional preference exercise and home program  Decreased ROM/flexibility - manual therapy and therapeutic exercise  Decreased strength - therapeutic exercise and therapeutic activities  Inflammation - cold therapy and self management/home program  Impaired  "muscle performance - neuro re-education  Decreased function - therapeutic activities  Impaired posture - neuro re-education  Instability -  Therapeutic Activity  Therapeutic Exercise  STG/LTGs have been met or progress has been made towards goals:  Yes (See Goal flow sheet completed today.)  Assessment of Progress: The patient's condition has potential to improve.  Self Management Plans:  Patient has been instructed in a home treatment program.  I have re-evaluated this patient and find that the nature, scope, duration and intensity of the therapy is appropriate for the medical condition of the patient.  Josefina continues to require the following intervention to meet STG and LTG's:  PT  PATIENTS NAME:  Josefina Miguel   : 1975    Recommendations:  This patient would benefit from continued therapy.     Frequency:  1 X a month, once daily  Duration:  for 3 months        Caregiver Signature/Credentials _____________________________ Date ________       Treating Provider: Nidhi Dunbar, PT, SCS     I have reviewed and certified the need for these services and plan of treatment while under my care.        PHYSICIAN'S SIGNATURE:   _____________________________________  Date___________   Miya BALL     Certification period:  Beginning of Cert date period: 20 to  End of Cert period date: 21     Functional Level Progress Report: Please see attached \"Goal Flow sheet for Functional level.\"    ____X____ Continue Services or       ________ DC Services                Service dates: From  SOC Date: 10/08/20 date to present                         "

## 2020-12-31 NOTE — PROGRESS NOTES
Subjective:  HPI  Physical Exam                    Objective:  System    Physical Exam    General     ROS    Assessment/Plan:    PROGRESS  REPORT    Progress reporting period is from 10/8/2020 to 12/31/2020.       SUBJECTIVE  Subjective: Overall neck has still been bothering her. Trying to use lumbar support for posture when working at computer at home. Stretches still feel very good throughout the day. Sleeping is now starting to be slightly affected (7 hrs instead of 8.5 hrs).     Current Pain level: 5/10.     Initial Pain level: 8/10.   Changes in function:  Yes, but slight setback in last month (back working full time computer at home)  Adverse reaction to treatment or activity: None    OBJECTIVE  Changes noted in objective findings:  Yes, but slight setback this past month.  Objective: Cervical AROM: flexion=slight limit (central neck);  extension=slight limit (no pain, feels good); R rot=slight limit (left sided pain); L rot= slight limit (pain on left side). Left UT trigger point noted. Tolerates US and ISTM well again today. Good review of HEP. Added low rows; tolerated well. Recommended theracane for home.      ASSESSMENT/PLAN  Updated problem list and treatment plan: Diagnosis 1:  Neck pain; fibromyalgia and posture dysfunction  Pain -  hot/cold therapy, US, manual therapy, self management, education, directional preference exercise and home program  Decreased ROM/flexibility - manual therapy and therapeutic exercise  Decreased strength - therapeutic exercise and therapeutic activities  Inflammation - cold therapy and self management/home program  Impaired muscle performance - neuro re-education  Decreased function - therapeutic activities  Impaired posture - neuro re-education  Instability -  Therapeutic Activity  Therapeutic Exercise  STG/LTGs have been met or progress has been made towards goals:  Yes (See Goal flow sheet completed today.)  Assessment of Progress: The patient's condition has potential  to improve.  Self Management Plans:  Patient has been instructed in a home treatment program.  I have re-evaluated this patient and find that the nature, scope, duration and intensity of the therapy is appropriate for the medical condition of the patient.  Josefina continues to require the following intervention to meet STG and LTG's:  PT    Recommendations:  This patient would benefit from continued therapy.     Frequency:  1 X a month, once daily  Duration:  for 3 months        Please refer to the daily flowsheet for treatment today, total treatment time and time spent performing 1:1 timed codes.

## 2021-01-06 ENCOUNTER — TELEPHONE (OUTPATIENT)
Dept: FAMILY MEDICINE | Facility: CLINIC | Age: 46
End: 2021-01-06

## 2021-01-06 NOTE — TELEPHONE ENCOUNTER
Reason for Call:  Form, our goal is to have forms completed with 72 hours, however, some forms may require a visit or additional information.    Type of letter, form or note:  medical    Who is the form from?:  [facility] (if other please explain)    Where did the form come from: form was faxed in    What clinic location was the form placed at?: [place]    Where the form was placed: Given to physician    What number is listed as a contact on the form?: [phone]       Additional comments:     Call taken on 1/6/2021 at 1:35 PM by Charlette Brownlee LPN

## 2021-01-07 NOTE — TELEPHONE ENCOUNTER
Completed forms faxed back to [facility] at [phone].   Originals sent to be scanned.       Guillermina Garcia

## 2021-01-11 ENCOUNTER — MYC MEDICAL ADVICE (OUTPATIENT)
Dept: FAMILY MEDICINE | Facility: CLINIC | Age: 46
End: 2021-01-11

## 2021-01-19 ENCOUNTER — MYC MEDICAL ADVICE (OUTPATIENT)
Dept: FAMILY MEDICINE | Facility: CLINIC | Age: 46
End: 2021-01-19

## 2021-01-19 NOTE — TELEPHONE ENCOUNTER
Routing to PCP to review and advise.    Dion VYAS RN   [facility] [place] [facility]

## 2021-01-20 DIAGNOSIS — G35 MULTIPLE SCLEROSIS (H): ICD-10-CM

## 2021-01-20 DIAGNOSIS — E55.9 HYPOVITAMINOSIS D: ICD-10-CM

## 2021-01-22 ENCOUNTER — INFUSION THERAPY VISIT (OUTPATIENT)
Dept: INFUSION THERAPY | Facility: CLINIC | Age: 46
End: 2021-01-22
Attending: PSYCHIATRY & NEUROLOGY
Payer: COMMERCIAL

## 2021-01-22 VITALS
DIASTOLIC BLOOD PRESSURE: 84 MMHG | OXYGEN SATURATION: 98 % | RESPIRATION RATE: 16 BRPM | HEART RATE: 65 BPM | TEMPERATURE: 98.5 F | SYSTOLIC BLOOD PRESSURE: 128 MMHG

## 2021-01-22 DIAGNOSIS — G35 MULTIPLE SCLEROSIS (H): Primary | ICD-10-CM

## 2021-01-22 PROCEDURE — 96365 THER/PROPH/DIAG IV INF INIT: CPT

## 2021-01-22 PROCEDURE — 258N000003 HC RX IP 258 OP 636: Performed by: PSYCHIATRY & NEUROLOGY

## 2021-01-22 PROCEDURE — 250N000011 HC RX IP 250 OP 636: Performed by: PSYCHIATRY & NEUROLOGY

## 2021-01-22 RX ORDER — HEPARIN SODIUM (PORCINE) LOCK FLUSH IV SOLN 100 UNIT/ML 100 UNIT/ML
5 SOLUTION INTRAVENOUS
Status: CANCELLED | OUTPATIENT
Start: 2021-02-19

## 2021-01-22 RX ORDER — HEPARIN SODIUM,PORCINE 10 UNIT/ML
5 VIAL (ML) INTRAVENOUS
Status: CANCELLED | OUTPATIENT
Start: 2021-02-19

## 2021-01-22 RX ADMIN — NATALIZUMAB 300 MG: 300 INJECTION INTRAVENOUS at 13:32

## 2021-01-22 RX ADMIN — SODIUM CHLORIDE 250 ML: 9 INJECTION, SOLUTION INTRAVENOUS at 13:33

## 2021-01-22 NOTE — PROGRESS NOTES
Infusion Nursing Note:  Josefina Kwanomid Aldrich presents today for tysabri.    Patient seen by provider today: No   present during visit today: Not Applicable.    Note: N/A.      Intravenous Access:  Peripheral IV placed.    Treatment Conditions:  Tysabri pre-infusion checklist completed via touch program.    Pt states she had an appt with Dr. Chong this week and he said she only needs labs draw every 3 months. Labs last done in November 2020. .      Post Infusion Assessment:  Patient tolerated infusion without incident.  Patient observed for 60 minutes post tysabri per protocol.  Blood return noted pre and post infusion.  Site patent and intact, free from redness, edema or discomfort.  No evidence of extravasations.  Access discontinued per protocol.       Discharge Plan:   Discharge instructions reviewed with: Patient.  Patient and/or family verbalized understanding of discharge instructions and all questions answered.  Copy of AVS reviewed with patient and/or family.  Patient will return 2/19/21 for next appointment.  Patient discharged in stable condition accompanied by: self.  Departure Mode: Ambulatory.    Norma Khan RN

## 2021-01-26 ENCOUNTER — THERAPY VISIT (OUTPATIENT)
Dept: PHYSICAL THERAPY | Facility: CLINIC | Age: 46
End: 2021-01-26
Payer: COMMERCIAL

## 2021-01-26 DIAGNOSIS — M54.2 CERVICALGIA: Primary | ICD-10-CM

## 2021-01-26 PROCEDURE — 97110 THERAPEUTIC EXERCISES: CPT | Mod: GP | Performed by: PHYSICAL THERAPIST

## 2021-01-26 PROCEDURE — 97035 APP MDLTY 1+ULTRASOUND EA 15: CPT | Mod: GP | Performed by: PHYSICAL THERAPIST

## 2021-01-26 PROCEDURE — 97140 MANUAL THERAPY 1/> REGIONS: CPT | Mod: GP | Performed by: PHYSICAL THERAPIST

## 2021-01-27 ENCOUNTER — MYC MEDICAL ADVICE (OUTPATIENT)
Dept: FAMILY MEDICINE | Facility: CLINIC | Age: 46
End: 2021-01-27

## 2021-01-27 ENCOUNTER — HOSPITAL ENCOUNTER (OUTPATIENT)
Dept: ULTRASOUND IMAGING | Facility: CLINIC | Age: 46
Discharge: HOME | End: 2021-01-27
Attending: NURSE PRACTITIONER | Admitting: NURSE PRACTITIONER
Payer: COMMERCIAL

## 2021-01-27 ENCOUNTER — OFFICE VISIT (OUTPATIENT)
Dept: FAMILY MEDICINE | Facility: CLINIC | Age: 46
End: 2021-01-27
Payer: COMMERCIAL

## 2021-01-27 ENCOUNTER — OFFICE VISIT (OUTPATIENT)
Dept: PEDIATRICS | Facility: CLINIC | Age: 46
End: 2021-01-27
Attending: NURSE PRACTITIONER
Payer: COMMERCIAL

## 2021-01-27 VITALS
BODY MASS INDEX: 44.65 KG/M2 | SYSTOLIC BLOOD PRESSURE: 124 MMHG | OXYGEN SATURATION: 99 % | HEIGHT: 65 IN | HEART RATE: 81 BPM | WEIGHT: 268 LBS | DIASTOLIC BLOOD PRESSURE: 86 MMHG | TEMPERATURE: 98.3 F

## 2021-01-27 VITALS
SYSTOLIC BLOOD PRESSURE: 112 MMHG | HEART RATE: 68 BPM | OXYGEN SATURATION: 100 % | DIASTOLIC BLOOD PRESSURE: 80 MMHG | TEMPERATURE: 98.8 F

## 2021-01-27 DIAGNOSIS — N76.0 BV (BACTERIAL VAGINOSIS): ICD-10-CM

## 2021-01-27 DIAGNOSIS — Z90.710 S/P HYSTERECTOMY: ICD-10-CM

## 2021-01-27 DIAGNOSIS — G35 MULTIPLE SCLEROSIS (H): ICD-10-CM

## 2021-01-27 DIAGNOSIS — N83.201 CYST OF RIGHT OVARY: Primary | ICD-10-CM

## 2021-01-27 DIAGNOSIS — R09.81 NASAL CONGESTION: ICD-10-CM

## 2021-01-27 DIAGNOSIS — R10.31 RLQ ABDOMINAL PAIN: Primary | ICD-10-CM

## 2021-01-27 DIAGNOSIS — N83.201 RIGHT OVARIAN CYST: ICD-10-CM

## 2021-01-27 DIAGNOSIS — B96.89 BV (BACTERIAL VAGINOSIS): ICD-10-CM

## 2021-01-27 DIAGNOSIS — J30.2 SEASONAL ALLERGIC RHINITIS, UNSPECIFIED TRIGGER: ICD-10-CM

## 2021-01-27 DIAGNOSIS — R10.31 RLQ ABDOMINAL PAIN: ICD-10-CM

## 2021-01-27 LAB
ALBUMIN UR-MCNC: NEGATIVE MG/DL
APPEARANCE UR: CLEAR
BILIRUB UR QL STRIP: NEGATIVE
COLOR UR AUTO: YELLOW
GLUCOSE UR STRIP-MCNC: NEGATIVE MG/DL
HGB UR QL STRIP: NEGATIVE
KETONES UR STRIP-MCNC: NEGATIVE MG/DL
LEUKOCYTE ESTERASE UR QL STRIP: NEGATIVE
NITRATE UR QL: NEGATIVE
PH UR STRIP: 6.5 PH (ref 5–7)
SOURCE: NORMAL
SP GR UR STRIP: 1.02 (ref 1–1.03)
SPECIMEN SOURCE: ABNORMAL
UROBILINOGEN UR STRIP-ACNC: 0.2 EU/DL (ref 0.2–1)
WET PREP SPEC: ABNORMAL

## 2021-01-27 PROCEDURE — 81003 URINALYSIS AUTO W/O SCOPE: CPT | Performed by: NURSE PRACTITIONER

## 2021-01-27 PROCEDURE — 87210 SMEAR WET MOUNT SALINE/INK: CPT | Performed by: NURSE PRACTITIONER

## 2021-01-27 PROCEDURE — 87491 CHLMYD TRACH DNA AMP PROBE: CPT | Performed by: NURSE PRACTITIONER

## 2021-01-27 PROCEDURE — 99207 PR FIRST ORDER ACUTE REFERRAL: CPT | Performed by: NURSE PRACTITIONER

## 2021-01-27 PROCEDURE — 76830 TRANSVAGINAL US NON-OB: CPT

## 2021-01-27 PROCEDURE — 87591 N.GONORRHOEAE DNA AMP PROB: CPT | Performed by: NURSE PRACTITIONER

## 2021-01-27 PROCEDURE — 99214 OFFICE O/P EST MOD 30 MIN: CPT | Performed by: NURSE PRACTITIONER

## 2021-01-27 RX ORDER — HYDROCODONE BITARTRATE AND ACETAMINOPHEN 5; 325 MG/1; MG/1
1 TABLET ORAL EVERY 6 HOURS PRN
Qty: 10 TABLET | Refills: 0 | Status: SHIPPED | OUTPATIENT
Start: 2021-01-27 | End: 2021-01-30

## 2021-01-27 RX ORDER — METRONIDAZOLE 500 MG/1
500 TABLET ORAL 2 TIMES DAILY
Qty: 14 TABLET | Refills: 0 | Status: SHIPPED | OUTPATIENT
Start: 2021-01-27 | End: 2021-02-04

## 2021-01-27 ASSESSMENT — MIFFLIN-ST. JEOR: SCORE: 1871.52

## 2021-01-27 NOTE — TELEPHONE ENCOUNTER
I do see Josefina was given 10 narcotic pills today at the [facility]. That is also what I would have provided to her too. This should last her 3-5 days. Ideally she only needs 1-3 a day.       I will not need to send another script at this time.     Please let her know.         Leonie Collazo, FNP-BC

## 2021-01-27 NOTE — PATIENT INSTRUCTIONS
Patient Education     Unknown Causes of Abdominal Pain (Female)    The exact cause of your belly (abdominal) pain is not clear. This does not mean that this is something to worry about. Everyone likes to know the exact cause of the problem. But sometimes with belly pain, there is no clear-cut cause, and this could be a good thing. The good news is that your symptoms can be treated, and you will feel better.   Your condition does not seem serious now. But sometimes the signs of a serious problem may take more time to appear. For this reason, it is important for you to watch for any new symptoms, problems, or worsening of your condition.  Over the next few days, the abdominal pain may come and go. Or it may be constant. Other common symptoms can include nausea and vomiting. Sometimes it can be difficult to tell if you feel nauseous. You may just feel bad and not connect that feeling to nausea. Constipation, diarrhea, and a fever may go along with the pain.  The pain may continue even if treated correctly over the following days. Depending on how things go, sometimes the cause can become clear and may need more or different treatment. Additional evaluations, medicines, or tests may also be needed.  Home care  Your healthcare provider may prescribe medicine for pain, symptoms, or an infection.  Follow the healthcare provider's instructions for taking these medicines.  General care    Rest as much as you can until your next exam. No strenuous activities.    Try to find positions that ease discomfort. A small pillow placed on the abdomen may help relieve pain.    Something warm on your abdomen (such as a heating pad) may help, but be careful not to burn yourself.  Diet    Don t force yourself to eat, especially if having cramps, vomiting, or diarrhea.    Water is important so you don't get dehydrated. Soup may also be good. Sports drinks may also help, especially if they are not too acidic. Don't drink sugary drinks as  this can make things worse. Take liquids in small amounts. Don t guzzle them.    Caffeine sometimes makes the pain and cramping worse.    Don t take dairy products if you have vomiting or diarrhea.    Don't eat large amounts at a time. Wait a few minutes between bites.    Eat a diet low in fiber (called a low-residue diet). Foods allowed include refined breads, white rice, fruit and vegetable juices without pulp, tender meats. These foods will pass more easily through the intestine.    Don t have whole-grain foods, whole fruits and vegetables, meats, seeds and nuts, fried or fatty foods, dairy, alcohol and spicy foods until your symptoms go away.  Follow-up care  Follow up with your healthcare provider, or as advised, if your pain does not begin to improve in the next 24 hours.  Call 911  Call 911 if any of these occur:    Trouble breathing    Confusion    Fainting or loss of consciousness    Rapid heart rate    Seizure  When to seek medical advice  Call your healthcare provider right away if any of these occur:    Pain gets worse or moves to the right lower abdomen    New or worsening vomiting or diarrhea    Swelling of the abdomen    Unable to pass stool for more than 3 days    Fever of 100.4 F (38 C) or higher, or as directed by your healthcare provider.    Blood in vomit or bowel movements (dark red or black color)    Yellow color of eyes and skin (jaundice)    Weakness, dizziness    Chest, arm, back, neck, or jaw pain    Unexpected vaginal bleeding or missed period    Can't keep down liquids or water and you are getting dehydrated  [employer] last reviewed this educational content on 6/1/2018 2000-2020 [employer]. [address] [place] [address], [place] [address]. All rights reserved. This information is not intended as a substitute for professional medical care. Always follow your healthcare professional's instructions.

## 2021-01-27 NOTE — PROGRESS NOTES
Assessment & Plan     RLQ abdominal pain  US shows ovarian cyst  Will have f/saul with GYN  - Referral to Acute and Diagnostic Services (Day of diagnostic / First order acute)  - NEISSERIA GONORRHOEA PCR  - CHLAMYDIA TRACHOMATIS PCR  - Wet prep  - US Pelvic Complete with Transvaginal    Cyst of right ovary  norco PRN severe pains  F/u with GYN as planned  - OB/GYN REFERRAL  - HYDROcodone-acetaminophen (NORCO) 5-325 MG tablet; Take 1 tablet by mouth every 6 hours as needed for severe pain    BV (bacterial vaginosis)  Your wet prep shows that you have bacterial vaginosis (overgrowth of a bacteria called clue cells).  This is not a sexually transmitted disease.  I sent in a prescription for Flagyl which is an antibiotic that you will take twice daily for one week.  You cannot drink alcohol while taking this medication.     - metroNIDAZOLE (FLAGYL) 500 MG tablet; Take 1 tablet (500 mg) by mouth 2 times daily for 7 days    Right ovarian cyst    No follow-ups on file.    MARTÍNEZ Muller CNP  [facility] is a 45 year old who presents to clinic today for the following health issues     HPI       Abdominal/Flank Pain  Onset/Duration: RLQ pain started Monday evening thought gas moving.  Night had some stabbing pain in mid pelvic, increased waving pains in the evening yesterday when driving home from therapy, pain continued during sleep.  Description:   Character: Sharp and Cramping  Location: right lower quadrant  Radiation: Back and to mid abdomen   Intensity: 8/10  Progression of Symptoms:  worsening  Accompanying Signs & Symptoms:  Fever/Chills: no  Gas/Bloating: no  Nausea: no  Vomitting: no  Diarrhea: no  Constipation: no  Dysuria or Hematuria: no  History:   Trauma: no  Previous similar pain: no  Previous tests done: none  Precipitating factors:   Does the pain change with:     Food: no    Bowel Movement: YES little better, after normal stool today    Urination:  no   Other factors:  no  Therapies tried and outcome: Gas X, heating pad  No LMP recorded. Patient has had a hysterectomy.    Had left ovarian cyst in the past  Has had hysterectomy 10+ years ago, but still has ovaries and fallopian tubes  Is able to feel hormonal changes with cycles when she is ovulating       Review of Systems   Constitutional, HEENT, cardiovascular, pulmonary, GI, , musculoskeletal, neuro, skin, endocrine and psych systems are negative, except as otherwise noted.      Objective    /80 (BP Location: Left arm, Patient Position: Chair, Cuff Size: Adult Large)   Pulse 68   Temp 98.8  F (37.1  C) (Tympanic)   SpO2 100%   There is no height or weight on file to calculate BMI.  Physical Exam   GENERAL: healthy, alert and no distress  RESP: lungs clear to auscultation - no rales, rhonchi or wheezes  CV: regular rate and rhythm, normal S1 S2, no S3 or S4, no murmur, click or rub, no peripheral edema and peripheral pulses strong  ABDOMEN: tenderness RLQ and lower midline and bowel sounds normal  MS: no gross musculoskeletal defects noted, no edema  SKIN: no suspicious lesions or rashes  PSYCH: mentation appears normal, affect normal/bright    Results for orders placed or performed in visit on 01/27/21   US Pelvic Complete with Transvaginal     Status: None    Narrative    US PELVIC COMPLETE WITH TRANSVAGINAL 1/27/2021 1:40 PM    CLINICAL HISTORY: RLQ abdominal pain  TECHNIQUE: Transabdominal scans were performed. Endovaginal ultrasound  was performed to better visualize the adnexa.    COMPARISON: 8/9/2020    FINDINGS:    UTERUS: Surgically removed.    RIGHT OVARY: 3.2 x 2.1 x 2.0  cm. Right ovarian dominant follicle or  corpus luteum measuring 1.5 x 1.1 x 1.6 cm. Color flow is present in  the right ovary.    LEFT OVARY: 2.2 x 2.2 x 2.6 cm. Normal with flow demonstrated.    No significant free fluid.      Impression    IMPRESSION:  1.  Right ovarian 1.6 cm dominant follicle or corpus luteum.  Color  flow is present in the right ovary. Normal left ovary.  2.  The uterus is surgically absent.      FERNANDO REBOLLEDO MD   Wet prep     Status: Abnormal    Specimen: Vagina   Result Value Ref Range    Specimen Description Vagina     Wet Prep Few  PMNs seen       Wet Prep No Trichomonas seen     Wet Prep No yeast seen     Wet Prep Clue cells seen (A)    Results for orders placed or performed in visit on 01/27/21   *UA reflex to Microscopic and Culture ([place] and [facility] (except [place] and [place])     Status: None    Specimen: Midstream Urine   Result Value Ref Range    Color Urine Yellow     Appearance Urine Clear     Glucose Urine Negative NEG^Negative mg/dL    Bilirubin Urine Negative NEG^Negative    Ketones Urine Negative NEG^Negative mg/dL    Specific Gravity Urine 1.020 1.003 - 1.035    Blood Urine Negative NEG^Negative    pH Urine 6.5 5.0 - 7.0 pH    Protein Albumin Urine Negative NEG^Negative mg/dL    Urobilinogen Urine 0.2 0.2 - 1.0 EU/dL    Nitrite Urine Negative NEG^Negative    Leukocyte Esterase Urine Negative NEG^Negative    Source Midstream Urine

## 2021-01-27 NOTE — PROGRESS NOTES
Assessment & Plan     RLQ abdominal pain  S/P total hysterectomy 2013 for fibroids - ovaries remain  Reassuring nontoxic appearance afebrile.    Last food intake approximately an hour ago.  Exquisitely tender right lower quadrant on examination discussed etiology including ovarian however other etiology exists including appendix and even kidney stone.    Urine is absolutely normal at this time in clinic no blood.  Reports previous difficulty breathing with aspirin.  Toradol injection not given due to this concern.  Of note she has taken ibuprofen without incident.  Called and discussed with [facility] they accept transfer for imaging today.  Written information provided to Josefina with address and phone number.  She is to proceed there right away.  Red flag symptoms discussed and if these occur present to the emergency room or call 911.  Josefina verbalizes understanding of plan of care and is in agreement.   - *UA reflex to Microscopic and Culture ([facility] (except [place] and [place])  - Referral to Acute and Diagnostic Services (Day of diagnostic / First order acute)    Multiple sclerosis (H)    Nasal congestion  Seasonal allergic rhinitis, unspecified trigger  No concerns.  Stable.  Continue same medication this was refilled today.  - ketotifen (ZADITOR) 0.025 % ophthalmic solution  Dispense: 10 mL; Refill: 3      Return today (on 1/27/2021) for [facility] now.        [facility]     Josefina is a 45 year old who presents to clinic today for the following health issues   HPI       Abdominal/Flank Pain  Onset/Duration: x2 days  Description:   Character: Cramping, felt like moving through pelvic area  Location: right lower quadrant pelvic region Right  Radiation: moves to mid pelvic exam  Intensity: 6/10  Progression of Symptoms:  waxing and waning -- worse than on Monday  Accompanying Signs & Symptoms:  Fever/Chills: no  Gas/Bloating:  "no  Nausea: no  Vomitting: no  Diarrhea: no  Constipation: no  Dysuria or Hematuria: no  History:   Trauma: no  Previous similar pain: YES- cyst on ovary  Previous tests done: US and CT  Precipitating factors:   Does the pain change with:     Food: no    Bowel Movement: YES- little better    Urination: no   Other factors:  no  Therapies tried and outcome: Ibuprofen 800mg, 2 Gabapentin, Trazodone, and heating pad  No LMP recorded. Patient has had a hysterectomy.    Does have a history of ovarian cysts; last ovarian cyst was on the left side she had a CT scan in March 2020 that revealed this and an ultrasound in August 2020 that showed resolution.  Right side at that time was normal.  She has no history of kidney stones.  Has history of abdominal surgeries including gastric bypass and hysterectomy.  Denies vaginal or urinary concerns.    Review of Systems   Constitutional, HEENT, cardiovascular, pulmonary, GI, , musculoskeletal, neuro, skin, endocrine and psych systems are negative, except as otherwise noted in the HPI.      Objective    /86 (BP Location: Left arm, Patient Position: Chair, Cuff Size: Adult Large)   Pulse 81   Temp 98.3  F (36.8  C) (Tympanic)   Ht 1.651 m (5' 5\")   Wt 121.6 kg (268 lb)   SpO2 99%   Breastfeeding No   BMI 44.60 kg/m    Body mass index is 44.6 kg/m .  Physical Exam   GENERAL: healthy, alert and no distress  RESP: lungs clear to auscultation - no rales, rhonchi or wheezes  CV: regular rate and rhythm, normal S1 S2, no S3 or S4, no murmur, click or rub, no peripheral edema and peripheral pulses strong  ABDOMEN: soft,RLQ pain with palpation with guarding noted, no rebound tenderness,  no hepatosplenomegaly, no masses and bowel sounds normal  MS: no gross musculoskeletal defects noted, no edema; no CVA tenderness  SKIN: no suspicious lesions or rashes  PSYCH: mentation appears normal, affect normal/bright    Transfer to Acute and Diagnostic Services  I have contacted the " staff at the [facility] at [phone] to confirm patient acceptance.  Special Needs: None    Discussed transition to Acute & Diagnostic Services Clinic, and patient agrees with next level of care.  Patient transportation will be provided by the patient.    Results for orders placed or performed in visit on 01/27/21 (from the past 24 hour(s))   *UA reflex to Microscopic and Culture ([facility] (except [place] and [place])    Specimen: Midstream Urine   Result Value Ref Range    Color Urine Yellow     Appearance Urine Clear     Glucose Urine Negative NEG^Negative mg/dL    Bilirubin Urine Negative NEG^Negative    Ketones Urine Negative NEG^Negative mg/dL    Specific Gravity Urine 1.020 1.003 - 1.035    Blood Urine Negative NEG^Negative    pH Urine 6.5 5.0 - 7.0 pH    Protein Albumin Urine Negative NEG^Negative mg/dL    Urobilinogen Urine 0.2 0.2 - 1.0 EU/dL    Nitrite Urine Negative NEG^Negative    Leukocyte Esterase Urine Negative NEG^Negative    Source Midstream Urine

## 2021-02-04 ENCOUNTER — OFFICE VISIT (OUTPATIENT)
Dept: OBGYN | Facility: CLINIC | Age: 46
End: 2021-02-04
Payer: COMMERCIAL

## 2021-02-04 VITALS — SYSTOLIC BLOOD PRESSURE: 126 MMHG | BODY MASS INDEX: 45.4 KG/M2 | DIASTOLIC BLOOD PRESSURE: 88 MMHG | WEIGHT: 272.8 LBS

## 2021-02-04 DIAGNOSIS — R10.2 PELVIC PAIN IN FEMALE: Primary | ICD-10-CM

## 2021-02-04 PROCEDURE — 99212 OFFICE O/P EST SF 10 MIN: CPT | Performed by: OBSTETRICS & GYNECOLOGY

## 2021-02-04 NOTE — PROGRESS NOTES
"Chief Complaint   Patient presents with     Pelvic Pain     Pelvic pain is 90% better.  Took last Flagyl yesterday as treatment for bacterial vaginosis. c/o changes in urine while taking rx.        Subjective:  Here for follow up of RLQ pain and \"ovarian cyst\".    Ultrasound from 1/27/21 reviewed.    Follicle 1.6 cm on right ovary and normal left ovary.  Uterus surgically absent.      REVIEW OF SYSTEMS:  Neg.  Feeling better    Health Maintenance   Topic Date Due     LIPID  1975     MICROALBUMIN  1975     Pneumococcal Vaccine: Pediatrics (0 to 5 Years) and At-Risk Patients (6 to 64 Years) (1 of 1 - PPSV23) 07/22/1983     HEPATITIS C SCREENING  07/22/1995     ASTHMA ACTION PLAN  03/20/2020     BMP  12/30/2020     PHQ-9  03/08/2021     ASTHMA CONTROL TEST  06/11/2021     PREVENTIVE CARE VISIT  09/08/2021     DTAP/TDAP/TD IMMUNIZATION (3 - Td) 03/13/2023     HIV SCREENING  Completed     DEPRESSION ACTION PLAN  Completed     INFLUENZA VACCINE  Completed     IPV IMMUNIZATION  Aged Out     MENINGITIS IMMUNIZATION  Aged Out     HEPATITIS B IMMUNIZATION  Aged Out     PAP  Discontinued       Allergies   Allergen Reactions     Aspirin Difficulty breathing, Palpitations and Other (See Comments)     Cephalexin Swelling     Adhesive Tape      Amantadine Swelling     Azithromycin Angioedema     Latex Hives, Itching and Rash     Penicillins Other (See Comments), Nausea and Vomiting, Hives, Swelling, Rash, Cramps and GI Disturbance     Amoxicillin OK       Objective:  Vitals: /88   Wt 123.7 kg (272 lb 12.8 oz)   BMI 45.40 kg/m    BMI= Body mass index is 45.4 kg/m .    Exam deferred  Assessment/Plan:    Physiologic ovarian cyst, pain resolved  Patient educated about normal ovarian anatomy and function    Ultrasound was normal - no intervention necessary    RTC prn    Vini Hodgson MD  "

## 2021-02-11 ENCOUNTER — MYC MEDICAL ADVICE (OUTPATIENT)
Dept: FAMILY MEDICINE | Facility: CLINIC | Age: 46
End: 2021-02-11

## 2021-02-11 DIAGNOSIS — J45.40 MODERATE PERSISTENT ASTHMA WITHOUT COMPLICATION: Primary | ICD-10-CM

## 2021-02-11 NOTE — TELEPHONE ENCOUNTER
Routing to Rv refill for protocol    Dion VYAS RN   [facility]

## 2021-02-12 ENCOUNTER — THERAPY VISIT (OUTPATIENT)
Dept: PHYSICAL THERAPY | Facility: CLINIC | Age: 46
End: 2021-02-12
Payer: COMMERCIAL

## 2021-02-12 DIAGNOSIS — M54.2 CERVICALGIA: Primary | ICD-10-CM

## 2021-02-12 PROCEDURE — 97140 MANUAL THERAPY 1/> REGIONS: CPT | Mod: GP | Performed by: PHYSICAL THERAPIST

## 2021-02-12 PROCEDURE — 97110 THERAPEUTIC EXERCISES: CPT | Mod: GP | Performed by: PHYSICAL THERAPIST

## 2021-02-12 PROCEDURE — 97035 APP MDLTY 1+ULTRASOUND EA 15: CPT | Mod: GP | Performed by: PHYSICAL THERAPIST

## 2021-02-12 NOTE — TELEPHONE ENCOUNTER
Rx is reported - need to verify  Also due for ACT    MyChart sent      Susannah Selby RN  [facility]

## 2021-02-15 RX ORDER — ALBUTEROL SULFATE 90 UG/1
2 AEROSOL, METERED RESPIRATORY (INHALATION) EVERY 6 HOURS PRN
Qty: 8.5 G | Refills: 1 | Status: SHIPPED | OUTPATIENT
Start: 2021-02-15 | End: 2021-09-09

## 2021-02-15 NOTE — TELEPHONE ENCOUNTER
Albuterol inhaler renewed by PCP    TAMARA CannonN, RN  [facility]

## 2021-02-15 NOTE — TELEPHONE ENCOUNTER
ACT updated   ACT Total Scores 10/22/2020 12/21/2020 2/15/2021   ACT TOTAL SCORE (Goal Greater than or Equal to 20) 13 15 19   In the past 12 months, how many times did you visit the emergency room for your asthma without being admitted to the hospital? 0 0 0   In the past 12 months, how many times were you hospitalized overnight because of your asthma? 0 0 0     Routing refill request to provider for review/approval because:  Medication is reported/historical  ACT score        Susannah Selby RN  [facility]

## 2021-02-16 ASSESSMENT — ASTHMA QUESTIONNAIRES: ACT_TOTALSCORE: 19

## 2021-02-19 ENCOUNTER — INFUSION THERAPY VISIT (OUTPATIENT)
Dept: INFUSION THERAPY | Facility: CLINIC | Age: 46
End: 2021-02-19
Attending: PSYCHIATRY & NEUROLOGY
Payer: COMMERCIAL

## 2021-02-19 VITALS
DIASTOLIC BLOOD PRESSURE: 78 MMHG | TEMPERATURE: 98.6 F | RESPIRATION RATE: 16 BRPM | HEART RATE: 92 BPM | SYSTOLIC BLOOD PRESSURE: 124 MMHG

## 2021-02-19 DIAGNOSIS — G35 MULTIPLE SCLEROSIS (H): Primary | ICD-10-CM

## 2021-02-19 PROCEDURE — 250N000011 HC RX IP 250 OP 636: Performed by: PSYCHIATRY & NEUROLOGY

## 2021-02-19 PROCEDURE — 258N000003 HC RX IP 258 OP 636: Performed by: PSYCHIATRY & NEUROLOGY

## 2021-02-19 PROCEDURE — 96365 THER/PROPH/DIAG IV INF INIT: CPT

## 2021-02-19 RX ORDER — HEPARIN SODIUM,PORCINE 10 UNIT/ML
5 VIAL (ML) INTRAVENOUS
Status: CANCELLED | OUTPATIENT
Start: 2021-03-19

## 2021-02-19 RX ORDER — HEPARIN SODIUM (PORCINE) LOCK FLUSH IV SOLN 100 UNIT/ML 100 UNIT/ML
5 SOLUTION INTRAVENOUS
Status: CANCELLED | OUTPATIENT
Start: 2021-03-19

## 2021-02-19 RX ADMIN — NATALIZUMAB 300 MG: 300 INJECTION INTRAVENOUS at 13:35

## 2021-02-19 RX ADMIN — SODIUM CHLORIDE 250 ML: 9 INJECTION, SOLUTION INTRAVENOUS at 13:44

## 2021-02-19 NOTE — PROGRESS NOTES
Infusion Nursing Note:  Josefina Aldrich presents today for Tysabri.    Patient seen by provider today: No   present during visit today: Not Applicable.        Intravenous Access:  Peripheral IV placed.    Treatment Conditions:  Tysabri pre-infusion checklist completed via touch program.  Pt requests IVF to run along with Tysabri to diminish effect of headache    Post Infusion Assessment:  Patient tolerated infusion without incident  Observed for 1 hour post infusion.  Site patent and intact, free from redness, edema or discomfort.  No evidence of extravasations.  Access discontinued per protocol.       Discharge Plan:   Discharge instructions reviewed with: Patient.  Patient and/or family verbalized understanding of discharge instructions and all questions answered.  AVS to patient via [employer].  Patient will return in 1 month for next appointment.   Patient discharged in stable condition accompanied by: self.  Departure Mode: Ambulatory.    Radha Lee RN

## 2021-03-01 ENCOUNTER — THERAPY VISIT (OUTPATIENT)
Dept: PHYSICAL THERAPY | Facility: CLINIC | Age: 46
End: 2021-03-01
Payer: COMMERCIAL

## 2021-03-01 DIAGNOSIS — M54.2 CERVICALGIA: Primary | ICD-10-CM

## 2021-03-01 PROCEDURE — 97140 MANUAL THERAPY 1/> REGIONS: CPT | Mod: GP | Performed by: PHYSICAL THERAPIST

## 2021-03-01 PROCEDURE — 97035 APP MDLTY 1+ULTRASOUND EA 15: CPT | Mod: GP | Performed by: PHYSICAL THERAPIST

## 2021-03-01 PROCEDURE — 97110 THERAPEUTIC EXERCISES: CPT | Mod: GP | Performed by: PHYSICAL THERAPIST

## 2021-03-06 ENCOUNTER — MYC MEDICAL ADVICE (OUTPATIENT)
Dept: FAMILY MEDICINE | Facility: CLINIC | Age: 46
End: 2021-03-06

## 2021-03-06 NOTE — LETTER
[facility] [place]  [address] [place] [address] [address]  [place] [facility] [address] [phone]       March 8, 2021    Josefina [place]  [address]   [place] [facility] [address]    To Whom it May Concern:    The above patient is under my professional care.  She has numerous underlying risk factors for COVID-19 and currently works remotely in healthcare.  If vaccine supply is available for employees please prioritize this patient to obtain her vaccine as soon as possible. Please contact me with questions or concerns.      Sincerely,    Miya Crump PA-C

## 2021-03-08 NOTE — TELEPHONE ENCOUNTER
Patient came in & I printed the letter off signed by Miya Crump.  I gave to patient.  Patient stated she didn't have a printer at home to print it off her MyChart.  Angelic Solano, Clinic Receptionist

## 2021-03-08 NOTE — TELEPHONE ENCOUNTER
Please see my chart message below     Please review and advise     Thank you     Amelia Chance RN, BSN  [place] [facility]

## 2021-03-09 ENCOUNTER — TRANSFERRED RECORDS (OUTPATIENT)
Dept: HEALTH INFORMATION MANAGEMENT | Facility: CLINIC | Age: 46
End: 2021-03-09

## 2021-03-16 ENCOUNTER — OFFICE VISIT (OUTPATIENT)
Dept: FAMILY MEDICINE | Facility: CLINIC | Age: 46
End: 2021-03-16
Payer: COMMERCIAL

## 2021-03-16 VITALS
WEIGHT: 274 LBS | HEART RATE: 81 BPM | SYSTOLIC BLOOD PRESSURE: 132 MMHG | TEMPERATURE: 98.2 F | BODY MASS INDEX: 45.65 KG/M2 | OXYGEN SATURATION: 100 % | DIASTOLIC BLOOD PRESSURE: 76 MMHG | HEIGHT: 65 IN

## 2021-03-16 DIAGNOSIS — M54.2 CERVICALGIA: ICD-10-CM

## 2021-03-16 DIAGNOSIS — J30.2 SEASONAL ALLERGIC RHINITIS, UNSPECIFIED TRIGGER: ICD-10-CM

## 2021-03-16 DIAGNOSIS — M54.2 NECK PAIN ON LEFT SIDE: ICD-10-CM

## 2021-03-16 DIAGNOSIS — G47.00 INSOMNIA, UNSPECIFIED TYPE: ICD-10-CM

## 2021-03-16 DIAGNOSIS — M79.605 PAIN OF LEFT LOWER EXTREMITY: ICD-10-CM

## 2021-03-16 DIAGNOSIS — M54.50 ACUTE RIGHT-SIDED LOW BACK PAIN WITHOUT SCIATICA: ICD-10-CM

## 2021-03-16 DIAGNOSIS — R09.81 NASAL CONGESTION: ICD-10-CM

## 2021-03-16 DIAGNOSIS — G43.109 MIGRAINE WITH AURA AND WITHOUT STATUS MIGRAINOSUS, NOT INTRACTABLE: ICD-10-CM

## 2021-03-16 DIAGNOSIS — M62.838 MUSCLE SPASM: ICD-10-CM

## 2021-03-16 DIAGNOSIS — D84.9 IMMUNOSUPPRESSION (H): ICD-10-CM

## 2021-03-16 DIAGNOSIS — G35 MULTIPLE SCLEROSIS (H): ICD-10-CM

## 2021-03-16 DIAGNOSIS — J45.40 MODERATE PERSISTENT ASTHMA WITHOUT COMPLICATION: ICD-10-CM

## 2021-03-16 PROBLEM — Z02.89 ENCOUNTER FOR COMPLETION OF FORM WITH PATIENT: Status: RESOLVED | Noted: 2020-09-16 | Resolved: 2021-03-16

## 2021-03-16 PROCEDURE — 96372 THER/PROPH/DIAG INJ SC/IM: CPT | Performed by: PHYSICIAN ASSISTANT

## 2021-03-16 PROCEDURE — 99214 OFFICE O/P EST MOD 30 MIN: CPT | Mod: 25 | Performed by: PHYSICIAN ASSISTANT

## 2021-03-16 RX ORDER — BACLOFEN 20 MG/1
TABLET ORAL
Qty: 180 TABLET | Refills: 1 | Status: SHIPPED | OUTPATIENT
Start: 2021-03-16 | End: 2021-09-09

## 2021-03-16 RX ORDER — ALBUTEROL SULFATE 0.83 MG/ML
2.5 SOLUTION RESPIRATORY (INHALATION) EVERY 6 HOURS PRN
Qty: 30 ML | Refills: 1 | Status: SHIPPED | OUTPATIENT
Start: 2021-03-16 | End: 2021-09-09

## 2021-03-16 RX ORDER — TRAZODONE HYDROCHLORIDE 50 MG/1
50 TABLET, FILM COATED ORAL AT BEDTIME
Qty: 90 TABLET | Refills: 1 | Status: SHIPPED | OUTPATIENT
Start: 2021-03-16 | End: 2021-09-09

## 2021-03-16 RX ORDER — CETIRIZINE HYDROCHLORIDE 10 MG/1
10 TABLET ORAL DAILY
Qty: 180 TABLET | Refills: 1
Start: 2021-03-16

## 2021-03-16 RX ORDER — TOPIRAMATE 50 MG/1
50 TABLET, FILM COATED ORAL AT BEDTIME
Qty: 90 TABLET | Refills: 1 | Status: SHIPPED | OUTPATIENT
Start: 2021-03-16 | End: 2021-09-09

## 2021-03-16 RX ORDER — BUDESONIDE AND FORMOTEROL FUMARATE DIHYDRATE 160; 4.5 UG/1; UG/1
2 AEROSOL RESPIRATORY (INHALATION) 2 TIMES DAILY
Qty: 10.2 G | Refills: 5 | Status: SHIPPED | OUTPATIENT
Start: 2021-03-16 | End: 2021-03-17

## 2021-03-16 RX ORDER — AZELASTINE 1 MG/ML
1 SPRAY, METERED NASAL 2 TIMES DAILY
Qty: 30 ML | Refills: 3 | Status: SHIPPED | OUTPATIENT
Start: 2021-03-16 | End: 2021-09-09

## 2021-03-16 RX ORDER — MONTELUKAST SODIUM 10 MG/1
1 TABLET ORAL AT BEDTIME
Qty: 90 TABLET | Refills: 1 | Status: SHIPPED | OUTPATIENT
Start: 2021-03-16 | End: 2021-09-09

## 2021-03-16 RX ORDER — NATALIZUMAB 300 MG/15ML
300 INJECTION INTRAVENOUS
Start: 2021-03-16 | End: 2021-09-09

## 2021-03-16 RX ORDER — TOPIRAMATE 100 MG/1
100 TABLET, FILM COATED ORAL AT BEDTIME
Qty: 90 TABLET | Refills: 1 | Status: SHIPPED | OUTPATIENT
Start: 2021-03-16 | End: 2021-09-09

## 2021-03-16 RX ORDER — FLUTICASONE PROPIONATE 50 MCG
1 SPRAY, SUSPENSION (ML) NASAL DAILY
Qty: 16 G | Refills: 5 | Status: SHIPPED | OUTPATIENT
Start: 2021-03-16 | End: 2021-08-17

## 2021-03-16 RX ORDER — KETOROLAC TROMETHAMINE 30 MG/ML
30 INJECTION, SOLUTION INTRAMUSCULAR; INTRAVENOUS ONCE
Status: COMPLETED | OUTPATIENT
Start: 2021-03-16 | End: 2021-03-16

## 2021-03-16 RX ADMIN — KETOROLAC TROMETHAMINE 30 MG: 30 INJECTION, SOLUTION INTRAMUSCULAR; INTRAVENOUS at 16:37

## 2021-03-16 ASSESSMENT — MIFFLIN-ST. JEOR: SCORE: 1898.74

## 2021-03-16 NOTE — PROGRESS NOTES
Assessment & Plan     Body mass index 45.0-49.9, adult (H)  Diet and weight loss efforts encouraged    Multiple sclerosis (H) - Dr. Chong - on Tysabri infusions  Immunosuppression (H)  Pain of left lower extremity  Muscle spasm  Multiple sclerosis (H)  Stable.  Tysabri infusions spaced out to 6 weeks from previous 4 weeks.  Patient is very excited about this.  Baclofen used nightly as needed and during the day as needed for spasms.  Refilled today.  - baclofen (LIORESAL) 20 MG tablet  Dispense: 180 tablet; Refill: 1  - natalizumab (TYSABRI) 300 MG/15ML injection    Moderate persistent asthma without complication  Nasal congestion  Seasonal allergic rhinitis, unspecified trigger  Well-controlled.  Continue current regimen.  - budesonide-formoterol (SYMBICORT) 160-4.5 MCG/ACT Inhaler  Dispense: 10.2 g; Refill: 5  - montelukast (SINGULAIR) 10 MG tablet  Dispense: 90 tablet; Refill: 1  - cetirizine (ZYRTEC) 10 MG tablet  Dispense: 180 tablet; Refill: 1  - albuterol (PROVENTIL) (2.5 MG/3ML) 0.083% neb solution  Dispense: 30 mL; Refill: 1  - fluticasone (FLONASE) 50 MCG/ACT nasal spray  Dispense: 16 g; Refill: 5  - azelastine (ASTELIN) 0.1 % nasal spray  Dispense: 30 mL; Refill: 3    Migraine with aura and without status migrainosus, not intractable  Stable.  Continue current regimen.  - topiramate (TOPAMAX) 100 MG tablet  Dispense: 90 tablet; Refill: 1  - topiramate (TOPAMAX) 50 MG tablet  Dispense: 90 tablet; Refill: 1    Insomnia, unspecified type  Stable.  Continue current regimen.  - traZODone (DESYREL) 50 MG tablet  Dispense: 90 tablet; Refill: 1    Acute right-sided low back pain without sciatica  Symptomatic strain.  Treated with Toradol injection in the office today.  Patient tolerated well.  Heat/ice and Tylenol as needed recommended.  - ketorolac (TORADOL) injection 30 mg    Neck pain on left side  Cervicalgia  PT renewal requested.  Improving.  - [facility]           BMI:   Estimated body  "mass index is 45.6 kg/m  as calculated from the following:    Height as of this encounter: 1.651 m (5' 5\").    Weight as of this encounter: 124.3 kg (274 lb).   Weight management plan: Discussed healthy diet and exercise guidelines      Return in about 6 months (around 9/16/2021) for Physical Exam, Fasting labs, Medication recheck.    Miya Crump PA-C  [facility]   Nataliya is a 45 year old who presents for the following health issues     HPI     Asthma Follow-Up  Continues on Symbicort and albuterol as needed.  Also utilizing montelukast and cetirizine.    Takes Astelin and Flonase daily  Was ACT completed today?    Yes    ACT Total Scores 3/16/2021   ACT TOTAL SCORE (Goal Greater than or Equal to 20) 22   In the past 12 months, how many times did you visit the emergency room for your asthma without being admitted to the hospital? 0   In the past 12 months, how many times were you hospitalized overnight because of your asthma? 0          How many days per week do you miss taking your asthma controller medication?  0    Please describe any recent triggers for your asthma: smoke, dust mites, animal dander, mold and cold air    Have you had any Emergency Room Visits, Urgent Care Visits, or Hospital Admissions since your last office visit?  No    Migraine   Taking topamax daily.  Rare use of Imitrex    Since your last clinic visit, how have your headaches changed?  No change    How often are you getting headaches or migraines? 4 x month     Are you able to do normal daily activities when you have a migraine? Yes    Are you taking rescue/relief medications? (Select all that apply) sumatriptan (Imitrex)    How helpful is your rescue/relief medication?  I get some relief    Are you taking any medications to prevent migraines? (Select all that apply)  Topamax    In the past 4 weeks, how often have you gone to urgent care or the emergency room because of your headaches?  " "0      How many servings of fruits and vegetables do you eat daily?  4 or more    On average, how many sweetened beverages do you drink each day (Examples: soda, juice, sweet tea, etc.  Do NOT count diet or artificially sweetened beverages)?   0    How many days per week do you exercise enough to make your heart beat faster? 4    How many minutes a day do you exercise enough to make your heart beat faster? 30 - 60    How many days per week do you miss taking your medication? 0    Multiple Sclerosis  Follows with Dr. Chong - virtual last week.  Nov MRI of brain - planned for April after fully vaccinated.  Tysabri infusions every 6 weeks - recent change - was every 4 weeks.  Not taking baclofen for leg spasms - causes hand tremor.    Insomnia  Takes trazodone 50 mg nightly to sleep longer, otherwise awakens at 6 am despite when she fell asleep.      Intermittent chest pressure  At least once weekly.  Wondering if heart is a concern.  Increased greens (brocolli especially) causes some bloating  - notes increased pressure.  Stool softener twice weekly since increasing fiber intake 2 months ago - associates with increased chest pressure symptoms.      HSV  Valacyclovir 500 mg once daily for suppression     Right-sided back pain.    Flared from yesterday.  Was trying to help lift a couch.  Trying baclofen and gabapentin with minimal/short-term results.  Avoids NSAIDs due to gastric bypass status.    Review of Systems   Constitutional, HEENT, cardiovascular, pulmonary, GI, , musculoskeletal, neuro, skin, endocrine and psych systems are negative, except as otherwise noted.      Objective    /76 (BP Location: Right arm, Patient Position: Sitting, Cuff Size: Adult Large)   Pulse 81   Temp 98.2  F (36.8  C) (Tympanic)   Ht 1.651 m (5' 5\")   Wt 124.3 kg (274 lb)   SpO2 100%   BMI 45.60 kg/m    Body mass index is 45.6 kg/m .  Physical Exam   GENERAL: healthy, alert and no distress  EYES: Eyes grossly normal to " inspection, PERRL and conjunctivae and sclerae normal  HENT: ear canals and TM's normal, nose and mouth without ulcers or lesions  NECK: no adenopathy, no asymmetry, masses, or scars and thyroid normal to palpation  RESP: lungs clear to auscultation - no rales, rhonchi or wheezes  CV: regular rate and rhythm, normal S1 S2, no S3 or S4, no murmur, click or rub, no peripheral edema and peripheral pulses strong  ABDOMEN: soft, nontender, no hepatosplenomegaly, no masses and bowel sounds normal  MS: no gross musculoskeletal defects noted, no edema significant tenderness with guarding to the right flank and paralumbar musculature  SKIN: no suspicious lesions or rashes  NEURO: Normal strength and tone, mentation intact and speech normal  PSYCH: mentation appears normal, affect normal/bright

## 2021-03-17 ASSESSMENT — ASTHMA QUESTIONNAIRES: ACT_TOTALSCORE: 22

## 2021-03-25 NOTE — MR AVS SNAPSHOT
After Visit Summary   11/1/2018    Josefina Aldrich    MRN: [id number]           Patient Information     Date Of Birth          1975        Visit Information        Provider Department      11/1/2018 10:00 AM Radames Castaneda MD [facility]        Today's Diagnoses     Intractable chronic migraine without aura and with status migrainosus    -  1      Care Instructions    1. For headache prevention:  Continue nortriptyline 50 mg at bedtime  Change topiramate to 50 mg in the morning and 100 mg in the evening    2. To treat acute headache:  -Take prochlorperazine (Compazine) 10 mg every six hours as needed  -You can also take 50 mg of sumatriptan up to twice daily (separate doses by at least one hour), but do not take 2-3 days/week  -Stop Fioricet (butalbital/acetaminophen caffeine)    3. I am referring you to Dr. Shena Menjivar for evaluation of chronic headache    4. Return to this clinic as scheduled on 11-29; you do not need to do MRI scans as these were recently completed.    5. Continue glatiramer aceate          Follow-ups after your visit        Additional Services     NEUROLOGY ADULT REFERRAL       Your provider has referred you for the following:   Consult at [facility]: Neurology [facility] - [place] [phone]   [email or web address]      Headache-Dr. Menjivar     Please be aware that coverage of these services is subject to the terms and limitations of your health insurance plan.  Call member services at your health plan with any benefit or coverage questions.      Please bring the following with you to your appointment:    (1) Any X-Rays, CTs or MRIs which have been performed.  Contact the facility where they were done to arrange for  prior to your scheduled appointment.    (2) List of current medications  (3) This referral request   (4) Any documents/labs given to you for this referral                  Your next 10  Spoke with [facility] who confirmed patient has a refill remaining on file for alendronate and they will get it ready for her   "appointments already scheduled     Nov 29, 2018 10:00 AM CST   (Arrive by 9:45 AM)   Return Multiple Sclerosis with Radames Castaneda MD   [facility] ([facility])    [address]  [address] [place] [id number] [phone]              Future tests that were ordered for you today     Open Future Orders        Priority Expected Expires Ordered    NEUROLOGY ADULT REFERRAL Routine 11/1/2018 11/1/2019 11/1/2018            Who to contact     If you have questions or need follow up information about today's clinic visit or your schedule please contact [facility] directly at [phone].  Normal or non-critical lab and imaging results will be communicated to you by [employer]il[employer], letter or phone within 4 business days after the clinic has received the results. If you do not hear from us within 7 days, please contact the clinic through [employer] or phone. If you have a critical or abnormal lab result, we will notify you by phone as soon as possible.  Submit refill requests through [employer] or call your pharmacy and they will forward the refill request to us. Please allow 3 business days for your refill to be completed.          Additional Information About Your Visit        [employer]il[employer] Information     [employer] gives you secure access to your electronic health record. If you see a primary care provider, you can also send messages to your care team and make appointments. If you have questions, please call your primary care clinic.  If you do not have a primary care provider, please call [phone] and they will assist you.        Care EveryWhere ID     This is your Care EveryWhere ID. This could be used by other organizations to access your [facility] medical records  [id number]        Your Vitals Were     Pulse Temperature Respirations Height Pulse Oximetry Breastfeeding?    91 98.5  F (36.9  C) (Oral) 16 1.664 m (5' 5.5\") 99% No    BMI (Body Mass Index)    "                41.62 kg/m2            Blood Pressure from Last 3 Encounters:   11/01/18 121/77   10/30/18 128/80   10/26/18 116/64    Weight from Last 3 Encounters:   11/01/18 115.2 kg (254 lb)   10/30/18 115.2 kg (254 lb)   10/26/18 114.8 kg (253 lb)                 Today's Medication Changes          These changes are accurate as of 11/1/18 11:40 AM.  If you have any questions, ask your nurse or doctor.               Start taking these medicines.        Dose/Directions    SUMAtriptan 50 MG tablet   Commonly known as:  IMITREX   Used for:  Intractable chronic migraine without aura and with status migrainosus   Started by:  Radames Castaneda MD        Take 50 mg up to twice daily as needed for headache; separate doses by at least one hour and do not use more than 3 days/week   Quantity:  30 tablet   Refills:  1            Where to get your medicines      These medications [facility] to [facility] [address] [place] [address] AT [address] [address]    Hours:  24-hours Phone:  [phone]     SUMAtriptan 50 MG tablet                Primary Care Provider Office Phone # Fax #    Miya Crump PA-C [phone]       [address] [place] [id number]        Equal Access to Services     RISHI MORENO AH: Hadii jimmie elliott hadasho Somagen, waaxda luqadaha, qaybta kaalmada wichoyashabbir, mya rodrigues . So [facility] [phone].    ATENCIÓN: Si habla español, tiene a barrera disposición servicios gratuitos de asistencia lingüística. Karen al [phone].    We comply with applicable federal civil rights laws and [place] laws. We do not discriminate on the basis of race, color, national origin, age, disability, sex, sexual orientation, or gender identity.            Thank you!     Thank you for choosing [facility]  for your care. Our goal is always to provide you with excellent care.  Hearing back from our patients is one way we can continue to improve our services. Please take a few minutes to complete the written survey that you may receive in the mail after your visit with us. Thank you!             Your Updated Medication List - Protect others around you: Learn how to safely use, store and throw away your medicines at www.disposemymeds.org.          This list is accurate as of 11/1/18 11:40 AM.  Always use your most recent med list.                   Brand Name Dispense Instructions for use Diagnosis    acetaminophen 500 MG tablet    TYLENOL    100 tablet    Take 2 tablets (1,000 mg) by mouth 3 times daily as needed for mild pain    Cyst of left ovary       * VENTOLIN  (90 Base) MCG/ACT inhaler   Generic drug:  albuterol      Inhale 2 puffs into the lungs        * albuterol (2.5 MG/3ML) 0.083% neb solution      Inhale 2.5 mg into the lungs        butalbital-acetaminophen-caffeine -40 MG per tablet    FIORICET/ESGIC    30 tablet    Take 1 tablet by mouth every 4 hours as needed for headaches (no more than 2 tablets daily)    Episodic tension-type headache, not intractable       cholecalciferol 5000 units Tabs tablet    vitamin D3     Take 1 tablet (5,000 Units) by mouth daily    Hypovitaminosis D       cyanocolbalamin 100 MCG tablet    vitamin  B-12     Take 100 mcg by mouth daily        cyclobenzaprine 10 MG tablet    FLEXERIL     Take 10 mg by mouth 3 times daily as needed        Glatiramer Acetate 40 MG/ML Sosy    COPAXONE    12 Syringe    Inject 40 mg Subcutaneous three times a week    Multiple sclerosis (H)       IBUPROFEN PO      Take 800 mg by mouth every 4 hours as needed for moderate pain        IRON SUPPLEMENT PO           lisinopril-hydrochlorothiazide 10-12.5 MG per tablet    PRINZIDE/ZESTORETIC    90 tablet    TAKE 1 TABLET BY MOUTH DAILY    Benign essential hypertension       metoclopramide 10 MG tablet    REGLAN    10 tablet    Take 1 tablet (10 mg) by mouth 4 times  daily as needed (nausea or headache)        naproxen 500 MG tablet    NAPROSYN    60 tablet    Take 1 tablet (500 mg) by mouth 2 times daily as needed for moderate pain    Intractable migraine without aura and with status migrainosus       nortriptyline 50 MG capsule    PAMELOR    90 capsule    Take 1 capsule (50 mg) by mouth At Bedtime    Migraine with aura and without status migrainosus, not intractable       predniSONE 20 MG tablet    DELTASONE    33 tablet    Take 60 mg daily for 5 days, then 40 mg daily for 5 days, then 20 mg daily for 5 days, then 10 mg for 6 days    MS (multiple sclerosis) (H), Numbness of right hand, Episodic tension-type headache, not intractable       prochlorperazine 10 MG tablet    COMPAZINE    20 tablet    Take 1 tablet (10 mg) by mouth every 6 hours as needed for nausea or vomiting    Intractable migraine without aura and with status migrainosus       SUMAtriptan 50 MG tablet    IMITREX    30 tablet    Take 50 mg up to twice daily as needed for headache; separate doses by at least one hour and do not use more than 3 days/week    Intractable chronic migraine without aura and with status migrainosus       * topiramate 100 MG tablet    TOPAMAX    60 tablet    Take 1 tablet (100 mg) by mouth At Bedtime (take with 50 mg to total 150 mg nightly)    Migraine with aura and without status migrainosus, not intractable       * topiramate 50 MG tablet    TOPAMAX    60 tablet    Take 1 tablet (50 mg) by mouth At Bedtime (take with 100 mg to total 150 mg nightly)    Migraine with aura and without status migrainosus, not intractable       triamcinolone 0.1 % cream    KENALOG    30 g    Apply sparingly to affected area three times daily    Eczema, unspecified type       valACYclovir 500 MG tablet    VALTREX    90 tablet    Take 1 tablet (500 mg) by mouth daily    HSV (herpes simplex virus) infection       * Notice:  This list has 4 medication(s) that are the same as other medications prescribed for  you. Read the directions carefully, and ask your doctor or other care provider to review them with you.

## 2021-03-26 ENCOUNTER — THERAPY VISIT (OUTPATIENT)
Dept: PHYSICAL THERAPY | Facility: CLINIC | Age: 46
End: 2021-03-26
Payer: COMMERCIAL

## 2021-03-26 DIAGNOSIS — M54.2 CERVICALGIA: Primary | ICD-10-CM

## 2021-03-26 PROCEDURE — 97112 NEUROMUSCULAR REEDUCATION: CPT | Mod: GP | Performed by: PHYSICAL THERAPIST

## 2021-03-26 PROCEDURE — 97035 APP MDLTY 1+ULTRASOUND EA 15: CPT | Mod: GP | Performed by: PHYSICAL THERAPIST

## 2021-03-26 PROCEDURE — 97140 MANUAL THERAPY 1/> REGIONS: CPT | Mod: GP | Performed by: PHYSICAL THERAPIST

## 2021-03-30 ENCOUNTER — TELEPHONE (OUTPATIENT)
Dept: FAMILY MEDICINE | Facility: CLINIC | Age: 46
End: 2021-03-30

## 2021-03-30 DIAGNOSIS — M79.605 PAIN OF LEFT LOWER EXTREMITY: ICD-10-CM

## 2021-03-30 NOTE — TELEPHONE ENCOUNTER
See PA initiation from 3/16- BRAND WAS ADVISED as GENERIC WAS DENIED - please call pharmacy to discuss recommendations to get her inhaler covered

## 2021-03-30 NOTE — TELEPHONE ENCOUNTER
Reason for Call:  Form, our goal is to have forms completed with 72 hours, however, some forms may require a visit or additional information.    Type of letter, form or note:  medical    Who is the form from?: [facility] (if other please explain)    Where did the form come from: form was faxed in    What clinic location was the form placed at?: [place]    Where the form was placed: Miya Crump Box/Folder    What number is listed as a contact on the form?: [phone]       Additional comments: Symbicort 160/4.5 mcg    Call taken on 3/30/2021 at 6:49 AM by Di Gonzalez

## 2021-03-30 NOTE — TELEPHONE ENCOUNTER
Form is asking if pt can have generic substitution for Symbicort.  Rx is TRISH in chart.  Please advise.  Mellissa Mock

## 2021-03-31 RX ORDER — GABAPENTIN 300 MG/1
CAPSULE ORAL
Qty: 180 CAPSULE | Refills: 3 | Status: SHIPPED | OUTPATIENT
Start: 2021-03-31 | End: 2021-04-09

## 2021-03-31 NOTE — TELEPHONE ENCOUNTER
Controlled Substance Refill Request for   gabapentin (NEURONTIN) 300 MG capsule 180 capsule 3 10/22/2020       Problem List Complete:  No     PROVIDER TO CONSIDER COMPLETION OF PROBLEM LIST AND OVERVIEW/CONTROLLED SUBSTANCE AGREEMENT    Last Written Prescription Date:  10/22/2020  Last Fill Quantity: 180,   # refills: 3    THE MOST RECENT OFFICE VISIT MUST BE WITHIN THE PAST 3 MONTHS. AT LEAST ONE FACE TO FACE VISIT MUST OCCUR EVERY 6 MONTHS. ADDITIONAL VISITS CAN BE VIRTUAL.  (THIS STATEMENT SHOULD BE DELETED.)    Last Office Visit with [facility] primary care provider: 3/16/2021    Future Office visit:     Controlled substance agreement:   Encounter-Level CSA:    There are no encounter-level csa.     Patient-Level CSA:    There are no patient-level csa.         Last Urine Drug Screen: No results found for: CDAUT, No results found for: COMDAT, No results found for: THC13, PCP13, COC13, MAMP13, OPI13, AMP13, BZO13, TCA13, MTD13, BAR13, OXY13, PPX13, BUP13     Processing:  Rx to be electronically transmitted to pharmacy by provider      [email or web address]

## 2021-04-02 ENCOUNTER — TRANSFERRED RECORDS (OUTPATIENT)
Dept: HEALTH INFORMATION MANAGEMENT | Facility: CLINIC | Age: 46
End: 2021-04-02

## 2021-04-02 ENCOUNTER — INFUSION THERAPY VISIT (OUTPATIENT)
Dept: INFUSION THERAPY | Facility: CLINIC | Age: 46
End: 2021-04-02
Attending: PSYCHIATRY & NEUROLOGY
Payer: COMMERCIAL

## 2021-04-02 ENCOUNTER — HOSPITAL ENCOUNTER (OUTPATIENT)
Facility: CLINIC | Age: 46
Setting detail: SPECIMEN
Discharge: HOME | End: 2021-04-02
Attending: PSYCHIATRY & NEUROLOGY | Admitting: PSYCHIATRY & NEUROLOGY
Payer: COMMERCIAL

## 2021-04-02 VITALS
OXYGEN SATURATION: 100 % | DIASTOLIC BLOOD PRESSURE: 80 MMHG | BODY MASS INDEX: 47.31 KG/M2 | TEMPERATURE: 98.2 F | RESPIRATION RATE: 16 BRPM | WEIGHT: 284.3 LBS | HEART RATE: 75 BPM | SYSTOLIC BLOOD PRESSURE: 129 MMHG

## 2021-04-02 DIAGNOSIS — G35 MULTIPLE SCLEROSIS (H): Primary | ICD-10-CM

## 2021-04-02 LAB
ALT SERPL W P-5'-P-CCNC: 29 U/L (ref 0–50)
AST SERPL W P-5'-P-CCNC: 35 U/L (ref 0–45)
BASOPHILS # BLD AUTO: 0 10E9/L (ref 0–0.2)
BASOPHILS NFR BLD AUTO: 0 %
DIFFERENTIAL METHOD BLD: ABNORMAL
EOSINOPHIL # BLD AUTO: 0.6 10E9/L (ref 0–0.7)
EOSINOPHIL NFR BLD AUTO: 4 %
ERYTHROCYTE [DISTWIDTH] IN BLOOD BY AUTOMATED COUNT: 13.2 % (ref 10–15)
HCT VFR BLD AUTO: 37.3 % (ref 35–47)
HGB BLD-MCNC: 12.2 G/DL (ref 11.7–15.7)
LYMPHOCYTES # BLD AUTO: 4.4 10E9/L (ref 0.8–5.3)
LYMPHOCYTES NFR BLD AUTO: 32 %
MCH RBC QN AUTO: 30.5 PG (ref 26.5–33)
MCHC RBC AUTO-ENTMCNC: 32.7 G/DL (ref 31.5–36.5)
MCV RBC AUTO: 93 FL (ref 78–100)
MONOCYTES # BLD AUTO: 1.5 10E9/L (ref 0–1.3)
MONOCYTES NFR BLD AUTO: 11 %
NEUTROPHILS # BLD AUTO: 7.3 10E9/L (ref 1.6–8.3)
NEUTROPHILS NFR BLD AUTO: 53 %
PLATELET # BLD AUTO: 359 10E9/L (ref 150–450)
PLATELET # BLD EST: ABNORMAL 10*3/UL
POLYCHROMASIA BLD QL SMEAR: SLIGHT
RBC # BLD AUTO: 4 10E12/L (ref 3.8–5.2)
WBC # BLD AUTO: 13.8 10E9/L (ref 4–11)

## 2021-04-02 PROCEDURE — 84450 TRANSFERASE (AST) (SGOT): CPT | Performed by: PSYCHIATRY & NEUROLOGY

## 2021-04-02 PROCEDURE — 250N000011 HC RX IP 250 OP 636: Performed by: PSYCHIATRY & NEUROLOGY

## 2021-04-02 PROCEDURE — 86360 T CELL ABSOLUTE COUNT/RATIO: CPT | Performed by: PSYCHIATRY & NEUROLOGY

## 2021-04-02 PROCEDURE — 258N000003 HC RX IP 258 OP 636: Performed by: PSYCHIATRY & NEUROLOGY

## 2021-04-02 PROCEDURE — 86359 T CELLS TOTAL COUNT: CPT | Performed by: PSYCHIATRY & NEUROLOGY

## 2021-04-02 PROCEDURE — 84460 ALANINE AMINO (ALT) (SGPT): CPT | Performed by: PSYCHIATRY & NEUROLOGY

## 2021-04-02 PROCEDURE — 999N001133 HC STATISTIC JC VIR AB INDEX INHIB: Performed by: PSYCHIATRY & NEUROLOGY

## 2021-04-02 PROCEDURE — 96365 THER/PROPH/DIAG IV INF INIT: CPT

## 2021-04-02 PROCEDURE — 85025 COMPLETE CBC W/AUTO DIFF WBC: CPT | Performed by: PSYCHIATRY & NEUROLOGY

## 2021-04-02 PROCEDURE — 86357 NK CELLS TOTAL COUNT: CPT | Performed by: PSYCHIATRY & NEUROLOGY

## 2021-04-02 PROCEDURE — 36415 COLL VENOUS BLD VENIPUNCTURE: CPT | Performed by: PSYCHIATRY & NEUROLOGY

## 2021-04-02 PROCEDURE — 86355 B CELLS TOTAL COUNT: CPT | Performed by: PSYCHIATRY & NEUROLOGY

## 2021-04-02 RX ORDER — HEPARIN SODIUM,PORCINE 10 UNIT/ML
5 VIAL (ML) INTRAVENOUS
Status: CANCELLED | OUTPATIENT
Start: 2021-04-16

## 2021-04-02 RX ORDER — HEPARIN SODIUM (PORCINE) LOCK FLUSH IV SOLN 100 UNIT/ML 100 UNIT/ML
5 SOLUTION INTRAVENOUS
Status: CANCELLED | OUTPATIENT
Start: 2021-04-16

## 2021-04-02 RX ADMIN — NATALIZUMAB 300 MG: 300 INJECTION INTRAVENOUS at 13:41

## 2021-04-02 RX ADMIN — SODIUM CHLORIDE 250 ML: 9 INJECTION, SOLUTION INTRAVENOUS at 13:41

## 2021-04-02 NOTE — PROGRESS NOTES
Infusion Nursing Note:  Nataliya Aldrich presents today for Tysabri    Patient seen by provider today: No   present during visit today: Not Applicable.    Note: N/A.    Intravenous Access:  Peripheral IV placed.    Treatment Conditions:  Tysabri pre-infusion checklist completed via touch program.      Post Infusion Assessment:  Biologic Infusion Post Education: Call the triage nurse at your clinic or seek medical attention if you have chills and/or temperature greater than or equal to 100.5, uncontrolled nausea/vomiting, diarrhea, constipation, dizziness, shortness of breath, chest pain, heart palpitations, weakness or any other new or concerning symptoms, questions or concerns.  You cannot have any live virus vaccines prior to or during treatment or up to 6 months post infusion.  If you have an upcoming surgery, medical procedure or dental procedure during treatment, this should be discussed with your ordering physician and your surgeon/dentist.  If you are having any concerning symptom, if you are unsure if you should get your next infusion or wish to speak to a provider before your next infusion, please call your care coordinator or triage nurse at your clinic to notify them so we can adequately serve you.     Patient was observed for 60 minutes post infusion.      Discharge Plan:   Copy of AVS reviewed with patient and/or family.  Patient will return 5/14/21 for next appointment.  Patient discharged in stable condition accompanied by: self.  Departure Mode: Ambulatory.    Carmen Mcqueen RN

## 2021-04-04 LAB
CD19 CELLS # BLD: 1193 CELLS/UL (ref 107–698)
CD19 CELLS NFR BLD: 21 % (ref 6–27)
CD3 CELLS # BLD: 3473 CELLS/UL (ref 603–2990)
CD3 CELLS NFR BLD: 61 % (ref 49–84)
CD3+CD4+ CELLS # BLD: 2055 CELLS/UL (ref 441–2156)
CD3+CD4+ CELLS NFR BLD: 36 % (ref 28–63)
CD3+CD4+ CELLS/CD3+CD8+ CLL BLD: 1.5 % (ref 1.4–2.6)
CD3+CD8+ CELLS # BLD: 1342 CELLS/UL (ref 125–1312)
CD3+CD8+ CELLS NFR BLD: 24 % (ref 10–40)
CD3-CD16+CD56+ CELLS # BLD: 953 CELLS/UL (ref 95–640)
CD3-CD16+CD56+ CELLS NFR BLD: 17 % (ref 4–25)
IFC SPECIMEN: ABNORMAL

## 2021-04-08 ENCOUNTER — MYC MEDICAL ADVICE (OUTPATIENT)
Dept: FAMILY MEDICINE | Facility: CLINIC | Age: 46
End: 2021-04-08

## 2021-04-08 ENCOUNTER — TRANSFERRED RECORDS (OUTPATIENT)
Dept: HEALTH INFORMATION MANAGEMENT | Facility: CLINIC | Age: 46
End: 2021-04-08

## 2021-04-09 ENCOUNTER — VIRTUAL VISIT (OUTPATIENT)
Dept: FAMILY MEDICINE | Facility: CLINIC | Age: 46
End: 2021-04-09
Payer: COMMERCIAL

## 2021-04-09 DIAGNOSIS — M79.605 PAIN OF LEFT LOWER EXTREMITY: ICD-10-CM

## 2021-04-09 DIAGNOSIS — G35 MS (MULTIPLE SCLEROSIS) (H): Primary | ICD-10-CM

## 2021-04-09 DIAGNOSIS — G89.29 CHRONIC MUSCULOSKELETAL PAIN DUE TO DISORDER OF NERVOUS SYSTEM: ICD-10-CM

## 2021-04-09 DIAGNOSIS — G98.8 CHRONIC MUSCULOSKELETAL PAIN DUE TO DISORDER OF NERVOUS SYSTEM: ICD-10-CM

## 2021-04-09 DIAGNOSIS — M79.18 CHRONIC MUSCULOSKELETAL PAIN DUE TO DISORDER OF NERVOUS SYSTEM: ICD-10-CM

## 2021-04-09 PROCEDURE — 99213 OFFICE O/P EST LOW 20 MIN: CPT | Mod: 95 | Performed by: NURSE PRACTITIONER

## 2021-04-09 RX ORDER — GABAPENTIN 300 MG/1
600 CAPSULE ORAL
Qty: 120 CAPSULE | Refills: 5 | Status: SHIPPED | OUTPATIENT
Start: 2021-04-09 | End: 2021-09-09

## 2021-04-09 NOTE — TELEPHONE ENCOUNTER
Attempt # 1    Called # [phone]     Left a non detailed VM to call back at [phone] and schedule telephone appointment today at 2:50 to go over medication.     Gretel sent    Janeth Villafuerte RN  [facility]

## 2021-04-09 NOTE — PROGRESS NOTES
Nataliya is a 45 year old who is being evaluated via a billable telephone visit.      What phone number would you like to be contacted at? [phone]    How would you like to obtain your AVS? MyChart    Assessment & Plan     MS (multiple sclerosis) (H)  Chronic musculoskeletal pain due to disorder of nervous system  Pain of left lower extremity  No concerns.  Stable.  This is not an increase of her medicine.   Kidney function normal noted 7 months ago.  Correct medication dosing changed on her current prescription to reflect twice daily dosing-filled for 6 months.  Follow-up in clinic for wellness examination in September.  Nataliya verbalizes understanding of plan of care and is in agreement.      - gabapentin (NEURONTIN) 300 MG capsule  Dispense: 120 capsule; Refill: 5      Return in about 6 months (around 10/9/2021) for Wellness exam fasting labs.    Leonie Collazo, [facility]   Nataliya is a 45 year old who presents for the following health issues     HPI     Chronic Pain Follow-Up    Where in your body do you have pain? All over - for nerve pain from MS  How has your pain affected your ability to work? Does effect - has been working from home to help   Which of these pain treatments have you tried since your last clinic visit? Other: None - has appt with Neurologist  for MS yesterday   How well are you sleeping? Fair  How has your mood been since your last visit? About the same  Have you had a significant life event? No  Other aggravating factors: prolonged sitting and prolonged standing, hands above head, pickin gup more than 10 pounds.  Taking medication as directed? Gabapentin needs to go back to 2 caps afternoon and 2 caps at night. 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   Neurologist changed her gabapentin meds before COVID to 2 capsules 600 mg in the a.m. and 600 mg  at bedtime.  Her current prescription shows two only at bedtime so she is running short on her medication.  She is doing well on this medication has been on that dosing for quite some time.     Due for physical examination and labs in September.  No other concerns at this time.    PHQ-9 SCORE 9/11/2019 2/11/2020 9/8/2020   PHQ-9 Total Score 7 4 8     JUSTICE-7 SCORE 9/11/2019 2/11/2020 9/8/2020   Total Score 4 8 2     No flowsheet data found.  Encounter-Level CSA:    There are no encounter-level csa.     Patient-Level CSA:    There are no patient-level csa.       Review of Systems   Constitutional, HEENT, cardiovascular, pulmonary, GI, , musculoskeletal, neuro, skin, endocrine and psych systems are negative, except as otherwise noted in the HPI.      Objective         Vitals:  No vitals were obtained today due to virtual visit.    Physical Exam   healthy, alert and no distress  PSYCH: Alert and oriented times 3; coherent speech, normal   rate and volume, able to articulate logical thoughts, able   to abstract reason, no tangential thoughts, no hallucinations   or delusions  Her affect is normal and pleasant  RESP: No cough, no audible wheezing, able to talk in full sentences  Remainder of exam unable to be completed due to telephone visits          Phone call duration:  19 minutes 18 seconds

## 2021-04-09 NOTE — TELEPHONE ENCOUNTER
Patient returned phone call, telephone appointment scheduled for 2:50PM with Leonie Collazo CNP.    Natasha HERRERA  Patient Representative - [facility] [place]

## 2021-04-09 NOTE — TELEPHONE ENCOUNTER
Writer unable to see Gabapentin ever prescribed as 300mg x 4.    Per  last fill by Dr. Chong last filled 6/15/20, given 90 tabs for 30 day supply.    Fill Date ID Written Sold Drug Qty Days Prescriber Rx # Pharmacy Refill Daily Dose * Pymt Type    01/27/2021  1   01/27/2021 01/27/2021  Hydrocodone-Acetamin 5-325 Mg  10.00 2 [address]  [id number]  [facility] ([id number][facility]  0/0 25.00 MME Medicaid  [place]   10/22/2020  2   10/22/2020  10/22/2020  Gabapentin 300 Mg Capsule  180.00 90 [address]  [id number]  [facility] ([id number])  0/3  Other  [place]   10/22/2020  2   10/22/2020  10/22/2020  Diazepam 10 Mg Tablet  1.00 1 [address]  [id number]  [facility] ([id number])  0/0 1.00 L[place]  [place]   08/26/2020  1   06/16/2020 08/27/2020  Gabapentin 300 Mg Capsule  180.00 [address]  [id number]  [facility] ([id number])  0/1  [place]  [place]   06/16/2020  1   06/15/2020  06/16/2020  Gabapentin 300 Mg Capsule  90.00 30 [address]rae  [id number]  [facility] ([id number])  0/5  Comm Ins       Routing to PCP to review and advise.    Dion VYAS RN   [facility]

## 2021-04-09 NOTE — TELEPHONE ENCOUNTER
Can we see if Josefina can do a virtual visit with me I have one open at the end of today.  We have never prescribed any more than 2 at bedtime and looking at her med list with us that is how it was prescribed.    I am not opposed to her taking more we just need to have a visit to discuss.        Leonie Collazo, FNP-BC

## 2021-04-10 ENCOUNTER — THERAPY VISIT (OUTPATIENT)
Dept: PHYSICAL THERAPY | Facility: CLINIC | Age: 46
End: 2021-04-10
Payer: COMMERCIAL

## 2021-04-10 DIAGNOSIS — M54.2 CERVICALGIA: Primary | ICD-10-CM

## 2021-04-10 PROCEDURE — 97035 APP MDLTY 1+ULTRASOUND EA 15: CPT | Mod: GP | Performed by: PHYSICAL THERAPIST

## 2021-04-10 PROCEDURE — 97140 MANUAL THERAPY 1/> REGIONS: CPT | Mod: GP | Performed by: PHYSICAL THERAPIST

## 2021-04-10 PROCEDURE — 97110 THERAPEUTIC EXERCISES: CPT | Mod: GP | Performed by: PHYSICAL THERAPIST

## 2021-04-14 LAB — LAB SCANNED RESULT: NORMAL

## 2021-05-03 ENCOUNTER — THERAPY VISIT (OUTPATIENT)
Dept: PHYSICAL THERAPY | Facility: CLINIC | Age: 46
End: 2021-05-03
Payer: COMMERCIAL

## 2021-05-03 DIAGNOSIS — M54.2 CERVICALGIA: Primary | ICD-10-CM

## 2021-05-03 PROCEDURE — 97140 MANUAL THERAPY 1/> REGIONS: CPT | Mod: GP | Performed by: PHYSICAL THERAPIST

## 2021-05-03 PROCEDURE — 97035 APP MDLTY 1+ULTRASOUND EA 15: CPT | Mod: GP | Performed by: PHYSICAL THERAPIST

## 2021-05-03 PROCEDURE — 97110 THERAPEUTIC EXERCISES: CPT | Mod: GP | Performed by: PHYSICAL THERAPIST

## 2021-05-06 ENCOUNTER — TELEPHONE (OUTPATIENT)
Dept: FAMILY MEDICINE | Facility: CLINIC | Age: 46
End: 2021-05-06

## 2021-05-06 NOTE — TELEPHONE ENCOUNTER
Prior Authorization Retail Medication Request    Medication/Dose: Cetirizine 10 mg tablets  ICD code (if different than what is on RX):    Previously Tried and Failed:    Rationale:      Insurance Name:  In chart  Insurance ID:        Pharmacy Information (if different than what is on RX)  Name:  [facility]  Phone:  [phone]  Plan does not cover at BID dosing.  Please advise to start a PA or change medication       Guillermina Garcia

## 2021-05-06 NOTE — TELEPHONE ENCOUNTER
Reason for Call:  Form, our goal is to have forms completed with 72 hours, however, some forms may require a visit or additional information.    Type of letter, form or note:  medical    Who is the form from?: [facility] (if other please explain)    Where did the form come from: form was faxed in    What clinic location was the form placed at?: [place]    Where the form was placed: Miya Crump Box/Folder    What number is listed as a contact on the form?: Fax to [phone]       Additional comments:  Cetirizine 10 mg tablets    Call taken on 5/6/2021 at 11:47 AM by Di Gonzalez

## 2021-05-10 NOTE — TELEPHONE ENCOUNTER
Called #   Telephone Information:   Mobile [phone]       Advised pt on the information  Below     Pt stated she will [facility] [employer] [employer] and get some as it is the same alexandre       Amelia Chance RN, BSN  [facility]

## 2021-05-10 NOTE — TELEPHONE ENCOUNTER
This is OTC.   Does pt want me to send it over as once daily for insurance purposes and she can buy more OTC/pay out of pocket to cover the second dose?  Alternatively, if she is a [facility] member their Clark brand is around $10 for 365 tabs.      Miya Crump MBA, MS, PA-C

## 2021-05-14 ENCOUNTER — INFUSION THERAPY VISIT (OUTPATIENT)
Dept: INFUSION THERAPY | Facility: CLINIC | Age: 46
End: 2021-05-14
Attending: PSYCHIATRY & NEUROLOGY
Payer: COMMERCIAL

## 2021-05-14 ENCOUNTER — THERAPY VISIT (OUTPATIENT)
Dept: PHYSICAL THERAPY | Facility: CLINIC | Age: 46
End: 2021-05-14
Payer: COMMERCIAL

## 2021-05-14 VITALS
HEART RATE: 67 BPM | RESPIRATION RATE: 16 BRPM | TEMPERATURE: 98 F | DIASTOLIC BLOOD PRESSURE: 79 MMHG | SYSTOLIC BLOOD PRESSURE: 119 MMHG | OXYGEN SATURATION: 99 %

## 2021-05-14 DIAGNOSIS — G35 MULTIPLE SCLEROSIS (H): Primary | ICD-10-CM

## 2021-05-14 DIAGNOSIS — M54.2 CERVICALGIA: Primary | ICD-10-CM

## 2021-05-14 PROCEDURE — 97110 THERAPEUTIC EXERCISES: CPT | Mod: GP | Performed by: PHYSICAL THERAPIST

## 2021-05-14 PROCEDURE — 97140 MANUAL THERAPY 1/> REGIONS: CPT | Mod: GP | Performed by: PHYSICAL THERAPIST

## 2021-05-14 PROCEDURE — 250N000011 HC RX IP 250 OP 636: Performed by: PSYCHIATRY & NEUROLOGY

## 2021-05-14 PROCEDURE — 96365 THER/PROPH/DIAG IV INF INIT: CPT

## 2021-05-14 PROCEDURE — 258N000003 HC RX IP 258 OP 636: Performed by: PSYCHIATRY & NEUROLOGY

## 2021-05-14 PROCEDURE — 97035 APP MDLTY 1+ULTRASOUND EA 15: CPT | Mod: GP | Performed by: PHYSICAL THERAPIST

## 2021-05-14 RX ADMIN — NATALIZUMAB 300 MG: 300 INJECTION INTRAVENOUS at 13:49

## 2021-05-14 NOTE — PROGRESS NOTES
Infusion Nursing Note:  Nataliya Aldrich presents today for Tysabri.    Patient seen by provider today: No   present during visit today: Not Applicable.    Note: NA      Intravenous Access:  Peripheral IV placed.    Treatment Conditions:  Tysabri pre-infusion checklist completed via touch program.      Post Infusion Assessment:  Patient tolerated infusion without incident.  Patient observed for 60 minutes post Tysabri per protocol.  Blood return noted pre and post infusion.  Site patent and intact, free from redness, edema or discomfort.  No evidence of extravasations.  Access discontinued per protocol.       Discharge Plan:   Discharge instructions reviewed with: Patient.  AVS to patient via [employer].  Patient will return 6/25 for next infusion appointment.   Patient discharged in stable condition accompanied by: self.  Departure Mode: Ambulatory.    Brooklyn Montes De Oca RN

## 2021-05-19 ENCOUNTER — TRANSFERRED RECORDS (OUTPATIENT)
Dept: HEALTH INFORMATION MANAGEMENT | Facility: CLINIC | Age: 46
End: 2021-05-19

## 2021-05-20 ENCOUNTER — TRANSFERRED RECORDS (OUTPATIENT)
Dept: HEALTH INFORMATION MANAGEMENT | Facility: CLINIC | Age: 46
End: 2021-05-20

## 2021-05-28 ENCOUNTER — THERAPY VISIT (OUTPATIENT)
Dept: PHYSICAL THERAPY | Facility: CLINIC | Age: 46
End: 2021-05-28
Payer: COMMERCIAL

## 2021-05-28 DIAGNOSIS — M54.2 CERVICALGIA: Primary | ICD-10-CM

## 2021-05-28 PROCEDURE — 97110 THERAPEUTIC EXERCISES: CPT | Mod: GP | Performed by: PHYSICAL THERAPIST

## 2021-05-28 PROCEDURE — 97140 MANUAL THERAPY 1/> REGIONS: CPT | Mod: GP | Performed by: PHYSICAL THERAPIST

## 2021-05-28 PROCEDURE — 97035 APP MDLTY 1+ULTRASOUND EA 15: CPT | Mod: GP | Performed by: PHYSICAL THERAPIST

## 2021-06-15 ENCOUNTER — MYC MEDICAL ADVICE (OUTPATIENT)
Dept: FAMILY MEDICINE | Facility: CLINIC | Age: 46
End: 2021-06-15

## 2021-06-15 ENCOUNTER — OFFICE VISIT (OUTPATIENT)
Dept: FAMILY MEDICINE | Facility: CLINIC | Age: 46
End: 2021-06-15
Payer: COMMERCIAL

## 2021-06-15 VITALS
OXYGEN SATURATION: 98 % | WEIGHT: 276 LBS | TEMPERATURE: 98.2 F | SYSTOLIC BLOOD PRESSURE: 124 MMHG | BODY MASS INDEX: 45.93 KG/M2 | HEART RATE: 82 BPM | RESPIRATION RATE: 20 BRPM | DIASTOLIC BLOOD PRESSURE: 76 MMHG

## 2021-06-15 DIAGNOSIS — B02.9 HERPES ZOSTER WITHOUT COMPLICATION: ICD-10-CM

## 2021-06-15 DIAGNOSIS — R21 RASH: Primary | ICD-10-CM

## 2021-06-15 DIAGNOSIS — T78.40XA ALLERGIC REACTION, INITIAL ENCOUNTER: ICD-10-CM

## 2021-06-15 PROCEDURE — 99213 OFFICE O/P EST LOW 20 MIN: CPT | Performed by: NURSE PRACTITIONER

## 2021-06-15 RX ORDER — DESONIDE 0.5 MG/G
CREAM TOPICAL 2 TIMES DAILY
Qty: 15 G | Refills: 1 | Status: SHIPPED | OUTPATIENT
Start: 2021-06-15 | End: 2022-02-17

## 2021-06-15 RX ORDER — TRIAMCINOLONE ACETONIDE 1 MG/G
CREAM TOPICAL 2 TIMES DAILY
Qty: 15 G | Refills: 3 | Status: SHIPPED | OUTPATIENT
Start: 2021-06-15 | End: 2023-07-12

## 2021-06-15 RX ORDER — VALACYCLOVIR HYDROCHLORIDE 1 G/1
1000 TABLET, FILM COATED ORAL 3 TIMES DAILY
Qty: 21 TABLET | Refills: 0 | Status: SHIPPED | OUTPATIENT
Start: 2021-06-15 | End: 2021-06-29

## 2021-06-15 NOTE — PATIENT INSTRUCTIONS
Patient Education     Shingles (Herpes Zoster)     Talk to your healthcare provider about the shingles vaccine.   Shingles is also called herpes zoster. It's a painful skin rash caused by the herpes zoster virus. This is the same virus that causes chickenpox. After a person has chickenpox, the virus stays inactive in the nerve cells. Years later, the virus can become active again and travel along the nerve to the skin. Most people have shingles only once. But it's possible to have it more than once.   Who is at risk for shingles?  Anyone who has ever had chickenpox can get shingles. But your risk is greater if you:     Are age 50 or older    Have an illness that weakens your immune system, such as HIV/AIDS    Have cancer, especially Hodgkin disease or lymphoma    Take medicines that weaken your immune system  What are the symptoms of shingles?    The first sign of shingles is often pain, burning, tingling, or itching on one part of your face or body. You may also feel as if you have the flu, with fever and chills.    A red rash with small blisters appears in a few days. The rash may look as follows:   ? The blisters can occur anywhere, but they re most common on the back, chest, or belly (abdomen).  ? They usually appear on only one side of the body, spreading along the nerve pathway where the virus is reactivating.   ? The rash can also form around an eye, along one side of the face or neck, or in the mouth.  ? In a few people, often those with a weak immune system, shingles appear on more than one part of the body at once.    After a few days, the blisters become dry and form a crust. The crust falls off in days to weeks. The blisters generally don't leave scars. But they can in severe cases. Or if someone has a weak immune system.    How is shingles treated?  For most people, shingles heals on its own in a few days or weeks. But treatment is advised to help ease pain, speed healing, and reduce the risk of  complications. Antiviral medicines are most often only prescribed if you are seen by a healthcare provider within the first 72 hours of having the rash. But antiviral medicines may be prescribed even after 72 hours if someone's immune system is weak. Or if the infection is extensive, severe, or isn't going away. To reduce symptoms:     Apply ice packs or cool compresses, or soak in a cool bath. To make an ice pack, put ice cubes in a plastic bag that seals at the top. Wrap the bag in a clean, thin towel or cloth. Never put ice or an ice pack directly on the skin.    Use calamine lotion to calm itchy skin.    Ask your provider about over-the-counter pain relievers. If your pain is severe, your provider may prescribe stronger pain medicines.  What are possible complications of shingles?   Shingles often goes away with no lasting effects. But some people have complications during or after the infection comes out:     Postherpetic neuralgia. This is the most common complication. It's more likely as people age, especially after age 60. It' is nerve pain at the place where the rash used to be. It can range from mild to severe. It can last for only a few days, or for months or even years after you have had shingles. Antiviral medicines given during the first 72 hours of the rash can reduce the chance of postherpetic neuralgia. Other medicines can be prescribed to help ease the pain and improve quality of life.    Bacterial infection. Shingles blisters may get infected with bacteria. Depending on the severity of the infection, topical, oral or IV (intravenous) antibiotic medicine is used to treat the infection.    Eye problems. If you have shingles on the face, see your healthcare provider right away. Shingles can cause serious problems with vision, and even blindness.  In very rare cases, shingles can also lead to pneumonia, hearing problems, brain inflammation, or even death.    When to get medical care  Call your  healthcare provider if you have any of these:     New symptoms or symptoms that don t go away with treatment    A rash or blisters near your eye    More drainage, fever, or rash after treatment    Severe pain that doesn t go away  How can shingles be prevented?  You can only get shingles if you have had chickenpox in the past. Someone who never had chickenpox can get the virus from you. But instead of have shingles, the person may get chickenpox. Until your blisters form scabs, don't have any contact with others, especially the following:     Pregnant women who have never had chickenpox or the vaccine    Babies who were born early (premature) or who had low weight at birth    People with weak immune systems (such as people getting chemotherapy for cancer, people who have had organ transplants, or people with HIV infections), particularly if they have never had chicken pox.  The shingles vaccine  The recombinant zoster vaccine (RZV) shingles vaccine is available to help prevent shingles or make it less painful.   You should get the RZV shingles vaccine if you are healthy and age 50 or older, even if you've had shingles in the past. Two shots of the RZV vaccine are recommended. You should get the second RZV shot 2 to 6 months after the first. The vaccine makes it less likely that you will develop shingles. If you do develop shingles, your symptoms will likely be milder than if you hadn t been vaccinated. RZV is also advised even if you had the older shingles vaccine (zoster live vaccine, ZVL) in the past. That's because the RZV vaccine works better and protects you from shingles longer.   Talk with your healthcare provider about the best time to get vaccinated.   [employer] last reviewed this educational content on 6/1/2019 2000-2021 [employer]. All rights reserved. This information is not intended as a substitute for professional medical care. Always follow your healthcare professional's  instructions.           Patient Education     Managing a Poison Ivy, Poison Oak, or Poison Sumac Reaction  If you come in contact with urushiol    If you think you may have come in contact with the sap oil (urushiol) contained in poison ivy, poison oak, or poison sumac plants, it's important to wash the affected part of your skin as soon as possible to remove the sap oil or help prevent further spread. Wash the affected areas with soap and cool water. Undress, and wash your clothes and gear as soon as you can. Be sure to wash any pet that was with you. Taking these steps can help prevent spreading sap oil to someone else. If you have a rash, but aren't sure if it's from one of these plants, see your healthcare provider.   To soothe the itching  Your skin may react to poison oak, poison ivy, and poison sumac within hours to a few days after contact. Listed below are some common home care treatments. If these aren't effective in relieving symptoms call your healthcare provider for additional treatments which may include prescription medicines.     Don t scratch or scrub your rash. Not even if the itching is severe. Scratching can lead to infection.    Bathe in lukewarm (not hot) water. Or take short cool showers to relieve the itching. For a more soothing bath, add oatmeal to the water.    Use antihistamines that are taken by mouth.  These include diphenhydramine. You can buy these at the grocery store or pharmacy. Talk to your healthcare provider or pharmacist for more information on oral antihistamines.    Use over-the-counter treatments on your skin.  These include cortisone creams and calamine lotion.  How your skin may react  A mild rash may become red, swollen, and itchy. The rash may form a line on your skin where you brushed against the plant. If you have a severe rash, your itching may worsen or your rash may blister and ooze. If this happens, seek medical care. The fluid from your blisters will not make your  rash spread. With or without medical care, your rash may last up to 3 weeks. In the future, try to avoid coming in contact with these plants.   When to call your healthcare provider  Call your healthcare provider or seek immediate medical attention if:     Your rash is severe    The rash spreads beyond the exposed part of your body or affects your face.    The rash does not clear up within a few weeks  Call 911  Call 911 if you have any of the following:     Trouble breathing or swallowing    Any significant swelling  [employer] last reviewed this educational content on 8/1/2019 2000-2021 [employer]. All rights reserved. This information is not intended as a substitute for professional medical care. Always follow your healthcare professional's instructions.

## 2021-06-15 NOTE — TELEPHONE ENCOUNTER
Please see my chart message below     Please review and advise     Thank you     Amelia Chance RN, BSN  [facility] [facility]

## 2021-06-15 NOTE — PROGRESS NOTES
Assessment & Plan     Rash  Herpes zoster without complication  Allergic reaction, initial encounter  Discussed etiologies including dermatitis, allergic reaction (possibly to louise),shingles or other such as poison sumac.  Close watch of symptoms to determine cause.   Steroid creams; desonide for face Kenalog for hand and chest.   Immunocompromised will start Valtrex to cover for shingles.   Close follow up if any worsening or it persists.   Continue antihistamine.   Nataliya verbalizes understanding of plan of care and is in agreement.     - desonide (DESOWEN) 0.05 % external cream  Dispense: 15 g; Refill: 1  - triamcinolone (KENALOG) 0.1 % external cream  Dispense: 15 g; Refill: 3  - valACYclovir (VALTREX) 1000 mg tablet  Dispense: 21 tablet; Refill: 0    Return in about 1 week (around 6/22/2021), or if symptoms worsen or fail to improve, for Recheck.    Leonie Collazo, [facility]   Nataliya is a 45 year old who presents for the following health issues : rash     HPI     Rash  Onset/Duration: about 1 week  Description  Location: face, left hand ? Left chest  Character: blotchy, burning  Itching: moderate  Intensity:  moderate  Progression of Symptoms:  worsening  Accompanying signs and symptoms:   Fever: no  Body aches or joint pain: no  Sore throat symptoms: no  Recent cold symptoms: no  History:           Previous episodes of similar rash: awhile ago  New exposures:  None and foods - possibly, more fruit added   Recent travel: no  Exposure to similar rash: YES  Precipitating or alleviating factors:   Therapies tried and outcome: hydrocortisone cream -  Effective but experied    Louise maybe?  no previous history of allergy to louise.     Previously had shingles.     Area on face and hand  And now chest all left. Had a itching tingly burning sensation all left sided before rash.     Review of Systems   Constitutional, HEENT, cardiovascular, pulmonary, GI, ,  musculoskeletal, neuro, skin, endocrine and psych systems are negative, except as otherwise noted in the HPI.      Objective    /76   Pulse 82   Temp 98.2  F (36.8  C)   Resp 20   Wt 125.2 kg (276 lb)   SpO2 98%   Breastfeeding No   BMI 45.93 kg/m    Body mass index is 45.93 kg/m .  Physical Exam   GENERAL: healthy, alert and no distress  EYES: Eyes grossly normal to inspection, PERRL and conjunctivae and sclerae normal  HENT: ear canals and TM's normal, nose and mouth without ulcers or lesions; no edema of tongue or posterior pharynx  NECK: no adenopathy, no asymmetry, masses, or scars and thyroid normal to palpation  RESP: lungs clear to auscultation - no rales, rhonchi or wheezes  CV: regular rate and rhythm, normal S1 S2, no S3 or S4, no murmur, click or rub, no peripheral edema and peripheral pulses strong  MS: no gross musculoskeletal defects noted, no edema  SKIN: erythematous cluster of papules noted left cheek; faint non erythematous papular rash left hand and left chest  PSYCH: mentation appears normal, affect normal/bright

## 2021-06-16 NOTE — TELEPHONE ENCOUNTER
It looks like refill of Kenalog cream was sent to the pharmacy yesterday. Recommend to continue monitoring for any other associations (ie possibly the louise). If rash persists over the next 2 weeks or worsens despite the cream, recommend making an appointment to take a closer look.    Praful Olivera DO  6/16/2021 7:46 AM

## 2021-06-18 ENCOUNTER — THERAPY VISIT (OUTPATIENT)
Dept: PHYSICAL THERAPY | Facility: CLINIC | Age: 46
End: 2021-06-18
Payer: COMMERCIAL

## 2021-06-18 DIAGNOSIS — M54.2 CERVICALGIA: Primary | ICD-10-CM

## 2021-06-18 PROCEDURE — 97110 THERAPEUTIC EXERCISES: CPT | Mod: GP | Performed by: PHYSICAL THERAPIST

## 2021-06-18 PROCEDURE — 97140 MANUAL THERAPY 1/> REGIONS: CPT | Mod: GP | Performed by: PHYSICAL THERAPIST

## 2021-06-18 PROCEDURE — 97035 APP MDLTY 1+ULTRASOUND EA 15: CPT | Mod: GP | Performed by: PHYSICAL THERAPIST

## 2021-06-18 NOTE — PROGRESS NOTES
Subjective:  HPI  Physical Exam                    Objective:  System    Physical Exam    General     ROS    Assessment/Plan:    PROGRESS  REPORT    Progress reporting period is from 3/26/2021 to 6/18/2021.       SUBJECTIVE  Subjective: Pt notes she has been making routine to walk outdoors everyday (at least 1 mile). Overall she is sleeping better (about 30 min without sleep per night) but would like to sleep without waking. Overall PT exercises are still going well and are helpful for posture and pain. Pt notes that US/massage use to help only one day but now is helping up to 3 days.     Current Pain level: 5/10.     Initial Pain level: 8/10.   Changes in function:  Yes, but frequent flare ups  Adverse reaction to treatment or activity: None    OBJECTIVE  Changes noted in objective findings:  Yes, overall sleeping better, but flare ups frequent  Objective: AROM: cervical flexion= full (pain on left); extension= full (extension); R rot= full (pain left); L rot= limited (pain on left). Continues to tolerated US and tool massage well today.      ASSESSMENT/PLAN  Updated problem list and treatment plan: Diagnosis 1:  Neck pain  Pain -  hot/cold therapy, manual therapy, self management, education, directional preference exercise and home program  Decreased ROM/flexibility - manual therapy and therapeutic exercise  Decreased strength - therapeutic exercise and therapeutic activities  Inflammation - cold therapy and self management/home program  Impaired muscle performance - neuro re-education  Decreased function - therapeutic activities  Impaired posture - neuro re-education  STG/LTGs have been met or progress has been made towards goals:  Yes,   Assessment of Progress: The patient's condition has potential to improve.  Self Management Plans:  Patient has been instructed in a home treatment program.  I have re-evaluated this patient and find that the nature, scope, duration and intensity of the therapy is appropriate for  the medical condition of the cheikh An continues to require the following intervention to meet STG and LTG's:  PT    Recommendations:  This patient would benefit from continued therapy.     Frequency:  2 X a month, once daily  Duration:  for 3 months        Please refer to the daily flowsheet for treatment today, total treatment time and time spent performing 1:1 timed codes.

## 2021-06-25 ENCOUNTER — INFUSION THERAPY VISIT (OUTPATIENT)
Dept: INFUSION THERAPY | Facility: CLINIC | Age: 46
End: 2021-06-25
Attending: PSYCHIATRY & NEUROLOGY
Payer: COMMERCIAL

## 2021-06-25 VITALS
TEMPERATURE: 99.1 F | DIASTOLIC BLOOD PRESSURE: 84 MMHG | OXYGEN SATURATION: 100 % | RESPIRATION RATE: 20 BRPM | HEART RATE: 73 BPM | SYSTOLIC BLOOD PRESSURE: 134 MMHG

## 2021-06-25 DIAGNOSIS — G35 MULTIPLE SCLEROSIS (H): Primary | ICD-10-CM

## 2021-06-25 PROCEDURE — 258N000003 HC RX IP 258 OP 636: Performed by: PSYCHIATRY & NEUROLOGY

## 2021-06-25 PROCEDURE — 96365 THER/PROPH/DIAG IV INF INIT: CPT

## 2021-06-25 PROCEDURE — 250N000011 HC RX IP 250 OP 636: Performed by: PSYCHIATRY & NEUROLOGY

## 2021-06-25 PROCEDURE — 96361 HYDRATE IV INFUSION ADD-ON: CPT

## 2021-06-25 RX ADMIN — NATALIZUMAB 300 MG: 300 INJECTION INTRAVENOUS at 13:40

## 2021-06-25 RX ADMIN — SODIUM CHLORIDE 250 ML: 9 INJECTION, SOLUTION INTRAVENOUS at 13:40

## 2021-06-25 NOTE — PROGRESS NOTES
Infusion Nursing Note:  Nataliya Aldrich presents today for Tysabri.    Patient seen by provider today: No   present during visit today: Not Applicable.    Note: No labs needed today.  Patient gets Tysabri every 6 weeks now instead of 4 weeks.  Patient prefers to get 250ml NS to help with headache.    Intravenous Access:  Peripheral IV placed.    Treatment Conditions:  Tysabri pre-infusion checklist completed via touch program.      Post Infusion Assessment:  Patient tolerated infusion without incident.  Patient observed for 60 minutes post Tysabri per protocol.  Blood return noted pre and post infusion.  Site patent and intact, free from redness, edema or discomfort.  No evidence of extravasations.  Access discontinued per protocol.       Discharge Plan:   AVS to patient via MYCHART.  Patient will return 8/6/21 for next appointment.   Patient discharged in stable condition accompanied by: self.  Departure Mode: Ambulatory.      Carmen Rice RN

## 2021-06-28 NOTE — PROGRESS NOTES
Assessment & Plan     Multiple sclerosis (H)  Neck pain  Reassuring examination in clinic today.   Will do steroid taper for her. She should find great improvement of symptoms while on steroids.  If any symptoms worsen or persist she is to be seen.  Close follow-up with neurologist.  Red flag symptoms discussed and if these occur present to the emergency room or call 911.  Nataliya verbalizes understanding of plan of care and is in agreement.     Return in about 1 week (around 7/6/2021) for Recheck.    Leonie Collazo, [facility] [place]    Richie An is a 45 year old who presents for the following health issues     HPI     Neck Pain  Onset/Duration: x3 days  Description:   Location: back of neck and upper shoulders - right side is worse and in shoulder blades  Radiation: into the right shoulder  Intensity: 6/10  Progression of Symptoms:  waxing and waning  Accompanying Signs & Symptoms:  Burning, tingling, prickly sensation in arm(s): No  Numbness in arm(s): YES- finger tips and toes improving intermittent has that sometimes with her MS.  Weakness in arm(s):  YES- right  Fever: no  Headache: no  Nausea and/or vomiting: No  History:   Trauma: YES- was doing core exercises, lots of planks 2 days prior to pain starting  Previous neck pain: YES- just on left side - PT for that currently Q3W  Previous surgery or injections: no  Previous Imaging (MRI,X ray): no  Precipitating or alleviating factors: laying down with pillow propped - see below  Does movement impact the pain:  YES- makes it worse  Therapies tried and outcome: 800mg of ibuprofen, gabapentin, baclofen, heat - last night - did get relief    Dr. Chong states it is like her neck is angry and flared because of her MS.  She has no signs of illness.    11/4/20  IMPRESSION: Minimal degenerative changes of the cervical spine again  noted without change. No spinal canal or neural foraminal stenosis at  any level of the cervical  "spine. No evidence for abnormality of the  cervical spinal cord.    Wanting to go see Grandmother 6.5 hours away in [place].   Does not wish to do imaging. She said this has happened before. Improves with steroids.    Review of Systems   Constitutional, HEENT, cardiovascular, pulmonary, GI, , musculoskeletal, neuro, skin, endocrine and psych systems are negative, except as otherwise noted in the HPI.      Objective    /82 (BP Location: Left arm, Patient Position: Chair, Cuff Size: Adult Large)   Pulse 87   Temp 97.6  F (36.4  C) (Tympanic)   Ht 1.651 m (5' 5\")   Wt 126.6 kg (279 lb)   SpO2 100%   Breastfeeding No   BMI 46.43 kg/m    Body mass index is 46.43 kg/m .  Physical Exam   GENERAL: healthy, alert and no distress  NECK: no adenopathy, no asymmetry, masses, or scars and thyroid normal to palpation  RESP: lungs clear to auscultation - no rales, rhonchi or wheezes  CV: regular rate and rhythm, normal S1 S2, no S3 or S4, no murmur, click or rub, no peripheral edema and peripheral pulses strong  MS: no gross musculoskeletal defects noted, no edema  SKIN: no suspicious lesions or rashes  NEURO: Normal strength and tone, mentation intact and speech normal; CN 2-12 intact  PSYCH: mentation appears normal, affect normal/bright            " right upper extremity/left lower extremity/left upper extremity

## 2021-06-29 ENCOUNTER — OFFICE VISIT (OUTPATIENT)
Dept: FAMILY MEDICINE | Facility: CLINIC | Age: 46
End: 2021-06-29
Payer: COMMERCIAL

## 2021-06-29 VITALS
SYSTOLIC BLOOD PRESSURE: 124 MMHG | HEART RATE: 87 BPM | DIASTOLIC BLOOD PRESSURE: 82 MMHG | HEIGHT: 65 IN | OXYGEN SATURATION: 100 % | WEIGHT: 279 LBS | BODY MASS INDEX: 46.48 KG/M2 | TEMPERATURE: 97.6 F

## 2021-06-29 DIAGNOSIS — M54.2 NECK PAIN: ICD-10-CM

## 2021-06-29 DIAGNOSIS — G35 MULTIPLE SCLEROSIS (H): Primary | ICD-10-CM

## 2021-06-29 PROCEDURE — 99213 OFFICE O/P EST LOW 20 MIN: CPT | Performed by: NURSE PRACTITIONER

## 2021-06-29 RX ORDER — PREDNISONE 20 MG/1
40 TABLET ORAL DAILY
Qty: 10 TABLET | Refills: 0 | Status: CANCELLED | OUTPATIENT
Start: 2021-06-29 | End: 2021-07-04

## 2021-06-29 RX ORDER — PREDNISONE 20 MG/1
TABLET ORAL
Qty: 20 TABLET | Refills: 0 | Status: SHIPPED | OUTPATIENT
Start: 2021-06-29 | End: 2021-07-12

## 2021-06-29 RX ORDER — VALACYCLOVIR HYDROCHLORIDE 500 MG/1
500 TABLET, FILM COATED ORAL DAILY
Qty: 30 TABLET | Refills: 1 | Status: CANCELLED | OUTPATIENT
Start: 2021-06-29

## 2021-06-29 ASSESSMENT — MIFFLIN-ST. JEOR: SCORE: 1921.42

## 2021-06-29 NOTE — PATIENT INSTRUCTIONS
Patient Education     Neck Pain     Neck pain has several possible causes when there is no injury:    You can get a minor ligament sprain or muscle strain from a sudden minor neck movement. Sleeping with your neck in an awkward position can also cause this.    Some people respond to emotional stress by tensing the muscles of their neck, shoulders, and upper back. Chronic spasm in these muscles can cause neck pain and sometimes headaches.    Gradual wear and tear of the joints in the spine can cause degenerative arthritis. This can be a source of occasional or chronic neck pain.    The spinal disks may bulge and put pressure on a nearby spinal nerve. This can happen as a natural result of aging or repeated small injuries to the neck. The spinal disks are the cushions between each spinal bone. This causes tingling, pain, or numbness that spreads from the neck to the shoulder, arm, or hand on one side.  Acute neck pain usually gets better in 1 to 2 weeks. Neck pain related to disk disease, arthritis in the spinal joints, or spinal stenosis can become chronic and last for months or years. Spinal stenosis is narrowing of the spinal canal.  X-rays are usually not ordered for the initial evaluation of neck pain. But X-rays may be done if you had a forceful physical injury, such as a car accident or fall. If pain continues and doesn t respond to medical treatment, X-rays and other tests may be done at a later time.  Home care    Rest and relax the muscles. Use a comfortable pillow that supports the head. It should also help keep the spine in a neutral position. The position of the head should not be tilted forward or backward. A rolled up towel may help for a custom fit.    A soft cervical collar can help pain, especially pain with head movement. Your doctor can tell you if this is appropriate for your condition.    Some people find relief with heat. Heat can be applied with either a warm shower or bath or a moist towel  heated in the microwave and massage. Others prefer cold packs. You can make an ice pack by filling a plastic bag that seals at the top with ice cubes or crushed ice and then wrapping it with a thin towel. Try both and use the method that feels best for 15 to 20 minutes, several times a day.    Whether using ice or heat, be careful that you don't injure your skin. Never put ice directly on the skin. Always wrap the ice in a towel or other type of cloth. This is very important, especially in people with poor skin sensations.     Try to reduce your stress level. Emotional stress can lead to neck muscle tension and get in the way of or delay the healing process.    You may use over-the-counter pain medicine to control pain, unless another medicine was prescribed. If you have chronic liver or kidney disease or ever had a stomach ulcer or digestive bleeding, talk with your healthcare provider before using these medicines.    Follow-up care  Follow up with your healthcare provider if your symptoms don't show signs of improvement after one week. Physical therapy or further tests may be needed.  If X-rays, CT scans, or MRI scans were taken, you will be told of any new findings that may affect your care.  Call 911  Call 911 if you have:    Sudden weakness or numbness in one or both arms    Neck swelling, difficulty or painful swallowing    Trouble breathing    Chest pain  When to seek medical advice  Call your healthcare provider right away if any of these occur:    Pain becomes worse or spreads into one or both arm    Increasing headache    Fever of 100.4 F (38 C) or higher, or as directed by your healthcare provider  [employer] last reviewed this educational content on 11/1/2019 2000-2021 [employer]. All rights reserved. This information is not intended as a substitute for professional medical care. Always follow your healthcare professional's instructions.           Patient Education     Know Your Neck: The  Cervical Spine  By learning about the parts of the neck, you can better understand your neck problem. The bones of the neck are called cervical vertebrae, commonly identified as C1 through C7. Together, they form a bony column called the spine. Vertebrae also protect the spinal cord, a pathway for messages to reach the brain. Surrounding the spine are soft tissues such as muscles, tendons, and nerves.  Flexibility Is Key  For the neck to function normally, it has to be flexible enough to move without discomfort. A healthy neck can move easily in six different directions.     Flexion and extension move the head forward and backward.      Rotation turns the head from left to right, with the chin over the shoulders.      Lateral bending moves the head from side to side.   [employer] last reviewed this educational content on 5/1/2018 2000-2021 [employer]. All rights reserved. This information is not intended as a substitute for professional medical care. Always follow your healthcare professional's instructions.

## 2021-06-30 ENCOUNTER — MYC MEDICAL ADVICE (OUTPATIENT)
Dept: FAMILY MEDICINE | Facility: CLINIC | Age: 46
End: 2021-06-30

## 2021-06-30 NOTE — TELEPHONE ENCOUNTER
Please see my chart message below     Please review and advise     Thank you     Amelia Chance RN, BSN  [place] [facility]

## 2021-07-01 NOTE — TELEPHONE ENCOUNTER
I am not sure what she is referring to? Can you ask for clarification.    Is she concerned she will be immunosuppressed on steroids and with her MS and concerned to be around people visiting her other grandma?      I just need more information to answer this question.      Leonie Collazo, FNP-BC

## 2021-07-02 NOTE — TELEPHONE ENCOUNTER
She is very in tune with her body regarding her MS and how she already has immunocompromise with this and steroids. She does have a higher risk of coming down with illness  due to this.    I would minimize her overall exposure to people however if she wants to visit her grandmother's I certainly am not telling her she can't do this.        Leonie Collazo, FNP-BC

## 2021-07-12 ENCOUNTER — OFFICE VISIT (OUTPATIENT)
Dept: PEDIATRICS | Facility: CLINIC | Age: 46
End: 2021-07-12
Payer: COMMERCIAL

## 2021-07-12 ENCOUNTER — MYC MEDICAL ADVICE (OUTPATIENT)
Dept: FAMILY MEDICINE | Facility: CLINIC | Age: 46
End: 2021-07-12

## 2021-07-12 ENCOUNTER — HOSPITAL ENCOUNTER (OUTPATIENT)
Dept: ULTRASOUND IMAGING | Facility: CLINIC | Age: 46
Discharge: HOME | End: 2021-07-12
Attending: PHYSICIAN ASSISTANT | Admitting: PHYSICIAN ASSISTANT
Payer: COMMERCIAL

## 2021-07-12 VITALS — SYSTOLIC BLOOD PRESSURE: 130 MMHG | DIASTOLIC BLOOD PRESSURE: 80 MMHG

## 2021-07-12 DIAGNOSIS — R10.13 EPIGASTRIC PAIN: ICD-10-CM

## 2021-07-12 DIAGNOSIS — R11.0 NAUSEA: ICD-10-CM

## 2021-07-12 DIAGNOSIS — R10.13 EPIGASTRIC PAIN: Primary | ICD-10-CM

## 2021-07-12 LAB
ALBUMIN SERPL-MCNC: 3.5 G/DL (ref 3.4–5)
ALBUMIN UR-MCNC: NEGATIVE MG/DL
ALP SERPL-CCNC: 60 U/L (ref 40–150)
ALT SERPL W P-5'-P-CCNC: 29 U/L (ref 0–50)
AMYLASE SERPL-CCNC: 64 U/L (ref 30–110)
ANION GAP SERPL CALCULATED.3IONS-SCNC: 5 MMOL/L (ref 3–14)
APPEARANCE UR: CLEAR
AST SERPL W P-5'-P-CCNC: 24 U/L (ref 0–45)
BASOPHILS # BLD MANUAL: 0 10E3/UL (ref 0–0.2)
BASOPHILS NFR BLD MANUAL: 0 %
BILIRUB SERPL-MCNC: 0.3 MG/DL (ref 0.2–1.3)
BILIRUB UR QL STRIP: NEGATIVE
BUN SERPL-MCNC: 14 MG/DL (ref 7–30)
CALCIUM SERPL-MCNC: 9.2 MG/DL (ref 8.5–10.1)
CHLORIDE BLD-SCNC: 105 MMOL/L (ref 94–109)
CO2 SERPL-SCNC: 25 MMOL/L (ref 20–32)
COLOR UR AUTO: ABNORMAL
CREAT SERPL-MCNC: 0.85 MG/DL (ref 0.52–1.04)
CRP SERPL-MCNC: 3.5 MG/L (ref 0–8)
EOSINOPHIL # BLD MANUAL: 0.3 10E3/UL (ref 0–0.7)
EOSINOPHIL NFR BLD MANUAL: 3 %
ERYTHROCYTE [DISTWIDTH] IN BLOOD BY AUTOMATED COUNT: 14.4 % (ref 10–15)
ERYTHROCYTE [SEDIMENTATION RATE] IN BLOOD BY WESTERGREN METHOD: 41 MM/HR (ref 0–20)
GFR SERPL CREATININE-BSD FRML MDRD: 84 ML/MIN/1.73M2
GLUCOSE BLD-MCNC: 96 MG/DL (ref 70–99)
GLUCOSE UR STRIP-MCNC: NEGATIVE MG/DL
HCT VFR BLD AUTO: 38.1 % (ref 35–47)
HGB BLD-MCNC: 11.8 G/DL (ref 11.7–15.7)
HGB UR QL STRIP: NEGATIVE
KETONES UR STRIP-MCNC: NEGATIVE MG/DL
LEUKOCYTE ESTERASE UR QL STRIP: NEGATIVE
LIPASE SERPL-CCNC: 93 U/L (ref 73–393)
LYMPHOCYTES # BLD MANUAL: 5.3 10E3/UL (ref 0.8–5.3)
LYMPHOCYTES NFR BLD MANUAL: 47 %
MCH RBC QN AUTO: 29.9 PG (ref 26.5–33)
MCHC RBC AUTO-ENTMCNC: 31 G/DL (ref 31.5–36.5)
MCV RBC AUTO: 97 FL (ref 78–100)
MONOCYTES # BLD MANUAL: 0.8 10E3/UL (ref 0–1.3)
MONOCYTES NFR BLD MANUAL: 7 %
NEUTROPHILS # BLD MANUAL: 4.7 10E3/UL (ref 1.6–8.3)
NEUTROPHILS NFR BLD MANUAL: 42 %
NITRATE UR QL: NEGATIVE
PH UR STRIP: 7 [PH] (ref 5–7)
PLAT MORPH BLD: ABNORMAL
PLATELET # BLD AUTO: 327 10E3/UL (ref 150–450)
POTASSIUM BLD-SCNC: 4 MMOL/L (ref 3.4–5.3)
PROMYELOCYTES # BLD MANUAL: 0.1 10E3/UL
PROMYELOCYTES NFR BLD MANUAL: 1 %
PROT SERPL-MCNC: 7.6 G/DL (ref 6.8–8.8)
RBC # BLD AUTO: 3.95 10E6/UL (ref 3.8–5.2)
RBC MORPH BLD: ABNORMAL
RBC URINE: 0 /HPF
SODIUM SERPL-SCNC: 135 MMOL/L (ref 133–144)
SP GR UR STRIP: 1.01 (ref 1–1.03)
SQUAMOUS EPITHELIAL: 3 /HPF
UROBILINOGEN UR STRIP-MCNC: NORMAL MG/DL
WBC # BLD AUTO: 11.2 10E3/UL (ref 4–11)
WBC URINE: <1 /HPF

## 2021-07-12 PROCEDURE — 80053 COMPREHEN METABOLIC PANEL: CPT | Performed by: PHYSICIAN ASSISTANT

## 2021-07-12 PROCEDURE — 81001 URINALYSIS AUTO W/SCOPE: CPT | Performed by: PHYSICIAN ASSISTANT

## 2021-07-12 PROCEDURE — 85027 COMPLETE CBC AUTOMATED: CPT | Performed by: PHYSICIAN ASSISTANT

## 2021-07-12 PROCEDURE — 86140 C-REACTIVE PROTEIN: CPT | Performed by: PHYSICIAN ASSISTANT

## 2021-07-12 PROCEDURE — 99215 OFFICE O/P EST HI 40 MIN: CPT | Performed by: PHYSICIAN ASSISTANT

## 2021-07-12 PROCEDURE — 85652 RBC SED RATE AUTOMATED: CPT | Performed by: PHYSICIAN ASSISTANT

## 2021-07-12 PROCEDURE — 82150 ASSAY OF AMYLASE: CPT | Performed by: PHYSICIAN ASSISTANT

## 2021-07-12 PROCEDURE — 76700 US EXAM ABDOM COMPLETE: CPT

## 2021-07-12 PROCEDURE — 36415 COLL VENOUS BLD VENIPUNCTURE: CPT | Performed by: PHYSICIAN ASSISTANT

## 2021-07-12 PROCEDURE — 83690 ASSAY OF LIPASE: CPT | Performed by: PHYSICIAN ASSISTANT

## 2021-07-12 NOTE — RESULT ENCOUNTER NOTE
Nataliya  Here are your recent results.  They are normal/stable.  Continue with the plan we discussed.     If you have any questions please do not hesitate to contact our office via phone ([phone]) or MyChart.    Miya Crump, MS, PA-C  [facility]

## 2021-07-12 NOTE — PROGRESS NOTES
"    Assessment & Plan     Epigastric pain  Nausea  Stat ultrasound reassuring against intra-abdominal process.  Labs reassuring.  Significant response to GI cocktail wheezes clinical suspicion of gastritis.  We will do a trial of omeprazole 20 mg twice daily before meals x14 days.  Patient to avoid aggravating foods.  Plan to follow-up in 1 week if not improving or worsening.  Advised of warning signs and when to seek urgent evaluation.  Patient voiced understanding and agreement.  - Referral to Acute and Diagnostic Services (Day of diagnostic / First order acute)  - UA with Microscopic reflex to Culture  - Comprehensive metabolic panel (BMP + Alb, Alk Phos, ALT, AST, Total. Bili, TP)  - Lipase  - Amylase  - CBC with platelets and differential  - ESR: Erythrocyte sedimentation rate  - CRP, inflammation  - US Abdomen Complete  - lidocaine (XYLOCAINE) 2 % 15 mL, alum & mag hydroxide-simethicone (MAALOX) 15 mL GI Cocktail  - omeprazole (PRILOSEC) 20 MG DR capsule  Dispense: 28 capsule; Refill: 0        52 minutes spent on the date of the encounter doing chart review, history and exam, documentation and further activities per the note           Return in about 1 week (around 7/19/2021) for evaluation if not improving/worsening.    ALFONSO [facility] is a 45 year old who presents for the following health issues     HPI     Abdominal/Flank Pain  Onset/Duration: 7/10 morning. Drank Friday night and \"stomach was destroyed.\" Diarrhea and lots of nausea, frequent belching   Description:   Character: Gnawing and Fullness  Location: epigastric region left lower quadrant  Radiation: Back  Intensity: moderate  Progression of Symptoms:  worsening  Accompanying Signs & Symptoms:  Fever/Chills: no  Gas/Bloating: YES  Nausea: YES  Vomitting: no  Diarrhea: YES- Saturday, but felt constipated yesterday & today  Constipation: Above  Dysuria or Hematuria: no  History: " "  Trauma: no  Previous similar pain: no  Previous tests done: none  Precipitating factors:   Does the pain change with:     Food: YES- \"Burning in stomach\"    Bowel Movement: no    Urination: no   Other factors:  no  Therapies tried and outcome: Tums has helped a little     No history of reflux.  Typically has a very resilient stomach.  Wondering if her history of taking her daily pills on an empty stomach has caused some damage.    No LMP recorded. Patient has had a hysterectomy.          Review of Systems   Constitutional, HEENT, cardiovascular, pulmonary, GI, , musculoskeletal, neuro, skin, endocrine and psych systems are negative, except as otherwise noted.      Objective    /80 (BP Location: Right arm, Patient Position: Sitting, Cuff Size: Adult Large)   There is no height or weight on file to calculate BMI.  Physical Exam   GENERAL: healthy, alert and no distress  EYES: Eyes grossly normal to inspection  RESP: lungs clear to auscultation - no rales, rhonchi or wheezes  CV: regular rate and rhythm, normal S1 S2, no S3 or S4, no murmur, click or rub, no peripheral edema and peripheral pulses strong  ABDOMEN: soft, mild epigastric tenderness, no hepatosplenomegaly, no masses and bowel sounds normal  MS: no gross musculoskeletal defects noted, no edema  SKIN: no suspicious lesions or rashes  NEURO: Normal strength and tone, mentation intact and speech normal  PSYCH: mentation appears normal, affect normal/bright    Results for orders placed or performed in visit on 07/12/21 (from the past 24 hour(s))   Comprehensive metabolic panel (BMP + Alb, Alk Phos, ALT, AST, Total. Bili, TP)   Result Value Ref Range    Sodium 135 133 - 144 mmol/L    Potassium 4.0 3.4 - 5.3 mmol/L    Chloride 105 94 - 109 mmol/L    Carbon Dioxide (CO2) 25 20 - 32 mmol/L    Anion Gap 5 3 - 14 mmol/L    Urea Nitrogen 14 7 - 30 mg/dL    Creatinine 0.85 0.52 - 1.04 mg/dL    Calcium 9.2 8.5 - 10.1 mg/dL    Glucose 96 70 - 99 mg/dL    " Alkaline Phosphatase 60 40 - 150 U/L    AST 24 0 - 45 U/L    ALT 29 0 - 50 U/L    Protein Total 7.6 6.8 - 8.8 g/dL    Albumin 3.5 3.4 - 5.0 g/dL    Bilirubin Total 0.3 0.2 - 1.3 mg/dL    GFR Estimate 84 >60 mL/min/1.73m2   Lipase   Result Value Ref Range    Lipase 93 73 - 393 U/L   Amylase   Result Value Ref Range    Amylase 64 30 - 110 U/L   CBC with platelets and differential    Narrative    The following orders were created for panel order CBC with platelets and differential.  Procedure                               Abnormality         Status                     ---------                               -----------         ------                     CBC with platelets and d...[[id number]]  Abnormal            Preliminary result           Please view results for these tests on the individual orders.   CRP, inflammation   Result Value Ref Range    CRP Inflammation 3.5 0.0 - 8.0 mg/L   CBC with platelets and differential   Result Value Ref Range    WBC Count 11.2 (H) 4.0 - 11.0 10e3/uL    RBC Count 3.95 3.80 - 5.20 10e6/uL    Hemoglobin 11.8 11.7 - 15.7 g/dL    Hematocrit 38.1 35.0 - 47.0 %    MCV 97 78 - 100 fL    MCH 29.9 26.5 - 33.0 pg    MCHC 31.0 (L) 31.5 - 36.5 g/dL    RDW 14.4 10.0 - 15.0 %    Platelet Count 327 150 - 450 10e3/uL   UA with Microscopic reflex to Culture    Specimen: Urine, Midstream   Result Value Ref Range    Color Urine Straw Colorless, Straw, Light Yellow, Yellow    Appearance Urine Clear Clear    Glucose Urine Negative Negative mg/dL    Bilirubin Urine Negative Negative    Ketones Urine Negative Negative mg/dL    Specific Gravity Urine 1.009 1.003 - 1.035    Blood Urine Negative Negative    pH Urine 7.0 5.0 - 7.0    Protein Albumin Urine Negative Negative mg/dL    Urobilinogen Urine Normal Normal, 2.0 mg/dL    Nitrite Urine Negative Negative    Leukocyte Esterase Urine Negative Negative    RBC Urine 0 <=2 /HPF    WBC Urine <1 <=5 /HPF    Squamous Epithelials Urine 3 (H) <=1 /HPF    Narrative     Urine Culture not indicated     Recent Results (from the past 744 hour(s))   US Abdomen Complete    Narrative    US ABDOMEN COMPLETE 7/12/2021 2:13 PM    CLINICAL HISTORY: Epigastric pain; Nausea    TECHNIQUE: Complete abdominal ultrasound.    COMPARISON: 3/20/2020    FINDINGS:    GALLBLADDER: Normal. No gallstones, wall thickening, or  pericholecystic fluid. Negative sonographic Torres's sign.    BILE DUCTS: No biliary dilatation. The common duct measures 3 mm.    LIVER: Normal parenchyma with smooth contour. No focal mass.    RIGHT KIDNEY: Normal size. No hydronephrosis.     LEFT KIDNEY: Normal size. No hydronephrosis.     SPLEEN: Normal.    PANCREAS: The visualized portions are normal.    AORTA: Normal in caliber.     IVC: Normal where visualized.    No ascites.      Impression    IMPRESSION:  1.  Normal complete abdominal ultrasound.    FERNANDO REBOLLEDO MD         SYSTEM ID:  [id number]

## 2021-07-15 ENCOUNTER — THERAPY VISIT (OUTPATIENT)
Dept: PHYSICAL THERAPY | Facility: CLINIC | Age: 46
End: 2021-07-15
Payer: COMMERCIAL

## 2021-07-15 DIAGNOSIS — M54.2 CERVICALGIA: Primary | ICD-10-CM

## 2021-07-15 PROCEDURE — 97110 THERAPEUTIC EXERCISES: CPT | Mod: GP | Performed by: PHYSICAL THERAPIST

## 2021-07-15 PROCEDURE — 97035 APP MDLTY 1+ULTRASOUND EA 15: CPT | Mod: GP | Performed by: PHYSICAL THERAPIST

## 2021-07-15 PROCEDURE — 97140 MANUAL THERAPY 1/> REGIONS: CPT | Mod: GP | Performed by: PHYSICAL THERAPIST

## 2021-08-01 DIAGNOSIS — R11.0 NAUSEA: ICD-10-CM

## 2021-08-01 DIAGNOSIS — R10.13 EPIGASTRIC PAIN: ICD-10-CM

## 2021-08-04 NOTE — TELEPHONE ENCOUNTER
Please call patient to find out if she is still needing this medication daily to control epigastric pain and nausea.  If she is needing this daily please find out if she is taking it once or twice daily and send in 1 year prescription for that dosing.  It is currently pended for twice daily but modify if needed.

## 2021-08-05 DIAGNOSIS — E55.9 HYPOVITAMINOSIS D: ICD-10-CM

## 2021-08-05 DIAGNOSIS — B00.9 HSV (HERPES SIMPLEX VIRUS) INFECTION: ICD-10-CM

## 2021-08-05 DIAGNOSIS — G35 MULTIPLE SCLEROSIS (H): ICD-10-CM

## 2021-08-05 DIAGNOSIS — Z53.9 ERRONEOUS ENCOUNTER--DISREGARD: Primary | ICD-10-CM

## 2021-08-05 RX ORDER — VALACYCLOVIR HYDROCHLORIDE 500 MG/1
500 TABLET, FILM COATED ORAL DAILY
Qty: 90 TABLET | Refills: 3 | Status: SHIPPED | OUTPATIENT
Start: 2021-08-05 | End: 2021-09-09

## 2021-08-05 NOTE — TELEPHONE ENCOUNTER
Called and spoke with patient. Symptoms have improved, but still gets reflux towards night time. Takes it once a day after dinner. Patient also needing refills of valtrex and vitamin D. See separate refill encounter, medications approved.     Prescription approved per [facility] Refill Protocol.    Janeth Villafuerte RN  [facility]

## 2021-08-05 NOTE — TELEPHONE ENCOUNTER
Prescription approved per [employer] Refill Protocol.    Janeth Villafuerte RN  [facility]

## 2021-08-06 ENCOUNTER — INFUSION THERAPY VISIT (OUTPATIENT)
Dept: INFUSION THERAPY | Facility: CLINIC | Age: 46
End: 2021-08-06
Attending: PSYCHIATRY & NEUROLOGY
Payer: COMMERCIAL

## 2021-08-06 VITALS
OXYGEN SATURATION: 100 % | RESPIRATION RATE: 16 BRPM | TEMPERATURE: 98 F | DIASTOLIC BLOOD PRESSURE: 72 MMHG | HEART RATE: 65 BPM | SYSTOLIC BLOOD PRESSURE: 124 MMHG

## 2021-08-06 DIAGNOSIS — G35 MULTIPLE SCLEROSIS (H): Primary | ICD-10-CM

## 2021-08-06 PROCEDURE — 36415 COLL VENOUS BLD VENIPUNCTURE: CPT | Performed by: PSYCHIATRY & NEUROLOGY

## 2021-08-06 PROCEDURE — 258N000003 HC RX IP 258 OP 636: Performed by: PSYCHIATRY & NEUROLOGY

## 2021-08-06 PROCEDURE — 250N000011 HC RX IP 250 OP 636: Performed by: PSYCHIATRY & NEUROLOGY

## 2021-08-06 PROCEDURE — 96365 THER/PROPH/DIAG IV INF INIT: CPT

## 2021-08-06 RX ADMIN — NATALIZUMAB 300 MG: 300 INJECTION INTRAVENOUS at 13:39

## 2021-08-06 RX ADMIN — SODIUM CHLORIDE 250 ML: 9 INJECTION, SOLUTION INTRAVENOUS at 13:39

## 2021-08-06 NOTE — PROGRESS NOTES
Infusion Nursing Note:  Nataliya Aldrich presents today for TYSABRI.    Patient seen by provider today: No   present during visit today: Not Applicable.    Note: NA    Intravenous Access:  Labs drawn without difficulty.  Peripheral IV placed.    Treatment Conditions:  Tysabri pre-infusion checklist completed via touch program.      Post Infusion Assessment:  Patient tolerated infusion without incident.  Patient observed for 60 minutes post infusion per protocol.  Site patent and intact, free from redness, edema or discomfort.  No evidence of extravasations.  Access discontinued per protocol.       Discharge Plan:   Discharge instructions reviewed with: Patient.  Patient and/or family verbalized understanding of discharge instructions and all questions answered.  AVS to patient via [employer].  Patient will return 9/17/21 for next appointment.   Patient discharged in stable condition accompanied by: self.  Departure Mode: Ambulatory.      Melissa Levin RN

## 2021-08-13 LAB — SCANNED LAB RESULT: NORMAL

## 2021-08-18 ENCOUNTER — THERAPY VISIT (OUTPATIENT)
Dept: PHYSICAL THERAPY | Facility: CLINIC | Age: 46
End: 2021-08-18
Payer: COMMERCIAL

## 2021-08-18 DIAGNOSIS — M54.2 CERVICALGIA: Primary | ICD-10-CM

## 2021-08-18 PROCEDURE — 97140 MANUAL THERAPY 1/> REGIONS: CPT | Mod: GP | Performed by: PHYSICAL THERAPIST

## 2021-08-18 PROCEDURE — 97110 THERAPEUTIC EXERCISES: CPT | Mod: GP | Performed by: PHYSICAL THERAPIST

## 2021-08-18 PROCEDURE — 97035 APP MDLTY 1+ULTRASOUND EA 15: CPT | Mod: GP | Performed by: PHYSICAL THERAPIST

## 2021-08-19 ENCOUNTER — MYC MEDICAL ADVICE (OUTPATIENT)
Dept: FAMILY MEDICINE | Facility: CLINIC | Age: 46
End: 2021-08-19

## 2021-08-20 NOTE — TELEPHONE ENCOUNTER
My chart message sent     Amelia Chance RN, BSN  [facility]

## 2021-09-02 ENCOUNTER — THERAPY VISIT (OUTPATIENT)
Dept: PHYSICAL THERAPY | Facility: CLINIC | Age: 46
End: 2021-09-02
Payer: COMMERCIAL

## 2021-09-02 DIAGNOSIS — M54.2 CERVICALGIA: Primary | ICD-10-CM

## 2021-09-02 PROCEDURE — 97140 MANUAL THERAPY 1/> REGIONS: CPT | Mod: GP | Performed by: PHYSICAL THERAPIST

## 2021-09-02 PROCEDURE — 97110 THERAPEUTIC EXERCISES: CPT | Mod: GP | Performed by: PHYSICAL THERAPIST

## 2021-09-02 PROCEDURE — 97035 APP MDLTY 1+ULTRASOUND EA 15: CPT | Mod: GP | Performed by: PHYSICAL THERAPIST

## 2021-09-04 ENCOUNTER — HEALTH MAINTENANCE LETTER (OUTPATIENT)
Age: 46
End: 2021-09-04

## 2021-09-09 ENCOUNTER — MYC MEDICAL ADVICE (OUTPATIENT)
Dept: FAMILY MEDICINE | Facility: CLINIC | Age: 46
End: 2021-09-09

## 2021-09-09 ENCOUNTER — OFFICE VISIT (OUTPATIENT)
Dept: FAMILY MEDICINE | Facility: CLINIC | Age: 46
End: 2021-09-09
Payer: COMMERCIAL

## 2021-09-09 VITALS
SYSTOLIC BLOOD PRESSURE: 120 MMHG | OXYGEN SATURATION: 97 % | BODY MASS INDEX: 45.48 KG/M2 | DIASTOLIC BLOOD PRESSURE: 70 MMHG | HEIGHT: 65 IN | WEIGHT: 273 LBS | HEART RATE: 70 BPM

## 2021-09-09 DIAGNOSIS — R21 RASH: ICD-10-CM

## 2021-09-09 DIAGNOSIS — Z13.1 SCREENING FOR DIABETES MELLITUS: ICD-10-CM

## 2021-09-09 DIAGNOSIS — G47.30 HYPERSOMNIA WITH SLEEP APNEA: ICD-10-CM

## 2021-09-09 DIAGNOSIS — B00.9 HSV (HERPES SIMPLEX VIRUS) INFECTION: ICD-10-CM

## 2021-09-09 DIAGNOSIS — D84.9 IMMUNOSUPPRESSION (H): ICD-10-CM

## 2021-09-09 DIAGNOSIS — Z23 NEED FOR PNEUMOCOCCAL VACCINE: ICD-10-CM

## 2021-09-09 DIAGNOSIS — Z98.84 S/P GASTRIC BYPASS: ICD-10-CM

## 2021-09-09 DIAGNOSIS — G98.8 CHRONIC MUSCULOSKELETAL PAIN DUE TO DISORDER OF NERVOUS SYSTEM: ICD-10-CM

## 2021-09-09 DIAGNOSIS — M62.838 MUSCLE SPASM: ICD-10-CM

## 2021-09-09 DIAGNOSIS — M79.605 PAIN OF LEFT LOWER EXTREMITY: ICD-10-CM

## 2021-09-09 DIAGNOSIS — K21.00 GASTROESOPHAGEAL REFLUX DISEASE WITH ESOPHAGITIS, UNSPECIFIED WHETHER HEMORRHAGE: ICD-10-CM

## 2021-09-09 DIAGNOSIS — G89.29 CHRONIC MUSCULOSKELETAL PAIN DUE TO DISORDER OF NERVOUS SYSTEM: ICD-10-CM

## 2021-09-09 DIAGNOSIS — J45.40 MODERATE PERSISTENT ASTHMA WITHOUT COMPLICATION: ICD-10-CM

## 2021-09-09 DIAGNOSIS — G35 MULTIPLE SCLEROSIS (H): ICD-10-CM

## 2021-09-09 DIAGNOSIS — G47.00 INSOMNIA, UNSPECIFIED TYPE: ICD-10-CM

## 2021-09-09 DIAGNOSIS — Z13.0 SCREENING FOR DEFICIENCY ANEMIA: ICD-10-CM

## 2021-09-09 DIAGNOSIS — Z13.220 SCREENING FOR HYPERLIPIDEMIA: ICD-10-CM

## 2021-09-09 DIAGNOSIS — M79.18 CHRONIC MUSCULOSKELETAL PAIN DUE TO DISORDER OF NERVOUS SYSTEM: ICD-10-CM

## 2021-09-09 DIAGNOSIS — Z13.29 SCREENING FOR THYROID DISORDER: ICD-10-CM

## 2021-09-09 DIAGNOSIS — G43.109 MIGRAINE WITH AURA AND WITHOUT STATUS MIGRAINOSUS, NOT INTRACTABLE: ICD-10-CM

## 2021-09-09 DIAGNOSIS — E53.8 VITAMIN B12 DEFICIENCY: ICD-10-CM

## 2021-09-09 DIAGNOSIS — J30.2 SEASONAL ALLERGIC RHINITIS, UNSPECIFIED TRIGGER: ICD-10-CM

## 2021-09-09 DIAGNOSIS — Z23 HIGH PRIORITY FOR 2019-NCOV VACCINE: Primary | ICD-10-CM

## 2021-09-09 DIAGNOSIS — G47.10 HYPERSOMNIA WITH SLEEP APNEA: ICD-10-CM

## 2021-09-09 DIAGNOSIS — E55.9 HYPOVITAMINOSIS D: ICD-10-CM

## 2021-09-09 DIAGNOSIS — Z00.00 ROUTINE GENERAL MEDICAL EXAMINATION AT A HEALTH CARE FACILITY: Primary | ICD-10-CM

## 2021-09-09 DIAGNOSIS — M47.812 SPONDYLOSIS OF CERVICAL REGION WITHOUT MYELOPATHY OR RADICULOPATHY: ICD-10-CM

## 2021-09-09 DIAGNOSIS — K91.2 POSTSURGICAL NONABSORPTION: ICD-10-CM

## 2021-09-09 DIAGNOSIS — Z11.59 NEED FOR HEPATITIS C SCREENING TEST: ICD-10-CM

## 2021-09-09 PROBLEM — I10 BENIGN ESSENTIAL HYPERTENSION: Status: RESOLVED | Noted: 2018-02-05 | Resolved: 2021-09-09

## 2021-09-09 PROBLEM — M54.2 NECK PAIN ON LEFT SIDE: Status: RESOLVED | Noted: 2018-06-13 | Resolved: 2021-09-09

## 2021-09-09 LAB
ALBUMIN SERPL-MCNC: 3.9 G/DL (ref 3.4–5)
ALP SERPL-CCNC: 68 U/L (ref 40–150)
ALT SERPL W P-5'-P-CCNC: 31 U/L (ref 0–50)
ANION GAP SERPL CALCULATED.3IONS-SCNC: 7 MMOL/L (ref 3–14)
AST SERPL W P-5'-P-CCNC: 20 U/L (ref 0–45)
BILIRUB SERPL-MCNC: 0.4 MG/DL (ref 0.2–1.3)
BUN SERPL-MCNC: 14 MG/DL (ref 7–30)
CALCIUM SERPL-MCNC: 8.7 MG/DL (ref 8.5–10.1)
CHLORIDE BLD-SCNC: 107 MMOL/L (ref 94–109)
CHOLEST SERPL-MCNC: 164 MG/DL
CO2 SERPL-SCNC: 23 MMOL/L (ref 20–32)
CREAT SERPL-MCNC: 0.91 MG/DL (ref 0.52–1.04)
DEPRECATED CALCIDIOL+CALCIFEROL SERPL-MC: 68 UG/L (ref 20–75)
ERYTHROCYTE [DISTWIDTH] IN BLOOD BY AUTOMATED COUNT: 13.4 % (ref 10–15)
FASTING STATUS PATIENT QL REPORTED: NORMAL
FERRITIN SERPL-MCNC: 74 NG/ML (ref 12–150)
GFR SERPL CREATININE-BSD FRML MDRD: 77 ML/MIN/1.73M2
GLUCOSE BLD-MCNC: 89 MG/DL (ref 70–99)
HCT VFR BLD AUTO: 39.7 % (ref 35–47)
HCV AB SERPL QL IA: NONREACTIVE
HDLC SERPL-MCNC: 65 MG/DL
HGB BLD-MCNC: 12.9 G/DL (ref 11.7–15.7)
LDLC SERPL CALC-MCNC: 79 MG/DL
MAGNESIUM SERPL-MCNC: 2.5 MG/DL (ref 1.6–2.3)
MCH RBC QN AUTO: 29.8 PG (ref 26.5–33)
MCHC RBC AUTO-ENTMCNC: 32.5 G/DL (ref 31.5–36.5)
MCV RBC AUTO: 92 FL (ref 78–100)
NONHDLC SERPL-MCNC: 99 MG/DL
PLATELET # BLD AUTO: 340 10E3/UL (ref 150–450)
POTASSIUM BLD-SCNC: 4.2 MMOL/L (ref 3.4–5.3)
PROT SERPL-MCNC: 8 G/DL (ref 6.8–8.8)
RBC # BLD AUTO: 4.33 10E6/UL (ref 3.8–5.2)
SODIUM SERPL-SCNC: 137 MMOL/L (ref 133–144)
TRIGL SERPL-MCNC: 101 MG/DL
TSH SERPL DL<=0.005 MIU/L-ACNC: 1.85 MU/L (ref 0.4–4)
VIT B12 SERPL-MCNC: 674 PG/ML (ref 193–986)
WBC # BLD AUTO: 7.9 10E3/UL (ref 4–11)

## 2021-09-09 PROCEDURE — 84443 ASSAY THYROID STIM HORMONE: CPT | Performed by: PHYSICIAN ASSISTANT

## 2021-09-09 PROCEDURE — 99214 OFFICE O/P EST MOD 30 MIN: CPT | Mod: 25 | Performed by: PHYSICIAN ASSISTANT

## 2021-09-09 PROCEDURE — 82728 ASSAY OF FERRITIN: CPT | Performed by: PHYSICIAN ASSISTANT

## 2021-09-09 PROCEDURE — 90471 IMMUNIZATION ADMIN: CPT | Performed by: PHYSICIAN ASSISTANT

## 2021-09-09 PROCEDURE — 36415 COLL VENOUS BLD VENIPUNCTURE: CPT | Performed by: PHYSICIAN ASSISTANT

## 2021-09-09 PROCEDURE — 80061 LIPID PANEL: CPT | Performed by: PHYSICIAN ASSISTANT

## 2021-09-09 PROCEDURE — 99396 PREV VISIT EST AGE 40-64: CPT | Mod: 25 | Performed by: PHYSICIAN ASSISTANT

## 2021-09-09 PROCEDURE — 80053 COMPREHEN METABOLIC PANEL: CPT | Performed by: PHYSICIAN ASSISTANT

## 2021-09-09 PROCEDURE — 85027 COMPLETE CBC AUTOMATED: CPT | Performed by: PHYSICIAN ASSISTANT

## 2021-09-09 PROCEDURE — 82607 VITAMIN B-12: CPT | Performed by: PHYSICIAN ASSISTANT

## 2021-09-09 PROCEDURE — 82306 VITAMIN D 25 HYDROXY: CPT | Performed by: PHYSICIAN ASSISTANT

## 2021-09-09 PROCEDURE — 86803 HEPATITIS C AB TEST: CPT | Performed by: PHYSICIAN ASSISTANT

## 2021-09-09 PROCEDURE — 83735 ASSAY OF MAGNESIUM: CPT | Performed by: PHYSICIAN ASSISTANT

## 2021-09-09 PROCEDURE — 90732 PPSV23 VACC 2 YRS+ SUBQ/IM: CPT | Performed by: PHYSICIAN ASSISTANT

## 2021-09-09 RX ORDER — MONTELUKAST SODIUM 10 MG/1
1 TABLET ORAL AT BEDTIME
Qty: 90 TABLET | Refills: 1 | Status: SHIPPED | OUTPATIENT
Start: 2021-09-09 | End: 2022-03-02

## 2021-09-09 RX ORDER — TOPIRAMATE 50 MG/1
50 TABLET, FILM COATED ORAL AT BEDTIME
Qty: 90 TABLET | Refills: 1 | Status: SHIPPED | OUTPATIENT
Start: 2021-09-09 | End: 2022-02-11

## 2021-09-09 RX ORDER — BACLOFEN 20 MG/1
TABLET ORAL
Qty: 180 TABLET | Refills: 1 | Status: SHIPPED | OUTPATIENT
Start: 2021-09-09 | End: 2021-11-08

## 2021-09-09 RX ORDER — TOPIRAMATE 100 MG/1
100 TABLET, FILM COATED ORAL AT BEDTIME
Qty: 90 TABLET | Refills: 1 | Status: SHIPPED | OUTPATIENT
Start: 2021-09-09 | End: 2022-03-16

## 2021-09-09 RX ORDER — VALACYCLOVIR HYDROCHLORIDE 500 MG/1
500 TABLET, FILM COATED ORAL DAILY
Qty: 90 TABLET | Refills: 3 | Status: SHIPPED | OUTPATIENT
Start: 2021-09-09 | End: 2022-04-19

## 2021-09-09 RX ORDER — BUDESONIDE AND FORMOTEROL FUMARATE DIHYDRATE 160; 4.5 UG/1; UG/1
2 AEROSOL RESPIRATORY (INHALATION) 2 TIMES DAILY
Qty: 10.2 G | Refills: 5 | Status: SHIPPED | OUTPATIENT
Start: 2021-09-09 | End: 2021-12-03

## 2021-09-09 RX ORDER — AZELASTINE 1 MG/ML
1 SPRAY, METERED NASAL 2 TIMES DAILY PRN
Qty: 30 ML | Refills: 3 | Status: SHIPPED | OUTPATIENT
Start: 2021-09-09 | End: 2022-05-10

## 2021-09-09 RX ORDER — ALBUTEROL SULFATE 90 UG/1
2 AEROSOL, METERED RESPIRATORY (INHALATION) EVERY 6 HOURS PRN
Qty: 8.5 G | Refills: 1 | Status: SHIPPED | OUTPATIENT
Start: 2021-09-09 | End: 2021-11-05

## 2021-09-09 RX ORDER — TRAZODONE HYDROCHLORIDE 50 MG/1
50 TABLET, FILM COATED ORAL
Qty: 90 TABLET | Refills: 1
Start: 2021-09-09 | End: 2022-02-08 | Stop reason: SINTOL

## 2021-09-09 RX ORDER — IPRATROPIUM BROMIDE AND ALBUTEROL SULFATE 2.5; .5 MG/3ML; MG/3ML
SOLUTION RESPIRATORY (INHALATION)
Qty: 180 ML | Refills: 1 | Status: SHIPPED | OUTPATIENT
Start: 2021-09-09 | End: 2021-12-07

## 2021-09-09 RX ORDER — NATALIZUMAB 300 MG/15ML
300 INJECTION INTRAVENOUS
Start: 2021-09-09

## 2021-09-09 RX ORDER — ALBUTEROL SULFATE 0.83 MG/ML
2.5 SOLUTION RESPIRATORY (INHALATION) EVERY 6 HOURS PRN
Qty: 30 ML | Refills: 1 | Status: SHIPPED | OUTPATIENT
Start: 2021-09-09 | End: 2021-12-20

## 2021-09-09 RX ORDER — GABAPENTIN 300 MG/1
CAPSULE ORAL
Qty: 120 CAPSULE | Refills: 5 | Status: SHIPPED | OUTPATIENT
Start: 2021-09-09 | End: 2022-04-19

## 2021-09-09 RX ORDER — FLUTICASONE PROPIONATE 50 MCG
SPRAY, SUSPENSION (ML) NASAL
Qty: 16 G | Refills: 5 | Status: SHIPPED | OUTPATIENT
Start: 2021-09-09 | End: 2022-02-11

## 2021-09-09 ASSESSMENT — ENCOUNTER SYMPTOMS
SORE THROAT: 0
CHILLS: 0
NERVOUS/ANXIOUS: 0
DYSURIA: 0
DIZZINESS: 0
FEVER: 0
PALPITATIONS: 0
WEAKNESS: 1
HEADACHES: 0
PARESTHESIAS: 0
JOINT SWELLING: 0
COUGH: 0
HEARTBURN: 0
ABDOMINAL PAIN: 0
CONSTIPATION: 0
ARTHRALGIAS: 0
NAUSEA: 0
FREQUENCY: 0
HEMATURIA: 0
MYALGIAS: 1
DIARRHEA: 0
SHORTNESS OF BREATH: 0
EYE PAIN: 0
HEMATOCHEZIA: 0

## 2021-09-09 ASSESSMENT — ANXIETY QUESTIONNAIRES
3. WORRYING TOO MUCH ABOUT DIFFERENT THINGS: NOT AT ALL
7. FEELING AFRAID AS IF SOMETHING AWFUL MIGHT HAPPEN: NOT AT ALL
GAD7 TOTAL SCORE: 0
2. NOT BEING ABLE TO STOP OR CONTROL WORRYING: NOT AT ALL
6. BECOMING EASILY ANNOYED OR IRRITABLE: NOT AT ALL
8. IF YOU CHECKED OFF ANY PROBLEMS, HOW DIFFICULT HAVE THESE MADE IT FOR YOU TO DO YOUR WORK, TAKE CARE OF THINGS AT HOME, OR GET ALONG WITH OTHER PEOPLE?: NOT DIFFICULT AT ALL
7. FEELING AFRAID AS IF SOMETHING AWFUL MIGHT HAPPEN: NOT AT ALL
1. FEELING NERVOUS, ANXIOUS, OR ON EDGE: NOT AT ALL
GAD7 TOTAL SCORE: 0
4. TROUBLE RELAXING: NOT AT ALL
GAD7 TOTAL SCORE: 0
5. BEING SO RESTLESS THAT IT IS HARD TO SIT STILL: NOT AT ALL

## 2021-09-09 ASSESSMENT — PATIENT HEALTH QUESTIONNAIRE - PHQ9
SUM OF ALL RESPONSES TO PHQ QUESTIONS 1-9: 1
10. IF YOU CHECKED OFF ANY PROBLEMS, HOW DIFFICULT HAVE THESE PROBLEMS MADE IT FOR YOU TO DO YOUR WORK, TAKE CARE OF THINGS AT HOME, OR GET ALONG WITH OTHER PEOPLE: NOT DIFFICULT AT ALL
SUM OF ALL RESPONSES TO PHQ QUESTIONS 1-9: 1

## 2021-09-09 ASSESSMENT — MIFFLIN-ST. JEOR: SCORE: 1889.2

## 2021-09-09 NOTE — RESULT ENCOUNTER NOTE
Nataliya  I have reviewed your recent labs. Here are the results:    -Normal red blood cell (hgb) levels, normal white blood cell count and normal platelet levels.  -Cholesterol levels (LDL,HDL, Triglycerides) are normal.  ADVISE: rechecking in 1-2 years.   -Liver and gallbladder tests are normal (ALT,AST, Alk phos, bilirubin), kidney function is normal (Cr, GFR), sodium is normal, potassium is normal, calcium is normal, glucose is normal.  -TSH (thyroid stimulating hormone) level is normal which indicates normal thyroid function.  -Magnesium is high-normal.  This is not to a concerning level.  Continue current supplementation regimen.  -Hepatitis C antibody screen test shows no signs of a previous hepatitis C infection.  -Vitamin D level is normal and getting 1000 IU daily in your diet or supplements is recommended.   -Ferritin (iron) level is normal.  -B12 level is normalized.  Continue your current supplementation.  For additional lab test information, labtestsonline.org is an excellent reference.        If you have any questions please do not hesitate to contact our office via phone ([phone]) or MyChart.    Miya Crump, MS, PA-C  [facility] [place]

## 2021-09-09 NOTE — PROGRESS NOTES
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 complication  Doing very well.  Continue current regimen.  -     SYMBICORT 160-4.5 MCG/ACT Inhaler; Inhale 2 puffs into the lungs 2 times daily  -     albuterol (VENTOLIN HFA) 108 (90 Base) MCG/ACT inhaler; Inhale 2 puffs into the lungs every 6 hours as needed for shortness of breath / dyspnea or wheezing  -     montelukast (SINGULAIR) 10 MG tablet; Take 1 tablet (10 mg) by mouth At Bedtime  -     albuterol (PROVENTIL) (2.5 MG/3ML) 0.083% neb solution; Take 1 vial (2.5 mg) by nebulization every 6 hours as needed for shortness of breath / dyspnea or wheezing  -     ipratropium - albuterol 0.5 mg/2.5 mg/3 mL (DUONEB) 0.5-2.5 (3) MG/3ML neb solution; NEBULIZE ONE VIAL EVERY 4 HOURS AS NEEDED FOR SHORTNESS OF BREATH OR WHEEZING    Need for hepatitis C screening test  Routine screening  -     Hepatitis C Screen Reflex to HCV RNA Quant and Genotype    Screening for diabetes mellitus  Routine screening  -     Comprehensive metabolic panel (BMP + Alb, Alk Phos, ALT, AST, Total. Bili, TP)    Screening for deficiency anemia  Routine screening  -     CBC with platelets    Screening for thyroid disorder  Routine screening  -     TSH with free T4 reflex    Need for pneumococcal  "vaccine  Vaccinated today  -     PPSV23, IM/SUBQ (2+ YRS) - [id number]        Patient has been advised of split billing requirements and indicates understanding: Yes  COUNSELING:  Reviewed preventive health counseling, as reflected in patient instructions       Regular exercise       Healthy diet/nutrition    Estimated body mass index is 45.43 kg/m  as calculated from the following:    Height as of this encounter: 1.651 m (5' 5\").    Weight as of this encounter: 123.8 kg (273 lb).    Weight management plan: Patient is following with bariatric surgery    She reports that she quit smoking about 8 years ago. Her smoking use included cigars. She started smoking about 10 years ago. She has a 1.00 pack-year smoking history. She has never used smokeless tobacco.      Counseling Resources:  ATP IV Guidelines  Pooled Cohorts Equation Calculator  Breast Cancer Risk Calculator  BRCA-Related Cancer Risk Assessment: FHS-7 Tool  FRAX Risk Assessment  ICSI Preventive Guidelines  Dietary Guidelines for Americans, 2010  USDA's MyPlate  ASA Prophylaxis  Lung CA Screening    Miya Crump PA-C  [facility]  "

## 2021-09-09 NOTE — PATIENT INSTRUCTIONS
Preventive Health Recommendations  Female Ages 40 to 49    Yearly exam:     See your health care provider every year in order to  1. Review health changes.   2. Discuss preventive care.    3. Review your medicines if your doctor prescribed any.      Get a Pap test every three years (unless you have an abnormal result and your provider advises testing more often).      If you get Pap tests with HPV test, you only need to test every 5 years, unless you have an abnormal result. You do not need a Pap test if your uterus was removed (hysterectomy) and you have not had cancer.      You should be tested each year for STDs (sexually transmitted diseases), if you're at risk.     Ask your doctor if you should have a mammogram.      Have a colonoscopy (test for colon cancer) if someone in your family has had colon cancer or polyps before age 50.       Have a cholesterol test every 5 years.       Have a diabetes test (fasting glucose) after age 45. If you are at risk for diabetes, you should have this test every 3 years.    Shots: Get a flu shot each year. Get a tetanus shot every 10 years.     Nutrition:     Eat at least 5 servings of fruits and vegetables each day.    Eat whole-grain bread, whole-wheat pasta and brown rice instead of white grains and rice.    Get adequate Calcium and Vitamin D.      Lifestyle    Exercise at least 150 minutes a week (an average of 30 minutes a day, 5 days a week). This will help you control your weight and prevent disease.    Limit alcohol to one drink per day.    No smoking.     Wear sunscreen to prevent skin cancer.  See your dentist every six months for an exam and cleaning.Patient Education     Honoring Choices - Your Rights: Making Your Own Health Care Treatment Decisions  [place] Law:  [place] law allows you to inform others of your health care wishes. You have the right to state your wishes or appoint an agent in writing so that others will know what you want if you can't tell  "them because of illness or injury. The information that follows tells about health care directives and how to prepare them. It does not give every detail of the law.  What is a health care directive?  A health care directive is a written document that informs others of your wishes about health care. It allows you to name a person (\"agent\") to decide for you if you are unable to decide. It also allows you to name an agent if you want someone else to decide for you while you still have capacity. You must be at least 18 years old to make a health care directive.  Why have a health care directive?  A health care directive is important if your attending physician determines you can't communicate your health care choices (because of physical or mental incapacity). It is also important if you wish to have someone else make your health care decisions. In some circumstances, your directive may state that you want someone other than an attending physician to decide when you cannot make your own decisions.  Must I have a health care directive? What happens if I don't have one?  You don't have to have a health care directive. But, writing one helps to make sure your wishes are followed. You will still receive medical treatment if you don't have a written directive. Health care providers will listen to what people close to you say about your treatment preferences, but the best way to be sure your wishes are followed is to have a health care directive.  How do I make a health care directive?  There are forms for health care directives. You don't have to use a form, but your health care directive must meet the following requirements to be legal:  Be in writing, dated, and state your name.  Be signed by you or someone you authorize to sign for you when you can understand and communicate your health care wishes.  Have your signature verified by a  or two witnesses (notaries and witnesses cannot also be named as " agent).  Include the appointment of an agent to make health care decisions for you and/or instructions about the health care choices you wish to make.  Before you prepare or revise your directive, you should discuss your health care wishes with your doctor or other health care provider. Information about where to get health care directive forms is given at the end of this document.  What can I put in a health care directive?  You have many choices of what to put in your health care directive. For example, you may include:  The person you trust as your agent to make health care decisions for you. You can name alternate agents, in case the first agent is unavailable, or joint agents.  Your goals, values, preferences, and cultural beliefs about health care.  The types of medical treatment you would want (or not want).  How you want your agent or agents to decide.  Where you want to receive care.  Instructions about artificial nutrition and hydration.  Mental health treatments that use electroshock therapy or neuroleptic medications.  Instructions if you are pregnant.  Donation of organs, tissues and eyes.   arrangements.  Who you would like as your guardian or conservator if there is a court action.  You may be as specific or as general as you wish. You can choose which issues or treatments to deal with in your health care directive.  Are there any limits to what I can put in my health care directive?  There are some limits about what you can put in your health care directive. For instance:  Your agent must be at least 18 years of age.  Your agent cannot be your health care provider, unless the health care provider is a family member or you give reasons for the naming of the agent in your directive.  You cannot request health care treatment that is outside of reasonable medical practice.  You cannot request assisted suicide.  How long does a health care directive last? Can I change it?  Your health care  directive lasts until you change or cancel it. As long as the changes meet the health care directive requirements listed above, you may cancel your directive by any of the following:  A written statement saying you want to cancel it  Destroying it  Telling at least two other people you want to cancel it  Writing a new health care directive.  What should I do with my health care directive after I have signed it?  You should inform others of your health care directive and give people copies of it. You may wish to inform family members, your health care agent or agents, and your health care providers that you have a health care directive. You should give them a copy. It's a good idea to review and update your directive as your needs change. Keep it in a safe place where it is easily found.   We are committed to making your health care wishes known. You may give a copy of your directive to any care team member or bring or mail a copy to any of our locations, and we will keep it in your medical record.  What if I've already prepared a health care document? Is it still good?  Before August 1, 1998, [place] law provided for several other types of directives, including living [place], durable health care anthony of  and mental health declarations. The law changed so people can use one form for all their health care instructions. Forms created before August 1, 1998 are still legal if they followed the law in effect when written. They are also legal if they meet the requirements of the new law (described above). You may want to review any existing documents to make sure they say what you want and meet all requirements.  I prepared my directive in another state. Is it still good?  Health care directives prepared in other states are legal if they meet the requirements of the other state's laws or the [place] requirements. But requests for assisted suicide will not be followed.  What if my health care provider refuses  to follow my health care directive?  Your health care provider generally will follow your health care directive, or any instructions from your agent, as long as the health care follows reasonable medical practice. But, you or your agent cannot request treatment that will not help you or which the provider cannot provide. If the provider cannot follow the agent's directions about life-sustaining treatment, the provider must inform the agent. The provider must also document the notice in your medical record. The provider must allow the agency to arrange to transfer you to another provider who will follow the agent's directions.  What if I believe a health care provider has not followed health care directive requirements?  Complaints of this type can be filed with the Office of Health Facility Complaints at [phone] ([facility]) or toll free at [phone].  What if I believe a health plan has not followed health care directive requirements?  Complaints of this type can be filed with the [place] Health Information Clearinghouse at [phone] or toll free at [phone].  How to obtain more information  Ask any care team member for information, forms, or how to register for a free class on advance care planning and creating a health care directive. Or you may:   visit [email or web address],   email [email or web address] or   call [phone].  Find other health care directive forms at [place] Board on Aging's Senior LinkAge Line: www.[place]aging.netor call 1-493.450.9743.   For informational purposes only. Not to replace the advice of your health care provider.  Copyright   2012 [employer]. All rights reserved. [employer] 1626 - REV 06/18.

## 2021-09-10 ENCOUNTER — MYC MEDICAL ADVICE (OUTPATIENT)
Dept: FAMILY MEDICINE | Facility: CLINIC | Age: 46
End: 2021-09-10

## 2021-09-10 DIAGNOSIS — Z23 HIGH PRIORITY FOR 2019-NCOV VACCINE: ICD-10-CM

## 2021-09-10 DIAGNOSIS — D84.9 IMMUNOSUPPRESSION (H): Primary | ICD-10-CM

## 2021-09-10 ASSESSMENT — ANXIETY QUESTIONNAIRES: GAD7 TOTAL SCORE: 0

## 2021-09-10 ASSESSMENT — PATIENT HEALTH QUESTIONNAIRE - PHQ9: SUM OF ALL RESPONSES TO PHQ QUESTIONS 1-9: 1

## 2021-09-10 ASSESSMENT — ASTHMA QUESTIONNAIRES: ACT_TOTALSCORE: 22

## 2021-09-13 NOTE — TELEPHONE ENCOUNTER
Please see my chart message below     Please review and advise     Thank you     Amelia Chance RN, BSN  [facility]      Spoke with pt who states she took the Cipro and that is not agreeing with her. She states it makes her feel very exhausted and affects her head like she is wearing a cap. She also states she is currently not having any symptoms. She has not taken any since but wants to know what Dr Gibson Julian would advise and should she come in to be seen. Notified pt will follow up with Dr Gibson Julian and let her know.

## 2021-09-13 NOTE — TELEPHONE ENCOUNTER
Patient inquiring about mychart message. Just wanted to make sure pcp received the message. Did advise patient where the booster shorts are being given and told her that once the order was placed we'd reach back out to her if necessary to schedule.     Yong Subramanian

## 2021-09-17 ENCOUNTER — INFUSION THERAPY VISIT (OUTPATIENT)
Dept: INFUSION THERAPY | Facility: CLINIC | Age: 46
End: 2021-09-17
Attending: PSYCHIATRY & NEUROLOGY
Payer: COMMERCIAL

## 2021-09-17 ENCOUNTER — THERAPY VISIT (OUTPATIENT)
Dept: PHYSICAL THERAPY | Facility: CLINIC | Age: 46
End: 2021-09-17
Payer: COMMERCIAL

## 2021-09-17 VITALS
DIASTOLIC BLOOD PRESSURE: 81 MMHG | SYSTOLIC BLOOD PRESSURE: 128 MMHG | TEMPERATURE: 98.1 F | OXYGEN SATURATION: 99 % | HEART RATE: 64 BPM

## 2021-09-17 DIAGNOSIS — M54.2 CERVICALGIA: Primary | ICD-10-CM

## 2021-09-17 DIAGNOSIS — G35 MULTIPLE SCLEROSIS (H): Primary | ICD-10-CM

## 2021-09-17 PROCEDURE — 97140 MANUAL THERAPY 1/> REGIONS: CPT | Mod: GP | Performed by: PHYSICAL THERAPIST

## 2021-09-17 PROCEDURE — 250N000011 HC RX IP 250 OP 636: Performed by: PSYCHIATRY & NEUROLOGY

## 2021-09-17 PROCEDURE — 96365 THER/PROPH/DIAG IV INF INIT: CPT

## 2021-09-17 PROCEDURE — 97035 APP MDLTY 1+ULTRASOUND EA 15: CPT | Mod: GP | Performed by: PHYSICAL THERAPIST

## 2021-09-17 PROCEDURE — 258N000003 HC RX IP 258 OP 636: Performed by: PSYCHIATRY & NEUROLOGY

## 2021-09-17 PROCEDURE — 97110 THERAPEUTIC EXERCISES: CPT | Mod: GP | Performed by: PHYSICAL THERAPIST

## 2021-09-17 RX ADMIN — SODIUM CHLORIDE 250 ML: 9 INJECTION, SOLUTION INTRAVENOUS at 13:13

## 2021-09-17 RX ADMIN — NATALIZUMAB 300 MG: 300 INJECTION INTRAVENOUS at 13:14

## 2021-09-17 NOTE — PROGRESS NOTES
Subjective:  HPI  Physical Exam                    Objective:  System    Physical Exam    General     ROS    Assessment/Plan:    PROGRESS  REPORT    Progress reporting period is from 9/17/2021.       SUBJECTIVE  Subjective: Pt notes overall still has ups and downs with her MS symptoms. Warm weather helps, stretches at home going well (being compliant). Walking outdoors feels good too. Notes MS causes a constant dull pain on left neck, shoulder, and arm.     Current Pain level: 7/10.      Initial Pain level: 8/10.   Changes in function:  Yes, but ups and downs (flare ups)  Adverse reaction to treatment or activity: None    OBJECTIVE  Changes noted in objective findings:  Yes, but ups and down with MS flare ups  Objective: NDI no change since last taken. Tension continues to be L sided UT/levator and tolerated US and manual tool massage well again today. Pt notes less tightness and soreness in left neck/arm after PT today (she notes it lasts a few days).  Good reivew of HEP.      ASSESSMENT/PLAN  Updated problem list and treatment plan: Diagnosis 1:  Neck pain  Pain -  hot/cold therapy, US, manual therapy, self management, education, directional preference exercise and home program  Decreased strength - therapeutic exercise and therapeutic activities  Inflammation - cold therapy and self management/home program  Impaired muscle performance - neuro re-education  Decreased function - therapeutic activities  Impaired posture - neuro re-education  Instability -  Therapeutic Activity  Therapeutic Exercise  STG/LTGs have been met or progress has been made towards goals:  Yes, but slow progress  Assessment of Progress: The patient's condition has potential to improve.  The patient's condition is unchanged.  Self Management Plans:  Patient has been instructed in a home treatment program.  I have re-evaluated this patient and find that the nature, scope, duration and intensity of the therapy is appropriate for the medical  condition of the patient.  Nataliya continues to require the following intervention to meet STG and LTG's:  PT    Recommendations:  This patient would benefit from continued therapy.     Frequency:  2 X a month, once daily  Duration:  for 3 months        Please refer to the daily flowsheet for treatment today, total treatment time and time spent performing 1:1 timed codes.

## 2021-09-17 NOTE — PROGRESS NOTES
Infusion Nursing Note:  Nataliya Aldrich presents today for Tysabri.    Patient seen by provider today: No   present during visit today: Not Applicable.    Note: N/A.      Intravenous Access:  Peripheral IV placed.    Treatment Conditions:  Tysabri pre-infusion checklist completed via touch program.      Post Infusion Assessment:  Patient tolerated infusion without incident.  Patient observed for 60 minutes post Tysabri per protocol.  Site patent and intact, free from redness, edema or discomfort.  No evidence of extravasations.  Access discontinued per protocol.       Discharge Plan:   Discharge instructions reviewed with: Patient.  Patient and/or family verbalized understanding of discharge instructions and all questions answered.  AVS to patient via [email or web address].  Patient will return 10/29/21 for next appointment.   Patient discharged in stable condition accompanied by: self.  Departure Mode: Ambulatory.      Nidhi Brizuela RN

## 2021-09-21 ENCOUNTER — HOSPITAL ENCOUNTER (OUTPATIENT)
Dept: MAMMOGRAPHY | Facility: CLINIC | Age: 46
Discharge: HOME | End: 2021-09-21
Attending: PHYSICIAN ASSISTANT | Admitting: PHYSICIAN ASSISTANT
Payer: COMMERCIAL

## 2021-09-21 DIAGNOSIS — Z12.31 SCREENING MAMMOGRAM, ENCOUNTER FOR: ICD-10-CM

## 2021-09-21 PROCEDURE — 77063 BREAST TOMOSYNTHESIS BI: CPT

## 2021-09-21 NOTE — RESULT ENCOUNTER NOTE
Nataliya  Here are your recent results.  They are normal.  If you have any questions please do not hesitate to contact our office via phone ([phone]) or MyChart.    Miya Crump MS, PA-C  [facility]

## 2021-09-30 ENCOUNTER — ANCILLARY PROCEDURE (OUTPATIENT)
Dept: GENERAL RADIOLOGY | Facility: CLINIC | Age: 46
End: 2021-09-30
Attending: NURSE PRACTITIONER
Payer: COMMERCIAL

## 2021-09-30 ENCOUNTER — OFFICE VISIT (OUTPATIENT)
Dept: FAMILY MEDICINE | Facility: CLINIC | Age: 46
End: 2021-09-30
Payer: COMMERCIAL

## 2021-09-30 VITALS
BODY MASS INDEX: 46.39 KG/M2 | HEART RATE: 110 BPM | DIASTOLIC BLOOD PRESSURE: 84 MMHG | RESPIRATION RATE: 18 BRPM | WEIGHT: 278.8 LBS | SYSTOLIC BLOOD PRESSURE: 128 MMHG | TEMPERATURE: 98.8 F | OXYGEN SATURATION: 99 %

## 2021-09-30 DIAGNOSIS — S93.402A MODERATE ANKLE SPRAIN, LEFT, INITIAL ENCOUNTER: ICD-10-CM

## 2021-09-30 DIAGNOSIS — S99.912A ANKLE INJURY, LEFT, INITIAL ENCOUNTER: ICD-10-CM

## 2021-09-30 DIAGNOSIS — S99.912A ANKLE INJURY, LEFT, INITIAL ENCOUNTER: Primary | ICD-10-CM

## 2021-09-30 PROCEDURE — 73610 X-RAY EXAM OF ANKLE: CPT | Mod: LT | Performed by: RADIOLOGY

## 2021-09-30 PROCEDURE — 99213 OFFICE O/P EST LOW 20 MIN: CPT | Performed by: NURSE PRACTITIONER

## 2021-09-30 NOTE — PROGRESS NOTES
"    Assessment & Plan     Ankle injury, left, initial encounter  Negative xray, likely sprain. Given brace to wear, encouraged ice, rest. If ongoing issue would refer.   - XR Ankle Left G/E 3 Views    Moderate ankle sprain, left, initial encounter.    Review of the result(s) of each unique test - xray  Ordering of each unique test  No LOS data to display   Time spent doing chart review, history and exam, documentation and further activities per the note       See Patient Instructions    Return in about 1 week (around 10/7/2021) for symptoms failing to improve or worsening.    Leonie Simmons, CNP  [facility] JACKELINE An is a 46 year old who presents for the following health issues     HPI     Musculoskeletal problem/pain  Onset/Duration: 3 days ago  Description  Location: ankle - left  Joint Swelling: YES  Redness: no  Pain: YES  Warmth: no  Intensity:  severe  Progression of Symptoms:  worsening  Accompanying signs and symptoms:   Fevers: no  Numbness/tingling/weakness: YES  History  Trauma to the area: no  Recent illness:  no  Previous similar problem: no  Previous evaluation:  no  Precipitating or alleviating factors:  Aggravating factors include: standing, walking and climbing stairs  Therapies tried and outcome: rest/inactivity, ice and acetaminophen    Stepped wrong on left ankle and \"tweaked.\" felt better after.   That day seemed fine.   Next day was a bit sore.   Got COVID booster then, following day ankle pain worsened. Very severe with rotational movements, stair walking, weight bearing. Not swollen. Hurts on lateral ankle.    Review of Systems   Constitutional, HEENT, cardiovascular, pulmonary, GI, , musculoskeletal, neuro, skin, endocrine and psych systems are negative, except as otherwise noted.      Objective    /84 (BP Location: Left arm, Patient Position: Sitting, Cuff Size: Adult Regular)   Pulse 110   Temp 98.8  F (37.1  C) (Oral)   Resp 18   Wt 126.5 kg " (278 lb 12.8 oz)   SpO2 99%   BMI 46.39 kg/m    Body mass index is 46.39 kg/m .  Physical Exam   GENERAL: healthy, alert and no distress  MS: LLE exam shows tenderness and pain to palpation lateral malleolus and swelling lateral ankle. No ecchymosis.  SKIN: no suspicious lesions or rashes    Pain with external rotation.  Xray - Reviewed and interpreted by me.  Negative for fracture.           LEONIE Serrano     [address] [address]  [email or web address]   Office: [phone]

## 2021-10-01 ENCOUNTER — MYC MEDICAL ADVICE (OUTPATIENT)
Dept: FAMILY MEDICINE | Facility: CLINIC | Age: 46
End: 2021-10-01

## 2021-10-01 DIAGNOSIS — S99.912A ANKLE INJURY, LEFT, INITIAL ENCOUNTER: Primary | ICD-10-CM

## 2021-10-04 NOTE — TELEPHONE ENCOUNTER
Please see my chart message below     Please review and advise     Thank you     Amelia Chance RN, BSN  [facility] [facility]

## 2021-10-05 ENCOUNTER — TRANSFERRED RECORDS (OUTPATIENT)
Dept: HEALTH INFORMATION MANAGEMENT | Facility: CLINIC | Age: 46
End: 2021-10-05

## 2021-10-14 ENCOUNTER — THERAPY VISIT (OUTPATIENT)
Dept: PHYSICAL THERAPY | Facility: CLINIC | Age: 46
End: 2021-10-14
Payer: COMMERCIAL

## 2021-10-14 DIAGNOSIS — G35 MS (MULTIPLE SCLEROSIS) (H): ICD-10-CM

## 2021-10-14 DIAGNOSIS — M54.2 CERVICALGIA: Primary | ICD-10-CM

## 2021-10-14 PROCEDURE — 97035 APP MDLTY 1+ULTRASOUND EA 15: CPT | Mod: GP | Performed by: PHYSICAL THERAPIST

## 2021-10-14 PROCEDURE — 97140 MANUAL THERAPY 1/> REGIONS: CPT | Mod: GP | Performed by: PHYSICAL THERAPIST

## 2021-10-14 PROCEDURE — 97110 THERAPEUTIC EXERCISES: CPT | Mod: GP | Performed by: PHYSICAL THERAPIST

## 2021-10-19 PROBLEM — F32.9 MAJOR DEPRESSION: Status: RESOLVED | Noted: 2018-07-25 | Resolved: 2020-09-08

## 2021-10-29 ENCOUNTER — INFUSION THERAPY VISIT (OUTPATIENT)
Dept: INFUSION THERAPY | Facility: CLINIC | Age: 46
End: 2021-10-29
Attending: PSYCHIATRY & NEUROLOGY
Payer: COMMERCIAL

## 2021-10-29 VITALS
SYSTOLIC BLOOD PRESSURE: 128 MMHG | DIASTOLIC BLOOD PRESSURE: 84 MMHG | HEART RATE: 76 BPM | RESPIRATION RATE: 20 BRPM | OXYGEN SATURATION: 99 % | TEMPERATURE: 98 F

## 2021-10-29 DIAGNOSIS — G35 MULTIPLE SCLEROSIS (H): Primary | ICD-10-CM

## 2021-10-29 PROCEDURE — 96365 THER/PROPH/DIAG IV INF INIT: CPT

## 2021-10-29 PROCEDURE — 250N000011 HC RX IP 250 OP 636: Performed by: PSYCHIATRY & NEUROLOGY

## 2021-10-29 PROCEDURE — 258N000003 HC RX IP 258 OP 636: Performed by: PSYCHIATRY & NEUROLOGY

## 2021-10-29 RX ADMIN — NATALIZUMAB 300 MG: 300 INJECTION INTRAVENOUS at 13:27

## 2021-10-29 NOTE — PROGRESS NOTES
Infusion Nursing Note:  Nataliya Aldrich presents today for Tysabri.   Patient seen by provider today: No   present during visit today: Not Applicable.    Note: Rather than Tegaderm for securing IV, used gauze, paper tape, and coban per patient's request.    Intravenous Access:  Peripheral IV placed.    Treatment Conditions:  Tysabri pre-infusion checklist completed via touch program.    Post Infusion Assessment:  Patient tolerated infusion without incident.  Patient observed for 60 minutes post Tysabri per protocol.  Blood return noted pre and post infusion.  Site patent and intact, free from redness, edema or discomfort.  No evidence of extravasations.  Access discontinued per protocol.     Discharge Plan:   Discharge instructions reviewed with: Patient.  Patient and/or family verbalized understanding of discharge instructions and all questions answered.  Copy of AVS reviewed with patient and/or family.  Patient will return 12/10 for next appointment.  Patient discharged in stable condition accompanied by: self.  Departure Mode: Ambulatory.    Leonie Alfred RN

## 2021-11-03 DIAGNOSIS — J45.40 MODERATE PERSISTENT ASTHMA WITHOUT COMPLICATION: ICD-10-CM

## 2021-11-04 ENCOUNTER — MYC MEDICAL ADVICE (OUTPATIENT)
Dept: FAMILY MEDICINE | Facility: CLINIC | Age: 46
End: 2021-11-04
Payer: COMMERCIAL

## 2021-11-05 ENCOUNTER — THERAPY VISIT (OUTPATIENT)
Dept: PHYSICAL THERAPY | Facility: CLINIC | Age: 46
End: 2021-11-05
Payer: COMMERCIAL

## 2021-11-05 DIAGNOSIS — M54.2 CERVICALGIA: Primary | ICD-10-CM

## 2021-11-05 DIAGNOSIS — M62.838 MUSCLE SPASM: ICD-10-CM

## 2021-11-05 DIAGNOSIS — G35 MS (MULTIPLE SCLEROSIS) (H): ICD-10-CM

## 2021-11-05 DIAGNOSIS — G35 MULTIPLE SCLEROSIS (H): ICD-10-CM

## 2021-11-05 DIAGNOSIS — M79.605 PAIN OF LEFT LOWER EXTREMITY: ICD-10-CM

## 2021-11-05 PROCEDURE — 97035 APP MDLTY 1+ULTRASOUND EA 15: CPT | Mod: GP | Performed by: PHYSICAL THERAPIST

## 2021-11-05 PROCEDURE — 97140 MANUAL THERAPY 1/> REGIONS: CPT | Mod: GP | Performed by: PHYSICAL THERAPIST

## 2021-11-05 RX ORDER — ALBUTEROL SULFATE 90 UG/1
AEROSOL, METERED RESPIRATORY (INHALATION)
Qty: 8.5 G | Refills: 2 | Status: SHIPPED | OUTPATIENT
Start: 2021-11-05 | End: 2022-04-14

## 2021-11-05 NOTE — TELEPHONE ENCOUNTER
Prescription approved per [facility] Refill Protocol.  Miya Sheffield RN

## 2021-11-06 NOTE — TELEPHONE ENCOUNTER
baclofen (LIORESAL) 20 MG tablet 180 tablet 1 9/9/2021  No   Sig - Route: Take 1 tablet (20 mg) by mouth At Bedtime. May also take 1 tablet (20 mg) every 4 hours as needed for muscle spasms       Last office visit: 9/30/2021 with prescribing provider:     Future Office Visit:          Encounter-Level CSA:    There are no encounter-level csa.     Patient-Level CSA:    There are no patient-level csa.           Routing refill request to provider for review/approval because:  Drug not on the [facility] refill protocol     Amelia Chance RN, BSN  [facility]

## 2021-11-08 RX ORDER — BACLOFEN 20 MG/1
TABLET ORAL
Qty: 180 TABLET | Refills: 1 | Status: SHIPPED | OUTPATIENT
Start: 2021-11-08 | End: 2022-04-19

## 2021-11-09 ENCOUNTER — MYC MEDICAL ADVICE (OUTPATIENT)
Dept: FAMILY MEDICINE | Facility: CLINIC | Age: 46
End: 2021-11-09
Payer: COMMERCIAL

## 2021-11-09 NOTE — TELEPHONE ENCOUNTER
Please see mychart message and advise patient,  BP Readings from Last 3 Encounters:   10/29/21 128/84   09/30/21 128/84   09/17/21 128/81     Ayesha PARDO - Registered Nurse  [facility]  Acute and Diagnostic Services

## 2021-11-10 ENCOUNTER — TELEPHONE (OUTPATIENT)
Dept: FAMILY MEDICINE | Facility: CLINIC | Age: 46
End: 2021-11-10
Payer: COMMERCIAL

## 2021-11-10 ENCOUNTER — MYC MEDICAL ADVICE (OUTPATIENT)
Dept: FAMILY MEDICINE | Facility: CLINIC | Age: 46
End: 2021-11-10
Payer: COMMERCIAL

## 2021-11-10 NOTE — TELEPHONE ENCOUNTER
Reason for Call:  Form, our goal is to have forms completed with 72 hours, however, some forms may require a visit or additional information.    Type of letter, form or note:  medical    Who is the form from?: [employer] (if other please explain)    Where did the form come from: Patient or family brought in       What clinic location was the form placed at?: [facility]    Where the form was placed: Given to physician    What number is listed as a contact on the form?: Fax # [phone]       Additional comments:     Call taken on 11/10/2021 at 2:59 PM by Yong Subramanian

## 2021-11-12 ENCOUNTER — MYC MEDICAL ADVICE (OUTPATIENT)
Dept: FAMILY MEDICINE | Facility: CLINIC | Age: 46
End: 2021-11-12
Payer: COMMERCIAL

## 2021-11-12 ENCOUNTER — TRANSFERRED RECORDS (OUTPATIENT)
Dept: HEALTH INFORMATION MANAGEMENT | Facility: CLINIC | Age: 46
End: 2021-11-12

## 2021-11-12 NOTE — TELEPHONE ENCOUNTER
Noted, forms printed, placed in LP basket. Advised VV in other mychart encounter.    Dion VYAS RN   [facility][note 3]

## 2021-11-12 NOTE — TELEPHONE ENCOUNTER
Form printed, placed in LP basket.    Dion VYAS RN   [facility]

## 2021-11-18 ENCOUNTER — OFFICE VISIT (OUTPATIENT)
Dept: PODIATRY | Facility: CLINIC | Age: 46
End: 2021-11-18
Payer: COMMERCIAL

## 2021-11-18 VITALS
HEIGHT: 65 IN | SYSTOLIC BLOOD PRESSURE: 134 MMHG | DIASTOLIC BLOOD PRESSURE: 76 MMHG | BODY MASS INDEX: 44.98 KG/M2 | WEIGHT: 270 LBS

## 2021-11-18 DIAGNOSIS — S99.912A ANKLE INJURY, LEFT, INITIAL ENCOUNTER: ICD-10-CM

## 2021-11-18 DIAGNOSIS — S93.492A SPRAIN OF ANTERIOR TALOFIBULAR LIGAMENT OF LEFT ANKLE, INITIAL ENCOUNTER: Primary | ICD-10-CM

## 2021-11-18 PROCEDURE — 99243 OFF/OP CNSLTJ NEW/EST LOW 30: CPT | Performed by: PODIATRIST

## 2021-11-18 ASSESSMENT — MIFFLIN-ST. JEOR: SCORE: 1875.59

## 2021-11-18 NOTE — PATIENT INSTRUCTIONS
ANKLE SPRAIN  An ankle sprain is an injury to one or more ligaments in the ankle, usually on the outside of the ankle. Ligaments are bands of tissue - like rubber bands - that connect one bone to another and bind the joints together. In the ankle joint, ligaments provide stability by limiting side-to-side movement.  Some ankle sprains are much worse than others. The severity of an ankle sprain depends on whether the ligament is stretched, partially torn, or completely torn, as well as on the number of ligaments involved. Ankle sprains are not the same as strains, which affect muscles rather than ligaments.  CAUSES  Sprained ankles often result from a fall, a sudden twist, or a blow that forces the ankle joint out of its normal position. Ankle sprains commonly occur while participating in sports, wearing inappropriate shoes, or walking or running on an uneven surface.  Sometimes ankle sprains occur because of a person is born with weak ankles. Previous ankle or foot injuries can also weaken the ankle and lead to sprains.  SYMPTOMS  The symptoms of ankle sprains may include: pain or soreness, swelling, bruising, difficulty walking, and stiffness in the joint.  These symptoms may vary in intensity, depending on the severity of the sprain. Sometimes pain and swelling are absent in people with previous ankle sprains. Instead, they may simply feel the ankle is wobbly and unsteady when they walk. Even if there is no pain or swelling with a sprained ankle, treatment is crucial. Any ankle sprain - whether it s your first or your fifth - requires prompt medical attention.  DIAGNOSIS  In evaluating your injury, the foot and ankle surgeon will obtain a thorough history of your symptoms and examine your foot. X-rays or other advanced imaging studies may be ordered to help determine the severity of the injury.  MEDICAL TREATMENT  There are four key reasons why an ankle sprain should be promptly evaluated and treated by a foot and  ankle surgeon:  An untreated ankle sprain may lead to chronic ankle instability, a condition marked by persistent discomfort and a  giving way  of the ankle. Weakness in the leg may also develop.   A more severe ankle injury may have occurred along with the sprain. This might include a serious bone fracture that, if left untreated, could lead to troubling complications.   An ankle sprain may be accompanied by a foot injury that causes discomfort but has gone unnoticed thus far.   Rehabilitation of a sprained ankle needs to begin right away. If rehabilitation is delayed, the injury may be less likely to heal properly.   NON-SURGICAL TREATMENT  When you have an ankle sprain, rehabilitation is crucial--and it starts the moment your treatment begins. Your foot and ankle surgeon may recommend one or more of the following treatment options:  Rest. Stay off the injured ankle. Walking may cause further injury.   Ice. Apply an ice pack to the injured area, placing a thin towel between the ice and the skin. Use ice for 20 minutes and then wait at least 40 minutes before icing again.   Compression. An elastic wrap may be recommended to control swelling.   Elevation. The ankle should be raised slightly above the level of your heart to reduce swelling.   Early physical therapy. Your doctor will start you on a rehabilitation program as soon as possible to promote healing and increase your range of motion. This includes doing prescribed exercises.   Medications. Nonsteroidal anti-inflammatory drugs (NSAIDs), such as ibuprofen, may be recommended to reduce pain and inflammation. In some cases, prescription pain medications are needed to provide adequate relief.   SURGICAL TREATMENT  In more severe cases, surgery may be required to adequately treat an ankle sprain. Surgery often involves repairing the damaged ligament or ligaments. The foot and ankle surgeon will select the surgical procedure best suited for your case based on the  type and severity of your injury as well as your activity level.  After surgery, rehabilitation is extremely important. Completing your rehabilitation program is crucial to a successful outcome. Be sure to continue to see your foot and ankle surgeon during this period to ensure that your ankle heals properly and function is restored.    [facility] LOCATIONS  [facility]  [address]  [place] [address]  Phone: [phone]  Fax: [phone] [facility]  [address]  [place] [address]  Phone: [phone]   Fax: [phone]   [facility]  [address]  [place] [address]  Phone: [phone]  Fax: [phone] [facility]  [address]  [place] [address]  Phone: [phone]   Fax: [phone]   [facility]   [address]  [place] [address]  Phone: [phone]  Fax: [phone] * Please call any location listed to make an appointment for a casting/fitting. Your referral was sent to their central office and they will all have the order on file.     Thank you for choosing [facility]!    DR. DURHAM'S CLINIC LOCATIONS     [facility] SCHEDULE SURGERY: [phone]   [address] [place] [address] APPOINTMENTS: [phone]   [place] [address] BILLING QUESTIONS: [phone]  [phone] RADIOLOGY: [phone]       [facility]    [address]    [place] [address] [phone]  [phone]      Follow up: 1 month as needed        Flu vaccines are now available at all [facility] clinics and retail pharmacies across the [facility] area. Appointments are required for clinic locations. To schedule an appointment online, please log into [employer] or create an account if you are a new user. You can also call [phone], or simply walk in at one of the  [facility]  [place] retail pharmacy [facility].

## 2021-11-18 NOTE — PROGRESS NOTES
ASSESSMENT:  Encounter Diagnosis   Name Primary?     Ankle injury, left, initial encounter      MEDICAL DECISION MAKING:  The problem is consistent with an inversion ankle injury, left and sprain of the anterior talofibular ligament.  Interval improvement since onset.    I anticipate her intermittent pain will resolve given time.  I suggested and offered a Tri-Lock ankle brace for additional protection.  I suggest she use this for 2 weeks, no longer than 4.  She is agreeable.    Continue relative rest as needed.    Given her generalized foot pain and enjoyment of weightbearing activities, she is referred for custom orthoses.    Nataliya Aldrich is referred to [facility] for prescription orthoses.      Follow-up in 1 month if pain persist.  A reasonable next step is physical therapy.    Disclaimer: This note consists of symbols derived from keyboarding, dictation and/or voice recognition software. As a result, there may be errors in the script that have gone undetected. Please consider this when interpreting information found in this chart.    Ankit Kathleen DPM, FACFAS, MS    [facility]le [facility]      ____________________________________________________________________    HPI:       I was asked by Leonie Simmons, CNP to evaluate Nataliya Aldrich in consultation for a left ankle injury.     Chief Complaint: left ankle pain  Onset of problem: 3 weeks  She was walking for exercise and inverted her left ankle on the edge of a sidewalk elevated from the grass.  Pain/ discomfort is described as:  shooting  Pain Rating:  shooting   Frequency:  Occasionally     The pain is exacerbated by stairs, prolonged weight bearing  Previous treatment: cold/ heat, elevation, rest  She also has bilateral, generalized foot pain with weight bearing    She works as a behavioral health     *  Past Medical History:   Diagnosis Date     Asthma     mild  "persistent - Dr Gee     Cerebral infarction (H) 2015     Fibroids     s/p hysterectomy     H/O gastric bypass      Hypertension      Migraine     followed by Devika     Obstructive sleep apnea      Pre-diabetes      Relapsing remitting multiple sclerosis (H) 2015    lesion in [place].  Facial tingling initial symptom.  F/B [facility]   *  *  Past Surgical History:   Procedure Laterality Date     GASTRIC BYPASS  2002    \"Trouble with B12 and D\"     HYSTERECTOMY, MONO  2013    cervix gone.  fibroids.  without BSO     TONSILLECTOMY     *  *  Current Outpatient Medications   Medication Sig Dispense Refill     albuterol (PROVENTIL) (2.5 MG/3ML) 0.083% neb solution Take 1 vial (2.5 mg) by nebulization every 6 hours as needed for shortness of breath / dyspnea or wheezing 30 mL 1     azelastine (ASTELIN) 0.1 % nasal spray Spray 1 spray into both nostrils 2 times daily as needed for rhinitis (nasal antihistamine) 30 mL 3     baclofen (LIORESAL) 20 MG tablet TAKE 1 TABLET BY MOUTH AT BEDTIME. MAY ALSO TAKE 1 TABLET EVERY 4 HOURS AS NEEDED FOR MUSCLE SPASMS. 180 tablet 1     cetirizine (ZYRTEC) 10 MG tablet Take 1 tablet (10 mg) by mouth daily 180 tablet 1     desonide (DESOWEN) 0.05 % external cream Apply topically 2 times daily For face; no more than 2 weeks in a row 15 g 1     fluticasone (FLONASE) 50 MCG/ACT nasal spray INHALE 1 SPRAY INTO BOTH NOSTRILS 16 g 5     gabapentin (NEURONTIN) 300 MG capsule Take 2 capsules (600 mg) by mouth At Bedtime. May also take 2 capsules (600 mg) daily as needed (pain). 120 capsule 5     ipratropium - albuterol 0.5 mg/2.5 mg/3 mL (DUONEB) 0.5-2.5 (3) MG/3ML neb solution NEBULIZE ONE VIAL EVERY 4 HOURS AS NEEDED FOR SHORTNESS OF BREATH OR WHEEZING 180 mL 1     ketotifen (ZADITOR) 0.025 % ophthalmic solution Place 1 drop into both eyes 2 times daily as needed for itching 10 mL 3     MAGNESIUM PO        montelukast (SINGULAIR) 10 MG tablet Take 1 tablet (10 mg) by mouth At Bedtime 90 tablet 1     " "natalizumab (TYSABRI) 300 MG/15ML injection Inject 15 mLs (300 mg) into the vein once every six weeks Infusion       omeprazole (PRILOSEC) 20 MG DR capsule Take 1 capsule (20 mg) by mouth daily 90 capsule 3     PROAIR  (90 Base) MCG/ACT inhaler INHALE 2 PUFFS INTO THE LUNGS EVERY 6 HOURS AS NEEDED FOR SHORTNESS OF BREATH OR DIFFICULT BREATHING OR WHEEZING 8.5 g 2     SUMAtriptan (IMITREX) 100 MG tablet Take 50 mg up to twice daily as needed for headache; separate doses by at least one hour and do not use more than 3 days/week 18 tablet 3     SYMBICORT 160-4.5 MCG/ACT Inhaler Inhale 2 puffs into the lungs 2 times daily 10.2 g 5     topiramate (TOPAMAX) 100 MG tablet Take 1 tablet (100 mg) by mouth At Bedtime (take with 50 mg to total 150 mg nightly) 90 tablet 1     topiramate (TOPAMAX) 50 MG tablet Take 1 tablet (50 mg) by mouth At Bedtime (take with 100 mg to total 150 mg nightly) 90 tablet 1     traZODone (DESYREL) 50 MG tablet Take 1 tablet (50 mg) by mouth nightly as needed for sleep 90 tablet 1     triamcinolone (KENALOG) 0.1 % external cream Apply topically 2 times daily No more  than 2 weeks in a row not on face 15 g 3     valACYclovir (VALTREX) 500 MG tablet Take 1 tablet (500 mg) by mouth daily 90 tablet 3     vitamin D3 (CHOLECALCIFEROL) 250 mcg (15389 units) capsule TAKE 1 CAPSULE BY MOUTH ONCE DAILY 90 capsule 3         EXAM:    Vitals: /76   Ht 1.651 m (5' 5\")   Wt 122.5 kg (270 lb)   BMI 44.93 kg/m    BMI: Body mass index is 44.93 kg/m .    Constitutional:  Nataliya Aldrich is in no apparent distress, appears well-nourished.  Cooperative with history and physical exam.    Vascular:  Pedal pulses are palpable for both the DP and PT arteries.  CFT < 3 sec.  No edema.      Neuro: Light touch sensation is intact to the L4, L5, S1 distributions  No evidence of weakness, spasticity, or contracture in the lower extremities.     Derm: Normal texture and turgor.  No erythema, ecchymosis, " or cyanosis.  No open lesions.     Musculoskeletal:    Lower extremity muscle strength is normal. No gross deformities.  Focal pain over the left anterior talofibular ligament.  No other pain on palpation.    XR ANKLE LEFT G/E 3 VIEWS 9/30/2021 3:54 PM      HISTORY: Ankle injury, left, initial encounter                                                                      IMPRESSION: No apparent fracture. The ankle mortise appears congruent.  Calcaneal spurs.     ALEXIA GILL MD

## 2021-11-18 NOTE — LETTER
11/18/2021         RE: Nataliya Aldrich  [address]  [place] [address]        Dear Colleague,    Thank you for referring your patient, Nataliya Aldrich, to the [facility]. Please see a copy of my visit note below.    ASSESSMENT:  Encounter Diagnosis   Name Primary?     Ankle injury, left, initial encounter      MEDICAL DECISION MAKING:  The problem is consistent with an inversion ankle injury, left and sprain of the anterior talofibular ligament.  Interval improvement since onset.    I anticipate her intermittent pain will resolve given time.  I suggested and offered a Tri-Lock ankle brace for additional protection.  I suggest she use this for 2 weeks, no longer than 4.  She is agreeable.    Continue relative rest as needed.    Given her generalized foot pain and enjoyment of weightbearing activities, she is referred for custom orthoses.    Nataliya Aldrich is referred to [facility] for prescription orthoses.      Follow-up in 1 month if pain persist.  A reasonable next step is physical therapy.    Disclaimer: This note consists of symbols derived from keyboarding, dictation and/or voice recognition software. As a result, there may be errors in the script that have gone undetected. Please consider this when interpreting information found in this chart.    Ankit Kathleen DPM, FACFAS, MS    [facility]le [facility]      ____________________________________________________________________    HPI:       I was asked by Leonie Simmons, CNP to evaluate Nataliya Aldrich in consultation for a left ankle injury.     Chief Complaint: left ankle pain  Onset of problem: 3 weeks  She was walking for exercise and inverted her left ankle on the edge of a sidewalk elevated from the grass.  Pain/ discomfort is described as:  shooting  Pain Rating:  shooting   Frequency:  Occasionally    "  The pain is exacerbated by stairs, prolonged weight bearing  Previous treatment: cold/ heat, elevation, rest  She also has bilateral, generalized foot pain with weight bearing    She works as a behavioral health     *  Past Medical History:   Diagnosis Date     Asthma     mild persistent - Dr Gee     Cerebral infarction (H) 2015     Fibroids     s/p hysterectomy     H/O gastric bypass      Hypertension      Migraine     followed by Devika     Obstructive sleep apnea      Pre-diabetes      Relapsing remitting multiple sclerosis (H) 2015    lesion in [place].  Facial tingling initial symptom.  F/B [facility]   *  *  Past Surgical History:   Procedure Laterality Date     GASTRIC BYPASS  2002    \"Trouble with B12 and D\"     HYSTERECTOMY, MONO  2013    cervix gone.  fibroids.  without BSO     TONSILLECTOMY     *  *  Current Outpatient Medications   Medication Sig Dispense Refill     albuterol (PROVENTIL) (2.5 MG/3ML) 0.083% neb solution Take 1 vial (2.5 mg) by nebulization every 6 hours as needed for shortness of breath / dyspnea or wheezing 30 mL 1     azelastine (ASTELIN) 0.1 % nasal spray Spray 1 spray into both nostrils 2 times daily as needed for rhinitis (nasal antihistamine) 30 mL 3     baclofen (LIORESAL) 20 MG tablet TAKE 1 TABLET BY MOUTH AT BEDTIME. MAY ALSO TAKE 1 TABLET EVERY 4 HOURS AS NEEDED FOR MUSCLE SPASMS. 180 tablet 1     cetirizine (ZYRTEC) 10 MG tablet Take 1 tablet (10 mg) by mouth daily 180 tablet 1     desonide (DESOWEN) 0.05 % external cream Apply topically 2 times daily For face; no more than 2 weeks in a row 15 g 1     fluticasone (FLONASE) 50 MCG/ACT nasal spray INHALE 1 SPRAY INTO BOTH NOSTRILS 16 g 5     gabapentin (NEURONTIN) 300 MG capsule Take 2 capsules (600 mg) by mouth At Bedtime. May also take 2 capsules (600 mg) daily as needed (pain). 120 capsule 5     ipratropium - albuterol 0.5 mg/2.5 mg/3 mL (DUONEB) 0.5-2.5 (3) MG/3ML neb solution NEBULIZE ONE VIAL EVERY 4 HOURS AS NEEDED " "FOR SHORTNESS OF BREATH OR WHEEZING 180 mL 1     ketotifen (ZADITOR) 0.025 % ophthalmic solution Place 1 drop into both eyes 2 times daily as needed for itching 10 mL 3     MAGNESIUM PO        montelukast (SINGULAIR) 10 MG tablet Take 1 tablet (10 mg) by mouth At Bedtime 90 tablet 1     natalizumab (TYSABRI) 300 MG/15ML injection Inject 15 mLs (300 mg) into the vein once every six weeks Infusion       omeprazole (PRILOSEC) 20 MG DR capsule Take 1 capsule (20 mg) by mouth daily 90 capsule 3     PROAIR  (90 Base) MCG/ACT inhaler INHALE 2 PUFFS INTO THE LUNGS EVERY 6 HOURS AS NEEDED FOR SHORTNESS OF BREATH OR DIFFICULT BREATHING OR WHEEZING 8.5 g 2     SUMAtriptan (IMITREX) 100 MG tablet Take 50 mg up to twice daily as needed for headache; separate doses by at least one hour and do not use more than 3 days/week 18 tablet 3     SYMBICORT 160-4.5 MCG/ACT Inhaler Inhale 2 puffs into the lungs 2 times daily 10.2 g 5     topiramate (TOPAMAX) 100 MG tablet Take 1 tablet (100 mg) by mouth At Bedtime (take with 50 mg to total 150 mg nightly) 90 tablet 1     topiramate (TOPAMAX) 50 MG tablet Take 1 tablet (50 mg) by mouth At Bedtime (take with 100 mg to total 150 mg nightly) 90 tablet 1     traZODone (DESYREL) 50 MG tablet Take 1 tablet (50 mg) by mouth nightly as needed for sleep 90 tablet 1     triamcinolone (KENALOG) 0.1 % external cream Apply topically 2 times daily No more  than 2 weeks in a row not on face 15 g 3     valACYclovir (VALTREX) 500 MG tablet Take 1 tablet (500 mg) by mouth daily 90 tablet 3     vitamin D3 (CHOLECALCIFEROL) 250 mcg (70551 units) capsule TAKE 1 CAPSULE BY MOUTH ONCE DAILY 90 capsule 3         EXAM:    Vitals: /76   Ht 1.651 m (5' 5\")   Wt 122.5 kg (270 lb)   BMI 44.93 kg/m    BMI: Body mass index is 44.93 kg/m .    Constitutional:  Nataliya Aldrich is in no apparent distress, appears well-nourished.  Cooperative with history and physical exam.    Vascular:  Pedal pulses " are palpable for both the DP and PT arteries.  CFT < 3 sec.  No edema.      Neuro: Light touch sensation is intact to the L4, L5, S1 distributions  No evidence of weakness, spasticity, or contracture in the lower extremities.     Derm: Normal texture and turgor.  No erythema, ecchymosis, or cyanosis.  No open lesions.     Musculoskeletal:    Lower extremity muscle strength is normal. No gross deformities.  Focal pain over the left anterior talofibular ligament.  No other pain on palpation.    XR ANKLE LEFT G/E 3 VIEWS 9/30/2021 3:54 PM      HISTORY: Ankle injury, left, initial encounter                                                                      IMPRESSION: No apparent fracture. The ankle mortise appears congruent.  Calcaneal spurs.     ALEXIA GILL MD          Again, thank you for allowing me to participate in the care of your patient.        Sincerely,        Ankit Kathleen DPM

## 2021-11-22 ENCOUNTER — TRANSFERRED RECORDS (OUTPATIENT)
Dept: HEALTH INFORMATION MANAGEMENT | Facility: CLINIC | Age: 46
End: 2021-11-22

## 2021-12-08 ENCOUNTER — MEDICAL CORRESPONDENCE (OUTPATIENT)
Dept: HEALTH INFORMATION MANAGEMENT | Facility: CLINIC | Age: 46
End: 2021-12-08
Payer: COMMERCIAL

## 2021-12-09 ENCOUNTER — MEDICAL CORRESPONDENCE (OUTPATIENT)
Dept: HEALTH INFORMATION MANAGEMENT | Facility: CLINIC | Age: 46
End: 2021-12-09
Payer: COMMERCIAL

## 2021-12-10 ENCOUNTER — INFUSION THERAPY VISIT (OUTPATIENT)
Dept: INFUSION THERAPY | Facility: CLINIC | Age: 46
End: 2021-12-10
Attending: PSYCHIATRY & NEUROLOGY
Payer: COMMERCIAL

## 2021-12-10 VITALS
SYSTOLIC BLOOD PRESSURE: 123 MMHG | TEMPERATURE: 98.5 F | OXYGEN SATURATION: 100 % | HEART RATE: 68 BPM | DIASTOLIC BLOOD PRESSURE: 82 MMHG

## 2021-12-10 DIAGNOSIS — G35 MULTIPLE SCLEROSIS (H): Primary | ICD-10-CM

## 2021-12-10 PROCEDURE — 250N000011 HC RX IP 250 OP 636: Performed by: PSYCHIATRY & NEUROLOGY

## 2021-12-10 PROCEDURE — 258N000003 HC RX IP 258 OP 636: Performed by: PSYCHIATRY & NEUROLOGY

## 2021-12-10 PROCEDURE — 36415 COLL VENOUS BLD VENIPUNCTURE: CPT | Performed by: PSYCHIATRY & NEUROLOGY

## 2021-12-10 PROCEDURE — 96365 THER/PROPH/DIAG IV INF INIT: CPT

## 2021-12-10 RX ORDER — HEPARIN SODIUM,PORCINE 10 UNIT/ML
5 VIAL (ML) INTRAVENOUS
Status: CANCELLED | OUTPATIENT
Start: 2021-12-10

## 2021-12-10 RX ORDER — IBUPROFEN 200 MG
600 TABLET ORAL EVERY 6 HOURS PRN
Status: CANCELLED
Start: 2021-12-10

## 2021-12-10 RX ORDER — ACETAMINOPHEN 325 MG/1
650 TABLET ORAL EVERY 4 HOURS PRN
Status: CANCELLED
Start: 2021-12-10

## 2021-12-10 RX ORDER — ONDANSETRON 2 MG/ML
4 INJECTION INTRAMUSCULAR; INTRAVENOUS EVERY 8 HOURS PRN
Status: CANCELLED
Start: 2021-12-10

## 2021-12-10 RX ORDER — ACETAMINOPHEN 325 MG/1
650 TABLET ORAL EVERY 4 HOURS PRN
Status: CANCELLED
Start: 2022-01-07

## 2021-12-10 RX ORDER — HEPARIN SODIUM,PORCINE 10 UNIT/ML
5 VIAL (ML) INTRAVENOUS
Status: CANCELLED | OUTPATIENT
Start: 2022-01-07

## 2021-12-10 RX ORDER — DIPHENHYDRAMINE HCL 25 MG
50 CAPSULE ORAL EVERY 4 HOURS PRN
Status: CANCELLED
Start: 2021-12-10

## 2021-12-10 RX ORDER — IBUPROFEN 200 MG
600 TABLET ORAL EVERY 6 HOURS PRN
Status: CANCELLED
Start: 2022-01-07

## 2021-12-10 RX ORDER — ONDANSETRON 2 MG/ML
4 INJECTION INTRAMUSCULAR; INTRAVENOUS EVERY 8 HOURS PRN
Status: CANCELLED
Start: 2022-01-07

## 2021-12-10 RX ORDER — HEPARIN SODIUM (PORCINE) LOCK FLUSH IV SOLN 100 UNIT/ML 100 UNIT/ML
5 SOLUTION INTRAVENOUS
Status: CANCELLED | OUTPATIENT
Start: 2021-12-10

## 2021-12-10 RX ORDER — HEPARIN SODIUM (PORCINE) LOCK FLUSH IV SOLN 100 UNIT/ML 100 UNIT/ML
5 SOLUTION INTRAVENOUS
Status: CANCELLED | OUTPATIENT
Start: 2022-01-07

## 2021-12-10 RX ORDER — DIPHENHYDRAMINE HCL 25 MG
50 CAPSULE ORAL EVERY 4 HOURS PRN
Status: CANCELLED
Start: 2022-01-07

## 2021-12-10 RX ADMIN — NATALIZUMAB 300 MG: 300 INJECTION INTRAVENOUS at 11:22

## 2021-12-10 RX ADMIN — SODIUM CHLORIDE 250 ML: 9 INJECTION, SOLUTION INTRAVENOUS at 11:22

## 2021-12-10 NOTE — PROGRESS NOTES
Infusion Nursing Note:  Nataliya Aldrich presents today for Tysabri, labs.    Patient seen by provider today: No   present during visit today: Not Applicable.    Note: SABINO virus antibody drawn today.       Intravenous Access:  Labs drawn without difficulty.  Peripheral IV placed.    Treatment Conditions:  Tysabri pre-infusion checklist completed via touch program.      Post Infusion Assessment:  Patient tolerated infusion without incident.  Patient observed for 60 minutes post Tysabri per protocol.  Site patent and intact, free from redness, edema or discomfort.  No evidence of extravasations.  Access discontinued per protocol.       Discharge Plan:   Discharge instructions reviewed with: Patient.  Patient and/or family verbalized understanding of discharge instructions and all questions answered.  AVS to patient via [employer].  Patient will return in one month for next dose of Tysabri.  Pt will schedule this appt at her earliest convenience for next appointment.   Patient discharged in stable condition accompanied by: self.  Departure Mode: Ambulatory.      Megan Higuera RN

## 2021-12-17 LAB — SCANNED LAB RESULT: NORMAL

## 2021-12-24 ENCOUNTER — THERAPY VISIT (OUTPATIENT)
Dept: PHYSICAL THERAPY | Facility: CLINIC | Age: 46
End: 2021-12-24
Payer: COMMERCIAL

## 2021-12-24 DIAGNOSIS — G35 MS (MULTIPLE SCLEROSIS) (H): ICD-10-CM

## 2021-12-24 DIAGNOSIS — M54.2 CERVICALGIA: Primary | ICD-10-CM

## 2021-12-24 PROCEDURE — 97140 MANUAL THERAPY 1/> REGIONS: CPT | Mod: GP | Performed by: PHYSICAL THERAPIST

## 2021-12-24 PROCEDURE — 97035 APP MDLTY 1+ULTRASOUND EA 15: CPT | Mod: GP | Performed by: PHYSICAL THERAPIST

## 2021-12-24 PROCEDURE — 97110 THERAPEUTIC EXERCISES: CPT | Mod: GP | Performed by: PHYSICAL THERAPIST

## 2022-01-13 ENCOUNTER — TELEPHONE (OUTPATIENT)
Dept: FAMILY MEDICINE | Facility: CLINIC | Age: 47
End: 2022-01-13
Payer: COMMERCIAL

## 2022-01-13 NOTE — TELEPHONE ENCOUNTER
Prior Authorization Retail Medication Request    Medication/Dose: Baclofen 20mg  ICD code (if different than what is on RX):    Previously Tried and Failed:    Rationale:      Insurance Name:  In Chart  Insurance ID:        Pharmacy Information (if different than what is on RX)  Name:     [id number] - [place], [place] - [address]  Phone:  [phone]    Plan does not cover. Please change RX or advise to start a PA.     Yong Subramanian

## 2022-01-17 NOTE — TELEPHONE ENCOUNTER
Prior Authorization Not Needed per Insurance    Medication: Baclofen 20mg  Insurance Company: [employer] - Phone [phone] Fax [phone]  Expected CoPay:      Pharmacy Filling the Rx: [id number] - [place], [place] - [address]  Pharmacy Notified: Yes  Patient Notified: Yes    Called [employer] to inquire about rejection as this medication is covered.  Per pharmacist they have resolved the issue on their end, no further assistance is needed.  Closing encounter.

## 2022-01-21 ENCOUNTER — INFUSION THERAPY VISIT (OUTPATIENT)
Dept: INFUSION THERAPY | Facility: CLINIC | Age: 47
End: 2022-01-21
Attending: PSYCHIATRY & NEUROLOGY
Payer: COMMERCIAL

## 2022-01-21 VITALS
TEMPERATURE: 98 F | DIASTOLIC BLOOD PRESSURE: 64 MMHG | HEART RATE: 72 BPM | OXYGEN SATURATION: 98 % | SYSTOLIC BLOOD PRESSURE: 119 MMHG

## 2022-01-21 DIAGNOSIS — G35 MULTIPLE SCLEROSIS (H): Primary | ICD-10-CM

## 2022-01-21 PROCEDURE — 96365 THER/PROPH/DIAG IV INF INIT: CPT

## 2022-01-21 PROCEDURE — 250N000011 HC RX IP 250 OP 636: Performed by: PSYCHIATRY & NEUROLOGY

## 2022-01-21 PROCEDURE — 258N000003 HC RX IP 258 OP 636: Performed by: PSYCHIATRY & NEUROLOGY

## 2022-01-21 RX ORDER — HEPARIN SODIUM,PORCINE 10 UNIT/ML
5 VIAL (ML) INTRAVENOUS
Status: CANCELLED | OUTPATIENT
Start: 2022-02-04

## 2022-01-21 RX ORDER — DIPHENHYDRAMINE HCL 25 MG
50 CAPSULE ORAL EVERY 4 HOURS PRN
Status: CANCELLED
Start: 2022-02-04

## 2022-01-21 RX ORDER — ACETAMINOPHEN 325 MG/1
650 TABLET ORAL EVERY 4 HOURS PRN
Status: CANCELLED
Start: 2022-02-04

## 2022-01-21 RX ORDER — IBUPROFEN 200 MG
600 TABLET ORAL EVERY 6 HOURS PRN
Status: CANCELLED
Start: 2022-02-04

## 2022-01-21 RX ORDER — HEPARIN SODIUM (PORCINE) LOCK FLUSH IV SOLN 100 UNIT/ML 100 UNIT/ML
5 SOLUTION INTRAVENOUS
Status: CANCELLED | OUTPATIENT
Start: 2022-02-04

## 2022-01-21 RX ORDER — ONDANSETRON 2 MG/ML
4 INJECTION INTRAMUSCULAR; INTRAVENOUS EVERY 8 HOURS PRN
Status: CANCELLED
Start: 2022-02-04

## 2022-01-21 RX ADMIN — SODIUM CHLORIDE 250 ML: 9 INJECTION, SOLUTION INTRAVENOUS at 12:57

## 2022-01-21 RX ADMIN — NATALIZUMAB 300 MG: 300 INJECTION INTRAVENOUS at 12:59

## 2022-01-21 NOTE — PROGRESS NOTES
Infusion Nursing Note:  Nataliya Aldrich presents today for Tysabri.    Patient seen by provider today: No   present during visit today: Not Applicable.    Note: N/A.      Intravenous Access:  Peripheral IV placed.    Treatment Conditions:  Tysabri pre-infusion checklist completed via touch program.      Post Infusion Assessment:  Patient tolerated infusion without incident.  Patient observed for 60 minutes post Tysabri per protocol.  Blood return noted pre and post infusion.  Site patent and intact, free from redness, edema or discomfort.  No evidence of extravasations.  Access discontinued per protocol.       Discharge Plan:   Discharge instructions reviewed with: Patient.  Patient and/or family verbalized understanding of discharge instructions and all questions answered.  AVS to patient via [employer].  Patient will return 3/4/22 for next appointment.   Patient discharged in stable condition accompanied by: self.  Departure Mode: Ambulatory.      Nidhi Brizuela RN

## 2022-02-04 ENCOUNTER — THERAPY VISIT (OUTPATIENT)
Dept: PHYSICAL THERAPY | Facility: CLINIC | Age: 47
End: 2022-02-04
Payer: COMMERCIAL

## 2022-02-04 DIAGNOSIS — G35 MS (MULTIPLE SCLEROSIS) (H): ICD-10-CM

## 2022-02-04 DIAGNOSIS — M54.2 CERVICALGIA: Primary | ICD-10-CM

## 2022-02-04 PROCEDURE — 97110 THERAPEUTIC EXERCISES: CPT | Mod: GP | Performed by: PHYSICAL THERAPIST

## 2022-02-04 PROCEDURE — 97035 APP MDLTY 1+ULTRASOUND EA 15: CPT | Mod: GP | Performed by: PHYSICAL THERAPIST

## 2022-02-04 PROCEDURE — 97140 MANUAL THERAPY 1/> REGIONS: CPT | Mod: GP | Performed by: PHYSICAL THERAPIST

## 2022-02-04 NOTE — PROGRESS NOTES
Subjective:  HPI  Physical Exam                    Objective:  System    Physical Exam    General     ROS    Assessment/Plan:          Progress reporting period is from 2/4/2022.       SUBJECTIVE  Subjective: Overall pt notes she is doing well. Pt notes with MS cold weather can affect her neck pain (make worse). She note a newer pain on left side of her anteolateral neck. She has been working out with light weight (5X/week- low impact)- feeling good about that. Walking on treadmill daily (20 min- doing a workout challenge). Cotinues to work on PT and yoga exercises as well; with working at [employer] being aware of posture corrections. Taking meds for night and only if needed during day.      Current Pain level: 4/10.      Initial Pain level: 8/10.   Changes in function:  Yes,   Adverse reaction to treatment or activity: None    OBJECTIVE  Changes noted in objective findings:  Yes,   Objective: CROM: flexion=slight limit (pull on left side); extension=full (no pain; feels good); R Rot=slight limit (pull on left); L Rot=slight limit (but no pain today). Tenderness/trigger point to L UT. Pt is overall doing very well with home wellness program (eating better, working out, being more active in the day).      ASSESSMENT/PLAN  Updated problem list and treatment plan: Diagnosis 1:  Neck pain and multiple sclerosis  Pain -  hot/cold therapy, US, manual therapy, self management, education, directional preference exercise and home program  Decreased ROM/flexibility - manual therapy and therapeutic exercise  Inflammation - cold therapy and self management/home program  Impaired muscle performance - neuro re-education  Decreased function - therapeutic activities  Impaired posture - neuro re-education  STG/LTGs have been met or progress has been made towards goals:  Yes (See Goal flow sheet completed today.)  Assessment of Progress: The patient's condition is improving.  Self Management Plans:  Patient has been instructed in a  home treatment program.  I have re-evaluated this patient and find that the nature, scope, duration and intensity of the therapy is appropriate for the medical condition of the patient.  Nataliya continues to require the following intervention to meet STG and LTG's:  PT    Recommendations:  This patient would benefit from continued therapy.     Frequency:  1 X a month, once daily  Duration:  for 3 months        Please refer to the daily flowsheet for treatment today, total treatment time and time spent performing 1:1 timed codes.

## 2022-02-08 ENCOUNTER — OFFICE VISIT (OUTPATIENT)
Dept: FAMILY MEDICINE | Facility: CLINIC | Age: 47
End: 2022-02-08
Payer: COMMERCIAL

## 2022-02-08 VITALS
BODY MASS INDEX: 47.65 KG/M2 | SYSTOLIC BLOOD PRESSURE: 130 MMHG | DIASTOLIC BLOOD PRESSURE: 84 MMHG | WEIGHT: 286 LBS | TEMPERATURE: 97.8 F | HEART RATE: 90 BPM | OXYGEN SATURATION: 97 % | HEIGHT: 65 IN

## 2022-02-08 DIAGNOSIS — Z23 NEED FOR INFLUENZA VACCINATION: ICD-10-CM

## 2022-02-08 DIAGNOSIS — G35 MULTIPLE SCLEROSIS (H): ICD-10-CM

## 2022-02-08 DIAGNOSIS — R21 RASH: Primary | ICD-10-CM

## 2022-02-08 DIAGNOSIS — E53.8 VITAMIN B12 DEFICIENCY: ICD-10-CM

## 2022-02-08 DIAGNOSIS — E61.2 MAGNESIUM DEFICIENCY: ICD-10-CM

## 2022-02-08 DIAGNOSIS — R25.3 EYE MUSCLE TWITCHES: ICD-10-CM

## 2022-02-08 DIAGNOSIS — E55.9 HYPOVITAMINOSIS D: ICD-10-CM

## 2022-02-08 LAB
ALBUMIN SERPL-MCNC: 3.7 G/DL (ref 3.4–5)
ALP SERPL-CCNC: 71 U/L (ref 40–150)
ALT SERPL W P-5'-P-CCNC: 29 U/L (ref 0–50)
ANION GAP SERPL CALCULATED.3IONS-SCNC: 7 MMOL/L (ref 3–14)
AST SERPL W P-5'-P-CCNC: 22 U/L (ref 0–45)
BILIRUB SERPL-MCNC: 0.3 MG/DL (ref 0.2–1.3)
BUN SERPL-MCNC: 13 MG/DL (ref 7–30)
CALCIUM SERPL-MCNC: 9 MG/DL (ref 8.5–10.1)
CHLORIDE BLD-SCNC: 104 MMOL/L (ref 94–109)
CO2 SERPL-SCNC: 24 MMOL/L (ref 20–32)
CREAT SERPL-MCNC: 0.82 MG/DL (ref 0.52–1.04)
GFR SERPL CREATININE-BSD FRML MDRD: 90 ML/MIN/1.73M2
GLUCOSE BLD-MCNC: 90 MG/DL (ref 70–99)
POTASSIUM BLD-SCNC: 4.2 MMOL/L (ref 3.4–5.3)
PROT SERPL-MCNC: 7.8 G/DL (ref 6.8–8.8)
SODIUM SERPL-SCNC: 135 MMOL/L (ref 133–144)
VIT B12 SERPL-MCNC: 728 PG/ML (ref 193–986)

## 2022-02-08 PROCEDURE — 83735 ASSAY OF MAGNESIUM: CPT | Performed by: NURSE PRACTITIONER

## 2022-02-08 PROCEDURE — 84207 ASSAY OF VITAMIN B-6: CPT | Mod: 90 | Performed by: NURSE PRACTITIONER

## 2022-02-08 PROCEDURE — 36415 COLL VENOUS BLD VENIPUNCTURE: CPT | Performed by: NURSE PRACTITIONER

## 2022-02-08 PROCEDURE — 99000 SPECIMEN HANDLING OFFICE-LAB: CPT | Performed by: NURSE PRACTITIONER

## 2022-02-08 PROCEDURE — 99214 OFFICE O/P EST MOD 30 MIN: CPT | Mod: 25 | Performed by: NURSE PRACTITIONER

## 2022-02-08 PROCEDURE — 80053 COMPREHEN METABOLIC PANEL: CPT | Performed by: NURSE PRACTITIONER

## 2022-02-08 PROCEDURE — 90471 IMMUNIZATION ADMIN: CPT | Performed by: NURSE PRACTITIONER

## 2022-02-08 PROCEDURE — 82607 VITAMIN B-12: CPT | Performed by: NURSE PRACTITIONER

## 2022-02-08 PROCEDURE — 90686 IIV4 VACC NO PRSV 0.5 ML IM: CPT | Performed by: NURSE PRACTITIONER

## 2022-02-08 PROCEDURE — 82306 VITAMIN D 25 HYDROXY: CPT | Performed by: NURSE PRACTITIONER

## 2022-02-08 PROCEDURE — 84630 ASSAY OF ZINC: CPT | Mod: 90 | Performed by: NURSE PRACTITIONER

## 2022-02-08 RX ORDER — DESONIDE 0.5 MG/G
CREAM TOPICAL 2 TIMES DAILY
Qty: 15 G | Refills: 0 | Status: SHIPPED | OUTPATIENT
Start: 2022-02-08

## 2022-02-08 ASSESSMENT — MIFFLIN-ST. JEOR: SCORE: 1948.17

## 2022-02-08 NOTE — PATIENT INSTRUCTIONS
"Can add additional Claritin 10 mg generic is fine daily for 3-7 days.       Patient Education     Contact Dermatitis  Contact dermatitis is a skin rash caused by something that touches the skin and makes it irritated and inflamed. Your skin may be red, swollen, dry, and may be cracked. Blisters may form and ooze. The rash will itch.   Contact dermatitis often forms on the face and neck, backs of hands, forearms, genitals, and lower legs. But it can affect any area.   People can get contact dermatitis from lots of sources. These include:    Plants such as poison ivy, oak, or sumac    Chemicals in hair dyes and rinses, soaps, solvents, waxes, fingernail polish, and deodorants     Jewelry or watchbands made of nickel or cobalt  Contact dermatitis is not passed from person to person.  Talk with your healthcare provider about what may have caused the rash. A type of allergy testing called \"patch testing\" may be used to discover what you are allergic to. You will need to stay away from the source of the rash in the future to prevent it from coming back.   Treatment is done to ease itching and prevent the rash from coming back. The rash should go away in a few days to a few weeks.   Home care  Your healthcare provider may prescribe medicine to ease swelling and itching. Follow all instructions when using these medicines.   General care    Stay away from anything that heats up your skin, such as hot showers or baths, or direct sunlight. This can make itching worse.    Apply cold compresses to soothe your sores to help ease your symptoms. Do this for 30 minutes 3 to 4 times a day. You can make a cold compress by soaking a cloth in cold water. Squeeze out excess water. You can add colloidal oatmeal to the water to help reduce itching. For severe itching in a small area, apply an ice pack wrapped in a thin towel. Do this for 20 minutes 3 to 4 times a day.    You can also try wet dressings. One way to do this is to wear a wet " piece of clothing under a dry one. Wear a damp shirt under a dry shirt if your upper body is affected. This can relieve itching and prevent you from scratching the affected area.    You can also help ease large areas of itching by taking a lukewarm bath with colloidal oatmeal added to the water.    Use hydrocortisone cream for redness and irritation, unless another medicine was prescribed. Calamine lotion can also relieve mild symptoms.    Use oral diphenhydramine to help reduce itching. You can buy this antihistamine at [facility] and grocery stores. It can make you sleepy, so use lower doses during the daytime. Don't use diphenhydramine if you have glaucoma or have trouble urinating because of an enlarged prostate.    If a plant causes your rash, make sure to wash your skin and the clothes you were wearing when you came into contact with the plant. This is to wash away the plant oils that gave you the rash and prevent more or worse symptoms. If you have a pet that's been outdoors, its fur may also have oil from the plant. Bathe your pet with soap or shampoo.    Stay away from the substance or object that causes your symptoms. If you can t stay away from it, wear gloves or some other type of protection    Follow-up care  Follow up with your healthcare provider, or as advised.  When to seek medical advice  Call your healthcare provider or seek medical attention right away if any of these occur:     Spreading of the rash to other parts of your body    Severe swelling of your face, eyelids, mouth, throat or tongue    Trouble urinating due to swelling in the genital area    Fever of 100.4 F (38 C) or higher, or as advised by your provider    Redness or swelling that gets worse    Pain that gets worse    Foul-smelling fluid leaking from the skin    Yellow-brown crusts on the open blisters  [employer] last reviewed this educational content on 8/1/2019 2000-2021 [employer]. All rights reserved. This  information is not intended as a substitute for professional medical care. Always follow your healthcare professional's instructions.           Patient Education     Lifestyle Changes for Controlling GERD  When you have GERD, stomach acid feels as if it s backing up toward your mouth. Making lifestyle changes can often improve your symptoms. This is true if you take medicine to control your GERD or not. Talk with your healthcare provider about the following suggestions. They may help you get relief from your symptoms.  Raise your head    Reflux is more likely to happen when you re lying down flat. That's because stomach fluid can flow backward more easily. Raising the head of your bed 4 to 6 inches can help. To do this:    Slide blocks or books under the legs at the head of your bed. Or put a wedge under the mattress. Many [employer] can make a wedge for you. The wedge should go from your waist to the top of your head.    Don t just prop your head up on a few pillows. This increases pressure on your stomach. It can make GERD worse.  Watch your eating habits  Certain foods may increase the acid in your stomach. Or they may relax the lower esophageal sphincter. This makes GERD more likely. It s best to avoid the following if they cause you symptoms:    Coffee, tea, and carbonated drinks (with and without caffeine)    Fatty, fried, or spicy food    Mint, chocolate, onions, tomatoes, and citrus    Peppermint    Any other foods that seem to irritate your stomach or cause you pain  Relieve the pressure  Tips include the following:    Eat smaller meals, even if you have to eat more often.    Don t lie down right after you eat. Wait a few hours for your stomach to empty.    Don't wear tight belts or tight-fitting clothes.    Lose any extra weight.  Tobacco and alcohol  Don't smoke tobacco or drink alcohol. They can make GERD symptoms worse.  [employer] last reviewed this educational content on 6/1/2019 2000-2021 The  "[employer]. All rights reserved. This information is not intended as a substitute for professional medical care. Always follow your healthcare professional's instructions.           Patient Education     Esophageal Spasm    The esophagus is a muscular tube that joins your mouth to your stomach. Contractions in the muscles in the esophagus help food move down to the stomach. When these muscles tighten or contract abnormally, it is known as spasm.  When the muscles spasm, it may feel like food is stuck and won t go down. It may cause a feeling of heartburn or a squeezing type of chest pain. The pain may spread to the neck, arm or back. If you try to swallow more food or liquid during a spasm, it may come back up within seconds.  Esophageal spasm is more common in people with gastroesophageal reflux disease (also called GERD or heartburn). Very hot or very cold foods or foods that are not chewed enough before swallowing may trigger a spasm. Symptoms can be constant or come and go.  Home care  Medicines  Your healthcare provider may prescribe medicines to help reduce your symptoms. If you have an underlying condition, such as GERD, your healthcare provider may prescribe medicines to help manage it.   Take all medicines as prescribed. Don't take any medicines without talking to your healthcare provider first.  Diet    Limit any foods that seem to cause spasm. This may include very hot or very cold foods.    Eat slowly and chew food well before swallowing.     Don't drink alcohol or caffeine. Don't use tobacco. These can delay healing and worsen the problem.    Try eating smaller meals. Have small snacks in between.  Follow-up care  Follow up with your healthcare provider, or as advised.  When to seek medical advice  Call your healthcare provider if any of the following occur:    Food that feels \"stuck\" in the esophagus for more than 30 minutes    Inability to swallow solid or liquids for more than 30 " minutes    Symptoms that feel like esophageal spasm but occur with heavy sweating, dizziness, or shortness of breath    Change in the usual patterns of your symptoms of esophageal spasm (new pattern of spreading to the neck, back, shoulder or arm; pain that is more severe than usual)    Lifestyle changes don't provide relief  [employer] last reviewed this educational content on 3/1/2018    2144-5068 [employer]. All rights reserved. This information is not intended as a substitute for professional medical care. Always follow your healthcare professional's instructions.

## 2022-02-08 NOTE — LETTER
"My Asthma Action Plan    Name: Nataliya Aldrich   YOB: 1975  Date: 2/8/2022   My doctor: MARTÍNEZ Michael CNP   My clinic: [facility]        My Control Medicine: { :692430}  My Rescue Medicine: { :006811::\"Albuterol (Proair/Ventolin/Proventil HFA) 2-4 puffs EVERY 4 HOURS as needed. Use a spacer if recommended by your provider.\"}  {AAP include Oral Steroid (Optional) :877560} My Asthma Severity:   Moderate Persistent  Know your asthma triggers:   smoke  dust mites  animal dander  mold  cold air            GREEN ZONE   Good Control    I feel good    No cough or wheeze    Can work, sleep and play without asthma symptoms       Take your asthma control medicine every day.     1. If exercise triggers your asthma, take your rescue medication    15 minutes before exercise or sports, and    During exercise if you have asthma symptoms  2. Spacer to use with inhaler: If you have a spacer, make sure to use it with your inhaler             YELLOW ZONE Getting Worse  I have ANY of these:    I do not feel good    Cough or wheeze    Chest feels tight    Wake up at night   1. Keep taking your Green Zone medications  2. Start taking your rescue medicine:    every 20 minutes for up to 1 hour. Then every 4 hours for 24-48 hours.  3. If you stay in the Yellow Zone for more than 12-24 hours, contact your doctor.  4. If you do not return to the Green Zone in 12-24 hours or you get worse, start taking your oral steroid medicine if prescribed by your provider.           RED ZONE Medical Alert - Get Help  I have ANY of these:    I feel awful    Medicine is not helping    Breathing getting harder    Trouble walking or talking    Nose opens wide to breathe       1. Take your rescue medicine NOW  2. If your provider has prescribed an oral steroid medicine, start taking it NOW  3. Call your doctor NOW  4. If you are still in the Red Zone after 20 minutes and you have not reached your " doctor:    Take your rescue medicine again and    Call 911 or go to the emergency room right away    See your regular doctor within 2 weeks of an Emergency Room or Urgent Care visit for follow-up treatment.          Annual Reminders:  Meet with Asthma Educator,  Flu Shot in the Fall, consider Pneumonia Vaccination for patients with asthma (aged 19 and older).    Pharmacy:    [id number] - [place], [place] - [address] [facility] [address] [place] - [address]  [id number] - [place], [place] - [address]     Electronically signed by MARTÍNEZ Michael CNP   Date: 02/08/22                      Asthma Triggers  How To Control Things That Make Your Asthma Worse    Triggers are things that make your asthma worse.  Look at the list below to help you find your triggers and what you can do about them.  You can help prevent asthma flare-ups by staying away from your triggers.      Trigger                                                          What you can do   Cigarette Smoke  Tobacco smoke can make asthma worse. Do not allow smoking in your home, car or around you.  Be sure no one smokes at a child s day care or school.  If you smoke, ask your health care provider for ways to help you quit.  Ask family members to quit too.  Ask your health care provider for a referral to Quit Plan to help you quit smoking, or call 0-137-690-PLAN.     Colds, Flu, Bronchitis  These are common triggers of asthma. Wash your hands often.  Don t touch your eyes, nose or mouth.  Get a flu shot every year.     Dust Mites  These are tiny bugs that live in cloth or carpet. They are too small to see. Wash sheets and blankets in hot water every week.   Encase pillows and mattress in dust mite proof covers.  Avoid having carpet if you can. If you have carpet, vacuum weekly.   Use a dust mask and HEPA vacuum.   Pollen and Outdoor Mold  Some people are allergic to  trees, grass, or weed pollen, or molds. Try to keep your windows closed.  Limit time out doors when pollen count is high.   Ask you health care provider about taking medicine during allergy season.     Animal Dander  Some people are allergic to skin flakes, urine or saliva from pets with fur or feathers. Keep pets with fur or feathers out of your home.    If you can t keep the pet outdoors, then keep the pet out of your bedroom.  Keep the bedroom door closed.  Keep pets off cloth furniture and away from stuffed toys.     Mice, Rats, and Cockroaches   Some people are allergic to the waste from these pests.   Cover food and garbage.  Clean up spills and food crumbs.  Store grease in the refrigerator.   Keep food out of the bedroom.   Indoor Mold  This can be a trigger if your home has high moisture. Fix leaking faucets, pipes, or other sources of water.   Clean moldy surfaces.  Dehumidify basement if it is damp and smelly.   Smoke, Strong Odors, and Sprays  These can reduce air quality. Stay away from strong odors and sprays, such as perfume, powder, hair spray, paints, smoke incense, paint, cleaning products, candles and new carpet.   Exercise or Sports  Some people with asthma have this trigger. Be active!  Ask your doctor about taking medicine before sports or exercise to prevent symptoms.    Warm up for 5-10 minutes before and after sports or exercise.     Other Triggers of Asthma  Cold air:  Cover your nose and mouth with a scarf.  Sometimes laughing or crying can be a trigger.  Some medicines and food can trigger asthma.

## 2022-02-08 NOTE — PROGRESS NOTES
Assessment & Plan     Rash  Rash etiologies discussed.  Is on new supplements as well as new skin care products.  Likely more of a contact dermatitis encouraged to discontinue new cosmetic products.  Unscented soaps with good unscented moisturizing lotion.  Desonide steroid cream for her face as needed.    - desonide (DESOWEN) 0.05 % external cream  Dispense: 15 g; Refill: 0    Multiple sclerosis (H)  Labs continue follow-up with neurology.  - Magnesium  - INFLUENZA VACCINE IM >6 MO VALENT IIV4 (ALFURIA/FLUZONE)  - Magnesium    Vitamin B12 deficiency    - Vitamin B12  - Vitamin B12    Hypovitaminosis D    - 25 OH Vit D therapy monitoring  - 25 OH Vit D therapy monitoring    Eye muscle twitches  Will check labs however could be normal phenomenon.  Discussed possibilities including oversupplementation versus fatigue versus MS versus vitamin deficiency.  Did go over all her vitamin supplements with her she has some overlap in what is in them between all the products she is taking.  They also has a high amount of carbohydrates in the supplements combined daily.  Encouraged her to reach out to her neurologist team to advise on supplements; for now continue her vitamin D and her magnesium supplement otherwise would go by advice from her neurologist team.   Nataliya verbalizes understanding of plan of care and is in agreement.     - Zinc  - Magnesium  - Comprehensive metabolic panel (BMP + Alb, Alk Phos, ALT, AST, Total. Bili, TP)  - Vitamin B6  - Zinc  - Magnesium  - Comprehensive metabolic panel (BMP + Alb, Alk Phos, ALT, AST, Total. Bili, TP)  - Vitamin B6    Magnesium deficiency    - Magnesium  - Magnesium    Need for influenza vaccination    - INFLUENZA VACCINE IM >6 MO VALENT IIV4 (ALFURIA/FLUZONE)    Return in about 2 weeks (around 2/22/2022), or if symptoms worsen or fail to improve, for Recheck.      MARTÍNEZ Michael [facility]    Richie An is a 46 year old who presents  for the following health issues     HPI     Rash / Hives  Onset/Duration: x5 days  Description  Location: face -- eyes lids are twitching when shut  Character: raised, burning, red, warm  Itching: moderate  Intensity:  moderate  Progression of Symptoms:  Waxing and waning  Accompanying signs and symptoms:   Fever: no  Body aches or joint pain: no  Sore throat symptoms: no  Recent cold symptoms: no  History:           Previous episodes of similar rash:   New exposures:  Supplements, Face wash-2 months  Recent travel: no  Exposure to similar rash: no  Precipitating or alleviating factors: Stopped eating strawberries had a reaction in past,  Has been taking a lot of supplements unsure if that it the issue, new face wash, last 2 days using Cera-Ve.  Therapies tried and outcome: Aquafor - Cera-Ve -- stops are better      New face product Oscar skin x 3 months.   Patient was on her phone to me to review.  Cera-VE last night Aquaphor and this am face feels better.  On new Supplements below     New masks.     Supplements: ONEL superfruits, supergreens, apple cider vinegar and ashwagandha   Apple cider vinegar takes 2 in am also has b12 folic acid x 4 months  Ashwagandha 2 in am 2 in pm vit d  X 4 months  Supergreens 2 in am has vitamin A, Thiamine, niacin, vit b6, folate, b12, d-calcium, iron, mag, zinc, x 1 month   Superfruits 2 in am has Vit  a, vit c, vit e, zinc, sodium, x 4 months   Melatonin 10 mg rather have her on that instead of trazodone.   Mag 400 mg daily x years  Sambucus elderberry  Once daily Vit C and Zinc x 5 months   Biotin once daily-sodium biotin x 2 months.     Review of Systems   Constitutional, HEENT, cardiovascular, pulmonary, GI, , musculoskeletal, neuro, skin, endocrine and psych systems are negative, except as otherwise noted in the HPI.      Objective    /84 (BP Location: Left arm, Patient Position: Chair, Cuff Size: Adult Large)   Pulse 90   Temp 97.8  F (36.6  C) (Tympanic)   Ht  "1.651 m (5' 5\")   Wt 129.7 kg (286 lb)   SpO2 97%   Breastfeeding No   BMI 47.59 kg/m    Body mass index is 47.59 kg/m .  Physical Exam   GENERAL: healthy, alert and no distress, very pleasant  EYES: Eyes grossly normal to inspection, PERRL and conjunctivae and sclerae normal  HENT: ear canals and TM's normal, nose and mouth without ulcers or lesions  RESP: lungs clear to auscultation - no rales, rhonchi or wheezes  CV: regular rate and rhythm, normal S1 S2, no S3 or S4, no murmur, click or rub, no peripheral edema and peripheral pulses strong  MS: no gross musculoskeletal defects noted, no edema  SKIN: Clear skin on exam today however reviewed picture from yesterday on her phone and she was noted to have raised erythematous papular rash scattered across primarily cheeks and nose bridge as well as some on her forehead   NEURO: Normal strength and tone, mentation intact and speech normal  PSYCH: mentation appears normal, affect normal/bright    Results for orders placed or performed in visit on 02/08/22   Zinc     Status: None   Result Value Ref Range    Zinc, Serum/Plasma 70.2 60.0 - 120.0 ug/dL   Vitamin B12     Status: Normal   Result Value Ref Range    Vitamin B12 728 193 - 986 pg/mL   Magnesium     Status: Normal   Result Value Ref Range    Magnesium 1.9 1.8 - 2.6 mg/dL   Comprehensive metabolic panel (BMP + Alb, Alk Phos, ALT, AST, Total. Bili, TP)     Status: Normal   Result Value Ref Range    Sodium 135 133 - 144 mmol/L    Potassium 4.2 3.4 - 5.3 mmol/L    Chloride 104 94 - 109 mmol/L    Carbon Dioxide (CO2) 24 20 - 32 mmol/L    Anion Gap 7 3 - 14 mmol/L    Urea Nitrogen 13 7 - 30 mg/dL    Creatinine 0.82 0.52 - 1.04 mg/dL    Calcium 9.0 8.5 - 10.1 mg/dL    Glucose 90 70 - 99 mg/dL    Alkaline Phosphatase 71 40 - 150 U/L    AST 22 0 - 45 U/L    ALT 29 0 - 50 U/L    Protein Total 7.8 6.8 - 8.8 g/dL    Albumin 3.7 3.4 - 5.0 g/dL    Bilirubin Total 0.3 0.2 - 1.3 mg/dL    GFR Estimate 90 >60 mL/min/1.73m2 "   25 OH Vit D therapy monitoring     Status: None   Result Value Ref Range    25 OH Vitamin D2 19 ug/L    25 OH Vitamin D3 52 ug/L    25 OH Vit D Total 71 20 - 75 ug/L    Narrative    This test was developed and its performance characteristics determined by the [facility],  Special Chemistry Laboratory. It has not been cleared or approved by the FDA. The laboratory is regulated under CLIA as qualified to perform high-complexity testing. This test is used for clinical purposes. It should not be regarded as investigational or for research.   Vitamin B6     Status: None   Result Value Ref Range    Vitamin B6 91.2 20.0 - 125.0 nmol/L

## 2022-02-08 NOTE — LETTER
February 22, 2022      Nataliya ASAEL Aldrich  [address]   [place] [address]        Dear Ms.Mosley Aldrich,    We are writing to inform you of your test results.      Here is a summary of your recent test results: we were unable to get a hold of you on your mychart.       Your labs look great. All of your labs are normal. I would see if you feel better being off the supplements as far as the twitching eyelid goes. Then add one at a time back in over a week or two and if eyelid twitch starts I would discontinue that one; if that makes sense.       I would also minimize the amount supplements given the amount of sugar content in all of them.           Let me know how it goes. You can also reach out to your neurologist to see their thoughts about the twitch.       Please call us at [phone] (or use [employer]ellyo) to address the above recommendations if needed.       Thank you for choosing [facility].   It was an honor and a privilege to participate in your care.       Resulted Orders   Magnesium   Result Value Ref Range    Magnesium 1.9 1.8 - 2.6 mg/dL   Comprehensive metabolic panel (BMP + Alb, Alk Phos, ALT, AST, Total. Bili, TP)   Result Value Ref Range    Sodium 135 133 - 144 mmol/L    Potassium 4.2 3.4 - 5.3 mmol/L    Chloride 104 94 - 109 mmol/L    Carbon Dioxide (CO2) 24 20 - 32 mmol/L    Anion Gap 7 3 - 14 mmol/L    Urea Nitrogen 13 7 - 30 mg/dL    Creatinine 0.82 0.52 - 1.04 mg/dL    Calcium 9.0 8.5 - 10.1 mg/dL    Glucose 90 70 - 99 mg/dL    Alkaline Phosphatase 71 40 - 150 U/L    AST 22 0 - 45 U/L    ALT 29 0 - 50 U/L    Protein Total 7.8 6.8 - 8.8 g/dL    Albumin 3.7 3.4 - 5.0 g/dL    Bilirubin Total 0.3 0.2 - 1.3 mg/dL    GFR Estimate 90 >60 mL/min/1.73m2      Comment:      Effective December 21, 2021 eGFRcr in adults is calculated using the 2021 CKD-EPI creatinine equation which includes age and gender (Josh et al., NEJM, DOI: 10.1056/PGIAoy1273341)   25 OH Vit D  therapy monitoring   Result Value Ref Range    25 OH Vitamin D2 19 ug/L    25 OH Vitamin D3 52 ug/L    25 OH Vit D Total 71 20 - 75 ug/L      Comment:      Season, race, dietary intake, and treatment affect the concentration of 25-hydroxy-Vitamin D. Values may decrease during winter months and increase during summer months. Values 20-29 ug/L may indicate Vitamin D insufficiency and values <20 ug/L may indicate Vitamin D deficiency.    Narrative    This test was developed and its performance characteristics determined by the [facility],  Special [facility] Laboratory. It has not been cleared or approved by the FDA. The laboratory is regulated under CLIA as qualified to perform high-complexity testing. This test is used for clinical purposes. It should not be regarded as investigational or for research.   Vitamin B6   Result Value Ref Range    Vitamin B6 91.2 20.0 - 125.0 nmol/L      Comment:      INTERPRETIVE INFORMATION: Vitamin B6 (Pyridoxal 5-Phosphate)    Pyridoxal 5'-phosphate measured in a specimen collected   following an 8-hour or overnight fast accurately indicates   vitamin B6 nutritional status. Non-fasting specimen   concentration reflects recent vitamin intake.    This test was developed and its performance characteristics   determined by [facility]. It has not been cleared or   approved by the US Food and Drug Administration. This test   was performed in a CLIA certified laboratory and is   intended for clinical purposes.  Performed By: [facility]  [address] [place] [id number]  : My Ellsworth MD       If you have any questions or concerns, please call the clinic at the number listed above.       Sincerely,      MARTÍNEZ Sams CNP

## 2022-02-09 ENCOUNTER — MYC MEDICAL ADVICE (OUTPATIENT)
Dept: FAMILY MEDICINE | Facility: CLINIC | Age: 47
End: 2022-02-09
Payer: COMMERCIAL

## 2022-02-09 LAB — MAGNESIUM SERPL-MCNC: 1.9 MG/DL (ref 1.8–2.6)

## 2022-02-11 LAB
PYRIDOXAL PHOS SERPL-SCNC: 91.2 NMOL/L
ZINC SERPL-MCNC: 70.2 UG/DL

## 2022-02-14 LAB
DEPRECATED CALCIDIOL+CALCIFEROL SERPL-MC: 71 UG/L (ref 20–75)
VITAMIN D2 SERPL-MCNC: 19 UG/L
VITAMIN D3 SERPL-MCNC: 52 UG/L

## 2022-02-15 NOTE — RESULT ENCOUNTER NOTE
Dear Nataliya,    Here is a summary of your recent test results:    Your labs look great. All of your labs are normal. I would see if you feel better being off the supplements as far as the twitching eyelid goes. Then add one at a time back in over a week or two and if eyelid twitch starts I would discontinue that one; if that makes sense.     I would also minimize the amount supplements given the amount of sugar content in all of them.       Let me know how it goes. You can also reach out to your neurologist to see their thoughts about the twitch.    Please call us at [phone] (or use [employer]) to address the above recommendations if needed.    Thank you for choosing [facility].  It was an honor and a privilege to participate in your care.       Healthy regards,    Leonie Collazo, LEROY  [facility]

## 2022-02-17 ENCOUNTER — MYC MEDICAL ADVICE (OUTPATIENT)
Dept: FAMILY MEDICINE | Facility: CLINIC | Age: 47
End: 2022-02-17

## 2022-02-17 ENCOUNTER — ANCILLARY PROCEDURE (OUTPATIENT)
Dept: GENERAL RADIOLOGY | Facility: CLINIC | Age: 47
End: 2022-02-17
Attending: PHYSICIAN ASSISTANT
Payer: COMMERCIAL

## 2022-02-17 ENCOUNTER — OFFICE VISIT (OUTPATIENT)
Dept: FAMILY MEDICINE | Facility: CLINIC | Age: 47
End: 2022-02-17
Payer: COMMERCIAL

## 2022-02-17 VITALS
HEIGHT: 60 IN | DIASTOLIC BLOOD PRESSURE: 80 MMHG | SYSTOLIC BLOOD PRESSURE: 120 MMHG | OXYGEN SATURATION: 97 % | BODY MASS INDEX: 55.95 KG/M2 | WEIGHT: 285 LBS | TEMPERATURE: 97.8 F | HEART RATE: 96 BPM | RESPIRATION RATE: 18 BRPM

## 2022-02-17 DIAGNOSIS — R10.11 RUQ ABDOMINAL PAIN: ICD-10-CM

## 2022-02-17 DIAGNOSIS — K59.00 CONSTIPATION, UNSPECIFIED CONSTIPATION TYPE: Primary | ICD-10-CM

## 2022-02-17 LAB
ALBUMIN SERPL-MCNC: 3.3 G/DL (ref 3.4–5)
ALBUMIN UR-MCNC: NEGATIVE MG/DL
ALP SERPL-CCNC: 62 U/L (ref 40–150)
ALT SERPL W P-5'-P-CCNC: 25 U/L (ref 0–50)
ANION GAP SERPL CALCULATED.3IONS-SCNC: 6 MMOL/L (ref 3–14)
APPEARANCE UR: CLEAR
AST SERPL W P-5'-P-CCNC: 18 U/L (ref 0–45)
BASOPHILS # BLD AUTO: 0 10E3/UL (ref 0–0.2)
BASOPHILS NFR BLD AUTO: 0 %
BILIRUB SERPL-MCNC: 0.3 MG/DL (ref 0.2–1.3)
BILIRUB UR QL STRIP: NEGATIVE
BUN SERPL-MCNC: 12 MG/DL (ref 7–30)
CALCIUM SERPL-MCNC: 9.1 MG/DL (ref 8.5–10.1)
CHLORIDE BLD-SCNC: 110 MMOL/L (ref 94–109)
CO2 SERPL-SCNC: 22 MMOL/L (ref 20–32)
COLOR UR AUTO: YELLOW
CREAT SERPL-MCNC: 0.9 MG/DL (ref 0.52–1.04)
CRP SERPL-MCNC: <2.9 MG/L (ref 0–8)
EOSINOPHIL # BLD AUTO: 0.2 10E3/UL (ref 0–0.7)
EOSINOPHIL NFR BLD AUTO: 3 %
ERYTHROCYTE [DISTWIDTH] IN BLOOD BY AUTOMATED COUNT: 13.8 % (ref 10–15)
ERYTHROCYTE [SEDIMENTATION RATE] IN BLOOD BY WESTERGREN METHOD: 32 MM/HR (ref 0–20)
GFR SERPL CREATININE-BSD FRML MDRD: 80 ML/MIN/1.73M2
GLUCOSE BLD-MCNC: 96 MG/DL (ref 70–99)
GLUCOSE UR STRIP-MCNC: NEGATIVE MG/DL
HCT VFR BLD AUTO: 36.3 % (ref 35–47)
HGB BLD-MCNC: 11.8 G/DL (ref 11.7–15.7)
HGB UR QL STRIP: NEGATIVE
IMM GRANULOCYTES # BLD: 0 10E3/UL
IMM GRANULOCYTES NFR BLD: 0 %
KETONES UR STRIP-MCNC: NEGATIVE MG/DL
LEUKOCYTE ESTERASE UR QL STRIP: NEGATIVE
LIPASE SERPL-CCNC: 116 U/L (ref 73–393)
LYMPHOCYTES # BLD AUTO: 4.2 10E3/UL (ref 0.8–5.3)
LYMPHOCYTES NFR BLD AUTO: 51 %
MCH RBC QN AUTO: 29.9 PG (ref 26.5–33)
MCHC RBC AUTO-ENTMCNC: 32.5 G/DL (ref 31.5–36.5)
MCV RBC AUTO: 92 FL (ref 78–100)
MONOCYTES # BLD AUTO: 0.7 10E3/UL (ref 0–1.3)
MONOCYTES NFR BLD AUTO: 8 %
MUCOUS THREADS #/AREA URNS LPF: PRESENT /LPF
NEUTROPHILS # BLD AUTO: 3.1 10E3/UL (ref 1.6–8.3)
NEUTROPHILS NFR BLD AUTO: 37 %
NITRATE UR QL: NEGATIVE
PH UR STRIP: 6 [PH] (ref 5–7)
PLATELET # BLD AUTO: 316 10E3/UL (ref 150–450)
POTASSIUM BLD-SCNC: 4.3 MMOL/L (ref 3.4–5.3)
PROT SERPL-MCNC: 7.3 G/DL (ref 6.8–8.8)
RBC # BLD AUTO: 3.94 10E6/UL (ref 3.8–5.2)
RBC #/AREA URNS AUTO: ABNORMAL /HPF
SODIUM SERPL-SCNC: 138 MMOL/L (ref 133–144)
SP GR UR STRIP: 1.01 (ref 1–1.03)
SQUAMOUS #/AREA URNS AUTO: ABNORMAL /LPF
UROBILINOGEN UR STRIP-ACNC: 0.2 E.U./DL
WBC # BLD AUTO: 8.1 10E3/UL (ref 4–11)
WBC #/AREA URNS AUTO: ABNORMAL /HPF

## 2022-02-17 PROCEDURE — 36415 COLL VENOUS BLD VENIPUNCTURE: CPT | Performed by: PHYSICIAN ASSISTANT

## 2022-02-17 PROCEDURE — 85025 COMPLETE CBC W/AUTO DIFF WBC: CPT | Performed by: PHYSICIAN ASSISTANT

## 2022-02-17 PROCEDURE — 99214 OFFICE O/P EST MOD 30 MIN: CPT | Performed by: PHYSICIAN ASSISTANT

## 2022-02-17 PROCEDURE — 83690 ASSAY OF LIPASE: CPT | Performed by: PHYSICIAN ASSISTANT

## 2022-02-17 PROCEDURE — 86140 C-REACTIVE PROTEIN: CPT | Performed by: PHYSICIAN ASSISTANT

## 2022-02-17 PROCEDURE — 81001 URINALYSIS AUTO W/SCOPE: CPT | Performed by: PHYSICIAN ASSISTANT

## 2022-02-17 PROCEDURE — 80053 COMPREHEN METABOLIC PANEL: CPT | Performed by: PHYSICIAN ASSISTANT

## 2022-02-17 PROCEDURE — 85652 RBC SED RATE AUTOMATED: CPT | Performed by: PHYSICIAN ASSISTANT

## 2022-02-17 PROCEDURE — 74019 RADEX ABDOMEN 2 VIEWS: CPT | Mod: FY | Performed by: RADIOLOGY

## 2022-02-17 NOTE — RESULT ENCOUNTER NOTE
Nataliya  Here are your recent results.  Your abdominal Xray shows a moderate stool burden even with your current frequent BMs.  I think it would be worthwhile to see if this is the cause of the upper bowel pain with the magnesium citrate clean out (I will send you a [employer] message and you should respond to that message so it comes directly to me).  However, if your lab results point me in another direction I will let you know (I should know before the end of the day).       If you have any questions please do not hesitate to contact our office via phone ([phone]) or [employer]ie.    Miya Crump, MS, PA-C  [facility]

## 2022-02-17 NOTE — PATIENT INSTRUCTIONS
Patient Education     Coping with Constipation  What is constipation?  If you have hard, dry stools that are difficult to pass, you have constipation. You may also have bloating, gas and stomach cramps.  How is it treated?  If the problem is severe, your care team can suggest medicine to relieve it (a laxative, stool softener or enema). Do not use medicine unless your care team tells you to.  You should not use an enema (rectal wash) if your white blood cell or platelet count is low.  What else can I do to treat or prevent constipation?    Eat at the same times each day.    Add fiber to your diet by eating:  ? Whole grain breads, cereals and pastas  ? Whole grains such as barley or brown rice  ? Raw vegetables  ? Fresh and dried fruits  ? Dried beans and peas  ? Nuts, seeds and popcorn.    Drink lots of fluids: at least eight to ten 8-ounce glasses each day. Try water, prune juice, warm juices, herbal teas and lemonade.    Have a hot drink with high-fiber foods for breakfast.    Eat the skins on fruits and potatoes. Wash them well before eating.    Add wheat bran to cereals, casseroles and homemade breads.    If gas is a problem:  ? Avoid gas-forming foods such carbonated (fizzy) drinks, broccoli, cabbage, cauliflower, dried beans and peas, peppers and onions.  ? Do not use a straw.  ? Do not chew gum.    Stay active. Simply getting out for a walk can help. Increase your exercise as you are able.    Try to use the toilet at the same times each day. Keep a record of your bowel movements.    If you take medicine that may cause constipation, your doctor may ask you to take a stool softener.  When should I call my care team?  Call your care team if:    You do not have a bowel movement for two or more days.    You have sudden, severe belly pain.    You notice blood in your stool.    You have severe hemorrhoids (swelling, itching and pain around the  anus).  Comments:  __________________________________________  __________________________________________  __________________________________________  __________________________________________  __________________________________________  __________________________________________  __________________________________________  __________________________________________  __________________________________________  __________________________________________  For informational purposes only. Not to replace the advice of your health care provider. Copyright   2006 [facility]. All rights reserved. Clinically reviewed by [facility]. SMARTworks 626390 - REV 04/19.

## 2022-02-17 NOTE — PROGRESS NOTES
Assessment & Plan     Constipation, unspecified constipation type  RUQ abdominal pain  Labs and x-ray reassuring against bowel obstruction or other acute process.  No bony abnormality appreciated on x-ray.  Highly suspect constipation as underlying etiology due to fluctuation in bowel pattern as of late as well as symptom improvement (albeit temporarily) after bowel movements.  Recommend bowel cleanout with magnesium citrate and then initiation of daily fiber supplement to maintain bowel movement regimen moving forward.  Patient will keep me apprised of her symptom response via [employer].  - XR Abdomen 2 Views  - UA with Microscopic - lab collect  - Comprehensive metabolic panel (BMP + Alb, Alk Phos, ALT, AST, Total. Bili, TP)  - Lipase  - CBC with platelets and differential  - ESR: Erythrocyte sedimentation rate  - CRP, inflammation  - Urine Microscopic Exam    DAILY FIBER THERAPY MIX:     1/2 cup Konsyl (psyllium fiber)  from [employer] , 1 cup of oat bran, 1 cup of wheat bran and 1 cup of flax seed meal - ground - = mix it together in a container or zip-loc bag and take 1 teaspoon in 1 cup of warm water daily,  follow with 1 -8 oz of water.       For some reason the Konsyl brand of psyllium fiber doesn't tend to give people as much gas/bloating as other brands do.  The warm liquid is to help soften the brans , so one doesn't feel like they are drinking saw dust in water.         Return in about 2 days (around 2/19/2022) for update via [employer].    Miya Crump PA-C  [facility]   Nataliya is a 46 year old who presents for the following health issues     History of Present Illness     Reason for visit:  Pain under right breast and colon pain and gas  Symptom onset:  3-7 days ago  Symptoms include:  Stabbing pain ;; pressure  Symptom intensity:  Moderate  Symptom progression:  Worsening  Had these symptoms before:  No  What makes it worse:  No  What makes it better:  Passing  / bowel movement    She eats 2-3 servings of fruits and vegetables daily.She consumes 0 sweetened beverage(s) daily.She exercises with enough effort to increase her heart rate 20 to 29 minutes per day.  She exercises with enough effort to increase her heart rate 5 days per week.   She is taking medications regularly.         Review of Systems   Constitutional, HEENT, cardiovascular, pulmonary, GI, , musculoskeletal, neuro, skin, endocrine and psych systems are negative, except as otherwise noted.      Objective    /80   Pulse 96   Temp 97.8  F (36.6  C) (Tympanic)   Resp 18   Ht 1.524 m (5')   Wt 129.3 kg (285 lb)   SpO2 97%   BMI 55.66 kg/m    Body mass index is 55.66 kg/m .  Physical Exam   GENERAL: healthy, alert and no distress  EYES: Eyes grossly normal to inspection  RESP: lungs clear to auscultation - no rales, rhonchi or wheezes  CV: regular rate and rhythm, normal S1 S2, no S3 or S4, no murmur, click or rub, no peripheral edema and peripheral pulses strong  ABDOMEN: soft, RUQ tenderness with guarding, no hepatosplenomegaly, no masses and bowel sounds normal  MS: no gross musculoskeletal defects noted, no edema  SKIN: no suspicious lesions or rashes  NEURO: Normal strength and tone, mentation intact and speech normal  PSYCH: mentation appears normal, affect normal/bright    Results for orders placed or performed in visit on 02/17/22   XR Abdomen 2 Views     Status: None    Narrative    XR ABDOMEN 2VIEWS 2/17/2022 9:08 AM    HISTORY: RUQ pain - waking from sleep; RUQ abdominal pain    COMPARISON: None.      Impression    IMPRESSION: Bowel gas pattern is nonobstructed. No free  intraperitoneal air. 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  Moderate stool in the colon.    SALVATORE ROSADO MD         SYSTEM ID:  [id number]   Results for orders placed or performed in visit on 02/17/22   UA with Microscopic - lab collect     Status: Normal   Result Value Ref Range    Color Urine Yellow  Colorless, Straw, Light Yellow, Yellow    Appearance Urine Clear Clear    Glucose Urine Negative Negative mg/dL    Bilirubin Urine Negative Negative    Ketones Urine Negative Negative mg/dL    Specific Gravity Urine 1.010 1.003 - 1.035    Blood Urine Negative Negative    pH Urine 6.0 5.0 - 7.0    Protein Albumin Urine Negative Negative mg/dL    Urobilinogen Urine 0.2 0.2, 1.0 E.U./dL    Nitrite Urine Negative Negative    Leukocyte Esterase Urine Negative Negative   Comprehensive metabolic panel (BMP + Alb, Alk Phos, ALT, AST, Total. Bili, TP)     Status: Abnormal   Result Value Ref Range    Sodium 138 133 - 144 mmol/L    Potassium 4.3 3.4 - 5.3 mmol/L    Chloride 110 (H) 94 - 109 mmol/L    Carbon Dioxide (CO2) 22 20 - 32 mmol/L    Anion Gap 6 3 - 14 mmol/L    Urea Nitrogen 12 7 - 30 mg/dL    Creatinine 0.90 0.52 - 1.04 mg/dL    Calcium 9.1 8.5 - 10.1 mg/dL    Glucose 96 70 - 99 mg/dL    Alkaline Phosphatase 62 40 - 150 U/L    AST 18 0 - 45 U/L    ALT 25 0 - 50 U/L    Protein Total 7.3 6.8 - 8.8 g/dL    Albumin 3.3 (L) 3.4 - 5.0 g/dL    Bilirubin Total 0.3 0.2 - 1.3 mg/dL    GFR Estimate 80 >60 mL/min/1.73m2   Lipase     Status: Normal   Result Value Ref Range    Lipase 116 73 - 393 U/L   ESR: Erythrocyte sedimentation rate     Status: Abnormal   Result Value Ref Range    Erythrocyte Sedimentation Rate 32 (H) 0 - 20 mm/hr   CRP, inflammation     Status: Normal   Result Value Ref Range    CRP Inflammation <2.9 0.0 - 8.0 mg/L   CBC with platelets and differential     Status: None   Result Value Ref Range    WBC Count 8.1 4.0 - 11.0 10e3/uL    RBC Count 3.94 3.80 - 5.20 10e6/uL    Hemoglobin 11.8 11.7 - 15.7 g/dL    Hematocrit 36.3 35.0 - 47.0 %    MCV 92 78 - 100 fL    MCH 29.9 26.5 - 33.0 pg    MCHC 32.5 31.5 - 36.5 g/dL    RDW 13.8 10.0 - 15.0 %    Platelet Count 316 150 - 450 10e3/uL    % Neutrophils 37 %    % Lymphocytes 51 %    % Monocytes 8 %    % Eosinophils 3 %    % Basophils 0 %    % Immature Granulocytes 0 %     Absolute Neutrophils 3.1 1.6 - 8.3 10e3/uL    Absolute Lymphocytes 4.2 0.8 - 5.3 10e3/uL    Absolute Monocytes 0.7 0.0 - 1.3 10e3/uL    Absolute Eosinophils 0.2 0.0 - 0.7 10e3/uL    Absolute Basophils 0.0 0.0 - 0.2 10e3/uL    Absolute Immature Granulocytes 0.0 <=0.4 10e3/uL   Urine Microscopic Exam     Status: Abnormal   Result Value Ref Range    RBC Urine 0-2 0-2 /HPF /HPF    WBC Urine None Seen 0-5 /HPF /HPF    Squamous Epithelials Urine Few (A) None Seen /LPF    Mucus Urine Present (A) None Seen /LPF   CBC with platelets and differential     Status: None    Narrative    The following orders were created for panel order CBC with platelets and differential.  Procedure                               Abnormality         Status                     ---------                               -----------         ------                     CBC with platelets and d...[[id number]]                      Final result                 Please view results for these tests on the individual orders.

## 2022-02-17 NOTE — RESULT ENCOUNTER NOTE
Nataliya  Here are your recent results.  Overall great news with your labs and urine ! no evidence of liver abnormalities/kidney/gallbladder/pancreatic etiology.  As mentioned in the results note for the x-ray, I do suspect that the moderate stool burden that is present may be contributing, especially with your improvement after a bowel movement (even temporarily).  I would like you to do a stool cleanout with magnesium citrate over the weekend and send me a MyChart update Saturday/Sunday regarding your pain/discomfort.  If you are not improved I can see you on Monday at the acute diagnostic service Center for further evaluation/imaging.      If you have any questions please do not hesitate to contact our office via phone ([phone]) or [employer].    Miya Crump, MS, PA-C  [facility]

## 2022-02-18 ENCOUNTER — E-VISIT (OUTPATIENT)
Dept: FAMILY MEDICINE | Facility: CLINIC | Age: 47
End: 2022-02-18
Payer: COMMERCIAL

## 2022-02-18 ENCOUNTER — MYC MEDICAL ADVICE (OUTPATIENT)
Dept: FAMILY MEDICINE | Facility: CLINIC | Age: 47
End: 2022-02-18

## 2022-02-18 DIAGNOSIS — K59.00 CONSTIPATION, UNSPECIFIED CONSTIPATION TYPE: Primary | ICD-10-CM

## 2022-02-18 DIAGNOSIS — M25.511 ACUTE PAIN OF RIGHT SHOULDER: Primary | ICD-10-CM

## 2022-02-18 PROCEDURE — 99207 PR NON-BILLABLE SERV PER CHARTING: CPT | Performed by: PHYSICIAN ASSISTANT

## 2022-02-18 ASSESSMENT — ASTHMA QUESTIONNAIRES: ACT_TOTALSCORE: 21

## 2022-02-18 NOTE — TELEPHONE ENCOUNTER
Routing to PCP to review and advise    Dion VYAS RN   [facility]

## 2022-02-18 NOTE — PATIENT INSTRUCTIONS
Patient Education     Treating Constipation  Constipation is a common and often uncomfortable problem. Constipation means you have bowel movements fewer than 3 times per week. Or that you strain to pass hard, dry stool. It can last a short time. Or it can be a problem that never seems to go away. The good news is that it can often be treated and controlled.   Eat more fiber  One of the best ways to help treat constipation is to increase your fiber intake. You can do this either through diet or by using fiber supplements. Fiber (in whole grains, fruits, and vegetables) adds bulk and absorbs water to soften the stool. This helps the stool pass through the colon more easily. When you increase your fiber intake, do it slowly to prevent side effects such as bloating. Also increase the amount of water that you drink. Eating more of these foods can add fiber to your diet:     High-fiber cereals    Whole grains, bran, and brown rice    Vegetables such as carrots, broccoli, and greens    Fresh fruits (especially apples, pears, and dried fruits such as raisins and apricots)    Nuts and legumes (especially beans such as lentils, kidney beans, and lima beans)  Get physically active  Exercise helps improve the working of your colon which helps ease constipation. Try to get some physical activity every day. If you haven t been active for a while, talk with your healthcare provider before starting again.     Laxatives  Your healthcare provider may suggest an over-the-counter product to help ease your constipation. He or she may suggest the use of bulk-forming agents or laxatives. Laxatives, if used as directed, are common and safe. Follow directions carefully when using them. See your provider for new-onset constipation, or long-term constipation, to rule out other causes such as medicines or thyroid disease.   [employer] last reviewed this educational content on 6/1/2019 2000-2021 [employer]. All rights  reserved. This information is not intended as a substitute for professional medical care. Always follow your healthcare professional's instructions.

## 2022-02-20 NOTE — RESULT ENCOUNTER NOTE
Note to Staff: please send a result letter    Dear Nataliya,     Here is a summary of your recent test results: we were unable to get a hold of you on your mychart.      Your labs look great. All of your labs are normal. I would see if you feel better being off the supplements as far as the twitching eyelid goes. Then add one at a time back in over a week or two and if eyelid twitch starts I would discontinue that one; if that makes sense.      I would also minimize the amount supplements given the amount of sugar content in all of them.         Let me know how it goes. You can also reach out to your neurologist to see their thoughts about the twitch.     Please call us at [phone] (or use [employer]) to address the above recommendations if needed.     Thank you for choosing  [facility].  It was an honor and a privilege to participate in your care.         Healthy regards,     Leonie Collazo, LEROY  [facility]

## 2022-02-28 ENCOUNTER — MYC MEDICAL ADVICE (OUTPATIENT)
Dept: FAMILY MEDICINE | Facility: CLINIC | Age: 47
End: 2022-02-28
Payer: COMMERCIAL

## 2022-02-28 NOTE — TELEPHONE ENCOUNTER
Please see my chart message below     Please review and advise     Thank you     Amelia Chance RN, BSN  [facility]

## 2022-03-03 ENCOUNTER — THERAPY VISIT (OUTPATIENT)
Dept: PHYSICAL THERAPY | Facility: CLINIC | Age: 47
End: 2022-03-03
Payer: COMMERCIAL

## 2022-03-03 DIAGNOSIS — G35 MS (MULTIPLE SCLEROSIS) (H): ICD-10-CM

## 2022-03-03 DIAGNOSIS — M54.2 CERVICALGIA: Primary | ICD-10-CM

## 2022-03-03 PROCEDURE — 97035 APP MDLTY 1+ULTRASOUND EA 15: CPT | Mod: GP | Performed by: PHYSICAL THERAPIST

## 2022-03-03 PROCEDURE — 97110 THERAPEUTIC EXERCISES: CPT | Mod: GP | Performed by: PHYSICAL THERAPIST

## 2022-03-03 PROCEDURE — 97140 MANUAL THERAPY 1/> REGIONS: CPT | Mod: GP | Performed by: PHYSICAL THERAPIST

## 2022-03-04 ENCOUNTER — INFUSION THERAPY VISIT (OUTPATIENT)
Dept: INFUSION THERAPY | Facility: CLINIC | Age: 47
End: 2022-03-04
Attending: PSYCHIATRY & NEUROLOGY
Payer: COMMERCIAL

## 2022-03-04 VITALS
TEMPERATURE: 97.7 F | HEART RATE: 71 BPM | OXYGEN SATURATION: 99 % | SYSTOLIC BLOOD PRESSURE: 134 MMHG | DIASTOLIC BLOOD PRESSURE: 87 MMHG

## 2022-03-04 DIAGNOSIS — G35 MULTIPLE SCLEROSIS (H): Primary | ICD-10-CM

## 2022-03-04 LAB
ALT SERPL W P-5'-P-CCNC: 28 U/L (ref 0–50)
AST SERPL W P-5'-P-CCNC: 23 U/L (ref 0–45)
BASOPHILS # BLD AUTO: 0.1 10E3/UL (ref 0–0.2)
BASOPHILS NFR BLD AUTO: 1 %
EOSINOPHIL # BLD AUTO: 0.3 10E3/UL (ref 0–0.7)
EOSINOPHIL NFR BLD AUTO: 3 %
ERYTHROCYTE [DISTWIDTH] IN BLOOD BY AUTOMATED COUNT: 13.9 % (ref 10–15)
HCT VFR BLD AUTO: 40.2 % (ref 35–47)
HGB BLD-MCNC: 12.6 G/DL (ref 11.7–15.7)
IMM GRANULOCYTES # BLD: 0 10E3/UL
IMM GRANULOCYTES NFR BLD: 0 %
LYMPHOCYTES # BLD AUTO: 4.6 10E3/UL (ref 0.8–5.3)
LYMPHOCYTES NFR BLD AUTO: 48 %
MCH RBC QN AUTO: 29.9 PG (ref 26.5–33)
MCHC RBC AUTO-ENTMCNC: 31.3 G/DL (ref 31.5–36.5)
MCV RBC AUTO: 96 FL (ref 78–100)
MONOCYTES # BLD AUTO: 0.9 10E3/UL (ref 0–1.3)
MONOCYTES NFR BLD AUTO: 10 %
NEUTROPHILS # BLD AUTO: 3.6 10E3/UL (ref 1.6–8.3)
NEUTROPHILS NFR BLD AUTO: 38 %
NRBC # BLD AUTO: 0 10E3/UL
NRBC BLD AUTO-RTO: 0 /100
PLATELET # BLD AUTO: 354 10E3/UL (ref 150–450)
RBC # BLD AUTO: 4.21 10E6/UL (ref 3.8–5.2)
WBC # BLD AUTO: 9.5 10E3/UL (ref 4–11)

## 2022-03-04 PROCEDURE — 84450 TRANSFERASE (AST) (SGOT): CPT | Performed by: PSYCHIATRY & NEUROLOGY

## 2022-03-04 PROCEDURE — 85025 COMPLETE CBC W/AUTO DIFF WBC: CPT | Performed by: PSYCHIATRY & NEUROLOGY

## 2022-03-04 PROCEDURE — 36415 COLL VENOUS BLD VENIPUNCTURE: CPT | Performed by: PSYCHIATRY & NEUROLOGY

## 2022-03-04 PROCEDURE — 96365 THER/PROPH/DIAG IV INF INIT: CPT

## 2022-03-04 PROCEDURE — 258N000003 HC RX IP 258 OP 636: Performed by: PSYCHIATRY & NEUROLOGY

## 2022-03-04 PROCEDURE — 84460 ALANINE AMINO (ALT) (SGPT): CPT | Performed by: PSYCHIATRY & NEUROLOGY

## 2022-03-04 PROCEDURE — 250N000011 HC RX IP 250 OP 636: Performed by: PSYCHIATRY & NEUROLOGY

## 2022-03-04 RX ORDER — HEPARIN SODIUM (PORCINE) LOCK FLUSH IV SOLN 100 UNIT/ML 100 UNIT/ML
5 SOLUTION INTRAVENOUS
Status: CANCELLED | OUTPATIENT
Start: 2022-03-18

## 2022-03-04 RX ORDER — DIPHENHYDRAMINE HCL 25 MG
50 CAPSULE ORAL EVERY 4 HOURS PRN
Status: CANCELLED
Start: 2022-03-18

## 2022-03-04 RX ORDER — ONDANSETRON 2 MG/ML
4 INJECTION INTRAMUSCULAR; INTRAVENOUS EVERY 8 HOURS PRN
Status: CANCELLED
Start: 2022-03-18

## 2022-03-04 RX ORDER — HEPARIN SODIUM,PORCINE 10 UNIT/ML
5 VIAL (ML) INTRAVENOUS
Status: CANCELLED | OUTPATIENT
Start: 2022-03-18

## 2022-03-04 RX ORDER — IBUPROFEN 200 MG
600 TABLET ORAL EVERY 6 HOURS PRN
Status: CANCELLED
Start: 2022-03-18

## 2022-03-04 RX ORDER — ACETAMINOPHEN 325 MG/1
650 TABLET ORAL EVERY 4 HOURS PRN
Status: CANCELLED
Start: 2022-03-18

## 2022-03-04 RX ADMIN — NATALIZUMAB 300 MG: 300 INJECTION INTRAVENOUS at 12:27

## 2022-03-04 RX ADMIN — SODIUM CHLORIDE 250 ML: 9 INJECTION, SOLUTION INTRAVENOUS at 12:26

## 2022-03-04 NOTE — PROGRESS NOTES
Infusion Nursing Note:  Nataliya Aldrich presents today for labs, Tysabri.    Patient seen by provider today: No   present during visit today: Not Applicable.    Note: N/A.      Intravenous Access:  Labs drawn without difficulty -- faxed to Dr Chong per labs staff  Peripheral IV placed.    Treatment Conditions:  Tysabri pre-infusion checklist completed via touch program.      Post Infusion Assessment:  Patient tolerated infusion without incident.  Patient observed for 60 minutes post Tysabri per protocol.  Site patent and intact, free from redness, edema or discomfort.  No evidence of extravasations.  Access discontinued per protocol.       Discharge Plan:   AVS to patient via [employer].  Patient will return 4/15/22 for next dose of Tysabri for next appointment.   Patient discharged in stable condition accompanied by: self.  Departure Mode: Ambulatory.      Megan Higuera RN

## 2022-03-08 ENCOUNTER — E-VISIT (OUTPATIENT)
Dept: FAMILY MEDICINE | Facility: CLINIC | Age: 47
End: 2022-03-08
Payer: COMMERCIAL
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
## 2022-03-08 DIAGNOSIS — T18.128A OBSTRUCTION OF ESOPHAGUS DUE TO FOOD IMPACTION: ICD-10-CM

## 2022-03-08 DIAGNOSIS — K21.00 GASTROESOPHAGEAL REFLUX DISEASE WITH ESOPHAGITIS, UNSPECIFIED WHETHER HEMORRHAGE: Primary | ICD-10-CM

## 2022-03-08 PROCEDURE — 99207 PR NO CHARGE LOS: CPT | Performed by: PHYSICIAN ASSISTANT

## 2022-03-08 RX ORDER — SUCRALFATE 1 G/1
1 TABLET ORAL 4 TIMES DAILY PRN
Qty: 60 TABLET | Refills: 0 | Status: SHIPPED | OUTPATIENT
Start: 2022-03-08 | End: 2022-09-22

## 2022-03-08 NOTE — PATIENT INSTRUCTIONS
Patient Education     Aleksandra-Kennedy Tear    Your esophagus is the tube that carries food from your mouth to your stomach. It plays an important role in digestion. Sometimes a person can tear the tissue of the lower esophagus, and it can start to bleed. This is called a Aleksandra-Kennedy tear.   Causes and symptoms of a Aleksandra-Kennedy tear  The most common cause of a tear is violent coughing or vomiting. Increased pressure in your belly (abdomen) from a hiatal hernia or childbirth can also lead to a tear. Seizures may cause a tear. So can alcoholism that leads to severe vomiting. Increasing age also raises the risk for a Aleksandra-Kennedy tear.   A tear can cause symptoms such as:    Vomit that is bright red or looks like coffee grounds    Stools that are black or sticky like tar    Weakness, dizziness, faintness, or shortness of breath    Diarrhea    Abdominal pain that sometimes spreads to the back  If the bleeding is not treated, it can continue for a long time. This can cause anemia, fatigue, and shortness of breath.   Diagnosing and treating a Aleksandra-Kennedy tear  If you have symptoms, your healthcare provider may test your stool to look for blood. You may also have an endoscopy. This is a procedure to look at your esophagus. It uses a tool called an endoscope. This is a thin, flexible tube with a camera and light at the end. After giving you some light sedation, the scope is put through your mouth and down into your esophagus. This lets your healthcare provider look at the inside of your esophagus.   In most cases, a Aleksandra-Kennedy tear will stop bleeding and heal on its own. But some people will need treatment. If needed, your healthcare provider will treat your tear through the endoscope. You may have an injection to make the bleeding stop. Or you may be given a heat treatment to close the wound. In some cases, a tiny clip may be used to close the tear. Depending on how much blood is lost, you may need a blood  transfusion. After treatment, you will likely have to take an acid blocker medicine for a couple weeks to help with healing.   Call 911  In rare cases, a Aleksandra-Kennedy tear can lead to severe internal bleeding. This is a medical emergency. Call 911 if you have:     A rapid, weak pulse    Much vomiting of blood    A blue tint to your lips or fingernails    Very little urine    Pale skin that s cool and moist to the touch    Confusion    Shallow breathing  [employer] last reviewed this educational content on 6/1/2019 2000-2021 [employer]. All rights reserved. This information is not intended as a substitute for professional medical care. Always follow your healthcare professional's instructions.

## 2022-03-14 DIAGNOSIS — G43.109 MIGRAINE WITH AURA AND WITHOUT STATUS MIGRAINOSUS, NOT INTRACTABLE: ICD-10-CM

## 2022-03-14 LAB — SCANNED LAB RESULT: ABNORMAL

## 2022-03-16 RX ORDER — TOPIRAMATE 100 MG/1
TABLET, FILM COATED ORAL
Qty: 90 TABLET | Refills: 1 | Status: SHIPPED | OUTPATIENT
Start: 2022-03-16 | End: 2022-09-02

## 2022-03-16 NOTE — TELEPHONE ENCOUNTER
Last note reviewed. 9/9/2021    Prescription approved per [facility] Refill Protocol.    Dion VYAS RN   [facility]

## 2022-03-31 ENCOUNTER — THERAPY VISIT (OUTPATIENT)
Dept: PHYSICAL THERAPY | Facility: CLINIC | Age: 47
End: 2022-03-31
Attending: PHYSICIAN ASSISTANT
Payer: COMMERCIAL

## 2022-03-31 DIAGNOSIS — M25.511 ACUTE PAIN OF RIGHT SHOULDER: ICD-10-CM

## 2022-03-31 DIAGNOSIS — G35 MS (MULTIPLE SCLEROSIS) (H): Primary | ICD-10-CM

## 2022-03-31 DIAGNOSIS — M54.2 CERVICALGIA: ICD-10-CM

## 2022-03-31 PROCEDURE — 97035 APP MDLTY 1+ULTRASOUND EA 15: CPT | Mod: GP | Performed by: PHYSICAL THERAPIST

## 2022-03-31 PROCEDURE — 97110 THERAPEUTIC EXERCISES: CPT | Mod: GP | Performed by: PHYSICAL THERAPIST

## 2022-03-31 PROCEDURE — 97140 MANUAL THERAPY 1/> REGIONS: CPT | Mod: GP | Performed by: PHYSICAL THERAPIST

## 2022-04-04 ENCOUNTER — TRANSFERRED RECORDS (OUTPATIENT)
Dept: HEALTH INFORMATION MANAGEMENT | Facility: CLINIC | Age: 47
End: 2022-04-04
Payer: COMMERCIAL

## 2022-04-07 ENCOUNTER — OFFICE VISIT (OUTPATIENT)
Dept: INTERNAL MEDICINE | Facility: CLINIC | Age: 47
End: 2022-04-07
Payer: COMMERCIAL

## 2022-04-07 ENCOUNTER — NURSE TRIAGE (OUTPATIENT)
Dept: FAMILY MEDICINE | Facility: CLINIC | Age: 47
End: 2022-04-07

## 2022-04-07 ENCOUNTER — E-VISIT (OUTPATIENT)
Dept: FAMILY MEDICINE | Facility: CLINIC | Age: 47
End: 2022-04-07
Payer: COMMERCIAL

## 2022-04-07 VITALS
OXYGEN SATURATION: 100 % | HEART RATE: 89 BPM | TEMPERATURE: 98.2 F | SYSTOLIC BLOOD PRESSURE: 132 MMHG | RESPIRATION RATE: 16 BRPM | HEIGHT: 65 IN | WEIGHT: 281 LBS | DIASTOLIC BLOOD PRESSURE: 80 MMHG | BODY MASS INDEX: 46.82 KG/M2

## 2022-04-07 DIAGNOSIS — R10.2 PELVIC PAIN IN FEMALE: Primary | ICD-10-CM

## 2022-04-07 DIAGNOSIS — D84.9 IMMUNOSUPPRESSION (H): ICD-10-CM

## 2022-04-07 DIAGNOSIS — R30.0 DYSURIA: ICD-10-CM

## 2022-04-07 DIAGNOSIS — G35 MULTIPLE SCLEROSIS (H): ICD-10-CM

## 2022-04-07 LAB
ALBUMIN UR-MCNC: NEGATIVE MG/DL
APPEARANCE UR: CLEAR
BILIRUB UR QL STRIP: NEGATIVE
COLOR UR AUTO: YELLOW
GLUCOSE UR STRIP-MCNC: NEGATIVE MG/DL
HGB UR QL STRIP: NEGATIVE
KETONES UR STRIP-MCNC: NEGATIVE MG/DL
LEUKOCYTE ESTERASE UR QL STRIP: NEGATIVE
NITRATE UR QL: NEGATIVE
PH UR STRIP: 6.5 [PH] (ref 5–7)
RBC #/AREA URNS AUTO: NORMAL /HPF
SP GR UR STRIP: <=1.005 (ref 1–1.03)
UROBILINOGEN UR STRIP-ACNC: 0.2 E.U./DL
WBC #/AREA URNS AUTO: NORMAL /HPF

## 2022-04-07 PROCEDURE — 99207 PR NON-BILLABLE SERV PER CHARTING: CPT | Performed by: NURSE PRACTITIONER

## 2022-04-07 PROCEDURE — 99214 OFFICE O/P EST MOD 30 MIN: CPT | Performed by: INTERNAL MEDICINE

## 2022-04-07 PROCEDURE — 81001 URINALYSIS AUTO W/SCOPE: CPT | Performed by: INTERNAL MEDICINE

## 2022-04-07 ASSESSMENT — PAIN SCALES - GENERAL: PAINLEVEL: EXTREME PAIN (8)

## 2022-04-07 NOTE — PROGRESS NOTES
"1534---1605  Face to face time with patient and with consultant: Over 30 minutes (1534---1605)     Assessment & Plan   (R10.2) Pelvic pain in female  (primary encounter diagnosis)  Comment: Discussed in detail with OB-GYN and with patient.   Pain suggestive of physiologic ovulation cyst vs more significant ovarian cyst.   Discussed alternating ibuprofen and acetaminophen every 3 hours while awaiting pelvic UA in am.   Plan: US Pelvic Complete with Transvaginal          (R30.0) Dysuria  Comment: UA normal.   Plan: UA with Microscopic reflex to Culture - Clinic         Collect, Urine Microscopic, CANCELED: UA Macro         with Reflex to Micro and Culture - lab collect          (G35) Multiple sclerosis (H) - Dr. Chong - on Tysabri infusions  (D84.9) Immunosuppression (H)  Comment: comorbidities stable but contribute to differential diagnostic concerns. She appears to have a nonsurgical abdominal exam. Continue to follow with Neurology.     (Z68.42) Body mass index 45.0-49.9, adult (H)  Comment: s/p bariatric surgery. Discussed eating/exercise measures.      BMI:   Estimated body mass index is 46.76 kg/m  as calculated from the following:    Height as of this encounter: 1.651 m (5' 5\").    Weight as of this encounter: 127.5 kg (281 lb).   Weight management plan: Discussed healthy diet and exercise guidelines    Patient Instructions   OB-GYN recommends getting a pelvic ultrasound, which I was able to schedule at the [facility] office ([address] [place] [address] in [place]) for tomorrow, Friday 4/8 at 10:40. They ask you to try to arrive by 10:30 with a full bladder.     They may also like for you to schedule a future OB-GYN follow up to review results.     They recommend trying to alternate use of Tylenol and ibuprofen about every three hours.       Return in about 1 day (around 4/8/2022) for Pelvic ultrasound.    Layton Cowan MD,   [facility] 46 [facility] who presents " "for the following health issues : Pelvic pain.    HPI     The patient has a history of recurrent pelvic pain, particularly recurrent over the past several months.  She awoke at about 12:30 AM with constant dull pelvic discomfort with interspersed \"waves of pain\".  This seems to be settling in toward the left side.  In the past it has somewhat alternated every month.  She is status post hysterectomy and therefore no longer has menses, but has attributed most of these pains to ovulation.  Previous pelvic ultrasounds have shown ovarian cyst that were felt either to be normal ovulatory cysts or at times larger cysts.    She has not with this episode had fevers or chills.  No more diffuse abdominal pain.  No nausea or vomiting.  She reports normal bowel movements, the most recent one about 2 hours ago.  She has been taking in solid food well.    The patient's past medical history includes multiple sclerosis diagnosed in 2017.  Relapses in the past have included symptoms of headaches, diffuse myalgias, gait instability and fatigue.  She is following with Dr. Chong of neurology regularly and on remittive therapy with overall improvement.      She is s/p bariatric surgery years ago.     Past medical, family and social histories as well as medications reviewed and updated as needed.    Review of Systems   REVIEW OF SYSTEMS: The following systems have been completely reviewed and are negative except as noted above:   Constitutional, respiratory, cardiovascular, gastrointestinal, genitourinary, musculoskeletal, dermatologic, psychiatric, and neurologic systems.        Objective    /80   Pulse 89   Temp 98.2  F (36.8  C) (Tympanic)   Resp 16   Ht 1.651 m (5' 5\")   Wt 127.5 kg (281 lb)   LMP  (LMP Unknown)   SpO2 100%   Breastfeeding No   BMI 46.76 kg/m    Body mass index is 46.76 kg/m .     Physical Exam   GENERAL: healthy, alert and no distress  RESP: lungs clear to auscultation - no rales, rhonchi or " wheezes  CV: regular rate and rhythm, normal S1 S2, no S3 or S4, no murmur, click or rub, no peripheral edema and peripheral pulses strong  ABDOMEN: tenderness LLQ and bowel sounds normal  MS: no gross musculoskeletal defects noted, no edema  BACK: no CVA tenderness, no paralumbar tenderness  PSYCH: mentation appears normal, affect normal/bright    Case discussed face to face with OB-GYN.

## 2022-04-07 NOTE — TELEPHONE ENCOUNTER
Noted, though upon discussing with [facility] , Dr. Cowan had an opening that did not require approval.     Pt given this spot. Pt called to advise. Patient stated an understanding and agreed with plan.  Next 5 appointments (look out 90 days)    Apr 07, 2022  3:20 PM  (Arrive by 3:00 PM)  Provider Visit with Layton Cowan MD  [facility] ([facility] ) [address] [place] [id number] [phone]        Dion VYAS RN   [facility]

## 2022-04-07 NOTE — TELEPHONE ENCOUNTER
Provider E-Visit time total (minutes):     Please call and triage.     This doesn't sound like just vag discharge.  She is having pelvic pain.  Please see if you can get her set up to day for in clinic provider or at least lab only  She can come to clinic today for wet prep and [facility]ACC urine if lab only (okay to put in my name) .     Based on results if no obvious cause may need pelvic US.

## 2022-04-07 NOTE — TELEPHONE ENCOUNTER
See triage note.  Next 5 appointments (look out 90 days)    Apr 07, 2022  3:20 PM  (Arrive by 3:00 PM)  Provider Visit with Layton Cowan MD  [facility] ([facility] ) [address] [facility] [address]  [phone]          Dion VYAS RN   [facility] Triage

## 2022-04-07 NOTE — PATIENT INSTRUCTIONS
OB-GYN recommends getting a pelvic ultrasound, which I was able to schedule at the [facility] office ([address]  in [place]) for tomorrow, Friday 4/8 at 10:40. They ask you to try to arrive by 10:30 with a full bladder.     They may also like for you to schedule a future OB-GYN follow up to review results.     They recommend trying to alternate use of Tylenol and ibuprofen about every three hours.

## 2022-04-07 NOTE — PATIENT INSTRUCTIONS
Josefina,     Thank you for choosing us for your care. I am sorry you are having these symptoms.     I am going to have nursing call you for an in clinic appointment ideally today or at least labs. A pelvic exam would be good. We can certainly do a pelvic ultrasound too if needed.     Healthy regards,       LEROY Michael (covering [facility])  Patient Education     Pelvic Pain, Uncertain Cause    Pelvic pain is pain felt in the lowest part of the belly (abdomen) and between the hipbones. The pain may occur suddenly and recently (acute). Or the pain may last for 6 months or longer (chronic).   There are many possible causes of pelvic pain. The pain may be due to a problem in the female reproductive system. Or it may be due to a problem in the digestive, urinary, or musculoskeletal systems.   Based on your visit today, the exact cause of your pelvic pain is not certain. Your condition doesn't seem to be serious at this time. But it is important for you to keep watching for any new symptoms or worsening of your condition.   General care  Your healthcare provider may advise a number of ways to help manage your pain. These can include:     Taking over-the-counter pain medicine. Stronger pain medicine may also be prescribed, if needed.    Applying heat to the pelvic area. Use a heating pad or a hot pack. Taking a hot bath may also help.    Getting plenty of rest.    Making certain lifestyle changes. These can include practicing good posture and getting regular exercise. Studies have shown that these changes help reduce pelvic pain in some women.    Seeing a physical therapist or pain specialist. These healthcare providers can discuss other ways to manage pain with you.    Acupressure or acupuncture.  Follow-up care  Follow up with your healthcare provider, or as advised.    When to get medical advice  Call your healthcare provider right away if any of the following occur:     Fever of 100.4 F or higher, or  as directed by your healthcare provider    Pain gets worse or you have sudden, severe pain or new pain    Nausea, vomiting, sweating, or restlessness    Dizziness or fainting    Abnormal vaginal discharge    Abnormal vaginal bleeding (especially bleeding after menopause)  [employer] last reviewed this educational content on 6/1/2020 2000-2021 [employer]. All rights reserved. This information is not intended as a substitute for professional medical care. Always follow your healthcare professional's instructions.

## 2022-04-07 NOTE — TELEPHONE ENCOUNTER
"S-(situation): Pt calling about e-visit submitted    B-(background): Pt reports lower abd pain that was onset this morning. Pt reports feels like ovarian cyst. Pt reports has tried OTC gas-ex, had some relief but pain has returned and is getting worse.     A-(assessment): Pt reports lower abd pain, center that radiates to right. Pt reports cramping like pains that come and go, stated almost feels like contractions. Pt reports not pregnant, had hysterectomy. Pt reports has pain that is constant at 3/10 and pain that comes and goes 8/10 lasting several seconds. Pt reports hx of ovarian cysts, MS. Pt reports has not taken any OTC pain reliever due to MS medications. Pt reports has tried heat and ice, change in positions. No relief.    R-(recommendations): Per chart review Leonie Collazo noted would be best to be seen in clinic today, would be okay with lab only if patient is wanting this. Pt reports wants to be seen. Advised nothing in [place] clinic, possible available slot in [place] clinic with approval.     Writer advised will send message to Dr. Cowan/team for second level and approval for time slot today.          Answer Assessment - Initial Assessment Questions  1. LOCATION: \"Where does it hurt?\"       Lower abd area  2. RADIATION: \"Does the pain shoot anywhere else?\" (e.g., chest, back)      Goes from center to right  3. ONSET: \"When did the pain begin?\" (e.g., minutes, hours or days ago)       This morning  4. SUDDEN: \"Gradual or sudden onset?\"      Sudden onset, gradual worsening  5. PATTERN \"Does the pain come and go, or is it constant?\"     - If constant: \"Is it getting better, staying the same, or worsening?\"       (Note: Constant means the pain never goes away completely; most serious pain is constant and it progresses)      - If intermittent: \"How long does it last?\" \"Do you have pain now?\"      (Note: Intermittent means the pain goes away completely between bouts)      Constant, but worsened at times  6. SEVERITY: " "\"How bad is the pain?\"  (e.g., Scale 1-10; mild, moderate, or severe)    - MILD (1-3): doesn't interfere with normal activities, abdomen soft and not tender to touch     - MODERATE (4-7): interferes with normal activities or awakens from sleep, tender to touch     - SEVERE (8-10): excruciating pain, doubled over, unable to do any normal activities       3/10 constant 8/10 worst  7. RECURRENT SYMPTOM: \"Have you ever had this type of abdominal pain before?\" If so, ask: \"When was the last time?\" and \"What happened that time?\"       yes  8. CAUSE: \"What do you think is causing the abdominal pain?\"      Unknown, possible ovarian cyst  9. RELIEVING/AGGRAVATING FACTORS: \"What makes it better or worse?\" (e.g., movement, antacids, bowel movement)      None,   10. OTHER SYMPTOMS: \"Has there been any vomiting, diarrhea, constipation, or urine problems?\"        None,   11. PREGNANCY: \"Is there any chance you are pregnant?\" \"When was your last menstrual period?\"        No, hx of hysterectomy    Protocols used: ABDOMINAL PAIN - FEMALE-A-OH      "

## 2022-04-08 ENCOUNTER — TRANSFERRED RECORDS (OUTPATIENT)
Dept: HEALTH INFORMATION MANAGEMENT | Facility: CLINIC | Age: 47
End: 2022-04-08

## 2022-04-08 ENCOUNTER — HOSPITAL ENCOUNTER (OUTPATIENT)
Dept: ULTRASOUND IMAGING | Facility: CLINIC | Age: 47
Discharge: HOME | End: 2022-04-08
Attending: INTERNAL MEDICINE | Admitting: INTERNAL MEDICINE
Payer: COMMERCIAL

## 2022-04-08 DIAGNOSIS — R10.2 PELVIC PAIN IN FEMALE: ICD-10-CM

## 2022-04-08 PROCEDURE — 76856 US EXAM PELVIC COMPLETE: CPT

## 2022-04-08 NOTE — TELEPHONE ENCOUNTER
Message handled by Nurse Triage with [facility] - provider name: AL-NP, please advise wet prep as well from visit yesterday.   Attempt # 1  Called # [phone]     Called to discuss add on Wet prep while at visit today.    Left a non detailed VM to call back at [phone] and ask for any available Triage Nurse.    Dion Gongora RN   [facility] Triage  .

## 2022-04-08 NOTE — TELEPHONE ENCOUNTER
Pt called back. Advised of need, Patient stated an understanding and agreed with plan.  Order placed, Pt stated will collect at US appt today.    Dion VYAS RN   [facility]

## 2022-04-13 ENCOUNTER — MYC MEDICAL ADVICE (OUTPATIENT)
Dept: FAMILY MEDICINE | Facility: CLINIC | Age: 47
End: 2022-04-13
Payer: COMMERCIAL

## 2022-04-13 DIAGNOSIS — J45.40 MODERATE PERSISTENT ASTHMA WITHOUT COMPLICATION: ICD-10-CM

## 2022-04-13 DIAGNOSIS — N83.209 HEMORRHAGIC OVARIAN CYST: Primary | ICD-10-CM

## 2022-04-13 NOTE — TELEPHONE ENCOUNTER
Please see my chart message below     Please review and advise     Thank you     Amelia Chance RN, BSN  [facility] Triage

## 2022-04-13 NOTE — LETTER
My Asthma Action Plan    Name: Nataliya Aldrich   YOB: 1975  Date: 4/13/2022   My doctor: Miya Crump PA-C   My clinic: [facility]        My Control Medicine: Budesonide + formoterol (Symbicort HFA) -  160/4.5 mcg 2 puffs twice daily  My Rescue Medicine: Albuterol (Proair/Ventolin/Proventil HFA) 2-4 puffs EVERY 4 HOURS as needed. Use a spacer if recommended by your provider.   My Asthma Severity:   Moderate Persistent  Know your asthma triggers:   smoke  dust mites  animal dander  mold  cold air            GREEN ZONE   Good Control    I feel good    No cough or wheeze    Can work, sleep and play without asthma symptoms       Take your asthma control medicine every day.     1. If exercise triggers your asthma, take your rescue medication    15 minutes before exercise or sports, and    During exercise if you have asthma symptoms  2. Spacer to use with inhaler: If you have a spacer, make sure to use it with your inhaler             YELLOW ZONE Getting Worse  I have ANY of these:    I do not feel good    Cough or wheeze    Chest feels tight    Wake up at night   1. Keep taking your Green Zone medications  2. Start taking your rescue medicine:    every 20 minutes for up to 1 hour. Then every 4 hours for 24-48 hours.  3. If you stay in the Yellow Zone for more than 12-24 hours, contact your doctor.  4. If you do not return to the Green Zone in 12-24 hours or you get worse, start taking your oral steroid medicine if prescribed by your provider.           RED ZONE Medical Alert - Get Help  I have ANY of these:    I feel awful    Medicine is not helping    Breathing getting harder    Trouble walking or talking    Nose opens wide to breathe       1. Take your rescue medicine NOW  2. If your provider has prescribed an oral steroid medicine, start taking it NOW  3. Call your doctor NOW  4. If you are still in the Red Zone after 20 minutes and you have not reached your  doctor:    Take your rescue medicine again and    Call 911 or go to the emergency room right away    See your regular doctor within 2 weeks of an Emergency Room or Urgent Care visit for follow-up treatment.          Annual Reminders:  Meet with Asthma Educator,  Flu Shot in the Fall, consider Pneumonia Vaccination for patients with asthma (aged 19 and older).    Pharmacy:    [id number] - [place], [place] - [address] [facility] [address] [place] [address]  [id number] - [place], [place] - [address] [address]     Electronically signed by Miya Crump PA-C   Date: 04/13/22                      Asthma Triggers  How To Control Things That Make Your Asthma Worse    Triggers are things that make your asthma worse.  Look at the list below to help you find your triggers and what you can do about them.  You can help prevent asthma flare-ups by staying away from your triggers.      Trigger                                                          What you can do   Cigarette Smoke  Tobacco smoke can make asthma worse. Do not allow smoking in your home, car or around you.  Be sure no one smokes at a child s day care or school.  If you smoke, ask your health care provider for ways to help you quit.  Ask family members to quit too.  Ask your health care provider for a referral to Quit Plan to help you quit smoking, or call 4-318-367-PLAN.     Colds, Flu, Bronchitis  These are common triggers of asthma. Wash your hands often.  Don t touch your eyes, nose or mouth.  Get a flu shot every year.     Dust Mites  These are tiny bugs that live in cloth or carpet. They are too small to see. Wash sheets and blankets in hot water every week.   Encase pillows and mattress in dust mite proof covers.  Avoid having carpet if you can. If you have carpet, vacuum weekly.   Use a dust mask and HEPA vacuum.   Pollen and Outdoor Mold  Some people are allergic to  trees, grass, or weed pollen, or molds. Try to keep your windows closed.  Limit time out doors when pollen count is high.   Ask you health care provider about taking medicine during allergy season.     Animal Dander  Some people are allergic to skin flakes, urine or saliva from pets with fur or feathers. Keep pets with fur or feathers out of your home.    If you can t keep the pet outdoors, then keep the pet out of your bedroom.  Keep the bedroom door closed.  Keep pets off cloth furniture and away from stuffed toys.     Mice, Rats, and Cockroaches   Some people are allergic to the waste from these pests.   Cover food and garbage.  Clean up spills and food crumbs.  Store grease in the refrigerator.   Keep food out of the bedroom.   Indoor Mold  This can be a trigger if your home has high moisture. Fix leaking faucets, pipes, or other sources of water.   Clean moldy surfaces.  Dehumidify basement if it is damp and smelly.   Smoke, Strong Odors, and Sprays  These can reduce air quality. Stay away from strong odors and sprays, such as perfume, powder, hair spray, paints, smoke incense, paint, cleaning products, candles and new carpet.   Exercise or Sports  Some people with asthma have this trigger. Be active!  Ask your doctor about taking medicine before sports or exercise to prevent symptoms.    Warm up for 5-10 minutes before and after sports or exercise.     Other Triggers of Asthma  Cold air:  Cover your nose and mouth with a scarf.  Sometimes laughing or crying can be a trigger.  Some medicines and food can trigger asthma.

## 2022-04-14 RX ORDER — ALBUTEROL SULFATE 90 UG/1
AEROSOL, METERED RESPIRATORY (INHALATION)
Qty: 18 G | Refills: 1 | Status: SHIPPED | OUTPATIENT
Start: 2022-04-14 | End: 2022-10-13

## 2022-04-15 ENCOUNTER — INFUSION THERAPY VISIT (OUTPATIENT)
Dept: INFUSION THERAPY | Facility: CLINIC | Age: 47
End: 2022-04-15
Attending: PSYCHIATRY & NEUROLOGY
Payer: COMMERCIAL

## 2022-04-15 VITALS
RESPIRATION RATE: 16 BRPM | HEART RATE: 80 BPM | OXYGEN SATURATION: 99 % | DIASTOLIC BLOOD PRESSURE: 75 MMHG | SYSTOLIC BLOOD PRESSURE: 123 MMHG | TEMPERATURE: 97.6 F

## 2022-04-15 DIAGNOSIS — G35 MULTIPLE SCLEROSIS (H): Primary | ICD-10-CM

## 2022-04-15 PROCEDURE — 250N000011 HC RX IP 250 OP 636: Performed by: PSYCHIATRY & NEUROLOGY

## 2022-04-15 PROCEDURE — 96365 THER/PROPH/DIAG IV INF INIT: CPT

## 2022-04-15 PROCEDURE — 258N000003 HC RX IP 258 OP 636: Performed by: PSYCHIATRY & NEUROLOGY

## 2022-04-15 RX ORDER — DIPHENHYDRAMINE HCL 25 MG
50 CAPSULE ORAL EVERY 4 HOURS PRN
Status: CANCELLED
Start: 2022-04-29

## 2022-04-15 RX ORDER — HEPARIN SODIUM (PORCINE) LOCK FLUSH IV SOLN 100 UNIT/ML 100 UNIT/ML
5 SOLUTION INTRAVENOUS
Status: CANCELLED | OUTPATIENT
Start: 2022-04-29

## 2022-04-15 RX ORDER — IBUPROFEN 200 MG
600 TABLET ORAL EVERY 6 HOURS PRN
Status: CANCELLED
Start: 2022-04-29

## 2022-04-15 RX ORDER — HEPARIN SODIUM,PORCINE 10 UNIT/ML
5 VIAL (ML) INTRAVENOUS
Status: CANCELLED | OUTPATIENT
Start: 2022-04-29

## 2022-04-15 RX ORDER — ACETAMINOPHEN 325 MG/1
650 TABLET ORAL EVERY 4 HOURS PRN
Status: CANCELLED
Start: 2022-04-29

## 2022-04-15 RX ORDER — ONDANSETRON 2 MG/ML
4 INJECTION INTRAMUSCULAR; INTRAVENOUS EVERY 8 HOURS PRN
Status: CANCELLED
Start: 2022-04-29

## 2022-04-15 RX ADMIN — NATALIZUMAB 300 MG: 300 INJECTION INTRAVENOUS at 12:33

## 2022-04-15 RX ADMIN — SODIUM CHLORIDE 250 ML: 9 INJECTION, SOLUTION INTRAVENOUS at 12:33

## 2022-04-15 NOTE — PROGRESS NOTES
Infusion Nursing Note:  Nataliya Aldrich presents today for Tysabri.    Patient seen by provider today: No   present during visit today: Not Applicable.    Note: No changes from last visit.      Intravenous Access:  Peripheral IV placed.    Treatment Conditions:  Tysabri pre-infusion checklist completed via touch program.      Post Infusion Assessment    Patient tolerated infusion without incident.  Blood return noted pre and post infusion.  Patient observed for 60 minutes post infusion per protocol.   Site patent and intact, free from redness, edema or discomfort.  No evidence of extravasations.  Access discontinued per protocol.       Discharge Plan:   Discharge instructions reviewed with: Patient.  Patient and/or family verbalized understanding of discharge instructions and all questions answered.  AVS to patient via [employer].  Patient will return 6/3/22 for next appointment.   Patient discharged in stable condition accompanied by: self.  Departure Mode: Ambulatory.      Melissa Levin RN

## 2022-04-18 ENCOUNTER — MYC MEDICAL ADVICE (OUTPATIENT)
Dept: FAMILY MEDICINE | Facility: CLINIC | Age: 47
End: 2022-04-18
Payer: COMMERCIAL

## 2022-04-18 DIAGNOSIS — M79.18 CHRONIC MUSCULOSKELETAL PAIN DUE TO DISORDER OF NERVOUS SYSTEM: ICD-10-CM

## 2022-04-18 DIAGNOSIS — G89.29 CHRONIC MUSCULOSKELETAL PAIN DUE TO DISORDER OF NERVOUS SYSTEM: ICD-10-CM

## 2022-04-18 DIAGNOSIS — M79.605 PAIN OF LEFT LOWER EXTREMITY: ICD-10-CM

## 2022-04-18 DIAGNOSIS — G98.8 CHRONIC MUSCULOSKELETAL PAIN DUE TO DISORDER OF NERVOUS SYSTEM: ICD-10-CM

## 2022-04-18 DIAGNOSIS — K21.00 GASTROESOPHAGEAL REFLUX DISEASE WITH ESOPHAGITIS, UNSPECIFIED WHETHER HEMORRHAGE: Primary | ICD-10-CM

## 2022-04-18 DIAGNOSIS — M62.838 MUSCLE SPASM: ICD-10-CM

## 2022-04-18 DIAGNOSIS — B00.9 HSV (HERPES SIMPLEX VIRUS) INFECTION: ICD-10-CM

## 2022-04-18 DIAGNOSIS — G35 MULTIPLE SCLEROSIS (H): ICD-10-CM

## 2022-04-18 DIAGNOSIS — E55.9 HYPOVITAMINOSIS D: ICD-10-CM

## 2022-04-19 RX ORDER — GABAPENTIN 300 MG/1
CAPSULE ORAL
Qty: 120 CAPSULE | Refills: 5 | Status: SHIPPED | OUTPATIENT
Start: 2022-04-19 | End: 2022-06-13

## 2022-04-19 RX ORDER — CALCIUM CARBONATE 500 MG/1
1 TABLET, CHEWABLE ORAL 2 TIMES DAILY
Qty: 100 TABLET | Refills: 1 | Status: SHIPPED | OUTPATIENT
Start: 2022-04-19 | End: 2022-10-05

## 2022-04-19 RX ORDER — VALACYCLOVIR HYDROCHLORIDE 500 MG/1
500 TABLET, FILM COATED ORAL DAILY
Qty: 90 TABLET | Refills: 3 | Status: SHIPPED | OUTPATIENT
Start: 2022-04-19 | End: 2022-11-23

## 2022-04-19 RX ORDER — BACLOFEN 20 MG/1
TABLET ORAL
Qty: 180 TABLET | Refills: 1 | Status: SHIPPED | OUTPATIENT
Start: 2022-04-19 | End: 2022-05-10

## 2022-04-19 NOTE — TELEPHONE ENCOUNTER
See my chart message below     Thank you     Amelia Chance RN, BSN  [facility]

## 2022-04-28 ENCOUNTER — THERAPY VISIT (OUTPATIENT)
Dept: PHYSICAL THERAPY | Facility: CLINIC | Age: 47
End: 2022-04-28
Payer: COMMERCIAL

## 2022-04-28 DIAGNOSIS — G35 MS (MULTIPLE SCLEROSIS) (H): Primary | ICD-10-CM

## 2022-04-28 DIAGNOSIS — M54.2 CERVICALGIA: ICD-10-CM

## 2022-04-28 PROCEDURE — 97035 APP MDLTY 1+ULTRASOUND EA 15: CPT | Mod: GP | Performed by: PHYSICAL THERAPIST

## 2022-04-28 PROCEDURE — 97110 THERAPEUTIC EXERCISES: CPT | Mod: GP | Performed by: PHYSICAL THERAPIST

## 2022-04-28 PROCEDURE — 97140 MANUAL THERAPY 1/> REGIONS: CPT | Mod: GP | Performed by: PHYSICAL THERAPIST

## 2022-04-28 NOTE — PROGRESS NOTES
Subjective:  HPI  Physical Exam                    Objective:  System    Physical Exam    General     ROS    Assessment/Plan:    PROGRESS  REPORT    Progress reporting period is from 4/28/2022.       SUBJECTIVE  Subjective: Overall pt notes she is feeling a little better. Increasing minutes on workouts/walking. Yoga in the morning of 10 min. 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 Overall sleeping in on and off (sometimes better than others).    Current Pain level: 3/10.      Initial Pain level: 8/10.   Changes in function:  Yes, improving motion and pain  Adverse reaction to treatment or activity: None    OBJECTIVE  Changes noted in objective findings:  Yes,   Objective: BROM: flexion=full (pull center of neck); Ext= full (feels good); R rot=full (no pain)  L rot= full (no pain).     ASSESSMENT/PLAN  Updated problem list and treatment plan: Diagnosis 1:  Neck pain; MS  Pain -  hot/cold therapy, manual therapy, self management, education, directional preference exercise and home program  Decreased ROM/flexibility - manual therapy, therapeutic exercise and home program  Inflammation - cold therapy, US, and self management/home program  Impaired muscle performance - neuro re-education and home program  Decreased function - therapeutic activities and home program  Impaired posture - neuro re-education and home program  Instability -  Therapeutic Activity  Therapeutic Exercise  STG/LTGs have been met or progress has been made towards goals:  Yes,   Assessment of Progress: The patient's condition has potential to improve.  Self Management Plans:  Patient has been instructed in a home treatment program.  I have re-evaluated this patient and find that the nature, scope, duration and intensity of the therapy is appropriate for the medical condition of the patient.  Nataliya continues to require the following intervention to meet STG and LTG's:  PT    Recommendations:  This patient would  benefit from continued therapy.     Frequency:  1 X a month, once daily  Duration:  for 3 months        Please refer to the daily flowsheet for treatment today, total treatment time and time spent performing 1:1 timed codes.

## 2022-04-29 ENCOUNTER — MYC MEDICAL ADVICE (OUTPATIENT)
Dept: FAMILY MEDICINE | Facility: CLINIC | Age: 47
End: 2022-04-29
Payer: COMMERCIAL

## 2022-05-03 ENCOUNTER — VIRTUAL VISIT (OUTPATIENT)
Dept: FAMILY MEDICINE | Facility: CLINIC | Age: 47
End: 2022-05-03
Payer: COMMERCIAL

## 2022-05-03 DIAGNOSIS — G98.8 CHRONIC MUSCULOSKELETAL PAIN DUE TO DISORDER OF NERVOUS SYSTEM: ICD-10-CM

## 2022-05-03 DIAGNOSIS — G35 MULTIPLE SCLEROSIS (H): Primary | ICD-10-CM

## 2022-05-03 DIAGNOSIS — G89.29 CHRONIC MUSCULOSKELETAL PAIN DUE TO DISORDER OF NERVOUS SYSTEM: ICD-10-CM

## 2022-05-03 DIAGNOSIS — M25.511 CHRONIC RIGHT SHOULDER PAIN: ICD-10-CM

## 2022-05-03 DIAGNOSIS — G89.29 CHRONIC RIGHT SHOULDER PAIN: ICD-10-CM

## 2022-05-03 DIAGNOSIS — M79.18 CHRONIC MUSCULOSKELETAL PAIN DUE TO DISORDER OF NERVOUS SYSTEM: ICD-10-CM

## 2022-05-03 DIAGNOSIS — M54.2 CERVICALGIA: ICD-10-CM

## 2022-05-03 PROCEDURE — 99214 OFFICE O/P EST MOD 30 MIN: CPT | Mod: 95 | Performed by: PHYSICIAN ASSISTANT

## 2022-05-03 NOTE — TELEPHONE ENCOUNTER
Please see mychart message. FMLA paperwork has been printed and placed in pcp bin.     Yong Subramanian

## 2022-05-03 NOTE — PROGRESS NOTES
Nataliya is a 46 year old who is being evaluated via a billable video visit.      How would you like to obtain your AVS? MyChart  If the video visit is dropped, the invitation should be resent by: Text to cell phone: [phone]  Will anyone else be joining your video visit? No    Video Start Time: 11:12 AM    Assessment & Plan     Multiple sclerosis (H) - Dr. Chong - on Tysabri infusions  Chronic musculoskeletal pain due to disorder of nervous system  Cervicalgia  Chronic right shoulder pain  Forms completed for FMLA.  Continue physical therapy every 2 weeks and infusions every month.  Follow up with neurology as recommended.      Review of prior external note(s) from - [facility] information from [facility] reviewed  36 minutes spent on the date of the encounter doing chart review, history and exam, documentation and further activities per the note      Return in about 4 months (around 9/9/2022) for Physical Exam, Fasting labs, Medication recheck.    Miya Crump PA-C  [facility] PRIOR [facility]   Nataliya is a 46 year old who presents for the following health issues     History of Present Illness       Reason for visit:  FMLA paperwork  Symptom onset:  More than a month  Symptoms include:  M.S  Symptom intensity:  Moderate  Symptom progression:  Staying the same  Had these symptoms before:  Yes  Has tried/received treatment for these symptoms:  Yes  Previous treatment was successful:  Yes  Prior treatment description:  Tysabri and therpay  What makes it worse:  Major activity  What makes it better:  Rest    She eats 4 or more servings of fruits and vegetables daily.She consumes 0 sweetened beverage(s) daily.She exercises with enough effort to increase her heart rate 30 to 60 minutes per day.  She exercises with enough effort to increase her heart rate 5 days per week. She is missing 1 dose(s) of medications per week.  She is not taking prescribed medications regularly due  to side effects.     Cervical DJD  Persistent and nearly daily neck pain.   Gabapentin is helpful for her pain.  Had an MRI of her cervical spine 11/4/2020 that showed minimal degenerative changes of the cervical spine without spinal canal or neural foraminal stenosis.  No cord abnormality.    Attends physical therapy every 2 weeks.  This helps her symptoms significantly.    Multiple Sclerosis  Diagnosed in 2016.  The patient initially presented with a history of facial numbness, slurred speech and right-sided motor and sensory symptoms in June 2015.  MRI imaging demonstrated a lesion in the addison that was initially interpreted as a cerebral infarct.  However, on subsequent review of the imaging, it was felt that an inflammatory demyelinating process was a possibility.  The patient also developed a new enhancing lesion noted on brain MRI in May 2016.  This was diagnostic for RRMS.  She now follows with Dr. Chong at [facility] -visit within the last month with his care team.  Previously had been seen at the [facility]  every 4 weeks.  Takes baclofen for leg spasms at night and occasionally during the day, however, does not like taking this because of an almost immediate hand tremor.  She has issues with spasms, chronic fatigue, chronic neck and leg pain.  Also reports taking magnesium twice daily 400 mg.      Review of Systems   Constitutional, HEENT, cardiovascular, pulmonary, GI, , musculoskeletal, neuro, skin, endocrine and psych systems are negative, except as otherwise noted.      Objective           Vitals:  No vitals were obtained today due to virtual visit.    Physical Exam   GENERAL: Healthy, alert and no distress  EYES: Eyes grossly normal to inspection.  No discharge or erythema, or obvious scleral/conjunctival abnormalities.  RESP: No audible wheeze, cough, or visible cyanosis.  No visible retractions or increased work of breathing.    SKIN: Visible skin clear. No  significant rash, abnormal pigmentation or lesions.  NEURO: Cranial nerves grossly intact.  Mentation and speech appropriate for age.  PSYCH: Mentation appears normal, affect normal/bright, judgement and insight intact, normal speech and appearance well-groomed.                Video-Visit Details    Type of service:  Video Visit    Video End Time:11:22 AM    Originating Location (pt. Location): Home    Distant Location (provider location):  [facility]     Platform used for Video Visit: [employer]

## 2022-05-09 ASSESSMENT — ASTHMA QUESTIONNAIRES: ACT_TOTALSCORE: 21

## 2022-05-10 DIAGNOSIS — M79.605 PAIN OF LEFT LOWER EXTREMITY: ICD-10-CM

## 2022-05-10 DIAGNOSIS — J30.2 SEASONAL ALLERGIC RHINITIS, UNSPECIFIED TRIGGER: ICD-10-CM

## 2022-05-10 DIAGNOSIS — M62.838 MUSCLE SPASM: ICD-10-CM

## 2022-05-10 DIAGNOSIS — J45.40 MODERATE PERSISTENT ASTHMA WITHOUT COMPLICATION: ICD-10-CM

## 2022-05-10 DIAGNOSIS — G35 MULTIPLE SCLEROSIS (H): ICD-10-CM

## 2022-05-10 RX ORDER — FLUTICASONE PROPIONATE 50 MCG
SPRAY, SUSPENSION (ML) NASAL
Qty: 16 G | Refills: 5 | Status: SHIPPED | OUTPATIENT
Start: 2022-05-10 | End: 2023-04-11

## 2022-05-10 RX ORDER — BUDESONIDE AND FORMOTEROL FUMARATE DIHYDRATE 160; 4.5 UG/1; UG/1
AEROSOL RESPIRATORY (INHALATION)
Qty: 10.2 G | Refills: 5 | Status: SHIPPED | OUTPATIENT
Start: 2022-05-10 | End: 2022-11-23

## 2022-05-10 RX ORDER — AZELASTINE 1 MG/ML
1 SPRAY, METERED NASAL 2 TIMES DAILY PRN
Qty: 30 ML | Refills: 5 | Status: SHIPPED | OUTPATIENT
Start: 2022-05-10 | End: 2022-11-23

## 2022-05-10 RX ORDER — BACLOFEN 20 MG/1
TABLET ORAL
Qty: 180 TABLET | Refills: 1 | Status: SHIPPED | OUTPATIENT
Start: 2022-05-10 | End: 2022-07-11
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
## 2022-05-13 ENCOUNTER — THERAPY VISIT (OUTPATIENT)
Dept: PHYSICAL THERAPY | Facility: CLINIC | Age: 47
End: 2022-05-13
Payer: COMMERCIAL

## 2022-05-13 DIAGNOSIS — G35 MS (MULTIPLE SCLEROSIS) (H): Primary | ICD-10-CM

## 2022-05-13 DIAGNOSIS — M54.2 CERVICALGIA: ICD-10-CM

## 2022-05-13 PROCEDURE — 97110 THERAPEUTIC EXERCISES: CPT | Mod: GP | Performed by: PHYSICAL THERAPIST

## 2022-05-13 PROCEDURE — 97140 MANUAL THERAPY 1/> REGIONS: CPT | Mod: GP | Performed by: PHYSICAL THERAPIST

## 2022-05-13 PROCEDURE — 97035 APP MDLTY 1+ULTRASOUND EA 15: CPT | Mod: GP | Performed by: PHYSICAL THERAPIST

## 2022-05-19 ENCOUNTER — VIRTUAL VISIT (OUTPATIENT)
Dept: FAMILY MEDICINE | Facility: CLINIC | Age: 47
End: 2022-05-19
Payer: COMMERCIAL

## 2022-05-19 ENCOUNTER — MYC MEDICAL ADVICE (OUTPATIENT)
Dept: FAMILY MEDICINE | Facility: CLINIC | Age: 47
End: 2022-05-19

## 2022-05-19 DIAGNOSIS — J34.89 SINUS PRESSURE: ICD-10-CM

## 2022-05-19 DIAGNOSIS — G43.711 INTRACTABLE CHRONIC MIGRAINE WITHOUT AURA AND WITH STATUS MIGRAINOSUS: ICD-10-CM

## 2022-05-19 DIAGNOSIS — R23.3 PETECHIAE: Primary | ICD-10-CM

## 2022-05-19 PROCEDURE — 99215 OFFICE O/P EST HI 40 MIN: CPT | Mod: 95 | Performed by: PHYSICIAN ASSISTANT

## 2022-05-19 RX ORDER — VIT C/ZN GLUC/HERBAL NO.325 90 MG-15MG
1 LOZENGE MUCOUS MEMBRANE DAILY
COMMUNITY
Start: 2022-05-19 | End: 2022-09-22

## 2022-05-19 RX ORDER — METHYLPREDNISOLONE 4 MG
TABLET, DOSE PACK ORAL
Qty: 21 TABLET | Refills: 0 | Status: SHIPPED | OUTPATIENT
Start: 2022-05-19 | End: 2022-07-13

## 2022-05-19 NOTE — Clinical Note
Hello- This patient is having frequent Petichiae episodes - is there any of her medications/supplements that could be contributing to platelet issues?  Thank you! Miya Crump MBA, MS, PA-C [facility] [place]

## 2022-05-19 NOTE — PROGRESS NOTES
Nataliya is a 46 year old who is being evaluated via a billable video visit.      How would you like to obtain your AVS? MyChart  If the video visit is dropped, the invitation should be resent by: [phone]  Will anyone else be joining your video visit? No      Video Start Time: 9:54 AM    Assessment & Plan     Petechiae  Suspect that her small blood vessels of her face are likely still recovering from the trauma a few months ago with the multiple blood vessel ruptures with coughing.  Platelets have been normal and are monitored closely with multiple sclerosis infusions.  No other concerning bleeding episodes.  No nosebleeds or significant easy bruising complaints.  Continue to monitor and if persisting longer than 6 months can involve hematology consultation if needed I have also reached out to [facility] pharmacy to see if any of her medications cause platelet aggregation issues.  I will keep her apprised.      Sinus pressure  Likely allergic.  Continue nasal sprays and antihistamine and utilize Medrol Dosepak for symptom relief.  - methylPREDNISolone (MEDROL DOSEPAK) 4 MG tablet therapy pack  Dispense: 21 tablet; Refill: 0    Intractable chronic migraine without aura and with status migrainosus  Medication list updated  - riboflavin 400 MG CAPS       42 minutes spent on the date of the encounter doing chart review, history and exam, documentation and further activities per the note       Return in about 4 months (around 9/9/2022) for Physical Exam.    Miya Crump PA-C  [facility]   Nataliya is a 46 year old who presents for the following health issues     HPI     Rash  Onset/Duration: 5/15/2022  Description  Location: Both eyelids  Character: blotchy, red  Itching: no  Intensity:  mild  Progression of Symptoms:  improving  Accompanying signs and symptoms:   Fever: no  Body aches or joint pain: no  Sore throat symptoms: no  Recent cold symptoms: no  History:            Previous episodes of similar rash: None  New exposures: none  Recent travel: no  Exposure to similar rash: no  Precipitating or alleviating factors: none  Therapies tried and outcome: none    Patient had an episode a few months ago where she was sneezing a lot and coughing a lot and had first a blood vessel in her eye and had tPA on her bilateral cheeks.  She states that she was at [employer] on 5/15/2022 and crying pretty aggressively when she noticed similar lesions above her left and right eyelids.  They are not raised.  They do not itch.  They are gradually improving.  Her son does have a history of ITP.  She has started taking 2 new supplements in the last few months including riboflavin for headaches as recommended by neurology and elderberry/vitamin C also recommended by neurology.    She also does have fairly significant sinus congestion most notably at the base of the nose.  She has been using her Astelin and Flonase nasal sprays as well as her over-the-counter antihistamine but states that it is quite bothersome.  This has been present for the last week.    Review of Systems   Constitutional, HEENT, cardiovascular, pulmonary, GI, , musculoskeletal, neuro, skin, endocrine and psych systems are negative, except as otherwise noted.      Objective           Vitals:  No vitals were obtained today due to virtual visit.    Physical Exam   GENERAL: Healthy, alert and no distress  EYES: Eyes grossly normal to inspection.  No discharge or erythema, or obvious scleral/conjunctival abnormalities.  RESP: No audible wheeze, cough, or visible cyanosis.  No visible retractions or increased work of breathing.    SKIN: Visible skin clear. No significant rash, abnormal pigmentation or lesions.  NEURO: Cranial nerves grossly intact.  Mentation and speech appropriate for age.  PSYCH: Mentation appears normal, affect normal/bright, judgement and insight intact, normal speech and appearance well-groomed.    Platelet Count   Date  Value Ref Range Status   03/04/2022 354 150 - 450 10e3/uL Final   02/17/2022 316 150 - 450 10e3/uL Final   09/09/2021 340 150 - 450 10e3/uL Final   04/02/2021 359 150 - 450 10e9/L Final   11/27/2020 329 150 - 450 10e9/L Final   10/02/2020 351 150 - 450 10e9/L Final   {            Video-Visit Details    Type of service:  Video Visit    Video End Time:10:17 AM    Originating Location (pt. Location): Home    Distant Location (provider location):  [facility]     Platform used for Video Visit: [employer]

## 2022-06-03 ENCOUNTER — INFUSION THERAPY VISIT (OUTPATIENT)
Dept: INFUSION THERAPY | Facility: CLINIC | Age: 47
End: 2022-06-03
Attending: PSYCHIATRY & NEUROLOGY
Payer: COMMERCIAL

## 2022-06-03 VITALS
TEMPERATURE: 99.4 F | RESPIRATION RATE: 16 BRPM | OXYGEN SATURATION: 99 % | DIASTOLIC BLOOD PRESSURE: 88 MMHG | SYSTOLIC BLOOD PRESSURE: 131 MMHG | HEART RATE: 65 BPM

## 2022-06-03 DIAGNOSIS — G35 MULTIPLE SCLEROSIS (H): Primary | ICD-10-CM

## 2022-06-03 PROCEDURE — 86355 B CELLS TOTAL COUNT: CPT | Performed by: PSYCHIATRY & NEUROLOGY

## 2022-06-03 PROCEDURE — 250N000011 HC RX IP 250 OP 636: Performed by: PSYCHIATRY & NEUROLOGY

## 2022-06-03 PROCEDURE — 258N000003 HC RX IP 258 OP 636: Performed by: PSYCHIATRY & NEUROLOGY

## 2022-06-03 PROCEDURE — 36415 COLL VENOUS BLD VENIPUNCTURE: CPT | Performed by: PSYCHIATRY & NEUROLOGY

## 2022-06-03 PROCEDURE — 96365 THER/PROPH/DIAG IV INF INIT: CPT

## 2022-06-03 RX ORDER — DIPHENHYDRAMINE HCL 25 MG
50 CAPSULE ORAL EVERY 4 HOURS PRN
Status: CANCELLED
Start: 2022-06-10

## 2022-06-03 RX ORDER — ACETAMINOPHEN 325 MG/1
650 TABLET ORAL EVERY 4 HOURS PRN
Status: DISCONTINUED | OUTPATIENT
Start: 2022-06-03 | End: 2022-06-03 | Stop reason: HOSPADM

## 2022-06-03 RX ORDER — IBUPROFEN 200 MG
600 TABLET ORAL EVERY 6 HOURS PRN
Status: CANCELLED
Start: 2022-06-10

## 2022-06-03 RX ORDER — ACETAMINOPHEN 325 MG/1
650 TABLET ORAL EVERY 4 HOURS PRN
Status: CANCELLED
Start: 2022-06-10

## 2022-06-03 RX ORDER — ONDANSETRON 2 MG/ML
4 INJECTION INTRAMUSCULAR; INTRAVENOUS EVERY 8 HOURS PRN
Status: DISCONTINUED | OUTPATIENT
Start: 2022-06-03 | End: 2022-06-03 | Stop reason: HOSPADM

## 2022-06-03 RX ORDER — HEPARIN SODIUM,PORCINE 10 UNIT/ML
5 VIAL (ML) INTRAVENOUS
Status: CANCELLED | OUTPATIENT
Start: 2022-06-10

## 2022-06-03 RX ORDER — IBUPROFEN 200 MG
600 TABLET ORAL EVERY 6 HOURS PRN
Status: DISCONTINUED | OUTPATIENT
Start: 2022-06-03 | End: 2022-06-03 | Stop reason: HOSPADM

## 2022-06-03 RX ORDER — DIPHENHYDRAMINE HCL 25 MG
50 CAPSULE ORAL EVERY 4 HOURS PRN
Status: DISCONTINUED | OUTPATIENT
Start: 2022-06-03 | End: 2022-06-03 | Stop reason: HOSPADM

## 2022-06-03 RX ORDER — ONDANSETRON 2 MG/ML
4 INJECTION INTRAMUSCULAR; INTRAVENOUS EVERY 8 HOURS PRN
Status: CANCELLED
Start: 2022-06-10

## 2022-06-03 RX ORDER — HEPARIN SODIUM (PORCINE) LOCK FLUSH IV SOLN 100 UNIT/ML 100 UNIT/ML
5 SOLUTION INTRAVENOUS
Status: CANCELLED | OUTPATIENT
Start: 2022-06-10

## 2022-06-03 RX ADMIN — SODIUM CHLORIDE 250 ML: 9 INJECTION, SOLUTION INTRAVENOUS at 14:01

## 2022-06-03 RX ADMIN — NATALIZUMAB 300 MG: 300 INJECTION INTRAVENOUS at 13:57

## 2022-06-03 NOTE — PROGRESS NOTES
Infusion Nursing Note:  Nataliya Aldrich presents today for Tysabri.    Patient seen by provider today: No   present during visit today: Not Applicable.    Note: T cell subset and SABINO Virus AB drawn today.  Notes,medication administration and vitals faxed to DR Hewitt's office at [phone]    Intravenous Access:  Peripheral IV placed.    Treatment Conditions:  Tysabri pre-infusion checklist completed via touch program.      Post Infusion Assessment:  Patient tolerated infusion without incident.  Patient observed for 60 minutes post Tysabri per protocol.  Blood return noted pre and post infusion.  Site patent and intact, free from redness, edema or discomfort.  No evidence of extravasations.  Access discontinued per protocol.       Discharge Plan:   Discharge instructions reviewed with: Patient.  Patient and/or family verbalized understanding of discharge instructions and all questions answered.  AVS to patient via YouTubeT.  Patient will return in 28 days  for next appointment.   Patient discharged in stable condition accompanied by: self.  Departure Mode: Ambulatory.      Radha Lee RN

## 2022-06-04 LAB
CD19 CELLS # BLD: 962 CELLS/UL (ref 107–698)
CD19 CELLS NFR BLD: 27 % (ref 6–27)
CD3 CELLS # BLD: 2194 CELLS/UL (ref 603–2990)
CD3 CELLS NFR BLD: 63 % (ref 49–84)
CD3+CD4+ CELLS # BLD: 1432 CELLS/UL (ref 441–2156)
CD3+CD4+ CELLS NFR BLD: 41 % (ref 28–63)
CD3+CD4+ CELLS/CD3+CD8+ CLL BLD: 1.91 % (ref 1.4–2.6)
CD3+CD8+ CELLS # BLD: 750 CELLS/UL (ref 125–1312)
CD3+CD8+ CELLS NFR BLD: 21 % (ref 10–40)
CD3-CD16+CD56+ CELLS # BLD: 329 CELLS/UL (ref 95–640)
CD3-CD16+CD56+ CELLS NFR BLD: 9 % (ref 4–25)
T CELL EXTENDED COMMENT: ABNORMAL
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
## 2022-06-09 DIAGNOSIS — G98.8 CHRONIC MUSCULOSKELETAL PAIN DUE TO DISORDER OF NERVOUS SYSTEM: ICD-10-CM

## 2022-06-09 DIAGNOSIS — G89.29 CHRONIC MUSCULOSKELETAL PAIN DUE TO DISORDER OF NERVOUS SYSTEM: ICD-10-CM

## 2022-06-09 DIAGNOSIS — K21.00 GASTROESOPHAGEAL REFLUX DISEASE WITH ESOPHAGITIS, UNSPECIFIED WHETHER HEMORRHAGE: ICD-10-CM

## 2022-06-09 DIAGNOSIS — J45.40 MODERATE PERSISTENT ASTHMA WITHOUT COMPLICATION: ICD-10-CM

## 2022-06-09 DIAGNOSIS — M79.605 PAIN OF LEFT LOWER EXTREMITY: ICD-10-CM

## 2022-06-09 DIAGNOSIS — G35 MULTIPLE SCLEROSIS (H): ICD-10-CM

## 2022-06-09 DIAGNOSIS — M79.18 CHRONIC MUSCULOSKELETAL PAIN DUE TO DISORDER OF NERVOUS SYSTEM: ICD-10-CM

## 2022-06-09 DIAGNOSIS — T18.128A OBSTRUCTION OF ESOPHAGUS DUE TO FOOD IMPACTION: ICD-10-CM

## 2022-06-10 ENCOUNTER — THERAPY VISIT (OUTPATIENT)
Dept: PHYSICAL THERAPY | Facility: CLINIC | Age: 47
End: 2022-06-10
Payer: COMMERCIAL

## 2022-06-10 DIAGNOSIS — M54.2 CERVICALGIA: ICD-10-CM

## 2022-06-10 DIAGNOSIS — G35 MS (MULTIPLE SCLEROSIS) (H): Primary | ICD-10-CM

## 2022-06-10 PROCEDURE — 97140 MANUAL THERAPY 1/> REGIONS: CPT | Mod: GP | Performed by: PHYSICAL THERAPIST

## 2022-06-10 PROCEDURE — 97035 APP MDLTY 1+ULTRASOUND EA 15: CPT | Mod: GP | Performed by: PHYSICAL THERAPIST

## 2022-06-10 PROCEDURE — 97110 THERAPEUTIC EXERCISES: CPT | Mod: GP | Performed by: PHYSICAL THERAPIST

## 2022-06-10 NOTE — PROGRESS NOTES
Subjective:  HPI  Physical Exam                    Objective:  System    Physical Exam    General     ROS    Assessment/Plan:    DISCHARGE REPORT    Progress reporting period is from  6/10/2022.       SUBJECTIVE  Subjective: Pt notes overall feeling good. Neck got stiff in long car ride to see Grandma (in [place]). Stressful when dealing with her mother. Still doing well with her daily walking, exercise routines. 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     Current Pain level: 5/10.      Initial Pain level: 8/10.   Changes in function:  Yes (See Goal flowsheet attached for changes in current functional level)  Adverse reaction to treatment or activity: None    OBJECTIVE  Changes noted in objective findings:  Yes,   Objective: Pt improving CROM: flexion= full (no pain); extension=full (feels good, no pain); R and L rot= WFL (no pain). Tenderness and trigger points in bilateral UT (tolerated US and tool massage well again today). Pt okay to continue HEP for a few months.     ASSESSMENT/PLAN  Updated problem list and treatment plan: Diagnosis 1:  Neck pain; MS  Pain -  home program  Inflammation - self management/home program  Decreased function - home program  STG/LTGs have been met or progress has been made towards goals:  Yes (See Goal flow sheet completed today.)  Assessment of Progress: The patient's condition is improving.  Self Management Plans:  Patient has been instructed in a home treatment program.  I have re-evaluated this patient and find that the nature, scope, duration and intensity of the therapy is appropriate for the medical condition of the patient.  Nataliya continues to require the following intervention to meet STG and LTG's:  PT intervention is no longer required to meet STG/LTG.    Recommendations:  This patient is ready to be discharged from therapy and continue their home treatment program.    Please refer  to the daily flowsheet for treatment today, total treatment time and time spent performing 1:1 timed codes.

## 2022-06-13 LAB — SCANNED LAB RESULT: NORMAL

## 2022-06-13 RX ORDER — MONTELUKAST SODIUM 10 MG/1
TABLET ORAL
Qty: 90 TABLET | Refills: 1 | Status: SHIPPED | OUTPATIENT
Start: 2022-06-13 | End: 2022-11-23

## 2022-06-13 RX ORDER — GABAPENTIN 300 MG/1
CAPSULE ORAL
Qty: 120 CAPSULE | Refills: 5 | Status: SHIPPED | OUTPATIENT
Start: 2022-06-13 | End: 2022-11-23

## 2022-06-13 NOTE — TELEPHONE ENCOUNTER
Routing refill request to provider for review/approval because:  Drug not on the [facility] refill protocol     Janeth Villafuerte RN  [facility]

## 2022-06-13 NOTE — TELEPHONE ENCOUNTER
Prescription approved per [facility] Refill Protocol.    Janeth Villafuerte RN  [facility]

## 2022-06-22 ENCOUNTER — MYC MEDICAL ADVICE (OUTPATIENT)
Dept: FAMILY MEDICINE | Facility: CLINIC | Age: 47
End: 2022-06-22

## 2022-06-23 ENCOUNTER — TRANSFERRED RECORDS (OUTPATIENT)
Dept: HEALTH INFORMATION MANAGEMENT | Facility: CLINIC | Age: 47
End: 2022-06-23

## 2022-06-23 NOTE — TELEPHONE ENCOUNTER
Please review MyChart message     MyChart message sent    Thank you!  Ivis GARZA RN   [facility] Triage

## 2022-07-09 DIAGNOSIS — M79.605 PAIN OF LEFT LOWER EXTREMITY: ICD-10-CM

## 2022-07-09 DIAGNOSIS — G35 MULTIPLE SCLEROSIS (H): ICD-10-CM

## 2022-07-09 DIAGNOSIS — M62.838 MUSCLE SPASM: ICD-10-CM

## 2022-07-11 RX ORDER — BACLOFEN 20 MG/1
TABLET ORAL
Qty: 180 TABLET | Refills: 1 | Status: SHIPPED | OUTPATIENT
Start: 2022-07-11 | End: 2022-11-23

## 2022-07-11 NOTE — TELEPHONE ENCOUNTER
Routing refill request to provider for review/approval because:  Drug not on the [facility] refill protocol      Janeth Villafuerte RN  [facility]

## 2022-07-13 ENCOUNTER — E-VISIT (OUTPATIENT)
Dept: FAMILY MEDICINE | Facility: CLINIC | Age: 47
End: 2022-07-13

## 2022-07-13 ENCOUNTER — E-VISIT (OUTPATIENT)
Dept: FAMILY MEDICINE | Facility: CLINIC | Age: 47
End: 2022-07-13
Payer: COMMERCIAL

## 2022-07-13 ENCOUNTER — VIRTUAL VISIT (OUTPATIENT)
Dept: FAMILY MEDICINE | Facility: CLINIC | Age: 47
End: 2022-07-13

## 2022-07-13 ENCOUNTER — NURSE TRIAGE (OUTPATIENT)
Dept: NURSING | Facility: CLINIC | Age: 47
End: 2022-07-13

## 2022-07-13 DIAGNOSIS — U07.1 INFECTION DUE TO 2019 NOVEL CORONAVIRUS: Primary | ICD-10-CM

## 2022-07-13 PROCEDURE — 99207 PR NO CHARGE LOS: CPT | Performed by: PHYSICIAN ASSISTANT

## 2022-07-13 PROCEDURE — 99214 OFFICE O/P EST MOD 30 MIN: CPT | Mod: 95 | Performed by: PHYSICIAN ASSISTANT

## 2022-07-13 PROCEDURE — 99207 PR NON-BILLABLE SERV PER CHARTING: CPT | Performed by: PHYSICIAN ASSISTANT

## 2022-07-13 RX ORDER — PREGABALIN 75 MG/1
75 CAPSULE ORAL DAILY
COMMUNITY
Start: 2022-07-05 | End: 2022-09-22

## 2022-07-13 RX ORDER — PEDIATRIC MULTIVITAMIN NO.17
TABLET,CHEWABLE ORAL DAILY
COMMUNITY
Start: 2022-06-09 | End: 2022-09-22

## 2022-07-13 NOTE — TELEPHONE ENCOUNTER
Triage Call:     Pt calling to report that she tested positive for COVID-19 today. She was exposed to someone with COVID-19 on Saturday, 7/9/2022.     Pt has a hx of MS and Asthma    Pt has been waking up in the middle of the night with more muscle spacticity and using her asthma treatments more than she usually does.     Pt also has muscle aches and pains; hx of Fibromyalgia    Pt walks daily and does yoga. Reports a balanced diet of protein and vegetables.      Disposition: Virtual appt. Pt was given the care advice and was able to get a virtual appt for this morning to discuss treatment recommendations for her COVID-19 illness.     Bety Faith RN  [facility] Nurse Advisor 11:12 AM 7/13/2022      How can I protect others?    These guidelines are for isolating before returning to work, school or .       If you DO have symptoms:  o Stay home and away from others  - For at least 5 days after your symptoms started, AND   - You are fever free for 24 hours (with no medicine that reduces fever), AND  - Your other symptoms are better.  o Wear a mask for 10 full days any time you are around others.    If you DON'T have symptoms:  o Stay at home and away from others for at least 5 days after your positive test.  o Wear a mask for 10 full days any time you are around others.    Reason for Disposition    HIGH RISK for severe COVID complications (e.g., weak immune system, age > 64 years, obesity with BMI > 25, pregnant, chronic lung disease or other chronic medical condition) (Exception: Already seen by PCP and no new or worsening symptoms.)    Additional Information    Negative: SEVERE difficulty breathing (e.g., struggling for each breath, speaks in single words)    Negative: Difficult to awaken or acting confused (e.g., disoriented, slurred speech)    Negative: Bluish (or gray) lips or face now    Negative: Shock suspected (e.g., cold/pale/clammy skin, too weak to stand, low BP, rapid pulse)    Negative: Sounds  like a life-threatening emergency to the triager    Negative: [1] Diagnosed or suspected COVID-19 AND [2] symptoms lasting 3 or more weeks    Negative: [1] COVID-19 exposure AND [2] no symptoms    Negative: COVID-19 vaccine reaction suspected (e.g., fever, headache, muscle aches) occurring 1 to 3 days after getting vaccine    Negative: COVID-19 vaccine, questions about    Negative: [1] Lives with someone known to have influenza (flu test positive) AND [2] flu-like symptoms (e.g., cough, runny nose, sore throat, SOB; with or without fever)    Negative: [1] Adult with possible COVID-19 symptoms AND [2] triager concerned about severity of symptoms or other causes    Negative: COVID-19 and breastfeeding, questions about    Negative: SEVERE or constant chest pain or pressure  (Exception: Mild central chest pain, present only when coughing.)    Negative: MODERATE difficulty breathing (e.g., speaks in phrases, SOB even at rest, pulse 100-120)    Negative: Headache and stiff neck (can't touch chin to chest)    Negative: Oxygen level (e.g., pulse oximetry) 90 percent or lower    Negative: Chest pain or pressure    Negative: Patient sounds very sick or weak to the triager    Negative: MILD difficulty breathing (e.g., minimal/no SOB at rest, SOB with walking, pulse <100)    Negative: Fever > 103 F (39.4 C)    Negative: [1] Fever > 101 F (38.3 C) AND [2] over 60 years of age    Negative: [1] Fever > 100.0 F (37.8 C) AND [2] bedridden (e.g., nursing home patient, CVA, chronic illness, recovering from surgery)    Protocols used: CORONAVIRUS (COVID-19) DIAGNOSED OR [id number] 1.18.2022

## 2022-07-13 NOTE — PATIENT INSTRUCTIONS

## 2022-07-13 NOTE — PROGRESS NOTES
Nataliya is a 46 year old who is being evaluated via a billable video visit.      How would you like to obtain your AVS? MyChart  If the video visit is dropped, the invitation should be resent by: Text to cell phone: [phone]  Will anyone else be joining your video visit? No    Assessment & Plan     Infection due to 2019 novel coronavirus  Patient meets criteria for Paxlovig therapy.  She will hold Flonase while on this medication.  We will continue to take all other medications as prescribed.  Advised of warning signs and when to seek urgent care.  We will monitor oxygen levels with pulse oximeter and encouraged proning for sleep.  Supportive cares also encouraged.  - nirmatrelvir and ritonavir (PAXLOVID) therapy pack  Dispense: 30 each; Refill: 0               Return in about 2 days (around 7/15/2022) for evaluation if worsening/not improving.    Miya Crump PA-C  [facility] PRIOR [facility]   Nataliya is a 46 year old, presenting for the following health issues:  Covid Concern    HPI     COVID-19 Symptom Review - Covid Positive 07/13/20/22 Home Test   How many days ago did these symptoms start? 07/11/2022    Are any of the following symptoms significant for you?  New or worsening difficulty breathing? Yes    Please describe what kind of difficulty you are having breathing:Mild dyspnea (able to do ADLs without difficulty, mild shortness of breath with activities such as climbing one or two flights of stairs or walking briskly)    Worsening cough? No    Fever or chills? No    Headache: No    Sore throat: YES- tickle in throat, feels like something's in her throat    Chest pain: No    Diarrhea: No    Body aches? YES- most likely do to her MR flair up    What treatments has patient tried? None   Does patient live in a nursing home, group home, or shelter? No  Does patient have a way to get food/medications during quarantined? Yes, I have a friend or family member who can help  me.          Review of Systems   Constitutional, HEENT, cardiovascular, pulmonary, GI, , musculoskeletal, neuro, skin, endocrine and psych systems are negative, except as otherwise noted.      Objective       Vitals:  No vitals were obtained today due to virtual visit.    Physical Exam   GENERAL: Healthy, alert and no distress  EYES: Eyes grossly normal to inspection.  No discharge or erythema, or obvious scleral/conjunctival abnormalities.  HENT: Normal cephalic/atraumatic.  External ears, nose and mouth without ulcers or lesions.  No nasal drainage visible.  NECK: No asymmetry, visible masses or scars  RESP: No audible wheeze, cough, or visible cyanosis.  No visible retractions or increased work of breathing.    SKIN: Visible skin clear. No significant rash, abnormal pigmentation or lesions.  NEURO: Cranial nerves grossly intact.  Mentation and speech appropriate for age.  PSYCH: Mentation appears normal, affect normal/bright, judgement and insight intact, normal speech and appearance well-groomed.            Video-Visit Details    Video Start Time: 12:54 PM    Type of service:  Video Visit    Video End Time:1:12 PM    Originating Location (pt. Location): Home    Distant Location (provider location):  [facility]     Platform used for Video Visit: [employer]  ..

## 2022-07-13 NOTE — PATIENT INSTRUCTIONS
See response from other E-visit.  I will see her for virtual visit today for COVID treatment options.

## 2022-07-19 ENCOUNTER — TELEPHONE (OUTPATIENT)
Dept: INFUSION THERAPY | Facility: CLINIC | Age: 47
End: 2022-07-19

## 2022-07-19 NOTE — PROGRESS NOTES
Received call from nurse Leonie at Dr Chong's office, returning charge nurse Leonora's call:     - OK to infuse Tysabri 10 days after COVID diagnosis (on 7/23) IF symptoms are improving and she is fever-free for 24 hours.   - Do not delay treatment more than two weeks. Since patient is currently rescheduled for 7/26, keep this as currently scheduled or call Dr Chong's office if it will be delayed further than this.  TORB Dr Chong/ Leonie NG / Dinah NG

## 2022-07-21 ENCOUNTER — INFUSION THERAPY VISIT (OUTPATIENT)
Dept: INFUSION THERAPY | Facility: CLINIC | Age: 47
End: 2022-07-21
Attending: PSYCHIATRY & NEUROLOGY
Payer: COMMERCIAL

## 2022-07-21 ENCOUNTER — MEDICAL CORRESPONDENCE (OUTPATIENT)
Dept: INFUSION THERAPY | Facility: CLINIC | Age: 47
End: 2022-07-21

## 2022-07-21 VITALS
SYSTOLIC BLOOD PRESSURE: 101 MMHG | HEART RATE: 80 BPM | OXYGEN SATURATION: 100 % | RESPIRATION RATE: 16 BRPM | DIASTOLIC BLOOD PRESSURE: 57 MMHG | TEMPERATURE: 98.1 F

## 2022-07-21 DIAGNOSIS — G35 MULTIPLE SCLEROSIS (H): Primary | ICD-10-CM

## 2022-07-21 PROCEDURE — 250N000011 HC RX IP 250 OP 636: Performed by: PSYCHIATRY & NEUROLOGY

## 2022-07-21 PROCEDURE — 999N000127 HC STATISTIC PERIPHERAL IV START W US GUIDANCE

## 2022-07-21 PROCEDURE — 96365 THER/PROPH/DIAG IV INF INIT: CPT

## 2022-07-21 PROCEDURE — 258N000003 HC RX IP 258 OP 636: Performed by: PSYCHIATRY & NEUROLOGY

## 2022-07-21 RX ORDER — ONDANSETRON 2 MG/ML
4 INJECTION INTRAMUSCULAR; INTRAVENOUS EVERY 8 HOURS PRN
Status: CANCELLED
Start: 2022-07-29

## 2022-07-21 RX ORDER — HEPARIN SODIUM (PORCINE) LOCK FLUSH IV SOLN 100 UNIT/ML 100 UNIT/ML
5 SOLUTION INTRAVENOUS
Status: CANCELLED | OUTPATIENT
Start: 2022-07-29

## 2022-07-21 RX ORDER — DIPHENHYDRAMINE HCL 25 MG
50 CAPSULE ORAL EVERY 4 HOURS PRN
Status: CANCELLED
Start: 2022-07-29

## 2022-07-21 RX ORDER — ACETAMINOPHEN 325 MG/1
650 TABLET ORAL EVERY 4 HOURS PRN
Status: CANCELLED
Start: 2022-07-29

## 2022-07-21 RX ORDER — IBUPROFEN 200 MG
600 TABLET ORAL EVERY 6 HOURS PRN
Status: CANCELLED
Start: 2022-07-29

## 2022-07-21 RX ORDER — HEPARIN SODIUM,PORCINE 10 UNIT/ML
5 VIAL (ML) INTRAVENOUS
Status: CANCELLED | OUTPATIENT
Start: 2022-07-29

## 2022-07-21 RX ADMIN — NATALIZUMAB 300 MG: 300 INJECTION INTRAVENOUS at 15:16

## 2022-07-21 RX ADMIN — SODIUM CHLORIDE 250 ML: 9 INJECTION, SOLUTION INTRAVENOUS at 15:16

## 2022-07-21 NOTE — LETTER
Date:July 22, 2022      Provider requested that no letter be sent. Do not send.       [facility]

## 2022-07-21 NOTE — LETTER
7/21/2022         RE: Nataliya Aldrich  [address]  [place] [address]        Dear Colleague,    Thank you for referring your patient, Nataliya Aldrich, to the [facility]. Please see a copy of my visit note below.    Nursing Note  Nataliya Aldrich [facility] for:   Tysabri  During today's [facility] appointment, orders from Dr.Jonathan Marko Chong were completed.    Progress note:  Patient identification verified by name and date of birth.  Assessment completed.  Vitals recorded in Doc Flowsheets.  Patient was provided with education regarding medication/procedure and possible side effects.  Patient verbalized understanding.     present during visit today: Not Applicable.    Treatment Conditions: Tysabri pre-infusion check list completed with patient via Touch Program. Per therapy plan patient does not need to be monitored for 60 minutes anymore.    Premedications: were not ordered.    Drug Waste Record: No    Infusion length and rate:  infusion given over approximately 60 minutes    Labs: were not ordered for this appointment.    Vascular access: peripheral IV was placed by vascular access nurse.    Is the next appt scheduled? yes  Asymptomatic COVID test completed? no    Post Infusion Assessment:  Patient tolerated infusion without incident.  Site patent and intact, free from redness, edema or discomfort.  No evidence of extravasations.  Access discontinued per protocol.   Infusion visit summary was faxed to  per therapy plan.    Discharge Plan:   Follow up plan of care with: ordering provider as scheduled. and after visit summary given to patient  Discharge instructions were reviewed with patient.  Patient/representative verbalized understanding of discharge instructions and all questions answered.  Patient  "discharged [facility] in stable condition.    Leonie Guillaume RN    Administrations This Visit     0.9% sodium chloride BOLUS     Admin Date  07/21/2022 Action  New Bag Dose  250 mL Route  Intravenous Administered By  Maki Mora RN          natalizumab (TYSABRI) 300 mg in sodium chloride 0.9 % 125 mL infusion     Admin Date  07/21/2022 Action  New Bag Dose  300 mg Rate  125 mL/hr Route  Intravenous Administered By  Maki Mora RN              Vital signs:  Temp: 98.1  F (36.7  C) Temp src: Oral BP: 101/57 Pulse: 80   Resp: 16 SpO2: 100 % O2 Device: None (Room air)        Estimated body mass index is 46.76 kg/m  as calculated from the following:    Height as of 4/7/22: 1.651 m (5' 5\").    Weight as of 4/7/22: 127.5 kg (281 lb).                  Again, thank you for allowing me to participate in the care of your patient.        Sincerely,        [facility] [place]    "

## 2022-07-21 NOTE — PROGRESS NOTES
Nursing Note  Nataliya Aldrich [facility] for:   Tysabri  During today's [facility] appointment, orders from Dr.Jonathan Marko Chong were completed.    Progress note:  Patient identification verified by name and date of birth.  Assessment completed.  Vitals recorded in Doc Flowsheets.  Patient was provided with education regarding medication/procedure and possible side effects.  Patient verbalized understanding.     present during visit today: Not Applicable.    Treatment Conditions: Tysabri pre-infusion check list completed with patient via Touch Program. Per therapy plan patient does not need to be monitored for 60 minutes anymore.    Premedications: were not ordered.    Drug Waste Record: No    Infusion length and rate:  infusion given over approximately 60 minutes    Labs: were not ordered for this appointment.    Vascular access: peripheral IV was placed by vascular access nurse.    Is the next appt scheduled? yes  Asymptomatic COVID test completed? no    Post Infusion Assessment:  Patient tolerated infusion without incident.  Site patent and intact, free from redness, edema or discomfort.  No evidence of extravasations.  Access discontinued per protocol.   Infusion visit summary was faxed to  per therapy plan.    Discharge Plan:   Follow up plan of care with: ordering provider as scheduled. and after visit summary given to patient  Discharge instructions were reviewed with patient.  Patient/representative verbalized understanding of discharge instructions and all questions answered.  Patient discharged [facility] in stable condition.    Leonie Guillaume RN    Administrations This Visit     0.9% sodium chloride BOLUS     Admin Date  07/21/2022 Action  New Bag Dose  250 mL Route  Intravenous Administered By  Maki Mora RN          natalizumab (TYSABRI) 300 mg in sodium  "chloride 0.9 % 125 mL infusion     Admin Date  07/21/2022 Action  New Bag Dose  300 mg Rate  125 mL/hr Route  Intravenous Administered By  Maki Mora RN              Vital signs:  Temp: 98.1  F (36.7  C) Temp src: Oral BP: 101/57 Pulse: 80   Resp: 16 SpO2: 100 % O2 Device: None (Room air)        Estimated body mass index is 46.76 kg/m  as calculated from the following:    Height as of 4/7/22: 1.651 m (5' 5\").    Weight as of 4/7/22: 127.5 kg (281 lb).              "

## 2022-07-21 NOTE — PATIENT INSTRUCTIONS
Dear Nataliya Aldrich    Thank you for choosing [facility] ([facility] for your infusion.  The following information is a summary of our appointment as well as important reminders.      We look forward in seeing you on your next appointment here at [facility]  Please don t hesitate to call us at [phone] to reschedule any of your appointments or to speak with one of the [facility] registered nurses.  It was a pleasure taking care of you today.    Sincerely,    [facility]  [facility]  [address] [place] [address]  [id number]  Phone:  [phone]

## 2022-08-04 ENCOUNTER — E-VISIT (OUTPATIENT)
Dept: FAMILY MEDICINE | Facility: CLINIC | Age: 47
End: 2022-08-04

## 2022-08-04 DIAGNOSIS — B37.31 YEAST INFECTION OF THE VAGINA: Primary | ICD-10-CM

## 2022-08-04 PROCEDURE — 99421 OL DIG E/M SVC 5-10 MIN: CPT | Performed by: NURSE PRACTITIONER

## 2022-08-05 RX ORDER — FLUCONAZOLE 150 MG/1
150 TABLET ORAL ONCE
Qty: 2 TABLET | Refills: 0 | Status: SHIPPED | OUTPATIENT
Start: 2022-08-05 | End: 2022-10-13

## 2022-08-05 NOTE — TELEPHONE ENCOUNTER
Provider E-Visit time total (minutes): 9 minutes.    8/5/22 5:19 PM-Called patient to discuss.   COVID + 7/11/22.  Mouth upper left side teeth filled recently; dentist told her she was close to needing root canal Monday he started her on clindamycin TID x 2 weeks.  Called dentist yesterday and they said to call PCP if continued symptoms.  Has improvement today.  Has decreased down to twice a day clindamycin due stomach discomfort gassy.  Requests diflucan for yeast she will get from antibiotics.    Leonie Collazo, LEROY-BC

## 2022-08-05 NOTE — PATIENT INSTRUCTIONS
Dear Nataliya Aldrich,    Continue clindamycin for a total of 7 days three times a day-please let me know how you are doing on Monday. Good probiotics while on clindamycin.   If you have the red flag symptoms we discussed present to urgent care to be seen.   Diflucan was sent to [facility] for you.           MARTÍNEZ Michael CNP

## 2022-08-08 ENCOUNTER — MYC MEDICAL ADVICE (OUTPATIENT)
Dept: FAMILY MEDICINE | Facility: CLINIC | Age: 47
End: 2022-08-08

## 2022-08-10 NOTE — TELEPHONE ENCOUNTER
That she is doing better.  I would have her continue a good probiotic for the next month.    She would need to contact her dentist about the urgency or timeline for her dental procedure and if antibiotics are even needed.        Leonie Collazo, DAVIDP-BC

## 2022-08-10 NOTE — TELEPHONE ENCOUNTER
Called #   Telephone Information:   Mobile [phone] ok LM        Pt stated she did finish the antibiotics for the 7 days - she has no more stuffiness or sinus pressure.     The clindamycin did tear up her stomach but now she is fine and her breathing is much better.     She was sending the my chart message as an FYI     Pt is going to get her bottom portion of her cavities taken care of on 8/11/2022 - pt is wondering if she should wait for a few weeks so she is not on back to back antibiotics ?     Please advise     Thank you       Amelia Chance RN, BSN  [place] [facility]

## 2022-08-10 NOTE — TELEPHONE ENCOUNTER
[employer] message sent  Ivis GARZA RN   [facility] Triage

## 2022-08-22 ENCOUNTER — E-VISIT (OUTPATIENT)
Dept: PEDIATRICS | Facility: CLINIC | Age: 47
End: 2022-08-22
Payer: COMMERCIAL

## 2022-08-22 DIAGNOSIS — Z12.31 VISIT FOR SCREENING MAMMOGRAM: ICD-10-CM

## 2022-08-22 DIAGNOSIS — R45.86 MOOD CHANGES: Primary | ICD-10-CM

## 2022-08-22 DIAGNOSIS — D72.829 LEUKOCYTOSIS, UNSPECIFIED TYPE: ICD-10-CM

## 2022-08-22 PROCEDURE — 99422 OL DIG E/M SVC 11-20 MIN: CPT | Performed by: PHYSICIAN ASSISTANT

## 2022-08-22 RX ORDER — DULOXETIN HYDROCHLORIDE 30 MG/1
CAPSULE, DELAYED RELEASE ORAL
Qty: 60 CAPSULE | Refills: 1 | Status: SHIPPED | OUTPATIENT
Start: 2022-08-22 | End: 2022-11-23

## 2022-08-22 ASSESSMENT — ANXIETY QUESTIONNAIRES
8. IF YOU CHECKED OFF ANY PROBLEMS, HOW DIFFICULT HAVE THESE MADE IT FOR YOU TO DO YOUR WORK, TAKE CARE OF THINGS AT HOME, OR GET ALONG WITH OTHER PEOPLE?: SOMEWHAT DIFFICULT
GAD7 TOTAL SCORE: 4
7. FEELING AFRAID AS IF SOMETHING AWFUL MIGHT HAPPEN: NOT AT ALL

## 2022-09-01 ENCOUNTER — THERAPY VISIT (OUTPATIENT)
Dept: PHYSICAL THERAPY | Facility: CLINIC | Age: 47
End: 2022-09-01
Payer: COMMERCIAL

## 2022-09-01 DIAGNOSIS — M54.2 CERVICALGIA: ICD-10-CM

## 2022-09-01 DIAGNOSIS — M25.511 ACUTE PAIN OF RIGHT SHOULDER: ICD-10-CM

## 2022-09-01 DIAGNOSIS — G35 MS (MULTIPLE SCLEROSIS) (H): Primary | ICD-10-CM

## 2022-09-01 PROCEDURE — 97035 APP MDLTY 1+ULTRASOUND EA 15: CPT | Mod: GP | Performed by: PHYSICAL THERAPIST

## 2022-09-01 PROCEDURE — 97140 MANUAL THERAPY 1/> REGIONS: CPT | Mod: GP | Performed by: PHYSICAL THERAPIST

## 2022-09-02 ENCOUNTER — INFUSION THERAPY VISIT (OUTPATIENT)
Dept: INFUSION THERAPY | Facility: CLINIC | Age: 47
End: 2022-09-02
Attending: PSYCHIATRY & NEUROLOGY
Payer: COMMERCIAL

## 2022-09-02 VITALS
TEMPERATURE: 98.1 F | HEART RATE: 63 BPM | DIASTOLIC BLOOD PRESSURE: 84 MMHG | SYSTOLIC BLOOD PRESSURE: 122 MMHG | RESPIRATION RATE: 16 BRPM | OXYGEN SATURATION: 100 %

## 2022-09-02 DIAGNOSIS — G35 MULTIPLE SCLEROSIS (H): Primary | ICD-10-CM

## 2022-09-02 LAB
ALT SERPL W P-5'-P-CCNC: 28 U/L (ref 10–35)
AST SERPL W P-5'-P-CCNC: 34 U/L (ref 10–35)
BASOPHILS # BLD AUTO: 0 10E3/UL (ref 0–0.2)
BASOPHILS NFR BLD AUTO: 1 %
EOSINOPHIL # BLD AUTO: 0.2 10E3/UL (ref 0–0.7)
EOSINOPHIL NFR BLD AUTO: 2 %
ERYTHROCYTE [DISTWIDTH] IN BLOOD BY AUTOMATED COUNT: 13.4 % (ref 10–15)
HCT VFR BLD AUTO: 38.6 % (ref 35–47)
HGB BLD-MCNC: 11.7 G/DL (ref 11.7–15.7)
IMM GRANULOCYTES # BLD: 0 10E3/UL
IMM GRANULOCYTES NFR BLD: 0 %
LYMPHOCYTES # BLD AUTO: 3.8 10E3/UL (ref 0.8–5.3)
LYMPHOCYTES NFR BLD AUTO: 50 %
MCH RBC QN AUTO: 29.4 PG (ref 26.5–33)
MCHC RBC AUTO-ENTMCNC: 30.3 G/DL (ref 31.5–36.5)
MCV RBC AUTO: 97 FL (ref 78–100)
MONOCYTES # BLD AUTO: 0.6 10E3/UL (ref 0–1.3)
MONOCYTES NFR BLD AUTO: 8 %
NEUTROPHILS # BLD AUTO: 2.9 10E3/UL (ref 1.6–8.3)
NEUTROPHILS NFR BLD AUTO: 39 %
NRBC # BLD AUTO: 0 10E3/UL
NRBC BLD AUTO-RTO: 0 /100
PLATELET # BLD AUTO: 330 10E3/UL (ref 150–450)
RBC # BLD AUTO: 3.98 10E6/UL (ref 3.8–5.2)
WBC # BLD AUTO: 7.4 10E3/UL (ref 4–11)

## 2022-09-02 PROCEDURE — 84460 ALANINE AMINO (ALT) (SGPT): CPT | Performed by: PSYCHIATRY & NEUROLOGY

## 2022-09-02 PROCEDURE — 84450 TRANSFERASE (AST) (SGOT): CPT | Performed by: PSYCHIATRY & NEUROLOGY

## 2022-09-02 PROCEDURE — 96365 THER/PROPH/DIAG IV INF INIT: CPT

## 2022-09-02 PROCEDURE — 258N000003 HC RX IP 258 OP 636: Performed by: PSYCHIATRY & NEUROLOGY

## 2022-09-02 PROCEDURE — 250N000011 HC RX IP 250 OP 636: Performed by: PSYCHIATRY & NEUROLOGY

## 2022-09-02 PROCEDURE — 85004 AUTOMATED DIFF WBC COUNT: CPT | Performed by: PSYCHIATRY & NEUROLOGY

## 2022-09-02 PROCEDURE — 36415 COLL VENOUS BLD VENIPUNCTURE: CPT | Performed by: PSYCHIATRY & NEUROLOGY

## 2022-09-02 RX ORDER — DIPHENHYDRAMINE HCL 25 MG
50 CAPSULE ORAL EVERY 4 HOURS PRN
Status: CANCELLED
Start: 2022-09-15

## 2022-09-02 RX ORDER — ACETAMINOPHEN 325 MG/1
650 TABLET ORAL EVERY 4 HOURS PRN
Status: CANCELLED
Start: 2022-09-15

## 2022-09-02 RX ORDER — HEPARIN SODIUM,PORCINE 10 UNIT/ML
5 VIAL (ML) INTRAVENOUS
Status: CANCELLED | OUTPATIENT
Start: 2022-09-15

## 2022-09-02 RX ORDER — ONDANSETRON 2 MG/ML
4 INJECTION INTRAMUSCULAR; INTRAVENOUS EVERY 8 HOURS PRN
Status: CANCELLED
Start: 2022-09-15

## 2022-09-02 RX ORDER — HEPARIN SODIUM (PORCINE) LOCK FLUSH IV SOLN 100 UNIT/ML 100 UNIT/ML
5 SOLUTION INTRAVENOUS
Status: CANCELLED | OUTPATIENT
Start: 2022-09-15

## 2022-09-02 RX ORDER — IBUPROFEN 200 MG
600 TABLET ORAL EVERY 6 HOURS PRN
Status: CANCELLED
Start: 2022-09-15

## 2022-09-02 RX ADMIN — NATALIZUMAB 300 MG: 300 INJECTION INTRAVENOUS at 13:29

## 2022-09-02 RX ADMIN — SODIUM CHLORIDE 250 ML: 9 INJECTION, SOLUTION INTRAVENOUS at 13:29

## 2022-09-02 ASSESSMENT — PAIN SCALES - GENERAL: PAINLEVEL: MODERATE PAIN (4)

## 2022-09-02 NOTE — PROGRESS NOTES
Infusion Nursing Note:  Nataliya Aldrich presents today for Labs, Tysabri.    Patient seen by provider today: No   present during visit today: Not Applicable.    Note: Patient reports no changes in home medications since they were last reviewed, declined to review them individually today.    Lab results and note faxed to Dr. Chong.    Intravenous Access:  Labs drawn without difficulty.  Peripheral IV placed.    Treatment Conditions:  Results reviewed, labs MET treatment parameters, ok to proceed with treatment.  Tysabri pre-infusion checklist completed via touch program.    Post Infusion Assessment:  Patient tolerated infusion without incident.  Blood return noted pre and post infusion.  Site patent and intact, free from redness, edema or discomfort.  No evidence of extravasations.  Access discontinued per protocol.   Per orders, patient does not need to complete 60 minute observation period.     Discharge Plan:   AVS to patient via MYCHART.  Patient will return 10/14/22 for next appointment.   Patient discharged in stable condition accompanied by: self.  Departure Mode: Ambulatory.      Nichole Elkins RN

## 2022-09-07 NOTE — PROGRESS NOTES
[facility]    OUTPATIENT Physical Therapy ORTHOPEDIC EVALUATION  PLAN OF TREATMENT FOR OUTPATIENT REHABILITATION  (COMPLETE FOR INITIAL CLAIMS ONLY)  Patient's Last Name, First Name, M.I.  YOB: 1975  Nataliya Miguel    Provider s Name:  [facility]   Medical Record No.  [id number]   Start of Care Date:  09/01/22   Onset Date:   03/31/22   Type:     _X__PT   ___OT Medical Diagnosis:    Encounter Diagnoses   Name Primary?    MS (multiple sclerosis) (H) Yes    Cervicalgia     Acute pain of right shoulder         Treatment Diagnosis:  Neck pain        Goals:     09/01/22 0500   Body Part   Goals listed below are for Neck pain   Goal #1   Goal #1 sleeping   Previous Functional Level No restrictions   Current Functional Level Able to sleep through the night with use of meds    Performance Level able to sleep about 7hrs /night   STG Target Performance Less than 1 hour without sleep per night   Rationale to establish restorative sleep pattern   Due Date 06/18/21   Date Goal Met 06/18/21   LTG Target Performance Sleep through the night with use of meds   Rationale to establish restorative sleep pattern   Due Date 06/23/22   Date Goal Met 06/10/22   If goal not met, why? extended due to flare ups       Therapy Frequency:  2 x month  Predicted Duration of Therapy Intervention:  6 months    Alton Khoury, PT                 I CERTIFY THE NEED FOR THESE SERVICES FURNISHED UNDER        THIS PLAN OF TREATMENT AND WHILE UNDER MY CARE     (Physician attestation of this document indicates review and certification of the therapy plan).                     Certification Date From:  09/01/22   Certification Date To:  03/07/23    Referring Provider:  Miya Crump    Initial Assessment        See Epic Evaluation SOC Date: 09/01/22

## 2022-09-12 LAB — SCANNED LAB RESULT: NORMAL

## 2022-09-22 ENCOUNTER — OFFICE VISIT (OUTPATIENT)
Dept: FAMILY MEDICINE | Facility: CLINIC | Age: 47
End: 2022-09-22
Payer: COMMERCIAL

## 2022-09-22 VITALS
TEMPERATURE: 97 F | SYSTOLIC BLOOD PRESSURE: 122 MMHG | BODY MASS INDEX: 45.12 KG/M2 | OXYGEN SATURATION: 99 % | DIASTOLIC BLOOD PRESSURE: 82 MMHG | HEART RATE: 101 BPM | HEIGHT: 65 IN | WEIGHT: 270.8 LBS | RESPIRATION RATE: 16 BRPM

## 2022-09-22 DIAGNOSIS — D72.829 LEUKOCYTOSIS, UNSPECIFIED TYPE: ICD-10-CM

## 2022-09-22 DIAGNOSIS — N89.8 VAGINAL DISCHARGE: ICD-10-CM

## 2022-09-22 DIAGNOSIS — R45.86 MOOD CHANGES: ICD-10-CM

## 2022-09-22 DIAGNOSIS — B37.31 YEAST INFECTION OF THE VAGINA: Primary | ICD-10-CM

## 2022-09-22 DIAGNOSIS — R79.89 LOW SERUM CALCIUM: ICD-10-CM

## 2022-09-22 DIAGNOSIS — R21 RASH: ICD-10-CM

## 2022-09-22 DIAGNOSIS — M54.50 ACUTE MIDLINE LOW BACK PAIN WITHOUT SCIATICA: ICD-10-CM

## 2022-09-22 DIAGNOSIS — G35 MULTIPLE SCLEROSIS (H): ICD-10-CM

## 2022-09-22 DIAGNOSIS — N95.1 MENOPAUSAL SYNDROME (HOT FLASHES): ICD-10-CM

## 2022-09-22 LAB
BASOPHILS # BLD AUTO: 0 10E3/UL (ref 0–0.2)
BASOPHILS NFR BLD AUTO: 0 %
CLUE CELLS: ABNORMAL
EOSINOPHIL # BLD AUTO: 0.3 10E3/UL (ref 0–0.7)
EOSINOPHIL NFR BLD AUTO: 3 %
ERYTHROCYTE [DISTWIDTH] IN BLOOD BY AUTOMATED COUNT: 13.6 % (ref 10–15)
HCT VFR BLD AUTO: 36.4 % (ref 35–47)
HGB BLD-MCNC: 11.8 G/DL (ref 11.7–15.7)
IMM GRANULOCYTES # BLD: 0 10E3/UL
IMM GRANULOCYTES NFR BLD: 0 %
LYMPHOCYTES # BLD AUTO: 5.3 10E3/UL (ref 0.8–5.3)
LYMPHOCYTES NFR BLD AUTO: 54 %
MCH RBC QN AUTO: 29.9 PG (ref 26.5–33)
MCHC RBC AUTO-ENTMCNC: 32.4 G/DL (ref 31.5–36.5)
MCV RBC AUTO: 92 FL (ref 78–100)
MONOCYTES # BLD AUTO: 0.6 10E3/UL (ref 0–1.3)
MONOCYTES NFR BLD AUTO: 6 %
NEUTROPHILS # BLD AUTO: 3.7 10E3/UL (ref 1.6–8.3)
NEUTROPHILS NFR BLD AUTO: 37 %
PLATELET # BLD AUTO: 337 10E3/UL (ref 150–450)
RBC # BLD AUTO: 3.94 10E6/UL (ref 3.8–5.2)
TRICHOMONAS, WET PREP: ABNORMAL
WBC # BLD AUTO: 10 10E3/UL (ref 4–11)
WBC'S/HIGH POWER FIELD, WET PREP: ABNORMAL
YEAST, WET PREP: PRESENT

## 2022-09-22 PROCEDURE — 84443 ASSAY THYROID STIM HORMONE: CPT | Performed by: PHYSICIAN ASSISTANT

## 2022-09-22 PROCEDURE — 83001 ASSAY OF GONADOTROPIN (FSH): CPT | Performed by: PHYSICIAN ASSISTANT

## 2022-09-22 PROCEDURE — 85025 COMPLETE CBC W/AUTO DIFF WBC: CPT | Performed by: PHYSICIAN ASSISTANT

## 2022-09-22 PROCEDURE — 83002 ASSAY OF GONADOTROPIN (LH): CPT | Performed by: PHYSICIAN ASSISTANT

## 2022-09-22 PROCEDURE — 99215 OFFICE O/P EST HI 40 MIN: CPT | Mod: 25 | Performed by: PHYSICIAN ASSISTANT

## 2022-09-22 PROCEDURE — 36415 COLL VENOUS BLD VENIPUNCTURE: CPT | Performed by: PHYSICIAN ASSISTANT

## 2022-09-22 PROCEDURE — 87210 SMEAR WET MOUNT SALINE/INK: CPT | Performed by: PHYSICIAN ASSISTANT

## 2022-09-22 PROCEDURE — 96372 THER/PROPH/DIAG INJ SC/IM: CPT | Performed by: PHYSICIAN ASSISTANT

## 2022-09-22 RX ORDER — KETOROLAC TROMETHAMINE 30 MG/ML
30 INJECTION, SOLUTION INTRAMUSCULAR; INTRAVENOUS ONCE
Status: COMPLETED | OUTPATIENT
Start: 2022-09-22 | End: 2022-09-22

## 2022-09-22 RX ORDER — FLUCONAZOLE 150 MG/1
150 TABLET ORAL
Qty: 2 TABLET | Refills: 0 | Status: SHIPPED | OUTPATIENT
Start: 2022-09-22 | End: 2022-10-05

## 2022-09-22 RX ADMIN — KETOROLAC TROMETHAMINE 30 MG: 30 INJECTION, SOLUTION INTRAMUSCULAR; INTRAVENOUS at 15:00

## 2022-09-22 NOTE — PROGRESS NOTES
Assessment & Plan     Yeast infection of the vagina  Vaginal discharge  Symptomatic yeast candidiasis.  Will treat with Diflucan x1 dose and repeat in 72 hours if symptoms persist.  - fluconazole (DIFLUCAN) 150 MG tablet  Dispense: 2 tablet; Refill: 0  - Wet prep - Clinic Collect    Low serum calcium  Patient not tolerating Tums well.  Discontinue this and start with calcium carbonate to see if this is tolerated well as her serum calcium was slightly low.   - calcium carbonate-vitamin D (OSCAL W/D) 500-200 MG-UNIT tablet    Acute midline low back pain without sciatica  Symptomatic central lumbar discomfort.  Likely muscular strain.  Cannot take oral NSAIDs due to history of bypass surgery.  Toradol administered in the office today.  Patient tolerated well.  - ketorolac (TORADOL) injection 30 mg    Multiple sclerosis (H) - Dr. Chong - on Tysabri infusions  Doing well.  Continue to follow with neurology as planned.    Menopausal syndrome (hot flashes)  Mood changes  Recommend initiating Cymbalta.  Suspect that this will help her mood as well.  Labs for surveillance.  - TSH with free T4 reflex  - Follicle stimulating hormone  - Luteinizing Hormone, Adult    Leukocytosis, unspecified type  Labs for surveillance.  - CBC with platelets and differential    Rash  Suspect reaction from a binder in one of her medications.  Continue to hold those medications until completely resolved.  Recommend a trial of combining triamcinolone and using over-the-counter Lotrimin as I suspect there is a fungal component due to the flaking present.  She will keep me informed if this does not improve her symptoms or if it worsens.  After resolution she can reintroduce her supplements one at a time.        55 minutes spent on the date of the encounter doing chart review, history and exam, documentation and further activities per the note      Return in about 15 days (around 10/7/2022) for mammogram.    Miya Crump PA-C  [facility]  [facility] PRIOR JACKELINE An is a 47 year old, presenting for the following health issues:  Vaginal Problem           Vaginal Symptoms  Onset/Duration: ~1 month  Description:  Vaginal Discharge: white   Itching (Pruritis): No  Burning sensation:  No  Odor: YES- abnormal  Accompanying Signs & Symptoms:  Urinary symptoms: No  Abdominal pain: No  Fever: No  History:   Sexually active: No  New Partner: No  Possibility of Pregnancy:  No  Recent antibiotic use: YES  Previous vaginitis issues: No  Precipitating or alleviating factors: None  Therapies tried and outcome: Over-the-counter Vagisil cream    Hot flashes/mood swings  Completed an E-visit 8/22/2022.  Duloxetine was sent to her pharmacy.  She has not started this yet because she was having issues/side effects from other medications.    Chronic neck pain  She was recently changed from gabapentin to pregabalin by Dr. Chong with [facility]  She states that she was doing well on the gabapentin but was prescribed it up to 4 times daily which he felt was inconvenient.  She has been having issues with insomnia and uncontrolled neck pain since making this change.  She is wondering if she can change back to gabapentin.    Rash on hand  The patient reports that for the last month or so she has been having a rash on her right thenar eminence that seem to improve slightly with triamcinolone but eventually began to peel.  She had restarted some of her supplements including magnesium, iron, and multivitamin.  Since discontinuing these approximately 1 week ago the rash has improved and the small bumps have shrunk.    Morbid obesity/nutritionist  Patient is following with a nutritionist at [place] who was eager to have her try a GLP-1 agonist, however, Dr. Chong was not supportive of this medication.  She has successfully lost 16 pounds with diet and exercise.  Her work schedule has changed recently to be 9-1/2-hour days and half  "days on Fridays so that she does not have to use vacation to accommodate her infusions.  While this is a great long-term option for her, she is exhausted at the end of the day and has little energy for exercise.  She had been taking a Tums supplement but is wondering if she needs this.    Low back pain  Patient has had a flare of low back pain after wearing high heels for an event last week.  She has been using acetaminophen with some relief but is still having significant pain.  It is gradually improving but it is slow and she is ambulating with an altered gait due to the pain she is experiencing.  She denies any sciatic symptoms as her pain does not move beyond the central low back.        Review of Systems   Constitutional, HEENT, cardiovascular, pulmonary, GI, , musculoskeletal, neuro, skin, endocrine and psych systems are negative, except as otherwise noted.      Objective    /82   Pulse 101   Temp 97  F (36.1  C) (Tympanic)   Resp 16   Ht 1.651 m (5' 5\")   Wt 122.8 kg (270 lb 12.8 oz)   LMP  (LMP Unknown)   SpO2 99%   BMI 45.06 kg/m    Body mass index is 45.06 kg/m .  Physical Exam   GENERAL: healthy, alert and no distress  EYES: Eyes grossly normal to inspection  RESP: lungs clear to auscultation - no rales, rhonchi or wheezes  CV: regular rate and rhythm, normal S1 S2, no S3 or S4, no murmur, click or rub, no peripheral edema and peripheral pulses strong  ABDOMEN: soft, nontender, no hepatosplenomegaly, no masses and bowel sounds normal   (female): normal female external genitalia, normal urethral meatus, vaginal mucosa pink, moist, well rugated, and cervix/uterus absent, normal adnexa, without masses, thin white vaginal discharge in vaginal vault.  MS: no gross musculoskeletal defects noted, no edema  SKIN: Scaly rash on right thenar eminence with small scattered vesicular/pustular lesions that appear to be nearly resolved.  NEURO: Normal strength and tone, mentation intact and speech " normal  PSYCH: mentation appears normal, affect normal/bright    Results for orders placed or performed in visit on 09/22/22 (from the past 24 hour(s))   Wet prep - Clinic Collect    Specimen: Vagina; Swab   Result Value Ref Range    Trichomonas Absent Absent    Yeast Present (A) Absent    Clue Cells Absent Absent    WBCs/high power field 1+ (A) None   CBC with platelets and differential    Narrative    The following orders were created for panel order CBC with platelets and differential.  Procedure                               Abnormality         Status                     ---------                               -----------         ------                     CBC with platelets and d...[[id number]]                      Final result                 Please view results for these tests on the individual orders.   CBC with platelets and differential   Result Value Ref Range    WBC Count 10.0 4.0 - 11.0 10e3/uL    RBC Count 3.94 3.80 - 5.20 10e6/uL    Hemoglobin 11.8 11.7 - 15.7 g/dL    Hematocrit 36.4 35.0 - 47.0 %    MCV 92 78 - 100 fL    MCH 29.9 26.5 - 33.0 pg    MCHC 32.4 31.5 - 36.5 g/dL    RDW 13.6 10.0 - 15.0 %    Platelet Count 337 150 - 450 10e3/uL    % Neutrophils 37 %    % Lymphocytes 54 %    % Monocytes 6 %    % Eosinophils 3 %    % Basophils 0 %    % Immature Granulocytes 0 %    Absolute Neutrophils 3.7 1.6 - 8.3 10e3/uL    Absolute Lymphocytes 5.3 0.8 - 5.3 10e3/uL    Absolute Monocytes 0.6 0.0 - 1.3 10e3/uL    Absolute Eosinophils 0.3 0.0 - 0.7 10e3/uL    Absolute Basophils 0.0 0.0 - 0.2 10e3/uL    Absolute Immature Granulocytes 0.0 <=0.4 10e3/uL

## 2022-09-22 NOTE — RESULT ENCOUNTER NOTE
Nataliya  I have reviewed your recent labs. Here are the results:    -Yeast vaginal infection test is POSITIVE.    ADVISE: treatment Diflucan 150 mg tablet for 1 dose and a prescription has been sent to your pharmacy.  I included a second tablet in the case your symptoms are still present after 72 hours.  If they have resolved with 1 tablet there is no need to take the second 1.  You can keep it in the back of your medicine cabinet for a rainy day.  -Bacterial vaginal infection test is normal - no signs of a bacterial infection.  -Trichomonas vaginal infection test is normal - no signs of a trichomonas infection.      If you have any questions please do not hesitate to contact our office via phone ([phone]) or MyChart.    Miya Crump MBA, MS, PA-C  [facility]

## 2022-09-23 LAB
FSH SERPL IRP2-ACNC: 4.3 MIU/ML
LH SERPL-ACNC: 3.7 MIU/ML
TSH SERPL DL<=0.005 MIU/L-ACNC: 1.02 MU/L (ref 0.4–4)

## 2022-09-26 ENCOUNTER — MYC MEDICAL ADVICE (OUTPATIENT)
Dept: FAMILY MEDICINE | Facility: CLINIC | Age: 47
End: 2022-09-26

## 2022-09-26 NOTE — LETTER
[address] [address] [phone]       September 28, 2022    Nataliya ASAEL Venkata Aldrich  [address]   [place] [address]    To Whom it May Concern:    The above patient has been under my professional care since November 2019.  She carries a diagnosis of multiple sclerosis that was diagnosed in 2016 with the onset of facial numbness, slurred speech, and right-sided motor and sensory disturbances in June 2015 in addition to MRI findings.  She is currently under the care of Dr. Gary Chong with [facility], [facility] and is on disease modifying therapy.    Please contact my office for any questions or concerns.    Kind regards,      Miya Crump MBA, MS, PA-C  [facility]

## 2022-09-28 NOTE — TELEPHONE ENCOUNTER
Please see my chart message below     Please review and advise     Thank you     Amelia Chance RN, BSN  [place] T[facility]

## 2022-09-28 NOTE — TELEPHONE ENCOUNTER
Patient wants to know if she can get the Pfizer bivalent booster as we are out of moderna and have no information as to when we will get more.     Please let me know and I can call victor manuel back       Guillermina Garcia     us

## 2022-09-28 NOTE — TELEPHONE ENCOUNTER
Yes, okay to get Pfizer COVID booster      Miya Crump MBA, MS, PA-C  [facility]

## 2022-09-30 ENCOUNTER — ALLIED HEALTH/NURSE VISIT (OUTPATIENT)
Dept: FAMILY MEDICINE | Facility: CLINIC | Age: 47
End: 2022-09-30
Payer: COMMERCIAL

## 2022-09-30 DIAGNOSIS — Z23 ENCOUNTER FOR IMMUNIZATION: Primary | ICD-10-CM

## 2022-09-30 PROCEDURE — 0124A COVID-19,PF,PFIZER BOOSTER BIVALENT: CPT

## 2022-09-30 PROCEDURE — 99207 PR NO CHARGE NURSE ONLY: CPT

## 2022-09-30 PROCEDURE — 91312 COVID-19,PF,PFIZER BOOSTER BIVALENT: CPT

## 2022-10-05 ENCOUNTER — OFFICE VISIT (OUTPATIENT)
Dept: FAMILY MEDICINE | Facility: CLINIC | Age: 47
End: 2022-10-05
Payer: COMMERCIAL

## 2022-10-05 VITALS
SYSTOLIC BLOOD PRESSURE: 117 MMHG | HEIGHT: 65 IN | BODY MASS INDEX: 44.65 KG/M2 | HEART RATE: 74 BPM | DIASTOLIC BLOOD PRESSURE: 80 MMHG | OXYGEN SATURATION: 99 % | TEMPERATURE: 97.4 F | WEIGHT: 268 LBS

## 2022-10-05 DIAGNOSIS — N83.209 HEMORRHAGIC CYST OF OVARY: ICD-10-CM

## 2022-10-05 DIAGNOSIS — N89.8 VAGINAL DISCHARGE: Primary | ICD-10-CM

## 2022-10-05 DIAGNOSIS — R10.2 PELVIC PAIN IN FEMALE: ICD-10-CM

## 2022-10-05 DIAGNOSIS — N83.201 CYST OF RIGHT OVARY: ICD-10-CM

## 2022-10-05 LAB
ALBUMIN UR-MCNC: NEGATIVE MG/DL
APPEARANCE UR: CLEAR
BILIRUB UR QL STRIP: NEGATIVE
CLUE CELLS: ABNORMAL
COLOR UR AUTO: YELLOW
GLUCOSE UR STRIP-MCNC: NEGATIVE MG/DL
HGB UR QL STRIP: NEGATIVE
KETONES UR STRIP-MCNC: NEGATIVE MG/DL
LEUKOCYTE ESTERASE UR QL STRIP: NEGATIVE
NITRATE UR QL: NEGATIVE
PH UR STRIP: 6.5 [PH] (ref 5–7)
SP GR UR STRIP: 1.02 (ref 1–1.03)
TRICHOMONAS, WET PREP: ABNORMAL
UROBILINOGEN UR STRIP-ACNC: 0.2 E.U./DL
WBC'S/HIGH POWER FIELD, WET PREP: ABNORMAL
YEAST, WET PREP: ABNORMAL

## 2022-10-05 PROCEDURE — 81003 URINALYSIS AUTO W/O SCOPE: CPT | Performed by: NURSE PRACTITIONER

## 2022-10-05 PROCEDURE — 87491 CHLMYD TRACH DNA AMP PROBE: CPT | Performed by: NURSE PRACTITIONER

## 2022-10-05 PROCEDURE — 87591 N.GONORRHOEAE DNA AMP PROB: CPT | Performed by: NURSE PRACTITIONER

## 2022-10-05 PROCEDURE — 99213 OFFICE O/P EST LOW 20 MIN: CPT | Performed by: NURSE PRACTITIONER

## 2022-10-05 PROCEDURE — 87210 SMEAR WET MOUNT SALINE/INK: CPT | Performed by: NURSE PRACTITIONER

## 2022-10-05 NOTE — PROGRESS NOTES
"Assessment & Plan     Vaginal discharge  Neg wet prep.   GC/Chlamydia pending; treat based on those results.   Avoid douche.   Pelvic US.   Nataliya verbalizes understanding of plan of care and is in agreement.   - Wet prep - Clinic Collect  - NEISSERIA GONORRHOEA PCR  - CHLAMYDIA TRACHOMATIS PCR  - US Pelvic Complete with Transvaginal    Pelvic pain in female    - Wet prep - Clinic Collect  - NEISSERIA GONORRHOEA PCR  - CHLAMYDIA TRACHOMATIS PCR  - US Pelvic Complete with Transvaginal  - UA Macro with Reflex to Micro and Culture - lab collect    Cyst of right ovary  Due for repeat US.  - US Pelvic Complete with Transvaginal        BMI:   Estimated body mass index is 44.6 kg/m  as calculated from the following:    Height as of this encounter: 1.651 m (5' 5\").    Weight as of this encounter: 121.6 kg (268 lb).     Return in about 7 weeks (around 11/23/2022) for Wellness exam fasting labs.      MARTÍNEZ Michael CNP  [facility] JACKELINE An is a 47 year old, presenting for the following health issues:  Vaginal Problem    Vaginal Problem     History of Present Illness     Vaginal Symptoms  Onset/Duration: x2-3 weeks  Description:  Vaginal Discharge: creamy tint of yellow   Itching (Pruritis): YES- after wiping when goes to bathroom  Burning sensation:  No  Odor: No  Accompanying Signs & Symptoms:  Urinary symptoms: No  Abdominal pain: YES- pelvic/ovarian pain - left side  Fever: No  History:   Sexually active: No - not since 8 months ago - did use a condom  New Partner: N/A  Possibility of Pregnancy:  No  Recent antibiotic use: Fluconazole only  Previous vaginitis issues: No  Precipitating or alleviating factors: None  Therapies tried and outcome: douched per providers recommendation - spotted after    Pelvic ultrasound done  4-8-2022 noted below with recommendations to repeat in 8 to 12 weeks.    FINDINGS:  UTERUS AND ENDOMETRIUM: Surgically absent.  RIGHT OVARY: 3.5 x 3.4 x 2.5 cm " "cm. 3.1 cm complex cyst with low-level internal echogenic material and slight mural nodularity.  LEFT OVARY: 2.6 x 1.8 x 1.8 cm. Normal.     No significant free fluid.  IMPRESSION:  1.  3.1 cm complex right ovarian cyst probably a benign hemorrhagic cyst. Given complex features, ultrasound follow-up in 8-12 weeks is recommended.  2.  Normal left ovary.  3.  Post hysterectomy.      Last intercourse about 6 months used a condom.    Review of Systems   Genitourinary: Positive for vaginal discharge.          Objective    /80 (BP Location: Left arm, Patient Position: Chair, Cuff Size: Adult Large)   Pulse 74   Temp 97.4  F (36.3  C) (Tympanic)   Ht 1.651 m (5' 5\")   Wt 121.6 kg (268 lb)   LMP  (LMP Unknown)   SpO2 99%   Breastfeeding No   BMI 44.60 kg/m    Body mass index is 44.6 kg/m .  Physical Exam   GENERAL: healthy, alert and no distress  ABDOMEN: soft, nontender, no hepatosplenomegaly, no masses and bowel sounds normal   (female): normal female external genitalia, normal urethral meatus, vaginal mucosa, thin watery slight green tinged discharge painful speculum exam; did not complete internal ovary examination due to pain; suprapubic palpation normal.     Results for orders placed or performed in visit on 10/05/22   UA Macro with Reflex to Micro and Culture - lab collect     Status: Normal    Specimen: Urine, Clean Catch   Result Value Ref Range    Color Urine Yellow Colorless, Straw, Light Yellow, Yellow    Appearance Urine Clear Clear    Glucose Urine Negative Negative mg/dL    Bilirubin Urine Negative Negative    Ketones Urine Negative Negative mg/dL    Specific Gravity Urine 1.025 1.003 - 1.035    Blood Urine Negative Negative    pH Urine 6.5 5.0 - 7.0    Protein Albumin Urine Negative Negative mg/dL    Urobilinogen Urine 0.2 0.2, 1.0 E.U./dL    Nitrite Urine Negative Negative    Leukocyte Esterase Urine Negative Negative    Narrative    Microscopic not indicated   Wet prep - Clinic Collect     " Status: Abnormal    Specimen: Vagina; Swab   Result Value Ref Range    Trichomonas Absent Absent    Yeast Absent Absent    Clue Cells Absent Absent    WBCs/high power field 1+ (A) None

## 2022-10-05 NOTE — RESULT ENCOUNTER NOTE
Dear Nataliya,    Here is a summary of your recent test results:    Urine is normal.    For additional lab test information, [email or web address] is an excellent reference.    In addition, here is a list of due or overdue Health Maintenance reminders:    Flu Vaccine(1) due on 09/01/2022  ANNUAL REVIEW OF HM ORDERS due on 09/09/2022  Preventive Care Visit due on 09/09/2022  Mammogram due on 09/21/2022  Asthma Control Test due on 11/03/2022    Please call us at [phone] (or use [employer]) to address the above recommendations if needed.    Thank you for choosing [facility].  It was an honor and a privilege to participate in your care.       Healthy regards,    Leonie Collazo, LEROY  [facility]

## 2022-10-07 ENCOUNTER — THERAPY VISIT (OUTPATIENT)
Dept: PHYSICAL THERAPY | Facility: CLINIC | Age: 47
End: 2022-10-07
Payer: COMMERCIAL

## 2022-10-07 ENCOUNTER — HOSPITAL ENCOUNTER (OUTPATIENT)
Dept: MAMMOGRAPHY | Facility: CLINIC | Age: 47
Discharge: HOME | End: 2022-10-07
Attending: PHYSICIAN ASSISTANT | Admitting: PHYSICIAN ASSISTANT
Payer: COMMERCIAL

## 2022-10-07 DIAGNOSIS — M54.2 CERVICALGIA: ICD-10-CM

## 2022-10-07 DIAGNOSIS — G35 MS (MULTIPLE SCLEROSIS) (H): Primary | ICD-10-CM

## 2022-10-07 DIAGNOSIS — Z12.31 VISIT FOR SCREENING MAMMOGRAM: ICD-10-CM

## 2022-10-07 LAB
C TRACH DNA SPEC QL NAA+PROBE: NEGATIVE
N GONORRHOEA DNA SPEC QL NAA+PROBE: NEGATIVE

## 2022-10-07 PROCEDURE — 97140 MANUAL THERAPY 1/> REGIONS: CPT | Mod: GP | Performed by: PHYSICAL THERAPIST

## 2022-10-07 PROCEDURE — 97035 APP MDLTY 1+ULTRASOUND EA 15: CPT | Mod: GP | Performed by: PHYSICAL THERAPIST

## 2022-10-07 PROCEDURE — 77067 SCR MAMMO BI INCL CAD: CPT

## 2022-10-07 NOTE — RESULT ENCOUNTER NOTE
Dear Nataliya,    Here is a summary of your recent test results:    Great news. Chlamydia and gonnohrea tests are normal. Lets keep an eye on symptoms.   Let me know if any symptoms continue and we may need to try a different antibiotic vaginally like a clindamycin.     For additional lab test information, [email or web address] is an excellent reference.    In addition, here is a list of due or overdue Health Maintenance reminders:    Flu Vaccine(1) due on 09/01/2022  ANNUAL REVIEW OF HM ORDERS due on 09/09/2022  Preventive Care Visit due on 09/09/2022  Mammogram due on 09/21/2022  Asthma Control Test due on 11/03/2022    Please call us at [phone] (or use [employer]) to address the above recommendations if needed.    Thank you for choosing [facility].  It was an honor and a privilege to participate in your care.       Healthy regards,    Leonie Collazo, LEROY  [facility]

## 2022-10-09 ENCOUNTER — MYC MEDICAL ADVICE (OUTPATIENT)
Dept: FAMILY MEDICINE | Facility: CLINIC | Age: 47
End: 2022-10-09

## 2022-10-12 ENCOUNTER — OFFICE VISIT (OUTPATIENT)
Dept: FAMILY MEDICINE | Facility: CLINIC | Age: 47
End: 2022-10-12
Payer: COMMERCIAL

## 2022-10-12 VITALS
DIASTOLIC BLOOD PRESSURE: 74 MMHG | WEIGHT: 272 LBS | OXYGEN SATURATION: 100 % | TEMPERATURE: 98.1 F | HEART RATE: 99 BPM | BODY MASS INDEX: 45.32 KG/M2 | HEIGHT: 65 IN | SYSTOLIC BLOOD PRESSURE: 111 MMHG

## 2022-10-12 DIAGNOSIS — B37.31 YEAST INFECTION OF THE VAGINA: ICD-10-CM

## 2022-10-12 DIAGNOSIS — R10.2 PELVIC PAIN IN FEMALE: ICD-10-CM

## 2022-10-12 DIAGNOSIS — N89.8 VAGINAL DISCHARGE: Primary | ICD-10-CM

## 2022-10-12 DIAGNOSIS — N93.9 VAGINAL BLEEDING: ICD-10-CM

## 2022-10-12 DIAGNOSIS — J32.9 SINUSITIS, UNSPECIFIED CHRONICITY, UNSPECIFIED LOCATION: ICD-10-CM

## 2022-10-12 LAB
CLUE CELLS: ABNORMAL
TRICHOMONAS, WET PREP: ABNORMAL
WBC'S/HIGH POWER FIELD, WET PREP: ABNORMAL
YEAST, WET PREP: ABNORMAL

## 2022-10-12 PROCEDURE — 87070 CULTURE OTHR SPECIMN AEROBIC: CPT | Performed by: NURSE PRACTITIONER

## 2022-10-12 PROCEDURE — 99214 OFFICE O/P EST MOD 30 MIN: CPT | Performed by: NURSE PRACTITIONER

## 2022-10-12 PROCEDURE — 87591 N.GONORRHOEAE DNA AMP PROB: CPT | Performed by: NURSE PRACTITIONER

## 2022-10-12 PROCEDURE — 87491 CHLMYD TRACH DNA AMP PROBE: CPT | Performed by: NURSE PRACTITIONER

## 2022-10-12 PROCEDURE — 87109 MYCOPLASMA: CPT | Performed by: NURSE PRACTITIONER

## 2022-10-12 PROCEDURE — 87210 SMEAR WET MOUNT SALINE/INK: CPT | Performed by: NURSE PRACTITIONER

## 2022-10-12 PROCEDURE — 87076 CULTURE ANAEROBE IDENT EACH: CPT | Performed by: NURSE PRACTITIONER

## 2022-10-12 ASSESSMENT — ANXIETY QUESTIONNAIRES
5. BEING SO RESTLESS THAT IT IS HARD TO SIT STILL: NOT AT ALL
2. NOT BEING ABLE TO STOP OR CONTROL WORRYING: NOT AT ALL
3. WORRYING TOO MUCH ABOUT DIFFERENT THINGS: NOT AT ALL
GAD7 TOTAL SCORE: 1
IF YOU CHECKED OFF ANY PROBLEMS ON THIS QUESTIONNAIRE, HOW DIFFICULT HAVE THESE PROBLEMS MADE IT FOR YOU TO DO YOUR WORK, TAKE CARE OF THINGS AT HOME, OR GET ALONG WITH OTHER PEOPLE: NOT DIFFICULT AT ALL
6. BECOMING EASILY ANNOYED OR IRRITABLE: SEVERAL DAYS
1. FEELING NERVOUS, ANXIOUS, OR ON EDGE: NOT AT ALL
7. FEELING AFRAID AS IF SOMETHING AWFUL MIGHT HAPPEN: NOT AT ALL
8. IF YOU CHECKED OFF ANY PROBLEMS, HOW DIFFICULT HAVE THESE MADE IT FOR YOU TO DO YOUR WORK, TAKE CARE OF THINGS AT HOME, OR GET ALONG WITH OTHER PEOPLE?: NOT DIFFICULT AT ALL
7. FEELING AFRAID AS IF SOMETHING AWFUL MIGHT HAPPEN: NOT AT ALL
GAD7 TOTAL SCORE: 1
4. TROUBLE RELAXING: NOT AT ALL
GAD7 TOTAL SCORE: 1

## 2022-10-12 ASSESSMENT — PATIENT HEALTH QUESTIONNAIRE - PHQ9
SUM OF ALL RESPONSES TO PHQ QUESTIONS 1-9: 2
SUM OF ALL RESPONSES TO PHQ QUESTIONS 1-9: 2
10. IF YOU CHECKED OFF ANY PROBLEMS, HOW DIFFICULT HAVE THESE PROBLEMS MADE IT FOR YOU TO DO YOUR WORK, TAKE CARE OF THINGS AT HOME, OR GET ALONG WITH OTHER PEOPLE: NOT DIFFICULT AT ALL

## 2022-10-12 NOTE — TELEPHONE ENCOUNTER
My chart message sent     Amelia Chance RN, BSN  [facility]

## 2022-10-12 NOTE — TELEPHONE ENCOUNTER
LOV 10/5/2022    Please see my chart message below     Please review and advise     Thank you     Amelia Chance RN, BSN  [place]

## 2022-10-12 NOTE — RESULT ENCOUNTER NOTE
Dear Nataliya,    Here is a summary of your recent test results:    No signs of bacteria or yeast vaginal infections.  Lets await the rest of the cultures.     For additional lab test information, [email or web address] is an excellent reference.    In addition, here is a list of due or overdue Health Maintenance reminders:    Hepatitis B Vaccine(2 of 3 - 3-dose series) due on 08/31/1999  Yearly Preventive Visit due on 09/09/2022  ANNUAL REVIEW OF HM ORDERS due on 09/09/2022  Asthma Control Test due on 11/03/2022    Please call us at [phone] (or use [employer]) to address the above recommendations if needed.    Thank you for choosing [facility].  It was an honor and a privilege to participate in your care.     Healthy regards,    Leonie Collazo, DAVIDP  [facility]

## 2022-10-12 NOTE — PROGRESS NOTES
"Assessment & Plan     Vaginal discharge  Vaginal bleeding  Start of possible urethra caruncle could be source.  NO bleeding in clinic today.   NO rectal bleeding.   Check UA and wet prep.   Vaginal bacteria culture for outlying bacteria.   Continue to monitor.   - Wet prep - Clinic Collect  - NEISSERIA GONORRHOEA PCR  - CHLAMYDIA TRACHOMATIS PCR  - Ureaplasma and Mycoplasma culture  - Vaginal Fluid Aerobic Bacterial Culture Routine  - UA Macro with Reflex to Micro and Culture - lab collect    Pelvic pain in female  Move up pelvic US. Plan of care based on those results.   Red flag symptoms discussed and if these occur present to the emergency room or call 911.  Nataliya verbalizes understanding of plan of care and is in agreement.     BMI:   Estimated body mass index is 45.26 kg/m  as calculated from the following:    Height as of this encounter: 1.651 m (5' 5\").    Weight as of this encounter: 123.4 kg (272 lb).       Return in about 2 days (around 10/14/2022) for Recheck pelvic US.      MARTÍNEZ Michael [facility] JACKELINE An is a 47 year old, presenting for the following health issues:  Vaginal Problem      History of Present Illness       Reason for visit:  Spotting ovarian pain    She eats 2-3 servings of fruits and vegetables daily.She consumes 0 sweetened beverage(s) daily.She exercises with enough effort to increase her heart rate 20 to 29 minutes per day.  She exercises with enough effort to increase her heart rate 4 days per week. She is missing 1 dose(s) of medications per week.    Today's PHQ-9         PHQ-9 Total Score: 2    PHQ-9 Q9 Thoughts of better off dead/self-harm past 2 weeks :   Not at all    How difficult have these problems made it for you to do your work, take care of things at home, or get along with other people: Not difficult at all  Today's JUSTICE-7 Score: 1     Review of Systems   Constitutional, HEENT, cardiovascular, pulmonary, GI, , " "musculoskeletal, neuro, skin, endocrine and psych systems are negative, except as otherwise noted in the HPI.      Objective    /74 (BP Location: Left arm, Patient Position: Chair, Cuff Size: Adult Regular)   Pulse 99   Temp 98.1  F (36.7  C) (Tympanic)   Ht 1.651 m (5' 5\")   Wt 123.4 kg (272 lb)   LMP  (LMP Unknown)   SpO2 100%   BMI 45.26 kg/m    Body mass index is 45.26 kg/m .  Physical Exam   GENERAL: healthy, alert and no distress   (female): (female): normal female external genitalia, urethral meatus mild atrophic changes ? starting urethral caruncle, vaginal mucosa normal, and thin white discharge  RECTAL (female): normal sphincter tone, no rectal masses, no hemorrhoids     Results for orders placed or performed in visit on 10/12/22   Wet prep - Clinic Collect     Status: Abnormal    Specimen: Vagina; Swab   Result Value Ref Range    Trichomonas Absent Absent    Yeast Absent Absent    Clue Cells Absent Absent    WBCs/high power field 3+ (A) None           "

## 2022-10-13 ENCOUNTER — TELEPHONE (OUTPATIENT)
Dept: FAMILY MEDICINE | Facility: CLINIC | Age: 47
End: 2022-10-13

## 2022-10-13 DIAGNOSIS — B37.31 YEAST INFECTION OF THE VAGINA: ICD-10-CM

## 2022-10-13 RX ORDER — FLUCONAZOLE 150 MG/1
150 TABLET ORAL ONCE
Qty: 2 TABLET | Refills: 0 | Status: SHIPPED | OUTPATIENT
Start: 2022-10-13 | End: 2022-10-13

## 2022-10-14 ENCOUNTER — INFUSION THERAPY VISIT (OUTPATIENT)
Dept: INFUSION THERAPY | Facility: CLINIC | Age: 47
End: 2022-10-14
Attending: PSYCHIATRY & NEUROLOGY
Payer: COMMERCIAL

## 2022-10-14 ENCOUNTER — THERAPY VISIT (OUTPATIENT)
Dept: PHYSICAL THERAPY | Facility: CLINIC | Age: 47
End: 2022-10-14
Payer: COMMERCIAL

## 2022-10-14 ENCOUNTER — ANCILLARY PROCEDURE (OUTPATIENT)
Dept: ULTRASOUND IMAGING | Facility: CLINIC | Age: 47
End: 2022-10-14
Attending: NURSE PRACTITIONER
Payer: COMMERCIAL

## 2022-10-14 VITALS
TEMPERATURE: 98.6 F | HEART RATE: 78 BPM | OXYGEN SATURATION: 98 % | SYSTOLIC BLOOD PRESSURE: 125 MMHG | RESPIRATION RATE: 18 BRPM | DIASTOLIC BLOOD PRESSURE: 73 MMHG

## 2022-10-14 DIAGNOSIS — N89.8 VAGINAL DISCHARGE: ICD-10-CM

## 2022-10-14 DIAGNOSIS — M54.2 CERVICALGIA: ICD-10-CM

## 2022-10-14 DIAGNOSIS — G35 MULTIPLE SCLEROSIS (H): Primary | ICD-10-CM

## 2022-10-14 DIAGNOSIS — G35 MS (MULTIPLE SCLEROSIS) (H): Primary | ICD-10-CM

## 2022-10-14 DIAGNOSIS — R10.2 PELVIC PAIN IN FEMALE: ICD-10-CM

## 2022-10-14 DIAGNOSIS — N83.201 CYST OF RIGHT OVARY: ICD-10-CM

## 2022-10-14 LAB
C TRACH DNA SPEC QL NAA+PROBE: NEGATIVE
N GONORRHOEA DNA SPEC QL NAA+PROBE: NEGATIVE

## 2022-10-14 PROCEDURE — 258N000003 HC RX IP 258 OP 636: Performed by: PSYCHIATRY & NEUROLOGY

## 2022-10-14 PROCEDURE — 76830 TRANSVAGINAL US NON-OB: CPT

## 2022-10-14 PROCEDURE — 97035 APP MDLTY 1+ULTRASOUND EA 15: CPT | Mod: GP | Performed by: PHYSICAL THERAPIST

## 2022-10-14 PROCEDURE — 97140 MANUAL THERAPY 1/> REGIONS: CPT | Mod: GP | Performed by: PHYSICAL THERAPIST

## 2022-10-14 PROCEDURE — 250N000011 HC RX IP 250 OP 636: Performed by: PSYCHIATRY & NEUROLOGY

## 2022-10-14 PROCEDURE — 96365 THER/PROPH/DIAG IV INF INIT: CPT

## 2022-10-14 RX ORDER — ACETAMINOPHEN 325 MG/1
650 TABLET ORAL EVERY 4 HOURS PRN
Status: CANCELLED
Start: 2022-10-28

## 2022-10-14 RX ORDER — ONDANSETRON 2 MG/ML
4 INJECTION INTRAMUSCULAR; INTRAVENOUS EVERY 8 HOURS PRN
Status: CANCELLED
Start: 2022-10-28

## 2022-10-14 RX ORDER — IBUPROFEN 200 MG
600 TABLET ORAL EVERY 6 HOURS PRN
Status: CANCELLED
Start: 2022-10-28

## 2022-10-14 RX ORDER — DIPHENHYDRAMINE HCL 25 MG
50 CAPSULE ORAL EVERY 4 HOURS PRN
Status: CANCELLED
Start: 2022-10-28

## 2022-10-14 RX ORDER — HEPARIN SODIUM (PORCINE) LOCK FLUSH IV SOLN 100 UNIT/ML 100 UNIT/ML
5 SOLUTION INTRAVENOUS
Status: CANCELLED | OUTPATIENT
Start: 2022-10-28

## 2022-10-14 RX ORDER — HEPARIN SODIUM,PORCINE 10 UNIT/ML
5 VIAL (ML) INTRAVENOUS
Status: CANCELLED | OUTPATIENT
Start: 2022-10-28

## 2022-10-14 RX ADMIN — NATALIZUMAB 300 MG: 300 INJECTION INTRAVENOUS at 13:19

## 2022-10-14 NOTE — PROGRESS NOTES
Infusion Nursing Note:  Nataliya Aldrich presents today for Tysabri.    Patient seen by provider today: Yes: Dr Nickerson   present during visit today: Not Applicable.    Note: Per Dr Nickerson's orders, no need for pt to stay for 1 hr observation.  No labs today.    Intravenous Access:  Peripheral IV placed.    Treatment Conditions:  Tysabri pre-infusion checklist completed via touch program.    Post Infusion Assessment:  Patient tolerated infusion without incident.  Blood return noted pre and post infusion.  Site patent and intact, free from redness, edema or discomfort.  No evidence of extravasations.  Access discontinued per protocol.     Discharge Plan:   Discharge instructions reviewed with: Patient.  Patient and/or family verbalized understanding of discharge instructions and all questions answered.  AVS to patient via [employer].  Patient will return 10/25 for next appointment.   Patient discharged in stable condition accompanied by: self.  Departure Mode: Ambulatory.      Radha Lee RN

## 2022-10-15 LAB — BACTERIA VAG AEROBE CULT: NORMAL

## 2022-10-16 ENCOUNTER — HEALTH MAINTENANCE LETTER (OUTPATIENT)
Age: 47
End: 2022-10-16

## 2022-10-17 ENCOUNTER — MYC MEDICAL ADVICE (OUTPATIENT)
Dept: FAMILY MEDICINE | Facility: CLINIC | Age: 47
End: 2022-10-17

## 2022-10-17 NOTE — RESULT ENCOUNTER NOTE
Dear Nataliya,    Here is a summary of your recent test results:    Vaginal cultures are normal but also still in process.  Ultrasound does show a hemorrhagic/bleeding follicle on the left ovary. This is the area of your pain so could explain that.    Given your vaginal discharge concerns and this for you to OB/GYN for consultation.  Please call to schedule appointment.       FMG: [facility] [phone]   [email or web address]    OR    IMPRESSION:  1.  Previously seen right ovarian cyst has resolved.  2.  Small hemorrhagic follicle of the left ovary may explain the patient's pain.  3.  Hysterectomy.    For additional lab test information, [email or web address] is an excellent reference.    In addition, here is a list of due or overdue Health Maintenance reminders:    Hepatitis B Vaccine(2 of 3 - 3-dose series) due on 08/31/1999  Yearly Preventive Visit due on 09/09/2022  ANNUAL REVIEW OF HM ORDERS due on 09/09/2022  Asthma Control Test due on 11/03/2022    Please call us at [phone] (or use [employer]) to address the above recommendations if needed.    Thank you for choosing [facility].  It was an honor and a privilege to participate in your care.       Healthy regards,    Leonie Collazo, LEROY  [facility]

## 2022-10-17 NOTE — RESULT ENCOUNTER NOTE
Dear Nataliya,    Here is a summary of your recent test results:    Your urine does show Ureaplasma. I am waiting for the culture to list antibiotic choices to make sure I give you the right antibiotic.    Ureaplasma is a group of tiny bacteria that inhabit the respiratory and urogenital (urinary and reproductive) tract. They are some of the smallest free-living organisms in the world. They're so tiny that they can't be seen through a microscope.  Ureaplasma is often a part of the human microbiome, which consists of trillions of tiny cells that live in and on the human body. These tiny organisms help you digest food, fight infections, and maintain reproductive health.  Sometimes typically harmless bacteria overgrow and inflame healthy tissues. This creates a colony of bacteria that can lead to infection.  How do you get it?  Ureaplasma is typically transmitted through sexual contact. It's very common among sexually active adults. It can enter the body through the vagina or the urethra.    Please call us at [phone] (or use [employer]) to address the above recommendations if needed.    Thank you for choosing [facility].  It was an honor and a privilege to participate in your care.       Healthy regards,    Leonie Collazo, LEROY  [facility]

## 2022-10-18 ENCOUNTER — OFFICE VISIT (OUTPATIENT)
Dept: OBGYN | Facility: CLINIC | Age: 47
End: 2022-10-18
Payer: COMMERCIAL

## 2022-10-18 VITALS — WEIGHT: 275.2 LBS | DIASTOLIC BLOOD PRESSURE: 78 MMHG | SYSTOLIC BLOOD PRESSURE: 118 MMHG | BODY MASS INDEX: 45.8 KG/M2

## 2022-10-18 DIAGNOSIS — R10.2 PELVIC PAIN IN FEMALE: ICD-10-CM

## 2022-10-18 DIAGNOSIS — Z90.710 H/O TOTAL HYSTERECTOMY: ICD-10-CM

## 2022-10-18 DIAGNOSIS — N93.9 VAGINAL SPOTTING: Primary | ICD-10-CM

## 2022-10-18 PROCEDURE — 99214 OFFICE O/P EST MOD 30 MIN: CPT | Performed by: STUDENT IN AN ORGANIZED HEALTH CARE EDUCATION/TRAINING PROGRAM

## 2022-10-18 NOTE — PROGRESS NOTES
[facility]  OB/GYN Clinic Note    SUBJECTIVE:                                                   Nataliya ASAEL Venkata Aldrich is a 47 year old female who presents to clinic today for the following health issue(s):  Patient presents with:  Pelvic Pain: Has been dealing with ovarian cyst since age of 42, has been spotting, and lots of discharge, has had recent US and labs.  Is worried more about the spotting     Additional information: pelvic pain and discharge    HPI:  Patient presents to discuss pelvic pain and new persistent vaginal spotting. Pain is more intense than in the past.  Pain started about two and a half weeks ago. Also had excessive vaginal discharge during this time. Treated for yeast infection but discharge continued. Has been evaluated for STI recently. Pain feels similar to when she has had ovarian cysts rupture in the past, however now the pain is more persistent, and associated with vaginal spotting which is worrisome to her, since she has a history of RA-TLH for fibroid uterus. She does notice increased spotting with intercourse as well, but now notices spotting without having had intercourse in the last 6 months.   Has long history of ovarian cysts and is familiar with the pain associated with cyst rupture. Had TVUS on Friday which was excruciatingly painful. She has some ongoing discomfort from the US study. Usually does not take anything for pain. Has history of MS and fibromyalgia and takes medication for this that helps with chronic pain. Ibuprofen helped a little with the pain she has now.     No LMP recorded (lmp unknown). Patient has had a hysterectomy..   Patient is sexually active, No obstetric history on file..  Using hysterectomy for contraception.    reports that she quit smoking about 9 years ago. Her smoking use included cigars and cigarettes. She started smoking about 11 years ago. She has a 1.00 pack-year smoking history. She has never used smokeless  "tobacco.  STD testing offered?  recently done  Health maintenance updated:  no    Problem list and histories reviewed & adjusted, as indicated.  Additional history: as documented.    Patient Active Problem List   Diagnosis     Migraine     Body mass index 45.0-49.9, adult (H)     Cognitive changes     Hypersomnia with sleep apnea     Iron deficiency anemia     Postsurgical nonabsorption     Pain, neuropathic     S/P gastric bypass      H/O total hysterectomy     Vitamin B12 deficiency     Hypovitaminosis D     Hypermagnesemia     Multiple sclerosis (H) - Dr. Chong - on Tysabri infusions     Immunosuppression (H)     Chronic musculoskeletal pain due to disorder of nervous system     Gastroesophageal reflux disease with esophagitis, unspecified whether hemorrhage     Insomnia, unspecified type     Muscle spasm     Seasonal allergic rhinitis, unspecified trigger     HSV (herpes simplex virus) infection     Pain of left lower extremity     Rash     Spondylosis of cervical region without myelopathy or radiculopathy     Moderate persistent asthma without complication     Past Surgical History:   Procedure Laterality Date     COLONOSCOPY       GASTRIC BYPASS      \"Trouble with B12 and D\"     HYSTERECTOMY, MONO  2013    cervix gone.  fibroids.  without BSO     TONSILLECTOMY        Social History     Tobacco Use     Smoking status: Former     Packs/day: 0.50     Years: 2.00     Pack years: 1.00     Types: Cigars, Cigarettes     Start date: 2011     Quit date: 2013     Years since quittin.3     Smokeless tobacco: Never   Substance Use Topics     Alcohol use: Yes     Alcohol/week: 2.0 standard drinks     Comment: 0-3 drinks per week      Problem (# of Occurrences) Relation (Name,Age of Onset)    Substance Abuse (1) Paternal Grandmother (uncle)    Depression (1) Paternal Grandmother (uncle)    Diabetes (2) Maternal Grandmother (Adwoa), Paternal Grandmother (uncle)    Hypertension (1) Mother (Sade)    " Cerebrovascular Disease (2) Maternal Grandfather (Grandpa and uncle), Paternal Grandmother (uncle)    Obesity (2) Mother (Sade), Father (Homero)    Lupus (1) Paternal Aunt (41)    Hyperlipidemia (2) Mother (Sade), Maternal Grandmother (Adwoa)       Negative family history of: Multiple Sclerosis, Breast Cancer, Ovarian Cancer            Current Outpatient Medications   Medication Sig     albuterol (PROVENTIL) (2.5 MG/3ML) 0.083% neb solution USE 1 VIAL VIA NEBULIZER EVERY 6 HOURS AS NEEDED FOR SHORTNESS OF BREATH OR DIFFICULT BREATHING OR WHEEZING     azelastine (ASTELIN) 0.1 % nasal spray Spray 1 spray into both nostrils 2 times daily as needed for rhinitis (nasal antihistamine)     baclofen (LIORESAL) 20 MG tablet TAKE 1 TABLET BY MOUTH EVERY NIGHT AT BEDTIME. MAY ALSO TAKE 1 TABLET EVERY 4 HOURS AS NEEDED FOR MUSCLE SPASMS     calcium carbonate-vitamin D (OSCAL W/D) 500-200 MG-UNIT tablet Take 1 tablet by mouth 2 times daily (with meals)     cetirizine (ZYRTEC) 10 MG tablet Take 1 tablet (10 mg) by mouth daily     desonide (DESOWEN) 0.05 % external cream Apply topically 2 times daily To face as needed for rash     DULoxetine (CYMBALTA) 30 MG capsule Take 1 capsule (30 mg) by mouth daily for 5 days, THEN 1 capsule (30 mg) 2 times daily.     fluticasone (FLONASE) 50 MCG/ACT nasal spray USE 1 PUFF IN EACH NOSTRIL AS DIRECTED.     gabapentin (NEURONTIN) 300 MG capsule TAKE 2 CAPSULES(600 MG) BY MOUTH AT BEDTIME. MAY ALSO TAKE 2 CAPSULES 600 MG DAILY AS NEEDED PAIN     ipratropium - albuterol 0.5 mg/2.5 mg/3 mL (DUONEB) 0.5-2.5 (3) MG/3ML neb solution USE 1 VIAL VIA NEBULIZER EVERY 4 HOURS AS NEEDED FOR SHORTNESS OF BREATH OR WHEEZING     ketotifen (ZADITOR) 0.025 % ophthalmic solution Place 1 drop into both eyes 2 times daily as needed for itching     MAGNESIUM PO      montelukast (SINGULAIR) 10 MG tablet TAKE 1 TABLET(10 MG) BY MOUTH AT BEDTIME     Multiple Vitamins-Calcium (ONE-A-DAY WOMENS FORMULA PO)       natalizumab (TYSABRI) 300 MG/15ML injection Inject 15 mLs (300 mg) into the vein once every six weeks Infusion     omeprazole (PRILOSEC) 20 MG DR capsule TAKE 1 CAPSULE(20 MG) BY MOUTH TWICE DAILY BEFORE MEALS     SUMAtriptan (IMITREX) 100 MG tablet Take 50 mg up to twice daily as needed for headache; separate doses by at least one hour and do not use more than 3 days/week     SYMBICORT 160-4.5 MCG/ACT Inhaler INHALE 2 PUFFS INTO THE LUNGS TWICE DAILY     topiramate (TOPAMAX) 100 MG tablet TAKE 1 TABLET BY MOUTH AT BEDTIME     topiramate (TOPAMAX) 50 MG tablet TAKE 1 TABLET BY MOUTH AT BEDTIME     triamcinolone (KENALOG) 0.1 % external cream Apply topically 2 times daily No more  than 2 weeks in a row not on face     valACYclovir (VALTREX) 500 MG tablet Take 1 tablet (500 mg) by mouth daily     VENTOLIN  (90 Base) MCG/ACT inhaler SHAKE WELL AND INHALE 2 PUFFS INTO THE LUNGS EVERY 6 HOURS AS NEEDED FOR SHORTNESS OF BREATH OR WHEEZING     vitamin D3 (CHOLECALCIFEROL) 250 mcg (64682 units) capsule TAKE 1 CAPSULE BY MOUTH ONCE DAILY     No current facility-administered medications for this visit.     Allergies   Allergen Reactions     Aspirin Difficulty breathing, Palpitations and Other (See Comments)     Cephalexin Swelling     Adhesive Tape      Amantadine Swelling     Azithromycin Angioedema     Naproxen      Latex Hives, Itching and Rash     Penicillins Other (See Comments), Nausea and Vomiting, Hives, Swelling, Rash, Cramps and GI Disturbance     Amoxicillin OK           OBJECTIVE:     /78   Wt 124.8 kg (275 lb 3.2 oz)   LMP  (LMP Unknown)   BMI 45.80 kg/m    Body mass index is 45.8 kg/m .    Exam:  Constitutional:  Appearance: Well nourished, well developed alert, in no acute distress  Pelvic Exam:  External Genitalia:     Normal appearance for age, no discharge present, no tenderness present, no inflammatory lesions present, color normal  Vagina:     Normal vaginal vault without central or  paravaginal defects, no discharge present, no inflammatory lesions present, no masses present. Mottled appearance, with red punctuations in a bed of white vaginal mucosa, that bleeds to palpation.   Bladder:     Nontender to palpation  Urethra:   Urethral Body:  Urethra palpation normal, urethra structural support normal   Urethral Meatus:  No erythema or lesions present  Cervix:     Surgically absent  Uterus:     Surgically absent. Cuff intact.   Adnexa:     No adnexal tenderness present, no adnexal masses present  Perineum:     Perineum within normal limits, no evidence of trauma, no rashes or skin lesions present  Anus:     Anus within normal limits, no hemorrhoids present  Genitalia and Groin:     No rashes present, no lesions present, no areas of discoloration, no masses present     US Pelvic Complete with Transvaginal 10/14/2022    Narrative  EXAM: ULTRASOUND PELVIC TRANSABDOMINAL AND TRANSVAGINAL  LOCATION: [facility]  DATE/TIME: 10/14/2022, 4:30 PM    INDICATION: Follow-up 04/08/2022 pelvic ultrasound; pelvic pain. Vaginal discharge.  COMPARISON: 04/08/2022.  TECHNIQUE: Transabdominal scans were performed. Endovaginal ultrasound was performed to better visualize the adnexa.    FINDINGS:    UTERUS: Surgically absent.    RIGHT OVARY: 3.2 x 1.6 x 1.3 cm. Normal. Previously seen cyst has resolved.    LEFT OVARY: 3.6 x 1.7 x 1.2 cm. Small hemorrhagic follicle measures 1.6 cm.    No significant free fluid.    Impression  IMPRESSION:  1.  Previously seen right ovarian cyst has resolved.  2.  Small hemorrhagic follicle of the left ovary may explain the patient's pain.  3.  Hysterectomy.        ASSESSMENT/PLAN:                                                        ICD-10-CM    1. Vaginal spotting  N93.9       2. Pelvic pain in female  R10.2       3. H/O total RA- [facility]sc hysterectomy, BS  Z90.710         Nataliya Aldrich is a 47 year old with new onset vaginal bleeding in the setting of  remote history of RA-TLH, BS, and acute on chronic pelvic pain. Recently had negative STI testing. Exam notable for vaginal tissue with friable punctuation, and otherwise normal vaginal mucosa. Cuff is intact. Unclear etiology. Vaginal tissue otherwise appears healthy and infectious testing is negative. Discussed vaginal moisturizer Replens, and using lubrication with intercourse.  US images were personally reviewed with the patient. Reviewed hemorrhagic cyst in detail, including peritoneal irritation that can persist for a long duration after resolution of cyst. Recommend NSAIDs prn to help with pain.       Socorro Richter MD  [facility] [place]

## 2022-10-19 ENCOUNTER — LAB (OUTPATIENT)
Dept: URGENT CARE | Facility: URGENT CARE | Age: 47
End: 2022-10-19
Attending: NURSE PRACTITIONER
Payer: COMMERCIAL

## 2022-10-19 ENCOUNTER — E-VISIT (OUTPATIENT)
Dept: FAMILY MEDICINE | Facility: CLINIC | Age: 47
End: 2022-10-19
Payer: COMMERCIAL

## 2022-10-19 DIAGNOSIS — G35 MULTIPLE SCLEROSIS (H): ICD-10-CM

## 2022-10-19 DIAGNOSIS — J02.9 ACUTE PHARYNGITIS, UNSPECIFIED ETIOLOGY: ICD-10-CM

## 2022-10-19 DIAGNOSIS — D84.9 IMMUNOSUPPRESSION (H): ICD-10-CM

## 2022-10-19 DIAGNOSIS — J45.40 MODERATE PERSISTENT ASTHMA WITHOUT COMPLICATION: ICD-10-CM

## 2022-10-19 DIAGNOSIS — J45.40 MODERATE PERSISTENT ASTHMA WITHOUT COMPLICATION: Primary | ICD-10-CM

## 2022-10-19 LAB
DEPRECATED S PYO AG THROAT QL EIA: NEGATIVE
GROUP A STREP BY PCR: NOT DETECTED

## 2022-10-19 PROCEDURE — 99423 OL DIG E/M SVC 21+ MIN: CPT | Performed by: NURSE PRACTITIONER

## 2022-10-19 PROCEDURE — U0005 INFEC AGEN DETEC AMPLI PROBE: HCPCS

## 2022-10-19 PROCEDURE — U0003 INFECTIOUS AGENT DETECTION BY NUCLEIC ACID (DNA OR RNA); SEVERE ACUTE RESPIRATORY SYNDROME CORONAVIRUS 2 (SARS-COV-2) (CORONAVIRUS DISEASE [COVID-19]), AMPLIFIED PROBE TECHNIQUE, MAKING USE OF HIGH THROUGHPUT TECHNOLOGIES AS DESCRIBED BY CMS-2020-01-R: HCPCS

## 2022-10-19 PROCEDURE — 87651 STREP A DNA AMP PROBE: CPT

## 2022-10-19 NOTE — TELEPHONE ENCOUNTER
Pelvic US done on 10/14. Result note written on 10/17 and viewed by patient.     KENDAL LEVY RN on 10/19/2022 at 11:02 AM   [facility]

## 2022-10-19 NOTE — TELEPHONE ENCOUNTER
Provider E-Visit time total (minutes): 27      Called patient to explain symptoms.     Flu vaccine last Wednesday 10/12/22. COVID booster the week and a half before.  Infusion Natalizumab 10/14/22.  COVID +July    Will complete Covid and strep swabs today; treat based on symptoms or results of these tests.       Leonie Collazo, DAVIDP-BC

## 2022-10-19 NOTE — TELEPHONE ENCOUNTER
This is an FYI     Amelia Chance RN, BSN  [facility] [place]

## 2022-10-19 NOTE — PATIENT INSTRUCTIONS
You may want to try warm salt water gargles or rinses to feel better or help prevent another bout in the future. Mix 1 teaspoon of salt in 8 ounces of water, gargle, and spit. Do this several times a day for several days. Do not swallow the mixture.      Viral Upper Respiratory Illness (Adult)    You have a viral upper respiratory illness (URI), which is another term for the common cold. This illness is contagious during the first few days. It is spread through the air by coughing and sneezing. It may also be spread by direct contact (touching the sick person and then touching your own eyes, nose, or mouth). Frequent handwashing will decrease risk of spread. Most viral illnesses go away within 7 to 10 days with rest and simple home remedies. Sometimes the illness may last for several weeks. Antibiotics will not kill a virus, and they are generally not prescribed for this condition.  Home care    If symptoms are severe, rest at home for the first 2 to 3 days. When you resume activity, don't let yourself get too tired.    Don't smoke. If you need help stopping, talk with your healthcare provider.    Avoid being exposed to cigarette smoke (yours or others ).    You may use acetaminophen or ibuprofen to control pain and fever, unless another medicine was prescribed. If you have chronic liver or kidney disease, have ever had a stomach ulcer or gastrointestinal bleeding, or are taking blood-thinning medicines, talk with your healthcare provider before using these medicines. Aspirin should never be given to anyone under 18 years of age who is ill with a viral infection or fever. It may cause severe liver or brain damage.    Your appetite may be poor, so a light diet is fine. Stay well hydrated by drinking 6 to 8 glasses of fluids per day (water, soft drinks, juices, tea, or soup). Extra fluids will help loosen secretions in the nose and lungs.    Over-the-counter cold medicines will not shorten the length of time you re  sick, but they may be helpful for the following symptoms: cough, sore throat, and nasal and sinus congestion. If you take prescription medicines, ask your healthcare provider or pharmacist which over-the-counter medicines are safe to use. (Note: Don't use decongestants if you have high blood pressure.)  Follow-up care  Follow up with your healthcare provider, or as advised.  When to seek medical advice  Call your healthcare provider right away if any of these occur:    Cough with lots of colored sputum (mucus)    Severe headache; face, neck, or ear pain    Difficulty swallowing due to throat pain    Fever of 100.4 F (38 C) or higher, or as directed by your healthcare provider  Call 911  Call 911 if any of these occur:    Chest pain, shortness of breath, wheezing, or difficulty breathing    Coughing up blood    Very severe pain with swallowing, especially if it goes along with a muffled voice   [employer] last reviewed this educational content on 6/1/2018 2000-2021 [employer]. All rights reserved. This information is not intended as a substitute for professional medical care. Always follow your healthcare professional's instructions.        Dear Nataliya,      Based on your responses, you may have coronavirus (COVID-19). Will also check for strep. Due to your vaccines and recent infusion you could just be worn down.     Please keep me updated on symptoms.     Will I be tested for COVID-19?  We would like to test you for COVID. I have placed orders for this test.     To schedule: go to your [employer] home page and scroll down to the section that says  You have an appointment that needs to be scheduled  and click the large green button that says  Schedule Now  and follow the steps to find the next available openings.    If you are unable to complete these [employer] scheduling steps, please call [phone] to schedule your testing.     These guidelines are for isolating before returning to work, school or  .     For employers, schools and day cares: This is an official notice for this person s medical guidelines for returning in-person.     For health care sites: The CDC gives different isolation and quarantine guidelines for healthcare sites, please check with these sites before arriving.     How do I self-isolate?  You isolate when you have symptoms of COVID or a test shows you have COVID, even if you don t have symptoms.     If you DO have symptoms:  o Stay home and away from others  - For at least 5 days after your symptoms started, AND   - You are fever free for 24 hours (with no medicine that reduces fever), AND  - Your other symptoms are better.  o Wear a mask for 10 full days any time you are around others.    If you DON T have symptoms:  o Stay at home and away from others for at least 5 days after your positive test.  o Wear a mask for 10 full days any time you are around others.    How can I take care of myself?  Over the counter medications may help with your symptoms such as runny or stuffy nose, cough, chills, or fever.  Talk to your care team about your options.     Some people are at high risk of severe illness (for example, you have a weak immune system, you re 65 years or older, or you have certain medical problems). If your risk is high and your symptoms started in the last 5 to 7 days, we strongly recommend for you to get COVID treatment as soon as possible. Paxlovid, Molnupiravir and the monoclonal antibody treatments are proven safe and effective, make you feel better faster, and prevent hospitalization and death.       To schedule an appointment to discuss COVID treatment, request an appointment on [employer] (select  COVID-19 Treatment ) or call [facility] ([phone]), press 7.      Get lots of rest. Drink extra fluids (unless a doctor has told you not to)    Take Tylenol (acetaminophen) or ibuprofen for fever or pain. If you have liver or kidney problems, ask your family doctor if  it's okay to take Tylenol or ibuprofen    Take over the counter medications for your symptoms, as directed by your doctor. You may also talk to your pharmacist.      If you have other health problems (like cancer, heart failure, an organ transplant or severe kidney disease): Call your specialty clinic if you don't feel better in the next 2 days.    Know when to call 911. Emergency warning signs include:  o Trouble breathing or shortness of breath  o Pain or pressure in the chest that doesn't go away  o Feeling confused like you haven't felt before, or not being able to wake up  o Bluish-colored lips or face    Where can I get more information?    [facility] - About COVID-19: [email or web address]     CDC - What to Do If You're Sick: https://www.cdc.gov/coronavirus/2019-ncov/if-you-are-sick/index.html     CDC - Quarantine & Isolation: https://www.cdc.gov/coronavirus/2019-ncov/your-health/quarantine-isolation.html     [facility] clinical trials (COVID-19 research studies): clinicalaffairs.[facility]-clinical-trials    Below are the COVID-19 hotlines at the [facility] ([facility]). Interpreters are available.  o For health questions: Call 638-040-9742 or 1-537.637.7577 (7 a.m. to 7 p.m.)  o For questions about schools and childcare: Call 118-872-6790 or 1-637.460.5918 (7 a.m. to 7 p.m.)          LEROY Michael (covering for Miya Crump-LIZZY)

## 2022-10-20 ENCOUNTER — MYC MEDICAL ADVICE (OUTPATIENT)
Dept: FAMILY MEDICINE | Facility: CLINIC | Age: 47
End: 2022-10-20

## 2022-10-20 ENCOUNTER — TELEPHONE (OUTPATIENT)
Dept: FAMILY MEDICINE | Facility: CLINIC | Age: 47
End: 2022-10-20

## 2022-10-20 DIAGNOSIS — T78.40XD ALLERGIC REACTION, SUBSEQUENT ENCOUNTER: ICD-10-CM

## 2022-10-20 DIAGNOSIS — G35 MULTIPLE SCLEROSIS (H): Primary | ICD-10-CM

## 2022-10-20 LAB — SARS-COV-2 RNA RESP QL NAA+PROBE: NEGATIVE

## 2022-10-20 NOTE — TELEPHONE ENCOUNTER
Reason for Call:  Form, our goal is to have forms completed with 72 hours, however, some forms may require a visit or additional information.    Type of letter, form or note:  medical    Who is the form from?: [place] - [employer] (if other please explain)    Where did the form come from: Patient or family brought in       What clinic location was the form placed at?: [facility]    Where the form was placed: [employer]    What number is listed as a contact on the form?: [phone]       Additional comments: Pt wants a copy of the form    Call taken on 10/20/2022 at 2:07 PM by Shena Su

## 2022-10-20 NOTE — RESULT ENCOUNTER NOTE
Dear Nataliya,    Here is a summary of your recent test results:    Thank goodness NO COVID let me know how you are doing.     For additional lab test information, labtestsonline.org is an excellent reference.    In addition, here is a list of due or overdue Health Maintenance reminders:    Hepatitis B Vaccine(2 of 3 - 3-dose series) due on 08/31/1999  Yearly Preventive Visit due on 09/09/2022  ANNUAL REVIEW OF HM ORDERS due on 09/09/2022  Asthma Control Test due on 11/03/2022    Please call us at [phone] (or use [employer]) to address the above recommendations if needed.    Thank you for choosing [facility].  It was an honor and a privilege to participate in your care.       Healthy regards,    Leonie Collazo, LEROY  [facility]

## 2022-10-20 NOTE — RESULT ENCOUNTER NOTE
Dear Nataliya,    Here is a summary of your recent test results:    NO strep awaiting COVID results.   Keep me posted how you are doing before the weekend.     Please call us at [phone] (or use [email or web address]) to address the above recommendations if needed.    Thank you for choosing [facility].  It was an honor and a privilege to participate in your care.     Healthy regards,    Leonie Collazo, LEROY  [facility]

## 2022-10-21 ENCOUNTER — MYC MEDICAL ADVICE (OUTPATIENT)
Dept: FAMILY MEDICINE | Facility: CLINIC | Age: 47
End: 2022-10-21

## 2022-10-21 LAB — BACTERIA UR CULT: ABNORMAL

## 2022-10-21 RX ORDER — FLUCONAZOLE 150 MG/1
150 TABLET ORAL ONCE
Qty: 2 TABLET | Refills: 0 | Status: SHIPPED | OUTPATIENT
Start: 2022-10-21 | End: 2023-02-07

## 2022-10-21 RX ORDER — DOXYCYCLINE 100 MG/1
100 CAPSULE ORAL 2 TIMES DAILY
Qty: 20 CAPSULE | Refills: 0 | Status: SHIPPED | OUTPATIENT
Start: 2022-10-21 | End: 2022-10-24 | Stop reason: SINTOL

## 2022-10-21 ASSESSMENT — ANXIETY QUESTIONNAIRES
6. BECOMING EASILY ANNOYED OR IRRITABLE: SEVERAL DAYS
GAD7 TOTAL SCORE: 1
1. FEELING NERVOUS, ANXIOUS, OR ON EDGE: NOT AT ALL
7. FEELING AFRAID AS IF SOMETHING AWFUL MIGHT HAPPEN: NOT AT ALL
2. NOT BEING ABLE TO STOP OR CONTROL WORRYING: NOT AT ALL
3. WORRYING TOO MUCH ABOUT DIFFERENT THINGS: NOT AT ALL
5. BEING SO RESTLESS THAT IT IS HARD TO SIT STILL: NOT AT ALL
GAD7 TOTAL SCORE: 1
IF YOU CHECKED OFF ANY PROBLEMS ON THIS QUESTIONNAIRE, HOW DIFFICULT HAVE THESE PROBLEMS MADE IT FOR YOU TO DO YOUR WORK, TAKE CARE OF THINGS AT HOME, OR GET ALONG WITH OTHER PEOPLE: NOT DIFFICULT AT ALL

## 2022-10-21 ASSESSMENT — PATIENT HEALTH QUESTIONNAIRE - PHQ9
5. POOR APPETITE OR OVEREATING: NOT AT ALL
SUM OF ALL RESPONSES TO PHQ QUESTIONS 1-9: 2

## 2022-10-22 NOTE — RESULT ENCOUNTER NOTE
Called 10/21//22 placed on doxycyline for sinus infection and coverage of vaginal discharge.   Continue to monitor.     LEROY Michael  [facility]

## 2022-10-24 NOTE — TELEPHONE ENCOUNTER
My chart message sent     Amelia Chance RN, BSN  [facility]

## 2022-10-24 NOTE — TELEPHONE ENCOUNTER
MTM-you advise on alternate antibiotic options for Ureaplasma infection (not Mycoplasma hominis).    Patient developed allergic symptoms after 3 doses of doxycycline including hand swelling, all over body itching, and throat/tongue fullness.    She also has sinus/ear symptoms and is immunosuppressed      Miya Crump MBA, MS, PA-C  [facility] [place]

## 2022-10-24 NOTE — TELEPHONE ENCOUNTER
10/12/2022     Please see my chart message below     Please review and advise     Thank you     Amelia Chance RN, BSN  [facility]

## 2022-10-25 RX ORDER — PREDNISONE 20 MG/1
TABLET ORAL
Qty: 20 TABLET | Refills: 0 | Status: SHIPPED | OUTPATIENT
Start: 2022-10-25 | End: 2022-11-07

## 2022-10-25 NOTE — TELEPHONE ENCOUNTER
Form completed and placed in [facility] inbox      Miya Crump MBA, MS, PACARMEN  [facility] [place]

## 2022-10-25 NOTE — TELEPHONE ENCOUNTER
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 I recommend levofloxacin 500 mg x 5 days for the ureaplasma infection which should also cover URI symptoms as well if bacterial etiology.     Please let me know if I can do anything else to help.     Sarah Bello, PharmD   Medication Therapy Management Pharmacist   October 25, 2022   [phone]

## 2022-10-26 NOTE — TELEPHONE ENCOUNTER
Completed forms faxed back to [employer]  at [phone]  Copy mailed to patient .   Originals sent to be scanned.       Guillermina Garcia

## 2022-11-09 ENCOUNTER — VIRTUAL VISIT (OUTPATIENT)
Dept: FAMILY MEDICINE | Facility: CLINIC | Age: 47
End: 2022-11-09
Payer: COMMERCIAL

## 2022-11-09 ENCOUNTER — MEDICAL CORRESPONDENCE (OUTPATIENT)
Dept: HEALTH INFORMATION MANAGEMENT | Facility: CLINIC | Age: 47
End: 2022-11-09

## 2022-11-09 DIAGNOSIS — R20.2 NUMBNESS AND TINGLING OF RIGHT HAND: ICD-10-CM

## 2022-11-09 DIAGNOSIS — M25.511 ACUTE PAIN OF RIGHT SHOULDER: Primary | ICD-10-CM

## 2022-11-09 DIAGNOSIS — R20.0 NUMBNESS AND TINGLING OF RIGHT HAND: ICD-10-CM

## 2022-11-09 PROCEDURE — 99215 OFFICE O/P EST HI 40 MIN: CPT | Mod: 95 | Performed by: PHYSICIAN ASSISTANT

## 2022-11-09 RX ORDER — CYCLOBENZAPRINE HCL 10 MG
10 TABLET ORAL 3 TIMES DAILY PRN
Qty: 30 TABLET | Refills: 0 | Status: SHIPPED | OUTPATIENT
Start: 2022-11-09 | End: 2022-11-23

## 2022-11-09 RX ORDER — ACETAMINOPHEN 500 MG
1000 TABLET ORAL 3 TIMES DAILY
Start: 2022-11-09

## 2022-11-09 ASSESSMENT — ASTHMA QUESTIONNAIRES
ACT_TOTALSCORE: 21
QUESTION_3 LAST FOUR WEEKS HOW OFTEN DID YOUR ASTHMA SYMPTOMS (WHEEZING, COUGHING, SHORTNESS OF BREATH, CHEST TIGHTNESS OR PAIN) WAKE YOU UP AT NIGHT OR EARLIER THAN USUAL IN THE MORNING: NOT AT ALL
QUESTION_5 LAST FOUR WEEKS HOW WOULD YOU RATE YOUR ASTHMA CONTROL: WELL CONTROLLED
QUESTION_2 LAST FOUR WEEKS HOW OFTEN HAVE YOU HAD SHORTNESS OF BREATH: ONCE OR TWICE A WEEK
QUESTION_1 LAST FOUR WEEKS HOW MUCH OF THE TIME DID YOUR ASTHMA KEEP YOU FROM GETTING AS MUCH DONE AT WORK, SCHOOL OR AT HOME: A LITTLE OF THE TIME
QUESTION_4 LAST FOUR WEEKS HOW OFTEN HAVE YOU USED YOUR RESCUE INHALER OR NEBULIZER MEDICATION (SUCH AS ALBUTEROL): ONCE A WEEK OR LESS
ACT_TOTALSCORE: 21

## 2022-11-09 NOTE — PROGRESS NOTES
"Nataliya is a 47 year old who is being evaluated via a billable video visit.      How would you like to obtain your AVS? MyChart  If the video visit is dropped, the invitation should be resent by: Text to cell phone: [phone]  Will anyone else be joining your video visit? No          Assessment & Plan     Acute pain of right shoulder  Numbness and tingling of right hand  Unclear etiology.  Concern for nerve compression versus rotator cuff pathology.  EMG ordered for further evaluation.  I will also send a message to her physical therapist to evaluate shoulder versus cervical spine etiology.  New PT referral placed to address the shoulder in addition to the cervical spine.  Scheduled acetaminophen 1000 mg 3 times daily and cyclobenzaprine to be used at night.  Continue gabapentin as needed.  She declined a repeat steroid taper.  Patient will keep me apprised of changes as they arise.  - acetaminophen (TYLENOL) 500 MG tablet  - cyclobenzaprine (FLEXERIL) 10 MG tablet  Dispense: 30 tablet; Refill: 0  - Physical Therapy Referral  - EMG    43 minutes spent on the date of the encounter doing chart review, history and exam, documentation and further activities per the note         BMI:   Estimated body mass index is 45.8 kg/m  as calculated from the following:    Height as of 10/12/22: 1.651 m (5' 5\").    Weight as of 10/18/22: 124.8 kg (275 lb 3.2 oz).   Weight management plan: deferred    Return in about 1 week (around 11/16/2022) for EMG.      Miya Crump MBA, MS, PA-C  [facility]   Nataliya is a 47 year old, presenting for the following health issues:  Musculoskeletal Problem      Musculoskeletal Problem    History of Present Illness     Last Monday, 10/31/2022, the patient lost a friend of hers to a motor vehicle accident.  In an effort to find healthy ways to grieve/cope she decided to work out on Thursday with 2 and 5 pound weights.  She did fine during her workout but " felt an ache shortly thereafter.  Within 24 hours she had limited mobility and significant searing pain that radiated to her right hand.  She does have a history of an injury to the right shoulder in approximately 2006 where she fell directly onto the shoulder when she slipped on ice.  She went through quite a bit of physical therapy and had an MRI in 2014 that did show a partial-thickness rotator cuff tear but this never limited her functional ability.    She was on a long/strong prednisone taper for an MS flare that she completed 2 days ago on 11/7/2022.  She feels that this likely was helping her pain as the last 2 days her pain has been increased.  Her pain is localized laterally over the lateral deltoid of the right shoulder and is like a band wrapped around that bicep.  She typically does not like to take her gabapentin during the day but she has been taking 300 mg every 4-5 hours for pain relief and this is helpful.  She was using ibuprofen but stopped because it was not helping and it is hard on her stomach with her history of gastric bypass.  She does not currently have any muscle relaxers but thinks that this has helped her previously in the past.    She continues to do gentle stretching/heat/ice.  She has not tried Tylenol/acetaminophen.    When asked if positioning of her neck influenced her symptoms she states that it is worsened with change in neck position.  She did have an MRI of her cervical spine completed by neurology at [facility] in June 2022 that showed no significant changes.  She is currently in physical therapy for her cervical spine and her next appointment is on Friday.    Pain History:  When did you first notice your pain? - Acute Pain   Have you seen anyone else for your pain? No  Where in your body do you have pain? Right Shoulder        Review of Systems   Constitutional, HEENT, cardiovascular, pulmonary, GI, , musculoskeletal, neuro, skin, endocrine and psych systems are negative, except  as otherwise noted.      Objective           Vitals:  No vitals were obtained today due to virtual visit.    Physical Exam   GENERAL: Healthy, alert and no distress  EYES: Eyes grossly normal to inspection.  No discharge or erythema, or obvious scleral/conjunctival abnormalities.  HENT: Normal cephalic/atraumatic.  External ears, nose and mouth without ulcers or lesions.  No nasal drainage visible.  NECK: No asymmetry, visible masses or scars  RESP: No audible wheeze, cough, or visible cyanosis.  No visible retractions or increased work of breathing.    SKIN: Visible skin clear. No significant rash, abnormal pigmentation or lesions.  NEURO: Cranial nerves grossly intact.  Mentation and speech appropriate for age.  PSYCH: Mentation appears normal, affect normal/bright, judgement and insight intact, normal speech and appearance well-groomed.    6/23/2022 - EXAM: PRE- AND POSTCONTRAST ENHANCED MRI OF THE CERVICAL SPINE    CLINICAL INFORMATION: 46-year-old female with multiple sclerosis.    COMPARISON STUDY: MR dated 11/12/2021.    TECHNICAL INFORMATION: T1, proton density, T2 GRE, T2 fast spin echo and STIR sagittal thin sections through the cervical spine with T1 SE, T2 gradient refocused and fast spin echo axial sections at selected levels. Following the intravenous administration of 24 cc Dotarem, T1-weighted sagittal and selected axial images were acquired.    INTERPRETATION: Images reveal reversal of the normal cervical lordosis. The cervical cord displays normal signal characteristics and morphologic features. The cerebellar tonsils are normal in position and morphology. The visualized skull base structures appear normal. No paraspinous soft tissue abnormalities are identified. Mild mucosal thickening in the inferior right maxillary sinus is demonstrated. No abnormal enhancement within the cord or brainstem is evident.    At T2-3 there is a 3 mm right paracentral disc protrusion abutting the ventral cord without  impingement. Foramina are patent bilaterally.    At T1-2 and C7-T1 there is normal disc contour and caliber of the canal and foramina.    At C6-7 there is early disc degeneration with minimal spondylosis. No canal or foraminal stenosis.    Ventral and mild dorsal spondylosis at C5-6 is demonstrated. There is uncinate spurring and mild bilateral foraminal stenosis. The canal is widely patent.    At C4-5 and C3-4 there is early disc degeneration and mild ventral spondylosis. There is minimal dorsal spondylosis without canal compromise. Uncinate spurring results in moderate left foraminal stenosis at C3-4, mild to moderate at C4-5 bilaterally and on the right at C3-4.    The C2-3 level is unremarkable.    CONCLUSION:    1. Slight progression of degenerative changes and mild reversal the normal cervical lordosis. No cervical HNP, canal or foraminal stenosis. There is moderate left foraminal stenosis at C3-4.    2. Small left paracentral HNP at T2-3 without cord compression is again noted.    3. No intrinsic cord pathology or abnormal enhancement.        Electronically signed on 6/23/2022 2:35:00 PM by RICARDO Delgado - 6/22/2014 - MR SHOULDER RIGHT WO CONTRAST      Signed by Dr. Shaggy Keys @ Jan 22 2014  3:28PM  Other Result Text    This result has an attachment that is not available.Shaggy Keys MD - 01/22/2014   Formatting of this note might be different from the original.   HISTORY:   Shoulder pain.     Technique:   Axial T1 and axial proton density fat sat BLADE sequences.  The patient was not able to complete the examination and the examination is therefore significantly limited.     Comparison:   Radiographs 01/21/2014.     Findings:   Rotator Cuff:  Assessment of rotator cuff tendons is significantly limited as no coronal or sagittal images were acquired.  There is at least tendinosis and potentially some partial tearing of the distal supraspinatus tendon anteriorly.  The distal  subscapularis tendon appears grossly intact.  Mild infraspinatus tendinosis.  Distal teres minor tendon is intact.  Rotator cuff muscle mass is maintained.     AC joint region:  The AC joint is grossly intact.  No subacromial-subdeltoid bursal fluid collection.     Biceps/labral complex:  Long head of biceps tendon appears grossly intact. No definite labral tear is seen.     Glenohumeral joint space:  Physiologic quantity of joint fluid.  No joint body or focal chondral defects seen.     Bones and soft tissues:  No acute fracture.  No soft tissue mass.  Subcoracoid interval appears narrowed.     Impression:    1. Examination is significantly limited by patient`s inability to complete the examination.  Only axial images were acquired. No coronal or sagittal images were obtained.    2. Tendinosis and potentially some degree of partial tearing of the anterior aspect of the distal supraspinatus tendon.    3. AC joint and glenohumeral joint spaces grossly intact.    4. Narrowed subcoracoid interval.     Dictated by Shaggy Keys MD @ Jan 22 2014  3:21PM               Video-Visit Details    Video Start Time: 11:32 AM    Type of service:  Video Visit    Video End Time:12:00 PM    Originating Location (pt. Location): Home        Distant Location (provider location):  On-site    Platform used for Video Visit: Diana

## 2022-11-09 NOTE — PROGRESS NOTES
Order for EMG faxed to [facility] at [phone]      Guillermina Garcia

## 2022-11-11 ENCOUNTER — THERAPY VISIT (OUTPATIENT)
Dept: PHYSICAL THERAPY | Facility: CLINIC | Age: 47
End: 2022-11-11
Attending: PHYSICIAN ASSISTANT
Payer: COMMERCIAL

## 2022-11-11 DIAGNOSIS — M25.511 ACUTE PAIN OF RIGHT SHOULDER: ICD-10-CM

## 2022-11-11 PROCEDURE — 97161 PT EVAL LOW COMPLEX 20 MIN: CPT | Mod: GP | Performed by: PHYSICAL THERAPIST

## 2022-11-11 PROCEDURE — 97110 THERAPEUTIC EXERCISES: CPT | Mod: GP | Performed by: PHYSICAL THERAPIST

## 2022-11-11 NOTE — PROGRESS NOTES
[facility]    OUTPATIENT Physical Therapy ORTHOPEDIC EVALUATION  PLAN OF TREATMENT FOR OUTPATIENT REHABILITATION  (COMPLETE FOR INITIAL CLAIMS ONLY)  Patient's Last Name, First Name, M.I.  YOB: 1975  Nataliya Miguel    Provider s Name:  [facility]   Medical Record No.  [id number]   Start of Care Date:  11/11/22   Onset Date:   11/08/22   Treatment Diagnosis:  R shoulder pain Medical Diagnosis:  Acute pain of right shoulder       Goals:     11/11/22 0500   Goal #2   Goal #2 reaching   Previous Functional Level No restrictions   Current Functional Level Can reach;overhead   Performance level 8/10 pain   STG Target Performance Reach;overhead   Performance level 4/10 pain   Rationale for dressing   Due date 12/23/22   LTG Target Performance Reach;overhead   Performance Level painfree   Rationale for dressing   Due date 02/03/23       Therapy Frequency:  1X/week  Predicted Duration of Therapy Intervention:  for 6 weeks    Nidhi Dunbar, PT                 I CERTIFY THE NEED FOR THESE SERVICES FURNISHED UNDER        THIS PLAN OF TREATMENT AND WHILE UNDER MY CARE     (Physician attestation of this document indicates review and certification of the therapy plan).                     Certification Date From:  11/11/22   Certification Date To:  02/08/23    Referring Provider:  Miya Crump    Initial Assessment        See Epic Evaluation SOC Date: 11/11/22

## 2022-11-11 NOTE — PROGRESS NOTES
Physical Therapy Initial Evaluation  Subjective:  Physical Therapy Initial Evaluation   11/11/2022   Precautions/Restrictions/MD instructions: PT eval and treat    Therapist Impression:   Pt is a 46 y/o female, with acute history of R shoulder pain . Pt presents with pain, decreased ROM, poor posture and decreased strength consistent with rotator cuff tendonitis. These impairments limit their ability to lift,  And reach. Skilled PT services necessary in order to reduce impairments and improve independent function.    Subjective:   Chief Complaint: R shoulder pain, incident with light dumbbell workouts  DOI/onset: 11/2/2022  DOS: none   Location: R shoulder  Quality: sharp at times  Frequency: intermittent  Radiates: none  Pain scale: Rest 2/10 Activity 10/10    Sleeping: if lying on right side can wake in pain   Exacerbated by: reach and lift overhead  Relieved by: rest  Progression: slowly improving   Previous Treatment: none  Effect of prior treatment: n/a   PMH and/or surgical history: n/a   Imaging: none    Occupation: computer work Job duties: computer   Current HEP/exercise regimen: walk, exercise videos  Patient's goals: painfree shoulder   Medications: refer to chart  General health as reported by patient: fair/good   Return to MD: as needed   Red Flags: MS      HPI                    Objective:  SHOULDER:      Shoulder: (* indicates patient's pain)   AROM L AROM R MMT L MMT R   Flex  150 110* 4/5 4-/5*   Abd       IR T8 L5* 4+/5 4/5   ER 80 60* 4/5 3+/5       Palpation: tender to supraspinatus tendon.       System    Physical Exam    General     ROS    Assessment/Plan:    Patient is a 47 year old female with right side shoulder complaints.    Patient has the following significant findings with corresponding treatment plan.                Diagnosis 1:  R shoulder pain; rotator cuff dysfunction  Pain -  hot/cold therapy, manual therapy, self management, education and home program  Decreased ROM/flexibility -  manual therapy and therapeutic exercise  Decreased joint mobility - manual therapy and therapeutic exercise  Decreased strength - therapeutic exercise and therapeutic activities  Inflammation - cold therapy and self management/home program  Impaired muscle performance - neuro re-education  Decreased function - therapeutic activities  Instability -  Therapeutic Activity  Therapeutic Exercise    Therapy Evaluation Codes:     Cumulative Therapy Evaluation is: Low complexity.    Previous and current functional limitations:  (See Goal Flow Sheet for this information)    Short term and Long term goals: (See Goal Flow Sheet for this information)     Communication ability:  Patient appears to be able to clearly communicate and understand verbal and written communication and follow directions correctly.  Treatment Explanation - The following has been discussed with the patient:   RX ordered/plan of care  Anticipated outcomes  Possible risks and side effects  This patient would benefit from PT intervention to resume normal activities.   Rehab potential is good.    Frequency:  1 X week, once daily  Duration:  for 6 weeks  Discharge Plan:  Achieve all LTG.  Independent in home treatment program.  Reach maximal therapeutic benefit.    Please refer to the daily flowsheet for treatment today, total treatment time and time spent performing 1:1 timed codes.

## 2022-11-23 ENCOUNTER — OFFICE VISIT (OUTPATIENT)
Dept: FAMILY MEDICINE | Facility: CLINIC | Age: 47
End: 2022-11-23
Payer: COMMERCIAL

## 2022-11-23 VITALS
SYSTOLIC BLOOD PRESSURE: 122 MMHG | HEIGHT: 65 IN | DIASTOLIC BLOOD PRESSURE: 80 MMHG | OXYGEN SATURATION: 98 % | TEMPERATURE: 97.3 F | BODY MASS INDEX: 45.15 KG/M2 | WEIGHT: 271 LBS | HEART RATE: 83 BPM

## 2022-11-23 DIAGNOSIS — M62.838 MUSCLE SPASM: ICD-10-CM

## 2022-11-23 DIAGNOSIS — K21.00 GASTROESOPHAGEAL REFLUX DISEASE WITH ESOPHAGITIS, UNSPECIFIED WHETHER HEMORRHAGE: ICD-10-CM

## 2022-11-23 DIAGNOSIS — M79.605 PAIN OF LEFT LOWER EXTREMITY: ICD-10-CM

## 2022-11-23 DIAGNOSIS — J30.2 SEASONAL ALLERGIC RHINITIS, UNSPECIFIED TRIGGER: ICD-10-CM

## 2022-11-23 DIAGNOSIS — R45.86 MOOD CHANGES: ICD-10-CM

## 2022-11-23 DIAGNOSIS — J45.40 MODERATE PERSISTENT ASTHMA WITHOUT COMPLICATION: ICD-10-CM

## 2022-11-23 DIAGNOSIS — G89.29 CHRONIC MUSCULOSKELETAL PAIN DUE TO DISORDER OF NERVOUS SYSTEM: ICD-10-CM

## 2022-11-23 DIAGNOSIS — Z98.84 S/P GASTRIC BYPASS: ICD-10-CM

## 2022-11-23 DIAGNOSIS — G43.109 MIGRAINE WITH AURA AND WITHOUT STATUS MIGRAINOSUS, NOT INTRACTABLE: ICD-10-CM

## 2022-11-23 DIAGNOSIS — F41.8 SITUATIONAL ANXIETY: ICD-10-CM

## 2022-11-23 DIAGNOSIS — B00.9 HSV (HERPES SIMPLEX VIRUS) INFECTION: ICD-10-CM

## 2022-11-23 DIAGNOSIS — Z00.00 ROUTINE GENERAL MEDICAL EXAMINATION AT A HEALTH CARE FACILITY: Primary | ICD-10-CM

## 2022-11-23 DIAGNOSIS — M25.511 ACUTE PAIN OF RIGHT SHOULDER: ICD-10-CM

## 2022-11-23 DIAGNOSIS — G98.8 CHRONIC MUSCULOSKELETAL PAIN DUE TO DISORDER OF NERVOUS SYSTEM: ICD-10-CM

## 2022-11-23 DIAGNOSIS — M79.18 CHRONIC MUSCULOSKELETAL PAIN DUE TO DISORDER OF NERVOUS SYSTEM: ICD-10-CM

## 2022-11-23 DIAGNOSIS — Z01.84 IMMUNITY STATUS TESTING: ICD-10-CM

## 2022-11-23 DIAGNOSIS — G35 MULTIPLE SCLEROSIS (H): ICD-10-CM

## 2022-11-23 PROCEDURE — 99396 PREV VISIT EST AGE 40-64: CPT | Performed by: PHYSICIAN ASSISTANT

## 2022-11-23 PROCEDURE — 99214 OFFICE O/P EST MOD 30 MIN: CPT | Mod: 25 | Performed by: PHYSICIAN ASSISTANT

## 2022-11-23 RX ORDER — DULOXETIN HYDROCHLORIDE 30 MG/1
30 CAPSULE, DELAYED RELEASE ORAL 2 TIMES DAILY
Qty: 180 CAPSULE | Refills: 1 | Status: SHIPPED | OUTPATIENT
Start: 2022-11-23 | End: 2023-04-11

## 2022-11-23 RX ORDER — BACLOFEN 20 MG/1
TABLET ORAL
Qty: 180 TABLET | Refills: 1 | Status: SHIPPED | OUTPATIENT
Start: 2022-11-23 | End: 2023-10-26

## 2022-11-23 RX ORDER — ALBUTEROL SULFATE 90 UG/1
AEROSOL, METERED RESPIRATORY (INHALATION)
Qty: 18 G | Refills: 1 | Status: SHIPPED | OUTPATIENT
Start: 2022-11-23 | End: 2023-04-07

## 2022-11-23 RX ORDER — HYDROXYZINE HYDROCHLORIDE 25 MG/1
12.5-25 TABLET, FILM COATED ORAL 3 TIMES DAILY PRN
Qty: 30 TABLET | Refills: 0 | Status: SHIPPED | OUTPATIENT
Start: 2022-11-23 | End: 2024-02-15

## 2022-11-23 RX ORDER — CYCLOBENZAPRINE HCL 10 MG
10 TABLET ORAL 3 TIMES DAILY PRN
Qty: 30 TABLET | Refills: 0 | Status: SHIPPED | OUTPATIENT
Start: 2022-11-23 | End: 2023-04-11

## 2022-11-23 RX ORDER — MONTELUKAST SODIUM 10 MG/1
TABLET ORAL
Qty: 90 TABLET | Refills: 1 | Status: SHIPPED | OUTPATIENT
Start: 2022-11-23 | End: 2023-04-11

## 2022-11-23 RX ORDER — TOPIRAMATE 100 MG/1
100 TABLET, FILM COATED ORAL AT BEDTIME
Qty: 90 TABLET | Refills: 1 | Status: SHIPPED | OUTPATIENT
Start: 2022-11-23 | End: 2023-04-11

## 2022-11-23 RX ORDER — DULOXETIN HYDROCHLORIDE 30 MG/1
30 CAPSULE, DELAYED RELEASE ORAL 2 TIMES DAILY
Qty: 180 CAPSULE | Refills: 1 | Status: SHIPPED | OUTPATIENT
Start: 2022-11-23 | End: 2022-11-23

## 2022-11-23 RX ORDER — GABAPENTIN 300 MG/1
CAPSULE ORAL
Qty: 120 CAPSULE | Refills: 5 | Status: SHIPPED | OUTPATIENT
Start: 2022-11-23 | End: 2023-01-26

## 2022-11-23 RX ORDER — VALACYCLOVIR HYDROCHLORIDE 500 MG/1
500 TABLET, FILM COATED ORAL DAILY
Qty: 90 TABLET | Refills: 3 | Status: SHIPPED | OUTPATIENT
Start: 2022-11-23 | End: 2023-12-21

## 2022-11-23 RX ORDER — ALBUTEROL SULFATE 0.83 MG/ML
SOLUTION RESPIRATORY (INHALATION)
Qty: 75 ML | Refills: 3 | Status: SHIPPED | OUTPATIENT
Start: 2022-11-23 | End: 2023-04-11

## 2022-11-23 RX ORDER — AZELASTINE 1 MG/ML
1 SPRAY, METERED NASAL 2 TIMES DAILY PRN
Qty: 30 ML | Refills: 5 | Status: SHIPPED | OUTPATIENT
Start: 2022-11-23 | End: 2023-04-11

## 2022-11-23 RX ORDER — BUDESONIDE AND FORMOTEROL FUMARATE DIHYDRATE 160; 4.5 UG/1; UG/1
AEROSOL RESPIRATORY (INHALATION)
Qty: 10.2 G | Refills: 5 | Status: SHIPPED | OUTPATIENT
Start: 2022-11-23 | End: 2023-04-11

## 2022-11-23 ASSESSMENT — ENCOUNTER SYMPTOMS
NAUSEA: 0
ARTHRALGIAS: 1
DYSURIA: 0
CONSTIPATION: 0
HEARTBURN: 0
SORE THROAT: 0
NERVOUS/ANXIOUS: 1
DIARRHEA: 0
SHORTNESS OF BREATH: 0
HEMATOCHEZIA: 0
CHILLS: 0
HEADACHES: 0
MYALGIAS: 1
FREQUENCY: 1
ABDOMINAL PAIN: 0
DIZZINESS: 0
BREAST MASS: 0
PALPITATIONS: 0
COUGH: 0
JOINT SWELLING: 0
PARESTHESIAS: 0
FEVER: 0
HEMATURIA: 0
EYE PAIN: 0
WEAKNESS: 0

## 2022-11-23 ASSESSMENT — ASTHMA QUESTIONNAIRES
ACT_TOTALSCORE: 25
QUESTION_5 LAST FOUR WEEKS HOW WOULD YOU RATE YOUR ASTHMA CONTROL: COMPLETELY CONTROLLED
QUESTION_1 LAST FOUR WEEKS HOW MUCH OF THE TIME DID YOUR ASTHMA KEEP YOU FROM GETTING AS MUCH DONE AT WORK, SCHOOL OR AT HOME: NONE OF THE TIME
ACT_TOTALSCORE: 25
QUESTION_4 LAST FOUR WEEKS HOW OFTEN HAVE YOU USED YOUR RESCUE INHALER OR NEBULIZER MEDICATION (SUCH AS ALBUTEROL): NOT AT ALL
QUESTION_2 LAST FOUR WEEKS HOW OFTEN HAVE YOU HAD SHORTNESS OF BREATH: NOT AT ALL
QUESTION_3 LAST FOUR WEEKS HOW OFTEN DID YOUR ASTHMA SYMPTOMS (WHEEZING, COUGHING, SHORTNESS OF BREATH, CHEST TIGHTNESS OR PAIN) WAKE YOU UP AT NIGHT OR EARLIER THAN USUAL IN THE MORNING: NOT AT ALL

## 2022-11-23 NOTE — PROGRESS NOTES
SUBJECTIVE:   CC: Nataliya is an 47 year old who presents for preventive health visit.   Patient has been advised of split billing requirements and indicates understanding: Yes  Healthy Habits:     Getting at least 3 servings of Calcium per day:  Yes    Bi-annual eye exam:  Yes    Dental care twice a year:  NO    Sleep apnea or symptoms of sleep apnea:  None    Diet:  Regular (no restrictions)    Frequency of exercise:  4-5 days/week    Duration of exercise:  15-30 minutes    Taking medications regularly:  Yes    Medication side effects:  Not applicable    PHQ-2 Total Score: 2    Additional concerns today:  No    Grief/mood changes  Friend passed in MVA ~2-3 weeks ago- unexpectedly.  We had started cymbalta for hot flashes/mood changes 8/23/22.  Overall is still struggling but is trying to do self care to improve symptoms.    PHQ 9/9/2021 10/12/2022 10/21/2022   PHQ-9 Total Score 1 2 2   Q9: Thoughts of better off dead/self-harm past 2 weeks Not at all Not at all Not at all       JUSTICE-7 SCORE 8/22/2022 10/12/2022 10/21/2022   Total Score 4 (minimal anxiety) 1 (minimal anxiety) -   Total Score - 1 1       Hypertension Follow-up  Stop lisinopril/hydrochlorothiazide 10/12.5 in September 2020 due to hypotension and lightheadedness    Do you check your blood pressure regularly outside of the clinic? No     Are you following a low salt diet? No    Are your blood pressures ever more than 140 on the top number (systolic) OR more       than 90 on the bottom number (diastolic), for example 140/90? No     Asthma/Allergy Follow-Up  Symbicort once daily & twice daily with flares,  Albuterol used regularly with activity (not taking prior to 1+ mile walks), zyrtec & montelukast daily.  Uses Flonase daily azelastine PRN.  Saw pulmonology @ [facility] 6/1/2020 and they determined that the PFTs were consistent with suspected decreased muscular tone of diaphragm and intercostal muscles.      Cervical DJD  Persistent and nearly daily  neck pain.   Gabapentin is helpful for her pain.  Had an MRI of her cervical spine 11/4/2020 that showed minimal degenerative changes of the cervical spine without spinal canal or neural foraminal stenosis.  No cord abnormality.  We recommended physical therapy.  She continues her home exercises.     HSV  Patient is taking valacyclovir 500 mg once daily for suppression.  Still occasionally has genital outbreaks.  Is unsure why as she is not currently sexually active.     Bariatric surgery status  Patient had Elise-en-Y gastric bypass 2002.  Last saw bariatric surgery and follow-up at [facility] 11[place]9/2020 there were concerns of an eating disorder and she was referred to [place] due to body distortion issues and restrictive thoughts about food.  She is taking a B12 supplement, vitamin D, multivitamin, calcium.  She is not taking iron as it causes her to be constipated.  Was assessed at [facility] between September and October 2020 and not diagnosed with an eating disorder but recommended to start individual therapy.  Unclear if she has pursued this.  Using weight watchers.  Last upper GI was completed 9/15/2020 and did not show any evidence of a gastrogastric fistula.  Last endoscopy was completed at [facility] 4/11/2014 and showed ulcer formation and normal postoperative gastric bypass changes.  She has GERD and takes omeprazole 20 mg daily.    Multiple Sclerosis  Diagnosed in 2016.  The patient initially presented with a history of facial numbness, slurred speech and right-sided motor and sensory symptoms in June 2015.  MRI imaging demonstrated a lesion in the addison that was initially interpreted as a cerebral infarct.  However, on subsequent review of the imaging, it was felt that an inflammatory demyelinating process was a possibility.  The patient also developed a new enhancing lesion noted on brain MRI in May 2016.  This was diagnostic for RRMS.  She now follows with Dr. Chong at \[facility] -visit within the  last month with his care team.  Previously had been seen at [facility] 6 [facility] - for the last 3 months- was every 4 weeks.  Takes baclofen for leg spasms at night and occasionally during the day, however, does not like taking this because of an almost immediate hand tremor.  She has issues with spasms, chronic fatigue, chronic neck and leg pain.  Also reports taking magnesium twice daily 400 mg.     Insomnia  Takes trazodone 50 mg nightly as needed -this works well but does cause drowsiness the next day so she primarily uses this on the weekends.  Without this medication she often awakens at 6 am despite when she fell asleep.      Nonspecific rash  Patient reports that she develops a nonspecific rash on her bilateral hands.  She uses triamcinolone for this sparingly.  She occasionally gets some lesions on her face that she uses desonide cream for very sparingly.     Migraine   Topamax 150 mg (taking only on the weekends) because groggy into the next day -has been noticing more headaches as of late and so is going to try to start taking this more routinely.     Since your last clinic visit, how have your headaches changed?  No change    How often are you getting headaches or migraines? 4x per week     Are you able to do normal daily activities when you have a migraine? Yes    Are you taking rescue/relief medications? Imitrex - doesn't need refill - uses rarely    How helpful is your rescue/relief medication?  The relief is inconsistent    Are you taking any medications to prevent migraines? (Select all that apply)  Topamax    In the past 4 weeks, how often have you gone to urgent care or the emergency room because of your headaches?  0     Asthma Follow-Up  Symbicort, albuterol PRN  Was ACT completed today?    Yes    ACT Total Scores 11/23/2022   ACT TOTAL SCORE (Goal Greater than or Equal to 20) 25   In the past 12 months, how many times did you visit the emergency room for your asthma  without being admitted to the hospital? 0   In the past 12 months, how many times were you hospitalized overnight because of your asthma? 0          How many days per week do you miss taking your asthma controller medication?  0    Please describe any recent triggers for your asthma: pollens and grass and dander    Have you had any Emergency Room Visits, Urgent Care Visits, or Hospital Admissions since your last office visit?  No      Today's PHQ-2 Score:   PHQ-2 (  [employer]) 2022   Q1: Little interest or pleasure in doing things 1   Q2: Feeling down, depressed or hopeless 1   PHQ-2 Score 2   PHQ-2 Total Score (12-17 Years)- Positive if 3 or more points; Administer PHQ-A if positive -   Q1: Little interest or pleasure in doing things Several days   Q2: Feeling down, depressed or hopeless Several days   PHQ-2 Score 2           Social History     Tobacco Use     Smoking status: Former     Packs/day: 0.50     Years: 2.00     Pack years: 1.00     Types: Cigars, Cigarettes     Start date: 2011     Quit date: 2013     Years since quittin.4     Smokeless tobacco: Never   Substance Use Topics     Alcohol use: Yes     Alcohol/week: 2.0 standard drinks     Comment: 0-3 drinks per week     If you drink alcohol do you typically have >3 drinks per day or >7 drinks per week? No    Alcohol Use 2022   Prescreen: >3 drinks/day or >7 drinks/week? No   Prescreen: >3 drinks/day or >7 drinks/week? -       Reviewed orders with patient.  Reviewed health maintenance and updated orders accordingly - Yes  BP Readings from Last 3 Encounters:   22 114/74   22 122/80   10/18/22 118/78    Wt Readings from Last 3 Encounters:   22 122.9 kg (271 lb)   10/18/22 124.8 kg (275 lb 3.2 oz)   10/12/22 123.4 kg (272 lb)                  Patient Active Problem List   Diagnosis     Migraine     Body mass index 45.0-49.9, adult (H)     Cognitive changes     Hypersomnia with sleep apnea     Iron deficiency anemia  "    Postsurgical nonabsorption     Pain, neuropathic     S/P gastric bypass      H/O total hysterectomy     Vitamin B12 deficiency     Hypovitaminosis D     Hypermagnesemia     Multiple sclerosis (H) - Dr. Chong - on Tysabri infusions     Immunosuppression (H)     Chronic musculoskeletal pain due to disorder of nervous system     Gastroesophageal reflux disease with esophagitis, unspecified whether hemorrhage     Insomnia, unspecified type     Muscle spasm     Seasonal allergic rhinitis, unspecified trigger     HSV (herpes simplex virus) infection     Pain of left lower extremity     Rash     Spondylosis of cervical region without myelopathy or radiculopathy     Moderate persistent asthma without complication     Past Surgical History:   Procedure Laterality Date     COLONOSCOPY  2012     GASTRIC BYPASS      \"Trouble with B12 and D\"     HYSTERECTOMY, MONO  2013    cervix gone.  fibroids.  without BSO     TONSILLECTOMY         Social History     Tobacco Use     Smoking status: Former     Packs/day: 0.50     Years: 2.00     Pack years: 1.00     Types: Cigars, Cigarettes     Start date: 2011     Quit date: 2013     Years since quittin.4     Smokeless tobacco: Never   Substance Use Topics     Alcohol use: Yes     Alcohol/week: 2.0 standard drinks     Comment: 0-3 drinks per week     Family History   Problem Relation Age of Onset     Hypertension Mother      Hyperlipidemia Mother      Obesity Mother      Obesity Father      Diabetes Maternal Grandmother      Hyperlipidemia Maternal Grandmother      Cerebrovascular Disease Maternal Grandfather      Diabetes Paternal Grandmother      Cerebrovascular Disease Paternal Grandmother      Depression Paternal Grandmother      Substance Abuse Paternal Grandmother      Lupus Paternal Aunt 41     Multiple Sclerosis No family hx of      Breast Cancer No family hx of      Ovarian Cancer No family hx of          Current Outpatient Medications   Medication Sig " Dispense Refill     acetaminophen (TYLENOL) 500 MG tablet Take 2 tablets (1,000 mg) by mouth 3 times daily       albuterol (PROVENTIL) (2.5 MG/3ML) 0.083% neb solution USE 1 VIAL VIA NEBULIZER EVERY 6 HOURS AS NEEDED FOR SHORTNESS OF BREATH OR DIFFICULT BREATHING OR WHEEZING 75 mL 3     azelastine (ASTELIN) 0.1 % nasal spray Spray 1 spray into both nostrils 2 times daily as needed for rhinitis (nasal antihistamine) 30 mL 5     baclofen (LIORESAL) 20 MG tablet TAKE 1 TABLET BY MOUTH EVERY NIGHT AT BEDTIME. MAY ALSO TAKE 1 TABLET EVERY 4 HOURS AS NEEDED FOR MUSCLE SPASMS 180 tablet 1     calcium carbonate-vitamin D (OSCAL W/D) 500-200 MG-UNIT tablet Take 1 tablet by mouth 2 times daily (with meals)       cetirizine (ZYRTEC) 10 MG tablet Take 1 tablet (10 mg) by mouth daily 180 tablet 1     cyclobenzaprine (FLEXERIL) 10 MG tablet Take 1 tablet (10 mg) by mouth 3 times daily as needed for muscle spasms 30 tablet 0     desonide (DESOWEN) 0.05 % external cream Apply topically 2 times daily To face as needed for rash 15 g 0     DULoxetine (CYMBALTA) 30 MG capsule Take 1 capsule (30 mg) by mouth 2 times daily 180 capsule 1     fluticasone (FLONASE) 50 MCG/ACT nasal spray USE 1 PUFF IN EACH NOSTRIL AS DIRECTED. 16 g 5     gabapentin (NEURONTIN) 300 MG capsule TAKE 2 CAPSULES(600 MG) BY MOUTH AT BEDTIME. MAY ALSO TAKE 2 CAPSULES 600 MG DAILY AS NEEDED 120 capsule 5     hydrOXYzine (ATARAX) 25 MG tablet Take 0.5-1 tablets (12.5-25 mg) by mouth 3 times daily as needed for anxiety or other (SLEEP) 30 tablet 0     ipratropium - albuterol 0.5 mg/2.5 mg/3 mL (DUONEB) 0.5-2.5 (3) MG/3ML neb solution USE 1 VIAL VIA NEBULIZER EVERY 4 HOURS AS NEEDED FOR SHORTNESS OF BREATH OR WHEEZING 180 mL 1     ketotifen (ZADITOR) 0.025 % ophthalmic solution Place 1 drop into both eyes 2 times daily as needed for itching 10 mL 1     MAGNESIUM PO        montelukast (SINGULAIR) 10 MG tablet TAKE 1 TABLET(10 MG) BY MOUTH AT BEDTIME 90 tablet 1      Multiple Vitamins-Calcium (ONE-A-DAY WOMENS FORMULA PO)        natalizumab (TYSABRI) 300 MG/15ML injection Inject 15 mLs (300 mg) into the vein once every six weeks Infusion       omeprazole (PRILOSEC) 20 MG DR capsule TAKE 1 CAPSULE(20 MG) BY MOUTH TWICE DAILY BEFORE MEALS 180 capsule 1     SUMAtriptan (IMITREX) 100 MG tablet Take 50 mg up to twice daily as needed for headache; separate doses by at least one hour and do not use more than 3 days/week 18 tablet 3     SYMBICORT 160-4.5 MCG/ACT Inhaler INHALE 2 PUFFS INTO THE LUNGS TWICE DAILY 10.2 g 5     topiramate (TOPAMAX) 100 MG tablet Take 1 tablet (100 mg) by mouth At Bedtime 90 tablet 1     triamcinolone (KENALOG) 0.1 % external cream Apply topically 2 times daily No more  than 2 weeks in a row not on face 15 g 3     valACYclovir (VALTREX) 500 MG tablet Take 1 tablet (500 mg) by mouth daily 90 tablet 3     VENTOLIN  (90 Base) MCG/ACT inhaler SHAKE WELL AND INHALE 2 PUFFS INTO THE LUNGS EVERY 6 HOURS AS NEEDED FOR SHORTNESS OF BREATH OR WHEEZING Strength: 108 (90 Base) MCG/ACT 18 g 1     vitamin D3 (CHOLECALCIFEROL) 250 mcg (30304 units) capsule TAKE 1 CAPSULE BY MOUTH ONCE DAILY 90 capsule 3       Breast Cancer Screening:    FHS-7:   Breast CA Risk Assessment (FHS-7) 9/21/2021 9/21/2021 10/7/2022   Did any of your first-degree relatives have breast or ovarian cancer? No - No   Did any of your relatives have bilateral breast cancer? No - No   Did any man in your family have breast cancer? No - No   Did any woman in your family have breast and ovarian cancer? No - No   Did any woman in your family have breast cancer before age 50 y? No Yes No   Do you have 2 or more relatives with breast and/or ovarian cancer? No - No   Do you have 2 or more relatives with breast and/or bowel cancer? No - No       Mammogram Screening: Recommended annual mammography  Pertinent mammograms are reviewed under the imaging tab.    History of abnormal Pap smear: Status post  "benign hysterectomy. Health Maintenance and Surgical History updated.     Reviewed and updated as needed this visit by clinical staff   Tobacco  Allergies  Meds  Problems  Med Hx  Surg Hx  Fam Hx          Reviewed and updated as needed this visit by Provider   Tobacco  Allergies  Meds  Problems  Med Hx  Surg Hx  Fam Hx             Review of Systems   Constitutional: Negative for chills and fever.   HENT: Negative for congestion, ear pain, hearing loss and sore throat.    Eyes: Negative for pain and visual disturbance.   Respiratory: Negative for cough and shortness of breath.    Cardiovascular: Positive for chest pain. Negative for palpitations and peripheral edema.   Gastrointestinal: Negative for abdominal pain, constipation, diarrhea, heartburn, hematochezia and nausea.   Breasts:  Negative for tenderness, breast mass and discharge.   Genitourinary: Positive for frequency. Negative for dysuria, genital sores, hematuria, pelvic pain, urgency, vaginal bleeding and vaginal discharge.   Musculoskeletal: Positive for arthralgias and myalgias. Negative for joint swelling.   Skin: Negative for rash.   Neurological: Negative for dizziness, weakness, headaches and paresthesias.   Psychiatric/Behavioral: Negative for mood changes. The patient is nervous/anxious.           OBJECTIVE:   /80 (BP Location: Left arm, Patient Position: Chair, Cuff Size: Adult Large)   Pulse 83   Temp 97.3  F (36.3  C) (Tympanic)   Ht 1.651 m (5' 5\")   Wt 122.9 kg (271 lb)   LMP  (LMP Unknown)   SpO2 98%   BMI 45.10 kg/m    Physical Exam  GENERAL: healthy, alert and no distress  EYES: Eyes grossly normal to inspection, PERRL and conjunctivae and sclerae normal  HENT: ear canals and TM's normal, nose and mouth without ulcers or lesions  NECK: no adenopathy, no asymmetry, masses, or scars and thyroid normal to palpation  RESP: lungs clear to auscultation - no rales, rhonchi or wheezes  CV: regular rate and rhythm, normal " S1 S2, no S3 or S4, no murmur, click or rub, no peripheral edema and peripheral pulses strong  ABDOMEN: soft, nontender, no hepatosplenomegaly, no masses and bowel sounds normal  MS: no gross musculoskeletal defects noted, no edema  SKIN: no suspicious lesions or rashes  NEURO: Normal strength and tone, mentation intact and speech normal  PSYCH: mentation appears normal, affect normal/bright    Diagnostic Test Results:  No results found for any visits on 11/23/22.    ASSESSMENT/PLAN:   Nataliya was seen today for physical.    Diagnoses and all orders for this visit:    Routine general medical examination at a health care facility    Body mass index 45.0-49.9, adult (H)  Encouraged diet/exercise    Multiple sclerosis (H) - Dr. Chong - on Tysabri infusions  Stable condition.  Dr. Chong is leaving his practice.  Referral placed to establish care with new neurologist.  -     baclofen (LIORESAL) 20 MG tablet; TAKE 1 TABLET BY MOUTH EVERY NIGHT AT BEDTIME. MAY ALSO TAKE 1 TABLET EVERY 4 HOURS AS NEEDED FOR MUSCLE SPASMS  -     gabapentin (NEURONTIN) 300 MG capsule; TAKE 2 CAPSULES(600 MG) BY MOUTH AT BEDTIME. MAY ALSO TAKE 2 CAPSULES 600 MG DAILY AS NEEDED    Seasonal allergic rhinitis, unspecified trigger  Stable.   Continue current regimen.  -     azelastine (ASTELIN) 0.1 % nasal spray; Spray 1 spray into both nostrils 2 times daily as needed for rhinitis (nasal antihistamine)    Moderate persistent asthma without complication  Stable.   Continue current regimen.  -     SYMBICORT 160-4.5 MCG/ACT Inhaler; INHALE 2 PUFFS INTO THE LUNGS TWICE DAILY  -     montelukast (SINGULAIR) 10 MG tablet; TAKE 1 TABLET(10 MG) BY MOUTH AT BEDTIME  -     VENTOLIN  (90 Base) MCG/ACT inhaler; SHAKE WELL AND INHALE 2 PUFFS INTO THE LUNGS EVERY 6 HOURS AS NEEDED FOR SHORTNESS OF BREATH OR WHEEZING Strength: 108 (90 Base) MCG/ACT  -     albuterol (PROVENTIL) (2.5 MG/3ML) 0.083% neb solution; USE 1 VIAL VIA NEBULIZER EVERY 6 HOURS  AS NEEDED FOR SHORTNESS OF BREATH OR DIFFICULT BREATHING OR WHEEZING    S/P gastric bypass 2002  Continue current supplements    Pain of left lower extremity  Muscle spasm  Acute pain of right shoulder  Stable.  Intermittent flares.  Continue current regimen.  -     baclofen (LIORESAL) 20 MG tablet; TAKE 1 TABLET BY MOUTH EVERY NIGHT AT BEDTIME. MAY ALSO TAKE 1 TABLET EVERY 4 HOURS AS NEEDED FOR MUSCLE SPASMS  -     cyclobenzaprine (FLEXERIL) 10 MG tablet; Take 1 tablet (10 mg) by mouth 3 times daily as needed for muscle spasms  -     gabapentin (NEURONTIN) 300 MG capsule; TAKE 2 CAPSULES(600 MG) BY MOUTH AT BEDTIME. MAY ALSO TAKE 2 CAPSULES 600 MG DAILY AS NEEDED    Mood changes  Situational anxiety  Improved.  Continue current regimen  -     hydrOXYzine (ATARAX) 25 MG tablet; Take 0.5-1 tablets (12.5-25 mg) by mouth 3 times daily as needed for anxiety or other (SLEEP)  -     DULoxetine (CYMBALTA) 30 MG capsule; Take 1 capsule (30 mg) by mouth 2 times daily    Chronic musculoskeletal pain due to disorder of nervous system  Stable.  Continue current regimen  -     gabapentin (NEURONTIN) 300 MG capsule; TAKE 2 CAPSULES(600 MG) BY MOUTH AT BEDTIME. MAY ALSO TAKE 2 CAPSULES 600 MG DAILY AS NEEDED    Gastroesophageal reflux disease with esophagitis, unspecified whether hemorrhage  Stable.  Continue current regimen  -     omeprazole (PRILOSEC) 20 MG DR capsule; TAKE 1 CAPSULE(20 MG) BY MOUTH TWICE DAILY BEFORE MEALS    Migraine with aura and without status migrainosus, not intractable  Stable.  Continue current regimen  -     topiramate (TOPAMAX) 100 MG tablet; Take 1 tablet (100 mg) by mouth At Bedtime    HSV (herpes simplex virus) infection  Stable.  Refilled today  -     valACYclovir (VALTREX) 500 MG tablet; Take 1 tablet (500 mg) by mouth daily    Immunity status testing  -     Hepatitis B Surface Antibody; Future        Patient has been advised of split billing requirements and indicates understanding:  Yes      COUNSELING:       Regular exercise       Healthy diet/nutrition        She reports that she quit smoking about 9 years ago. Her smoking use included cigars and cigarettes. She started smoking about 11 years ago. She has a 1.00 pack-year smoking history. She has never used smokeless tobacco.      Miya Crump PA-C  [facility] [place]

## 2022-11-25 ENCOUNTER — INFUSION THERAPY VISIT (OUTPATIENT)
Dept: INFUSION THERAPY | Facility: CLINIC | Age: 47
End: 2022-11-25
Attending: PSYCHIATRY & NEUROLOGY
Payer: COMMERCIAL

## 2022-11-25 VITALS
OXYGEN SATURATION: 100 % | TEMPERATURE: 97.7 F | RESPIRATION RATE: 16 BRPM | SYSTOLIC BLOOD PRESSURE: 114 MMHG | HEART RATE: 70 BPM | DIASTOLIC BLOOD PRESSURE: 74 MMHG

## 2022-11-25 DIAGNOSIS — G35 MULTIPLE SCLEROSIS (H): Primary | ICD-10-CM

## 2022-11-25 PROCEDURE — 258N000003 HC RX IP 258 OP 636: Performed by: PSYCHIATRY & NEUROLOGY

## 2022-11-25 PROCEDURE — 250N000011 HC RX IP 250 OP 636: Performed by: PSYCHIATRY & NEUROLOGY

## 2022-11-25 PROCEDURE — 96365 THER/PROPH/DIAG IV INF INIT: CPT

## 2022-11-25 RX ORDER — DIPHENHYDRAMINE HCL 25 MG
50 CAPSULE ORAL EVERY 4 HOURS PRN
Status: CANCELLED
Start: 2022-12-09

## 2022-11-25 RX ORDER — IBUPROFEN 200 MG
600 TABLET ORAL EVERY 6 HOURS PRN
Status: CANCELLED
Start: 2022-12-09

## 2022-11-25 RX ORDER — ACETAMINOPHEN 325 MG/1
650 TABLET ORAL EVERY 4 HOURS PRN
Status: CANCELLED
Start: 2022-12-09

## 2022-11-25 RX ORDER — ONDANSETRON 2 MG/ML
4 INJECTION INTRAMUSCULAR; INTRAVENOUS EVERY 8 HOURS PRN
Status: CANCELLED
Start: 2022-12-09

## 2022-11-25 RX ORDER — HEPARIN SODIUM,PORCINE 10 UNIT/ML
5 VIAL (ML) INTRAVENOUS
Status: CANCELLED | OUTPATIENT
Start: 2022-12-09

## 2022-11-25 RX ORDER — HEPARIN SODIUM (PORCINE) LOCK FLUSH IV SOLN 100 UNIT/ML 100 UNIT/ML
5 SOLUTION INTRAVENOUS
Status: CANCELLED | OUTPATIENT
Start: 2022-12-09

## 2022-11-25 RX ADMIN — SODIUM CHLORIDE 250 ML: 9 INJECTION, SOLUTION INTRAVENOUS at 08:28

## 2022-11-25 RX ADMIN — NATALIZUMAB 300 MG: 300 INJECTION INTRAVENOUS at 08:34

## 2022-11-25 ASSESSMENT — PAIN SCALES - GENERAL: PAINLEVEL: MODERATE PAIN (4)

## 2022-11-25 NOTE — PROGRESS NOTES
Infusion Nursing Note:  Nataliya Aldrich presents today for Tysabri.    Patient seen by provider today: No   present during visit today: Not Applicable.    Note: N/A.    Intravenous Access:  Peripheral IV placed.    Treatment Conditions:  Lab Results   Component Value Date    HGB 11.8 09/22/2022    WBC 10.0 09/22/2022    ANEU 4.7 07/12/2021    ANEUTAUTO 3.7 09/22/2022     09/22/2022      Tysabri pre-infusion checklist completed via touch program.    Post Infusion Assessment:  Patient tolerated infusion without incident.  Blood return noted pre and post infusion.  Site patent and intact, free from redness, edema or discomfort.  No evidence of extravasations.  Access discontinued per protocol.     Discharge Plan:   Discharge instructions reviewed with: Patient.  Patient and/or family verbalized understanding of discharge instructions and all questions answered.  AVS to patient via Nolio[facility]ART.  Patient will schedule next appointment.   Patient discharged in stable condition accompanied by: self.  Departure Mode: Ambulatory.      Nichole Elkins RN

## 2022-12-02 ENCOUNTER — THERAPY VISIT (OUTPATIENT)
Dept: PHYSICAL THERAPY | Facility: CLINIC | Age: 47
End: 2022-12-02
Payer: COMMERCIAL

## 2022-12-02 DIAGNOSIS — M54.2 CERVICALGIA: ICD-10-CM

## 2022-12-02 DIAGNOSIS — G35 MS (MULTIPLE SCLEROSIS) (H): Primary | ICD-10-CM

## 2022-12-02 PROCEDURE — 97140 MANUAL THERAPY 1/> REGIONS: CPT | Mod: GP | Performed by: PHYSICAL THERAPIST

## 2022-12-02 PROCEDURE — 97035 APP MDLTY 1+ULTRASOUND EA 15: CPT | Mod: GP | Performed by: PHYSICAL THERAPIST

## 2022-12-05 ENCOUNTER — MYC MEDICAL ADVICE (OUTPATIENT)
Dept: FAMILY MEDICINE | Facility: CLINIC | Age: 47
End: 2022-12-05

## 2022-12-05 DIAGNOSIS — J45.40 MODERATE PERSISTENT ASTHMA WITHOUT COMPLICATION: Primary | ICD-10-CM

## 2022-12-08 ENCOUNTER — E-VISIT (OUTPATIENT)
Dept: FAMILY MEDICINE | Facility: CLINIC | Age: 47
End: 2022-12-08
Payer: COMMERCIAL

## 2022-12-08 DIAGNOSIS — J01.90 ACUTE SINUSITIS TREATED WITH ANTIBIOTICS IN THE PAST 60 DAYS: Primary | ICD-10-CM

## 2022-12-08 PROCEDURE — 99421 OL DIG E/M SVC 5-10 MIN: CPT | Performed by: PHYSICIAN ASSISTANT

## 2022-12-08 RX ORDER — CEFDINIR 300 MG/1
300 CAPSULE ORAL 2 TIMES DAILY
Qty: 20 CAPSULE | Refills: 0 | Status: SHIPPED | OUTPATIENT
Start: 2022-12-08 | End: 2022-12-18

## 2022-12-08 NOTE — TELEPHONE ENCOUNTER
Order for Nebulizer faxed to [facility] at [phone]    Patient notified       Guillermina Garcia

## 2022-12-08 NOTE — PATIENT INSTRUCTIONS
Nataliya-  I am sorry to hear that you are not feeling well again.  I know that we treated you with doxycycline recently and we should not use the same antibiotic within 60 days.  You have tolerated cefdinir without difficulty in the past in 2020.  I will send over this antibiotic to your pharmacy for you to start right away.  We will hold off on the prednisone unless absolutely needed.       If not feeling better in 5 to 7 days please make an appointment for evaluation in the clinic.     Healthy regards,       Miya Crump MBA, MS, PA-C  [facility] [place]

## 2022-12-13 ENCOUNTER — TELEPHONE (OUTPATIENT)
Dept: FAMILY MEDICINE | Facility: CLINIC | Age: 47
End: 2022-12-13

## 2022-12-13 NOTE — TELEPHONE ENCOUNTER
Reason for Call:  Same Day Appointment, Requested Provider:      PCP: Miya Crump    Reason for visit: MS flare up     Duration of symptoms: 4 days    Have you been treated for this in the past? Yes    Additional comments:     Can we leave a detailed message on this number? YES    Phone number patient can be reached at: Cell number on file:    Telephone Information:   Mobile [phone]       Best Time:     Call taken on 12/13/2022 at 6:46 AM by Lilo Barr

## 2022-12-14 ENCOUNTER — E-VISIT (OUTPATIENT)
Dept: FAMILY MEDICINE | Facility: CLINIC | Age: 47
End: 2022-12-14
Payer: COMMERCIAL

## 2022-12-14 DIAGNOSIS — M54.6 ACUTE RIGHT-SIDED THORACIC BACK PAIN: Primary | ICD-10-CM

## 2022-12-14 PROCEDURE — 99207 PR NON-BILLABLE SERV PER CHARTING: CPT | Performed by: PHYSICIAN ASSISTANT

## 2022-12-19 NOTE — PROGRESS NOTES
Infusion Nursing Note:  Josefina Aldrich presents today for Tysabri.    Patient seen by provider today: No   present during visit today: Not Applicable.    Note: N/A.    Intravenous Access:  Peripheral IV placed.    Treatment Conditions:  Tysabri pre-infusion checklist completed via touch program.      Post Infusion Assessment:  Patient tolerated infusion without incident.  Patient observed for 60 minutes post Tysabri per protocol.  Site patent and intact, free from redness, edema or discomfort.  No evidence of extravasations.  Access discontinued per protocol.       Discharge Plan:   Discharge instructions reviewed with: Patient.  AVS to patient via GTIHART.  Patient will make further appts  Patient discharged in stable condition accompanied by: self.  Departure Mode: Ambulatory.    Radha Lee RN                         Started med 2 months ago - never got update. Is this med working well for him? How is he taking it? Need to have correct for RX.

## 2022-12-21 ENCOUNTER — ALLIED HEALTH/NURSE VISIT (OUTPATIENT)
Dept: FAMILY MEDICINE | Facility: CLINIC | Age: 47
End: 2022-12-21
Payer: COMMERCIAL

## 2022-12-21 ENCOUNTER — TELEPHONE (OUTPATIENT)
Dept: FAMILY MEDICINE | Facility: CLINIC | Age: 47
End: 2022-12-21

## 2022-12-21 DIAGNOSIS — Z23 ENCOUNTER FOR IMMUNIZATION: Primary | ICD-10-CM

## 2022-12-21 PROCEDURE — 90471 IMMUNIZATION ADMIN: CPT

## 2022-12-21 PROCEDURE — 90677 PCV20 VACCINE IM: CPT

## 2022-12-21 PROCEDURE — 99207 PR NO CHARGE NURSE ONLY: CPT

## 2022-12-21 NOTE — TELEPHONE ENCOUNTER
[facility] sent a response to the order on 10/20/ 22 from LEONORA Crump .  The patient no showed/ canceled a [place] appointment.  We have attempted to contact the patient to reschedule with no results.    Phone:  [phone]

## 2022-12-26 ENCOUNTER — TELEPHONE (OUTPATIENT)
Dept: FAMILY MEDICINE | Facility: CLINIC | Age: 47
End: 2022-12-26

## 2022-12-26 NOTE — TELEPHONE ENCOUNTER
It appears this is a request for topiramate 100 mg - this was refilled on 11/23 x 6 months to  [place] - Please verify this is on file with pharmacy and let me know if anything further needed from me.    Miya Crump MBA, MS, PA-C  [facility] [place]

## 2022-12-30 ENCOUNTER — THERAPY VISIT (OUTPATIENT)
Dept: PHYSICAL THERAPY | Facility: CLINIC | Age: 47
End: 2022-12-30
Payer: COMMERCIAL

## 2022-12-30 DIAGNOSIS — M54.2 CERVICALGIA: ICD-10-CM

## 2022-12-30 DIAGNOSIS — G35 MS (MULTIPLE SCLEROSIS) (H): Primary | ICD-10-CM

## 2022-12-30 PROCEDURE — 97140 MANUAL THERAPY 1/> REGIONS: CPT | Mod: GP | Performed by: PHYSICAL THERAPIST

## 2022-12-30 PROCEDURE — 97035 APP MDLTY 1+ULTRASOUND EA 15: CPT | Mod: GP | Performed by: PHYSICAL THERAPIST

## 2022-12-30 NOTE — PROGRESS NOTES
Subjective:  HPI  Physical Exam                    Objective:  System    Physical Exam    General     ROS    Assessment/Plan:    PROGRESS  REPORT    Progress reporting period is from 12/30/2022.       SUBJECTIVE  Subjective: Pt notes stress at work has created a flare up in her MS. Fatigued and painful all over body. Trying to keep up with yoga and exericses.    Current Pain level: 9/10.      Initial Pain level: 8/10.   Changes in function:  Yes, but recent stress flare up  Adverse reaction to treatment or activity: None    OBJECTIVE  Changes noted in objective findings:   Objective: CROM: flexion=limited (pain bilateral neck); extension=mod limit (tight, no painful); R rot=limited (pain on left); L rot=limited (pain on right). Very tense in B UT's and cervical spinal muscles. Tolerated US and tool mobilization well again today.     ASSESSMENT/PLAN  Updated problem list and treatment plan: Diagnosis 1:  Neck pain and MS  Pain -  hot/cold therapy, US, manual therapy, self management and home program  Inflammation - US and self management/home program  Impaired muscle performance - neuro re-education  Decreased function - therapeutic activities  Impaired posture - neuro re-education  STG/LTGs have been met or progress has been made towards goals:  Yes, but recent flare up  Assessment of Progress: The patient's condition has potential to improve.  Self Management Plans:  Patient has been instructed in a home treatment program.  I have re-evaluated this patient and find that the nature, scope, duration and intensity of the therapy is appropriate for the medical condition of the patient.  Nataliya continues to require the following intervention to meet STG and LTG's:  PT    Recommendations:  This patient would benefit from continued therapy.     Frequency:  2 X a month, once daily  Duration:  for 3 months        Please refer to the daily flowsheet for treatment today, total treatment time and time spent performing 1:1  timed codes.

## 2022-12-31 ENCOUNTER — PRE VISIT (OUTPATIENT)
Dept: NEUROLOGY | Facility: CLINIC | Age: 47
End: 2022-12-31

## 2022-12-31 NOTE — TELEPHONE ENCOUNTER
RECORDS RECEIVED FROM: Internal, external   REASON FOR VISIT: MS   Date of Appt: 12.31.22   NOTES (FOR ALL VISITS) STATUS DETAILS   OFFICE NOTE from referring provider Internal 11.23.22, 5.3.22 ADIA Thurston   OFFICE NOTE from other specialist Received Emilee Carver NP @ [facility]:  10/14/22  11/22/21  10/5/21  4/8/22    Dr Chong @ [facility]:  7/5/22 4/8/21   MEDICATION LIST Internal    IMAGING  (FOR ALL VISITS)     MRI (HEAD, NECK, SPINE) Rec'd/Internal [facility]:  MRI Cervical Spine 11/12/21  MRI Brain 11/12/21  MRI Brain 5/20/21  MRI Cervical Spine 5/20/21    [facility]:  MRI Cervical Spine 11/4/20  MRI Brain 7/23/19      Action 12.31.22    Action Taken Sent request to [facility] for office notes, images, etc.

## 2023-01-02 ENCOUNTER — TELEPHONE (OUTPATIENT)
Dept: FAMILY MEDICINE | Facility: CLINIC | Age: 48
End: 2023-01-02

## 2023-01-02 NOTE — TELEPHONE ENCOUNTER
S-(situation): fall    B-(background): Has MS - having a flare up the last 1-2 wks and increase in migraines.  Recently found out son attempted suicide and in [facility].    A-(assessment): Patient reports she fell in the bathroom early am on 1/1/23 -found out her son attempted suicide.  She thinks she fell due to the stress of hearing about the suicide attempt.  She is unsure how she landed (unwitnessed) but reports L side of face is sore.  Noticed petechiae all over face yesterday but today only on eyelids and forehead.  C/o increase in body pain from the fall.  Using her cane now for ambulating.      Reports increasing use of her Baclofen and Gabapentin - taking every 4 hrs, and increase use of Topiramate - taking every 6 hrs.    R-(recommendations): (Patient has an office visit scheduled 1/3/23.)  If c/o increase pain or other symptoms prior to appointment, go to ER for eval.

## 2023-01-03 ENCOUNTER — OFFICE VISIT (OUTPATIENT)
Dept: FAMILY MEDICINE | Facility: CLINIC | Age: 48
End: 2023-01-03
Payer: COMMERCIAL

## 2023-01-03 DIAGNOSIS — G35 MULTIPLE SCLEROSIS (H): Primary | ICD-10-CM

## 2023-01-03 PROCEDURE — 99214 OFFICE O/P EST MOD 30 MIN: CPT | Performed by: PHYSICIAN ASSISTANT

## 2023-01-03 RX ORDER — PREDNISONE 20 MG/1
TABLET ORAL
Qty: 20 TABLET | Refills: 0 | Status: SHIPPED | OUTPATIENT
Start: 2023-01-03 | End: 2023-01-26

## 2023-01-03 RX ORDER — CYCLOBENZAPRINE HCL 10 MG
10 TABLET ORAL 3 TIMES DAILY PRN
Qty: 40 TABLET | Refills: 0 | Status: SHIPPED | OUTPATIENT
Start: 2023-01-03

## 2023-01-03 NOTE — PROGRESS NOTES
Assessment & Plan     Multiple sclerosis (H) - Dr. Chong - on Tysabri infusions  All over body pain likely secondary to MS flare.  Will treat with prednisone taper as this typically improves her symptoms nicely.  Also given a prescription for cyclobenzaprine for muscle relaxation.  Recommend taking a break from work as this stress appears to be worsening her overall symptomatology.  I recommend being off until the end of the month and if she feels that she can return sooner we can discuss on a case-by-case basis.  - predniSONE (DELTASONE) 20 MG tablet  Dispense: 20 tablet; Refill: 0  - cyclobenzaprine (FLEXERIL) 10 MG tablet  Dispense: 40 tablet; Refill: 0        32 minutes spent on the date of the encounter doing chart review, history and exam, documentation and further activities per the note         Return in about 23 days (around 1/26/2023) for Follow-up.    Miya Crump PA-C  [facility] [place] An is a 47 year old, presenting for the following health issues:  MS flare up and FMLA paperwork      HPI     Here to discuss MS flare up and paperwork for FMLA    Flare with grandmother's health & dementia    Job stress due to people's vacatoin.      Son attempted suicide this weekend -unsure quite why    Fell this weekend after finding out son was in ED.  More shakiness/tremor         Review of Systems   Constitutional, HEENT, cardiovascular, pulmonary, GI, , musculoskeletal, neuro, skin, endocrine and psych systems are negative, except as otherwise noted.      Objective    LMP  (LMP Unknown)   There is no height or weight on file to calculate BMI.  Physical Exam   GENERAL: healthy, alert and no distress  EYES: Eyes grossly normal to inspection  RESP: lungs clear to auscultation - no rales, rhonchi or wheezes  CV: regular rate and rhythm, normal S1 S2, no S3 or S4, no murmur, click or rub, no peripheral edema and peripheral pulses strong  MS: no gross musculoskeletal  defects noted, no edema  SKIN: no suspicious lesions or rashes  NEURO: Normal strength and tone, mentation intact and speech normal  PSYCH: mentation appears normal, affect normal/bright, patient tearful

## 2023-01-05 NOTE — PROGRESS NOTES
Completed forms faxed back to [facility]  at [phone].   Originals sent to be scanned.       Guillermina Garcia

## 2023-01-06 ENCOUNTER — TELEPHONE (OUTPATIENT)
Dept: FAMILY MEDICINE | Facility: CLINIC | Age: 48
End: 2023-01-06

## 2023-01-06 ENCOUNTER — INFUSION THERAPY VISIT (OUTPATIENT)
Dept: INFUSION THERAPY | Facility: CLINIC | Age: 48
End: 2023-01-06
Attending: PSYCHIATRY & NEUROLOGY
Payer: COMMERCIAL

## 2023-01-06 VITALS
RESPIRATION RATE: 16 BRPM | TEMPERATURE: 97.6 F | DIASTOLIC BLOOD PRESSURE: 72 MMHG | HEART RATE: 80 BPM | OXYGEN SATURATION: 98 % | SYSTOLIC BLOOD PRESSURE: 116 MMHG

## 2023-01-06 DIAGNOSIS — G35 MULTIPLE SCLEROSIS (H): Primary | ICD-10-CM

## 2023-01-06 PROCEDURE — 258N000003 HC RX IP 258 OP 636: Performed by: PSYCHIATRY & NEUROLOGY

## 2023-01-06 PROCEDURE — 250N000011 HC RX IP 250 OP 636: Performed by: PSYCHIATRY & NEUROLOGY

## 2023-01-06 PROCEDURE — 96365 THER/PROPH/DIAG IV INF INIT: CPT

## 2023-01-06 PROCEDURE — 36415 COLL VENOUS BLD VENIPUNCTURE: CPT | Performed by: PSYCHIATRY & NEUROLOGY

## 2023-01-06 RX ADMIN — NATALIZUMAB 300 MG: 300 INJECTION INTRAVENOUS at 14:18

## 2023-01-06 RX ADMIN — SODIUM CHLORIDE 250 ML: 9 INJECTION, SOLUTION INTRAVENOUS at 14:18

## 2023-01-06 NOTE — TELEPHONE ENCOUNTER
Forms/Letter Request    Type of form/letter: Short term Disability    Have you been seen for this request: Yes     Do we have the form/letter: Yes:     When is form/letter needed by: 01/11/2023    How would you like the form/letter returned: [phone]- patient requesting call once faxed    Patient Notified form requests are processed in 3-5 business days:Yes    Could we send this information to you in [place] or would you prefer to receive a phone call?:   Patient would prefer a phone call   Okay to leave a detailed message?: Yes at Cell number on file:    Telephone Information:   Mobile [phone]

## 2023-01-06 NOTE — PROGRESS NOTES
Infusion Nursing Note:  Nataliya N Venkata Aldrich presents today for Tysabri.    Patient seen by provider today: No   present during visit today: Not Applicable.    Note: SABINO virus drawn today.  No post observation time per order.    Patient reports having increased stress in her life recently but denies new symptoms like change in thinking, eyesight, or balance and answered no to the Tysabri check list questions.  She is in close contact with her PCP.      Intravenous Access:  Labs drawn without difficulty.  Peripheral IV placed.    Treatment Conditions:  Tysabri pre-infusion checklist completed via touch program.    Post Infusion Assessment:  Patient tolerated infusion without incident.  Blood return noted pre and post infusion.  Site patent and intact, free from redness, edema or discomfort.  No evidence of extravasations.  Access discontinued per protocol.     Discharge Plan:   AVS to patient via MYCHART.  Patient will return 2/17/23 for next appointment.   Patient discharged in stable condition accompanied by: self.  Departure Mode: Ambulatory.      Carmen Rice RN

## 2023-01-08 ENCOUNTER — MYC MEDICAL ADVICE (OUTPATIENT)
Dept: FAMILY MEDICINE | Facility: CLINIC | Age: 48
End: 2023-01-08

## 2023-01-09 NOTE — TELEPHONE ENCOUNTER
Please see my chart message below     Please review and advise     Thank you     Amelia Chance RN, BSN  [facility]

## 2023-01-10 NOTE — TELEPHONE ENCOUNTER
Form completed and placed in [facility] inbox      Miya Crump MBA, MS, PACARMEN  [facility] [place]

## 2023-01-11 ENCOUNTER — TELEPHONE (OUTPATIENT)
Dept: FAMILY MEDICINE | Facility: CLINIC | Age: 48
End: 2023-01-11

## 2023-01-11 NOTE — TELEPHONE ENCOUNTER
Completed forms faxed back to [facility] services  at [phone]      Guillermina Garcia       Originals sent to be scanned.       Guillermina Garcia

## 2023-01-11 NOTE — TELEPHONE ENCOUNTER
Reason for Call:  Form, our goal is to have forms completed with 72 hours, however, some forms may require a visit or additional information.    Type of letter, form or note:  disability    Who is the form from?: Insurance comp    Where did the form come from: form was faxed in    What clinic location was the form placed at?: [facility]    Where the form was placed: Miya Crump Box/Folder    What number is listed as a contact on the form?: [phone] Fax           Call taken on 1/11/2023 at 1:44 PM by Cleopatra Solano

## 2023-01-13 ENCOUNTER — THERAPY VISIT (OUTPATIENT)
Dept: PHYSICAL THERAPY | Facility: CLINIC | Age: 48
End: 2023-01-13
Payer: COMMERCIAL

## 2023-01-13 DIAGNOSIS — M54.2 CERVICALGIA: ICD-10-CM

## 2023-01-13 DIAGNOSIS — G35 MS (MULTIPLE SCLEROSIS) (H): Primary | ICD-10-CM

## 2023-01-13 LAB — SCANNED LAB RESULT: NORMAL

## 2023-01-13 PROCEDURE — 97035 APP MDLTY 1+ULTRASOUND EA 15: CPT | Mod: GP | Performed by: PHYSICAL THERAPIST

## 2023-01-13 PROCEDURE — 97110 THERAPEUTIC EXERCISES: CPT | Mod: GP | Performed by: PHYSICAL THERAPIST

## 2023-01-20 ENCOUNTER — THERAPY VISIT (OUTPATIENT)
Dept: PHYSICAL THERAPY | Facility: CLINIC | Age: 48
End: 2023-01-20
Payer: COMMERCIAL

## 2023-01-20 DIAGNOSIS — G35 MS (MULTIPLE SCLEROSIS) (H): Primary | ICD-10-CM

## 2023-01-20 PROCEDURE — 97035 APP MDLTY 1+ULTRASOUND EA 15: CPT | Mod: GP | Performed by: PHYSICAL THERAPIST

## 2023-01-20 PROCEDURE — 97140 MANUAL THERAPY 1/> REGIONS: CPT | Mod: GP | Performed by: PHYSICAL THERAPIST

## 2023-01-26 ENCOUNTER — OFFICE VISIT (OUTPATIENT)
Dept: FAMILY MEDICINE | Facility: CLINIC | Age: 48
End: 2023-01-26
Payer: COMMERCIAL

## 2023-01-26 VITALS
HEIGHT: 65 IN | DIASTOLIC BLOOD PRESSURE: 70 MMHG | BODY MASS INDEX: 45.5 KG/M2 | HEART RATE: 91 BPM | RESPIRATION RATE: 16 BRPM | TEMPERATURE: 96.3 F | SYSTOLIC BLOOD PRESSURE: 120 MMHG | OXYGEN SATURATION: 98 % | WEIGHT: 273.1 LBS

## 2023-01-26 DIAGNOSIS — M79.2 PAIN, NEUROPATHIC: ICD-10-CM

## 2023-01-26 DIAGNOSIS — E55.9 HYPOVITAMINOSIS D: ICD-10-CM

## 2023-01-26 DIAGNOSIS — R27.9 LACK OF COORDINATION: ICD-10-CM

## 2023-01-26 DIAGNOSIS — Z13.1 SCREENING FOR DIABETES MELLITUS: ICD-10-CM

## 2023-01-26 DIAGNOSIS — M79.605 PAIN OF LEFT LOWER EXTREMITY: ICD-10-CM

## 2023-01-26 DIAGNOSIS — E53.8 VITAMIN B12 DEFICIENCY: ICD-10-CM

## 2023-01-26 DIAGNOSIS — M48.02 FORAMINAL STENOSIS OF CERVICAL REGION: ICD-10-CM

## 2023-01-26 DIAGNOSIS — R07.89 ATYPICAL CHEST PAIN: ICD-10-CM

## 2023-01-26 DIAGNOSIS — G98.8 CHRONIC MUSCULOSKELETAL PAIN DUE TO DISORDER OF NERVOUS SYSTEM: ICD-10-CM

## 2023-01-26 DIAGNOSIS — G89.29 CHRONIC MUSCULOSKELETAL PAIN DUE TO DISORDER OF NERVOUS SYSTEM: ICD-10-CM

## 2023-01-26 DIAGNOSIS — M79.18 CHRONIC MUSCULOSKELETAL PAIN DUE TO DISORDER OF NERVOUS SYSTEM: ICD-10-CM

## 2023-01-26 DIAGNOSIS — G56.01 CARPAL TUNNEL SYNDROME OF RIGHT WRIST: Primary | ICD-10-CM

## 2023-01-26 DIAGNOSIS — M47.892 OTHER OSTEOARTHRITIS OF SPINE, CERVICAL REGION: ICD-10-CM

## 2023-01-26 DIAGNOSIS — R26.89 BALANCE PROBLEMS: ICD-10-CM

## 2023-01-26 DIAGNOSIS — G35 MULTIPLE SCLEROSIS (H): ICD-10-CM

## 2023-01-26 DIAGNOSIS — D50.9 IRON DEFICIENCY ANEMIA, UNSPECIFIED IRON DEFICIENCY ANEMIA TYPE: ICD-10-CM

## 2023-01-26 DIAGNOSIS — Z01.84 IMMUNITY STATUS TESTING: ICD-10-CM

## 2023-01-26 LAB
ALBUMIN SERPL BCG-MCNC: 4.2 G/DL (ref 3.5–5.2)
ALP SERPL-CCNC: 62 U/L (ref 35–104)
ALT SERPL W P-5'-P-CCNC: 15 U/L (ref 10–35)
ANION GAP SERPL CALCULATED.3IONS-SCNC: 12 MMOL/L (ref 7–15)
AST SERPL W P-5'-P-CCNC: 31 U/L (ref 10–35)
BASOPHILS # BLD AUTO: 0.1 10E3/UL (ref 0–0.2)
BASOPHILS NFR BLD AUTO: 1 %
BILIRUB SERPL-MCNC: 0.3 MG/DL
BUN SERPL-MCNC: 10.1 MG/DL (ref 6–20)
CALCIUM SERPL-MCNC: 9.3 MG/DL (ref 8.6–10)
CHLORIDE SERPL-SCNC: 108 MMOL/L (ref 98–107)
CREAT SERPL-MCNC: 0.97 MG/DL (ref 0.51–0.95)
DEPRECATED CALCIDIOL+CALCIFEROL SERPL-MC: 77 UG/L (ref 20–75)
DEPRECATED HCO3 PLAS-SCNC: 19 MMOL/L (ref 22–29)
EOSINOPHIL # BLD AUTO: 0.2 10E3/UL (ref 0–0.7)
EOSINOPHIL NFR BLD AUTO: 3 %
ERYTHROCYTE [DISTWIDTH] IN BLOOD BY AUTOMATED COUNT: 14 % (ref 10–15)
GFR SERPL CREATININE-BSD FRML MDRD: 73 ML/MIN/1.73M2
GLUCOSE SERPL-MCNC: 91 MG/DL (ref 70–99)
HBV SURFACE AB SERPL IA-ACNC: 0.88 M[IU]/ML
HBV SURFACE AB SERPL IA-ACNC: NONREACTIVE M[IU]/ML
HCT VFR BLD AUTO: 39.9 % (ref 35–47)
HGB BLD-MCNC: 12.4 G/DL (ref 11.7–15.7)
IMM GRANULOCYTES # BLD: 0.1 10E3/UL
IMM GRANULOCYTES NFR BLD: 1 %
LYMPHOCYTES # BLD AUTO: 4.4 10E3/UL (ref 0.8–5.3)
LYMPHOCYTES NFR BLD AUTO: 51 %
MCH RBC QN AUTO: 29.8 PG (ref 26.5–33)
MCHC RBC AUTO-ENTMCNC: 31.1 G/DL (ref 31.5–36.5)
MCV RBC AUTO: 96 FL (ref 78–100)
MONOCYTES # BLD AUTO: 0.6 10E3/UL (ref 0–1.3)
MONOCYTES NFR BLD AUTO: 7 %
NEUTROPHILS # BLD AUTO: 3.1 10E3/UL (ref 1.6–8.3)
NEUTROPHILS NFR BLD AUTO: 37 %
NRBC # BLD AUTO: 0 10E3/UL
NRBC BLD AUTO-RTO: 1 /100
PLATELET # BLD AUTO: 313 10E3/UL (ref 150–450)
POTASSIUM SERPL-SCNC: 3.9 MMOL/L (ref 3.4–5.3)
PROT SERPL-MCNC: 7.5 G/DL (ref 6.4–8.3)
RBC # BLD AUTO: 4.16 10E6/UL (ref 3.8–5.2)
SODIUM SERPL-SCNC: 139 MMOL/L (ref 136–145)
VIT B12 SERPL-MCNC: 534 PG/ML (ref 232–1245)
WBC # BLD AUTO: 8.4 10E3/UL (ref 4–11)

## 2023-01-26 PROCEDURE — 82306 VITAMIN D 25 HYDROXY: CPT | Performed by: PHYSICIAN ASSISTANT

## 2023-01-26 PROCEDURE — 86706 HEP B SURFACE ANTIBODY: CPT | Performed by: PHYSICIAN ASSISTANT

## 2023-01-26 PROCEDURE — 82607 VITAMIN B-12: CPT | Performed by: PHYSICIAN ASSISTANT

## 2023-01-26 PROCEDURE — 85025 COMPLETE CBC W/AUTO DIFF WBC: CPT | Performed by: PHYSICIAN ASSISTANT

## 2023-01-26 PROCEDURE — 99215 OFFICE O/P EST HI 40 MIN: CPT | Performed by: PHYSICIAN ASSISTANT

## 2023-01-26 PROCEDURE — 36415 COLL VENOUS BLD VENIPUNCTURE: CPT | Performed by: PHYSICIAN ASSISTANT

## 2023-01-26 PROCEDURE — 93000 ELECTROCARDIOGRAM COMPLETE: CPT | Performed by: PHYSICIAN ASSISTANT

## 2023-01-26 PROCEDURE — 80053 COMPREHEN METABOLIC PANEL: CPT | Performed by: PHYSICIAN ASSISTANT

## 2023-01-26 RX ORDER — GABAPENTIN 300 MG/1
300 CAPSULE ORAL EVERY 4 HOURS
Qty: 120 CAPSULE | Refills: 5
Start: 2023-01-26 | End: 2023-04-11

## 2023-01-26 RX ORDER — PREGABALIN 75 MG/1
75 CAPSULE ORAL 2 TIMES DAILY PRN
Qty: 60 CAPSULE | Refills: 0 | Status: SHIPPED | OUTPATIENT
Start: 2023-01-26 | End: 2023-04-11

## 2023-01-26 NOTE — LETTER
[employer] [place] [address]   [place] [facility] [address] [phone]       January 26, 2023    Nataliya Hastings [place]  [address]   [place] [facility] [address]    To Whom it May Concern:    The above patient was seen by me in clinic today.  Based on my assessment I recommend that she remain out of work to continue her medical treatments through 3/1/2023.  Pending her clinical response I will re-evaluate her on 2/28/2023 to determine if further extension of her medical leave is indicated    Please contact me with questions or concerns.      Sincerely,    Miya Crump PA-C

## 2023-01-26 NOTE — RESULT ENCOUNTER NOTE
Nataliya  I have reviewed your recent test results:    -Your EKG is completely normal.  Please try utilizing the omeprazole twice daily before meals (about 30 minutes) to see if this resolves the chest discomfort you are experiencing..    For additional lab test information, www.[email or web address].com is an excellent reference.     If you have any questions please do not hesitate to contact our office via phone ([phone]) or [place].    Healthy regards,     Miya Crump MBA, MS, PA-C  [facility]

## 2023-01-26 NOTE — PROGRESS NOTES
Assessment & Plan     Carpal tunnel syndrome of right wrist  Recommend evaluation with hand specialist to discuss options.  - Orthopedic  Referral    Multiple sclerosis (H) - Dr. Chong - on Tysabri infusions  Chronic musculoskeletal pain due to disorder of nervous system  Pain, neuropathic  Balance problems  Lack of coordination  Pain suboptimally controlled.  Recommend trial of pregabalin during the day and continue gabapentin at night.  Pt can trial this and let me know what works for her.  Continue PHYSICAL THERAPY and I will keep her off of work due to her continued physical limitations so that she can focus on recovery and healing.    - gabapentin (NEURONTIN) 300 MG capsule  Dispense: 120 capsule; Refill: 5  - pregabalin (LYRICA) 75 MG capsule  Dispense: 60 capsule; Refill: 0    Foraminal stenosis of cervical region  Other osteoarthritis of spine, cervical region  Suboptimally controlled.  Medical spine may be able to offer some treatment options for her.  Referral placed.    - Spine  Referral      Atypical chest pain  Normal EKG.  Suspect MSK etiology.  Continue PHYSICAL THERAPY and gabapentin/rpregabalin.    - EKG 12-lead complete w/read - Clinics    Screening for diabetes mellitus  Routine screening  - Comprehensive metabolic panel (BMP + Alb, Alk Phos, ALT, AST, Total. Bili, TP)    Vitamin B12 deficiency  Historical.  Levels for surveillance.    - Vitamin B12    Hypovitaminosis D  Historical.  Levels for surveillance.    - Vitamin D Deficiency    Iron deficiency anemia, unspecified iron deficiency anemia type  Historical.  Labs for surveillance.  - CBC with platelets and differential    Immunity status testing  Unsure if vaccinated against Hep B.  Titer to investigate.    - Hepatitis B Surface Antibody    43 minutes spent on the date of the encounter doing chart review, history and exam, documentation and further activities per the note      Return in about 1 month (around 2/28/2023)  for Medication recheck.    Miya Crump PA-C  [facility] [place] [place] An is a 47 year old, presenting for the following health issues:  RECHECK  Patient is here for follow up from 1/3/2023.    History of Present Illness       Reason for visit:  Ms relapse    She eats 2-3 servings of fruits and vegetables daily.She consumes 0 sweetened beverage(s) daily.She exercises with enough effort to increase her heart rate 10 to 19 minutes per day.  She exercises with enough effort to increase her heart rate 3 or less days per week.   She is taking medications regularly.     Here to discuss MS flare up and paperwork for FMLA     Flare with grandmother's health & dementia     Job stress due to people's vacatoin.       Son attempted suicide early January - connecting more and he is going to therapy - very optimistic - lives in same building.      Neck pain and right arm pain - was on prednisone taper 1/3/2023 - right hand feels like hand falling asleep.  Specifically her hand, 1st through 3rd digits.  Hard time gripping things.      Gait issues - increased gabapentin and PHYSICAL THERAPY from every 2 weeks to every week.  Follow up mid February.    Sept note:   Chronic neck pain  She was recently changed from gabapentin to pregabalin by Dr. Chong with [facility], [facility].  She states that she was doing well on the gabapentin but was prescribed it up to 4 times daily which he felt was inconvenient.  She has been having issues with insomnia and uncontrolled neck pain since making this change.  She is wondering if she can change back to gabapentin.     Today for neck reports that gabapentin makes her groggy so she doesn't take it when she has to leave the house.  Has never seen medical spine specialist.    Review of Systems   Constitutional, HEENT, cardiovascular, pulmonary, GI, , musculoskeletal, neuro, skin, endocrine and psych systems are negative, except as otherwise  "noted.      Objective    /70   Pulse 91   Temp (!) 96.3  F (35.7  C) (Tympanic)   Resp 16   Ht 1.651 m (5' 5\")   Wt 123.9 kg (273 lb 1.6 oz)   LMP  (LMP Unknown)   SpO2 98%   BMI 45.45 kg/m    Body mass index is 45.45 kg/m .  Physical Exam   GENERAL: healthy, alert and no distress  EYES: Eyes grossly normal to inspection  RESP: lungs clear to auscultation - no rales, rhonchi or wheezes  CV: regular rate and rhythm, normal S1 S2, no S3 or S4, no murmur, click or rub, no peripheral edema and peripheral pulses strong  MS: no gross musculoskeletal defects noted, no edema  SKIN: no suspicious lesions or rashes  NEURO: Normal strength and tone, mentation intact and speech normal, + phalen's test at right carpal tunnel  PSYCH: mentation appears normal, affect normal/bright    Results for orders placed or performed in visit on 01/26/23   Hepatitis B Surface Antibody     Status: None   Result Value Ref Range    Hepatitis B Surface Antibody Instrument Value 0.88 <8.00 m[IU]/mL    Hepatitis B Surface Antibody Nonreactive    Comprehensive metabolic panel (BMP + Alb, Alk Phos, ALT, AST, Total. Bili, TP)     Status: Abnormal   Result Value Ref Range    Sodium 139 136 - 145 mmol/L    Potassium 3.9 3.4 - 5.3 mmol/L    Chloride 108 (H) 98 - 107 mmol/L    Carbon Dioxide (CO2) 19 (L) 22 - 29 mmol/L    Anion Gap 12 7 - 15 mmol/L    Urea Nitrogen 10.1 6.0 - 20.0 mg/dL    Creatinine 0.97 (H) 0.51 - 0.95 mg/dL    Calcium 9.3 8.6 - 10.0 mg/dL    Glucose 91 70 - 99 mg/dL    Alkaline Phosphatase 62 35 - 104 U/L    AST 31 10 - 35 U/L    ALT 15 10 - 35 U/L    Protein Total 7.5 6.4 - 8.3 g/dL    Albumin 4.2 3.5 - 5.2 g/dL    Bilirubin Total 0.3 <=1.2 mg/dL    GFR Estimate 73 >60 mL/min/1.73m2   Vitamin B12     Status: Normal   Result Value Ref Range    Vitamin B12 534 232 - 1,245 pg/mL   Vitamin D Deficiency     Status: Abnormal   Result Value Ref Range    Vitamin D, Total (25-Hydroxy) 77 (H) 20 - 75 ug/L    Narrative    Season, " race, dietary intake, and treatment affect the concentration of 25-hydroxy-Vitamin D. Values may decrease during winter months and increase during summer months. Values 20-29 ug/L may indicate Vitamin D insufficiency and values <20 ug/L may indicate Vitamin D deficiency.    Vitamin D determination is routinely performed by an immunoassay specific for 25 hydroxyvitamin D3.  If an individual is on vitamin D2(ergocalciferol) supplementation, please specify 25 OH vitamin D2 and D3 level determination by LCMSMS test VITD23.     CBC with platelets and differential     Status: Abnormal   Result Value Ref Range    WBC Count 8.4 4.0 - 11.0 10e3/uL    RBC Count 4.16 3.80 - 5.20 10e6/uL    Hemoglobin 12.4 11.7 - 15.7 g/dL    Hematocrit 39.9 35.0 - 47.0 %    MCV 96 78 - 100 fL    MCH 29.8 26.5 - 33.0 pg    MCHC 31.1 (L) 31.5 - 36.5 g/dL    RDW 14.0 10.0 - 15.0 %    Platelet Count 313 150 - 450 10e3/uL    % Neutrophils 37 %    % Lymphocytes 51 %    % Monocytes 7 %    % Eosinophils 3 %    % Basophils 1 %    % Immature Granulocytes 1 %    NRBCs per 100 WBC 1 (H) <1 /100    Absolute Neutrophils 3.1 1.6 - 8.3 10e3/uL    Absolute Lymphocytes 4.4 0.8 - 5.3 10e3/uL    Absolute Monocytes 0.6 0.0 - 1.3 10e3/uL    Absolute Eosinophils 0.2 0.0 - 0.7 10e3/uL    Absolute Basophils 0.1 0.0 - 0.2 10e3/uL    Absolute Immature Granulocytes 0.1 <=0.4 10e3/uL    Absolute NRBCs 0.0 10e3/uL   CBC with platelets and differential     Status: Abnormal    Narrative    The following orders were created for panel order CBC with platelets and differential.  Procedure                               Abnormality         Status                     ---------                               -----------         ------                     CBC with platelets and d...[[id number]]  Abnormal            Final result                 Please view results for these tests on the individual orders.            EKG Interpretation:      Interpreted by Miya Crump,  PA-C  Rhythm: Normal sinus   Rate: Normal  Axis: Normal  Ectopy: None  Conduction: Normal  ST Segments/ T Waves: No ST-T wave changes and No acute ischemic changes  Q Waves: None  Comparison to prior: Unchanged from previous EKGs    Clinical Impression: normal EKG

## 2023-01-30 ENCOUNTER — MYC MEDICAL ADVICE (OUTPATIENT)
Dept: FAMILY MEDICINE | Facility: CLINIC | Age: 48
End: 2023-01-30
Payer: COMMERCIAL

## 2023-01-31 ENCOUNTER — MYC MEDICAL ADVICE (OUTPATIENT)
Dept: FAMILY MEDICINE | Facility: CLINIC | Age: 48
End: 2023-01-31
Payer: COMMERCIAL

## 2023-01-31 DIAGNOSIS — B37.31 YEAST INFECTION OF THE VAGINA: ICD-10-CM

## 2023-02-01 NOTE — RESULT ENCOUNTER NOTE
Nataliya  I have reviewed your recent test results:    -Normal red blood cell (hgb) levels, normal white blood cell count and normal platelet levels.  -Liver and gallbladder tests are normal (ALT,AST, Alk phos, bilirubin), kidney function is normal (Cr, GFR), sodium is normal, potassium is normal, calcium is normal, glucose is normal.  -Vitamin D level is optimal.  Continue your current supplementation.  -B12 level is normal.  Continue your current supplementation.  -you are NOT immune to hepatitis B.  We can discuss starting the Twinrix (Hep A/B) vaccination series at any point in time.  I would like you to OK this with neurology, however, to see if they have a preferred timing with your infusions.    For additional lab test information, www.testing.com is an excellent reference.     If you have any questions please do not hesitate to contact our office via phone ([phone]) or MyChart.    Healthy regards,     Miya Crump MBA, MS, PA-C  [facility]

## 2023-02-02 NOTE — TELEPHONE ENCOUNTER
Patient sent another myc message    Hello I spoke to my dentist and he changed the medication to doxycycline. However I do have another issue with the antibiotic it is causing me to have a yeast infection can I please have fluconazole sent in the pharmacy?    Medication and pharmacy pended.  Sign if appropriate.

## 2023-02-03 ENCOUNTER — THERAPY VISIT (OUTPATIENT)
Dept: PHYSICAL THERAPY | Facility: CLINIC | Age: 48
End: 2023-02-03
Payer: COMMERCIAL

## 2023-02-03 DIAGNOSIS — G35 MS (MULTIPLE SCLEROSIS) (H): ICD-10-CM

## 2023-02-03 DIAGNOSIS — M54.2 CERVICALGIA: Primary | ICD-10-CM

## 2023-02-03 PROCEDURE — 97035 APP MDLTY 1+ULTRASOUND EA 15: CPT | Mod: GP | Performed by: PHYSICAL THERAPIST

## 2023-02-03 PROCEDURE — 97140 MANUAL THERAPY 1/> REGIONS: CPT | Mod: GP | Performed by: PHYSICAL THERAPIST

## 2023-02-07 RX ORDER — FLUCONAZOLE 150 MG/1
150 TABLET ORAL ONCE
Qty: 2 TABLET | Refills: 0 | Status: SHIPPED | OUTPATIENT
Start: 2023-02-07 | End: 2023-07-22

## 2023-02-07 RX ORDER — FLUCONAZOLE 150 MG/1
150 TABLET ORAL ONCE
Qty: 1 TABLET | Refills: 0 | Status: CANCELLED | OUTPATIENT
Start: 2023-02-07 | End: 2023-02-07

## 2023-02-09 ENCOUNTER — THERAPY VISIT (OUTPATIENT)
Dept: PHYSICAL THERAPY | Facility: CLINIC | Age: 48
End: 2023-02-09
Payer: COMMERCIAL

## 2023-02-09 DIAGNOSIS — G35 MS (MULTIPLE SCLEROSIS) (H): ICD-10-CM

## 2023-02-09 DIAGNOSIS — M54.2 CERVICALGIA: Primary | ICD-10-CM

## 2023-02-09 PROCEDURE — 97140 MANUAL THERAPY 1/> REGIONS: CPT | Mod: GP | Performed by: PHYSICAL THERAPIST

## 2023-02-09 PROCEDURE — 97035 APP MDLTY 1+ULTRASOUND EA 15: CPT | Mod: GP | Performed by: PHYSICAL THERAPIST

## 2023-02-15 RX ORDER — IBUPROFEN 200 MG
600 TABLET ORAL EVERY 6 HOURS PRN
Status: CANCELLED
Start: 2023-02-15

## 2023-02-15 RX ORDER — ACETAMINOPHEN 325 MG/1
650 TABLET ORAL EVERY 4 HOURS PRN
Status: CANCELLED
Start: 2023-02-15

## 2023-02-15 RX ORDER — ONDANSETRON 2 MG/ML
4 INJECTION INTRAMUSCULAR; INTRAVENOUS EVERY 8 HOURS PRN
Status: CANCELLED
Start: 2023-02-15

## 2023-02-15 RX ORDER — HEPARIN SODIUM (PORCINE) LOCK FLUSH IV SOLN 100 UNIT/ML 100 UNIT/ML
5 SOLUTION INTRAVENOUS
Status: CANCELLED | OUTPATIENT
Start: 2023-02-15

## 2023-02-15 RX ORDER — DIPHENHYDRAMINE HCL 25 MG
50 CAPSULE ORAL EVERY 4 HOURS PRN
Status: CANCELLED
Start: 2023-02-15

## 2023-02-15 RX ORDER — HEPARIN SODIUM,PORCINE 10 UNIT/ML
5 VIAL (ML) INTRAVENOUS
Status: CANCELLED | OUTPATIENT
Start: 2023-02-15

## 2023-02-17 ENCOUNTER — TELEPHONE (OUTPATIENT)
Dept: FAMILY MEDICINE | Facility: CLINIC | Age: 48
End: 2023-02-17

## 2023-02-17 ENCOUNTER — INFUSION THERAPY VISIT (OUTPATIENT)
Dept: INFUSION THERAPY | Facility: CLINIC | Age: 48
End: 2023-02-17
Attending: PSYCHIATRY & NEUROLOGY
Payer: COMMERCIAL

## 2023-02-17 VITALS
DIASTOLIC BLOOD PRESSURE: 90 MMHG | HEART RATE: 63 BPM | OXYGEN SATURATION: 100 % | SYSTOLIC BLOOD PRESSURE: 130 MMHG | TEMPERATURE: 98 F

## 2023-02-17 DIAGNOSIS — G35 MULTIPLE SCLEROSIS (H): Primary | ICD-10-CM

## 2023-02-17 PROCEDURE — 250N000011 HC RX IP 250 OP 636: Performed by: PSYCHIATRY & NEUROLOGY

## 2023-02-17 PROCEDURE — 96365 THER/PROPH/DIAG IV INF INIT: CPT

## 2023-02-17 PROCEDURE — 258N000003 HC RX IP 258 OP 636: Performed by: PSYCHIATRY & NEUROLOGY

## 2023-02-17 RX ORDER — DIPHENHYDRAMINE HCL 25 MG
50 CAPSULE ORAL EVERY 4 HOURS PRN
Status: CANCELLED
Start: 2023-03-01

## 2023-02-17 RX ORDER — ONDANSETRON 2 MG/ML
4 INJECTION INTRAMUSCULAR; INTRAVENOUS EVERY 8 HOURS PRN
Status: CANCELLED
Start: 2023-03-01

## 2023-02-17 RX ORDER — IBUPROFEN 200 MG
600 TABLET ORAL EVERY 6 HOURS PRN
Status: CANCELLED
Start: 2023-03-01

## 2023-02-17 RX ORDER — HEPARIN SODIUM,PORCINE 10 UNIT/ML
5 VIAL (ML) INTRAVENOUS
Status: CANCELLED | OUTPATIENT
Start: 2023-03-01

## 2023-02-17 RX ORDER — HEPARIN SODIUM (PORCINE) LOCK FLUSH IV SOLN 100 UNIT/ML 100 UNIT/ML
5 SOLUTION INTRAVENOUS
Status: CANCELLED | OUTPATIENT
Start: 2023-03-01

## 2023-02-17 RX ORDER — ACETAMINOPHEN 325 MG/1
650 TABLET ORAL EVERY 4 HOURS PRN
Status: CANCELLED
Start: 2023-03-01

## 2023-02-17 RX ADMIN — SODIUM CHLORIDE 250 ML: 9 INJECTION, SOLUTION INTRAVENOUS at 13:19

## 2023-02-17 RX ADMIN — NATALIZUMAB 300 MG: 300 INJECTION INTRAVENOUS at 13:25

## 2023-02-17 NOTE — TELEPHONE ENCOUNTER
Reason for Call:  Appointment Request    Patient requesting this type of appt: Chronic Diease Management/Medication/Follow-Up    Requested provider: Miya Crump    Reason patient unable to be scheduled: Not within requested timeframe    When does patient want to be seen/preferred time: By 2/24    Comments: Pt called to r/s 2/28 appt with PCP because her work and disability services are needing her FMLA paperwork to be done by 2/24 for her return to work of 3/1. No sooner appts available with provider. Pt is wondering if this could be done as a phone call prior to her 2/28 visit. Please advise.    Could we send this information to you in [place] or would you prefer to receive a phone call?:   Patient would prefer a phone call   Okay to leave a detailed message?: Yes at Cell number on file:    Telephone Information:   Mobile [phone]       Call taken on 2/17/2023 at 8:35 AM by Amelia Dumont

## 2023-02-17 NOTE — PROGRESS NOTES
Infusion Nursing Note:  Nataliya Aldrich presents today for Tysabri.    Patient seen by provider today: No   present during visit today: Not Applicable.    Note: Patient prefers NS to infuse with Tysabri.  No post-observation per order.    Intravenous Access:  Peripheral IV placed.    Treatment Conditions:  Tysabri pre-infusion checklist completed via touch program.  SABINO Virus negative on 1/6/23.    Post Infusion Assessment:  Patient tolerated infusion without incident.  Blood return noted pre and post infusion.  Site patent and intact, free from redness, edema or discomfort.  No evidence of extravasations.  Access discontinued per protocol.     Discharge Plan:   AVS to patient via MYCHART.  Patient will return in one month, will call for next appointment.   Patient discharged in stable condition accompanied by: self.  Departure Mode: Ambulatory.      Carmen Rice RN

## 2023-02-20 ENCOUNTER — MYC MEDICAL ADVICE (OUTPATIENT)
Dept: FAMILY MEDICINE | Facility: CLINIC | Age: 48
End: 2023-02-20
Payer: COMMERCIAL

## 2023-02-20 NOTE — TELEPHONE ENCOUNTER
I scheduled her on 2/22 - OK to cancel 2/28 appt if this works for her- I put it as virtual but she can come in person if she prefers.  OK to change to in person if desired.      Miya Crump MBA, MS, PA-AMOL  [facility] [place]

## 2023-02-21 NOTE — TELEPHONE ENCOUNTER
Please see my chart message below     Please review and advise     Thank you     Amelia Chance RN, BSN  [facility]

## 2023-02-21 NOTE — TELEPHONE ENCOUNTER
Called and spoke with pt. Patient will do a VV tomorrow 2/22/23 as she does note want to deal with the weather. Patient wanted to also keep her appt for 2/28/23 as well just incase she needs it. She states her BP has been high.  Patient states she will cancel her appt on 2/28/23 with plenty of time if she starts feeling better.   Bishop STERN CMA ([facility])

## 2023-02-22 ENCOUNTER — MYC MEDICAL ADVICE (OUTPATIENT)
Dept: FAMILY MEDICINE | Facility: CLINIC | Age: 48
End: 2023-02-22

## 2023-02-22 ENCOUNTER — VIRTUAL VISIT (OUTPATIENT)
Dept: FAMILY MEDICINE | Facility: CLINIC | Age: 48
End: 2023-02-22
Payer: COMMERCIAL

## 2023-02-22 DIAGNOSIS — D84.9 IMMUNOSUPPRESSION (H): ICD-10-CM

## 2023-02-22 DIAGNOSIS — G35 MULTIPLE SCLEROSIS (H): Primary | ICD-10-CM

## 2023-02-22 DIAGNOSIS — M79.2 PAIN, NEUROPATHIC: ICD-10-CM

## 2023-02-22 PROCEDURE — 99215 OFFICE O/P EST HI 40 MIN: CPT | Mod: VID | Performed by: PHYSICIAN ASSISTANT

## 2023-02-22 NOTE — LETTER
[facility] [place]  [address] [place] [address]. .  [facility] [id number] [phone]       February 22, 2023    Nataliya Hastings [place]  [address]   [facility] [address]    To Whom it May Concern:     The above patient is under my professional care.  Due to underlying immunosuppression from her multiple sclerosis treatment along with asthma it is my professional opinion that she should continue working remotely to decrease her risk of infection/illness.  These restrictions are indefinite as her condition is chronic and has no cure.    Please contact my office with any questions or concerns.      Sincerely,    Miya Crump MBA, MS, PA-C  [facility] [place]

## 2023-02-22 NOTE — PROGRESS NOTES
Nataliya is a 47 year old who is being evaluated via a billable video visit.      How would you like to obtain your AVS? MyChart  If the video visit is dropped, the invitation should be resent by: Text to cell phone: [phone]  Will anyone else be joining your video visit? No        Assessment & Plan     Multiple sclerosis (H) - Dr. Chong - on Tysabri infusions  Pain, neuropathic  Immunosuppression (H)  We will return patient to work for up to 30 hours a week on 3/1/2023.  With the additional caveat that she needs to be given time to attend her physical therapy appointments once weekly (approximately 2.5 hours) and her monthly infusion (approximately 4 hours).  I do also support continued working from home due to her underlying immunosuppression and asthma.  This letter has been written.  I also filled out her FMLA paperwork with the above return to work recommendations.  We can reevaluate this on 3/23/2023 to see if she is able to resume an increase in her hours.  If there issues before that time she is to contact the clinic.    Body mass index 45.0-49.9, adult (H)  Continued dietary efforts and exercise efforts as physically able.      42 minutes spent on the date of the encounter doing chart review, history and exam, documentation and further activities per the note    Return in about 29 days (around 3/23/2023) for Video visit.    Miya Crump PA-C  [facility]   Nataliya is a 47 year old, presenting for the following health issues:  fmla paperwork      HPI     Paperwork for FMLA    Review of Systems   Constitutional, HEENT, cardiovascular, pulmonary, GI, , musculoskeletal, neuro, skin, endocrine and psych systems are negative, except as otherwise noted.      Objective         Last office visit 1/26/2023    MS flare up and paperwork for FMLA   Flare with grandmother's health & dementia,   Son attempted suicide early January - connecting more and he is going to  "therapy - very optimistic - lives in same building.    Neurologist does feel that stress is contributing to symptoms.   Gait issues -improving.  Continuing physical therapy weekly.   Has been getting emails from work advising of her work load that has been \"piling up \"since she has been gone.  Is trying to compartmentalize the anticipated stress of work but this has been difficult due to multiple conversations with her leadership about her return.  Additionally, she was hired as a remote position and was told that they are transitioning to being in the office 3 days a week.  Due to her immunosuppression she is wondering if I would write her a note/letter justifying her continued remote work recommendation.     Carpal tunnel/numbness of right hand/arm  Neck pain and right arm pain - was on prednisone taper 1/3/2023 - right hand feels like hand falling asleep.  Specifically her hand, 1st through 3rd digits.  Hard time gripping things.    Has EMG through the [facility] scheduled on 3/14/2023.        Chronic neck pain  She was recently changed from gabapentin to pregabalin by Dr. Chong with [facility].  She states that she was doing well on the gabapentin but was prescribed it up to 4 times daily which he felt was inconvenient.  She has been having issues with insomnia and uncontrolled neck pain since making this change.  She is wondering if she can change back to gabapentin.  At last visit on 1/26/2023 encouraged her to use Lyrica during the day and gabapentin at night due to her grogginess.  She feels that this combination of Lyrica during the day and gabapentin at night has been helpful.       Vitals:  No vitals were obtained today due to virtual visit.    Physical Exam   GENERAL: Healthy, alert and no distress  EYES: Eyes grossly normal to inspection.  No discharge or erythema, or obvious scleral/conjunctival abnormalities.  HENT: Normal cephalic/atraumatic.  External ears, " nose and mouth without ulcers or lesions.  No nasal drainage visible.  NECK: No asymmetry, visible masses or scars  RESP: No audible wheeze, cough, or visible cyanosis.  No visible retractions or increased work of breathing.    SKIN: Visible skin clear. No significant rash, abnormal pigmentation or lesions.  NEURO: Cranial nerves grossly intact.  Mentation and speech appropriate for age.  PSYCH: Mentation appears normal, affect normal/bright, judgement and insight intact, normal speech and appearance well-groomed.                Video-Visit Details    Type of service:  Video Visit   Video Start Time: 1:36 PM  Video End Time:2:01 PM    Originating Location (pt. Location): Home  Distant Location (provider location):  On-site  Platform used for Video Visit: Diana

## 2023-02-23 NOTE — TELEPHONE ENCOUNTER
Forms faxed to leave management services at [phone]  Copy mailed to patient   Original sent to be scanned and abstracted        Guillermina Garcia

## 2023-02-24 ENCOUNTER — THERAPY VISIT (OUTPATIENT)
Dept: PHYSICAL THERAPY | Facility: CLINIC | Age: 48
End: 2023-02-24
Payer: COMMERCIAL

## 2023-02-24 DIAGNOSIS — G35 MS (MULTIPLE SCLEROSIS) (H): Primary | ICD-10-CM

## 2023-02-24 DIAGNOSIS — M54.2 CERVICALGIA: ICD-10-CM

## 2023-02-24 PROCEDURE — 97140 MANUAL THERAPY 1/> REGIONS: CPT | Mod: GP | Performed by: PHYSICAL THERAPIST

## 2023-02-24 PROCEDURE — 97035 APP MDLTY 1+ULTRASOUND EA 15: CPT | Mod: GP | Performed by: PHYSICAL THERAPIST

## 2023-02-24 PROCEDURE — 97110 THERAPEUTIC EXERCISES: CPT | Mod: GP | Performed by: PHYSICAL THERAPIST

## 2023-02-28 ENCOUNTER — OFFICE VISIT (OUTPATIENT)
Dept: FAMILY MEDICINE | Facility: CLINIC | Age: 48
End: 2023-02-28
Payer: COMMERCIAL

## 2023-02-28 VITALS
DIASTOLIC BLOOD PRESSURE: 62 MMHG | HEART RATE: 108 BPM | TEMPERATURE: 96.1 F | SYSTOLIC BLOOD PRESSURE: 120 MMHG | RESPIRATION RATE: 16 BRPM | BODY MASS INDEX: 45.48 KG/M2 | HEIGHT: 65 IN | WEIGHT: 273 LBS | OXYGEN SATURATION: 99 %

## 2023-02-28 DIAGNOSIS — M79.2 PAIN, NEUROPATHIC: ICD-10-CM

## 2023-02-28 DIAGNOSIS — R25.2 SPASTICITY: ICD-10-CM

## 2023-02-28 DIAGNOSIS — G35 MULTIPLE SCLEROSIS (H): Primary | ICD-10-CM

## 2023-02-28 DIAGNOSIS — M54.41 ACUTE RIGHT-SIDED LOW BACK PAIN WITH RIGHT-SIDED SCIATICA: ICD-10-CM

## 2023-02-28 PROCEDURE — 99215 OFFICE O/P EST HI 40 MIN: CPT | Mod: 25 | Performed by: PHYSICIAN ASSISTANT

## 2023-02-28 PROCEDURE — 96372 THER/PROPH/DIAG INJ SC/IM: CPT | Performed by: PHYSICIAN ASSISTANT

## 2023-02-28 RX ORDER — KETOROLAC TROMETHAMINE 30 MG/ML
30 INJECTION, SOLUTION INTRAMUSCULAR; INTRAVENOUS ONCE
Status: COMPLETED | OUTPATIENT
Start: 2023-02-28 | End: 2023-02-28

## 2023-02-28 RX ADMIN — KETOROLAC TROMETHAMINE 30 MG: 30 INJECTION, SOLUTION INTRAMUSCULAR; INTRAVENOUS at 10:16

## 2023-02-28 NOTE — LETTER
[facility] [place]  [address] [place] [address].   [place] [facility] [id number] [phone]       February 28, 2023    Nataliya VYAS [place]  [address]   [place] [address]    To Whom it May Concern:    The above patient is under my professional care.  She has Multiple Sclerosis and recently had a flare that is gradually improving both her physical and emotional health.  She is able to return to work in a limited capacity (25 hours/week) so as to acclimate to a return to work.  Due to her chronic neurologic disease returning in a gradual fashion is paramount as returning to a full workload could cause regression of her recent progress.    Please work with her to gradually return to her position.  It is my professional opinion that further time off of work would not change the need for a progressive return to work.        Please contact me with questions or concerns.      Sincerely,    Miya Crump MBA, MS, PA-C  [facility] [place]

## 2023-02-28 NOTE — PROGRESS NOTES
Assessment & Plan     Multiple sclerosis (H)  Pain, neuropathic  Acute right-sided low back pain with right-sided sciatica  Spasticity  Toradol injection and completed in the office today.  Letter justifying gradual return to work made today.  Recommend referral to PM&R to attempt multidisciplinary approach to patient's chronic condition and acute pain.  Pool therapy referral faxed to [facility] as no available pool therapy options within [place] outside of [place].  - Adult Physical Medicine and Rehab  Referral  - Physical Therapy Referral  - ketorolac (TORADOL) injection 30 mg      Review of prior external note(s) from - [facility] information from \[facility]\"" Clinic of Neurology reviewed     43 minutes spent on the date of the encounter doing chart review, history and exam, documentation and further activities per the note      Return in about 23 days (around 3/23/2023).    Miya Crump PA-C  [facility] [place]    Richie An is a 47 year old, presenting for the following health issues:  RECHECK  Patient is here to follow up on [facility] paperwork and MS    History of Present Illness       Reason for visit:  Fmla and back pain    She eats 4 or more servings of fruits and vegetables daily.She consumes 0 sweetened beverage(s) daily.She exercises with enough effort to increase her heart rate 10 to 19 minutes per day.  She exercises with enough effort to increase her heart rate 4 days per week.   She is taking medications regularly.     Low back pain  Patient sent a message on 2/24/2023 that she woke up with significant increase in her lower back pain.  She described this as a pulled nerve in her lower spine rather than sciatica down the right leg that she historically has experienced.  She does still have some sciatic symptoms into her right leg.  She is having an increase in frequency of her bowel movements but no incontinence or inability to pass stool.  She was  "given PT exercises by her therapist today but it is still quite limiting.  She had difficulty walking.  She continues taking Lyrica 75 mg in the morning.  Gabapentin 300 in the afternoon and 600 at night.  Baclofen 20 mg once daily for spasms.  (more causes hand twitching).  Wondering about pool therapy or some specialized therapy for her MS/nerve issues.     FMLA/return to work  She is supposed to return to work tomorrow in a gradual fashion starting at 25 hours a week.  Her employer does not understand why she cannot return to work 40 hours/week.  She needs a medical justification for this gradual return to work.      Review of Systems   Constitutional, HEENT, cardiovascular, pulmonary, GI, , musculoskeletal, neuro, skin, endocrine and psych systems are negative, except as otherwise noted.      Objective    /62   Pulse 108   Temp (!) 96.1  F (35.6  C) (Tympanic)   Resp 16   Ht 1.651 m (5' 5\")   Wt 123.8 kg (273 lb)   LMP  (LMP Unknown)   SpO2 99%   BMI 45.43 kg/m    Body mass index is 45.43 kg/m .  Physical Exam   GENERAL: healthy, alert and no distress, antalgic gait and forward posturing secondary to pain with guarding of the right perithoracic musculature  EYES: Eyes grossly normal to inspection, PERRL and conjunctivae and sclerae normal  MS: Tenderness with guarding to gentle palpation of the right perithoracic/paralumbar musculature   SKIN: no suspicious lesions or rashes  NEURO: Normal strength and tone, mentation intact and speech normal  PSYCH: mentation appears normal, affect normal/bright                "

## 2023-02-28 NOTE — LETTER
[facility] [place]  [address] [place] [address].   [place] [facility] [id number] [phone]       February 28, 2023    Nataliya VYAS [place]  [address]   [place] [phone]    To Whom it May Concern:    The above patient is under my professional care.  She has Multiple Sclerosis and recently had a flare that is gradually improving both her physical and emotional health.  She is able to return to work in a limited capacity (30 hours/week) so as to acclimate to a return to work.  Due to her chronic neurologic disease returning in a gradual fashion is paramount as returning to a full workload could cause regression of her recent progress.    Please work with her to gradually return to her position.  It is my professional opinion that further time off of work would not change the need for a progressive return to work.        Please contact me with questions or concerns.      Sincerely,    Miya Crump MBA, MS, PA-C  [facility] [place]

## 2023-03-03 ENCOUNTER — THERAPY VISIT (OUTPATIENT)
Dept: PHYSICAL THERAPY | Facility: CLINIC | Age: 48
End: 2023-03-03
Payer: COMMERCIAL

## 2023-03-03 DIAGNOSIS — M54.2 CERVICALGIA: Primary | ICD-10-CM

## 2023-03-03 DIAGNOSIS — G35 MS (MULTIPLE SCLEROSIS) (H): ICD-10-CM

## 2023-03-03 PROCEDURE — 97530 THERAPEUTIC ACTIVITIES: CPT | Mod: GP | Performed by: PHYSICAL THERAPIST

## 2023-03-03 PROCEDURE — 97140 MANUAL THERAPY 1/> REGIONS: CPT | Mod: GP | Performed by: PHYSICAL THERAPIST

## 2023-03-03 PROCEDURE — 97035 APP MDLTY 1+ULTRASOUND EA 15: CPT | Mod: GP | Performed by: PHYSICAL THERAPIST

## 2023-03-03 NOTE — PROGRESS NOTES
[facility]    OUTPATIENT Physical Therapy ORTHOPEDIC EVALUATION  PLAN OF TREATMENT FOR OUTPATIENT REHABILITATION  (COMPLETE FOR INITIAL CLAIMS ONLY)  Patient's Last Name, First Name, M.I.  YOB: 1975  Nataliya Migule    Provider s Name:  [facility]   Medical Record No.  [id number]   Start of Care Date:  09/01/22   Onset Date:   03/31/22   Treatment Diagnosis:  Neck pain Medical Diagnosis:  Data Unavailable       Goals:     03/03/23 0500   Body Part   Goals listed below are for Neck pain   Goal #1   Goal #1 sleeping   Previous Functional Level No restrictions   Current Functional Level 1-2 hours without sleep per night   STG Target Performance Less than 1 hour without sleep per night   Rationale to establish restorative sleep pattern   Due Date 05/27/23   LTG Target Performance Sleep through the night with use of meds   Rationale to establish restorative sleep pattern   Due Date 08/20/23   If goal not met, why? MS flare up and MD continues to order more PT         Therapy Frequency:  2 x month  Predicted Duration of Therapy Intervention:  6 months    Nidhi Dunbar, PT                 I CERTIFY THE NEED FOR THESE SERVICES FURNISHED UNDER        THIS PLAN OF TREATMENT AND WHILE UNDER MY CARE     (Physician attestation of this document indicates review and certification of the therapy plan).                     Certification Date From:  03/07/23   Certification Date To:  08/20/23    Referring Provider:  Miya Crump    Initial Assessment        See Epic Evaluation SOC Date: 09/01/22

## 2023-03-09 ENCOUNTER — MYC MEDICAL ADVICE (OUTPATIENT)
Dept: FAMILY MEDICINE | Facility: CLINIC | Age: 48
End: 2023-03-09
Payer: COMMERCIAL

## 2023-03-13 ENCOUNTER — TRANSFERRED RECORDS (OUTPATIENT)
Dept: HEALTH INFORMATION MANAGEMENT | Facility: CLINIC | Age: 48
End: 2023-03-13
Payer: COMMERCIAL

## 2023-03-15 ENCOUNTER — TELEPHONE (OUTPATIENT)
Dept: FAMILY MEDICINE | Facility: CLINIC | Age: 48
End: 2023-03-15

## 2023-03-15 ENCOUNTER — OFFICE VISIT (OUTPATIENT)
Dept: PHYSICAL MEDICINE AND REHAB | Facility: CLINIC | Age: 48
End: 2023-03-15
Attending: PHYSICIAN ASSISTANT
Payer: COMMERCIAL

## 2023-03-15 VITALS — DIASTOLIC BLOOD PRESSURE: 78 MMHG | OXYGEN SATURATION: 100 % | HEART RATE: 83 BPM | SYSTOLIC BLOOD PRESSURE: 109 MMHG

## 2023-03-15 DIAGNOSIS — R25.2 SPASTICITY: ICD-10-CM

## 2023-03-15 DIAGNOSIS — G35 MULTIPLE SCLEROSIS (H): Primary | ICD-10-CM

## 2023-03-15 DIAGNOSIS — M79.2 PAIN, NEUROPATHIC: ICD-10-CM

## 2023-03-15 DIAGNOSIS — M54.41 ACUTE RIGHT-SIDED LOW BACK PAIN WITH RIGHT-SIDED SCIATICA: ICD-10-CM

## 2023-03-15 PROCEDURE — 99205 OFFICE O/P NEW HI 60 MIN: CPT | Performed by: PHYSICAL MEDICINE & REHABILITATION

## 2023-03-15 NOTE — PROGRESS NOTES
"       PM&R Clinic Note Initial Visit     Patient Name: Nataliya Aldrich : 1975 Medical Record: [id number]     Requesting Physician/clinician: Miya Crump PA-C  Chief Complaint: low back pain, R leg N/T, neck pain           History of Present Illness:     Nataliya Aldrich is a 47 year old female with MS, fibromyalgia and a fall with recnet flare of neck and low back pain. She also has R leg weakness with occasional instability with walking. She feels her MS is well treated on Tysabri, but still struggles with neck, back and general spasms. She is finishing PT and is set to start aquatic therapy at [facility]. She takes Lyrica in th AM, gabapentin afternoon and PM, and baclofen 20 usually in the PM. This regiment minimizes her side effects. She has never tried a smaller baclofen dose, as her pills are 20mg. She sustained a CVA in .          Past Medical and Surgical History:     Past Medical History:   Diagnosis Date     Asthma     mild persistent - Dr Gee     Cerebral infarction (H) 2015     Fibroids     s/p hysterectomy     H/O gastric bypass      Hypertension      Migraine     followed by Devika     Obstructive sleep apnea      Pre-diabetes      Relapsing remitting multiple sclerosis (H) 2015    lesion in [place].  Facial tingling initial symptom.  F/B [facility]     Spondylosis of cervical region without myelopathy or radiculopathy      Past Surgical History:   Procedure Laterality Date     COLONOSCOPY       GASTRIC BYPASS      \"Trouble with B12 and D\"     HYSTERECTOMY, MONO  2013    cervix gone.  fibroids.  without BSO     TONSILLECTOMY              Social History:     Social History     Tobacco Use     Smoking status: Former     Packs/day: 0.50     Years: 2.00     Pack years: 1.00     Types: Cigars, Cigarettes     Start date: 2011     Quit date: 2013     Years since quittin.7     Smokeless tobacco: Never   Substance Use Topics     Alcohol use: Yes     Alcohol/week: 2.0 " standard drinks     Comment: 0-3 drinks per week            Functional history:     Nataliya Aldrich is independent with ADL's             Family History:     Family History   Problem Relation Age of Onset     Hypertension Mother      Hyperlipidemia Mother      Obesity Mother      Obesity Father      Diabetes Maternal Grandmother      Hyperlipidemia Maternal Grandmother      Cerebrovascular Disease Maternal Grandfather      Diabetes Paternal Grandmother      Cerebrovascular Disease Paternal Grandmother      Depression Paternal Grandmother      Substance Abuse Paternal Grandmother      Lupus Paternal Aunt 41     Multiple Sclerosis No family hx of      Breast Cancer No family hx of      Ovarian Cancer No family hx of             Medications:     Current Outpatient Medications   Medication Sig Dispense Refill     acetaminophen (TYLENOL) 500 MG tablet Take 2 tablets (1,000 mg) by mouth 3 times daily       albuterol (PROVENTIL) (2.5 MG/3ML) 0.083% neb solution USE 1 VIAL VIA NEBULIZER EVERY 6 HOURS AS NEEDED FOR SHORTNESS OF BREATH OR DIFFICULT BREATHING OR WHEEZING 75 mL 3     azelastine (ASTELIN) 0.1 % nasal spray Spray 1 spray into both nostrils 2 times daily as needed for rhinitis (nasal antihistamine) 30 mL 5     baclofen (LIORESAL) 20 MG tablet TAKE 1 TABLET BY MOUTH EVERY NIGHT AT BEDTIME. MAY ALSO TAKE 1 TABLET EVERY 4 HOURS AS NEEDED FOR MUSCLE SPASMS 180 tablet 1     calcium carbonate-vitamin D (OSCAL W/D) 500-200 MG-UNIT tablet Take 1 tablet by mouth 2 times daily (with meals)       cetirizine (ZYRTEC) 10 MG tablet Take 1 tablet (10 mg) by mouth daily 180 tablet 1     cyclobenzaprine (FLEXERIL) 10 MG tablet Take 1 tablet (10 mg) by mouth 3 times daily as needed for muscle spasms 40 tablet 0     cyclobenzaprine (FLEXERIL) 10 MG tablet Take 1 tablet (10 mg) by mouth 3 times daily as needed for muscle spasms 30 tablet 0     desonide (DESOWEN) 0.05 % external cream Apply topically 2 times daily To face as  needed for rash 15 g 0     DULoxetine (CYMBALTA) 30 MG capsule Take 1 capsule (30 mg) by mouth 2 times daily 180 capsule 1     fluticasone (FLONASE) 50 MCG/ACT nasal spray USE 1 PUFF IN EACH NOSTRIL AS DIRECTED. 16 g 5     gabapentin (NEURONTIN) 300 MG capsule Take 1 capsule (300 mg) by mouth every 4 hours 120 capsule 5     hydrOXYzine (ATARAX) 25 MG tablet Take 0.5-1 tablets (12.5-25 mg) by mouth 3 times daily as needed for anxiety or other (SLEEP) 30 tablet 0     ipratropium - albuterol 0.5 mg/2.5 mg/3 mL (DUONEB) 0.5-2.5 (3) MG/3ML neb solution USE 1 VIAL VIA NEBULIZER EVERY 4 HOURS AS NEEDED FOR SHORTNESS OF BREATH OR WHEEZING 180 mL 1     ketotifen (ZADITOR) 0.025 % ophthalmic solution Place 1 drop into both eyes 2 times daily as needed for itching 10 mL 1     MAGNESIUM PO        montelukast (SINGULAIR) 10 MG tablet TAKE 1 TABLET(10 MG) BY MOUTH AT BEDTIME 90 tablet 1     Multiple Vitamins-Calcium (ONE-A-DAY WOMENS FORMULA PO)        natalizumab (TYSABRI) 300 MG/15ML injection Inject 15 mLs (300 mg) into the vein once every six weeks Infusion       omeprazole (PRILOSEC) 20 MG DR capsule TAKE 1 CAPSULE(20 MG) BY MOUTH TWICE DAILY BEFORE MEALS 180 capsule 1     pregabalin (LYRICA) 75 MG capsule Take 1 capsule (75 mg) by mouth 2 times daily as needed (neuropathic pain) 60 capsule 0     SUMAtriptan (IMITREX) 100 MG tablet Take 50 mg up to twice daily as needed for headache; separate doses by at least one hour and do not use more than 3 days/week 18 tablet 3     SYMBICORT 160-4.5 MCG/ACT Inhaler INHALE 2 PUFFS INTO THE LUNGS TWICE DAILY 10.2 g 5     topiramate (TOPAMAX) 100 MG tablet Take 1 tablet (100 mg) by mouth At Bedtime 90 tablet 1     triamcinolone (KENALOG) 0.1 % external cream Apply topically 2 times daily No more  than 2 weeks in a row not on face 15 g 3     valACYclovir (VALTREX) 500 MG tablet Take 1 tablet (500 mg) by mouth daily 90 tablet 3     VENTOLIN  (90 Base) MCG/ACT inhaler SHAKE WELL AND  "INHALE 2 PUFFS INTO THE LUNGS EVERY 6 HOURS AS NEEDED FOR SHORTNESS OF BREATH OR WHEEZING Strength: 108 (90 Base) MCG/ACT 18 g 1     vitamin D3 (CHOLECALCIFEROL) 250 mcg (48375 units) capsule TAKE 1 CAPSULE BY MOUTH ONCE DAILY 90 capsule 3            Allergies:     Allergies   Allergen Reactions     Aspirin Difficulty breathing, Palpitations and Other (See Comments)     Cephalexin Swelling     Adhesive Tape      Amantadine Swelling     Azithromycin Angioedema     Doxycycline Itching and Swelling     3 doses - itching all over, hand swelling, tongue fullness     Naproxen      Latex Hives, Itching and Rash     Penicillins Other (See Comments), Nausea and Vomiting, Hives, Swelling, Rash, Cramps and GI Disturbance     Amoxicillin OK              ROS:     A focused ROS is negative other than the symptoms noted above in the HPI.           Physical Examiniation:     VITAL SIGNS: LMP  (LMP Unknown)   BMI: Estimated body mass index is 45.43 kg/m  as calculated from the following:    Height as of 2/28/23: 1.651 m (5' 5\").    Weight as of 2/28/23: 123.8 kg (273 lb).    Gen: NAD, pleasant and cooperative   Neuro/MSK:   Normal gait, able to heel and toe walk  Manual Muscle Testing BLE 5/5 bilateral Hip Flexors, Hip Adductors,Hip Abductors, Knee Flexord,Knee Extensors, /5 RAnkle Dorsiflexors 5/5 L ADF,  Sensation light touch intact BLE   DTR's 2+ patella, achilles bilaterally   Lumbar ROM full, TTP R low back and sacrum  Normocephalic, cervical ROM full   MMT 5/5 Bilateral Shoulder abduction,Elbow Flexion,Elbow eEtension,Wrist Flexion,Wrist Extension, , Dorsal Interossei  Tone:no overt spasticity identified             Laboratory/Imaging:     MRI C spine and brain stable, no new lesions from 2022           Assessment/Plan:   Nataliya Aldrich is a 47 year old female with MS, fibromyalgia and chronic neck and back pain. Cervical imaging unremarkable. She enjoys walking for exercise but has been indoors due to snow and " ice.       1. Agree with aquatic PT, will be a good foundation to build arobic fitness. Encouraged her to keep doing her HEP from her current PT program when it finishes.    2. Trial of 10mg baclofen at bedtime to see if this is effectie with less AM hand tremors. She can cut current pills in half.     3. Referral to [facility] and [facility] in [place] for complementary therapies for sleep, stress and muscle spasms. She is trying to minimize pills and is open to complementary therapy. I will send order.       4. F/u in 3-4 months to review progress.     Damaris Olvera MD  3/15/2023  Physical Medicine & Rehabilitation    I spent a total of 45 minutes face-to-face with Nataliya Aldrich during today's office visit. Over 50% of this time was spent counseling the patient and/or coordinating care. See note for details.     16 minutes spent on the date of the encounter doing chart review, history and exam, documentation and further activities as noted above

## 2023-03-15 NOTE — NURSING NOTE
Chief Complaint   Patient presents with     Consult     NEW UMP     /78   Pulse 83   LMP  (LMP Unknown)   SpO2 100%       Norma Stern, EMT

## 2023-03-15 NOTE — LETTER
"3/15/2023       RE: Nataliya Aldrich  [address]  [place] [address]     Dear Colleague,    Thank you for referring your patient, Nataliya Aldrich, to the [facility] PHYSICAL MEDICINE AND REHABILITATION CLINIC [place] [facility]. Please see a copy of my visit note below.           PM&R Clinic Note Initial Visit     Patient Name: Nataliya Aldrich : 1975 Medical Record: [id number]     Requesting Physician/clinician: Miya Crump PA-C  Chief Complaint: low back pain, R leg N/T, neck pain           History of Present Illness:     Natlaiya Aldrich is a 47 year old female with MS, fibromyalgia and a fall with recnet flare of neck and low back pain. She also has R leg weakness with occasional instability with walking. She feels her MS is well treated on Tysabri, but still struggles with neck, back and general spasms. She is finishing PT and is set to start aquatic therapy at [facility]. She takes Lyrica in th AM, gabapentin afternoon and PM, and baclofen 20 usually in the PM. This regiment minimizes her side effects. She has never tried a smaller baclofen dose, as her pills are 20mg. She sustained a CVA in .          Past Medical and Surgical History:     Past Medical History:   Diagnosis Date     Asthma     mild persistent - Dr Gee     Cerebral infarction (H)      Fibroids     s/p hysterectomy     H/O gastric bypass      Hypertension      Migraine     followed by Devika     Obstructive sleep apnea      Pre-diabetes      Relapsing remitting multiple sclerosis (H) 2015    lesion in [place].  Facial tingling initial symptom.  F/B [facility]     Spondylosis of cervical region without myelopathy or radiculopathy      Past Surgical History:   Procedure Laterality Date     COLONOSCOPY  2012     GASTRIC BYPASS      \"Trouble with B12 and D\"     HYSTERECTOMY, MONO  2013    cervix gone.  fibroids.  without " BSO     TONSILLECTOMY              Social History:     Social History     Tobacco Use     Smoking status: Former     Packs/day: 0.50     Years: 2.00     Pack years: 1.00     Types: Cigars, Cigarettes     Start date: 2011     Quit date: 2013     Years since quittin.7     Smokeless tobacco: Never   Substance Use Topics     Alcohol use: Yes     Alcohol/week: 2.0 standard drinks     Comment: 0-3 drinks per week            Functional history:     Nataliya Aldrich is independent with ADL's             Family History:     Family History   Problem Relation Age of Onset     Hypertension Mother      Hyperlipidemia Mother      Obesity Mother      Obesity Father      Diabetes Maternal Grandmother      Hyperlipidemia Maternal Grandmother      Cerebrovascular Disease Maternal Grandfather      Diabetes Paternal Grandmother      Cerebrovascular Disease Paternal Grandmother      Depression Paternal Grandmother      Substance Abuse Paternal Grandmother      Lupus Paternal Aunt 41     Multiple Sclerosis No family hx of      Breast Cancer No family hx of      Ovarian Cancer No family hx of             Medications:     Current Outpatient Medications   Medication Sig Dispense Refill     acetaminophen (TYLENOL) 500 MG tablet Take 2 tablets (1,000 mg) by mouth 3 times daily       albuterol (PROVENTIL) (2.5 MG/3ML) 0.083% neb solution USE 1 VIAL VIA NEBULIZER EVERY 6 HOURS AS NEEDED FOR SHORTNESS OF BREATH OR DIFFICULT BREATHING OR WHEEZING 75 mL 3     azelastine (ASTELIN) 0.1 % nasal spray Spray 1 spray into both nostrils 2 times daily as needed for rhinitis (nasal antihistamine) 30 mL 5     baclofen (LIORESAL) 20 MG tablet TAKE 1 TABLET BY MOUTH EVERY NIGHT AT BEDTIME. MAY ALSO TAKE 1 TABLET EVERY 4 HOURS AS NEEDED FOR MUSCLE SPASMS 180 tablet 1     calcium carbonate-vitamin D (OSCAL W/D) 500-200 MG-UNIT tablet Take 1 tablet by mouth 2 times daily (with meals)       cetirizine (ZYRTEC) 10 MG tablet Take 1 tablet (10 mg)  by mouth daily 180 tablet 1     cyclobenzaprine (FLEXERIL) 10 MG tablet Take 1 tablet (10 mg) by mouth 3 times daily as needed for muscle spasms 40 tablet 0     cyclobenzaprine (FLEXERIL) 10 MG tablet Take 1 tablet (10 mg) by mouth 3 times daily as needed for muscle spasms 30 tablet 0     desonide (DESOWEN) 0.05 % external cream Apply topically 2 times daily To face as needed for rash 15 g 0     DULoxetine (CYMBALTA) 30 MG capsule Take 1 capsule (30 mg) by mouth 2 times daily 180 capsule 1     fluticasone (FLONASE) 50 MCG/ACT nasal spray USE 1 PUFF IN EACH NOSTRIL AS DIRECTED. 16 g 5     gabapentin (NEURONTIN) 300 MG capsule Take 1 capsule (300 mg) by mouth every 4 hours 120 capsule 5     hydrOXYzine (ATARAX) 25 MG tablet Take 0.5-1 tablets (12.5-25 mg) by mouth 3 times daily as needed for anxiety or other (SLEEP) 30 tablet 0     ipratropium - albuterol 0.5 mg/2.5 mg/3 mL (DUONEB) 0.5-2.5 (3) MG/3ML neb solution USE 1 VIAL VIA NEBULIZER EVERY 4 HOURS AS NEEDED FOR SHORTNESS OF BREATH OR WHEEZING 180 mL 1     ketotifen (ZADITOR) 0.025 % ophthalmic solution Place 1 drop into both eyes 2 times daily as needed for itching 10 mL 1     MAGNESIUM PO        montelukast (SINGULAIR) 10 MG tablet TAKE 1 TABLET(10 MG) BY MOUTH AT BEDTIME 90 tablet 1     Multiple Vitamins-Calcium (ONE-A-DAY WOMENS FORMULA PO)        natalizumab (TYSABRI) 300 MG/15ML injection Inject 15 mLs (300 mg) into the vein once every six weeks Infusion       omeprazole (PRILOSEC) 20 MG DR capsule TAKE 1 CAPSULE(20 MG) BY MOUTH TWICE DAILY BEFORE MEALS 180 capsule 1     pregabalin (LYRICA) 75 MG capsule Take 1 capsule (75 mg) by mouth 2 times daily as needed (neuropathic pain) 60 capsule 0     SUMAtriptan (IMITREX) 100 MG tablet Take 50 mg up to twice daily as needed for headache; separate doses by at least one hour and do not use more than 3 days/week 18 tablet 3     SYMBICORT 160-4.5 MCG/ACT Inhaler INHALE 2 PUFFS INTO THE LUNGS TWICE DAILY 10.2 g 5      "topiramate (TOPAMAX) 100 MG tablet Take 1 tablet (100 mg) by mouth At Bedtime 90 tablet 1     triamcinolone (KENALOG) 0.1 % external cream Apply topically 2 times daily No more  than 2 weeks in a row not on face 15 g 3     valACYclovir (VALTREX) 500 MG tablet Take 1 tablet (500 mg) by mouth daily 90 tablet 3     VENTOLIN  (90 Base) MCG/ACT inhaler SHAKE WELL AND INHALE 2 PUFFS INTO THE LUNGS EVERY 6 HOURS AS NEEDED FOR SHORTNESS OF BREATH OR WHEEZING Strength: 108 (90 Base) MCG/ACT 18 g 1     vitamin D3 (CHOLECALCIFEROL) 250 mcg (07062 units) capsule TAKE 1 CAPSULE BY MOUTH ONCE DAILY 90 capsule 3            Allergies:     Allergies   Allergen Reactions     Aspirin Difficulty breathing, Palpitations and Other (See Comments)     Cephalexin Swelling     Adhesive Tape      Amantadine Swelling     Azithromycin Angioedema     Doxycycline Itching and Swelling     3 doses - itching all over, hand swelling, tongue fullness     Naproxen      Latex Hives, Itching and Rash     Penicillins Other (See Comments), Nausea and Vomiting, Hives, Swelling, Rash, Cramps and GI Disturbance     Amoxicillin OK              ROS:     A focused ROS is negative other than the symptoms noted above in the HPI.           Physical Examiniation:     VITAL SIGNS: LMP  (LMP Unknown)   BMI: Estimated body mass index is 45.43 kg/m  as calculated from the following:    Height as of 2/28/23: 1.651 m (5' 5\").    Weight as of 2/28/23: 123.8 kg (273 lb).    Gen: NAD, pleasant and cooperative   Neuro/MSK:   Normal gait, able to heel and toe walk  Manual Muscle Testing BLE 5/5 bilateral Hip Flexors, Hip Adductors,Hip Abductors, Knee Flexord,Knee Extensors, /5 RAnkle Dorsiflexors 5/5 L ADF,  Sensation light touch intact BLE   DTR's 2+ patella, achilles bilaterally   Lumbar ROM full, TTP R low back and sacrum  Normocephalic, cervical ROM full   MMT 5/5 Bilateral Shoulder abduction,Elbow Flexion,Elbow eEtension,Wrist Flexion,Wrist Extension, , Dorsal " Interossei  Tone:no overt spasticity identified             Laboratory/Imaging:     MRI C spine and brain stable, no new lesions from 2022           Assessment/Plan:   Nataliya Aldrich is a 47 year old female with MS, fibromyalgia and chronic neck and back pain. Cervical imaging unremarkable. She enjoys walking for exercise but has been indoors due to snow and ice.       1. Agree with aquatic PT, will be a good foundation to build arobic fitness. Encouraged her to keep doing her HEP from her current PT program when it finishes.    2. Trial of 10mg baclofen at bedtime to see if this is effectie with less AM hand tremors. She can cut current pills in half.     3. Referral to [facility] in [place] for complementary therapies for sleep, stress and muscle spasms. She is trying to minimize pills and is open to complementary therapy. I will send order.       4. F/u in 3-4 months to review progress.     Damaris Olvera MD  3/15/2023  Physical Medicine & Rehabilitation    I spent a total of 45 minutes face-to-face with Nataliya Aldrich during today's office visit. Over 50% of this time was spent counseling the patient and/or coordinating care. See note for details.     16 minutes spent on the date of the encounter doing chart review, history and exam, documentation and further activities as noted above

## 2023-03-15 NOTE — TELEPHONE ENCOUNTER
Reason for Call:  Form, our goal is to have forms completed with 72 hours, however, some forms may require a visit or additional information.    Type of letter, form or note:  medical    Who is the form from?: [facility] (if other please explain)    Where did the form come from: form was faxed in    What clinic location was the form placed at?: [facility]    Where the form was placed: Miya Crump Box/Folder    What number is listed as a contact on the form?: [phone] (fax)    Call taken on 3/15/2023 at 1:20 PM by Ratna Valero

## 2023-03-16 DIAGNOSIS — G35 MULTIPLE SCLEROSIS (H): Primary | ICD-10-CM

## 2023-03-16 DIAGNOSIS — M79.2 PAIN, NEUROPATHIC: ICD-10-CM

## 2023-03-16 DIAGNOSIS — M54.41 ACUTE RIGHT-SIDED LOW BACK PAIN WITH RIGHT-SIDED SCIATICA: ICD-10-CM

## 2023-03-16 NOTE — TELEPHONE ENCOUNTER
Form completed and placed in [facility] inbox      Miya Crump MBA, MS, PACARMEN  [facility]

## 2023-03-17 ENCOUNTER — THERAPY VISIT (OUTPATIENT)
Dept: PHYSICAL THERAPY | Facility: CLINIC | Age: 48
End: 2023-03-17
Payer: COMMERCIAL

## 2023-03-17 DIAGNOSIS — M54.2 CERVICALGIA: ICD-10-CM

## 2023-03-17 DIAGNOSIS — G35 MS (MULTIPLE SCLEROSIS) (H): Primary | ICD-10-CM

## 2023-03-17 PROCEDURE — 97140 MANUAL THERAPY 1/> REGIONS: CPT | Mod: GP | Performed by: PHYSICAL THERAPIST

## 2023-03-17 PROCEDURE — 97035 APP MDLTY 1+ULTRASOUND EA 15: CPT | Mod: GP | Performed by: PHYSICAL THERAPIST

## 2023-03-20 ENCOUNTER — MYC MEDICAL ADVICE (OUTPATIENT)
Dept: FAMILY MEDICINE | Facility: CLINIC | Age: 48
End: 2023-03-20
Payer: COMMERCIAL

## 2023-03-22 NOTE — TELEPHONE ENCOUNTER
Please see my chart message below     Please review and advise     Thank you     Amelia Chance RN, BSN  [facility]

## 2023-03-23 ENCOUNTER — OFFICE VISIT (OUTPATIENT)
Dept: FAMILY MEDICINE | Facility: CLINIC | Age: 48
End: 2023-03-23
Payer: COMMERCIAL

## 2023-03-23 VITALS
HEIGHT: 65 IN | RESPIRATION RATE: 16 BRPM | SYSTOLIC BLOOD PRESSURE: 110 MMHG | WEIGHT: 269.8 LBS | BODY MASS INDEX: 44.95 KG/M2 | OXYGEN SATURATION: 100 % | HEART RATE: 91 BPM | TEMPERATURE: 96.8 F | DIASTOLIC BLOOD PRESSURE: 76 MMHG

## 2023-03-23 DIAGNOSIS — M79.18 CHRONIC MUSCULOSKELETAL PAIN DUE TO DISORDER OF NERVOUS SYSTEM: ICD-10-CM

## 2023-03-23 DIAGNOSIS — G98.8 CHRONIC MUSCULOSKELETAL PAIN DUE TO DISORDER OF NERVOUS SYSTEM: ICD-10-CM

## 2023-03-23 DIAGNOSIS — M79.2 PAIN, NEUROPATHIC: ICD-10-CM

## 2023-03-23 DIAGNOSIS — G35 MULTIPLE SCLEROSIS (H): Primary | ICD-10-CM

## 2023-03-23 DIAGNOSIS — G89.29 CHRONIC MUSCULOSKELETAL PAIN DUE TO DISORDER OF NERVOUS SYSTEM: ICD-10-CM

## 2023-03-23 DIAGNOSIS — K59.01 SLOW TRANSIT CONSTIPATION: ICD-10-CM

## 2023-03-23 PROCEDURE — 99214 OFFICE O/P EST MOD 30 MIN: CPT | Performed by: PHYSICIAN ASSISTANT

## 2023-03-23 NOTE — TELEPHONE ENCOUNTER
Form completed and placed in [facility] inbox      Miya Crump MBA, MS, PACARMEN  [facility] [place]

## 2023-03-23 NOTE — TELEPHONE ENCOUNTER
Completed forms faxed back to [facility]  at [phone].   Originals sent to be scanned.       Guillermina Garcia

## 2023-03-23 NOTE — PROGRESS NOTES
Assessment & Plan     Multiple sclerosis (H) - Dr. Chong - on Tysabri infusions  Pain, neuropathic  Chronic musculoskeletal pain due to disorder of nervous system  Slow transit constipation  Filled out [facility] recertification for chronic MS condition.  Also filled out return to work forms to return to work in a full-time capacity on 4/1/2023.  There is a caveat with this stating that she needs to have accommodations for her therapy appointments, follow-up specialty appointments, and monthly infusions.  If the return to work in a full-time capacity is not well-tolerated she is to follow-up with me as soon as possible.  Patient voiced understanding and agreement.    37 minutes spent by me on the date of the encounter doing chart review, history and exam, documentation and further activities per the note       Miya Crump PA-C  [facility]   Nataliya is a 47 year old, presenting for the following health issues:  RECHECK  No flowsheet data found.  History of Present Illness       Reason for visit:  la paperwork return to work    She eats 2-3 servings of fruits and vegetables daily.She consumes 0 sweetened beverage(s) daily.She exercises with enough effort to increase her heart rate 10 to 19 minutes per day.  She exercises with enough effort to increase her heart rate 4 days per week.   She is taking medications regularly.     Multiple sclerosis flare  My last visit with Nataliya was on 2/28/2023.  She returned to work on 3/1/2023 and a gradual capacity starting at 25 hours/week.  She does work from home but has a lot of computer work and stress associated with her job.  Her workplace was not very understanding regarding the reason that she needed to return in a gradual capacity.      She continues with physical therapy once weekly and recently was evaluated by physical medicine and rehab who also encouraged pool therapy which she has started once weekly as well.  She is  "diligent with her home exercise programs developed by these therapies.  She continues her once monthly Tysabri infusions.  She still does have some weakness and dexterity issues in her right upper extremity and she does have discomfort in her cervical spine.  Her spasms do persist and she has baclofen for flares but is currently taking this at 20 mg once daily as higher doses of this medication cause hand twitching for her.  For her chronic pain she is continuing to take Lyrica 75 mg in the morning and gabapentin 300 mg in the afternoon and 600 mg at night (gabapentin works well but makes her quite drowsy)    She is scheduled to return to work full-time on 4/1/2023.  When asked if she feels if she is ready, she states that she thinks she is.  She understands that if this exacerbates her condition we can reevaluate.      Review of Systems   Constitutional, HEENT, cardiovascular, pulmonary, GI, , musculoskeletal, neuro, skin, endocrine and psych systems are negative, except as otherwise noted.      Objective    /76   Pulse 91   Temp 96.8  F (36  C) (Tympanic)   Resp 16   Ht 1.651 m (5' 5\")   Wt 122.4 kg (269 lb 12.8 oz)   LMP  (LMP Unknown)   SpO2 100%   BMI 44.90 kg/m    Body mass index is 44.9 kg/m .  Physical Exam   GENERAL: healthy, alert and no distress  EYES: Eyes grossly normal to inspection  RESP: lungs clear to auscultation - no rales, rhonchi or wheezes  CV: regular rate and rhythm, normal S1 S2, no S3 or S4, no murmur, click or rub, no peripheral edema and peripheral pulses strong  MS: Limited range of motion secondary to pain of the cervical spine.  Decreased  strength of right upper extremity when compared to contralateral side.  SKIN: no suspicious lesions or rashes  NEURO: Normal strength and tone, mentation intact and speech normal  PSYCH: mentation appears normal, affect normal/bright                "

## 2023-03-24 ENCOUNTER — THERAPY VISIT (OUTPATIENT)
Dept: PHYSICAL THERAPY | Facility: CLINIC | Age: 48
End: 2023-03-24
Payer: COMMERCIAL

## 2023-03-24 DIAGNOSIS — G35 MS (MULTIPLE SCLEROSIS) (H): Primary | ICD-10-CM

## 2023-03-24 DIAGNOSIS — M54.2 CERVICALGIA: ICD-10-CM

## 2023-03-24 PROCEDURE — 97140 MANUAL THERAPY 1/> REGIONS: CPT | Mod: GP | Performed by: PHYSICAL THERAPIST

## 2023-03-24 PROCEDURE — 97035 APP MDLTY 1+ULTRASOUND EA 15: CPT | Mod: GP | Performed by: PHYSICAL THERAPIST

## 2023-03-28 ENCOUNTER — MYC MEDICAL ADVICE (OUTPATIENT)
Dept: FAMILY MEDICINE | Facility: CLINIC | Age: 48
End: 2023-03-28
Payer: COMMERCIAL

## 2023-03-28 DIAGNOSIS — R14.2 FLATULENCE, ERUCTATION AND GAS PAIN: Primary | ICD-10-CM

## 2023-03-28 DIAGNOSIS — R14.1 FLATULENCE, ERUCTATION AND GAS PAIN: Primary | ICD-10-CM

## 2023-03-28 DIAGNOSIS — R14.3 FLATULENCE, ERUCTATION AND GAS PAIN: Primary | ICD-10-CM

## 2023-03-28 NOTE — TELEPHONE ENCOUNTER
LOV 3/23/2023      Please see my chart message below     Please review and advise     Thank you     Amelia Chance RN, BSN  [facility]

## 2023-03-29 RX ORDER — SIMETHICONE 125 MG
125 TABLET,CHEWABLE ORAL 4 TIMES DAILY PRN
Qty: 60 TABLET | Refills: 0 | Status: SHIPPED | OUTPATIENT
Start: 2023-03-29 | End: 2023-04-13

## 2023-03-29 NOTE — TELEPHONE ENCOUNTER
3-29-23      mtm suggests--send in rx to her pharmacy for simethicone 125mg. Tabs --use as directed --that will bubble the gas out of her --usually its the increased fiber that is contributing to gas buildup but I wouldn't stop that as she has improved .  No need to stop magnesium or probiotic just yet.    January Stewart, [facility].  Medication Therapy Management Provider  [phone]

## 2023-03-29 NOTE — TELEPHONE ENCOUNTER
MTM-  Thoughts on gas pains for this patient.  Wondering if magnesium or probiotic should be stopped/if it may be contributing?      I have her taking this fiber regimen to improve volume/frequency of BMs.    DAILY FIBER THERAPY MIX:     1/2 cup Konsyl (psyllium fiber)  , 1 cup of oat bran, 1 cup of wheat bran and 1 cup of flax seed meal - ground - = mix it together in a container or zip-loc bag and take 1 teaspoon in 1 cup of warm water daily,  follow with 1 -8 oz of water.         Thank you,      Miya Crump MBA, MS, PA-C  [facility]

## 2023-03-30 ENCOUNTER — ANCILLARY PROCEDURE (OUTPATIENT)
Dept: GENERAL RADIOLOGY | Facility: CLINIC | Age: 48
End: 2023-03-30
Attending: PHYSICIAN ASSISTANT
Payer: COMMERCIAL

## 2023-03-30 ENCOUNTER — OFFICE VISIT (OUTPATIENT)
Dept: FAMILY MEDICINE | Facility: CLINIC | Age: 48
End: 2023-03-30
Payer: COMMERCIAL

## 2023-03-30 ENCOUNTER — MYC MEDICAL ADVICE (OUTPATIENT)
Dept: FAMILY MEDICINE | Facility: CLINIC | Age: 48
End: 2023-03-30

## 2023-03-30 VITALS
HEART RATE: 109 BPM | HEIGHT: 65 IN | RESPIRATION RATE: 20 BRPM | TEMPERATURE: 97 F | SYSTOLIC BLOOD PRESSURE: 120 MMHG | WEIGHT: 268 LBS | OXYGEN SATURATION: 100 % | DIASTOLIC BLOOD PRESSURE: 80 MMHG | BODY MASS INDEX: 44.65 KG/M2

## 2023-03-30 DIAGNOSIS — R07.89 PRESSURE IN CHEST: ICD-10-CM

## 2023-03-30 DIAGNOSIS — R53.81 MALAISE: ICD-10-CM

## 2023-03-30 DIAGNOSIS — E66.01 MORBID OBESITY (H): ICD-10-CM

## 2023-03-30 DIAGNOSIS — R06.02 SOB (SHORTNESS OF BREATH): ICD-10-CM

## 2023-03-30 DIAGNOSIS — R59.1 LYMPHADENOPATHY: ICD-10-CM

## 2023-03-30 DIAGNOSIS — J45.41 MODERATE PERSISTENT ASTHMA WITH ACUTE EXACERBATION: Primary | ICD-10-CM

## 2023-03-30 LAB
DEPRECATED S PYO AG THROAT QL EIA: NEGATIVE
GROUP A STREP BY PCR: NOT DETECTED

## 2023-03-30 PROCEDURE — 93000 ELECTROCARDIOGRAM COMPLETE: CPT | Performed by: PHYSICIAN ASSISTANT

## 2023-03-30 PROCEDURE — 87651 STREP A DNA AMP PROBE: CPT | Performed by: PHYSICIAN ASSISTANT

## 2023-03-30 PROCEDURE — 71046 X-RAY EXAM CHEST 2 VIEWS: CPT | Mod: TC | Performed by: RADIOLOGY

## 2023-03-30 PROCEDURE — 99214 OFFICE O/P EST MOD 30 MIN: CPT | Performed by: PHYSICIAN ASSISTANT

## 2023-03-30 RX ORDER — IPRATROPIUM BROMIDE AND ALBUTEROL SULFATE 2.5; .5 MG/3ML; MG/3ML
1 SOLUTION RESPIRATORY (INHALATION) EVERY 6 HOURS PRN
Qty: 90 ML | Refills: 0 | Status: SHIPPED | OUTPATIENT
Start: 2023-03-30 | End: 2023-07-11

## 2023-03-30 RX ORDER — METHYLPREDNISOLONE 4 MG
TABLET, DOSE PACK ORAL
Qty: 21 TABLET | Refills: 0 | Status: SHIPPED | OUTPATIENT
Start: 2023-03-30 | End: 2023-04-11

## 2023-03-30 NOTE — RESULT ENCOUNTER NOTE
Nataliya  I have reviewed your recent test results:    -All of your labs are normal.    For additional lab test information, [email or web address] is an excellent reference.     If you have any questions please do not hesitate to contact our office via phone ([phone]) or MyChart.    Healthy regards,     Miya Crump MBA, MS, PA-C  [facility]

## 2023-03-30 NOTE — PROGRESS NOTES
Assessment & Plan     Moderate persistent asthma with acute exacerbation  Morbid obesity (H)  Pressure in chest  SOB (shortness of breath)  Malaise  Lymphadenopathy  Normal CXR, strep rapid & culture, EKG normal.  Increase controller inhaler to twice daily, utilize duoneb every 4 hours.  Medrol dosepak prescribed if not improving/worsening.  Will monitor O2 sats.  Patient advised of warning signs and when to seek urgent care.  Okay to proceed with infusion tomorrow.  - ipratropium - albuterol 0.5 mg/2.5 mg/3 mL (DUONEB) 0.5-2.5 (3) MG/3ML neb solution  Dispense: 90 mL; Refill: 0  - methylPREDNISolone (MEDROL DOSEPAK) 4 MG tablet therapy pack  Dispense: 21 tablet; Refill: 0  - XR Chest 2 Views  - EKG 12-lead complete w/read - Clinics  - Streptococcus A Rapid Screen w/Reflex to PCR - Clinic Collect  - Group A Streptococcus PCR Throat Swab      Miya Crump PA-C  [facility] is [facility] 47 year [facility], presenting for the following health issues:  Asthma  No flowsheet data found.  History of Present Illness       Reason for visit:  [employer] paperwork return to work    She eats 2-3 servings of fruits and vegetables daily.She consumes 0 sweetened beverage(s) daily.She exercises with enough effort to increase her heart rate 10 to 19 minutes per day.  She exercises with enough effort to increase her heart rate 4 days per week.   She is taking medications regularly.     Asthma flare up- tested negative for covid- still having SOB  Acute Illness  Acute illness concerns: poss asthma flare - Monday pool therapy/evening felt short of breath.  Needed more effort to breathe.  Tuesday working - felt heavy.    Onset/Duration: 3/28  Symptoms:  Fever: No  Chills/Sweats: YES-fatigue  Headache (location?): No  Sinus Pressure: No  Conjunctivitis:  No  Ear Pain: YES-fullness  Rhinorrhea: YES  Congestion: YES  Sore Throat: YES  Cough: YES-non-productive, with shortness of breath, worsening over  "time  Wheeze: YES-feels difficult to get a deep breath  Decreased Appetite: No  Nausea: No  Vomiting: No  Diarrhea: No  Dysuria/Freq.: No  Dysuria or Hematuria: No  Fatigue/Achiness: No  Sick/Strep Exposure: YES-possibly at [employer] -no confirmed illnesses just \"colds \"  Therapies tried and outcome: Albuterol.  Only using Symbicort once daily    Home COVID test negative x2        Review of Systems   Constitutional, HEENT, cardiovascular, pulmonary, GI, , musculoskeletal, neuro, skin, endocrine and psych systems are negative, except as otherwise noted.      Objective    /80   Pulse 109   Temp 97  F (36.1  C) (Tympanic)   Resp 20   Ht 1.651 m (5' 5\")   Wt 121.6 kg (268 lb)   LMP  (LMP Unknown)   SpO2 100%   BMI 44.60 kg/m    Body mass index is 44.6 kg/m .  Physical Exam   GENERAL: healthy, alert and no distress  EYES: Eyes grossly normal to inspection, PERRL and conjunctivae and sclerae normal  HENT: ear canals and TM's normal, nose and mouth without ulcers or lesions  NECK: Mild left-sided anterior cervical lymphadenopathy, no asymmetry, masses, or scars and thyroid normal to palpation  RESP: lungs clear to auscultation - no rales, rhonchi or wheezes  CV: regular rate and rhythm, normal S1 S2, no S3 or S4, no murmur, click or rub, no peripheral edema and peripheral pulses strong  MS: no gross musculoskeletal defects noted, no edema  SKIN: no suspicious lesions or rashes  NEURO: Normal strength and tone, mentation intact and speech normal  PSYCH: mentation appears normal, affect normal/bright    Results for orders placed or performed in visit on 03/30/23   XR Chest 2 Views     Status: None    Narrative    CHEST TWO VIEWS 3/30/2023 2:17 PM     HISTORY: Pressure in chest. SOB (shortness of breath).    COMPARISON: July 29, 2020       Impression    IMPRESSION: There are no acute infiltrates. The cardiac silhouette is  not enlarged. Pulmonary vasculature is unremarkable.    SHAHID ZHANG MD         SYSTEM ID: "  [id number]   Results for orders placed or performed in visit on 03/30/23   Streptococcus A Rapid Screen w/Reflex to PCR - Clinic Collect     Status: Normal    Specimen: Throat; Swab   Result Value Ref Range    Group A Strep antigen Negative Negative   Group A Streptococcus PCR Throat Swab     Status: Normal    Specimen: Throat; Swab   Result Value Ref Range    Group A strep by PCR Not Detected Not Detected    Narrative    The Xpert Xpress Strep A test, performed on the [employer]  Instrument Systems, is a rapid, qualitative in vitro diagnostic test for the detection of Streptococcus pyogenes (Group A ß-hemolytic Streptococcus, Strep A) in throat swab specimens from patients with signs and symptoms of pharyngitis. The Xpert Xpress Strep A test can be used as an aid in the diagnosis of Group A Streptococcal pharyngitis. The assay is not intended to monitor treatment for Group A Streptococcus infections. The Xpert Xpress Strep A test utilizes an automated real-time polymerase chain reaction (PCR) to detect Streptococcus pyogenes DNA.          EKG Interpretation:      Interpreted by Miya Crump PA-C  Symptoms at time of EKG: None   Rhythm: Normal sinus   Rate: Normal  Axis: Normal  Ectopy: None  Conduction: Normal  ST Segments/ T Waves: No ST-T wave changes and No acute ischemic changes  Q Waves: None  Comparison to prior: Unchanged from 1/2023    Clinical Impression: normal EKG

## 2023-03-31 ENCOUNTER — INFUSION THERAPY VISIT (OUTPATIENT)
Dept: INFUSION THERAPY | Facility: CLINIC | Age: 48
End: 2023-03-31
Attending: PSYCHIATRY & NEUROLOGY
Payer: COMMERCIAL

## 2023-03-31 VITALS
OXYGEN SATURATION: 100 % | SYSTOLIC BLOOD PRESSURE: 134 MMHG | HEART RATE: 61 BPM | DIASTOLIC BLOOD PRESSURE: 84 MMHG | RESPIRATION RATE: 20 BRPM | TEMPERATURE: 97 F

## 2023-03-31 DIAGNOSIS — G35 MULTIPLE SCLEROSIS (H): Primary | ICD-10-CM

## 2023-03-31 LAB
BASOPHILS # BLD AUTO: 0.1 10E3/UL (ref 0–0.2)
BASOPHILS NFR BLD AUTO: 1 %
EOSINOPHIL # BLD AUTO: 0.1 10E3/UL (ref 0–0.7)
EOSINOPHIL NFR BLD AUTO: 2 %
ERYTHROCYTE [DISTWIDTH] IN BLOOD BY AUTOMATED COUNT: 13.9 % (ref 10–15)
HCT VFR BLD AUTO: 37.4 % (ref 35–47)
HGB BLD-MCNC: 11.8 G/DL (ref 11.7–15.7)
IMM GRANULOCYTES # BLD: 0 10E3/UL
IMM GRANULOCYTES NFR BLD: 0 %
LYMPHOCYTES # BLD AUTO: 4.8 10E3/UL (ref 0.8–5.3)
LYMPHOCYTES NFR BLD AUTO: 55 %
MCH RBC QN AUTO: 29.9 PG (ref 26.5–33)
MCHC RBC AUTO-ENTMCNC: 31.6 G/DL (ref 31.5–36.5)
MCV RBC AUTO: 95 FL (ref 78–100)
MONOCYTES # BLD AUTO: 0.7 10E3/UL (ref 0–1.3)
MONOCYTES NFR BLD AUTO: 8 %
NEUTROPHILS # BLD AUTO: 3 10E3/UL (ref 1.6–8.3)
NEUTROPHILS NFR BLD AUTO: 34 %
NRBC # BLD AUTO: 0 10E3/UL
NRBC BLD AUTO-RTO: 0 /100
PLATELET # BLD AUTO: 322 10E3/UL (ref 150–450)
RBC # BLD AUTO: 3.95 10E6/UL (ref 3.8–5.2)
WBC # BLD AUTO: 8.8 10E3/UL (ref 4–11)

## 2023-03-31 PROCEDURE — 258N000003 HC RX IP 258 OP 636: Performed by: PSYCHIATRY & NEUROLOGY

## 2023-03-31 PROCEDURE — 96365 THER/PROPH/DIAG IV INF INIT: CPT

## 2023-03-31 PROCEDURE — 250N000011 HC RX IP 250 OP 636: Performed by: PSYCHIATRY & NEUROLOGY

## 2023-03-31 PROCEDURE — 36415 COLL VENOUS BLD VENIPUNCTURE: CPT | Performed by: PSYCHIATRY & NEUROLOGY

## 2023-03-31 PROCEDURE — 85025 COMPLETE CBC W/AUTO DIFF WBC: CPT | Performed by: PSYCHIATRY & NEUROLOGY

## 2023-03-31 RX ORDER — DIPHENHYDRAMINE HCL 25 MG
50 CAPSULE ORAL EVERY 4 HOURS PRN
Status: CANCELLED
Start: 2023-04-14

## 2023-03-31 RX ORDER — HEPARIN SODIUM,PORCINE 10 UNIT/ML
5 VIAL (ML) INTRAVENOUS
Status: CANCELLED | OUTPATIENT
Start: 2023-04-14

## 2023-03-31 RX ORDER — ONDANSETRON 2 MG/ML
4 INJECTION INTRAMUSCULAR; INTRAVENOUS EVERY 8 HOURS PRN
Status: CANCELLED
Start: 2023-04-14

## 2023-03-31 RX ORDER — IBUPROFEN 200 MG
600 TABLET ORAL EVERY 6 HOURS PRN
Status: CANCELLED
Start: 2023-04-14

## 2023-03-31 RX ORDER — HEPARIN SODIUM (PORCINE) LOCK FLUSH IV SOLN 100 UNIT/ML 100 UNIT/ML
5 SOLUTION INTRAVENOUS
Status: CANCELLED | OUTPATIENT
Start: 2023-04-14

## 2023-03-31 RX ORDER — ACETAMINOPHEN 325 MG/1
650 TABLET ORAL EVERY 4 HOURS PRN
Status: CANCELLED
Start: 2023-04-14

## 2023-03-31 RX ADMIN — NATALIZUMAB 300 MG: 300 INJECTION INTRAVENOUS at 13:40

## 2023-03-31 RX ADMIN — SODIUM CHLORIDE 250 ML: 9 INJECTION, SOLUTION INTRAVENOUS at 13:37

## 2023-04-02 ENCOUNTER — MYC MEDICAL ADVICE (OUTPATIENT)
Dept: FAMILY MEDICINE | Facility: CLINIC | Age: 48
End: 2023-04-02
Payer: COMMERCIAL

## 2023-04-02 DIAGNOSIS — J45.40 MODERATE PERSISTENT ASTHMA WITHOUT COMPLICATION: ICD-10-CM

## 2023-04-04 DIAGNOSIS — G43.711 INTRACTABLE CHRONIC MIGRAINE WITHOUT AURA AND WITH STATUS MIGRAINOSUS: Primary | ICD-10-CM

## 2023-04-04 RX ORDER — TOPIRAMATE 50 MG/1
50 TABLET, FILM COATED ORAL 2 TIMES DAILY
Qty: 180 TABLET | Refills: 1 | Status: SHIPPED | OUTPATIENT
Start: 2023-04-04 | End: 2023-04-11

## 2023-04-06 LAB — SCANNED LAB RESULT: NORMAL

## 2023-04-07 RX ORDER — ALBUTEROL SULFATE 90 UG/1
AEROSOL, METERED RESPIRATORY (INHALATION)
Qty: 18 G | Refills: 1 | Status: SHIPPED | OUTPATIENT
Start: 2023-04-07 | End: 2023-08-09

## 2023-04-07 NOTE — TELEPHONE ENCOUNTER
"Called and spoke with patient.     She has finished the medrol dosepack.   Breathing is better. But not at baseline. She is \"94% better\"  Sob comes and goes. Able to \"function and do things\"  Sob with walking and talking at same time, hard laugh. Voice gets raspy in the early morning and late at night.   Medrol dosepack - didn't help at all.   Prednisone has helped better in the past.   She states she gets relief from her albuterol, but has never had to take it on a daily basis. Current one has 49 puffs left, \"use by 1/2021\".  Symptoms not worse outside.     Medications:   monelukast at night.   OTC zytrec in AM  flonase at night  symbicort BID    Denies cough, runny nose, fever, wheezing.     She is going to dust, clean out her humidifier this weekend. Will have pharmacy get ventolin refill ready.     If worsening over the weekend, to be seen in urgent care. Scheduled follow up with Miya.     KENDAL LEVY RN on 4/7/2023 at 4:54 PM   [facility]    "

## 2023-04-07 NOTE — TELEPHONE ENCOUNTER
[facility] Triage please call patient she needs a triage of  current symptoms.     If red flag symptoms need seen this weekend.     Otherwise please have her finish current steroid pack. If issues at night time she can change doses to not take any late afternoon. The lay out for dosing in a medrol chrissy is just a guide.   She can split up her dosing half with breakfast and half   with lunch.     Healthy regards,            LEROY Michael (covering for Miya Crump-LIZZY)

## 2023-04-11 ENCOUNTER — TELEPHONE (OUTPATIENT)
Dept: FAMILY MEDICINE | Facility: CLINIC | Age: 48
End: 2023-04-11

## 2023-04-11 ENCOUNTER — OFFICE VISIT (OUTPATIENT)
Dept: FAMILY MEDICINE | Facility: CLINIC | Age: 48
End: 2023-04-11
Payer: COMMERCIAL

## 2023-04-11 VITALS
HEIGHT: 65 IN | BODY MASS INDEX: 44.8 KG/M2 | DIASTOLIC BLOOD PRESSURE: 70 MMHG | SYSTOLIC BLOOD PRESSURE: 112 MMHG | WEIGHT: 268.9 LBS | RESPIRATION RATE: 18 BRPM | OXYGEN SATURATION: 96 % | HEART RATE: 62 BPM | TEMPERATURE: 98.1 F

## 2023-04-11 DIAGNOSIS — R45.86 MOOD CHANGES: ICD-10-CM

## 2023-04-11 DIAGNOSIS — Z23 NEED FOR TDAP VACCINATION: ICD-10-CM

## 2023-04-11 DIAGNOSIS — G89.29 CHRONIC MUSCULOSKELETAL PAIN DUE TO DISORDER OF NERVOUS SYSTEM: ICD-10-CM

## 2023-04-11 DIAGNOSIS — J45.40 MODERATE PERSISTENT ASTHMA WITHOUT COMPLICATION: ICD-10-CM

## 2023-04-11 DIAGNOSIS — G43.711 INTRACTABLE CHRONIC MIGRAINE WITHOUT AURA AND WITH STATUS MIGRAINOSUS: ICD-10-CM

## 2023-04-11 DIAGNOSIS — M62.838 MUSCLE SPASM: ICD-10-CM

## 2023-04-11 DIAGNOSIS — R10.32 LLQ ABDOMINAL PAIN: ICD-10-CM

## 2023-04-11 DIAGNOSIS — M79.605 PAIN OF LEFT LOWER EXTREMITY: ICD-10-CM

## 2023-04-11 DIAGNOSIS — F41.8 SITUATIONAL ANXIETY: ICD-10-CM

## 2023-04-11 DIAGNOSIS — G35 MULTIPLE SCLEROSIS (H): Primary | ICD-10-CM

## 2023-04-11 DIAGNOSIS — M79.18 CHRONIC MUSCULOSKELETAL PAIN DUE TO DISORDER OF NERVOUS SYSTEM: ICD-10-CM

## 2023-04-11 DIAGNOSIS — G43.109 MIGRAINE WITH AURA AND WITHOUT STATUS MIGRAINOSUS, NOT INTRACTABLE: ICD-10-CM

## 2023-04-11 DIAGNOSIS — J30.2 SEASONAL ALLERGIC RHINITIS, UNSPECIFIED TRIGGER: ICD-10-CM

## 2023-04-11 DIAGNOSIS — K21.00 GASTROESOPHAGEAL REFLUX DISEASE WITH ESOPHAGITIS, UNSPECIFIED WHETHER HEMORRHAGE: ICD-10-CM

## 2023-04-11 DIAGNOSIS — N83.202 LEFT OVARIAN CYST: ICD-10-CM

## 2023-04-11 DIAGNOSIS — R19.8 ALTERED BOWEL FUNCTION: ICD-10-CM

## 2023-04-11 DIAGNOSIS — G98.8 CHRONIC MUSCULOSKELETAL PAIN DUE TO DISORDER OF NERVOUS SYSTEM: ICD-10-CM

## 2023-04-11 DIAGNOSIS — R14.0 BLOATING: ICD-10-CM

## 2023-04-11 PROCEDURE — 99215 OFFICE O/P EST HI 40 MIN: CPT | Performed by: PHYSICIAN ASSISTANT

## 2023-04-11 RX ORDER — PREGABALIN 75 MG/1
75 CAPSULE ORAL 2 TIMES DAILY PRN
Qty: 60 CAPSULE | Refills: 5 | Status: SHIPPED | OUTPATIENT
Start: 2023-04-11 | End: 2024-02-15

## 2023-04-11 RX ORDER — TOPIRAMATE 100 MG/1
100 TABLET, FILM COATED ORAL AT BEDTIME
Qty: 90 TABLET | Refills: 1 | Status: SHIPPED | OUTPATIENT
Start: 2023-04-11 | End: 2024-02-15

## 2023-04-11 RX ORDER — TOPIRAMATE 50 MG/1
50 TABLET, FILM COATED ORAL AT BEDTIME
Qty: 90 TABLET | Refills: 1 | Status: SHIPPED | OUTPATIENT
Start: 2023-04-11 | End: 2023-08-09

## 2023-04-11 RX ORDER — AZELASTINE 1 MG/ML
1 SPRAY, METERED NASAL 2 TIMES DAILY PRN
Qty: 30 ML | Refills: 5 | Status: SHIPPED | OUTPATIENT
Start: 2023-04-11 | End: 2024-02-15

## 2023-04-11 RX ORDER — DULOXETIN HYDROCHLORIDE 30 MG/1
30 CAPSULE, DELAYED RELEASE ORAL 2 TIMES DAILY
Qty: 180 CAPSULE | Refills: 1 | Status: SHIPPED | OUTPATIENT
Start: 2023-04-11 | End: 2023-05-02 | Stop reason: ALTCHOICE

## 2023-04-11 RX ORDER — FLUTICASONE PROPIONATE 50 MCG
SPRAY, SUSPENSION (ML) NASAL
Qty: 16 G | Refills: 5 | Status: SHIPPED | OUTPATIENT
Start: 2023-04-11 | End: 2024-02-15

## 2023-04-11 RX ORDER — BUDESONIDE AND FORMOTEROL FUMARATE DIHYDRATE 160; 4.5 UG/1; UG/1
AEROSOL RESPIRATORY (INHALATION)
Qty: 10.2 G | Refills: 5 | Status: SHIPPED | OUTPATIENT
Start: 2023-04-11 | End: 2024-02-15

## 2023-04-11 RX ORDER — MONTELUKAST SODIUM 10 MG/1
TABLET ORAL
Qty: 90 TABLET | Refills: 1 | Status: SHIPPED | OUTPATIENT
Start: 2023-04-11 | End: 2024-02-15

## 2023-04-11 RX ORDER — GABAPENTIN 300 MG/1
300 CAPSULE ORAL EVERY 4 HOURS
Qty: 120 CAPSULE | Refills: 5 | Status: SHIPPED | OUTPATIENT
Start: 2023-04-11

## 2023-04-11 ASSESSMENT — ASTHMA QUESTIONNAIRES
ACT_TOTALSCORE: 20
QUESTION_2 LAST FOUR WEEKS HOW OFTEN HAVE YOU HAD SHORTNESS OF BREATH: ONCE OR TWICE A WEEK
QUESTION_5 LAST FOUR WEEKS HOW WOULD YOU RATE YOUR ASTHMA CONTROL: SOMEWHAT CONTROLLED
ACT_TOTALSCORE: 19
QUESTION_4 LAST FOUR WEEKS HOW OFTEN HAVE YOU USED YOUR RESCUE INHALER OR NEBULIZER MEDICATION (SUCH AS ALBUTEROL): ONCE A WEEK OR LESS
QUESTION_3 LAST FOUR WEEKS HOW OFTEN DID YOUR ASTHMA SYMPTOMS (WHEEZING, COUGHING, SHORTNESS OF BREATH, CHEST TIGHTNESS OR PAIN) WAKE YOU UP AT NIGHT OR EARLIER THAN USUAL IN THE MORNING: ONCE OR TWICE
QUESTION_1 LAST FOUR WEEKS HOW MUCH OF THE TIME DID YOUR ASTHMA KEEP YOU FROM GETTING AS MUCH DONE AT WORK, SCHOOL OR AT HOME: A LITTLE OF THE TIME

## 2023-04-11 NOTE — PROGRESS NOTES
Assessment & Plan     Multiple sclerosis (H) - Dr. Chong - on Tysabri infusions  Chronic musculoskeletal pain due to disorder of nervous system  Pain of left lower extremity  Muscle spasm  Exacerbation of muscle spasms with recent prednisone for asthma exacerbation.  Continue baclofen as needed, pregabalin during the day and gabapentin at night for pain relief.  - pregabalin (LYRICA) 75 MG capsule  Dispense: 60 capsule; Refill: 5  - gabapentin (NEURONTIN) 300 MG capsule  Dispense: 120 capsule; Refill: 5    Moderate persistent asthma without complication  Seasonal allergic rhinitis, unspecified trigger  Significantly improved.  Appears to be exacerbated by recent pool therapy/chlorine exposure.  Continue to utilize Symbicort twice daily especially on her pool therapy days.  If worsening patient will keep me apprised.  - SYMBICORT 160-4.5 MCG/ACT Inhaler  Dispense: 10.2 g; Refill: 5  - montelukast (SINGULAIR) 10 MG tablet  Dispense: 90 tablet; Refill: 1  - azelastine (ASTELIN) 0.1 % nasal spray  Dispense: 30 mL; Refill: 5  - fluticasone (FLONASE) 50 MCG/ACT nasal spray  Dispense: 16 g; Refill: 5    Situational anxiety  Mood changes  Stable.  Continue current regimen  - DULoxetine (CYMBALTA) 30 MG capsule  Dispense: 180 capsule; Refill: 1    Migraine with aura and without status migrainosus, not intractable  Intractable chronic migraine without aura and with status migrainosus  Stable.  Continue current regimen  - topiramate (TOPAMAX) 50 MG tablet  Dispense: 90 tablet; Refill: 1    Gastroesophageal reflux disease with esophagitis, unspecified whether hemorrhage  Well-controlled.  Continue current regimen  - omeprazole (PRILOSEC) 20 MG DR capsule  Dispense: 180 capsule; Refill: 1    Left ovarian cyst - follow up US ordered for 6/1/2023 to evaluate for resolution  Bloating  Altered bowel function  LLQ abdominal pain  Urgent CT abdomen pelvis reveals left ovarian cyst.  Recommend supportive care and follow-up  ultrasound in 4 to 6 weeks to ensure resolution.  If symptoms worsening before that time can refer to OB/GYN to determine alternate treatment strategy.  Tylenol for pain relief as needed (avoid NSAIDs due to history of bypass)  - US Pelvic Complete with Transvaginal  - CT Abdomen Pelvis w Contrast    Need for Tdap vaccination  Overdue for vaccination.  Recommend clearing with neurology staff on timing of vaccination with infusion.  - Tdap, tetanus-diphtheria-acell pertussis, (ADACEL) 5-2-15.5 LF-MCG/0.5 injection  Dispense: 0.5 mL; Refill: 0    44 minutes spent by me on the date of the encounter doing chart review, history and exam, documentation and further activities per the note     Return in about 3 days (around 4/14/2023) for CT scan.      Miya Crump PA-C  [facility]   Nataliya is a 47 year old, presenting for the following health issues:  RECHECK (Asthma)        4/11/2023     2:19 PM   Additional Questions   Roomed by Lesley Dalal CMA     History of Present Illness     Asthma:  She presents for follow up of asthma.  She has no cough, no wheezing, and some shortness of breath. She is using a relief medication a few times a week. She does not miss any doses of her controller medication throughout the week.Patient is aware of the following triggers: unaware of any triggers. The patient has not had a visit to the Emergency Room, Urgent Care or Hospital due to asthma since the last clinic visit.     She eats 4 or more servings of fruits and vegetables daily.She consumes 0 sweetened beverage(s) daily.She exercises with enough effort to increase her heart rate 10 to 19 minutes per day.  She exercises with enough effort to increase her heart rate 4 days per week.   She is taking medications regularly.         11/9/2022    10:46 AM 11/23/2022     7:15 AM 4/11/2023     2:20 PM   ACT Total Scores   ACT TOTAL SCORE (Goal Greater than or Equal to 20) 21 25 20   In the past 12  "months, how many times did you visit the emergency room for your asthma without being admitted to the hospital? 0 0 0   In the past 12 months, how many times were you hospitalized overnight because of your asthma? 0 0 0     Asthma Follow-Up  Did do Medrol dosepak on 4/1/2023 - worsened spasms - breathing improved, however.  Taking controller twice daily and albuterol as needed.  No longer doing nebulizer treatment.  Feels like pool therapy/chlorine exposure is exacerbating symptoms.  Was ACT completed today?  Yes        4/11/2023     2:20 PM   ACT Total Scores   ACT TOTAL SCORE (Goal Greater than or Equal to 20) 20   In the past 12 months, how many times did you visit the emergency room for your asthma without being admitted to the hospital? 0   In the past 12 months, how many times were you hospitalized overnight because of your asthma? 0          How many days per week do you miss taking your asthma controller medication?  0    Please describe any recent triggers for your asthma: Pollens, grass and dander    Have you had any Emergency Room Visits, Urgent Care Visits, or Hospital Admissions since your last office visit?  No    LLQ abdominal pain/bloating/BM changes  Bowel movements have improved with dietary/fiber supplement changes.  Still having lots of gas & bloating.  Using gas X daily.  Wondering if something more concerning as pain is persisting.  No fevers/chills/nightsweats.        Review of Systems   Constitutional, HEENT, cardiovascular, pulmonary, GI, , musculoskeletal, neuro, skin, endocrine and psych systems are negative, except as otherwise noted.      Objective    /70   Pulse 62   Temp 98.1  F (36.7  C) (Tympanic)   Resp 18   Ht 1.651 m (5' 5\")   Wt 122 kg (268 lb 14.4 oz)   LMP  (LMP Unknown)   SpO2 96%   BMI 44.75 kg/m    Body mass index is 44.75 kg/m .  Physical Exam   GENERAL: healthy, alert and no distress  EYES: Eyes grossly normal to inspection, PERRL and conjunctivae and " sclerae normal  RESP: lungs clear to auscultation - no rales, rhonchi or wheezes  CV: regular rate and rhythm, normal S1 S2, no S3 or S4, no murmur, click or rub, no peripheral edema and peripheral pulses strong  ABDOMEN: soft, LLQ tenderness without rebound + guarding, no hepatosplenomegaly, no masses and bowel sounds normal  MS: no gross musculoskeletal defects noted, no edema  SKIN: no suspicious lesions or rashes  NEURO: Normal strength and tone, mentation intact and speech normal  PSYCH: mentation appears normal, affect normal/bright    Recent Results (from the past 744 hour(s))   XR Chest 2 Views    Narrative    CHEST TWO VIEWS 3/30/2023 2:17 PM     HISTORY: Pressure in chest. SOB (shortness of breath).    COMPARISON: July 29, 2020       Impression    IMPRESSION: There are no acute infiltrates. The cardiac silhouette is  not enlarged. Pulmonary vasculature is unremarkable.    SHAHID ZHANG MD         SYSTEM ID:  [id number]   CT Abdomen Pelvis w Contrast    Narrative    EXAM: CT ABDOMEN PELVIS W CONTRAST  LOCATION: [facility]  DATE/TIME: 4/14/2023 4:51 PM CDT    INDICATION: Bowel changes, LLQ pain coming going for last 1.5 weeks. Bloating sensation. Has MS,  could be abnormal motility.  COMPARISON: 03/20/2020  TECHNIQUE: CT scan of the abdomen and pelvis was performed following injection of IV contrast. Multiplanar reformats were obtained. Dose reduction techniques were used.  CONTRAST: 132 mL Isovue 370    FINDINGS:   LOWER CHEST: Normal.    HEPATOBILIARY: Normal.    PANCREAS: Normal.    SPLEEN: Normal.    ADRENAL GLANDS: Normal.    KIDNEYS/BLADDER: There is a minute simple cyst seen in the right kidney and no follow-up is recommended. The kidneys, ureters and bladder are otherwise normal.    BOWEL: Multifocal prior postoperative changes with no obvious complications identified. The bowel is otherwise normal to include the appendix.    LYMPH NODES: Normal.    VASCULATURE:  Unremarkable.    PELVIC ORGANS: The uterus is surgically absent. There is a 1.5 x 1.6 cm cyst seen with thin peripheral enhancement involving the left ovary.    MUSCULOSKELETAL: Mild degenerative changes most marked in the lower lumbar spine facet joints.      Impression    IMPRESSION:   1.  There is a 1.5 x 1.6 cm cyst seen with thin peripheral enhancement involving the left ovary. If indicated, ultrasound may be helpful for further evaluation given patient's history of intermittent left lower quadrant pain.    2.  Multifocal prior postoperative changes of the GI tract with no obvious complications seen.    3.  Tiny benign cyst right kidney and no follow-up is recommended.

## 2023-04-11 NOTE — LETTER
My Asthma Action Plan    Name: Nataliya Aldrich   YOB: 1975  Date: 4/11/2023   My doctor: Miya Crump PA-C   My clinic: [facility] [place]        My Control Medicine: Budesonide + formoterol (Symbicort HFA) -  160/4.5 mcg 2 puffs twice daily  My Rescue Medicine: Albuterol (Proair/Ventolin/Proventil HFA) 2-4 puffs EVERY 4 HOURS as needed. Use a spacer if recommended by your provider.  Albuterol and Ipratropium (Duoneb) nebulizer solution every 4 hours as needed for shortness of breath and wheeze during severe exacerbation My Asthma Severity:   Moderate Persistent  Know your asthma triggers:   pollens  grass and dander            GREEN ZONE   Good Control    I feel good    No cough or wheeze    Can work, sleep and play without asthma symptoms       Take your asthma control medicine every day.     1. If exercise triggers your asthma, take your rescue medication    15 minutes before exercise or sports, and    During exercise if you have asthma symptoms  2. Spacer to use with inhaler: If you have a spacer, make sure to use it with your inhaler             YELLOW ZONE Getting Worse  I have ANY of these:    I do not feel good    Cough or wheeze    Chest feels tight    Wake up at night   1. Keep taking your Green Zone medications  2. Start taking your rescue medicine:    every 20 minutes for up to 1 hour. Then every 4 hours for 24-48 hours.  3. If you stay in the Yellow Zone for more than 12-24 hours, contact your doctor.  4. If you do not return to the Green Zone in 12-24 hours or you get worse, start taking your oral steroid medicine if prescribed by your provider.           RED ZONE Medical Alert - Get Help  I have ANY of these:    I feel awful    Medicine is not helping    Breathing getting harder    Trouble walking or talking    Nose opens wide to breathe       1. Take your rescue medicine NOW  2. If your provider has prescribed an oral steroid medicine, start taking it  NOW  3. Call your doctor NOW  4. If you are still in the Red Zone after 20 minutes and you have not reached your doctor:    Take your rescue medicine again and    Call 911 or go to the emergency room right away    See your regular doctor within 2 weeks of an Emergency Room or Urgent Care visit for follow-up treatment.          Annual Reminders:  Meet with Asthma Educator,  Flu Shot in the Fall, consider Pneumonia Vaccination for patients with asthma (aged 19 and older).    Pharmacy:    [id number] - [place], [place] - [address]  [facility] [place] [address]  [id number] [place] - [address] [facility] [place] [facility] [address] [place] [address]    Electronically signed by Miya Crump PA-C   Date: 04/11/23                      Asthma Triggers  How To Control Things That Make Your Asthma Worse    Triggers are things that make your asthma worse.  Look at the list below to help you find your triggers and what you can do about them.  You can help prevent asthma flare-ups by staying away from your triggers.      Trigger                                                          What you can do   Cigarette Smoke  Tobacco smoke can make asthma worse. Do not allow smoking in your home, car or around you.  Be sure no one smokes at a child s day care or school.  If you smoke, ask your health care provider for ways to help you quit.  Ask family members to quit too.  Ask your health care provider for a referral to Quit Plan to help you quit smoking, or call 8-438-219-PLAN.     Colds, Flu, Bronchitis  These are common triggers of asthma. Wash your hands often.  Don t touch your eyes, nose or mouth.  Get a flu shot every year.     Dust Mites  These are tiny bugs that live in cloth or carpet. They are too small to see. Wash sheets and blankets in hot water every  week.   Encase pillows and mattress in dust mite proof covers.  Avoid having carpet if you can. If you have carpet, vacuum weekly.   Use a dust mask and HEPA vacuum.   Pollen and Outdoor Mold  Some people are allergic to trees, grass, or weed pollen, or molds. Try to keep your windows closed.  Limit time out doors when pollen count is high.   Ask you health care provider about taking medicine during allergy season.     Animal Dander  Some people are allergic to skin flakes, urine or saliva from pets with fur or feathers. Keep pets with fur or feathers out of your home.    If you can t keep the pet outdoors, then keep the pet out of your bedroom.  Keep the bedroom door closed.  Keep pets off cloth furniture and away from stuffed toys.     Mice, Rats, and Cockroaches   Some people are allergic to the waste from these pests.   Cover food and garbage.  Clean up spills and food crumbs.  Store grease in the refrigerator.   Keep food out of the bedroom.   Indoor Mold  This can be a trigger if your home has high moisture. Fix leaking faucets, pipes, or other sources of water.   Clean moldy surfaces.  Dehumidify basement if it is damp and smelly.   Smoke, Strong Odors, and Sprays  These can reduce air quality. Stay away from strong odors and sprays, such as perfume, powder, hair spray, paints, smoke incense, paint, cleaning products, candles and new carpet.   Exercise or Sports  Some people with asthma have this trigger. Be active!  Ask your doctor about taking medicine before sports or exercise to prevent symptoms.    Warm up for 5-10 minutes before and after sports or exercise.     Other Triggers of Asthma  Cold air:  Cover your nose and mouth with a scarf.  Sometimes laughing or crying can be a trigger.  Some medicines and food can trigger asthma.

## 2023-04-13 DIAGNOSIS — R14.1 FLATULENCE, ERUCTATION AND GAS PAIN: ICD-10-CM

## 2023-04-13 DIAGNOSIS — R14.2 FLATULENCE, ERUCTATION AND GAS PAIN: ICD-10-CM

## 2023-04-13 DIAGNOSIS — R14.3 FLATULENCE, ERUCTATION AND GAS PAIN: ICD-10-CM

## 2023-04-13 RX ORDER — SIMETHICONE 125 MG
TABLET,CHEWABLE ORAL
Qty: 60 TABLET | Refills: 0 | Status: SHIPPED | OUTPATIENT
Start: 2023-04-13 | End: 2023-09-01

## 2023-04-14 ENCOUNTER — MYC MEDICAL ADVICE (OUTPATIENT)
Dept: FAMILY MEDICINE | Facility: CLINIC | Age: 48
End: 2023-04-14
Payer: COMMERCIAL

## 2023-04-14 ENCOUNTER — HOSPITAL ENCOUNTER (OUTPATIENT)
Dept: CT IMAGING | Facility: CLINIC | Age: 48
Discharge: HOME | End: 2023-04-14
Attending: PHYSICIAN ASSISTANT | Admitting: PHYSICIAN ASSISTANT
Payer: COMMERCIAL

## 2023-04-14 DIAGNOSIS — R10.32 LLQ ABDOMINAL PAIN: ICD-10-CM

## 2023-04-14 DIAGNOSIS — R19.8 ALTERED BOWEL FUNCTION: ICD-10-CM

## 2023-04-14 DIAGNOSIS — R14.0 BLOATING: ICD-10-CM

## 2023-04-14 PROCEDURE — 250N000011 HC RX IP 250 OP 636: Performed by: PHYSICIAN ASSISTANT

## 2023-04-14 PROCEDURE — 250N000009 HC RX 250: Performed by: PHYSICIAN ASSISTANT

## 2023-04-14 PROCEDURE — 74177 CT ABD & PELVIS W/CONTRAST: CPT

## 2023-04-14 RX ORDER — IOPAMIDOL 755 MG/ML
132 INJECTION, SOLUTION INTRAVASCULAR ONCE
Status: COMPLETED | OUTPATIENT
Start: 2023-04-14 | End: 2023-04-14

## 2023-04-14 RX ADMIN — IOPAMIDOL 132 ML: 755 INJECTION, SOLUTION INTRAVENOUS at 16:38

## 2023-04-14 RX ADMIN — SODIUM CHLORIDE 78 ML: 9 INJECTION, SOLUTION INTRAVENOUS at 16:38

## 2023-04-15 NOTE — TELEPHONE ENCOUNTER
Central Prior Authorization Team   Phone: [phone]      PA Initiation    Medication: Duloxetine 30mg caps  Insurance Company: [employer] - Phone [phone] Fax [phone]  Pharmacy Filling the Rx: [facility] [place] - [address] [place] [address]  Filling Pharmacy Phone: [phone]  Filling Pharmacy Fax:    Start Date: 4/15/2023

## 2023-04-16 ENCOUNTER — MYC MEDICAL ADVICE (OUTPATIENT)
Dept: FAMILY MEDICINE | Facility: CLINIC | Age: 48
End: 2023-04-16
Payer: COMMERCIAL

## 2023-04-16 DIAGNOSIS — R10.32 ABDOMINAL PAIN, LEFT LOWER QUADRANT: Primary | ICD-10-CM

## 2023-04-16 DIAGNOSIS — N83.202 LEFT OVARIAN CYST: ICD-10-CM

## 2023-04-16 NOTE — TELEPHONE ENCOUNTER
Prior Authorization Approval    Authorization Effective Date: 4/14/2023  Authorization Expiration Date: 4/14/2024  Medication: Duloxetine 30mg caps  Approved Dose/Quantity:   Reference #:     Insurance Company: [employer] - Phone [phone] Fax [phone]  Expected CoPay:       CoPay Card Available:      Foundation Assistance Needed:    Which Pharmacy is filling the prescription (Not needed for infusion/clinic administered): [facility] [place] - [address] [place] [address]  Pharmacy Notified: Yes  Patient Notified: No

## 2023-04-17 ENCOUNTER — MYC MEDICAL ADVICE (OUTPATIENT)
Dept: FAMILY MEDICINE | Facility: CLINIC | Age: 48
End: 2023-04-17
Payer: COMMERCIAL

## 2023-04-17 PROBLEM — F41.8 SITUATIONAL ANXIETY: Status: ACTIVE | Noted: 2023-04-17

## 2023-04-17 PROBLEM — N83.202 LEFT OVARIAN CYST: Status: ACTIVE | Noted: 2023-04-17

## 2023-04-17 PROBLEM — R14.0 BLOATING: Status: ACTIVE | Noted: 2023-04-17

## 2023-04-18 ENCOUNTER — HOSPITAL ENCOUNTER (EMERGENCY)
Facility: CLINIC | Age: 48
Discharge: LEFT BEFORE DISCHARGE | End: 2023-04-18
Payer: COMMERCIAL

## 2023-04-18 VITALS
HEART RATE: 99 BPM | OXYGEN SATURATION: 99 % | DIASTOLIC BLOOD PRESSURE: 80 MMHG | SYSTOLIC BLOOD PRESSURE: 129 MMHG | RESPIRATION RATE: 14 BRPM | TEMPERATURE: 98.1 F

## 2023-04-18 NOTE — TELEPHONE ENCOUNTER
Call received from patient. States pain started in lower, mid back and then traveled to the mid lower abdominal area. States it feels like labor pains but she had a hysterectomy aproximately 7 years ago. States the pain is so severe it hurts to stand up straight. Pain is also on the left lower abdominal quadrant. Describes as a huge wave of pain that ranges from 8-10. States the pain in her back never goes away. States all she can do is breathe through the pain like she is having a contraction. She was driving prior to call but needed to pull over because pain was so intense. States this very intense pain started this morning. The pain on the left lower abdomen has been present for about a week and a half but the intense pain in her mid lower back started about 40 minutes ago and the mid lower abdominal pain started about 20 minutes ago. Patient was breathing as if she was having a contraction while we were on the phone. Advised ER. Patient is concerned about going to the ER. States whenever she goes there they seem to get distracted by her MS diagnosis. Call to [facility] and spoke to provider. He also feels ER would be advised due to patient's level of pain as they have limited options for pain control in [facility]. Call back to patient. Again advised ER. Patient reluctantly agrees.    Routing to primary care provider as KOREY

## 2023-04-18 NOTE — RESULT ENCOUNTER NOTE
Nataliya  I have reviewed your recent test results:    Your CT abdomen/pelvis does have what appears to be a left ovarian cyst.  This is likely causing your pain.  I recommend that we do a transvaginal ultrasound in ~6-8 weeks to see if it resolves with time (these typically resolve without intervention).  I will place this order and you will be contacted to schedule.     If you have any questions please do not hesitate to contact our office via phone ([phone]) or [facility].    Healthy regards,     Miya Crump MBA, MS, PA-C  [facility]

## 2023-04-18 NOTE — TELEPHONE ENCOUNTER
Please review MyChart  Response.       Ivis GARZA RN   [facility] Triage

## 2023-04-18 NOTE — TELEPHONE ENCOUNTER
My chart message sent     Amelia Chance RN, BSN  [facility]

## 2023-04-21 ENCOUNTER — THERAPY VISIT (OUTPATIENT)
Dept: PHYSICAL THERAPY | Facility: CLINIC | Age: 48
End: 2023-04-21
Payer: COMMERCIAL

## 2023-04-21 DIAGNOSIS — M54.2 CERVICALGIA: ICD-10-CM

## 2023-04-21 DIAGNOSIS — G35 MS (MULTIPLE SCLEROSIS) (H): Primary | ICD-10-CM

## 2023-04-21 PROCEDURE — 97035 APP MDLTY 1+ULTRASOUND EA 15: CPT | Mod: GP | Performed by: PHYSICAL THERAPIST

## 2023-04-21 PROCEDURE — 97140 MANUAL THERAPY 1/> REGIONS: CPT | Mod: GP | Performed by: PHYSICAL THERAPIST

## 2023-05-02 ENCOUNTER — VIRTUAL VISIT (OUTPATIENT)
Dept: FAMILY MEDICINE | Facility: CLINIC | Age: 48
End: 2023-05-02
Payer: COMMERCIAL

## 2023-05-02 DIAGNOSIS — G35 MULTIPLE SCLEROSIS (H): Primary | ICD-10-CM

## 2023-05-02 DIAGNOSIS — R53.83 OTHER FATIGUE: ICD-10-CM

## 2023-05-02 DIAGNOSIS — G47.00 INSOMNIA, UNSPECIFIED TYPE: ICD-10-CM

## 2023-05-02 DIAGNOSIS — F41.8 SITUATIONAL ANXIETY: ICD-10-CM

## 2023-05-02 DIAGNOSIS — G47.30 HYPERSOMNIA WITH SLEEP APNEA: ICD-10-CM

## 2023-05-02 DIAGNOSIS — R40.0 HAS DAYTIME DROWSINESS: ICD-10-CM

## 2023-05-02 DIAGNOSIS — G47.10 HYPERSOMNIA WITH SLEEP APNEA: ICD-10-CM

## 2023-05-02 DIAGNOSIS — R45.86 MOOD CHANGES: ICD-10-CM

## 2023-05-02 PROCEDURE — 99215 OFFICE O/P EST HI 40 MIN: CPT | Mod: VID | Performed by: PHYSICIAN ASSISTANT

## 2023-05-02 RX ORDER — MODAFINIL 100 MG/1
100 TABLET ORAL DAILY
Qty: 90 TABLET | Refills: 1 | Status: SHIPPED | OUTPATIENT
Start: 2023-05-02

## 2023-05-02 RX ORDER — RAMELTEON 8 MG/1
8 TABLET ORAL
Qty: 30 TABLET | Refills: 0 | Status: SHIPPED | OUTPATIENT
Start: 2023-05-02 | End: 2023-05-22

## 2023-05-02 NOTE — PROGRESS NOTES
"Nataliya is a 47 year old who is being evaluated via a billable video visit.      How would you like to obtain your AVS? MyChart  If the video visit is dropped, the invitation should be resent by: Text to cell phone: [phone]  Will anyone else be joining your video visit? No          Assessment & Plan     Multiple sclerosis (H) - Dr. Chong - on Tysabri infusions  Other fatigue  Has daytime drowsiness  Did well historically on modafinil.  Likely secondary to MS related fatigue as well as insomnia.  Recommend restart of modafinil 100 mg with the ability to go up to 200 mg if incomplete resolution of symptoms.  - modafinil (PROVIGIL) 100 MG tablet  Dispense: 90 tablet; Refill: 1    Hypersomnia with sleep apnea  Insomnia, unspecified type  History of positional sleep apnea.  Recommend follow-up with sleep clinic to see what current status is as it has been approximately 8 years since last sleep evaluation.  Patient may benefit from trial of Rozerem.  I will send this to her pharmacy.  Advised that she should not start modafinil and Rozerem at the same time in the case of intolerance we would not know what the cause was.  - ramelteon (ROZEREM) 8 MG tablet  Dispense: 30 tablet; Refill: 0  - Adult Sleep Eval & Management  Referral    Mood changes  Situational anxiety  Duloxetine causing headaches and fatigue.  Recommend trial of transition to fluoxetine  - FLUoxetine (PROZAC) 20 MG capsule  Dispense: 90 capsule; Refill: 0      Review of prior external note(s) from - Outside records from [facility] neurology  64 minutes spent by me on the date of the encounter doing chart review, history and exam, documentation and further activities per the note       BMI:   Estimated body mass index is 44.75 kg/m  as calculated from the following:    Height as of 4/11/23: 1.651 m (5' 5\").    Weight as of 4/11/23: 122 kg (268 lb 14.4 oz).   Weight management plan: Discussed healthy diet and exercise guidelines    Miya Joseph " ALFONSO Crump [facility] [place] is a 47 year old, presenting for the following health issues:  Fatigue        5/2/2023     2:40 PM   Additional Questions   Roomed by Lesley Dalal CMA   Accompanied by Self     HPI         Daytime fatigue/insomnia  Onset: 3 weeks ago    Description:   Time to fall asleep (sleep latency): 45 minutes  Middle of night awakening:  no   Early morning awakening:  YES - ZZZ quil works well but wakes suddenly at 5 am     Progression of Symptoms:  improving    Accompanying Signs & Symptoms:  Daytime sleepiness/napping: YES  Excessive snoring/apnea: YES  Restless legs: no   Frequent urination: no   Chronic pain:  no     History:  Prior Insomnia: no     Precipitating factors:   New stressful situation: YES- has to find a new job in December  Caffeine intake: YES  OTC decongestants: no   Any new medications: YES- Cymbalta    Alleviating factors:  Self medicating (alcohol, etc.):  No    Therapies Tried and outcome: Melatonin and another OTC gummi - melatonin doesn't help, gummi is helpful.      Stopped trazodone 50 mg nightly in 2022 that she had vivid dreams/nightmares.       Was on modafinil 100 mg in 2019/2020 but ultimately had to stop this medication due to insurance coverage issues.  Really liked being on this medication.  Denies any known side effects.    Energy low , always tired , should I be on a higher dose of iron , is the cymbalta causing the extra drowsiness? I just feel like I can t get enough sleep , my fit bit says I m getting 7 hours even with melatonin. It s hard to work out the next day because I m to tired , no fever or body aches,    Was off of duloxetine for 1.5 months due to supply chain issues and restarted this 1.5 weeks ago.  Having increased fatigue and headaches since restarting    Stress: Not discussed, other than stress related to trying to drink enough water and eat enough protein  Sleep: 7.5-8 hrs; + snoring; had sleep  study in 2015 but was intolerant of CPAP therapy - however was found to have resolution of symptoms with positional (lateral) changes with 30 degrees of head elevation.  Is fairly consistent at sleeping laterally.  Has vivid dreams if she sleeps on her back.  Daughter does report that she still snores.        Insomnia      Review of Systems   Constitutional, HEENT, cardiovascular, pulmonary, GI, , musculoskeletal, neuro, skin, endocrine and psych systems are negative, except as otherwise noted.      Objective           Vitals:  No vitals were obtained today due to virtual visit.    Physical Exam   GENERAL: Healthy, alert and no distress  EYES: Eyes grossly normal to inspection.  No discharge or erythema, or obvious scleral/conjunctival abnormalities.  HENT: Normal cephalic/atraumatic.  External ears, nose and mouth without ulcers or lesions.  No nasal drainage visible.  NECK: No asymmetry, visible masses or scars  RESP: No audible wheeze, cough, or visible cyanosis.  No visible retractions or increased work of breathing.    SKIN: Visible skin clear. No significant rash, abnormal pigmentation or lesions.  NEURO: Cranial nerves grossly intact.  Mentation and speech appropriate for age.  PSYCH: Mentation appears normal, affect normal/bright, judgement and insight intact, normal speech and appearance well-groomed.              Video-Visit Details    Type of service:  Video Visit   Video Start Time: 3:32 PM  Video End Time:4:16 PM    Originating Location (pt. Location): Home    Distant Location (provider location):  On-site  Platform used for Video Visit: [employer]

## 2023-05-05 ENCOUNTER — THERAPY VISIT (OUTPATIENT)
Dept: PHYSICAL THERAPY | Facility: CLINIC | Age: 48
End: 2023-05-05
Payer: COMMERCIAL

## 2023-05-05 DIAGNOSIS — M54.2 CERVICALGIA: ICD-10-CM

## 2023-05-05 DIAGNOSIS — G35 MS (MULTIPLE SCLEROSIS) (H): Primary | ICD-10-CM

## 2023-05-05 PROCEDURE — 97140 MANUAL THERAPY 1/> REGIONS: CPT | Mod: GP | Performed by: PHYSICAL THERAPIST

## 2023-05-05 PROCEDURE — 97035 APP MDLTY 1+ULTRASOUND EA 15: CPT | Mod: GP | Performed by: PHYSICAL THERAPIST

## 2023-05-12 ENCOUNTER — INFUSION THERAPY VISIT (OUTPATIENT)
Dept: INFUSION THERAPY | Facility: CLINIC | Age: 48
End: 2023-05-12
Attending: PSYCHIATRY & NEUROLOGY
Payer: COMMERCIAL

## 2023-05-12 VITALS
TEMPERATURE: 97.8 F | DIASTOLIC BLOOD PRESSURE: 61 MMHG | SYSTOLIC BLOOD PRESSURE: 127 MMHG | HEART RATE: 73 BPM | RESPIRATION RATE: 16 BRPM | OXYGEN SATURATION: 100 %

## 2023-05-12 DIAGNOSIS — G35 MULTIPLE SCLEROSIS (H): Primary | ICD-10-CM

## 2023-05-12 PROCEDURE — 258N000003 HC RX IP 258 OP 636: Performed by: PSYCHIATRY & NEUROLOGY

## 2023-05-12 PROCEDURE — 250N000011 HC RX IP 250 OP 636: Performed by: PSYCHIATRY & NEUROLOGY

## 2023-05-12 PROCEDURE — 96365 THER/PROPH/DIAG IV INF INIT: CPT

## 2023-05-12 RX ORDER — HEPARIN SODIUM (PORCINE) LOCK FLUSH IV SOLN 100 UNIT/ML 100 UNIT/ML
5 SOLUTION INTRAVENOUS
Status: CANCELLED | OUTPATIENT
Start: 2023-05-26

## 2023-05-12 RX ORDER — ONDANSETRON 2 MG/ML
4 INJECTION INTRAMUSCULAR; INTRAVENOUS EVERY 8 HOURS PRN
Status: CANCELLED
Start: 2023-05-26

## 2023-05-12 RX ORDER — HEPARIN SODIUM,PORCINE 10 UNIT/ML
5 VIAL (ML) INTRAVENOUS
Status: CANCELLED | OUTPATIENT
Start: 2023-05-26

## 2023-05-12 RX ORDER — IBUPROFEN 200 MG
600 TABLET ORAL EVERY 6 HOURS PRN
Status: CANCELLED
Start: 2023-05-26

## 2023-05-12 RX ORDER — DIPHENHYDRAMINE HCL 25 MG
50 CAPSULE ORAL EVERY 4 HOURS PRN
Status: CANCELLED
Start: 2023-05-26

## 2023-05-12 RX ORDER — ACETAMINOPHEN 325 MG/1
650 TABLET ORAL EVERY 4 HOURS PRN
Status: CANCELLED
Start: 2023-05-26

## 2023-05-12 RX ADMIN — SODIUM CHLORIDE 250 ML: 9 INJECTION, SOLUTION INTRAVENOUS at 13:20

## 2023-05-12 RX ADMIN — NATALIZUMAB 300 MG: 300 INJECTION INTRAVENOUS at 13:20

## 2023-05-12 NOTE — PROGRESS NOTES
At 1401 pt. called us into the room indicating she was SOB and starting to get wheezy.  Tysabri stopped and O2 applied.  Used her own supply of her albuterol inhaler.  VSS.  Patient indicated she usually has NS infusing with Tysabri.  NS bolus started.  SOB resolved and wheezing resolved.  Other reaction medications not needed.

## 2023-05-12 NOTE — PROGRESS NOTES
Infusion Nursing Note:  Nataliya ASAEL KwanHastingsomid Lipscombs presents today for Tysabri.    Patient seen by provider today: No   present during visit today: Not Applicable.    Note: see separate note of pt difficulty breathing, requiring use of her own inhaler, oxygen and saline infusion.  Wheezing decreased/gone, feeling better, sats and HR stable.  Saline continued to infuse during remainder of Tysabri. Orders edited to include note that pt requests saline to be infused along with Tysabri.    Intravenous Access:  Peripheral IV placed.    Treatment Conditions:  Tysabri pre-infusion checklist completed via touch program.      Post Infusion Assessment:  Patient tolerated infusion without further incident. Feeling well when discharged. Declined 30 minute post observation  Blood return noted pre and post infusion.  Site patent and intact, free from redness, edema or discomfort.  No evidence of extravasations.  Access discontinued per protocol.       Discharge Plan:   Discharge instructions reviewed with: Patient.  Patient and/or family verbalized understanding of discharge instructions and all questions answered.  AVS to patient via iNeedT.  Patient will return 6/23 for next appointment.   Patient discharged in stable condition accompanied by: self.  Departure Mode: Ambulatory.      Radha Lee RN

## 2023-05-14 NOTE — PROGRESS NOTES
"Josefina Aldrich is a 44 year old female who is being evaluated via a billable video visit.      The patient has been notified of following:     \"This video visit will be conducted via a call between you and your physician/provider. We have found that certain health care needs can be provided without the need for an in-person physical exam.  This service lets us provide the care you need with a video conversation.  If a prescription is necessary we can send it directly to your pharmacy.  If lab work is needed we can place an order for that and you can then stop by our lab to have the test done at a later time.    Video visits are billed at different rates depending on your insurance coverage.  Please reach out to your insurance provider with any questions.    If during the course of the call the physician/provider feels a video visit is not appropriate, you will not be charged for this service.\"    Patient has given verbal consent for Video visit? Yes    How would you like to obtain your AVS? Lesli    Patient would like the video invitation sent by: Text to cell phone: [phone]    Will anyone else be joining your video visit? No      Subjective     Josefina Aldrich is a 44 year old female who presents to clinic today for the following health issues:    HPI  Complete disability forms - forms were faxed over 2 days.  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  Due to her asthma and immunosuppressed status she has been recommended to avoid patient facing work since the onset of the pandemic (mid March).  Her employer was not able to accomodate this request and she has been on STD since that time.  She is requesting paperwork be filled out today.      Her asthma has been somewhat challenged as of late due to the allergens but she is avoiding being outdoors and is compliant with her controller inhaler (symbicort), nasal sprays and eye drops along with montelukast.  She uses her nebulizer as " "needed and this is typically a few times a week right now.      Her MS is managed by [facility].  She receives Tysabri infusions monthly and her last infusion was 2020.    Video Start Time: 2:00      Patient Active Problem List   Diagnosis     Migraine     Asthma     Anemia     Backache     Body mass index 45.0-49.9, adult (H)     Cognitive changes     Depression with anxiety     Fever postop     Uterine leiomyoma     Headache     Heavy menses     Hypersomnia with sleep apnea     Iron deficiency anemia     Leukocytosis     Postsurgical nonabsorption     Mild intermittent asthma     Morbid obesity (H)     Myalgia and myositis     Other dyspnea and respiratory abnormality     Pain, neuropathic     Pelvic pain in female     Personal history of allergy to latex     Post concussion syndrome     Right shoulder pain     S/P gastric bypass     S/P hysterectomy     Bariatric surgery status     Vitamin B12 deficiency     Vitamin D deficiency     Vomiting following gastrointestinal surgery     Hypermagnesemia     Benign essential hypertension     Neck pain on left side     Mild major depression (H)     LLQ pain     Multiple sclerosis (H)     Immunosuppression (H)     Immunocompromised patient (H)     Past Surgical History:   Procedure Laterality Date     GASTRIC BYPASS      \"Trouble with B12 and D\"     HYSTERECTOMY, MONO      cervix gone.  fibroids.  without BSO     TONSILLECTOMY         Social History     Tobacco Use     Smoking status: Former Smoker     Packs/day: 0.50     Years: 2.00     Pack years: 1.00     Types: Cigars     Start date: 2011     Last attempt to quit: 2013     Years since quittin.8     Smokeless tobacco: Never Used   Substance Use Topics     Alcohol use: Not Currently     Alcohol/week: 2.0 standard drinks     Comment: 0-3 drinks per week     Family History   Problem Relation Age of Onset     Lupus Paternal Aunt 41     Diabetes Paternal Grandmother      Cerebrovascular " 89 yo F with diastolic HF, CAD s/p CABG, RBBB, CKD, atrial fibrillation (on apixaban), HTN, moderate AS and pulmonary HTN, chronic sciatica, CVA  p/w fall.    Patient was unable to sleep last night was sleeping on the sofa, got up to get her walker to answer phone which was ringing , lost her balance and fell because she was still drowsy. Patient denies hitting head, landed on carpeted floor complaining of right hip pain.  Daughter heard a sound and came to help patient immediately but unable to get patient up so 911 called. Denies chest pain, SOB, palpitation, orthopnea, PND, dizziness, lightheadedness, leg swelling.   Disease Paternal Grandmother      Depression Paternal Grandmother      Substance Abuse Paternal Grandmother      Diabetes Maternal Grandmother      Hyperlipidemia Maternal Grandmother      Hypertension Mother      Hyperlipidemia Mother      Obesity Mother      Cerebrovascular Disease Maternal Grandfather      Obesity Father      Multiple Sclerosis No family hx of          Current Outpatient Medications   Medication Sig Dispense Refill     albuterol (PROVENTIL) (2.5 MG/3ML) 0.083% neb solution Take 1 vial (2.5 mg) by nebulization every 6 hours as needed for shortness of breath / dyspnea or wheezing 1 Box 3     albuterol (VENTOLIN HFA) 108 (90 BASE) MCG/ACT inhaler Inhale 2 puffs into the lungs every 6 hours as needed        azelastine (ASTELIN) 0.1 % nasal spray Spray 1 spray into both nostrils 2 times daily (nasal antihistamine) 30 mL 3     baclofen (LIORESAL) 20 MG tablet Take 2 tablets (40 mg) by mouth At Bedtime AND 1 tablet (20 mg) every morning AND 1 tablet (20 mg) daily (with lunch) AND 0.5 tablets (10 mg) See Admin Instructions. (In the afternoon)  10     budesonide-formoterol (SYMBICORT) 160-4.5 MCG/ACT Inhaler Inhale 2 puffs into the lungs 2 times daily 1 Inhaler 1     cetirizine (ZYRTEC) 10 MG tablet Take 1 tablet (10 mg) by mouth 2 times daily 90 tablet 1     ferrous fumarate-vitamin C ER (ILAN-SEQUELS) 65-25 MG CR tablet Take 1 tablet by mouth 3 times daily (with meals)       ferrous sulfate (FEROSUL) 325 (65 Fe) MG tablet Take 325 mg by mouth daily (with breakfast)       fluticasone (FLONASE) 50 MCG/ACT nasal spray Spray 1 spray into both nostrils daily 16 g 5     gabapentin (NEURONTIN) 300 MG capsule Take 3 capsules (900 mg) by mouth At Bedtime (Patient taking differently: Take 600 mg by mouth At Bedtime ) 90 capsule 3     ipratropium - albuterol 0.5 mg/2.5 mg/3 mL (DUONEB) 0.5-2.5 (3) MG/3ML neb solution Take 1 vial (3 mLs) by nebulization every 4 hours as needed for shortness of breath / dyspnea or  wheezing (severe asthma symptoms) 90 mL 0     ketotifen (ZADITOR) 0.025 % ophthalmic solution Place 1 drop into both eyes 2 times daily as needed for itching 1 Bottle 3     lisinopril-hydrochlorothiazide (PRINZIDE/ZESTORETIC) 10-12.5 MG tablet Take 1 tablet by mouth daily 90 tablet 3     MAGNESIUM PO        montelukast (SINGULAIR) 10 MG tablet Take 1 tablet (10 mg) by mouth At Bedtime 90 tablet 3     natalizumab (TYSABRI) 300 MG/15ML injection Infusion       order for DME Equipment being ordered: Nebulizer with supplies 1 Device 0     Probiotic Product (PROBIOTIC PO)        SUMAtriptan (IMITREX) 100 MG tablet Take 50 mg up to twice daily as needed for headache; separate doses by at least one hour and do not use more than 3 days/week 18 tablet 3     topiramate (TOPAMAX) 100 MG tablet Take 1 tablet (100 mg) by mouth At Bedtime (take with 50 mg to total 150 mg nightly) 60 tablet 3     topiramate (TOPAMAX) 50 MG tablet Take 1 tablet (50 mg) by mouth At Bedtime (take with 100 mg to total 150 mg nightly) 60 tablet 3     traZODone (DESYREL) 50 MG tablet TAKE 1 TABLET BY MOUTH AT BEDTIME 90 tablet 1     triamcinolone (KENALOG) 0.1 % external cream Apply topically 3 times daily as needed for irritation       valACYclovir (VALTREX) 500 MG tablet TAKE 1 TABLET BY MOUTH EVERY DAY 90 tablet 0     vitamin D3 (CHOLECALCIFEROL) 75985 units (250 mcg) capsule Take 1 capsule (10,000 Units) by mouth daily 90 capsule 3     vitamin E (TOCOPHEROL) 400 units (360 mg) capsule Take by mouth daily       Allergies   Allergen Reactions     Aspirin Difficulty breathing, Palpitations and Other (See Comments)     Cephalexin Swelling     Adhesive Tape      Amantadine Swelling     Azithromycin Angioedema     Latex Hives, Itching and Rash     Penicillins Other (See Comments), Nausea and Vomiting, Hives, Swelling, Rash, Cramps and GI Disturbance     Amoxicillin OK       Reviewed and updated as needed this visit by Provider         Review of Systems  "  ROS COMP: Constitutional, HEENT, cardiovascular, pulmonary, GI, , musculoskeletal, neuro, skin, endocrine and psych systems are negative, except as otherwise noted.      Objective    There were no vitals taken for this visit.  Estimated body mass index is 43.43 kg/m  as calculated from the following:    Height as of 3/19/20: 1.651 m (5' 5\").    Weight as of 4/17/20: 118.4 kg (261 lb).  Physical Exam     GENERAL: healthy, alert and no distress  EYES: Eyes grossly normal to inspection, conjunctivae and sclerae normal  HENT: normal cephalic/atraumatic.  External ears, nose and mouth without ulcers or lesions.  NECK: no asymmetry, masses or scars  RESP: no audible wheeze, cough, or visible cyanosis.  No visible retractions or increased work of breathing.  Able to speak fully in complete sentences.  MS: no gross musculoskeletal defects noted.  Normal range of motion.  SKIN: no suspicious lesions or rashes  NEURO: Cranial nerves grossly intact, mentation intact and speech normal  PSYCH: mentation appears normal, affect normal/bright, judgement and insight intact, normal speech and appearance well-groomed      Diagnostic Test Results:  Labs reviewed in Epic        Assessment & Plan     Diagnoses and all orders for this visit:    Encounter for completion of form with patient  Multiple sclerosis (H)  Immunosuppression (H)  Recommend continued avoidance of work in patient facing/close contact.  Due to the nature of the clinic setting and her VERY high risk of not only acquiring an illness but having a dire outcome if she contracts the COVID-19 virus, I advise her to remain off of work.  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  Unfortunately, she cannot work remotely due to the nature of her job roles/duties.  We will re-evaluate in 4 weeks to see if extension of these restrictions & STD are necessary.  I did request that Nataliya have a letter completed by her employer " advising of their accommodations available (if any) at that time.               Return in about 4 weeks (around 6/5/2020) for recheck if employer not able to accomodate restrictions for social isolation.    Miya Crump PA-C  [facility]  [facility]      Video-Visit Details    Type of service:  Video Visit    Video End Time:2:27 PM    Originating Location (pt. Location): Home    Distant Location (provider location):  [facility]     Platform used for Video Visit: Diana    Return in about 4 weeks (around 6/5/2020) for recheck if employer not able to accomodate restrictions for social isolation.

## 2023-05-18 DIAGNOSIS — G47.00 INSOMNIA, UNSPECIFIED TYPE: ICD-10-CM

## 2023-05-22 RX ORDER — RAMELTEON 8 MG/1
TABLET, FILM COATED ORAL
Qty: 30 TABLET | Refills: 0 | Status: SHIPPED | OUTPATIENT
Start: 2023-05-22 | End: 2024-02-15

## 2023-05-24 ENCOUNTER — LAB (OUTPATIENT)
Dept: LAB | Facility: CLINIC | Age: 48
End: 2023-05-24
Payer: COMMERCIAL

## 2023-05-24 ENCOUNTER — TELEPHONE (OUTPATIENT)
Dept: FAMILY MEDICINE | Facility: CLINIC | Age: 48
End: 2023-05-24

## 2023-05-24 DIAGNOSIS — Z23 NEED FOR HEPATITIS B VACCINATION: ICD-10-CM

## 2023-05-24 DIAGNOSIS — Z01.84 IMMUNITY STATUS TESTING: Primary | ICD-10-CM

## 2023-05-24 DIAGNOSIS — Z11.59 NEED FOR HEPATITIS B SCREENING TEST: Primary | ICD-10-CM

## 2023-05-24 DIAGNOSIS — Z01.84 IMMUNITY STATUS TESTING: ICD-10-CM

## 2023-05-24 LAB — HOLD SPECIMEN: NORMAL

## 2023-05-24 PROCEDURE — 36415 COLL VENOUS BLD VENIPUNCTURE: CPT

## 2023-05-24 PROCEDURE — 86706 HEP B SURFACE ANTIBODY: CPT

## 2023-05-24 NOTE — TELEPHONE ENCOUNTER
Patient states in 1999 she was working at [employer] and she had the full series of Hepatitis B vaccine but health maintenance and [facility] shows she is due for dose #2 and dose #3. Patient states the clinic she woked at is no longer there and she doesn't think she would be able to track down immunization records through them.   Does she need to start series over, or have a titer done?  She is concerned that the vaccine may have a negative impact on her MS. HAN Espinoza R.N.

## 2023-05-24 NOTE — TELEPHONE ENCOUNTER
Please call patient and advise that we did a titer in January that showed that she was not immune to hepatitis B at that time.  The typical protocol if someone has received a full series in the past is to give another hepatitis B vaccine and recheck the titer 6 to 8 weeks later.  It is not an active vaccine and thus, should not flare any symptoms with regard to her MS.    Regardless, I would like her to clear this with her neurologist, Dr. Chong as well as ask about the preferred timing of the vaccine between Tysabri infusions.      Miya Crump MBA, MS, PA-C  [facility]

## 2023-05-24 NOTE — TELEPHONE ENCOUNTER
Called #   Telephone Information:   Mobile [phone]     Advised pt on the information below     Pt stated she has spoken with her neurologist - he said it would be okay to do the vaccine it is not a live vaccine.     Pt did schedule the hep B shot for tomorrow routing to PCP as an FYI     Amelia Chance RN, BSN  [facility]

## 2023-05-25 ENCOUNTER — ALLIED HEALTH/NURSE VISIT (OUTPATIENT)
Dept: FAMILY MEDICINE | Facility: CLINIC | Age: 48
End: 2023-05-25
Payer: COMMERCIAL

## 2023-05-25 DIAGNOSIS — Z23 NEED FOR HEPATITIS B VACCINATION: ICD-10-CM

## 2023-05-25 LAB
HBV SURFACE AB SERPL IA-ACNC: 0 M[IU]/ML
HBV SURFACE AB SERPL IA-ACNC: NONREACTIVE M[IU]/ML

## 2023-05-25 PROCEDURE — 90471 IMMUNIZATION ADMIN: CPT

## 2023-05-25 PROCEDURE — 99207 PR NO CHARGE NURSE ONLY: CPT

## 2023-05-25 PROCEDURE — 90746 HEPB VACCINE 3 DOSE ADULT IM: CPT

## 2023-05-26 NOTE — RESULT ENCOUNTER NOTE
Nataliya  I have reviewed your recent test results:    -Similar to the labs drawn 4 months ago you are not immune to hepatitis B.  The plan is to have the repeat hep B vaccine and recheck this titer in approximately 6 weeks to see if you have converted and if not, we do a 5th vaccine and recheck... And so on and so forth until you convert to immunity.    For additional lab test information, www.testing.com is an excellent reference.     If you have any questions please do not hesitate to contact our office via phone ([phone]) or [facility].    Healthy regards,     Miya Crump MBA, MS, PA-C  [facility]

## 2023-06-02 ENCOUNTER — HOSPITAL ENCOUNTER (OUTPATIENT)
Dept: ULTRASOUND IMAGING | Facility: CLINIC | Age: 48
Discharge: HOME | End: 2023-06-02
Attending: PHYSICIAN ASSISTANT | Admitting: PHYSICIAN ASSISTANT
Payer: COMMERCIAL

## 2023-06-02 DIAGNOSIS — N83.202 LEFT OVARIAN CYST: ICD-10-CM

## 2023-06-02 PROCEDURE — 76830 TRANSVAGINAL US NON-OB: CPT

## 2023-06-05 NOTE — RESULT ENCOUNTER NOTE
Nataliya  I have reviewed your recent test results:    Your repeat ultrasound does not show any persistent left ovarian cyst, however, there is a comment that the evaluation/exam was limited on the left ovary due to bowel gas.  If your symptoms have significantly improved no follow-up is needed.  If persisting we can discuss options with OB/GYN.     If you have any questions please do not hesitate to contact our office via phone ([phone]) or MyChart.    Healthy regards,     Miya Crump MBA, MS, PA-C  [facility]

## 2023-06-21 ENCOUNTER — VIRTUAL VISIT (OUTPATIENT)
Dept: FAMILY MEDICINE | Facility: CLINIC | Age: 48
End: 2023-06-21
Payer: COMMERCIAL

## 2023-06-21 ENCOUNTER — LAB (OUTPATIENT)
Dept: LAB | Facility: CLINIC | Age: 48
End: 2023-06-21
Payer: COMMERCIAL

## 2023-06-21 DIAGNOSIS — R60.0 BILATERAL EDEMA OF LOWER EXTREMITY: Primary | ICD-10-CM

## 2023-06-21 DIAGNOSIS — R60.0 BILATERAL EDEMA OF LOWER EXTREMITY: ICD-10-CM

## 2023-06-21 LAB
CRP SERPL-MCNC: <3 MG/L
D DIMER PPP FEU-MCNC: 0.47 UG/ML FEU (ref 0–0.5)
ERYTHROCYTE [SEDIMENTATION RATE] IN BLOOD BY WESTERGREN METHOD: 28 MM/HR (ref 0–20)
NT-PROBNP SERPL-MCNC: 234 PG/ML (ref 0–450)
URATE SERPL-MCNC: 4.1 MG/DL (ref 2.4–5.7)

## 2023-06-21 PROCEDURE — 85379 FIBRIN DEGRADATION QUANT: CPT

## 2023-06-21 PROCEDURE — 86140 C-REACTIVE PROTEIN: CPT

## 2023-06-21 PROCEDURE — 99214 OFFICE O/P EST MOD 30 MIN: CPT | Mod: VID | Performed by: PHYSICIAN ASSISTANT

## 2023-06-21 PROCEDURE — 84550 ASSAY OF BLOOD/URIC ACID: CPT

## 2023-06-21 PROCEDURE — 83880 ASSAY OF NATRIURETIC PEPTIDE: CPT

## 2023-06-21 PROCEDURE — 85007 BL SMEAR W/DIFF WBC COUNT: CPT

## 2023-06-21 PROCEDURE — 85652 RBC SED RATE AUTOMATED: CPT

## 2023-06-21 PROCEDURE — 36415 COLL VENOUS BLD VENIPUNCTURE: CPT

## 2023-06-21 PROCEDURE — 85027 COMPLETE CBC AUTOMATED: CPT

## 2023-06-21 PROCEDURE — 80053 COMPREHEN METABOLIC PANEL: CPT

## 2023-06-21 NOTE — PROGRESS NOTES
Nataliya is a 47 year old who is being evaluated via a billable video visit.      How would you like to obtain your AVS? MyChart  If the video visit is dropped, the invitation should be resent by: Text to cell phone: [phone]  Will anyone else be joining your video visit? No        Assessment & Plan     Bilateral edema of lower extremity  Suspect electrolyte disturbance.  Patient will come to clinic for stat labs today especially a D-dimer to ensure no need for urgent ultrasound for DVT rule out.  Hydration efforts encouraged, elevation also advised.  Close follow-up in the clinic tomorrow for evaluation.  - D dimer, quantitative  - Comprehensive metabolic panel (BMP + Alb, Alk Phos, ALT, AST, Total. Bili, TP)  - CBC with platelets and differential  - ESR: Erythrocyte sedimentation rate  - CRP, inflammation  - BNP-N terminal pro  - Uric acid          Return in about 1 day (around 6/22/2023) for Evaluation in clinic.      Miya Crump MBA, MS, PA-C  [facility]   Nataliya is a 47 year old, presenting for the following health issues:  Leg Swelling        6/21/2023     2:59 PM   Additional Questions   Roomed by Nelida PALMA   Accompanied by Self     History of Present Illness       Reason for visit:  Water retention after flying    She eats 4 or more servings of fruits and vegetables daily.She consumes 0 sweetened beverage(s) daily.She exercises with enough effort to increase her heart rate 20 to 29 minutes per day.  She exercises with enough effort to increase her heart rate 4 days per week.   She is taking medications regularly.       Concern - Both feet & Ankle swelling   Onset: 6/20/2023 - arrived at 2 am (there on Thursday 6/15).  - care ride and plane ride - swollen when she got home - laid down - 3 hours of sleep   Description: Patient is having swelling in both feet and ankles.  Not swollen when she was there.  Lots of walking.  Very hot there/out in the heat a lot.   Patient states that they are warm to the touch.    Spasm while she had feet elevated.  Stood up and felt really hot and painful.  Not significantly red.      Took baclofen and ice pack -somewhat helpful    Better and improved overnight - swelling started again when up and around  Hands felt swollen.      Did have BM which helped symptoms.      Puffy under eyes   Intensity: moderate  Progression of Symptoms:  same  Accompanying Signs & Symptoms: None  Previous history of similar problem: none  Precipitating factors:        Worsened by: Sitting at desk  Alleviating factors:        Improved by: Elevating   Therapies tried and outcome: Elevating        Review of Systems   Constitutional, HEENT, cardiovascular, pulmonary, GI, , musculoskeletal, neuro, skin, endocrine and psych systems are negative, except as otherwise noted.      Objective           Vitals:  No vitals were obtained today due to virtual visit.    Physical Exam   GENERAL: Healthy, alert and no distress  EYES: Eyes grossly normal to inspection.  No discharge or erythema, or obvious scleral/conjunctival abnormalities.  HENT: Normal cephalic/atraumatic.  External ears, nose and mouth without ulcers or lesions.  No nasal drainage visible.  NECK: No asymmetry, visible masses or scars  RESP: No audible wheeze, cough, or visible cyanosis.  No visible retractions or increased work of breathing.    SKIN: Visible skin clear. No significant rash, abnormal pigmentation or lesions.  NEURO: Cranial nerves grossly intact.  Mentation and speech appropriate for age.  PSYCH: Mentation appears normal, affect normal/bright, judgement and insight intact, normal speech and appearance well-groomed.    Results for orders placed or performed in visit on 06/21/23   D dimer, quantitative     Status: Normal   Result Value Ref Range    D-Dimer Quantitative 0.47 0.00 - 0.50 ug/mL FEU    Narrative    This D-dimer assay is intended for use in conjunction with a clinical pretest  probability assessment model to exclude pulmonary embolism (PE) and deep venous thrombosis (DVT) in outpatients suspected of PE or DVT. The cut-off value is 0.50 ug/mL FEU.   Comprehensive metabolic panel (BMP + Alb, Alk Phos, ALT, AST, Total. Bili, TP)     Status: Abnormal   Result Value Ref Range    Sodium 139 136 - 145 mmol/L    Potassium 4.2 3.4 - 5.3 mmol/L    Chloride 108 (H) 98 - 107 mmol/L    Carbon Dioxide (CO2) 18 (L) 22 - 29 mmol/L    Anion Gap 13 7 - 15 mmol/L    Urea Nitrogen 10.8 6.0 - 20.0 mg/dL    Creatinine 0.95 0.51 - 0.95 mg/dL    Calcium 9.3 8.6 - 10.0 mg/dL    Glucose 82 70 - 99 mg/dL    Alkaline Phosphatase 66 35 - 104 U/L    AST 31 0 - 45 U/L    ALT 24 0 - 50 U/L    Protein Total 7.2 6.4 - 8.3 g/dL    Albumin 4.3 3.5 - 5.2 g/dL    Bilirubin Total 0.2 <=1.2 mg/dL    GFR Estimate 75 >60 mL/min/1.73m2   ESR: Erythrocyte sedimentation rate     Status: Abnormal   Result Value Ref Range    Erythrocyte Sedimentation Rate 28 (H) 0 - 20 mm/hr   CRP, inflammation     Status: Normal   Result Value Ref Range    CRP Inflammation <3.00 <5.00 mg/L   BNP-N terminal pro     Status: Normal   Result Value Ref Range    N Terminal Pro BNP Outpatient 234 0 - 450 pg/mL   Uric acid     Status: Normal   Result Value Ref Range    Uric Acid 4.1 2.4 - 5.7 mg/dL   CBC with platelets and differential     Status: Abnormal   Result Value Ref Range    WBC Count 10.5 4.0 - 11.0 10e3/uL    RBC Count 3.93 3.80 - 5.20 10e6/uL    Hemoglobin 12.0 11.7 - 15.7 g/dL    Hematocrit 38.2 35.0 - 47.0 %    MCV 97 78 - 100 fL    MCH 30.5 26.5 - 33.0 pg    MCHC 31.4 (L) 31.5 - 36.5 g/dL    RDW 14.2 10.0 - 15.0 %    Platelet Count 329 150 - 450 10e3/uL   Manual Differential     Status: Abnormal   Result Value Ref Range    % Neutrophils 38 %    % Lymphocytes 56 %    % Monocytes 3 %    % Eosinophils 2 %    % Basophils 1 %    Absolute Neutrophils 4.0 1.6 - 8.3 10e3/uL    Absolute Lymphocytes 5.9 (H) 0.8 - 5.3 10e3/uL    Absolute Monocytes 0.3  0.0 - 1.3 10e3/uL    Absolute Eosinophils 0.2 0.0 - 0.7 10e3/uL    Absolute Basophils 0.1 0.0 - 0.2 10e3/uL    RBC Morphology Confirmed RBC Indices     Platelet Assessment  Automated Count Confirmed. Platelet morphology is normal.     Automated Count Confirmed. Platelet morphology is normal.   CBC with platelets and differential     Status: Abnormal    Narrative    The following orders were created for panel order CBC with platelets and differential.  Procedure                               Abnormality         Status                     ---------                               -----------         ------                     CBC with platelets and d...[[id number]]  Abnormal            Final result               Manual Differential[[id number]]          Abnormal            Final result                 Please view results for these tests on the individual orders.               Video-Visit Details    Type of service:  Video Visit   Video Start Time: 3:32 PM  Video End Time:3:52 PM    Originating Location (pt. Location): Home  Distant Location (provider location):  On-site  Platform used for Video Visit: Diana

## 2023-06-22 ENCOUNTER — MYC MEDICAL ADVICE (OUTPATIENT)
Dept: FAMILY MEDICINE | Facility: CLINIC | Age: 48
End: 2023-06-22

## 2023-06-22 ENCOUNTER — OFFICE VISIT (OUTPATIENT)
Dept: FAMILY MEDICINE | Facility: CLINIC | Age: 48
End: 2023-06-22
Payer: COMMERCIAL

## 2023-06-22 VITALS
HEART RATE: 91 BPM | WEIGHT: 266 LBS | HEIGHT: 65 IN | BODY MASS INDEX: 44.32 KG/M2 | OXYGEN SATURATION: 99 % | SYSTOLIC BLOOD PRESSURE: 122 MMHG | TEMPERATURE: 98.3 F | DIASTOLIC BLOOD PRESSURE: 82 MMHG

## 2023-06-22 DIAGNOSIS — M79.604 LEG PAIN, BILATERAL: ICD-10-CM

## 2023-06-22 DIAGNOSIS — M54.2 CERVICALGIA: ICD-10-CM

## 2023-06-22 DIAGNOSIS — M79.605 LEG PAIN, BILATERAL: ICD-10-CM

## 2023-06-22 DIAGNOSIS — R60.9 EDEMA, UNSPECIFIED TYPE: Primary | ICD-10-CM

## 2023-06-22 DIAGNOSIS — M79.18 MYOFASCIAL PAIN: ICD-10-CM

## 2023-06-22 LAB
ALBUMIN SERPL BCG-MCNC: 4.3 G/DL (ref 3.5–5.2)
ALP SERPL-CCNC: 66 U/L (ref 35–104)
ALT SERPL W P-5'-P-CCNC: 24 U/L (ref 0–50)
ANION GAP SERPL CALCULATED.3IONS-SCNC: 13 MMOL/L (ref 7–15)
AST SERPL W P-5'-P-CCNC: 31 U/L (ref 0–45)
BASOPHILS # BLD MANUAL: 0.1 10E3/UL (ref 0–0.2)
BASOPHILS NFR BLD MANUAL: 1 %
BILIRUB SERPL-MCNC: 0.2 MG/DL
BUN SERPL-MCNC: 10.8 MG/DL (ref 6–20)
CALCIUM SERPL-MCNC: 9.3 MG/DL (ref 8.6–10)
CHLORIDE SERPL-SCNC: 108 MMOL/L (ref 98–107)
CREAT SERPL-MCNC: 0.95 MG/DL (ref 0.51–0.95)
DEPRECATED HCO3 PLAS-SCNC: 18 MMOL/L (ref 22–29)
EOSINOPHIL # BLD MANUAL: 0.2 10E3/UL (ref 0–0.7)
EOSINOPHIL NFR BLD MANUAL: 2 %
ERYTHROCYTE [DISTWIDTH] IN BLOOD BY AUTOMATED COUNT: 14.2 % (ref 10–15)
GFR SERPL CREATININE-BSD FRML MDRD: 75 ML/MIN/1.73M2
GLUCOSE SERPL-MCNC: 82 MG/DL (ref 70–99)
HCT VFR BLD AUTO: 38.2 % (ref 35–47)
HGB BLD-MCNC: 12 G/DL (ref 11.7–15.7)
LYMPHOCYTES # BLD MANUAL: 5.9 10E3/UL (ref 0.8–5.3)
LYMPHOCYTES NFR BLD MANUAL: 56 %
MCH RBC QN AUTO: 30.5 PG (ref 26.5–33)
MCHC RBC AUTO-ENTMCNC: 31.4 G/DL (ref 31.5–36.5)
MCV RBC AUTO: 97 FL (ref 78–100)
MONOCYTES # BLD MANUAL: 0.3 10E3/UL (ref 0–1.3)
MONOCYTES NFR BLD MANUAL: 3 %
NEUTROPHILS # BLD MANUAL: 4 10E3/UL (ref 1.6–8.3)
NEUTROPHILS NFR BLD MANUAL: 38 %
PLAT MORPH BLD: ABNORMAL
PLATELET # BLD AUTO: 329 10E3/UL (ref 150–450)
POTASSIUM SERPL-SCNC: 4.2 MMOL/L (ref 3.4–5.3)
PROT SERPL-MCNC: 7.2 G/DL (ref 6.4–8.3)
RBC # BLD AUTO: 3.93 10E6/UL (ref 3.8–5.2)
RBC MORPH BLD: ABNORMAL
SODIUM SERPL-SCNC: 139 MMOL/L (ref 136–145)
WBC # BLD AUTO: 10.5 10E3/UL (ref 4–11)

## 2023-06-22 PROCEDURE — 99214 OFFICE O/P EST MOD 30 MIN: CPT | Performed by: PHYSICIAN ASSISTANT

## 2023-06-22 NOTE — PROGRESS NOTES
Assessment & Plan     Edema, unspecified type  Leg pain, bilateral  Markedly improved.  Continue elevation and hydration efforts.  Fatigue is likely a result of diet changes, alcohol use, heat, and significantly increased exercise from baseline.  Gradual improvement will likely be noted over the course of the next week or 2.  Advised of warning signs and when to seek urgent care.  All questions answered the patient's satisfaction.    Myofascial pain  Cervicalgia  Continuing physical therapy.  New referral needed today.  Symptoms improving with current treatment regimen.  - Physical Therapy Referral       Return in about 1 week (around 6/29/2023) for Contact clinic if symptoms worsening or not improving.    Miya Crump PA-C  [facility] PRIOR JACKELINE An is a 47 year old, presenting for the following health issues:  follow up edema        6/22/2023     5:11 PM   Additional Questions   Roomed by enrique valladares   Accompanied by self         6/22/2023     5:11 PM   Patient Reported Additional Medications   Patient reports taking the following new medications none     History of Present Illness       Reason for visit:  Water retention after flying    She eats 4 or more servings of fruits and vegetables daily.She consumes 0 sweetened beverage(s) daily.She exercises with enough effort to increase her heart rate 20 to 29 minutes per day.  She exercises with enough effort to increase her heart rate 4 days per week.   She is taking medications regularly.     Patient is here in follow-up for bilateral lower extremity swelling that began when she returned from a flight from [place] and prolonged car ride shortly thereafter on 6/20/2023.  She did a significant amount of walking that is much more than she normally does as well as a significant increase in alcohol consumption compared to normal.  It was very hot but she was diligent about hydration efforts.  She did describe some spasms and  "sensation of hot and painful lower extremities when significantly swollen.  She does not have any history of blood clots or clotting disorders.  She is not a smoker.  D-dimer and extensive laboratory work-up completed -all unremarkable.  See below for details.    We did a virtual visit on 6/21/2023 and since that time she has been elevating her legs and diligent with hydration.  She states that she is significantly better but still is appreciating a small amount of swelling bilaterally.  No further burning sensations.  She is quite fatigued.  She states that her diet while on this 4-day trip was significant variation from her normal food consumption.  She ate a lot of salty and fried food and did not consume much of any in the way of fruits and vegetables.    The patient is scheduled for her Tysabri infusion for her MS tomorrow and wanted to be sure it is okay to proceed.      Review of Systems   Constitutional, HEENT, cardiovascular, pulmonary, GI, , musculoskeletal, neuro, skin, endocrine and psych systems are negative, except as otherwise noted.      Objective    /82   Pulse 91   Temp 98.3  F (36.8  C)   Ht 1.651 m (5' 5\")   Wt 120.7 kg (266 lb)   LMP  (LMP Unknown)   SpO2 99%   BMI 44.26 kg/m    Body mass index is 44.26 kg/m .  Physical Exam   GENERAL: healthy, alert and no distress  EYES: Eyes grossly normal to inspection  RESP: lungs clear to auscultation - no rales, rhonchi or wheezes  CV: regular rate and rhythm, normal S1 S2, no S3 or S4, no murmur, click or rub, and peripheral pulses strong  MS: no gross musculoskeletal defects noted, 1+ bilateral non pitting edema of lower extremities.  No redness/warmth  SKIN: no suspicious lesions or rashes  NEURO: Normal strength and tone, mentation intact and speech normal  PSYCH: mentation appears normal, affect normal/bright    Lab on 06/21/2023   Component Date Value Ref Range Status     D-Dimer Quantitative 06/21/2023 0.47  0.00 - 0.50 ug/mL FEU " Final     Sodium 06/21/2023 139  136 - 145 mmol/L Final     Potassium 06/21/2023 4.2  3.4 - 5.3 mmol/L Final     Chloride 06/21/2023 108 (H)  98 - 107 mmol/L Final     Carbon Dioxide (CO2) 06/21/2023 18 (L)  22 - 29 mmol/L Final     Anion Gap 06/21/2023 13  7 - 15 mmol/L Final     Urea Nitrogen 06/21/2023 10.8  6.0 - 20.0 mg/dL Final     Creatinine 06/21/2023 0.95  0.51 - 0.95 mg/dL Final     Calcium 06/21/2023 9.3  8.6 - 10.0 mg/dL Final     Glucose 06/21/2023 82  70 - 99 mg/dL Final     Alkaline Phosphatase 06/21/2023 66  35 - 104 U/L Final     AST 06/21/2023 31  0 - 45 U/L Final    Reference intervals for this test were updated on 6/12/2023 to more accurately reflect our healthy population. There may be differences in the flagging of prior results with similar values performed with this method. Interpretation of those prior results can be made in the context of the updated reference intervals.     ALT 06/21/2023 24  0 - 50 U/L Final    Reference intervals for this test were updated on 6/12/2023 to more accurately reflect our healthy population. There may be differences in the flagging of prior results with similar values performed with this method. Interpretation of those prior results can be made in the context of the updated reference intervals.       Protein Total 06/21/2023 7.2  6.4 - 8.3 g/dL Final     Albumin 06/21/2023 4.3  3.5 - 5.2 g/dL Final     Bilirubin Total 06/21/2023 0.2  <=1.2 mg/dL Final     GFR Estimate 06/21/2023 75  >60 mL/min/1.73m2 Final     Erythrocyte Sedimentation Rate 06/21/2023 28 (H)  0 - 20 mm/hr Final     CRP Inflammation 06/21/2023 <3.00  <5.00 mg/L Final     N Terminal Pro BNP Outpatient 06/21/2023 234  0 - 450 pg/mL Final    Reference range shown and results flagged as abnormal are for the outpatient, non acute settings. Establishing a baseline value for each individual patient is useful for follow-up.    Suggested inpatient cut points for confirming diagnosis of CHF in an acute  setting are:  >450 pg/mL (age 18 to less than 50)  >900 pg/mL (age 50 to less than 75)  >1800 pg/mL (75 yrs and older)    An inpatient or emergency department NT-proPBNP <300 pg/mL effectively rules out acute CHF, with 99% negative predictive value.         Uric Acid 06/21/2023 4.1  2.4 - 5.7 mg/dL Final     WBC Count 06/21/2023 10.5  4.0 - 11.0 10e3/uL Final     RBC Count 06/21/2023 3.93  3.80 - 5.20 10e6/uL Final     Hemoglobin 06/21/2023 12.0  11.7 - 15.7 g/dL Final     Hematocrit 06/21/2023 38.2  35.0 - 47.0 % Final     MCV 06/21/2023 97  78 - 100 fL Final     MCH 06/21/2023 30.5  26.5 - 33.0 pg Final     MCHC 06/21/2023 31.4 (L)  31.5 - 36.5 g/dL Final     RDW 06/21/2023 14.2  10.0 - 15.0 % Final     Platelet Count 06/21/2023 329  150 - 450 10e3/uL Final     % Neutrophils 06/21/2023 38  % Final     % Lymphocytes 06/21/2023 56  % Final     % Monocytes 06/21/2023 3  % Final     % Eosinophils 06/21/2023 2  % Final     % Basophils 06/21/2023 1  % Final     Absolute Neutrophils 06/21/2023 4.0  1.6 - 8.3 10e3/uL Final     Absolute Lymphocytes 06/21/2023 5.9 (H)  0.8 - 5.3 10e3/uL Final     Absolute Monocytes 06/21/2023 0.3  0.0 - 1.3 10e3/uL Final     Absolute Eosinophils 06/21/2023 0.2  0.0 - 0.7 10e3/uL Final     Absolute Basophils 06/21/2023 0.1  0.0 - 0.2 10e3/uL Final     RBC Morphology 06/21/2023 Confirmed RBC Indices   Final     Platelet Assessment 06/21/2023 Automated Count Confirmed. Platelet morphology is normal.  Automated Count Confirmed. Platelet morphology is normal. Final

## 2023-06-22 NOTE — TELEPHONE ENCOUNTER
Patient called to state she would to be seen after 4 today. Looked and pt was already scheduled for 5:00 check in. Advised Patient.     Chris Sheffield RN [facility]

## 2023-06-23 ENCOUNTER — INFUSION THERAPY VISIT (OUTPATIENT)
Dept: INFUSION THERAPY | Facility: CLINIC | Age: 48
End: 2023-06-23
Attending: PSYCHIATRY & NEUROLOGY
Payer: COMMERCIAL

## 2023-06-23 ENCOUNTER — THERAPY VISIT (OUTPATIENT)
Dept: PHYSICAL THERAPY | Facility: CLINIC | Age: 48
End: 2023-06-23
Payer: COMMERCIAL

## 2023-06-23 VITALS
DIASTOLIC BLOOD PRESSURE: 80 MMHG | TEMPERATURE: 99 F | OXYGEN SATURATION: 99 % | SYSTOLIC BLOOD PRESSURE: 120 MMHG | RESPIRATION RATE: 16 BRPM | HEART RATE: 72 BPM

## 2023-06-23 DIAGNOSIS — G35 MULTIPLE SCLEROSIS (H): Primary | ICD-10-CM

## 2023-06-23 DIAGNOSIS — M79.18 MYOFASCIAL PAIN SYNDROME, CERVICAL: Primary | ICD-10-CM

## 2023-06-23 PROCEDURE — 96365 THER/PROPH/DIAG IV INF INIT: CPT

## 2023-06-23 PROCEDURE — 97161 PT EVAL LOW COMPLEX 20 MIN: CPT | Mod: GP | Performed by: PHYSICAL THERAPIST

## 2023-06-23 PROCEDURE — 258N000003 HC RX IP 258 OP 636: Performed by: PSYCHIATRY & NEUROLOGY

## 2023-06-23 PROCEDURE — 250N000011 HC RX IP 250 OP 636: Mod: JZ | Performed by: PSYCHIATRY & NEUROLOGY

## 2023-06-23 PROCEDURE — 97140 MANUAL THERAPY 1/> REGIONS: CPT | Mod: GP | Performed by: PHYSICAL THERAPIST

## 2023-06-23 PROCEDURE — 97035 APP MDLTY 1+ULTRASOUND EA 15: CPT | Mod: GP | Performed by: PHYSICAL THERAPIST

## 2023-06-23 RX ORDER — ACETAMINOPHEN 325 MG/1
650 TABLET ORAL EVERY 4 HOURS PRN
Status: CANCELLED
Start: 2023-07-07

## 2023-06-23 RX ORDER — HEPARIN SODIUM,PORCINE 10 UNIT/ML
5 VIAL (ML) INTRAVENOUS
Status: CANCELLED | OUTPATIENT
Start: 2023-07-07

## 2023-06-23 RX ORDER — DIPHENHYDRAMINE HCL 25 MG
50 CAPSULE ORAL EVERY 4 HOURS PRN
Status: CANCELLED
Start: 2023-07-07

## 2023-06-23 RX ORDER — IBUPROFEN 200 MG
600 TABLET ORAL EVERY 6 HOURS PRN
Status: CANCELLED
Start: 2023-07-07

## 2023-06-23 RX ORDER — ONDANSETRON 2 MG/ML
4 INJECTION INTRAMUSCULAR; INTRAVENOUS EVERY 8 HOURS PRN
Status: CANCELLED
Start: 2023-07-07

## 2023-06-23 RX ORDER — HEPARIN SODIUM (PORCINE) LOCK FLUSH IV SOLN 100 UNIT/ML 100 UNIT/ML
5 SOLUTION INTRAVENOUS
Status: CANCELLED | OUTPATIENT
Start: 2023-07-07

## 2023-06-23 RX ADMIN — SODIUM CHLORIDE 250 ML: 9 INJECTION, SOLUTION INTRAVENOUS at 13:41

## 2023-06-23 RX ADMIN — NATALIZUMAB 300 MG: 300 INJECTION INTRAVENOUS at 13:37

## 2023-06-23 NOTE — PROGRESS NOTES
Infusion Nursing Note:  Nataliya Aldrich presents today for Tysabri.    Patient seen by provider today: No   present during visit today: Not Applicable.    Note: N/A.      Intravenous Access:  Peripheral IV placed.    Treatment Conditions:  Tysabri pre-infusion checklist completed via touch program.      Post Infusion Assessment:  Patient tolerated infusion without incident.  Blood return noted pre and post infusion.  Site patent and intact, free from redness, edema or discomfort.  No evidence of extravasations.  Access discontinued per protocol.       Discharge Plan:   Discharge instructions reviewed with: Patient.  Patient and/or family verbalized understanding of discharge instructions and all questions answered.  AVS to patient via [employer].  Patient will return 8/4 for next appointment.   Patient discharged in stable condition accompanied by: self.  Departure Mode: Ambulatory.      Radha Lee RN

## 2023-06-23 NOTE — PROGRESS NOTES
PHYSICAL THERAPY EVALUATION  Type of Visit: Evaluation    See electronic medical record for Abuse and Falls Screening details.    Subjective      Presenting condition or subjective complaint:    Date of onset: 23    Relevant medical history:     Dates & types of surgery:      Prior diagnostic imaging/testing results:       Prior therapy history for the same diagnosis, illness or injury:        Prior Level of Function   Transfers: Independent  Ambulation: Independent  ADL: Independent  IADL: Housekeeping, Laundry, Meal preparation, Work    Living Environment  Social support:     Type of home:     Stairs to enter the home:         Ramp:     Stairs inside the home:         Help at home:    Equipment owned:       Employment:      Hobbies/Interests:      Patient goals for therapy:      Pain assessment: Pain present  Location: cervical and thoracic /Ratin/10 (all body soreness)     Objective     CERVICAL:    Posture: forward head and rounded shoulders    Neurological: no numbness and tingling    AROM: (Major, Moderate, Minimal or Nil loss)  Movement Loss Sriram Mod Min Nil Pain   Protrusion   X  Left sided pull   Flexion  X   Pain through spine   Retraction  X   None   Extension   X  Feels good   Left Rotation  X   Pull on right   Right Rotation  X   Pull on left   Left Side Bending  X   none   Right Side bending  X   Pain on left         Repeated movement testing: Improves with repeated retractions and extensions    Palpation: very tender and hypertonic over B UT's and levator scap    Assessment & Plan   CLINICAL IMPRESSIONS   Medical Diagnosis: Neck pain, myofascial pain    Treatment Diagnosis: neck pain, myofascial pain   Impression/Assessment: Patient is a 47 year old female with neck and mysofascial pain due to MS complaints.  The following significant findings have been identified: Pain, Decreased ROM/flexibility, Decreased joint mobility, Decreased strength, Inflammation, Impaired gait, Impaired muscle  performance and Impaired posture. These impairments interfere with their ability to perform self care tasks, work tasks and recreational activities as compared to previous level of function.     Clinical Decision Making (Complexity):   Clinical Presentation: Stable/Uncomplicated  Clinical Presentation Rationale: based on medical and personal factors listed in PT evaluation  Clinical Decision Making (Complexity): Low complexity    PLAN OF CARE  Treatment Interventions:  Modalities: Ultrasound  Interventions: Manual Therapy, Neuromuscular Re-education, Therapeutic Activity, Therapeutic Exercise    Long Term Goals     PT Goal 1  Goal Identifier: Goal 1  Goal Description: Pt will be able to sit, 30 min, painfree, at work  Rationale: to maximize safety and independence with performance of ADLs and functional tasks  Target Date: 09/15/23  PT Goal 2  Goal Identifier: Goal 2  Goal Description: Pt will be able to sleep through the night without waking in pain  Rationale: to maximize safety and independence with performance of ADLs and functional tasks  Target Date: 09/15/23      Frequency of Treatment: 1X/week  Duration of Treatment: 12 weeks      Education Assessment:   Learner/Method: Patient;Pictures/Video    Risks and benefits of evaluation/treatment have been explained.   Patient/Family/caregiver agrees with Plan of Care.     Evaluation Time:     PT Eval, Low Complexity Minutes (20488): 15      Signing Clinician: Nidhi Dunbar, PT      [facility]                                                                                   OUTPATIENT PHYSICAL THERAPY      PLAN OF TREATMENT FOR OUTPATIENT REHABILITATION   Patient's Last Name, First Name, M.Nataliya Conrad YOB: 1975   Provider's Name   [facility]   Medical Record No.  [id number]     Onset Date: 03/23/23  Start of Care Date: 06/23/23     Medical Diagnosis:  Neck pain, myofascial  pain      PT Treatment Diagnosis:  neck pain, myofascial pain Plan of Treatment  Frequency/Duration: 1X/week/ 12 weeks    Certification date from 06/23/23 to 09/20/23         See note for plan of treatment details and functional goals     Nidhi Dunbar, PT                         I CERTIFY THE NEED FOR THESE SERVICES FURNISHED UNDER        THIS PLAN OF TREATMENT AND WHILE UNDER MY CARE     (Physician attestation of this document indicates review and certification of the therapy plan).                  Referring Provider:  Miya Crump      Initial Assessment  See Epic Evaluation- Start of Care Date: 06/23/23

## 2023-06-30 ENCOUNTER — THERAPY VISIT (OUTPATIENT)
Dept: PHYSICAL THERAPY | Facility: CLINIC | Age: 48
End: 2023-06-30
Payer: COMMERCIAL

## 2023-06-30 DIAGNOSIS — M54.2 CERVICALGIA: ICD-10-CM

## 2023-06-30 DIAGNOSIS — M79.18 MYOFASCIAL PAIN: ICD-10-CM

## 2023-06-30 PROCEDURE — 97035 APP MDLTY 1+ULTRASOUND EA 15: CPT | Mod: GP | Performed by: PHYSICAL THERAPIST

## 2023-06-30 PROCEDURE — 97140 MANUAL THERAPY 1/> REGIONS: CPT | Mod: GP | Performed by: PHYSICAL THERAPIST

## 2023-07-12 ENCOUNTER — MYC MEDICAL ADVICE (OUTPATIENT)
Dept: FAMILY MEDICINE | Facility: CLINIC | Age: 48
End: 2023-07-12
Payer: COMMERCIAL

## 2023-07-12 NOTE — TELEPHONE ENCOUNTER
My chart message sent     Amelia Chance RN, BSN  [facility]

## 2023-07-14 ENCOUNTER — THERAPY VISIT (OUTPATIENT)
Dept: PHYSICAL THERAPY | Facility: CLINIC | Age: 48
End: 2023-07-14
Payer: COMMERCIAL

## 2023-07-14 DIAGNOSIS — M79.18 MYOFASCIAL PAIN: ICD-10-CM

## 2023-07-14 DIAGNOSIS — M54.2 CERVICALGIA: Primary | ICD-10-CM

## 2023-07-14 PROCEDURE — 97035 APP MDLTY 1+ULTRASOUND EA 15: CPT | Mod: GP | Performed by: PHYSICAL THERAPIST

## 2023-07-14 PROCEDURE — 97140 MANUAL THERAPY 1/> REGIONS: CPT | Mod: GP | Performed by: PHYSICAL THERAPIST

## 2023-07-17 ENCOUNTER — NURSE TRIAGE (OUTPATIENT)
Dept: FAMILY MEDICINE | Facility: CLINIC | Age: 48
End: 2023-07-17

## 2023-07-17 ENCOUNTER — OFFICE VISIT (OUTPATIENT)
Dept: FAMILY MEDICINE | Facility: CLINIC | Age: 48
End: 2023-07-17
Payer: COMMERCIAL

## 2023-07-17 VITALS
HEIGHT: 65 IN | DIASTOLIC BLOOD PRESSURE: 76 MMHG | HEART RATE: 107 BPM | RESPIRATION RATE: 15 BRPM | OXYGEN SATURATION: 100 % | WEIGHT: 262 LBS | TEMPERATURE: 98.6 F | SYSTOLIC BLOOD PRESSURE: 120 MMHG | BODY MASS INDEX: 43.65 KG/M2

## 2023-07-17 DIAGNOSIS — R60.0 SALIVARY GLAND SWELLING: ICD-10-CM

## 2023-07-17 DIAGNOSIS — H92.02 OTALGIA, LEFT: ICD-10-CM

## 2023-07-17 DIAGNOSIS — K08.89 PAIN, DENTAL: ICD-10-CM

## 2023-07-17 DIAGNOSIS — Z11.3 SCREEN FOR STD (SEXUALLY TRANSMITTED DISEASE): ICD-10-CM

## 2023-07-17 DIAGNOSIS — J02.9 SORE THROAT: Primary | ICD-10-CM

## 2023-07-17 DIAGNOSIS — Z12.11 SCREEN FOR COLON CANCER: ICD-10-CM

## 2023-07-17 DIAGNOSIS — D72.829 LEUKOCYTOSIS, UNSPECIFIED TYPE: ICD-10-CM

## 2023-07-17 LAB
BASOPHILS # BLD AUTO: 0 10E3/UL (ref 0–0.2)
BASOPHILS NFR BLD AUTO: 0 %
DEPRECATED S PYO AG THROAT QL EIA: NEGATIVE
EOSINOPHIL # BLD AUTO: 0.2 10E3/UL (ref 0–0.7)
EOSINOPHIL NFR BLD AUTO: 1 %
ERYTHROCYTE [DISTWIDTH] IN BLOOD BY AUTOMATED COUNT: 14.1 % (ref 10–15)
ERYTHROCYTE [SEDIMENTATION RATE] IN BLOOD BY WESTERGREN METHOD: 25 MM/HR (ref 0–20)
HCT VFR BLD AUTO: 35.8 % (ref 35–47)
HGB BLD-MCNC: 11.4 G/DL (ref 11.7–15.7)
IMM GRANULOCYTES # BLD: 0 10E3/UL
IMM GRANULOCYTES NFR BLD: 0 %
LYMPHOCYTES # BLD AUTO: 5.9 10E3/UL (ref 0.8–5.3)
LYMPHOCYTES NFR BLD AUTO: 47 %
MCH RBC QN AUTO: 29.9 PG (ref 26.5–33)
MCHC RBC AUTO-ENTMCNC: 31.8 G/DL (ref 31.5–36.5)
MCV RBC AUTO: 94 FL (ref 78–100)
MONOCYTES # BLD AUTO: 0.8 10E3/UL (ref 0–1.3)
MONOCYTES NFR BLD AUTO: 7 %
NEUTROPHILS # BLD AUTO: 5.5 10E3/UL (ref 1.6–8.3)
NEUTROPHILS NFR BLD AUTO: 44 %
PLATELET # BLD AUTO: 354 10E3/UL (ref 150–450)
RBC # BLD AUTO: 3.81 10E6/UL (ref 3.8–5.2)
WBC # BLD AUTO: 12.5 10E3/UL (ref 4–11)

## 2023-07-17 PROCEDURE — 85652 RBC SED RATE AUTOMATED: CPT | Performed by: NURSE PRACTITIONER

## 2023-07-17 PROCEDURE — 87651 STREP A DNA AMP PROBE: CPT | Performed by: NURSE PRACTITIONER

## 2023-07-17 PROCEDURE — 85025 COMPLETE CBC W/AUTO DIFF WBC: CPT | Performed by: NURSE PRACTITIONER

## 2023-07-17 PROCEDURE — 99214 OFFICE O/P EST MOD 30 MIN: CPT | Performed by: NURSE PRACTITIONER

## 2023-07-17 PROCEDURE — 87591 N.GONORRHOEAE DNA AMP PROB: CPT | Performed by: NURSE PRACTITIONER

## 2023-07-17 PROCEDURE — 36415 COLL VENOUS BLD VENIPUNCTURE: CPT | Performed by: NURSE PRACTITIONER

## 2023-07-17 PROCEDURE — 86140 C-REACTIVE PROTEIN: CPT | Performed by: NURSE PRACTITIONER

## 2023-07-17 PROCEDURE — 87491 CHLMYD TRACH DNA AMP PROBE: CPT | Performed by: NURSE PRACTITIONER

## 2023-07-17 RX ORDER — CLINDAMYCIN HCL 300 MG
300 CAPSULE ORAL 3 TIMES DAILY
Qty: 30 CAPSULE | Refills: 0 | Status: SHIPPED | OUTPATIENT
Start: 2023-07-17 | End: 2023-07-27

## 2023-07-17 RX ORDER — CHLORHEXIDINE GLUCONATE ORAL RINSE 1.2 MG/ML
15 SOLUTION DENTAL 2 TIMES DAILY
Qty: 118 ML | Refills: 1 | Status: SHIPPED | OUTPATIENT
Start: 2023-07-17 | End: 2023-07-17

## 2023-07-17 RX ORDER — CHLORHEXIDINE GLUCONATE ORAL RINSE 1.2 MG/ML
15 SOLUTION DENTAL 2 TIMES DAILY
Qty: 118 ML | Refills: 1 | Status: SHIPPED | OUTPATIENT
Start: 2023-07-17 | End: 2023-11-06

## 2023-07-17 NOTE — RESULT ENCOUNTER NOTE
Dear Nataliya,    Here is a summary of your recent test results:    Well you do have a mild elevation in your white blood cell count and Sed rate (although this is at you normal for elevation).   I will figure out which antibiotic will have the most coverage for your symptoms; clindamycin versus doxycyline. I will let you know when I have sent it in.     For additional lab test information, labtestsonline.org is an excellent reference.    In addition, here is a list of due or overdue Health Maintenance reminders:    Hepatitis B Vaccine(3 of 3 - 19+ 3-dose series) due on 07/20/2023  Colorectal Cancer Screening due on 08/01/2023    Please call us at [phone] (or use [employer]) to address the above recommendations if needed.    Thank you for choosing [facility].  It was an honor and a privilege to participate in your care.       Healthy regards,    Leonie Collazo, LEROY  [facility]

## 2023-07-17 NOTE — TELEPHONE ENCOUNTER
"S-(situation): bruising, ear pain, neck pain    B-(background): history of fibromyalgia and MS    A-(assessment): Patient reports small bump and 1-2 inch bruising on left forearm noticed 7/15, no known injury.  She also notices petechiae both eyelids upon waking today. She also notes sore throat and pain around left ear, feels plugged.  Also notes \"knot\" left neck that is painful.  States she is attending PT for neck pain. Today she has tingling in fingertips of left hand.  She denies numbness or weakness in face or limbs. She also denies fever, chest pain or difficulty breathing.    R-(recommendations): Patient denies known injury to left arm.  She also denies any vomiting, forceful coughing.  She denies history of blood clots or difficulty clotting. She is concerned about the bruising. Recommended patient be seen and scheduled her to be seen by partner today.  Leonora Espinoza RLissethN.      Reason for Disposition    Not caused by an injury and < 5 unexplained bruises    Patient wants to be seen    Additional Information    Negative: Shock suspected (e.g., cold/pale/clammy skin, too weak to stand, low BP, rapid pulse)    Negative: Fever and purple or blood-colored spots or dots    Negative: Sounds like a life-threatening emergency to the triager    Negative: Bruise(s) of forehead or head    Negative: Bruise(s) of face or jaw    Negative: Patient has a concerning injury (e.g., chest, neck, leg)    Negative: Post-operative bruising    Negative: Dizziness or lightheadedness    Negative: Bruise on head, face, chest, or abdomen and taking Coumadin (warfarin) or other strong blood thinner, or known bleeding disorder (e.g., thrombocytopenia)    Negative: Unexplained bleeding from another site (e.g., gums, nose, urine) as well    Negative: Patient sounds very sick or weak to the triager    Negative: SEVERE pain and not improved 2 hours after pain medicine/ice packs    Negative: Purple or blood-colored spots or dots that are not " "from injury or friction (no fever and sounds well to triager)    Negative: Not caused by an injury and 5 or more bruises now    Negative: Raised bruise and size > 2 inches (5 cm) and getting bigger    Negative: Taking Coumadin (warfarin) or other strong blood thinner, or known bleeding disorder (e.g., thrombocytopenia)    Negative: Suspicious history for the injury    Negative: Minor bruising at site of heparin injection (e.g., Heparin, Lovenox, Innohep)    Negative: After 3 weeks and bruise still present    Negative: After 10 days and bruise not fading    Answer Assessment - Initial Assessment Questions  1. APPEARANCE of BRUISE: \"Describe the bruise.\"       Bruise left forearm, petechiae both eyelids  2. SIZE: \"How large is the bruise?\"       Left forearm about 1-2 inches  3. NUMBER: \"How many bruises are there?\"       Left forearm and eyelids  4. LOCATION: \"Where is the bruise located?\"       As above  5. ONSET: \"How long ago did the bruise occur?\"       Unsure, first noticed arm bruise Sat. 7/15, petechiae on eyelids appeared today.  6. CAUSE: \"Tell me how it happened.\"      unknown  7. MEDICAL HISTORY: \"Do you have any medical problems that can cause easy bruising or bleeding?\" (e.g., leukemia, liver disease, recent chemotherapy)      Not known--has history of fibromyalgia and MS  8. MEDICATIONS: \"Do you take any medications which thin the blood such as: aspirin, heparin, ibuprofen (NSAIDS), Plavix, or Coumadin?\"      no  9. OTHER SYMPTOMS: \"Do you have any other symptoms?\"  (e.g., weakness, dizziness, pain, fever, nosebleed, blood in urine/stool)      Denies weakness, dizziness, blood in urine/stool, epistaxis.  10. PREGNANCY: \"Is there any chance you are pregnant?\" \"When was your last menstrual period?\"        NA    Protocols used: BRUISES-A-OH    "

## 2023-07-17 NOTE — PROGRESS NOTES
Assessment & Plan     Sore throat  Reassuring exam no higher level of care felt to be needed.   Labs.  WBC up.   Rapid strep neg. Facial swelling very mild unknown etiology but with leukocytosis will treat with antibiotics while labs pending.    Peridex and antibiotics.  Close follow up.  Red flag symptoms discussed and if these occur present to the emergency room or call 911.   Nataliya verbalizes understanding of plan of care and is in agreement.   - Streptococcus A Rapid Screen w/Reflex to PCR - Clinic Collect  - NEISSERIA GONORRHOEA PCR  - CHLAMYDIA TRACHOMATIS PCR  - Group A Streptococcus PCR Throat Swab  - ESR: Erythrocyte sedimentation rate  - CRP, inflammation  - chlorhexidine (PERIDEX) 0.12 % solution  Dispense: 118 mL; Refill: 1  - CBC with platelets and differential  - ESR: Erythrocyte sedimentation rate  - CRP, inflammation  - CBC with platelets and differential  - clindamycin (CLEOCIN) 300 MG capsule  Dispense: 30 capsule; Refill: 0    Screen for colon cancer    - Colonoscopy Screening  Referral    Screen for STD (sexually transmitted disease)    - NEISSERIA GONORRHOEA PCR  - CHLAMYDIA TRACHOMATIS PCR  - clindamycin (CLEOCIN) 300 MG capsule  Dispense: 30 capsule; Refill: 0    Otalgia, left    - ESR: Erythrocyte sedimentation rate  - CRP, inflammation  - chlorhexidine (PERIDEX) 0.12 % solution  Dispense: 118 mL; Refill: 1  - CBC with platelets and differential  - ESR: Erythrocyte sedimentation rate  - CRP, inflammation  - CBC with platelets and differential  - clindamycin (CLEOCIN) 300 MG capsule  Dispense: 30 capsule; Refill: 0    Pain, dental    - ESR: Erythrocyte sedimentation rate  - CRP, inflammation  - chlorhexidine (PERIDEX) 0.12 % solution  Dispense: 118 mL; Refill: 1  - CBC with platelets and differential  - ESR: Erythrocyte sedimentation rate  - CRP, inflammation  - CBC with platelets and differential  - clindamycin (CLEOCIN) 300 MG capsule  Dispense: 30 capsule; Refill: 0    Salivary  "gland swelling    - clindamycin (CLEOCIN) 300 MG capsule  Dispense: 30 capsule; Refill: 0    Leukocytosis, unspecified type      Return in about 2 weeks (around 7/31/2023) for if symptoms persist or worsening please be seen.      MARTÍNEZ Michael CNP  [facility] PRIOR JACKELINE An is a 47 year old, presenting for the following health issues:  Otalgia and Arm Pain        7/17/2023     3:55 PM   Additional Questions   Roomed by Serena PALMA   Accompanied by Daughter -  Eris     Arm Pain    History of Present Illness       Reason for visit:  Popped blood vessel neck pain ear pain  Symptom onset:  1-3 days ago  Symptoms include:  Ear pain neck pain  Symptom intensity:  Moderate  Symptom progression:  Staying the same  Had these symptoms before:  No  What makes it worse:  No  What makes it better:  Gabapentin    She eats 4 or more servings of fruits and vegetables daily.She consumes 0 sweetened beverage(s) daily.She exercises with enough effort to increase her heart rate 10 to 19 minutes per day.  She exercises with enough effort to increase her heart rate 4 days per week.   She is taking medications regularly.     PT - neck therapy every other week - last was Friday. Under a lot of stress - working 2 jobs - second job is [employer] so lots of driving.    Saturday - Forearm was sore, felt knot in arm, has bruise there. - exercising daily.   Toes started getting tingly.    This morning fingers were tingly, Pain around ear, sounds are muffled - and cheek feels inflamed.       Triaged - 07/17/2023 - 8:07a    S-(situation): bruising, ear pain, neck pain     B-(background): history of fibromyalgia and MS     A-(assessment): Patient reports small bump and 1-2 inch bruising on left forearm noticed 7/15, no known injury.  She also notices petechiae both eyelids upon waking today. She also notes sore throat and pain around left ear, feels plugged.  Also notes \"knot\" left neck that is painful.  States " "she is attending PT for neck pain. Today she has tingling in fingertips of left hand.  She denies numbness or weakness in face or limbs. She also denies fever, chest pain or difficulty breathing.     R-(recommendations): Patient denies known injury to left arm.  She also denies any vomiting, forceful coughing.  She denies history of blood clots or difficulty clotting. She is concerned about the bruising. Recommended patient be seen and scheduled her to be seen by partner today.  Leonora Espinoza R.N.        Reason for Disposition    Not caused by an injury and < 5 unexplained bruises    Patient wants to be seen    Answer Assessment - Initial Assessment Questions  1. APPEARANCE of BRUISE: \"Describe the bruise.\"       Bruise left forearm, petechiae both eyelids  2. SIZE: \"How large is the bruise?\"       Left forearm about 1-2 inches  3. NUMBER: \"How many bruises are there?\"       Left forearm and eyelids  4. LOCATION: \"Where is the bruise located?\"       As above  5. ONSET: \"How long ago did the bruise occur?\"       Unsure, first noticed arm bruise Sat. 7/15, petechiae on eyelids appeared today.  6. CAUSE: \"Tell me how it happened.\"      unknown  7. MEDICAL HISTORY: \"Do you have any medical problems that can cause easy bruising or bleeding?\" (e.g., leukemia, liver disease, recent chemotherapy)      Not known--has history of fibromyalgia and MS  8. MEDICATIONS: \"Do you take any medications which thin the blood such as: aspirin, heparin, ibuprofen (NSAIDS), Plavix, or Coumadin?\"      no  9. OTHER SYMPTOMS: \"Do you have any other symptoms?\"  (e.g., weakness, dizziness, pain, fever, nosebleed, blood in urine/stool)      Denies weakness, dizziness, blood in urine/stool, epistaxis.  10. PREGNANCY: \"Is there any chance you are pregnant?\" \"When was your last menstrual period?\"        NA    Protocols used: BRUISES-A-OH    As far as the petechiae she reports she did have a 10 min long cry this morning          Review of Systems " "  Constitutional, HEENT, cardiovascular, pulmonary, GI, , musculoskeletal, neuro, skin, endocrine and psych systems are negative, except as otherwise noted in the HPI.      Objective    /76 (BP Location: Left arm, Patient Position: Chair, Cuff Size: Adult Large)   Pulse 107   Temp 98.6  F (37  C) (Tympanic)   Resp 15   Ht 1.651 m (5' 5\")   Wt 118.8 kg (262 lb)   LMP  (LMP Unknown)   SpO2 100%   BMI 43.60 kg/m    Body mass index is 43.6 kg/m .  Physical Exam   GENERAL: healthy, alert and no distress  EYES: Eyes grossly normal to inspection, PERRL and conjunctivae and sclerae normal  HENT: ear canals and TM's normal, nose and mouth without ulcers or lesions; Mild swelling of left side of face; normal dentition  NECK: no adenopathy, no asymmetry, masses, or scars and thyroid normal to palpation  RESP: lungs clear to auscultation - no rales, rhonchi or wheezes  CV: regular rate and rhythm, normal S1 S2, no S3 or S4, no murmur, click or rub, no peripheral edema and peripheral pulses strong  ABDOMEN: soft, nontender, no hepatosplenomegaly, no masses and bowel sounds normal  MS: no gross musculoskeletal defects noted, no edema  LYMPH: no cervical, supraclavicular, axillary, or inguinal adenopathy  SKIN: NO petechiae.       "

## 2023-07-18 ENCOUNTER — E-VISIT (OUTPATIENT)
Dept: FAMILY MEDICINE | Facility: CLINIC | Age: 48
End: 2023-07-18
Payer: COMMERCIAL

## 2023-07-18 ENCOUNTER — MYC MEDICAL ADVICE (OUTPATIENT)
Dept: FAMILY MEDICINE | Facility: CLINIC | Age: 48
End: 2023-07-18

## 2023-07-18 DIAGNOSIS — R60.0 SALIVARY GLAND SWELLING: Primary | ICD-10-CM

## 2023-07-18 DIAGNOSIS — B37.31 YEAST INFECTION OF THE VAGINA: ICD-10-CM

## 2023-07-18 LAB
CRP SERPL-MCNC: 4.25 MG/L
GROUP A STREP BY PCR: NOT DETECTED

## 2023-07-18 PROCEDURE — 99207 PR NON-BILLABLE SERV PER CHARTING: CPT | Performed by: NURSE PRACTITIONER

## 2023-07-18 NOTE — PATIENT INSTRUCTIONS
Josefina,     The white blood cells elevated can be due to the acute infection versus inflammation/swelling repsonse.  Strep is negative.    The antibiotic will be covering you for strep, your teeth and/or salivary infection.  I am just being cautious BUT if you feel better today you can hold off on starting the antibiotics. If you are worsening go ahead and start antibiotics.       Healthy regards,            Leonie Collazo, FNP-BC

## 2023-07-18 NOTE — TELEPHONE ENCOUNTER
Provider E-Visit time total (minutes): 2 minutes.       Healthy regards,            Leonie Collazo, FNP-BC

## 2023-07-19 LAB — N GONORRHOEA DNA SPEC QL NAA+PROBE: NEGATIVE

## 2023-07-20 LAB — C TRACH DNA SPEC QL NAA+PROBE: NEGATIVE

## 2023-07-21 NOTE — RESULT ENCOUNTER NOTE
Dear Nataliya,    Here is a summary of your recent test results:    Chlamydia and gonnohrea tests are normal.  I hope you are feeling better.   I will order a repeat cbc or you to do next week to ensure its improving.     For additional lab test information, labtestsonline.org is an excellent reference.    In addition, here is a list of due or overdue Health Maintenance reminders:    Hepatitis B Vaccine(3 of 3 - 19+ 3-dose series) due on 07/20/2023  Colorectal Cancer Screening due on 08/01/2023  Ferritin Lab due on 08/18/2023    Please call us at [phone] (or use [facility]) to address the above recommendations if needed.    Thank you for choosing [facility].  It was an honor and a privilege to participate in your care.       Healthy regards,    Leonie Collazo, LEROY  [facility]

## 2023-07-22 RX ORDER — FLUCONAZOLE 150 MG/1
150 TABLET ORAL ONCE
Qty: 2 TABLET | Refills: 0 | Status: SHIPPED | OUTPATIENT
Start: 2023-07-22 | End: 2023-07-22

## 2023-07-24 ENCOUNTER — ALLIED HEALTH/NURSE VISIT (OUTPATIENT)
Dept: FAMILY MEDICINE | Facility: CLINIC | Age: 48
End: 2023-07-24
Payer: COMMERCIAL

## 2023-07-24 DIAGNOSIS — Z23 ENCOUNTER FOR IMMUNIZATION: ICD-10-CM

## 2023-07-24 DIAGNOSIS — Z23 NEED FOR HEPATITIS VACCINATION: Primary | ICD-10-CM

## 2023-07-24 PROCEDURE — 99207 PR NO CHARGE NURSE ONLY: CPT

## 2023-07-24 NOTE — PROGRESS NOTES
Prior to immunization administration, verified patients identity using patient s name and date of birth. Please see Immunization Activity for additional information.     Screening Questionnaire for Adult Immunization    Are you sick today?   No   Do you have allergies to medications, food, a vaccine component or latex?   No   Have you ever had a serious reaction after receiving a vaccination?   No   Do you have a long-term health problem with heart, lung, kidney, or metabolic disease (e.g., diabetes), asthma, a blood disorder, no spleen, complement component deficiency, a cochlear implant, or a spinal fluid leak?  Are you on long-term aspirin therapy?   No   Do you have cancer, leukemia, HIV/AIDS, or any other immune system problem?   No   Do you have a parent, brother, or sister with an immune system problem?   No   In the past 3 months, have you taken medications that affect  your immune system, such as prednisone, other steroids, or anticancer drugs; drugs for the treatment of rheumatoid arthritis, Crohn s disease, or psoriasis; or have you had radiation treatments?   No   Have you had a seizure, or a brain or other nervous system problem?   No   During the past year, have you received a transfusion of blood or blood    products, or been given immune (gamma) globulin or antiviral drug?   No   For women: Are you pregnant or is there a chance you could become       pregnant during the next month?   No   Have you received any vaccinations in the past 4 weeks?   No     Immunization questionnaire answers were all negative.    I have reviewed the following standing orders:   This patient is due and qualifies for the Hepatitis B vaccine.    Click here for Hepatitis B Standing Order    I have reviewed the vaccines inclusion and exclusion criteria; No concerns regarding eligibility.     Patient instructed to remain in clinic for 15 minutes afterwards, and to report any adverse reactions.     Screening performed by Leonie  MINERVA Freeman on 7/24/2023 at 10:57 AM.   Prior to immunization administration, verified patients identity using patient s name and date of birth. Please see Immunization Activity for additional information.     Screening Questionnaire for Adult Immunization    Are you sick today?   No   Do you have allergies to medications, food, a vaccine component or latex?   No   Have you ever had a serious reaction after receiving a vaccination?   No   Do you have a long-term health problem with heart, lung, kidney, or metabolic disease (e.g., diabetes), asthma, a blood disorder, no spleen, complement component deficiency, a cochlear implant, or a spinal fluid leak?  Are you on long-term aspirin therapy?   No   Do you have cancer, leukemia, HIV/AIDS, or any other immune system problem?   No   Do you have a parent, brother, or sister with an immune system problem?   No   In the past 3 months, have you taken medications that affect  your immune system, such as prednisone, other steroids, or anticancer drugs; drugs for the treatment of rheumatoid arthritis, Crohn s disease, or psoriasis; or have you had radiation treatments?   No   Have you had a seizure, or a brain or other nervous system problem?   No   During the past year, have you received a transfusion of blood or blood    products, or been given immune (gamma) globulin or antiviral drug?   No   For women: Are you pregnant or is there a chance you could become       pregnant during the next month?   No   Have you received any vaccinations in the past 4 weeks?   No     Immunization questionnaire answers were all negative.    I have reviewed the following standing orders:   This patient is due and qualifies for the Hepatitis B vaccine.    Click here for Hepatitis B Standing Order    I have reviewed the vaccines inclusion and exclusion criteria; No concerns regarding eligibility.     Patient instructed to remain in clinic for 15 minutes afterwards, and to report any adverse  reactions.     Screening performed by Leonie Freeman CMA on 7/24/2023 at 10:57 AM.

## 2023-07-28 ENCOUNTER — OFFICE VISIT (OUTPATIENT)
Dept: PODIATRY | Facility: CLINIC | Age: 48
End: 2023-07-28
Payer: COMMERCIAL

## 2023-07-28 ENCOUNTER — ANCILLARY PROCEDURE (OUTPATIENT)
Dept: GENERAL RADIOLOGY | Facility: CLINIC | Age: 48
End: 2023-07-28
Attending: PODIATRIST
Payer: COMMERCIAL

## 2023-07-28 ENCOUNTER — THERAPY VISIT (OUTPATIENT)
Dept: PHYSICAL THERAPY | Facility: CLINIC | Age: 48
End: 2023-07-28
Payer: COMMERCIAL

## 2023-07-28 VITALS — DIASTOLIC BLOOD PRESSURE: 76 MMHG | BODY MASS INDEX: 43.6 KG/M2 | WEIGHT: 262 LBS | SYSTOLIC BLOOD PRESSURE: 118 MMHG

## 2023-07-28 DIAGNOSIS — M79.18 MYOFASCIAL PAIN: ICD-10-CM

## 2023-07-28 DIAGNOSIS — S93.402S MODERATE ANKLE SPRAIN, LEFT, SEQUELA: ICD-10-CM

## 2023-07-28 DIAGNOSIS — M25.572 CHRONIC PAIN OF LEFT ANKLE: Primary | ICD-10-CM

## 2023-07-28 DIAGNOSIS — G35 MULTIPLE SCLEROSIS (H): ICD-10-CM

## 2023-07-28 DIAGNOSIS — M25.572 CHRONIC PAIN OF LEFT ANKLE: ICD-10-CM

## 2023-07-28 DIAGNOSIS — G89.29 CHRONIC PAIN OF LEFT ANKLE: ICD-10-CM

## 2023-07-28 DIAGNOSIS — M54.2 CERVICALGIA: Primary | ICD-10-CM

## 2023-07-28 DIAGNOSIS — G89.29 CHRONIC PAIN OF LEFT ANKLE: Primary | ICD-10-CM

## 2023-07-28 PROCEDURE — 97530 THERAPEUTIC ACTIVITIES: CPT | Mod: GP | Performed by: PHYSICAL THERAPIST

## 2023-07-28 PROCEDURE — 97140 MANUAL THERAPY 1/> REGIONS: CPT | Mod: 59 | Performed by: PHYSICAL THERAPIST

## 2023-07-28 PROCEDURE — 97035 APP MDLTY 1+ULTRASOUND EA 15: CPT | Mod: GP | Performed by: PHYSICAL THERAPIST

## 2023-07-28 PROCEDURE — 99213 OFFICE O/P EST LOW 20 MIN: CPT | Performed by: PODIATRIST

## 2023-07-28 PROCEDURE — 73610 X-RAY EXAM OF ANKLE: CPT | Mod: TC | Performed by: RADIOLOGY

## 2023-07-28 NOTE — PROGRESS NOTES
"PATIENT HISTORY:   Nataliya Aldrich is a 48 year old female who presents to clinic for pain to left ankle.  Has been going on for 2 months.  Denies fever, nausea, vomiting.  Notes its aching and throbbing.  Will come and go.  6 out of 10 at its worst.  Worse with weightbearing and exercising.  She has tried stretching it but it has not really helped.  Denies specific injury.  Wondering what is causing the pain and what can be done for it.    Review of Systems:  Patient denies fever, chills, rash, wound, stiffness, numbness, weakness, heart burn, blood in stool, chest pain with activity, calf pain when walking, shortness of breath with activity, chronic cough, easy bleeding/bruising, swelling of ankles, excessive thirst, fatigue, depression, anxiety.  Patient admits to limping at times.     PAST MEDICAL HISTORY:   Past Medical History:   Diagnosis Date    Asthma     mild persistent - Dr Gee    Cerebral infarction (H) 2015    Fibroids     s/p hysterectomy    H/O gastric bypass     Hypertension     Migraine     followed by Devika    Obstructive sleep apnea     Pre-diabetes     Relapsing remitting multiple sclerosis (H) 2015    lesion in [place].  Facial tingling initial symptom.  F/B [facility]    Spondylosis of cervical region without myelopathy or radiculopathy         PAST SURGICAL HISTORY:   Past Surgical History:   Procedure Laterality Date    COLONOSCOPY  2012    GASTRIC BYPASS  2002    \"Trouble with B12 and D\"    HYSTERECTOMY, MONO  2013    cervix gone.  fibroids.  without BSO    TONSILLECTOMY          MEDICATIONS:   Current Outpatient Medications:     acetaminophen (TYLENOL) 500 MG tablet, Take 2 tablets (1,000 mg) by mouth 3 times daily, Disp: , Rfl:     azelastine (ASTELIN) 0.1 % nasal spray, Spray 1 spray into both nostrils 2 times daily as needed for rhinitis (nasal antihistamine), Disp: 30 mL, Rfl: 5    baclofen (LIORESAL) 20 MG tablet, TAKE 1 TABLET BY MOUTH EVERY NIGHT AT BEDTIME. MAY ALSO TAKE 1 TABLET " EVERY 4 HOURS AS NEEDED FOR MUSCLE SPASMS, Disp: 180 tablet, Rfl: 1    calcium carbonate-vitamin D (OSCAL W/D) 500-200 MG-UNIT tablet, Take 1 tablet by mouth 2 times daily (with meals), Disp: , Rfl:     cetirizine (ZYRTEC) 10 MG tablet, Take 1 tablet (10 mg) by mouth daily, Disp: 180 tablet, Rfl: 1    chlorhexidine (PERIDEX) 0.12 % solution, Swish and spit 15 mLs in mouth 2 times daily, Disp: 118 mL, Rfl: 1    cyclobenzaprine (FLEXERIL) 10 MG tablet, Take 1 tablet (10 mg) by mouth 3 times daily as needed for muscle spasms, Disp: 40 tablet, Rfl: 0    desonide (DESOWEN) 0.05 % external cream, Apply topically 2 times daily To face as needed for rash, Disp: 15 g, Rfl: 0    FLUoxetine (PROZAC) 20 MG capsule, Take 1 capsule (20 mg) by mouth daily, Disp: 90 capsule, Rfl: 0    fluticasone (FLONASE) 50 MCG/ACT nasal spray, USE 1 PUFF IN EACH NOSTRIL AS DIRECTED., Disp: 16 g, Rfl: 5    gabapentin (NEURONTIN) 300 MG capsule, Take 1 capsule (300 mg) by mouth every 4 hours, Disp: 120 capsule, Rfl: 5    hydrOXYzine (ATARAX) 25 MG tablet, Take 0.5-1 tablets (12.5-25 mg) by mouth 3 times daily as needed for anxiety or other (SLEEP), Disp: 30 tablet, Rfl: 0    ipratropium - albuterol 0.5 mg/2.5 mg/3 mL (DUONEB) 0.5-2.5 (3) MG/3ML neb solution, TAKE 1 VIAL (3 MLS) BY NEBULIZATION EVERY 6 HOURS AS NEEDED FOR SHORTNESS OF BREATH, WHEEZING OR COUGH, Disp: 90 mL, Rfl: 0    ketotifen (ZADITOR) 0.025 % ophthalmic solution, Place 1 drop into both eyes 2 times daily as needed for itching, Disp: 10 mL, Rfl: 1    MAGNESIUM PO, , Disp: , Rfl:     modafinil (PROVIGIL) 100 MG tablet, Take 1 tablet (100 mg) by mouth daily For daytime drowsiness (ok to increase to 2 tablets daily if symptoms not improved in 14 days), Disp: 90 tablet, Rfl: 1    montelukast (SINGULAIR) 10 MG tablet, TAKE 1 TABLET(10 MG) BY MOUTH AT BEDTIME, Disp: 90 tablet, Rfl: 1    Multiple Vitamins-Calcium (ONE-A-DAY WOMENS FORMULA PO), , Disp: , Rfl:     natalizumab (TYSABRI) 300  MG/15ML injection, Inject 15 mLs (300 mg) into the vein once every six weeks Infusion, Disp: , Rfl:     omeprazole (PRILOSEC) 20 MG DR capsule, TAKE 1 CAPSULE(20 MG) BY MOUTH TWICE DAILY BEFORE MEALS, Disp: 180 capsule, Rfl: 1    pregabalin (LYRICA) 75 MG capsule, Take 1 capsule (75 mg) by mouth 2 times daily as needed (neuropathic pain), Disp: 60 capsule, Rfl: 5    ROZEREM 8 MG tablet, TAKE ONE TABLET BY MOUTH AT BEDTIME AS NEEDED FOR SLEEP, Disp: 30 tablet, Rfl: 0    simethicone (MYLICON) 125 MG chewable tablet, CHEW AND SWALLOW ONE TABLET BY MOUTH FOUR TIMES A DAY AS NEEDED FOR INTESTINAL GAS, Disp: 60 tablet, Rfl: 0    SUMAtriptan (IMITREX) 100 MG tablet, Take 50 mg up to twice daily as needed for headache; separate doses by at least one hour and do not use more than 3 days/week, Disp: 18 tablet, Rfl: 3    SYMBICORT 160-4.5 MCG/ACT Inhaler, INHALE 2 PUFFS INTO THE LUNGS TWICE DAILY, Disp: 10.2 g, Rfl: 5    topiramate (TOPAMAX) 100 MG tablet, Take 1 tablet (100 mg) by mouth At Bedtime (take with 50 mg dose at bedtime), Disp: 90 tablet, Rfl: 1    topiramate (TOPAMAX) 50 MG tablet, Take 1 tablet (50 mg) by mouth At Bedtime (take with 100 mg dose to total 150 mg at bedtime), Disp: 90 tablet, Rfl: 1    triamcinolone (KENALOG) 0.1 % external cream, Apply topically 2 times daily No more  than 2 weeks in a row not on face, Disp: 15 g, Rfl: 1    valACYclovir (VALTREX) 500 MG tablet, Take 1 tablet (500 mg) by mouth daily, Disp: 90 tablet, Rfl: 3    VENTOLIN  (90 Base) MCG/ACT inhaler, SHAKE WELL AND INHALE 2 PUFFS INTO THE LUNGS EVERY 6 HOURS AS NEEDED FOR SHORTNESS OF BREATH OR WHEEZING Strength: 108 (90 Base) MCG/ACT, Disp: 18 g, Rfl: 1    vitamin D3 (CHOLECALCIFEROL) 250 mcg (38157 units) capsule, TAKE 1 CAPSULE BY MOUTH ONCE DAILY, Disp: 90 capsule, Rfl: 3     ALLERGIES:    Allergies   Allergen Reactions    Aspirin Difficulty breathing, Palpitations and Other (See Comments)    Cephalexin Swelling    Adhesive  Tape     Amantadine Swelling    Azithromycin Angioedema    Doxycycline Itching and Swelling     3 doses - itching all over, hand swelling, tongue fullness    Naproxen     Latex Hives, Itching and Rash    Penicillins Other (See Comments), Nausea and Vomiting, Hives, Swelling, Rash, Cramps and GI Disturbance     Amoxicillin OK        SOCIAL HISTORY:   Social History     Socioeconomic History    Marital status:      Spouse name: Not on file    Number of children: 2    Years of education: Not on file    Highest education level: Not on file   Occupational History     Employer: [employer]    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      Employer: [employer]   Tobacco Use    Smoking status: Former     Packs/day: 0.50     Years: 2.00     Pack years: 1.00     Types: Cigars, Cigarettes     Start date: 1/2/2011     Quit date: 7/2/2013     Years since quitting: 10.0    Smokeless tobacco: Never   Vaping Use    Vaping Use: Never used   Substance and Sexual Activity    Alcohol use: Yes     Alcohol/week: 2.0 standard drinks of alcohol     Comment: 0-3 drinks per week    Drug use: No     Comment: Marijuana when younger     Sexual activity: Not Currently     Partners: Male     Birth control/protection: Abstinence   Other Topics Concern    Parent/sibling w/ CABG, MI or angioplasty before 65F 55M? No   Social History Narrative    Not on file     Social Determinants of Health     Financial Resource Strain: Not on file   Food Insecurity: Not on file   Transportation Needs: Not on file   Physical Activity: Not on file   Stress: Not on file   Social Connections: Not on file   Intimate Partner Violence: Not on file   Housing Stability: Not on file        FAMILY HISTORY:   Family History   Problem Relation Age of Onset    Hypertension Mother     Hyperlipidemia Mother     Obesity Mother     Obesity Father     Diabetes Maternal Grandmother     Hyperlipidemia Maternal Grandmother     Cerebrovascular  Disease Maternal Grandfather     Diabetes Paternal Grandmother     Cerebrovascular Disease Paternal Grandmother     Depression Paternal Grandmother     Substance Abuse Paternal Grandmother     Lupus Paternal Aunt 41    Multiple Sclerosis No family hx of     Breast Cancer No family hx of     Ovarian Cancer No family hx of         EXAM:Vitals: Wt 118.8 kg (262 lb)   LMP  (LMP Unknown)   BMI 43.60 kg/m    BMI= Body mass index is 43.6 kg/m .    General appearance: Patient is alert and fully cooperative with history & exam.  No sign of distress is noted during the visit.     Psychiatric: Affect is pleasant & appropriate.  Patient appears motivated to improve health.     Respiratory: Breathing is regular & unlabored while sitting.     HEENT: Hearing is intact to spoken word.  Speech is clear.  No gross evidence of visual impairment that would impact ambulation.     Dermatologic: Skin is intact to both lower extremities without significant lesions, rash or abrasion.  No paronychia or evidence of soft tissue infection is noted.     Vascular: DP & PT pulses are intact & regular bilaterally.  No significant edema or varicosities noted.  CFT and skin temperature is normal to both lower extremities.     Neurologic: Lower extremity sensation is intact to light touch.  No evidence of weakness or contracture in the lower extremities.  No evidence of neuropathy.     Musculoskeletal: Patient is ambulatory without assistive device or brace.  Pain on palpation of the left anterior talofibular ligament.  Pain with inversion of the foot.    Radiographs: left ankle xray -  I personally reviewed the xrays -ankle joint spaces congruent.  No fractures are noted.  Plantar and posterior heel spurs noted.     ASSESSMENT:    Chronic pain of left ankle  Moderate ankle sprain, left, sequela  Multiple sclerosis (H)     Medical Decision Making/Plan:  Reviewed patient's chart in [facility].  Reviewed and discussed x-rays.Talked about ankle sprains and  that they can take up to 12 weeks to heal. We discussed that there can be tearing of the ligament which may require surgical repair to help re-stabilize the ankle. Non surgically recommend an ankle brace, orthotics, and physical therapy to get range of motion and proprioception back.    Discussed that sometimes the lateral ankle ligaments can stretch or tear.  At this time I would recommend an ankle brace to help with stabilization of the foot on the leg.  We will order an MRI to assess if there is a tear.  If no tear would likely try physical therapy with proprioception.  If there is a tear discussed permanent ankle bracing versus surgical repair.  All questions were answered to patient's satisfaction and she will call further questions or concerns.        Patient risk factor: Patient is at low risk for infection.        Rocio Tobisa DPM, Podiatry/Foot and Ankle Surgery

## 2023-07-28 NOTE — PATIENT INSTRUCTIONS
Thank you for choosing [facility] Podiatry / Foot & Ankle Surgery!    DR SIMENTAL'S CLINIC:  [facility]   [address]   [place] [address]      TRIAGE LINE: [phone]  APPOINTMENTS: [phone]  RADIOLOGY: [phone]  SET UP SURGERY: [phone]  PHYSICAL THERAPY: [phone]   FAX NUMBER: [phone]  BILLING QUESTIONS: [phone]       Follow up: As needed    Please call to schedule your MRI/CT/Ultrasound/Arthrogram appointment.  The number is [phone].    ANKLE SPRAIN  An ankle sprain is an injury to one or more ligaments in the ankle, usually on the outside of the ankle. Ligaments are bands of tissue - like rubber bands - that connect one bone to another and bind the joints together. In the ankle joint, ligaments provide stability by limiting side-to-side movement.  Some ankle sprains are much worse than others. The severity of an ankle sprain depends on whether the ligament is stretched, partially torn, or completely torn, as well as on the number of ligaments involved. Ankle sprains are not the same as strains, which affect muscles rather than ligaments.  CAUSES  Sprained ankles often result from a fall, a sudden twist, or a blow that forces the ankle joint out of its normal position. Ankle sprains commonly occur while participating in sports, wearing inappropriate shoes, or walking or running on an uneven surface.  Sometimes ankle sprains occur because of a person is born with weak ankles. Previous ankle or foot injuries can also weaken the ankle and lead to sprains.  SYMPTOMS  The symptoms of ankle sprains may include: pain or soreness, swelling, bruising, difficulty walking, and stiffness in the joint.  These symptoms may vary in intensity, depending on the severity of the sprain. Sometimes pain and swelling are absent in people with previous ankle sprains. Instead, they may simply feel the ankle is wobbly and unsteady when they walk. Even if there is no pain  or swelling with a sprained ankle, treatment is crucial. Any ankle sprain - whether it s your first or your fifth - requires prompt medical attention.  DIAGNOSIS  In evaluating your injury, the foot and ankle surgeon will obtain a thorough history of your symptoms and examine your foot. X-rays or other advanced imaging studies may be ordered to help determine the severity of the injury.  MEDICAL TREATMENT  There are four key reasons why an ankle sprain should be promptly evaluated and treated by a foot and ankle surgeon:  An untreated ankle sprain may lead to chronic ankle instability, a condition marked by persistent discomfort and a  giving way  of the ankle. Weakness in the leg may also develop.   A more severe ankle injury may have occurred along with the sprain. This might include a serious bone fracture that, if left untreated, could lead to troubling complications.   An ankle sprain may be accompanied by a foot injury that causes discomfort but has gone unnoticed thus far.   Rehabilitation of a sprained ankle needs to begin right away. If rehabilitation is delayed, the injury may be less likely to heal properly.   NON-SURGICAL TREATMENT  When you have an ankle sprain, rehabilitation is crucial--and it starts the moment your treatment begins. Your foot and ankle surgeon may recommend one or more of the following treatment options:  Rest. Stay off the injured ankle. Walking may cause further injury.   Ice. Apply an ice pack to the injured area, placing a thin towel between the ice and the skin. Use ice for 20 minutes and then wait at least 40 minutes before icing again.   Compression. An elastic wrap may be recommended to control swelling.   Elevation. The ankle should be raised slightly above the level of your heart to reduce swelling.   Early physical therapy. Your doctor will start you on a rehabilitation program as soon as possible to promote healing and increase your range of motion. This includes doing  prescribed exercises.   Medications. Nonsteroidal anti-inflammatory drugs (NSAIDs), such as ibuprofen, may be recommended to reduce pain and inflammation. In some cases, prescription pain medications are needed to provide adequate relief.   SURGICAL TREATMENT  In more severe cases, surgery may be required to adequately treat an ankle sprain. Surgery often involves repairing the damaged ligament or ligaments. The foot and ankle surgeon will select the surgical procedure best suited for your case based on the type and severity of your injury as well as your activity level.  After surgery, rehabilitation is extremely important. Completing your rehabilitation program is crucial to a successful outcome. Be sure to continue to see your foot and ankle surgeon during this period to ensure that your ankle heals properly and function is restored.

## 2023-07-28 NOTE — LETTER
"    7/28/2023         RE: Nataliya Aldrich  [address]  [place] [address]        Dear Colleague,    Thank you for referring your patient, Nataliya Aldrich, to the [facility] PODIATRY. Please see a copy of my visit note below.    PATIENT HISTORY:   Nataliya Aldrich is a 48 year old female who presents to clinic for pain to left ankle.  Has been going on for 2 months.  Denies fever, nausea, vomiting.  Notes its aching and throbbing.  Will come and go.  6 out of 10 at its worst.  Worse with weightbearing and exercising.  She has tried stretching it but it has not really helped.  Denies specific injury.  Wondering what is causing the pain and what can be done for it.    Review of Systems:  Patient denies fever, chills, rash, wound, stiffness, numbness, weakness, heart burn, blood in stool, chest pain with activity, calf pain when walking, shortness of breath with activity, chronic cough, easy bleeding/bruising, swelling of ankles, excessive thirst, fatigue, depression, anxiety.  Patient admits to limping at times.     PAST MEDICAL HISTORY:   Past Medical History:   Diagnosis Date     Asthma     mild persistent - Dr Gee     Cerebral infarction (H) 2015     Fibroids     s/p hysterectomy     H/O gastric bypass      Hypertension      Migraine     followed by Devika     Obstructive sleep apnea      Pre-diabetes      Relapsing remitting multiple sclerosis (H) 2015    lesion in [place].  Facial tingling initial symptom.  F/B [facility]     Spondylosis of cervical region without myelopathy or radiculopathy         PAST SURGICAL HISTORY:   Past Surgical History:   Procedure Laterality Date     COLONOSCOPY  2012     GASTRIC BYPASS  2002    \"Trouble with B12 and D\"     HYSTERECTOMY, MONO  2013    cervix gone.  fibroids.  without BSO     TONSILLECTOMY          MEDICATIONS:   Current Outpatient Medications:      acetaminophen (TYLENOL) 500 MG tablet, Take 2 tablets (1,000 " mg) by mouth 3 times daily, Disp: , Rfl:      azelastine (ASTELIN) 0.1 % nasal spray, Spray 1 spray into both nostrils 2 times daily as needed for rhinitis (nasal antihistamine), Disp: 30 mL, Rfl: 5     baclofen (LIORESAL) 20 MG tablet, TAKE 1 TABLET BY MOUTH EVERY NIGHT AT BEDTIME. MAY ALSO TAKE 1 TABLET EVERY 4 HOURS AS NEEDED FOR MUSCLE SPASMS, Disp: 180 tablet, Rfl: 1     calcium carbonate-vitamin D (OSCAL W/D) 500-200 MG-UNIT tablet, Take 1 tablet by mouth 2 times daily (with meals), Disp: , Rfl:      cetirizine (ZYRTEC) 10 MG tablet, Take 1 tablet (10 mg) by mouth daily, Disp: 180 tablet, Rfl: 1     chlorhexidine (PERIDEX) 0.12 % solution, Swish and spit 15 mLs in mouth 2 times daily, Disp: 118 mL, Rfl: 1     cyclobenzaprine (FLEXERIL) 10 MG tablet, Take 1 tablet (10 mg) by mouth 3 times daily as needed for muscle spasms, Disp: 40 tablet, Rfl: 0     desonide (DESOWEN) 0.05 % external cream, Apply topically 2 times daily To face as needed for rash, Disp: 15 g, Rfl: 0     FLUoxetine (PROZAC) 20 MG capsule, Take 1 capsule (20 mg) by mouth daily, Disp: 90 capsule, Rfl: 0     fluticasone (FLONASE) 50 MCG/ACT nasal spray, USE 1 PUFF IN EACH NOSTRIL AS DIRECTED., Disp: 16 g, Rfl: 5     gabapentin (NEURONTIN) 300 MG capsule, Take 1 capsule (300 mg) by mouth every 4 hours, Disp: 120 capsule, Rfl: 5     hydrOXYzine (ATARAX) 25 MG tablet, Take 0.5-1 tablets (12.5-25 mg) by mouth 3 times daily as needed for anxiety or other (SLEEP), Disp: 30 tablet, Rfl: 0     ipratropium - albuterol 0.5 mg/2.5 mg/3 mL (DUONEB) 0.5-2.5 (3) MG/3ML neb solution, TAKE 1 VIAL (3 MLS) BY NEBULIZATION EVERY 6 HOURS AS NEEDED FOR SHORTNESS OF BREATH, WHEEZING OR COUGH, Disp: 90 mL, Rfl: 0     ketotifen (ZADITOR) 0.025 % ophthalmic solution, Place 1 drop into both eyes 2 times daily as needed for itching, Disp: 10 mL, Rfl: 1     MAGNESIUM PO, , Disp: , Rfl:      modafinil (PROVIGIL) 100 MG tablet, Take 1 tablet (100 mg) by mouth daily For daytime  drowsiness (ok to increase to 2 tablets daily if symptoms not improved in 14 days), Disp: 90 tablet, Rfl: 1     montelukast (SINGULAIR) 10 MG tablet, TAKE 1 TABLET(10 MG) BY MOUTH AT BEDTIME, Disp: 90 tablet, Rfl: 1     Multiple Vitamins-Calcium (ONE-A-DAY WOMENS FORMULA PO), , Disp: , Rfl:      natalizumab (TYSABRI) 300 MG/15ML injection, Inject 15 mLs (300 mg) into the vein once every six weeks Infusion, Disp: , Rfl:      omeprazole (PRILOSEC) 20 MG DR capsule, TAKE 1 CAPSULE(20 MG) BY MOUTH TWICE DAILY BEFORE MEALS, Disp: 180 capsule, Rfl: 1     pregabalin (LYRICA) 75 MG capsule, Take 1 capsule (75 mg) by mouth 2 times daily as needed (neuropathic pain), Disp: 60 capsule, Rfl: 5     ROZEREM 8 MG tablet, TAKE ONE TABLET BY MOUTH AT BEDTIME AS NEEDED FOR SLEEP, Disp: 30 tablet, Rfl: 0     simethicone (MYLICON) 125 MG chewable tablet, CHEW AND SWALLOW ONE TABLET BY MOUTH FOUR TIMES A DAY AS NEEDED FOR INTESTINAL GAS, Disp: 60 tablet, Rfl: 0     SUMAtriptan (IMITREX) 100 MG tablet, Take 50 mg up to twice daily as needed for headache; separate doses by at least one hour and do not use more than 3 days/week, Disp: 18 tablet, Rfl: 3     SYMBICORT 160-4.5 MCG/ACT Inhaler, INHALE 2 PUFFS INTO THE LUNGS TWICE DAILY, Disp: 10.2 g, Rfl: 5     topiramate (TOPAMAX) 100 MG tablet, Take 1 tablet (100 mg) by mouth At Bedtime (take with 50 mg dose at bedtime), Disp: 90 tablet, Rfl: 1     topiramate (TOPAMAX) 50 MG tablet, Take 1 tablet (50 mg) by mouth At Bedtime (take with 100 mg dose to total 150 mg at bedtime), Disp: 90 tablet, Rfl: 1     triamcinolone (KENALOG) 0.1 % external cream, Apply topically 2 times daily No more  than 2 weeks in a row not on face, Disp: 15 g, Rfl: 1     valACYclovir (VALTREX) 500 MG tablet, Take 1 tablet (500 mg) by mouth daily, Disp: 90 tablet, Rfl: 3     VENTOLIN  (90 Base) MCG/ACT inhaler, SHAKE WELL AND INHALE 2 PUFFS INTO THE LUNGS EVERY 6 HOURS AS NEEDED FOR SHORTNESS OF BREATH OR WHEEZING  Strength: 108 (90 Base) MCG/ACT, Disp: 18 g, Rfl: 1     vitamin D3 (CHOLECALCIFEROL) 250 mcg (40476 units) capsule, TAKE 1 CAPSULE BY MOUTH ONCE DAILY, Disp: 90 capsule, Rfl: 3     ALLERGIES:    Allergies   Allergen Reactions     Aspirin Difficulty breathing, Palpitations and Other (See Comments)     Cephalexin Swelling     Adhesive Tape      Amantadine Swelling     Azithromycin Angioedema     Doxycycline Itching and Swelling     3 doses - itching all over, hand swelling, tongue fullness     Naproxen      Latex Hives, Itching and Rash     Penicillins Other (See Comments), Nausea and Vomiting, Hives, Swelling, Rash, Cramps and GI Disturbance     Amoxicillin OK        SOCIAL HISTORY:   Social History     Socioeconomic History     Marital status:      Spouse name: Not on file     Number of children: 2     Years of education: Not on file     Highest education level: Not on file   Occupational History     Employer: [employer]     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      Employer: [employer]   Tobacco Use     Smoking status: Former     Packs/day: 0.50     Years: 2.00     Pack years: 1.00     Types: Cigars, Cigarettes     Start date: 1/2/2011     Quit date: 7/2/2013     Years since quitting: 10.0     Smokeless tobacco: Never   Vaping Use     Vaping Use: Never used   Substance and Sexual Activity     Alcohol use: Yes     Alcohol/week: 2.0 standard drinks of alcohol     Comment: 0-3 drinks per week     Drug use: No     Comment: Marijuana when younger      Sexual activity: Not Currently     Partners: Male     Birth control/protection: Abstinence   Other Topics Concern     Parent/sibling w/ CABG, MI or angioplasty before 65F 55M? No   Social History Narrative     Not on file     Social Determinants of Health     Financial Resource Strain: Not on file   Food Insecurity: Not on file   Transportation Needs: Not on file   Physical Activity: Not on file   Stress: Not on file   Social  Connections: Not on file   Intimate Partner Violence: Not on file   Housing Stability: Not on file        FAMILY HISTORY:   Family History   Problem Relation Age of Onset     Hypertension Mother      Hyperlipidemia Mother      Obesity Mother      Obesity Father      Diabetes Maternal Grandmother      Hyperlipidemia Maternal Grandmother      Cerebrovascular Disease Maternal Grandfather      Diabetes Paternal Grandmother      Cerebrovascular Disease Paternal Grandmother      Depression Paternal Grandmother      Substance Abuse Paternal Grandmother      Lupus Paternal Aunt 41     Multiple Sclerosis No family hx of      Breast Cancer No family hx of      Ovarian Cancer No family hx of         EXAM:Vitals: Wt 118.8 kg (262 lb)   LMP  (LMP Unknown)   BMI 43.60 kg/m    BMI= Body mass index is 43.6 kg/m .    General appearance: Patient is alert and fully cooperative with history & exam.  No sign of distress is noted during the visit.     Psychiatric: Affect is pleasant & appropriate.  Patient appears motivated to improve health.     Respiratory: Breathing is regular & unlabored while sitting.     HEENT: Hearing is intact to spoken word.  Speech is clear.  No gross evidence of visual impairment that would impact ambulation.     Dermatologic: Skin is intact to both lower extremities without significant lesions, rash or abrasion.  No paronychia or evidence of soft tissue infection is noted.     Vascular: DP & PT pulses are intact & regular bilaterally.  No significant edema or varicosities noted.  CFT and skin temperature is normal to both lower extremities.     Neurologic: Lower extremity sensation is intact to light touch.  No evidence of weakness or contracture in the lower extremities.  No evidence of neuropathy.     Musculoskeletal: Patient is ambulatory without assistive device or brace.  Pain on palpation of the left anterior talofibular ligament.  Pain with inversion of the foot.    Radiographs: left ankle xray -  I  personally reviewed the xrays -ankle joint spaces congruent.  No fractures are noted.  Plantar and posterior heel spurs noted.     ASSESSMENT:    Chronic pain of left ankle  Moderate ankle sprain, left, sequela  Multiple sclerosis (H)     Medical Decision Making/Plan:  [facility]  Reviewed and discussed x-rays.Talked about ankle sprains and that they can take up to 12 weeks to heal. We discussed that there can be tearing of the ligament which may require surgical repair to help re-stabilize the ankle. Non surgically recommend an ankle brace, orthotics, and physical therapy to get range of motion and proprioception back.    Discussed that sometimes the lateral ankle ligaments can stretch or tear.  At this time I would recommend an ankle brace to help with stabilization of the foot on the leg.  We will order an MRI to assess if there is a tear.  If no tear would likely try physical therapy with proprioception.  If there is a tear discussed permanent ankle bracing versus surgical repair.  All questions were answered to patient's satisfaction and she will call further questions or concerns.        Patient risk factor: Patient is at low risk for infection.        Rocio Tobias DPM, Podiatry/Foot and Ankle Surgery      Again, thank you for allowing me to participate in the care of your patient.        Sincerely,        Rocio Tobias DPM, Podiatry/Foot and Ankle Surgery

## 2023-08-02 ENCOUNTER — MEDICAL CORRESPONDENCE (OUTPATIENT)
Dept: HEALTH INFORMATION MANAGEMENT | Facility: CLINIC | Age: 48
End: 2023-08-02
Payer: COMMERCIAL

## 2023-08-04 ENCOUNTER — INFUSION THERAPY VISIT (OUTPATIENT)
Dept: INFUSION THERAPY | Facility: CLINIC | Age: 48
End: 2023-08-04
Attending: PSYCHIATRY & NEUROLOGY
Payer: COMMERCIAL

## 2023-08-04 VITALS
OXYGEN SATURATION: 100 % | HEART RATE: 63 BPM | DIASTOLIC BLOOD PRESSURE: 84 MMHG | TEMPERATURE: 98 F | RESPIRATION RATE: 16 BRPM | SYSTOLIC BLOOD PRESSURE: 130 MMHG

## 2023-08-04 DIAGNOSIS — G35 MULTIPLE SCLEROSIS (H): Primary | ICD-10-CM

## 2023-08-04 PROCEDURE — 36415 COLL VENOUS BLD VENIPUNCTURE: CPT | Performed by: PSYCHIATRY & NEUROLOGY

## 2023-08-04 PROCEDURE — 96365 THER/PROPH/DIAG IV INF INIT: CPT

## 2023-08-04 PROCEDURE — 258N000003 HC RX IP 258 OP 636: Performed by: PSYCHIATRY & NEUROLOGY

## 2023-08-04 PROCEDURE — 250N000011 HC RX IP 250 OP 636: Mod: JZ | Performed by: PSYCHIATRY & NEUROLOGY

## 2023-08-04 RX ORDER — ONDANSETRON 2 MG/ML
4 INJECTION INTRAMUSCULAR; INTRAVENOUS EVERY 8 HOURS PRN
Status: CANCELLED
Start: 2023-08-18

## 2023-08-04 RX ORDER — IBUPROFEN 200 MG
600 TABLET ORAL EVERY 6 HOURS PRN
Status: CANCELLED
Start: 2023-08-18

## 2023-08-04 RX ORDER — HEPARIN SODIUM (PORCINE) LOCK FLUSH IV SOLN 100 UNIT/ML 100 UNIT/ML
5 SOLUTION INTRAVENOUS
Status: CANCELLED | OUTPATIENT
Start: 2023-08-18

## 2023-08-04 RX ORDER — HEPARIN SODIUM,PORCINE 10 UNIT/ML
5 VIAL (ML) INTRAVENOUS
Status: CANCELLED | OUTPATIENT
Start: 2023-08-18

## 2023-08-04 RX ORDER — DIPHENHYDRAMINE HCL 25 MG
50 CAPSULE ORAL EVERY 4 HOURS PRN
Status: CANCELLED
Start: 2023-08-18

## 2023-08-04 RX ORDER — ACETAMINOPHEN 325 MG/1
650 TABLET ORAL EVERY 4 HOURS PRN
Status: CANCELLED
Start: 2023-08-18

## 2023-08-04 RX ADMIN — SODIUM CHLORIDE 250 ML: 9 INJECTION, SOLUTION INTRAVENOUS at 13:42

## 2023-08-04 RX ADMIN — NATALIZUMAB 300 MG: 300 INJECTION INTRAVENOUS at 13:42

## 2023-08-04 NOTE — PROGRESS NOTES
Infusion Nursing Note:  Nataliya Aldrich presents today for Tysabri.    Patient seen by provider today: No   present during visit today: Not Applicable.    Note: feeling well..      Intravenous Access:  Labs drawn without difficulty.  Peripheral IV placed.    Treatment Conditions:  Tysabri pre-infusion checklist completed via touch program.      Post Infusion Assessment:  Patient tolerated infusion without incident.   Does not need to stay 60 minutes post infusion for observation, per provider    Discharge Plan:   Discharge instructions reviewed with: Patient.  Patient and/or family verbalized understanding of discharge instructions and all questions answered.  AVS to patient via [email or web address].  Patient will return 9/15 for next appointment.   Patient discharged in stable condition accompanied by: self.  Departure Mode: Ambulatory.      Radha Lee RN

## 2023-08-05 ENCOUNTER — HOSPITAL ENCOUNTER (EMERGENCY)
Facility: CLINIC | Age: 48
Discharge: HOME | End: 2023-08-05
Attending: EMERGENCY MEDICINE | Admitting: EMERGENCY MEDICINE
Payer: COMMERCIAL

## 2023-08-05 VITALS
OXYGEN SATURATION: 100 % | SYSTOLIC BLOOD PRESSURE: 129 MMHG | TEMPERATURE: 97.7 F | HEART RATE: 64 BPM | DIASTOLIC BLOOD PRESSURE: 80 MMHG | RESPIRATION RATE: 21 BRPM

## 2023-08-05 DIAGNOSIS — T63.441A ALLERGIC REACTION TO BEE STING: ICD-10-CM

## 2023-08-05 PROCEDURE — 99284 EMERGENCY DEPT VISIT MOD MDM: CPT | Mod: 25

## 2023-08-05 PROCEDURE — 96374 THER/PROPH/DIAG INJ IV PUSH: CPT

## 2023-08-05 PROCEDURE — 250N000011 HC RX IP 250 OP 636: Performed by: EMERGENCY MEDICINE

## 2023-08-05 PROCEDURE — 96375 TX/PRO/DX INJ NEW DRUG ADDON: CPT

## 2023-08-05 RX ORDER — KETOROLAC TROMETHAMINE 15 MG/ML
15 INJECTION, SOLUTION INTRAMUSCULAR; INTRAVENOUS ONCE
Status: COMPLETED | OUTPATIENT
Start: 2023-08-05 | End: 2023-08-05

## 2023-08-05 RX ORDER — DIPHENHYDRAMINE HYDROCHLORIDE 50 MG/ML
25 INJECTION INTRAMUSCULAR; INTRAVENOUS ONCE
Status: DISCONTINUED | OUTPATIENT
Start: 2023-08-05 | End: 2023-08-05

## 2023-08-05 RX ORDER — DIPHENHYDRAMINE HYDROCHLORIDE 50 MG/ML
50 INJECTION INTRAMUSCULAR; INTRAVENOUS ONCE
Status: COMPLETED | OUTPATIENT
Start: 2023-08-05 | End: 2023-08-05

## 2023-08-05 RX ORDER — DIPHENHYDRAMINE HCL 25 MG
25 TABLET ORAL EVERY 6 HOURS PRN
Qty: 30 TABLET | Refills: 0 | Status: SHIPPED | OUTPATIENT
Start: 2023-08-05 | End: 2024-02-15

## 2023-08-05 RX ORDER — FAMOTIDINE 20 MG/1
20 TABLET, FILM COATED ORAL 2 TIMES DAILY
Qty: 10 TABLET | Refills: 0 | Status: SHIPPED | OUTPATIENT
Start: 2023-08-05 | End: 2023-08-10

## 2023-08-05 RX ORDER — PREDNISONE 20 MG/1
40 TABLET ORAL DAILY
Qty: 6 TABLET | Refills: 0 | Status: SHIPPED | OUTPATIENT
Start: 2023-08-05 | End: 2023-08-08

## 2023-08-05 RX ORDER — METHYLPREDNISOLONE SODIUM SUCCINATE 125 MG/2ML
125 INJECTION, POWDER, LYOPHILIZED, FOR SOLUTION INTRAMUSCULAR; INTRAVENOUS ONCE
Status: COMPLETED | OUTPATIENT
Start: 2023-08-05 | End: 2023-08-05

## 2023-08-05 RX ADMIN — FAMOTIDINE 20 MG: 10 INJECTION, SOLUTION INTRAVENOUS at 15:51

## 2023-08-05 RX ADMIN — DIPHENHYDRAMINE HYDROCHLORIDE 50 MG: 50 INJECTION INTRAMUSCULAR; INTRAVENOUS at 15:51

## 2023-08-05 RX ADMIN — METHYLPREDNISOLONE SODIUM SUCCINATE 125 MG: 125 INJECTION, POWDER, FOR SOLUTION INTRAMUSCULAR; INTRAVENOUS at 15:53

## 2023-08-05 RX ADMIN — KETOROLAC TROMETHAMINE 15 MG: 15 INJECTION, SOLUTION INTRAMUSCULAR; INTRAVENOUS at 17:54

## 2023-08-05 ASSESSMENT — ACTIVITIES OF DAILY LIVING (ADL): ADLS_ACUITY_SCORE: 35

## 2023-08-05 NOTE — ED TRIAGE NOTES
Patient presents to the ED reporting an allergic reaction to a bee stung.  was stung at 1330 and initially had a large amount of localized swelling to the left leg. States that since then the whole leg has swollen and now her throat feels scratchy and her lips are swollen.

## 2023-08-05 NOTE — ED PROVIDER NOTES
"  History     Chief Complaint:  Allergic Reaction       HPI     Nataliya Aldrich is a 48 year old female who presents with an allergic reaction. Patient reports she was at an outdoor [employer] event when she was stung by a bee on her left leg at around 1315. Immediately after being stung, she felt a burning and stinging sensation that \"felt like a knife\". Shortly after, her leg became red and swollen, and she felt numbness and tingling in her tongue. Some retired nurses at the event had first aid kits, and they applied some antihistamine, antibiotic, and benadryl topical creams, and iced the area which did help. She stayed at the event for a while thinking she was better, but on her way home noticed worsening pain, a headache, scratchy throat, \"bubbly\" lips, and felt hot. She notes she has a history of MS and asthma. She also noticed trouble breathing here in the ED, but did not have her inhaler, so she took a mint to help her breathing. She adds she has had swelling when she was stung by a bee when she was younger. Denies being on steroids, but notes she had an infusion for MS yesterday.    Independent Historian:   None - Patient Only    Review of External Notes:   None    Medications:    Cefdinir  Doxycycline hyclate  Doxycycline monohydrate  Duloxetine  Fluconazole  Hydrocodone acetaminophen  Methylprednisolone  Nirmatrelvir-ritonavir  Prednisone  Albuterol sulfate  Azelastine  Baclofen  Budesonide formoterol fumerate  Cetirizine  Chlorhexidine gluconate  Cyclobenzaprine  Desonide  Fluoxetine  Fluticasone propionate  Gabapentin  Hydroxyzine  Ipratropium-albuterol  Ketotifen fumarate  Modafinil  Montelukast sodium  Natalizumab  Omeprazole  Pregabalin  Ramelteon  Simethicone  Sumatriptan succinate  Topiramate  Valacyclovir    Past Medical History:    Asthma  Cerebral infarction  Fibroids  Hypertension  ISAAC  Pre-diabetes  Multiple sclerosis relapsing remitting  Spondylosis of cervical region without myelopathy " or radiculopathy  DDD  Long term use of immunosuppressant medication  Cervicalgia  Multiple sclerosis  Optic neuropathy  Visual field defect  Myalgia and myositis  Myoclonus  Paresthesias  Vitamin D deficiency  Gait ataxia  Insomnia  Neurogenic bladder  Vitamin B12 deficiency  Depression with anxiety  Acute pontine stroke  Elevated BMI  Post concussion syndrome  AC separation L shoulder  Cognitive changes  Iron deficiency anemia  Hypersomnia with sleep apnea  Leukocytosis  Fibroid uterus  Closed fracture of foot  Tendonitis of ankle  Tendonitis of L knee  Shoulder impingement  Amenorrhea  Concussion  Diverticulitis    Past Surgical History:    Colonoscopy  Gastric bypass  Hysterectomy  Tonsillectomy  C section x 2  Esophagogastric fundoplasty  Tubal ligation  Esophagogastroduodenoscopy    Physical Exam   Patient Vitals for the past 24 hrs:   BP Temp Pulse Resp SpO2   08/05/23 1715 129/76 -- 66 19 100 %   08/05/23 1700 126/73 -- 66 19 100 %   08/05/23 1645 121/74 -- 63 22 100 %   08/05/23 1630 108/73 -- 58 23 100 %   08/05/23 1615 115/62 -- 73 12 100 %   08/05/23 1600 128/82 -- 74 13 100 %   08/05/23 1541 (!) 144/112 97.7  F (36.5  C) 98 16 98 %        Physical Exam      HEENT:    Oropharynx is moist, without lesions or trismus.     No posterior pharyngeal edema.     No pooling of secretions.  Eyes:    Conjunctiva normal  Neck:     Supple, no meningismus.     CV:     Regular rate and rhythm.      No murmurs, rubs or gallops.       No  lower extremity edema.  PULM:    Clear to auscultation bilateral.       No respiratory distress.      Good air exchange.     No wheezing or stridor.  ABD:    Soft, non-tender, non-distended.      No rebound, guarding or rigidity.  MSK:     No gross deformity to all four extremities.   LYMPH:   No cervical lymphadenopathy.  NEURO:   Alert, good muscular tone, no atrophy.   Skin:    Warm, dry and intact.      Isolated raised erythematous patch to posterior aspect of left thigh  Psych:     Mood is good and affect is appropriate.      Emergency Department Course     Emergency Department Course & Assessments:  Interventions:  Medications   famotidine (PEPCID) injection 20 mg (20 mg Intravenous $Given 23 1551)   ketorolac (TORADOL) injection 15 mg (has no administration in time range)   diphenhydrAMINE (BENADRYL) injection 50 mg (50 mg Intravenous $Given 23 1551)   methylPREDNISolone sodium succinate (solu-MEDROL) injection 125 mg (125 mg Intravenous $Given 23 1553)      Independent Interpretation (X-rays, CTs, rhythm strip):  None    Assessments/Consultations/Discussion of Management or Tests:   ED Course as of 23 1737   Sat Aug 05, 2023   3514 Dr. Cuenca' evaluation     Social Determinants of Health affecting care:   None    Disposition:  The patient was discharged to home.     Impression & Plan    CMS Diagnoses: None    Medical Decision Makin-year-old female presents with bee sting to the left thigh with associated allergic phenomenon.  Patient has a localized allergic response for which she was treated with antihistamines and steroids.  She reported transient paresthesias of the tongue.  Bee sting was greater than 3 hours prior to arrival and she has no objective signs of allergic phenomenon outside the localized erythema to the left posterior thigh.  I do not believe there is indication for IM epinephrine.  Patient was observed in the ED in which her symptoms have completely resolved.  She was discharged home on a short course of steroids and antihistamines.  No evidence of anaphylaxis.  Patient safe for discharge home.    Diagnosis:    ICD-10-CM    1. Allergic reaction to bee sting  T63.441A            Discharge Medications:  New Prescriptions    DIPHENHYDRAMINE (BENADRYL) 25 MG TABLET    Take 1 tablet (25 mg) by mouth every 6 hours as needed for itching or allergies    FAMOTIDINE (PEPCID) 20 MG TABLET    Take 1 tablet (20 mg) by mouth 2 times daily for 5 days     PREDNISONE (DELTASONE) 20 MG TABLET    Take 2 tablets (40 mg) by mouth daily for 3 days      Scribe Disclosure:  I, Rubinaalejo Pedraza, am serving as a scribe at 4:19 PM on 8/5/2023 to document services personally performed by Madhu Cuenca MD based on my observations and the provider's statements to me.    8/5/2023   Madhu Cuenca MD Matthews, Jeremiah R, MD  08/05/23 1905

## 2023-08-07 DIAGNOSIS — J45.40 MODERATE PERSISTENT ASTHMA WITHOUT COMPLICATION: ICD-10-CM

## 2023-08-07 DIAGNOSIS — G43.711 INTRACTABLE CHRONIC MIGRAINE WITHOUT AURA AND WITH STATUS MIGRAINOSUS: ICD-10-CM

## 2023-08-09 ENCOUNTER — E-VISIT (OUTPATIENT)
Dept: FAMILY MEDICINE | Facility: CLINIC | Age: 48
End: 2023-08-09
Payer: COMMERCIAL

## 2023-08-09 DIAGNOSIS — T63.461A ALLERGIC REACTION TO WASP STING: Primary | ICD-10-CM

## 2023-08-09 PROCEDURE — 99422 OL DIG E/M SVC 11-20 MIN: CPT | Performed by: FAMILY MEDICINE

## 2023-08-09 RX ORDER — TOPIRAMATE 50 MG/1
50 TABLET, FILM COATED ORAL 2 TIMES DAILY
Qty: 180 TABLET | Refills: 1 | Status: SHIPPED | OUTPATIENT
Start: 2023-08-09 | End: 2024-02-15

## 2023-08-09 RX ORDER — EPINEPHRINE 0.3 MG/.3ML
0.3 INJECTION SUBCUTANEOUS PRN
Qty: 2 EACH | Refills: 3 | Status: SHIPPED | OUTPATIENT
Start: 2023-08-09 | End: 2024-05-21

## 2023-08-09 RX ORDER — ALBUTEROL SULFATE 90 UG/1
AEROSOL, METERED RESPIRATORY (INHALATION)
Qty: 18 G | Refills: 1 | Status: SHIPPED | OUTPATIENT
Start: 2023-08-09 | End: 2024-02-15

## 2023-08-09 NOTE — PATIENT INSTRUCTIONS
Please see the following instructions re: your wasp sting allergy and how to give yourself and EpiPen shot.      Please, call our clinic or go to the ER immediately if signs or symptoms worsen or fail to improve as anticipated.    Please call or schedule an appointment with Miya Crump PA-C at your earliest convenience, if you have more questions.             Thank you so much for doing an e-visit with us today. And, again, thank you  for choosing our [facility].  Please contact us with any questions that you may have.   We appreciate the opportunity to serve you now and look forward to supporting your healthcare needs for a long time to come!    Our clinic for the foreseeable future and until further notice , will continue to offer virtual visits, including telephone visits, video visits,  and e-visits, especially during this period of COVID-19 coronavirus pandemic.  Please call to schedule another one if you need it!        Most Sincerely,     Susan Kaye MD                                              To reach your [facility] care team after hours call:   [phone]    PLEASE NOTE OUR HOURS HAVE CHANGED secondary to COVID-19 coronavirus pandemic, as we are trying to minimize patient exposure to the virus,  which is now widespread in the [facility].  These hours may change with very little notice.  We apologize for any inconvenience.       Our current clinic hours are:    Our current clinic hours are:         Monday- Thursday   7:00am - 6:00pm  in person.      Friday  7:00am- 5:00pm                       Saturday and Sunday : Closed to in person and virtual visits        We have telephone and virtual visit times available between    7:00am - 6pm on Monday-Friday as well.                                                Phone:  [phone]      Our pharmacy hours:Monday  through Friday 8:00am to 5:00pm                        Saturday - 9:00 am to 12  noon Sunday : Closed.                ###  Please note: at this time we are not accepting any walk-in visits. ###      There is also information available at our web site:  [email or web address]    If your provider ordered any lab tests and you do not receive the results within 10 business days, please call the clinic.    If you need a medication refill please contact your pharmacy.  Please allow 2 business days for your refill to be completed.    Our clinic offers telephone visits and e visits.  Please ask one of your team members to explain more.      Use [email or web address] (secure email communication and access to your chart) to send your primary care provider a message or make an appointment. Ask someone on your Team how to sign up for [email or web address].     Pharmacy is drive-thru only at this time secondary to the COVID-19 coronavirus pandemic, as we are trying to minimize patient exposure to the virus,  which is now widespread in the state.        There is also information available at our web site:  [email or web address]    If your provider ordered any lab tests and you do not receive the results within 10 business days, please call the clinic.    If you need a medication refill please contact your pharmacy.  Please allow 3 business days for your refill to be completed.    Miya Antony PA-C is out of office this week.  I've sent an RX for an epi-pen to our pharmacy for you.  Please keep in mind an Epi-pen just buys you time.  You use it ASAP after a bee or wasp sting and take benadryl 50mg if able.  Ok to repeat Epi-pen in 5-10 minutes if symptoms continuing and not rapidly improving.  After first shot of epinephrine, call 911 and get to Emergency room asap as you'll need to be monitored secondary to your asthma.     See attached for additional information and have the pharmacist teach you how to use the Epi-pen (may be a different brand name).     Thanks for choosing us as your health care partner,    Susan FRANCO  MD [place] for Miya Crump PA-C out of office.

## 2023-08-11 ENCOUNTER — THERAPY VISIT (OUTPATIENT)
Dept: PHYSICAL THERAPY | Facility: CLINIC | Age: 48
End: 2023-08-11
Payer: COMMERCIAL

## 2023-08-11 DIAGNOSIS — M54.2 CERVICALGIA: ICD-10-CM

## 2023-08-11 DIAGNOSIS — M79.18 MYOFASCIAL PAIN: Primary | ICD-10-CM

## 2023-08-11 PROCEDURE — 97140 MANUAL THERAPY 1/> REGIONS: CPT | Mod: GP | Performed by: PHYSICAL THERAPIST

## 2023-08-11 PROCEDURE — 97530 THERAPEUTIC ACTIVITIES: CPT | Mod: GP | Performed by: PHYSICAL THERAPIST

## 2023-08-11 PROCEDURE — 97035 APP MDLTY 1+ULTRASOUND EA 15: CPT | Mod: GP | Performed by: PHYSICAL THERAPIST

## 2023-08-14 ENCOUNTER — E-VISIT (OUTPATIENT)
Dept: FAMILY MEDICINE | Facility: CLINIC | Age: 48
End: 2023-08-14
Payer: COMMERCIAL

## 2023-08-14 DIAGNOSIS — B35.3 TINEA PEDIS, UNSPECIFIED LATERALITY: Primary | ICD-10-CM

## 2023-08-14 PROCEDURE — 99421 OL DIG E/M SVC 5-10 MIN: CPT | Performed by: PHYSICIAN ASSISTANT

## 2023-08-14 RX ORDER — CLOTRIMAZOLE 1 %
CREAM (GRAM) TOPICAL 2 TIMES DAILY
Qty: 60 G | Refills: 1 | Status: SHIPPED | OUTPATIENT
Start: 2023-08-14 | End: 2023-12-20

## 2023-08-14 RX ORDER — TOLNAFTATE 1 G/100G
POWDER TOPICAL 2 TIMES DAILY
Qty: 45 G | Refills: 1 | Status: SHIPPED | OUTPATIENT
Start: 2023-08-14 | End: 2024-02-15

## 2023-08-14 NOTE — PATIENT INSTRUCTIONS
"Ana Aldrich    After reviewing your responses, I've been able to diagnose you with \"tinea\" which is a common skin condition caused by a fungal infection. There are many common names for this depending on where it is located and it's appearance (jock itch, athlete's foot, ringworm, etc).  Based on your responses, I have prescribed clotrimazole cream and tolnaftate powder.  to treat this. Please follow the instructions on the medication. If you experience irritation of your skin, new rash, or any other new symptoms, you should stop using this medication and contact your primary care provider.  If this treatment does not work for you in 2 weeks or after completing treatment, please plan to follow-up with your primary care provider to evaluate in-person.  Self-care:  Wash all items that come into contact with infected skin: Wash all towels, clothes, and bedding in hot water. Use laundry soap. Clean shower stalls, mats, and floors with a germ-killing or fungus-killing .   Do not share personal items: Do not share towels, brushes, red, or hair accessories.    Keep your skin, hair, and nails clean and dry: Bathe every day, and dry your skin before you put medicine on the infected area. Wash your hands often. Do not scratch your sores. This may cause the infection to spread.   Avoid infected pets: A patch of missing fur is a sign of infection in a pet. Take your infected pet to a  for treatment.  Contact your primary healthcare provider if:  You have a fever.    Your infection continues to spread after 7 days of treatment.   Your rash is not gone in 2 weeks.   The area around your rash becomes red, warm, tender, swollen, or smells bad.   You have questions or concerns about your condition or care.    Recurrences of tinea pedis are common. For recurrent episodes of tinea pedis: use desiccating foot powders, placement of antifungal powder in shoes, and avoid occlusive footwear.    Thanks " for choosing?us?as your health care partner,      Miya Crump MBA, MS, PA-C  [facility]     - - -

## 2023-08-15 LAB — SCANNED LAB RESULT: NORMAL

## 2023-08-21 ENCOUNTER — MYC MEDICAL ADVICE (OUTPATIENT)
Dept: FAMILY MEDICINE | Facility: CLINIC | Age: 48
End: 2023-08-21
Payer: COMMERCIAL

## 2023-08-24 NOTE — TELEPHONE ENCOUNTER
Disp Refills Start End TRISH   modafinil (PROVIGIL) 100 MG tablet 90 tablet 1 5/2/2023  --   Sig - Route: Take 1 tablet (100 mg) by

## 2023-08-24 NOTE — TELEPHONE ENCOUNTER
Checked interactions in Aden & Anaisedex. No interactions noted. Advised Pt of this and to monitor for symptoms of jitters or sleeplessness.     Chris Sheffield RN [facility]

## 2023-08-29 ENCOUNTER — HOSPITAL ENCOUNTER (OUTPATIENT)
Dept: MRI IMAGING | Facility: CLINIC | Age: 48
Discharge: HOME | End: 2023-08-29
Attending: PODIATRIST | Admitting: PODIATRIST
Payer: COMMERCIAL

## 2023-08-29 PROCEDURE — 73721 MRI JNT OF LWR EXTRE W/O DYE: CPT | Mod: 26 | Performed by: RADIOLOGY

## 2023-08-29 PROCEDURE — 73721 MRI JNT OF LWR EXTRE W/O DYE: CPT | Mod: LT

## 2023-08-30 ENCOUNTER — MYC MEDICAL ADVICE (OUTPATIENT)
Dept: PODIATRY | Facility: CLINIC | Age: 48
End: 2023-08-30
Payer: COMMERCIAL

## 2023-08-30 DIAGNOSIS — R14.1 FLATULENCE, ERUCTATION AND GAS PAIN: ICD-10-CM

## 2023-08-30 DIAGNOSIS — J45.41 MODERATE PERSISTENT ASTHMA WITH ACUTE EXACERBATION: ICD-10-CM

## 2023-08-30 DIAGNOSIS — S93.402S MODERATE ANKLE SPRAIN, LEFT, SEQUELA: Primary | ICD-10-CM

## 2023-08-30 DIAGNOSIS — R14.3 FLATULENCE, ERUCTATION AND GAS PAIN: ICD-10-CM

## 2023-08-30 DIAGNOSIS — R14.2 FLATULENCE, ERUCTATION AND GAS PAIN: ICD-10-CM

## 2023-08-30 NOTE — TELEPHONE ENCOUNTER
Malathi An,     I put in an order for physical therapy.  You can call to make that appointment at:  [phone] .     Rocio Tobias DPM

## 2023-08-30 NOTE — TELEPHONE ENCOUNTER
MRI of left ankle shows just a sprain of the ankle. No tendon or ligament tears. No fractures noted. Would recommend wearing an ankle brace for the next month. Sprains can take 12 weeks to heal.  We could do an order for physical therapy if you like to help with range of motion of the ankle.     Let me know if you would like an order for physical therapy.     Rocio Tobias DPM

## 2023-09-01 ENCOUNTER — THERAPY VISIT (OUTPATIENT)
Dept: PHYSICAL THERAPY | Facility: CLINIC | Age: 48
End: 2023-09-01
Payer: COMMERCIAL

## 2023-09-01 DIAGNOSIS — M54.2 CERVICALGIA: ICD-10-CM

## 2023-09-01 DIAGNOSIS — M79.18 MYOFASCIAL PAIN: Primary | ICD-10-CM

## 2023-09-01 PROCEDURE — 97140 MANUAL THERAPY 1/> REGIONS: CPT | Mod: GP | Performed by: PHYSICAL THERAPIST

## 2023-09-01 PROCEDURE — 97035 APP MDLTY 1+ULTRASOUND EA 15: CPT | Mod: GP | Performed by: PHYSICAL THERAPIST

## 2023-09-01 RX ORDER — IPRATROPIUM BROMIDE AND ALBUTEROL SULFATE 2.5; .5 MG/3ML; MG/3ML
1 SOLUTION RESPIRATORY (INHALATION) EVERY 6 HOURS PRN
Qty: 90 ML | Refills: 0 | Status: SHIPPED | OUTPATIENT
Start: 2023-09-01 | End: 2024-03-13

## 2023-09-01 RX ORDER — SIMETHICONE 125 MG
TABLET,CHEWABLE ORAL
Qty: 60 TABLET | Refills: 0 | Status: SHIPPED | OUTPATIENT
Start: 2023-09-01 | End: 2024-02-15

## 2023-09-05 ENCOUNTER — VIRTUAL VISIT (OUTPATIENT)
Dept: SLEEP MEDICINE | Facility: CLINIC | Age: 48
End: 2023-09-05
Attending: PHYSICIAN ASSISTANT
Payer: COMMERCIAL

## 2023-09-05 VITALS — BODY MASS INDEX: 43.65 KG/M2 | HEIGHT: 65 IN | WEIGHT: 262 LBS

## 2023-09-05 DIAGNOSIS — G35 MULTIPLE SCLEROSIS (H): Primary | ICD-10-CM

## 2023-09-05 DIAGNOSIS — G25.81 RESTLESS LEGS SYNDROME (RLS): ICD-10-CM

## 2023-09-05 DIAGNOSIS — G47.10 HYPERSOMNIA WITH SLEEP APNEA: ICD-10-CM

## 2023-09-05 DIAGNOSIS — G47.30 HYPERSOMNIA WITH SLEEP APNEA: ICD-10-CM

## 2023-09-05 PROCEDURE — 99245 OFF/OP CONSLTJ NEW/EST HI 55: CPT | Mod: 95 | Performed by: PHYSICIAN ASSISTANT

## 2023-09-05 ASSESSMENT — PAIN SCALES - GENERAL: PAINLEVEL: MODERATE PAIN (4)

## 2023-09-05 NOTE — PROGRESS NOTES
Virtual Visit Details    Type of service:  Video Visit   Video Start Time: 3:36 PM  Video End Time:4:45 PM    Originating Location (pt. Location): Home    Distant Location (provider location):  On-site  Platform used for Video Visit: [facility]    Outpatient Sleep Medicine Consultation:      Name: Nataliya Aldrich MRN# [id number]   Age: 48 year old YOB: 1975     Date of Consultation: September 4, 2023  Consultation is requested by: Miya Crump PA-C  [address] [place] [id number] Miya Crump  Primary care provider: Miya Crump       Reason for Sleep Consult:     Nataliya Aldrich is sent by Miya Crump for a sleep consultation regarding hypersomnia with sleep apnea.    Patient s Reason for visit  Nataliya Aldrich main reason for visit: Snore when really tired and will wake up with headaches,Previous sleep apnea  Patient states problem(s) started: Ongoing off and on  Nataliya Aldrich's goals for this visit: How to better manage staying asleep for a full 8-9Hours           Assessment and Plan:     Summary Sleep Diagnoses and Recommendations:  (G47.10,  G47.30) Hypersomnia with sleep apnea (primary encounter diagnosis), (G35) Multiple sclerosis (H) - Dr. Chong - on Tysabri infusions    Comment: Nataliya presents for re-evaluation of previously diagnosed mild, positional sleep apnea. She had a PSG at [facility] in 2015 that showed an average AHI of 7/hr, but REM AHI of 40/hr with an O2 joni of 64%. She had a brief trial of CPAP where the pressures were quickly titrated to 16 cm, which based on the documentation I have seemed quite excessive. She did not tolerate it, so the end of the night was conducted with the head of the bed elevated and that seemed to resolve her apnea. She has been trying to treat at home with positional restriction, but apparently still ends up on her back. She will feel worse and have a headache if she wakes on her  back. She sleeps better when she falls asleep on the couch in a more seated position. She is still observed to snore loudly and have pauses in breathing when supine, but her snoring is much improved when lateral. She often feels tired around 3 PM, but does not generally nap or doze inadvertently. She has lost almost 40 pounds since her sleep study, but her BMI is still 43. She has relapsing remitting MS and had PFTs in 2020 that showed a reduced MIP. She is also on 40 mg baclofen at bedtime which could exacerbate her issue if she has respiratory muscle weakness. Her CO2 on metabolic panel is usually low (which mildly elevated chloride) and her SpO2 is 100%, which may suggest a metabolic acidosis at the time (had just returned from a [place] trip with increased walking and drinking).   Plan: Comprehensive Sleep Study  Split with TCM-experiment with head of bed and positional restriction. If hypoxemia or hypoventilation are present, initiate CPAP.   In the mean time, she will invest in a wedge pillow.       (G25.81) Restless legs syndrome (RLS)  Comment: She does seem to have some restlessness or possibly inadvertent twitching in the evening. She is on gabapentin 600 mg at bedtime. She had a ferritin of 85 last year, but more recently had a mildly low hemoglobin.   Plan: We will re-visit this after her sleep study.       Comorbid Diagnoses:  Relapsing/remitting MS, BMI 43, s/p gastric bypass, moderate persistent asthma, hx of CVA    Summary Counseling:    Sleep Testing Reviewed  Obstructive Sleep Apnea Reviewed  Complications of Untreated Sleep Apnea Reviewed      Patient will follow up 2 weeks after sleep study.  Bennett Goltz, PA-C      Total time spent reviewing medical records, history and physical examination, review of previous testing and interpretation as well as documentation on this date: 80 min    CC: Miya Crump          History of Present Illness:     Nataliya presents for evaluation of sleep apnea.  She recently travelled with her daughter who observed loud snoring and choking, when on her back. She will also wake with nightmares or hear her own snoring if on her back. She may also wakes with a slight headache when on her back. She does not think she snores much when on her sides. She tries to wedge pillows behind her to avoid rolling to her back. She feels she sleeps better when sitting up on the couch.     Her weight is down almost 40# since her sleep study in 2015.     She is on 40 mg baclofen at night. She has as needed dosing of that, but does not need it daily.  She has a prescription for cyclobenzaprine, but does not take it. She is on 300 mg in the morning and middle of the day and 600 mg at bedtime. She can take hydroxyzine, which is ok on weekends, but makes her too tired the next morning. She is not taking the pregabalin.  She gets 7-7.5 hours of sleep in the week. She can get 8+ hours on weekends.    She takes modafinil 100 mg as needed.    Past Sleep Evaluations: 12/29/2015 at [facility]. Wt: 300#. AHI was 7.2/hr RDI 18.5/hr. Supine AHI 9/hr, non-supine AHI 3.9/hr. REM AHI 40.5/hr. O2 joni 64% in REM. 4.3 minutes spent below 89%. CPAP was titrated rapidly up to 16 cm despite no apnea or hypopnea events or hypoxemia on CPAP of any pressure. She woke and was not tolerant of CPAP and then slept the rest of the night with the head of bed elevated 30 degrees. This also reduced the AHI (1.9/hr) and resolved the hypoxemia. However, the treatment portion of the night was conducted almost entirely in lateral positions.    PFTs via [facility] in 2020 showed (obtained from note dated 6/1/2020): normal FVC (96%) and FEV1 (98%)  FEV1/FVC 83%. TLC 85% decreased MIP c/w diaphragm/respiratory muscle weakness  NIF is -57 of 59% of predicted    SLEEP-WAKE SCHEDULE:     Work/School Days: Patient goes to school/work: Yes   Usually gets into bed at 8:30-9pm. If she goes to bed at 9:30 PM, she does not wake in the middle of  the night.   Takes patient about 30 mins to fall asleep  Has trouble falling asleep 3-4 nights nights per week  Wakes up in the middle of the night 1 times.  Wakes up due to Snorting self awake;Pain;Nightmares  She has trouble falling back asleep 0-1 times a week.   It usually takes 5-40 mins to get back to sleep  Patient is usually up at 6:15  Uses alarm: Yes hits the snooze once.    Weekends/Non-work Days/All Other Days:  Usually gets into bed at 10   Takes patient about 15 mins to fall asleep  Patient is usually up at 7:30-8. She gets up more easily when she wakes on her own about 7:30 AM.   Uses alarm: No    Sleep Need  Patient gets  7-8 hours sleep on average   Patient thinks she needs about 9houra sleep    Nataliya VYAS Hastings Valdemar prefers to sleep in this position(s): Side   Patient states they do the following activities in bed: Watch TV;Use phone, computer, or tablet. She is tired but has a racing mind at night. She thinks about things she has to do.    Naps  Patient takes a purposeful nap 1 times a week and naps are usually 20 mins in duration. She gets quite tired at 3 PM.  She feels better after a nap: Yes  She dozes off unintentionally 0 days per week  Patient has had a driving accident or near-miss due to sleepiness/drowsiness: No      SLEEP DISRUPTIONS:    Breathing/Snoring  Patient snores:Yes  Other people complain about her snoring: No  Patient has been told she stops breathing in her sleep:Yes  She has issues with the following: Morning headaches;Morning mouth dryness;Stuffy nose when you wake up    Movement:  Patient gets pain, discomfort, with an urge to move:  Yes. She does have pain as well as restless legs in the evening, most when really tired. She also rocks her legs. Her hemoglobin was mildly low in 7/23. Ferritin was 84.9 in 8/22. She had been on supplemental iron in the past, now just a multivitamin. She takes 400 mg magnesium at night.   It happens when she is resting:  Yes  It happens  more at night:  Yes  Patient has been told she kicks her legs at night:  Yes-daughter observes her to kick and move around a lot. Her ex also said she kicked and punched in her sleep.      Behaviors in Sleep:  Nataliya Aldrich has experienced the following behaviors while sleeping: Recurring Nightmares;Sleep-talking;Teeth grinding;Kicking or punching;See or hear things that are not really there upon awakening or just falling asleep. She is not aware of dream enactment. Sometimes she wakes thinking she heard something or maybe sense something like a breeze in front of her face. She may get up and check the house. That happens more when really stressed.   Pt denies bruxism, sleep walking, and dream enactment behavior. Pt denies sleep paralysis and cataplexy.       Is there anything else you would like your sleep provider to know:          CAFFEINE AND OTHER SUBSTANCES:    Patient consumes caffeinated beverages per day:  2 cups  Last caffeine use is usually: 2:30 pm  List of any prescribed or over the counter stimulants that patient takes: Gabapentin, modafinil 100 mg prn (does not notice much difference, has not tried 200 mg). Modafinil was exacerbating headaches.   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 She has 9 hour shift M-Th and 5 hour shift on Friday. She works for [employer] and they lost their contract with Medicare/Medicaid. She  will be having to find a new job by December.   She lives with her daughter She is .    SCALES:    EPWORTH SLEEPINESS SCALE          No data to display                  INSOMNIA SEVERITY INDEX (MAGDA)          9/5/2023     3:04 PM   Insomnia Severity Index (MAGDA)   Difficulty falling asleep 2   Difficulty staying asleep 1   Problems waking up too early 2   How SATISFIED/DISSATISFIED are you with your CURRENT sleep pattern? 2   How NOTICEABLE to others do you think your sleep problem is in terms of impairing the quality of your life? 1   How WORRIED/DISTRESSED are you about your current sleep problem? 1   To what extent do you consider your sleep problem to INTERFERE with your daily functioning (e.g. daytime fatigue, mood, ability to function at work/daily chores, concentration, memory, mood, etc.) CURRENTLY? 2   MAGDA Total Score 11       Guidelines for Scoring/Interpretation:  Total score categories:  0-7 = No clinically significant insomnia   8-14 = Subthreshold insomnia   15-21 = Clinical insomnia (moderate severity)  22-28 = Clinical insomnia (severe)  Used via courtesy of www.[place].va.gov with permission from Lauro Russ PhD., [facility]      STOP BANG         9/5/2023     3:16 PM   STOP BANG Questionnaire (  2008, the American Society of Anesthesiologists, Inc. [employer]   BMI Clinic: 43.6         GAD7        10/21/2022     2:26 PM   JUSTICE-7    1. Feeling nervous, anxious, or on edge 0   2. Not being able to stop or control worrying 0   3. Worrying too much about different things 0   4. Trouble relaxing 0   5. Being so restless that it is hard to sit still 0   6. Becoming easily annoyed or irritable 1   7. Feeling afraid, as if something awful might happen 0   JUSTICE-7 Total Score 1   If you checked any problems, how difficult have they made it for you to do your work, take care of things at home, or get along with other people? Not difficult at all         CAGE-AID       "   No data to display                CAGE-AID reprinted with permission from the [facility] Journal, TAVARES Grayson. and MAXIMILIANO Womack, \"Conjoint screening questionnaires for alcohol and drug abuse\" [place] Medical Journal 94: 135-140, 1995.      PATIENT HEALTH QUESTIONNAIRE-9 (PHQ - 9)        10/21/2022     2:26 PM   PHQ-9 ([employer])   1.  Little interest or pleasure in doing things 0   2.  Feeling down, depressed, or hopeless 0   3.  Trouble falling or staying asleep, or sleeping too much 1   4.  Feeling tired or having little energy 1   5.  Poor appetite or overeating 0   6.  Feeling bad about yourself - or that you are a failure or have let yourself or your family down 0   7.  Trouble concentrating on things, such as reading the newspaper or watching television 0   8.  Moving or speaking so slowly that other people could have noticed. Or the opposite - being so fidgety or restless that you have been moving around a lot more than usual 0   9.  Thoughts that you would be better off dead, or of hurting yourself in some way 0   PHQ-9 Total Score 2   If you checked off any problems, how difficult have these problems made it for you to do your work, take care of things at home, or get along with other people? Not difficult at all   6.  Feeling bad about yourself 0   7.  Trouble concentrating 0   8.  Moving slowly or restless 0   9.  Suicidal or self-harm thoughts 0   Difficulty at work, home, or with people Not difficult at all       Developed by Dhaval Miller, Ivette Norton, Jose Alfredo Mcguire and colleagues, with an educational trent from [employer]. No permission required to reproduce, translate, display or distribute.        Allergies:    Allergies   Allergen Reactions    Aspirin Difficulty breathing, Palpitations and Other (See Comments)    Cephalexin Swelling    Wasp Venom Angioedema and Difficulty breathing    Adhesive Tape     Amantadine Swelling    Azithromycin Angioedema    Doxycycline Itching and " Swelling     3 doses - itching all over, hand swelling, tongue fullness    Naproxen     Latex Hives, Itching and Rash    Penicillins Other (See Comments), Nausea and Vomiting, Hives, Swelling, Rash, Cramps and GI Disturbance     Amoxicillin OK       Medications:    Current Outpatient Medications   Medication Sig Dispense Refill    acetaminophen (TYLENOL) 500 MG tablet Take 2 tablets (1,000 mg) by mouth 3 times daily      azelastine (ASTELIN) 0.1 % nasal spray Spray 1 spray into both nostrils 2 times daily as needed for rhinitis (nasal antihistamine) 30 mL 5    baclofen (LIORESAL) 20 MG tablet TAKE 1 TABLET BY MOUTH EVERY NIGHT AT BEDTIME. MAY ALSO TAKE 1 TABLET EVERY 4 HOURS AS NEEDED FOR MUSCLE SPASMS 180 tablet 1    calcium carbonate-vitamin D (OSCAL W/D) 500-200 MG-UNIT tablet Take 1 tablet by mouth 2 times daily (with meals)      cetirizine (ZYRTEC) 10 MG tablet Take 1 tablet (10 mg) by mouth daily 180 tablet 1    chlorhexidine (PERIDEX) 0.12 % solution Swish and spit 15 mLs in mouth 2 times daily 118 mL 1    clotrimazole (LOTRIMIN) 1 % external cream Apply topically 2 times daily (Can take up to 14 days) 60 g 1    cyclobenzaprine (FLEXERIL) 10 MG tablet Take 1 tablet (10 mg) by mouth 3 times daily as needed for muscle spasms 40 tablet 0    desonide (DESOWEN) 0.05 % external cream Apply topically 2 times daily To face as needed for rash 15 g 0    diphenhydrAMINE (BENADRYL) 25 MG tablet Take 1 tablet (25 mg) by mouth every 6 hours as needed for itching or allergies 30 tablet 0    EPINEPHrine (ANY BX GENERIC EQUIV) 0.3 MG/0.3ML injection 2-pack Inject 0.3 mLs (0.3 mg) into the muscle as needed for anaphylaxis May repeat one time in 5-15 minutes if response to initial dose is inadequate. 2 each 3    FLUoxetine (PROZAC) 20 MG capsule Take 1 capsule (20 mg) by mouth daily 90 capsule 0    fluticasone (FLONASE) 50 MCG/ACT nasal spray USE 1 PUFF IN EACH NOSTRIL AS DIRECTED. 16 g 5    gabapentin (NEURONTIN) 300 MG  capsule Take 1 capsule (300 mg) by mouth every 4 hours 120 capsule 5    hydrOXYzine (ATARAX) 25 MG tablet Take 0.5-1 tablets (12.5-25 mg) by mouth 3 times daily as needed for anxiety or other (SLEEP) 30 tablet 0    ipratropium - albuterol 0.5 mg/2.5 mg/3 mL (DUONEB) 0.5-2.5 (3) MG/3ML neb solution TAKE 1 VIAL (3 MLS) BY NEBULIZATION EVERY 6 HOURS AS NEEDED FOR SHORTNESS OF BREATH, WHEEZING OR COUGH 90 mL 0    ketotifen (ZADITOR) 0.025 % ophthalmic solution Place 1 drop into both eyes 2 times daily as needed for itching 10 mL 1    MAGNESIUM PO       modafinil (PROVIGIL) 100 MG tablet Take 1 tablet (100 mg) by mouth daily For daytime drowsiness (ok to increase to 2 tablets daily if symptoms not improved in 14 days) 90 tablet 1    montelukast (SINGULAIR) 10 MG tablet TAKE 1 TABLET(10 MG) BY MOUTH AT BEDTIME 90 tablet 1    Multiple Vitamins-Calcium (ONE-A-DAY WOMENS FORMULA PO)       natalizumab (TYSABRI) 300 MG/15ML injection Inject 15 mLs (300 mg) into the vein once every six weeks Infusion      omeprazole (PRILOSEC) 20 MG DR capsule TAKE 1 CAPSULE(20 MG) BY MOUTH TWICE DAILY BEFORE MEALS 180 capsule 1    pregabalin (LYRICA) 75 MG capsule Take 1 capsule (75 mg) by mouth 2 times daily as needed (neuropathic pain) 60 capsule 5    ROZEREM 8 MG tablet TAKE ONE TABLET BY MOUTH AT BEDTIME AS NEEDED FOR SLEEP 30 tablet 0    simethicone (MYLICON) 125 MG chewable tablet CHEW AND SWALLOW ONE TABLET BY MOUTH FOUR TIMES A DAY AS NEEDED FOR INTESTINAL GAS 60 tablet 0    SUMAtriptan (IMITREX) 100 MG tablet Take 50 mg up to twice daily as needed for headache; separate doses by at least one hour and do not use more than 3 days/week 18 tablet 3    SYMBICORT 160-4.5 MCG/ACT Inhaler INHALE 2 PUFFS INTO THE LUNGS TWICE DAILY 10.2 g 5    tolnaftate (TINACTIN) 1 % external powder Apply topically 2 times daily 45 g 1    topiramate (TOPAMAX) 100 MG tablet Take 1 tablet (100 mg) by mouth At Bedtime (take with 50 mg dose at bedtime) 90 tablet  1    topiramate (TOPAMAX) 50 MG tablet TAKE 1 TABLET (50 MG) BY MOUTH 2 TIMES DAILY 180 tablet 1    triamcinolone (KENALOG) 0.1 % external cream Apply topically 2 times daily No more  than 2 weeks in a row not on face 15 g 1    valACYclovir (VALTREX) 500 MG tablet Take 1 tablet (500 mg) by mouth daily 90 tablet 3    VENTOLIN  (90 Base) MCG/ACT inhaler SHAKE WELL AND INHALE 2 PUFFS INTO THE LUNGS EVERY 6 HOURS AS NEEDED FOR SHORTNESS OF BREATH OR WHEEZING STRENGTH 18 g 1    vitamin D3 (CHOLECALCIFEROL) 250 mcg (21332 units) capsule TAKE 1 CAPSULE BY MOUTH ONCE DAILY 90 capsule 3       Problem List:  Patient Active Problem List    Diagnosis Date Noted    Left ovarian cyst - follow up US ordered for 6/1/2023 to evaluate for resolution 04/17/2023     Priority: Medium    Situational anxiety 04/17/2023     Priority: Medium    Bloating 04/17/2023     Priority: Medium    Morbid obesity (H) 03/30/2023     Priority: Medium    Myofascial pain syndrome, cervical 09/09/2021     Priority: Medium    Gastroesophageal reflux disease with esophagitis, unspecified whether hemorrhage 09/09/2021     Priority: Medium    Insomnia, unspecified type 09/09/2021     Priority: Medium    Muscle spasm 09/09/2021     Priority: Medium    Seasonal allergic rhinitis, unspecified trigger 09/09/2021     Priority: Medium    HSV (herpes simplex virus) infection 09/09/2021     Priority: Medium    Pain of left lower extremity 09/09/2021     Priority: Medium    Rash 09/09/2021     Priority: Medium    Spondylosis of cervical region without myelopathy or radiculopathy 09/09/2021     Priority: Medium    Moderate persistent asthma without complication 09/09/2021     Priority: Medium    Immunosuppression (H) 04/08/2019     Priority: Medium    Multiple sclerosis (H) - Dr. Chong - on Tysabri infusions 03/29/2019     Priority: Medium    Hypermagnesemia 01/10/2018     Priority: Medium    Migraine 09/16/2016     Priority: Medium    Postsurgical  "nonabsorption 04/27/2014     Priority: Medium    Vitamin B12 deficiency 04/01/2014     Priority: Medium    H/O total hysterectomy 03/19/2013     Priority: Medium    Cognitive changes 11/19/2012     Priority: Medium    Neck pain 11/19/2012     Priority: Medium    Pain, neuropathic 10/24/2012     Priority: Medium    S/P gastric bypass 2002 03/30/2010     Priority: Medium    Hypovitaminosis D 02/25/2010     Priority: Medium    Hypersomnia with sleep apnea 02/24/2010     Priority: Medium    Iron deficiency anemia 02/24/2010     Priority: Medium        Past Medical/Surgical History:  Past Medical History:   Diagnosis Date    Asthma     mild persistent - Dr Gee    Cerebral infarction (H) 2015    Fibroids     s/p hysterectomy    H/O gastric bypass     Hypertension     Migraine     followed by Devika    Obstructive sleep apnea     Pre-diabetes     Relapsing remitting multiple sclerosis (H) 2015    lesion in [place].  Facial tingling initial symptom.  F/B [facility]    Spondylosis of cervical region without myelopathy or radiculopathy      Past Surgical History:   Procedure Laterality Date    COLONOSCOPY  2012    GASTRIC BYPASS  2002    \"Trouble with B12 and D\"    HYSTERECTOMY, MONO  2013    cervix gone.  fibroids.  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      Employer: [employer]   Tobacco Use    Smoking status: Former     Packs/day: 0.50     Years: 2.00     Pack years: 1.00     Types: Cigars, Cigarettes     Start date: 1/2/2011     Quit date: 7/2/2013     Years since quitting: 10.1    Smokeless tobacco: Never   Vaping Use    Vaping Use: Never used   Substance and Sexual Activity    Alcohol use: " "Yes     Alcohol/week: 2.0 standard drinks of alcohol     Comment: 0-3 drinks per week    Drug use: No     Comment: Marijuana when younger     Sexual activity: Not Currently     Partners: Male     Birth control/protection: Abstinence   Other Topics Concern    Parent/sibling w/ CABG, MI or angioplasty before 65F 55M? No   Social History Narrative    Not on file     Social Determinants of Health     Financial Resource Strain: Not on file   Food Insecurity: Not on file   Transportation Needs: Not on file   Physical Activity: Not on file   Stress: Not on file   Social Connections: Not on file   Intimate Partner Violence: Not on file   Housing Stability: Not on file       Family History:  Family History   Problem Relation Age of Onset    Hypertension Mother     Hyperlipidemia Mother     Obesity Mother     Obesity Father     Diabetes Maternal Grandmother     Hyperlipidemia Maternal Grandmother     Cerebrovascular Disease Maternal Grandfather     Diabetes Paternal Grandmother     Cerebrovascular Disease Paternal Grandmother     Depression Paternal Grandmother     Substance Abuse Paternal Grandmother     Lupus Paternal Aunt 41    Multiple Sclerosis No family hx of     Breast Cancer No family hx of     Ovarian Cancer No family hx of        Review of Systems:  A complete review of systems reviewed by me is negative with the exeption of what has been mentioned in the history of present illness.        Physical Examination:  Vitals: Ht 1.651 m (5' 5\")   Wt 118.8 kg (262 lb)   LMP  (LMP Unknown)   BMI 43.60 kg/m    BMI= Body mass index is 43.6 kg/m .           GENERAL APPEARANCE: healthy, alert, no distress, and cooperative  EYES: Eyes grossly normal to inspection  HENT: oral mucous membranes moist and oropharynx clear  NECK: no asymmetry, masses, or scars  RESP: no apparent respiratory distress (retractions or difficulty speaking in full sentences), no audible wheeze or cough   Mallampati Class: II.  Tonsillar Stage: 0  " surgically removed.         Data: All pertinent previous laboratory data reviewed     Recent Labs   Lab Test 06/21/23  1606 01/26/23  0936    139   POTASSIUM 4.2 3.9   CHLORIDE 108* 108*   CO2 18* 19*   ANIONGAP 13 12   GLC 82 91   BUN 10.8 10.1   CR 0.95 0.97*   MILA 9.3 9.3       Recent Labs   Lab Test 07/17/23  1737   WBC 12.5*   RBC 3.81   HGB 11.4*   HCT 35.8   MCV 94   MCH 29.9   MCHC 31.8   RDW 14.1          Recent Labs   Lab Test 06/21/23  1606   PROTTOTAL 7.2   ALBUMIN 4.3   BILITOTAL 0.2   ALKPHOS 66   AST 31   ALT 24       TSH (mU/L)   Date Value   09/22/2022 1.02   09/09/2021 1.85   01/03/2018 2.83   09/08/2016 1.90       No results found for: UAMP, UBARB, BENZODIAZEUR, UCANN, UCOC, OPIT, UPCP    Iron Saturation Index   Date/Time Value Ref Range Status   01/03/2018 08:40 AM 24 15 - 46 % Final     Ferritin   Date/Time Value Ref Range Status   09/09/2021 08:28 AM 74 12 - 150 ng/mL Final   01/03/2018 08:40 AM 78 12 - 150 ng/mL Final       No results found for: PH, PHARTERIAL, PO2, IO2XFTXKOQA, SAT, PCO2, HCO3, BASEEXCESS, JONATHAN, BEB    @LABRCNTIPR(phv:4,pco2v:4,po2v:4,hco3v:4,alesha:4,o2per:4)@    Echocardiology: No results found for this or any previous visit (from the past 4320 hour(s)).    Chest x-ray:   XR Chest 2 Views 03/30/2023    Narrative  CHEST TWO VIEWS 3/30/2023 2:17 PM    HISTORY: Pressure in chest. SOB (shortness of breath).    COMPARISON: July 29, 2020    Impression  IMPRESSION: There are no acute infiltrates. The cardiac silhouette is  not enlarged. Pulmonary vasculature is unremarkable.    SHAHID ZHANG MD      SYSTEM ID:  [id number]      Chest CT: No results found for this or any previous visit from the past 365 days.      PFT: Most Recent [facility] Pulmonary Function Testing    No results found for: 20001  No results found for: 20002  No results found for: 20003  No results found for: 20015  No results found for: 20016  No results found for: 20027  No results found for: 20028  No  results found for: 20029  No results found for: 20079  No results found for: 20080  No results found for: 20081  No results found for: 20335  No results found for: 20105  No results found for: 20053  No results found for: 20054  No results found for: 20055      Bennett Ezra Goltz, PA-C, [facility] 9/4/2023

## 2023-09-05 NOTE — NURSING NOTE
Is the patient currently in the state of [place]? YES    Visit mode:VIDEO    If the visit is dropped, the patient can be reconnected by: VIDEO VISIT: Text to cell phone:   Telephone Information:   Mobile [phone]       Will anyone else be joining the visit? Yes daughter Eris  (If patient encounters technical issues they should call [phone] :[phone])    How would you like to obtain your AVS? MyChart    Are changes needed to the allergy or medication list? Pt stated no changes to allergies and Pt stated no med changes    Reason for visit: Consult    Zahira CHOI

## 2023-09-05 NOTE — PATIENT INSTRUCTIONS
"Look into getting a wedge pillow.              MY TREATMENT INFORMATION FOR SLEEP APNEA-  Nataliya Aldrich    DOCTOR : Bennett Ezra Goltz, PA-C, [facility]    Am I having a sleep study at a sleep center?  --->Due to normal delays, you will be contacted within 2-4 weeks to schedule    Am I having a home sleep study?  --->Watch the video for the device you are using:    -/drop off device-   https://www.newScale.com/watch?v=yGGFBdELGhk    -Disposable device sent out require phone/computer application-   https://www.newScale.com/watch?v=BCce_vbiwxE      Frequently asked questions:  1. What is Obstructive Sleep Apnea (ISAAC)? ISAAC is the most common type of sleep apnea. Apnea means, \"without breath.\"  Apnea is most often caused by narrowing or collapse of the upper airway as muscles relax during sleep.   Almost everyone has occasional apneas. Most people with sleep apnea have had brief interruptions at night frequently for many years.  The severity of sleep apnea is related to how frequent and severe the events are.   2. What are the consequences of ISAAC? Symptoms include: feeling sleepy during the day, snoring loudly, gasping or stopping of breathing, trouble sleeping, and occasionally morning headaches or heartburn at night.  Sleepiness can be serious and even increase the risk of falling asleep while driving. Other health consequences may include development of high blood pressure and other cardiovascular disease in persons who are susceptible. Untreated ISAAC  can contribute to heart disease, stroke and diabetes.   3. What are the treatment options? In most situations, sleep apnea is a lifelong disease that must be managed with daily therapy. Medications are not effective for sleep apnea and surgery is generally not considered until other therapies have been tried. Your treatment is your choice . Continuous Positive Airway (CPAP) works right away and is the therapy that is effective in nearly everyone. An oral device " to hold your jaw forward is usually the next most reliable option. Other options include postioning devices (to keep you off your back), weight loss, and surgery including a tongue pacing device. There is more detail about some of these options below.  4. Are my sleep studies covered by insurance? Although we will request verification of coverage, we advise you also check in advance of the study to ensure there is coverage.    Important tips for those choosing CPAP and similar devices  For new devices, sign up for device JUDI to monitor your device for your followup visits  We encourage you to utilize the [employer] judi or website (myAir web (resmed.com) ) to monitor your therapy progress and share the data with your healthcare team when you discuss your sleep apnea.                                                    Know your equipment:  CPAP is continuous positive airway pressure that prevents obstructive sleep apnea by keeping the throat from collapsing while you are sleeping. In most cases, the device is  smart  and can slowly self-adjusts if your throat collapses and keeps a record every day of how well you are treated-this information is available to you and your care team.  BPAP is bilevel positive airway pressure that keeps your throat open and also assists each breath with a pressure boost to maintain adequate breathing.  Special kinds of BPAP are used in patients who have inadequate breathing from lung or heart disease. In most cases, the device is  smart  and can slowly self-adjusts to assist breathing. Like CPAP, the device keeps a record of how well you are treated.  Your mask is your connection to the device. You get to choose what feels most comfortable and the staff will help to make sure if fits. Here: are some examples of the different masks that are available:       Key points to remember on your journey with sleep apnea:  Sleep study.  PAP devices often need to be adjusted during a sleep  study to show that they are effective and adjusted right.  Good tips to remember: Try wearing just the mask during a quiet time during the day so your body adapts to wearing it. A humidifier is recommended for comfort in most cases to prevent drying of your nose and throat. Allergy medication from your provider may help you if you are having nasal congestion.  Getting settled-in. It takes more than one night for most of us to get used to wearing a mask. Try wearing just the mask during a quiet time during the day so your body adapts to wearing it. A humidifier is recommended for comfort in most cases. Our team will work with you carefully on the first day and will be in contact within 4 days and again at 2 and 4 weeks for advice and remote device adjustments. Your therapy is evaluated by the device each day.   Use it every night. The more you are able to sleep naturally for 7-8 hours, the more likely you will have good sleep and to prevent health risks or symptoms from sleep apnea. Even if you use it 4 hours it helps. Occasionally all of us are unable to use a medical therapy, in sleep apnea, it is not dangerous to miss one night.   Communicate. Call our skilled team on the number provided on the first day if your visit for problems that make it difficult to wear the device. Over 2 out of 3 patients can learn to wear the device long-term with help from our team. Remember to call our team or your sleep providers if you are unable to wear the device as we may have other solutions for those who cannot adapt to mask CPAP therapy. It is recommended that you sleep your sleep provider within the first 3 months and yearly after that if you are not having problems.   Use it for your health. We encourage use of CPAP masks during daytime quiet periods to allow your face and brain to adapt to the sensation of CPAP so that it will be a more natural sensation to awaken to at night or during naps. This can be very useful during  the first few weeks or months of adapting to CPAP though it does not help medically to wear CPAP during wakefulness and  should not be used as a strategy just to meet guidelines.  Take care of your equipment. Make sure you clean your mask and tubing using directions every day and that your filter and mask are replaced as recommended or if they are not working.     BESIDES CPAP, WHAT OTHER THERAPIES ARE THERE?    Positioning Device  Positioning devices are generally used when sleep apnea is mild and only occurs on your back.This example shows a pillow that straps around the waist. It may be appropriate for those whose sleep study shows milder sleep apnea that occurs primarily when lying flat on one's back. Preliminary studies have shown benefit but effectiveness at home may need to be verified by a home sleep test. These devices are generally not covered by medical insurance.  Examples of devices that maintain sleeping on the back to prevent snoring and mild sleep apnea.    Belt type body positioner  http://Forward Talent/    Electronic reminder  http://nightshifttherapy.anfix/            Oral Appliance  What is oral appliance therapy?  An oral appliance device fits on your teeth at night like a retainer used after having braces. The device is made by a specialized dentist and requires several visits over 1-2 months before a manufactured device is made to fit your teeth and is adjusted to prevent your sleep apnea. Once an oral device is working properly, snoring should be improved. A home sleep test may be recommended at that time if to determine whether the sleep apnea is adequately treated.       Some things to remember:  -Oral devices are often, but not always, covered by your medical insurance. Be sure to check with your insurance provider.   -If you are referred for oral therapy, you will be given a list of specialized dentists to consider or you may choose to visit the Web site of the American Academy of Dental  Sleep Medicine  -Oral devices are less likely to work if you have severe sleep apnea or are extremely overweight.     More detailed information  An oral appliance is a small acrylic device that fits over the upper and lower teeth  (similar to a retainer or a mouth guard). This device slightly moves jaw forward, which moves the base of the tongue forward, opens the airway, improves breathing for effective treat snoring and obstructive sleep apnea in perhaps 7 out of 10 people .  The best working devices are custom-made by a dental device  after a mold is made of the teeth 1, 2, 3.  When is an oral appliance indicated?  Oral appliance therapy is recommended as a first-line treatment for patients with primary snoring, mild sleep apnea, and for patients with moderate sleep apnea who prefer appliance therapy to use of CPAP4, 5. Severity of sleep apnea is determined by sleep testing and is based on the number of respiratory events per hour of sleep.   How successful is oral appliance therapy?  The success rate of oral appliance therapy in patients with mild sleep apnea is 75-80% while in patients with moderate sleep apnea it is 50-70%. The chance of success in patients with severe sleep apnea is 40-50%. The research also shows that oral appliances have a beneficial effect on the cardiovascular health of ISAAC patients at the same magnitude as CPAP therapy7.  Oral appliances should be a second-line treatment in cases of severe sleep apnea, but if not completely successful then a combination therapy utilizing CPAP plus oral appliance therapy may be effective. Oral appliances tend to be effective in a broad range of patients although studies show that the patients who have the highest success are females, younger patients, those with milder disease, and less severe obesity. 3, 6.   Finding a dentist that practices dental sleep medicine  Specific training is available through the American Academy of Dental Sleep  Medicine for dentists interested in working in the field of sleep. To find a dentist who is educated in the field of sleep and the use of oral appliances, near you, visit the Web site of the American Academy of Dental Sleep Medicine.    References  1. Kurtis et al. Objectively measured vs self-reported compliance during oral appliance therapy for sleep-disordered breathing. Chest 2013; 144(5): 1610-1279.  2. Luis Carlos et al. Objective measurement of compliance during oral appliance therapy for sleep-disordered breathing. Thorax 2013; 68(1): 91-96.  3. Vincent et al. Mandibular advancement devices in 620 men and women with ISAAC and snoring: tolerability and predictors of treatment success. Chest 2004; 125: 7036-4928.  4. Gloria et al. Oral appliances for snoring and ISAAC: a review. Sleep 2006; 29: 244-262.  5. Prince et al. Oral appliance treatment for ISAAC: an update. J Clin Sleep Med 2014; 10(2): 215-227.  6. Bryant et al. Predictors of OSAH treatment outcome. J Dent Res 2007; 86: 5794-6686.      Weight Loss:    Weight loss is a long-term strategy that may improve sleep apnea in some patients.    Weight management is a personal decision and the decision should be based on your interest and the potential benefits.  If you are interested in exploring weight loss strategies, the following discussion covers the impact on weight loss on sleep apnea and the approaches that may be successful.    Being overweight does not necessarily mean you will have health consequences.  Those who have BMI over 35 or over 27 with existing medical conditions carries greater risk.   Weight loss decreases severity of sleep apnea in most people with obesity. For those with mild obesity who have developed snoring with weight gain, even 15-30 pound weight loss can improve and occasionally eliminate sleep apnea.  Structured and life-long dietary and health habits are necessary to lose weight and keep healthier weight levels.      Though there may be significant health benefits from weight loss, long-term weight loss is very difficult to achieve- studies show success with dietary management in less than 10% of people. In addition, substantial weight loss may require years of dietary control and may be difficult if patients have severe obesity. In these cases, surgical management may be considered.  Finally, older individuals who have tolerated obesity without health complications may be less likely to benefit from weight loss strategies.      [unfilled]    Surgery:    Surgery for obstructive sleep apnea is considered generally only when other therapies fail to work. Surgery may be discussed with you if you are having a difficult time tolerating CPAP and or when there is an abnormal structure that requires surgical correction.  Nose and throat surgeries often enlarge the airway to prevent collapse.  Most of these surgeries create pain for 1-2 weeks and up to half of the most common surgeries are not effective throughout life.  You should carefully discuss the benefits and drawbacks to surgery with your sleep provider and surgeon to determine if it is the best solution for you.   More information  Surgery for ISAAC is directed at areas that are responsible for narrowing or complete obstruction of the airway during sleep.  There are a wide range of procedures available to enlarge and/or stabilize the airway to prevent blockage of breathing in the three major areas where it can occur: the palate, tongue, and nasal regions.  Successful surgical treatment depends on the accurate identification of the factors responsible for obstructive sleep apnea in each person.  A personalized approach is required because there is no single treatment that works well for everyone.  Because of anatomic variation, consultation with an examination by a sleep surgeon is a critical first step in determining what surgical options are best for each patient.  In some  cases, examination during sedation may be recommended in order to guide the selection of procedures.  Patients will be counseled about risks and benefits as well as the typical recovery course after surgery. Surgery is typically not a cure for a person s ISAAC.  However, surgery will often significantly improve one s ISAAC severity (termed  success rate ).  Even in the absence of a cure, surgery will decrease the cardiovascular risk associated with OSA7; improve overall quality of life8 (sleepiness, functionality, sleep quality, etc).      Palate Procedures:  Patients with ISAAC often have narrowing of their airway in the region of their tonsils and uvula.  The goals of palate procedures are to widen the airway in this region as well as to help the tissues resist collapse.  Modern palate procedure techniques focus on tissue conservation and soft tissue rearrangement, rather than tissue removal.  Often the uvula is preserved in this procedure. Residual sleep apnea is common in patient after pharyngoplasty with an average reduction in sleep apnea events of 33%2.      Tongue Procedures:  ExamWhile patients are awake, the muscles that surround the throat are active and keep this region open for breathing. These muscles relax during sleep, allowing the tongue and other structures to collapse and block breathing.  There are several different tongue procedures available.  Selection of a tongue base procedure depends on characteristics seen on physical exam.  Generally, procedures are aimed at removing bulky tissues in this area or preventing the back of the tongue from falling back during sleep.  Success rates for tongue surgery range from 50-62%3.    Hypoglossal Nerve Stimulation:  Hypoglossal nerve stimulation has recently received approval from the [place] Food and Drug Administration for the treatment of obstructive sleep apnea.  This is based on research showing that the system was safe and effective in treating sleep  apnea6.  Results showed that the median AHI score decreased 68%, from 29.3 to 9.0. This therapy uses an implant system that senses breathing patterns and delivers mild stimulation to airway muscles, which keeps the airway open during sleep.  The system consists of three fully implanted components: a small generator (similar in size to a pacemaker), a breathing sensor, and a stimulation lead.  Using a small handheld remote, a patient turns the therapy on before bed and off upon awakening.    Candidates for this device must be greater than 18 years of age, have moderate to severe ISAAC (AHI between 15-65), BMI less than 35, have tried CPAP/oral appliance for at least 8 weeks without success, and have appropriate upper airway anatomy (determined by a sleep endoscopy performed by Dr. Riaz Hassan).    Hypoglossal Nerve Stimulation Pathway:    The sleep surgeon s office will work with the patient through the insurance prior-authorization process (including communications and appeals).    Nasal Procedures:  Nasal obstruction can interfere with nasal breathing during the day and night.  Studies have shown that relief of nasal obstruction can improve the ability of some patients to tolerate positive airway pressure therapy for obstructive sleep apnea1.  Treatment options include medications such as nasal saline, topical corticosteroid and antihistamine sprays, and oral medications such as antihistamines or decongestants. Non-surgical treatments can include external nasal dilators for selected patients. If these are not successful by themselves, surgery can improve the nasal airway either alone or in combination with these other options.      Combination Procedures:  Combination of surgical procedures and other treatments may be recommended, particularly if patients have more than one area of narrowing or persistent positional disease.  The success rate of combination surgery ranges from 66-80%2,3.    References  Jim SHORT  The Role of the Nose in Snoring and Obstructive Sleep Apnoea: An Update.  Eur Arch Otorhinolaryngol. 2011; 268: 1365-73.   Em SM; Joanna JA; Sharmila JR; Pallanch JF; Xuan MB; Carlos SG; Sadie EASTMAN. Surgical modifications of the upper airway for obstructive sleep apnea in adults: a systematic review and meta-analysis. SLEEP 2010;33(10):3545-1547. Curly OMER. Hypopharyngeal surgery in obstructive sleep apnea: an evidence-based medicine review.  Arch Otolaryngol Head Neck Surg. 2006 Feb;132(2):206-13.  Aly YH1, Hansa Y, Shay HERMAN. The efficacy of anatomically based multilevel surgery for obstructive sleep apnea. Otolaryngol Head Neck Surg. 2003 Oct;129(4):327-35.  Kezirian E, Goldberg A. Hypopharyngeal Surgery in Obstructive Sleep Apnea: An Evidence-Based Medicine Review. Arch Otolaryngol Head Neck Surg. 2006 Feb;132(2):206-13.  Emilia ACE et al. Upper-Airway Stimulation for Obstructive Sleep Apnea.  N Engl J Med. 2014 Jan 9;370(2):139-49.  Peapollo Y et al. Increased Incidence of Cardiovascular Disease in Middle-aged Men with Obstructive Sleep Apnea. Am J Respir Crit Care Med; 2002 166: 159-165  Petty EM et al. Studying Life Effects and Effectiveness of Palatopharyngoplasty (SLEEP) study: Subjective Outcomes of Isolated Uvulopalatopharyngoplasty. Otolaryngol Head Neck Surg. 2011; 144: 623-631.        WHAT IF I ONLY HAVE SNORING?    Mandibular advancement devices, lateral sleep positioning, long-term weight loss and treatment of nasal allergies have been shown to improve snoring.  Exercising tongue muscles with a game (https://apps.KKBOX.Hyperpia/us/judi/soundly-reduce-snoring/ze4423546371) or stimulating the tongue during the day with a device (https://doi.org/10.3390/bzc45180856) have improved snoring in some individuals.    Remember to Drive Safe... Drive Alive     Sleep health profoundly affects your health, mood, and your safety.  Thirty three percent of the population (one in three of us) is not getting enough sleep  and many have a sleep disorder. Not getting enough sleep or having an untreated / undertreated sleep condition may make us sleepy without even knowing it. In fact, our driving could be dramatically impaired due to our sleep health. As your provider, here are some things I would like you to know about driving:     Here are some warning signs for impairment and dangerous drowsy driving:              -Having been awake more than 16 hours               -Looking tired               -Eyelid drooping              -Head nodding (it could be too late at this point)              -Driving for more than 30 minutes     Some things you could do to make the driving safer if you are experiencing some drowsiness:              -Stop driving and rest              -Call for transportation              -Make sure your sleep disorder is adequately treated     Some things that have been shown NOT to work when experiencing drowsiness while driving:              -Turning on the radio              -Opening windows              -Eating any  distracting  /  entertaining  foods (e.g., sunflower seeds, candy, or any other)              -Talking on the phone      Your decision may not only impact your life, but also the life of others. Please, remember to drive safe for yourself and all of us.

## 2023-09-07 ENCOUNTER — PATIENT OUTREACH (OUTPATIENT)
Dept: CARE COORDINATION | Facility: CLINIC | Age: 48
End: 2023-09-07
Payer: COMMERCIAL

## 2023-09-12 ENCOUNTER — MYC MEDICAL ADVICE (OUTPATIENT)
Dept: FAMILY MEDICINE | Facility: CLINIC | Age: 48
End: 2023-09-12
Payer: COMMERCIAL

## 2023-09-15 ENCOUNTER — INFUSION THERAPY VISIT (OUTPATIENT)
Dept: INFUSION THERAPY | Facility: CLINIC | Age: 48
End: 2023-09-15
Attending: PSYCHIATRY & NEUROLOGY
Payer: COMMERCIAL

## 2023-09-15 VITALS
OXYGEN SATURATION: 98 % | RESPIRATION RATE: 20 BRPM | SYSTOLIC BLOOD PRESSURE: 144 MMHG | TEMPERATURE: 98.9 F | DIASTOLIC BLOOD PRESSURE: 80 MMHG | HEART RATE: 82 BPM

## 2023-09-15 DIAGNOSIS — G35 MULTIPLE SCLEROSIS (H): Primary | ICD-10-CM

## 2023-09-15 PROCEDURE — 96365 THER/PROPH/DIAG IV INF INIT: CPT

## 2023-09-15 PROCEDURE — 258N000003 HC RX IP 258 OP 636: Performed by: PSYCHIATRY & NEUROLOGY

## 2023-09-15 PROCEDURE — 250N000011 HC RX IP 250 OP 636: Mod: JZ | Performed by: PSYCHIATRY & NEUROLOGY

## 2023-09-15 RX ORDER — HEPARIN SODIUM,PORCINE 10 UNIT/ML
5 VIAL (ML) INTRAVENOUS
Status: CANCELLED | OUTPATIENT
Start: 2023-09-27

## 2023-09-15 RX ORDER — HEPARIN SODIUM (PORCINE) LOCK FLUSH IV SOLN 100 UNIT/ML 100 UNIT/ML
5 SOLUTION INTRAVENOUS
Status: CANCELLED | OUTPATIENT
Start: 2023-09-27

## 2023-09-15 RX ORDER — ACETAMINOPHEN 325 MG/1
650 TABLET ORAL EVERY 4 HOURS PRN
Status: CANCELLED
Start: 2023-09-27

## 2023-09-15 RX ORDER — DIPHENHYDRAMINE HCL 25 MG
50 CAPSULE ORAL EVERY 4 HOURS PRN
Status: CANCELLED
Start: 2023-09-27

## 2023-09-15 RX ORDER — ONDANSETRON 2 MG/ML
4 INJECTION INTRAMUSCULAR; INTRAVENOUS EVERY 8 HOURS PRN
Status: CANCELLED
Start: 2023-09-27

## 2023-09-15 RX ORDER — IBUPROFEN 200 MG
600 TABLET ORAL EVERY 6 HOURS PRN
Status: CANCELLED
Start: 2023-09-27

## 2023-09-15 RX ADMIN — NATALIZUMAB 300 MG: 300 INJECTION INTRAVENOUS at 13:42

## 2023-09-15 RX ADMIN — SODIUM CHLORIDE 250 ML: 9 INJECTION, SOLUTION INTRAVENOUS at 13:32

## 2023-09-15 NOTE — PROGRESS NOTES
Infusion Nursing Note:  Nataliya Aldrich presents today for Tysabri.    Patient seen by provider today: No   present during visit today: Not Applicable.    Note: Tysabri checklist completed and faxed per protocol.  Patient reports feeling well and denies illness.  No post infusion observation needed.    Intravenous Access:  No Intravenous access/labs at this visit.  Peripheral IV placed.    Treatment Conditions:  Not Applicable.    Post Infusion Assessment:  Patient tolerated infusion without incident.  Blood return noted pre and post infusion.  Site patent and intact, free from redness, edema or discomfort.  No evidence of extravasations.  Access discontinued per protocol.       Discharge Plan:   AVS to patient via MYCHART.  Patient will return 10/27 for next appointment.   Patient discharged in stable condition accompanied by: self.  Departure Mode: Ambulatory.      June Vieira RN

## 2023-09-24 ENCOUNTER — E-VISIT (OUTPATIENT)
Dept: FAMILY MEDICINE | Facility: CLINIC | Age: 48
End: 2023-09-24
Payer: COMMERCIAL

## 2023-09-24 DIAGNOSIS — S99.929A INJURY OF TOE, UNSPECIFIED LATERALITY, INITIAL ENCOUNTER: Primary | ICD-10-CM

## 2023-09-24 PROCEDURE — 99421 OL DIG E/M SVC 5-10 MIN: CPT | Performed by: PHYSICIAN ASSISTANT

## 2023-09-25 ENCOUNTER — ANCILLARY PROCEDURE (OUTPATIENT)
Dept: GENERAL RADIOLOGY | Facility: CLINIC | Age: 48
End: 2023-09-25
Attending: NURSE PRACTITIONER
Payer: COMMERCIAL

## 2023-09-25 ENCOUNTER — OFFICE VISIT (OUTPATIENT)
Dept: FAMILY MEDICINE | Facility: CLINIC | Age: 48
End: 2023-09-25
Payer: COMMERCIAL

## 2023-09-25 VITALS
TEMPERATURE: 98.4 F | HEIGHT: 65 IN | BODY MASS INDEX: 44.15 KG/M2 | WEIGHT: 265 LBS | SYSTOLIC BLOOD PRESSURE: 136 MMHG | OXYGEN SATURATION: 100 % | HEART RATE: 104 BPM | RESPIRATION RATE: 13 BRPM | DIASTOLIC BLOOD PRESSURE: 74 MMHG

## 2023-09-25 DIAGNOSIS — R22.42 LOCALIZED SWELLING OF LEFT FOOT: ICD-10-CM

## 2023-09-25 DIAGNOSIS — R22.42 LOCALIZED SWELLING OF LEFT FOOT: Primary | ICD-10-CM

## 2023-09-25 DIAGNOSIS — S92.912A CLOSED DISPLACED FRACTURE OF PHALANX OF TOE OF LEFT FOOT, UNSPECIFIED TOE, INITIAL ENCOUNTER: ICD-10-CM

## 2023-09-25 DIAGNOSIS — S99.929A INJURY OF TOE, UNSPECIFIED LATERALITY, INITIAL ENCOUNTER: ICD-10-CM

## 2023-09-25 DIAGNOSIS — M79.631 PAIN OF RIGHT FOREARM: ICD-10-CM

## 2023-09-25 PROCEDURE — 2894A XR TOE LEFT G/E 2 VIEWS: CPT | Mod: TC | Performed by: RADIOLOGY

## 2023-09-25 PROCEDURE — 73090 X-RAY EXAM OF FOREARM: CPT | Mod: TC | Performed by: RADIOLOGY

## 2023-09-25 PROCEDURE — 99214 OFFICE O/P EST MOD 30 MIN: CPT | Performed by: NURSE PRACTITIONER

## 2023-09-25 PROCEDURE — 73630 X-RAY EXAM OF FOOT: CPT | Mod: TC | Performed by: RADIOLOGY

## 2023-09-25 NOTE — RESULT ENCOUNTER NOTE
Dear Nataliya,    Here is a summary of your recent test results:    No fracture of your forearm.  Great news.     For additional lab test information, labtestsonline.org is an excellent reference.    In addition, here is a list of due or overdue Health Maintenance reminders:    Hepatitis B Vaccine(3 of 3 - 19+ 3-dose series) due on 07/20/2023  Ferritin Lab due on 08/18/2023  Flu Vaccine(1) due on 09/01/2023  Colorectal Cancer Screening due on 08/01/2023  Mammogram due on 10/07/2023  Asthma Control Test due on 10/11/2023    Please call us at [phone] (or use ABSMaterials) to address the above recommendations if needed.    Thank you for choosing [facility].  It was an honor and a privilege to participate in your care.       Healthy regards,    Leonie Collazo, LEROY  [facility]

## 2023-09-25 NOTE — COMMUNITY RESOURCES LIST (ENGLISH)
09/25/2023   [facility]  N/A  For questions about this resource list or additional care needs, please contact your primary care clinic or care manager.  Phone: [phone]   Email: N/A   Address: [facility] [place] [address]   Hours: N/A        Financial Stability       Rent and mortgage payment assistance  1  Community Action Partnership (CAP) of [place] [facility] [place] - Family Homeless Prevention Assistance Program (FHPAP) Distance: 7.73 miles      In-Person   [address] [place] [address]  Language: English, Indonesian  Hours: Mon - Fri 8:00 AM - 4:30 PM  Fees: Free   Phone: (765) 348-6573 Email: info@Siri.Ambarella Website: https://www.Coinify/     2  [place] Distance: 7.78 miles      In-Person, Phone/[address] [address]  Language: English, Indonesian  Hours: Mon - Fri 9:00 AM - 4:00 PM  Fees: Free   Phone: (127) 765-2599 Email: analia@[facility]. Website: https://www.[employer] Corporation/lashawn          Food and Nutrition       Food pantry  3  [facility] Food Shelf Distance: 7.63 miles      In-Person   [address] [place] [address]  Language: English, Zimbabwean  Hours: Mon - Fri 9:00 AM - 5:00 PM  Fees: Free   Phone: (138) 426-9972 Email: clark@"[employer] Website: https://www.WireOver.org/[place]-office/     4  Community Action Partnership (CAP) [facility] [place] - Food Shelf Distance: 7.73 miles      In-Person   [address] [facility] [place] [address]  Language: English, Indonesian  Hours: Mon - Fri 9:00 AM - 4:00 PM Appt. Only  Fees: Free   Phone: (921) 490-1337 Ext.1 Email: info@Siri.org Website: https://www.capagency.org/     SNAP application assistance  5  Community Action Partnership (CAP) of [place] Distance: 7.73 miles      In-Person   [address] [address]   Language: English, Tuvaluan  Hours: Mon - Fri 8:00 AM - 8:00 PM  Fees: Free   Phone: (207) 326-5577 Email: info@Prolebrity.org Website: https://www.Prolebrity.org/     6  [place] Distance: 7.78 miles      In-Person, Phone/Virtual   [address] [address]  Language: English, Tuvaluan  Hours: Mon - Fri 9:00 AM - 4:00 PM  Fees: Free   Phone: (651) 969-2353 Email: analia@[facility] Website: https://www.BeanJockey/lashawn     Soup kitchen or free meals  7  [place] Community Assistance Distance: 7.58 miles      In-Person   [address] [place] [address]  Language: English  Hours: Mon - Tue 5:30 PM - 6:30 PM , Thu - Fri 5:30 PM - 6:30 PM  Fees: Free   Phone: (392) 920-6163 Email: blake@[employer] Website: http://Servhawk.org/     8  [facility] Distance: 11.4 miles      [facility]   [address] [place] [address]  Language: English  Hours: Mon - Fri 5:00 PM - 6:00 PM  Fees: Free   Phone: (810) 292-7617 Email: david@"[employer]org Website: https://www."[employer]el[employer]".org/          Important Numbers & Websites       Emergency Services   [address]  [facility]   311  Poison Control   (638) 789-1130  Suicide Prevention Lifeline   (831) 996-6829 (TALK)  Child Abuse Hotline   (895) 504-7785 (4-A-Child)  Sexual Assault Hotline   (842) 863-3983 (HOPE)  National Runaway Safeline   (529) 176-6214 (RUNAWAY)  All-Options Talkline   (725) 182-8877  Substance Abuse Referral   (537) 179-3049 (HELP)

## 2023-09-25 NOTE — COMMUNITY RESOURCES LIST (ENGLISH)
09/25/2023   [facility]  N/A  For additional resource needs, please contact your health insurance member services or your primary care team.  Phone: [phone]   Email: N/A   Address: [facility] [place] [address]   Hours: N/A        Financial Stability       Rent and mortgage payment assistance  1  Community Action Partnership (CAP) of [place] [facility] [place] - Family Homeless Prevention Assistance Program (FHPAP) Distance: 7.73 miles      In-Person   [address] [facility] [place] [address]  Language: English, Bangladeshi  Hours: Mon - Fri 8:00 AM - 4:30 PM  Fees: Free   Phone: (905) 561-8198 Email: info@EcoStart[employer] Website: https://www.Createorg/     2  [place] Distance: 7.78 miles      In-Person, Phone/[address] [address]  Language: English, Bangladeshi  Hours: Mon - Fri 9:00 AM - 4:00 PM  Fees: Free   Phone: (854) 989-6001 Email: analia@[facility]. Website: https://www.MTX Connect/lashawn          Food and Nutrition       Food pantry  3  [facility] Food Shelf Distance: 7.63 miles      In-Person   [address] [place] [address]  Language: English, Burkinan  Hours: Mon - Fri 9:00 AM - 5:00 PM  Fees: Free   Phone: (537) 822-3999 Email: clark@[employer] Website: https://www.ezCater.org/[place]-office/     4  Community Action Partnership (CAP) [facility] - Food Shelf Distance: 7.73 miles      In-Person   [address] [facility] [place] [place] [address]  Language: English, Bangladeshi  Hours: Mon - Fri 9:00 AM - 4:00 PM Appt. Only  Fees: Free   Phone: (861) 519-2215 Ext.1 Email: info@EcoStart.org Website: https://www.capagency.org/     SNAP application assistance  5  [employer] [place] Distance: 23.27 miles      Phone/Virtual   [address] [address]  Language: English, Hmong, Taiwanese, Burkinan,  Lao  Hours: Mon - Fri 8:30 AM - 4:30 PM  Fees: Free   Phone: (815) 256-4148 Email: helpline@StukentsoMister Spex.org Website: https://www.StukentsoMister Spex.org/programs/[place]-food-helpline/     6  Community Action Partnership (CAP) [facility] Distance: 7.73 miles      In-Person   [address] [place] [address]  Language: English, Lao  Hours: Mon - Fri 8:00 AM - 8:00 PM  Fees: Free   Phone: (246) 940-6406 Email: info@[facility] [employer].org Website: https://www.Van Ness campusPursuit Vascular.org/     Soup kitchen or free meals  7  [facility] Assistance Distance: 7.58 miles      In-Person   [address] [place] [address]  Language: English  Hours: Mon - Tue 5:30 PM - 6:30 PM , Thu - Fri 5:30 PM - 6:30 PM  Fees: Free   Phone: (718) 273-4787 Email: blake@Sparks Website: http://[facility] HillCardocmn.org/     8  [facility] Distance: 11.4 miles      Pickup   [address] [facility] [place] [address]  Language: English  Hours: Mon - Fri 5:00 PM - 6:00 PM  Fees: Free   Phone: (794) 172-1194 Email: david@23andMe.org Website: https://www.23andMe.org/          Important Numbers & Websites       [facility]   211 211itedway.org  Poison Control   (714) 888-8631 [place]poison.[place]  Suicide and Crisis Lifeline   980 988lifeline.org  Childhelp National Child Abuse Hotline   715.590.5349 Childhelphotline.org  National Sexual Assault Hotline   (304) 340-4404 (HOPE) Rainn.org  National Runaway Safeline   (951) 739-5556 (RUNAWAY) [facility].org  Pregnancy & Postpartum Support [place]   Call/text 550-913-9028 Ppsupportmn.org  Substance Abuse National Helpline ([facility]   399-060-HELP (4357) Findtreatment.gov  Emergency Services   912

## 2023-09-25 NOTE — PROGRESS NOTES
"  Assessment & Plan     Localized swelling of left foot  Injury of toe, unspecified laterality, initial encounter  Closed displaced fracture of phalanx of toe of left foot, unspecified toe, initial encounter  Fractured toes.   Cast shoe.   AT home cares.   Ortho referral if not improving.   Nataliya verbalizes understanding of plan of care and is in agreement.   - XR Foot Left G/E 3 Views  - XR Toe Left G/E 2 Views        Pain of right forearm  Fractured not appreciated.   Likely hematoma.   Warmth to the area.   Cont to monitor.   - XR Forearm Right 2 Views      BMI:   Estimated body mass index is 44.1 kg/m  as calculated from the following:    Height as of this encounter: 1.651 m (5' 5\").    Weight as of this encounter: 120.2 kg (265 lb).   Weight management plan: Discussed healthy diet and exercise guidelines    Return in about 2 weeks (around 10/9/2023) for Recheck.      MARTÍNEZ [facility] PRIOR JACKELINE An is a 48 year old, presenting for the following health issues:  Toe Injury        9/25/2023     2:20 PM   Additional Questions   Roomed by Serena PALMA   Accompanied by Self       History of Present Illness       Reason for visit:  Dropped object on left foot  Symptom onset:  1-3 days ago  Symptoms include:  Pain swollen  Symptom intensity:  Severe  Symptom progression:  Staying the same  Had these symptoms before:  No  What makes it worse:  Walking  What makes it better:  Ibuphrophen    She eats 2-3 servings of fruits and vegetables daily.She consumes 0 sweetened beverage(s) daily.She exercises with enough effort to increase her heart rate 10 to 19 minutes per day.  She exercises with enough effort to increase her heart rate 4 days per week.   She is taking medications regularly.     X3 days - was moving furniture around and dropped ottoman on left foot - hitting 4th toe. When walking, bottom of foot feels hard. Entire foot and ankle are now swollen.   Lost balance " "and fell back with tv tray in hands - bruised right inner arm.    Starting PT end of 10/2023 for old injury to ankle - left.    Soaked in epsom salt, elevating, ibuprofen -- tylenol did not help.   Ibuprofen 400mg at 8a and 1:30p        Review of Systems   Constitutional, HEENT, cardiovascular, pulmonary, GI, , musculoskeletal, neuro, skin, endocrine and psych systems are negative, except as otherwise noted in the HPI.      Objective    /74 (BP Location: Left arm, Patient Position: Chair, Cuff Size: Adult Large)   Pulse 104   Temp 98.4  F (36.9  C) (Tympanic)   Resp 13   Ht 1.651 m (5' 5\")   Wt 120.2 kg (265 lb)   LMP  (LMP Unknown)   SpO2 100%   BMI 44.10 kg/m    Body mass index is 44.1 kg/m .  Physical Exam   GENERAL: healthy, alert and no distress  MS: Top of left foot mild edema and tenderness bases of 3rd 4th and 5th toes; Good ROM noted of foot flexion and extension; tenderness with toe ROM; mid ight forearm mild swelling tenderness with palpation but normal ROM has bruise/hematoma.       Results for orders placed or performed in visit on 09/25/23   XR Forearm Right 2 Views     Status: None    Narrative    FOREARM TWO VIEWS RIGHT  9/25/2023 4:09 PM     HISTORY: Pain of right forearm  COMPARISON: None.      Impression    IMPRESSION: No acute fracture or malalignment. Mild first CMC joint  degenerative changes.    RODOLFO PABON MD         SYSTEM ID:  [id number]   Results for orders placed or performed in visit on 09/25/23   XR Toe Left G/E 2 Views     Status: None    Narrative    XR LEFT FOOT THREE OR MORE VIEWS, XR LEFT TOE TWO OR MORE VIEWS    9/25/2023 3:11 PM     HISTORY: Localized swelling of left foot    COMPARISON: 8/29/2023.       Impression    IMPRESSION:   Acute mildly displaced and angulated fractures of the fourth and  fifth proximal phalanges of the toes. Question nondisplaced  intra-articular extension into the fifth MTP joint of the fifth toe  proximal phalangeal fracture. " There is soft tissue swelling.    No additional fracture about the foot.    NOTE: ABNORMAL REPORT    THE DICTATION ABOVE DESCRIBES AN ABNORMAL REPORT FOR WHICH FOLLOW-UP  IS NEEDED.    ARDEN FRANCE MD         SYSTEM ID:  [id number]   Results for orders placed or performed in visit on 09/25/23   XR Foot Left G/E 3 Views     Status: None    Narrative    XR LEFT FOOT THREE OR MORE VIEWS, XR LEFT TOE TWO OR MORE VIEWS    9/25/2023 3:11 PM     HISTORY: Localized swelling of left foot    COMPARISON: 8/29/2023.       Impression    IMPRESSION:   Acute mildly displaced and angulated fractures of the fourth and  fifth proximal phalanges of the toes. Question nondisplaced  intra-articular extension into the fifth MTP joint of the fifth toe  proximal phalangeal fracture. There is soft tissue swelling.    No additional fracture about the foot.    NOTE: ABNORMAL REPORT    THE DICTATION ABOVE DESCRIBES AN ABNORMAL REPORT FOR WHICH FOLLOW-UP  IS NEEDED.    ARDEN FRANCE MD         SYSTEM ID:  [id number]

## 2023-09-26 ENCOUNTER — MYC MEDICAL ADVICE (OUTPATIENT)
Dept: FAMILY MEDICINE | Facility: CLINIC | Age: 48
End: 2023-09-26
Payer: COMMERCIAL

## 2023-09-28 ENCOUNTER — MYC MEDICAL ADVICE (OUTPATIENT)
Dept: FAMILY MEDICINE | Facility: CLINIC | Age: 48
End: 2023-09-28
Payer: COMMERCIAL

## 2023-09-28 ENCOUNTER — NURSE TRIAGE (OUTPATIENT)
Dept: FAMILY MEDICINE | Facility: CLINIC | Age: 48
End: 2023-09-28
Payer: COMMERCIAL

## 2023-09-28 NOTE — TELEPHONE ENCOUNTER
"Nurse Triage SBAR    Is this a 2nd Level Triage? YES, LICENSED PRACTITIONER REVIEW IS REQUIRED    Situation: Patient reporting change in her hematoma.     Background: Patient was seen on 09/25/2023.    Assessment: Patient says the color of her hematoma is now yellow and purple, but patient noticed a larger \"knot\" (roughly size of a fingernail) on her right forearm that wasn't present during her exam on 09/25/2023. Patient says she is worried about the \"large knot\" that wasn't present when she saw provider on 09/25/2023. Patient says the pain in her arm isn't any worse, but it's tender to the touch. Treatment: Ibuprofen and elevating arm.     Patient worried about new development of a large \"knot\". Patient described the \"knot\" as \"long\". Patient fears she may be having swelling of her vein.     Protocol Recommended Disposition:   Go To Office Now    Recommendation: Recommend to be seen in person due to increase in size of \"knot\" due to possible expanding hematoma. Please advise on appointment.     Routed to provider    Does the patient meet one of the following criteria for ADS visit consideration? 16+ years old, with an [facility] PCP     TIP  Providers, please consider if this condition is appropriate for management at one of our Acute and Diagnostic Services sites.     If patient is a good candidate, please use dotphrase <dot>triageresponse and select Refer to ADS to document.  Reason for Disposition   Raised bruise and size > 2 inches (5 cm) and getting bigger    Additional Information   Negative: Shock suspected (e.g., cold/pale/clammy skin, too weak to stand, low BP, rapid pulse)   Negative: Fever and purple or blood-colored spots or dots   Negative: Sounds like a life-threatening emergency to the triager   Negative: Bruise(s) of forehead or head   Negative: Bruise(s) of face or jaw   Negative: Patient has a concerning injury (e.g., chest, neck, leg)   Negative: Post-operative bruising   Negative: Bruise on head, " face, chest, or abdomen and taking Coumadin (warfarin) or other strong blood thinner, or known bleeding disorder (e.g., thrombocytopenia)   Negative: Unexplained bleeding from another site (e.g., gums, nose, urine) as well   Negative: Patient sounds very sick or weak to the triager   Negative: Dizziness or lightheadedness   Negative: 5 or more bruises now, NOT caused by an injury   Negative: Purple or blood-colored spots or dots that are not from injury or friction (no fever and sounds well to triager)   Negative: SEVERE pain and not improved 2 hours after pain medicine/ice packs    Protocols used: Bruises-A-OH

## 2023-09-28 NOTE — TELEPHONE ENCOUNTER
"As long as no increase in pain, likely normal healing/inflammatory response.  Recommend alternating heat/ice (heat may actually help to improve the \"swelling\" faster as it causes increased bloodflow to the area.  The color of the hematoma will get worse before it gets better and can get larger due to gravity.      Miya Crump MBA, MS, PA-C  [facility]    "

## 2023-09-28 NOTE — TELEPHONE ENCOUNTER
Called and advised of providers note.   Advised new, persistent or worsening symptoms call triage.     Patient agreeable to plan.     Ivis GARZA RN   [facility] Triage

## 2023-10-04 ENCOUNTER — VIRTUAL VISIT (OUTPATIENT)
Dept: FAMILY MEDICINE | Facility: CLINIC | Age: 48
End: 2023-10-04
Payer: COMMERCIAL

## 2023-10-04 DIAGNOSIS — G35 MULTIPLE SCLEROSIS (H): Primary | ICD-10-CM

## 2023-10-04 DIAGNOSIS — R41.89 COGNITIVE CHANGES: ICD-10-CM

## 2023-10-04 DIAGNOSIS — R09.81 NASAL CONGESTION: ICD-10-CM

## 2023-10-04 DIAGNOSIS — M79.18 MYOFASCIAL PAIN: ICD-10-CM

## 2023-10-04 DIAGNOSIS — Z12.11 SCREEN FOR COLON CANCER: ICD-10-CM

## 2023-10-04 DIAGNOSIS — M79.2 PAIN, NEUROPATHIC: ICD-10-CM

## 2023-10-04 DIAGNOSIS — J30.2 SEASONAL ALLERGIC RHINITIS, UNSPECIFIED TRIGGER: ICD-10-CM

## 2023-10-04 PROBLEM — N31.9 NEUROGENIC BLADDER: Status: ACTIVE | Noted: 2019-01-16

## 2023-10-04 PROBLEM — R20.2 PARESTHESIAS: Status: ACTIVE | Noted: 2019-04-25

## 2023-10-04 PROBLEM — R25.2 SPASTICITY: Status: ACTIVE | Noted: 2019-01-16

## 2023-10-04 PROBLEM — M79.605 PAIN OF LEFT LOWER EXTREMITY: Status: RESOLVED | Noted: 2021-09-09 | Resolved: 2023-10-04

## 2023-10-04 PROCEDURE — 99214 OFFICE O/P EST MOD 30 MIN: CPT | Mod: VID | Performed by: PHYSICIAN ASSISTANT

## 2023-10-04 ASSESSMENT — ASTHMA QUESTIONNAIRES: ACT_TOTALSCORE: 23

## 2023-10-04 NOTE — PROGRESS NOTES
Nataliya is a 48 year old who is being evaluated via a billable video visit.      How would you like to obtain your AVS? MyChart  If the video visit is dropped, the invitation should be resent by: Text to cell phone: [phone]  Will anyone else be joining your video visit? No          Assessment & Plan     Multiple sclerosis (H) - Dr. Chong - on Tysabri infusions  Cognitive changes  Myofascial pain  Pain, neuropathic  Recertified FMLA forms for Nataliya today.  We will mail a copy to her home and send a copy to medical records for obstruction.    Nasal congestion  Seasonal allergic rhinitis, unspecified trigger  Seasonal allergen flare.  Refilled today.  - ketotifen fumarate 0.035%, ketotifen 0.025%, (ZADITOR) 0.025 % ophthalmic solution  Dispense: 10 mL; Refill: 1    Screen for colon cancer  Routine screening  - Colonoscopy Screening  Referral    32 minutes spent by me on the date of the encounter doing chart review, history and exam, documentation and further activities per the note       Return in about 1 month (around 11/6/2023) for Physical Exam, Fasting labs.    Miya Crump PA-C  [facility]   Nataliya is a 48 year old, presenting for the following health issues:  FLMA Paperwork        10/4/2023    10:54 AM   Additional Questions   Roomed by Nelida MINERVA   Accompanied by Self     Patient is wanting to go over [facility] Paperwork.       HPI     Multiple sclerosis  Patient is here for recertification for FMLA intermittent leave for her multiple sclerosis.       She continues with physical therapy every other week and recently was evaluated by physical medicine and rehab who also encouraged pool therapy which she has started once every other week as well.  She is diligent with her home exercise programs developed by these therapies.  This has been somewhat complicated recently by a toe fracture which has limited her mobility but is gradually improving.  She  continues her Tysabri infusions every 6 weeks.  She still does have some weakness and dexterity issues in her right upper extremity and she does have discomfort in her cervical spine.  Her spasms do persist and she has baclofen for flares but is currently taking this at 20 mg once daily as higher doses of this medication cause hand twitching for her.  For her chronic pain she is continuing to take Lyrica 75 mg in the morning and gabapentin 300 mg in the afternoon and 600 mg at night (gabapentin works well but makes her quite drowsy)     She continues to work full-time from home.         Review of Systems   Constitutional, HEENT, cardiovascular, pulmonary, GI, , musculoskeletal, neuro, skin, endocrine and psych systems are negative, except as otherwise noted.      Objective           Vitals:  No vitals were obtained today due to virtual visit.    Physical Exam   GENERAL: Healthy, alert and no distress  EYES: Eyes grossly normal to inspection.  No discharge or erythema, or obvious scleral/conjunctival abnormalities.  RESP: No audible wheeze, cough, or visible cyanosis.  No visible retractions or increased work of breathing.    SKIN: Visible skin clear. No significant rash, abnormal pigmentation or lesions.  NEURO: Cranial nerves grossly intact.  Mentation and speech appropriate for age.  PSYCH: Mentation appears normal, affect normal/bright, judgement and insight intact, normal speech and appearance well-groomed.                Video-Visit Details    Type of service:  Video Visit   Video Start Time: 11:33 AM  Video End Time:11:51 AM    Originating Location (pt. Location): Home    Distant Location (provider location):  On-site  Platform used for Video Visit: Diana

## 2023-10-05 NOTE — TELEPHONE ENCOUNTER
Packet has been copied into [facility].    Nelida CARRASCO  sent a copy to patient (patient confirmed)     Filed in [facility]    Nelida GARCIA

## 2023-10-09 ENCOUNTER — MYC MEDICAL ADVICE (OUTPATIENT)
Dept: FAMILY MEDICINE | Facility: CLINIC | Age: 48
End: 2023-10-09
Payer: COMMERCIAL

## 2023-10-09 DIAGNOSIS — N64.4 BREAST TENDERNESS IN FEMALE: Primary | ICD-10-CM

## 2023-10-10 ENCOUNTER — MYC MEDICAL ADVICE (OUTPATIENT)
Dept: FAMILY MEDICINE | Facility: CLINIC | Age: 48
End: 2023-10-10
Payer: COMMERCIAL

## 2023-10-10 ENCOUNTER — TELEPHONE (OUTPATIENT)
Dept: FAMILY MEDICINE | Facility: CLINIC | Age: 48
End: 2023-10-10
Payer: COMMERCIAL

## 2023-10-10 NOTE — TELEPHONE ENCOUNTER
Patient calls his morning. She asked for her FMLA forms to be refaxed. No alternative fax was provide when asked. She said they were faxed after her appointment with LEONORA Crump. No documentation in her chart about recent completion of  FMLA forms.     My also sent a SetJam message and provided the fax number again.

## 2023-10-11 NOTE — TELEPHONE ENCOUNTER
Found form, sent as requested to Danita Sheffield at [phone]  Case [id number]  [facility] #[id number]

## 2023-10-11 NOTE — TELEPHONE ENCOUNTER
DivX message sent to patient today to notify forms were faxed - see 10/11/23 MyC Message. Closing encounter.

## 2023-10-12 ENCOUNTER — ANCILLARY PROCEDURE (OUTPATIENT)
Dept: MAMMOGRAPHY | Facility: CLINIC | Age: 48
End: 2023-10-12
Attending: PHYSICIAN ASSISTANT
Payer: COMMERCIAL

## 2023-10-12 ENCOUNTER — ANCILLARY PROCEDURE (OUTPATIENT)
Dept: ULTRASOUND IMAGING | Facility: CLINIC | Age: 48
End: 2023-10-12
Attending: PHYSICIAN ASSISTANT
Payer: COMMERCIAL

## 2023-10-12 DIAGNOSIS — N64.4 BREAST TENDERNESS IN FEMALE: ICD-10-CM

## 2023-10-12 PROCEDURE — G0279 TOMOSYNTHESIS, MAMMO: HCPCS

## 2023-10-12 PROCEDURE — 76642 ULTRASOUND BREAST LIMITED: CPT | Mod: 50

## 2023-10-12 PROCEDURE — 77066 DX MAMMO INCL CAD BI: CPT

## 2023-10-13 ENCOUNTER — THERAPY VISIT (OUTPATIENT)
Dept: PHYSICAL THERAPY | Facility: CLINIC | Age: 48
End: 2023-10-13
Payer: COMMERCIAL

## 2023-10-13 DIAGNOSIS — M54.2 CERVICALGIA: ICD-10-CM

## 2023-10-13 DIAGNOSIS — M79.18 MYOFASCIAL PAIN: Primary | ICD-10-CM

## 2023-10-13 PROCEDURE — 97140 MANUAL THERAPY 1/> REGIONS: CPT | Mod: GP | Performed by: PHYSICAL THERAPIST

## 2023-10-13 PROCEDURE — 97035 APP MDLTY 1+ULTRASOUND EA 15: CPT | Mod: GP | Performed by: PHYSICAL THERAPIST

## 2023-10-24 ENCOUNTER — PATIENT OUTREACH (OUTPATIENT)
Dept: CARE COORDINATION | Facility: CLINIC | Age: 48
End: 2023-10-24
Payer: COMMERCIAL

## 2023-10-26 DIAGNOSIS — M79.605 PAIN OF LEFT LOWER EXTREMITY: ICD-10-CM

## 2023-10-26 DIAGNOSIS — G35 MULTIPLE SCLEROSIS (H): ICD-10-CM

## 2023-10-26 DIAGNOSIS — M62.838 MUSCLE SPASM: ICD-10-CM

## 2023-10-26 RX ORDER — BACLOFEN 20 MG/1
TABLET ORAL
Qty: 180 TABLET | Refills: 1 | Status: SHIPPED | OUTPATIENT
Start: 2023-10-26 | End: 2023-11-02

## 2023-10-27 ENCOUNTER — THERAPY VISIT (OUTPATIENT)
Dept: PHYSICAL THERAPY | Facility: CLINIC | Age: 48
End: 2023-10-27
Payer: COMMERCIAL

## 2023-10-27 ENCOUNTER — INFUSION THERAPY VISIT (OUTPATIENT)
Dept: INFUSION THERAPY | Facility: CLINIC | Age: 48
End: 2023-10-27
Attending: PSYCHIATRY & NEUROLOGY
Payer: COMMERCIAL

## 2023-10-27 VITALS
RESPIRATION RATE: 16 BRPM | HEART RATE: 82 BPM | OXYGEN SATURATION: 100 % | DIASTOLIC BLOOD PRESSURE: 74 MMHG | TEMPERATURE: 98.3 F | SYSTOLIC BLOOD PRESSURE: 113 MMHG

## 2023-10-27 DIAGNOSIS — M79.18 MYOFASCIAL PAIN: Primary | ICD-10-CM

## 2023-10-27 DIAGNOSIS — G35 MULTIPLE SCLEROSIS (H): Primary | ICD-10-CM

## 2023-10-27 LAB
BASOPHILS # BLD AUTO: 0.1 10E3/UL (ref 0–0.2)
BASOPHILS NFR BLD AUTO: 1 %
EOSINOPHIL # BLD AUTO: 0.2 10E3/UL (ref 0–0.7)
EOSINOPHIL NFR BLD AUTO: 2 %
ERYTHROCYTE [DISTWIDTH] IN BLOOD BY AUTOMATED COUNT: 13.7 % (ref 10–15)
HCT VFR BLD AUTO: 37.4 % (ref 35–47)
HGB BLD-MCNC: 12.2 G/DL (ref 11.7–15.7)
IMM GRANULOCYTES # BLD: 0 10E3/UL
IMM GRANULOCYTES NFR BLD: 0 %
LYMPHOCYTES # BLD AUTO: 4.8 10E3/UL (ref 0.8–5.3)
LYMPHOCYTES NFR BLD AUTO: 61 %
MCH RBC QN AUTO: 30.4 PG (ref 26.5–33)
MCHC RBC AUTO-ENTMCNC: 32.6 G/DL (ref 31.5–36.5)
MCV RBC AUTO: 93 FL (ref 78–100)
MONOCYTES # BLD AUTO: 0.5 10E3/UL (ref 0–1.3)
MONOCYTES NFR BLD AUTO: 7 %
NEUTROPHILS # BLD AUTO: 2.3 10E3/UL (ref 1.6–8.3)
NEUTROPHILS NFR BLD AUTO: 29 %
NRBC # BLD AUTO: 0 10E3/UL
NRBC BLD AUTO-RTO: 0 /100
PLATELET # BLD AUTO: 326 10E3/UL (ref 150–450)
RBC # BLD AUTO: 4.01 10E6/UL (ref 3.8–5.2)
WBC # BLD AUTO: 7.9 10E3/UL (ref 4–11)

## 2023-10-27 PROCEDURE — 258N000003 HC RX IP 258 OP 636: Performed by: PSYCHIATRY & NEUROLOGY

## 2023-10-27 PROCEDURE — 250N000011 HC RX IP 250 OP 636: Mod: JZ | Performed by: PSYCHIATRY & NEUROLOGY

## 2023-10-27 PROCEDURE — 97035 APP MDLTY 1+ULTRASOUND EA 15: CPT | Mod: GP | Performed by: PHYSICAL THERAPIST

## 2023-10-27 PROCEDURE — 96365 THER/PROPH/DIAG IV INF INIT: CPT

## 2023-10-27 PROCEDURE — 97140 MANUAL THERAPY 1/> REGIONS: CPT | Mod: GP | Performed by: PHYSICAL THERAPIST

## 2023-10-27 PROCEDURE — 36415 COLL VENOUS BLD VENIPUNCTURE: CPT | Performed by: PSYCHIATRY & NEUROLOGY

## 2023-10-27 PROCEDURE — 85014 HEMATOCRIT: CPT | Performed by: PSYCHIATRY & NEUROLOGY

## 2023-10-27 RX ORDER — HEPARIN SODIUM (PORCINE) LOCK FLUSH IV SOLN 100 UNIT/ML 100 UNIT/ML
5 SOLUTION INTRAVENOUS
Status: CANCELLED | OUTPATIENT
Start: 2023-11-10

## 2023-10-27 RX ORDER — HEPARIN SODIUM,PORCINE 10 UNIT/ML
5 VIAL (ML) INTRAVENOUS
Status: CANCELLED | OUTPATIENT
Start: 2023-11-10

## 2023-10-27 RX ORDER — ONDANSETRON 2 MG/ML
4 INJECTION INTRAMUSCULAR; INTRAVENOUS EVERY 8 HOURS PRN
Status: CANCELLED
Start: 2023-11-10

## 2023-10-27 RX ORDER — IBUPROFEN 200 MG
600 TABLET ORAL EVERY 6 HOURS PRN
Status: CANCELLED
Start: 2023-11-10

## 2023-10-27 RX ORDER — ACETAMINOPHEN 325 MG/1
650 TABLET ORAL EVERY 4 HOURS PRN
Status: CANCELLED
Start: 2023-11-10

## 2023-10-27 RX ORDER — DIPHENHYDRAMINE HCL 25 MG
50 CAPSULE ORAL EVERY 4 HOURS PRN
Status: CANCELLED
Start: 2023-11-10

## 2023-10-27 RX ADMIN — NATALIZUMAB 300 MG: 300 INJECTION INTRAVENOUS at 13:45

## 2023-10-27 RX ADMIN — SODIUM CHLORIDE 250 ML: 9 INJECTION, SOLUTION INTRAVENOUS at 13:45

## 2023-10-27 NOTE — PROGRESS NOTES
Infusion Nursing Note:  Nataliya ASAEL Hastings Aldrich presents today for Tysabri.    Patient seen by provider today: No   present during visit today: Not Applicable.    Note: recovering from broken toe.      Intravenous Access:  Labs drawn without difficulty.  Peripheral IV placed.    Treatment Conditions:  Tysabri pre-infusion checklist completed via touch program.      Post Infusion Assessment:  Patient tolerated infusion without incident.  Blood return noted pre and post infusion.  Site patent and intact, free from redness, edema or discomfort.  No evidence of extravasations.  Access discontinued per protocol.  Biologic Infusion Post Education: Call the triage nurse at your clinic or seek medical attention if you have chills and/or temperature greater than or equal to 100.5, uncontrolled nausea/vomiting, diarrhea, constipation, dizziness, shortness of breath, chest pain, heart palpitations, weakness or any other new or concerning symptoms, questions or concerns.  You cannot have any live virus vaccines prior to or during treatment or up to 6 months post infusion.  If you have an upcoming surgery, medical procedure or dental procedure during treatment, this should be discussed with your ordering physician and your surgeon/dentist.  If you are having any concerning symptom, if you are unsure if you should get your next infusion or wish to speak to a provider before your next infusion, please call your care coordinator or triage nurse at your clinic to notify them so we can adequately serve you.       Discharge Plan:   Patient declined prescription refills.  Discharge instructions reviewed with: Patient.  Patient and/or family verbalized understanding of discharge instructions and all questions answered.  AVS to patient via [email or web address].  Patient will return 12/8 for next appointment.   Patient discharged in stable condition accompanied by: self.  Departure Mode: Ambulatory.      Radha Lee RN

## 2023-10-31 ENCOUNTER — TELEPHONE (OUTPATIENT)
Dept: FAMILY MEDICINE | Facility: CLINIC | Age: 48
End: 2023-10-31
Payer: COMMERCIAL

## 2023-10-31 DIAGNOSIS — G35 MULTIPLE SCLEROSIS (H): ICD-10-CM

## 2023-10-31 DIAGNOSIS — M79.605 PAIN OF LEFT LOWER EXTREMITY: ICD-10-CM

## 2023-10-31 DIAGNOSIS — M62.838 MUSCLE SPASM: ICD-10-CM

## 2023-10-31 NOTE — TELEPHONE ENCOUNTER
Forms/Letter Request    Type of form/letter:  RX Clarification for Baclofen 20 MG from [facility]    Have you been seen for this request: N/A    Do we have the form/letter: Yes: In Miya Crump's bin    [facility]        Where did/will the form come from? form was faxed in

## 2023-11-01 NOTE — TELEPHONE ENCOUNTER
Received a 2nd notice for the patient regarding Baclofen 20mg - in the providers basket to review/sign    Nelida  K

## 2023-11-02 RX ORDER — BACLOFEN 20 MG/1
TABLET ORAL
Qty: 180 TABLET | Refills: 1 | Status: SHIPPED | OUTPATIENT
Start: 2023-11-02

## 2023-11-02 NOTE — TELEPHONE ENCOUNTER
Recent baclofen prescription indicating maximum daily dose of 80 mg to [facility] pharmacy as requested.      Miya Crump MBA, MS, PA-C  [facility]

## 2023-11-03 ENCOUNTER — THERAPY VISIT (OUTPATIENT)
Dept: PHYSICAL THERAPY | Facility: CLINIC | Age: 48
End: 2023-11-03
Payer: COMMERCIAL

## 2023-11-03 DIAGNOSIS — M54.2 CERVICALGIA: ICD-10-CM

## 2023-11-03 DIAGNOSIS — M79.18 MYOFASCIAL PAIN: Primary | ICD-10-CM

## 2023-11-03 PROCEDURE — 97035 APP MDLTY 1+ULTRASOUND EA 15: CPT | Mod: GP | Performed by: PHYSICAL THERAPIST

## 2023-11-03 PROCEDURE — 97140 MANUAL THERAPY 1/> REGIONS: CPT | Mod: GP | Performed by: PHYSICAL THERAPIST

## 2023-11-06 ENCOUNTER — E-VISIT (OUTPATIENT)
Dept: FAMILY MEDICINE | Facility: CLINIC | Age: 48
End: 2023-11-06
Payer: COMMERCIAL

## 2023-11-06 DIAGNOSIS — R21 RASH: ICD-10-CM

## 2023-11-06 PROCEDURE — 99421 OL DIG E/M SVC 5-10 MIN: CPT | Performed by: PHYSICIAN ASSISTANT

## 2023-11-06 RX ORDER — TRIAMCINOLONE ACETONIDE 1 MG/G
CREAM TOPICAL 2 TIMES DAILY
Qty: 15 G | Refills: 1 | Status: SHIPPED | OUTPATIENT
Start: 2023-11-06 | End: 2024-02-15

## 2023-11-09 LAB — SCANNED LAB RESULT: NORMAL

## 2023-11-10 ENCOUNTER — THERAPY VISIT (OUTPATIENT)
Dept: PHYSICAL THERAPY | Facility: CLINIC | Age: 48
End: 2023-11-10
Payer: COMMERCIAL

## 2023-11-10 DIAGNOSIS — M79.18 MYOFASCIAL PAIN: Primary | ICD-10-CM

## 2023-11-10 DIAGNOSIS — M54.2 CERVICALGIA: ICD-10-CM

## 2023-11-10 PROCEDURE — 97035 APP MDLTY 1+ULTRASOUND EA 15: CPT | Mod: GP | Performed by: PHYSICAL THERAPIST

## 2023-11-10 PROCEDURE — 97140 MANUAL THERAPY 1/> REGIONS: CPT | Mod: GP | Performed by: PHYSICAL THERAPIST

## 2023-11-22 ENCOUNTER — VIRTUAL VISIT (OUTPATIENT)
Dept: FAMILY MEDICINE | Facility: CLINIC | Age: 48
End: 2023-11-22
Payer: COMMERCIAL

## 2023-11-22 ENCOUNTER — TELEPHONE (OUTPATIENT)
Dept: FAMILY MEDICINE | Facility: CLINIC | Age: 48
End: 2023-11-22

## 2023-11-22 DIAGNOSIS — R61 NIGHT SWEATS: ICD-10-CM

## 2023-11-22 DIAGNOSIS — R63.4 LOSS OF WEIGHT: ICD-10-CM

## 2023-11-22 DIAGNOSIS — N83.202 LEFT OVARIAN CYST: ICD-10-CM

## 2023-11-22 DIAGNOSIS — R10.32 LLQ ABDOMINAL PAIN: Primary | ICD-10-CM

## 2023-11-22 DIAGNOSIS — Z12.11 SCREEN FOR COLON CANCER: ICD-10-CM

## 2023-11-22 DIAGNOSIS — R19.8 ALTERED BOWEL FUNCTION: ICD-10-CM

## 2023-11-22 PROCEDURE — 99214 OFFICE O/P EST MOD 30 MIN: CPT | Mod: VID | Performed by: PHYSICIAN ASSISTANT

## 2023-11-22 NOTE — TELEPHONE ENCOUNTER
"    Provider would like patient triaged before today's appointment.      Appointment note    I m having lower quadrant pain agin in my ovaries off an on for the past couple days as well as I ve noticed that I ve been dropping weight like 13 pounds in the past 2 weeks and still loosing , and when I sleep I sleep hard and have bad night sweats, no day time fevers , headaches, cough , diarrhea, regular bowels, have been having joint pain in my knee like it wants to go out , I have also not been able to to eat well no more than twice a day with a small breakfast and a big dinner       Called patient.   \"It's actually 13 pounds over 4.5 weeks\"  The patient states she didn't realize that his was  result of a depression issue.   She explains that her best friend  last year  at the time.   Didn't realize going through depression.   She was sleeping for 10 hours a day      \"This is just a hard month, I am still doing things, functioning and working out. I didn't realize I was not eating full meals until my daughter said something.  I had goals to loose weight, but had not weighed myself.   I have been forcing myself to eat during the day the last couple days.\" The patient said she was only eating one meal a day. Drinks water all day  The patient states she has more energy the last couple days since increasing her food consumption.     Neurology informed her that Topiramate can make you loose weight.   The Topiramate helps control her migraines- states migraines are under control as long as she does not miss one pill.     She has not taken the Prozac because her families history and she is afraid to take it.   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   She states her outlet is therapy, work out, music and journaling.       LLQ- she states the cramping, like a contraction\" is in the same area that it always is.   The cramping comes and goes - \"same sharp " "dull pain\". It must be a cyst that is trying to go away\"every time to I go in just a cyst trying to absorb\"  when I have bowel movements it doesn't hurt- I am having 2 bowel movements a day\"   No diarrhea   She does not take pain medication for the pain.   may be I didn't have enough nutrients for my body to absorb the cyst\"    Asked if symptoms correlate to her period/ovulation.    She does not get a period so she does not know when she ovulates , this episodes has lasted for 3 days.     Neurology  stated the right knee was buckling because  ( states problems with right side of her body has MS issues.     Has had night sweats for a week.     Denied fever, cough, headache.     Denied thoughts of harm to self or others,states feels safe.       Advised will forward information to pcp. Will call her back if virtual is not ok.     Huddled with pcp and relayed information.     "

## 2023-11-22 NOTE — COMMUNITY RESOURCES LIST (ENGLISH)
11/22/2023   [facility]  N/A  For questions about this resource list or additional care needs, please contact your primary care clinic or care manager.  Phone: [phone]   Email: N/A   Address: [facility] [place] [address]   Hours: N/A        Financial Stability       Rent and mortgage payment assistance  1  Community Action Partnership (CAP) of [place] [place] - Family Homeless Prevention Assistance Program (FHPAP) Distance: 7.73 miles      In-Person   [address] [place] [address]  Language: English, Samoan  Hours: Mon - Fri 8:00 AM - 4:30 PM  Fees: Free   Phone: (504) 786-6276 Email: info@Pandabus.Tagrule Website: https://www.U.S. Silica/     2  [place] Distance: 7.78 miles      In-Person, Phone/[address] [address]  Language: English, Samoan  Hours: Mon - Fri 9:00 AM - 4:00 PM  Fees: Free   Phone: (594) 902-4501 Email: analia@[facility]. Website: https://www.Sijibang.com/lashawn          Food and Nutrition       Food pantry  3  [facility] Food Shelf Distance: 7.63 miles      In-Person   [address] [place] [address]  Language: English, Barbadian  Hours: Mon - Fri 9:00 AM - 5:00 PM  Fees: Free   Phone: (394) 937-1824 Email: clark@[employer] Website: https://www.RelTel.org/Lamar-office/     4  Community Action Partnership (CAP) [facility] - [place] - Food Shelf Distance: 7.73 miles      In-Person   [address] [place] [address]  Language: English, Samoan  Hours: Mon - Fri 9:00 AM - 4:00 PM Appt. Only  Fees: Free   Phone: (307) 254-1941 Ext.1 Email: info@Pandabus.org Website: https://www.capagency.org/     SNAP application assistance  5  Community Action Partnership (CAP) of [place]  [place] Distance: 7.73 miles      In-Person   [address] [address]   Language: English, Thai  Hours: Mon - Fri 8:00 AM - 8:00 PM  Fees: Free   Phone: (588) 788-1966 Email: info@Sopheon.org Website: https://www.Sopheon.org/     6  [place] Distance: 7.78 miles      In-Person, Phone/Virtual   [address] [address]  Language: English, Thai  Hours: Mon - Fri 9:00 AM - 4:00 PM  Fees: Free   Phone: (652) 334-1013 Email: analia@[facility] Website: https://www.GoldSpot Media/lashawn     Soup kitchen or free meals  7  [place] Community Assistance Distance: 7.58 miles      In-Person   [address] [place] [address]  Language: English  Hours: Mon - Tue 5:30 PM - 6:30 PM , Thu - Fri 5:30 PM - 6:30 PM  Fees: Free   Phone: (491) 869-9907 Email: blake@[employer] Website: http://Nativeflow.org/     8  [facility] Distance: 11.4 miles      [facility]   [address] [place] [address]  Language: English  Hours: Mon - Fri 5:00 PM - 6:00 PM  Fees: Free   Phone: (836) 675-3817 Email: david@Neighborland.org Website: https://www.Neighborland.org/          Important Numbers & Websites       Emergency Services   911  [facility]   311  Poison Control   (230) 621-3400  Suicide Prevention Lifeline   (319) 479-3524 (TALK)  Child Abuse Hotline   (314) 861-6258 (4-A-Child)  Sexual Assault Hotline   (627) 179-7946 (HOPE)  National Runaway Safeline   (539) 524-4076 (RUNAWAY)  All-Options Talkline   (449) 718-8469  Substance Abuse Referral   (809) 724-9611 (HELP)

## 2023-11-22 NOTE — PROGRESS NOTES
Nataliya is a 48 year old who is being evaluated via a billable video visit.      How would you like to obtain your AVS? MyChart  If the video visit is dropped, the invitation should be resent by: Text to cell phone: [phone]  Will anyone else be joining your video visit? No          Assessment & Plan     LLQ abdominal pain  Left ovarian cyst - follow up US ordered for 6/1/2023 to evaluate for resolution  Altered bowel function  Night sweats  Loss of weight  Suspect constipation as source of LLQ pain due to caliber changes in the last few weeks.  Recommend below:   Patient Instructions   Increase prunes to daily instead of every other day - continue dates every other day.  Do this through the weekend.  If not significant improvement then:     Miralax 1 capful daily x 1-2 weeks.      If working can continue if not helpful within 1-2 weeks do bowel cleanout    MORNING OF MIRALAX CLEANOUT    When you wake up:  Begin a liquid diet. (See list below for suggestions.)  Take 2 Dulcolax (bisacodyl) tablets. (DO NOT CHEW.)    1 hour after waking up:  Mix the entire 238-gram bottle of MiraLAX with 64 ounces of a sports drink (avoid red colored ones)  Drink all of the mixture over the next few hours until gone. (Suggestion: An 8-ounce glass every 15-30 minutes equals 2-4 hours.)  It is very important to drink plenty of water and other liquids in order to avoid dehydration and to flush the bowel. (Although alcohol is a liquid, it can make you dehydrated. You should NOT drink alcohol while doing the cleanout.)    NOTE: Please stay home once you have started your cleanout. Also, the use of moist towelettes or wipes may help to minimize discomfort during the cleanout. A nonprescription 1% hydrocortisone cream may also be soothing when applied to the rectal area after each bowel movement.  It is common during the cleanout to experience some nausea, bloating, and/or abdominal distention. If you chilled the mixture prior to  "drinking it, you could experience chills from consuming so much cold liquid in a short time period. If you develop nausea or vomiting, slow down the rate at which you drink the solution. Please attempt to drink all of the laxative solution even if it takes you longer.Once stooling slows down, you may resume eating solid food.    LIQUID DIET  Juices  Coffee and Tea  Powdered Drinks  Water/Vitamin Water  Diet/Regular Sodas  Sports Drinks  Popsicles  Jell-O  Broths or Bouillon  Ensure or Boost        Return in about 2 weeks (around 2023) for contact clinic if not improving/worsening.      Miya Crump PA-C  [facility] JACKELINE An is a 48 year old, presenting for the following health issues:  RECHECK        2023     3:09 PM   Additional Questions   Roomed by Nelida PALMA   Accompanied by Self     Triage note from 2023    Provider would like patient triaged before today's appointment.       Appointment note     I m having lower quadrant pain agin in my ovaries off an on for the past couple days as well as I ve noticed that I ve been dropping weight like 13 pounds in the past 2 weeks and still loosing , and when I sleep I sleep hard and have bad night sweats, no day time fevers , headaches, cough , diarrhea, regular bowels, have been having joint pain in my knee like it wants to go out , I have also not been able to to eat well no more than twice a day with a small breakfast and a big dinner         Called patient.   \"It's actually 13 pounds over 4.5 weeks\"  The patient states she didn't realize that his was  result of a depression issue.   She explains that her best friend  last year  at the time.   Didn't realize going through depression.   She was sleeping for 10 hours a day       \"This is just a hard month, I am still doing things, functioning and working out. I didn't realize I was not eating full meals until my daughter said something.  I had goals to loose " "weight, but had not weighed myself.   I have been forcing myself to eat during the day the last couple days.\" The patient said she was only eating one meal a day. Drinks water all day  The patient states she has more energy the last couple days since increasing her food consumption.      Neurology informed her that Topiramate can make you loose weight.   The Topiramate helps control her migraines- states migraines are under control as long as she does not miss one pill.      She has not taken the Prozac because her families history and she is afraid to take it.   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   She states her outlet is therapy, work out, music and journaling.         LLQ- she states the cramping, like a contraction\" is in the same area that it always is.   The cramping comes and goes - \"same sharp dull pain\". It must be a cyst that is trying to go away\"every time to I go in just a cyst trying to absorb\"  when I have bowel movements it doesn't hurt (- I am having 2 bowel movements a day\"   No diarrhea   She does not take pain medication for the pain.   may be I didn't have enough nutrients for my body to absorb the cyst\"     Asked if symptoms correlate to her period/ovulation.    She does not get a period so she does not know when she ovulates , this episodes has lasted for 3 days.      Neurology  stated the right knee was buckling because  ( states problems with right side of her body has MS issues.      Has had night sweats for a week.      Denied fever, cough, headache.      Denied thoughts of harm to self or others,states feels safe.         History of Present Illness       Reason for visit:  Ovaries pain, Weight loss , mood changes    She eats 2-3 servings of fruits and vegetables daily.She consumes 0 sweetened beverage(s) daily.She exercises with enough effort to increase her heart rate 10 to 19 minutes per day.  She exercises with " enough effort to increase her heart rate 3 or less days per week.   She is taking medications regularly.       Review of Systems   Constitutional, HEENT, cardiovascular, pulmonary, GI, , musculoskeletal, neuro, skin, endocrine and psych systems are negative, except as otherwise noted.      Objective           Vitals:  No vitals were obtained today due to virtual visit.    Physical Exam   GENERAL: Healthy, alert and no distress  EYES: Eyes grossly normal to inspection.  No discharge or erythema, or obvious scleral/conjunctival abnormalities.  RESP: No audible wheeze, cough, or visible cyanosis.  No visible retractions or increased work of breathing.    SKIN: Visible skin clear. No significant rash, abnormal pigmentation or lesions.  NEURO: Cranial nerves grossly intact.  Mentation and speech appropriate for age.  PSYCH: Mentation appears normal, affect normal/bright, judgement and insight intact, normal speech and appearance well-groomed.              Video-Visit Details    Type of service:  Video Visit     Originating Location (pt. Location): Home    Distant Location (provider location):  On-site  Platform used for Video Visit: Diana

## 2023-11-22 NOTE — PATIENT INSTRUCTIONS
Increase prunes to daily instead of every other day - continue dates every other day.  Do this through the weekend.  If not significant improvement then:     Miralax 1 capful daily x 1-2 weeks.      If working can continue if not helpful within 1-2 weeks do bowel cleanout    MORNING OF MIRALAX CLEANOUT    When you wake up:  Begin a liquid diet. (See list below for suggestions.)  Take 2 Dulcolax (bisacodyl) tablets. (DO NOT CHEW.)    1 hour after waking up:  Mix the entire 238-gram bottle of MiraLAX with 64 ounces of a sports drink (avoid red colored ones)  Drink all of the mixture over the next few hours until gone. (Suggestion: An 8-ounce glass every 15-30 minutes equals 2-4 hours.)  It is very important to drink plenty of water and other liquids in order to avoid dehydration and to flush the bowel. (Although alcohol is a liquid, it can make you dehydrated. You should NOT drink alcohol while doing the cleanout.)    NOTE: Please stay home once you have started your cleanout. Also, the use of moist towelettes or wipes may help to minimize discomfort during the cleanout. A nonprescription 1% hydrocortisone cream may also be soothing when applied to the rectal area after each bowel movement.  It is common during the cleanout to experience some nausea, bloating, and/or abdominal distention. If you chilled the mixture prior to drinking it, you could experience chills from consuming so much cold liquid in a short time period. If you develop nausea or vomiting, slow down the rate at which you drink the solution. Please attempt to drink all of the laxative solution even if it takes you longer.Once stooling slows down, you may resume eating solid food.    LIQUID DIET  Juices  Coffee and Tea  Powdered Drinks  Water/Vitamin Water  Diet/Regular Sodas  Sports Drinks  Popsicles  Jell-O  Broths or Bouillon  Ensure or Boost

## 2023-12-06 NOTE — PROGRESS NOTES
Infusion Nursing Note:  Nataliya Aldrich presents today for Labs + Tysabri.    Patient seen by provider today: No   present during visit today: Not Applicable.    Note: Nataliya reports she is feeling well today.  She saw her PCP yesterday for asthma exacerbation.  She reports her symptoms are greatly improved since starting scheduled Duoneb treatments.  She was prescribed Medrol Dosepack available if symptoms do not improve, but she doesn't plan to start it at this time.    Intravenous Access:  Labs drawn without difficulty.  Peripheral IV placed.    Treatment Conditions:  Tysabri pre-infusion checklist completed via touch program.    Last SABINO Virus test: Negative on 1/6/2023    Labs collected for CBC and SABINO Virus.  Request sent to lab to fax results to Dr. Chong.    Post Infusion Assessment:  Patient tolerated infusion without incident.  No observation needed, per order.  Blood return noted pre and post infusion.  Site patent and intact, free from redness, edema or discomfort.  No evidence of extravasations.  Access discontinued per protocol.     Discharge Plan:   Discharge instructions reviewed with: Patient.  Patient and/or family verbalized understanding of discharge instructions and all questions answered.  AVS to patient via Coravin.  Patient will return 4/12 for next appointment.   Patient discharged in stable condition accompanied by: self.  Departure Mode: Ambulatory.      Sony Valero RN  
The patient is a 31y Female complaining of vaginal bleeding.
negative

## 2023-12-08 ENCOUNTER — INFUSION THERAPY VISIT (OUTPATIENT)
Dept: INFUSION THERAPY | Facility: CLINIC | Age: 48
End: 2023-12-08
Attending: INTERNAL MEDICINE
Payer: COMMERCIAL

## 2023-12-08 ENCOUNTER — TELEPHONE (OUTPATIENT)
Dept: FAMILY MEDICINE | Facility: CLINIC | Age: 48
End: 2023-12-08

## 2023-12-08 VITALS
RESPIRATION RATE: 16 BRPM | TEMPERATURE: 97.9 F | HEART RATE: 68 BPM | OXYGEN SATURATION: 100 % | DIASTOLIC BLOOD PRESSURE: 68 MMHG | SYSTOLIC BLOOD PRESSURE: 126 MMHG

## 2023-12-08 DIAGNOSIS — G35 MULTIPLE SCLEROSIS (H): Primary | ICD-10-CM

## 2023-12-08 PROCEDURE — 250N000011 HC RX IP 250 OP 636: Mod: JZ | Performed by: PSYCHIATRY & NEUROLOGY

## 2023-12-08 PROCEDURE — 258N000003 HC RX IP 258 OP 636: Performed by: PSYCHIATRY & NEUROLOGY

## 2023-12-08 PROCEDURE — 96365 THER/PROPH/DIAG IV INF INIT: CPT

## 2023-12-08 RX ORDER — DIPHENHYDRAMINE HCL 25 MG
50 CAPSULE ORAL EVERY 4 HOURS PRN
Status: CANCELLED
Start: 2023-12-22

## 2023-12-08 RX ORDER — HEPARIN SODIUM,PORCINE 10 UNIT/ML
5 VIAL (ML) INTRAVENOUS
Status: CANCELLED | OUTPATIENT
Start: 2023-12-22

## 2023-12-08 RX ORDER — HEPARIN SODIUM (PORCINE) LOCK FLUSH IV SOLN 100 UNIT/ML 100 UNIT/ML
5 SOLUTION INTRAVENOUS
Status: CANCELLED | OUTPATIENT
Start: 2023-12-22

## 2023-12-08 RX ORDER — ONDANSETRON 2 MG/ML
4 INJECTION INTRAMUSCULAR; INTRAVENOUS EVERY 8 HOURS PRN
Status: CANCELLED
Start: 2023-12-22

## 2023-12-08 RX ORDER — ACETAMINOPHEN 325 MG/1
650 TABLET ORAL EVERY 4 HOURS PRN
Status: CANCELLED
Start: 2023-12-22

## 2023-12-08 RX ORDER — IBUPROFEN 200 MG
600 TABLET ORAL EVERY 6 HOURS PRN
Status: CANCELLED
Start: 2023-12-22

## 2023-12-08 RX ADMIN — NATALIZUMAB 300 MG: 300 INJECTION INTRAVENOUS at 13:23

## 2023-12-08 RX ADMIN — SODIUM CHLORIDE 250 ML: 9 INJECTION, SOLUTION INTRAVENOUS at 13:22

## 2023-12-08 NOTE — PROGRESS NOTES
Infusion Nursing Note:  Nataliya Kwanomid Aldrich presents today for Tysabri.    Patient seen by provider today: No   present during visit today: Not Applicable.    Note: MS touch program checklist completed.  Patient runs  bag with her infusion to prevent migraines (headaches).    Per patient labs are drawn every 3 and 6 months and they were drawn in October; therefore labs were not drawn today.    Patient has an order- she does not need to stay for the hour observation.   Pt didn't want VS after infusion.    Intravenous Access:  Peripheral IV placed.    Treatment Conditions:  Tysabri pre-infusion checklist completed via touch program.      Post Infusion Assessment:  Patient tolerated infusion without incident.  Blood return noted pre and post infusion.  Site patent and intact, free from redness, edema or discomfort.  No evidence of extravasations.  Access discontinued per protocol.    Biologic Infusion Post Education: Call the triage nurse at your clinic or seek medical attention if you have chills and/or temperature greater than or equal to 100.5, uncontrolled nausea/vomiting, diarrhea, constipation, dizziness, shortness of breath, chest pain, heart palpitations, weakness or any other new or concerning symptoms, questions or concerns.  You cannot have any live virus vaccines prior to or during treatment or up to 6 months post infusion.  If you have an upcoming surgery, medical procedure or dental procedure during treatment, this should be discussed with your ordering physician and your surgeon/dentist.  If you are having any concerning symptom, if you are unsure if you should get your next infusion or wish to speak to a provider before your next infusion, please call your care coordinator or triage nurse at your clinic to notify them so we can adequately serve you.       Discharge Plan:   Discharge instructions reviewed with: Patient.  Patient and/or family verbalized understanding of discharge  instructions and all questions answered.  AVS to patient via [employer] Systems[employer].  Patient will return January (next infusion is in about 6 weeks 1/12/24) for next appointment.   Patient discharged in stable condition accompanied by: self.  Departure Mode: Ambulatory.      Kari Schoen, RN

## 2023-12-08 NOTE — TELEPHONE ENCOUNTER
Patient dropped off Medically Necessary Equipment and Emergency Certification form. Patient signed JUANA to have form faxed back to [employer]. Patient would also like a copy mailed to her address. Form due by 12/15/23. Form in your mailbox for signature.    Please call patient to notify of completion

## 2023-12-12 NOTE — TELEPHONE ENCOUNTER
Faxed signed forms to [employer] as requested by patient at #[phone]    Patient called to confirm forms were sent.    Copied into HIMS    [facility] in [facility] // Nelida GARCIA

## 2023-12-12 NOTE — TELEPHONE ENCOUNTER
Form completed and placed in [facility] inbox      Miya Crump MBA, MS, PACARMEN  [facility]

## 2023-12-18 ENCOUNTER — MYC MEDICAL ADVICE (OUTPATIENT)
Dept: FAMILY MEDICINE | Facility: CLINIC | Age: 48
End: 2023-12-18
Payer: COMMERCIAL

## 2023-12-20 ENCOUNTER — VIRTUAL VISIT (OUTPATIENT)
Dept: FAMILY MEDICINE | Facility: CLINIC | Age: 48
End: 2023-12-20
Payer: COMMERCIAL

## 2023-12-20 DIAGNOSIS — B36.0 TINEA VERSICOLOR: Primary | ICD-10-CM

## 2023-12-20 DIAGNOSIS — J45.41 MODERATE PERSISTENT ASTHMA WITH EXACERBATION: ICD-10-CM

## 2023-12-20 PROCEDURE — 99214 OFFICE O/P EST MOD 30 MIN: CPT | Mod: VID | Performed by: PHYSICIAN ASSISTANT

## 2023-12-20 RX ORDER — CLOTRIMAZOLE 1 %
CREAM (GRAM) TOPICAL 2 TIMES DAILY
Qty: 60 G | Refills: 1 | Status: SHIPPED | OUTPATIENT
Start: 2023-12-20

## 2023-12-20 NOTE — PROGRESS NOTES
Nataliya is a 48 year old who is being evaluated via a billable video visit.      How would you like to obtain your AVS? MyChart  If the video visit is dropped, the invitation should be resent by: Text to cell phone: [phone]  Will anyone else be joining your video visit? No          Assessment & Plan     Tinea versicolor  Rash appears fungal in nature.  Use clotrimazole and Selsun blue shampoo for body wash to improve.    - clotrimazole (LOTRIMIN) 1 % external cream  Dispense: 60 g; Refill: 1    Moderate persistent asthma with exacerbation  Increase symbicort to BID and use nebulizer scheduled q6 hours with duoneb.  If worsening/not improving follow up [facility] clinic.           No follow-ups on file.      Miya Crump PA-C  [facility]   Nataliya is a 48 year old, presenting for the following health issues:  Chest Tightness        12/20/2023     2:42 PM   Additional Questions   Roomed by Nelida PALMA   Accompanied by Self       HPI   From appointment note:  After having my COVID vaccine I have noticed that my asthma has been flared where after working out 12/6  or doing strenuous activity I m getting real short of breath and tightness in my chest or I m having to use my inhaler. I m not having any other symptoms headache fever, chest tightness in my chest even when I m done working out I m still having the tightness in my chest no major pain just tightness.I don t think I am wheezing. The vaccine just made me really sick I had it 11/30     Patient states symptoms are getting better as the days go on.   Doing stairs    Toys for tots last weekend lots of vigorous activty.      Taking Symbicort once daily (2 puffs)     Rash  Under bilateral breasts.  Itching and now burning.  Using triamcinolone and doesn't seem to help.  Began ~ 12/15/23.        Review of Systems   Constitutional, HEENT, cardiovascular, pulmonary, gi and gu systems are negative, except as otherwise noted.       Objective           Vitals:  No vitals were obtained today due to virtual visit.    Physical Exam   GENERAL: Healthy, alert and no distress  EYES: Eyes grossly normal to inspection.  No discharge or erythema, or obvious scleral/conjunctival abnormalities.  HENT: Normal cephalic/atraumatic.  External ears, nose and mouth without ulcers or lesions.  No nasal drainage visible.  NECK: No asymmetry, visible masses or scars  RESP: No audible wheeze, cough, or visible cyanosis.  No visible retractions or increased work of breathing.    SKIN: Visible skin clear. No significant rash, abnormal pigmentation or lesions.  NEURO: Cranial nerves grossly intact.  Mentation and speech appropriate for age.  PSYCH: Mentation appears normal, affect normal/bright, judgement and insight intact, normal speech and appearance well-groomed.    For rash - see patient submitted photos submitted 12/18/23 via gamigo.          Video-Visit Details    Type of service:  Video Visit     Originating Location (pt. Location): Home    Distant Location (provider location):  On-site  Platform used for Video Visit: TawanaWell

## 2023-12-21 DIAGNOSIS — B00.9 HSV (HERPES SIMPLEX VIRUS) INFECTION: ICD-10-CM

## 2023-12-21 RX ORDER — VALACYCLOVIR HYDROCHLORIDE 500 MG/1
500 TABLET, FILM COATED ORAL DAILY
Qty: 90 TABLET | Refills: 3 | Status: SHIPPED | OUTPATIENT
Start: 2023-12-21

## 2024-01-05 ENCOUNTER — OFFICE VISIT (OUTPATIENT)
Dept: PODIATRY | Facility: CLINIC | Age: 49
End: 2024-01-05
Payer: COMMERCIAL

## 2024-01-05 VITALS
SYSTOLIC BLOOD PRESSURE: 119 MMHG | DIASTOLIC BLOOD PRESSURE: 74 MMHG | WEIGHT: 255.5 LBS | HEIGHT: 65 IN | BODY MASS INDEX: 42.57 KG/M2

## 2024-01-05 DIAGNOSIS — L60.0 INGROWING RIGHT GREAT TOENAIL: Primary | ICD-10-CM

## 2024-01-05 PROCEDURE — 99213 OFFICE O/P EST LOW 20 MIN: CPT | Performed by: PODIATRIST

## 2024-01-05 NOTE — PATIENT INSTRUCTIONS
Thank you for choosing [facility] Podiatry / Foot & Ankle Surgery!    DR. UDRHAM'S CLINIC LOCATIONS:     [facility] TRIAGE LINE: [phone]   [address] APPOINTMENTS: [phone]   [place] [address] RADIOLOGY: [phone]   (Every other Tues - Wed - Fri PM) SET UP SURGERY: [phone]    PHYSICAL THERAPY: [phone]   [place] SPECIALTY BILLING QUESTIONS: [phone] [address] FAX: [phone]   [place] [address]    (Thurs & Fri AM)      - What is an ingrown toenail? An ingrown toenail is a medical condition usually caused by a nail edge irritating the neighboring soft tissue and skin. It can cause pain and lead to infection.  - What causes ingrown toenails? There are likely multiple causes of ingrown toenails, including nail shape and width, narrow shoes, a person s activity level, and likely family history of nail problems.  - Risks of not treating condition: If left untreated, some people endure ongoing tenderness and pain. The biggest concern is infection from bacteria entering through the damage soft tissue.  - How is it treated? Some people achieve relief by soaking their feet and trimming the edge of the nail. If an infection has developed, then an oral antibiotic can be prescribed, but the condition often returns after the course of the medication is completed. Often self treatment is not successful and treatment by a qualified medical provider is needed. This often involves numbing the toe with a local anesthetic and then either removing the edge of a nail or the entire nail.  - Risks of surgery? As with any form of surgery, infections is a risk. However, a person is probably at greater risk for infection if the ingrown toenail is left untreated. Nail removal is a common, simple, and fairly quick procedure that in most cases is done in the physician's office. If an infection exists, often the only treatment that is successful is removal.     INGROWN  TOENAIL REMOVAL HOME CARE  1. Keep bandage on until that evening or the day after your procedure. If the bandage falls off, start the soaking process.    2. Some bleeding is normal. If bleeding seems excessive to you, place ice on top of your foot for 15-20 minutes and elevate your foot above heart level.    3. Over the counter pain medication (tylenol / ibuprofen), elevating your foot and ice application is all you will need for pain control.    4. If the bandage feels too tight and your toe is throbbing it is ok to remove the bandage and start soaking.     5. For one to two weeks, soak your foot twice a day in mild skin friendly soap (dish or hand soap) and warm water for 15 minutes. It is ok to soak your foot for a few minutes to loosen the dressing applied in the clinic. After soaking, blot dry and apply a regular band aid.    6. It is normal to experience some discomfort and redness around the nail for several days following the procedure. Drainage will likely appear a red - yellow. This is normal. If your toe is still draining a red - yellow fluid after 2 weeks keep continuing to soak foot another few days.    7. Initial discomfort might last for 2-3 days. You may resume with regular activity as soon as you are comfortable, as long as you keep the wound clean and dry and follow the soaking instruction. It is recommended that you do not enter public swimming pools/hot tubs while your toe is draining.    8. If you are experiencing worsening pain and redness or notice pus after 2-3 days please contact the clinic. Ask to speak with a triage nurse and they will inform our team of your symptoms and we can advise if a follow up is needed.

## 2024-01-05 NOTE — LETTER
1/5/2024         RE: Nataliya Aldrich  [address]  [place] [address]        Dear Colleague,    Thank you for referring your patient, Nataliya Aldrich, to the [facility] PODIATRY. Please see a copy of my visit note below.    ASSESSMENT:  Encounter Diagnosis   Name Primary?     Ingrowing right great toenail, medial edge Yes     MEDICAL DECISION MAKING:  Her discomfort is most consistent with a type of ingrown toenail.  I assured her that I do not appreciate any clinical signs of bacterial infection.  Her described pain is infrequent and only with contact to the involved area.    We discussed a partial nail avulsion procedure versus ongoing conservative cares.  I discussed the avulsion procedure in detail including the postprocedure course or recommendations.    She would like to continue with conservative cares at this time.  -Moisturizing the hyperkeratotic skin  -Avoiding tight footwear  -Consider placing a donut pad around the area when painful  -Monitor for signs of infection.    Follow-up on an as-needed basis.    Disclaimer: This note consists of symbols derived from keyboarding, dictation and/or voice recognition software. As a result, there may be errors in the script that have gone undetected. Please consider this when interpreting information found in this chart.    Ankit Kathleen DPM, FACFAS, [facility] Department of Podiatry/Foot & Ankle Surgery      ____________________________________________________________________    HPI:       Nataliya presents today reporting a possible ingrown toenail involving her right great toe.  This started 1 month ago  There is aching pain rated a 2 out of 10 at worst  Comes and goes  Pain with weightbearing activities  She has soaked her foot  She specifies the proximal medial edge of the right hallux nail unit.  She manages by applying Vicks vapor rub and sometimes hydrogen peroxide.    Past Medical  "History:   Diagnosis Date     Asthma     mild persistent - Dr Gee     Cerebral infarction (H) 2015     Fibroids     s/p hysterectomy     H/O gastric bypass      Hypertension      Migraine     followed by Devika     Obstructive sleep apnea      Pre-diabetes      Relapsing remitting multiple sclerosis (H) 2015    lesion in [place].  Facial tingling initial symptom.  F/B [facility]     Spondylosis of cervical region without myelopathy or radiculopathy    *  *  Past Surgical History:   Procedure Laterality Date     COLONOSCOPY  2012     GASTRIC BYPASS  2002    \"Trouble with B12 and D\"     HYSTERECTOMY, MONO  2013    cervix gone.  fibroids.  without BSO     TONSILLECTOMY     *  *  Current Outpatient Medications   Medication Sig Dispense Refill     acetaminophen (TYLENOL) 500 MG tablet Take 2 tablets (1,000 mg) by mouth 3 times daily       azelastine (ASTELIN) 0.1 % nasal spray Spray 1 spray into both nostrils 2 times daily as needed for rhinitis (nasal antihistamine) 30 mL 5     baclofen (LIORESAL) 20 MG tablet TAKE ONE TABLET BY MOUTH EVERY NIGHT AT BEDTIME MAY ALSO TAKE ONE TABLET BY MOUTH EVERY 4 HOURS AS NEEDED FOR MUSCLE SPASMS (max dose: 80 mg/day) 180 tablet 1     cetirizine (ZYRTEC) 10 MG tablet Take 1 tablet (10 mg) by mouth daily 180 tablet 1     clotrimazole (LOTRIMIN) 1 % external cream Apply topically 2 times daily (Can take up to 14 days) 60 g 1     cyclobenzaprine (FLEXERIL) 10 MG tablet Take 1 tablet (10 mg) by mouth 3 times daily as needed for muscle spasms 40 tablet 0     desonide (DESOWEN) 0.05 % external cream Apply topically 2 times daily To face as needed for rash 15 g 0     diphenhydrAMINE (BENADRYL) 25 MG tablet Take 1 tablet (25 mg) by mouth every 6 hours as needed for itching or allergies 30 tablet 0     EPINEPHrine (ANY BX GENERIC EQUIV) 0.3 MG/0.3ML injection 2-pack Inject 0.3 mLs (0.3 mg) into the muscle as needed for anaphylaxis May repeat one time in 5-15 minutes if response to initial dose is " inadequate. 2 each 3     FLUoxetine (PROZAC) 20 MG capsule Take 1 capsule (20 mg) by mouth daily 90 capsule 0     fluticasone (FLONASE) 50 MCG/ACT nasal spray USE 1 PUFF IN EACH NOSTRIL AS DIRECTED. 16 g 5     gabapentin (NEURONTIN) 300 MG capsule Take 1 capsule (300 mg) by mouth every 4 hours 120 capsule 5     hydrOXYzine (ATARAX) 25 MG tablet Take 0.5-1 tablets (12.5-25 mg) by mouth 3 times daily as needed for anxiety or other (SLEEP) 30 tablet 0     ipratropium - albuterol 0.5 mg/2.5 mg/3 mL (DUONEB) 0.5-2.5 (3) MG/3ML neb solution TAKE 1 VIAL (3 MLS) BY NEBULIZATION EVERY 6 HOURS AS NEEDED FOR SHORTNESS OF BREATH, WHEEZING OR COUGH 90 mL 0     ketotifen fumarate 0.035%, ketotifen 0.025%, (ZADITOR) 0.025 % ophthalmic solution Place 1 drop into both eyes 2 times daily as needed for itching 10 mL 1     MAGNESIUM PO        modafinil (PROVIGIL) 100 MG tablet Take 1 tablet (100 mg) by mouth daily For daytime drowsiness (ok to increase to 2 tablets daily if symptoms not improved in 14 days) 90 tablet 1     montelukast (SINGULAIR) 10 MG tablet TAKE 1 TABLET(10 MG) BY MOUTH AT BEDTIME 90 tablet 1     Multiple Vitamins-Calcium (ONE-A-DAY WOMENS FORMULA PO)        natalizumab (TYSABRI) 300 MG/15ML injection Inject 15 mLs (300 mg) into the vein once every six weeks Infusion       omeprazole (PRILOSEC) 20 MG DR capsule TAKE 1 CAPSULE(20 MG) BY MOUTH TWICE DAILY BEFORE MEALS 180 capsule 1     pregabalin (LYRICA) 75 MG capsule Take 1 capsule (75 mg) by mouth 2 times daily as needed (neuropathic pain) 60 capsule 5     ROZEREM 8 MG tablet TAKE ONE TABLET BY MOUTH AT BEDTIME AS NEEDED FOR SLEEP 30 tablet 0     simethicone (MYLICON) 125 MG chewable tablet CHEW AND SWALLOW ONE TABLET BY MOUTH FOUR TIMES A DAY AS NEEDED FOR INTESTINAL GAS 60 tablet 0     SUMAtriptan (IMITREX) 100 MG tablet Take 50 mg up to twice daily as needed for headache; separate doses by at least one hour and do not use more than 3 days/week 18 tablet 3      "SYMBICORT 160-4.5 MCG/ACT Inhaler INHALE 2 PUFFS INTO THE LUNGS TWICE DAILY 10.2 g 5     tolnaftate (TINACTIN) 1 % external powder Apply topically 2 times daily 45 g 1     topiramate (TOPAMAX) 100 MG tablet Take 1 tablet (100 mg) by mouth At Bedtime (take with 50 mg dose at bedtime) 90 tablet 1     topiramate (TOPAMAX) 50 MG tablet TAKE 1 TABLET (50 MG) BY MOUTH 2 TIMES DAILY 180 tablet 1     triamcinolone (KENALOG) 0.1 % external cream Apply topically 2 times daily No more  than 2 weeks in a row not on face 15 g 1     valACYclovir (VALTREX) 500 MG tablet TAKE ONE TABLET BY MOUTH ONCE DAILY 90 tablet 3     VENTOLIN  (90 Base) MCG/ACT inhaler SHAKE WELL AND INHALE 2 PUFFS INTO THE LUNGS EVERY 6 HOURS AS NEEDED FOR SHORTNESS OF BREATH OR WHEEZING STRENGTH 18 g 1     vitamin D3 (CHOLECALCIFEROL) 250 mcg (75741 units) capsule TAKE 1 CAPSULE BY MOUTH ONCE DAILY 90 capsule 3         EXAM:    Vitals: Ht 1.651 m (5' 5\")   Wt 115.9 kg (255 lb 8 oz)   LMP  (LMP Unknown)   BMI 42.52 kg/m    BMI: Body mass index is 42.52 kg/m .    Constitutional:  Natlaiya Aldrich is in no apparent distress, appears well-nourished.  Cooperative with history and physical exam.    Vascular:  Pedal pulses are palpable for both the DP and PT arteries.  CFT < 3 sec.  No edema.      Neuro: Light touch sensation is intact to the L4, L5, S1 distributions  No evidence of weakness, spasticity, or contracture in the lower extremities.     Derm: There is some hyperkeratosis at the proximal aspect of the right hallux medial skin fold.  No edema or erythema.        Again, thank you for allowing me to participate in the care of your patient.        Sincerely,        Ankit Kathleen, LIZANDRO  "

## 2024-01-05 NOTE — PROGRESS NOTES
ASSESSMENT:  Encounter Diagnosis   Name Primary?    Ingrowing right great toenail, medial edge Yes     MEDICAL DECISION MAKING:  Her discomfort is most consistent with a type of ingrown toenail.  I assured her that I do not appreciate any clinical signs of bacterial infection.  Her described pain is infrequent and only with contact to the involved area.    We discussed a partial nail avulsion procedure versus ongoing conservative cares.  I discussed the avulsion procedure in detail including the postprocedure course or recommendations.    She would like to continue with conservative cares at this time.  -Moisturizing the hyperkeratotic skin  -Avoiding tight footwear  -Consider placing a donut pad around the area when painful  -Monitor for signs of infection.    Follow-up on an as-needed basis.    Disclaimer: This note consists of symbols derived from keyboarding, dictation and/or voice recognition software. As a result, there may be errors in the script that have gone undetected. Please consider this when interpreting information found in this chart.    Ankit Kathleen DPM, FACFAS, MS    [place] Department of Podiatry/Foot & Ankle Surgery      ____________________________________________________________________    HPI:       Nataliya presents today reporting a possible ingrown toenail involving her right great toe.  This started 1 month ago  There is aching pain rated a 2 out of 10 at worst  Comes and goes  Pain with weightbearing activities  She has soaked her foot  She specifies the proximal medial edge of the right hallux nail unit.  She manages by applying Vicks vapor rub and sometimes hydrogen peroxide.    Past Medical History:   Diagnosis Date    Asthma     mild persistent - Dr Gee    Cerebral infarction (H) 2015    Fibroids     s/p hysterectomy    H/O gastric bypass     Hypertension     Migraine     followed by Devika    Obstructive sleep apnea     Pre-diabetes     Relapsing remitting multiple sclerosis (H)  "2015    lesion in [place].  Facial tingling initial symptom.  F/B [facility]    Spondylosis of cervical region without myelopathy or radiculopathy    *  *  Past Surgical History:   Procedure Laterality Date    COLONOSCOPY  2012    GASTRIC BYPASS  2002    \"Trouble with B12 and D\"    HYSTERECTOMY, MONO  2013    cervix gone.  fibroids.  without BSO    TONSILLECTOMY     *  *  Current Outpatient Medications   Medication Sig Dispense Refill    acetaminophen (TYLENOL) 500 MG tablet Take 2 tablets (1,000 mg) by mouth 3 times daily      azelastine (ASTELIN) 0.1 % nasal spray Spray 1 spray into both nostrils 2 times daily as needed for rhinitis (nasal antihistamine) 30 mL 5    baclofen (LIORESAL) 20 MG tablet TAKE ONE TABLET BY MOUTH EVERY NIGHT AT BEDTIME MAY ALSO TAKE ONE TABLET BY MOUTH EVERY 4 HOURS AS NEEDED FOR MUSCLE SPASMS (max dose: 80 mg/day) 180 tablet 1    cetirizine (ZYRTEC) 10 MG tablet Take 1 tablet (10 mg) by mouth daily 180 tablet 1    clotrimazole (LOTRIMIN) 1 % external cream Apply topically 2 times daily (Can take up to 14 days) 60 g 1    cyclobenzaprine (FLEXERIL) 10 MG tablet Take 1 tablet (10 mg) by mouth 3 times daily as needed for muscle spasms 40 tablet 0    desonide (DESOWEN) 0.05 % external cream Apply topically 2 times daily To face as needed for rash 15 g 0    diphenhydrAMINE (BENADRYL) 25 MG tablet Take 1 tablet (25 mg) by mouth every 6 hours as needed for itching or allergies 30 tablet 0    EPINEPHrine (ANY BX GENERIC EQUIV) 0.3 MG/0.3ML injection 2-pack Inject 0.3 mLs (0.3 mg) into the muscle as needed for anaphylaxis May repeat one time in 5-15 minutes if response to initial dose is inadequate. 2 each 3    FLUoxetine (PROZAC) 20 MG capsule Take 1 capsule (20 mg) by mouth daily 90 capsule 0    fluticasone (FLONASE) 50 MCG/ACT nasal spray USE 1 PUFF IN EACH NOSTRIL AS DIRECTED. 16 g 5    gabapentin (NEURONTIN) 300 MG capsule Take 1 capsule (300 mg) by mouth every 4 hours 120 capsule 5    hydrOXYzine " (ATARAX) 25 MG tablet Take 0.5-1 tablets (12.5-25 mg) by mouth 3 times daily as needed for anxiety or other (SLEEP) 30 tablet 0    ipratropium - albuterol 0.5 mg/2.5 mg/3 mL (DUONEB) 0.5-2.5 (3) MG/3ML neb solution TAKE 1 VIAL (3 MLS) BY NEBULIZATION EVERY 6 HOURS AS NEEDED FOR SHORTNESS OF BREATH, WHEEZING OR COUGH 90 mL 0    ketotifen fumarate 0.035%, ketotifen 0.025%, (ZADITOR) 0.025 % ophthalmic solution Place 1 drop into both eyes 2 times daily as needed for itching 10 mL 1    MAGNESIUM PO       modafinil (PROVIGIL) 100 MG tablet Take 1 tablet (100 mg) by mouth daily For daytime drowsiness (ok to increase to 2 tablets daily if symptoms not improved in 14 days) 90 tablet 1    montelukast (SINGULAIR) 10 MG tablet TAKE 1 TABLET(10 MG) BY MOUTH AT BEDTIME 90 tablet 1    Multiple Vitamins-Calcium (ONE-A-DAY WOMENS FORMULA PO)       natalizumab (TYSABRI) 300 MG/15ML injection Inject 15 mLs (300 mg) into the vein once every six weeks Infusion      omeprazole (PRILOSEC) 20 MG DR capsule TAKE 1 CAPSULE(20 MG) BY MOUTH TWICE DAILY BEFORE MEALS 180 capsule 1    pregabalin (LYRICA) 75 MG capsule Take 1 capsule (75 mg) by mouth 2 times daily as needed (neuropathic pain) 60 capsule 5    ROZEREM 8 MG tablet TAKE ONE TABLET BY MOUTH AT BEDTIME AS NEEDED FOR SLEEP 30 tablet 0    simethicone (MYLICON) 125 MG chewable tablet CHEW AND SWALLOW ONE TABLET BY MOUTH FOUR TIMES A DAY AS NEEDED FOR INTESTINAL GAS 60 tablet 0    SUMAtriptan (IMITREX) 100 MG tablet Take 50 mg up to twice daily as needed for headache; separate doses by at least one hour and do not use more than 3 days/week 18 tablet 3    SYMBICORT 160-4.5 MCG/ACT Inhaler INHALE 2 PUFFS INTO THE LUNGS TWICE DAILY 10.2 g 5    tolnaftate (TINACTIN) 1 % external powder Apply topically 2 times daily 45 g 1    topiramate (TOPAMAX) 100 MG tablet Take 1 tablet (100 mg) by mouth At Bedtime (take with 50 mg dose at bedtime) 90 tablet 1    topiramate (TOPAMAX) 50 MG tablet TAKE 1  "TABLET (50 MG) BY MOUTH 2 TIMES DAILY 180 tablet 1    triamcinolone (KENALOG) 0.1 % external cream Apply topically 2 times daily No more  than 2 weeks in a row not on face 15 g 1    valACYclovir (VALTREX) 500 MG tablet TAKE ONE TABLET BY MOUTH ONCE DAILY 90 tablet 3    VENTOLIN  (90 Base) MCG/ACT inhaler SHAKE WELL AND INHALE 2 PUFFS INTO THE LUNGS EVERY 6 HOURS AS NEEDED FOR SHORTNESS OF BREATH OR WHEEZING STRENGTH 18 g 1    vitamin D3 (CHOLECALCIFEROL) 250 mcg (56650 units) capsule TAKE 1 CAPSULE BY MOUTH ONCE DAILY 90 capsule 3         EXAM:    Vitals: Ht 1.651 m (5' 5\")   Wt 115.9 kg (255 lb 8 oz)   LMP  (LMP Unknown)   BMI 42.52 kg/m    BMI: Body mass index is 42.52 kg/m .    Constitutional:  Nataliya Aldrich is in no apparent distress, appears well-nourished.  Cooperative with history and physical exam.    Vascular:  Pedal pulses are palpable for both the DP and PT arteries.  CFT < 3 sec.  No edema.      Neuro: Light touch sensation is intact to the L4, L5, S1 distributions  No evidence of weakness, spasticity, or contracture in the lower extremities.     Derm: There is some hyperkeratosis at the proximal aspect of the right hallux medial skin fold.  No edema or erythema.      "

## 2024-01-10 ENCOUNTER — TELEPHONE (OUTPATIENT)
Dept: SLEEP MEDICINE | Facility: CLINIC | Age: 49
End: 2024-01-10

## 2024-01-10 NOTE — TELEPHONE ENCOUNTER
Reason for Call:  Other other    Detailed comments: patient called and rescheduled her sleep study in May     The order  and needs an extention and attach to sleep study appt.    Thank you.    Phone Number Patient can be reached at: Other phone number:  na*    Best Time: na    Can we leave a detailed message on this number? Not Applicable    Call taken on 1/10/2024 at 12:49 PM by Kelly Richter

## 2024-01-10 NOTE — TELEPHONE ENCOUNTER
Order extended and linked.     Ayesha GARCIA RN  [facility]

## 2024-01-13 ENCOUNTER — HEALTH MAINTENANCE LETTER (OUTPATIENT)
Age: 49
End: 2024-01-13

## 2024-01-16 ENCOUNTER — TRANSFERRED RECORDS (OUTPATIENT)
Dept: HEALTH INFORMATION MANAGEMENT | Facility: CLINIC | Age: 49
End: 2024-01-16

## 2024-01-17 ENCOUNTER — OFFICE VISIT (OUTPATIENT)
Dept: FAMILY MEDICINE | Facility: CLINIC | Age: 49
End: 2024-01-17
Payer: COMMERCIAL

## 2024-01-17 VITALS
RESPIRATION RATE: 18 BRPM | TEMPERATURE: 98.4 F | DIASTOLIC BLOOD PRESSURE: 66 MMHG | BODY MASS INDEX: 42.15 KG/M2 | HEART RATE: 101 BPM | SYSTOLIC BLOOD PRESSURE: 106 MMHG | HEIGHT: 65 IN | OXYGEN SATURATION: 99 % | WEIGHT: 253 LBS

## 2024-01-17 DIAGNOSIS — B36.0 TINEA VERSICOLOR: Primary | ICD-10-CM

## 2024-01-17 PROCEDURE — 99213 OFFICE O/P EST LOW 20 MIN: CPT | Performed by: NURSE PRACTITIONER

## 2024-01-17 RX ORDER — KETOCONAZOLE 20 MG/G
CREAM TOPICAL 2 TIMES DAILY
Qty: 45 G | Refills: 0 | Status: SHIPPED | OUTPATIENT
Start: 2024-01-17 | End: 2024-06-19

## 2024-01-17 ASSESSMENT — ANXIETY QUESTIONNAIRES
7. FEELING AFRAID AS IF SOMETHING AWFUL MIGHT HAPPEN: NOT AT ALL
6. BECOMING EASILY ANNOYED OR IRRITABLE: NOT AT ALL
GAD7 TOTAL SCORE: 4
7. FEELING AFRAID AS IF SOMETHING AWFUL MIGHT HAPPEN: NOT AT ALL
5. BEING SO RESTLESS THAT IT IS HARD TO SIT STILL: NOT AT ALL
8. IF YOU CHECKED OFF ANY PROBLEMS, HOW DIFFICULT HAVE THESE MADE IT FOR YOU TO DO YOUR WORK, TAKE CARE OF THINGS AT HOME, OR GET ALONG WITH OTHER PEOPLE?: SOMEWHAT DIFFICULT
3. WORRYING TOO MUCH ABOUT DIFFERENT THINGS: SEVERAL DAYS
GAD7 TOTAL SCORE: 4
GAD7 TOTAL SCORE: 4
4. TROUBLE RELAXING: SEVERAL DAYS
IF YOU CHECKED OFF ANY PROBLEMS ON THIS QUESTIONNAIRE, HOW DIFFICULT HAVE THESE PROBLEMS MADE IT FOR YOU TO DO YOUR WORK, TAKE CARE OF THINGS AT HOME, OR GET ALONG WITH OTHER PEOPLE: SOMEWHAT DIFFICULT
2. NOT BEING ABLE TO STOP OR CONTROL WORRYING: SEVERAL DAYS
1. FEELING NERVOUS, ANXIOUS, OR ON EDGE: SEVERAL DAYS

## 2024-01-17 ASSESSMENT — PATIENT HEALTH QUESTIONNAIRE - PHQ9
SUM OF ALL RESPONSES TO PHQ QUESTIONS 1-9: 0
10. IF YOU CHECKED OFF ANY PROBLEMS, HOW DIFFICULT HAVE THESE PROBLEMS MADE IT FOR YOU TO DO YOUR WORK, TAKE CARE OF THINGS AT HOME, OR GET ALONG WITH OTHER PEOPLE: NOT DIFFICULT AT ALL
SUM OF ALL RESPONSES TO PHQ QUESTIONS 1-9: 0

## 2024-01-17 NOTE — COMMUNITY RESOURCES LIST (ENGLISH)
01/17/2024   [facility]  N/A  For questions about this resource list or additional care needs, please contact your primary care clinic or care manager.  Phone: [phone]   Email: N/A   Address: [address] [place] [address]   Hours: N/A        Financial Stability       Rent and mortgage payment assistance  1  Community Action Partnership (CAP) [facility], [facility] Homeless Prevention Assistance Program (FHPAP) Distance: 7.73 miles      In-Person   [address] [place] [address]  Language: English, Ugandan  Hours: Mon - Fri 8:00 AM - 4:30 PM  Fees: Free   Phone: (762) 360-4661 Email: info@[employer] Website: https://www.[employer].com.org/     2  [facility] Distance: 9.87 miles      In-Person, Phone/[address] [place] [address]  Language: English, Ugandan  Hours: Mon 11:00 AM - 5:00 PM , Tue 10:00 AM - 6:00 PM , Wed 11:00 AM - 5:00 PM , Fri 11:00 AM - 5:00 PM  Fees: Free   Phone: (175) 994-2710 Email: help@[employer].org Website: http://[facility].org/          Food and Nutrition       Food pantry  3  [facility] Food Shelf Distance: 7.63 miles      In-Person   [address] [place] [address]  Language: English, Barbadian  Hours: Mon - Fri 9:00 AM - 5:00 PM  Fees: Free   Phone: (591) 831-7832 Email: clark@[employer] Website: https://www.[employer].org/[place]-office/     4  Community Action Partnership (CAP) [facility] Food Shelf Distance: 7.73 miles      In-Person   [address] [place] [address]  Language: English, Ugandan  Hours: Mon - Fri 9:00 AM - 4:00 PM Appt. Only  Fees: Free   Phone: (446) 971-4254 Ext.1 Email: info@capagency.org Website: https://www.capagency.org/     SNAP application assistance  5  Community Action Partnership (CAP) of [place] Distance:  7.73 miles      In-Person   [address] [place] [address]  Language: English, Bulgarian  Hours: Mon - Fri 8:00 AM - 8:00 PM  Fees: Free   Phone: (190) 234-5476 Email: info@[employer] Website: https://www.[employer].org/     6  Community Action Partnership (CAP) [facility] [place]  [facility] Distance: 15.3 miles      In-Person   [address] [place] [address]  Language: English, Bulgarian  Hours: Mon - Fri 8:00 AM - 8:00 PM  Fees: Free   Phone: (113) 930-7531 Email: info@[employer] Website: http://www.[employer]org     Soup kitchen or free meals  7  [place] Community Assistance Distance: 7.58 miles      In-Person   [address] [place] [address]  Language: English  Hours: Mon - Tue 5:30 PM - 6:30 PM , Thu - Fri 5:30 PM - 6:30 PM  Fees: Free   Phone: (998) 693-9312 Email: blake@[employer] Website: http://[facility]-[place].org/     8  [facility] Distance: 11.4 miles      Pickup   [address] [place] [address]  Language: English  Hours: Mon - Fri 5:00 PM - 6:00 PM  Fees: Free   Phone: (443) 564-3973 Email: david@[employer].org Website: https://www.[employer].org/          Important Numbers & Websites       Emergency Services   911  [facility]   311  Poison Control   (981) 603-8563  Suicide Prevention Lifeline   (881) 142-4486 (TALK)  Child Abuse Hotline   (431) 390-9245 (4-A-Child)  Sexual Assault Hotline   (738) 324-9627 (HOPE)  National Runaway Safeline   (437) 430-4295 (RUNAWAY)  All-Options Talkline   (890) 958-4747  Substance Abuse Referral   (619) 639-4754 (HELP)

## 2024-01-17 NOTE — PROGRESS NOTES
Assessment & Plan     Tinea versicolor  Since tinea was not eradicated on first treatment will trial ketoconazole for 30 days. Educated to keep using even when lesions have resolved, to keep area dry and change bra as needed. Annual exam scheduled in 4 weeks, can recheck at that time. Return earlier if lesion worsens.   - ketoconazole (NIZORAL) 2 % external cream  Dispense: 45 g; Refill: 0          Return in about 1 month (around 2/17/2024) for Preventative Visit + Fasting labs.    Richie An is a 48 year old, presenting for the following health issues:  Sundeep An is a 48 year old female with a history of MS, migraines, fibromyalgia, s/p gastric bypass, GERD, and obesity presents with reoccurrence of a rash under there breasts. The original rash was under her left breast and now she has a similar spot under her right. She completed a trial of lotrimin but stopped after 2 week and moved to using just barrier creams and good hygiene. The lotrimin was initially helpful but the itching has started to reoccur. There is a black spot in the center under the left breast, she is known to have hyperpigmented spots after skin injury.           1/17/2024    11:52 AM   Additional Questions   Roomed by Miya CARRASCO     History of Present Illness       Reason for visit:  Left breast fungus, now has some on right breast as well- possibly spreading  : x 1 month.  Symptoms include:  Small bump that is sore and itchy under her left breast and has a black dot on it    She eats 4 or more servings of fruits and vegetables daily.She consumes 0 sweetened beverage(s) daily.She exercises with enough effort to increase her heart rate 10 to 19 minutes per day.  She exercises with enough effort to increase her heart rate 3 or less days per week.   She is taking medications regularly.     Recheck from 12/20/23- had a virtual visit with ANDREW Thurston.  Was on Triamcinolone, had some itchiness and burning. Has some burn marks and  "then switched to Lotrimin which helped with the itching. Still has some black marks and the itching is intermittent. Only bothersome because of night sweats.  Stopped using soap in that area, warm water and uses a clean wash cloth which has helped. Uses Aquaphor or Vaseline, wants to make sure everything looks good.           Objective    /66   Pulse 101   Temp 98.4  F (36.9  C)   Resp 18   Ht 1.651 m (5' 5\")   Wt 114.8 kg (253 lb)   LMP  (LMP Unknown)   SpO2 99%   BMI 42.10 kg/m    Body mass index is 42.1 kg/m .    Review of Systems  CONSTITUTIONAL: NEGATIVE for fever, chills, change in weight  INTEGUMENTARY/SKIN: NEGATIVE for worrisome rashes, moles or lesions and POSITIVE for pruritus and rash under breast bilaterally      Physical Exam  Constitutional:       Appearance: Normal appearance. She is not ill-appearing.   Skin:     General: Skin is cool.      Coloration: Skin is not jaundiced or pale.      Findings: Lesion and rash present.          Neurological:      Mental Status: She is alert.                  Damaris Matamoros DNP Student at the [facility]      I was present with the student who participated in the service and in the documentation of the note, which I have reviewed and verified. The history, reviews of systems, objective data, and assessment/plan were completed by myself.        Signed Electronically by: MARTÍNEZ Harrison CNP    "

## 2024-01-19 ENCOUNTER — MEDICAL CORRESPONDENCE (OUTPATIENT)
Dept: HEALTH INFORMATION MANAGEMENT | Facility: CLINIC | Age: 49
End: 2024-01-19

## 2024-01-19 ENCOUNTER — INFUSION THERAPY VISIT (OUTPATIENT)
Dept: INFUSION THERAPY | Facility: CLINIC | Age: 49
End: 2024-01-19
Attending: PSYCHIATRY & NEUROLOGY
Payer: COMMERCIAL

## 2024-01-19 VITALS
DIASTOLIC BLOOD PRESSURE: 78 MMHG | OXYGEN SATURATION: 99 % | HEART RATE: 69 BPM | SYSTOLIC BLOOD PRESSURE: 116 MMHG | RESPIRATION RATE: 18 BRPM | TEMPERATURE: 98.2 F

## 2024-01-19 DIAGNOSIS — G35 MULTIPLE SCLEROSIS (H): Primary | ICD-10-CM

## 2024-01-19 PROCEDURE — 36415 COLL VENOUS BLD VENIPUNCTURE: CPT | Performed by: PSYCHIATRY & NEUROLOGY

## 2024-01-19 PROCEDURE — 96365 THER/PROPH/DIAG IV INF INIT: CPT

## 2024-01-19 PROCEDURE — 258N000003 HC RX IP 258 OP 636: Performed by: PSYCHIATRY & NEUROLOGY

## 2024-01-19 PROCEDURE — 250N000011 HC RX IP 250 OP 636: Mod: JZ | Performed by: PSYCHIATRY & NEUROLOGY

## 2024-01-19 RX ORDER — HEPARIN SODIUM (PORCINE) LOCK FLUSH IV SOLN 100 UNIT/ML 100 UNIT/ML
5 SOLUTION INTRAVENOUS
Status: CANCELLED | OUTPATIENT
Start: 2024-02-02

## 2024-01-19 RX ORDER — HEPARIN SODIUM,PORCINE 10 UNIT/ML
5 VIAL (ML) INTRAVENOUS
Status: CANCELLED | OUTPATIENT
Start: 2024-02-02

## 2024-01-19 RX ORDER — IBUPROFEN 200 MG
600 TABLET ORAL EVERY 6 HOURS PRN
Status: CANCELLED
Start: 2024-02-02

## 2024-01-19 RX ORDER — ACETAMINOPHEN 325 MG/1
650 TABLET ORAL EVERY 4 HOURS PRN
Status: CANCELLED
Start: 2024-02-02

## 2024-01-19 RX ORDER — ONDANSETRON 2 MG/ML
4 INJECTION INTRAMUSCULAR; INTRAVENOUS EVERY 8 HOURS PRN
Status: CANCELLED
Start: 2024-02-02

## 2024-01-19 RX ORDER — DIPHENHYDRAMINE HCL 25 MG
50 CAPSULE ORAL EVERY 4 HOURS PRN
Status: CANCELLED
Start: 2024-02-02

## 2024-01-19 RX ADMIN — SODIUM CHLORIDE 250 ML: 9 INJECTION, SOLUTION INTRAVENOUS at 13:35

## 2024-01-19 RX ADMIN — NATALIZUMAB 300 MG: 300 INJECTION INTRAVENOUS at 13:38

## 2024-01-19 NOTE — PROGRESS NOTES
Infusion Nursing Note:  Nataliya Hastings Aldrich presents today for Tysabri.    Patient seen by provider today: No   present during visit today: Not Applicable.    Note: Pt likes to have 250 NS infuse along with Tysabri to lessen headaches. Informed pt that she needs to reach out to her provider for more orders prior to her next scheduled infusion.    Per orders, pt does not have to stay 60 minutes post infusion for observation.      Intravenous Access:  Labs drawn without difficulty.  Peripheral IV placed.    Treatment Conditions:  Tysabri pre-infusion checklist completed via touch program.      Post Infusion Assessment:  Patient tolerated infusion without incident.  Blood return noted pre and post infusion.  Site patent and intact, free from redness, edema or discomfort.  No evidence of extravasations.  Access discontinued per protocol.       Discharge Plan:   Discharge instructions reviewed with: Patient.  Patient and/or family verbalized understanding of discharge instructions and all questions answered.  AVS to patient via InsideT.  Patient will return 2/23/24 for next appointment.   Patient discharged in stable condition accompanied by: self.  Departure Mode: Ambulatory.      Milena Castellon RN

## 2024-01-24 ENCOUNTER — MEDICAL CORRESPONDENCE (OUTPATIENT)
Dept: HEALTH INFORMATION MANAGEMENT | Facility: CLINIC | Age: 49
End: 2024-01-24
Payer: COMMERCIAL

## 2024-01-24 DIAGNOSIS — G35 MULTIPLE SCLEROSIS (H): Primary | ICD-10-CM

## 2024-01-24 RX ORDER — HEPARIN SODIUM (PORCINE) LOCK FLUSH IV SOLN 100 UNIT/ML 100 UNIT/ML
5 SOLUTION INTRAVENOUS
Status: CANCELLED | OUTPATIENT
Start: 2024-03-01

## 2024-01-24 RX ORDER — HEPARIN SODIUM,PORCINE 10 UNIT/ML
5-20 VIAL (ML) INTRAVENOUS DAILY PRN
Status: CANCELLED | OUTPATIENT
Start: 2024-03-01

## 2024-01-24 RX ORDER — ALBUTEROL SULFATE 90 UG/1
1-2 AEROSOL, METERED RESPIRATORY (INHALATION)
Status: CANCELLED
Start: 2024-03-01

## 2024-01-24 RX ORDER — EPINEPHRINE 1 MG/ML
0.3 INJECTION, SOLUTION INTRAMUSCULAR; SUBCUTANEOUS EVERY 5 MIN PRN
Status: CANCELLED | OUTPATIENT
Start: 2024-03-01

## 2024-01-24 RX ORDER — METHYLPREDNISOLONE SODIUM SUCCINATE 125 MG/2ML
125 INJECTION, POWDER, LYOPHILIZED, FOR SOLUTION INTRAMUSCULAR; INTRAVENOUS
Status: CANCELLED
Start: 2024-03-01

## 2024-01-24 RX ORDER — DIPHENHYDRAMINE HYDROCHLORIDE 50 MG/ML
50 INJECTION INTRAMUSCULAR; INTRAVENOUS
Status: CANCELLED
Start: 2024-03-01

## 2024-01-24 RX ORDER — ALBUTEROL SULFATE 0.83 MG/ML
2.5 SOLUTION RESPIRATORY (INHALATION)
Status: CANCELLED | OUTPATIENT
Start: 2024-03-01

## 2024-01-24 RX ORDER — MEPERIDINE HYDROCHLORIDE 25 MG/ML
25 INJECTION INTRAMUSCULAR; INTRAVENOUS; SUBCUTANEOUS EVERY 30 MIN PRN
Status: CANCELLED | OUTPATIENT
Start: 2024-03-01

## 2024-01-25 LAB — SCANNED LAB RESULT: NORMAL

## 2024-02-02 ENCOUNTER — THERAPY VISIT (OUTPATIENT)
Dept: PHYSICAL THERAPY | Facility: CLINIC | Age: 49
End: 2024-02-02
Payer: COMMERCIAL

## 2024-02-02 DIAGNOSIS — M79.18 MYOFASCIAL PAIN: Primary | ICD-10-CM

## 2024-02-02 DIAGNOSIS — G35 MS (MULTIPLE SCLEROSIS) (H): ICD-10-CM

## 2024-02-02 DIAGNOSIS — M54.2 CERVICALGIA: ICD-10-CM

## 2024-02-02 PROCEDURE — 97140 MANUAL THERAPY 1/> REGIONS: CPT | Mod: GP | Performed by: PHYSICAL THERAPIST

## 2024-02-02 PROCEDURE — 97035 APP MDLTY 1+ULTRASOUND EA 15: CPT | Mod: GP | Performed by: PHYSICAL THERAPIST

## 2024-02-02 PROCEDURE — 97161 PT EVAL LOW COMPLEX 20 MIN: CPT | Mod: GP | Performed by: PHYSICAL THERAPIST

## 2024-02-02 NOTE — PROGRESS NOTES
PHYSICAL THERAPY EVALUATION  Type of Visit: Evaluation    See electronic medical record for Abuse and Falls Screening details.    Subjective       Presenting condition or subjective complaint: Neck pain and myofascial pain neck/shoulders; headahces;  Date of onset: 11/02/23    Relevant medical history:     Dates & types of surgery:      Prior diagnostic imaging/testing results: MRI just saw neurologist; no new lesions with MS   Prior therapy history for the same diagnosis, illness or injury: Yes        Living Environment  Social support: With family members   Type of home: Apartment/condo   Stairs to enter the home: No   Is there a railing: No   Ramp: No   Stairs inside the home: No   Is there a railing: No   Help at home: None  Equipment owned:       Employment: Yes business owner with baking  Hobbies/Interests: baking, cooking,    Patient goals for therapy: work with new business (cookie baker); standing in kitchen    Pain assessment: Pain present  Location: neck /Rating: rest=  5/10 ; worst=8/10     Objective   CERVICAL:    Posture: slight forward head, rounded shoulders    Neurological:    Sensory Deficit, Reflexes, Dural Signs: right sided tingling in fingertips    AROM: (Major, Moderate, Minimal or Nil loss)  Movement Loss Sriram Mod Min Nil Pain   Protrusion        Flexion   X  Pull bilaterally   Retraction   X  Pull central mid back   Extension   X  Pull/tight in front   Left Rotation  X   Pull/pain on left   Right Rotation   X     Left Side Bending   X     Right Side bending   X  Pull on right side         Repeated movement testing: repeated extension feels very good          Assessment & Plan   CLINICAL IMPRESSIONS  Medical Diagnosis: Neck pain, myofascial pain, MS    Treatment Diagnosis: neck pain, MS, myofasical pain   Impression/Assessment: Patient is a 48 year old female with neck and myofascial complaints.  The following significant findings have been identified: Pain, Decreased ROM/flexibility,  Decreased joint mobility, Decreased strength, Impaired balance, Decreased proprioception, Impaired sensation, Inflammation, Impaired gait, Impaired muscle performance, Decreased activity tolerance, Impaired posture, and Instability. These impairments interfere with their ability to perform self care tasks, work tasks, recreational activities, and household chores as compared to previous level of function.     Clinical Decision Making (Complexity):  Clinical Presentation: Stable/Uncomplicated  Clinical Presentation Rationale: based on medical and personal factors listed in PT evaluation  Clinical Decision Making (Complexity): Low complexity    PLAN OF CARE  Treatment Interventions:  Modalities: Ultrasound  Interventions: Manual Therapy, Neuromuscular Re-education, Therapeutic Activity, Therapeutic Exercise    Long Term Goals     PT Goal 1  Goal Identifier: Goal 1  Goal Description: Pt will be able to sit, 30 min, painfree, at work  Rationale: to maximize safety and independence with performance of ADLs and functional tasks  Target Date: 08/02/24  PT Goal 2  Goal Identifier: Goal 2  Goal Description: Pt will be able to sleep through the night without waking in pain  Rationale: to maximize safety and independence with performance of ADLs and functional tasks  Target Date: 08/02/24      Frequency of Treatment: 2X/mo  Duration of Treatment: 6 mo  Education Assessment:   Learner/Method: Patient;Pictures/Video    Risks and benefits of evaluation/treatment have been explained.   Patient/Family/caregiver agrees with Plan of Care.     Evaluation Time:     PT Eval, Low Complexity Minutes (94536): 15       Signing Clinician: Nidhi Dunbar PT      [facility]                                                                                   OUTPATIENT PHYSICAL THERAPY      PLAN OF TREATMENT FOR OUTPATIENT REHABILITATION   Patient's Last Name, First Name, M.INataliya Peterson Date of Birth:   1975   Provider's Name   [facility]   Medical Record No.  [id number]     Onset Date: 11/02/23  Start of Care Date: 02/02/24     Medical Diagnosis:  Neck pain, myofascial pain, MS      PT Treatment Diagnosis:  neck pain, MS, myofasical pain Plan of Treatment  Frequency/Duration: 2X/mo/ 6 mo    Certification date from 02/02/24 to 08/02/24         See note for plan of treatment details and functional goals     Nidhi Dunbar, DELMA                         I CERTIFY THE NEED FOR THESE SERVICES FURNISHED UNDER        THIS PLAN OF TREATMENT AND WHILE UNDER MY CARE .             Physician Signature               Date    X_____________________________________________________                  Referring Provider:  Miya Crump MD    Initial Assessment  See Epic Evaluation- Start of Care Date: 02/02/24

## 2024-02-13 SDOH — HEALTH STABILITY: PHYSICAL HEALTH: ON AVERAGE, HOW MANY MINUTES DO YOU ENGAGE IN EXERCISE AT THIS LEVEL?: 20 MIN

## 2024-02-13 SDOH — HEALTH STABILITY: PHYSICAL HEALTH: ON AVERAGE, HOW MANY DAYS PER WEEK DO YOU ENGAGE IN MODERATE TO STRENUOUS EXERCISE (LIKE A BRISK WALK)?: 3 DAYS

## 2024-02-13 ASSESSMENT — SOCIAL DETERMINANTS OF HEALTH (SDOH): HOW OFTEN DO YOU GET TOGETHER WITH FRIENDS OR RELATIVES?: ONCE A WEEK

## 2024-02-13 NOTE — COMMUNITY RESOURCES LIST (ENGLISH)
02/13/2024   [facility]  N/A  For questions about this resource list or additional care needs, please contact your primary care clinic or care manager.  Phone: [phone]   Email: N/A   Address: [address] [place] [id number]   Hours: N/A        Financial Stability       Rent and mortgage payment assistance  1  Community Action Partnership (CAP) [facility] Homeless Prevention Assistance Program (FHPAP) Distance: 7.73 miles      In-Person   [address] [place] [address]  Language: English, Nicaraguan  Hours: Mon - Fri 8:00 AM - 4:30 PM  Fees: Free   Phone: (856) 424-8300 Email: info@[employer] Website: https://www.[employer].org/     2  [facility] Options for Women Distance: 9.87 miles      In-Person, Phone/[address] [place] [address]  Language: English, Nicaraguan  Hours: Mon 11:00 AM - 5:00 PM , Tue 10:00 AM - 6:00 PM , Wed 11:00 AM - 5:00 PM , Fri 11:00 AM - 5:00 PM  Fees: Free   Phone: (322) 197-2184 Email: help@[employer].org Website: http://[facility].org/          Food and Nutrition       Food pantry  3  [facility] Food Shelf Distance: 7.63 miles      In-Person   [address] [facility] [address] [address] [place] [address]  Language: English, Peruvian  Hours: Mon - Fri 9:00 AM - 5:00 PM  Fees: Free   Phone: (414) 874-8821 Email: clark@[employer] Website: https://www.[employer].org/[place]-office/     4  Community Action Partnership (CAP) [facility] Food Shelf Distance: 7.73 miles      In-Person   [address] [place] [address]  Language: English, Nicaraguan  Hours: Mon - Fri 9:00 AM - 4:00 PM Appt. Only  Fees: Free   Phone: (392) 440-2052 Ext.1 Email: info@capagency.org Website: https://www.capagency.org/     SNAP application assistance  5  Community Action Partnership (CAP) of [place] Distance:  7.73 miles      In-Person   [address] [place] [address]  Language: English, Upper sorbian  Hours: Mon - Fri 8:00 AM - 8:00 PM  Fees: Free   Phone: (330) 709-4416 Email: info@[employer] Website: https://www.[employer].org/     6  Community Action Partnership (CAP) [facility]  [facility] Distance: 15.3 miles      In-Person   [address] [place] [address]  Language: English, Upper sorbian  Hours: Mon - Fri 8:00 AM - 8:00 PM  Fees: Free   Phone: (723) 759-7998 Email: info@[employer] Website: http://www.[employer]     Soup kitchen or free meals  7  [place] Community Assistance Distance: 7.58 miles      In-Person   [address] [place] [address]  Language: English  Hours: Mon - Tue 5:30 PM - 6:30 PM , Thu - Fri 5:30 PM - 6:30 PM  Fees: Free   Phone: (242) 211-4790 Email: blake@[employer] Website: http://[facility].org/     8  [facility] Distance: 11.4 miles      Pickup   [address] [place] [address]  Language: English  Hours: Mon - Fri 5:00 PM - 6:00 PM  Fees: Free   Phone: (981) 296-4937 Email: david@[employer].org Website: https://www.[employer].org/          Important Numbers & Websites       Emergency Services   911  [facility]   311  Poison Control   (848) 710-3344  Suicide Prevention Lifeline   (655) 844-3422 (TALK)  Child Abuse Hotline   (932) 127-9309 (4-A-Child)  Sexual Assault Hotline   (238) 834-7053 (HOPE)  National Runaway Safeline   (728) 248-2231 (RUNAWAY)  All-Options Talkline   (216) 571-3858  Substance Abuse Referral   (973) 609-1507 (HELP)

## 2024-02-15 ENCOUNTER — OFFICE VISIT (OUTPATIENT)
Dept: FAMILY MEDICINE | Facility: CLINIC | Age: 49
End: 2024-02-15
Attending: PHYSICIAN ASSISTANT
Payer: COMMERCIAL

## 2024-02-15 ENCOUNTER — MYC MEDICAL ADVICE (OUTPATIENT)
Dept: FAMILY MEDICINE | Facility: CLINIC | Age: 49
End: 2024-02-15

## 2024-02-15 VITALS
TEMPERATURE: 96.8 F | DIASTOLIC BLOOD PRESSURE: 78 MMHG | HEIGHT: 65 IN | RESPIRATION RATE: 16 BRPM | HEART RATE: 126 BPM | OXYGEN SATURATION: 100 % | BODY MASS INDEX: 41.77 KG/M2 | SYSTOLIC BLOOD PRESSURE: 112 MMHG | WEIGHT: 250.7 LBS

## 2024-02-15 DIAGNOSIS — F09 COGNITIVE DYSFUNCTION ACCOMPANYING MULTIPLE SCLEROSIS (H): ICD-10-CM

## 2024-02-15 DIAGNOSIS — B36.0 TINEA VERSICOLOR: ICD-10-CM

## 2024-02-15 DIAGNOSIS — J45.40 MODERATE PERSISTENT ASTHMA WITHOUT COMPLICATION: ICD-10-CM

## 2024-02-15 DIAGNOSIS — M79.18 CHRONIC MUSCULOSKELETAL PAIN DUE TO DISORDER OF NERVOUS SYSTEM: ICD-10-CM

## 2024-02-15 DIAGNOSIS — G47.00 INSOMNIA, UNSPECIFIED TYPE: ICD-10-CM

## 2024-02-15 DIAGNOSIS — E53.8 VITAMIN B12 DEFICIENCY: ICD-10-CM

## 2024-02-15 DIAGNOSIS — G47.10 HYPERSOMNIA WITH SLEEP APNEA: ICD-10-CM

## 2024-02-15 DIAGNOSIS — G35 MULTIPLE SCLEROSIS (H): ICD-10-CM

## 2024-02-15 DIAGNOSIS — B35.1 ONYCHOMYCOSIS: ICD-10-CM

## 2024-02-15 DIAGNOSIS — Z13.29 SCREENING FOR THYROID DISORDER: ICD-10-CM

## 2024-02-15 DIAGNOSIS — D50.9 IRON DEFICIENCY ANEMIA, UNSPECIFIED IRON DEFICIENCY ANEMIA TYPE: ICD-10-CM

## 2024-02-15 DIAGNOSIS — Z98.84 S/P GASTRIC BYPASS: ICD-10-CM

## 2024-02-15 DIAGNOSIS — G89.29 CHRONIC MUSCULOSKELETAL PAIN DUE TO DISORDER OF NERVOUS SYSTEM: ICD-10-CM

## 2024-02-15 DIAGNOSIS — Z13.220 LIPID SCREENING: ICD-10-CM

## 2024-02-15 DIAGNOSIS — K21.00 GASTROESOPHAGEAL REFLUX DISEASE WITH ESOPHAGITIS, UNSPECIFIED WHETHER HEMORRHAGE: ICD-10-CM

## 2024-02-15 DIAGNOSIS — E66.01 MORBID OBESITY (H): ICD-10-CM

## 2024-02-15 DIAGNOSIS — Z13.1 SCREENING FOR DIABETES MELLITUS: ICD-10-CM

## 2024-02-15 DIAGNOSIS — J30.2 SEASONAL ALLERGIC RHINITIS, UNSPECIFIED TRIGGER: ICD-10-CM

## 2024-02-15 DIAGNOSIS — G47.30 HYPERSOMNIA WITH SLEEP APNEA: ICD-10-CM

## 2024-02-15 DIAGNOSIS — G98.8 CHRONIC MUSCULOSKELETAL PAIN DUE TO DISORDER OF NERVOUS SYSTEM: ICD-10-CM

## 2024-02-15 DIAGNOSIS — M79.2 PAIN, NEUROPATHIC: ICD-10-CM

## 2024-02-15 DIAGNOSIS — G35 COGNITIVE DYSFUNCTION ACCOMPANYING MULTIPLE SCLEROSIS (H): ICD-10-CM

## 2024-02-15 DIAGNOSIS — N28.9 DECREASED RENAL FUNCTION: Primary | ICD-10-CM

## 2024-02-15 DIAGNOSIS — Z12.11 SCREEN FOR COLON CANCER: ICD-10-CM

## 2024-02-15 DIAGNOSIS — Z13.21 ENCOUNTER FOR VITAMIN DEFICIENCY SCREENING: ICD-10-CM

## 2024-02-15 DIAGNOSIS — Z79.899 POLYPHARMACY: ICD-10-CM

## 2024-02-15 DIAGNOSIS — E55.9 HYPOVITAMINOSIS D: ICD-10-CM

## 2024-02-15 DIAGNOSIS — R25.1 TREMOR: ICD-10-CM

## 2024-02-15 DIAGNOSIS — D84.9 IMMUNOSUPPRESSION (H): ICD-10-CM

## 2024-02-15 DIAGNOSIS — G43.109 MIGRAINE WITH AURA AND WITHOUT STATUS MIGRAINOSUS, NOT INTRACTABLE: ICD-10-CM

## 2024-02-15 DIAGNOSIS — Z00.00 ROUTINE GENERAL MEDICAL EXAMINATION AT A HEALTH CARE FACILITY: Primary | ICD-10-CM

## 2024-02-15 DIAGNOSIS — R25.2 SPASTICITY: ICD-10-CM

## 2024-02-15 DIAGNOSIS — N31.9 NEUROGENIC BLADDER: ICD-10-CM

## 2024-02-15 PROBLEM — R09.81 NASAL CONGESTION: Status: RESOLVED | Noted: 2023-10-04 | Resolved: 2024-02-15

## 2024-02-15 LAB
ALBUMIN SERPL BCG-MCNC: 4.4 G/DL (ref 3.5–5.2)
ALP SERPL-CCNC: 73 U/L (ref 40–150)
ALT SERPL W P-5'-P-CCNC: 28 U/L (ref 0–50)
ANION GAP SERPL CALCULATED.3IONS-SCNC: 11 MMOL/L (ref 7–15)
AST SERPL W P-5'-P-CCNC: 32 U/L (ref 0–45)
BILIRUB SERPL-MCNC: 0.3 MG/DL
BUN SERPL-MCNC: 19 MG/DL (ref 6–20)
CALCIUM SERPL-MCNC: 9.3 MG/DL (ref 8.6–10)
CHLORIDE SERPL-SCNC: 107 MMOL/L (ref 98–107)
CHOLEST SERPL-MCNC: 162 MG/DL
CREAT SERPL-MCNC: 1.02 MG/DL (ref 0.51–0.95)
DEPRECATED HCO3 PLAS-SCNC: 19 MMOL/L (ref 22–29)
EGFRCR SERPLBLD CKD-EPI 2021: 68 ML/MIN/1.73M2
ERYTHROCYTE [DISTWIDTH] IN BLOOD BY AUTOMATED COUNT: 14.2 % (ref 10–15)
FASTING STATUS PATIENT QL REPORTED: YES
FERRITIN SERPL-MCNC: 96 NG/ML (ref 6–175)
GLUCOSE SERPL-MCNC: 93 MG/DL (ref 70–99)
HCT VFR BLD AUTO: 37 % (ref 35–47)
HDLC SERPL-MCNC: 58 MG/DL
HGB BLD-MCNC: 12.3 G/DL (ref 11.7–15.7)
LDLC SERPL CALC-MCNC: 85 MG/DL
MAGNESIUM SERPL-MCNC: 2.1 MG/DL (ref 1.7–2.3)
MCH RBC QN AUTO: 30.5 PG (ref 26.5–33)
MCHC RBC AUTO-ENTMCNC: 33.2 G/DL (ref 31.5–36.5)
MCV RBC AUTO: 92 FL (ref 78–100)
NONHDLC SERPL-MCNC: 104 MG/DL
PLATELET # BLD AUTO: 322 10E3/UL (ref 150–450)
POTASSIUM SERPL-SCNC: 3.9 MMOL/L (ref 3.4–5.3)
PROT SERPL-MCNC: 7.5 G/DL (ref 6.4–8.3)
RBC # BLD AUTO: 4.03 10E6/UL (ref 3.8–5.2)
SODIUM SERPL-SCNC: 137 MMOL/L (ref 135–145)
TRIGL SERPL-MCNC: 94 MG/DL
TSH SERPL DL<=0.005 MIU/L-ACNC: 2.92 UIU/ML (ref 0.3–4.2)
VIT B12 SERPL-MCNC: 602 PG/ML (ref 232–1245)
VIT D+METAB SERPL-MCNC: 95 NG/ML (ref 20–50)
WBC # BLD AUTO: 8 10E3/UL (ref 4–11)

## 2024-02-15 PROCEDURE — 83735 ASSAY OF MAGNESIUM: CPT | Performed by: PHYSICIAN ASSISTANT

## 2024-02-15 PROCEDURE — 85027 COMPLETE CBC AUTOMATED: CPT | Performed by: PHYSICIAN ASSISTANT

## 2024-02-15 PROCEDURE — 82728 ASSAY OF FERRITIN: CPT | Performed by: PHYSICIAN ASSISTANT

## 2024-02-15 PROCEDURE — 80053 COMPREHEN METABOLIC PANEL: CPT | Performed by: PHYSICIAN ASSISTANT

## 2024-02-15 PROCEDURE — 99396 PREV VISIT EST AGE 40-64: CPT | Performed by: PHYSICIAN ASSISTANT

## 2024-02-15 PROCEDURE — 99215 OFFICE O/P EST HI 40 MIN: CPT | Mod: 25 | Performed by: PHYSICIAN ASSISTANT

## 2024-02-15 PROCEDURE — 80061 LIPID PANEL: CPT | Performed by: PHYSICIAN ASSISTANT

## 2024-02-15 PROCEDURE — 82607 VITAMIN B-12: CPT | Performed by: PHYSICIAN ASSISTANT

## 2024-02-15 PROCEDURE — 84443 ASSAY THYROID STIM HORMONE: CPT | Performed by: PHYSICIAN ASSISTANT

## 2024-02-15 PROCEDURE — 36415 COLL VENOUS BLD VENIPUNCTURE: CPT | Performed by: PHYSICIAN ASSISTANT

## 2024-02-15 PROCEDURE — 82306 VITAMIN D 25 HYDROXY: CPT | Performed by: PHYSICIAN ASSISTANT

## 2024-02-15 RX ORDER — TOPIRAMATE 50 MG/1
50 TABLET, FILM COATED ORAL 2 TIMES DAILY
Qty: 180 TABLET | Refills: 1 | Status: SHIPPED | OUTPATIENT
Start: 2024-02-15

## 2024-02-15 RX ORDER — BUDESONIDE AND FORMOTEROL FUMARATE DIHYDRATE 160; 4.5 UG/1; UG/1
AEROSOL RESPIRATORY (INHALATION)
Qty: 10.2 G | Refills: 5 | Status: SHIPPED | OUTPATIENT
Start: 2024-02-15

## 2024-02-15 RX ORDER — FLUCONAZOLE 200 MG/1
200 TABLET ORAL WEEKLY
Qty: 2 TABLET | Refills: 0 | Status: SHIPPED | OUTPATIENT
Start: 2024-02-15 | End: 2024-06-19

## 2024-02-15 RX ORDER — ALBUTEROL SULFATE 90 UG/1
AEROSOL, METERED RESPIRATORY (INHALATION)
Qty: 18 G | Refills: 4 | Status: SHIPPED | OUTPATIENT
Start: 2024-02-15 | End: 2024-09-29

## 2024-02-15 RX ORDER — FLUTICASONE PROPIONATE 50 MCG
SPRAY, SUSPENSION (ML) NASAL
Qty: 16 G | Refills: 5 | Status: SHIPPED | OUTPATIENT
Start: 2024-02-15

## 2024-02-15 RX ORDER — TOPIRAMATE 100 MG/1
100 TABLET, FILM COATED ORAL AT BEDTIME
Qty: 90 TABLET | Refills: 1 | Status: SHIPPED | OUTPATIENT
Start: 2024-02-15

## 2024-02-15 RX ORDER — MONTELUKAST SODIUM 10 MG/1
TABLET ORAL
Qty: 90 TABLET | Refills: 3 | Status: SHIPPED | OUTPATIENT
Start: 2024-02-15

## 2024-02-15 RX ORDER — PREGABALIN 75 MG/1
75 CAPSULE ORAL 2 TIMES DAILY PRN
Qty: 60 CAPSULE | Refills: 5 | Status: SHIPPED | OUTPATIENT
Start: 2024-02-15 | End: 2024-03-13

## 2024-02-15 RX ORDER — AZELASTINE 1 MG/ML
1 SPRAY, METERED NASAL 2 TIMES DAILY PRN
Qty: 30 ML | Refills: 5 | Status: SHIPPED | OUTPATIENT
Start: 2024-02-15

## 2024-02-15 ASSESSMENT — ANXIETY QUESTIONNAIRES
GAD7 TOTAL SCORE: 3
IF YOU CHECKED OFF ANY PROBLEMS ON THIS QUESTIONNAIRE, HOW DIFFICULT HAVE THESE PROBLEMS MADE IT FOR YOU TO DO YOUR WORK, TAKE CARE OF THINGS AT HOME, OR GET ALONG WITH OTHER PEOPLE: SOMEWHAT DIFFICULT
8. IF YOU CHECKED OFF ANY PROBLEMS, HOW DIFFICULT HAVE THESE MADE IT FOR YOU TO DO YOUR WORK, TAKE CARE OF THINGS AT HOME, OR GET ALONG WITH OTHER PEOPLE?: SOMEWHAT DIFFICULT
GAD7 TOTAL SCORE: 3
1. FEELING NERVOUS, ANXIOUS, OR ON EDGE: NOT AT ALL
6. BECOMING EASILY ANNOYED OR IRRITABLE: NOT AT ALL
4. TROUBLE RELAXING: SEVERAL DAYS
7. FEELING AFRAID AS IF SOMETHING AWFUL MIGHT HAPPEN: NOT AT ALL
3. WORRYING TOO MUCH ABOUT DIFFERENT THINGS: SEVERAL DAYS
7. FEELING AFRAID AS IF SOMETHING AWFUL MIGHT HAPPEN: NOT AT ALL
GAD7 TOTAL SCORE: 3
2. NOT BEING ABLE TO STOP OR CONTROL WORRYING: SEVERAL DAYS
5. BEING SO RESTLESS THAT IT IS HARD TO SIT STILL: NOT AT ALL

## 2024-02-15 ASSESSMENT — PATIENT HEALTH QUESTIONNAIRE - PHQ9
10. IF YOU CHECKED OFF ANY PROBLEMS, HOW DIFFICULT HAVE THESE PROBLEMS MADE IT FOR YOU TO DO YOUR WORK, TAKE CARE OF THINGS AT HOME, OR GET ALONG WITH OTHER PEOPLE: SOMEWHAT DIFFICULT
SUM OF ALL RESPONSES TO PHQ QUESTIONS 1-9: 4
SUM OF ALL RESPONSES TO PHQ QUESTIONS 1-9: 4

## 2024-02-15 ASSESSMENT — ASTHMA QUESTIONNAIRES: ACT_TOTALSCORE: 21

## 2024-02-15 NOTE — LETTER
[facility]  [address] [place], [place] [address] [phone]                    February 22, 2024    Nataliya Kwanomid Aldrich  [address]   [place] [address]      Dear Nataliya,    Here is a summary of your recent test results:    -Normal red blood cell (hgb) levels, normal white blood cell count and normal platelet levels.   -Cholesterol levels (LDL,HDL, Triglycerides) are normal. ADVISE: rechecking in 2-3 years.   -Liver and gallbladder tests are normal (ALT,AST, Alk phos, bilirubin), kidney function is VERY mildly decreased which is likely a result of dehydration.  This is NOT to a concerning level - we can recheck this in 3-6 months or you can have it rechecked with your next infusion (Cr, GFR)  I will have an order put in the chart so that it can be drawn by the infusion team, sodium is normal, potassium is normal, calcium is normal, glucose is normal.   -TSH (thyroid stimulating hormone) level is normal which indicates normal thyroid function.   -Magnesium is normal.  OK to continue your current supplementation.   -Vitamin D level is optimal for MS patients.  Continue your current supplementation.    -Ferritin (iron) level is normal.  If you are taking an iron supplement - please continue this.  If not taking one, no need to start at this time.   -B12 level is normal, continue your current supplementation.     I will ask my staff to fax these results to Dr. Chong.     For additional lab test information, www.testing.com is an excellent reference.     Your test results are enclosed.      Please contact me if you have any questions.    In addition, here is a list of due or overdue Health Maintenance reminders.    Health Maintenance Due   Topic Date Due    Colorectal Cancer Screening  08/01/2023       Please call us at [phone] (or use [employer]) to address the above recommendations.            Thank you very much for trusting [facility].     Healthy regards,      Miya Crump PA-C         Results for orders placed or performed in visit on 02/15/24   Ferritin     Status: Normal   Result Value Ref Range    Ferritin 96 6 - 175 ng/mL   Vitamin B12     Status: Normal   Result Value Ref Range    Vitamin B12 602 232 - 1,245 pg/mL   Vitamin D Deficiency     Status: Abnormal   Result Value Ref Range    Vitamin D, Total (25-Hydroxy) 95 (H) 20 - 50 ng/mL    Narrative    Season, race, dietary intake, and treatment affect the concentration of 25-hydroxy-Vitamin D. Values may decrease during winter months and increase during summer months.    Vitamin D determination is routinely performed by an immunoassay specific for 25 hydroxyvitamin D3.  If an individual is on vitamin D2(ergocalciferol) supplementation, please specify 25 OH vitamin D2 and D3 level determination by LCMSMS test VITD23.     Comprehensive metabolic panel (BMP + Alb, Alk Phos, ALT, AST, Total. Bili, TP)     Status: Abnormal   Result Value Ref Range    Sodium 137 135 - 145 mmol/L    Potassium 3.9 3.4 - 5.3 mmol/L    Carbon Dioxide (CO2) 19 (L) 22 - 29 mmol/L    Anion Gap 11 7 - 15 mmol/L    Urea Nitrogen 19.0 6.0 - 20.0 mg/dL    Creatinine 1.02 (H) 0.51 - 0.95 mg/dL    GFR Estimate 68 >60 mL/min/1.73m2    Calcium 9.3 8.6 - 10.0 mg/dL    Chloride 107 98 - 107 mmol/L    Glucose 93 70 - 99 mg/dL    Alkaline Phosphatase 73 40 - 150 U/L    AST 32 0 - 45 U/L    ALT 28 0 - 50 U/L    Protein Total 7.5 6.4 - 8.3 g/dL    Albumin 4.4 3.5 - 5.2 g/dL    Bilirubin Total 0.3 <=1.2 mg/dL   CBC with platelets     Status: Normal   Result Value Ref Range    WBC Count 8.0 4.0 - 11.0 10e3/uL    RBC Count 4.03 3.80 - 5.20 10e6/uL    Hemoglobin 12.3 11.7 - 15.7 g/dL    Hematocrit 37.0 35.0 - 47.0 %    MCV 92 78 - 100 fL    MCH 30.5 26.5 - 33.0 pg    MCHC 33.2 31.5 - 36.5 g/dL    RDW 14.2 10.0 - 15.0 %    Platelet Count 322 150 - 450 10e3/uL   Lipid panel reflex to direct LDL Fasting     Status: None   Result  Value Ref Range    Cholesterol 162 <200 mg/dL    Triglycerides 94 <150 mg/dL    Direct Measure HDL 58 >=50 mg/dL    LDL Cholesterol Calculated 85 <=100 mg/dL    Non HDL Cholesterol 104 <130 mg/dL    Patient Fasting > 8hrs? Yes     Narrative    Cholesterol  Desirable:  <200 mg/dL    Triglycerides  Normal:  Less than 150 mg/dL  Borderline High:  150-199 mg/dL  High:  200-499 mg/dL  Very High:  Greater than or equal to 500 mg/dL    Direct Measure HDL  Female:  Greater than or equal to 50 mg/dL   Male:  Greater than or equal to 40 mg/dL    LDL Cholesterol  Desirable:  <100mg/dL  Above Desirable:  100-129 mg/dL   Borderline High:  130-159 mg/dL   High:  160-189 mg/dL   Very High:  >= 190 mg/dL    Non HDL Cholesterol  Desirable:  130 mg/dL  Above Desirable:  130-159 mg/dL  Borderline High:  160-189 mg/dL  High:  190-219 mg/dL  Very High:  Greater than or equal to 220 mg/dL   TSH with free T4 reflex     Status: Normal   Result Value Ref Range    TSH 2.92 0.30 - 4.20 uIU/mL   Magnesium     Status: Normal   Result Value Ref Range    Magnesium 2.1 1.7 - 2.3 mg/dL

## 2024-02-15 NOTE — PROGRESS NOTES
Preventive Care Visit  [facility] [place]  Miya Opal Crump PA-C, Family Medicine  Feb 15, 2024    Assessment & Plan     Routine general medical examination at a health care facility  - PRIMARY CARE FOLLOW-UP SCHEDULING    Morbid obesity (H)  Vitamin B12 deficiency  Iron deficiency anemia, unspecified iron deficiency anemia type  S/P gastric bypass 2002  Continue diet and exercise efforts encouraged.  Weight stable.  - Ferritin  - CBC with platelets  - Vitamin B12    Multiple sclerosis (H) - Dr. Chong - on Tysabri infusions  Hypovitaminosis D  Cognitive dysfunction accompanying multiple sclerosis (H)  Immunosuppression (H24)  Spasticity  Chronic musculoskeletal pain due to disorder of nervous system  Tremor  Pain, neuropathic  Polypharmacy  Continues under the care of Dr. Chong with neurology.  Recent MRI stable.  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  Patient apprehensive to start medications that she has not tolerated in the past (trazodone with nightmares).  Would like to consider a consultation with [facility] clinical pharmacy.  Referral placed today.  Continue physical therapy as previously advised now that insurance limitations have been lifted.  Patient is applying for SSDI and I fully support her decision to do this.  She will let me know if anything is needed from me in order to get this approved.  - pregabalin (LYRICA) 75 MG capsule  Dispense: 60 capsule; Refill: 5  - Vitamin D Deficiency  - Med Therapy Management Referral    Insomnia, unspecified type  Suboptimally controlled.  Recommend reinitiating melatonin.  Will discuss medication adjustments with MTM  - Med Therapy Management Referral    Moderate persistent asthma without complication  Seasonal allergic rhinitis, unspecified trigger  Currently stable.  Continue current regimen.  Medications refilled today.  - montelukast (SINGULAIR) 10 MG  tablet  Dispense: 90 tablet; Refill: 3  - budesonide-formoterol (SYMBICORT) 160-4.5 MCG/ACT Inhaler  Dispense: 10.2 g; Refill: 5  - VENTOLIN  (90 Base) MCG/ACT inhaler  Dispense: 18 g; Refill: 4  - fluticasone (FLONASE) 50 MCG/ACT nasal spray  Dispense: 16 g; Refill: 5  - azelastine (ASTELIN) 0.1 % nasal spray  Dispense: 30 mL; Refill: 5    Gastroesophageal reflux disease with esophagitis, unspecified whether hemorrhage  Currently well-controlled.  Continue current regimen.  Avoid aggravating foods.  - omeprazole (PRILOSEC) 20 MG DR capsule  Dispense: 180 capsule; Refill: 1    Hypersomnia with sleep apnea - sleep study scheduled 5/2024  Sleep study scheduled for May 2024.    Migraine with aura and without status migrainosus, not intractable  Improved as of late.  Continue topiramate for prevention.  No refills needed for Imitrex for flares.  - topiramate (TOPAMAX) 100 MG tablet  Dispense: 90 tablet; Refill: 1    Tinea versicolor  Persistent irritation despite topical ketoconazole.  Recommend oral fluconazole 200 mg once weekly x 2 weeks.  She will keep me apprised if this does not resolve her symptoms.  - fluconazole (DIFLUCAN) 200 MG tablet  Dispense: 2 tablet; Refill: 0    Onychomycosis  Due to numerous medications will continue to do topical antifungal massages into cuticle of the nailbed.  If healthy nail not growing with this intervention can consider oral Lamisil.    Encounter for vitamin deficiency screening  Routine screening  - Magnesium    Screening for diabetes mellitus  Encounter for vitamin deficiency screening  Screening for thyroid disorder  Lipid screening  Routine screening  - Magnesium  - Comprehensive metabolic panel (BMP + Alb, Alk Phos, ALT, AST, Total. Bili, TP)  - TSH with free T4 reflex  - Lipid panel reflex to direct LDL Fasting    Screen for colon cancer  Routine screening  - Colonoscopy Screening  Referral      Review of prior external note(s) from - CareEverywhere  information from neurology reviewed  46 minutes spent by me on the date of the encounter doing chart review, history and exam, documentation and further activities per the note OUTSIDE OF PREVENTATIVE CARE      Counseling  Appropriate preventive services were discussed with this patient, including applicable screening as appropriate for fall prevention, nutrition, physical activity, Tobacco-use cessation, weight loss and cognition.  Checklist reviewing preventive services available has been given to the patient.  Reviewed patient's diet, addressing concerns and/or questions.   She is at risk for lack of exercise and has been provided with information to increase physical activity for the benefit of her well-being.     Patient has been advised of split billing requirements and indicates understanding: Yes    No follow-ups on file.        Miya Crump MBA, MS, PA-C  [facility] [place]   Nataliya is a 48 year old, presenting for the following:  Physical        2/15/2024     7:36 AM   Additional Questions   Roomed by Nelida MINERVA   Accompanied by Self        Health Care Directive  Patient does not have a Health Care Directive or Living Will: Discussed advance care planning with patient; however, patient declined at this time.    HPI    Asthma/Allergy Follow-Up  Symbicort once daily & twice daily with flares,  Albuterol used regularly with activity, zyrtec & montelukast daily.  Due to recent issues with employment/insurance coverage was out of inhalers for a while but is now back on them.  Has noticed decreased exercise tolerance since restarting physical activity.  Uses Flonase daily azelastine PRN.  Saw pulmonology @ [facility] 6/1/2020 and they determined that the PFTs were consistent with suspected decreased muscular tone of diaphragm and intercostal muscles.      Insomnia  Historically was doing quite well with insomnia but due to lack of insurance coverage and inability to continue  physical therapy for her neck and upper back her sleep has been disrupted.  Trazodone has caused nightmares in the past.  Recently represcribed by neurology but the patient does not desire taking this.  She was prescribed Rozerem May of last year but cannot remember if this worked well for her.  Due to all of her sensitivities with medications she is interested in trialing an MTM consultation.  Is not currently using melatonin but is willing to restart this as it has worked well in the past.     Nonspecific rash  Under bilateral breasts.  Improved with topical treatment but not completely resolved.  Occasionally itchy.  Saw Bronwyn Fuchs CNP who thought this was consistent with tinea versicolor.  She started patient on ketoconazole on 1/17/2024 which has significantly improved the rash but not completely resolved.  She did mention that oral treatment may be needed if incomplete resolution was noted.    Toenail concern  Some thickening/flaking of nails.  Wondering if fungal.  Has been using topical antifungal massaged into nailbed/cuticles and thinks this is helping.  Some discomfort along the lateral nailbed of great right toenail.  No apparent ingrown.  Has not had any pedicures or foot treatments recently.     Migraine   Topamax 150 mg (taking only on the weekends) because groggy into the next day -has been noticing more headaches as of late and so is going to try to start taking this more routinely.  With increased neck stiffness/pain was having increased headaches.  Did receive a new prescription for sumatriptan from neurology which has helped to improve the severity/frequency of headaches.    Cervical DJD  Persistent and nearly daily neck pain.   Gabapentin and baclofen helpful for pain.  Unfortunately, physical therapy was disrupted for approximately 7 to 8 weeks due to insurance coverage issues.  Has restarted this and is gradually improving.     HSV  Patient is taking valacyclovir 500 mg once daily for  suppression.  Has not had any outbreaks in the last year.  Is unsure why as she is not currently sexually active.     Bariatric surgery status  Patient had Elise-en-Y gastric bypass 2002.  Last saw [place]ariatric [place] [facility] [place] there were concerns of an eating disorder and she was referred to [facility] due to body distortion issues and restrictive thoughts about food.  She is taking a B12 supplement, vitamin D, multivitamin, calcium.  She is not taking iron as it causes her to be constipated.  Was assessed at [facility] between September and October 2020 and not diagnosed with an eating disorder & recommended to start individual therapy.  Using weight watchers.  Last upper GI was completed 9/15/2020 and did not show any evidence of a gastrogastric fistula.  Last endoscopy was completed at [facility] 4/11/2014 and showed ulcer formation and normal postoperative gastric bypass changes.  She has GERD and takes omeprazole 20 mg daily.     Multiple Sclerosis  Diagnosed in 2016.  The patient initially presented with a history of facial numbness, slurred speech and right-sided motor and sensory symptoms in June 2015.  MRI imaging demonstrated a lesion in the addison that was initially interpreted as a cerebral infarct.  However, on subsequent review of the imaging, it was felt that an inflammatory demyelinating process was a possibility.  The patient also developed a new enhancing lesion noted on brain MRI in May 2016.  This was diagnostic for RRMS.  She now follows with Dr. Chong at [facility] -visit within the last month with his care team.  [facility]   Takes baclofen for leg spasms at night and occasionally during the day, however, does not like taking this because of an almost immediate hand tremor.  She has issues with spasms, chronic fatigue, chronic neck and leg pain.  Also reports taking magnesium twice daily 400 mg.  As mentioned above, neck  pain/headaches have worsened since being out of physical therapy.  Has tried to continue home exercise program but benefits are limited.      Recent C-spine/T-spine/brain MRIs that all showed no new lesions.  Has noted a right hand tremor which is quite bothersome.  This was confirmed by neurology nurse practitioner.  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  She was recently restarted on duloxetine but did not start this medication.  She is applying for SSDI as she was laid off from her job at the end of December.    Situational anxiety  Mood changes  Worsened due to stress surrounding being off of medications due to out-of-pocket cost with insurance lapse.  Not currently on any medication.  Did not restart duloxetine as prescribed by neurology nurse practitioner.  How are you doing with your anxiety since your last visit? Worsened   Are you having other symptoms that might be associated with anxiety? No  Have you had a significant life event? Job Concerns   Are you feeling depressed? No  Do you have any concerns with your use of alcohol or other drugs? No    Social History     Tobacco Use    Smoking status: Former     Packs/day: 0.50     Years: 2.00     Additional pack years: 0.00     Total pack years: 1.00     Types: Cigars, Cigarettes     Start date: 1/2/2011     Quit date: 7/2/2013     Years since quitting: 10.6    Smokeless tobacco: Never   Vaping Use    Vaping Use: Never used   Substance Use Topics    Alcohol use: Yes     Alcohol/week: 2.0 standard drinks of alcohol     Comment: 0-3 drinks per week    Drug use: No     Comment: Marijuana when younger          10/21/2022     2:26 PM 1/17/2024    11:50 AM 2/15/2024     7:38 AM   JUSTICE-7 SCORE   Total Score  4 (minimal anxiety) 3 (minimal anxiety)   Total Score 1 4 3         10/21/2022     2:26 PM 1/17/2024    11:49 AM 2/15/2024     7:37 AM   PHQ   PHQ-9 Total Score 2 0 4    Q9: Thoughts of better off dead/self-harm past 2 weeks Not at all Not at all Not at all         2/15/2024     7:37 AM   Last PHQ-9   1.  Little interest or pleasure in doing things 0   2.  Feeling down, depressed, or hopeless 1   3.  Trouble falling or staying asleep, or sleeping too much 1   4.  Feeling tired or having little energy 1   5.  Poor appetite or overeating 1   6.  Feeling bad about yourself 0   7.  Trouble concentrating 0   8.  Moving slowly or restless 0   Q9: Thoughts of better off dead/self-harm past 2 weeks 0   PHQ-9 Total Score 4         2/15/2024     7:38 AM   JUSTICE-7    1. Feeling nervous, anxious, or on edge 0   2. Not being able to stop or control worrying 1   3. Worrying too much about different things 1   4. Trouble relaxing 1   5. Being so restless that it is hard to sit still 0   6. Becoming easily annoyed or irritable 0   7. Feeling afraid, as if something awful might happen 0   JUSTICE-7 Total Score 3   If you checked any problems, how difficult have they made it for you to do your work, take care of things at home, or get along with other people? Somewhat difficult         2/13/2024   General Health   How would you rate your overall physical health? Good   Feel stress (tense, anxious, or unable to sleep) To some extent   (!) STRESS CONCERN      2/13/2024   Nutrition   Three or more servings of calcium each day? Yes   Diet: Regular (no restrictions)   How many servings of fruit and vegetables per day? 4 or more   How many sweetened beverages each day? 0-1         2/13/2024   Exercise   Days per week of moderate/strenous exercise 3 days   Average minutes spent exercising at this level 20 min         2/13/2024   Social Factors   Frequency of gathering with friends or relatives Once a week   Worry food won't last until get money to buy more Yes   Food not last or not have enough money for food? Yes   Do you have housing?  Yes   Are you worried about losing your housing? Yes   Lack of  transportation? No   Unable to get utilities (heat,electricity)? No   Want help with housing or utility concern? (!) YES   (!) FOOD SECURITY CONCERN PRESENT(!) HOUSING CONCERN PRESENT      2/13/2024   Dental   Dentist two times every year? Yes         2/13/2024   TB Screening   Were you born outside of [place]?  No           Today's PHQ-2 Score:       1/17/2024    11:50 AM   PHQ-2 ( 1999 [employer])   Q1: Little interest or pleasure in doing things 0   Q2: Feeling down, depressed or hopeless 0   PHQ-2 Score 0   Q1: Little interest or pleasure in doing things Not at all   Q2: Feeling down, depressed or hopeless Not at all   PHQ-2 Score 0         2/13/2024   Substance Use   Alcohol more than 3/day or more than 7/wk No   Do you use any other substances recreationally? No     Social History     Tobacco Use    Smoking status: Former     Packs/day: 0.50     Years: 2.00     Additional pack years: 0.00     Total pack years: 1.00     Types: Cigars, Cigarettes     Start date: 1/2/2011     Quit date: 7/2/2013     Years since quitting: 10.6    Smokeless tobacco: Never   Vaping Use    Vaping Use: Never used   Substance Use Topics    Alcohol use: Yes     Alcohol/week: 2.0 standard drinks of alcohol     Comment: 0-3 drinks per week    Drug use: No     Comment: Marijuana when younger              2/13/2024   Breast Cancer Screening   Family history of breast, colon, or ovarian cancer? Yes         2/13/2024   LAST FHS-7 RESULTS   1st degree relative breast or ovarian cancer Yes   Any relative bilateral breast cancer Yes   Any male have breast cancer No   Any woman have breast and ovarian cancer Yes   Any woman with breast cancer before 50yrs Yes   2 or more relatives with breast and/or ovarian cancer Yes   2 or more relatives with breast and/or bowel cancer Unknown        Mammogram Screening - Mammogram every 1-2 years updated in Health Maintenance based on mutual decision making        2/13/2024   STI Screening   New sexual partner(s) since  "last STI/HIV test? No     History of abnormal Pap smear: Status post benign hysterectomy. Health Maintenance and Surgical History updated.       The 10-year ASCVD risk score (Delisa CHUA, et al., 2019) is: 0.4%    Values used to calculate the score:      Age: 48 years      Sex: Female      Is Non- : Yes      Diabetic: No      Tobacco smoker: No      Systolic Blood Pressure: 112 mmHg      Is BP treated: No      HDL Cholesterol: 65 mg/dL      Total Cholesterol: 164 mg/dL       Reviewed and updated as needed this visit by Provider   Tobacco  Allergies    Med Hx  Surg Hx  Fam Hx  Soc Hx Sexual   Activity          Past Medical History:   Diagnosis Date    Asthma     mild persistent - Dr Gee    Cerebral infarction (H) 2015    Fibroids     s/p hysterectomy    H/O gastric bypass     Hypertension     Migraine     followed by [facility]    Obstructive sleep apnea     needs updated sleep study    Pre-diabetes     Relapsing remitting multiple sclerosis (H) 2015    lesion in [place].  Facial tingling initial symptom.  F/B [facility]    Spondylosis of cervical region without myelopathy or radiculopathy      Past Surgical History:   Procedure Laterality Date    COLONOSCOPY  2012    GASTRIC BYPASS  2002    \"Trouble with B12 and D\"    HYSTERECTOMY, MONO  2013    cervix gone.  fibroids.  without BSO    TONSILLECTOMY           Review of Systems  Constitutional, HEENT, cardiovascular, pulmonary, GI, , musculoskeletal, neuro, skin, endocrine and psych systems are negative, except as otherwise noted.     Objective    Exam  /78   Pulse (!) 126   Temp 96.8  F (36  C) (Tympanic)   Resp 16   Ht 1.651 m (5' 5\")   Wt 113.7 kg (250 lb 11.2 oz)   LMP  (LMP Unknown)   SpO2 100%   BMI 41.72 kg/m     Estimated body mass index is 41.72 kg/m  as calculated from the following:    Height as of this encounter: 1.651 m (5' 5\").    Weight as of this encounter: 113.7 kg (250 lb 11.2 oz).    Physical Exam  GENERAL: alert and " no distress  EYES: Eyes grossly normal to inspection, PERRL and conjunctivae and sclerae normal  HENT: ear canals and TM's normal, nose and mouth without ulcers or lesions  NECK: no adenopathy, no asymmetry, masses, or scars  RESP: lungs clear to auscultation - no rales, rhonchi or wheezes  CV: regular rate and rhythm, normal S1 S2, no S3 or S4, no murmur, click or rub, no peripheral edema  ABDOMEN: soft, nontender, no hepatosplenomegaly, no masses and bowel sounds normal  MS: no gross musculoskeletal defects noted, no edema  SKIN: no suspicious lesions, erythematous blotchy rash that is very faint in appearance under bilateral breasts.  Right great toenail thickened and hyperkeratotic with some yellowing appearance  NEURO: Normal strength and tone, mentation intact and speech normal  PSYCH: mentation appears normal, affect normal/bright      Signed Electronically by: Miya Crump PA-C

## 2024-02-15 NOTE — PATIENT INSTRUCTIONS
Preventive Care Advice   This is general advice given by our system to help you stay healthy. However, your care team may have specific advice just for you. Please talk to your care team about your preventive care needs.  Nutrition  Eat 5 or more servings of fruits and vegetables each day.  Try wheat bread, brown rice and whole grain pasta (instead of white bread, rice, and pasta).  Get enough calcium and vitamin D. Check the label on foods and aim for 100% of the RDA (recommended daily allowance).  Lifestyle  Exercise at least 150 minutes each week  (30 minutes a day, 5 days a week).  Do muscle strengthening activities 2 days a week. These help control your weight and prevent disease.  No smoking.  Wear sunscreen to prevent skin cancer.  Have a dental exam and cleaning every 6 months.  Yearly exams  See your health care team every year to talk about:  Any changes in your health.  Any medicines your care team has prescribed.  Preventive care, family planning, and ways to prevent chronic diseases.  Shots (vaccines)   HPV shots (up to age 26), if you've never had them before.  Hepatitis B shots (up to age 59), if you've never had them before.  COVID-19 shot: Get this shot when it's due.  Flu shot: Get a flu shot every year.  Tetanus shot: Get a tetanus shot every 10 years.  Pneumococcal, hepatitis A, and RSV shots: Ask your care team if you need these based on your risk.  Shingles shot (for age 50 and up)  General health tests  Diabetes screening:  Starting at age 35, Get screened for diabetes at least every 3 years.  If you are younger than age 35, ask your care team if you should be screened for diabetes.  Cholesterol test: At age 39, start having a cholesterol test every 5 years, or more often if advised.  Bone density scan (DEXA): At age 50, ask your care team if you should have this scan for osteoporosis (brittle bones).  Hepatitis C: Get tested at least once in your life.  STIs (sexually transmitted  infections)  Before age 24: Ask your care team if you should be screened for STIs.  After age 24: Get screened for STIs if you're at risk. You are at risk for STIs (including HIV) if:  You are sexually active with more than one person.  You don't use condoms every time.  You or a partner was diagnosed with a sexually transmitted infection.  If you are at risk for HIV, ask about PrEP medicine to prevent HIV.  Get tested for HIV at least once in your life, whether you are at risk for HIV or not.  Cancer screening tests  Cervical cancer screening: If you have a cervix, begin getting regular cervical cancer screening tests starting at age 21.  Breast cancer scan (mammogram): If you've ever had breasts, begin having regular mammograms starting at age 40. This is a scan to check for breast cancer.  Colon cancer screening: It is important to start screening for colon cancer at age 45.  Have a colonoscopy test every 10 years (or more often if you're at risk) Or, ask your provider about stool tests like a FIT test every year or Cologuard test every 3 years.  To learn more about your testing options, visit:   https://[email or web address]/277800.pdf.  For help making a decision, visit:   https://bit.ly/lf00582.  Prostate cancer screening test: If you have a prostate, ask your care team if a prostate cancer screening test (PSA) at age 55 is right for you.  Lung cancer screening: If you are a current or former smoker ages 50 to 80, ask your care team if ongoing lung cancer screenings are right for you.  For informational purposes only. Not to replace the advice of your health care provider. Copyright   2023 [facility]. All rights reserved. Clinically reviewed by the [facility] Transitions Program. GiftMe [id number] - REV 01/24.    Learning About Stress  What is stress?     Stress is your body's response to a hard situation. Your body can have a physical, emotional, or mental response. Stress is a fact of life for  most people, and it affects everyone differently. What causes stress for you may not be stressful for someone else.  A lot of things can cause stress. You may feel stress when you go on a job interview, take a test, or run a race. This kind of short-term stress is normal and even useful. It can help you if you need to work hard or react quickly. For example, stress can help you finish an important job on time.  Long-term stress is caused by ongoing stressful situations or events. Examples of long-term stress include long-term health problems, ongoing problems at work, or conflicts in your family. Long-term stress can harm your health.  How does stress affect your health?  When you are stressed, your body responds as though you are in danger. It makes hormones that speed up your heart, make you breathe faster, and give you a burst of energy. This is called the fight-or-flight stress response. If the stress is over quickly, your body goes back to normal and no harm is done.  But if stress happens too often or lasts too long, it can have bad effects. Long-term stress can make you more likely to get sick, and it can make symptoms of some diseases worse. If you tense up when you are stressed, you may develop neck, shoulder, or low back pain. Stress is linked to high blood pressure and heart disease.  Stress also harms your emotional health. It can make you salvador, tense, or depressed. Your relationships may suffer, and you may not do well at work or school.  What can you do to manage stress?  You can try these things to help manage stress:   Do something active. Exercise or activity can help reduce stress. Walking is a great way to get started. Even everyday activities such as housecleaning or yard work can help.  Try yoga or oneal chi. These techniques combine exercise and meditation. You may need some training at first to learn them.  Do something you enjoy. For example, listen to music or go to a movie. Practice your  "hobby or do volunteer work.  Meditate. This can help you relax, because you are not worrying about what happened before or what may happen in the future.  Do guided imagery. Imagine yourself in any setting that helps you feel calm. You can use online videos, books, or a teacher to guide you.  Do breathing exercises. For example:  From a standing position, bend forward from the waist with your knees slightly bent. Let your arms dangle close to the floor.  Breathe in slowly and deeply as you return to a standing position. Roll up slowly and lift your head last.  Hold your breath for just a few seconds in the standing position.  Breathe out slowly and bend forward from the waist.  Let your feelings out. Talk, laugh, cry, and express anger when you need to. Talking with supportive friends or family, a counselor, or a zabrina leader about your feelings is a healthy way to relieve stress. Avoid discussing your feelings with people who make you feel worse.  Write. It may help to write about things that are bothering you. This helps you find out how much stress you feel and what is causing it. When you know this, you can find better ways to cope.  What can you do to prevent stress?  You might try some of these things to help prevent stress:  Manage your time. This helps you find time to do the things you want and need to do.  Get enough sleep. Your body recovers from the stresses of the day while you are sleeping.  Get support. Your family, friends, and community can make a difference in how you experience stress.  Limit your news feed. Avoid or limit time on social media or news that may make you feel stressed.  Do something active. Exercise or activity can help reduce stress. Walking is a great way to get started.  Where can you learn more?  Go to https://www.healthwise.net/patiented  Enter N032 in the search box to learn more about \"Learning About Stress.\"  Current as of: February 26, 2023               Content Version: " 13.8    4227-1985 [employer].   Care instructions adapted under license by your healthcare professional. If you have questions about a medical condition or this instruction, always ask your healthcare professional. [employer] disclaims any warranty or liability for your use of this information.

## 2024-02-16 ENCOUNTER — THERAPY VISIT (OUTPATIENT)
Dept: PHYSICAL THERAPY | Facility: CLINIC | Age: 49
End: 2024-02-16
Payer: COMMERCIAL

## 2024-02-16 DIAGNOSIS — M54.2 CERVICALGIA: ICD-10-CM

## 2024-02-16 DIAGNOSIS — M79.18 MYOFASCIAL PAIN: Primary | ICD-10-CM

## 2024-02-16 PROCEDURE — 97140 MANUAL THERAPY 1/> REGIONS: CPT | Mod: GP | Performed by: PHYSICAL THERAPIST

## 2024-02-16 PROCEDURE — 97035 APP MDLTY 1+ULTRASOUND EA 15: CPT | Mod: GP | Performed by: PHYSICAL THERAPIST

## 2024-02-19 ENCOUNTER — TELEPHONE (OUTPATIENT)
Dept: FAMILY MEDICINE | Facility: CLINIC | Age: 49
End: 2024-02-19
Payer: COMMERCIAL

## 2024-02-19 NOTE — TELEPHONE ENCOUNTER
Patient calls, wonders about results of lab she had done last week. Writer reviewed labs with patient, ANDREW Crump has not reviewed as of yet. Informed patient to be patient. She is in agreement, will call back in a few days if she has not heard from team.

## 2024-02-19 NOTE — RESULT ENCOUNTER NOTE
Team:   please fax results to Dr. Chong  [facility]  [address], [address]  [place] [address] [phone]    Nataliya  I have reviewed your recent test results:    -Normal red blood cell (hgb) levels, normal white blood cell count and normal platelet levels.  -Cholesterol levels (LDL,HDL, Triglycerides) are normal. ADVISE: rechecking in 2-3 years.  -Liver and gallbladder tests are normal (ALT,AST, Alk phos, bilirubin), kidney function is VERY mildly decreased which is likely a result of dehydration.  This is NOT to a concerning level - we can recheck this in 3-6 months or you can have it rechecked with your next infusion (Cr, GFR)  I will have an order put in the chart so that it can be drawn by the infusion team, sodium is normal, potassium is normal, calcium is normal, glucose is normal.  -TSH (thyroid stimulating hormone) level is normal which indicates normal thyroid function.  -Magnesium is normal.  OK to continue your current supplementation.  -Vitamin D level is optimal for MS patients.  Continue your current supplementation.    -Ferritin (iron) level is normal.  If you are taking an iron supplement - please continue this.  If not taking one, no need to start at this time.  -B12 level is normal, continue your current supplementation.    I will ask my staff to fax these results to Dr. Chong.    For additional lab test information, [email or web address] is an excellent reference.     If you have any questions please do not hesitate to contact our office via phone ([phone]) or Jelast[employer]art.    Healthy regards,     Miya Crump MBA, MS, PA-C  [facility]

## 2024-02-25 ENCOUNTER — E-VISIT (OUTPATIENT)
Dept: FAMILY MEDICINE | Facility: CLINIC | Age: 49
End: 2024-02-25
Payer: COMMERCIAL

## 2024-02-25 DIAGNOSIS — M54.50 ACUTE RIGHT-SIDED LOW BACK PAIN WITHOUT SCIATICA: Primary | ICD-10-CM

## 2024-02-25 PROCEDURE — 99207 PR NON-BILLABLE SERV PER CHARTING: CPT | Performed by: PHYSICIAN ASSISTANT

## 2024-02-26 ENCOUNTER — OFFICE VISIT (OUTPATIENT)
Dept: PEDIATRICS | Facility: CLINIC | Age: 49
End: 2024-02-26
Payer: COMMERCIAL

## 2024-02-26 ENCOUNTER — ANCILLARY PROCEDURE (OUTPATIENT)
Dept: GENERAL RADIOLOGY | Facility: CLINIC | Age: 49
End: 2024-02-26
Attending: PHYSICIAN ASSISTANT
Payer: COMMERCIAL

## 2024-02-26 VITALS
RESPIRATION RATE: 18 BRPM | SYSTOLIC BLOOD PRESSURE: 127 MMHG | BODY MASS INDEX: 41.6 KG/M2 | OXYGEN SATURATION: 100 % | HEART RATE: 68 BPM | TEMPERATURE: 98.2 F | DIASTOLIC BLOOD PRESSURE: 83 MMHG | WEIGHT: 250 LBS

## 2024-02-26 DIAGNOSIS — K59.00 CONSTIPATION, UNSPECIFIED CONSTIPATION TYPE: Primary | ICD-10-CM

## 2024-02-26 DIAGNOSIS — E66.01 MORBID OBESITY (H): ICD-10-CM

## 2024-02-26 DIAGNOSIS — M54.50 ACUTE RIGHT-SIDED LOW BACK PAIN WITHOUT SCIATICA: ICD-10-CM

## 2024-02-26 DIAGNOSIS — G35 MULTIPLE SCLEROSIS (H): ICD-10-CM

## 2024-02-26 DIAGNOSIS — D84.9 IMMUNOSUPPRESSION (H): ICD-10-CM

## 2024-02-26 DIAGNOSIS — R11.0 NAUSEA: ICD-10-CM

## 2024-02-26 LAB
ALBUMIN SERPL BCG-MCNC: 4.4 G/DL (ref 3.5–5.2)
ALBUMIN UR-MCNC: NEGATIVE MG/DL
ALP SERPL-CCNC: 70 U/L (ref 40–150)
ALT SERPL W P-5'-P-CCNC: 19 U/L (ref 0–50)
ANION GAP SERPL CALCULATED.3IONS-SCNC: 10 MMOL/L (ref 7–15)
APPEARANCE UR: CLEAR
AST SERPL W P-5'-P-CCNC: 26 U/L (ref 0–45)
BACTERIA #/AREA URNS HPF: ABNORMAL /HPF
BASOPHILS # BLD AUTO: 0.1 10E3/UL (ref 0–0.2)
BASOPHILS NFR BLD AUTO: 1 %
BILIRUB SERPL-MCNC: 0.2 MG/DL
BILIRUB UR QL STRIP: NEGATIVE
BUN SERPL-MCNC: 13.7 MG/DL (ref 6–20)
CALCIUM SERPL-MCNC: 9.3 MG/DL (ref 8.6–10)
CHLORIDE SERPL-SCNC: 108 MMOL/L (ref 98–107)
COLOR UR AUTO: ABNORMAL
CREAT SERPL-MCNC: 1 MG/DL (ref 0.51–0.95)
CRP SERPL-MCNC: <3 MG/L
DEPRECATED HCO3 PLAS-SCNC: 21 MMOL/L (ref 22–29)
EGFRCR SERPLBLD CKD-EPI 2021: 70 ML/MIN/1.73M2
EOSINOPHIL # BLD AUTO: 0.1 10E3/UL (ref 0–0.7)
EOSINOPHIL NFR BLD AUTO: 2 %
ERYTHROCYTE [DISTWIDTH] IN BLOOD BY AUTOMATED COUNT: 14.3 % (ref 10–15)
ERYTHROCYTE [SEDIMENTATION RATE] IN BLOOD BY WESTERGREN METHOD: 20 MM/HR (ref 0–20)
GLUCOSE SERPL-MCNC: 72 MG/DL (ref 70–99)
GLUCOSE UR STRIP-MCNC: NEGATIVE MG/DL
HCT VFR BLD AUTO: 37.5 % (ref 35–47)
HGB BLD-MCNC: 12.1 G/DL (ref 11.7–15.7)
HGB UR QL STRIP: NEGATIVE
IMM GRANULOCYTES # BLD: 0 10E3/UL
IMM GRANULOCYTES NFR BLD: 0 %
KETONES UR STRIP-MCNC: NEGATIVE MG/DL
LEUKOCYTE ESTERASE UR QL STRIP: NEGATIVE
LIPASE SERPL-CCNC: 34 U/L (ref 13–60)
LYMPHOCYTES # BLD AUTO: 4.3 10E3/UL (ref 0.8–5.3)
LYMPHOCYTES NFR BLD AUTO: 60 %
MCH RBC QN AUTO: 30.5 PG (ref 26.5–33)
MCHC RBC AUTO-ENTMCNC: 32.3 G/DL (ref 31.5–36.5)
MCV RBC AUTO: 95 FL (ref 78–100)
MONOCYTES # BLD AUTO: 0.5 10E3/UL (ref 0–1.3)
MONOCYTES NFR BLD AUTO: 7 %
MUCOUS THREADS #/AREA URNS LPF: PRESENT /LPF
NEUTROPHILS # BLD AUTO: 2.2 10E3/UL (ref 1.6–8.3)
NEUTROPHILS NFR BLD AUTO: 30 %
NITRATE UR QL: NEGATIVE
NRBC # BLD AUTO: 0 10E3/UL
NRBC BLD AUTO-RTO: 0 /100
PH UR STRIP: 7.5 [PH] (ref 5–7)
PLATELET # BLD AUTO: 340 10E3/UL (ref 150–450)
POTASSIUM SERPL-SCNC: 4.1 MMOL/L (ref 3.4–5.3)
PROT SERPL-MCNC: 7.2 G/DL (ref 6.4–8.3)
RBC # BLD AUTO: 3.97 10E6/UL (ref 3.8–5.2)
RBC URINE: 0 /HPF
SODIUM SERPL-SCNC: 139 MMOL/L (ref 135–145)
SP GR UR STRIP: 1.01 (ref 1–1.03)
SQUAMOUS EPITHELIAL: <1 /HPF
UROBILINOGEN UR STRIP-MCNC: NORMAL MG/DL
WBC # BLD AUTO: 7.2 10E3/UL (ref 4–11)
WBC URINE: <1 /HPF

## 2024-02-26 PROCEDURE — 36415 COLL VENOUS BLD VENIPUNCTURE: CPT | Performed by: PHYSICIAN ASSISTANT

## 2024-02-26 PROCEDURE — 83690 ASSAY OF LIPASE: CPT | Performed by: PHYSICIAN ASSISTANT

## 2024-02-26 PROCEDURE — 86140 C-REACTIVE PROTEIN: CPT | Performed by: PHYSICIAN ASSISTANT

## 2024-02-26 PROCEDURE — 99214 OFFICE O/P EST MOD 30 MIN: CPT | Performed by: PHYSICIAN ASSISTANT

## 2024-02-26 PROCEDURE — 85025 COMPLETE CBC W/AUTO DIFF WBC: CPT | Performed by: PHYSICIAN ASSISTANT

## 2024-02-26 PROCEDURE — 81001 URINALYSIS AUTO W/SCOPE: CPT | Performed by: PHYSICIAN ASSISTANT

## 2024-02-26 PROCEDURE — 74019 RADEX ABDOMEN 2 VIEWS: CPT | Mod: TC | Performed by: RADIOLOGY

## 2024-02-26 PROCEDURE — 80053 COMPREHEN METABOLIC PANEL: CPT | Performed by: PHYSICIAN ASSISTANT

## 2024-02-26 PROCEDURE — 85652 RBC SED RATE AUTOMATED: CPT | Performed by: PHYSICIAN ASSISTANT

## 2024-02-26 RX ORDER — ONDANSETRON 4 MG/1
4 TABLET, ORALLY DISINTEGRATING ORAL ONCE
Status: COMPLETED | OUTPATIENT
Start: 2024-02-26 | End: 2024-02-26

## 2024-02-26 RX ADMIN — ONDANSETRON 4 MG: 4 TABLET, ORALLY DISINTEGRATING ORAL at 13:18

## 2024-02-26 NOTE — PATIENT INSTRUCTIONS
MORNING OF MIRALAX CLEANOUT    When you wake up:  Begin a liquid diet. (See list below for suggestions.)  Take 2 Dulcolax (bisacodyl) tablets. (DO NOT CHEW.)    1 hour after waking up:  Mix the entire 238-gram bottle of MiraLAX with 64 ounces of a sports drink (avoid red colored ones)  Drink all of the mixture over the next few hours until gone. (Suggestion: An 8-ounce glass every 15-30 minutes equals 2-4 hours.)  It is very important to drink plenty of water and other liquids in order to avoid dehydration and to flush the bowel. (Although alcohol is a liquid, it can make you dehydrated. You should NOT drink alcohol while doing the cleanout.)    NOTE: Please stay home once you have started your cleanout. Also, the use of moist towelettes or wipes may help to minimize discomfort during the cleanout. A nonprescription 1% hydrocortisone cream may also be soothing when applied to the rectal area after each bowel movement.  It is common during the cleanout to experience some nausea, bloating, and/or abdominal distention. If you chilled the mixture prior to drinking it, you could experience chills from consuming so much cold liquid in a short time period. If you develop nausea or vomiting, slow down the rate at which you drink the solution. Please attempt to drink all of the laxative solution even if it takes you longer.Once stooling slows down, you may resume eating solid food.    LIQUID DIET  Juices  Coffee and Tea  Powdered Drinks  Water/Vitamin Water  Diet/Regular Sodas  Sports Drinks  Popsicles  Jell-O  Broths or Bouillon  Ensure or Boost

## 2024-02-26 NOTE — TELEPHONE ENCOUNTER
Provider E-Visit time total (minutes): 4 minutes (first outreach), 6 minutes (2nd through 4th outreach)

## 2024-02-26 NOTE — PROGRESS NOTES
Assessment & Plan     Constipation, unspecified constipation type  Acute right-sided low back pain without sciatica  Nausea  Multiple sclerosis (H) - Dr. Chong - on Tysabri infusions  Immunosuppression (H24)  Stat labs reassuring.  X-ray shows large retained stool present.  Suspect impacted by delayed gastric motility due to MS. bowel cleanout recommended.  Discussed in detail with patient.  Patient will keep me apprised of response to this treatment regimen.  Perirectal barrier recommended.  - XR Abdomen 2 Views  - Lipase  - CBC with platelets differential  - Comprehensive metabolic panel  - Erythrocyte sedimentation rate auto  - CRP inflammation  - UA with Microscopic reflex to Culture  - Lipase  - ondansetron (ZOFRAN ODT) ODT tab 4 mg    Morbid obesity (H)  Will start liquid diet today due to current symptoms.  Continue diet and exercise efforts.      Return in about 1 week (around 3/4/2024) for Follow-up if symptoms worsening or not improving..      Myia Crump MBA, MS, PA-C  [facility]      Richie An is a 48 year old, presenting for the following health issues:  Flank Pain (Right flank pain X 6 days, radiating pain to RLQ abdomen )        2/15/2024     7:36 AM   Additional Questions   Roomed by Nelida MINERVA   Accompanied by Self     HPI     Abdominal/Flank Pain  Onset/Duration: X 6 days  Description:   Character: Sharp and Dull ache  Location: right flank  Radiation: RLQ abdomen  Intensity: 7/10  Progression of Symptoms:  worsening  Accompanying Signs & Symptoms:  Fever/chills: no, notes hot flash's  Gas/Bloating: YES  Nausea: YES  Vomitting: no   Diarrhea: no   Constipation:no   Dysuria: no            Hematuria: no            Frequency: no            Incontinence of urine: no   History:            Last bowel movement: today-normal, large quantity   Trauma: no   Previous similar pain: YES- was not evaluated, hx of UTI and sciatica pain, but notes  this pain is different.     Previous tests done: none           Previous Abdominal surgery: YES- Hysterectomy,  X 2, Gastric Bypass.   Precipitating factors:   Does the pain change with:     Food: YES- increased nausea, doesn't matter what she eats     Bowel Movement: YES- Improved pain    Urination: no              Other factors: YES- was filing and bending a lot on Wednesday, Wednesday night, sudden onset of pain during driving with nausea  Therapies tried and outcome:  Advil can dull pain at times.  Increased fluids, felt slightly better next day but sx's returned. Heating pad, cold packs-neither of these helped.    When food last eaten: 11:15 AM- Half bagel/cream cheese, 2 cups of coffee with almond milk, colligen powder in coffee.        Review of Systems  Constitutional, HEENT, cardiovascular, pulmonary, GI, , musculoskeletal, neuro, skin, endocrine and psych systems are negative, except as otherwise noted.      Objective    /83 (BP Location: Right arm, Patient Position: Chair, Cuff Size: Adult Large)   Pulse 68   Temp 98.2  F (36.8  C) (Oral)   Resp 18   Wt 113.4 kg (250 lb)   LMP  (LMP Unknown)   SpO2 100%   BMI 41.60 kg/m    Body mass index is 41.6 kg/m .  Physical Exam   GENERAL: alert and no distress  EYES: Eyes grossly normal to inspection, PERRL and conjunctivae and sclerae normal  RESP: lungs clear to auscultation - no rales, rhonchi or wheezes  CV: regular rate and rhythm, normal S1 S2, no S3 or S4, no murmur, click or rub, no peripheral edema  ABDOMEN: soft, ,RLQ tenderness with guarding, no hepatosplenomegaly, no masses and bowel sounds normal  MS: no gross musculoskeletal defects noted, no edema  SKIN: no suspicious lesions or rashes  NEURO: Normal strength and tone, mentation intact and speech normal  PSYCH: mentation appears normal, affect normal/bright    Results for orders placed or performed in visit on 24   XR Abdomen 2 Views     Status: None    Narrative     ABDOMEN TWO VIEWS February 26, 2024 1:04 PM     HISTORY: Right-sided low abdominal pain/flank pain, with nausea -  worsening. Acute right-sided low back pain without sciatica. Nausea.    COMPARISON: 2/17/2022.      Impression    IMPRESSION: Nonobstructive bowel gas pattern. Large stool within the  colon. No free air. Postprocedural change at the GE junction region.    JONATHAN MANNING MD         SYSTEM ID:  [id number]   Results for orders placed or performed in visit on 02/26/24   Comprehensive metabolic panel     Status: Abnormal   Result Value Ref Range    Sodium 139 135 - 145 mmol/L    Potassium 4.1 3.4 - 5.3 mmol/L    Carbon Dioxide (CO2) 21 (L) 22 - 29 mmol/L    Anion Gap 10 7 - 15 mmol/L    Urea Nitrogen 13.7 6.0 - 20.0 mg/dL    Creatinine 1.00 (H) 0.51 - 0.95 mg/dL    GFR Estimate 70 >60 mL/min/1.73m2    Calcium 9.3 8.6 - 10.0 mg/dL    Chloride 108 (H) 98 - 107 mmol/L    Glucose 72 70 - 99 mg/dL    Alkaline Phosphatase 70 40 - 150 U/L    AST 26 0 - 45 U/L    ALT 19 0 - 50 U/L    Protein Total 7.2 6.4 - 8.3 g/dL    Albumin 4.4 3.5 - 5.2 g/dL    Bilirubin Total 0.2 <=1.2 mg/dL   Erythrocyte sedimentation rate auto     Status: Normal   Result Value Ref Range    Erythrocyte Sedimentation Rate 20 0 - 20 mm/hr   CRP inflammation     Status: Normal   Result Value Ref Range    CRP Inflammation <3.00 <5.00 mg/L   UA with Microscopic reflex to Culture     Status: Abnormal    Specimen: Urine, Clean Catch   Result Value Ref Range    Color Urine Light Yellow Colorless, Straw, Light Yellow, Yellow    Appearance Urine Clear Clear    Glucose Urine Negative Negative mg/dL    Bilirubin Urine Negative Negative    Ketones Urine Negative Negative mg/dL    Specific Gravity Urine 1.008 1.003 - 1.035    Blood Urine Negative Negative    pH Urine 7.5 (H) 5.0 - 7.0    Protein Albumin Urine Negative Negative mg/dL    Urobilinogen Urine Normal Normal, 2.0 mg/dL    Nitrite Urine Negative Negative    Leukocyte Esterase Urine Negative Negative     Bacteria Urine Few (A) None Seen /HPF    Mucus Urine Present (A) None Seen /LPF    RBC Urine 0 <=2 /HPF    WBC Urine <1 <=5 /HPF    Squamous Epithelials Urine <1 <=1 /HPF    Narrative    Urine Culture not indicated   Lipase     Status: Normal   Result Value Ref Range    Lipase 34 13 - 60 U/L   CBC with platelets and differential     Status: None   Result Value Ref Range    WBC Count 7.2 4.0 - 11.0 10e3/uL    RBC Count 3.97 3.80 - 5.20 10e6/uL    Hemoglobin 12.1 11.7 - 15.7 g/dL    Hematocrit 37.5 35.0 - 47.0 %    MCV 95 78 - 100 fL    MCH 30.5 26.5 - 33.0 pg    MCHC 32.3 31.5 - 36.5 g/dL    RDW 14.3 10.0 - 15.0 %    Platelet Count 340 150 - 450 10e3/uL    % Neutrophils 30 %    % Lymphocytes 60 %    % Monocytes 7 %    % Eosinophils 2 %    % Basophils 1 %    % Immature Granulocytes 0 %    NRBCs per 100 WBC 0 <1 /100    Absolute Neutrophils 2.2 1.6 - 8.3 10e3/uL    Absolute Lymphocytes 4.3 0.8 - 5.3 10e3/uL    Absolute Monocytes 0.5 0.0 - 1.3 10e3/uL    Absolute Eosinophils 0.1 0.0 - 0.7 10e3/uL    Absolute Basophils 0.1 0.0 - 0.2 10e3/uL    Absolute Immature Granulocytes 0.0 <=0.4 10e3/uL    Absolute NRBCs 0.0 10e3/uL   CBC with platelets differential     Status: None    Narrative    The following orders were created for panel order CBC with platelets differential.  Procedure                               Abnormality         Status                     ---------                               -----------         ------                     CBC with platelets and d...[[id number]]                      Final result                 Please view results for these tests on the individual orders.           Signed Electronically by: Miya Crump PA-C

## 2024-03-01 ENCOUNTER — INFUSION THERAPY VISIT (OUTPATIENT)
Dept: INFUSION THERAPY | Facility: CLINIC | Age: 49
End: 2024-03-01
Attending: PSYCHIATRY & NEUROLOGY
Payer: COMMERCIAL

## 2024-03-01 ENCOUNTER — THERAPY VISIT (OUTPATIENT)
Dept: PHYSICAL THERAPY | Facility: CLINIC | Age: 49
End: 2024-03-01
Payer: COMMERCIAL

## 2024-03-01 VITALS
DIASTOLIC BLOOD PRESSURE: 78 MMHG | RESPIRATION RATE: 18 BRPM | HEART RATE: 59 BPM | SYSTOLIC BLOOD PRESSURE: 116 MMHG | TEMPERATURE: 98.1 F | OXYGEN SATURATION: 100 %

## 2024-03-01 DIAGNOSIS — K30 STOMACH UPSET: Primary | ICD-10-CM

## 2024-03-01 DIAGNOSIS — G35 MULTIPLE SCLEROSIS (H): Primary | ICD-10-CM

## 2024-03-01 DIAGNOSIS — M54.2 CERVICALGIA: ICD-10-CM

## 2024-03-01 DIAGNOSIS — M79.18 MYOFASCIAL PAIN: Primary | ICD-10-CM

## 2024-03-01 PROCEDURE — 258N000003 HC RX IP 258 OP 636: Performed by: PSYCHIATRY & NEUROLOGY

## 2024-03-01 PROCEDURE — 250N000011 HC RX IP 250 OP 636: Mod: JZ | Performed by: PSYCHIATRY & NEUROLOGY

## 2024-03-01 PROCEDURE — 97035 APP MDLTY 1+ULTRASOUND EA 15: CPT | Mod: GP | Performed by: PHYSICAL THERAPIST

## 2024-03-01 PROCEDURE — 97140 MANUAL THERAPY 1/> REGIONS: CPT | Mod: GP | Performed by: PHYSICAL THERAPIST

## 2024-03-01 PROCEDURE — 96365 THER/PROPH/DIAG IV INF INIT: CPT

## 2024-03-01 RX ORDER — SIMETHICONE 80 MG
80 TABLET,CHEWABLE ORAL EVERY 6 HOURS PRN
Qty: 60 TABLET | Refills: 1 | Status: SHIPPED | OUTPATIENT
Start: 2024-03-01

## 2024-03-01 RX ORDER — MEPERIDINE HYDROCHLORIDE 25 MG/ML
25 INJECTION INTRAMUSCULAR; INTRAVENOUS; SUBCUTANEOUS EVERY 30 MIN PRN
Status: CANCELLED | OUTPATIENT
Start: 2024-03-29

## 2024-03-01 RX ORDER — DIPHENHYDRAMINE HYDROCHLORIDE 50 MG/ML
50 INJECTION INTRAMUSCULAR; INTRAVENOUS
Status: CANCELLED
Start: 2024-03-29

## 2024-03-01 RX ORDER — EPINEPHRINE 1 MG/ML
0.3 INJECTION, SOLUTION INTRAMUSCULAR; SUBCUTANEOUS EVERY 5 MIN PRN
Status: CANCELLED | OUTPATIENT
Start: 2024-03-29

## 2024-03-01 RX ORDER — HEPARIN SODIUM (PORCINE) LOCK FLUSH IV SOLN 100 UNIT/ML 100 UNIT/ML
5 SOLUTION INTRAVENOUS
Status: CANCELLED | OUTPATIENT
Start: 2024-03-29

## 2024-03-01 RX ORDER — HEPARIN SODIUM,PORCINE 10 UNIT/ML
5-20 VIAL (ML) INTRAVENOUS DAILY PRN
Status: CANCELLED | OUTPATIENT
Start: 2024-03-29

## 2024-03-01 RX ORDER — ALBUTEROL SULFATE 0.83 MG/ML
2.5 SOLUTION RESPIRATORY (INHALATION)
Status: CANCELLED | OUTPATIENT
Start: 2024-03-29

## 2024-03-01 RX ORDER — METHYLPREDNISOLONE SODIUM SUCCINATE 125 MG/2ML
125 INJECTION, POWDER, LYOPHILIZED, FOR SOLUTION INTRAMUSCULAR; INTRAVENOUS
Status: CANCELLED
Start: 2024-03-29

## 2024-03-01 RX ORDER — ALBUTEROL SULFATE 90 UG/1
1-2 AEROSOL, METERED RESPIRATORY (INHALATION)
Status: CANCELLED
Start: 2024-03-29

## 2024-03-01 RX ADMIN — NATALIZUMAB 300 MG: 300 INJECTION INTRAVENOUS at 13:20

## 2024-03-01 RX ADMIN — SODIUM CHLORIDE 250 ML: 9 INJECTION, SOLUTION INTRAVENOUS at 13:19

## 2024-03-01 NOTE — PROGRESS NOTES
Infusion Nursing Note:  Nataliya Aldrich presents today for Tysabri.    Patient seen by provider today: No   present during visit today: Not Applicable.    Note:   Per orders patient does not need to wait for 1 hour obs after.      Intravenous Access:  Peripheral IV placed.    Treatment Conditions:  Tysabri pre-infusion checklist completed via touch program.      Post Infusion Assessment:  Patient tolerated infusion without incident.  Blood return noted pre and post infusion.  Site patent and intact, free from redness, edema or discomfort.  No evidence of extravasations.  Access discontinued per protocol.       Discharge Plan:   Discharge instructions reviewed with: Patient.  Patient and/or family verbalized understanding of discharge instructions and all questions answered.  AVS to patient via [employer].  Patient will return 4/12/24 for next appointment.   Patient discharged in stable condition accompanied by: self.  Departure Mode: Ambulatory.      Kari Schoen, RN

## 2024-03-07 DIAGNOSIS — G35 MULTIPLE SCLEROSIS (H): Primary | ICD-10-CM

## 2024-03-11 ENCOUNTER — VIRTUAL VISIT (OUTPATIENT)
Dept: FAMILY MEDICINE | Facility: CLINIC | Age: 49
End: 2024-03-11
Payer: COMMERCIAL

## 2024-03-11 DIAGNOSIS — J02.9 SORE THROAT: Primary | ICD-10-CM

## 2024-03-11 DIAGNOSIS — J45.40 MODERATE PERSISTENT ASTHMA WITHOUT COMPLICATION: ICD-10-CM

## 2024-03-11 DIAGNOSIS — B34.9 VIRAL SYNDROME: ICD-10-CM

## 2024-03-11 DIAGNOSIS — Z51.81 MEDICATION MONITORING ENCOUNTER: ICD-10-CM

## 2024-03-11 LAB
DEPRECATED S PYO AG THROAT QL EIA: NEGATIVE
FLUAV AG SPEC QL IA: NEGATIVE
FLUBV AG SPEC QL IA: NEGATIVE
GROUP A STREP BY PCR: NOT DETECTED

## 2024-03-11 PROCEDURE — 87804 INFLUENZA ASSAY W/OPTIC: CPT | Mod: 95 | Performed by: FAMILY MEDICINE

## 2024-03-11 PROCEDURE — 87651 STREP A DNA AMP PROBE: CPT | Mod: 95 | Performed by: FAMILY MEDICINE

## 2024-03-11 PROCEDURE — 87635 SARS-COV-2 COVID-19 AMP PRB: CPT | Mod: 95 | Performed by: FAMILY MEDICINE

## 2024-03-11 PROCEDURE — 99213 OFFICE O/P EST LOW 20 MIN: CPT | Mod: 95 | Performed by: FAMILY MEDICINE

## 2024-03-11 NOTE — LETTER
March 12, 2024      Nataliyapatricia Aldrich  [address] [address]   [place] [address]        Dear Ms.Mosley Aldrich,    We are writing to inform you of your test results.    Strep and influenza are negative     We advise:     Continue current cares   Await covid results     For additional lab test information, labtestsonline.org is an excellent reference.     Resulted Orders   Streptococcus A Rapid Screen w/Reflex to PCR - Clinic Collect   Result Value Ref Range    Group A Strep antigen Negative Negative   Influenza A & B Antigen - Clinic Collect   Result Value Ref Range    Influenza A antigen Negative Negative    Influenza B antigen Negative Negative    Narrative    Test results must be correlated with clinical data. If necessary, results should be confirmed by a molecular assay or viral culture.   Group A Streptococcus PCR Throat Swab   Result Value Ref Range    Group A strep by PCR Not Detected Not Detected    Narrative    The Xpert Xpress Strep A test, performed on the Photomedex  Instrument Systems, is a rapid, qualitative in vitro diagnostic test for the detection of Streptococcus pyogenes (Group A ß-hemolytic Streptococcus, Strep A) in throat swab specimens from patients with signs and symptoms of pharyngitis. The Xpert Xpress Strep A test can be used as an aid in the diagnosis of Group A Streptococcal pharyngitis. The assay is not intended to monitor treatment for Group A Streptococcus infections. The Xpert Xpress Strep A test utilizes an automated real-time polymerase chain reaction (PCR) to detect Streptococcus pyogenes DNA.       If you have any questions or concerns, please call the clinic at the number listed above.       Sincerely,            Carlyle Basilio M.D.

## 2024-03-11 NOTE — PROGRESS NOTES
Nataliya is a 48 year old who is being evaluated via a billable video visit.      How would you like to obtain your AVS? MyChart  If the video visit is dropped, the invitation should be resent by: Text to cell phone: [phone]  Will anyone else be joining your video visit? No      Assessment & Plan     Sore throat    - Streptococcus A Rapid Screen w/Reflex to PCR - Clinic Collect  - Symptomatic COVID-19 Virus (Coronavirus) by PCR  - Influenza A & B Antigen - Clinic Collect  - PRIMARY CARE FOLLOW-UP SCHEDULING    Viral syndrome    - Streptococcus A Rapid Screen w/Reflex to PCR - Clinic Collect  - Symptomatic COVID-19 Virus (Coronavirus) by PCR  - Influenza A & B Antigen - Clinic Collect  - PRIMARY CARE FOLLOW-UP SCHEDULING    Moderate persistent asthma without complication    - PRIMARY CARE FOLLOW-UP SCHEDULING    Medication monitoring encounter    - Streptococcus A Rapid Screen w/Reflex to PCR - Clinic Collect  - Symptomatic COVID-19 Virus (Coronavirus) by PCR  - Influenza A & B Antigen - Clinic Collect  - PRIMARY CARE FOLLOW-UP SCHEDULING      Prescription drug management    24 minutes spent by me on the date of the encounter doing chart review, history and exam, documentation and further activities per the note    Work on weight loss  Regular exercise    Check strep / flu / covid  Sx cares reviewed  Albuterol inhaler / nebs prn    Subjective     Nataliya is a 48 year old, presenting for the following health issues:    Acute Illness    Acute illness concerns: ST, started with a tickle yesterday afternoon, then burning/fire, then itching, tried cepacol, some help, tried echinacea/vitamin C, gabapentin, then hoarse, some MS flare, no sob, early asthma changes? nasal changes    Onset/Duration: 1 day  Symptoms:  Fever:Unsure, but feels like possibly a low grade temperature   Chills/Sweats: YES  Headache (location?): YES last night  Sinus Pressure: No  Conjunctivitis:  No  Ear Pain: no, last pm on Rt  Rhinorrhea:  YES  Congestion: No  Sore Throat: YES- and she was hoarse this morning   Cough: no  Wheeze: No  Decreased Appetite: No  Nausea: No  Vomiting: No  Diarrhea: No  Dysuria/Freq.: No  Dysuria or Hematuria: No  Fatigue/Achiness: YES - but has MS - took Gabapentin for pain    Sick/Strep Exposure: No  Therapies tried and outcome: echinacea, vitamin C, advil    She hurt her neck 5 days ago and had E-visit for that last week.    Patient Active Problem List   Diagnosis    Migraine    Cognitive dysfunction accompanying multiple sclerosis (H)    Iron deficiency anemia    Postsurgical nonabsorption    Pain, neuropathic    S/P gastric bypass 2002    H/O total hysterectomy    Vitamin B12 deficiency    Hypovitaminosis D    Hypermagnesemia    Multiple sclerosis (H) - Dr. Chong - on Tysabri infusions    Immunosuppression (H24)    Chronic musculoskeletal pain due to disorder of nervous system    Gastroesophageal reflux disease with esophagitis, unspecified whether hemorrhage    Insomnia, unspecified type    Muscle spasm    Seasonal allergic rhinitis, unspecified trigger    HSV (herpes simplex virus) infection    Rash    Spondylosis of cervical region without myelopathy or radiculopathy    Moderate persistent asthma without complication    Morbid obesity (H)    Left ovarian cyst - follow up US ordered for 6/1/2023 to evaluate for resolution    Situational anxiety    Bloating    Spasticity    Paresthesias    Neurogenic bladder    Tremor    Polypharmacy       Past Medical History:   Diagnosis Date    Asthma     mild persistent - Dr Gee    Cerebral infarction (H) 2015    Fibroids     s/p hysterectomy    H/O gastric bypass     Hypertension     Migraine     followed by Devika    Obstructive sleep apnea     needs updated sleep study    Pre-diabetes     Relapsing remitting multiple sclerosis (H) 2015    lesion in [place].  Facial tingling initial symptom.  F/B [facility]    Spondylosis of cervical region without myelopathy or radiculopathy   "      Past Surgical History:   Procedure Laterality Date    COLONOSCOPY  2012    GASTRIC BYPASS  2002    \"Trouble with B12 and D\"    HYSTERECTOMY, MONO  2013    cervix gone.  fibroids.  without BSO    TONSILLECTOMY         Current Outpatient Medications   Medication Sig Dispense Refill    acetaminophen (TYLENOL) 500 MG tablet Take 2 tablets (1,000 mg) by mouth 3 times daily      azelastine (ASTELIN) 0.1 % nasal spray Spray 1 spray into both nostrils 2 times daily as needed for rhinitis (nasal antihistamine) 30 mL 5    baclofen (LIORESAL) 20 MG tablet TAKE ONE TABLET BY MOUTH EVERY NIGHT AT BEDTIME MAY ALSO TAKE ONE TABLET BY MOUTH EVERY 4 HOURS AS NEEDED FOR MUSCLE SPASMS (max dose: 80 mg/day) 180 tablet 1    budesonide-formoterol (SYMBICORT) 160-4.5 MCG/ACT Inhaler INHALE 2 PUFFS INTO THE LUNGS TWICE DAILY 10.2 g 5    cetirizine (ZYRTEC) 10 MG tablet Take 1 tablet (10 mg) by mouth daily 180 tablet 1    clotrimazole (LOTRIMIN) 1 % external cream Apply topically 2 times daily (Can take up to 14 days) 60 g 1    desonide (DESOWEN) 0.05 % external cream Apply topically 2 times daily To face as needed for rash 15 g 0    fluticasone (FLONASE) 50 MCG/ACT nasal spray USE 1 PUFF IN EACH NOSTRIL AS DIRECTED. 16 g 5    gabapentin (NEURONTIN) 300 MG capsule Take 1 capsule (300 mg) by mouth every 4 hours 120 capsule 5    MAGNESIUM PO       modafinil (PROVIGIL) 100 MG tablet Take 1 tablet (100 mg) by mouth daily For daytime drowsiness (ok to increase to 2 tablets daily if symptoms not improved in 14 days) 90 tablet 1    montelukast (SINGULAIR) 10 MG tablet TAKE 1 TABLET(10 MG) BY MOUTH AT BEDTIME 90 tablet 3    Multiple Vitamins-Calcium (ONE-A-DAY WOMENS FORMULA PO)       natalizumab (TYSABRI) 300 MG/15ML injection Inject 15 mLs (300 mg) into the vein once every six weeks Infusion      omeprazole (PRILOSEC) 20 MG DR capsule TAKE 1 CAPSULE(20 MG) BY MOUTH TWICE DAILY BEFORE MEALS 180 capsule 1    pregabalin (LYRICA) 75 MG capsule " Take 1 capsule (75 mg) by mouth 2 times daily as needed (neuropathic pain) 60 capsule 5    simethicone (MYLICON) 80 MG chewable tablet Take 1 tablet (80 mg) by mouth every 6 hours as needed for flatulence or cramping 60 tablet 1    topiramate (TOPAMAX) 100 MG tablet Take 1 tablet (100 mg) by mouth at bedtime (take with 50 mg dose at bedtime) 90 tablet 1    topiramate (TOPAMAX) 50 MG tablet Take 1 tablet (50 mg) by mouth 2 times daily 180 tablet 1    valACYclovir (VALTREX) 500 MG tablet TAKE ONE TABLET BY MOUTH ONCE DAILY 90 tablet 3    vitamin D3 (CHOLECALCIFEROL) 250 mcg (91096 units) capsule TAKE 1 CAPSULE BY MOUTH ONCE DAILY 90 capsule 3    cyclobenzaprine (FLEXERIL) 10 MG tablet Take 1 tablet (10 mg) by mouth 3 times daily as needed for muscle spasms (Patient not taking: Reported on 3/11/2024) 40 tablet 0    EPINEPHrine (ANY BX GENERIC EQUIV) 0.3 MG/0.3ML injection 2-pack Inject 0.3 mLs (0.3 mg) into the muscle as needed for anaphylaxis May repeat one time in 5-15 minutes if response to initial dose is inadequate. 2 each 3    ipratropium - albuterol 0.5 mg/2.5 mg/3 mL (DUONEB) 0.5-2.5 (3) MG/3ML neb solution TAKE 1 VIAL (3 MLS) BY NEBULIZATION EVERY 6 HOURS AS NEEDED FOR SHORTNESS OF BREATH, WHEEZING OR COUGH (Patient not taking: Reported on 3/11/2024) 90 mL 0    SUMAtriptan (IMITREX) 100 MG tablet Take 50 mg up to twice daily as needed for headache; separate doses by at least one hour and do not use more than 3 days/week (Patient not taking: Reported on 3/11/2024) 18 tablet 3    VENTOLIN  (90 Base) MCG/ACT inhaler SHAKE WELL AND INHALE 2 PUFFS INTO THE LUNGS EVERY 6 HOURS AS NEEDED FOR SHORTNESS OF BREATH OR WHEEZING STRENGTH (Patient not taking: Reported on 3/11/2024) 18 g 4       Allergies   Allergen Reactions    Aspirin Difficulty breathing, Palpitations and Other (See Comments)    Cephalexin Swelling    Wasp Venom Angioedema and Difficulty breathing    Adhesive Tape     Amantadine Swelling     Azithromycin Angioedema    Doxycycline Itching and Swelling     3 doses - itching all over, hand swelling, tongue fullness    Naproxen     Latex Hives, Itching and Rash    Penicillins Other (See Comments), Nausea and Vomiting, Hives, Swelling, Rash, Cramps and GI Disturbance     Amoxicillin OK       Family History   Problem Relation Age of Onset    Hypertension Mother     Hyperlipidemia Mother     Obesity Mother     Obesity Father     Sleep Apnea Father     Diabetes Father     Hypertension Father     No Known Problems Sister     No Known Problems Sister     No Known Problems Sister     No Known Problems Sister     No Known Problems Sister     No Known Problems Sister     No Known Problems Sister     No Known Problems Brother     No Known Problems Brother     Diabetes Maternal Grandmother     Hyperlipidemia Maternal Grandmother     Cerebrovascular Disease Maternal Grandfather     Diabetes Paternal Grandmother     Cerebrovascular Disease Paternal Grandmother     Depression Paternal Grandmother     Substance Abuse Paternal Grandmother     Sleep Apnea Son     Lupus Paternal Aunt 41    Multiple Sclerosis No family hx of     Breast Cancer No family hx of     Ovarian Cancer No family hx of        Social History     Socioeconomic History    Marital status:      Spouse name: None    Number of children: 2    Years of education: None    Highest education level: None   Occupational History     Employer: [employer] [place]    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      Employer: [employer]     Comment: Laid off 12/2023    Occupation: Disability   Tobacco Use    Smoking status: Former     Packs/day: 0.50     Years: 2.00     Additional pack years: 0.00     Total pack years: 1.00     Types: Cigars, Cigarettes     Start date: 1/2/2011     Quit date: 7/2/2013     Years since quitting: 10.6    Smokeless tobacco: Never   Vaping Use    Vaping Use: Never used   Substance and Sexual Activity     Alcohol use: Yes     Alcohol/week: 2.0 standard drinks of alcohol     Comment: 0-3 drinks per week    Drug use: No     Comment: Marijuana when younger     Sexual activity: Not Currently     Partners: Male     Birth control/protection: Abstinence   Other Topics Concern    Parent/sibling w/ CABG, MI or angioplasty before 65F 55M? No     Social Determinants of Health     Financial Resource Strain: Low Risk  (2/13/2024)    Financial Resource Strain     Within the past 12 months, have you or your family members you live with been unable to get utilities (heat, electricity) when it was really needed?: No   Food Insecurity: High Risk (2/13/2024)    Food Insecurity     Within the past 12 months, did you worry that your food would run out before you got money to buy more?: Yes     Within the past 12 months, did the food you bought just not last and you didn t have money to get more?: Yes   Transportation Needs: Low Risk  (2/13/2024)    Transportation Needs     Within the past 12 months, has lack of transportation kept you from medical appointments, getting your medicines, non-medical meetings or appointments, work, or from getting things that you need?: No   Physical Activity: Insufficiently Active (2/13/2024)    Exercise Vital Sign     Days of Exercise per Week: 3 days     Minutes of Exercise per Session: 20 min   Stress: Stress Concern Present (2/13/2024)    [employer] [place] [employer] - Occupational Stress Questionnaire     Feeling of Stress : To some extent   Social Connections: Unknown (2/13/2024)    Social Connection and Isolation Panel [NHANES]     Frequency of Social Gatherings with Friends and Family: Once a week   Interpersonal Safety: Low Risk  (2/15/2024)    Interpersonal Safety     Do you feel physically and emotionally safe where you currently live?: Yes     Within the past 12 months, have you been hit, slapped, kicked or otherwise physically hurt by someone?: No     Within the past 12 months, have  you been humiliated or emotionally abused in other ways by your partner or ex-partner?: No   Housing Stability: High Risk (2/13/2024)    Housing Stability     Do you have housing? : Yes     Are you worried about losing your housing?: Yes       Review of Systems  CONSTITUTIONAL: NEGATIVE for fever, chills, change in weight  INTEGUMENTARY/SKIN: NEGATIVE for worrisome rashes, moles or lesions  EYES: NEGATIVE for vision changes or irritation  ENT/MOUTH: NEGATIVE for ear, mouth and throat problems  RESP: NEGATIVE for significant cough or SOB  CV: NEGATIVE for chest pain, palpitations or peripheral edema  GI: NEGATIVE for nausea, abdominal pain, heartburn, or change in bowel habits  : NEGATIVE for frequency, dysuria, or hematuria  MUSCULOSKELETAL: NEGATIVE for significant arthralgias or myalgia  NEURO: NEGATIVE for weakness, dizziness or paresthesias  ENDOCRINE: NEGATIVE for temperature intolerance, skin/hair changes  HEME: NEGATIVE for bleeding problems  PSYCHIATRIC: NEGATIVE for changes in mood or affect      Objective       Vitals:  No vitals were obtained today due to virtual visit.    Physical Exam   GENERAL: alert and no distress  EYES: Eyes grossly normal to inspection.  No discharge or erythema, or obvious scleral/conjunctival abnormalities.  RESP: No audible wheeze, cough, or visible cyanosis.    SKIN: Visible skin clear. No significant rash, abnormal pigmentation or lesions.  NEURO: Cranial nerves grossly intact.  Mentation and speech appropriate for age.  PSYCH: Appropriate affect, tone, and pace of words    Strep      Video-Visit Details    Type of service:  Video Visit   Video Start Time:  1:05 pm  Video End Time: 1:30 pm    Originating Location (pt. Location): Home    Distant Location (provider location):  On-site    Platform used for Video Visit: Diana    Signed Electronically by:              Carlyle Basilio MD, FAAFP     [facility]  [address] [place] [address]   YENI Myers [id number]  [email or web address]  [email or web address]   Office: [phone]  Fax: [phone]

## 2024-03-12 LAB — SARS-COV-2 RNA RESP QL NAA+PROBE: NEGATIVE

## 2024-03-12 NOTE — PROGRESS NOTES
Medication Therapy Management (MTM) Encounter    ASSESSMENT:                            Medication Adherence/Access: No issues identified    Tremors:  Discussed with patient based on her reports it does sound like this is a side effects of baclofen, since the tremors come and go depending on when she takes this. Tremors are a potential side effect of baclofen, but a very low risk, but it is possible. Encouraged patient to discuss with provider trying a different muscle relaxer to see if there is one she can tolerate without potentially causing tremors.    GERD:   Stable.    Supplements:   Stable.    Pain/Muscle Spasms:  See above regarding baclofen recommendations.    Asthma/Allergies:   Discussed that frequent albuterol use can cause tremors, which patient reports she has experienced, but these are all over shakes, vs the tremor just appears to be in her hand.    Fatigue:  Stable.    Multiple Sclerosis:  Continue following with neurology.    Stomach Cramping:  Stable.    Migraine:  Discussed that topiramate dose was change by PCP on 2/15/24, so I am not sure why the pharmacy didn't give her the correct dose. Encouraged her to have PCP re-send Rx, since patient is seeing PCP after our visit today, with a note to the pharmacy that this is a dose change. If that doesn't work she can let me know and I can call the pharmacy.    PLAN:                            I would consider trying a different muscle relaxer instead of baclofen to see if there is any difference in your tremors. You already are prescribed cyclobenzaprine, so you could try this, otherwise discuss with provider switching to something else (ex. Tizanidine, methocarbamol, etc). Let us know if there is no change in incidence of tremors.  I can see that Miya Crump did send an updated topiramate dose to the pharmacy, I would discuss with Miya that pharmacy did not give you the correct dosage and have her re-send it and then talk to the pharmacy. Let me  know if this doesn't work.    Follow-up: Return in about 3 months (around 6/13/2024) for Medication Therapy Management.    SUBJECTIVE/OBJECTIVE:                          Nataliya Aldrich is a 48 year old female called for an initial visit. She was referred to me from Miya Crump PA-C.      Reason for visit: med review, tremors.    Allergies/ADRs: Reviewed in chart  Past Medical History: Reviewed in chart  Tobacco: She reports that she quit smoking about 10 years ago. Her smoking use included cigars and cigarettes. She started smoking about 13 years ago. She has a 1 pack-year smoking history. She has never used smokeless tobacco.  Alcohol: Less than 1 beverage / month    Medication Adherence/Access: Patient takes medications directly from bottles.  Per patient, misses medication 0 times per week.   Medication barriers: none.     Tremor:  Reports that she has a right hand tremor. She notices it the day after she takes baclofen. If she skips the baclofen then tremors aren't much of a concern. Has discussed this with her neurologist. Neurologist reports that it is rare for baclofen to cause tremors. Was told it could be progression.  Does report her father also had tremors with baclofen.    GERD    Omeprazole 20 mg twice daily as needed   Patient feels that current regimen is effective.       Supplements   Magnesium daily  Multivitamin daily  Vitamin D daily  Elderberry daily  Vitamin C  No reported issues at this time.        Pain/Muscle Spasms:  Acetaminophen 1000 mg as needed   Baclofen 20 mg as needed - every other day due to tremors  Cyclobenzaprine 10 mg three times daily as needed - only takes when she really needs, it can cause her some drowsiness  Gabapentin 300 mg every 4 hours - takes 2 at night  Lyrica 75 mg twice daily as needed - reports this was added on to use instead of gabapentin during the day since it doesn't make her as drowsy. Really hasn't been using this lately due to being laid off from  work.  Reports she prefers gabapentin to pregabalin for pain control. Using pregabalin very infrequently.  Patient follows with a neurologist    Asthma/Allergies:   ICS/LABA - Symbicort 160/4.5 mcg - 2 puffs twice daily   Albuterol (ProAir/Ventolin/Proventil) - reports she is using it seasonally or for exercising  Ipratropium and albuterol Neb (DuoNeb) - taking about every 4 hours  montelukast (Singulair) 10 mg once daily  Astelin nasal spray as needed - every morning  Flonase nasal spray daily as needed - every night  Zyrtec 10 mg daily  Patient rinses their mouth after using steroid inhaler.   Reports she does have a cold right now. Is using Duo Neb every 4 hours.  Reports taking the duoneb as frequently as she is does make her shake, but this is different from her tremors.  Triggers include: smoke, upper respiratory infections, dust mites, pollens, and exercise or sports.    Fatigue:  Modafinil 100 mg daily  Reports with MS she he can hit a wall and get very tired. Takes it on days this this happens, not every day since it can cause migraines.    Multiple Sclerosis:  Tysabri (natalizumab) 300 mg every 6 weeks IV  Report MS symptoms may be starting to possibly worsen. Following closely with neurology to monitor progression. Was told that tremors have worsened lately at last test.    Stomach Cramping:  Simethicone 80 mg as needed - taking at least every other day  Reports this really helps for gas symptoms.    Migraine:  Imitrex 50 mg as needed - reports she uses this about once a month  Topiramate 150 mg at bedtime   Reports migraines are somewhat controlled. Started having breakthroughs so neurologist recommended taking 50 mg in the AM and 150 mg PM, but reports pharmacy only filled it for 150 mg at bedtime, didn't give her enough of the 50 mg tablets to take an additional dose in the morning.  Follows with neurologist who helps manages symptoms.    Today's Vitals: LMP  (LMP Unknown)   ----------------      I  spent 45 minutes with this patient today. All changes were made via collaborative practice agreement with Miya Crump PA-C. A copy of the visit note was provided to the patient's provider(s).    A summary of these recommendations was sent via BOARDZ.    Kim Dior, PharmD  Medication Therapy Management Pharmacist  Voicemail: [phone]      Telemedicine Visit Details  Type of service:  Telephone visit  Start Time:  10:00 AM  End Time:  10:45 AM     Medication Therapy Recommendations  Tremor    Current Medication: baclofen (LIORESAL) 20 MG tablet   Rationale: Undesirable effect - Adverse medication event - Safety   Recommendation: Change Medication   Status: Contact Provider - Awaiting Response

## 2024-03-13 ENCOUNTER — VIRTUAL VISIT (OUTPATIENT)
Dept: FAMILY MEDICINE | Facility: CLINIC | Age: 49
End: 2024-03-13
Payer: COMMERCIAL

## 2024-03-13 ENCOUNTER — VIRTUAL VISIT (OUTPATIENT)
Dept: PHARMACY | Facility: CLINIC | Age: 49
End: 2024-03-13
Attending: PHYSICIAN ASSISTANT
Payer: COMMERCIAL

## 2024-03-13 DIAGNOSIS — G35 MULTIPLE SCLEROSIS (H): ICD-10-CM

## 2024-03-13 DIAGNOSIS — K21.00 GASTROESOPHAGEAL REFLUX DISEASE WITH ESOPHAGITIS, UNSPECIFIED WHETHER HEMORRHAGE: ICD-10-CM

## 2024-03-13 DIAGNOSIS — G43.711 INTRACTABLE CHRONIC MIGRAINE WITHOUT AURA AND WITH STATUS MIGRAINOSUS: ICD-10-CM

## 2024-03-13 DIAGNOSIS — M79.2 PAIN, NEUROPATHIC: ICD-10-CM

## 2024-03-13 DIAGNOSIS — R10.84 STOMACH CRAMPS, GENERALIZED: ICD-10-CM

## 2024-03-13 DIAGNOSIS — R53.83 FATIGUE, UNSPECIFIED TYPE: ICD-10-CM

## 2024-03-13 DIAGNOSIS — J20.9 ACUTE BRONCHITIS WITH COEXISTING CONDITION REQUIRING PROPHYLACTIC TREATMENT: Primary | ICD-10-CM

## 2024-03-13 DIAGNOSIS — R25.2 SPASTICITY: ICD-10-CM

## 2024-03-13 DIAGNOSIS — J45.41 MODERATE PERSISTENT ASTHMA WITH ACUTE EXACERBATION: ICD-10-CM

## 2024-03-13 DIAGNOSIS — D84.9 IMMUNOSUPPRESSION (H): ICD-10-CM

## 2024-03-13 DIAGNOSIS — Z78.9 TAKES DIETARY SUPPLEMENTS: ICD-10-CM

## 2024-03-13 DIAGNOSIS — R25.1 TREMOR: Primary | ICD-10-CM

## 2024-03-13 PROCEDURE — 99605 MTMS BY PHARM NP 15 MIN: CPT | Mod: 93 | Performed by: PHARMACIST

## 2024-03-13 PROCEDURE — 99607 MTMS BY PHARM ADDL 15 MIN: CPT | Mod: 93 | Performed by: PHARMACIST

## 2024-03-13 PROCEDURE — 99214 OFFICE O/P EST MOD 30 MIN: CPT | Mod: 95 | Performed by: PHYSICIAN ASSISTANT

## 2024-03-13 RX ORDER — MULTIVIT-MIN/IRON/FOLIC ACID/K 18-600-40
500 CAPSULE ORAL DAILY
COMMUNITY

## 2024-03-13 RX ORDER — GUAIFENESIN 600 MG/1
1200 TABLET, EXTENDED RELEASE ORAL 2 TIMES DAILY
Status: SHIPPED
Start: 2024-03-13 | End: 2024-04-23

## 2024-03-13 RX ORDER — CEFDINIR 300 MG/1
300 CAPSULE ORAL 2 TIMES DAILY
Qty: 14 CAPSULE | Refills: 0 | Status: SHIPPED | OUTPATIENT
Start: 2024-03-13 | End: 2024-04-15

## 2024-03-13 RX ORDER — IPRATROPIUM BROMIDE AND ALBUTEROL SULFATE 2.5; .5 MG/3ML; MG/3ML
1 SOLUTION RESPIRATORY (INHALATION) EVERY 6 HOURS PRN
Qty: 90 ML | Refills: 0 | Status: SHIPPED | OUTPATIENT
Start: 2024-03-13 | End: 2024-04-23

## 2024-03-13 RX ORDER — PREDNISONE 20 MG/1
TABLET ORAL
Qty: 20 TABLET | Refills: 0 | Status: SHIPPED | OUTPATIENT
Start: 2024-03-13 | End: 2024-04-09

## 2024-03-13 NOTE — LETTER
My Asthma Action Plan    Name: Nataliya lAdrich   YOB: 1975  Date: 3/13/2024   My doctor: Miya Crump PA-C   My clinic: [facility]        My Control Medicine: Budesonide + formoterol (Symbicort HFA) -  160/4.5 mcg 2 puffs twice daily  My Rescue Medicine: Albuterol (Proair/Ventolin/Proventil HFA) 2-4 puffs EVERY 4 HOURS as needed. Use a spacer if recommended by your provider.   My Asthma Severity:   Moderate Persistent  Know your asthma triggers:   pollens  grass and dander            GREEN ZONE   Good Control  I feel good  No cough or wheeze  Can work, sleep and play without asthma symptoms       Take your asthma control medicine every day.     If exercise triggers your asthma, take your rescue medication  15 minutes before exercise or sports, and  During exercise if you have asthma symptoms  Spacer to use with inhaler: If you have a spacer, make sure to use it with your inhaler             YELLOW ZONE Getting Worse  I have ANY of these:  I do not feel good  Cough or wheeze  Chest feels tight  Wake up at night   Keep taking your Green Zone medications  Start taking your rescue medicine:  every 20 minutes for up to 1 hour. Then every 4 hours for 24-48 hours.  If you stay in the Yellow Zone for more than 12-24 hours, contact your doctor.  If you do not return to the Green Zone in 12-24 hours or you get worse, start taking your oral steroid medicine if prescribed by your provider.           RED ZONE Medical Alert - Get Help  I have ANY of these:  I feel awful  Medicine is not helping  Breathing getting harder  Trouble walking or talking  Nose opens wide to breathe       Take your rescue medicine NOW  If your provider has prescribed an oral steroid medicine, start taking it NOW  Call your doctor NOW  If you are still in the Red Zone after 20 minutes and you have not reached your doctor:  Take your rescue medicine again and  Call 911 or go to the emergency room  right away    See your regular doctor within 2 weeks of an Emergency Room or Urgent Care visit for follow-up treatment.          Annual Reminders:  Meet with Asthma Educator,  Flu Shot in the Fall, consider Pneumonia Vaccination for patients with asthma (aged 19 and older).    Pharmacy:    [facility] [place] - [place], [place] - [address]    Electronically signed by Miya Crump PA-C   Date: 03/13/24                      Asthma Triggers  How To Control Things That Make Your Asthma Worse    Triggers are things that make your asthma worse.  Look at the list below to help you find your triggers and what you can do about them.  You can help prevent asthma flare-ups by staying away from your triggers.      Trigger                                                          What you can do   Cigarette Smoke  Tobacco smoke can make asthma worse. Do not allow smoking in your home, car or around you.  Be sure no one smokes at a child s day care or school.  If you smoke, ask your health care provider for ways to help you quit.  Ask family members to quit too.  Ask your health care provider for a referral to Quit Plan to help you quit smoking, or call 2-360-456-PLAN.     Colds, Flu, Bronchitis  These are common triggers of asthma. Wash your hands often.  Don t touch your eyes, nose or mouth.  Get a flu shot every year.     Dust Mites  These are tiny bugs that live in cloth or carpet. They are too small to see. Wash sheets and blankets in hot water every week.   Encase pillows and mattress in dust mite proof covers.  Avoid having carpet if you can. If you have carpet, vacuum weekly.   Use a dust mask and HEPA vacuum.   Pollen and Outdoor Mold  Some people are allergic to trees, grass, or weed pollen, or molds. Try to keep your windows closed.  Limit time out doors when pollen count is high.   Ask you  health care provider about taking medicine during allergy season.     Animal Dander  Some people are allergic to skin flakes, urine or saliva from pets with fur or feathers. Keep pets with fur or feathers out of your home.    If you can t keep the pet outdoors, then keep the pet out of your bedroom.  Keep the bedroom door closed.  Keep pets off cloth furniture and away from stuffed toys.     Mice, Rats, and Cockroaches   Some people are allergic to the waste from these pests.   Cover food and garbage.  Clean up spills and food crumbs.  Store grease in the refrigerator.   Keep food out of the bedroom.   Indoor Mold  This can be a trigger if your home has high moisture. Fix leaking faucets, pipes, or other sources of water.   Clean moldy surfaces.  Dehumidify basement if it is damp and smelly.   Smoke, Strong Odors, and Sprays  These can reduce air quality. Stay away from strong odors and sprays, such as perfume, powder, hair spray, paints, smoke incense, paint, cleaning products, candles and new carpet.   Exercise or Sports  Some people with asthma have this trigger. Be active!  Ask your doctor about taking medicine before sports or exercise to prevent symptoms.    Warm up for 5-10 minutes before and after sports or exercise.     Other Triggers of Asthma  Cold air:  Cover your nose and mouth with a scarf.  Sometimes laughing or crying can be a trigger.  Some medicines and food can trigger asthma.

## 2024-03-13 NOTE — PROGRESS NOTES
Nataliya is a 48 year old who is being evaluated via a billable video visit.    How would you like to obtain your AVS? MyChart  If the video visit is dropped, the invitation should be resent by: Text to cell phone: [phone]  Will anyone else be joining your video visit? No      Assessment & Plan     Acute bronchitis with coexisting condition requiring prophylactic treatment  Immunosuppression (H24)  Moderate persistent asthma with acute exacerbation  Recent lab testing unremarkable.  Symptoms sound to be consistent with viral process and asthma exacerbation.  Recommend scheduled nebulizers and Mucinex twice daily to loosen congestion.  If no significant improvement start cefdinir Friday, 3/15/2024.  If all of these efforts not improving wheezing recommend starting a long and strong prednisone taper Sunday, 3/17/2024.  Patient advised of warning signs and when to seek urgent care.  All questions answered to patient's satisfaction  - cefdinir (OMNICEF) 300 MG capsule  Dispense: 14 capsule; Refill: 0  - guaiFENesin (MUCINEX) 600 MG 12 hr tablet  - ipratropium - albuterol 0.5 mg/2.5 mg/3 mL (DUONEB) 0.5-2.5 (3) MG/3ML neb solution  Dispense: 90 mL; Refill: 0  - predniSONE (DELTASONE) 20 MG tablet  Dispense: 20 tablet; Refill: 0      Return in about 5 days (around 3/18/2024) for Send update or call clinic if worsening or not improving.        Miya Crump MBA, MS, PA-C  [facility]   Nataliya is a 48 year old, presenting for the following health issues:  URI        3/13/2024    10:31 AM   Additional Questions   Roomed by Nelida PALMA   Accompanied by Self     Video Start Time: 11:14 AM    HPI     Acute Illness  Acute illness concerns: Sore throat  Onset/Duration: 3/10/2024  Symptoms:  Fever: No  Chills/Sweats: YES  Headache (location?): No  Sinus Pressure: YES  Conjunctivitis:  No  Ear Pain: YES: right  Rhinorrhea: YES  Congestion: YES  Sore Throat: YES  Cough: YES-productive of  yellow sputum  Wheeze: YES  Decreased Appetite: YES  Nausea: No  Vomiting: No  Diarrhea: No  Dysuria/Freq.: No  Dysuria or Hematuria: No  Fatigue/Achiness: YES- Fatigue  Sick/Strep Exposure: No  Therapies tried and outcome: Nebulizer     Negative strep, covid, & strep 3/11/2024 (virtual visit 3/11/2024 w/Dr. Basilio)    Has been doing nebs (duoneb)- every 4 hours - now every 6- now coughing more.  Nose clear.  Throat better.  Left side is wheezy and feels congestion.    Slightly productive cough.  Sleeping is when cough is the worst.        Review of Systems  Constitutional, HEENT, cardiovascular, pulmonary, GI, , musculoskeletal, neuro, skin, endocrine and psych systems are negative, except as otherwise noted.      Objective           Vitals:  No vitals were obtained today due to virtual visit.    Physical Exam   GENERAL: alert and no distress  EYES: Eyes grossly normal to inspection.  No discharge or erythema, or obvious scleral/conjunctival abnormalities.  HENT: Normal cephalic/atraumatic.  External ears, nose and mouth without ulcers or lesions.  No nasal drainage visible.  NECK: No asymmetry, visible masses or scars  RESP: No audible wheeze, cough, or visible cyanosis.    SKIN: Visible skin clear. No significant rash, abnormal pigmentation or lesions.  NEURO: Cranial nerves grossly intact.  Mentation and speech appropriate for age.  PSYCH: Appropriate affect, tone, and pace of words        Video-Visit Details    Type of service:  Video Visit   Video End Time:11:30 AM  Originating Location (pt. Location): Home  Distant Location (provider location):  On-site  Platform used for Video Visit: Diana  Signed Electronically by: Miya Crump PA-C

## 2024-03-13 NOTE — PATIENT INSTRUCTIONS
"Recommendations from today's MTM visit:                                                    MTM (medication therapy management) is a service provided by a clinical pharmacist designed to help you get the most of out of your medicines.   Today we reviewed what your medicines are for, how to know if they are working, that your medicines are safe and how to make your medicine regimen as easy as possible.      I would consider trying a different muscle relaxer instead of baclofen to see if there is any difference in your tremors. You already are prescribed cyclobenzaprine, so you could try this, otherwise discuss with provider switching to something else (ex. Tizanidine, methocarbamol, etc). Let us know if there is no change in incidence of tremors.  I can see that Miya Wareton did send an updated topiramate dose to the pharmacy, I would discuss with Miya that pharmacy did not give you the correct dosage and have her re-send it and then talk to the pharmacy. Let me know if this doesn't work.    Follow-up: Return in about 3 months (around 6/13/2024) for Medication Therapy Management.    It was great speaking with you today.  I value your experience and would be very thankful for your time in providing feedback in our clinic survey. In the next few days, you may receive an email or text message from [employer] with a link to a survey related to your  clinical pharmacist.\"     To schedule another MTM appointment, please call the clinic directly or you may call the MTM scheduling line at [phone] or toll-free at [phone].     My Clinical Pharmacist's contact information:                                                      Please feel free to contact me with any questions or concerns you have.      Kim Dior, Rupinder  Medication Therapy Management Pharmacist  Voicemail: [phone]     "

## 2024-03-13 NOTE — PATIENT INSTRUCTIONS
Continue DuoNeb every 6 hours while awake and use over-the-counter Mucinex twice daily to help loosen any chest congestion.    If not noticing significant improvement by Friday, 3/15/2024 please start cefdinir antibiotic    If persistent wheezing despite the interventions of antibiotic, Mucinex, and scheduled nebulizer then start prednisone taper that has been sent to your pharmacy.

## 2024-03-14 ENCOUNTER — OFFICE VISIT (OUTPATIENT)
Dept: PODIATRY | Facility: CLINIC | Age: 49
End: 2024-03-14
Payer: COMMERCIAL

## 2024-03-14 VITALS — HEIGHT: 65 IN | DIASTOLIC BLOOD PRESSURE: 70 MMHG | BODY MASS INDEX: 41.6 KG/M2 | SYSTOLIC BLOOD PRESSURE: 118 MMHG

## 2024-03-14 DIAGNOSIS — L60.0 INGROWING RIGHT GREAT TOENAIL: Primary | ICD-10-CM

## 2024-03-14 PROCEDURE — 11730 AVULSION NAIL PLATE SIMPLE 1: CPT | Mod: T5 | Performed by: PODIATRIST

## 2024-03-14 NOTE — PROGRESS NOTES
"ASSESSMENT:  Encounter Diagnosis   Name Primary?    Ingrowing right great toenail, medial edge Yes     MEDICAL DECISION MAKING:  Although the toe is not painful today, she does report intermittent discomfort.  She thinks it is associated with certain shoes.  We discussed a period of time focusing on shoes that accommodate the toe, to avoid pressure on the nail plate.  I also reviewed partial nail avulsion and partial chemical matrixectomy.  After discussion, she elected to proceed with a partial nail avulsion, medial edge, right hallux.  We discussed the postprocedural recommendations and management of the typically low-level pain.    Nail Avulsion Procedure  (non permanent removal)    The procedure was discussed with the Nataliya Aldrich, including risk of infection, abnormal nail regrowth, and possible need for an additional future nail procedure.  Post-procedure home cares were reviewed. I explained that these cares are important for preventing infection and aiding in timely healing.   Verbal and written consent was obtained.   The site was marked and the \"Time Out\" called.     The base of the right great toe was injected with 2 cc of  2% Lidocaine plain.  The toe was then prepped with betadine solution.  A tourniquet was applied around the base of the toe for hemostasis.   Next it was checked for adequate anesthesia.      The medial nail was loosened from the nail bed and marginal soft tissue attachments with a blunt instrument. A nail splitter was then used to make a longitudinal cut 2mm from the medial skin fold.  It was completed, atraumatically, under the eponychium with a [place] blade. Next, the nail edge was firmly grasped with a hemostat and removed. The underlying nail bed was inspected and no abnormalities seen.    The tourniquet was removed. Bacitracin ointment was applied to the nail bed, followed by a compressive dressing.  Nataliya Aldrich tolerated the procedure well.      Nataliya VYAS" "Venkata Aldrich is instructed to watch for, and call if,  increasing redness, drainage, and pain after 2-3 days. Post procedure instructions were provided in the After Visit Summary.       Disclaimer: This note consists of symbols derived from keyboarding, dictation and/or voice recognition software. As a result, there may be errors in the script that have gone undetected. Please consider this when interpreting information found in this chart.    Ankit Kathleen, LIZANDRO, FACFAS, MS    [facility]      ____________________________________________________________________    HPI:           Chief Complaint: ingrown toenail right great toe  She continues to have intermittent discomfort along the medial edge of this nail unit.  She was evaluated on January 5, 2024 for this and we discussed conservative options versus partial nail removal.  She is leaning towards some form of partial nail removal.  *  Past Medical History:   Diagnosis Date    Asthma     mild persistent - Dr Gee    Cerebral infarction (H) 2015    Fibroids     s/p hysterectomy    H/O gastric bypass     Hypertension     Migraine     followed by Devika    Obstructive sleep apnea     needs updated sleep study    Pre-diabetes     Relapsing remitting multiple sclerosis (H) 2015    lesion in [place].  Facial tingling initial symptom.  F/B [facility]    Spondylosis of cervical region without myelopathy or radiculopathy    *  *  Past Surgical History:   Procedure Laterality Date    COLONOSCOPY  2012    GASTRIC BYPASS  2002    \"Trouble with B12 and D\"    HYSTERECTOMY, MONO  2013    cervix gone.  fibroids.  without BSO    TONSILLECTOMY     *  *  Current Outpatient Medications   Medication Sig Dispense Refill    acetaminophen (TYLENOL) 500 MG tablet Take 2 tablets (1,000 mg) by mouth 3 times daily      Ascorbic Acid (VITAMIN C) 500 MG CAPS Take 500 mg by mouth daily      azelastine (ASTELIN) 0.1 % nasal spray Spray 1 spray into both nostrils 2 " times daily as needed for rhinitis (nasal antihistamine) 30 mL 5    baclofen (LIORESAL) 20 MG tablet TAKE ONE TABLET BY MOUTH EVERY NIGHT AT BEDTIME MAY ALSO TAKE ONE TABLET BY MOUTH EVERY 4 HOURS AS NEEDED FOR MUSCLE SPASMS (max dose: 80 mg/day) 180 tablet 1    budesonide-formoterol (SYMBICORT) 160-4.5 MCG/ACT Inhaler INHALE 2 PUFFS INTO THE LUNGS TWICE DAILY 10.2 g 5    cefdinir (OMNICEF) 300 MG capsule Take 1 capsule (300 mg) by mouth 2 times daily for 7 days 14 capsule 0    cetirizine (ZYRTEC) 10 MG tablet Take 1 tablet (10 mg) by mouth daily 180 tablet 1    clotrimazole (LOTRIMIN) 1 % external cream Apply topically 2 times daily (Can take up to 14 days) 60 g 1    cyclobenzaprine (FLEXERIL) 10 MG tablet Take 1 tablet (10 mg) by mouth 3 times daily as needed for muscle spasms 40 tablet 0    desonide (DESOWEN) 0.05 % external cream Apply topically 2 times daily To face as needed for rash 15 g 0    ELDERBERRY PO Take by mouth daily      EPINEPHrine (ANY BX GENERIC EQUIV) 0.3 MG/0.3ML injection 2-pack Inject 0.3 mLs (0.3 mg) into the muscle as needed for anaphylaxis May repeat one time in 5-15 minutes if response to initial dose is inadequate. 2 each 3    fluticasone (FLONASE) 50 MCG/ACT nasal spray USE 1 PUFF IN EACH NOSTRIL AS DIRECTED. 16 g 5    gabapentin (NEURONTIN) 300 MG capsule Take 1 capsule (300 mg) by mouth every 4 hours 120 capsule 5    guaiFENesin (MUCINEX) 600 MG 12 hr tablet Take 2 tablets (1,200 mg) by mouth 2 times daily      ipratropium - albuterol 0.5 mg/2.5 mg/3 mL (DUONEB) 0.5-2.5 (3) MG/3ML neb solution Take 1 vial (3 mLs) by nebulization every 6 hours as needed for shortness of breath, wheezing or cough 90 mL 0    MAGNESIUM PO Take 400 mg by mouth at bedtime      modafinil (PROVIGIL) 100 MG tablet Take 1 tablet (100 mg) by mouth daily For daytime drowsiness (ok to increase to 2 tablets daily if symptoms not improved in 14 days) 90 tablet 1    montelukast (SINGULAIR) 10 MG tablet TAKE 1  "TABLET(10 MG) BY MOUTH AT BEDTIME 90 tablet 3    Multiple Vitamins-Calcium (ONE-A-DAY WOMENS FORMULA PO)       natalizumab (TYSABRI) 300 MG/15ML injection Inject 15 mLs (300 mg) into the vein once every six weeks Infusion      omeprazole (PRILOSEC) 20 MG DR capsule TAKE 1 CAPSULE(20 MG) BY MOUTH TWICE DAILY BEFORE MEALS 180 capsule 1    PREBIOTIC PRODUCT PO Take by mouth daily      predniSONE (DELTASONE) 20 MG tablet Take 60 mg daily for 3 days, then 40 mg daily for 3 days, then 20 mg daily for 3 days, then 10 mg for 4 days 20 tablet 0    simethicone (MYLICON) 80 MG chewable tablet Take 1 tablet (80 mg) by mouth every 6 hours as needed for flatulence or cramping 60 tablet 1    SUMAtriptan (IMITREX) 100 MG tablet Take 50 mg up to twice daily as needed for headache; separate doses by at least one hour and do not use more than 3 days/week 18 tablet 3    topiramate (TOPAMAX) 100 MG tablet Take 1 tablet (100 mg) by mouth at bedtime (take with 50 mg dose at bedtime) 90 tablet 1    topiramate (TOPAMAX) 50 MG tablet Take 1 tablet (50 mg) by mouth 2 times daily 180 tablet 1    valACYclovir (VALTREX) 500 MG tablet TAKE ONE TABLET BY MOUTH ONCE DAILY 90 tablet 3    VENTOLIN  (90 Base) MCG/ACT inhaler SHAKE WELL AND INHALE 2 PUFFS INTO THE LUNGS EVERY 6 HOURS AS NEEDED FOR SHORTNESS OF BREATH OR WHEEZING STRENGTH 18 g 4    vitamin D3 (CHOLECALCIFEROL) 250 mcg (37630 units) capsule TAKE 1 CAPSULE BY MOUTH ONCE DAILY 90 capsule 3         EXAM:    Vitals: /70   Ht 1.651 m (5' 5\")   LMP  (LMP Unknown)   BMI 41.60 kg/m    BMI: Body mass index is 41.6 kg/m .    Vascular:  Pedal pulses are palpable for both the DP and PT arteries.  CFT < 3 sec.  No edema.       Neuro: Light touch sensation is intact to the L4, L5, S1 distributions  No evidence of weakness, spasticity, or contracture in the lower extremities.      Derm: There is some hyperkeratosis at the distal aspect of the right hallux medial skin fold.  No edema or " erythema. No drainage. No pain today on palpation. The nail plate in incurvated.

## 2024-03-14 NOTE — LETTER
"    3/14/2024         RE: Nataliya Aldrich  [address]  [place] [address]        Dear Colleague,    Thank you for referring your patient, Nataliya Aldrich, to the [facility]OD[facility]. Please see a copy of my visit note below.    ASSESSMENT:  Encounter Diagnosis   Name Primary?     Ingrowing right great toenail, medial edge Yes     MEDICAL DECISION MAKING:  Although the toe is not painful today, she does report intermittent discomfort.  She thinks it is associated with certain shoes.  We discussed a period of time focusing on shoes that accommodate the toe, to avoid pressure on the nail plate.  I also reviewed partial nail avulsion and partial chemical matrixectomy.  After discussion, she elected to proceed with a partial nail avulsion, medial edge, right hallux.  We discussed the postprocedural recommendations and management of the typically low-level pain.    Nail Avulsion Procedure  (non permanent removal)    The procedure was discussed with the Nataliya Aldrich, including risk of infection, abnormal nail regrowth, and possible need for an additional future nail procedure.  Post-procedure home cares were reviewed. I explained that these cares are important for preventing infection and aiding in timely healing.   Verbal and written consent was obtained.   The site was marked and the \"Time Out\" called.     The base of the right great toe was injected with 2 cc of  2% Lidocaine plain.  The toe was then prepped with betadine solution.  A tourniquet was applied around the base of the toe for hemostasis.   Next it was checked for adequate anesthesia.      The medial nail was loosened from the nail bed and marginal soft tissue attachments with a blunt instrument. A nail splitter was then used to make a longitudinal cut 2mm from the medial skin fold.  It was completed, atraumatically, under the eponychium with a [place] blade. Next, the nail edge was " "firmly grasped with a hemostat and removed. The underlying nail bed was inspected and no abnormalities seen.    The tourniquet was removed. Bacitracin ointment was applied to the nail bed, followed by a compressive dressing.  Nataliya Aldrich tolerated the procedure well.      Nataliya Aldrich is instructed to watch for, and call if,  increasing redness, drainage, and pain after 2-3 days. Post procedure instructions were provided in the After Visit Summary.       Disclaimer: This note consists of symbols derived from keyboarding, dictation and/or voice recognition software. As a result, there may be errors in the script that have gone undetected. Please consider this when interpreting information found in this chart.    Ankit Kathleen DPM, FACFAS, MS    [facility] & Ankle [facility]      ____________________________________________________________________    HPI:           Chief Complaint: ingrown toenail right great toe  She continues to have intermittent discomfort along the medial edge of this nail unit.  She was evaluated on January 5, 2024 for this and we discussed conservative options versus partial nail removal.  She is leaning towards some form of partial nail removal.  *  Past Medical History:   Diagnosis Date     Asthma     mild persistent - Dr Gee     Cerebral infarction (H) 2015     Fibroids     s/p hysterectomy     H/O gastric bypass      Hypertension      Migraine     followed by Devika     Obstructive sleep apnea     needs updated sleep study     Pre-diabetes      Relapsing remitting multiple sclerosis (H) 2015    lesion in [place].  Facial tingling initial symptom.  F/B [facility]     Spondylosis of cervical region without myelopathy or radiculopathy    *  *  Past Surgical History:   Procedure Laterality Date     COLONOSCOPY  2012     GASTRIC BYPASS  2002    \"Trouble with B12 and D\"     HYSTERECTOMY, MONO  2013    cervix gone.  fibroids.  without BSO     TONSILLECTOMY   "   *  *  Current Outpatient Medications   Medication Sig Dispense Refill     acetaminophen (TYLENOL) 500 MG tablet Take 2 tablets (1,000 mg) by mouth 3 times daily       Ascorbic Acid (VITAMIN C) 500 MG CAPS Take 500 mg by mouth daily       azelastine (ASTELIN) 0.1 % nasal spray Spray 1 spray into both nostrils 2 times daily as needed for rhinitis (nasal antihistamine) 30 mL 5     baclofen (LIORESAL) 20 MG tablet TAKE ONE TABLET BY MOUTH EVERY NIGHT AT BEDTIME MAY ALSO TAKE ONE TABLET BY MOUTH EVERY 4 HOURS AS NEEDED FOR MUSCLE SPASMS (max dose: 80 mg/day) 180 tablet 1     budesonide-formoterol (SYMBICORT) 160-4.5 MCG/ACT Inhaler INHALE 2 PUFFS INTO THE LUNGS TWICE DAILY 10.2 g 5     cefdinir (OMNICEF) 300 MG capsule Take 1 capsule (300 mg) by mouth 2 times daily for 7 days 14 capsule 0     cetirizine (ZYRTEC) 10 MG tablet Take 1 tablet (10 mg) by mouth daily 180 tablet 1     clotrimazole (LOTRIMIN) 1 % external cream Apply topically 2 times daily (Can take up to 14 days) 60 g 1     cyclobenzaprine (FLEXERIL) 10 MG tablet Take 1 tablet (10 mg) by mouth 3 times daily as needed for muscle spasms 40 tablet 0     desonide (DESOWEN) 0.05 % external cream Apply topically 2 times daily To face as needed for rash 15 g 0     ELDERBERRY PO Take by mouth daily       EPINEPHrine (ANY BX GENERIC EQUIV) 0.3 MG/0.3ML injection 2-pack Inject 0.3 mLs (0.3 mg) into the muscle as needed for anaphylaxis May repeat one time in 5-15 minutes if response to initial dose is inadequate. 2 each 3     fluticasone (FLONASE) 50 MCG/ACT nasal spray USE 1 PUFF IN EACH NOSTRIL AS DIRECTED. 16 g 5     gabapentin (NEURONTIN) 300 MG capsule Take 1 capsule (300 mg) by mouth every 4 hours 120 capsule 5     guaiFENesin (MUCINEX) 600 MG 12 hr tablet Take 2 tablets (1,200 mg) by mouth 2 times daily       ipratropium - albuterol 0.5 mg/2.5 mg/3 mL (DUONEB) 0.5-2.5 (3) MG/3ML neb solution Take 1 vial (3 mLs) by nebulization every 6 hours as needed for  "shortness of breath, wheezing or cough 90 mL 0     MAGNESIUM PO Take 400 mg by mouth at bedtime       modafinil (PROVIGIL) 100 MG tablet Take 1 tablet (100 mg) by mouth daily For daytime drowsiness (ok to increase to 2 tablets daily if symptoms not improved in 14 days) 90 tablet 1     montelukast (SINGULAIR) 10 MG tablet TAKE 1 TABLET(10 MG) BY MOUTH AT BEDTIME 90 tablet 3     Multiple Vitamins-Calcium (ONE-A-DAY WOMENS FORMULA PO)        natalizumab (TYSABRI) 300 MG/15ML injection Inject 15 mLs (300 mg) into the vein once every six weeks Infusion       omeprazole (PRILOSEC) 20 MG DR capsule TAKE 1 CAPSULE(20 MG) BY MOUTH TWICE DAILY BEFORE MEALS 180 capsule 1     PREBIOTIC PRODUCT PO Take by mouth daily       predniSONE (DELTASONE) 20 MG tablet Take 60 mg daily for 3 days, then 40 mg daily for 3 days, then 20 mg daily for 3 days, then 10 mg for 4 days 20 tablet 0     simethicone (MYLICON) 80 MG chewable tablet Take 1 tablet (80 mg) by mouth every 6 hours as needed for flatulence or cramping 60 tablet 1     SUMAtriptan (IMITREX) 100 MG tablet Take 50 mg up to twice daily as needed for headache; separate doses by at least one hour and do not use more than 3 days/week 18 tablet 3     topiramate (TOPAMAX) 100 MG tablet Take 1 tablet (100 mg) by mouth at bedtime (take with 50 mg dose at bedtime) 90 tablet 1     topiramate (TOPAMAX) 50 MG tablet Take 1 tablet (50 mg) by mouth 2 times daily 180 tablet 1     valACYclovir (VALTREX) 500 MG tablet TAKE ONE TABLET BY MOUTH ONCE DAILY 90 tablet 3     VENTOLIN  (90 Base) MCG/ACT inhaler SHAKE WELL AND INHALE 2 PUFFS INTO THE LUNGS EVERY 6 HOURS AS NEEDED FOR SHORTNESS OF BREATH OR WHEEZING STRENGTH 18 g 4     vitamin D3 (CHOLECALCIFEROL) 250 mcg (36752 units) capsule TAKE 1 CAPSULE BY MOUTH ONCE DAILY 90 capsule 3         EXAM:    Vitals: /70   Ht 1.651 m (5' 5\")   LMP  (LMP Unknown)   BMI 41.60 kg/m    BMI: Body mass index is 41.6 kg/m .    Vascular:  Pedal " pulses are palpable for both the DP and PT arteries.  CFT < 3 sec.  No edema.       Neuro: Light touch sensation is intact to the L4, L5, S1 distributions  No evidence of weakness, spasticity, or contracture in the lower extremities.      Derm: There is some hyperkeratosis at the distal aspect of the right hallux medial skin fold.  No edema or erythema. No drainage. No pain today on palpation. The nail plate in incurvated.          Again, thank you for allowing me to participate in the care of your patient.        Sincerely,        Ankit Kathleen DPM

## 2024-03-14 NOTE — PATIENT INSTRUCTIONS
Thank you for choosing [facility] Podiatry / Foot & Ankle Surgery!    DR. DURHAM'S CLINIC LOCATIONS:     [facility] TRIAGE LINE: [phone]   [address] [place] [address] APPOINTMENTS: [phone]   [place] [address] RADIOLOGY: [phone]   (Every other Tues - Wed - Fri PM) SET UP SURGERY: [phone]    PHYSICAL THERAPY: [phone]   [facility] SPECIALTY BILLING QUESTIONS: [phone] [id number] FAX: [phone]   [place] [address]    (Thurs & Fri AM)         DR. DURHAM'S RECOMMENDATIONS FOR HEALING AFTER A NAIL REMOVAL PROCEDURE    1. Try to keep the bandage on until bedtime or the morning after your procedure.     2. Some bleeding is normal. If bleeding seems excessive to you, place ice on top of your foot for 15-20 minutes, elevate your foot above heart level and reinforce the dressing (add additional covering)    3. Over the counter pain medication (tylenol / ibuprofen), elevating your foot and cold application is all you should need for pain control. Pain is usually easily managed.      4. For 1-2 weeks, soak your foot twice a day in mild, skin friendly soap water solution for 15 minutes (dish soap, hand soap, body wash, etc). After soaking, blot the area dry and apply a regular band aid. An antibiotic ointment is not needed.  If the guaze dressing sticks to your toe, soak your foot for a few minutes with the dressing on. This should help loosen it.     6. It is normal to experience some discomfort and redness around the nail for several days following the procedure. Drainage will likely appear a red and/or yellow. This is normal. If your toe is still draining fluid after 2 weeks, continue soaking.    7. Initial discomfort might last for 2-3 days. You may resume with regular activity as soon as you want,  as long as you keep the wound clean, dry and follow the soaking instruction. It is recommended that you do not enter public swimming pools/hot tubs while your toe is  draining.    8. If you are experiencing worsening pain and redness or notice pus after 2-3 days please contact the clinic. Ask to speak with a triage nurse and they will inform our team of your symptoms and we can advise if a follow up is needed.      IF YOU HAD A PERMANENT NAIL REMOVAL PROCEDURE    - Healing will take longer and you might need to soak for 2-3 weeks. You are healing from the nail being removed and the chemical burn.  - Expect some drainage, crust  and redness. This is from the acid (phenol) that was applied and the chemical burn.  - After soaking, use a Q-tip to clean under and around the skin where the nail was removed. This helps get rid of the brownish material and helps the wound drain  - Sometimes it is hard to know if the redness and drainage is normal versus a developing infection. If in doubt, reach out to Dr. Kathleen's office via My Chart or phone.   - If redness extends back to the middle of the toe, you should have it looked at in clinic.     After 2-3 days, if you are experiencing worsening pain and redness, or notice pus, please contact the clinic. Ask to speak with a triage nurse and they will inform our team of your symptoms and we can advise if a follow up is needed.

## 2024-03-15 ENCOUNTER — THERAPY VISIT (OUTPATIENT)
Dept: PHYSICAL THERAPY | Facility: CLINIC | Age: 49
End: 2024-03-15
Payer: COMMERCIAL

## 2024-03-15 DIAGNOSIS — M54.2 CERVICALGIA: ICD-10-CM

## 2024-03-15 DIAGNOSIS — M79.18 MYOFASCIAL PAIN: Primary | ICD-10-CM

## 2024-03-15 PROCEDURE — 97110 THERAPEUTIC EXERCISES: CPT | Mod: GP | Performed by: PHYSICAL THERAPIST

## 2024-03-15 PROCEDURE — 97140 MANUAL THERAPY 1/> REGIONS: CPT | Mod: GP | Performed by: PHYSICAL THERAPIST

## 2024-03-15 PROCEDURE — 97035 APP MDLTY 1+ULTRASOUND EA 15: CPT | Mod: GP | Performed by: PHYSICAL THERAPIST

## 2024-03-27 ENCOUNTER — TELEPHONE (OUTPATIENT)
Dept: PODIATRY | Facility: CLINIC | Age: 49
End: 2024-03-27
Payer: COMMERCIAL

## 2024-03-27 NOTE — TELEPHONE ENCOUNTER
[facility] Call Center    Phone Message    May a detailed message be left on voicemail: yes     Reason for Call: Other: Patient had procedure done 2 weeks ago.     Pain was starting to go away but when started yoga again yesterday pain has returned and is severe.    There is no drainage but there is pain/tenderness on same toe.    Please call patient to discuss symptoms.    Action Taken: Message routed to:  Other: [facility]    Travel Screening: Not Applicable

## 2024-03-27 NOTE — TELEPHONE ENCOUNTER
Patient had the medial edge removed from ingrown great right toenail on 3/14/24 with Dr. Kathleen. It was a non permanent removal.     Attempted to reach patient and it went to voicemail. The mailbox is full.   Please try to call patient again later.     NETTA Anton RN

## 2024-03-27 NOTE — TELEPHONE ENCOUNTER
Phone call to patient. She states 2 days ago her toe had been doing well and seemed healed. There was no drainage and she was back to wearing wide toed shoes. She then did some floor yoga without any problems. However, she woke up in the night with throbbing pain in her toe. She denies any drainage or redness. It is not tender to the touch.   She soaked the toe and it helped.   She did floor yoga again today and grazed her toe on the mat and felt the throbbing pain again. No other signs of infection or injury.     Discussed that her toe probably got aggravated by the yoga. She should continue to watch for drainage, redness or infection. Otherwise, it will take some time to heal over the next couple of weeks. She can continue to soak and cover for comfort if she would like.   She verbalized understanding.     NETTA Anton RN

## 2024-04-01 ENCOUNTER — THERAPY VISIT (OUTPATIENT)
Dept: PHYSICAL THERAPY | Facility: CLINIC | Age: 49
End: 2024-04-01
Payer: COMMERCIAL

## 2024-04-01 DIAGNOSIS — G35 MS (MULTIPLE SCLEROSIS) (H): ICD-10-CM

## 2024-04-01 DIAGNOSIS — M54.2 CERVICALGIA: ICD-10-CM

## 2024-04-01 DIAGNOSIS — M79.18 MYOFASCIAL PAIN: Primary | ICD-10-CM

## 2024-04-01 PROCEDURE — 97140 MANUAL THERAPY 1/> REGIONS: CPT | Mod: GP | Performed by: PHYSICAL THERAPIST

## 2024-04-01 PROCEDURE — 97035 APP MDLTY 1+ULTRASOUND EA 15: CPT | Mod: GP | Performed by: PHYSICAL THERAPIST

## 2024-04-01 PROCEDURE — 97110 THERAPEUTIC EXERCISES: CPT | Mod: GP | Performed by: PHYSICAL THERAPIST

## 2024-04-02 ENCOUNTER — E-VISIT (OUTPATIENT)
Dept: FAMILY MEDICINE | Facility: CLINIC | Age: 49
End: 2024-04-02
Payer: COMMERCIAL

## 2024-04-02 DIAGNOSIS — H10.30 ACUTE CONJUNCTIVITIS, UNSPECIFIED ACUTE CONJUNCTIVITIS TYPE, UNSPECIFIED LATERALITY: Primary | ICD-10-CM

## 2024-04-02 PROCEDURE — 99207 PR NO CHARGE LOS: CPT | Performed by: FAMILY MEDICINE

## 2024-04-02 RX ORDER — OFLOXACIN 3 MG/ML
SOLUTION/ DROPS OPHTHALMIC
Qty: 5 ML | Refills: 0 | Status: SHIPPED | OUTPATIENT
Start: 2024-04-02 | End: 2024-04-09

## 2024-04-02 NOTE — PATIENT INSTRUCTIONS
Thank you for choosing us for your care. I have placed an order for a prescription so that you can start treatment. View your full visit summary for details by clicking on the link below. Your pharmacist will able to address any questions you may have about the medication.     If you re not feeling better within 2-3 days, please schedule an appointment.  You can schedule an appointment right here in [facility], or call [phone]  If the visit is for the same symptoms as your eVisit, we ll refund the cost of your eVisit if seen within seven days.    Pinkeye: Care Instructions  Overview     Pinkeye is redness and swelling of the eye surface and the conjunctiva (the lining of the eyelid and the covering of the white part of the eye). Pinkeye is also called conjunctivitis. Pinkeye is often caused by infection with bacteria or a virus. Dry air, allergies, smoke, and chemicals are other common causes.  Pinkeye often gets better on its own in 7 to 10 days. Antibiotics only help if the pinkeye is caused by bacteria. Pinkeye caused by infection spreads easily. If an allergy or chemical is causing pinkeye, it will not go away unless you can avoid whatever is causing it.  Follow-up care is a key part of your treatment and safety. Be sure to make and go to all appointments, and call your doctor if you are having problems. It's also a good idea to know your test results and keep a list of the medicines you take.  How can you care for yourself at home?  Wash your hands often. Always wash them before and after you treat pinkeye or touch your eyes or face.  Use moist cotton or a clean, wet cloth to remove crust. Wipe from the inside corner of the eye to the outside. Use a clean part of the cloth for each wipe.  Put cold or warm wet cloths on your eye a few times a day if the eye hurts.  Do not wear contact lenses or eye makeup until the pinkeye is gone. Throw away any eye makeup you were using when you got pinkeye. Clean your  "contacts and storage case. If you wear disposable contacts, use a new pair when your eye has cleared and it is safe to wear contacts again.  If the doctor gave you antibiotic ointment or eyedrops, use them as directed. Use the medicine for as long as instructed, even if your eye starts looking better soon. Keep the bottle tip clean, and do not let it touch the eye area.  To put in eyedrops or ointment:  Tilt your head back, and pull your lower eyelid down with one finger.  Drop or squirt the medicine inside the lower lid.  Close your eye for 30 to 60 seconds to let the drops or ointment move around.  Do not touch the ointment or dropper tip to your eyelashes or any other surface.  Do not share towels, pillows, or washcloths while you have pinkeye.  When should you call for help?   Call your doctor now or seek immediate medical care if:    You have pain in your eye, not just irritation on the surface.     You have a change in vision or loss of vision.     You have an increase in discharge from the eye.     Your eye has not started to improve or begins to get worse within 48 hours after you start using antibiotics.     Pinkeye lasts longer than 7 days.   Watch closely for changes in your health, and be sure to contact your doctor if you have any problems.  Where can you learn more?  Go to https://www.[employer].net/patiented  Enter Y392 in the search box to learn more about \"Pinkeye: Care Instructions.\"  Current as of: June 5, 2023               Content Version: 14.0    9328-0221 [employer].   Care instructions adapted under license by your healthcare professional. If you have questions about a medical condition or this instruction, always ask your healthcare professional. [employer] disclaims any warranty or liability for your use of this information.      "

## 2024-04-04 ENCOUNTER — NURSE TRIAGE (OUTPATIENT)
Dept: FAMILY MEDICINE | Facility: CLINIC | Age: 49
End: 2024-04-04
Payer: COMMERCIAL

## 2024-04-04 NOTE — TELEPHONE ENCOUNTER
Nurse Triage SBAR    Is this a 2nd Level Triage? NO    Situation: rectal pain and bleeding    Background: Patient with history of MS and constipation.  States she took stool softener last evening and ate 1 prune as she had no BM yesterday.  This morning she had a soft brown stool but it took some straining to pass it.  Later on in the morning today she had another soft, brown stool which also took some straining to pass.  She noted a small amount of blood on toilet paper when she wiped.  No rectal lump noted, rectal pain continues when she walks.     Assessment: Possible hemorrhoid.  Denies dizziness, weakness, shortness of breath, black stools.     Protocol Recommended Disposition:   Home Care, See More Appropriate Protocol    Recommendation: She is currently driving, unable to schedule appt.  Discussed sitz baths, not straining, drinking plenty of water and use of over the counter hemorrhoid products.  She is to make an appointment to follow up on the rectal pain and bleeding.       Does the patient meet one of the following criteria for ADS visit consideration? 16+ years old, with an [facility] PCP     TIP  Providers, please consider if this condition is appropriate for management at one of our Acute and Diagnostic Services sites.     If patient is a good candidate, please use dotphrase <dot>triageresponse and select Refer to ADS to document.      Reason for Disposition   Rectal symptoms   Mild rectal pain    Additional Information   Negative: Passed out (i.e., fainted, collapsed and was not responding)   Negative: Shock suspected (e.g., cold/pale/clammy skin, too weak to stand, low BP, rapid pulse)   Negative: Vomiting red blood or black (coffee ground) material   Negative: Sounds like a life-threatening emergency to the triager   Negative: Diarrhea is main symptom   Negative: Sounds like a life-threatening emergency to the triager   Negative: Diarrhea is main symptom   Negative: Constipation is main symptom (e.g.,  pain or discomfort caused by passage of hard BMs)   Negative: Blood in or on bowel movement is main symptom   Negative: MPOX SUSPECTED (e.g., direct skin contact such as sex, recent travel to [place] or [place]) and any SYMPTOMS OF MPOX (e.g., rash, fever, muscle aches, or swollen lymph nodes)   Negative: At risk for Mpox (men-who-have-sex-with-men) and possible exposure (e.g., multiple sex partners in past 21 days) and ANY SYMPTOMS OF MPOX (e.g., rash, fever, muscle aches, or swollen lymph nodes)   Negative: Sexual assault or rape (sexual intercourse or activity occurs without freely given consent), known or suspected   Negative: Injury to rectum   Negative: Patient sounds very sick or weak to the triager   Negative: Severe rectal pain   Negative: Rectal pain or redness and fever > 100.4 F (38.0 C)   Negative: Acute onset rectal pain and constipation (straining with rectal pressure or fullness), which is not relieved by Sitz bath or suppository   Negative: MODERATE-SEVERE rectal pain (i.e., interferes with school, work, or sleep)   Negative: MODERATE-SEVERE rectal itching (i.e., interferes with school, work, or sleep)   Negative: Last bowel movement (BM) > 4 days ago   Negative: Rectal area looks infected (e.g., draining sore, spreading redness)   Negative: Rash of rectal area (e.g., open sore, painful tiny water blisters, unexplained bumps)   Negative: Patient is worried they have a sexually transmitted infection (STI)   Negative: Home treatment > 3 days for rectal pain and not improved   Negative: Home treatment for > 3 days for rectal itching and not improved   Negative: Patient wants to be seen   Negative: Recurrent episodes of unexplained rectal pain, but NO rectal symptoms now   Negative: Painless lump in rectal area    Protocols used: Rectal Bleeding-A-OH, Rectal Symptoms-A-OH

## 2024-04-04 NOTE — TELEPHONE ENCOUNTER
Situation   Patient calls back.     Assessment   Earlier lower left abdomen pain - cramping     The patient states she just had  another bm   Stool was formed and small- patient states 2 inches   No blood in the toilet or when wiping but there was a small line of red down the side of the stool.     Pain in her left lower abdomen went away after bm. Now the pain is moderate in her left side- where her arms sit at her side.     Patient states she thinks it may be because she was so backed up and ate a large salad and raw veggies- last night..     Writer had patient walk around and she said the left side pain was diminishing   Patient feels like she has to have another bm  Patient is going to see what happens over the next couple hours. Advised can call back if needed   Recommendations   Abdomen massage   Drink plenty of water  Heating pad to abdomen for 20 minutes 3-5 times a day   Walking around often .    Advised of protocol dispositions when to be seen. Explained  same day if abdominal pain > 2 hours, explained moderate rectal bleeding.     Patient states understanding and knows she is aware of UC hours and locations.      Reason for Disposition   Normal formed BM (bowel movement) with a few streaks or drops of blood on surface of BM    Additional Information   Negative: SEVERE rectal bleeding (large blood clots; constant or on and off bleeding)   Negative: SEVERE dizziness (e.g., unable to stand, requires support to walk, feels like passing out now)   Negative: MODERATE rectal bleeding (small blood clots, passing blood without stool, or toilet water turns red) more than once a day   Negative: Bloody, black, or tarry bowel movements  (Exception: Chronic-unchanged black-grey bowel movements and is taking iron pills or Pepto-Bismol.)   Negative: High-risk adult (e.g., prior surgery on aorta, abdominal aortic aneurysm)   Negative: Rectal foreign body (inserted or swallowed)   Negative: SEVERE abdominal pain (e.g.,  "excruciating)   Negative: Constant abdominal pain lasting > 2 hours   Negative: Pale skin (pallor) of new-onset or worsening   Negative: Patient sounds very sick or weak to the triager   Negative: MODERATE rectal bleeding (small blood clots, passing blood without stool, or toilet water turns red)   Negative: Taking Coumadin (warfarin) or other strong blood thinner, or known bleeding disorder (e.g., thrombocytopenia)   Negative: Colonoscopy in past 72 hours   Negative: Known cirrhosis of the liver (or history of liver failure or ascites)   Negative: Patient wants to be seen   Negative: MILD rectal bleeding (more than just a few drops or streaks)     \"One small streak in the side of a small bowel movement\"   Negative: Cancer of rectum or intestines (colon)   Negative: Radiation therapy to lower abdomen or pelvis   Negative: Rectal bleeding is minimal (e.g., blood just on toilet paper, a few drops in toilet bowl), and bleeding recurs 3 or more times using Care Advice   Negative: Rectal bleeding is a chronic symptom (recurrent or ongoing AND present > 4 weeks)   Negative: Family history of cancer of intestines   Negative: Age > 50 years   Negative: No doctor (or NP/PA) exam for rectal bleeding in past year    Protocols used: Rectal Bleeding-A-OH    "

## 2024-04-09 ENCOUNTER — ANCILLARY PROCEDURE (OUTPATIENT)
Dept: GENERAL RADIOLOGY | Facility: CLINIC | Age: 49
End: 2024-04-09
Attending: INTERNAL MEDICINE
Payer: COMMERCIAL

## 2024-04-09 ENCOUNTER — E-VISIT (OUTPATIENT)
Dept: FAMILY MEDICINE | Facility: CLINIC | Age: 49
End: 2024-04-09
Payer: COMMERCIAL

## 2024-04-09 ENCOUNTER — OFFICE VISIT (OUTPATIENT)
Dept: PEDIATRICS | Facility: CLINIC | Age: 49
End: 2024-04-09
Payer: COMMERCIAL

## 2024-04-09 ENCOUNTER — NURSE TRIAGE (OUTPATIENT)
Dept: FAMILY MEDICINE | Facility: CLINIC | Age: 49
End: 2024-04-09

## 2024-04-09 VITALS
BODY MASS INDEX: 41.6 KG/M2 | SYSTOLIC BLOOD PRESSURE: 123 MMHG | RESPIRATION RATE: 20 BRPM | HEART RATE: 87 BPM | DIASTOLIC BLOOD PRESSURE: 82 MMHG | WEIGHT: 250 LBS | OXYGEN SATURATION: 100 % | TEMPERATURE: 98.3 F

## 2024-04-09 DIAGNOSIS — J45.41 MODERATE PERSISTENT ASTHMA WITH ACUTE EXACERBATION: Primary | ICD-10-CM

## 2024-04-09 DIAGNOSIS — J45.901 EXACERBATION OF PERSISTENT ASTHMA, UNSPECIFIED ASTHMA SEVERITY: Primary | ICD-10-CM

## 2024-04-09 DIAGNOSIS — M62.838 MUSCLE SPASM: ICD-10-CM

## 2024-04-09 DIAGNOSIS — J45.41 MODERATE PERSISTENT ASTHMA WITH ACUTE EXACERBATION: ICD-10-CM

## 2024-04-09 LAB
BASOPHILS # BLD AUTO: ABNORMAL 10*3/UL
BASOPHILS # BLD MANUAL: 0 10E3/UL (ref 0–0.2)
BASOPHILS NFR BLD AUTO: ABNORMAL %
BASOPHILS NFR BLD MANUAL: 0 %
EOSINOPHIL # BLD AUTO: ABNORMAL 10*3/UL
EOSINOPHIL # BLD MANUAL: 0 10E3/UL (ref 0–0.7)
EOSINOPHIL NFR BLD AUTO: ABNORMAL %
EOSINOPHIL NFR BLD MANUAL: 0 %
ERYTHROCYTE [DISTWIDTH] IN BLOOD BY AUTOMATED COUNT: 14.1 % (ref 10–15)
HCT VFR BLD AUTO: 36.6 % (ref 35–47)
HGB BLD-MCNC: 12 G/DL (ref 11.7–15.7)
IMM GRANULOCYTES # BLD: ABNORMAL 10*3/UL
IMM GRANULOCYTES NFR BLD: ABNORMAL %
LYMPHOCYTES # BLD AUTO: ABNORMAL 10*3/UL
LYMPHOCYTES # BLD MANUAL: 5.7 10E3/UL (ref 0.8–5.3)
LYMPHOCYTES NFR BLD AUTO: ABNORMAL %
LYMPHOCYTES NFR BLD MANUAL: 48 %
MCH RBC QN AUTO: 30.6 PG (ref 26.5–33)
MCHC RBC AUTO-ENTMCNC: 32.8 G/DL (ref 31.5–36.5)
MCV RBC AUTO: 93 FL (ref 78–100)
MONOCYTES # BLD AUTO: ABNORMAL 10*3/UL
MONOCYTES # BLD MANUAL: 0.6 10E3/UL (ref 0–1.3)
MONOCYTES NFR BLD AUTO: ABNORMAL %
MONOCYTES NFR BLD MANUAL: 5 %
NEUTROPHILS # BLD AUTO: ABNORMAL 10*3/UL
NEUTROPHILS # BLD MANUAL: 5.6 10E3/UL (ref 1.6–8.3)
NEUTROPHILS NFR BLD AUTO: ABNORMAL %
NEUTROPHILS NFR BLD MANUAL: 47 %
NRBC # BLD AUTO: 0 10E3/UL
NRBC BLD AUTO-RTO: 0 /100
PLAT MORPH BLD: ABNORMAL
PLATELET # BLD AUTO: 305 10E3/UL (ref 150–450)
RBC # BLD AUTO: 3.92 10E6/UL (ref 3.8–5.2)
RBC MORPH BLD: ABNORMAL
WBC # BLD AUTO: 11.9 10E3/UL (ref 4–11)

## 2024-04-09 PROCEDURE — 99214 OFFICE O/P EST MOD 30 MIN: CPT | Mod: 25 | Performed by: INTERNAL MEDICINE

## 2024-04-09 PROCEDURE — 85027 COMPLETE CBC AUTOMATED: CPT | Performed by: INTERNAL MEDICINE

## 2024-04-09 PROCEDURE — 85007 BL SMEAR W/DIFF WBC COUNT: CPT | Performed by: INTERNAL MEDICINE

## 2024-04-09 PROCEDURE — 71046 X-RAY EXAM CHEST 2 VIEWS: CPT | Mod: TC | Performed by: INTERNAL MEDICINE

## 2024-04-09 PROCEDURE — 94640 AIRWAY INHALATION TREATMENT: CPT | Performed by: INTERNAL MEDICINE

## 2024-04-09 PROCEDURE — 36415 COLL VENOUS BLD VENIPUNCTURE: CPT | Performed by: INTERNAL MEDICINE

## 2024-04-09 PROCEDURE — 99207 PR NON-BILLABLE SERV PER CHARTING: CPT | Performed by: PHYSICIAN ASSISTANT

## 2024-04-09 PROCEDURE — 96374 THER/PROPH/DIAG INJ IV PUSH: CPT | Performed by: INTERNAL MEDICINE

## 2024-04-09 RX ORDER — METHYLPREDNISOLONE SODIUM SUCCINATE 40 MG/ML
60 INJECTION, POWDER, LYOPHILIZED, FOR SOLUTION INTRAMUSCULAR; INTRAVENOUS ONCE
Status: DISCONTINUED | OUTPATIENT
Start: 2024-04-09 | End: 2024-04-09

## 2024-04-09 RX ORDER — METHYLPREDNISOLONE SODIUM SUCCINATE 125 MG/2ML
62.5 INJECTION, POWDER, LYOPHILIZED, FOR SOLUTION INTRAMUSCULAR; INTRAVENOUS ONCE
Status: COMPLETED | OUTPATIENT
Start: 2024-04-09 | End: 2024-04-09

## 2024-04-09 RX ORDER — PREDNISONE 20 MG/1
TABLET ORAL
Qty: 20 TABLET | Refills: 0 | Status: SHIPPED | OUTPATIENT
Start: 2024-04-09 | End: 2024-04-23

## 2024-04-09 RX ORDER — ALBUTEROL SULFATE 0.83 MG/ML
2.5 SOLUTION RESPIRATORY (INHALATION)
Status: DISCONTINUED | OUTPATIENT
Start: 2024-04-09 | End: 2024-04-23

## 2024-04-09 RX ORDER — ALBUTEROL SULFATE 0.83 MG/ML
SOLUTION RESPIRATORY (INHALATION)
Qty: 75 ML | Refills: 0 | Status: SHIPPED | OUTPATIENT
Start: 2024-04-09

## 2024-04-09 RX ADMIN — ALBUTEROL SULFATE 2.5 MG: 0.83 SOLUTION RESPIRATORY (INHALATION) at 12:50

## 2024-04-09 RX ADMIN — METHYLPREDNISOLONE SODIUM SUCCINATE 62.5 MG: 125 INJECTION INTRAMUSCULAR; INTRAVENOUS at 13:08

## 2024-04-09 NOTE — PROGRESS NOTES
"  ASSESSMENT:     1.  Moderately severe asthma with acute exacerbation, likely triggered by aspiration of water 2 days ago.  No suggestion of pneumonia.  Rule out infectious process, likely viral.  - patient is some better after neb in office.     - she wished to continue neb treatments at home rather than receiving 2nd neb in the clinic, which is reasonable.  2.  Dietary calcium insufficiency.   Muscle contractures in hands may benefit from oral calcium intake.      PLAN:  1.  Prednisone course with taper.   May taper more quickly if and only if symptoms improve rapidly.   Patient has received a lot of oral steroids, and would like to limit future exposure if she responds quickly.   She has done this on her own in the past.    2.  Compare albuterol nebs (refill given) to Duonebs for home treatment.  3.  Start at least 500-600 mg of calcium daily for bones and hand muscle contractions.  If no benefit, consider increase to 1505-1593 mg daily    Greater than 30 minutes were spent on chart review, patient care and assessment, and other management of this patient for this visit.           Richie An is a 48 year old, presenting for the following health issues:  Breathing Problem    HPI     Shortness of Breath/Breathing Problem  Onset/Duration: X 3 days  Progression of symptoms: worsening  Accompanying signs and symptoms:       SOB at rest: YES- \"sometime, worse with talking\"       SOB with activity: YES       Pain with inspiration: YES       Cough: YES- with deep breaths, cause tickle and cough, sometimes dry and other times productive       Pink tinged sputum: No       Sweating: No       Nausea/vomiting: No       Lightheadedness: No       Palpitations: No       Fever/chills: No       Heartburn: No  History   Family history of coagulation disorders: YES- Paternal grandmother with \"blood clots\"  Tobacco use: No  Previous similar symptoms: YES- asthma flare ups  Precipitating factors:  Related to eating: " "No  Better with burping: No  Therapies tried and outcome: \"the wind and the rain hit me, and went into my mouth at the same time.  An hour later I felt like I needed my inhaler.  I feel like I'm not getting enough relief with my inhaler, Duo neb helped the most.  This helped with sleep, but temporary relief.\"    Patient uses simbicort twice daily and usually on needs her albuterol rescue for exercise, occas with seasonal change.   Was shopping 2 days ago and when getting in care was hit in mouth with marline of wind and water, inhaled this.   Initially OK, but an hour or two later noted asthma flaring.   Used inhaler but still tight.   Did Duoneb for home rescue, helped (gives her a headache usually).  Woke yesterday with wheezing and tightness.  Another duoneb helped.   Developed more pressure on her chest, which is typical flare symptoms for her.  Fatigued.   No better today.  Dyspneic, though home O2 sat 91% (never goes below 90).   Contacted provider, and referred to [facility].  Asthma since young, on steroids many times.   In past, gets energy from med, but no significant side effects.    No ENT symptoms.   Chest pressure, some aching as per usual flare.  No cardiac symptoms, swollen ankles  Tested for COVID, negative.  No contacts are ill.          ROS otherwise negative      Objective    /82 (BP Location: Right arm, Patient Position: Chair, Cuff Size: Adult Large)   Pulse 87   Temp 98.3  F (36.8  C) (Oral)   Resp 20   Wt 113.4 kg (250 lb)   LMP  (LMP Unknown)   SpO2 100%   BMI 41.60 kg/m    Body mass index is 41.6 kg/m .  Physical Exam   GENERAL: alert and no distress  NECK: no adenopathy, no asymmetry, masses, or scars  RESP: Decreased breath sounds especially lower lung fields.  No rales , no rhonchi, no wheezes, and prolonged expiratory phase  CV: regular pulse  MS: no gross musculoskeletal defects noted, no edema    Results for orders placed or performed in visit on 04/09/24 (from the past 24 hour(s)) "   CBC with platelets differential    Narrative    The following orders were created for panel order CBC with platelets differential.  Procedure                               Abnormality         Status                     ---------                               -----------         ------                     CBC with platelets and d...[[id number]]  Abnormal            Final result               Manual Differential[[id number]]          Abnormal            Final result                 Please view results for these tests on the individual orders.   CBC with platelets and differential   Result Value Ref Range    WBC Count 11.9 (H) 4.0 - 11.0 10e3/uL    RBC Count 3.92 3.80 - 5.20 10e6/uL    Hemoglobin 12.0 11.7 - 15.7 g/dL    Hematocrit 36.6 35.0 - 47.0 %    MCV 93 78 - 100 fL    MCH 30.6 26.5 - 33.0 pg    MCHC 32.8 31.5 - 36.5 g/dL    RDW 14.1 10.0 - 15.0 %    Platelet Count 305 150 - 450 10e3/uL    % Neutrophils      % Lymphocytes      % Monocytes      % Eosinophils      % Basophils      % Immature Granulocytes      NRBCs per 100 WBC 0 <1 /100    Absolute Neutrophils      Absolute Lymphocytes      Absolute Monocytes      Absolute Eosinophils      Absolute Basophils      Absolute Immature Granulocytes      Absolute NRBCs 0.0 10e3/uL   Manual Differential   Result Value Ref Range    % Neutrophils 47 %    % Lymphocytes 48 %    % Monocytes 5 %    % Eosinophils 0 %    % Basophils 0 %    Absolute Neutrophils 5.6 1.6 - 8.3 10e3/uL    Absolute Lymphocytes 5.7 (H) 0.8 - 5.3 10e3/uL    Absolute Monocytes 0.6 0.0 - 1.3 10e3/uL    Absolute Eosinophils 0.0 0.0 - 0.7 10e3/uL    Absolute Basophils 0.0 0.0 - 0.2 10e3/uL    RBC Morphology Confirmed RBC Indices     Platelet Assessment  Automated Count Confirmed. Platelet morphology is normal.     Automated Count Confirmed. Platelet morphology is normal.     *Note: Due to a large number of results and/or encounters for the requested time period, some results have not been displayed. A  complete set of results can be found in Results Review.       CXR negative     Signed Electronically by: Jose Roberto Olvera MD

## 2024-04-09 NOTE — TELEPHONE ENCOUNTER
I do not have any availability today so recommend seeing one of my partners in clinic today or [facility] if clinically appropriate.  Would like to avoid urgent care as I do not want her waiting in the waiting room

## 2024-04-09 NOTE — PATIENT INSTRUCTIONS
PLAN:  1.  Prednisone course with taper.   May taper more quickly if and only if symptoms improve rapidly  2.  Compare albuterol nebs (refill given) to Duonebs for home treatment.  3.  Start at least 500-600 mg of calcium daily for bones and hand muscle contractions.  If no benefit, consider increase to 0853-3260 mg daily

## 2024-04-09 NOTE — TELEPHONE ENCOUNTER
Huddled with pcp, will ask Dr. Basilio and Bronwyn if ok to use poa.   2 morning,  poa time slots have passed.   Sending epic message.

## 2024-04-09 NOTE — TELEPHONE ENCOUNTER
"Situation   Patient called to update information for the evisit that she submitted today.     Patient feels she is having a asthma flare  Background    MS - infusions    Assessment    2 days ago patient states it was so windy and when getting gas she inhaled a huge marline of wind and rain water.     yesterday she started feeling winded and has had intermittent wheezing.     chest muscles feel heavy with deep breathing.     Started the neb due to feeling tight and inhaler was not relieving the symptom    Dry cough started after starting a neb, occasionally yellow sputum is coming up.     Mild SOB \"when I getting doing too many things or talking for long periods of time\"    Low grade temp today 99.1     Using duoneb - relief for about 2 hours.   Home 02 reading 92% and heart rate is 81    Covid was neg via a home test yesterday.   No one else around her is sick.   No runny nose or sore throat, No GI symptoms.     Recommendations   Advised will send updated information to pcp for review.   Staff will call her back or provider will respond via evisit.     Reason for Disposition   Previous asthma attacks and this feels like asthma attack   Oxygen level (e.g., pulse oximetry) 91 to 94%    Additional Information   Negative: SEVERE asthma attack (e.g., struggling for each breath, speaks in single words, loud wheezes, pulse >120) (RED Zone)   Negative: Bluish (or gray) lips or face   Negative: Difficult to awaken or acting confused (e.g., disoriented, slurred speech)   Negative: Passed out (i.e., fainted, collapsed and was not responding)   Negative: Wheezing started suddenly after medicine, an allergic food, or bee sting   Negative: Sounds like a life-threatening emergency to the triager   Negative: MODERATE asthma attack (e.g., SOB at rest, speaks in phrases, audible wheezes) AND doesn't have neb or inhaler available   Negative: Peak flow rate less than 50% of baseline level (RED Zone)   Negative: Oxygen level (e.g., pulse " oximetry) 90% or lower   Negative: Hospitalized before with asthma; now feels same   Negative: MODERATE asthma attack (e.g., SOB at rest, speaks in phrases, audible wheezes) and not resolved after 2 or 3 inhaler or nebulizer treatments given 20 minutes apart   Negative: Peak flow rate 50-79% of baseline level (YELLOW zone) after using 2 or 3 inhaler nebulizer treatments given 20 minutes) apart   Negative: Patient sounds very sick or weak to the triager   Negative: Quick-relief asthma medicine (e.g., albuterol /salbutamol, levalbuterol by inhaler or nebulizer) is needed more frequently than every 4 hours to keep you comfortable   Negative: Bluish (or gray) lips or face   Negative: SEVERE difficulty breathing (e.g., struggling for each breath, speaks in single words)   Negative: Rapid onset of cough and has hives   Negative: Coughing started suddenly after medicine, an allergic food or bee sting   Negative: Difficulty breathing after exposure to flames, smoke, or fumes   Negative: Sounds like a life-threatening emergency to the triager   Negative: Dry cough (non-productive; no sputum or minimal clear sputum) and within 14 days of COVID-19 Exposure   Negative: MODERATE difficulty breathing (e.g., speaks in phrases, SOB even at rest, pulse 100-120) and still present when not coughing   Negative: Passed out (i.e., fainted, collapsed and was not responding)   Negative: MILD difficulty breathing (e.g., minimal/no SOB at rest, SOB with walking, pulse <100) and still present when not coughing   Negative: Coughed up > 1 tablespoon (15 ml) blood (Exception: Blood-tinged sputum.)   Negative: Fever > 101 F (38.3 C) and over 60 years of age   Negative: Fever > 100.0 F (37.8 C) and has diabetes mellitus or a weak immune system (e.g., HIV positive, cancer chemotherapy, organ transplant, splenectomy, chronic steroids)   Negative: Fever > 100.0 F (37.8 C) and bedridden (e.g., CVA, chronic illness, recovering from surgery)   Negative:  Increasing ankle swelling   Negative: Coughing up adelaida-colored (reddish-brown) or blood-tinged sputum   Negative: Patient sounds very sick or weak to the triager   Commented on: Chest pain present when not coughing     chest muscles feel heavy with deep breathing.   Commented on: Wheezing is present     Not currently, states intermittent since yesterday.   Commented on: Chest pain  (Exception: Mild chest tightness that feels the same as prior asthma attacks.)     Patient state the tightness feels like an asthma flare.    Protocols used: Cough-A-OH, Asthma Attack-A-OH

## 2024-04-09 NOTE — PATIENT INSTRUCTIONS
Dear Nataliya,    We are sorry you are not feeling well. Based on the responses you provided, it is recommended that you be seen in-person in clinic by either the acute diagnostic service Center or in the prior clinic if we have any clinic availability with my partners as I am unfortunately full today.  I have asked my triage team to call you to assist with facilitating.     Otherwise, you can call [phone] to schedule an appointment.     You will not be charged for this eVisit. Thank you for trusting us with your care.     Miya Crump PA-C

## 2024-04-09 NOTE — TELEPHONE ENCOUNTER
Called [facility] - stated they will take pt at [phone]       Called #   Telephone Information:   Mobile [phone]     Advised pt on the information below and above     Patient stated an understanding and agreed with plan.        Amelia Chance RN, BSN  [facility]

## 2024-04-11 ENCOUNTER — NURSE TRIAGE (OUTPATIENT)
Dept: FAMILY MEDICINE | Facility: CLINIC | Age: 49
End: 2024-04-11
Payer: COMMERCIAL

## 2024-04-11 NOTE — TELEPHONE ENCOUNTER
Patient calling stating she was recently seen for a URI.  Patient concerned that she is losing her voice.  Noted some laryngitis during call.  Advised that some people can develop laryngitis with some viral infections and that this is not uncommon.      Patient stated she has been having difficulty using the albuterol nebs due to getting so shaky that it makes it hard for her to function.   Patient has duonebs at home as well and wonders if she can use them in place of albuterol nebs.  Noted in office visit notes that the provider mentioned in the plan to compare albuterol nebs to duonebs for home treatment.  Patient was relieved to hear as she thinks they work better than albuterol to open her airways.        Reason for Disposition   Hoarseness lasts < 2 weeks    Additional Information   Negative: SEVERE difficulty breathing (e.g., struggling for each breath, speaks in single words)   Negative: Bluish (or gray) lips or face now   Negative: Stridor with difficulty breathing   Negative: Started suddenly after sting from bee, wasp, or yellow jacket   Negative: Started suddenly after taking a medicine or allergic food (e.g., nuts, seafood)   Negative: Started suddenly along with widespread hives   Negative: Tongue or facial swelling   Negative: Can't swallow normal secretions (e.g., drooling or spitting)   Negative: Sounds like a life-threatening emergency to the triager   Negative: Nasal allergies also present and they are acting up   Negative: Cold symptoms are main concern   Negative: Sore throat is main symptom   Negative: Recovered from choking episode and hoarseness lasts > 30 minutes   Negative: Direct blow to front of neck   Negative: Hoarseness starting in past 24 hours AND taking an ACE Inhibitor medicine (e.g., benazepril/LOTENSIN, captopril/CAPOTEN, enalapril/VASOTEC, lisinopril/ZESTRIL)   Negative: Difficulty breathing   Negative: Patient sounds very sick or weak to the triager   Negative: Fever > 103 F  "(39.4 C)   Negative: SEVERE sore throat pain   Negative: Fever present > 3 days (72 hours)   Negative: Hoarseness starting > 24 hours ago AND taking an ACE Inhibitor medicine (e.g., benazepril/LOTENSIN, captopril/CAPOTEN, enalapril/VASOTEC, lisinopril/ZESTRIL)   Negative: MODERATE to SEVERE hoarseness (e.g., interferes with work) and is professional voice user (e.g., call center worker, cullen, teacher) (Exception: Current common cold or mild URI symptoms.)   Negative: Hoarseness and risk factor (e.g., recent neck surgery or intubation, smoker, unexpected weight loss) (Exception: Current common cold or mild URI symptoms.)   Negative: Hoarseness and swollen lymph node or lump in neck (Exception: Current common cold or mild URI symptoms.)   Negative: Sore throat lasts > 5 days   Negative: Patient wants to be seen   Negative: Hoarseness persists > 2 weeks    Answer Assessment - Initial Assessment Questions  1. DESCRIPTION: \"Describe your voice.\" (e.g., coarse, raspy, weaker, airy, scratchy, deeper)      Coarse, raspy, weaker  2. SEVERITY: \"How bad is it?\"    - MILD: doesn't interfere with normal activities    - MODERATE: interferes with normal activities such as school or work    - SEVERE: only able to whisper.      moderate  3. ONSET: \"When did the hoarseness begin?\"      today  4. COUGH: \"Is there a cough?\" If Yes, ask: \"How bad is it?\"      no  5. FEVER: \"Do you have a fever?\" If Yes, ask: \"What is your temperature, how was it measured, and when did it start?\"      no  6. ALLERGIES: \"Any allergy symptoms?\" If Yes, ask: \"What are they?\"      no  7. IRRITANTS: \"Do you smoke?\" \"Have you been exposed to any irritating fumes?\" (e.g., smoke)      no  8. CAUSE: \"What do you think is causing the hoarseness?\"      URI  9. OTHER SYMPTOMS: \"Do you have any other symptoms?\" (e.g., breathing difficulty, fever, foreign body, lymph node swelling in neck, rash, sore throat, weight loss)      no  10. PREGNANCY: \"Is there any chance " "you are pregnant?\" \"When was your last menstrual period?\"        no    Protocols used: [id number]    "

## 2024-04-12 ENCOUNTER — INFUSION THERAPY VISIT (OUTPATIENT)
Dept: INFUSION THERAPY | Facility: CLINIC | Age: 49
End: 2024-04-12
Attending: PSYCHIATRY & NEUROLOGY
Payer: COMMERCIAL

## 2024-04-12 ENCOUNTER — THERAPY VISIT (OUTPATIENT)
Dept: PHYSICAL THERAPY | Facility: CLINIC | Age: 49
End: 2024-04-12
Payer: COMMERCIAL

## 2024-04-12 VITALS
SYSTOLIC BLOOD PRESSURE: 127 MMHG | HEART RATE: 83 BPM | DIASTOLIC BLOOD PRESSURE: 79 MMHG | OXYGEN SATURATION: 100 % | TEMPERATURE: 98.3 F

## 2024-04-12 DIAGNOSIS — G35 MULTIPLE SCLEROSIS (H): Primary | ICD-10-CM

## 2024-04-12 DIAGNOSIS — M79.18 MYOFASCIAL PAIN: Primary | ICD-10-CM

## 2024-04-12 DIAGNOSIS — M54.2 CERVICALGIA: ICD-10-CM

## 2024-04-12 PROCEDURE — 96365 THER/PROPH/DIAG IV INF INIT: CPT

## 2024-04-12 PROCEDURE — 97140 MANUAL THERAPY 1/> REGIONS: CPT | Mod: GP | Performed by: PHYSICAL THERAPIST

## 2024-04-12 PROCEDURE — 258N000003 HC RX IP 258 OP 636: Performed by: PSYCHIATRY & NEUROLOGY

## 2024-04-12 PROCEDURE — 250N000011 HC RX IP 250 OP 636: Mod: JZ | Performed by: PSYCHIATRY & NEUROLOGY

## 2024-04-12 PROCEDURE — 97035 APP MDLTY 1+ULTRASOUND EA 15: CPT | Mod: GP | Performed by: PHYSICAL THERAPIST

## 2024-04-12 PROCEDURE — 97110 THERAPEUTIC EXERCISES: CPT | Mod: GP | Performed by: PHYSICAL THERAPIST

## 2024-04-12 RX ORDER — DIPHENHYDRAMINE HYDROCHLORIDE 50 MG/ML
50 INJECTION INTRAMUSCULAR; INTRAVENOUS
Status: CANCELLED
Start: 2024-04-26

## 2024-04-12 RX ORDER — METHYLPREDNISOLONE SODIUM SUCCINATE 125 MG/2ML
125 INJECTION, POWDER, LYOPHILIZED, FOR SOLUTION INTRAMUSCULAR; INTRAVENOUS
Status: CANCELLED
Start: 2024-04-26

## 2024-04-12 RX ORDER — HEPARIN SODIUM,PORCINE 10 UNIT/ML
5-20 VIAL (ML) INTRAVENOUS DAILY PRN
Status: CANCELLED | OUTPATIENT
Start: 2024-04-26

## 2024-04-12 RX ORDER — MEPERIDINE HYDROCHLORIDE 25 MG/ML
25 INJECTION INTRAMUSCULAR; INTRAVENOUS; SUBCUTANEOUS EVERY 30 MIN PRN
Status: CANCELLED | OUTPATIENT
Start: 2024-04-26

## 2024-04-12 RX ORDER — ALBUTEROL SULFATE 0.83 MG/ML
2.5 SOLUTION RESPIRATORY (INHALATION)
Status: CANCELLED | OUTPATIENT
Start: 2024-04-26

## 2024-04-12 RX ORDER — EPINEPHRINE 1 MG/ML
0.3 INJECTION, SOLUTION INTRAMUSCULAR; SUBCUTANEOUS EVERY 5 MIN PRN
Status: CANCELLED | OUTPATIENT
Start: 2024-04-26

## 2024-04-12 RX ORDER — ALBUTEROL SULFATE 90 UG/1
1-2 AEROSOL, METERED RESPIRATORY (INHALATION)
Status: CANCELLED
Start: 2024-04-26

## 2024-04-12 RX ORDER — HEPARIN SODIUM (PORCINE) LOCK FLUSH IV SOLN 100 UNIT/ML 100 UNIT/ML
5 SOLUTION INTRAVENOUS
Status: CANCELLED | OUTPATIENT
Start: 2024-04-26

## 2024-04-12 RX ADMIN — SODIUM CHLORIDE 250 ML: 9 INJECTION, SOLUTION INTRAVENOUS at 13:18

## 2024-04-12 RX ADMIN — NATALIZUMAB 300 MG: 300 INJECTION INTRAVENOUS at 13:18

## 2024-04-15 ENCOUNTER — OFFICE VISIT (OUTPATIENT)
Dept: FAMILY MEDICINE | Facility: CLINIC | Age: 49
End: 2024-04-15
Payer: COMMERCIAL

## 2024-04-15 VITALS
WEIGHT: 253 LBS | TEMPERATURE: 98.4 F | RESPIRATION RATE: 13 BRPM | HEIGHT: 65 IN | HEART RATE: 109 BPM | SYSTOLIC BLOOD PRESSURE: 120 MMHG | DIASTOLIC BLOOD PRESSURE: 68 MMHG | OXYGEN SATURATION: 99 % | BODY MASS INDEX: 42.15 KG/M2

## 2024-04-15 DIAGNOSIS — R09.3 ABNORMAL COLOR OF SPUTUM: ICD-10-CM

## 2024-04-15 DIAGNOSIS — D72.829 LEUKOCYTOSIS, UNSPECIFIED TYPE: ICD-10-CM

## 2024-04-15 DIAGNOSIS — R25.2 CRAMPS, EXTREMITY: ICD-10-CM

## 2024-04-15 DIAGNOSIS — E55.9 HYPOVITAMINOSIS D: ICD-10-CM

## 2024-04-15 DIAGNOSIS — M79.632 PAIN OF LEFT FOREARM: ICD-10-CM

## 2024-04-15 DIAGNOSIS — R20.0 NUMBNESS OF LEFT HAND: ICD-10-CM

## 2024-04-15 DIAGNOSIS — J45.901 EXACERBATION OF PERSISTENT ASTHMA, UNSPECIFIED ASTHMA SEVERITY: ICD-10-CM

## 2024-04-15 DIAGNOSIS — R68.89 FREQUENTLY SICK: ICD-10-CM

## 2024-04-15 DIAGNOSIS — G35 MULTIPLE SCLEROSIS (H): ICD-10-CM

## 2024-04-15 DIAGNOSIS — J20.9 ACUTE BRONCHITIS WITH COEXISTING CONDITION REQUIRING PROPHYLACTIC TREATMENT: Primary | ICD-10-CM

## 2024-04-15 DIAGNOSIS — E66.01 MORBID OBESITY (H): ICD-10-CM

## 2024-04-15 LAB
ANION GAP SERPL CALCULATED.3IONS-SCNC: 10 MMOL/L (ref 7–15)
BASOPHILS # BLD AUTO: 0 10E3/UL (ref 0–0.2)
BASOPHILS NFR BLD AUTO: 0 %
BUN SERPL-MCNC: 15.3 MG/DL (ref 6–20)
CALCIUM SERPL-MCNC: 9.1 MG/DL (ref 8.6–10)
CHLORIDE SERPL-SCNC: 110 MMOL/L (ref 98–107)
CREAT SERPL-MCNC: 1.02 MG/DL (ref 0.51–0.95)
DEPRECATED HCO3 PLAS-SCNC: 22 MMOL/L (ref 22–29)
EGFRCR SERPLBLD CKD-EPI 2021: 68 ML/MIN/1.73M2
EOSINOPHIL # BLD AUTO: 0.2 10E3/UL (ref 0–0.7)
EOSINOPHIL NFR BLD AUTO: 1 %
ERYTHROCYTE [DISTWIDTH] IN BLOOD BY AUTOMATED COUNT: 14.1 % (ref 10–15)
GLUCOSE SERPL-MCNC: 87 MG/DL (ref 70–99)
HCT VFR BLD AUTO: 35.2 % (ref 35–47)
HGB BLD-MCNC: 11.2 G/DL (ref 11.7–15.7)
IMM GRANULOCYTES # BLD: 0.2 10E3/UL
IMM GRANULOCYTES NFR BLD: 2 %
LYMPHOCYTES # BLD AUTO: 6.5 10E3/UL (ref 0.8–5.3)
LYMPHOCYTES NFR BLD AUTO: 49 %
MAGNESIUM SERPL-MCNC: 2.4 MG/DL (ref 1.7–2.3)
MCH RBC QN AUTO: 30.3 PG (ref 26.5–33)
MCHC RBC AUTO-ENTMCNC: 31.8 G/DL (ref 31.5–36.5)
MCV RBC AUTO: 95 FL (ref 78–100)
MONOCYTES # BLD AUTO: 0.6 10E3/UL (ref 0–1.3)
MONOCYTES NFR BLD AUTO: 5 %
NEUTROPHILS # BLD AUTO: 5.8 10E3/UL (ref 1.6–8.3)
NEUTROPHILS NFR BLD AUTO: 43 %
PLATELET # BLD AUTO: 332 10E3/UL (ref 150–450)
POTASSIUM SERPL-SCNC: 3.9 MMOL/L (ref 3.4–5.3)
RBC # BLD AUTO: 3.7 10E6/UL (ref 3.8–5.2)
SODIUM SERPL-SCNC: 142 MMOL/L (ref 135–145)
WBC # BLD AUTO: 13.3 10E3/UL (ref 4–11)

## 2024-04-15 PROCEDURE — 80048 BASIC METABOLIC PNL TOTAL CA: CPT | Performed by: NURSE PRACTITIONER

## 2024-04-15 PROCEDURE — 99000 SPECIMEN HANDLING OFFICE-LAB: CPT | Performed by: NURSE PRACTITIONER

## 2024-04-15 PROCEDURE — 99214 OFFICE O/P EST MOD 30 MIN: CPT | Performed by: NURSE PRACTITIONER

## 2024-04-15 PROCEDURE — 36415 COLL VENOUS BLD VENIPUNCTURE: CPT | Performed by: NURSE PRACTITIONER

## 2024-04-15 PROCEDURE — 83735 ASSAY OF MAGNESIUM: CPT | Performed by: NURSE PRACTITIONER

## 2024-04-15 PROCEDURE — 85025 COMPLETE CBC W/AUTO DIFF WBC: CPT | Performed by: NURSE PRACTITIONER

## 2024-04-15 PROCEDURE — 84630 ASSAY OF ZINC: CPT | Mod: 90 | Performed by: NURSE PRACTITIONER

## 2024-04-15 PROCEDURE — 82306 VITAMIN D 25 HYDROXY: CPT | Performed by: NURSE PRACTITIONER

## 2024-04-15 RX ORDER — CEFDINIR 300 MG/1
300 CAPSULE ORAL 2 TIMES DAILY
Qty: 14 CAPSULE | Refills: 0 | Status: SHIPPED | OUTPATIENT
Start: 2024-04-15 | End: 2024-04-23

## 2024-04-15 NOTE — PROGRESS NOTES
Assessment & Plan     Acute bronchitis with coexisting condition requiring prophylactic treatment  Abnormal color of sputum  Exacerbation of persistent asthma, unspecified asthma severity  Reassuring exam no higher level of care felt to be needed.    Persisting symptoms high risk for decompensation immunocompromise will treat with a full course of antibiotics.  Complete all of her steroids.  Follow up if no improving.   Red flag symptoms discussed and if these occur present to the emergency room or call 911.   Nataliya verbalizes understanding of plan of care and is in agreement.      Cramps, extremity  Labs.  - Magnesium  - Basic metabolic panel  (Ca, Cl, CO2, Creat, Gluc, K, Na, BUN)  - Magnesium  - Basic metabolic panel  (Ca, Cl, CO2, Creat, Gluc, K, Na, BUN)    Pain of left forearm  Numbness of left hand  MS versus carpal tunnel type etiology.  Encouraged her to wear arm braces if persisting could also consider EMG to look for carpal tunnel she will keep me updated on effectiveness of arm braces    Morbid obesity (H)    Multiple sclerosis (H) - Dr. Chong - on Tysabri infusions    Leukocytosis, unspecified type    - CBC with platelets and differential  - CBC with platelets and differential    Hypovitaminosis D    - 25 OH Vit D therapy monitoring  - 25 OH Vit D therapy monitoring    Frequently sick    - Zinc  - Zinc      Return in 2 weeks (on 4/29/2024) for humberto for rayus; and left carpal tunnel splint, if symptoms persist or worsening please be seen.     Richie An is a 48 year old, presenting for the following health issues:  Musculoskeletal Problem        4/15/2024     7:23 AM   Additional Questions   Roomed by Serena CMA   Accompanied by Self     HPI     Concern - Left Forearm  Onset: x3 weeks - on and off  Description: Nerve pain top of arm-dull numbing pain-- when really bad ring and pinkie finger sometimes the thumb will start to close cool tip of fingers.   Intensity: moderate, severe - does  "wake from pain at night sometimes  Progression of Symptoms:  worsening and waxing and waning-comes in waves  Accompanying Signs & Symptoms: possible swelling - watch  is indented at times when wakes  Previous history of similar problem: Right side after having stroke  Precipitating factors:        Worsened by: use a lot or twisting arm  Alleviating factors:        Improved by: Gabapentin, stretching out  Therapies tried and outcome: Gabapentin - noticed when taking for other pain it did help      ADS - last week - given prednisone for breathing and lost voice -- still coughing up green stuff, was yellow Saturday.  Still getting winded when talking or walking   Taking DUO neb Q6H and tapering from prednisone - taking 2 per day as of now  Chest xray done - no pneumonia. CBC was high - showed viral per provider.  Sputum changed to green now which has her concerned.   History of MS/immunosuppression as well as asthma can get quite ill with bronchitis  Only took for two doses of Cefdinir in March      Left forearm numbness neurologist wondering if this is her MS versus other      Constitutional, HEENT, cardiovascular, pulmonary, GI, , musculoskeletal, neuro, skin, endocrine and psych systems are negative, except as otherwise noted in the HPI.         Objective    /68 (BP Location: Right arm, Patient Position: Chair, Cuff Size: Adult Large)   Pulse 109   Temp 98.4  F (36.9  C) (Tympanic)   Resp 13   Ht 1.651 m (5' 5\")   Wt 114.8 kg (253 lb)   LMP  (LMP Unknown)   SpO2 99%   BMI 42.10 kg/m    Body mass index is 42.1 kg/m .  Physical Exam   GENERAL: alert and no distress, very pleasant  EYES: Eyes grossly normal to inspection, PERRL and conjunctivae and sclerae normal  HENT: ear canals and TM's normal, nose and mouth without ulcers or lesions  NECK: no adenopathy, no asymmetry, masses, or scars  RESP: lungs clear to auscultation - no rales, rhonchi or wheezes  CV: regular rate and rhythm, normal S1 S2, no S3 " or S4, no murmur, click or rub, no peripheral edema  ABDOMEN: soft, nontender, no hepatosplenomegaly, no masses and bowel sounds normal  MS: no gross musculoskeletal defects noted, no edema normal ROM bilateral arms?  Mildly positive phalen negative Tinels  SKIN: no suspicious lesions or rashes  NEURO: Normal strength and tone, mentation intact and speech normal;   PSYCH: mentation appears normal, affect normal/bright  LYMPH: no cervical, supraclavicular, axillary, or inguinal adenopathy    Results for orders placed or performed in visit on 04/15/24   25 OH Vit D therapy monitoring     Status: None   Result Value Ref Range    25 OH Vitamin D2 <5 ug/L    25 OH Vitamin D3 65 ug/L    25 OH Vit D Total <70 20 - 75 ug/L    Narrative    This test was developed and its performance characteristics determined by the [facility],  Special [facility]. It has not been cleared or approved by the FDA. The laboratory is regulated under CLIA as qualified to perform high-complexity testing. This test is used for clinical purposes. It should not be regarded as investigational or for research.   Zinc     Status: None   Result Value Ref Range    Zinc, Serum/Plasma 74.5 60.0 - 120.0 ug/dL   Magnesium     Status: Abnormal   Result Value Ref Range    Magnesium 2.4 (H) 1.7 - 2.3 mg/dL   Basic metabolic panel  (Ca, Cl, CO2, Creat, Gluc, K, Na, BUN)     Status: Abnormal   Result Value Ref Range    Sodium 142 135 - 145 mmol/L    Potassium 3.9 3.4 - 5.3 mmol/L    Chloride 110 (H) 98 - 107 mmol/L    Carbon Dioxide (CO2) 22 22 - 29 mmol/L    Anion Gap 10 7 - 15 mmol/L    Urea Nitrogen 15.3 6.0 - 20.0 mg/dL    Creatinine 1.02 (H) 0.51 - 0.95 mg/dL    GFR Estimate 68 >60 mL/min/1.73m2    Calcium 9.1 8.6 - 10.0 mg/dL    Glucose 87 70 - 99 mg/dL   CBC with platelets and differential     Status: Abnormal   Result Value Ref Range    WBC Count 13.3 (H) 4.0 - 11.0 10e3/uL    RBC Count 3.70 (L) 3.80 - 5.20 10e6/uL     Hemoglobin 11.2 (L) 11.7 - 15.7 g/dL    Hematocrit 35.2 35.0 - 47.0 %    MCV 95 78 - 100 fL    MCH 30.3 26.5 - 33.0 pg    MCHC 31.8 31.5 - 36.5 g/dL    RDW 14.1 10.0 - 15.0 %    Platelet Count 332 150 - 450 10e3/uL    % Neutrophils 43 %    % Lymphocytes 49 %    % Monocytes 5 %    % Eosinophils 1 %    % Basophils 0 %    % Immature Granulocytes 2 %    Absolute Neutrophils 5.8 1.6 - 8.3 10e3/uL    Absolute Lymphocytes 6.5 (H) 0.8 - 5.3 10e3/uL    Absolute Monocytes 0.6 0.0 - 1.3 10e3/uL    Absolute Eosinophils 0.2 0.0 - 0.7 10e3/uL    Absolute Basophils 0.0 0.0 - 0.2 10e3/uL    Absolute Immature Granulocytes 0.2 <=0.4 10e3/uL   CBC with platelets and differential     Status: Abnormal    Narrative    The following orders were created for panel order CBC with platelets and differential.  Procedure                               Abnormality         Status                     ---------                               -----------         ------                     CBC with platelets and d...[[id number]]  Abnormal            Final result                 Please view results for these tests on the individual orders.         Signed Electronically by: MARTÍNEZ Michael CNP

## 2024-04-16 LAB — ZINC SERPL-MCNC: 74.5 UG/DL

## 2024-04-18 ENCOUNTER — OFFICE VISIT (OUTPATIENT)
Dept: PODIATRY | Facility: CLINIC | Age: 49
End: 2024-04-18
Payer: COMMERCIAL

## 2024-04-18 VITALS — DIASTOLIC BLOOD PRESSURE: 75 MMHG | SYSTOLIC BLOOD PRESSURE: 111 MMHG | BODY MASS INDEX: 42.1 KG/M2 | WEIGHT: 253 LBS

## 2024-04-18 DIAGNOSIS — L60.8 CHANGE IN NAIL APPEARANCE: Primary | ICD-10-CM

## 2024-04-18 DIAGNOSIS — M79.674 GREAT TOE PAIN, RIGHT: ICD-10-CM

## 2024-04-18 PROCEDURE — 99213 OFFICE O/P EST LOW 20 MIN: CPT | Performed by: PODIATRIST

## 2024-04-18 NOTE — PROGRESS NOTES
ASSESSMENT:  Encounter Diagnoses   Name Primary?    Change in nail appearance Yes    Great toe pain, right      MEDICAL DECISION MAKING:  No acute findings on clinical exam.  Her distal right hallux pain is likely related to the thickening of the toenail and pressure from footwear.    Recommendations are simple:  Avoid tight footwear  Consider filing the thickened aspect of the nail  She will proceed with a pedicure   She will also continue applying a moisturizer  If the pain persists or worsens, a total nail avulsion is an option.    She did inquire if this thickening might be from fungus and I explained that it is possible, yet stress or injury to the germinal matrix could also result in nail change.    Follow-up on an as-needed basis.    Disclaimer: This note consists of symbols derived from keyboarding, dictation and/or voice recognition software. As a result, there may be errors in the script that have gone undetected. Please consider this when interpreting information found in this chart.    Ankit Kathleen, LIZANDRO, FACFAS, MS    [place] Department of Podiatry/Foot & Ankle Surgery      ____________________________________________________________________    HPI:       Nataliya returns for recheck of her right hallux nail.  She was last seen in clinic on 3/14/2024.  Due to an ingrown toenail, she had a partial nail avulsion, medial edge, right hallux.  She reports that she healed quickly.  No current pain in that location.  She does have some pain at the tip of the toe when wearing footwear.  No pain when socks and shoes are off.  She is recently started applying a moisturizer to the distal aspect of the nail and toe.    Past Medical History:   Diagnosis Date    Asthma     mild persistent - Dr Gee    Cerebral infarction (H) 2015    Fibroids     s/p hysterectomy    H/O gastric bypass     Hypertension     Migraine     followed by Devika    Obstructive sleep apnea     needs updated sleep study    Pre-diabetes      "Relapsing remitting multiple sclerosis (H) 2015    lesion in [place].  Facial tingling initial symptom.  F/B [facility]    Spondylosis of cervical region without myelopathy or radiculopathy    *  *  Past Surgical History:   Procedure Laterality Date    COLONOSCOPY  2012    GASTRIC BYPASS  2002    \"Trouble with B12 and D\"    HYSTERECTOMY, MONO  2013    cervix gone.  fibroids.  without BSO    TONSILLECTOMY     *  *  Current Outpatient Medications   Medication Sig Dispense Refill    acetaminophen (TYLENOL) 500 MG tablet Take 2 tablets (1,000 mg) by mouth 3 times daily      albuterol (PROVENTIL) (2.5 MG/3ML) 0.083% neb solution USE 1 VIAL VIA NEBULIZER EVERY 6 HOURS AS NEEDED FOR SHORTNESS OF BREATH OR DIFFICULT BREATHING OR WHEEZING 75 mL 0    Ascorbic Acid (VITAMIN C) 500 MG CAPS Take 500 mg by mouth daily      azelastine (ASTELIN) 0.1 % nasal spray Spray 1 spray into both nostrils 2 times daily as needed for rhinitis (nasal antihistamine) 30 mL 5    baclofen (LIORESAL) 20 MG tablet TAKE ONE TABLET BY MOUTH EVERY NIGHT AT BEDTIME MAY ALSO TAKE ONE TABLET BY MOUTH EVERY 4 HOURS AS NEEDED FOR MUSCLE SPASMS (max dose: 80 mg/day) 180 tablet 1    budesonide-formoterol (SYMBICORT) 160-4.5 MCG/ACT Inhaler INHALE 2 PUFFS INTO THE LUNGS TWICE DAILY 10.2 g 5    cefdinir (OMNICEF) 300 MG capsule Take 1 capsule (300 mg) by mouth 2 times daily 14 capsule 0    cetirizine (ZYRTEC) 10 MG tablet Take 1 tablet (10 mg) by mouth daily 180 tablet 1    clotrimazole (LOTRIMIN) 1 % external cream Apply topically 2 times daily (Can take up to 14 days) 60 g 1    cyclobenzaprine (FLEXERIL) 10 MG tablet Take 1 tablet (10 mg) by mouth 3 times daily as needed for muscle spasms 40 tablet 0    desonide (DESOWEN) 0.05 % external cream Apply topically 2 times daily To face as needed for rash 15 g 0    ELDERBERRY PO Take by mouth daily      EPINEPHrine (ANY BX GENERIC EQUIV) 0.3 MG/0.3ML injection 2-pack Inject 0.3 mLs (0.3 mg) into the muscle as needed for " anaphylaxis May repeat one time in 5-15 minutes if response to initial dose is inadequate. 2 each 3    fluticasone (FLONASE) 50 MCG/ACT nasal spray USE 1 PUFF IN EACH NOSTRIL AS DIRECTED. 16 g 5    gabapentin (NEURONTIN) 300 MG capsule Take 1 capsule (300 mg) by mouth every 4 hours 120 capsule 5    guaiFENesin (MUCINEX) 600 MG 12 hr tablet Take 2 tablets (1,200 mg) by mouth 2 times daily      ipratropium - albuterol 0.5 mg/2.5 mg/3 mL (DUONEB) 0.5-2.5 (3) MG/3ML neb solution Take 1 vial (3 mLs) by nebulization every 6 hours as needed for shortness of breath, wheezing or cough 90 mL 0    MAGNESIUM PO Take 400 mg by mouth at bedtime      modafinil (PROVIGIL) 100 MG tablet Take 1 tablet (100 mg) by mouth daily For daytime drowsiness (ok to increase to 2 tablets daily if symptoms not improved in 14 days) 90 tablet 1    montelukast (SINGULAIR) 10 MG tablet TAKE 1 TABLET(10 MG) BY MOUTH AT BEDTIME 90 tablet 3    Multiple Vitamins-Calcium (ONE-A-DAY WOMENS FORMULA PO)       natalizumab (TYSABRI) 300 MG/15ML injection Inject 15 mLs (300 mg) into the vein once every six weeks Infusion      omeprazole (PRILOSEC) 20 MG DR capsule TAKE 1 CAPSULE(20 MG) BY MOUTH TWICE DAILY BEFORE MEALS 180 capsule 1    PREBIOTIC PRODUCT PO Take by mouth daily      predniSONE (DELTASONE) 20 MG tablet Take 60 mg daily for 3 days, then 40 mg daily for 3 days, then 20 mg daily for 3 days, then 10 mg for 4 days 20 tablet 0    simethicone (MYLICON) 80 MG chewable tablet Take 1 tablet (80 mg) by mouth every 6 hours as needed for flatulence or cramping 60 tablet 1    SUMAtriptan (IMITREX) 100 MG tablet Take 50 mg up to twice daily as needed for headache; separate doses by at least one hour and do not use more than 3 days/week 18 tablet 3    topiramate (TOPAMAX) 100 MG tablet Take 1 tablet (100 mg) by mouth at bedtime (take with 50 mg dose at bedtime) 90 tablet 1    topiramate (TOPAMAX) 50 MG tablet Take 1 tablet (50 mg) by mouth 2 times daily 180  tablet 1    valACYclovir (VALTREX) 500 MG tablet TAKE ONE TABLET BY MOUTH ONCE DAILY 90 tablet 3    VENTOLIN  (90 Base) MCG/ACT inhaler SHAKE WELL AND INHALE 2 PUFFS INTO THE LUNGS EVERY 6 HOURS AS NEEDED FOR SHORTNESS OF BREATH OR WHEEZING STRENGTH 18 g 4    vitamin D3 (CHOLECALCIFEROL) 250 mcg (45271 units) capsule TAKE 1 CAPSULE BY MOUTH ONCE DAILY 90 capsule 3         EXAM:    Vitals: /75   Wt 114.8 kg (253 lb)   LMP  (LMP Unknown)   BMI 42.10 kg/m    BMI: Body mass index is 42.1 kg/m .      Vascular:  Pedal pulses are palpable for both the DP and PT arteries.  CFT < 3 sec.  No edema.      Neuro: Light touch sensation is intact to the L4, L5, S1 distributions  No evidence of weakness, spasticity, or contracture in the lower extremities.     Derm: The medial nail unit, right hallux is completely healed.  There is some thickening of the right hallux nail distally.  Skin at the hyponychial region is mildly hyperkeratotic.  No wounds.  No erythema.    Musculoskeletal:    Lower extremity muscle strength is normal. No gross deformities.

## 2024-04-18 NOTE — LETTER
4/18/2024         RE: Nataliya Aldrich  [id number]  [place] [address]        Dear Colleague,    Thank you for referring your patient, Nataliya Aldrich, to the [facility]. Please see a copy of my visit note below.    ASSESSMENT:  Encounter Diagnoses   Name Primary?     Change in nail appearance Yes     Great toe pain, right      MEDICAL DECISION MAKING:  No acute findings on clinical exam.  Her distal right hallux pain is likely related to the thickening of the toenail and pressure from footwear.    Recommendations are simple:  Avoid tight footwear  Consider filing the thickened aspect of the nail  She will proceed with a pedicure   She will also continue applying a moisturizer  If the pain persists or worsens, a total nail avulsion is an option.    She did inquire if this thickening might be from fungus and I explained that it is possible, yet stress or injury to the germinal matrix could also result in nail change.    Follow-up on an as-needed basis.    Disclaimer: This note consists of symbols derived from keyboarding, dictation and/or voice recognition software. As a result, there may be errors in the script that have gone undetected. Please consider this when interpreting information found in this chart.    Ankit Kathleen DPM, FACFAS, MS    [place] Department of Podiatry/Foot & Ankle Surgery      ____________________________________________________________________    HPI:       Nataliya returns for recheck of her right hallux nail.  She was last seen in clinic on 3/14/2024.  Due to an ingrown toenail, she had a partial nail avulsion, medial edge, right hallux.  She reports that she healed quickly.  No current pain in that location.  She does have some pain at the tip of the toe when wearing footwear.  No pain when socks and shoes are off.  She is recently started applying a moisturizer to the distal aspect of the nail and toe.    Past  "Medical History:   Diagnosis Date     Asthma     mild persistent - Dr Gee     Cerebral infarction (H) 2015     Fibroids     s/p hysterectomy     H/O gastric bypass      Hypertension      Migraine     followed by Devika     Obstructive sleep apnea     needs updated sleep study     Pre-diabetes      Relapsing remitting multiple sclerosis (H) 2015    lesion in [place].  Facial tingling initial symptom.  F/B [facility]     Spondylosis of cervical region without myelopathy or radiculopathy    *  *  Past Surgical History:   Procedure Laterality Date     COLONOSCOPY  2012     GASTRIC BYPASS  2002    \"Trouble with B12 and D\"     HYSTERECTOMY, MONO  2013    cervix gone.  fibroids.  without BSO     TONSILLECTOMY     *  *  Current Outpatient Medications   Medication Sig Dispense Refill     acetaminophen (TYLENOL) 500 MG tablet Take 2 tablets (1,000 mg) by mouth 3 times daily       albuterol (PROVENTIL) (2.5 MG/3ML) 0.083% neb solution USE 1 VIAL VIA NEBULIZER EVERY 6 HOURS AS NEEDED FOR SHORTNESS OF BREATH OR DIFFICULT BREATHING OR WHEEZING 75 mL 0     Ascorbic Acid (VITAMIN C) 500 MG CAPS Take 500 mg by mouth daily       azelastine (ASTELIN) 0.1 % nasal spray Spray 1 spray into both nostrils 2 times daily as needed for rhinitis (nasal antihistamine) 30 mL 5     baclofen (LIORESAL) 20 MG tablet TAKE ONE TABLET BY MOUTH EVERY NIGHT AT BEDTIME MAY ALSO TAKE ONE TABLET BY MOUTH EVERY 4 HOURS AS NEEDED FOR MUSCLE SPASMS (max dose: 80 mg/day) 180 tablet 1     budesonide-formoterol (SYMBICORT) 160-4.5 MCG/ACT Inhaler INHALE 2 PUFFS INTO THE LUNGS TWICE DAILY 10.2 g 5     cefdinir (OMNICEF) 300 MG capsule Take 1 capsule (300 mg) by mouth 2 times daily 14 capsule 0     cetirizine (ZYRTEC) 10 MG tablet Take 1 tablet (10 mg) by mouth daily 180 tablet 1     clotrimazole (LOTRIMIN) 1 % external cream Apply topically 2 times daily (Can take up to 14 days) 60 g 1     cyclobenzaprine (FLEXERIL) 10 MG tablet Take 1 tablet (10 mg) by mouth 3 times " daily as needed for muscle spasms 40 tablet 0     desonide (DESOWEN) 0.05 % external cream Apply topically 2 times daily To face as needed for rash 15 g 0     ELDERBERRY PO Take by mouth daily       EPINEPHrine (ANY BX GENERIC EQUIV) 0.3 MG/0.3ML injection 2-pack Inject 0.3 mLs (0.3 mg) into the muscle as needed for anaphylaxis May repeat one time in 5-15 minutes if response to initial dose is inadequate. 2 each 3     fluticasone (FLONASE) 50 MCG/ACT nasal spray USE 1 PUFF IN EACH NOSTRIL AS DIRECTED. 16 g 5     gabapentin (NEURONTIN) 300 MG capsule Take 1 capsule (300 mg) by mouth every 4 hours 120 capsule 5     guaiFENesin (MUCINEX) 600 MG 12 hr tablet Take 2 tablets (1,200 mg) by mouth 2 times daily       ipratropium - albuterol 0.5 mg/2.5 mg/3 mL (DUONEB) 0.5-2.5 (3) MG/3ML neb solution Take 1 vial (3 mLs) by nebulization every 6 hours as needed for shortness of breath, wheezing or cough 90 mL 0     MAGNESIUM PO Take 400 mg by mouth at bedtime       modafinil (PROVIGIL) 100 MG tablet Take 1 tablet (100 mg) by mouth daily For daytime drowsiness (ok to increase to 2 tablets daily if symptoms not improved in 14 days) 90 tablet 1     montelukast (SINGULAIR) 10 MG tablet TAKE 1 TABLET(10 MG) BY MOUTH AT BEDTIME 90 tablet 3     Multiple Vitamins-Calcium (ONE-A-DAY WOMENS FORMULA PO)        natalizumab (TYSABRI) 300 MG/15ML injection Inject 15 mLs (300 mg) into the vein once every six weeks Infusion       omeprazole (PRILOSEC) 20 MG DR capsule TAKE 1 CAPSULE(20 MG) BY MOUTH TWICE DAILY BEFORE MEALS 180 capsule 1     PREBIOTIC PRODUCT PO Take by mouth daily       predniSONE (DELTASONE) 20 MG tablet Take 60 mg daily for 3 days, then 40 mg daily for 3 days, then 20 mg daily for 3 days, then 10 mg for 4 days 20 tablet 0     simethicone (MYLICON) 80 MG chewable tablet Take 1 tablet (80 mg) by mouth every 6 hours as needed for flatulence or cramping 60 tablet 1     SUMAtriptan (IMITREX) 100 MG tablet Take 50 mg up to twice  daily as needed for headache; separate doses by at least one hour and do not use more than 3 days/week 18 tablet 3     topiramate (TOPAMAX) 100 MG tablet Take 1 tablet (100 mg) by mouth at bedtime (take with 50 mg dose at bedtime) 90 tablet 1     topiramate (TOPAMAX) 50 MG tablet Take 1 tablet (50 mg) by mouth 2 times daily 180 tablet 1     valACYclovir (VALTREX) 500 MG tablet TAKE ONE TABLET BY MOUTH ONCE DAILY 90 tablet 3     VENTOLIN  (90 Base) MCG/ACT inhaler SHAKE WELL AND INHALE 2 PUFFS INTO THE LUNGS EVERY 6 HOURS AS NEEDED FOR SHORTNESS OF BREATH OR WHEEZING STRENGTH 18 g 4     vitamin D3 (CHOLECALCIFEROL) 250 mcg (39178 units) capsule TAKE 1 CAPSULE BY MOUTH ONCE DAILY 90 capsule 3         EXAM:    Vitals: /75   Wt 114.8 kg (253 lb)   LMP  (LMP Unknown)   BMI 42.10 kg/m    BMI: Body mass index is 42.1 kg/m .      Vascular:  Pedal pulses are palpable for both the DP and PT arteries.  CFT < 3 sec.  No edema.      Neuro: Light touch sensation is intact to the L4, L5, S1 distributions  No evidence of weakness, spasticity, or contracture in the lower extremities.     Derm: The medial nail unit, right hallux is completely healed.  There is some thickening of the right hallux nail distally.  Skin at the hyponychial region is mildly hyperkeratotic.  No wounds.  No erythema.    Musculoskeletal:    Lower extremity muscle strength is normal. No gross deformities.        Again, thank you for allowing me to participate in the care of your patient.        Sincerely,        Ankit Kathleen DPM

## 2024-04-19 LAB
DEPRECATED CALCIDIOL+CALCIFEROL SERPL-MC: <70 UG/L (ref 20–75)
VITAMIN D2 SERPL-MCNC: <5 UG/L
VITAMIN D3 SERPL-MCNC: 65 UG/L

## 2024-04-23 ENCOUNTER — VIRTUAL VISIT (OUTPATIENT)
Dept: FAMILY MEDICINE | Facility: CLINIC | Age: 49
End: 2024-04-23
Payer: COMMERCIAL

## 2024-04-23 DIAGNOSIS — J20.9 ACUTE BRONCHITIS WITH COEXISTING CONDITION REQUIRING PROPHYLACTIC TREATMENT: ICD-10-CM

## 2024-04-23 DIAGNOSIS — R09.81 NASAL CONGESTION: ICD-10-CM

## 2024-04-23 DIAGNOSIS — N28.9 DECREASED RENAL FUNCTION: ICD-10-CM

## 2024-04-23 DIAGNOSIS — J30.2 SEASONAL ALLERGIC RHINITIS, UNSPECIFIED TRIGGER: ICD-10-CM

## 2024-04-23 DIAGNOSIS — J06.9 RECENT URI: ICD-10-CM

## 2024-04-23 DIAGNOSIS — D72.829 LEUKOCYTOSIS, UNSPECIFIED TYPE: Primary | ICD-10-CM

## 2024-04-23 DIAGNOSIS — R49.1 LOST VOICE: ICD-10-CM

## 2024-04-23 DIAGNOSIS — J45.41 MODERATE PERSISTENT ASTHMA WITH ACUTE EXACERBATION: ICD-10-CM

## 2024-04-23 PROCEDURE — 99214 OFFICE O/P EST MOD 30 MIN: CPT | Mod: 95 | Performed by: PHYSICIAN ASSISTANT

## 2024-04-23 RX ORDER — IPRATROPIUM BROMIDE AND ALBUTEROL SULFATE 2.5; .5 MG/3ML; MG/3ML
1 SOLUTION RESPIRATORY (INHALATION) EVERY 6 HOURS PRN
Qty: 90 ML | Refills: 1 | Status: SHIPPED | OUTPATIENT
Start: 2024-04-23 | End: 2024-09-29

## 2024-04-23 NOTE — PROGRESS NOTES
"Nataliya is a 48 year old who is being evaluated via a billable video visit.    How would you like to obtain your AVS? MyChart  If the video visit is dropped, the invitation should be resent by: Text to cell phone: [phone]  Will anyone else be joining your video visit? No      Assessment & Plan     Leukocytosis, unspecified type  Likely reactive in response to steroid taper.  Recheck at next Tysabri infusion scheduled in late May.  - CBC with platelets and differential    Decreased renal function  Very mild decrease in renal function.  Will recheck with Tysabri infusion in late May.  Hydration efforts encouraged.  - Basic metabolic panel  (Ca, Cl, CO2, Creat, Gluc, K, Na, BUN)    Recent URI  Lost voice  Acute bronchitis with coexisting condition requiring prophylactic treatment  Moderate persistent asthma with acute exacerbation  Markedly improved.  Continue DuoNeb when needed.  Voice likely postinflammatory from upper respiratory infections.  Recommend throat lozenges, warm teas, and vocal rest.  Suspect this will gradually improve over the course of the next month.  - CBC with platelets and differential  - Basic metabolic panel  (Ca, Cl, CO2, Creat, Gluc, K, Na, BUN)  - ipratropium - albuterol 0.5 mg/2.5 mg/3 mL (DUONEB) 0.5-2.5 (3) MG/3ML neb solution  Dispense: 90 mL; Refill: 1    BMI  Estimated body mass index is 42.1 kg/m  as calculated from the following:    Height as of 4/15/24: 1.651 m (5' 5\").    Weight as of 4/18/24: 114.8 kg (253 lb).   Weight management plan: deferred, video visit      Return in about 31 days (around 5/24/2024) for recheck labs w/infusion.     Miya Crump MBA, MS, PA-C  [facility] [place]      Subjective   Nataliya is a 48 year old, presenting for the following health issues:  RECHECK          HPI         4/23/2024    10:45 AM   Additional Questions   Roomed by Nleida PALMA   Accompanied by Self       Video Start Time: 11:06 AM  Patient is wanting to follow up on labs " to see if her number went down.  Triage note from 4/11/2024  Was seen at [facility] 4/9/24.  Got neb and lost voice while in clinic after nebulizer treatment with albuterol.  Got IM methylprednisolone in clinic.  Treated for viral bronchitis/asthma exacerbation with long and strong prednisone taper.  Seen by Leonie Collazo CNP 4/15/2024.  Labs at that time showed slight leukocytosis.  Patient reported significant productive cough at that time..  Treated with cefdinir 4/15/24.  Overall feeling significantly better.  Still wheezy a little bit with exercise although mild.  Still losing voice intermittently and feeling fatigued faster.        Patient calling stating she was recently seen for a URI.  Patient concerned that she is losing her voice.  Noted some laryngitis during call.  Advised that some people can develop laryngitis with some viral infections and that this is not uncommon.       Patient stated she has been having difficulty using the albuterol nebs due to getting so shaky that it makes it hard for her to function.   Patient has duonebs at home as well and wonders if she can use them in place of albuterol nebs.  Noted in office visit notes that the provider mentioned in the plan to compare albuterol nebs to duonebs for home treatment.  Patient was relieved to hear as she thinks they work better than albuterol to open her airways.          Reason for Disposition   Hoarseness lasts < 2 weeks    Additional Information   Negative: SEVERE difficulty breathing (e.g., struggling for each breath, speaks in single words)   Negative: Bluish (or gray) lips or face now   Negative: Stridor with difficulty breathing   Negative: Started suddenly after sting from bee, wasp, or yellow jacket   Negative: Started suddenly after taking a medicine or allergic food (e.g., nuts, seafood)   Negative: Started suddenly along with widespread hives   Negative: Tongue or facial swelling   Negative: Can't swallow normal secretions (e.g., drooling  "or spitting)   Negative: Sounds like a life-threatening emergency to the triager   Negative: Nasal allergies also present and they are acting up   Negative: Cold symptoms are main concern   Negative: Sore throat is main symptom   Negative: Recovered from choking episode and hoarseness lasts > 30 minutes   Negative: Direct blow to front of neck   Negative: Hoarseness starting in past 24 hours AND taking an ACE Inhibitor medicine (e.g., benazepril/LOTENSIN, captopril/CAPOTEN, enalapril/VASOTEC, lisinopril/ZESTRIL)   Negative: Difficulty breathing   Negative: Patient sounds very sick or weak to the triager   Negative: Fever > 103 F (39.4 C)   Negative: SEVERE sore throat pain   Negative: Fever present > 3 days (72 hours)   Negative: Hoarseness starting > 24 hours ago AND taking an ACE Inhibitor medicine (e.g., benazepril/LOTENSIN, captopril/CAPOTEN, enalapril/VASOTEC, lisinopril/ZESTRIL)   Negative: MODERATE to SEVERE hoarseness (e.g., interferes with work) and is professional voice user (e.g., call center worker, cullen, teacher) (Exception: Current common cold or mild URI symptoms.)   Negative: Hoarseness and risk factor (e.g., recent neck surgery or intubation, smoker, unexpected weight loss) (Exception: Current common cold or mild URI symptoms.)   Negative: Hoarseness and swollen lymph node or lump in neck (Exception: Current common cold or mild URI symptoms.)   Negative: Sore throat lasts > 5 days   Negative: Patient wants to be seen   Negative: Hoarseness persists > 2 weeks    Answer Assessment - Initial Assessment Questions  1. DESCRIPTION: \"Describe your voice.\" (e.g., coarse, raspy, weaker, airy, scratchy, deeper)      Coarse, raspy, weaker  2. SEVERITY: \"How bad is it?\"    - MILD: doesn't interfere with normal activities    - MODERATE: interferes with normal activities such as school or work    - SEVERE: only able to whisper.      moderate  3. ONSET: \"When did the hoarseness begin?\"      today  4. COUGH: \"Is " "there a cough?\" If Yes, ask: \"How bad is it?\"      no  5. FEVER: \"Do you have a fever?\" If Yes, ask: \"What is your temperature, how was it measured, and when did it start?\"      no  6. ALLERGIES: \"Any allergy symptoms?\" If Yes, ask: \"What are they?\"      no  7. IRRITANTS: \"Do you smoke?\" \"Have you been exposed to any irritating fumes?\" (e.g., smoke)      no  8. CAUSE: \"What do you think is causing the hoarseness?\"      URI  9. OTHER SYMPTOMS: \"Do you have any other symptoms?\" (e.g., breathing difficulty, fever, foreign body, lymph node swelling in neck, rash, sore throat, weight loss)      no  10. PREGNANCY: \"Is there any chance you are pregnant?\" \"When was your last menstrual period?\"        no    Protocols used: [id number]           Review of Systems  Constitutional, HEENT, cardiovascular, pulmonary, GI, , musculoskeletal, neuro, skin, endocrine and psych systems are negative, except as otherwise noted.      Objective           Vitals:  No vitals were obtained today due to virtual visit.    Physical Exam   GENERAL: alert and no distress  EYES: Eyes grossly normal to inspection.  No discharge or erythema, or obvious scleral/conjunctival abnormalities.  HENT: Normal cephalic/atraumatic.  External ears, nose and mouth without ulcers or lesions.  No nasal drainage visible.  NECK: No asymmetry, visible masses or scars  RESP: No audible wheeze, cough, or visible cyanosis.    SKIN: Visible skin clear. No significant rash, abnormal pigmentation or lesions.  NEURO: Cranial nerves grossly intact.  Mentation and speech appropriate for age.  PSYCH: Appropriate affect, tone, and pace of words    Recent Results (from the past 744 hour(s))   XR Chest 2 Views    Narrative    CHEST TWO VIEWS 4/9/2024 12:44 PM     HISTORY: Moderate persistent asthma with acute exacerbation    COMPARISON: 3/30/2023.       Impression    IMPRESSION: Cardiopericardial silhouette is within normal limits. No  focal airspace consolidation. No " pleural effusion. No discernible  pneumothorax. No acute displaced fracture.    LILY SARMIENTO MD         SYSTEM ID:  [id number]     Component      Latest Ref [facility] 4/15/2024  8:38 AM   WBC      4.0 - 11.0 10e3/uL 13.3 (H)    RBC Count      3.80 - 5.20 10e6/uL 3.70 (L)    Hemoglobin      11.7 - 15.7 g/dL 11.2 (L)    Hematocrit      35.0 - 47.0 % 35.2    MCV      78 - 100 fL 95    MCH      26.5 - 33.0 pg 30.3    MCHC      31.5 - 36.5 g/dL 31.8    RDW      10.0 - 15.0 % 14.1    Platelet Count      150 - 450 10e3/uL 332    % Neutrophils      % 43    % Lymphocytes      % 49    % Monocytes      % 5    % Eosinophils      % 1    % Basophils      % 0    % Immature Granulocytes      % 2    NRBCs per 100 WBC      <1 /100    Absolute Neutrophils      1.6 - 8.3 10e3/uL 5.8    Absolute Lymphocytes      0.8 - 5.3 10e3/uL 6.5 (H)    Absolute Monocytes      0.0 - 1.3 10e3/uL 0.6    Absolute Eosinophils      0.0 - 0.7 10e3/uL 0.2    Absolute Basophils      0.0 - 0.2 10e3/uL 0.0    Absolute Immature Granulocytes      <=0.4 10e3/uL 0.2    Absolute NRBCs      10e3/uL    Absolute Neutrophil      1.6 - 8.3 10e3/uL    Absolute Lymphocytes      0.8 - 5.3 10e3/uL    Absolute Monocytes      0.0 - 1.3 10e3/uL    Absolute Eosinophils      0.0 - 0.7 10e3/uL    Absolute Basophils      0.0 - 0.2 10e3/uL    RBC Morphology    Platelet Morphology      Automated Count Confirmed. Platelet morphology is normal.     Sodium      135 - 145 mmol/L 142    Potassium      3.4 - 5.3 mmol/L 3.9    Chloride      98 - 107 mmol/L 110 (H)    Carbon Dioxide (CO2)      22 - 29 mmol/L 22    Anion Gap      7 - 15 mmol/L 10    Urea Nitrogen      6.0 - 20.0 mg/dL 15.3    Creatinine      0.51 - 0.95 mg/dL 1.02 (H)    GFR Estimate      >60 mL/min/1.73m2 68    Calcium      8.6 - 10.0 mg/dL 9.1    Glucose      70 - 99 mg/dL 87    25 OH Vit D2      ug/L <5    25 OH Vit D3      ug/L 65    25 OH Vit D total      20 - 75 ug/L <70    Zinc      60.0 - 120.0 ug/dL 74.5     Magnesium      1.7 - 2.3 mg/dL 2.4 (H)       Legend:  (H) High  (L) Low        Video-Visit Details    Type of service:  Video Visit   Video End Time:11:31 AM  Originating Location (pt. Location): Home    Distant Location (provider location):  On-site  Platform used for Video Visit: Diana  Signed Electronically by: Miya Crump PA-C

## 2024-04-24 ENCOUNTER — MYC MEDICAL ADVICE (OUTPATIENT)
Dept: FAMILY MEDICINE | Facility: CLINIC | Age: 49
End: 2024-04-24
Payer: COMMERCIAL

## 2024-04-24 RX ORDER — KETOTIFEN FUMARATE 0.35 MG/ML
1 SOLUTION/ DROPS OPHTHALMIC 2 TIMES DAILY PRN
Qty: 10 ML | Refills: 2 | Status: SHIPPED | OUTPATIENT
Start: 2024-04-24

## 2024-04-24 NOTE — TELEPHONE ENCOUNTER
Please see my chart message below     Please review and advise     Thank you     Amelia Chance RN, BSN  [place] [facility]

## 2024-05-01 ENCOUNTER — TELEPHONE (OUTPATIENT)
Dept: SLEEP MEDICINE | Facility: CLINIC | Age: 49
End: 2024-05-01
Payer: COMMERCIAL

## 2024-05-01 NOTE — TELEPHONE ENCOUNTER
Pt was called to r/s psg on 5/21. Gave them available appt in [facility] on 5/2 and asked them to return a call to me directly to let me know if that appt works for them

## 2024-05-03 NOTE — RESULT ENCOUNTER NOTE
Patient aware updated by PCP in my absence. Recheck of CBC and BMP next month.    LEROY Michael  [facility]

## 2024-05-06 ENCOUNTER — THERAPY VISIT (OUTPATIENT)
Dept: PHYSICAL THERAPY | Facility: CLINIC | Age: 49
End: 2024-05-06
Payer: COMMERCIAL

## 2024-05-06 DIAGNOSIS — M54.2 CERVICALGIA: ICD-10-CM

## 2024-05-06 DIAGNOSIS — M79.18 MYOFASCIAL PAIN: Primary | ICD-10-CM

## 2024-05-06 PROCEDURE — 97035 APP MDLTY 1+ULTRASOUND EA 15: CPT | Mod: GP | Performed by: PHYSICAL THERAPIST

## 2024-05-06 PROCEDURE — 97110 THERAPEUTIC EXERCISES: CPT | Mod: GP | Performed by: PHYSICAL THERAPIST

## 2024-05-06 PROCEDURE — 97140 MANUAL THERAPY 1/> REGIONS: CPT | Mod: GP | Performed by: PHYSICAL THERAPIST

## 2024-05-17 ENCOUNTER — VIRTUAL VISIT (OUTPATIENT)
Dept: FAMILY MEDICINE | Facility: CLINIC | Age: 49
End: 2024-05-17
Payer: COMMERCIAL

## 2024-05-17 DIAGNOSIS — M79.672 PAIN OF LEFT HEEL: Primary | ICD-10-CM

## 2024-05-17 DIAGNOSIS — M72.2 PLANTAR FASCIITIS: ICD-10-CM

## 2024-05-17 PROCEDURE — 99213 OFFICE O/P EST LOW 20 MIN: CPT | Mod: 95 | Performed by: NURSE PRACTITIONER

## 2024-05-17 NOTE — PROGRESS NOTES
Nataliya is a 48 year old who is being evaluated via a billable video visit.    How would you like to obtain your AVS? MyChart  If the video visit is dropped, the invitation should be resent by: Text to cell phone: [phone]  Will anyone else be joining your video visit? No    Assessment & Plan     Pain of left heel  Plantar fasciitis  At home cares discussed.  NSAIDs sparingly.  Ice.  Written education provided.  Return to podiatry for evaluation.  She will let me know if she continues to have pain that is not relieved from gabapentin or NSAIDs.  Nataliya verbalizes understanding of plan of care and is in agreement.        Return in about 4 weeks (around 6/14/2024) for if symptoms persist or worsening please be seen.     Subjective   Nataliya is a 48 year old, presenting for the following health issues:  Musculoskeletal Problem        5/17/2024    11:49 AM   Additional Questions   Roomed by Nelida PALMA   Accompanied by Self       Video Start Time: 12:16 PM    HPI     Pain History:  When did you first notice your pain? 1 week ago   Have you seen anyone else for your pain? No  How has your pain affected your ability to work? 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  Left heal     Patient was seen 2 years ago for the same issue was referred to get inserts. Would like new inserts.  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   Back bottom of heel.     Ankit Kathleen, LIZANDRO  Podiatry       Hurt low back for first three days better now.      Constitutional, HEENT, cardiovascular, pulmonary, GI, , musculoskeletal, neuro, skin, endocrine and psych systems are negative, except as otherwise noted in the HPI.         Objective           Vitals:  No vitals were obtained today due to virtual visit.    Physical Exam   GENERAL: alert and no distress  EYES: Eyes grossly normal to inspection.  No discharge or erythema, or obvious  scleral/conjunctival abnormalities.  RESP: No audible wheeze, cough, or visible cyanosis.    SKIN: Visible skin clear. No significant rash, abnormal pigmentation or lesions.  NEURO: Cranial nerves grossly intact.  Mentation and speech appropriate for age.  PSYCH: Appropriate affect, tone, and pace of words      Video-Visit Details    Type of service:  Video Visit   Video End Time:12:44 PM  Originating Location (pt. Location): Home  Distant Location (provider location):  On-site  Platform used for Video Visit: Diana       Signed Electronically by: MARTÍNEZ Michael CNP

## 2024-05-21 ENCOUNTER — MYC REFILL (OUTPATIENT)
Dept: FAMILY MEDICINE | Facility: CLINIC | Age: 49
End: 2024-05-21

## 2024-05-21 DIAGNOSIS — T63.461A ALLERGIC REACTION TO WASP STING: ICD-10-CM

## 2024-05-21 RX ORDER — EPINEPHRINE 0.3 MG/.3ML
0.3 INJECTION SUBCUTANEOUS PRN
Qty: 2 EACH | Refills: 3 | Status: SHIPPED | OUTPATIENT
Start: 2024-05-21

## 2024-05-23 ENCOUNTER — THERAPY VISIT (OUTPATIENT)
Dept: PHYSICAL THERAPY | Facility: CLINIC | Age: 49
End: 2024-05-23
Payer: COMMERCIAL

## 2024-05-23 DIAGNOSIS — M79.18 MYOFASCIAL PAIN: Primary | ICD-10-CM

## 2024-05-23 DIAGNOSIS — M54.2 CERVICALGIA: ICD-10-CM

## 2024-05-23 PROCEDURE — 97140 MANUAL THERAPY 1/> REGIONS: CPT | Mod: GP | Performed by: PHYSICAL THERAPIST

## 2024-05-23 PROCEDURE — 97035 APP MDLTY 1+ULTRASOUND EA 15: CPT | Mod: GP | Performed by: PHYSICAL THERAPIST

## 2024-05-23 PROCEDURE — 97110 THERAPEUTIC EXERCISES: CPT | Mod: GP | Performed by: PHYSICAL THERAPIST

## 2024-05-24 ENCOUNTER — INFUSION THERAPY VISIT (OUTPATIENT)
Dept: INFUSION THERAPY | Facility: CLINIC | Age: 49
End: 2024-05-24
Attending: PSYCHIATRY & NEUROLOGY
Payer: COMMERCIAL

## 2024-05-24 VITALS
DIASTOLIC BLOOD PRESSURE: 69 MMHG | SYSTOLIC BLOOD PRESSURE: 112 MMHG | OXYGEN SATURATION: 99 % | RESPIRATION RATE: 16 BRPM | HEART RATE: 71 BPM | TEMPERATURE: 97.9 F

## 2024-05-24 DIAGNOSIS — G35 MULTIPLE SCLEROSIS (H): Primary | ICD-10-CM

## 2024-05-24 PROCEDURE — 258N000003 HC RX IP 258 OP 636: Performed by: PSYCHIATRY & NEUROLOGY

## 2024-05-24 PROCEDURE — 96365 THER/PROPH/DIAG IV INF INIT: CPT

## 2024-05-24 PROCEDURE — 250N000011 HC RX IP 250 OP 636: Mod: JZ | Performed by: PSYCHIATRY & NEUROLOGY

## 2024-05-24 RX ORDER — MEPERIDINE HYDROCHLORIDE 25 MG/ML
25 INJECTION INTRAMUSCULAR; INTRAVENOUS; SUBCUTANEOUS EVERY 30 MIN PRN
Status: CANCELLED | OUTPATIENT
Start: 2024-06-07

## 2024-05-24 RX ORDER — METHYLPREDNISOLONE SODIUM SUCCINATE 125 MG/2ML
125 INJECTION, POWDER, LYOPHILIZED, FOR SOLUTION INTRAMUSCULAR; INTRAVENOUS
Status: CANCELLED
Start: 2024-06-07

## 2024-05-24 RX ORDER — ALBUTEROL SULFATE 0.83 MG/ML
2.5 SOLUTION RESPIRATORY (INHALATION)
Status: CANCELLED | OUTPATIENT
Start: 2024-06-07

## 2024-05-24 RX ORDER — ALBUTEROL SULFATE 90 UG/1
1-2 AEROSOL, METERED RESPIRATORY (INHALATION)
Status: CANCELLED
Start: 2024-06-07

## 2024-05-24 RX ORDER — DIPHENHYDRAMINE HYDROCHLORIDE 50 MG/ML
50 INJECTION INTRAMUSCULAR; INTRAVENOUS
Status: CANCELLED
Start: 2024-06-07

## 2024-05-24 RX ORDER — EPINEPHRINE 1 MG/ML
0.3 INJECTION, SOLUTION INTRAMUSCULAR; SUBCUTANEOUS EVERY 5 MIN PRN
Status: CANCELLED | OUTPATIENT
Start: 2024-06-07

## 2024-05-24 RX ORDER — HEPARIN SODIUM,PORCINE 10 UNIT/ML
5-20 VIAL (ML) INTRAVENOUS DAILY PRN
Status: CANCELLED | OUTPATIENT
Start: 2024-06-07

## 2024-05-24 RX ORDER — HEPARIN SODIUM (PORCINE) LOCK FLUSH IV SOLN 100 UNIT/ML 100 UNIT/ML
5 SOLUTION INTRAVENOUS
Status: CANCELLED | OUTPATIENT
Start: 2024-06-07

## 2024-05-24 RX ADMIN — SODIUM CHLORIDE 250 ML: 9 INJECTION, SOLUTION INTRAVENOUS at 08:37

## 2024-05-24 RX ADMIN — NATALIZUMAB 300 MG: 300 INJECTION INTRAVENOUS at 08:44

## 2024-05-24 NOTE — PROGRESS NOTES
Infusion Nursing Note:  Nataliya Aldrich presents today for Tysabri.    Patient seen by provider today: No   present during visit today: Not Applicable.    Note: Patient does not stay for observation.  Patient likes 250 ml saline run with Tysabri.     Intravenous Access:  Peripheral IV placed.    Treatment Conditions:  Tysabri pre-infusion checklist completed via touch program.      Post Infusion Assessment:  Patient tolerated infusion without incident.  Blood return noted pre and post infusion.  Site patent and intact, free from redness, edema or discomfort.  No evidence of extravasations.  Access discontinued per protocol.       Discharge Plan:   [facility]  Patient will return 7/5 (for labs and Tysabri) next appointment.   Patient discharged in stable condition accompanied by: daughter.  Departure Mode: Ambulatory.      June Vieira RN

## 2024-05-29 ENCOUNTER — E-VISIT (OUTPATIENT)
Dept: FAMILY MEDICINE | Facility: CLINIC | Age: 49
End: 2024-05-29
Payer: COMMERCIAL

## 2024-05-29 DIAGNOSIS — J45.41 MODERATE PERSISTENT ASTHMA WITH ACUTE EXACERBATION: Primary | ICD-10-CM

## 2024-05-29 PROCEDURE — 99207 PR NO CHARGE LOS: CPT | Performed by: PHYSICIAN ASSISTANT

## 2024-05-29 RX ORDER — PREDNISONE 20 MG/1
TABLET ORAL
Qty: 20 TABLET | Refills: 0 | Status: SHIPPED | OUTPATIENT
Start: 2024-05-29

## 2024-06-06 ENCOUNTER — OFFICE VISIT (OUTPATIENT)
Dept: PODIATRY | Facility: CLINIC | Age: 49
End: 2024-06-06
Payer: COMMERCIAL

## 2024-06-06 ENCOUNTER — THERAPY VISIT (OUTPATIENT)
Dept: PHYSICAL THERAPY | Facility: CLINIC | Age: 49
End: 2024-06-06
Payer: COMMERCIAL

## 2024-06-06 VITALS — DIASTOLIC BLOOD PRESSURE: 80 MMHG | BODY MASS INDEX: 42.1 KG/M2 | WEIGHT: 253 LBS | SYSTOLIC BLOOD PRESSURE: 122 MMHG

## 2024-06-06 DIAGNOSIS — M21.41 FLAT FEET, BILATERAL: ICD-10-CM

## 2024-06-06 DIAGNOSIS — M79.18 MYOFASCIAL PAIN: Primary | ICD-10-CM

## 2024-06-06 DIAGNOSIS — M54.2 CERVICALGIA: ICD-10-CM

## 2024-06-06 DIAGNOSIS — M21.42 FLAT FEET, BILATERAL: ICD-10-CM

## 2024-06-06 DIAGNOSIS — M25.371 ANKLE INSTABILITY, RIGHT: ICD-10-CM

## 2024-06-06 DIAGNOSIS — M72.2 PLANTAR FASCIITIS, LEFT: Primary | ICD-10-CM

## 2024-06-06 PROCEDURE — 99213 OFFICE O/P EST LOW 20 MIN: CPT | Performed by: PODIATRIST

## 2024-06-06 PROCEDURE — 97110 THERAPEUTIC EXERCISES: CPT | Mod: GP | Performed by: PHYSICAL THERAPIST

## 2024-06-06 PROCEDURE — 97035 APP MDLTY 1+ULTRASOUND EA 15: CPT | Mod: GP | Performed by: PHYSICAL THERAPIST

## 2024-06-06 PROCEDURE — 97140 MANUAL THERAPY 1/> REGIONS: CPT | Mod: GP | Performed by: PHYSICAL THERAPIST

## 2024-06-06 NOTE — PROGRESS NOTES
ASSESSMENT:  Encounter Diagnoses   Name Primary?    Plantar fasciitis, left Yes    Ankle instability, right     Flat feet, bilateral      MEDICAL DECISION MAKING:  Her left heel pain is consistent with plantar fascial pain.    We discussed the causes and nature of arch and heel pain.  The anatomy and function of the plantar fascia was discussed.  The treatment plan discussed included calf and plantar fascial stretching, avoidance of barefoot walking, stiffer soled shoes, activity modification, over-the-counter arch supports versus custom orthoses, prn icing and NSAIDs.  A comprehensive After-Visit Summary was provided.     She will consider quality over-the-counter arch support.  History of custom devices and this is a future option.  A Tri-Lock brace was prescribed.  She is to use the foot strap medially to offload the left heel.    She is referred to physical therapy for both the left plantar fascial pain and the right ankle instability.  I agree that she should avoid any bracing on the right.    Disclaimer: This note consists of symbols derived from keyboarding, dictation and/or voice recognition software. As a result, there may be errors in the script that have gone undetected. Please consider this when interpreting information found in this chart.    Ankit Kathleen DPM, FACFAS, MS    [place] [facility]      ____________________________________________________________________    HPI:       Nataliya returns today with some new concerns.  Her chief complaint is what she thinks is plantar fasciitis, left heel pain.  This started approximately a month ago.  She is using a Tri-Lock brace without the foot strap.  This relieves the pain.  She also has less pain when she is wearing a quality athletic shoe.  She associates the onset to prolonged standing and nonsupportive footwear.    Another concern is weakness of the right ankle, the feeling that it twists or gives out.  She has  "discussed this with physical therapy and is doing some balance exercises.  *  Past Medical History:   Diagnosis Date    Asthma     mild persistent - Dr Gee    Cerebral infarction (H) 2015    Fibroids     s/p hysterectomy    H/O gastric bypass     Hypertension     Migraine     followed by Devika    Obstructive sleep apnea     needs updated sleep study    Pre-diabetes     Relapsing remitting multiple sclerosis (H) 2015    lesion in [place].  Facial tingling initial symptom.  F/B [facility]    Spondylosis of cervical region without myelopathy or radiculopathy    *  *  Past Surgical History:   Procedure Laterality Date    COLONOSCOPY  2012    GASTRIC BYPASS  2002    \"Trouble with B12 and D\"    HYSTERECTOMY, MONO  2013    cervix gone.  fibroids.  without BSO    TONSILLECTOMY     *  *  Current Outpatient Medications   Medication Sig Dispense Refill    acetaminophen (TYLENOL) 500 MG tablet Take 2 tablets (1,000 mg) by mouth 3 times daily      albuterol (PROVENTIL) (2.5 MG/3ML) 0.083% neb solution USE 1 VIAL VIA NEBULIZER EVERY 6 HOURS AS NEEDED FOR SHORTNESS OF BREATH OR DIFFICULT BREATHING OR WHEEZING 75 mL 0    Ascorbic Acid (VITAMIN C) 500 MG CAPS Take 500 mg by mouth daily      azelastine (ASTELIN) 0.1 % nasal spray Spray 1 spray into both nostrils 2 times daily as needed for rhinitis (nasal antihistamine) 30 mL 5    baclofen (LIORESAL) 20 MG tablet TAKE ONE TABLET BY MOUTH EVERY NIGHT AT BEDTIME MAY ALSO TAKE ONE TABLET BY MOUTH EVERY 4 HOURS AS NEEDED FOR MUSCLE SPASMS (max dose: 80 mg/day) 180 tablet 1    budesonide-formoterol (SYMBICORT) 160-4.5 MCG/ACT Inhaler INHALE 2 PUFFS INTO THE LUNGS TWICE DAILY 10.2 g 5    cetirizine (ZYRTEC) 10 MG tablet Take 1 tablet (10 mg) by mouth daily 180 tablet 1    clotrimazole (LOTRIMIN) 1 % external cream Apply topically 2 times daily (Can take up to 14 days) 60 g 1    cyclobenzaprine (FLEXERIL) 10 MG tablet Take 1 tablet (10 mg) by mouth 3 times daily as needed for muscle spasms 40 " tablet 0    desonide (DESOWEN) 0.05 % external cream Apply topically 2 times daily To face as needed for rash 15 g 0    ELDERBERRY PO Take by mouth daily      EPINEPHrine (ANY BX GENERIC EQUIV) 0.3 MG/0.3ML injection 2-pack Inject 0.3 mLs (0.3 mg) into the muscle as needed for anaphylaxis May repeat one time in 5-15 minutes if response to initial dose is inadequate. 2 each 3    fluticasone (FLONASE) 50 MCG/ACT nasal spray USE 1 PUFF IN EACH NOSTRIL AS DIRECTED. 16 g 5    gabapentin (NEURONTIN) 300 MG capsule Take 1 capsule (300 mg) by mouth every 4 hours 120 capsule 5    ipratropium - albuterol 0.5 mg/2.5 mg/3 mL (DUONEB) 0.5-2.5 (3) MG/3ML neb solution Take 1 vial (3 mLs) by nebulization every 6 hours as needed for shortness of breath, wheezing or cough 90 mL 1    ketotifen fumarate 0.035% 0.035 % SOLN ophthalmic solution Place 1 drop into both eyes 2 times daily as needed for itching or dry eyes 10 mL 2    MAGNESIUM PO Take 400 mg by mouth at bedtime      modafinil (PROVIGIL) 100 MG tablet Take 1 tablet (100 mg) by mouth daily For daytime drowsiness (ok to increase to 2 tablets daily if symptoms not improved in 14 days) 90 tablet 1    montelukast (SINGULAIR) 10 MG tablet TAKE 1 TABLET(10 MG) BY MOUTH AT BEDTIME 90 tablet 3    Multiple Vitamins-Calcium (ONE-A-DAY WOMENS FORMULA PO)       natalizumab (TYSABRI) 300 MG/15ML injection Inject 15 mLs (300 mg) into the vein once every six weeks Infusion      omeprazole (PRILOSEC) 20 MG DR capsule TAKE 1 CAPSULE(20 MG) BY MOUTH TWICE DAILY BEFORE MEALS 180 capsule 1    PREBIOTIC PRODUCT PO Take by mouth daily      predniSONE (DELTASONE) 20 MG tablet Take 60 mg daily for 3 days, then 40 mg daily for 3 days, then 20 mg daily for 3 days, then 10 mg for 4 days 20 tablet 0    simethicone (MYLICON) 80 MG chewable tablet Take 1 tablet (80 mg) by mouth every 6 hours as needed for flatulence or cramping 60 tablet 1    SUMAtriptan (IMITREX) 100 MG tablet Take 50 mg up to twice daily  as needed for headache; separate doses by at least one hour and do not use more than 3 days/week 18 tablet 3    topiramate (TOPAMAX) 100 MG tablet Take 1 tablet (100 mg) by mouth at bedtime (take with 50 mg dose at bedtime) 90 tablet 1    topiramate (TOPAMAX) 50 MG tablet Take 1 tablet (50 mg) by mouth 2 times daily 180 tablet 1    valACYclovir (VALTREX) 500 MG tablet TAKE ONE TABLET BY MOUTH ONCE DAILY 90 tablet 3    VENTOLIN  (90 Base) MCG/ACT inhaler SHAKE WELL AND INHALE 2 PUFFS INTO THE LUNGS EVERY 6 HOURS AS NEEDED FOR SHORTNESS OF BREATH OR WHEEZING STRENGTH 18 g 4    vitamin D3 (CHOLECALCIFEROL) 250 mcg (82746 units) capsule TAKE 1 CAPSULE BY MOUTH ONCE DAILY 90 capsule 3         EXAM:    Vitals: /80   Wt 114.8 kg (253 lb)   LMP  (LMP Unknown)   BMI 42.10 kg/m    BMI: Body mass index is 42.1 kg/m .    Constitutional:  Nataliya Aldrich is in no apparent distress, appears well-nourished.  Cooperative with history and physical exam.    Vascular:  Pedal pulses are palpable for both the DP and PT arteries.  CFT < 3 sec.  No edema.      Neuro: Light touch sensation is intact to the L4, L5, S1 distributions  No evidence of weakness, spasticity, or contracture in the lower extremities.     Derm: Normal texture and turgor.  No erythema, ecchymosis, or cyanosis.  No open lesions.     Musculoskeletal:    Lower extremity muscle strength is normal.  With weightbearing, she demonstrates a flatfoot structure.  Mild pain on palpation over the right anterior talofibular ligament.  Pain on palpation to the plantar medial left heel.  No pain with squeezing the body of the left calcaneus.

## 2024-06-06 NOTE — PATIENT INSTRUCTIONS
Thank you for choosing [facility] Podiatry / Foot & Ankle Surgery!    DR. DURHAM'S CLINIC LOCATIONS:     [facility] TRIAGE LINE: [phone]   [address] [place] [address] APPOINTMENTS: [phone]   [place] [address] RADIOLOGY: [phone]   (Every other Tues - Wed - Fri PM) SET UP SURGERY: [phone]    PHYSICAL THERAPY: [phone]   [place] SPECIALTY BILLING QUESTIONS: [phone] [address] FAX: [phone]   [place] [address]    (Thurs & Fri AM)         Dr. Durham's Heel and Arch Pain Recommendations:     1)  A rigid-soled shoe will take stress off of the plantar fascia. An athletic type shoe with a more rigid sole is probably best.    2)  Some good over the counter inserts/ arch supports might help:  Spenco, Superfeet, Birkenstock, others.  If you buy some, consider gradually getting used to them. Increase time on them over the course of a week.    3)  Custom orthoses (prescription arch supports) are known to help treat and prevent plantar fasciitis.    4)  Avoid barefoot walking, even at home.  It is a good idea to wear supportive shoes as much as possible.    5)  Stretch your calf musculature and plantar fascia frequently.  It is a good idea to do this before getting out of bed, if pain is worse in the mornings.    6)  Ice and anti-inflammatories can help if pain is related to inflammation - more likely to help when the problem first starts.  If the pain has existed for over a month, anti-inflammatory measures might be less helpful.  Sometimes he can be therapeutic.    If your pain persists, please return to clinic.  Future options include bracing, physical therapy, immobilization/walking boot, surgery or other procedures.    Plantar fasciitis is very common and given time, will usually resolve. Obviously it can take a long time, as we walk on the part that his hurting.        PLANTAR FASCIITIS  Plantar fasciitis is often referred to as heel spurs or heel pain. Plantar  fasciitis is a very common problem that affects people of all foot shapes, age, weight and activity level. Pain may be in the arch or on the weight-bearing surface of the heel. The pain may come on without injury or identifiable cause. Pain is generally present when first getting out of bed in the morning or up from a seated break.     CAUSES  The plantar fascia is a dense fibrous band of tissue that stretches across the bottom surface of the foot. The fascia helps support the foot muscles and arch. Plantar fasciitis is thought to be caused by mechanical strain or overload. Frequent walking without shoes or wearing unsupportive shoes is thought to cause structural overload and ultimately inflammation of the plantar fascia. Some people have heel spurs that can be seen on x-ray. The heel spur is actually a minor component of plantar fascitis and is largely ignored.       SELF TREATMENT   The easiest solution is to stop walking around your home without shoes. Plantar fasciitis is largely a shoe problem. Shoes are either not being worn often enough or your current shoes are inadequate for your weight, foot structure or activity level. The majority of shoes on the market today are not sufficient to resist development of plantar fasciitis or to promote healing. Assume that your current shoes are inadequate and will need to be replaced. Even high quality shoes wear out with 6 months to one year of frequent use. Weight loss is another option. Losing ten pounds in the next two months may be enough to resolve the problem. Ice applied to the area of pain two to three times per day for ten minutes each session can be very helpful. Warm foot soaks in epsom salts can also relieve pain. This should continue until the problem resolves. Achilles tendon stretching is essential. Stretch multiple times daily to promote healing and to prevent recurrence in the future. Over all stretching of the body is helpful as well such as the  calves, thighs and lower back. Normally when one area of the body is tight, other areas are too. Gentle Yoga can be good for this.     Over the counter topical anti inflammatories can be helpful such as biofreeze, bengay, salon pas, ect...  Oral ibuprofen or aleve is recommended as well to try to calm down inflammation.     Night splints can be helpful to gradually stretch the foot at night as a lot of pain is when you get up in the morning. Taking a towel or thera band and stretching the foot back multiple times before you get ou of bed can be beneficial as well.     MEDICAL TREATMENT  Medical treatments often include custom arch supports, cortisone injections, physical therapy, splints to be worn in bed, prescription medications and surgery. The home treatments listed above will be necessary regardless of these advanced medical treatments. Surgery is rarely needed but is very helpful in selected cases.     PROGNOSIS  Plantar fasciitis can last from one day to a lifetime. Some people get intermittent fascitis that is very short-lived. Others suffer daily for years. Excessive body weight, frequent bare foot walking, long hours on the feet, inadequate shoes, predisposing foot structures and excessive activity such as running are all potential issues that lead to chronic and/or recurring plantar fascitis. Having plantar fasciitis means that you are forever prone to this problem and will require modification of some of the above factors. Most people seek treatment within one to four months. Healing usually requires a similar one to four month time frame. Healing time is relative to the amount of effort spent treating the problem.   Plantar fasciitis is highly recurrent. Risk factors often continue, including return to bare foot walking, inadequate shoes, excessive body weight, excessive activities, etc. Your life style and foot structure may predispose you to recurrent plantar fasciitis. A daily prevention regimen can  be very helpful. Ongoing use of shoe inserts, careful attention to appropriate shoes, daily Achilles stretching, etc. may prevent recurrence. Prompt attention at the earliest warning signs of heel pain can resolve the problem in as short as a few days.     EXERCISES  Stair Exercise: Step on the stairs with the ball of your foot and hold your position for at least 15 seconds, then slowly step down with the heels of your foot. You can do this daily and as often as you want.   Picking the Towel: Sit comfortably and then pick the towel up with your toes. You can use any object other than a towel as long as the material can be soft and you can pick it up with your toes.  Rolling the Bottle: Use a small ball or frozen water bottle and then roll it around with your foot.   Flex the Toes: Sit comfortably and then flex your toes by pointing it towards the floor or towards your body. This will relax and flex your foot and exercise your plantar fascia, the calf, and the Achilles tendon. The inability of the foot to stretch often causes the bunching up of the plantar fascia area leading to the pain.  Calf/Achilles Stretching Examples:  Do the stretching gently. Do not bounce or stretch to the point of pain. Hold each stretch for 30 seconds. Stretch 10 times per set, three sets per day. Morning, afternoon and evening. If your heel pain is very severe in the morning, consider doing the first set of stretches before you get out of bed.                 OVER THE COUNTER INSERT RECOMMENDATIONS  SuperFeet   Sofsole Fit Spenco   Power Step   Walk-Fit Arch Cradles     Most of these can be found at your local [employer], sporting goods stores, or online.  **A good high quality over the counter insert should cost around $40-$50      [employer] SHOES LOCATIONS  [place]  [address] [place] [address]  [phone]   [address]  [phone] [place] [address]  [phone]   [address]  [phone]   [place]  [address]  [phone] [place] [address]  [phone]   [place]  [address]  [phone]   [place]  [address] [address]  [phone] [place]  [address]  [phone]     [place] ORTHOTICS LOCATIONS  [facility]  [address]  [place] [address]  Phone: [phone]  Fax: [phone] [facility]  [address]  [place] [address]  Phone: [phone]   Fax: [phone]   [facility]  [address]  [place] [address]  Phone: [phone]  Fax: [phone] [facility]  [address]  [place] [address]  Phone: [phone]   Fax: [phone]   [facility]   [address]  [place] [address]  Phone: [phone]  Fax: [phone] * Please call any location listed to make an appointment for a casting/fitting. Your referral was sent to their central office and they will all have the order on file.

## 2024-06-06 NOTE — LETTER
6/6/2024      Nataliya Aldrich  [address]  [place] [address]      Dear Colleague,    Thank you for referring your patient, Natlaiya Aldrich, to the [facility] PODIATRY. Please see a copy of my visit note below.    ASSESSMENT:  Encounter Diagnoses   Name Primary?     Plantar fasciitis, left Yes     Ankle instability, right      Flat feet, bilateral      MEDICAL DECISION MAKING:  Her left heel pain is consistent with plantar fascial pain.    We discussed the causes and nature of arch and heel pain.  The anatomy and function of the plantar fascia was discussed.  The treatment plan discussed included calf and plantar fascial stretching, avoidance of barefoot walking, stiffer soled shoes, activity modification, over-the-counter arch supports versus custom orthoses, prn icing and NSAIDs.  A comprehensive After-Visit Summary was provided.     She will consider quality over-the-counter arch support.  History of custom devices and this is a future option.  A Tri-Lock brace was prescribed.  She is to use the foot strap medially to offload the left heel.    She is referred to physical therapy for both the left plantar fascial pain and the right ankle instability.  I agree that she should avoid any bracing on the right.    Disclaimer: This note consists of symbols derived from keyboarding, dictation and/or voice recognition software. As a result, there may be errors in the script that have gone undetected. Please consider this when interpreting information found in this chart.    Ankit Kathleen, LIZANDRO, FACFAS, [facility] Department of Podiatry/Foot & Ankle Surgery      ____________________________________________________________________    HPI:       Nataliya returns today with some new concerns.  Her chief complaint is what she thinks is plantar fasciitis, left heel pain.  This started approximately a month ago.  She is using a Tri-Lock brace without the foot  "strap.  This relieves the pain.  She also has less pain when she is wearing a quality athletic shoe.  She associates the onset to prolonged standing and nonsupportive footwear.    Another concern is weakness of the right ankle, the feeling that it twists or gives out.  She has discussed this with physical therapy and is doing some balance exercises.  *  Past Medical History:   Diagnosis Date     Asthma     mild persistent - Dr Gee     Cerebral infarction (H) 2015     Fibroids     s/p hysterectomy     H/O gastric bypass      Hypertension      Migraine     followed by Devika     Obstructive sleep apnea     needs updated sleep study     Pre-diabetes      Relapsing remitting multiple sclerosis (H) 2015    lesion in [place].  Facial tingling initial symptom.  F/B [facility]     Spondylosis of cervical region without myelopathy or radiculopathy    *  *  Past Surgical History:   Procedure Laterality Date     COLONOSCOPY  2012     GASTRIC BYPASS  2002    \"Trouble with B12 and D\"     HYSTERECTOMY, MONO  2013    cervix gone.  fibroids.  without BSO     TONSILLECTOMY     *  *  Current Outpatient Medications   Medication Sig Dispense Refill     acetaminophen (TYLENOL) 500 MG tablet Take 2 tablets (1,000 mg) by mouth 3 times daily       albuterol (PROVENTIL) (2.5 MG/3ML) 0.083% neb solution USE 1 VIAL VIA NEBULIZER EVERY 6 HOURS AS NEEDED FOR SHORTNESS OF BREATH OR DIFFICULT BREATHING OR WHEEZING 75 mL 0     Ascorbic Acid (VITAMIN C) 500 MG CAPS Take 500 mg by mouth daily       azelastine (ASTELIN) 0.1 % nasal spray Spray 1 spray into both nostrils 2 times daily as needed for rhinitis (nasal antihistamine) 30 mL 5     baclofen (LIORESAL) 20 MG tablet TAKE ONE TABLET BY MOUTH EVERY NIGHT AT BEDTIME MAY ALSO TAKE ONE TABLET BY MOUTH EVERY 4 HOURS AS NEEDED FOR MUSCLE SPASMS (max dose: 80 mg/day) 180 tablet 1     budesonide-formoterol (SYMBICORT) 160-4.5 MCG/ACT Inhaler INHALE 2 PUFFS INTO THE LUNGS TWICE DAILY 10.2 g 5     cetirizine " (ZYRTEC) 10 MG tablet Take 1 tablet (10 mg) by mouth daily 180 tablet 1     clotrimazole (LOTRIMIN) 1 % external cream Apply topically 2 times daily (Can take up to 14 days) 60 g 1     cyclobenzaprine (FLEXERIL) 10 MG tablet Take 1 tablet (10 mg) by mouth 3 times daily as needed for muscle spasms 40 tablet 0     desonide (DESOWEN) 0.05 % external cream Apply topically 2 times daily To face as needed for rash 15 g 0     ELDERBERRY PO Take by mouth daily       EPINEPHrine (ANY BX GENERIC EQUIV) 0.3 MG/0.3ML injection 2-pack Inject 0.3 mLs (0.3 mg) into the muscle as needed for anaphylaxis May repeat one time in 5-15 minutes if response to initial dose is inadequate. 2 each 3     fluticasone (FLONASE) 50 MCG/ACT nasal spray USE 1 PUFF IN EACH NOSTRIL AS DIRECTED. 16 g 5     gabapentin (NEURONTIN) 300 MG capsule Take 1 capsule (300 mg) by mouth every 4 hours 120 capsule 5     ipratropium - albuterol 0.5 mg/2.5 mg/3 mL (DUONEB) 0.5-2.5 (3) MG/3ML neb solution Take 1 vial (3 mLs) by nebulization every 6 hours as needed for shortness of breath, wheezing or cough 90 mL 1     ketotifen fumarate 0.035% 0.035 % SOLN ophthalmic solution Place 1 drop into both eyes 2 times daily as needed for itching or dry eyes 10 mL 2     MAGNESIUM PO Take 400 mg by mouth at bedtime       modafinil (PROVIGIL) 100 MG tablet Take 1 tablet (100 mg) by mouth daily For daytime drowsiness (ok to increase to 2 tablets daily if symptoms not improved in 14 days) 90 tablet 1     montelukast (SINGULAIR) 10 MG tablet TAKE 1 TABLET(10 MG) BY MOUTH AT BEDTIME 90 tablet 3     Multiple Vitamins-Calcium (ONE-A-DAY WOMENS FORMULA PO)        natalizumab (TYSABRI) 300 MG/15ML injection Inject 15 mLs (300 mg) into the vein once every six weeks Infusion       omeprazole (PRILOSEC) 20 MG DR capsule TAKE 1 CAPSULE(20 MG) BY MOUTH TWICE DAILY BEFORE MEALS 180 capsule 1     PREBIOTIC PRODUCT PO Take by mouth daily       predniSONE (DELTASONE) 20 MG tablet Take 60 mg  daily for 3 days, then 40 mg daily for 3 days, then 20 mg daily for 3 days, then 10 mg for 4 days 20 tablet 0     simethicone (MYLICON) 80 MG chewable tablet Take 1 tablet (80 mg) by mouth every 6 hours as needed for flatulence or cramping 60 tablet 1     SUMAtriptan (IMITREX) 100 MG tablet Take 50 mg up to twice daily as needed for headache; separate doses by at least one hour and do not use more than 3 days/week 18 tablet 3     topiramate (TOPAMAX) 100 MG tablet Take 1 tablet (100 mg) by mouth at bedtime (take with 50 mg dose at bedtime) 90 tablet 1     topiramate (TOPAMAX) 50 MG tablet Take 1 tablet (50 mg) by mouth 2 times daily 180 tablet 1     valACYclovir (VALTREX) 500 MG tablet TAKE ONE TABLET BY MOUTH ONCE DAILY 90 tablet 3     VENTOLIN  (90 Base) MCG/ACT inhaler SHAKE WELL AND INHALE 2 PUFFS INTO THE LUNGS EVERY 6 HOURS AS NEEDED FOR SHORTNESS OF BREATH OR WHEEZING STRENGTH 18 g 4     vitamin D3 (CHOLECALCIFEROL) 250 mcg (48112 units) capsule TAKE 1 CAPSULE BY MOUTH ONCE DAILY 90 capsule 3         EXAM:    Vitals: /80   Wt 114.8 kg (253 lb)   LMP  (LMP Unknown)   BMI 42.10 kg/m    BMI: Body mass index is 42.1 kg/m .    Constitutional:  Nataliya Aldrich is in no apparent distress, appears well-nourished.  Cooperative with history and physical exam.    Vascular:  Pedal pulses are palpable for both the DP and PT arteries.  CFT < 3 sec.  No edema.      Neuro: Light touch sensation is intact to the L4, L5, S1 distributions  No evidence of weakness, spasticity, or contracture in the lower extremities.     Derm: Normal texture and turgor.  No erythema, ecchymosis, or cyanosis.  No open lesions.     Musculoskeletal:    Lower extremity muscle strength is normal.  With weightbearing, she demonstrates a flatfoot structure.  Mild pain on palpation over the right anterior talofibular ligament.  Pain on palpation to the plantar medial left heel.  No pain with squeezing the body of the left  calcaneus.        Again, thank you for allowing me to participate in the care of your patient.        Sincerely,        Ankit Kathleen DPM

## 2024-06-18 ENCOUNTER — E-VISIT (OUTPATIENT)
Dept: FAMILY MEDICINE | Facility: CLINIC | Age: 49
End: 2024-06-18
Payer: COMMERCIAL

## 2024-06-18 DIAGNOSIS — B36.0 TINEA VERSICOLOR: ICD-10-CM

## 2024-06-18 PROCEDURE — 99421 OL DIG E/M SVC 5-10 MIN: CPT | Performed by: PHYSICIAN ASSISTANT

## 2024-06-19 RX ORDER — KETOCONAZOLE 20 MG/G
CREAM TOPICAL 2 TIMES DAILY
Qty: 45 G | Refills: 0 | Status: SHIPPED | OUTPATIENT
Start: 2024-06-19

## 2024-06-19 RX ORDER — FLUCONAZOLE 200 MG/1
200 TABLET ORAL WEEKLY
Qty: 4 TABLET | Refills: 0 | Status: SHIPPED | OUTPATIENT
Start: 2024-06-19 | End: 2024-07-11

## 2024-06-20 ENCOUNTER — THERAPY VISIT (OUTPATIENT)
Dept: PHYSICAL THERAPY | Facility: CLINIC | Age: 49
End: 2024-06-20
Payer: COMMERCIAL

## 2024-06-20 ENCOUNTER — OFFICE VISIT (OUTPATIENT)
Dept: FAMILY MEDICINE | Facility: CLINIC | Age: 49
End: 2024-06-20
Payer: COMMERCIAL

## 2024-06-20 ENCOUNTER — NURSE TRIAGE (OUTPATIENT)
Dept: FAMILY MEDICINE | Facility: CLINIC | Age: 49
End: 2024-06-20

## 2024-06-20 VITALS
SYSTOLIC BLOOD PRESSURE: 122 MMHG | RESPIRATION RATE: 14 BRPM | BODY MASS INDEX: 41.8 KG/M2 | HEART RATE: 85 BPM | OXYGEN SATURATION: 100 % | WEIGHT: 250.9 LBS | TEMPERATURE: 96.2 F | HEIGHT: 65 IN | DIASTOLIC BLOOD PRESSURE: 80 MMHG

## 2024-06-20 DIAGNOSIS — M79.18 MYOFASCIAL PAIN: Primary | ICD-10-CM

## 2024-06-20 DIAGNOSIS — D84.9 IMMUNOSUPPRESSION (H): ICD-10-CM

## 2024-06-20 DIAGNOSIS — G35 MS (MULTIPLE SCLEROSIS) (H): ICD-10-CM

## 2024-06-20 DIAGNOSIS — L73.9 FOLLICULITIS: Primary | ICD-10-CM

## 2024-06-20 DIAGNOSIS — M54.2 CERVICALGIA: ICD-10-CM

## 2024-06-20 PROCEDURE — 97140 MANUAL THERAPY 1/> REGIONS: CPT | Mod: GP | Performed by: PHYSICAL THERAPIST

## 2024-06-20 PROCEDURE — 97110 THERAPEUTIC EXERCISES: CPT | Mod: GP | Performed by: PHYSICAL THERAPIST

## 2024-06-20 PROCEDURE — 97035 APP MDLTY 1+ULTRASOUND EA 15: CPT | Mod: GP | Performed by: PHYSICAL THERAPIST

## 2024-06-20 PROCEDURE — 99214 OFFICE O/P EST MOD 30 MIN: CPT | Performed by: PHYSICIAN ASSISTANT

## 2024-06-20 RX ORDER — SULFAMETHOXAZOLE/TRIMETHOPRIM 800-160 MG
1 TABLET ORAL 2 TIMES DAILY
Qty: 20 TABLET | Refills: 0 | Status: SHIPPED | OUTPATIENT
Start: 2024-06-20 | End: 2024-06-30

## 2024-06-20 RX ORDER — MUPIROCIN 20 MG/G
OINTMENT TOPICAL 3 TIMES DAILY
Qty: 22 G | Refills: 1 | Status: SHIPPED | OUTPATIENT
Start: 2024-06-20

## 2024-06-20 NOTE — TELEPHONE ENCOUNTER
Reason for Call:  Appointment Request    Patient requesting this type of appt:  Office visit     Requested provider: Miya Crump or any other provider     Reason patient unable to be scheduled: Not within requested timeframe    When does patient want to be seen/preferred time:  ASAP     Comments: bumps on genital area     Could we send this information to you in [employer] or would you prefer to receive a phone call?:   Patient would like to be contacted via [employer]    Call taken on 6/20/2024 at 7:45 AM by Cally Couch

## 2024-06-20 NOTE — PROGRESS NOTES
Assessment & Plan     Folliculitis  Immunosuppression (H24)   Recommend conservative treatment with topical initially due to significant antibiotic allergies.  Topical mupirocin prescription sent in and diligent warm compresses recommended.  If unsuccessful can utilize Bactrim DS.  I will send this to her pharmacy so that she has this option should she need it.  Advised of warning signs and when to seek urgent care.  All questions answered to the patient's satisfaction.  - sulfamethoxazole-trimethoprim (BACTRIM DS) 800-160 MG tablet  Dispense: 20 tablet; Refill: 0  - mupirocin (BACTROBAN) 2 % external ointment  Dispense: 22 g; Refill: 1      Return in about 1 week (around 6/27/2024) for Call or MyChart if worsening or not improving.      Miya Crump MBA, MS, PA-C  [facility]   Nataliya is a 48 year old, presenting for the following health issues:  Vaginal Problem        6/20/2024     4:14 PM   Additional Questions   Roomed by Nelida CMA   Accompanied by Self     History of Present Illness       Reason for visit:  Vaginal concerns  Symptom onset:  1-3 days ago    She eats 2-3 servings of fruits and vegetables daily.She consumes 0 sweetened beverage(s) daily.She exercises with enough effort to increase her heart rate 10 to 19 minutes per day.  She exercises with enough effort to increase her heart rate 3 or less days per week.   She is taking medications regularly.         Vaginal Symptoms  Onset/Duration: 6/18/2024  Description:  Vaginal Discharge: none   Itching (Pruritis): YES- bump itches  Burning sensation:  No  Odor: No  Accompanying Signs & Symptoms:  Urinary symptoms: No  Abdominal pain: YES  Fever: No  History:   Sexually active: YES  New Partner: No  Possibility of Pregnancy:  No  Recent antibiotic use: No  Previous vaginitis issues: No  Precipitating or alleviating factors: None  Therapies tried and outcome: none    Started shaving because she is having hot flashes  "and felt unhygienic.  Using thick layers of coconut oil for hydration afterward.  Small white pustular areas popping up in areas that she has been shaving.  No pain or drainage.  Wondering if something is wrong.  Uses a very clean razor and changes often.      Review of Systems  Constitutional, HEENT, cardiovascular, pulmonary, GI, , musculoskeletal, neuro, skin, endocrine and psych systems are negative, except as otherwise noted.      Objective    /80   Pulse 85   Temp (!) 96.2  F (35.7  C) (Tympanic)   Resp 14   Ht 1.651 m (5' 5\")   Wt 113.8 kg (250 lb 14.4 oz)   LMP  (LMP Unknown)   SpO2 100%   BMI 41.75 kg/m    Body mass index is 41.75 kg/m .  Physical Exam   GENERAL: alert and no distress  EYES: Eyes grossly normal to inspection, PERRL and conjunctivae and sclerae normal  MS: no gross musculoskeletal defects noted, no edema  SKIN: no suspicious lesions or rashes, multiple small milia/irritated follicles scattered throughout perineum, vaginal area/labia majora without active infection or drainage.  NEURO: Normal strength and tone, mentation intact and speech normal  PSYCH: mentation appears normal, affect normal/bright    No results found for any visits on 06/20/24.        Signed Electronically by: Miya Crump PA-C    "

## 2024-06-20 NOTE — PROGRESS NOTES
06/20/24 0500   Appointment Info   Signing clinician's name / credentials Nidhi Dunbar, PT, SCS   Total/Authorized Visits E&T   Visits Used 10   Medical Diagnosis Neck pain, myofascial pain, MS   PT Tx Diagnosis neck pain, MS, myofasical pain   Quick Adds Certification   Progress Note/Certification   Start of Care Date 02/02/24   Onset of illness/injury or Date of Surgery 11/02/23   Therapy Frequency 2X/mo   Predicted Duration 6 mo   Certification date from 02/02/24   Certification date to 08/02/24   Progress Note Due Date 08/02/24   Progress Note Completed Date 06/20/24   GOALS   PT Goals 2   PT Goal 1   Goal Identifier Goal 1   Goal Description Pt will be able to sit, 30 min, painfree, at work   Rationale to maximize safety and independence with performance of ADLs and functional tasks   Target Date 08/02/24   PT Goal 2   Goal Identifier Goal 2   Goal Description Pt will be able to sleep through the night without waking in pain   Rationale to maximize safety and independence with performance of ADLs and functional tasks   Goal Progress only waking about 1X/night due to necksoreness   Target Date 08/02/24   Subjective Report   Subjective Report Pt notes neck is a little better compared to last PT session. Less stressful week. Been aware of posture with her cooking/baking in the kitchen. Being active with walking outdoors.   Objective Measures   Objective Measures Objective Measure 1;Objective Measure 2   Objective Measure 1   Objective Measure AROM:   Details cervical: flexion- mod limit(tightness in neck); extension- min limit  (feels good); L rot= nill loss (no pain);  R rot= nill (pulls on left)   Objective Measure 2   Objective Measure palpation   Details very toned and tender to B UT's (R>L)   PT Modalities   PT Modalities Ultrasound   Ultrasound   Ultrasound Minutes (20041) 10   Ultrasound -Type (does not include 3-5 min prep/cleanup time) Continuous;2 cm sound head   Intensity 2.0   Duration (does not  include the 3-5 min set up/clean up time) 10 min   Frequency 1 MHz   Location B UT's   Positioning sitting   Patient Response/Progress lessen tension over trigger points   Treatment Interventions (PT)   Interventions Therapeutic Procedure/Exercise;Manual Therapy   Therapeutic Procedure/Exercise   Therapeutic Procedures: strength, endurance, ROM, flexibility minutes (79798) 8   Therapeutic Procedures Ther Proc 2;Ther Proc 3;Ther Proc 4   Ther Proc 1 Pt education   Ther Proc 1 - Details diet/nutrition, sleep, exercises, posture; gentle motion   Ther Proc 2 cervical extensions   Ther Proc 2 - Details X10   Ther Proc 3 Scapula retraction   Ther Proc 3 - Details X10 (with PT OP)   Ther Proc 4 SLS balance (for ankle stability)   Ther Proc 4 - Details X30 sec X3   Skilled Intervention instructed on gentle ROM exercises to improve pain   Manual Therapy   Manual Therapy: Mobilization, MFR, MLD, friction massage minutes (45347) 15   Manual Therapy 1 Tool myofascial mobilization   Manual Therapy 1 - Details B UT's, splenius cervicalis, levator scap: 15 min   Skilled Intervention facilitated myofascial release of trigger points to reduce pain at rest   Education   Learner/Method Patient;Pictures/Video   Plan   Home program See PTRx   Plan for next session re assess and progress as tolerated   Total Session Time   Timed Code Treatment Minutes 33   Total Treatment Time (sum of timed and untimed services) 33         PLAN  Continue therapy per current plan of care.    Beginning/End Dates of Progress Note Reporting Period:  (P) 06/20/24 to 06/20/2024    Referring Provider:  Miya Crump

## 2024-06-20 NOTE — TELEPHONE ENCOUNTER
Kirsten to use POA/      Miya Crump MBA, MS, PA-C  [facility] [place]

## 2024-06-20 NOTE — TELEPHONE ENCOUNTER
Reason for Disposition    Rash of female genital area (e.g., labia, vagina, vulva)    Protocols used: Rash or Redness - [id number]

## 2024-06-25 ENCOUNTER — TELEPHONE (OUTPATIENT)
Dept: FAMILY MEDICINE | Facility: CLINIC | Age: 49
End: 2024-06-25
Payer: COMMERCIAL

## 2024-06-25 ENCOUNTER — MYC MEDICAL ADVICE (OUTPATIENT)
Dept: FAMILY MEDICINE | Facility: CLINIC | Age: 49
End: 2024-06-25
Payer: COMMERCIAL

## 2024-06-25 NOTE — TELEPHONE ENCOUNTER
Forms/Letter Request    Type of form/letter: Special diet Information Request - [facility] SNAP Worker fax #[phone] Attn Ankit Case #[id number]    Pt wants an original copy also    Do we have the form/letter: Yes:     Who is the form from? Patient    Where did/will the form come from? Patient or family brought in       When is form/letter needed by: asap - FRIDAY    How would you like the form/letter returned: Fax : [phone] Ankit Case #[id number]    Patient Notified form requests are processed in 5-7 business days:No    Could we send this information to you in [employer] or would you prefer to receive a phone call?:   na    Put in providers in box    Nelida GARCIA

## 2024-06-26 NOTE — TELEPHONE ENCOUNTER
Form completed and placed in [facility] inbox      Miya Crump MBA, MS, PACARMEN  [facility] [place]

## 2024-06-27 NOTE — TELEPHONE ENCOUNTER
Faxed signed form ([facility]) #[phone] per pt.  Email : [email or web address].    Consent on file    Called pt to let her know this was done and to find out if she wanted  a copy up front to     Phone #[phone] for Ankit    Copied into [facility] / filed in [facility]

## 2024-06-28 ENCOUNTER — THERAPY VISIT (OUTPATIENT)
Dept: PHYSICAL THERAPY | Facility: CLINIC | Age: 49
End: 2024-06-28
Attending: PODIATRIST
Payer: COMMERCIAL

## 2024-06-28 DIAGNOSIS — M72.2 PLANTAR FASCIITIS, LEFT: ICD-10-CM

## 2024-06-28 DIAGNOSIS — M25.371 ANKLE INSTABILITY, RIGHT: ICD-10-CM

## 2024-06-28 PROCEDURE — 97161 PT EVAL LOW COMPLEX 20 MIN: CPT | Mod: GP | Performed by: PHYSICAL THERAPIST

## 2024-06-28 PROCEDURE — 97110 THERAPEUTIC EXERCISES: CPT | Mod: GP | Performed by: PHYSICAL THERAPIST

## 2024-06-28 ASSESSMENT — ACTIVITIES OF DAILY LIVING (ADL)
PERFORMING_LIGHT_ACTIVITIES_AROUND_YOUR_HOME: MODERATE DIFFICULTY
SHOPPING: EXTREME DIFFICULTY OR UNABLE TO PERFORM ACTIVITY
PERFORMING_HEAVY_ACTIVITIES_AROUND_YOUR_HOME: EXTREME DIFFICULTY OR UNABLE TO PERFORM ACTIVITY
PUTTING_ON_YOUR_SHOES_OR_SOCKS: A LITTLE BIT OF DIFFICULTY
RUNNING_ON_EVEN_GROUND: EXTREME DIFFICULTY OR UNABLE TO PERFORM ACTIVITY
ROLLING_OVER_IN_BED: A LITTLE BIT OF DIFFICULTY
PLEASE_INDICATE_YOR_PRIMARY_REASON_FOR_REFERRAL_TO_THERAPY:: FOOT AND/OR ANKLE
SITTING_FOR_1_HOUR: A LITTLE BIT OF DIFFICULTY
LEFS_SCORE(%): 0
SQUATTING: MODERATE DIFFICULTY
RUNNING_ON_UNEVEN_GROUND: EXTREME DIFFICULTY OR UNABLE TO PERFORM ACTIVITY
WALKING_2_BLOCKS: EXTREME DIFFICULTY OR UNABLE TO PERFORM ACTIVITY
ANY_OF_YOUR_USUAL_WORK,_HOUSEWORK_OR_SCHOOL_ACTIVITIES: QUITE A BIT OF DIFFICULTY
WALKING_BETWEEN_ROOMS: MODERATE DIFFICULTY
GETTING_INTO_AND_OUT_OF_A_BATH: MODERATE DIFFICULTY
STANDING_FOR_1_HOUR: EXTREME DIFFICULTY OR UNABLE TO PERFORM ACTIVITY
LIFTING_AN_OBJECT,_LIKE_A_BAG_OF_GROCERIES_FROM_THE_FLOOR: A LITTLE BIT OF DIFFICULTY
LEFS_RAW_SCORE: 0
GOING_UP_OR_DOWN_10_STAIRS: EXTREME DIFFICULTY OR UNABLE TO PERFORM ACTIVITY
YOUR_USUAL_HOBBIES,_RECREATIONAL_OR_SPORTING_ACTIVITIES: EXTREME DIFFICULTY OR UNABLE TO PERFORM ACTIVITY
MAKING_SHARP_TURNS_WHILE_RUNNING_FAST: EXTREME DIFFICULTY OR UNABLE TO PERFORM ACTIVITY
GETTING_INTO_OR_OUT_OF_A_CAR: MODERATE DIFFICULTY
WALKING_A_MILE: EXTREME DIFFICULTY OR UNABLE TO PERFORM ACTIVITY

## 2024-06-28 NOTE — PROGRESS NOTES
PHYSICAL THERAPY EVALUATION  Type of Visit: Evaluation        Fall Risk Screen:  Fall screen completed by: PT  Have you fallen 2 or more times in the past year?: No  Have you fallen and had an injury in the past year?: No  Is patient a fall risk?: No    Subjective       Presenting condition or subjective complaint: planter facitis and rolling ankle  Date of onset: 04/28/24    Relevant medical history:     Dates & types of surgery:      Prior diagnostic imaging/testing results: X-ray     Prior therapy history for the same diagnosis, illness or injury: Yes        Living Environment  Social support: With family members   Type of home: Apartment/condo   Stairs to enter the home:         Ramp: No   Stairs inside the home: No       Help at home: None  Equipment owned: [employer]     Employment: No    Hobbies/Interests: [employer] walking dancing    Patient goals for therapy: excersise and walk miles again wear no brace    Pain assessment: Pain present  Location: L heel pain and R instability in ankle /Rating: rest= 0/10   worst=   L: 9/10  ; R: 6/10     Objective   ANKLE:      SL Balance:   L: 10 sec some pain in heel(R hip drop)  R: 10 sec challenging (L hip drop)    ROM/Strength: (* indicates patient's pain)   AROM L AROM R MMT L MMT R   Dorsiflexion 0 5 5/5 4/5   Plantarflexion 54 58 5/5 4/5   Inversion (prone) 30 30 5/5 4+/5   Eversion (prone) 20 20 5/5 4/5   G Toe Ext WFL WFL WFL WFL   G Toe Flex   WFL WFL       Edema:      Figure 8: L: 53cm ; R: 53.5cm    Palpation: very tender to palpation over L medial heel        Assessment & Plan   CLINICAL IMPRESSIONS  Medical Diagnosis: Plantar fasciitis, left (M72.2)    Ankle instability, right (M25.371)    Treatment Diagnosis: L Plantar facsitis, R ankle instability   Impression/Assessment: Patient is a 48 year old female with Bilateral ankle complaints.  The following significant findings have been identified: Pain, Decreased ROM/flexibility, Decreased strength,  Impaired balance, Decreased proprioception, Impaired sensation, Inflammation, Impaired gait, Impaired muscle performance, Decreased activity tolerance, Impaired posture, and Instability. These impairments interfere with their ability to perform self care tasks, work tasks, recreational activities, household chores, and community mobility as compared to previous level of function.     Clinical Decision Making (Complexity):  Clinical Presentation: Stable/Uncomplicated  Clinical Presentation Rationale: based on medical and personal factors listed in PT evaluation  Clinical Decision Making (Complexity): Low complexity    PLAN OF CARE  Treatment Interventions:  Interventions: Manual Therapy, Neuromuscular Re-education, Therapeutic Activity, Therapeutic Exercise    Long Term Goals     PT Goal 1  Goal Identifier: Goal 1  Goal Description: Pt will be able to walk 30 min, painfree, in the community  Rationale: to maximize safety and independence within the community  Target Date: 09/20/24      Frequency of Treatment: 1X/week  Duration of Treatment: 12 weeks      Education Assessment:   Learner/Method: Patient;Pictures/Video    Risks and benefits of evaluation/treatment have been explained.   Patient/Family/caregiver agrees with Plan of Care.     Evaluation Time:     PT Eval, Low Complexity Minutes (40926): 15       Signing Clinician: Nidhi Dunbar, PT        [facility]                                                                                   OUTPATIENT PHYSICAL THERAPY      PLAN OF TREATMENT FOR OUTPATIENT REHABILITATION   Patient's Last Name, First Name, M.SIOBHAN  Hastings AldrichNataliya barcenas YOB: 1975   Provider's Name   [facility]   Medical Record No.  [id number]     Onset Date: 04/28/24  Start of Care Date: 06/28/24     Medical Diagnosis:  Plantar fasciitis, left (M72.2)    Ankle instability, right (M25.371)      PT Treatment Diagnosis:  L  Plantar facsitis, R ankle instability Plan of Treatment  Frequency/Duration: 1X/week/ 12 weeks    Certification date from 06/28/24 to 09/20/24         See note for plan of treatment details and functional goals     Nidhi Dunbar, PT                         I CERTIFY THE NEED FOR THESE SERVICES FURNISHED UNDER        THIS PLAN OF TREATMENT AND WHILE UNDER MY CARE     (Physician attestation of this document indicates review and certification of the therapy plan).              Referring Provider:  Ankit Kathleen    Initial Assessment  See Epic Evaluation- Start of Care Date: 06/28/24

## 2024-07-05 ENCOUNTER — INFUSION THERAPY VISIT (OUTPATIENT)
Dept: INFUSION THERAPY | Facility: CLINIC | Age: 49
End: 2024-07-05
Attending: PSYCHIATRY & NEUROLOGY
Payer: COMMERCIAL

## 2024-07-05 VITALS
OXYGEN SATURATION: 99 % | DIASTOLIC BLOOD PRESSURE: 67 MMHG | TEMPERATURE: 98 F | HEART RATE: 68 BPM | SYSTOLIC BLOOD PRESSURE: 124 MMHG | RESPIRATION RATE: 16 BRPM

## 2024-07-05 DIAGNOSIS — D72.829 LEUKOCYTOSIS, UNSPECIFIED TYPE: ICD-10-CM

## 2024-07-05 DIAGNOSIS — G35 MULTIPLE SCLEROSIS (H): Primary | ICD-10-CM

## 2024-07-05 DIAGNOSIS — J06.9 RECENT URI: ICD-10-CM

## 2024-07-05 LAB
ERYTHROCYTE [DISTWIDTH] IN BLOOD BY AUTOMATED COUNT: 14.2 % (ref 10–15)
HCT VFR BLD AUTO: 40 % (ref 35–47)
HGB BLD-MCNC: 12.8 G/DL (ref 11.7–15.7)
MCH RBC QN AUTO: 30.5 PG (ref 26.5–33)
MCHC RBC AUTO-ENTMCNC: 32 G/DL (ref 31.5–36.5)
MCV RBC AUTO: 96 FL (ref 78–100)
PLATELET # BLD AUTO: 326 10E3/UL (ref 150–450)
RBC # BLD AUTO: 4.19 10E6/UL (ref 3.8–5.2)
WBC # BLD AUTO: 8.1 10E3/UL (ref 4–11)

## 2024-07-05 PROCEDURE — 96365 THER/PROPH/DIAG IV INF INIT: CPT

## 2024-07-05 PROCEDURE — 258N000003 HC RX IP 258 OP 636: Performed by: PSYCHIATRY & NEUROLOGY

## 2024-07-05 PROCEDURE — 36415 COLL VENOUS BLD VENIPUNCTURE: CPT

## 2024-07-05 PROCEDURE — 85027 COMPLETE CBC AUTOMATED: CPT | Performed by: NURSE PRACTITIONER

## 2024-07-05 PROCEDURE — 250N000011 HC RX IP 250 OP 636: Performed by: PSYCHIATRY & NEUROLOGY

## 2024-07-05 RX ORDER — DIPHENHYDRAMINE HYDROCHLORIDE 50 MG/ML
50 INJECTION INTRAMUSCULAR; INTRAVENOUS
Status: CANCELLED
Start: 2024-07-19

## 2024-07-05 RX ORDER — MEPERIDINE HYDROCHLORIDE 25 MG/ML
25 INJECTION INTRAMUSCULAR; INTRAVENOUS; SUBCUTANEOUS EVERY 30 MIN PRN
Status: CANCELLED | OUTPATIENT
Start: 2024-07-19

## 2024-07-05 RX ORDER — ALBUTEROL SULFATE 90 UG/1
1-2 AEROSOL, METERED RESPIRATORY (INHALATION)
Status: CANCELLED
Start: 2024-07-19

## 2024-07-05 RX ORDER — HEPARIN SODIUM (PORCINE) LOCK FLUSH IV SOLN 100 UNIT/ML 100 UNIT/ML
5 SOLUTION INTRAVENOUS
Status: CANCELLED | OUTPATIENT
Start: 2024-07-19

## 2024-07-05 RX ORDER — ALBUTEROL SULFATE 0.83 MG/ML
2.5 SOLUTION RESPIRATORY (INHALATION)
Status: CANCELLED | OUTPATIENT
Start: 2024-07-19

## 2024-07-05 RX ORDER — METHYLPREDNISOLONE SODIUM SUCCINATE 125 MG/2ML
125 INJECTION, POWDER, LYOPHILIZED, FOR SOLUTION INTRAMUSCULAR; INTRAVENOUS
Status: CANCELLED
Start: 2024-07-19

## 2024-07-05 RX ORDER — EPINEPHRINE 1 MG/ML
0.3 INJECTION, SOLUTION INTRAMUSCULAR; SUBCUTANEOUS EVERY 5 MIN PRN
Status: CANCELLED | OUTPATIENT
Start: 2024-07-19

## 2024-07-05 RX ORDER — HEPARIN SODIUM,PORCINE 10 UNIT/ML
5-20 VIAL (ML) INTRAVENOUS DAILY PRN
Status: CANCELLED | OUTPATIENT
Start: 2024-07-19

## 2024-07-05 RX ADMIN — SODIUM CHLORIDE 250 ML: 9 INJECTION, SOLUTION INTRAVENOUS at 13:29

## 2024-07-05 RX ADMIN — NATALIZUMAB 300 MG: 300 INJECTION INTRAVENOUS at 13:29

## 2024-07-05 NOTE — PROGRESS NOTES
Infusion Nursing Note:  Nataliya Aldrich presents today for Tysabri.    Patient seen by provider today: No   present during visit today: Not Applicable.    Note: Patient prefers to have 250 mL NS running during Tysabri infusion.   Patient has had more that 12 Tysabri infusions and does not stay for 60 min observation.  Pre and post VSS.    Intravenous Access:  Labs drawn without difficulty.  Peripheral IV placed.    Treatment Conditions:  Tysabri pre-infusion checklist completed via touch program.      Post Infusion Assessment:  Patient tolerated infusion without incident.  Blood return noted pre and post infusion.  Site patent and intact, free from redness, edema or discomfort.  No evidence of extravasations.  Access discontinued per protocol.       Discharge Plan:   Discharge instructions reviewed with: Patient.  Patient and/or family verbalized understanding of discharge instructions and all questions answered.  AVS to patient via [employer].  Patient will return 8/16/24 for next appointment.   Patient discharged in stable condition accompanied by: self.  Departure Mode: Ambulatory.      Damaris Jensen RN

## 2024-07-08 ENCOUNTER — PATIENT OUTREACH (OUTPATIENT)
Dept: CARE COORDINATION | Facility: CLINIC | Age: 49
End: 2024-07-08

## 2024-07-08 NOTE — RESULT ENCOUNTER NOTE
Dear Nataliya,    Here is a summary of your recent test results:    -Normal red blood cell (hgb) levels, normal white blood cell count and normal platelet levels.    For additional lab test information, labtestsonline.org is an excellent reference.    In addition, here is a list of due or overdue Health Maintenance reminders:    Colorectal Cancer Screening due on 08/06/2023    Please call us at [phone] (or use SocialSafe) to address the above recommendations if needed.    Thank you for choosing [facility].  It was an honor and a privilege to participate in your care.       Healthy regards,    Leonie Collazo, LEROY  [facility]

## 2024-07-11 ENCOUNTER — THERAPY VISIT (OUTPATIENT)
Dept: PHYSICAL THERAPY | Facility: CLINIC | Age: 49
End: 2024-07-11
Payer: COMMERCIAL

## 2024-07-11 DIAGNOSIS — M79.18 MYOFASCIAL PAIN: Primary | ICD-10-CM

## 2024-07-11 DIAGNOSIS — M54.2 CERVICALGIA: ICD-10-CM

## 2024-07-11 DIAGNOSIS — G35 MS (MULTIPLE SCLEROSIS) (H): ICD-10-CM

## 2024-07-11 PROCEDURE — 97140 MANUAL THERAPY 1/> REGIONS: CPT | Mod: GP | Performed by: PHYSICAL THERAPIST

## 2024-07-11 PROCEDURE — 97110 THERAPEUTIC EXERCISES: CPT | Mod: GP | Performed by: PHYSICAL THERAPIST

## 2024-07-11 PROCEDURE — 97035 APP MDLTY 1+ULTRASOUND EA 15: CPT | Mod: GP | Performed by: PHYSICAL THERAPIST

## 2024-07-19 ENCOUNTER — THERAPY VISIT (OUTPATIENT)
Dept: PHYSICAL THERAPY | Facility: CLINIC | Age: 49
End: 2024-07-19
Payer: COMMERCIAL

## 2024-07-19 DIAGNOSIS — M54.2 CERVICALGIA: ICD-10-CM

## 2024-07-19 DIAGNOSIS — M79.18 MYOFASCIAL PAIN: Primary | ICD-10-CM

## 2024-07-19 DIAGNOSIS — G35 MS (MULTIPLE SCLEROSIS) (H): ICD-10-CM

## 2024-07-19 PROCEDURE — 97035 APP MDLTY 1+ULTRASOUND EA 15: CPT | Mod: GP | Performed by: PHYSICAL THERAPIST

## 2024-07-19 PROCEDURE — 97140 MANUAL THERAPY 1/> REGIONS: CPT | Mod: GP | Performed by: PHYSICAL THERAPIST

## 2024-07-19 PROCEDURE — 97110 THERAPEUTIC EXERCISES: CPT | Mod: GP | Performed by: PHYSICAL THERAPIST

## 2024-07-25 ENCOUNTER — E-VISIT (OUTPATIENT)
Dept: FAMILY MEDICINE | Facility: CLINIC | Age: 49
End: 2024-07-25
Payer: COMMERCIAL

## 2024-07-25 DIAGNOSIS — H10.30 ACUTE CONJUNCTIVITIS, UNSPECIFIED ACUTE CONJUNCTIVITIS TYPE, UNSPECIFIED LATERALITY: ICD-10-CM

## 2024-07-25 DIAGNOSIS — R21 RASH AND NONSPECIFIC SKIN ERUPTION: Primary | ICD-10-CM

## 2024-07-25 DIAGNOSIS — R21 RASH: Primary | ICD-10-CM

## 2024-07-25 PROCEDURE — 99421 OL DIG E/M SVC 5-10 MIN: CPT | Performed by: PHYSICIAN ASSISTANT

## 2024-07-25 PROCEDURE — 99207 PR NON-BILLABLE SERV PER CHARTING: CPT | Performed by: PHYSICIAN ASSISTANT

## 2024-07-25 RX ORDER — CLOTRIMAZOLE 1 %
CREAM (GRAM) TOPICAL 2 TIMES DAILY
Qty: 30 G | Refills: 1 | Status: SHIPPED | OUTPATIENT
Start: 2024-07-25

## 2024-07-25 RX ORDER — OFLOXACIN 3 MG/ML
SOLUTION/ DROPS OPHTHALMIC
Qty: 5 ML | Refills: 0 | Status: SHIPPED | OUTPATIENT
Start: 2024-07-25

## 2024-07-25 RX ORDER — CLOTRIMAZOLE 1 %
CREAM (GRAM) TOPICAL 2 TIMES DAILY
Status: SHIPPED
Start: 2024-07-25 | End: 2024-07-25

## 2024-07-29 ENCOUNTER — THERAPY VISIT (OUTPATIENT)
Dept: PHYSICAL THERAPY | Facility: CLINIC | Age: 49
End: 2024-07-29
Payer: COMMERCIAL

## 2024-07-29 DIAGNOSIS — G35 MS (MULTIPLE SCLEROSIS) (H): ICD-10-CM

## 2024-07-29 DIAGNOSIS — M54.2 CERVICALGIA: Primary | ICD-10-CM

## 2024-07-29 PROCEDURE — 97140 MANUAL THERAPY 1/> REGIONS: CPT | Mod: GP | Performed by: PHYSICAL THERAPIST

## 2024-07-29 PROCEDURE — 97035 APP MDLTY 1+ULTRASOUND EA 15: CPT | Mod: GP | Performed by: PHYSICAL THERAPIST

## 2024-08-16 ENCOUNTER — MEDICAL CORRESPONDENCE (OUTPATIENT)
Dept: HEALTH INFORMATION MANAGEMENT | Facility: CLINIC | Age: 49
End: 2024-08-16

## 2024-08-16 ENCOUNTER — INFUSION THERAPY VISIT (OUTPATIENT)
Dept: INFUSION THERAPY | Facility: CLINIC | Age: 49
End: 2024-08-16
Attending: PSYCHIATRY & NEUROLOGY
Payer: COMMERCIAL

## 2024-08-16 ENCOUNTER — TRANSFERRED RECORDS (OUTPATIENT)
Dept: HEALTH INFORMATION MANAGEMENT | Facility: CLINIC | Age: 49
End: 2024-08-16

## 2024-08-16 VITALS
DIASTOLIC BLOOD PRESSURE: 76 MMHG | HEART RATE: 72 BPM | TEMPERATURE: 96.9 F | RESPIRATION RATE: 16 BRPM | OXYGEN SATURATION: 100 % | SYSTOLIC BLOOD PRESSURE: 114 MMHG

## 2024-08-16 DIAGNOSIS — G35 MULTIPLE SCLEROSIS (H): Primary | ICD-10-CM

## 2024-08-16 PROCEDURE — 36415 COLL VENOUS BLD VENIPUNCTURE: CPT | Performed by: PSYCHIATRY & NEUROLOGY

## 2024-08-16 PROCEDURE — 258N000003 HC RX IP 258 OP 636: Performed by: PSYCHIATRY & NEUROLOGY

## 2024-08-16 PROCEDURE — 250N000011 HC RX IP 250 OP 636: Performed by: PSYCHIATRY & NEUROLOGY

## 2024-08-16 PROCEDURE — 96365 THER/PROPH/DIAG IV INF INIT: CPT

## 2024-08-16 RX ORDER — HEPARIN SODIUM,PORCINE 10 UNIT/ML
5-20 VIAL (ML) INTRAVENOUS DAILY PRN
Status: CANCELLED | OUTPATIENT
Start: 2024-08-30

## 2024-08-16 RX ORDER — ALBUTEROL SULFATE 0.83 MG/ML
2.5 SOLUTION RESPIRATORY (INHALATION)
Status: CANCELLED | OUTPATIENT
Start: 2024-08-30

## 2024-08-16 RX ORDER — HEPARIN SODIUM (PORCINE) LOCK FLUSH IV SOLN 100 UNIT/ML 100 UNIT/ML
5 SOLUTION INTRAVENOUS
Status: CANCELLED | OUTPATIENT
Start: 2024-08-30

## 2024-08-16 RX ORDER — DIPHENHYDRAMINE HYDROCHLORIDE 50 MG/ML
50 INJECTION INTRAMUSCULAR; INTRAVENOUS
Status: CANCELLED
Start: 2024-08-30

## 2024-08-16 RX ORDER — EPINEPHRINE 1 MG/ML
0.3 INJECTION, SOLUTION INTRAMUSCULAR; SUBCUTANEOUS EVERY 5 MIN PRN
Status: CANCELLED | OUTPATIENT
Start: 2024-08-30

## 2024-08-16 RX ORDER — METHYLPREDNISOLONE SODIUM SUCCINATE 125 MG/2ML
125 INJECTION, POWDER, LYOPHILIZED, FOR SOLUTION INTRAMUSCULAR; INTRAVENOUS
Status: CANCELLED
Start: 2024-08-30

## 2024-08-16 RX ORDER — ALBUTEROL SULFATE 90 UG/1
1-2 AEROSOL, METERED RESPIRATORY (INHALATION)
Status: CANCELLED
Start: 2024-08-30

## 2024-08-16 RX ORDER — MEPERIDINE HYDROCHLORIDE 25 MG/ML
25 INJECTION INTRAMUSCULAR; INTRAVENOUS; SUBCUTANEOUS EVERY 30 MIN PRN
Status: CANCELLED | OUTPATIENT
Start: 2024-08-30

## 2024-08-16 RX ADMIN — NATALIZUMAB 300 MG: 300 INJECTION INTRAVENOUS at 13:50

## 2024-08-16 RX ADMIN — SODIUM CHLORIDE 250 ML: 9 INJECTION, SOLUTION INTRAVENOUS at 13:48

## 2024-08-16 NOTE — PROGRESS NOTES
Infusion Nursing Note:  Nataliya Aldrich presents today for Tysabri.    Patient seen by provider today: No   present during visit today: Not Applicable.    Note: Orders obtained from Dr Chong's office for SABINO virus labs every 3 months, via telephone and faxed orders.  Patient no longer stays for observation after infusion.       Intravenous Access:  Peripheral IV placed.    Treatment Conditions:  Tysabri pre-infusion checklist completed via touch program.      Post Infusion Assessment:  Patient tolerated infusion without incident.  Blood return noted pre and post infusion.  Site patent and intact, free from redness, edema or discomfort.  No evidence of extravasations.  Access discontinued per protocol.       Discharge Plan:   Discharge instructions reviewed with: Patient.  Patient and/or family verbalized understanding of discharge instructions and all questions answered.  AVS to patient via V[employer].  Patient will return 9/27 for next appointment.   Patient discharged in stable condition accompanied by: self.  Departure Mode: Ambulatory.      Damaris Allred RN

## 2024-08-19 ENCOUNTER — MYC MEDICAL ADVICE (OUTPATIENT)
Dept: FAMILY MEDICINE | Facility: CLINIC | Age: 49
End: 2024-08-19
Payer: COMMERCIAL

## 2024-08-20 ENCOUNTER — E-VISIT (OUTPATIENT)
Dept: FAMILY MEDICINE | Facility: CLINIC | Age: 49
End: 2024-08-20
Payer: COMMERCIAL

## 2024-08-20 DIAGNOSIS — F41.9 INSOMNIA SECONDARY TO ANXIETY: ICD-10-CM

## 2024-08-20 DIAGNOSIS — F41.9 ANXIETY: Primary | ICD-10-CM

## 2024-08-20 DIAGNOSIS — F51.05 INSOMNIA SECONDARY TO ANXIETY: ICD-10-CM

## 2024-08-20 PROCEDURE — 99422 OL DIG E/M SVC 11-20 MIN: CPT | Performed by: PHYSICIAN ASSISTANT

## 2024-08-20 ASSESSMENT — ANXIETY QUESTIONNAIRES
1. FEELING NERVOUS, ANXIOUS, OR ON EDGE: NOT AT ALL
6. BECOMING EASILY ANNOYED OR IRRITABLE: NOT AT ALL
4. TROUBLE RELAXING: SEVERAL DAYS
IF YOU CHECKED OFF ANY PROBLEMS ON THIS QUESTIONNAIRE, HOW DIFFICULT HAVE THESE PROBLEMS MADE IT FOR YOU TO DO YOUR WORK, TAKE CARE OF THINGS AT HOME, OR GET ALONG WITH OTHER PEOPLE: SOMEWHAT DIFFICULT
2. NOT BEING ABLE TO STOP OR CONTROL WORRYING: NOT AT ALL
GAD7 TOTAL SCORE: 2
7. FEELING AFRAID AS IF SOMETHING AWFUL MIGHT HAPPEN: NOT AT ALL
7. FEELING AFRAID AS IF SOMETHING AWFUL MIGHT HAPPEN: NOT AT ALL
GAD7 TOTAL SCORE: 2
5. BEING SO RESTLESS THAT IT IS HARD TO SIT STILL: NOT AT ALL
8. IF YOU CHECKED OFF ANY PROBLEMS, HOW DIFFICULT HAVE THESE MADE IT FOR YOU TO DO YOUR WORK, TAKE CARE OF THINGS AT HOME, OR GET ALONG WITH OTHER PEOPLE?: SOMEWHAT DIFFICULT
3. WORRYING TOO MUCH ABOUT DIFFERENT THINGS: SEVERAL DAYS
GAD7 TOTAL SCORE: 2

## 2024-08-20 ASSESSMENT — PATIENT HEALTH QUESTIONNAIRE - PHQ9
SUM OF ALL RESPONSES TO PHQ QUESTIONS 1-9: 7
SUM OF ALL RESPONSES TO PHQ QUESTIONS 1-9: 7
10. IF YOU CHECKED OFF ANY PROBLEMS, HOW DIFFICULT HAVE THESE PROBLEMS MADE IT FOR YOU TO DO YOUR WORK, TAKE CARE OF THINGS AT HOME, OR GET ALONG WITH OTHER PEOPLE: SOMEWHAT DIFFICULT

## 2024-08-21 RX ORDER — HYDROXYZINE PAMOATE 25 MG/1
25 CAPSULE ORAL
Qty: 30 CAPSULE | Refills: 0 | Status: SHIPPED | OUTPATIENT
Start: 2024-08-21

## 2024-08-21 ASSESSMENT — PATIENT HEALTH QUESTIONNAIRE - PHQ9: SUM OF ALL RESPONSES TO PHQ QUESTIONS 1-9: 7

## 2024-08-21 NOTE — TELEPHONE ENCOUNTER
Form filled out and placed in [facility] inbox.  Please follow-up with patient to see if this needs to get faxed, mail, or picked up.  Please send a copy to abstraction as well.      Miya Crump MBA, MS, PA-C  [facility]

## 2024-08-21 NOTE — PATIENT INSTRUCTIONS
[facility] Mobile Crisis Response Services  (contact the county where the person is physically located at the time of the crisis)  *[place] (Child and Adult):    [phone]  *[place] (Child and Adult):    [phone]  *[place] (Child and Adult):    [phone]  *[place] (Child):    [phone]    (Adult):    [phone]  *[place] (Child):    [phone]   (Adult):    [phone]  *[place] (Child and Adult):    [phone]  *[place] (Child and Adult):    [phone]  Other Crisis Resources (non-mobile)  *[facility] Crisis [facility]vention Center):    [phone]    Walk-in and telephone crisis intervention services (focuses on >18 year-olds)    Located at [address] [address]  *Suicide Hotline:    https://LogoGarden/ - call or text 988   Text Telephone:    988     *[facility] of [facility] ( women and children):    [phone]  or  [phone]  *[place] [facility] Crisis Line ( women and children):    [phone]       Short-term Residential Crisis Resources  *[facility] (ages 10-17):  [phone]     [address] [place] [address]   *[facility] Crisis [facility] (Birth - 6 years):    [phone]  *[facility] Crisis [facility] (Birth - 12 years):    [phone]       Inpatient Hospitalization and Residential Evaluation  *[facility] (Child and Adult)     [address] [place] [address]    Inpatient Intake [phone]    [facility]al [facility] Center:    [phone]    Day Treatment/Behavioral Intake:    [phone]  *[facility] (Adult):    [phone]

## 2024-08-22 NOTE — TELEPHONE ENCOUNTER
Patient called and we spoke .  The forms are at the  and also sent in the My Chart as an attachment.    Faye López   in [place]

## 2024-08-22 NOTE — TELEPHONE ENCOUNTER
Signed form received from [place] [employer] .  No fax # - called pt to let them know paperwork was done - mail or pu at ?    Nelida GARCIA

## 2024-08-23 ENCOUNTER — VIRTUAL VISIT (OUTPATIENT)
Dept: FAMILY MEDICINE | Facility: CLINIC | Age: 49
End: 2024-08-23
Payer: COMMERCIAL

## 2024-08-23 DIAGNOSIS — E88.9 ERROR, INBORN METABOLISM: Primary | ICD-10-CM

## 2024-08-23 PROCEDURE — 99207 PR NO CHARGE LOS: CPT | Mod: 95 | Performed by: NURSE PRACTITIONER

## 2024-08-23 NOTE — PROGRESS NOTES
Patient reports she wishes to cancel.     She would like to try hydroxyzine before discussing at an appt.      NO charge today.      Healthy regards,            Leonie Collazo, FNP-BC

## 2024-08-27 ENCOUNTER — TRANSFERRED RECORDS (OUTPATIENT)
Dept: HEALTH INFORMATION MANAGEMENT | Facility: CLINIC | Age: 49
End: 2024-08-27

## 2024-08-27 LAB — SCANNED LAB RESULT: NORMAL

## 2024-09-17 ENCOUNTER — THERAPY VISIT (OUTPATIENT)
Dept: PHYSICAL THERAPY | Facility: CLINIC | Age: 49
End: 2024-09-17
Payer: COMMERCIAL

## 2024-09-17 DIAGNOSIS — M54.2 CERVICALGIA: Primary | ICD-10-CM

## 2024-09-17 DIAGNOSIS — G35 MS (MULTIPLE SCLEROSIS) (H): ICD-10-CM

## 2024-09-17 PROCEDURE — 97035 APP MDLTY 1+ULTRASOUND EA 15: CPT | Mod: GP | Performed by: PHYSICAL THERAPIST

## 2024-09-17 PROCEDURE — 97140 MANUAL THERAPY 1/> REGIONS: CPT | Mod: GP | Performed by: PHYSICAL THERAPIST

## 2024-09-18 ENCOUNTER — E-VISIT (OUTPATIENT)
Dept: FAMILY MEDICINE | Facility: CLINIC | Age: 49
End: 2024-09-18
Payer: COMMERCIAL

## 2024-09-18 DIAGNOSIS — N89.8 VAGINAL SORE: Primary | ICD-10-CM

## 2024-09-18 PROCEDURE — 99207 PR NON-BILLABLE SERV PER CHARTING: CPT | Performed by: PHYSICIAN ASSISTANT

## 2024-09-18 NOTE — LETTER
[address] [facility] [id number] [phone]       September 23, 2024    Nataliya Hastings [place]ira  [address]   [facility] [address]    To Whom it May Concern:     The above patient is under my professional care.  Due to underlying immunosuppression from her multiple sclerosis treatment along with asthma it is my professional opinion that she should work in a remote capacity to decrease her risk of infection/illness.       Please contact my office with any questions or concerns.      Sincerely,      Miya Crump MBA, MS, PA-C  [facility] [place]

## 2024-09-19 ENCOUNTER — OFFICE VISIT (OUTPATIENT)
Dept: FAMILY MEDICINE | Facility: CLINIC | Age: 49
End: 2024-09-19
Payer: COMMERCIAL

## 2024-09-19 VITALS
TEMPERATURE: 97.7 F | DIASTOLIC BLOOD PRESSURE: 70 MMHG | OXYGEN SATURATION: 100 % | SYSTOLIC BLOOD PRESSURE: 112 MMHG | HEIGHT: 65 IN | HEART RATE: 78 BPM | WEIGHT: 243 LBS | RESPIRATION RATE: 14 BRPM | BODY MASS INDEX: 40.48 KG/M2

## 2024-09-19 DIAGNOSIS — N94.0 OVULATORY PAIN: ICD-10-CM

## 2024-09-19 DIAGNOSIS — L73.9 HAIR FOLLICLE INFECTION: Primary | ICD-10-CM

## 2024-09-19 PROCEDURE — 99214 OFFICE O/P EST MOD 30 MIN: CPT | Performed by: PHYSICIAN ASSISTANT

## 2024-09-19 ASSESSMENT — ASTHMA QUESTIONNAIRES
QUESTION_2 LAST FOUR WEEKS HOW OFTEN HAVE YOU HAD SHORTNESS OF BREATH: NOT AT ALL
ACT_TOTALSCORE: 23
ACT_TOTALSCORE: 23
QUESTION_5 LAST FOUR WEEKS HOW WOULD YOU RATE YOUR ASTHMA CONTROL: WELL CONTROLLED
QUESTION_1 LAST FOUR WEEKS HOW MUCH OF THE TIME DID YOUR ASTHMA KEEP YOU FROM GETTING AS MUCH DONE AT WORK, SCHOOL OR AT HOME: NONE OF THE TIME
QUESTION_4 LAST FOUR WEEKS HOW OFTEN HAVE YOU USED YOUR RESCUE INHALER OR NEBULIZER MEDICATION (SUCH AS ALBUTEROL): ONCE A WEEK OR LESS
QUESTION_3 LAST FOUR WEEKS HOW OFTEN DID YOUR ASTHMA SYMPTOMS (WHEEZING, COUGHING, SHORTNESS OF BREATH, CHEST TIGHTNESS OR PAIN) WAKE YOU UP AT NIGHT OR EARLIER THAN USUAL IN THE MORNING: NOT AT ALL

## 2024-09-19 ASSESSMENT — ANXIETY QUESTIONNAIRES
GAD7 TOTAL SCORE: 1
8. IF YOU CHECKED OFF ANY PROBLEMS, HOW DIFFICULT HAVE THESE MADE IT FOR YOU TO DO YOUR WORK, TAKE CARE OF THINGS AT HOME, OR GET ALONG WITH OTHER PEOPLE?: NOT DIFFICULT AT ALL
GAD7 TOTAL SCORE: 1
GAD7 TOTAL SCORE: 1
7. FEELING AFRAID AS IF SOMETHING AWFUL MIGHT HAPPEN: NOT AT ALL

## 2024-09-19 ASSESSMENT — PATIENT HEALTH QUESTIONNAIRE - PHQ9
10. IF YOU CHECKED OFF ANY PROBLEMS, HOW DIFFICULT HAVE THESE PROBLEMS MADE IT FOR YOU TO DO YOUR WORK, TAKE CARE OF THINGS AT HOME, OR GET ALONG WITH OTHER PEOPLE: SOMEWHAT DIFFICULT
SUM OF ALL RESPONSES TO PHQ QUESTIONS 1-9: 3
SUM OF ALL RESPONSES TO PHQ QUESTIONS 1-9: 3

## 2024-09-19 ASSESSMENT — PAIN SCALES - GENERAL: PAINLEVEL: NO PAIN (0)

## 2024-09-19 NOTE — PROGRESS NOTES
Assessment & Plan     Hair follicle infection  Inflamed inguinal lesion consistent with inflamed hair follicle. Disinfected the area with alcohol and applied gentle pressure which resulted in pale yellow discharge from the lesion. Continued to apply pressure until drainage subsided. Applied bacitracin and a bandage. Patient endorsed immediate relief of pain in the area. Discussed with patient that area may continue to weep. Encouraged her to keep the area clean with soap and water and avoid shaving for several days. She can apply antibiotic ointment and a band aid to the area. Follow up if recurs.     Ovulatory pain  Suspect ovulatory pain, especially given this happens cyclically for patient. Can take over the counter tylenol/NSAIDs as needed for discomfort. She does have a history of ovarian cysts that have resolved on their own in the past. Instructed patient to call the clinic or be seen if pain changes or suddenly worsens.       Work on weight loss  Regular exercise    Return in about 5 days (around 9/24/2024) for evaluation if worserning/not improving.      Miya Crump MBA, MS, PA-C  [facility]   Nataliya is a 49 year old, presenting for the following health issues:  vaginal concern        9/19/2024     2:04 PM   Additional Questions   Roomed by LEONORA MCCORMACK   Accompanied by SELF     History of Present Illness       Reason for visit:  Vaginal concerns    She eats 2-3 servings of fruits and vegetables daily.She consumes 0 sweetened beverage(s) daily.She exercises with enough effort to increase her heart rate 10 to 19 minutes per day.  She exercises with enough effort to increase her heart rate 3 or less days per week.   She is taking medications regularly.     Onset/Duration: 2-3 days ago   Description:  Vaginal Discharge: white   Bump on outside area   Itching (Pruritis): YES - at site of bump   Burning sensation:  YES - at site of bump   Odor: YES- only when putting it  up to nose and really trying to smell it  Accompanying Signs & Symptoms:  Urinary symptoms: No  Abdominal pain: YES -had pain 1st before noticing the bump-states her daughter has her period so she is in tune with her daughter's period   Fever: No  History:   Sexually active: YES  New Partner: No  Possibility of Pregnancy:  No  Recent antibiotic use: No  Therapies tried and outcome: Rx that PCP has given in the past     In the inguinal area, rubbing on panty line. Shaves in the area and tries to be careful with keeping it clean. About 4 days ago noticed a pimple that was non-painful. Washed it, put peroxide on it and then put mupirocin ointment on it. Over the next few days the area went from a little bump to a larger bump that became painful. She doesn't notice it unless washing or wiping in that area. The third day it started to become more painful just with sitting and began to burn and itch. She tried to squeeze it yesterday and now it is really bothersome, itching, burning and painful. Denies bleeding or discharge from the area. It does feel hard.    Having left sided abdominal pain that started two days ago that she gets every month. She states this correlates with ovulation. She describes the pain as cramping and aching in that area. No nausea, vomiting, fever or chills. Ovarian cysts have always resolved on their own in the past. She typically gets this sensation around the same time that her daughter has her period, and the time has correlated again. She has had some discharge that is associated that is white and thin/creamy.    Currently sexually active. Not doing anything for birth control/protection. Has been with the same partner for about 4-5 years. Occasionally will have some pain with intercourse that she associates with vaginal dryness. No pain with urination, urgency or frequency. No bloody urine.     Review of Systems  Constitutional, HEENT, cardiovascular, pulmonary, GI, , musculoskeletal,  "neuro, skin, endocrine and psych systems are negative, except as otherwise noted.      Objective    /70   Pulse 78   Temp 97.7  F (36.5  C) (Tympanic)   Resp 14   Ht 1.651 m (5' 5\")   Wt 110.2 kg (243 lb)   LMP  (LMP Unknown)   SpO2 100%   BMI 40.44 kg/m    Body mass index is 40.44 kg/m .  Physical Exam   GENERAL: alert and no distress  EYES: Eyes grossly normal to inspection, PERRL and conjunctivae and sclerae normal  HENT: ear canals and TM's normal, nose and mouth without ulcers or lesions  NECK: no adenopathy, no asymmetry, masses, or scars  RESP: lungs clear to auscultation - no rales, rhonchi or wheezes  CV: regular rate and rhythm, normal S1 S2, no S3 or S4, no murmur, click or rub, no peripheral edema  ABDOMEN: soft, nontender, no hepatosplenomegaly, no masses and bowel sounds normal   (female): normal female external genitalia. 1 cm swollen, erythematous area in right inguinal area with white head. No purulence or drainage associated.  MS: no gross musculoskeletal defects noted, no edema  SKIN: no suspicious lesions or rashes  NEURO: Normal strength and tone, mentation intact and speech normal  PSYCH: mentation appears normal, affect normal/bright       Signed Electronically by: Miya Crump PA-C    "

## 2024-09-24 ENCOUNTER — THERAPY VISIT (OUTPATIENT)
Dept: PHYSICAL THERAPY | Facility: CLINIC | Age: 49
End: 2024-09-24
Payer: COMMERCIAL

## 2024-09-24 DIAGNOSIS — G35 MS (MULTIPLE SCLEROSIS) (H): ICD-10-CM

## 2024-09-24 DIAGNOSIS — M54.2 CERVICALGIA: Primary | ICD-10-CM

## 2024-09-24 DIAGNOSIS — M79.18 MYOFASCIAL PAIN: ICD-10-CM

## 2024-09-24 PROCEDURE — 97140 MANUAL THERAPY 1/> REGIONS: CPT | Mod: GP | Performed by: PHYSICAL THERAPIST

## 2024-09-24 PROCEDURE — 97110 THERAPEUTIC EXERCISES: CPT | Mod: GP | Performed by: PHYSICAL THERAPIST

## 2024-09-24 PROCEDURE — 97035 APP MDLTY 1+ULTRASOUND EA 15: CPT | Mod: GP | Performed by: PHYSICAL THERAPIST

## 2024-09-24 NOTE — PROGRESS NOTES
09/24/24 0500   Appointment Info   Signing clinician's name / credentials Nidhi Dunbar, PT, SCS   Total/Authorized Visits E&T   Visits Used 15   Medical Diagnosis Neck pain, myofascial pain, MS   PT Tx Diagnosis neck pain, MS, myofasical pain   Progress Note/Certification   Start of Care Date 02/02/24   Onset of illness/injury or Date of Surgery 11/02/23   Therapy Frequency 1X every other weeks   Predicted Duration 12 weeks   Certification date from 08/02/24   Certification date to 10/25/24   Progress Note Due Date 10/25/24   Progress Note Completed Date 09/24/24   GOALS   PT Goals 2   PT Goal 1   Goal Identifier Goal 1   Goal Description Pt will be able to sit, 30 min, painfree, at work   Rationale to maximize safety and independence with performance of ADLs and functional tasks   Target Date 10/25/24   PT Goal 2   Goal Identifier Goal 2   Goal Description Pt will be able to sleep through the night without waking in pain   Rationale to maximize safety and independence with performance of ADLs and functional tasks   Goal Progress only waking about 1X/night due to necksoreness   Target Date 10/25/24   Subjective Report   Subjective Report Pt notes she got a new remote [employer] job through [employer] (she is very excited, she will be working 0.6 FTE and fully remote and work from home). She notes her neck is better since she is sleeping better (less stress). Still can get some tension and soreness in neck/mid back (but overall improving). 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   Objective Measures   Objective Measures Objective Measure 1;Objective Measure 2   Objective Measure 1   Objective Measure AROM:   Details cervical: flexion- mod limit(tightness in neck); extension- min limit  (feels good); L rot= nill loss (no pain);  R rot= nill (pulls on left)   Objective Measure 2   Objective Measure palpation   Details toned and tender to B UT's (R>L)   PT Modalities   PT Modalities Ultrasound    Ultrasound   Ultrasound Minutes (77755) 10   Ultrasound -Type (does not include 3-5 min prep/cleanup time) Continuous;2 cm sound head   Intensity 2.0   Duration (does not include the 3-5 min set up/clean up time) 10 min   Frequency 1 MHz   Location B UT's   Positioning sitting   Patient Response/Progress lessen tension over trigger points   Treatment Interventions (PT)   Interventions Therapeutic Procedure/Exercise;Manual Therapy   Therapeutic Procedure/Exercise   Therapeutic Procedures: strength, endurance, ROM, flexibility minutes (05307) 15   Therapeutic Procedures Ther Proc 2;Ther Proc 3;Ther Proc 4   Ther Proc 1 Pt education   Ther Proc 1 - Details diet/nutrition, sleep, exercises, posture; gentle motion   Ther Proc 2 cervical extensions   Ther Proc 2 - Details rev   Ther Proc 3 Scapula retraction   Ther Proc 3 - Details rev   Ther Proc 4 SLS balance (for ankle stability)   Ther Proc 4 - Details rev   Skilled Intervention instructed on gentle ROM exercises to improve pain   Manual Therapy   Manual Therapy: Mobilization, MFR, MLD, friction massage minutes (82311) 10   Manual Therapy 1 Tool myofascial mobilization   Manual Therapy 1 - Details B UT's, splenius cervicalis, levator scap: 15 min   Skilled Intervention facilitated myofascial release of trigger points to reduce pain at rest   Education   Learner/Method Patient;Pictures/Video   Plan   Home program See PTRx   Plan for next session re assess and progress as tolerated   Total Session Time   Timed Code Treatment Minutes 35   Total Treatment Time (sum of timed and untimed services) 35         [facility]                                                                                   OUTPATIENT PHYSICAL THERAPY    PLAN OF TREATMENT FOR OUTPATIENT REHABILITATION   Patient's Last Name, First Name, M.Nataliya Conrad YOB: 1975   Provider's Name   [facility]   Medical  Record No.  [id number]     Onset Date: 11/02/23  Start of Care Date: 02/02/24     Medical Diagnosis:  Neck pain, myofascial pain, MS      PT Treatment Diagnosis:  neck pain, MS, myofasical pain Plan of Treatment  Frequency/Duration: 1X every other weeks/ 12 weeks    Certification date from 08/02/24 to (P) 10/25/24         See note for plan of treatment details and functional goals     Nidhi Dunbar, PT                         I CERTIFY THE NEED FOR THESE SERVICES FURNISHED UNDER        THIS PLAN OF TREATMENT AND WHILE UNDER MY CARE     (Physician attestation of this document indicates review and certification of the therapy plan).              Referring Provider:  Miya Crump    Initial Assessment  See Epic Evaluation- Start of Care Date: 02/02/24

## 2024-09-25 ENCOUNTER — E-VISIT (OUTPATIENT)
Dept: FAMILY MEDICINE | Facility: CLINIC | Age: 49
End: 2024-09-25
Payer: COMMERCIAL

## 2024-09-25 DIAGNOSIS — J45.41 MODERATE PERSISTENT ASTHMA WITH ACUTE EXACERBATION: Primary | ICD-10-CM

## 2024-09-25 PROCEDURE — 99421 OL DIG E/M SVC 5-10 MIN: CPT | Performed by: PHYSICIAN ASSISTANT

## 2024-09-25 RX ORDER — METHYLPREDNISOLONE 4 MG
TABLET, DOSE PACK ORAL
Qty: 21 TABLET | Refills: 0 | Status: SHIPPED | OUTPATIENT
Start: 2024-09-25

## 2024-09-25 ASSESSMENT — ASTHMA QUESTIONNAIRES: ACT_TOTALSCORE: 18

## 2024-09-27 ENCOUNTER — NURSE TRIAGE (OUTPATIENT)
Dept: FAMILY MEDICINE | Facility: CLINIC | Age: 49
End: 2024-09-27
Payer: COMMERCIAL

## 2024-09-27 ENCOUNTER — INFUSION THERAPY VISIT (OUTPATIENT)
Dept: INFUSION THERAPY | Facility: CLINIC | Age: 49
End: 2024-09-27
Attending: PSYCHIATRY & NEUROLOGY
Payer: COMMERCIAL

## 2024-09-27 VITALS
DIASTOLIC BLOOD PRESSURE: 82 MMHG | SYSTOLIC BLOOD PRESSURE: 126 MMHG | RESPIRATION RATE: 20 BRPM | TEMPERATURE: 98.5 F | OXYGEN SATURATION: 100 % | HEART RATE: 74 BPM

## 2024-09-27 DIAGNOSIS — R05.9 COUGH: Primary | ICD-10-CM

## 2024-09-27 DIAGNOSIS — G35 MULTIPLE SCLEROSIS (H): Primary | ICD-10-CM

## 2024-09-27 DIAGNOSIS — R05.8 PRODUCTIVE COUGH: ICD-10-CM

## 2024-09-27 PROCEDURE — 258N000003 HC RX IP 258 OP 636: Performed by: PSYCHIATRY & NEUROLOGY

## 2024-09-27 PROCEDURE — 96365 THER/PROPH/DIAG IV INF INIT: CPT

## 2024-09-27 PROCEDURE — 250N000011 HC RX IP 250 OP 636: Performed by: PSYCHIATRY & NEUROLOGY

## 2024-09-27 RX ORDER — HEPARIN SODIUM,PORCINE 10 UNIT/ML
5-20 VIAL (ML) INTRAVENOUS DAILY PRN
OUTPATIENT
Start: 2024-10-11

## 2024-09-27 RX ORDER — DIPHENHYDRAMINE HYDROCHLORIDE 50 MG/ML
50 INJECTION INTRAMUSCULAR; INTRAVENOUS
Start: 2024-10-11

## 2024-09-27 RX ORDER — CEFDINIR 300 MG/1
300 CAPSULE ORAL 2 TIMES DAILY
Qty: 14 CAPSULE | Refills: 0 | Status: SHIPPED | OUTPATIENT
Start: 2024-09-27

## 2024-09-27 RX ORDER — MEPERIDINE HYDROCHLORIDE 25 MG/ML
25 INJECTION INTRAMUSCULAR; INTRAVENOUS; SUBCUTANEOUS EVERY 30 MIN PRN
OUTPATIENT
Start: 2024-10-11

## 2024-09-27 RX ORDER — METHYLPREDNISOLONE SODIUM SUCCINATE 125 MG/2ML
125 INJECTION, POWDER, LYOPHILIZED, FOR SOLUTION INTRAMUSCULAR; INTRAVENOUS
Start: 2024-10-11

## 2024-09-27 RX ORDER — EPINEPHRINE 1 MG/ML
0.3 INJECTION, SOLUTION INTRAMUSCULAR; SUBCUTANEOUS EVERY 5 MIN PRN
OUTPATIENT
Start: 2024-10-11

## 2024-09-27 RX ORDER — ALBUTEROL SULFATE 90 UG/1
1-2 AEROSOL, METERED RESPIRATORY (INHALATION)
Start: 2024-10-11

## 2024-09-27 RX ORDER — ALBUTEROL SULFATE 0.83 MG/ML
2.5 SOLUTION RESPIRATORY (INHALATION)
OUTPATIENT
Start: 2024-10-11

## 2024-09-27 RX ORDER — HEPARIN SODIUM (PORCINE) LOCK FLUSH IV SOLN 100 UNIT/ML 100 UNIT/ML
5 SOLUTION INTRAVENOUS
OUTPATIENT
Start: 2024-10-11

## 2024-09-27 RX ADMIN — SODIUM CHLORIDE 250 ML: 9 INJECTION, SOLUTION INTRAVENOUS at 14:54

## 2024-09-27 RX ADMIN — NATALIZUMAB 300 MG: 300 INJECTION INTRAVENOUS at 14:59

## 2024-09-27 ASSESSMENT — PAIN SCALES - GENERAL: PAINLEVEL: NO PAIN (0)

## 2024-09-27 NOTE — TELEPHONE ENCOUNTER
Talked with pcp  Ok to infusion if no fever     Order rx of Omnicef 300mg BID for 7 days -   Ok to continue Duo neb every 4    Recommend urgent care if symptoms Worsen   No improvement  reach out Monday     Called and informed patient of all instructions/rx  Patient states she can take Omnicef and tolerated in the past - prior [facility] pharmacy

## 2024-09-27 NOTE — PROGRESS NOTES
Infusion Nursing Note:    Nataliyapatricia Aldrich presents today for Tysabri.      Patient seen by provider today: No       Note: Patient no longer stays for observation after infusion. Having some asthma flares up and was started on prednisone.  States she did get the OK for infusion today from MS provider.        Intravenous Access:  Peripheral IV placed.     Treatment Conditions:  Tysabri pre-infusion checklist completed via touch program.        Post Infusion Assessment:  Patient tolerated infusion without incident.  Blood return noted pre and post infusion.  Site patent and intact, free from redness, edema or discomfort.  No evidence of extravasations.  Access discontinued per protocol.         Discharge Plan:   Discharge instructions reviewed with: Patient.  Patient and/or family verbalized understanding of discharge instructions and all questions answered.  AVS to patient via [employer].  Patient will return 11/8 for next appointment.   Patient discharged in stable condition accompanied by: self.  Departure Mode: Ambulatory.    Sonia Isidro RN on 9/27/2024 at 3:41 PM

## 2024-09-27 NOTE — TELEPHONE ENCOUNTER
Situation  Patient calls asking if she should be concerned that she started coughing up yellow phlegm today    Background   Evisit 9/25 - asthma- chest tightness and symptoms not relieved by inhaler   ethylPREDNISolone (MEDROL DOSEPAK) 4 MG tablet therapy pack 21 tablet 0 9/25/2024 -- --   Sig: Follow Package Directions   3 days left of prednisone     Assessment   Feels steroids have helped but she is not sure wether the yellow in her phlegm is a sign of infection.     Cough started after taking prednisone.   Plegm started out clear and 3 days after starting prednisone phlegm is now yellow with a green tinge     She is concerned due to the color and has her ms medication infusion today- her ms provider states she can have the infusion as long has she does no have an infection or fever     She is using a duoneb every 4 hours during the day since Wednesday,   Explained is ordered as every 6 hours.     She explains After the neb she has a big cough and phlegm is clear with a yellow tinge    The patient states the chest tightness is less but still present on the left side 4 hours post her neb and that is when she feels mild to moderate SOB- then uses a neb again  The patient states she is the SOB is less severe with walking since the steroid.   She states since the prednisone the pain in the left chest is not as bad since the taking the steroids.     Denied feeling like the yellow phlegm is post nasal drop  She states her nose is stuffed up- when she blows her nose it is clear. When she blows a lot out she states there is a slight yellow tinge    Denies fever     Recommendations   Will huddled with provider and call her back.  Advised only way we would really know if she has an infection is to be seen by a provider   Continue steroid pack as directed   Nebs as directed/needed     Reason for Disposition   MILD difficulty breathing (e.g., minimal/no SOB at rest, SOB with walking, pulse <100) and still present when not  coughing    Additional Information   Negative: Bluish (or gray) lips or face   Negative: SEVERE difficulty breathing (e.g., struggling for each breath, speaks in single words)   Negative: Rapid onset of cough and has hives   Negative: Coughing started suddenly after medicine, an allergic food or bee sting   Negative: Difficulty breathing after exposure to flames, smoke, or fumes   Negative: Sounds like a life-threatening emergency to the triager   Negative: Previous asthma attacks and this feels like asthma attack   Negative: Dry cough (non-productive; no sputum or minimal clear sputum) and within 14 days of COVID-19 Exposure   Negative: MODERATE difficulty breathing (e.g., speaks in phrases, SOB even at rest, pulse 100-120) and still present when not coughing   Negative: Chest pain present when not coughing   Negative: Passed out (i.e., fainted, collapsed and was not responding)   Negative: Patient sounds very sick or weak to the triager   Negative: Coughed up > 1 tablespoon (15 ml) blood (Exception: Blood-tinged sputum.)   Negative: Fever > 103 F (39.4 C)   Negative: Fever > 101 F (38.3 C) and over 60 years of age   Negative: Fever > 100.0 F (37.8 C) and has diabetes mellitus or a weak immune system (e.g., HIV positive, cancer chemotherapy, organ transplant, splenectomy, chronic steroids)   Negative: Fever > 100.0 F (37.8 C) and bedridden (e.g., CVA, chronic illness, recovering from surgery)   Negative: Increasing ankle swelling   Negative: Wheezing is present    Protocols used: Cough-A-OH

## 2024-09-28 ENCOUNTER — APPOINTMENT (OUTPATIENT)
Dept: GENERAL RADIOLOGY | Facility: CLINIC | Age: 49
End: 2024-09-28
Attending: EMERGENCY MEDICINE
Payer: COMMERCIAL

## 2024-09-28 ENCOUNTER — NURSE TRIAGE (OUTPATIENT)
Dept: NURSING | Facility: CLINIC | Age: 49
End: 2024-09-28
Payer: COMMERCIAL

## 2024-09-28 ENCOUNTER — HOSPITAL ENCOUNTER (EMERGENCY)
Facility: CLINIC | Age: 49
Discharge: HOME | End: 2024-09-28
Attending: EMERGENCY MEDICINE | Admitting: EMERGENCY MEDICINE
Payer: COMMERCIAL

## 2024-09-28 VITALS
HEART RATE: 74 BPM | OXYGEN SATURATION: 100 % | TEMPERATURE: 97 F | BODY MASS INDEX: 40.59 KG/M2 | DIASTOLIC BLOOD PRESSURE: 83 MMHG | HEIGHT: 65 IN | WEIGHT: 243.61 LBS | SYSTOLIC BLOOD PRESSURE: 137 MMHG | RESPIRATION RATE: 20 BRPM

## 2024-09-28 DIAGNOSIS — J45.41 MODERATE PERSISTENT ASTHMA WITH ACUTE EXACERBATION: ICD-10-CM

## 2024-09-28 DIAGNOSIS — U07.1 COVID-19 VIRUS INFECTION: ICD-10-CM

## 2024-09-28 DIAGNOSIS — J45.21 MILD INTERMITTENT ASTHMA WITH ACUTE EXACERBATION: ICD-10-CM

## 2024-09-28 LAB
ALBUMIN SERPL BCG-MCNC: 4.3 G/DL (ref 3.5–5.2)
ALP SERPL-CCNC: 71 U/L (ref 40–150)
ALT SERPL W P-5'-P-CCNC: 20 U/L (ref 0–50)
ANION GAP SERPL CALCULATED.3IONS-SCNC: 15 MMOL/L (ref 7–15)
AST SERPL W P-5'-P-CCNC: 28 U/L (ref 0–45)
BASOPHILS # BLD AUTO: 0.1 10E3/UL (ref 0–0.2)
BASOPHILS NFR BLD AUTO: 1 %
BILIRUB SERPL-MCNC: 0.2 MG/DL
BUN SERPL-MCNC: 11.5 MG/DL (ref 6–20)
CALCIUM SERPL-MCNC: 9 MG/DL (ref 8.8–10.4)
CHLORIDE SERPL-SCNC: 105 MMOL/L (ref 98–107)
CREAT SERPL-MCNC: 0.95 MG/DL (ref 0.51–0.95)
EGFRCR SERPLBLD CKD-EPI 2021: 74 ML/MIN/1.73M2
EOSINOPHIL # BLD AUTO: 0.1 10E3/UL (ref 0–0.7)
EOSINOPHIL NFR BLD AUTO: 1 %
ERYTHROCYTE [DISTWIDTH] IN BLOOD BY AUTOMATED COUNT: 14.1 % (ref 10–15)
GLUCOSE SERPL-MCNC: 70 MG/DL (ref 70–99)
HCO3 SERPL-SCNC: 19 MMOL/L (ref 22–29)
HCT VFR BLD AUTO: 39.5 % (ref 35–47)
HGB BLD-MCNC: 12.3 G/DL (ref 11.7–15.7)
HOLD SPECIMEN: NORMAL
IMM GRANULOCYTES # BLD: 0.1 10E3/UL
IMM GRANULOCYTES NFR BLD: 1 %
LYMPHOCYTES # BLD AUTO: 4.1 10E3/UL (ref 0.8–5.3)
LYMPHOCYTES NFR BLD AUTO: 45 %
MCH RBC QN AUTO: 30 PG (ref 26.5–33)
MCHC RBC AUTO-ENTMCNC: 31.1 G/DL (ref 31.5–36.5)
MCV RBC AUTO: 96 FL (ref 78–100)
MONOCYTES # BLD AUTO: 0.8 10E3/UL (ref 0–1.3)
MONOCYTES NFR BLD AUTO: 9 %
NEUTROPHILS # BLD AUTO: 4 10E3/UL (ref 1.6–8.3)
NEUTROPHILS NFR BLD AUTO: 44 %
NRBC # BLD AUTO: 0 10E3/UL
NRBC BLD AUTO-RTO: 0 /100
PLATELET # BLD AUTO: 309 10E3/UL (ref 150–450)
POTASSIUM SERPL-SCNC: 4.2 MMOL/L (ref 3.4–5.3)
PROT SERPL-MCNC: 7.3 G/DL (ref 6.4–8.3)
RBC # BLD AUTO: 4.1 10E6/UL (ref 3.8–5.2)
SODIUM SERPL-SCNC: 139 MMOL/L (ref 135–145)
WBC # BLD AUTO: 9.1 10E3/UL (ref 4–11)

## 2024-09-28 PROCEDURE — 94640 AIRWAY INHALATION TREATMENT: CPT

## 2024-09-28 PROCEDURE — 84132 ASSAY OF SERUM POTASSIUM: CPT | Performed by: EMERGENCY MEDICINE

## 2024-09-28 PROCEDURE — 71046 X-RAY EXAM CHEST 2 VIEWS: CPT

## 2024-09-28 PROCEDURE — 93005 ELECTROCARDIOGRAM TRACING: CPT

## 2024-09-28 PROCEDURE — 99285 EMERGENCY DEPT VISIT HI MDM: CPT | Mod: 25

## 2024-09-28 PROCEDURE — 85025 COMPLETE CBC W/AUTO DIFF WBC: CPT | Performed by: EMERGENCY MEDICINE

## 2024-09-28 PROCEDURE — 250N000009 HC RX 250

## 2024-09-28 PROCEDURE — 36415 COLL VENOUS BLD VENIPUNCTURE: CPT | Performed by: EMERGENCY MEDICINE

## 2024-09-28 RX ORDER — IPRATROPIUM BROMIDE AND ALBUTEROL SULFATE 2.5; .5 MG/3ML; MG/3ML
3 SOLUTION RESPIRATORY (INHALATION) ONCE
Status: COMPLETED | OUTPATIENT
Start: 2024-09-28 | End: 2024-09-28

## 2024-09-28 RX ORDER — IPRATROPIUM BROMIDE AND ALBUTEROL SULFATE 2.5; .5 MG/3ML; MG/3ML
SOLUTION RESPIRATORY (INHALATION)
Status: COMPLETED
Start: 2024-09-28 | End: 2024-09-28

## 2024-09-28 RX ORDER — PREDNISONE 20 MG/1
TABLET ORAL
Qty: 9 TABLET | Refills: 0 | Status: SHIPPED | OUTPATIENT
Start: 2024-09-28 | End: 2024-10-08

## 2024-09-28 RX ADMIN — IPRATROPIUM BROMIDE AND ALBUTEROL SULFATE 3 ML: 2.5; .5 SOLUTION RESPIRATORY (INHALATION) at 17:36

## 2024-09-28 RX ADMIN — IPRATROPIUM BROMIDE AND ALBUTEROL SULFATE 3 ML: .5; 3 SOLUTION RESPIRATORY (INHALATION) at 17:36

## 2024-09-28 ASSESSMENT — COLUMBIA-SUICIDE SEVERITY RATING SCALE - C-SSRS
1. IN THE PAST MONTH, HAVE YOU WISHED YOU WERE DEAD OR WISHED YOU COULD GO TO SLEEP AND NOT WAKE UP?: NO
6. HAVE YOU EVER DONE ANYTHING, STARTED TO DO ANYTHING, OR PREPARED TO DO ANYTHING TO END YOUR LIFE?: NO
2. HAVE YOU ACTUALLY HAD ANY THOUGHTS OF KILLING YOURSELF IN THE PAST MONTH?: NO

## 2024-09-28 ASSESSMENT — ACTIVITIES OF DAILY LIVING (ADL)
ADLS_ACUITY_SCORE: 36

## 2024-09-28 NOTE — ED PROVIDER NOTES
Emergency Department Note      History of Present Illness     Chief Complaint   Chest Pain and Shortness of Breath      HPI   Nataliya Aldrich is a 49 year old female with history of asthma, ISAAC, and migraines who presents to the ED for evaluation of chest pain and shortness of breath.  Patient reports she developed cough, congestion, and shortness of breath 3 days ago.  She takes Symbicort daily and uses nebs as needed for asthma exacerbation, typically when she is sick, exercises, or changing of the seasons.  Her inhaler was not improving her cold symptoms, so she contacted her primary doctor who prescribed her a Medrol Dosepak 3 days ago.  Yesterday, she felt her condition worsening and developed symptoms consistent with her asthma exacerbations including left-sided chest pressure and green/yellow sputum.  She was prescribed cefdinir by her primary provider and started this after her MS infusion yesterday afternoon.  Her last neb at home was at 2000 yesterday.  She has only been able to use her inhaler today which has not provided relief. Patient also explains her daughter came back from traveling to [place] 3 days ago and she was exposed to her daughter 2 days ago.  Today her daughter developed a fever and was COVID-positive.  Patient took a home COVID test and that was positive.  She contacted her clinic's nurse triage line and was referred to the ED with concerns for COVID-pneumonia.  Patient denies vomiting, diarrhea, fever, pharyngitis, expiratory wheezing, calf pain, or increased spasming.  No history of blood clots.  Of note she has not eaten today.    Independent Historian   None    Review of External Notes   I reviewed E-visit on 9/25/24 when she was prescribed Medrol Dosepak.     Past Medical History     Medical History and Problem List   Asthma  Cerebral infarction  Fibroids  Hypertension  Migraine  ISAAC  Prediabetes  Multiple sclerosis, relapsing remitting  Spondylosis of cervical  "region    Medications   Albuterol neb as needed  Budesonide-formoterol  Cefdinir  Gabapentin  Montelukast  Modafinil  Natalizumab  Omeprazole  Topiramate  Valacyclovir    Surgical History   Hysterectomy  Tonsillectomy   section x 2  Gastric bypass    Physical Exam     Patient Vitals for the past 24 hrs:   BP Temp Temp src Pulse Resp SpO2 Height Weight   24 -- -- -- -- -- 100 % -- --   24 137/83 -- -- 74 -- 100 % -- --   24 193 -- -- -- -- -- 100 % -- --   24 184 -- -- -- -- -- 100 % -- --   24 1837 -- -- -- -- -- 100 % -- --   24 136/83 -- -- 66 -- -- -- --   24 1815 -- -- -- -- -- 100 % -- --   24 1800 -- -- -- -- -- 100 % -- --   24 1745 -- -- -- -- -- 99 % -- --   24 1727 (!) 180/111 -- -- -- -- -- -- --   24 1725 -- 97  F (36.1  C) Temporal 82 20 98 % 1.651 m (5' 5\") 110.5 kg (243 lb 9.7 oz)     Physical Exam  General: Resting on the bed.  Head: No obvious trauma to head.  Ears, Nose, Throat:  External ears normal.  Nose normal.    Eyes:  Conjunctivae clear.  Pupils are equal, round, and reactive.   Neck: Normal range of motion.  Neck supple.   CV: Regular rate and rhythm.  No murmurs.      Respiratory: Effort normal and breath sounds normal.  No wheezing or crackles. No retractions.    Gastrointestinal: Soft.  No distension. There is no tenderness.    Musculoskeletal: Non tender non edematous calves  Neuro: Alert. Moving all extremities appropriately.  Normal speech.    Skin: Skin is warm and dry.  No rash noted.     Diagnostics     Lab Results   Labs Ordered and Resulted from Time of ED Arrival to Time of ED Departure   COMPREHENSIVE METABOLIC PANEL - Abnormal       Result Value    Sodium 139      Potassium 4.2      Carbon Dioxide (CO2) 19 (*)     Anion Gap 15      Urea Nitrogen 11.5      Creatinine 0.95      GFR Estimate 74      Calcium 9.0      Chloride 105      Glucose 70      Alkaline Phosphatase 71      AST 28      " ALT 20      Protein Total 7.3      Albumin 4.3      Bilirubin Total 0.2     CBC WITH PLATELETS AND DIFFERENTIAL - Abnormal    WBC Count 9.1      RBC Count 4.10      Hemoglobin 12.3      Hematocrit 39.5      MCV 96      MCH 30.0      MCHC 31.1 (*)     RDW 14.1      Platelet Count 309      % Neutrophils 44      % Lymphocytes 45      % Monocytes 9      % Eosinophils 1      % Basophils 1      % Immature Granulocytes 1      NRBCs per 100 WBC 0      Absolute Neutrophils 4.0      Absolute Lymphocytes 4.1      Absolute Monocytes 0.8      Absolute Eosinophils 0.1      Absolute Basophils 0.1      Absolute Immature Granulocytes 0.1      Absolute NRBCs 0.0         Imaging   XR Chest 2 Views   Final Result   IMPRESSION: Minimal degenerative change thoracic spine. Chest otherwise negative. Lungs clear. No change from prior.           EKG   ECG results from 09/28/24   EKG 12 lead     Value    Systolic Blood Pressure     Diastolic Blood Pressure     Ventricular Rate 69    Atrial Rate 69    [place] Interval 138    QRS Duration 80        QTc 398    P Axis 44    R AXIS 22    T Axis 15    Interpretation ECG      Sinus rhythm  Normal ECG  When compared with ECG of 25-Dec-2019 13:00,  No significant change was found  Interpreted by me at 1724       *Note: Due to a large number of results and/or encounters for the requested time period, some results have not been displayed. A complete set of results can be found in Results Review.        Independent Interpretation   CXR: No infiltrate or pleural effusion.    ED Course      Medications Administered   Medications   ipratropium - albuterol 0.5 mg/2.5 mg/3 mL (DUONEB) neb solution 3 mL (3 mLs Nebulization $Given 9/28/24 1736)       Procedures   Procedures     Discussion of Management   None    ED Course   ED Course as of 09/29/24 0100   Sat Sep 28, 2024   1742 I obtained history and examined the patient as noted above.    1936 I consulted ED pharmacist Patel regarding Paxlovid for the  patient.   2031 I rechecked the patient and explained findings. Patient discharged home with instructions regarding supportive care, medications, and reasons to return. The importance of close follow-up was reviewed.        Additional Documentation  None    Medical Decision Making / Diagnosis     CMS Diagnoses: None    MIPS       None    MDM   Nataliya Aldrich is a 49 year old female who presents emergency department with cough and asthma concerns.  Vital signs are stable.  Broad differentials considered include not limited to pneumonia pneumothorax, effusion, COVID, influenza, RSV, PE, asthma exacerbation, etc.  Patient is well-appearing nontoxic.  She received 1 neb prior to my assessment.  She felt much better.  She reports that she took a home COVID test that was positive.  Labs are reassuring CBC without leukocytosis or anemia.  BMP without acute electrolyte, metabolic or renal dysfunction.  EKG showing sinus rhythm.  No acute ischemic change.  Patient denies any chest pain or shortness of breath other than her normal asthma symptoms.  We opted not to pursue further cardiac testing given that symptoms are most consistent with asthma.  Chest x-ray shows no evidence acute pneumonia, pneumothorax or effusion.  No hypoxia or tachycardia to suggest PE.  Spoke with pharmacy patient is appropriate for Paxlovid.  Will prescribe a prednisone taper as well.  Patient felt improved after 1 neb.  No retractions or hypoxia, no clear suggestion for admission at this time.  I have recommended close follow-up with primary doctor.  Return precautions including worsening shortness of breath and chest pain were discussed.    Disposition   The patient was discharged.     Diagnosis     ICD-10-CM    1. COVID-19 virus infection  U07.1       2. Mild intermittent asthma with acute exacerbation  J45.21       3. Moderate persistent asthma with acute exacerbation  J45.41            Discharge Medications   Discharge Medication List  as of 9/28/2024  8:37 PM        START taking these medications    Details   nirmatrelvir and ritonavir (PAXLOVID) 300 mg/100 mg therapy pack Take 3 tablets by mouth 2 times daily for 5 days., Disp-30 tablet, R-0, E-PrescribeDate of symptom onset: 9/25/24; Risk criteria met: Yes; Weight >40 kg Yes; Renal fxn: normal;  Drug-Drug interactions reviewed & addressed: Yes      !! predniSONE (DELTASONE) 20 MG tablet 2 tabs day 1-2, then 1 tab days 3-4, then 1/2 tab daily for 6 days, Disp-9 tablet, R-0, E-Prescribe       !! - Potential duplicate medications found. Please discuss with provider.            Scribe Disclosure:  I, Dixie Long, am serving as a scribe at 7:39 PM on 9/28/2024 to document services personally performed by Susannah Mckenzie MD based on my observations and the provider's statements to me.        Susannah Mckenzie MD  09/29/24 0102

## 2024-09-28 NOTE — TELEPHONE ENCOUNTER
Nurse Triage SBAR    Situation: Covid    Background: Patient calling. Pts daughter has covid. Hx of MS and Asthma. Concerns with her Asthma on Wednesday. She was started on Cefdinir friday. Positive covid test today.     Assessment: Left sided chest pressure - her provider was aware of the chest pressure when she was started on the ABX. Productive cough - green and yellow. Slight headache. Runny nose. Nasal congestions. Worsening sob. Chest tightness now. O2: 96%.    Protocol Recommended Disposition: Emergency Department    Recommendation: According to the protocol, Patient should go to the ED now. Advised Patient that the patient needs to go to the ED now. Care advice given. Patient verbalizes understanding but stated she might go to urgent care instead. Reviewed concerning symptoms and when to call back.    Susan Guadalupe RN Nursing Advisor 9/28/2024 4:28 PM    Reason for Disposition   SEVERE or constant chest pain or pressure  (Exception: Mild central chest pain, present only when coughing.)    Additional Information   Negative: SEVERE difficulty breathing (e.g., struggling for each breath, speaks in single words)   Negative: Difficult to awaken or acting confused (e.g., disoriented, slurred speech)   Negative: Bluish (or gray) lips or face now   Negative: Shock suspected (e.g., cold/pale/clammy skin, too weak to stand, low BP, rapid pulse)   Negative: Sounds like a life-threatening emergency to the triager   Negative: [1] Diagnosed or suspected COVID-19 AND [2] symptoms lasting 3 or more weeks   Negative: [1] COVID-19 exposure AND [2] no symptoms   Negative: COVID-19 vaccine reaction suspected (e.g., fever, headache, muscle aches) occurring 1 to 3 days after getting vaccine   Negative: COVID-19 vaccine, questions about   Negative: [1] Exposure to someone known to have influenza (flu test positive) AND [2] flu-like symptoms (e.g., cough, runny nose, sore throat, SOB; with or without fever)   Negative: [1] Possible  COVID-19 symptoms AND [2] triager concerned about severity of symptoms or other causes   Negative: COVID-19 and breastfeeding, questions about   Negative: MODERATE difficulty breathing (e.g., speaks in phrases, SOB even at rest, pulse 100-120)    Protocols used: COVID-19 - Diagnosed or [id number]-LJ

## 2024-09-28 NOTE — Clinical Note
Nataliya Aldrich was seen and treated in our emergency department on 9/28/2024.  She may return to work on 10/02/2024.       If you have any questions or concerns, please don't hesitate to call.      Susannah Mckenzie MD

## 2024-09-28 NOTE — ED TRIAGE NOTES
Covid positive today. Increasing shortness of breath and chest pain. Patient already being treated for shortness of breath due to her asthma. On cefdinir and solumedrol but symptoms continue to worsen. In triage, patient is short of breath with activity and unable to speak in complete sentences.

## 2024-09-29 ENCOUNTER — MYC REFILL (OUTPATIENT)
Dept: FAMILY MEDICINE | Facility: CLINIC | Age: 49
End: 2024-09-29

## 2024-09-29 DIAGNOSIS — J45.40 MODERATE PERSISTENT ASTHMA WITHOUT COMPLICATION: ICD-10-CM

## 2024-09-29 DIAGNOSIS — J20.9 ACUTE BRONCHITIS WITH COEXISTING CONDITION REQUIRING PROPHYLACTIC TREATMENT: ICD-10-CM

## 2024-09-29 DIAGNOSIS — J45.41 MODERATE PERSISTENT ASTHMA WITH ACUTE EXACERBATION: ICD-10-CM

## 2024-09-29 NOTE — DISCHARGE INSTRUCTIONS
May use inhaler every 4-6 hours as needed for shortness of breath and asthma exacerbation.  We have prescribed prednisone and Paxlovid to help with your COVID infection.  Return to the ER if having worsening chest pain, shortness of breath, fevers or chills or other concerns.      Discharge Instructions  COVID-19    COVID-19 is a viral illness that spreads from person-to-person primarily by droplets when an infected person coughs or sneezes and the droplets are then breathed in by another person.    Symptoms of COVID-19  Many people have no symptoms or mild symptoms.  Symptoms usually appear within a few days after contact with a person with COVID-19.  A mild COVID-19 illness is like a cold and can have fever, cough, sneezing, sore throat, tiredness, headache, and muscle pain. Some patients also have stomach symptoms like nausea, vomiting, or diarrhea.  A moderate COVID-19 illness might include shortness of breath or pneumonia on a chest x-ray.  A severe COVID-19 illness causes significant breathing problems such as low oxygen levels or more serious pneumonia.  Some patients experience loss of taste or smell which is somewhat unique to COVID-19.    What should I do if I test positive?  If you test positive for COVID and have no symptoms, you should take precautions, as described below, for five days.  If you test positive for COVID and have symptoms, you should stay home and away from others. You can go back to normal activities when you are feeling better and have been without a fever (without using medications to treat the fever) for 24 hours. When you return to normal activities you should take precautions, as described below, for five days.  Precautions to prevent the spread of COVID-19  Clean Air. Viruses spread from person to person in the air. Bring fresh air into your home by opening doors or windows or using exhaust fans if possible. Use an air filter. Be outdoors when possible.  Practice good hygiene.  Cover your mouth and nose with a tissue when you cough or sneeze. Wash your hands often with soap and water for at least 20 seconds or use an       alcohol-based hand  containing at least 60% alcohol. Avoid touching your face. Clean surfaces such as countertops, handrails, and doorknobs.  Wear a facemask. Masks both prevent an infected person from spreading the virus and prevent a well person from getting the virus. Cloth masks are good, surgical/disposable masks are better, and respirators (N95) are the best.  Practice physical distancing. Avoid being close to someone with cough or other symptoms. Avoid crowded spaces.   What should I do for myself while I am sick?  Treat your symptoms. You can take Acetaminophen (Tylenol) to treat body aches and fever as needed for comfort. Ibuprofen (Advil or Motrin) can be used as well if you still have symptoms after taking Tylenol. Drink fluids. Rest.  Watch for worsening symptoms such as shortness of breath/difficulty breathing or very severe weakness.  Exercise/Sports in rare cases, COVID could affect your heart in a way that makes exercise or participation in sports dangerous.  If you have a mild COVID illness (fever, cough, sore throat, and similar symptoms but no difficulty breathing or abnormalities of the lung): After your COVID symptoms have resolved, you can return to exercise. If you develop difficulty breathing or chest discomfort with exercise, palpitations (a sensation of your heart racing or skipping), or lightheadedness/passing out, you should contact your doctor/clinic.  If you have more than a mild illness (meaning that you have problems with your breathing or lungs) or if you participate in competitive or strenuous activity or have a history of heart disease: Please see your primary doctor/provider prior to return to activity/competition.    COVID treatments such as antiviral medications are available. They are recommended for those patients who have  a risk for developing more severe COVID illness. Age is the biggest risk factor. Risk is increased for adults greater than 50 years old and particularly for adults greater than 65 years. Importantly, the treatments must be started early in the illness (within five days). These treatments may have been considered today during your visit. If you have other questions, contact your primary doctor/clinic.    You can learn more about COVID treatments from the [facility]:  [email or web address]            What should I do if I am exposed to COVID?  If you are exposed to COVID, you should monitor for symptoms and test if you develop symptoms. Practicing the precautions discussed above are a good idea, particularly if you plan to be around any person who is at higher risk of COVID complications such as older patients (>65) or people with significant medical problems.            Return to the Emergency Department if:  If you are developing worsening breathing, weakness, or feel worse you should seek medical attention.  If you are uncertain, contact your health care provider/clinic. If you need emergency medical attention, call 911.

## 2024-09-30 ENCOUNTER — PATIENT OUTREACH (OUTPATIENT)
Dept: FAMILY MEDICINE | Facility: CLINIC | Age: 49
End: 2024-09-30
Payer: COMMERCIAL

## 2024-09-30 LAB
ATRIAL RATE - MUSE: 69 BPM
DIASTOLIC BLOOD PRESSURE - MUSE: NORMAL MMHG
INTERPRETATION ECG - MUSE: NORMAL
P AXIS - MUSE: 44 DEGREES
PR INTERVAL - MUSE: 138 MS
QRS DURATION - MUSE: 80 MS
QT - MUSE: 372 MS
QTC - MUSE: 398 MS
R AXIS - MUSE: 22 DEGREES
SYSTOLIC BLOOD PRESSURE - MUSE: NORMAL MMHG
T AXIS - MUSE: 15 DEGREES
VENTRICULAR RATE- MUSE: 69 BPM

## 2024-09-30 RX ORDER — ALBUTEROL SULFATE 90 UG/1
AEROSOL, METERED RESPIRATORY (INHALATION)
Qty: 18 G | Refills: 4 | Status: SHIPPED | OUTPATIENT
Start: 2024-09-30

## 2024-09-30 RX ORDER — IPRATROPIUM BROMIDE AND ALBUTEROL SULFATE 2.5; .5 MG/3ML; MG/3ML
1 SOLUTION RESPIRATORY (INHALATION) EVERY 6 HOURS PRN
Qty: 90 ML | Refills: 1 | Status: SHIPPED | OUTPATIENT
Start: 2024-09-30

## 2024-09-30 NOTE — TELEPHONE ENCOUNTER
Transitions of Care Outreach  Chief Complaint   Patient presents with    Hospital F/U       Most Recent Admission Date: 9/28/2024   Most Recent Admission Diagnosis:      Most Recent Discharge Date: 9/28/2024   Most Recent Discharge Diagnosis: COVID-19 virus infection - U07.1  Mild intermittent asthma with acute exacerbation - J45.21  Moderate persistent asthma with acute exacerbation - J45.41     Transitions of Care Assessment         Follow up Plan Out of work until 10/13/24, will see primary care provider 10/8         Future Appointments   Date Time Provider Department Center   10/8/2024  9:40 AM Miya Crump PA-C [facility]   10/10/2024  7:40 AM Nidhi Dunbar, PT [facility]   10/24/2024  7:40 AM Nidhi Dunbar, PT [facility]   11/1/2024  8:00 PM BED 5 SH SLEEP [facility] [place] [facility]   11/7/2024  7:40 AM Nidhi Dunbar, PT [facility]   11/8/2024  2:30 PM RH INFUSION CHAIR [facility]   12/20/2024  2:30 PM RH INFUSION CHAIR [facility]       Outpatient Plan as outlined on AVS reviewed with patient.    For any urgent concerns, please contact our 24 hour nurse triage line: [phone] ([phone])       Deidra Espinoza RN

## 2024-09-30 NOTE — TELEPHONE ENCOUNTER
Attempt # 1    Called # [phone]     Left a non detailed VM to call back at [phone] and ask for any available Triage Nurse.    KENDAL LEVY RN on 9/30/2024 at 3:38 PM   [facility]

## 2024-09-30 NOTE — TELEPHONE ENCOUNTER
Clinical Impressions      COVID-19 virus infection    Mild intermittent asthma with acute exacerbation

## 2024-10-08 ENCOUNTER — OFFICE VISIT (OUTPATIENT)
Dept: FAMILY MEDICINE | Facility: CLINIC | Age: 49
End: 2024-10-08
Payer: COMMERCIAL

## 2024-10-08 VITALS
RESPIRATION RATE: 16 BRPM | WEIGHT: 245.6 LBS | HEART RATE: 85 BPM | BODY MASS INDEX: 40.92 KG/M2 | HEIGHT: 65 IN | OXYGEN SATURATION: 100 % | DIASTOLIC BLOOD PRESSURE: 70 MMHG | SYSTOLIC BLOOD PRESSURE: 110 MMHG | TEMPERATURE: 96.4 F

## 2024-10-08 DIAGNOSIS — Z86.16 PERSONAL HISTORY OF COVID-19: Primary | ICD-10-CM

## 2024-10-08 DIAGNOSIS — J45.41 MODERATE PERSISTENT ASTHMA WITH ACUTE EXACERBATION: ICD-10-CM

## 2024-10-08 DIAGNOSIS — D84.9 IMMUNOSUPPRESSION (H): ICD-10-CM

## 2024-10-08 DIAGNOSIS — J45.40 MODERATE PERSISTENT ASTHMA WITHOUT COMPLICATION: ICD-10-CM

## 2024-10-08 DIAGNOSIS — B37.31 YEAST INFECTION OF THE VAGINA: ICD-10-CM

## 2024-10-08 PROCEDURE — 99214 OFFICE O/P EST MOD 30 MIN: CPT | Performed by: PHYSICIAN ASSISTANT

## 2024-10-08 PROCEDURE — G2211 COMPLEX E/M VISIT ADD ON: HCPCS | Performed by: PHYSICIAN ASSISTANT

## 2024-10-08 RX ORDER — FLUCONAZOLE 150 MG/1
150 TABLET ORAL
Qty: 3 TABLET | Refills: 0 | Status: SHIPPED | OUTPATIENT
Start: 2024-10-08 | End: 2024-10-15

## 2024-10-08 RX ORDER — BUDESONIDE AND FORMOTEROL FUMARATE DIHYDRATE 160; 4.5 UG/1; UG/1
AEROSOL RESPIRATORY (INHALATION)
Qty: 10.2 G | Refills: 5 | Status: SHIPPED | OUTPATIENT
Start: 2024-10-08

## 2024-10-08 NOTE — LETTER
LE[place] [address] [id number] [phone]       October 8, 2024    Nataliya [place]  [id number] [place] [address] [place]   [facility] [id number]    To Whom it May Concern:    The above patient is able to begin work on 10/14/2024 as she has recovered from her recent illness and is not contagious.   Please contact me with questions or concerns.      Sincerely,    Miya Crump MBA, MS, PA-C  [facility]

## 2024-10-08 NOTE — PROGRESS NOTES
Assessment & Plan     Personal history of COVID-19  Moderate persistent asthma with acute exacerbation  Immunosuppression (H)  Reassurance that once daily nebulizer treatments is still completely normal and expected with recent COVID-19 infection.  Completed Paxlovig.  Clinically doing better but still having fairly significant fatigue.  Continue controller inhaler and finish prednisone taper as previously prescribed.  Gradually increase physical activity exertion as tolerated.  Letter written to allow her to return to work on 10/14/2024.  - budesonide-formoterol (SYMBICORT) 160-4.5 MCG/ACT Inhaler  Dispense: 10.2 g; Refill: 5    Yeast infection of the vagina  Having symptoms of vaginal yeast.  Previously on antibiotic as well as steroid and history of recurrent yeast infections.  Rx sent for 3 tablets of fluconazole to be taken every 72 hours until symptoms resolve.  - fluconazole (DIFLUCAN) 150 MG tablet  Dispense: 3 tablet; Refill: 0      The longitudinal plan of care for the diagnosis(es)/condition(s) as documented were addressed during this visit. Due to the added complexity in care, I will continue to support Nataliya in the subsequent management and with ongoing continuity of care.    MED REC REQUIRED  Post Medication Reconciliation Status:  Discharge medications reconciled, continue medications without change    Return in about 2 weeks (around 10/22/2024) for flu shot.      Miya Crump MBA, MS, PA-C  [facility]   Nataliya is a 49 year old, presenting for the following health issues:  ER F/U        10/8/2024     9:32 AM   Additional Questions   Roomed by Nelida PALMA   Accompanied by Self     HPI       ED/UC Followup:    Facility:  [facility] Emergency Department  Date of visit: 9/28/2024  Reason for visit: COVID 19.  Chest Pain and Shortness of Breath  Current Status: Patient states she is still having to take her neb once a day. She is having tightness in  her chest and wheezing at night.     ED HPI 9/28/2024  Nataliya Aldrich is a 49 year old female with history of asthma, ISAAC, and migraines who presents to the ED for evaluation of chest pain and shortness of breath.  Patient reports she developed cough, congestion, and shortness of breath 3 days ago.  She takes Symbicort daily and uses nebs as needed for asthma exacerbation, typically when she is sick, exercises, or changing of the seasons.  Her inhaler was not improving her cold symptoms, so she contacted her primary doctor who prescribed her a Medrol Dosepak 3 days ago.  Yesterday, she felt her condition worsening and developed symptoms consistent with her asthma exacerbations including left-sided chest pressure and green/yellow sputum.  She was prescribed cefdinir by her primary provider and started this after her MS infusion yesterday afternoon.  Her last neb at home was at 2000 yesterday.  She has only been able to use her inhaler today which has not provided relief. Patient also explains her daughter came back from traveling to [place] 3 days ago and she was exposed to her daughter 2 days ago.  Today her daughter developed a fever and was COVID-positive.  Patient took a home COVID test and that was positive.  She contacted her clinic's nurse triage line and was referred to the ED with concerns for COVID-pneumonia.  Patient denies vomiting, diarrhea, fever, pharyngitis, expiratory wheezing, calf pain, or increased spasming.  No history of blood clots.  Of note she has not eaten today.    [facility]   Nataliya Aldrich is a 49 year old female who presents emergency department with cough and asthma concerns.  Vital signs are stable.  Broad differentials considered include not limited to pneumonia pneumothorax, effusion, COVID, influenza, RSV, PE, asthma exacerbation, etc.  Patient is well-appearing nontoxic.  She received 1 neb prior to my assessment.  She felt much better.  She reports that she took  "a home COVID test that was positive.  Labs are reassuring CBC without leukocytosis or anemia.  BMP without acute electrolyte, metabolic or renal dysfunction.  EKG showing sinus rhythm.  No acute ischemic change.  Patient denies any chest pain or shortness of breath other than her normal asthma symptoms.  We opted not to pursue further cardiac testing given that symptoms are most consistent with asthma.  Chest x-ray shows no evidence acute pneumonia, pneumothorax or effusion.  No hypoxia or tachycardia to suggest PE.  Spoke with pharmacy patient is appropriate for Paxlovid.  Will prescribe a prednisone taper as well.  Patient felt improved after 1 neb.  No retractions or hypoxia, no clear suggestion for admission at this time.  I have recommended close follow-up with primary doctor.  Return precautions including worsening shortness of breath and chest pain were discussed.    Finish cefdinir prescription and Paxlovid prescription.  Has continued on prednisone taper and is down to a half a tablet remaining.  Is worried that chest tightness could worsen once she discontinues this.  Has continued her Symbicort daily.  Occasionally coughing up clear sputum.  Wondering if this is anything to be concerned about.  Does get a \"burst of energy \"but this lasts no longer than 3 hours and then she is pretty fatigued for the rest the day    Review of Systems  Constitutional, HEENT, cardiovascular, pulmonary, GI, , musculoskeletal, neuro, skin, endocrine and psych systems are negative, except as otherwise noted.      Objective    /70   Pulse 85   Temp (!) 96.4  F (35.8  C) (Tympanic)   Resp 16   Ht 1.651 m (5' 5\")   Wt 111.4 kg (245 lb 9.6 oz)   LMP  (LMP Unknown)   SpO2 100%   BMI 40.87 kg/m    Body mass index is 40.87 kg/m .  Physical Exam   GENERAL: alert and no distress  EYES: Eyes grossly normal to inspection, PERRL and conjunctivae and sclerae normal  RESP: lungs clear to auscultation - no rales, rhonchi or " wheezes  CV: regular rate and rhythm, normal S1 S2, no S3 or S4, no murmur, click or rub, no peripheral edema  MS: no gross musculoskeletal defects noted, no edema  SKIN: no suspicious lesions or rashes  NEURO: Normal strength and tone, mentation intact and speech normal  PSYCH: mentation appears normal, affect normal/bright    No results found for any visits on 10/08/24.        Signed Electronically by: Miya Crump PA-C

## 2024-10-10 ENCOUNTER — THERAPY VISIT (OUTPATIENT)
Dept: PHYSICAL THERAPY | Facility: CLINIC | Age: 49
End: 2024-10-10
Payer: COMMERCIAL

## 2024-10-10 DIAGNOSIS — M54.2 CERVICALGIA: Primary | ICD-10-CM

## 2024-10-10 DIAGNOSIS — G35 MS (MULTIPLE SCLEROSIS) (H): ICD-10-CM

## 2024-10-10 DIAGNOSIS — M79.18 MYOFASCIAL PAIN: ICD-10-CM

## 2024-10-10 PROCEDURE — 97110 THERAPEUTIC EXERCISES: CPT | Mod: GP | Performed by: PHYSICAL THERAPIST

## 2024-10-10 PROCEDURE — 97140 MANUAL THERAPY 1/> REGIONS: CPT | Mod: GP | Performed by: PHYSICAL THERAPIST

## 2024-10-10 PROCEDURE — 97035 APP MDLTY 1+ULTRASOUND EA 15: CPT | Mod: GP | Performed by: PHYSICAL THERAPIST

## 2024-10-16 ENCOUNTER — TELEPHONE (OUTPATIENT)
Dept: PHARMACY | Facility: CLINIC | Age: 49
End: 2024-10-16
Payer: COMMERCIAL

## 2024-10-16 NOTE — TELEPHONE ENCOUNTER
We have attempted to contact this patient three times to set up a MTM follow up appointment and were unsuccessful. Contact attempts were made via Gen[employer] and letterx2. We will no longer continue to contact this patient to schedule a visit at this time. Please refer back to MTM if you believe this patient would continue to benefit from our services.     Thank you!    Kim Dior, PharmD  Medication Therapy Management Pharmacist  Voicemail: [phone]

## 2024-10-17 ENCOUNTER — E-VISIT (OUTPATIENT)
Dept: FAMILY MEDICINE | Facility: CLINIC | Age: 49
End: 2024-10-17
Payer: COMMERCIAL

## 2024-10-17 DIAGNOSIS — L30.1 DYSHIDROTIC ECZEMA: Primary | ICD-10-CM

## 2024-10-17 PROCEDURE — 99422 OL DIG E/M SVC 11-20 MIN: CPT | Performed by: FAMILY MEDICINE

## 2024-10-17 RX ORDER — TRIAMCINOLONE ACETONIDE 5 MG/G
CREAM TOPICAL 2 TIMES DAILY
Qty: 15 G | Refills: 1 | Status: SHIPPED | OUTPATIENT
Start: 2024-10-17

## 2024-10-24 ENCOUNTER — MYC REFILL (OUTPATIENT)
Dept: FAMILY MEDICINE | Facility: CLINIC | Age: 49
End: 2024-10-24

## 2024-10-24 DIAGNOSIS — R09.81 NASAL CONGESTION: ICD-10-CM

## 2024-10-24 DIAGNOSIS — B36.0 TINEA VERSICOLOR: ICD-10-CM

## 2024-10-24 DIAGNOSIS — G98.8 CHRONIC MUSCULOSKELETAL PAIN DUE TO DISORDER OF NERVOUS SYSTEM: ICD-10-CM

## 2024-10-24 DIAGNOSIS — E55.9 HYPOVITAMINOSIS D: ICD-10-CM

## 2024-10-24 DIAGNOSIS — G89.29 CHRONIC MUSCULOSKELETAL PAIN DUE TO DISORDER OF NERVOUS SYSTEM: ICD-10-CM

## 2024-10-24 DIAGNOSIS — J30.2 SEASONAL ALLERGIC RHINITIS, UNSPECIFIED TRIGGER: ICD-10-CM

## 2024-10-24 DIAGNOSIS — G35 MULTIPLE SCLEROSIS (H): ICD-10-CM

## 2024-10-24 DIAGNOSIS — G43.109 MIGRAINE WITH AURA AND WITHOUT STATUS MIGRAINOSUS, NOT INTRACTABLE: ICD-10-CM

## 2024-10-24 DIAGNOSIS — M79.605 PAIN OF LEFT LOWER EXTREMITY: ICD-10-CM

## 2024-10-24 DIAGNOSIS — M79.18 CHRONIC MUSCULOSKELETAL PAIN DUE TO DISORDER OF NERVOUS SYSTEM: ICD-10-CM

## 2024-10-24 DIAGNOSIS — M62.838 MUSCLE SPASM: ICD-10-CM

## 2024-10-24 RX ORDER — KETOCONAZOLE 20 MG/G
CREAM TOPICAL 2 TIMES DAILY
Qty: 45 G | Refills: 1 | Status: SHIPPED | OUTPATIENT
Start: 2024-10-24

## 2024-10-24 RX ORDER — TOPIRAMATE 100 MG/1
100 TABLET, FILM COATED ORAL AT BEDTIME
Qty: 90 TABLET | Refills: 1 | Status: SHIPPED | OUTPATIENT
Start: 2024-10-24

## 2024-10-24 RX ORDER — TOPIRAMATE 100 MG/1
100 TABLET, FILM COATED ORAL AT BEDTIME
Qty: 90 TABLET | Refills: 1 | OUTPATIENT
Start: 2024-10-24

## 2024-10-24 RX ORDER — GABAPENTIN 300 MG/1
CAPSULE ORAL
Qty: 120 CAPSULE | Refills: 1 | Status: SHIPPED | OUTPATIENT
Start: 2024-10-24 | End: 2024-10-25

## 2024-10-24 RX ORDER — GABAPENTIN 300 MG/1
300 CAPSULE ORAL EVERY 4 HOURS
Qty: 120 CAPSULE | Refills: 5 | OUTPATIENT
Start: 2024-10-24

## 2024-10-24 RX ORDER — KETOTIFEN FUMARATE 0.35 MG/ML
1 SOLUTION/ DROPS OPHTHALMIC 2 TIMES DAILY PRN
Qty: 10 ML | Refills: 2 | Status: SHIPPED | OUTPATIENT
Start: 2024-10-24

## 2024-10-24 RX ORDER — TOPIRAMATE 50 MG/1
50 TABLET, FILM COATED ORAL 2 TIMES DAILY
Qty: 180 TABLET | Refills: 1 | OUTPATIENT
Start: 2024-10-24

## 2024-10-24 RX ORDER — TOPIRAMATE 50 MG/1
50 TABLET, FILM COATED ORAL 2 TIMES DAILY
Qty: 180 TABLET | Refills: 1 | Status: SHIPPED | OUTPATIENT
Start: 2024-10-24

## 2024-10-24 RX ORDER — BACLOFEN 20 MG/1
TABLET ORAL
Qty: 180 TABLET | Refills: 1 | Status: SHIPPED | OUTPATIENT
Start: 2024-10-24

## 2024-10-25 ENCOUNTER — TELEPHONE (OUTPATIENT)
Dept: FAMILY MEDICINE | Facility: CLINIC | Age: 49
End: 2024-10-25
Payer: COMMERCIAL

## 2024-10-25 ENCOUNTER — HOSPITAL ENCOUNTER (OUTPATIENT)
Dept: MAMMOGRAPHY | Facility: CLINIC | Age: 49
Discharge: HOME | End: 2024-10-25
Attending: PHYSICIAN ASSISTANT | Admitting: PHYSICIAN ASSISTANT
Payer: COMMERCIAL

## 2024-10-25 DIAGNOSIS — G89.29 CHRONIC MUSCULOSKELETAL PAIN DUE TO DISORDER OF NERVOUS SYSTEM: ICD-10-CM

## 2024-10-25 DIAGNOSIS — G98.8 CHRONIC MUSCULOSKELETAL PAIN DUE TO DISORDER OF NERVOUS SYSTEM: ICD-10-CM

## 2024-10-25 DIAGNOSIS — G35 MULTIPLE SCLEROSIS (H): ICD-10-CM

## 2024-10-25 DIAGNOSIS — Z12.31 VISIT FOR SCREENING MAMMOGRAM: ICD-10-CM

## 2024-10-25 DIAGNOSIS — M79.18 CHRONIC MUSCULOSKELETAL PAIN DUE TO DISORDER OF NERVOUS SYSTEM: ICD-10-CM

## 2024-10-25 DIAGNOSIS — M79.605 PAIN OF LEFT LOWER EXTREMITY: ICD-10-CM

## 2024-10-25 PROCEDURE — 77063 BREAST TOMOSYNTHESIS BI: CPT

## 2024-10-25 RX ORDER — GABAPENTIN 300 MG/1
300-600 CAPSULE ORAL 4 TIMES DAILY PRN
Qty: 360 CAPSULE | Refills: 1 | Status: SHIPPED | OUTPATIENT
Start: 2024-10-25

## 2024-10-25 NOTE — TELEPHONE ENCOUNTER
Previous directions  on gabapentin were 1-2 capsules four times daily from Dr Chong.  Can you clarify if you wanted it dosed four times daily or every 4 hours as written?  Thank you,  Leonie Clay [facility], [facility] [place] [phone]

## 2024-10-28 ENCOUNTER — MYC MEDICAL ADVICE (OUTPATIENT)
Dept: FAMILY MEDICINE | Facility: CLINIC | Age: 49
End: 2024-10-28
Payer: COMMERCIAL

## 2024-10-29 ENCOUNTER — THERAPY VISIT (OUTPATIENT)
Dept: PHYSICAL THERAPY | Facility: CLINIC | Age: 49
End: 2024-10-29
Payer: COMMERCIAL

## 2024-10-29 DIAGNOSIS — G35 MS (MULTIPLE SCLEROSIS) (H): ICD-10-CM

## 2024-10-29 DIAGNOSIS — M79.18 MYOFASCIAL PAIN: ICD-10-CM

## 2024-10-29 DIAGNOSIS — M54.2 CERVICALGIA: Primary | ICD-10-CM

## 2024-10-29 PROCEDURE — 97035 APP MDLTY 1+ULTRASOUND EA 15: CPT | Mod: GP | Performed by: PHYSICAL THERAPIST

## 2024-10-29 PROCEDURE — 97140 MANUAL THERAPY 1/> REGIONS: CPT | Mod: GP | Performed by: PHYSICAL THERAPIST

## 2024-10-29 PROCEDURE — 97110 THERAPEUTIC EXERCISES: CPT | Mod: GP | Performed by: PHYSICAL THERAPIST

## 2024-10-29 RX ORDER — ALBUTEROL SULFATE 0.83 MG/ML
2.5 SOLUTION RESPIRATORY (INHALATION)
Status: CANCELLED | OUTPATIENT
Start: 2024-10-29

## 2024-10-29 RX ORDER — ALBUTEROL SULFATE 90 UG/1
1-2 INHALANT RESPIRATORY (INHALATION)
Status: CANCELLED
Start: 2024-10-29

## 2024-10-29 RX ORDER — EPINEPHRINE 1 MG/ML
0.3 INJECTION, SOLUTION INTRAMUSCULAR; SUBCUTANEOUS EVERY 5 MIN PRN
Status: CANCELLED | OUTPATIENT
Start: 2024-10-29

## 2024-10-29 RX ORDER — METHYLPREDNISOLONE SODIUM SUCCINATE 40 MG/ML
40 INJECTION INTRAMUSCULAR; INTRAVENOUS
Status: CANCELLED
Start: 2024-10-29

## 2024-10-29 RX ORDER — DIPHENHYDRAMINE HYDROCHLORIDE 50 MG/ML
25 INJECTION INTRAMUSCULAR; INTRAVENOUS
Status: CANCELLED
Start: 2024-10-29

## 2024-10-29 RX ORDER — MEPERIDINE HYDROCHLORIDE 25 MG/ML
25 INJECTION INTRAMUSCULAR; INTRAVENOUS; SUBCUTANEOUS
Status: CANCELLED | OUTPATIENT
Start: 2024-10-29

## 2024-10-29 RX ORDER — DIPHENHYDRAMINE HYDROCHLORIDE 50 MG/ML
50 INJECTION INTRAMUSCULAR; INTRAVENOUS
Status: CANCELLED
Start: 2024-10-29

## 2024-10-31 ENCOUNTER — VIRTUAL VISIT (OUTPATIENT)
Dept: FAMILY MEDICINE | Facility: CLINIC | Age: 49
End: 2024-10-31
Payer: COMMERCIAL

## 2024-10-31 DIAGNOSIS — N95.1 PERIMENOPAUSE: Primary | ICD-10-CM

## 2024-10-31 DIAGNOSIS — Z11.3 SCREEN FOR STD (SEXUALLY TRANSMITTED DISEASE): ICD-10-CM

## 2024-10-31 PROCEDURE — 99215 OFFICE O/P EST HI 40 MIN: CPT | Mod: 95 | Performed by: PHYSICIAN ASSISTANT

## 2024-10-31 NOTE — PATIENT INSTRUCTIONS
Schedule virtual visit with Carla Navarro CNM at the [facility] to discuss if hormone replacement would be helpful.

## 2024-10-31 NOTE — PROGRESS NOTES
"Nataliya is a 49 year old who is being evaluated via a billable video visit.    How would you like to obtain your AVS? MyChart  If the video visit is dropped, the invitation should be resent by: Text to cell phone: [phone]  Will anyone else be joining your video visit? No      Assessment & Plan     Perimenopause  Recommend consultation with OBGYN regarding perimenopause and possible hormone replacement options.  Patient is s/p ovarian sparing abdominal hysterectomy.  Patient will schedule at her earliest convenience.  - Ob/Gyn  Referral    Screen for STD (sexually transmitted disease)  Routine screening  - NEISSERIA GONORRHOEA PCR  - CHLAMYDIA TRACHOMATIS PCR            BMI  Estimated body mass index is 40.87 kg/m  as calculated from the following:    Height as of 10/8/24: 1.651 m (5' 5\").    Weight as of 10/8/24: 111.4 kg (245 lb 9.6 oz).   Weight management plan: Deferred, virtual visit      42 minutes spent by me on the date of the encounter doing chart review, history and exam, documentation and further activities per the note  Return in about 1 day (around 11/1/2024) for Lab Work.      Miya Crump MBA, MS, PA-C  [facility]   Nataliya is a 49 year old, presenting for the following health issues:  RECHECK        10/31/2024     4:54 PM   Additional Questions   Roomed by Nelida PALMA   Accompanied by Self       Video Start Time: 5:17 PM    History of Present Illness       Reason for visit:  Rash    She eats 2-3 servings of fruits and vegetables daily.She consumes 0 sweetened beverage(s) daily.She exercises with enough effort to increase her heart rate 10 to 19 minutes per day.  She exercises with enough effort to increase her heart rate 5 days per week.   She is taking medications regularly.       Vaginal Symptoms  Onset/Duration:   Description:  Vaginal Discharge: white clear   Itching (Pruritis): No  Burning sensation:  No  Odor: No  Accompanying Signs & " Symptoms:  Urinary symptoms: No  Abdominal pain: No  Fever: No  History:   Sexually active: YES  New Partner: No  Possibility of Pregnancy:  No  Recent antibiotic use: No  Previous vaginitis issues: No  Precipitating or alleviating factors: Patches Bactroban  Therapies tried and outcome: Bactroban    9/19/2024: Hair follicle infection  Inflamed inguinal lesion consistent with inflamed hair follicle. Disinfected the area with alcohol and applied gentle pressure which resulted in pale yellow discharge from the lesion. Continued to apply pressure until drainage subsided. Applied bacitracin and a bandage. Patient endorsed immediate relief of pain in the area. Discussed with patient that area may continue to weep. Encouraged her to keep the area clean with soap and water and avoid shaving for several days. She can apply antibiotic ointment and a band aid to the area. Follow up if recurs.      Today: Still has a small bump in this area but not tender and no further discharge.  Is using topical Bactroban wondering if this is necessary.  Has another similar lesion adjacent to the it and wondering if this could be an infection that is spreading.    Perimenopause  Having a variety of symptoms that she wonders is related to perimenopause.  Noting night sweats, change in body odor, decreased/slowed benefits of exercise and diet.  Has intermittent ovarian pain that is associated with clear vaginal discharge.  Has had the same sexual partner for over a year but wondering if any STD testing would be beneficial.        Review of Systems  Constitutional, neuro, ENT, endocrine, pulmonary, cardiac, gastrointestinal, genitourinary, musculoskeletal, integument and psychiatric systems are negative, except as otherwise noted.      Objective           Vitals:  No vitals were obtained today due to virtual visit.    Physical Exam   GENERAL: alert and no distress  EYES: Eyes grossly normal to inspection.  No discharge or erythema, or obvious  scleral/conjunctival abnormalities.  RESP: No audible wheeze, cough, or visible cyanosis.    SKIN: Visible skin clear. No significant rash, abnormal pigmentation or lesions.  NEURO: Cranial nerves grossly intact.  Mentation and speech appropriate for age.  PSYCH: Appropriate affect, tone, and pace of words          Video-Visit Details    Type of service:  Video Visit   Video End Time:5:50 PM  Originating Location (pt. Location): Home    Distant Location (provider location):  On-site  Platform used for Video Visit: Diana  Signed Electronically by: Miya Crump PA-C

## 2024-11-01 ENCOUNTER — LAB (OUTPATIENT)
Dept: LAB | Facility: CLINIC | Age: 49
End: 2024-11-01
Payer: COMMERCIAL

## 2024-11-01 ENCOUNTER — E-VISIT (OUTPATIENT)
Dept: FAMILY MEDICINE | Facility: CLINIC | Age: 49
End: 2024-11-01
Payer: COMMERCIAL

## 2024-11-01 DIAGNOSIS — Z11.3 SCREEN FOR STD (SEXUALLY TRANSMITTED DISEASE): ICD-10-CM

## 2024-11-01 DIAGNOSIS — N89.8 VAGINAL DISCHARGE: Primary | ICD-10-CM

## 2024-11-01 DIAGNOSIS — B37.31 YEAST INFECTION OF THE VAGINA: ICD-10-CM

## 2024-11-01 PROCEDURE — 87591 N.GONORRHOEAE DNA AMP PROB: CPT

## 2024-11-01 PROCEDURE — 99207 PR NON-BILLABLE SERV PER CHARTING: CPT | Performed by: PHYSICIAN ASSISTANT

## 2024-11-01 PROCEDURE — 87491 CHLMYD TRACH DNA AMP PROBE: CPT

## 2024-11-02 LAB
C TRACH DNA SPEC QL NAA+PROBE: NEGATIVE
N GONORRHOEA DNA SPEC QL NAA+PROBE: NEGATIVE

## 2024-11-02 NOTE — TELEPHONE ENCOUNTER
Appt came in through [employer].   Please traige  For new or worsening syymptoms.  Ok to wait?       Guillermina Garcia        risk factors

## 2024-11-02 NOTE — RESULT ENCOUNTER NOTE
Natalyia  I have reviewed your recent test results:    -Chlamydia and gonnohrea tests are normal.    For additional lab test information, www.testing.com is an excellent reference.     If you have any questions please do not hesitate to contact our office via phone ([phone]) or MyChart.    Healthy regards,     Miya Crump MBA, MS, PA-C  [facility]

## 2024-11-03 ENCOUNTER — MYC MEDICAL ADVICE (OUTPATIENT)
Dept: FAMILY MEDICINE | Facility: CLINIC | Age: 49
End: 2024-11-03

## 2024-11-03 ENCOUNTER — E-VISIT (OUTPATIENT)
Dept: FAMILY MEDICINE | Facility: CLINIC | Age: 49
End: 2024-11-03
Payer: COMMERCIAL

## 2024-11-03 DIAGNOSIS — R21 RASH: Primary | ICD-10-CM

## 2024-11-03 PROCEDURE — 99207 PR NON-BILLABLE SERV PER CHARTING: CPT | Performed by: PHYSICIAN ASSISTANT

## 2024-11-04 ENCOUNTER — OFFICE VISIT (OUTPATIENT)
Dept: FAMILY MEDICINE | Facility: CLINIC | Age: 49
End: 2024-11-04
Payer: COMMERCIAL

## 2024-11-04 VITALS
BODY MASS INDEX: 40.82 KG/M2 | DIASTOLIC BLOOD PRESSURE: 76 MMHG | HEART RATE: 78 BPM | HEIGHT: 65 IN | RESPIRATION RATE: 18 BRPM | TEMPERATURE: 97.9 F | OXYGEN SATURATION: 99 % | WEIGHT: 245 LBS | SYSTOLIC BLOOD PRESSURE: 132 MMHG

## 2024-11-04 DIAGNOSIS — B02.9 HERPES ZOSTER WITHOUT COMPLICATION: Primary | ICD-10-CM

## 2024-11-04 PROCEDURE — G2211 COMPLEX E/M VISIT ADD ON: HCPCS

## 2024-11-04 PROCEDURE — 99213 OFFICE O/P EST LOW 20 MIN: CPT

## 2024-11-04 RX ORDER — VALACYCLOVIR HYDROCHLORIDE 1 G/1
1000 TABLET, FILM COATED ORAL 3 TIMES DAILY
Qty: 21 TABLET | Refills: 0 | Status: SHIPPED | OUTPATIENT
Start: 2024-11-04 | End: 2024-11-11

## 2024-11-04 RX ORDER — PREDNISONE 10 MG/1
TABLET ORAL
Qty: 24 TABLET | Refills: 0 | Status: SHIPPED | OUTPATIENT
Start: 2024-11-04 | End: 2024-11-05

## 2024-11-04 ASSESSMENT — ASTHMA QUESTIONNAIRES
ACT_TOTALSCORE: 23
ACT_TOTALSCORE: 23
QUESTION_5 LAST FOUR WEEKS HOW WOULD YOU RATE YOUR ASTHMA CONTROL: WELL CONTROLLED
QUESTION_2 LAST FOUR WEEKS HOW OFTEN HAVE YOU HAD SHORTNESS OF BREATH: NOT AT ALL
EMERGENCY_ROOM_LAST_YEAR_TOTAL: ONE
QUESTION_3 LAST FOUR WEEKS HOW OFTEN DID YOUR ASTHMA SYMPTOMS (WHEEZING, COUGHING, SHORTNESS OF BREATH, CHEST TIGHTNESS OR PAIN) WAKE YOU UP AT NIGHT OR EARLIER THAN USUAL IN THE MORNING: NOT AT ALL
QUESTION_4 LAST FOUR WEEKS HOW OFTEN HAVE YOU USED YOUR RESCUE INHALER OR NEBULIZER MEDICATION (SUCH AS ALBUTEROL): ONCE A WEEK OR LESS
QUESTION_1 LAST FOUR WEEKS HOW MUCH OF THE TIME DID YOUR ASTHMA KEEP YOU FROM GETTING AS MUCH DONE AT WORK, SCHOOL OR AT HOME: NONE OF THE TIME

## 2024-11-04 NOTE — PROGRESS NOTES
Assessment & Plan     Herpes zoster without complication  Will treat current outbreak with valtrex TID for 7 days and prednisone taper for 12 days. Patient should return to clinic if rash worsens or does not improve after 7 days. Discussed that patient should contact neuro team to see if she should still get her infusion for MS this week given herpes zoster outbreak.   - valACYclovir (VALTREX) 1000 mg tablet  Dispense: 21 tablet; Refill: 0  - predniSONE (DELTASONE) 10 MG tablet  Dispense: 24 tablet; Refill: 0    Li Thompson PA-C    27 minutes spent by me on the date of the encounter doing chart review, history and exam, documentation and further activities per the note        Subjective   Nataliya is a 49 year old, presenting for the following health issues:  Derm Problem        11/4/2024    12:52 PM   Additional Questions   Roomed by Miya CARRASCO     Via the [employer] Maintenance questionnaire, the patient has reported the following services have been completed -Colonscopy: mn 2013-03-04, this information has been sent to the abstraction team.  History of Present Illness       Reason for visit:  Rash    She eats 2-3 servings of fruits and vegetables daily.She consumes 0 sweetened beverage(s) daily.She exercises with enough effort to increase her heart rate 10 to 19 minutes per day.  She exercises with enough effort to increase her heart rate 5 days per week.   She is taking medications regularly.       Rash (Has had shingles before on neck but on the right side- has MS) see triage note.   Onset/Duration: x 1 day ago  Description  Location: left side of neck below ear, sore to the touch yesterday- now the whole area started to feel sore- pain follows the jaw on lefts side- feels like glands are swollen  Character: blotchy, flakey, burning- noticed some redness yesterday afternoon  Itching: no  Intensity:  mild  Progression of Symptoms:  worsening  Accompanying signs and symptoms:   Fever: No  Body aches or joint pain:  "No  Sore throat symptoms: No  Recent cold symptoms: No  History:           Previous episodes of similar rash: see above had shingles on right side   New exposures:  None  Recent travel: No  Exposure to similar rash: No  Precipitating or alleviating factors: none  Therapies tried and outcome: none    Woke up on Sunday at about 6am with pain and \"crick\" in left neck. Tried to massage it out and do some stretches. Finished morning routine and didn't notice anything. However, after coming home from [place] she felt some irritation on the left side of her neck. At around 3:30 she wass braiding her hair and felt incrreased pain on the left side of her neck and noticed a red rash. Has some allergies and food sensitivites and avoided all known triggers. Describes pain as burning and hurting. Left side of tongue felt swollen and painful.     Has a history of shingles on the right side of her neck in the past. This feels very similar.       Review of Systems  Constitutional, neuro, ENT, endocrine, pulmonary, cardiac, gastrointestinal, genitourinary, musculoskeletal, integument and psychiatric systems are negative, except as otherwise noted.      Objective    /76   Pulse 78   Temp 97.9  F (36.6  C) (Tympanic)   Resp 18   Ht 1.651 m (5' 5\")   Wt 111.1 kg (245 lb)   LMP  (LMP Unknown)   SpO2 99%   BMI 40.77 kg/m    Body mass index is 40.77 kg/m .  Physical Exam  Constitutional:       General: She is not in acute distress.     Appearance: Normal appearance. She is not ill-appearing.   HENT:      Head: Normocephalic.      Nose: Nose normal.      Mouth/Throat:      Mouth: Mucous membranes are moist.      Pharynx: Oropharynx is clear.   Cardiovascular:      Rate and Rhythm: Normal rate.   Pulmonary:      Effort: Pulmonary effort is normal. No respiratory distress.   Musculoskeletal:         General: Normal range of motion.      Cervical back: Normal range of motion and neck supple.   Skin:     General: Skin is warm and " dry.      Findings: Rash present. Rash is vesicular.          Neurological:      Mental Status: She is alert.   Psychiatric:         Mood and Affect: Mood normal.         Behavior: Behavior normal.         Thought Content: Thought content normal.         Judgment: Judgment normal.           Signed Electronically by: Li Thompson PA-C

## 2024-11-04 NOTE — TELEPHONE ENCOUNTER
Pt calling   Stating she work up with a pain on her left side of her neck just below the left ear - then later she notices red splotchy area where the pain is. The pain follows the jaw line on left side     This morning she notices there is some white flaky area in the middle of the red blotches and it burns.   Rn advised that pt should be seen in person     Made an OV for today     Patient stated an understanding and agreed with plan.    Amelia Chance RN, BSN  [facility]

## 2024-11-05 ENCOUNTER — TELEPHONE (OUTPATIENT)
Dept: FAMILY MEDICINE | Facility: CLINIC | Age: 49
End: 2024-11-05
Payer: COMMERCIAL

## 2024-11-05 NOTE — TELEPHONE ENCOUNTER
Situation    Patient calling concerned that her shingles has worsened since yesterday    Background   Patient has MS   LOV yesterday 11/4/24  Prescribed Valtrex (took 3rd dose today) and 2nd dose of Prednisone today.    Photo of shingles rash today is in the evisit from 11/3- reviewed     Assessment   Patient states her shingles rash has spread over night   Yesterday rash was the size of dime and light red. A few white flakes in the light red area   Today the rash area is about about the size of 1.5 quarters.   The area is now darker red, like a bruise  There are more white flaky areas within the red area     Denied fever, rapid or irregular heart beat, dizziness, lightheaded, no s/s of infection at this time.     Recommendations   Monitor new different or worsening symptoms   Wants reassurance from pcp that she does not need any other medication give her ms - explained will review information with pcp and call her back.  Advised of s/s of infection, worsening  rash, pain symptoms - call triage   Patient has touched base with neuro to talk about shingles and infusion for MS

## 2024-11-05 NOTE — TELEPHONE ENCOUNTER
Huddled with pcp- reviewed information above and picture  Pcp states stop prednisone now, given MS/immunosuppressed.  Take her gabapentin as directed will help with the pain  Take valtrex as directed   Normal to get worse before better.      Called and informed patient of above information  Infusion has been pushed out per neuro   Patient states understanding and is agreeable to plan.

## 2024-11-05 NOTE — PROGRESS NOTES
Patient did not return for further treatment and no additional progress was noted.  Please refer to the progress/soap note and goal flowsheet completed  for discharge information.

## 2024-11-06 ENCOUNTER — MEDICAL CORRESPONDENCE (OUTPATIENT)
Dept: HEALTH INFORMATION MANAGEMENT | Facility: CLINIC | Age: 49
End: 2024-11-06
Payer: COMMERCIAL

## 2024-11-07 DIAGNOSIS — G35 MULTIPLE SCLEROSIS (H): Primary | ICD-10-CM

## 2024-11-07 RX ORDER — HEPARIN SODIUM,PORCINE 10 UNIT/ML
5-20 VIAL (ML) INTRAVENOUS DAILY PRN
OUTPATIENT
Start: 2024-11-14

## 2024-11-07 RX ORDER — ALBUTEROL SULFATE 90 UG/1
1-2 INHALANT RESPIRATORY (INHALATION)
Start: 2024-11-14

## 2024-11-07 RX ORDER — METHYLPREDNISOLONE SODIUM SUCCINATE 40 MG/ML
40 INJECTION INTRAMUSCULAR; INTRAVENOUS
Start: 2024-11-14

## 2024-11-07 RX ORDER — MEPERIDINE HYDROCHLORIDE 25 MG/ML
25 INJECTION INTRAMUSCULAR; INTRAVENOUS; SUBCUTANEOUS
OUTPATIENT
Start: 2024-11-14

## 2024-11-07 RX ORDER — HEPARIN SODIUM (PORCINE) LOCK FLUSH IV SOLN 100 UNIT/ML 100 UNIT/ML
5 SOLUTION INTRAVENOUS
OUTPATIENT
Start: 2024-11-14

## 2024-11-07 RX ORDER — EPINEPHRINE 1 MG/ML
0.3 INJECTION, SOLUTION INTRAMUSCULAR; SUBCUTANEOUS EVERY 5 MIN PRN
OUTPATIENT
Start: 2024-11-14

## 2024-11-07 RX ORDER — FLUCONAZOLE 150 MG/1
150 TABLET ORAL
Qty: 3 TABLET | Refills: 0 | Status: SHIPPED | OUTPATIENT
Start: 2024-11-07 | End: 2024-11-14

## 2024-11-07 RX ORDER — ALBUTEROL SULFATE 0.83 MG/ML
2.5 SOLUTION RESPIRATORY (INHALATION)
OUTPATIENT
Start: 2024-11-14

## 2024-11-07 RX ORDER — DIPHENHYDRAMINE HYDROCHLORIDE 50 MG/ML
50 INJECTION INTRAMUSCULAR; INTRAVENOUS
Start: 2024-11-14

## 2024-11-07 RX ORDER — DIPHENHYDRAMINE HYDROCHLORIDE 50 MG/ML
25 INJECTION INTRAMUSCULAR; INTRAVENOUS
Start: 2024-11-14

## 2024-11-08 ENCOUNTER — VIRTUAL VISIT (OUTPATIENT)
Dept: FAMILY MEDICINE | Facility: CLINIC | Age: 49
End: 2024-11-08
Payer: COMMERCIAL

## 2024-11-08 DIAGNOSIS — R21 RASH: Primary | ICD-10-CM

## 2024-11-08 PROCEDURE — 99213 OFFICE O/P EST LOW 20 MIN: CPT | Mod: 95 | Performed by: NURSE PRACTITIONER

## 2024-11-08 RX ORDER — KETOCONAZOLE 20 MG/G
CREAM TOPICAL DAILY
Qty: 30 G | Refills: 1 | Status: SHIPPED | OUTPATIENT
Start: 2024-11-08

## 2024-11-08 NOTE — PROGRESS NOTES
Nataliya is a 49 year old who is being evaluated via a billable video visit.    How would you like to obtain your AVS? MyChart  If the video visit is dropped, the invitation should be resent by: Text to cell phone: [phone]  Will anyone else be joining your video visit? No    Assessment & Plan     Rash  Possible tinea will treat with topical antifungal.  Be seen or send a picture if worsening.   Nataliya verbalizes understanding of plan of care and is in agreement.    - ketoconazole (NIZORAL) 2 % external cream  Dispense: 30 g; Refill: 1          Return in about 2 weeks (around 11/22/2024) for if symptoms persist or worsening please be seen.     Subjective   Nataliya is a 49 year old, presenting for the following health issues:  Derm Problem        11/8/2024     9:17 AM   Additional Questions   Roomed by Nelida PALMA   Accompanied by Self       Via the Health Maintenance questionnaire, the patient has reported the following services have been completed -Colonscopy: mngi 2013-03-04, this information has been sent to the abstraction team.  Video Start Time: 9:54 AM    History of Present Illness       Reason for visit:  Rash    She eats 2-3 servings of fruits and vegetables daily.She consumes 0 sweetened beverage(s) daily.She exercises with enough effort to increase her heart rate 10 to 19 minutes per day.  She exercises with enough effort to increase her heart rate 5 days per week.   She is taking medications regularly.       Rash  Onset/Duration: 11/3/2024  Description  Location: Patient states that the rash is on the left side. Did see Li 11/4/2024. Would like a second opinion. Only itches outside of rash.   Character: round  Itching: moderate  Intensity:  moderate  Progression of Symptoms:  same  Accompanying signs and symptoms:   Fever: No  Body aches or joint pain: No  Sore throat symptoms: No  Recent cold symptoms: No  History:           Previous episodes of similar rash: None  New exposures:  None  Recent  travel: No  Exposure to similar rash: No  Precipitating or alleviating factors: None  Therapies tried and outcome: Valtrex 100 mg 3 times a day       Stopped prednisone.   Rash pic sent in evisit 11/3/24.   Middle skin is reported as normal outer ring has some raised area.      Huddled with pcp- reviewed information above and picture  Pcp states stop prednisone now, given MS/immunosuppressed.  Take her gabapentin as directed will help with the pain  Take valtrex as directed   Normal to get worse before better.       Called and informed patient of above information  Infusion has been pushed out per neuro   Patient states understanding and is agreeable to plan.         Ivis Wood RN   MR    11/5/24  9:33 AM  Note  Situation    Patient calling concerned that her shingles has worsened since yesterday     Background   Patient has MS   LOV yesterday 11/4/24  Prescribed Valtrex (took 3rd dose today) and 2nd dose of Prednisone today.    Photo of shingles rash today is in the evisit from 11/3- reviewed      Assessment   Patient states her shingles rash has spread over night   Yesterday rash was the size of dime and light red. A few white flakes in the light red area   Today the rash area is about about the size of 1.5 quarters.   The area is now darker red, like a bruise  There are more white flaky areas within the red area      Denied fever, rapid or irregular heart beat, dizziness, lightheaded, no s/s of infection at this time.      Recommendations   Monitor new different or worsening symptoms   Wants reassurance from pcp that she does not need any other medication give her ms - explained will review information with pcp and call her back.  Advised of s/s of infection, worsening  rash, pain symptoms - call triage   Patient has touched base with neuro to talk about shingles and infusion for MS                      Constitutional, HEENT, cardiovascular, pulmonary, GI, , musculoskeletal, neuro, skin, endocrine and  psych systems are negative, except as otherwise noted in the HPI.         Objective           Vitals:  No vitals were obtained today due to virtual visit.    Physical Exam   GENERAL: alert and no distress  EYES: Eyes grossly normal to inspection.  No discharge or erythema, or obvious scleral/conjunctival abnormalities.  RESP: No audible wheeze, cough, or visible cyanosis.    SKIN: Visible skin clear. No significant rash, abnormal pigmentation or lesions.  E visit picture 11/3/24            NEURO: Cranial nerves grossly intact.  Mentation and speech appropriate for age.  PSYCH: Appropriate affect, tone, and pace of words      Video-Visit Details    Type of service:  Video Visit   Video End Time:10:19 AM  Originating Location (pt. Location): Home  Distant Location (provider location):  On-site  Platform used for Video Visit: Diana       Signed Electronically by: MARTÍNEZ Michael CNP

## 2024-11-12 ENCOUNTER — E-VISIT (OUTPATIENT)
Dept: FAMILY MEDICINE | Facility: CLINIC | Age: 49
End: 2024-11-12
Payer: COMMERCIAL

## 2024-11-12 DIAGNOSIS — R21 RASH AND NONSPECIFIC SKIN ERUPTION: Primary | ICD-10-CM

## 2024-11-12 NOTE — TELEPHONE ENCOUNTER
Provider E-Visit time total (minutes): 4 minutes (first outreach), 3 minutes (second out reach)

## 2024-11-18 ENCOUNTER — NURSE TRIAGE (OUTPATIENT)
Dept: FAMILY MEDICINE | Facility: CLINIC | Age: 49
End: 2024-11-18
Payer: COMMERCIAL

## 2024-11-18 NOTE — TELEPHONE ENCOUNTER
"Nurse Triage SBAR    Is this a 2nd Level Triage? NO    Situation: breast lump    Background: n/a    Assessment: laying down this morning. Pain underneath my left breast. Arm behind head. At first I felt a smaller bump on the outer side yesterday. Have dense breasts. Underneath the breast. Knot right there. Maybe a aniyah size. Smaller one on the outside. Tender to palpation.     Protocol Recommended Disposition:   See in Office Within 3 Days    Recommendation: scheduled office visit tomorrow with PCP    Next 5 appointments (look out 90 days)      Nov 19, 2024 8:20 AM  (Arrive by 8:00 AM)  Provider Visit with Miya Crump PA-C  [facility] ([facility] ) [address] [place] [id number] [phone]           Does the patient meet one of the following criteria for ADS visit consideration? 16+ years old, with an [facility] PCP     TIP  Providers, please consider if this condition is appropriate for management at one of our Acute and Diagnostic Services sites.     If patient is a good candidate, please use dotphrase <dot>triageresponse and select Refer to ADS to document.     Patient stated an understanding and agreed with plan.     KENDAL LEVY RN on 11/18/2024 at 10:47 AM   [facility]       Reason for Disposition   Breast lump    Additional Information   Negative: SEVERE breast pain and fever > 103 F  (39.4 C)   Negative: Patient sounds very sick or weak to the triager   Negative: Breast looks infected (spreading redness, feels hot or painful to touch) and fever   Negative: Breast looks infected (spreading redness, feels hot or painful to touch) and no fever   Negative: Painful rash and multiple small blisters grouped together (i.e., dermatomal distribution or \"band\" or \"stripe\")   Negative: Cuts, burns, or bruises of breasts and suspicious history for the injury    Protocols used: Breast Symptoms-A-OH    "

## 2024-11-18 NOTE — TELEPHONE ENCOUNTER
Reason for Call:  Appointment Request    Patient requesting this type of appt:  Office vidit    Requested provider: Miya Crump    Reason patient unable to be scheduled: Not within requested timeframe    When does patient want to be seen/preferred time: Same day    Comments: patient has a lump under breast area and is requesting see primary provider     Could we [facility] this [facility] you [facility] or would you prefer to receive a phone call?:   Patient would prefer a phone call   Okay to leave a detailed message?: Yes at Home number on file [phone] (home)    Call taken on 11/18/2024 at 6:20 AM by Mellissa Em

## 2024-11-19 ENCOUNTER — MYC REFILL (OUTPATIENT)
Dept: FAMILY MEDICINE | Facility: CLINIC | Age: 49
End: 2024-11-19

## 2024-11-19 ENCOUNTER — OFFICE VISIT (OUTPATIENT)
Dept: FAMILY MEDICINE | Facility: CLINIC | Age: 49
End: 2024-11-19
Payer: COMMERCIAL

## 2024-11-19 VITALS
OXYGEN SATURATION: 100 % | RESPIRATION RATE: 14 BRPM | DIASTOLIC BLOOD PRESSURE: 70 MMHG | BODY MASS INDEX: 40.48 KG/M2 | TEMPERATURE: 97.8 F | WEIGHT: 243 LBS | HEART RATE: 82 BPM | SYSTOLIC BLOOD PRESSURE: 124 MMHG | HEIGHT: 65 IN

## 2024-11-19 DIAGNOSIS — N60.19 FIBROCYSTIC BREAST CHANGES, UNSPECIFIED LATERALITY: Primary | ICD-10-CM

## 2024-11-19 DIAGNOSIS — G35 MULTIPLE SCLEROSIS (H): ICD-10-CM

## 2024-11-19 DIAGNOSIS — F33.0 MAJOR DEPRESSIVE DISORDER, RECURRENT EPISODE, MILD (H): ICD-10-CM

## 2024-11-19 DIAGNOSIS — K30 STOMACH UPSET: ICD-10-CM

## 2024-11-19 DIAGNOSIS — N64.4 BREAST PAIN, LEFT: ICD-10-CM

## 2024-11-19 RX ORDER — FLUOXETINE 10 MG/1
CAPSULE ORAL
Qty: 180 CAPSULE | Refills: 0 | Status: SHIPPED | OUTPATIENT
Start: 2024-11-19 | End: 2025-11-26

## 2024-11-19 RX ORDER — KETOROLAC TROMETHAMINE 30 MG/ML
30 INJECTION, SOLUTION INTRAMUSCULAR; INTRAVENOUS ONCE
Status: COMPLETED | OUTPATIENT
Start: 2024-11-19 | End: 2024-11-19

## 2024-11-19 RX ADMIN — KETOROLAC TROMETHAMINE 30 MG: 30 INJECTION, SOLUTION INTRAMUSCULAR; INTRAVENOUS at 09:25

## 2024-11-19 NOTE — PROGRESS NOTES
Assessment & Plan     Fibrocystic breast changes, unspecified laterality  Breast pain, left  Suspect musculoskeletal/suspensory ligament pain.  Recommend Toradol injection today, warm compresses and avoiding compressing this area as it likely is further irritating it.  If not improving or worsening patient will keep me apprised poorly controlled.    - ketorolac (TORADOL) injection 30 mg    Major depressive disorder, recurrent episode, mild (H)  Currently off of medication.  Currently in psychotherapy.  Verbal contract for safety.  Restart fluoxetine at 10 mg for a week and then increase to 20 mg daily thereafter.  Close follow-up in 4 weeks to ensure correct medication and dosage.  - FLUoxetine (PROZAC) 10 MG capsule  Dispense: 180 capsule; Refill: 0    Multiple sclerosis (H) - Dr. Chong - on Tysabri infusions  Patient's disease symptomatology does not appear to be well-controlled.  Recommend meeting with Dr. Slade Johansen or Dr. Carmen Lazaro for a second opinion on current treatment regimen.  - Adult Neurology  Referral    Review of prior external note(s) from - [facility] information from [facility]  reviewed  43 minutes spent by me on the date of the encounter doing chart review, history and exam, documentation and further activities per the note    Return in about 4 weeks (around 12/17/2024) for Video visit, Medication recheck.      Miya Crump MBA, MS, PA-C  [facility]      Richie An is a 49 year old, presenting for the following health issues:  Breast Problem        11/19/2024     8:23 AM   Additional Questions   [facility]   Accompanied by Self     Via the Health Maintenance questionnaire, the patient has reported the following services have been completed -Colonscopy: mngi 2012-05-05, this information has been sent to the abstraction team.  History of Present Illness       Reason for visit:  Bump on breast    She eats 4  or more servings of fruits and vegetables daily.She consumes 0 sweetened beverage(s) daily.She exercises with enough effort to increase her heart rate 20 to 29 minutes per day.  She exercises with enough effort to increase her heart rate 4 days per week.   She is taking medications regularly.     Triaged - 11/18/2024  Nurse Triage SBAR     Is this a 2nd Level Triage? NO     Situation: breast lump     Background: n/a     Assessment: laying down this morning. Pain underneath my left breast. Arm behind head. At first I felt a smaller bump on the outer side yesterday. Have dense breasts. Underneath the breast. Knot right there. Maybe a aniyah size. Smaller one on the outside. Tender to palpation.      Protocol Recommended Disposition:   See in Office Within 3 Days     Recommendation: scheduled office visit tomorrow with PCP     Next 5 appointments (look out 90 days)       Nov 19, 2024 8:20 AM  (Arrive by 8:00 AM)  Provider Visit with Miya Crump PA-C  [facility] ([facility] ) [address] [place] [id number] [phone]                  MS/fatigue/mood/chronic  Had to postpone Tysabri infusion due to concern for zoster outbreak on neck (rescheduled this Friday which would be 8 weeks between infusions) and exhaustion, body aches.  And mood are not doing well.  Feels that some of her office visits with neurologist are causing increased and unnecessary anxiety surrounding being on too many medications, conflicting medication instructions, and opportunities for alternative therapies because to separate does not seem to be controlling her symptoms well.  She has not had any changes on MRI which she is thankful for but she was to see overall felt better.  Uses modafinil but feels that the benefits wear off after a few hours.  Finds her self being tearful and tries to be positive but she is getting frustrated.  She was on duloxetine but noted  "fatigue and increased headaches so she is was transition to fluoxetine May 2023 which she tolerated well but stopped for an unknown reason.  She would be willing to restart this.    Left breast discomfort  Noted yesterday along the inferior lateral aspect of the left breast.  Was wearing a underwire bras and not sure if this is the cause.  Feels that there is a lump in this area.  Had a normal 3D mammogram on 10/25/2024.  Notable fibrocystic changes on mammography also has a similar nontender lump on the medial aspect of the left breast.  Does not think it has changed since onset.    Review of Systems  Constitutional, HEENT, cardiovascular, pulmonary, GI, , musculoskeletal, neuro, skin, endocrine and psych systems are negative, except as otherwise noted.      Objective    /70 (BP Location: Left arm, Patient Position: Chair, Cuff Size: Adult Large)   Pulse 82   Temp 97.8  F (36.6  C) (Tympanic)   Resp 14   Ht 1.651 m (5' 5\")   Wt 110.2 kg (243 lb)   LMP  (LMP Unknown)   SpO2 100%   BMI 40.44 kg/m    Body mass index is 40.44 kg/m .  Physical Exam   GENERAL: alert and no distress  EYES: Eyes grossly normal to inspection, PERRL and conjunctivae and sclerae normal  BREAST: Tender poorly defined lump on the left inferior lateral breast at approximately 4:00.  Tenderness also with palpation of the rib cage and the suspensory ligament of that area.  No nipple discharge and no palpable axillary masses or adenopathy  MS: no gross musculoskeletal defects noted, no edema  SKIN: no suspicious lesions or rashes  NEURO: Normal strength and tone, mentation intact and speech normal  PSYCH: mentation appears normal, affect normal/bright, tearful    No results found for any visits on 11/19/24.        Signed Electronically by: Miya Crump PA-C    "

## 2024-11-21 RX ORDER — SIMETHICONE 80 MG
80 TABLET,CHEWABLE ORAL EVERY 6 HOURS PRN
Qty: 60 TABLET | Refills: 1 | Status: SHIPPED | OUTPATIENT
Start: 2024-11-21

## 2024-11-22 PROCEDURE — 85041 AUTOMATED RBC COUNT: CPT | Performed by: PSYCHIATRY & NEUROLOGY

## 2024-11-22 PROCEDURE — 85014 HEMATOCRIT: CPT | Performed by: PSYCHIATRY & NEUROLOGY

## 2024-11-22 PROCEDURE — 84450 TRANSFERASE (AST) (SGOT): CPT | Performed by: PSYCHIATRY & NEUROLOGY

## 2024-11-22 PROCEDURE — 86359 T CELLS TOTAL COUNT: CPT | Performed by: PSYCHIATRY & NEUROLOGY

## 2024-11-22 PROCEDURE — 85004 AUTOMATED DIFF WBC COUNT: CPT | Performed by: PSYCHIATRY & NEUROLOGY

## 2024-11-22 PROCEDURE — 84460 ALANINE AMINO (ALT) (SGPT): CPT | Performed by: PSYCHIATRY & NEUROLOGY

## 2024-11-22 PROCEDURE — 86360 T CELL ABSOLUTE COUNT/RATIO: CPT | Performed by: PSYCHIATRY & NEUROLOGY

## 2024-12-11 ENCOUNTER — MYC REFILL (OUTPATIENT)
Dept: FAMILY MEDICINE | Facility: CLINIC | Age: 49
End: 2024-12-11
Payer: COMMERCIAL

## 2024-12-11 DIAGNOSIS — G35 MULTIPLE SCLEROSIS (H): ICD-10-CM

## 2024-12-11 DIAGNOSIS — J20.9 ACUTE BRONCHITIS WITH COEXISTING CONDITION REQUIRING PROPHYLACTIC TREATMENT: ICD-10-CM

## 2024-12-11 DIAGNOSIS — M79.605 PAIN OF LEFT LOWER EXTREMITY: ICD-10-CM

## 2024-12-11 DIAGNOSIS — J45.41 MODERATE PERSISTENT ASTHMA WITH ACUTE EXACERBATION: ICD-10-CM

## 2024-12-11 DIAGNOSIS — R40.0 HAS DAYTIME DROWSINESS: ICD-10-CM

## 2024-12-11 DIAGNOSIS — R53.83 OTHER FATIGUE: ICD-10-CM

## 2024-12-11 DIAGNOSIS — M62.838 MUSCLE SPASM: ICD-10-CM

## 2024-12-11 RX ORDER — IPRATROPIUM BROMIDE AND ALBUTEROL SULFATE 2.5; .5 MG/3ML; MG/3ML
1 SOLUTION RESPIRATORY (INHALATION) EVERY 6 HOURS PRN
Qty: 90 ML | Refills: 0 | Status: SHIPPED | OUTPATIENT
Start: 2024-12-11

## 2024-12-11 RX ORDER — MODAFINIL 100 MG/1
100 TABLET ORAL DAILY
Qty: 90 TABLET | Refills: 1 | Status: SHIPPED | OUTPATIENT
Start: 2024-12-11

## 2024-12-11 RX ORDER — BACLOFEN 20 MG/1
TABLET ORAL
Qty: 180 TABLET | Refills: 1 | OUTPATIENT
Start: 2024-12-11

## 2024-12-17 ENCOUNTER — VIRTUAL VISIT (OUTPATIENT)
Dept: OBGYN | Facility: CLINIC | Age: 49
End: 2024-12-17
Attending: PHYSICIAN ASSISTANT
Payer: COMMERCIAL

## 2024-12-17 DIAGNOSIS — N95.1 PERIMENOPAUSE: Primary | ICD-10-CM

## 2024-12-17 DIAGNOSIS — N95.1 VASOMOTOR SYMPTOMS DUE TO MENOPAUSE: ICD-10-CM

## 2024-12-17 RX ORDER — AMITRIPTYLINE HYDROCHLORIDE 10 MG/1
TABLET ORAL
COMMUNITY
Start: 2024-11-11

## 2024-12-17 RX ORDER — CALCIUM CARBONATE 500 MG/1
TABLET, CHEWABLE ORAL
COMMUNITY
Start: 2024-12-08

## 2024-12-17 RX ORDER — ESTRADIOL 0.03 MG/D
1 FILM, EXTENDED RELEASE TRANSDERMAL
Qty: 24 PATCH | Refills: 0 | Status: SHIPPED | OUTPATIENT
Start: 2024-12-19

## 2024-12-17 NOTE — PROGRESS NOTES
"Nataliya Aldrich 49 year old  No LMP recorded (lmp unknown). Patient has had a hysterectomy.     CC: Perimenopause  consult     She has been experiencing:  Hot flashes  , Night sweats  , Insomnia  , Mood changes  , and Breast tenderness          PMH   Past Medical History:   Diagnosis Date    Asthma     mild persistent - Dr Gee    Cerebral infarction (H) 2015    Fibroids     s/p hysterectomy    H/O gastric bypass     Hypertension     Migraine     followed by Devika    Obstructive sleep apnea     needs updated sleep study    Pre-diabetes     Relapsing remitting multiple sclerosis (H)     lesion in [place].  Facial tingling initial symptom.  F/B [facility]    Spondylosis of cervical region without myelopathy or radiculopathy      PSH   Past Surgical History:   Procedure Laterality Date    COLONOSCOPY      GASTRIC BYPASS      \"Trouble with B12 and D\"    HYSTERECTOMY, MONO  2013    cervix gone.  fibroids.  without BSO    TONSILLECTOMY         Hormone use none  Denies breast CA, clotting disorder/history, hypertension, smoking     Does have hx of migraine and takes daily meds, controlled well.    Mammogram current  Colon screening UTD  Pap smear ,   Lipid screening WNL     ASCVD risk: LOW 0.9% 10 yr risk    A/P:  .diag  Perimenopause  -Discussed physiology and resources for perimenopause/menopause and sent AVS  -STRAW + 10 stage  -2 (early menopause transition)  -Discussed MHT, that systemic estrogen therapy is the gold standard and FDA approved for treating vasomotor symptoms (hot flashes/night sweats) and osteoporosis while local estrogen therapy is FDA approved to treat genitourinary syndrome of menopause (GSM).  When using systemic estrogen MHT there is a rare increased risk of breast cancer, but considered protective for bone health, cardiovascular health, brain health, and decreasing all cause mortality.  The Menopause Society recommends starting MHT within 10 yrs of menopause and before age " 60 yr/old.    -Discussed recommendation for 1-2 month med chick in after MHT start and then annually to assess need and risks/bens.    50 minutes spent on the date of the encounter doing chart review, history and exam, documentation and further activities per the note      https://menopause.org/patient-education and https://www.stanton.nih.gov/health/menopause/what-menopause  Carla Navarro, DNP, APRN, CNM, FACNM

## 2024-12-17 NOTE — PATIENT INSTRUCTIONS
"Feliciano An,    I sent the prescription for estrogen patch to your pharmacy.  I'll ask the  to call to scheduled w follow up phone visit in about a month.  Nice to meet you today!  Carla Navarro, DNP, APRN, CNM, FACNM        Here are some good resources:  Recent research on estrogen and MS:  https://pmc.ncbi.nlm.nih.gov/articles/XJC30650908/    Menopause.org is the national organization with the best up to date info, check out the \"Menonotes\" for patient ed:  https://menopause.org/patient-education/menonotes    Supplements:    Vitamin D3, 2,000 international unit(s) every day  Magnesium oxide or magnesium glycinate 400-500 mg every night before bed (if it causes diarrhea switch to another form or another brand)   Fish oil / omega 3 2,000 - 2,400 mg / day     A book Hot and Bothered by Fozia Kunz  A podcast Sujatha Rich 'From PMS to Menopause: How to Hack Your Hormones'   The book The New Rules of Menopause; A [facility] Guide to Perimenopause and Beyond by  Dr Amelia Reyes    Many people ask about the book The New [place] Pause By Dr Shelley Mcintyre.  I think she does a nice job describing what happens to the body during menopause and menopause hormone therapy, but she also recommends many expensive tests and expensive supplements that are not part of the standard guidelines in menopause treatment  "

## 2024-12-19 ENCOUNTER — MYC REFILL (OUTPATIENT)
Dept: FAMILY MEDICINE | Facility: CLINIC | Age: 49
End: 2024-12-19
Payer: COMMERCIAL

## 2024-12-19 DIAGNOSIS — B00.9 HSV (HERPES SIMPLEX VIRUS) INFECTION: ICD-10-CM

## 2024-12-19 RX ORDER — VALACYCLOVIR HYDROCHLORIDE 500 MG/1
500 TABLET, FILM COATED ORAL DAILY
Qty: 90 TABLET | Refills: 2 | Status: SHIPPED | OUTPATIENT
Start: 2024-12-19

## 2025-01-02 ENCOUNTER — E-VISIT (OUTPATIENT)
Dept: FAMILY MEDICINE | Facility: CLINIC | Age: 50
End: 2025-01-02
Payer: COMMERCIAL

## 2025-01-02 DIAGNOSIS — B37.31 CANDIDAL VULVOVAGINITIS: Primary | ICD-10-CM

## 2025-01-02 RX ORDER — FLUCONAZOLE 150 MG/1
150 TABLET ORAL
Qty: 3 TABLET | Refills: 0 | Status: SHIPPED | OUTPATIENT
Start: 2025-01-02

## 2025-01-02 NOTE — PATIENT INSTRUCTIONS
Thank you for choosing us for your care. I have placed an order for a prescription so that you can start treatment. View your full visit summary for details by clicking on the link below. Your pharmacist will able to address any questions you may have about the medication.     If you re not feeling better within 2-3 days, please schedule an appointment.  You can schedule an appointment right here in [facility], or call [phone]  If the visit is for the same symptoms as your eVisit, we ll refund the cost of your eVisit if seen within seven days.

## 2025-01-08 DIAGNOSIS — R21 RASH: ICD-10-CM

## 2025-01-08 RX ORDER — KETOCONAZOLE 20 MG/G
CREAM TOPICAL DAILY
Qty: 30 G | Refills: 1 | Status: SHIPPED | OUTPATIENT
Start: 2025-01-08

## 2025-01-13 ENCOUNTER — E-VISIT (OUTPATIENT)
Dept: FAMILY MEDICINE | Facility: CLINIC | Age: 50
End: 2025-01-13
Payer: COMMERCIAL

## 2025-01-13 DIAGNOSIS — M79.18 CHRONIC MUSCULOSKELETAL PAIN DUE TO DISORDER OF NERVOUS SYSTEM: Primary | ICD-10-CM

## 2025-01-13 DIAGNOSIS — G35 MULTIPLE SCLEROSIS (H): ICD-10-CM

## 2025-01-13 DIAGNOSIS — M25.512 CHRONIC PAIN OF BOTH SHOULDERS: ICD-10-CM

## 2025-01-13 DIAGNOSIS — G98.8 CHRONIC MUSCULOSKELETAL PAIN DUE TO DISORDER OF NERVOUS SYSTEM: Primary | ICD-10-CM

## 2025-01-13 DIAGNOSIS — M62.838 MUSCLE SPASM: ICD-10-CM

## 2025-01-13 DIAGNOSIS — M54.2 CHRONIC NECK PAIN: ICD-10-CM

## 2025-01-13 DIAGNOSIS — G89.29 CHRONIC MUSCULOSKELETAL PAIN DUE TO DISORDER OF NERVOUS SYSTEM: Primary | ICD-10-CM

## 2025-01-13 DIAGNOSIS — M25.511 CHRONIC PAIN OF BOTH SHOULDERS: ICD-10-CM

## 2025-01-13 DIAGNOSIS — G89.29 CHRONIC PAIN OF BOTH SHOULDERS: ICD-10-CM

## 2025-01-13 DIAGNOSIS — G89.29 CHRONIC NECK PAIN: ICD-10-CM

## 2025-01-14 ENCOUNTER — E-VISIT (OUTPATIENT)
Dept: FAMILY MEDICINE | Facility: CLINIC | Age: 50
End: 2025-01-14
Payer: COMMERCIAL

## 2025-01-14 DIAGNOSIS — B35.0 TINEA OF SCALP: Primary | ICD-10-CM

## 2025-01-14 RX ORDER — TERBINAFINE HYDROCHLORIDE 250 MG/1
250 TABLET ORAL DAILY
Qty: 28 TABLET | Refills: 0 | Status: SHIPPED | OUTPATIENT
Start: 2025-01-14 | End: 2025-02-11

## 2025-01-21 ENCOUNTER — TRANSFERRED RECORDS (OUTPATIENT)
Dept: HEALTH INFORMATION MANAGEMENT | Facility: CLINIC | Age: 50
End: 2025-01-21
Payer: COMMERCIAL

## 2025-01-22 ENCOUNTER — OFFICE VISIT (OUTPATIENT)
Dept: ORTHOPEDICS | Facility: CLINIC | Age: 50
End: 2025-01-22
Payer: COMMERCIAL

## 2025-01-22 ENCOUNTER — ANCILLARY PROCEDURE (OUTPATIENT)
Dept: GENERAL RADIOLOGY | Facility: CLINIC | Age: 50
End: 2025-01-22
Attending: PHYSICIAN ASSISTANT
Payer: COMMERCIAL

## 2025-01-22 ENCOUNTER — E-VISIT (OUTPATIENT)
Dept: FAMILY MEDICINE | Facility: CLINIC | Age: 50
End: 2025-01-22
Payer: COMMERCIAL

## 2025-01-22 DIAGNOSIS — S39.92XA INJURY OF LOW BACK, INITIAL ENCOUNTER: Primary | ICD-10-CM

## 2025-01-22 DIAGNOSIS — M54.50 ACUTE MIDLINE LOW BACK PAIN, UNSPECIFIED WHETHER SCIATICA PRESENT: Primary | ICD-10-CM

## 2025-01-22 DIAGNOSIS — S39.92XA INJURY OF LOW BACK, INITIAL ENCOUNTER: ICD-10-CM

## 2025-01-22 DIAGNOSIS — M54.16 LUMBAR RADICULOPATHY: ICD-10-CM

## 2025-01-22 PROCEDURE — 72100 X-RAY EXAM L-S SPINE 2/3 VWS: CPT | Mod: TC | Performed by: RADIOLOGY

## 2025-01-22 PROCEDURE — 99207 PR NON-BILLABLE SERV PER CHARTING: CPT | Performed by: PHYSICIAN ASSISTANT

## 2025-01-22 PROCEDURE — 99203 OFFICE O/P NEW LOW 30 MIN: CPT | Performed by: PHYSICIAN ASSISTANT

## 2025-01-22 NOTE — LETTER
1/22/2025      Nataliya Aldrich  [address]  [place] [address]      Dear Colleague,    Thank you for referring your patient, Nataliya Aldrich, to the [facility] [place]. Please see a copy of my visit note below.    ASSESSMENT & PLAN  Nataliya Aldrich is seen today for her low back injury. We discussed that she does have symptoms of nerve irritation of her lower back that is complicated by her underlying history of MS. We discussed we will start with a referral to physical therapy for acute back pain from injury. We discussed using prednisone however she is currently being treated for an infection of her scalp and will hold off on steroids. We discussed over the counter treatments of tylenol, topical creams, heat/ice and tumeric. We discussed that if her symptoms worsen or do not improve to follow up with Spine clinic or her Neurology team. She was understanding of our plan and in agreement. All questions were answered.     Zahira Valdez PA-C  [facility]    -----  Chief Complaint   Patient presents with     Lower Back - Pain       SUBJECTIVE  Nataliya Aldrich is a/an 49 year old  who is seen as a Walk In for evaluation of lower back injury.  Onset was 1 week ago while getting out of bed. Pain is located lower middle back. Symptoms are worsened by bending,sitting for a long period.  She has tried ice, gabapentin, Advil and yoga exercises. Associated symptoms include nerve pain numbness, tingling, and Balance issues . She noted numbness in the left foot of the big toe that is above her baseline symptoms. She does note leaning over the shopping cart does help with her pain.    Denies saddle anesthesia, bowel/bladdery dysfunction, or progressive weakness    The patient is seen alone    Prior injury/Surgical history of affected joint: HX of MS; has neuropathy in the right foot  Social  History/Occupation: desk job    REVIEW OF SYSTEMS:  Pertinent positives/negative: As stated above in HPI    OBJECTIVE:  LMP  (LMP Unknown)    General: Alert and in no distress  Skin: no visable rashes  CV: Extremities appear well perfused   Resp: normal respiratory effort, no conversational dyspnea   Psych: normal mood, affect  MSK: THORACIC/LUMBAR SPINE    Inspection:  There is no erythema, open wounds, or drainage  There is no swelling or ecchymosis    Palpation:  Tenderness to palpation about the midline lumbar spine with right paraspinal tenderness      Strength:  Hip Flexion: 5/5  Hamstrings: 5/5  Quadriceps: 5/5  Plantar extension: 5/5  Dorsiflexion: 5/5    Sensation/Vascular:  Sensation is intact to light touch throughout the dermatomes ( she notes a difference but states this is baseline and notes no changes with sensory testing from her normal)  Dorsalis pedis pulse is palpable, foot is warm and well perfused    Special Tests  Reflexes: Intact and equal to contralateral extremity     RADIOLOGY:  Final results and radiologist's interpretation available in the [facility] health record.  Images below were personally reviewed and discussed with the patient in the office today.  My personal interpretation of the performed imaging: Lumbar x-rays demonstrated  no fractures. Minimal degenerative changes.           Again, thank you for allowing me to participate in the care of your patient.        Sincerely,        Zahira Valdez PA-C    Electronically signed

## 2025-01-22 NOTE — PATIENT INSTRUCTIONS
1. Injury of low back, initial encounter      Recommendations  - OTC Tylenol 1000 mg every 8 hours for pain.   -Topical creams such as Voltaren gel, Asperecreme, Biofreeze, china gel, Tiger Balm or blue emu.   - Apply ice first 72 hours post injury, then alternate heat and ice  -Physical therapy referral placed    Follow up with Spine clinic/Neurology if symptoms worsen or do not improve    -Call direct clinic number [[phone]] at any time with questions or concerns.    -Orthopedic Walk in Monday through Thursday 8 am to 6 pm, Friday 8 am to 5 pm, Saturday 8 am to 12 pm at [address] [facility] [address] [place].

## 2025-01-22 NOTE — PATIENT INSTRUCTIONS
Thank you for choosing us for your care. Based on the information provided, I think it best that you be evaluated in our walk in urgent care so you can be treated appropriately.    Orthopedic walk in [facility]   [address] [place] [address] [address]    Hours Monday through Thursday 8 am-6 pm  Friday 8 am-5 pm  Saturday 8 am- 12 pm    You will not be charged for this eVisit.

## 2025-01-22 NOTE — PROGRESS NOTES
ASSESSMENT & PLAN  Nataliya Aldrich is seen today for her low back injury. We discussed that she does have symptoms of nerve irritation of her lower back that is complicated by her underlying history of MS. We discussed we will start with a referral to physical therapy for acute back pain from injury. We discussed using prednisone however she is currently being treated for an infection of her scalp and will hold off on steroids. We discussed over the counter treatments of tylenol, topical creams, heat/ice and tumeric. We discussed that if her symptoms worsen or do not improve to follow up with Spine clinic or her Neurology team. She was understanding of our plan and in agreement. All questions were answered.     Zahira Valdez PA-C  [facility]    -----  Chief Complaint   Patient presents with    Lower Back - Pain       SUBJECTIVE  Nataliya Aldrich is a/an 49 year old  who is seen as a Walk In for evaluation of lower back injury.  Onset was 1 week ago while getting out of bed. Pain is located lower middle back. Symptoms are worsened by bending,sitting for a long period.  She has tried ice, gabapentin, Advil and yoga exercises. Associated symptoms include nerve pain numbness, tingling, and Balance issues . She noted numbness in the left foot of the big toe that is above her baseline symptoms. She does note leaning over the shopping cart does help with her pain.    Denies saddle anesthesia, bowel/bladdery dysfunction, or progressive weakness    The patient is seen alone    Prior injury/Surgical history of affected joint: HX of MS; has neuropathy in the right foot  Social History/Occupation: desk job    REVIEW OF SYSTEMS:  Pertinent positives/negative: As stated above in HPI    OBJECTIVE:  LMP  (LMP Unknown)    General: Alert and in no distress  Skin: no visable rashes  CV: Extremities appear well perfused   Resp: normal respiratory effort, no conversational dyspnea    Psych: normal mood, affect  MSK: THORACIC/LUMBAR SPINE    Inspection:  There is no erythema, open wounds, or drainage  There is no swelling or ecchymosis    Palpation:  Tenderness to palpation about the midline lumbar spine with right paraspinal tenderness      Strength:  Hip Flexion: 5/5  Hamstrings: 5/5  Quadriceps: 5/5  Plantar extension: 5/5  Dorsiflexion: 5/5    Sensation/Vascular:  Sensation is intact to light touch throughout the dermatomes ( she notes a difference but states this is baseline and notes no changes with sensory testing from her normal)  Dorsalis pedis pulse is palpable, foot is warm and well perfused    Special Tests  Reflexes: Intact and equal to contralateral extremity     RADIOLOGY:  Final results and radiologist's interpretation available in the [facility] health record.  Images below were personally reviewed and discussed with the patient in the office today.  My personal interpretation of the performed imaging: Lumbar x-rays demonstrated  no fractures. Minimal degenerative changes.

## 2025-01-24 ENCOUNTER — THERAPY VISIT (OUTPATIENT)
Dept: PHYSICAL THERAPY | Facility: CLINIC | Age: 50
End: 2025-01-24
Attending: PHYSICIAN ASSISTANT
Payer: COMMERCIAL

## 2025-01-24 DIAGNOSIS — S39.92XA INJURY OF LOW BACK, INITIAL ENCOUNTER: ICD-10-CM

## 2025-01-24 DIAGNOSIS — M54.16 LUMBAR RADICULOPATHY: ICD-10-CM

## 2025-01-24 PROCEDURE — 97110 THERAPEUTIC EXERCISES: CPT | Mod: GP | Performed by: PHYSICAL THERAPIST

## 2025-01-24 PROCEDURE — 97530 THERAPEUTIC ACTIVITIES: CPT | Mod: GP | Performed by: PHYSICAL THERAPIST

## 2025-01-24 PROCEDURE — 97161 PT EVAL LOW COMPLEX 20 MIN: CPT | Mod: GP | Performed by: PHYSICAL THERAPIST

## 2025-01-24 NOTE — PROGRESS NOTES
PHYSICAL THERAPY EVALUATION  Type of Visit: Evaluation        Fall Risk Screen:  Fall screen completed by: PT  Have you fallen 2 or more times in the past year?: No  Have you fallen and had an injury in the past year?: No  Is patient a fall risk?: No    Subjective         Presenting condition or subjective complaint: lower back pain  Date of onset: 01/15/25    Relevant medical history: Anemia; Asthma; Concussions; Depression; Fibromyalgia; Foot drop; High blood pressure; Menopause; Migraines or headaches; Multiple Sclerosis; Overweight; Progressive neurological deficits; Severe headaches; Sleep disorder like apnea; Stroke; Vision problems   Dates & types of surgery: gastric bypass  c sections and  hysterectomy    Prior diagnostic imaging/testing results: MRI; X-ray     Prior therapy history for the same diagnosis, illness or injury: Yes      Prior Level of Function  Transfers: Independent  Ambulation: Independent  ADL: Independent    Living Environment  Social support: With family members   Type of home: Apartment/condo   Stairs to enter the home: No       Ramp: No   Stairs inside the home: No       Help at home: None  Equipment owned: Straight Cane     Employment: Yes   Hobbies/Interests: [employer] choir reading walking    Patient goals for therapy: work out and walk with no pain bend with no pain sit with no pain    Pain assessment: Pain present  Location: R low back/buttocks /Ratin/10    Present symptoms: patient reports pain located in the upper R buttocks, radiates into R thigh .    Radiation:R buttocks  Type of pain is described as aching .   Associated symptoms include: R LE during driving (parasthesia)  Present since: 1/15/2025 with symptoms improving (improving/unchanging/worsening).  This condition is new (new/recurrent/chronic).  Symptoms commenced as a result of: reports she was sleeping and began having muscle spasms--tried to get out of bed, legs locked,  was lying on her left side caused her to fall, hitting her left shoulder and face, rolling and falling onto floor landing on her stomach.   Was able to get up on her own, and get an ice pack, got back into bed on her own.    Condition occurred in the following environment: home   Symptoms at onset: more lower leg pain initially, low back came on a few days later.  Constant symptoms:  Intermittent symptoms:    Cough/Sneeze/Strain:negative    (with consideration for bending, sitting/rising, standing, walking, lying, am, as the day progresses, pm, when still, on the move, other )  Symptoms are made worse with the following: sitting, bending, dressing   Symptoms are made better with the following: stretching, pilates, lying down    Sleep disturbance: no  Sleeping postures: sides, mainly L  Sleeping surface: soft (firm, soft, sag)       Objective   LUMBAR:    Posture:   Sitting: fair; Standing:fair; Lordosis:   Posture Correction: better  Relevant Lateral Shift: nil    Neurological:    Motor Deficit:  Myotomes L R   L1-2 (hip flexion) 5/5 5/5   L3 (knee extension) 5/5 5/5   L4 (ankle DF) 5/5 4+/5   L5 (g. toe ext) 5/5 4/5   S1 (ankle PF or knee flex) 5/5 4/5     Sensory Deficit and Reflexes: n/a    Dural Signs:   L R   Slump + (mild) +   SLR     Other:     AROM: (Major, Moderate, Minimal or Nil loss)  Movement Loss Sriram Mod Min Nil Pain   Flexion   x x P R LE   Extension  x   P R buttocks   Side Gliding R   x  pdm   Side Gliding L    x      Repeated movement testing:   (During: produces, abolishes, increases, decreases, no effect, centralizing, peripheralizing; After: better, worse, no better, no worse, no effect, centralized, peripheralized)    Pre-test Symptoms Standing:    Symptoms During Symptoms After ROM increased ROM decreased No Effect   FIS        Rep FIS        EIS        Rep EIS        Pre-test Symptoms Lying: tightness in low back, R LE tingling/N    Symptoms During Symptoms After ROM increased ROM decreased  No Effect   DEXTER        Rep DEXTER        EIL D B      Rep EIL D B X, improved  myotomes, incr ROM     If required Pre-test Symptoms:    Symptoms During Symptoms After ROM increased ROM decreased No Effect   SGIS - R        Rep SGIS - R        SGIS - L        Rep SGIS - L          Static Tests:   Other Tests:     Provisional Classification: derangement, below knee, asymmetrical;   Principle of Management (education/equipment/mechanical therapy/specific principle): rep EIL x 10 2 hrs: posture education       Assessment & Plan   CLINICAL IMPRESSIONS  Medical Diagnosis: Injury of low back, initial encounter  Lumbar radiculopathy    Treatment Diagnosis: decreased mobility, decreased function   Impression/Assessment: Patient is a 49 year old female with lumbar complaints.  The following significant findings have been identified: Pain, Decreased ROM/flexibility, Decreased strength, Impaired muscle performance, Decreased activity tolerance, and Impaired posture. These impairments interfere with their ability to perform self care tasks, work tasks, recreational activities, household chores, and community mobility as compared to previous level of function.     Clinical Decision Making (Complexity):  Clinical Presentation: Stable/Uncomplicated  Clinical Presentation Rationale: based on medical and personal factors listed in PT evaluation  Clinical Decision Making (Complexity): Low complexity    PLAN OF CARE  Treatment Interventions:  Interventions: Manual Therapy, Neuromuscular Re-education, Therapeutic Activity, Therapeutic Exercise    Long Term Goals            Frequency of Treatment: 1 x week  Duration of Treatment: 12 weeks    Recommended Referrals to Other Professionals:   Education Assessment:        Risks and benefits of evaluation/treatment have been explained.   Patient/Family/caregiver agrees with Plan of Care.     Evaluation Time:             Signing Clinician: Cris Bae, PT        [facility]  Services                                                                                   OUTPATIENT PHYSICAL THERAPY      PLAN OF TREATMENT FOR OUTPATIENT REHABILITATION   Patient's Last Name, First Name, Nataliya Santiago YOB: 1975   Provider's Name   [facility]   Medical Record No.  [id number]     Onset Date: 01/15/25  Start of Care Date: 01/24/25     Medical Diagnosis:  Injury of low back, initial encounter  Lumbar radiculopathy      PT Treatment Diagnosis:  decreased mobility, decreased function Plan of Treatment  Frequency/Duration: 1 x week/ 12 weeks    Certification date from 01/24/25 to 04/18/25         See note for plan of treatment details and functional goals     Cris Bae, PT                         I CERTIFY THE NEED FOR THESE SERVICES FURNISHED UNDER        THIS PLAN OF TREATMENT AND WHILE UNDER MY CARE     (Physician attestation of this document indicates review and certification of the therapy plan).              Referring Provider:  Zahira Valdez    Initial Assessment  See Epic Evaluation- Start of Care Date: 01/24/25

## 2025-01-26 NOTE — LETTER
[facility]  [address] [facility] [place] [address]                                                                                                       [phone]    July 25, 2018    Josefina ASAEL Hastings Aldrich  [address]  [facility] [address]      To Whom it May Concern:    Josefina appears to have shingles, advise reurn to work only after all lesions have scabbed over.  Please contact me with questions or concerns.      Sincerely,        Carlyle Basilio M.D.   English

## 2025-01-28 ENCOUNTER — TRANSFERRED RECORDS (OUTPATIENT)
Dept: HEALTH INFORMATION MANAGEMENT | Facility: CLINIC | Age: 50
End: 2025-01-28

## 2025-01-28 ENCOUNTER — E-VISIT (OUTPATIENT)
Dept: FAMILY MEDICINE | Facility: CLINIC | Age: 50
End: 2025-01-28
Payer: COMMERCIAL

## 2025-01-28 ENCOUNTER — MEDICAL CORRESPONDENCE (OUTPATIENT)
Dept: HEALTH INFORMATION MANAGEMENT | Facility: CLINIC | Age: 50
End: 2025-01-28

## 2025-01-28 DIAGNOSIS — E66.01 MORBID OBESITY (H): Primary | ICD-10-CM

## 2025-01-28 PROCEDURE — 99207 PR NON-BILLABLE SERV PER CHARTING: CPT | Performed by: PHYSICIAN ASSISTANT

## 2025-01-29 ENCOUNTER — OFFICE VISIT (OUTPATIENT)
Dept: FAMILY MEDICINE | Facility: CLINIC | Age: 50
End: 2025-01-29
Payer: COMMERCIAL

## 2025-01-29 ENCOUNTER — TELEPHONE (OUTPATIENT)
Dept: FAMILY MEDICINE | Facility: CLINIC | Age: 50
End: 2025-01-29

## 2025-01-29 VITALS
DIASTOLIC BLOOD PRESSURE: 64 MMHG | RESPIRATION RATE: 16 BRPM | WEIGHT: 241.1 LBS | HEART RATE: 89 BPM | TEMPERATURE: 97.9 F | BODY MASS INDEX: 40.17 KG/M2 | OXYGEN SATURATION: 99 % | SYSTOLIC BLOOD PRESSURE: 110 MMHG | HEIGHT: 65 IN

## 2025-01-29 DIAGNOSIS — R73.01 IMPAIRED FASTING GLUCOSE: ICD-10-CM

## 2025-01-29 DIAGNOSIS — N95.1 VASOMOTOR SYMPTOMS DUE TO MENOPAUSE: ICD-10-CM

## 2025-01-29 DIAGNOSIS — M54.16 LUMBAR RADICULOPATHY: ICD-10-CM

## 2025-01-29 DIAGNOSIS — G35 MULTIPLE SCLEROSIS (H): Primary | ICD-10-CM

## 2025-01-29 DIAGNOSIS — E66.01 MORBID OBESITY (H): Primary | ICD-10-CM

## 2025-01-29 DIAGNOSIS — G35 MULTIPLE SCLEROSIS (H): ICD-10-CM

## 2025-01-29 DIAGNOSIS — Z98.84 S/P GASTRIC BYPASS: ICD-10-CM

## 2025-01-29 LAB
ALBUMIN SERPL BCG-MCNC: 4.3 G/DL (ref 3.5–5.2)
ALP SERPL-CCNC: 72 U/L (ref 40–150)
ALT SERPL W P-5'-P-CCNC: 23 U/L (ref 0–50)
ANION GAP SERPL CALCULATED.3IONS-SCNC: 9 MMOL/L (ref 7–15)
AST SERPL W P-5'-P-CCNC: 31 U/L (ref 0–45)
BASOPHILS # BLD AUTO: 0.1 10E3/UL (ref 0–0.2)
BASOPHILS NFR BLD AUTO: 1 %
BILIRUB SERPL-MCNC: 0.3 MG/DL
BUN SERPL-MCNC: 14.6 MG/DL (ref 6–20)
CALCIUM SERPL-MCNC: 10.2 MG/DL (ref 8.8–10.4)
CHLORIDE SERPL-SCNC: 106 MMOL/L (ref 98–107)
CREAT SERPL-MCNC: 1.02 MG/DL (ref 0.51–0.95)
EGFRCR SERPLBLD CKD-EPI 2021: 68 ML/MIN/1.73M2
EOSINOPHIL # BLD AUTO: 0.2 10E3/UL (ref 0–0.7)
EOSINOPHIL NFR BLD AUTO: 2 %
ERYTHROCYTE [DISTWIDTH] IN BLOOD BY AUTOMATED COUNT: 14.1 % (ref 10–15)
EST. AVERAGE GLUCOSE BLD GHB EST-MCNC: 114 MG/DL
GLUCOSE SERPL-MCNC: 82 MG/DL (ref 70–99)
HBA1C MFR BLD: 5.6 % (ref 0–5.6)
HCO3 SERPL-SCNC: 21 MMOL/L (ref 22–29)
HCT VFR BLD AUTO: 39 % (ref 35–47)
HGB BLD-MCNC: 12.4 G/DL (ref 11.7–15.7)
IMM GRANULOCYTES # BLD: 0 10E3/UL
IMM GRANULOCYTES NFR BLD: 1 %
LYMPHOCYTES # BLD AUTO: 4.1 10E3/UL (ref 0.8–5.3)
LYMPHOCYTES NFR BLD AUTO: 47 %
MCH RBC QN AUTO: 29.8 PG (ref 26.5–33)
MCHC RBC AUTO-ENTMCNC: 31.8 G/DL (ref 31.5–36.5)
MCV RBC AUTO: 94 FL (ref 78–100)
MONOCYTES # BLD AUTO: 0.6 10E3/UL (ref 0–1.3)
MONOCYTES NFR BLD AUTO: 7 %
NEUTROPHILS # BLD AUTO: 3.7 10E3/UL (ref 1.6–8.3)
NEUTROPHILS NFR BLD AUTO: 43 %
NRBC # BLD AUTO: 0 10E3/UL
NRBC BLD AUTO-RTO: 1 /100
PLATELET # BLD AUTO: 309 10E3/UL (ref 150–450)
POTASSIUM SERPL-SCNC: 4.4 MMOL/L (ref 3.4–5.3)
PROT SERPL-MCNC: 7.5 G/DL (ref 6.4–8.3)
RBC # BLD AUTO: 4.16 10E6/UL (ref 3.8–5.2)
SODIUM SERPL-SCNC: 136 MMOL/L (ref 135–145)
T3FREE SERPL-MCNC: 2.9 PG/ML (ref 2–4.4)
T4 FREE SERPL-MCNC: 1.04 NG/DL (ref 0.9–1.7)
TSH SERPL DL<=0.005 MIU/L-ACNC: 1.94 UIU/ML (ref 0.3–4.2)
WBC # BLD AUTO: 8.7 10E3/UL (ref 4–11)

## 2025-01-29 PROCEDURE — G2211 COMPLEX E/M VISIT ADD ON: HCPCS | Performed by: PHYSICIAN ASSISTANT

## 2025-01-29 PROCEDURE — 83036 HEMOGLOBIN GLYCOSYLATED A1C: CPT | Performed by: PHYSICIAN ASSISTANT

## 2025-01-29 PROCEDURE — 99214 OFFICE O/P EST MOD 30 MIN: CPT | Mod: 59 | Performed by: PHYSICIAN ASSISTANT

## 2025-01-29 PROCEDURE — 96372 THER/PROPH/DIAG INJ SC/IM: CPT | Performed by: PHYSICIAN ASSISTANT

## 2025-01-29 PROCEDURE — 84443 ASSAY THYROID STIM HORMONE: CPT | Performed by: PHYSICIAN ASSISTANT

## 2025-01-29 PROCEDURE — 84481 FREE ASSAY (FT-3): CPT | Performed by: PHYSICIAN ASSISTANT

## 2025-01-29 PROCEDURE — 80053 COMPREHEN METABOLIC PANEL: CPT | Performed by: PHYSICIAN ASSISTANT

## 2025-01-29 PROCEDURE — 85025 COMPLETE CBC W/AUTO DIFF WBC: CPT | Performed by: PHYSICIAN ASSISTANT

## 2025-01-29 PROCEDURE — 84439 ASSAY OF FREE THYROXINE: CPT | Performed by: PHYSICIAN ASSISTANT

## 2025-01-29 PROCEDURE — 36415 COLL VENOUS BLD VENIPUNCTURE: CPT | Performed by: PHYSICIAN ASSISTANT

## 2025-01-29 RX ORDER — KETOROLAC TROMETHAMINE 30 MG/ML
30 INJECTION, SOLUTION INTRAMUSCULAR; INTRAVENOUS ONCE
Status: COMPLETED | OUTPATIENT
Start: 2025-01-29 | End: 2025-01-29

## 2025-01-29 RX ADMIN — KETOROLAC TROMETHAMINE 30 MG: 30 INJECTION, SOLUTION INTRAMUSCULAR; INTRAVENOUS at 09:51

## 2025-01-29 NOTE — PATIENT INSTRUCTIONS
DAILY FIBER THERAPY MIX:     1/2 cup Konsyl (psyllium fiber)  from [employer] , 1 cup of oat bran, 1 cup of wheat bran and 1 cup of flax seed meal - ground - = mix it together in a container or zip-loc bag and take 1 teaspoon in 1 cup of warm water daily,  follow with 1 -8 oz of water.       For some reason the Konsyl brand of psyllium fiber doesn't tend to give people as much gas/bloating as other brands do.  The warm liquid is to help soften the brans , so one doesn't feel like they are drinking saw dust in water.

## 2025-01-29 NOTE — PROGRESS NOTES
Assessment & Plan     Morbid obesity (H)  ***  - tirzepatide-Weight Management (ZEPBOUND) 2.5 MG/0.5ML prefilled pen  Dispense: 2 mL; Refill: 0  - tirzepatide-Weight Management (ZEPBOUND) 5 MG/0.5ML prefilled pen  Dispense: 2 mL; Refill: 0  - tirzepatide-Weight Management (ZEPBOUND) 7.5 MG/0.5ML prefilled pen  Dispense: 2 mL; Refill: 1  - Comprehensive metabolic panel (BMP + Alb, Alk Phos, ALT, AST, Total. Bili, TP)    S/P gastric bypass 2002  ***  - tirzepatide-Weight Management (ZEPBOUND) 2.5 MG/0.5ML prefilled pen  Dispense: 2 mL; Refill: 0  - tirzepatide-Weight Management (ZEPBOUND) 5 MG/0.5ML prefilled pen  Dispense: 2 mL; Refill: 0  - tirzepatide-Weight Management (ZEPBOUND) 7.5 MG/0.5ML prefilled pen  Dispense: 2 mL; Refill: 1    Multiple sclerosis (H) - Dr. Chong - on Tysabri infusions  ***    Vasomotor symptoms due to menopause  ***  - TSH  - T4, free  - T3, Free    Impaired fasting glucose  ***  - Hemoglobin A1c    Lumbar radiculopathy  ***  - ketorolac (TORADOL) injection 30 mg        Return in about 3 months (around 4/29/2025) for Medication recheck.      Miya Crump MBA, MS, PA-C  [facility] is a 49 year old, presenting for the following health issues:  Recheck Medication (Weight )        1/29/2025     8:50 AM   Additional Questions   Roomed by Nelida MINERVA   Accompanied by Self     History of Present Illness       Reason for visit:  Weight management  Symptom onset:  More than a month  Symptoms include:  Ms obesity menopause  Symptom intensity:  Moderate  Symptom progression:  Staying the same  Had these symptoms before:  No  What makes it worse:  Bending  What makes it better:  Laying down stretches   She is taking medications regularly.     Weight  The patient has experienced fluctuations in weight, and reported being up to 275 pounds in October 2022 before starting to work with a nutritionist. The patient has since reduced weight to between 235  "and 240 pounds. Despite efforts to manage weight through diet and exercise, the patient reported feeling \"stuck.\" The patient had gastric surgery in 2002, which did not yield optimal weight loss results due to pregnancy within a year post-surgery.      Wt Readings from Last 30 Encounters:   01/29/25 109.4 kg (241 lb 1.6 oz)   11/29/24 110.2 kg (243 lb)   11/19/24 110.2 kg (243 lb)   11/04/24 111.1 kg (245 lb)   10/08/24 111.4 kg (245 lb 9.6 oz)   09/28/24 110.5 kg (243 lb 9.7 oz)   09/19/24 110.2 kg (243 lb)   06/20/24 113.8 kg (250 lb 14.4 oz)   06/06/24 114.8 kg (253 lb)   04/18/24 114.8 kg (253 lb)   04/15/24 114.8 kg (253 lb)   04/09/24 113.4 kg (250 lb)   02/26/24 113.4 kg (250 lb)   02/15/24 113.7 kg (250 lb 11.2 oz)   01/17/24 114.8 kg (253 lb)   01/05/24 115.9 kg (255 lb 8 oz)   09/25/23 120.2 kg (265 lb)   09/05/23 118.8 kg (262 lb)   07/28/23 118.8 kg (262 lb)   07/17/23 118.8 kg (262 lb)   06/22/23 120.7 kg (266 lb)   04/11/23 122 kg (268 lb 14.4 oz)   03/30/23 121.6 kg (268 lb)   03/23/23 122.4 kg (269 lb 12.8 oz)   02/28/23 123.8 kg (273 lb)   01/26/23 123.9 kg (273 lb 1.6 oz)   11/23/22 122.9 kg (271 lb)   10/18/22 124.8 kg (275 lb 3.2 oz)   10/12/22 123.4 kg (272 lb)   10/05/22 121.6 kg (268 lb)       Menopause/hot flashes  The patient is experiencing hot flashes, initially at night and now also during the day, since starting 0.025 mg estradiol patches in December. The patient reported better sleep but increased daytime hot flashes. Concerns about menopause, possible interactions with multiple sclerosis (MS), and the impact on symptoms were expressed. The patient has a follow-up appointment scheduled in the first week of February.    MS  Saw Dr. Chong yesterday and labs showed some concerning results.  1/2025 cervical and [facility] MR[facility] done at [facility] were stable but neurophthalmology evaluation showed some decline in neuro-optic tests.  Wants to change tysabri infusions back to [id number] (currently q6 " "weeks)      Review of Systems  Constitutional, HEENT, cardiovascular, pulmonary, GI, , musculoskeletal, neuro, skin, endocrine and psych systems are negative, except as otherwise noted.      Objective    /64   Pulse 89   Temp 97.9  F (36.6  C) (Tympanic)   Resp 16   Ht 1.651 m (5' 5\")   Wt 109.4 kg (241 lb 1.6 oz)   LMP  (LMP Unknown)   SpO2 99%   BMI 40.12 kg/m    Body mass index is 40.12 kg/m .  Physical Exam   GENERAL: alert and no distress  EYES: Eyes grossly normal to inspection, PERRL and conjunctivae and sclerae normal  RESP: lungs clear to auscultation - no rales, rhonchi or wheezes  CV: regular rate and rhythm, normal S1 S2, no S3 or S4, no murmur, click or rub, no peripheral edema  MS: no gross musculoskeletal defects noted, no edema  SKIN: no suspicious lesions or rashes  NEURO: Normal strength and tone, mentation intact and speech normal  PSYCH: mentation appears normal, affect normal/bright    No results found for any visits on 01/29/25.        Signed Electronically by: Miya Crump PA-C  {Email feedback regarding this note to [email or web address]   :[id number]}  " Bilirubin Total 0.3 <=1.2 mg/dL   Hemoglobin A1c     Status: Normal   Result Value Ref Range    Estimated Average Glucose 114 <117 mg/dL    Hemoglobin A1C 5.6 0.0 - 5.6 %           Signed Electronically by: Miya Crump PA-C

## 2025-01-29 NOTE — LETTER
Provider name: Miya Crump MBA, MS, [facility] [address] [address]  Phone: [phone]  Fax [phone]    2/3/2025    Patients Name: Nataliya Aldrich  YOB: 1975  Payor: Payor: [facility] / Plan: [facility] PMAP / Product Type: HMO /    ID: [id number]    Reason for Request: Prior Authorization Drug    Requested Medication: Wegovy (semaglutide)  Dosage: 0.25 mg, 0.5 mg, 1.0 mg (doses will be tapered upward monthly) Quantity Requested: 2 mL(per month)   Diagnosis: Morbid Obesity (E66.01)  Formulary Medications tried and when: (be specific and include detail (contraindications, allergies etc): Patient has successfully lost 30 lbs with diet/exercise efforts since 2022.  Her progress has plateaued and further weight loss is needed due to it impacting her chronic pain, multiple sclerosis, and asthma.    Other Pertinant History: Given her complex medical history and current medications, phentermine is not a suitable option for the following reasons:    Migraine Headaches: Phentermine's stimulant properties can exacerbate migraine headaches, which would likely worsen her existing condition and compromise her quality of life.    Anxiety: Stimulants like phentermine can increase anxiety symptoms, posing a significant risk considering her current diagnosis and treatment with hydroxyzine, which is used to manage anxiety symptoms.    Multiple Sclerosis and Severe Asthma: Phentermine can cause blood pressure elevation and increased heart rate, which could negatively impact her multiple sclerosis and asthma management.    Drug Interactions: The risk of potential interactions between phentermine and her current medications, particularly modafinil and topamax, could lead to adverse effects and complicate her treatment regimen.    Wegovy, a GLP-1 receptor agonist, offers a safer alternative for weight management in this patient, as it does not carry the  same risks associated with stimulants. Its effectiveness in weight reduction and its favorable profile for patients with multiple comorbidities make it the most appropriate choice for Nataliya Aldrich.    Please contact my office with any questions or concerns,    Miya Crump MBA, MS, PA-C  [facility]

## 2025-01-30 ENCOUNTER — TELEPHONE (OUTPATIENT)
Dept: FAMILY MEDICINE | Facility: CLINIC | Age: 50
End: 2025-01-30
Payer: COMMERCIAL

## 2025-01-30 NOTE — TELEPHONE ENCOUNTER
Test Results    Contacts       Contact Date/Time Type Contact Phone/Fax    01/30/2025 12:59 PM CST Phone (Incoming) Nataliya Miguel (Self) [phone] (M)            Who ordered the test:  Dr. Crump    Type of test: Lab    Date of test:  1/29/2025    Where was the test performed:  [facility]    What are your questions/concerns?:  Patient would like to know about results.    Could we send this information to you in [facility] or would you prefer to receive a phone call?:   Patient would like to be contacted via [facility]

## 2025-01-31 NOTE — RESULT ENCOUNTER NOTE
Nataliya  I have reviewed your recent test results:    -Liver and gallbladder tests are normal (ALT,AST, Alk phos, bilirubin), kidney function is stable (Cr, GFR) - see below, sodium is normal, potassium is normal, calcium is normal, glucose is normal.  -A1C (diabetic test) is normal and indicates that your blood sugar has been in a normal range the last 3 months.  -TSH (thyroid stimulating hormone), T4, and T3 levels are normal which indicates normal thyroid function.    Regarding concerns for kidney function:   Your recent test showed a very mild decrease in your creatinine levels (from 0.95 to 1.02) and a slight decrease in your GFR (from 74 to 68). It's important to understand that these fluctuations are quite common and can be influenced by various factors, many of which are temporary and not a cause for concern.    Factors that can affect kidney function results include:    Hydration Levels: Dehydration or even slight changes in how much water you drink can impact your kidney function tests.    Dietary Changes: High-protein diets or recent changes in your diet can influence creatinine levels.    Medications: Some medications can temporarily affect kidney function, which is why it is a good idea to monitor kidney function with any new medication addition (and another reason we wanted to get a baseline before starting tirzepatide)    Physical Activity: Intense exercise can lead to temporary changes in kidney function markers.    Lab Variability: Slight differences in test results can sometimes be due to laboratory variations.    Overall, your kidney function has been stable since 2018, which is a very positive sign. We will continue to monitor your kidney health closely, and there is no immediate need for concern based on these recent results. Maintaining a balanced diet, staying hydrated, and following any medical advice we provide will help keep your kidneys healthy.    For additional lab test information,  www.testing.com is an excellent reference.     If you have any questions please do not hesitate to contact our office via phone ([phone]) or MyChart.    Healthy regards,     Miya Crump MBA, MS, PA-C  [facility]

## 2025-02-01 NOTE — TELEPHONE ENCOUNTER
PA Initiation    Medication: ZEPBOUND 2.5 MG/0.5ML SC SOAJ  Insurance Company: [employer] - Phone [phone] Fax [phone]  Pharmacy Filling the Rx: [facility]  [place] - [place], [place] - [address] [place] [address]  Filling Pharmacy Phone: [phone]  Filling Pharmacy Fax:    Start Date: 2/1/2025

## 2025-02-03 ENCOUNTER — HOSPITAL ENCOUNTER (EMERGENCY)
Facility: CLINIC | Age: 50
Discharge: HOME | End: 2025-02-03
Attending: EMERGENCY MEDICINE | Admitting: EMERGENCY MEDICINE
Payer: COMMERCIAL

## 2025-02-03 ENCOUNTER — NURSE TRIAGE (OUTPATIENT)
Dept: NURSING | Facility: CLINIC | Age: 50
End: 2025-02-03
Payer: COMMERCIAL

## 2025-02-03 ENCOUNTER — APPOINTMENT (OUTPATIENT)
Dept: CT IMAGING | Facility: CLINIC | Age: 50
End: 2025-02-03
Attending: EMERGENCY MEDICINE
Payer: COMMERCIAL

## 2025-02-03 VITALS
BODY MASS INDEX: 39.61 KG/M2 | HEART RATE: 114 BPM | RESPIRATION RATE: 18 BRPM | WEIGHT: 238 LBS | DIASTOLIC BLOOD PRESSURE: 81 MMHG | TEMPERATURE: 97.8 F | OXYGEN SATURATION: 100 % | SYSTOLIC BLOOD PRESSURE: 126 MMHG

## 2025-02-03 DIAGNOSIS — R51.9 ACUTE NONINTRACTABLE HEADACHE, UNSPECIFIED HEADACHE TYPE: ICD-10-CM

## 2025-02-03 LAB
ALBUMIN SERPL BCG-MCNC: 4.2 G/DL (ref 3.5–5.2)
ALP SERPL-CCNC: 75 U/L (ref 40–150)
ALT SERPL W P-5'-P-CCNC: 20 U/L (ref 0–50)
ANION GAP SERPL CALCULATED.3IONS-SCNC: 12 MMOL/L (ref 7–15)
AST SERPL W P-5'-P-CCNC: 30 U/L (ref 0–45)
ATRIAL RATE - MUSE: 71 BPM
BASOPHILS # BLD AUTO: 0.1 10E3/UL (ref 0–0.2)
BASOPHILS NFR BLD AUTO: 1 %
BILIRUB SERPL-MCNC: 0.2 MG/DL
BUN SERPL-MCNC: 15.3 MG/DL (ref 6–20)
CALCIUM SERPL-MCNC: 8.8 MG/DL (ref 8.8–10.4)
CHLORIDE SERPL-SCNC: 109 MMOL/L (ref 98–107)
CREAT SERPL-MCNC: 0.97 MG/DL (ref 0.51–0.95)
DIASTOLIC BLOOD PRESSURE - MUSE: NORMAL MMHG
EGFRCR SERPLBLD CKD-EPI 2021: 72 ML/MIN/1.73M2
EOSINOPHIL # BLD AUTO: 0.2 10E3/UL (ref 0–0.7)
EOSINOPHIL NFR BLD AUTO: 2 %
ERYTHROCYTE [DISTWIDTH] IN BLOOD BY AUTOMATED COUNT: 14 % (ref 10–15)
GLUCOSE SERPL-MCNC: 105 MG/DL (ref 70–99)
HCO3 SERPL-SCNC: 19 MMOL/L (ref 22–29)
HCT VFR BLD AUTO: 36.6 % (ref 35–47)
HGB BLD-MCNC: 11.8 G/DL (ref 11.7–15.7)
HOLD SPECIMEN: NORMAL
IMM GRANULOCYTES # BLD: 0 10E3/UL
IMM GRANULOCYTES NFR BLD: 0 %
INR PPP: 1.02 (ref 0.85–1.15)
INTERPRETATION ECG - MUSE: NORMAL
LYMPHOCYTES # BLD AUTO: 4.7 10E3/UL (ref 0.8–5.3)
LYMPHOCYTES NFR BLD AUTO: 61 %
MCH RBC QN AUTO: 29.9 PG (ref 26.5–33)
MCHC RBC AUTO-ENTMCNC: 32.2 G/DL (ref 31.5–36.5)
MCV RBC AUTO: 93 FL (ref 78–100)
MONOCYTES # BLD AUTO: 0.6 10E3/UL (ref 0–1.3)
MONOCYTES NFR BLD AUTO: 8 %
NEUTROPHILS # BLD AUTO: 2.1 10E3/UL (ref 1.6–8.3)
NEUTROPHILS NFR BLD AUTO: 28 %
NRBC # BLD AUTO: 0 10E3/UL
NRBC BLD AUTO-RTO: 0 /100
P AXIS - MUSE: 53 DEGREES
PLATELET # BLD AUTO: 295 10E3/UL (ref 150–450)
POTASSIUM SERPL-SCNC: 3.9 MMOL/L (ref 3.4–5.3)
PR INTERVAL - MUSE: 154 MS
PROT SERPL-MCNC: 6.7 G/DL (ref 6.4–8.3)
QRS DURATION - MUSE: 80 MS
QT - MUSE: 374 MS
QTC - MUSE: 406 MS
R AXIS - MUSE: 20 DEGREES
RBC # BLD AUTO: 3.94 10E6/UL (ref 3.8–5.2)
SODIUM SERPL-SCNC: 140 MMOL/L (ref 135–145)
SYSTOLIC BLOOD PRESSURE - MUSE: NORMAL MMHG
T AXIS - MUSE: 9 DEGREES
TROPONIN T SERPL HS-MCNC: <6 NG/L
VENTRICULAR RATE- MUSE: 71 BPM
WBC # BLD AUTO: 7.7 10E3/UL (ref 4–11)

## 2025-02-03 PROCEDURE — 85610 PROTHROMBIN TIME: CPT | Performed by: EMERGENCY MEDICINE

## 2025-02-03 PROCEDURE — 85025 COMPLETE CBC W/AUTO DIFF WBC: CPT | Performed by: EMERGENCY MEDICINE

## 2025-02-03 PROCEDURE — 96374 THER/PROPH/DIAG INJ IV PUSH: CPT

## 2025-02-03 PROCEDURE — 93005 ELECTROCARDIOGRAM TRACING: CPT

## 2025-02-03 PROCEDURE — 96361 HYDRATE IV INFUSION ADD-ON: CPT

## 2025-02-03 PROCEDURE — 70450 CT HEAD/BRAIN W/O DYE: CPT

## 2025-02-03 PROCEDURE — 99285 EMERGENCY DEPT VISIT HI MDM: CPT | Mod: 25

## 2025-02-03 PROCEDURE — 250N000011 HC RX IP 250 OP 636: Performed by: EMERGENCY MEDICINE

## 2025-02-03 PROCEDURE — 258N000003 HC RX IP 258 OP 636: Performed by: EMERGENCY MEDICINE

## 2025-02-03 PROCEDURE — 84484 ASSAY OF TROPONIN QUANT: CPT | Performed by: EMERGENCY MEDICINE

## 2025-02-03 PROCEDURE — 36415 COLL VENOUS BLD VENIPUNCTURE: CPT | Performed by: EMERGENCY MEDICINE

## 2025-02-03 PROCEDURE — 80053 COMPREHEN METABOLIC PANEL: CPT | Performed by: EMERGENCY MEDICINE

## 2025-02-03 PROCEDURE — 96375 TX/PRO/DX INJ NEW DRUG ADDON: CPT

## 2025-02-03 RX ORDER — DIPHENHYDRAMINE HYDROCHLORIDE 50 MG/ML
25 INJECTION INTRAMUSCULAR; INTRAVENOUS ONCE
Status: COMPLETED | OUTPATIENT
Start: 2025-02-03 | End: 2025-02-03

## 2025-02-03 RX ORDER — DEXAMETHASONE SODIUM PHOSPHATE 10 MG/ML
10 INJECTION, SOLUTION INTRAMUSCULAR; INTRAVENOUS ONCE
Status: COMPLETED | OUTPATIENT
Start: 2025-02-03 | End: 2025-02-03

## 2025-02-03 RX ORDER — METOCLOPRAMIDE HYDROCHLORIDE 5 MG/ML
10 INJECTION INTRAMUSCULAR; INTRAVENOUS ONCE
Status: COMPLETED | OUTPATIENT
Start: 2025-02-03 | End: 2025-02-03

## 2025-02-03 RX ADMIN — METOCLOPRAMIDE 10 MG: 5 INJECTION, SOLUTION INTRAMUSCULAR; INTRAVENOUS at 02:23

## 2025-02-03 RX ADMIN — DEXAMETHASONE SODIUM PHOSPHATE 10 MG: 10 INJECTION, SOLUTION INTRAMUSCULAR; INTRAVENOUS at 02:23

## 2025-02-03 RX ADMIN — DIPHENHYDRAMINE HYDROCHLORIDE 25 MG: 50 INJECTION, SOLUTION INTRAMUSCULAR; INTRAVENOUS at 02:22

## 2025-02-03 RX ADMIN — SODIUM CHLORIDE 1000 ML: 9 INJECTION, SOLUTION INTRAVENOUS at 02:20

## 2025-02-03 ASSESSMENT — ACTIVITIES OF DAILY LIVING (ADL)
ADLS_ACUITY_SCORE: 42

## 2025-02-03 NOTE — ED TRIAGE NOTES
Pt to ER with c/o CP pt with HA earlier went to [facility] and developed CP left sided and mid sternal, pt does have hx of CVA in the past

## 2025-02-03 NOTE — LETTER
Nataliya Aldrich was seen and treated in our emergency department on 2/3/2025.  Please excuse her from work 2/3/2025.

## 2025-02-03 NOTE — TELEPHONE ENCOUNTER
PRIOR AUTHORIZATION DENIED    Medication: ZEPBOUND 2.5 MG/0.5ML SC SOAJ  Insurance Company: [employer] - Phone [phone] Fax [phone]  Denial Date: 2/3/2025  Denial Reason(s): Needs to try/fail Phentermine      Appeal Information:   Patient Notified: No
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
## 2025-02-03 NOTE — TELEPHONE ENCOUNTER
"Pt reports that she started having headaches yesterday, then woke up with a headache this morning so she took ibuprofen 200 mg. She never takes NSAIDs except for today. The headache subsided. She went to [employer], then when she returned home, she saw tiny purple and red dots above her left eyelid, and purplish bruising under her right eye.  Has on and off right sided headache, present at the time of call. Pain subsided as she took her topiramate and gabapentin.  On and off chest pains, feels \"gassy\" and \"pins\" in chest area     No fever 98.2F but has hot flashes    PLAN: ED per protocol. Pt will go to Rosa Hoover RN/[facility]      Reason for Disposition   Headache    Additional Information   Negative: [1] Purple or blood-colored WIDESPREAD rash AND [2] fever   Negative: [1] Purple or blood-colored WIDESPREAD rash AND [2] very weak   Negative: Shock suspected (e.g., cold/pale/clammy skin, too weak to stand, low BP, rapid pulse)   Negative: Difficult to awaken or acting confused (e.g., disoriented, slurred speech)   Negative: Sounds like a life-threatening emergency to the triager    Protocols used: Rash - Purple Spots or Dots-A-AH    "

## 2025-02-03 NOTE — TELEPHONE ENCOUNTER
New prescription for Wegovy sent to pharmacy based on preferred options for insurance.  Letter drafted to justify reason for not utilizing phentermine.      Miya Crump MBA, MS, PA-C  [facility]

## 2025-02-04 ENCOUNTER — TELEPHONE (OUTPATIENT)
Dept: FAMILY MEDICINE | Facility: CLINIC | Age: 50
End: 2025-02-04
Payer: COMMERCIAL

## 2025-02-04 NOTE — TELEPHONE ENCOUNTER
Prior Authorization Retail Medication Request    Medication/Dose: Insurance is requiring a PA for Wegovy   Diagnosis and ICD code (if different than what is on RX):  E66.01   New/renewal/insurance change PA/secondary ins. PA:  Previously Tried and Failed:  Unknown  Rationale:  Unknown    Insurance   Primary: Elba MONTANEZ NVT  Insurance ID:  [id number]    Secondary (if applicable):N/A  Insurance ID:  N/A    Thank You,  Alisha Llanes [facility]  [phone]      Clinic Information  Preferred routing pool for dept communication: [facility]

## 2025-02-05 DIAGNOSIS — G35 MULTIPLE SCLEROSIS (H): Primary | ICD-10-CM

## 2025-02-05 RX ORDER — HEPARIN SODIUM (PORCINE) LOCK FLUSH IV SOLN 100 UNIT/ML 100 UNIT/ML
5 SOLUTION INTRAVENOUS
OUTPATIENT
Start: 2025-02-05

## 2025-02-05 RX ORDER — DIPHENHYDRAMINE HYDROCHLORIDE 50 MG/ML
50 INJECTION INTRAMUSCULAR; INTRAVENOUS
Start: 2025-02-05

## 2025-02-05 RX ORDER — ALBUTEROL SULFATE 0.83 MG/ML
2.5 SOLUTION RESPIRATORY (INHALATION)
OUTPATIENT
Start: 2025-02-05

## 2025-02-05 RX ORDER — EPINEPHRINE 1 MG/ML
0.3 INJECTION, SOLUTION INTRAMUSCULAR; SUBCUTANEOUS EVERY 5 MIN PRN
OUTPATIENT
Start: 2025-02-05

## 2025-02-05 RX ORDER — HEPARIN SODIUM,PORCINE 10 UNIT/ML
5-20 VIAL (ML) INTRAVENOUS DAILY PRN
OUTPATIENT
Start: 2025-02-05

## 2025-02-05 RX ORDER — DIPHENHYDRAMINE HYDROCHLORIDE 50 MG/ML
25 INJECTION INTRAMUSCULAR; INTRAVENOUS
Start: 2025-02-05

## 2025-02-05 RX ORDER — METHYLPREDNISOLONE SODIUM SUCCINATE 40 MG/ML
40 INJECTION INTRAMUSCULAR; INTRAVENOUS
Start: 2025-02-05

## 2025-02-05 RX ORDER — ALBUTEROL SULFATE 90 UG/1
1-2 INHALANT RESPIRATORY (INHALATION)
Start: 2025-02-05

## 2025-02-05 RX ORDER — MEPERIDINE HYDROCHLORIDE 25 MG/ML
25 INJECTION INTRAMUSCULAR; INTRAVENOUS; SUBCUTANEOUS
OUTPATIENT
Start: 2025-02-05

## 2025-02-05 NOTE — TELEPHONE ENCOUNTER
Central Prior Authorization Team - Phone: [phone]     PA Initiation    Medication: WEGOVY 0.25 MG/0.5ML SC SOAJ  Insurance Company: [employer] - Phone [phone] Fax [phone]  Pharmacy Filling the Rx: [facility] - [place], [place] - [address] [place] [address]  Filling Pharmacy Phone: [phone]  Filling Pharmacy Fax:    Start Date: 2/5/2025    *received call from Jackelyn Parra requesting clinical questions, pt current weight ,BMI, past tried and failed meds and was able to answer clinical questions via phone.    Determination will be sent via fax.

## 2025-02-06 ENCOUNTER — OFFICE VISIT (OUTPATIENT)
Dept: FAMILY MEDICINE | Facility: CLINIC | Age: 50
End: 2025-02-06
Payer: COMMERCIAL

## 2025-02-06 ENCOUNTER — OFFICE VISIT (OUTPATIENT)
Dept: PEDIATRICS | Facility: CLINIC | Age: 50
End: 2025-02-06
Payer: COMMERCIAL

## 2025-02-06 VITALS
RESPIRATION RATE: 18 BRPM | OXYGEN SATURATION: 100 % | SYSTOLIC BLOOD PRESSURE: 115 MMHG | WEIGHT: 243 LBS | BODY MASS INDEX: 40.44 KG/M2 | HEART RATE: 74 BPM | TEMPERATURE: 98.6 F | DIASTOLIC BLOOD PRESSURE: 78 MMHG

## 2025-02-06 VITALS
HEIGHT: 65 IN | BODY MASS INDEX: 40.54 KG/M2 | DIASTOLIC BLOOD PRESSURE: 70 MMHG | WEIGHT: 243.3 LBS | SYSTOLIC BLOOD PRESSURE: 122 MMHG | RESPIRATION RATE: 16 BRPM | OXYGEN SATURATION: 100 % | HEART RATE: 100 BPM | TEMPERATURE: 97.6 F

## 2025-02-06 DIAGNOSIS — R51.9 ACUTE NONINTRACTABLE HEADACHE, UNSPECIFIED HEADACHE TYPE: ICD-10-CM

## 2025-02-06 DIAGNOSIS — K21.9 GASTROESOPHAGEAL REFLUX DISEASE, UNSPECIFIED WHETHER ESOPHAGITIS PRESENT: ICD-10-CM

## 2025-02-06 DIAGNOSIS — R51.9 ACUTE NONINTRACTABLE HEADACHE, UNSPECIFIED HEADACHE TYPE: Primary | ICD-10-CM

## 2025-02-06 RX ORDER — PANTOPRAZOLE SODIUM 40 MG/1
40 TABLET, DELAYED RELEASE ORAL DAILY
Qty: 90 TABLET | Refills: 3 | Status: SHIPPED | OUTPATIENT
Start: 2025-02-06

## 2025-02-06 RX ORDER — ACETAMINOPHEN/CAFFEINE 500MG-65MG
2 TABLET ORAL DAILY PRN
COMMUNITY
Start: 2025-02-06

## 2025-02-06 RX ORDER — KETOROLAC TROMETHAMINE 30 MG/ML
30 INJECTION, SOLUTION INTRAMUSCULAR; INTRAVENOUS ONCE
Status: COMPLETED | OUTPATIENT
Start: 2025-02-06 | End: 2025-02-06

## 2025-02-06 RX ADMIN — KETOROLAC TROMETHAMINE 30 MG: 30 INJECTION, SOLUTION INTRAMUSCULAR; INTRAVENOUS at 12:16

## 2025-02-06 RX ADMIN — Medication 1000 ML: at 12:16

## 2025-02-06 NOTE — TELEPHONE ENCOUNTER
KM - approved the Prior Auth - talked to pt and advised that [facility] should be calling to let her know its approved and ready to PU    Pt understood
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
## 2025-02-06 NOTE — PROGRESS NOTES
Assessment/Plan:    HA felt to be due to migraine. Patient was given 1 L of normal saline and 30 mg of IV Toradol and notes significant improvement in symptoms. Was also given GI cocktail for GERD and that is doing better as well.   Continue pantoprazole and topiramate.  Follow up with primary care if symptoms return or are persistent.     See patient instructions below.    At the end of the encounter, I discussed results, diagnosis, medications. Discussed red flags for immediate return to clinic/ER, as well as indications for follow up if no improvement. Patient understood and agreed to plan. Patient was stable for discharge.      ICD-10-CM    1. Acute nonintractable headache, unspecified headache type  R51.9 Referral to Acute and Diagnostic Services (Day of diagnostic / First order acute)     ketorolac (TORADOL) injection 30 mg     sodium chloride 0.9% BOLUS 1,000 mL     sodium chloride (PF) 0.9% PF flush 3 mL      2. Gastroesophageal reflux disease, unspecified whether esophagitis present  K21.9 Referral to Acute and Diagnostic Services (Day of diagnostic / First order acute)     lidocaine (viscous) (XYLOCAINE) 2 % 15 mL, alum & mag hydroxide-simethicone (MAALOX) 15 mL GI Cocktail     sodium chloride (PF) 0.9% PF flush 3 mL            Return in about 3 days (around 2/9/2025) for Follow up w/ primary care provider if not better.    TU Parish, ALFONSO  [facility]  -----------------------------------------------------------------------------------------------------------------------------------------------------    HPI:  Nataliya Aldrich is a 49 year old female with history of gastric bypass, MS, migraine, and HTN who presents for evaluation of the following:    Headache  Onset/Duration: X 5 days   Description:   Location: behind and above right eye  Character: throbbing pain  Frequency:  daily   Duration:  all day   Wake with headaches:  YES   Able to do daily activities  "when headache present:  YES- with struggle  Intensity: 4/10  Progression of Symptoms: got better after meds in ER  Accompanying Signs & Symptoms:  Stiff neck: YES- chronic with MS  Neck or upper back pain: YES- chronic with MS           Sinus or URI symptoms: no  Fever: no   Nausea or vomiting: no   Dizziness: no   Numbness/tingling: YES- chronic neuropathy   Weakness: no  Visual changes: no  History:   Head trauma: no  Daily pain medication use: no            Previous tests for headaches: YES- hx of stroke, chronic migraine started after this            Neurologist evaluations: YES  Precipitating or alleviating factors:   Does light make it worse: YES   Does sound make it worse: YES  Does sleep help: YES  Therapies Tried and outcome: \"migraine cocktail\" in ER brought severity down, but pain is still there    She was seen in the ER 3 days ago for this.   She had alcohol the night before and didn't think she should take her meds, so missed her usual dose of topiramate. HA started the following day. The next day in [place], she was crying really hard in [place] & leaning forward and her HA became worse. Reported it \"felt like her head was going to explode\". Went home and took migraine meds without much relief. Later she noticed petechiae around both of her eyes. Later that night, she started experiencing chest pain. And she also reported increased paresthesia from baseline in her right upper & lower extremities.   ER work-up included head CT,  EKG, troponin, CBC, CMP, and INR which were all unremarkable. ER provider felt her headache was more of a migraine. She was given IV Reglan, Benadryl, and Decadron in the ER.     She states she usually receives IV fluids for her migraines but she didn't get this in the ER.    She is on Topamax for migraine prophylaxis. Has Imitrex at home but tries not to use it, as it can cause an MS flare.   She was seen in primary care today and referred to the [facility] for IV fluids and Toradol for " HA management. She also reported continued  pressure sensation in her chest and frequent belching/gassiness  which was felt to be due to GERD/gastritis. She has been on omeprazole but feels it makes her constipated, so she was given a Rx for pantoprazole to try instead. She cannot take oral NSAIDs due to hx of gastric bypass, so was given a Rx for acetaminophen-caffeine to try as well.       Past Medical History:   Diagnosis Date    Asthma     mild persistent - Dr Gee    Diverticulitis of colon     Fibroids     s/p hysterectomy    Fibromyalgia     Gastroesophageal reflux disease     Hypertension     Iron deficiency anemia     Migraine     followed by oswald Fortune,    Obesity     Obstructive sleep apnea     needs updated sleep study    Ovarian cyst     Pre-diabetes     Relapsing remitting multiple sclerosis 2015    lesion in [place].  Facial tingling initial symptom.  F/B [facility]    Spondylosis of cervical region        Vitals:    02/06/25 1115   BP: 115/78   BP Location: Right arm   Patient Position: Chair   Cuff Size: Adult Large   Pulse: 74   Resp: 18   Temp: 98.6  F (37  C)   TempSrc: Oral   SpO2: 100%   Weight: 110.2 kg (243 lb)       Physical Exam  Vitals and nursing note reviewed.   HENT:      Right Ear: Tympanic membrane normal.      Left Ear: Tympanic membrane normal.      Nose:      Right Sinus: No maxillary sinus tenderness or frontal sinus tenderness.      Left Sinus: No maxillary sinus tenderness or frontal sinus tenderness.   Pulmonary:      Effort: Pulmonary effort is normal.   Neurological:      Mental Status: She is alert.      GCS: GCS eye subscore is 4. GCS verbal subscore is 5. GCS motor subscore is 6.      Cranial Nerves: Cranial nerves 2-12 are intact.      Sensory: Sensation is intact.      Motor: Motor function is intact.      Coordination: Coordination is intact.      Gait: Gait is intact.         Labs/Imaging:  No results found. However, due to the size of the patient record, not all  encounters were searched. Please check Results Review for a complete set of results.  No results found for this or any previous visit (from the past 24 hours).        Patient Instructions   Follow up with primary care if headache persists despite today's treatment. Continue topiramate. Try acetaminophen-caffeine.  Continue pantaprazole. May use Tums or Pepto Bismol as well for heartburn.  GERD   The esophagus is a tube that carries food from the mouth to the stomach. A valve (the LES, lower esophageal sphincter) at the lower end of the esophagus prevents stomach acid from flowing upward. When this valve doesn't work properly, stomach contents may repeatedly flow back up (reflux) into the esophagus. This is called gastroesophageal reflux disease (GERD). GERD can irritate the esophagus. It can cause problems with pain, swallowing or breathing. In severe cases, GERD can cause recurrent pneumonia (from aspiration or breathing in particles) or other serious problems.  Symptoms of reflux include burning, pressure or sharp pain in the upper abdomen or mid to lower chest. The pain can spread to the neck, back, or shoulder. There may be belching, an acid taste in the back of the throat, chronic cough, or sore throat, or hoarseness. GERD symptoms often occur during the day after a big meal. They can also occur at night when lying down.   Home care  Lifestyle changes can help reduce symptoms. If needed, your healthcare provider may prescribe medicines. Symptoms often improve with treatment, but if treatment is stopped, the symptoms often return after a few months. So most persons with GERD will need to continue treatment or get treatment on and off.  Lifestyle changes  Drinking six to eight glasses of water daily and eating high-fiber, low-fat foods (for example, fruits, vegetables, and whole-grain breads and cereals) are recommended. Drinking carbonated beverages, alcohol, and caffeinated beverages; eating high-fat and spicy  "foods; and smoking are discouraged. Some foods, such as beans, cabbage, and cauliflower, naturally produce gas and should be limited or avoided. Meals that are small, regular, and frequent are encouraged because they are easier to digest than large ones.   Don t eat large meals, especially at night. Frequent, smaller meals are best. Don't lie down right after eating. And don t eat anything 3 hours before going to bed.  Don't drink alcohol or smoke. As much as possible, stay away from second hand smoke.  If you are overweight, losing weight will reduce symptoms.   Don't wear tight clothing around your stomach area.  If your symptoms occur during sleep, use a foam wedge to elevate your upper body (not just your head.) Or, place 4\" blocks under the head of your bed. Or use 2 bed risers under your bedframe.  Medicines  If needed, medicines can help relieve the symptoms of GERD and prevent damage to the esophagus. Discuss a medicine plan with your healthcare provider. This may include one or more of the following medicines:  Antacids to help neutralize the normal acids in your stomach.  Acid blockers (Histamine or H2 blockers) to decrease acid production.  Acid inhibitors (proton pump inhibitors PPIs) to decrease acid production in a different way than the blockers. They may work better, but can take a little longer to take effect.  Take an antacid 30 to 60 minutes after eating and at bedtime, but not at the same time as an acid blocker.  Try not to take medicines such as ibuprofen and aspirin. If you are taking aspirin for your heart or other medical reasons, talk to your healthcare provider about stopping it.  Follow-up care  Follow up with your healthcare provider or as advised by our staff.  When to seek medical advice  Call your healthcare provider if any of the following occur:  Stomach pain gets worse or moves to the lower right abdomen (appendix area)  Chest pain appears or gets worse, or spreads to the back, " neck, shoulder, or arm  An over-the-counter trial of medicine doesn't relieve your symptoms  Weight loss that can't be explained  Trouble or pain swallowing  Frequent vomiting (can t keep down liquids)  Blood in the stool or vomit (red or black in color)  Feeling weak or dizzy  Fever of 100.4 F (38 C) or higher, or as directed by your healthcare provider  Date Last Reviewed: 3/1/2018

## 2025-02-06 NOTE — PATIENT INSTRUCTIONS
Follow up with primary care if headache persists despite today's treatment. Continue topiramate. Try acetaminophen-caffeine.  Continue pantaprazole. May use Tums or Pepto Bismol as well for heartburn.  GERD   The esophagus is a tube that carries food from the mouth to the stomach. A valve (the LES, lower esophageal sphincter) at the lower end of the esophagus prevents stomach acid from flowing upward. When this valve doesn't work properly, stomach contents may repeatedly flow back up (reflux) into the esophagus. This is called gastroesophageal reflux disease (GERD). GERD can irritate the esophagus. It can cause problems with pain, swallowing or breathing. In severe cases, GERD can cause recurrent pneumonia (from aspiration or breathing in particles) or other serious problems.  Symptoms of reflux include burning, pressure or sharp pain in the upper abdomen or mid to lower chest. The pain can spread to the neck, back, or shoulder. There may be belching, an acid taste in the back of the throat, chronic cough, or sore throat, or hoarseness. GERD symptoms often occur during the day after a big meal. They can also occur at night when lying down.   Home care  Lifestyle changes can help reduce symptoms. If needed, your healthcare provider may prescribe medicines. Symptoms often improve with treatment, but if treatment is stopped, the symptoms often return after a few months. So most persons with GERD will need to continue treatment or get treatment on and off.  Lifestyle changes  Drinking six to eight glasses of water daily and eating high-fiber, low-fat foods (for example, fruits, vegetables, and whole-grain breads and cereals) are recommended. Drinking carbonated beverages, alcohol, and caffeinated beverages; eating high-fat and spicy foods; and smoking are discouraged. Some foods, such as beans, cabbage, and cauliflower, naturally produce gas and should be limited or avoided. Meals that are small, regular, and frequent  "are encouraged because they are easier to digest than large ones.   Don t eat large meals, especially at night. Frequent, smaller meals are best. Don't lie down right after eating. And don t eat anything 3 hours before going to bed.  Don't drink alcohol or smoke. As much as possible, stay away from second hand smoke.  If you are overweight, losing weight will reduce symptoms.   Don't wear tight clothing around your stomach area.  If your symptoms occur during sleep, use a foam wedge to elevate your upper body (not just your head.) Or, place 4\" blocks under the head of your bed. Or use 2 bed risers under your bedframe.  Medicines  If needed, medicines can help relieve the symptoms of GERD and prevent damage to the esophagus. Discuss a medicine plan with your healthcare provider. This may include one or more of the following medicines:  Antacids to help neutralize the normal acids in your stomach.  Acid blockers (Histamine or H2 blockers) to decrease acid production.  Acid inhibitors (proton pump inhibitors PPIs) to decrease acid production in a different way than the blockers. They may work better, but can take a little longer to take effect.  Take an antacid 30 to 60 minutes after eating and at bedtime, but not at the same time as an acid blocker.  Try not to take medicines such as ibuprofen and aspirin. If you are taking aspirin for your heart or other medical reasons, talk to your healthcare provider about stopping it.  Follow-up care  Follow up with your healthcare provider or as advised by our staff.  When to seek medical advice  Call your healthcare provider if any of the following occur:  Stomach pain gets worse or moves to the lower right abdomen (appendix area)  Chest pain appears or gets worse, or spreads to the back, neck, shoulder, or arm  An over-the-counter trial of medicine doesn't relieve your symptoms  Weight loss that can't be explained  Trouble or pain swallowing  Frequent vomiting (can t keep down " liquids)  Blood in the stool or vomit (red or black in color)  Feeling weak or dizzy  Fever of 100.4 F (38 C) or higher, or as directed by your healthcare provider  Date Last Reviewed: 3/1/2018
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
## 2025-02-06 NOTE — PATIENT INSTRUCTIONS
PROCEED DIRECTLY TO:     [facility]  [address], [address], [place] [address]    Phone is [phone], Fax is [phone]

## 2025-02-10 ENCOUNTER — MYC REFILL (OUTPATIENT)
Dept: FAMILY MEDICINE | Facility: CLINIC | Age: 50
End: 2025-02-10
Payer: COMMERCIAL

## 2025-02-10 DIAGNOSIS — J30.2 SEASONAL ALLERGIC RHINITIS, UNSPECIFIED TRIGGER: ICD-10-CM

## 2025-02-10 DIAGNOSIS — B37.31 CANDIDAL VULVOVAGINITIS: ICD-10-CM

## 2025-02-10 RX ORDER — FLUCONAZOLE 150 MG/1
150 TABLET ORAL
Qty: 3 TABLET | Refills: 0 | Status: CANCELLED | OUTPATIENT
Start: 2025-02-10

## 2025-02-11 ENCOUNTER — THERAPY VISIT (OUTPATIENT)
Dept: PHYSICAL THERAPY | Facility: CLINIC | Age: 50
End: 2025-02-11
Attending: PHYSICIAN ASSISTANT
Payer: COMMERCIAL

## 2025-02-11 DIAGNOSIS — G89.29 CHRONIC MUSCULOSKELETAL PAIN DUE TO DISORDER OF NERVOUS SYSTEM: ICD-10-CM

## 2025-02-11 DIAGNOSIS — M79.18 CHRONIC MUSCULOSKELETAL PAIN DUE TO DISORDER OF NERVOUS SYSTEM: ICD-10-CM

## 2025-02-11 DIAGNOSIS — M25.512 CHRONIC PAIN OF BOTH SHOULDERS: ICD-10-CM

## 2025-02-11 DIAGNOSIS — M54.2 CHRONIC NECK PAIN: ICD-10-CM

## 2025-02-11 DIAGNOSIS — M62.838 MUSCLE SPASM: ICD-10-CM

## 2025-02-11 DIAGNOSIS — G35 MULTIPLE SCLEROSIS (H): ICD-10-CM

## 2025-02-11 DIAGNOSIS — G89.29 CHRONIC PAIN OF BOTH SHOULDERS: ICD-10-CM

## 2025-02-11 DIAGNOSIS — G98.8 CHRONIC MUSCULOSKELETAL PAIN DUE TO DISORDER OF NERVOUS SYSTEM: ICD-10-CM

## 2025-02-11 DIAGNOSIS — G89.29 CHRONIC NECK PAIN: ICD-10-CM

## 2025-02-11 DIAGNOSIS — M25.511 CHRONIC PAIN OF BOTH SHOULDERS: ICD-10-CM

## 2025-02-11 PROCEDURE — 97140 MANUAL THERAPY 1/> REGIONS: CPT | Mod: GP | Performed by: PHYSICAL THERAPIST

## 2025-02-11 PROCEDURE — 97161 PT EVAL LOW COMPLEX 20 MIN: CPT | Mod: GP | Performed by: PHYSICAL THERAPIST

## 2025-02-11 PROCEDURE — 97035 APP MDLTY 1+ULTRASOUND EA 15: CPT | Mod: GP | Performed by: PHYSICAL THERAPIST

## 2025-02-11 RX ORDER — FLUTICASONE PROPIONATE 50 MCG
SPRAY, SUSPENSION (ML) NASAL
Qty: 16 G | Refills: 5 | Status: SHIPPED | OUTPATIENT
Start: 2025-02-11

## 2025-02-11 NOTE — TELEPHONE ENCOUNTER
Called #   Telephone Information:   Mobile [phone]     Pt stated she did not need the diflucan she needs the Flonase to be filled - she uses it nightly for her allergies     Routing to PCP to review       Amelia Chance RN, BSN  [facility]

## 2025-02-11 NOTE — PROGRESS NOTES
PHYSICAL THERAPY EVALUATION  Type of Visit: Evaluation       Fall Risk Screen:  Fall screen completed by: PT  Have you fallen 2 or more times in the past year?: No  Have you fallen and had an injury in the past year?: No  Is patient a fall risk?: No    Subjective         Presenting condition or subjective complaint: Neck pain, lower back pain , shoulder pain  Date of onset: 11/11/24    Relevant medical history: Anemia; Asthma; Cold or hot arm or leg; Concussions; Depression; Dizziness; Fibromyalgia; Foot drop; High blood pressure; Menopause; Migraines or headaches; Multiple Sclerosis; Neck injury; Numbness or tingling in perianal area; Overweight; Severe headaches; Sleep disorder like apnea; Stroke; Vision problems   Dates & types of surgery: Gastric 2001, hysterectomy 2007, c section 1997&2003    Prior diagnostic imaging/testing results: MRI; CT scan; X-ray     Prior therapy history for the same diagnosis, illness or injury: Yes 01/2025        Living Environment  Social support: With family members   Type of home: Apartment/condo   Stairs to enter the home: No       Ramp: No   Stairs inside the home: No       Help at home: Self Cares (home health aide/personal care attendant, family, etc)  Equipment owned: Straight Cane     Employment: Yes   Hobbies/Interests: Cooking, singing, dancing, poetry, walking    Patient goals for therapy: Walk, work out , read without being in pain, work at my desk , hold my neck up    Pain assessment: Pain present  Location: neck/upper back  /Rating: rest=   worst=     Objective   CERVICAL:    Posture: slight forward head, rounded shoulders    Neurological:    Motor Deficit: Right side weaker due to MS    Sensory Deficit, Reflexes, Dural Signs: R side affected by MS (numbness)    AROM: (Major, Moderate, Minimal or Nil loss)  Movement Loss Sriram Mod Min Nil Pain   Protrusion    X    Flexion   X  Pull central    Retraction  X   Tension bilaterally   Extension   X  Feels  good/relief- some minor R upper arm pull   Left Rotation    X none   Right Rotation   X  L sided tightness   Left Side Bending   X  Tight on right   Right Side bending   X  none       Repeated movement testing:   -repeated retraction and extension: X10 (feels good, loosens up)    Palpation: hypertone and tension to B UT's      Assessment & Plan   CLINICAL IMPRESSIONS  Medical Diagnosis: Neck, myofascial pain, and multiple sclerosis    Treatment Diagnosis: Neck pain, myofascial pain   Impression/Assessment: Patient is a 49 year old female with neck and myofascial tension due to MS - complaints.  The following significant findings have been identified: Pain, Decreased ROM/flexibility, Decreased joint mobility, Decreased strength, Impaired balance, Decreased proprioception, Impaired sensation, Inflammation, Impaired gait, Impaired muscle performance, Decreased activity tolerance, Impaired posture, and Instability. These impairments interfere with their ability to perform self care tasks, work tasks, recreational activities, household chores, and community mobility as compared to previous level of function.     Clinical Decision Making (Complexity):  Clinical Presentation: Stable/Uncomplicated  Clinical Presentation Rationale: based on medical and personal factors listed in PT evaluation  Clinical Decision Making (Complexity): Low complexity    PLAN OF CARE  Treatment Interventions:  Modalities: Ultrasound  Interventions: Manual Therapy, Neuromuscular Re-education, Therapeutic Activity, Therapeutic Exercise    Long Term Goals     PT Goal 1  Goal Identifier: Goal 1  Goal Description: Pt will be able to sit, 30 min, painfree, at work  Rationale: to maximize safety and independence with performance of ADLs and functional tasks  Target Date: 05/06/25  PT Goal 2  Goal Identifier: Goal 2  Goal Description: Pt will be able to sleep through the night without waking in pain  Rationale: to maximize safety and independence with  performance of ADLs and functional tasks  Target Date: 05/06/25      Frequency of Treatment: 1X/week  Duration of Treatment: 12 weeks      Education Assessment:   Learner/Method: Patient;Pictures/Video    Risks and benefits of evaluation/treatment have been explained.   Patient/Family/caregiver agrees with Plan of Care.     Evaluation Time:     PT Eval, Low Complexity Minutes (11289): 15  Evaluation Only     Signing Clinician: DELMA Rutledge [facility]                                                                                   OUTPATIENT PHYSICAL THERAPY      PLAN OF TREATMENT FOR OUTPATIENT REHABILITATION   Patient's Last Name, First Name, M.ILisseth  Venkata AldrichNataliya  N YOB: 1975   Provider's Name   [facility]   Medical Record No.  [id number]     Onset Date: 11/11/24  Start of Care Date: 02/11/25     Medical Diagnosis:  Neck, myofascial pain, and multiple sclerosis      PT Treatment Diagnosis:  Neck pain, myofascial pain Plan of Treatment  Frequency/Duration: 1X/week/ 12 weeks    Certification date from 02/11/25 to 05/06/25         See note for plan of treatment details and functional goals     Nidhi Dunbar PT                         I CERTIFY THE NEED FOR THESE SERVICES FURNISHED UNDER        THIS PLAN OF TREATMENT AND WHILE UNDER MY CARE     (Physician attestation of this document indicates review and certification of the therapy plan).              Referring Provider:  Miya Crump    Initial Assessment  See Epic Evaluation- Start of Care Date: 02/11/25

## 2025-02-13 SDOH — HEALTH STABILITY: PHYSICAL HEALTH: ON AVERAGE, HOW MANY MINUTES DO YOU ENGAGE IN EXERCISE AT THIS LEVEL?: 20 MIN

## 2025-02-13 SDOH — HEALTH STABILITY: PHYSICAL HEALTH: ON AVERAGE, HOW MANY DAYS PER WEEK DO YOU ENGAGE IN MODERATE TO STRENUOUS EXERCISE (LIKE A BRISK WALK)?: 3 DAYS

## 2025-02-13 ASSESSMENT — ASTHMA QUESTIONNAIRES
QUESTION_3 LAST FOUR WEEKS HOW OFTEN DID YOUR ASTHMA SYMPTOMS (WHEEZING, COUGHING, SHORTNESS OF BREATH, CHEST TIGHTNESS OR PAIN) WAKE YOU UP AT NIGHT OR EARLIER THAN USUAL IN THE MORNING: NOT AT ALL
ACT_TOTALSCORE: 23
QUESTION_4 LAST FOUR WEEKS HOW OFTEN HAVE YOU USED YOUR RESCUE INHALER OR NEBULIZER MEDICATION (SUCH AS ALBUTEROL): ONCE A WEEK OR LESS
ACT_TOTALSCORE: 23
QUESTION_1 LAST FOUR WEEKS HOW MUCH OF THE TIME DID YOUR ASTHMA KEEP YOU FROM GETTING AS MUCH DONE AT WORK, SCHOOL OR AT HOME: NONE OF THE TIME
QUESTION_2 LAST FOUR WEEKS HOW OFTEN HAVE YOU HAD SHORTNESS OF BREATH: NOT AT ALL
QUESTION_5 LAST FOUR WEEKS HOW WOULD YOU RATE YOUR ASTHMA CONTROL: WELL CONTROLLED

## 2025-02-13 ASSESSMENT — ANXIETY QUESTIONNAIRES
GAD7 TOTAL SCORE: 3
2. NOT BEING ABLE TO STOP OR CONTROL WORRYING: NOT AT ALL
GAD7 TOTAL SCORE: 3
1. FEELING NERVOUS, ANXIOUS, OR ON EDGE: NOT AT ALL
3. WORRYING TOO MUCH ABOUT DIFFERENT THINGS: NOT AT ALL
GAD7 TOTAL SCORE: 3
5. BEING SO RESTLESS THAT IT IS HARD TO SIT STILL: SEVERAL DAYS
4. TROUBLE RELAXING: SEVERAL DAYS
7. FEELING AFRAID AS IF SOMETHING AWFUL MIGHT HAPPEN: NOT AT ALL
6. BECOMING EASILY ANNOYED OR IRRITABLE: SEVERAL DAYS
8. IF YOU CHECKED OFF ANY PROBLEMS, HOW DIFFICULT HAVE THESE MADE IT FOR YOU TO DO YOUR WORK, TAKE CARE OF THINGS AT HOME, OR GET ALONG WITH OTHER PEOPLE?: SOMEWHAT DIFFICULT
IF YOU CHECKED OFF ANY PROBLEMS ON THIS QUESTIONNAIRE, HOW DIFFICULT HAVE THESE PROBLEMS MADE IT FOR YOU TO DO YOUR WORK, TAKE CARE OF THINGS AT HOME, OR GET ALONG WITH OTHER PEOPLE: SOMEWHAT DIFFICULT
7. FEELING AFRAID AS IF SOMETHING AWFUL MIGHT HAPPEN: NOT AT ALL

## 2025-02-13 ASSESSMENT — PATIENT HEALTH QUESTIONNAIRE - PHQ9
10. IF YOU CHECKED OFF ANY PROBLEMS, HOW DIFFICULT HAVE THESE PROBLEMS MADE IT FOR YOU TO DO YOUR WORK, TAKE CARE OF THINGS AT HOME, OR GET ALONG WITH OTHER PEOPLE: NOT DIFFICULT AT ALL
SUM OF ALL RESPONSES TO PHQ QUESTIONS 1-9: 2
SUM OF ALL RESPONSES TO PHQ QUESTIONS 1-9: 2

## 2025-02-13 ASSESSMENT — SOCIAL DETERMINANTS OF HEALTH (SDOH): HOW OFTEN DO YOU GET TOGETHER WITH FRIENDS OR RELATIVES?: ONCE A WEEK

## 2025-02-18 ENCOUNTER — MYC MEDICAL ADVICE (OUTPATIENT)
Dept: FAMILY MEDICINE | Facility: CLINIC | Age: 50
End: 2025-02-18

## 2025-02-18 ENCOUNTER — OFFICE VISIT (OUTPATIENT)
Dept: FAMILY MEDICINE | Facility: CLINIC | Age: 50
End: 2025-02-18
Attending: PHYSICIAN ASSISTANT
Payer: COMMERCIAL

## 2025-02-18 ENCOUNTER — THERAPY VISIT (OUTPATIENT)
Dept: PHYSICAL THERAPY | Facility: CLINIC | Age: 50
End: 2025-02-18
Attending: PHYSICIAN ASSISTANT
Payer: COMMERCIAL

## 2025-02-18 VITALS
HEART RATE: 88 BPM | WEIGHT: 239 LBS | BODY MASS INDEX: 39.82 KG/M2 | TEMPERATURE: 97 F | RESPIRATION RATE: 18 BRPM | DIASTOLIC BLOOD PRESSURE: 80 MMHG | HEIGHT: 65 IN | SYSTOLIC BLOOD PRESSURE: 110 MMHG | OXYGEN SATURATION: 99 %

## 2025-02-18 DIAGNOSIS — Z12.11 SCREEN FOR COLON CANCER: ICD-10-CM

## 2025-02-18 DIAGNOSIS — D50.9 IRON DEFICIENCY ANEMIA, UNSPECIFIED IRON DEFICIENCY ANEMIA TYPE: ICD-10-CM

## 2025-02-18 DIAGNOSIS — N31.9 NEUROGENIC BLADDER: ICD-10-CM

## 2025-02-18 DIAGNOSIS — M62.838 MUSCLE SPASM: ICD-10-CM

## 2025-02-18 DIAGNOSIS — R79.89 ELEVATED VITAMIN B12 LEVEL: ICD-10-CM

## 2025-02-18 DIAGNOSIS — M79.18 CHRONIC MUSCULOSKELETAL PAIN DUE TO DISORDER OF NERVOUS SYSTEM: ICD-10-CM

## 2025-02-18 DIAGNOSIS — K59.03 DRUG-INDUCED CONSTIPATION: ICD-10-CM

## 2025-02-18 DIAGNOSIS — E66.01 MORBID OBESITY (H): ICD-10-CM

## 2025-02-18 DIAGNOSIS — Z98.84 S/P GASTRIC BYPASS: ICD-10-CM

## 2025-02-18 DIAGNOSIS — F41.8 SITUATIONAL ANXIETY: ICD-10-CM

## 2025-02-18 DIAGNOSIS — R40.0 HAS DAYTIME DROWSINESS: ICD-10-CM

## 2025-02-18 DIAGNOSIS — F09 COGNITIVE DYSFUNCTION ACCOMPANYING MULTIPLE SCLEROSIS (H): ICD-10-CM

## 2025-02-18 DIAGNOSIS — R53.83 OTHER FATIGUE: ICD-10-CM

## 2025-02-18 DIAGNOSIS — G35 MULTIPLE SCLEROSIS (H): ICD-10-CM

## 2025-02-18 DIAGNOSIS — G98.8 CHRONIC MUSCULOSKELETAL PAIN DUE TO DISORDER OF NERVOUS SYSTEM: ICD-10-CM

## 2025-02-18 DIAGNOSIS — F33.0 MAJOR DEPRESSIVE DISORDER, RECURRENT EPISODE, MILD: ICD-10-CM

## 2025-02-18 DIAGNOSIS — M79.18 CHRONIC MUSCULOSKELETAL PAIN DUE TO DISORDER OF NERVOUS SYSTEM: Primary | ICD-10-CM

## 2025-02-18 DIAGNOSIS — Z00.00 ROUTINE GENERAL MEDICAL EXAMINATION AT A HEALTH CARE FACILITY: Primary | ICD-10-CM

## 2025-02-18 DIAGNOSIS — K91.2 POSTSURGICAL NONABSORPTION: ICD-10-CM

## 2025-02-18 DIAGNOSIS — J45.40 MODERATE PERSISTENT ASTHMA WITHOUT COMPLICATION: ICD-10-CM

## 2025-02-18 DIAGNOSIS — M79.605 PAIN OF LEFT LOWER EXTREMITY: ICD-10-CM

## 2025-02-18 DIAGNOSIS — G89.29 CHRONIC MUSCULOSKELETAL PAIN DUE TO DISORDER OF NERVOUS SYSTEM: Primary | ICD-10-CM

## 2025-02-18 DIAGNOSIS — G89.29 CHRONIC MUSCULOSKELETAL PAIN DUE TO DISORDER OF NERVOUS SYSTEM: ICD-10-CM

## 2025-02-18 DIAGNOSIS — B00.9 HSV (HERPES SIMPLEX VIRUS) INFECTION: ICD-10-CM

## 2025-02-18 DIAGNOSIS — K21.00 GASTROESOPHAGEAL REFLUX DISEASE WITH ESOPHAGITIS, UNSPECIFIED WHETHER HEMORRHAGE: ICD-10-CM

## 2025-02-18 DIAGNOSIS — G35 COGNITIVE DYSFUNCTION ACCOMPANYING MULTIPLE SCLEROSIS (H): ICD-10-CM

## 2025-02-18 DIAGNOSIS — G98.8 CHRONIC MUSCULOSKELETAL PAIN DUE TO DISORDER OF NERVOUS SYSTEM: Primary | ICD-10-CM

## 2025-02-18 DIAGNOSIS — G43.109 MIGRAINE WITH AURA AND WITHOUT STATUS MIGRAINOSUS, NOT INTRACTABLE: ICD-10-CM

## 2025-02-18 DIAGNOSIS — Z13.220 LIPID SCREENING: ICD-10-CM

## 2025-02-18 DIAGNOSIS — N28.9 DECREASED RENAL FUNCTION: ICD-10-CM

## 2025-02-18 PROBLEM — R14.0 BLOATING: Status: RESOLVED | Noted: 2023-04-17 | Resolved: 2025-02-18

## 2025-02-18 LAB
ANION GAP SERPL CALCULATED.3IONS-SCNC: 12 MMOL/L (ref 7–15)
BUN SERPL-MCNC: 13.1 MG/DL (ref 6–20)
CALCIUM SERPL-MCNC: 9.4 MG/DL (ref 8.8–10.4)
CHLORIDE SERPL-SCNC: 106 MMOL/L (ref 98–107)
CHOLEST SERPL-MCNC: 191 MG/DL
CREAT SERPL-MCNC: 1.12 MG/DL (ref 0.51–0.95)
EGFRCR SERPLBLD CKD-EPI 2021: 61 ML/MIN/1.73M2
FASTING STATUS PATIENT QL REPORTED: YES
FASTING STATUS PATIENT QL REPORTED: YES
FERRITIN SERPL-MCNC: 51 NG/ML (ref 6–175)
GLUCOSE SERPL-MCNC: 65 MG/DL (ref 70–99)
HCO3 SERPL-SCNC: 20 MMOL/L (ref 22–29)
HDLC SERPL-MCNC: 60 MG/DL
LDLC SERPL CALC-MCNC: 110 MG/DL
NONHDLC SERPL-MCNC: 131 MG/DL
POTASSIUM SERPL-SCNC: 3.9 MMOL/L (ref 3.4–5.3)
SODIUM SERPL-SCNC: 138 MMOL/L (ref 135–145)
TRIGL SERPL-MCNC: 107 MG/DL
VIT B12 SERPL-MCNC: >4000 PG/ML (ref 232–1245)

## 2025-02-18 PROCEDURE — 82607 VITAMIN B-12: CPT | Performed by: PHYSICIAN ASSISTANT

## 2025-02-18 PROCEDURE — 80048 BASIC METABOLIC PNL TOTAL CA: CPT | Performed by: PHYSICIAN ASSISTANT

## 2025-02-18 PROCEDURE — 99396 PREV VISIT EST AGE 40-64: CPT | Performed by: PHYSICIAN ASSISTANT

## 2025-02-18 PROCEDURE — 97110 THERAPEUTIC EXERCISES: CPT | Mod: GP | Performed by: PHYSICAL THERAPIST

## 2025-02-18 PROCEDURE — 99215 OFFICE O/P EST HI 40 MIN: CPT | Mod: 25 | Performed by: PHYSICIAN ASSISTANT

## 2025-02-18 PROCEDURE — 36415 COLL VENOUS BLD VENIPUNCTURE: CPT | Performed by: PHYSICIAN ASSISTANT

## 2025-02-18 PROCEDURE — 80061 LIPID PANEL: CPT | Performed by: PHYSICIAN ASSISTANT

## 2025-02-18 PROCEDURE — 97140 MANUAL THERAPY 1/> REGIONS: CPT | Mod: GP | Performed by: PHYSICAL THERAPIST

## 2025-02-18 PROCEDURE — 82728 ASSAY OF FERRITIN: CPT | Performed by: PHYSICIAN ASSISTANT

## 2025-02-18 PROCEDURE — 97035 APP MDLTY 1+ULTRASOUND EA 15: CPT | Mod: GP | Performed by: PHYSICAL THERAPIST

## 2025-02-18 RX ORDER — TOPIRAMATE 100 MG/1
100 TABLET, FILM COATED ORAL AT BEDTIME
Qty: 90 TABLET | Refills: 1 | Status: SHIPPED | OUTPATIENT
Start: 2025-02-18

## 2025-02-18 RX ORDER — TOPIRAMATE 50 MG/1
50 TABLET, FILM COATED ORAL 2 TIMES DAILY
Qty: 180 TABLET | Refills: 1 | Status: SHIPPED | OUTPATIENT
Start: 2025-02-18

## 2025-02-18 RX ORDER — BACLOFEN 20 MG/1
TABLET ORAL
Qty: 180 TABLET | Refills: 1 | Status: SHIPPED | OUTPATIENT
Start: 2025-02-18

## 2025-02-18 RX ORDER — ALBUTEROL SULFATE 0.83 MG/ML
SOLUTION RESPIRATORY (INHALATION)
Qty: 75 ML | Refills: 1 | Status: SHIPPED | OUTPATIENT
Start: 2025-02-18

## 2025-02-18 RX ORDER — NATALIZUMAB 300 MG/15ML
300 INJECTION INTRAVENOUS
COMMUNITY
Start: 2025-02-18

## 2025-02-18 RX ORDER — ALBUTEROL SULFATE 90 UG/1
AEROSOL, METERED RESPIRATORY (INHALATION)
Qty: 18 G | Refills: 4 | Status: SHIPPED | OUTPATIENT
Start: 2025-02-18

## 2025-02-18 RX ORDER — FLUOXETINE 10 MG/1
10 CAPSULE ORAL DAILY
Qty: 90 CAPSULE | Refills: 3 | Status: SHIPPED | OUTPATIENT
Start: 2025-02-18

## 2025-02-18 RX ORDER — GABAPENTIN 300 MG/1
300-600 CAPSULE ORAL 4 TIMES DAILY PRN
Qty: 360 CAPSULE | Refills: 1 | Status: SHIPPED | OUTPATIENT
Start: 2025-02-18

## 2025-02-18 RX ORDER — CYCLOBENZAPRINE HCL 10 MG
10 TABLET ORAL 3 TIMES DAILY PRN
Qty: 40 TABLET | Refills: 0 | Status: SHIPPED | OUTPATIENT
Start: 2025-02-18

## 2025-02-18 RX ORDER — VALACYCLOVIR HYDROCHLORIDE 500 MG/1
500 TABLET, FILM COATED ORAL DAILY
Qty: 90 TABLET | Refills: 3 | Status: SHIPPED | OUTPATIENT
Start: 2025-02-18

## 2025-02-18 RX ORDER — POLYETHYLENE GLYCOL 3350 17 G/17G
1 POWDER, FOR SOLUTION ORAL DAILY
Qty: 578 G | Refills: 1 | Status: SHIPPED | OUTPATIENT
Start: 2025-02-18

## 2025-02-18 RX ORDER — BUDESONIDE AND FORMOTEROL FUMARATE DIHYDRATE 160; 4.5 UG/1; UG/1
AEROSOL RESPIRATORY (INHALATION)
Qty: 10.2 G | Refills: 5 | Status: SHIPPED | OUTPATIENT
Start: 2025-02-18

## 2025-02-18 RX ORDER — MODAFINIL 100 MG/1
100 TABLET ORAL DAILY
Qty: 90 TABLET | Refills: 1 | Status: SHIPPED | OUTPATIENT
Start: 2025-02-18

## 2025-02-18 RX ORDER — MONTELUKAST SODIUM 10 MG/1
TABLET ORAL
Qty: 90 TABLET | Refills: 3 | Status: SHIPPED | OUTPATIENT
Start: 2025-02-18

## 2025-02-18 NOTE — LETTER
My Asthma Action Plan    Name: Nataliya Aldrich   YOB: 1975  Date: 2/18/2025   My doctor: Miya Crump PA-C   My clinic: [facility] [place]        My Control Medicine: Budesonide + formoterol (Symbicort HFA) -  160/4.5 mcg 2 puffs twice daily  My Rescue Medicine: Albuterol (Proair/Ventolin/Proventil HFA) 2-4 puffs EVERY 4 HOURS as needed. Use a spacer if recommended by your provider.   My Asthma Severity:   Moderate Persistent  Know your asthma triggers:   pollens  grass and dander            GREEN ZONE   Good Control  I feel good  No cough or wheeze  Can work, sleep and play without asthma symptoms       Take your asthma control medicine every day.     If exercise triggers your asthma, take your rescue medication  15 minutes before exercise or sports, and  During exercise if you have asthma symptoms  Spacer to use with inhaler: If you have a spacer, make sure to use it with your inhaler             YELLOW ZONE Getting Worse  I have ANY of these:  I do not feel good  Cough or wheeze  Chest feels tight  Wake up at night   Keep taking your Green Zone medications  Start taking your rescue medicine:  every 20 minutes for up to 1 hour. Then every 4 hours for 24-48 hours.  If you stay in the Yellow Zone for more than 12-24 hours, contact your doctor.  If you do not return to the Green Zone in 12-24 hours or you get worse, start taking your oral steroid medicine if prescribed by your provider.           RED ZONE Medical Alert - Get Help  I have ANY of these:  I feel awful  Medicine is not helping  Breathing getting harder  Trouble walking or talking  Nose opens wide to breathe       Take your rescue medicine NOW  If your provider has prescribed an oral steroid medicine, start taking it NOW  Call your doctor NOW  If you are still in the Red Zone after 20 minutes and you have not reached your doctor:  Take your rescue medicine again and  Call 911 or go to the emergency room  right away    See your regular doctor within 2 weeks of an Emergency Room or Urgent Care visit for follow-up treatment.          Annual Reminders:  Meet with Asthma Educator,  Flu Shot in the Fall, consider Pneumonia Vaccination for patients with asthma (aged 19 and older).    Pharmacy:    [facility] [address]    Electronically signed by Miya Crump PA-C   Date: 02/18/25                      Asthma Triggers  How To Control Things That Make Your Asthma Worse    Triggers are things that make your asthma worse.  Look at the list below to help you find your triggers and what you can do about them.  You can help prevent asthma flare-ups by staying away from your triggers.      Trigger                                                          What you can do   Cigarette Smoke  Tobacco smoke can make asthma worse. Do not allow smoking in your home, car or around you.  Be sure no one smokes at a child s day care or school.  If you smoke, ask your health care provider for ways to help you quit.  Ask family members to quit too.  Ask your health care provider for a referral to Quit Plan to help you quit smoking, or call 1-432-783-PLAN.     Colds, Flu, Bronchitis  These are common triggers of asthma. Wash your hands often.  Don t touch your eyes, nose or mouth.  Get a flu shot every year.     Dust Mites  These are tiny bugs that live in cloth or carpet. They are too small to see. Wash sheets and blankets in hot water every week.   Encase pillows and mattress in dust mite proof covers.  Avoid having carpet if you can. If you have carpet, vacuum weekly.   Use a dust mask and HEPA vacuum.   Pollen and Outdoor Mold  Some people are allergic to trees, grass, or weed pollen, or molds. Try to keep your windows closed.  Limit time out doors when pollen count is high.   Ask you health care provider about taking medicine during allergy season.      Animal Dander  Some people are allergic to skin flakes, urine or saliva from pets with fur or feathers. Keep pets with fur or feathers out of your home.    If you can t keep the pet outdoors, then keep the pet out of your bedroom.  Keep the bedroom door closed.  Keep pets off cloth furniture and away from stuffed toys.     Mice, Rats, and Cockroaches   Some people are allergic to the waste from these pests.   Cover food and garbage.  Clean up spills and food crumbs.  Store grease in the refrigerator.   Keep food out of the bedroom.   Indoor Mold  This can be a trigger if your home has high moisture. Fix leaking faucets, pipes, or other sources of water.   Clean moldy surfaces.  Dehumidify basement if it is damp and smelly.   Smoke, Strong Odors, and Sprays  These can reduce air quality. Stay away from strong odors and sprays, such as perfume, powder, hair spray, paints, smoke incense, paint, cleaning products, candles and new carpet.   Exercise or Sports  Some people with asthma have this trigger. Be active!  Ask your doctor about taking medicine before sports or exercise to prevent symptoms.    Warm up for 5-10 minutes before and after sports or exercise.     Other Triggers of Asthma  Cold air:  Cover your nose and mouth with a scarf.  Sometimes laughing or crying can be a trigger.  Some medicines and food can trigger asthma.

## 2025-02-18 NOTE — PATIENT INSTRUCTIONS
Patient Education   Preventive Care Advice   This is general advice given by our system to help you stay healthy. However, your care team may have specific advice just for you. Please talk to your care team about your preventive care needs.  Nutrition  Eat 5 or more servings of fruits and vegetables each day.  Try wheat bread, brown rice and whole grain pasta (instead of white bread, rice, and pasta).  Get enough calcium and vitamin D. Check the label on foods and aim for 100% of the RDA (recommended daily allowance).  Lifestyle  Exercise at least 150 minutes each week  (30 minutes a day, 5 days a week).  Do muscle strengthening activities 2 days a week. These help control your weight and prevent disease.  No smoking.  Wear sunscreen to prevent skin cancer.  Have a dental exam and cleaning every 6 months.  Yearly exams  See your health care team every year to talk about:  Any changes in your health.  Any medicines your care team has prescribed.  Preventive care, family planning, and ways to prevent chronic diseases.  Shots (vaccines)   HPV shots (up to age 26), if you've never had them before.  Hepatitis B shots (up to age 59), if you've never had them before.  COVID-19 shot: Get this shot when it's due.  Flu shot: Get a flu shot every year.  Tetanus shot: Get a tetanus shot every 10 years.  Pneumococcal, hepatitis A, and RSV shots: Ask your care team if you need these based on your risk.  Shingles shot (for age 50 and up)  General health tests  Diabetes screening:  Starting at age 35, Get screened for diabetes at least every 3 years.  If you are younger than age 35, ask your care team if you should be screened for diabetes.  Cholesterol test: At age 39, start having a cholesterol test every 5 years, or more often if advised.  Bone density scan (DEXA): At age 50, ask your care team if you should have this scan for osteoporosis (brittle bones).  Hepatitis C: Get tested at least once in your life.  STIs (sexually  transmitted infections)  Before age 24: Ask your care team if you should be screened for STIs.  After age 24: Get screened for STIs if you're at risk. You are at risk for STIs (including HIV) if:  You are sexually active with more than one person.  You don't use condoms every time.  You or a partner was diagnosed with a sexually transmitted infection.  If you are at risk for HIV, ask about PrEP medicine to prevent HIV.  Get tested for HIV at least once in your life, whether you are at risk for HIV or not.  Cancer screening tests  Cervical cancer screening: If you have a cervix, begin getting regular cervical cancer screening tests starting at age 21.  Breast cancer scan (mammogram): If you've ever had breasts, begin having regular mammograms starting at age 40. This is a scan to check for breast cancer.  Colon cancer screening: It is important to start screening for colon cancer at age 45.  Have a colonoscopy test every 10 years (or more often if you're at risk) Or, ask your provider about stool tests like a FIT test every year or Cologuard test every 3 years.  To learn more about your testing options, visit:   .  For help making a decision, visit:   https://bit.ly/ir27232.  Prostate cancer screening test: If you have a prostate, ask your care team if a prostate cancer screening test (PSA) at age 55 is right for you.  Lung cancer screening: If you are a current or former smoker ages 50 to 80, ask your care team if ongoing lung cancer screenings are right for you.  For informational purposes only. Not to replace the advice of your health care provider. Copyright   2023 [employer]. All rights reserved. Clinically reviewed by the [facility] Transitions Program. LurnQ [id number] - REV 01/24.

## 2025-02-18 NOTE — PROGRESS NOTES
Preventive Care Visit  [facility] [place]  Miya Opal Crump PA-C, Family Medicine  Feb 18, 2025      Assessment & Plan     Routine general medical examination at a health care facility  - PRIMARY CARE FOLLOW-UP SCHEDULING  - PRIMARY CARE FOLLOW-UP SCHEDULING    Multiple sclerosis (H) - Dr. Chong - on Tysabri infusions  Cognitive dysfunction accompanying multiple sclerosis (H)  Muscle spasm  Has daytime drowsiness  Neurogenic bladder  Other fatigue  Follows closely with Dr. Chong.  Getting second opinion with Dr. Johansen May 2025.  - baclofen (LIORESAL) 20 MG tablet  Dispense: 180 tablet; Refill: 1  - modafinil (PROVIGIL) 100 MG tablet  Dispense: 90 tablet; Refill: 1  - gabapentin (NEURONTIN) 300 MG capsule  Dispense: 360 capsule; Refill: 1  - cyclobenzaprine (FLEXERIL) 10 MG tablet  Dispense: 40 tablet; Refill: 0  - natalizumab (TYSABRI) 300 MG/15ML injection    Chronic musculoskeletal pain due to disorder of nervous system  Pain of left lower extremity  Continue physical therapy.  Refilled gabapentin, Flexeril, baclofen for as needed use for flares.  - gabapentin (NEURONTIN) 300 MG capsule  Dispense: 360 capsule; Refill: 1  - cyclobenzaprine (FLEXERIL) 10 MG tablet  Dispense: 40 tablet; Refill: 0  - baclofen (LIORESAL) 20 MG tablet  Dispense: 180 tablet; Refill: 1    Iron deficiency anemia, unspecified iron deficiency anemia type  Continue supplementation.  - Ferritin  - Vitamin B12    Morbid obesity (H)  Postsurgical nonabsorption  S/P gastric bypass 2002  Drug-induced constipation  Tolerating semaglutide well.  Continue current regimen.  Recommend adding MiraLAX daily to achieve soft daily bowel movement  - polyethylene glycol (MIRALAX) 17 GM/Dose powder  Dispense: 578 g; Refill: 1    Gastroesophageal reflux disease with esophagitis, unspecified whether hemorrhage  Doing well with transition to pantoprazole.  Continue current regimen.    Moderate persistent asthma without  complication  Well-controlled.  Continue current regimen.  - budesonide-formoterol (SYMBICORT) 160-4.5 MCG/ACT Inhaler  Dispense: 10.2 g; Refill: 5  - montelukast (SINGULAIR) 10 MG tablet  Dispense: 90 tablet; Refill: 3  - albuterol (PROVENTIL) (2.5 MG/3ML) 0.083% neb solution  Dispense: 75 mL; Refill: 1  - VENTOLIN  (90 Base) MCG/ACT inhaler  Dispense: 18 g; Refill: 4    Major depressive disorder, recurrent episode, mild  Situational anxiety  Well-controlled.  Continue current regimen.  - FLUoxetine (PROZAC) 10 MG capsule  Dispense: 90 capsule; Refill: 3    HSV (herpes simplex virus) infection  Well-controlled.  Continue current regimen.  - valACYclovir (VALTREX) 500 MG tablet  Dispense: 90 tablet; Refill: 3    Migraine with aura and without status migrainosus, not intractable  Well-controlled.  Continue current regimen.  - topiramate (TOPAMAX) 100 MG tablet  Dispense: 90 tablet; Refill: 1  - topiramate (TOPAMAX) 50 MG tablet  Dispense: 180 tablet; Refill: 1    Decreased renal function  Labs for continued surveillance  - Basic metabolic panel  (Ca, Cl, CO2, Creat, Gluc, K, Na, BUN)    Screen for colon cancer  Routine screening  - Colonoscopy Screening  Referral    Lipid screening  Routine screening  - Lipid panel reflex to direct LDL Non-fasting      Patient has been advised of split billing requirements and indicates understanding: Yes        Counseling  Appropriate preventive services were addressed with this patient via screening, questionnaire, or discussion as appropriate for fall prevention, nutrition, physical activity, Tobacco-use cessation, social engagement, weight loss and cognition.  Checklist reviewing preventive services available has been given to the patient.  Reviewed patient's diet, addressing concerns and/or questions.   She is at risk for lack of exercise and has been provided with information to increase physical activity for the benefit of her well-being.     The longitudinal  plan of care for the diagnosis(es)/condition(s) as documented were addressed during this visit. Due to the added complexity in care, I will continue to support Nataliya in the subsequent management and with ongoing continuity of care.    Review of prior external note(s) from - Outside records from [facility] for multiple sclerosis  53 minutes spent by me on the date of the encounter doing chart review, history and exam, documentation and further activities per the note OUTSIDE OF PREVENTATIVE CARE    Return in about 1 year (around 2/18/2026) for Physical Exam, Fasting labs, Medication recheck.      Miya Crump MBA, MS, PA-C  [facility]   Nataliya is a 49 year old, presenting for the following:  Physical        2/18/2025     7:15 AM   Additional Questions   Roomed by Alina PERRY    Morbid obesity  Started wegovy 0.25 mg 2/7/2025 - 3rd injection due Friday 2/21.  Some constipation.  Colace helps (1 tablet helpful but not effective enough and 2 tablets causes diarrhea.  MiraLAX historically has worked very slowly.      Wt Readings from Last 10 Encounters:   02/18/25 108.4 kg (239 lb)   02/06/25 110.2 kg (243 lb)   02/06/25 110.4 kg (243 lb 4.8 oz)   02/03/25 108 kg (238 lb)   01/29/25 109.4 kg (241 lb 1.6 oz)   11/29/24 110.2 kg (243 lb)   11/19/24 110.2 kg (243 lb)   11/04/24 111.1 kg (245 lb)   10/08/24 111.4 kg (245 lb 9.6 oz)   09/28/24 110.5 kg (243 lb 9.7 oz)     Sleep apnea  Had sleep study in 2015 but was intolerant of CPAP therapy.  Was found to have resolution of her symptoms with positional (lateral changes) and 30 degrees of head elevation.  Reports that she has barely consistent with sleeping laterally.  Has vivid dreams if she sleeps on her back.  Does still snore per her daughter.    Insomnia  Has issues getting to sleep due to not being able to shut her brain off.  Unsure if she has tried hydroxyzine that was prescribed follow-up 2024.   Trazodone in 2022 caused vivid dreams/nightmares.  ZzzQuil works well but wakes suddenly at 5 AM.  Melatonin tablet does not help but has found a gummy that somewhat helpful.  Wondering about cinnamon tea as she read something about this helping with sleep.    GERD/bariatric surgery status/Body distortion  Patient had Elise-en-Y gastric bypass 2002.  Last saw bariatric surgery and follow-up at [facility] 11/9/2020 there were concerns of an eating disorder and she was referred to [place] due to body distortion issues and restrictive thoughts about food.  She is taking a B12 supplement, vitamin D, multivitamin, calcium.  Was assessed at [place] between September and October 2020 and not diagnosed with an eating disorder but recommended to start individual therapy.  Last upper GI was completed 9/15/2020 and did not show any evidence of a gastrogastric fistula.  Last endoscopy was completed at [facility] 4/11/2014 and showed ulcer formation and normal postoperative gastric bypass changes.  She has GERD and symptoms have improved with transition to pantoprazole 2/6/2025.  Omeprazole historically works well but caused her to struggle with constipation    Menopause/hot flashes  The patient is experiencing hot flashes, initially at night and now also during the day, since starting 0.025 mg estradiol patches in December. The patient reported better sleep but increased daytime hot flashes. Concerns about menopause, possible interactions with multiple sclerosis (MS), and the impact on symptoms were expressed. The patient has a follow-up appointment scheduled in the first week of February.  Feels that since starting semaglutide her hot flashes are improved.    Multiple sclerosis  Diagnosed in 2016.  The patient initially presented with a history of facial numbness, slurred speech and right-sided motor and sensory symptoms in June 2015.  MRI imaging demonstrated a lesion in the addison that was initially interpreted as a cerebral infarct.   However, on subsequent review of the imaging, it was felt that an inflammatory demyelinating process was a possibility.  The patient also developed a new enhancing lesion noted on brain MRI in May 2016.  This was diagnostic for RRMS.  Takes baclofen for leg spasms at night and occasionally during the day, however, does not like taking this because of an almost immediate hand tremor.  She has issues with spasms, chronic fatigue, chronic neck and leg pain.  Also reports taking magnesium twice daily 400 mg. Saw Dr. Chong 1/28/2025 and labs showed some concerning results.  1/2025 cervical and brain MRIs done at [facility] were stable but neurophthalmology evaluation showed some decline in neuro-optic tests.  Wants to change tysabri infusions back to [id number] (currently q6 weeks).  Is on modafinil for chronic fatigue.  Finds that this is helpful.  Is prescribed amitriptyline but does not take this for sleep/pain as it causes fairly significant constipation.    HSV  Patient is taking valacyclovir 500 mg once daily for suppression.  Still occasionally has genital outbreaks.  Is unsure why as she is not currently sexually active.    Neck pain/shoulder pain/back pain  Persistent and nearly daily neck pain.   Gabapentin is helpful for her pain.  Had an MRI of her cervical spine 11/4/2020 that showed minimal degenerative changes of the cervical spine without spinal canal or neural foraminal stenosis.  No cord abnormality.  PHYSICAL THERAPY with Cris Bae (lower back) and Nidhi (neck/shoulders).  [facility] is very difficult to get into.  Not sure if she is able to have [facility] treat for neck/shoulders and low back.  Will discuss at next visit with her.  Cymbalta - headaches.  Lyrica - has this - not sure if any negatives.  Gets flares of pain that caused her to need her gabapentin.    Asthma/Allergy Follow-Up  Symbicort once daily & twice daily with flares,  Albuterol used regularly with activity (not taking prior to 1+ mile  walks), zyrtec & montelukast daily.  Uses Flonase daily azelastine PRN.  Saw pulmonology @ [facility] 2020 and they determined that the PFTs were consistent with suspected decreased muscular tone of diaphragm and intercostal muscles.     Migraine   Topiramate 150 mg at bedtime and 50 mg during the day.  Since your last clinic visit, how have your headaches changed?  No change  How often are you getting headaches or migraines? 4x per week   Are you able to do normal daily activities when you have a migraine? Yes  Are you taking rescue/relief medications? Imitrex - doesn't need refill - uses rarely  How helpful is your rescue/relief medication?  The relief is inconsistent  Are you taking any medications to prevent migraines? (Select all that apply)  Topamax  In the past 4 weeks, how often have you gone to urgent care or the emergency room because of your headaches?  0    Depression and Anxiety   Started fluoxetine 10 mg 2024.  Finds that this is helpful and she is more even on this medication.  This has been prescribed previously but she does not think that she ever tried this when prescribed in .  How are you doing with your depression since your last visit? No change  How are you doing with your anxiety since your last visit?  No change  Are you having other symptoms that might be associated with depression or anxiety? No  Have you had a significant life event? No   Do you have any concerns with your use of alcohol or other drugs? No    Social History     Tobacco Use    Smoking status: Former     Current packs/day: 0.00     Average packs/day: 0.5 packs/day for 2.5 years (1.2 ttl pk-yrs)     Types: Cigars, Cigarettes     Start date: 2011     Quit date: 2013     Years since quittin.6    Smokeless tobacco: Never   Vaping Use    Vaping status: Never Used   Substance Use Topics    Alcohol use: Yes     Alcohol/week: 2.0 standard drinks of alcohol     Comment: 0-3 drinks per week    Drug use: No      Comment: Marijuana when younger          8/20/2024     2:31 PM 9/19/2024     2:01 PM 2/13/2025     2:40 PM   PHQ   PHQ-9 Total Score 7 3 2    Q9: Thoughts of better off dead/self-harm past 2 weeks Not at all Not at all Not at all       Patient-reported         8/20/2024     2:31 PM 9/19/2024     2:02 PM 2/13/2025     2:41 PM   JUSTICE-7 SCORE   Total Score 2 (minimal anxiety) 1 (minimal anxiety) 3 (minimal anxiety)   Total Score 2 1 3        Patient-reported         2/13/2025     2:40 PM   Last PHQ-9   1.  Little interest or pleasure in doing things 0   2.  Feeling down, depressed, or hopeless 0   3.  Trouble falling or staying asleep, or sleeping too much 1   4.  Feeling tired or having little energy 1   5.  Poor appetite or overeating 0   6.  Feeling bad about yourself 0   7.  Trouble concentrating 0   8.  Moving slowly or restless 0   Q9: Thoughts of better off dead/self-harm past 2 weeks 0   PHQ-9 Total Score 2        Patient-reported         2/13/2025     2:41 PM   JUSTICE-7    1. Feeling nervous, anxious, or on edge 0   2. Not being able to stop or control worrying 0   3. Worrying too much about different things 0   4. Trouble relaxing 1   5. Being so restless that it is hard to sit still 1   6. Becoming easily annoyed or irritable 1   7. Feeling afraid, as if something awful might happen 0   JUSTICE-7 Total Score 3    If you checked any problems, how difficult have they made it for you to do your work, take care of things at home, or get along with other people? Somewhat difficult       Patient-reported       Suicide Assessment Five-step Evaluation and Treatment (SAFE-T)      Health Care Directive  Patient does not have a Health Care Directive: Discussed advance care planning with patient; however, patient declined at this time.      2/13/2025   General Health   How would you rate your overall physical health? Good   Feel stress (tense, anxious, or unable to sleep) Only a little   (!) STRESS CONCERN      2/13/2025    Nutrition   Three or more servings of calcium each day? (!) NO   Diet: Regular (no restrictions)   How many servings of fruit and vegetables per day? (!) 2-3   How many sweetened beverages each day? 0-1         2025   Exercise   Days per week of moderate/strenous exercise 3 days   Average minutes spent exercising at this level 20 min         2025   Social Factors   Frequency of gathering with friends or relatives Once a week   Worry food won't last until get money to buy more No   Food not last or not have enough money for food? Yes   Do you have housing? (Housing is defined as stable permanent housing and does not include staying ouside in a car, in a tent, in an abandoned building, in an overnight shelter, or couch-surfing.) Yes   Are you worried about losing your housing? Yes   Lack of transportation? No   Unable to get utilities (heat,electricity)? No   Want help with housing or utility concern? No   (!) FOOD SECURITY CONCERN PRESENT(!) HOUSING CONCERN PRESENT      2025   Dental   Dentist two times every year? Yes         2024   TB Screening   Were you born outside of the [place]? No       Today's PHQ-9 Score:       2025     2:40 PM   PHQ-9 SCORE   PHQ-9 Total Score MyChart 2 (Minimal depression)   PHQ-9 Total Score 2        Patient-reported         2025   Substance Use   Alcohol more than 3/day or more than 7/wk No   Do you use any other substances recreationally? No     Social History     Tobacco Use    Smoking status: Former     Current packs/day: 0.00     Average packs/day: 0.5 packs/day for 2.5 years (1.2 ttl pk-yrs)     Types: Cigars, Cigarettes     Start date: 2011     Quit date: 2013     Years since quittin.6    Smokeless tobacco: Never   Vaping Use    Vaping status: Never Used   Substance Use Topics    Alcohol use: Yes     Alcohol/week: 2.0 standard drinks of alcohol     Comment: 0-3 drinks per week    Drug use: No     Comment: Marijuana when younger             "10/25/2024   LAST FHS-7 RESULTS   1st degree relative breast or ovarian cancer No   Any relative bilateral breast cancer No   Any male have breast cancer No   Any ONE woman have BOTH breast AND ovarian cancer No   Any woman with breast cancer before 50yrs Yes    No   2 or more relatives with breast AND/OR ovarian cancer Yes    No   2 or more relatives with breast AND/OR bowel cancer Yes    No       Multiple values from one day are sorted in reverse-chronological order        Mammogram Screening - Mammogram every 1-2 years updated in Health Maintenance based on mutual decision making        2025   STI Screening   New sexual partner(s) since last STI/HIV test? No     History of abnormal Pap smear: Status post hysterectomy with removal of cervix and no history of CIN2 or greater or cervical cancer. Health Maintenance and Surgical History updated.       ASCVD Risk   The ASCVD Risk score (Delisa CHUA, et al., 2019) failed to calculate for the following reasons:    Risk score cannot be calculated because patient has a medical history suggesting prior/existing ASCVD       Reviewed and updated as needed this visit by Provider   Tobacco  Allergies       Soc Hx Sexual Activity          Past Medical History:   Diagnosis Date    Asthma     mild persistent - Dr Gee    Cerebral infarction (H)     Diverticulitis of colon     Fibroids     s/p hysterectomy    Fibromyalgia     Gastroesophageal reflux disease     Hypertension     Iron deficiency anemia     Migraine     followed by oswald Fortune,    Obesity     Obstructive sleep apnea     needs updated sleep study    Ovarian cyst     Pre-diabetes     Relapsing remitting multiple sclerosis     lesion in [place].  Facial tingling initial symptom.  F/B [facility]    Spondylosis of cervical region      Past Surgical History:   Procedure Laterality Date     SECTION      COLONOSCOPY      DILATION AND CURETTAGE      EGD      GASTRIC BYPASS      \"Trouble " "with B12 and D\"    HYSTERECTOMY, MONO  2013    cervix gone.  fibroids.  without BSO    TONSILLECTOMY      Tubal ligation NOS           Review of Systems  Constitutional, HEENT, cardiovascular, pulmonary, GI, , musculoskeletal, neuro, skin, endocrine and psych systems are negative, except as otherwise noted.     Objective    Exam  /80   Pulse 88   Temp 97  F (36.1  C) (Tympanic)   Resp 18   Ht 1.651 m (5' 5\")   Wt 108.4 kg (239 lb)   LMP  (LMP Unknown)   SpO2 99%   BMI 39.77 kg/m     Estimated body mass index is 39.77 kg/m  as calculated from the following:    Height as of this encounter: 1.651 m (5' 5\").    Weight as of this encounter: 108.4 kg (239 lb).    Physical Exam  GENERAL: alert and no distress  EYES: Eyes grossly normal to inspection, PERRL and conjunctivae and sclerae normal  HENT: ear canals and TM's normal, nose and mouth without ulcers or lesions  NECK: no adenopathy, no asymmetry, masses, or scars  RESP: lungs clear to auscultation - no rales, rhonchi or wheezes  CV: regular rate and rhythm, normal S1 S2, no S3 or S4, no murmur, click or rub, no peripheral edema  ABDOMEN: soft, nontender, no hepatosplenomegaly, no masses and bowel sounds normal  MS: no gross musculoskeletal defects noted, no edema  SKIN: no suspicious lesions or rashes  NEURO: Normal strength and tone, mentation intact and speech normal  PSYCH: mentation appears normal, affect normal/bright        Signed Electronically by: Miya Crump PA-C    Answers submitted by the patient for this visit:  Patient Health Questionnaire (Submitted on 2/13/2025)  If you checked off any problems, how difficult have these problems made it for you to do your work, take care of things at home, or get along with other people?: Not difficult at all  PHQ9 TOTAL SCORE: 2  Patient Health Questionnaire (G7) (Submitted on 2/13/2025)  JUSTICE 7 TOTAL SCORE: 3    "

## 2025-02-19 ENCOUNTER — MYC MEDICAL ADVICE (OUTPATIENT)
Dept: FAMILY MEDICINE | Facility: CLINIC | Age: 50
End: 2025-02-19
Payer: COMMERCIAL

## 2025-02-19 ENCOUNTER — TELEPHONE (OUTPATIENT)
Dept: GASTROENTEROLOGY | Facility: CLINIC | Age: 50
End: 2025-02-19
Payer: COMMERCIAL

## 2025-02-19 NOTE — TELEPHONE ENCOUNTER
"Endoscopy Scheduling Screen    Have you had any respiratory illness or flu-like symptoms in the last 10 days?  No    What is your communication preference for Instructions and/or Bowel Prep?   MyChart    What insurance is in the chart?  Other:  [facility]    Ordering/Referring Provider:   ALBERT SHAHID        (If ordering provider performs procedure, schedule with ordering provider unless otherwise instructed. )    BMI: Estimated body mass index is 39.77 kg/m  as calculated from the following:    Height as of 2/18/25: 1.651 m (5' 5\").    Weight as of 2/18/25: 108.4 kg (239 lb).     Sedation Ordered  moderate sedation.   If patient BMI > 50 do not schedule in ASC.    If patient BMI > 45 do not schedule at [facility].    Are you taking methadone or Suboxone?  NO, No RN review required.    Have you been diagnosed and are being treated for severe PTSD or severe anxiety?  NO, No RN review required.    Are you taking any prescription medications for pain 3 or more times per week?   NO, No RN review required.    Do you have a history of malignant hyperthermia?  No    (Females) Are you currently pregnant?   No     Have you been diagnosed or told you have pulmonary hypertension?   No    Do you have an LVAD?  No    Have you been told you have moderate to severe sleep apnea?  Yes. Do you use a CPAP? No. (RN Review required for scheduling unless scheduling in Hospital.)     Have you been told you have COPD, asthma, or any other lung disease?  Yes     What breathing problems do you have?  Asthma     Do you use home oxygen?  No    Have your breathing problems required an ED visit or hospitalization in the last year?  No.    Do you  have a history of any heart conditions or any upcoming cardiac exams like an echo, angiogram, stress test, or ablation?  No     Have you ever had or are you waiting for an organ transplant?  No. Continue scheduling, no site restrictions.    Have you had a stroke or transient ischemic attack (TIA aka \"mini " "stroke\") in the last 2 years?   No.    Have you been diagnosed with or been told you have cirrhosis of the liver?   No.    Are you currently on dialysis?   No    Do you need assistance transferring?   No    BMI: Estimated body mass index is 39.77 kg/m  as calculated from the following:    Height as of 2/18/25: 1.651 m (5' 5\").    Weight as of 2/18/25: 108.4 kg (239 lb).     Is patients BMI > 40 and scheduling location [facility]?  No    Do you take an injectable or oral medication for weight loss or diabetes (excluding insulin)?  Yes, hold time can be up to 7 days. Please consult with you prescribing provider to discuss endoscopy recommendations. (Please schedule at least 7 days out.) WEGOVY    Do you take the medication Naltrexone?  No    Do you take blood thinners?  No       Prep   Are you currently on dialysis or do you have chronic kidney disease?  No    Do you have a diagnosis of diabetes?  No    Do you have a diagnosis of cystic fibrosis (CF)?  No    On a regular basis do you go 3 -5 days between bowel movements?  No    BMI > 40?  No    Preferred Pharmacy:    [address]  [address] [place] [id number]  Phone: [phone] Fax: [phone] Alternate Fax: [phone], [phone]    Final Scheduling Details     Procedure scheduled  Colonoscopy    Surgeon:  MINE     Date of procedure:  04/04/2025     Pre-OP / PAC:   No - Not required for this site.    Location  [facility] - Per order.    Sedation   Moderate Sedation - Per order.      Patient Reminders:   You will receive a call from a Nurse to review instructions and health history.  This assessment must be completed prior to your procedure.  Failure to complete the Nurse assessment may result in the procedure being cancelled.      On the day of your procedure, please designate an adult(s) who can drive you home stay with you for the next 24 hours. The medicines used in the exam will make you " sleepy. You will not be able to drive.      You cannot take public transportation, ride share services, or non-medical taxi service without a responsible caregiver.  Medical transport services are allowed with the requirement that a responsible caregiver will receive you at your destination.  We require that drivers and caregivers are confirmed prior to your procedure.

## 2025-02-20 ENCOUNTER — TELEPHONE (OUTPATIENT)
Dept: GASTROENTEROLOGY | Facility: CLINIC | Age: 50
End: 2025-02-20

## 2025-02-20 NOTE — TELEPHONE ENCOUNTER
Addressed in another encounter      Miya Crump MBA, MS, PA-C  [facility] [place]

## 2025-02-20 NOTE — TELEPHONE ENCOUNTER
Caller: Nataliya    Reason for Reschedule/Cancellation (please be detailed, any staff messages or encounters to note?):   PCP wants delayed    Did you cancel or rescheduled an E[facility] procedure? No.    Is screening questionnaire older than 3 months from the reschedule date.   If Yes, please complete screening questionnaire. No    Prior to reschedule please review:  Ordering Provider: ALBERT SHAHID   Sedation Determined: CS  Does patient have any ASC Exclusions, please identify?: no    Notes on Cancelled Procedure:  Procedure: Lower Endoscopy [Colonoscopy]   Date: 4/4  Location: [facility]; [address] [place] [address]  Surgeon: Sasha    Rescheduled: Yes,   Procedure: Lower Endoscopy [Colonoscopy]    Date: 6/20   Location: [facility]; [address] [place] [address]   Surgeon: Katelyn   Sedation Level Scheduled  CS ,  Reason for Sedation Level order   Instructions updated and sent: y     Does patient need PAC or Pre -Op Rescheduled? : n

## 2025-02-25 ENCOUNTER — THERAPY VISIT (OUTPATIENT)
Dept: PHYSICAL THERAPY | Facility: CLINIC | Age: 50
End: 2025-02-25
Attending: PHYSICIAN ASSISTANT
Payer: COMMERCIAL

## 2025-02-25 DIAGNOSIS — M79.18 CHRONIC MUSCULOSKELETAL PAIN DUE TO DISORDER OF NERVOUS SYSTEM: ICD-10-CM

## 2025-02-25 DIAGNOSIS — M54.2 CHRONIC NECK PAIN: ICD-10-CM

## 2025-02-25 DIAGNOSIS — M62.838 MUSCLE SPASM: Primary | ICD-10-CM

## 2025-02-25 DIAGNOSIS — G89.29 CHRONIC NECK PAIN: ICD-10-CM

## 2025-02-25 DIAGNOSIS — G98.8 CHRONIC MUSCULOSKELETAL PAIN DUE TO DISORDER OF NERVOUS SYSTEM: ICD-10-CM

## 2025-02-25 DIAGNOSIS — G89.29 CHRONIC MUSCULOSKELETAL PAIN DUE TO DISORDER OF NERVOUS SYSTEM: ICD-10-CM

## 2025-02-25 PROCEDURE — 97110 THERAPEUTIC EXERCISES: CPT | Mod: GP | Performed by: PHYSICAL THERAPIST

## 2025-02-25 PROCEDURE — 97035 APP MDLTY 1+ULTRASOUND EA 15: CPT | Mod: GP | Performed by: PHYSICAL THERAPIST

## 2025-02-25 PROCEDURE — 97140 MANUAL THERAPY 1/> REGIONS: CPT | Mod: GP | Performed by: PHYSICAL THERAPIST

## 2025-02-26 ENCOUNTER — E-VISIT (OUTPATIENT)
Dept: FAMILY MEDICINE | Facility: CLINIC | Age: 50
End: 2025-02-26
Payer: COMMERCIAL

## 2025-02-26 DIAGNOSIS — Z23 NEED FOR SHINGLES VACCINE: ICD-10-CM

## 2025-02-26 DIAGNOSIS — R19.8 CHANGE IN BOWEL MOVEMENT: Primary | ICD-10-CM

## 2025-02-27 RX ORDER — ZOSTER VACCINE RECOMBINANT, ADJUVANTED 50 MCG/0.5
1 KIT INTRAMUSCULAR ONCE
Qty: 0.5 ML | Refills: 1 | Status: SHIPPED | OUTPATIENT
Start: 2025-02-27 | End: 2025-02-27

## 2025-02-28 ENCOUNTER — VIRTUAL VISIT (OUTPATIENT)
Dept: OBGYN | Facility: CLINIC | Age: 50
End: 2025-02-28
Attending: ADVANCED PRACTICE MIDWIFE
Payer: COMMERCIAL

## 2025-02-28 DIAGNOSIS — N95.1 VASOMOTOR SYMPTOMS DUE TO MENOPAUSE: ICD-10-CM

## 2025-02-28 DIAGNOSIS — N95.1 PERIMENOPAUSE: ICD-10-CM

## 2025-02-28 PROCEDURE — 98014 SYNCH AUDIO-ONLY EST MOD 30: CPT | Performed by: ADVANCED PRACTICE MIDWIFE

## 2025-02-28 RX ORDER — ESTRADIOL 0.03 MG/D
1 FILM, EXTENDED RELEASE TRANSDERMAL
Qty: 24 PATCH | Refills: 3 | Status: SHIPPED | OUTPATIENT
Start: 2025-03-03

## 2025-02-28 NOTE — PROGRESS NOTES
Virtual Visit Details    Type of service:  Telephone Visit   Phone call duration: 25 minutes   Originating Location (pt. Location): Home    Distant Location (provider location):  On-site  Telephone visit completed due to the patient did not consent to a video visit.    Subjective:  Nataliya Aldrich is an 49 year old, , who requests an evaluation of naye/menopause symptoms.  Happy w estrogen patch  It took about 2 weeks to feel improvement  Naye/menopause symptoms include:   Hot flashes and Night sweats 80% better!   Insomnia improvement  Mood improvement    She has recently started wegovy a few weeks ago. Wondering about taking the 2 meds together  Already having good results with wegovy      Gynecologic History  No LMP recorded (lmp unknown). Patient has had a hysterectomy.       Current contraception: hysterectomy    Pt is due for full annual exam and preventive screening    Obstetric History  OB History    Para Term  AB Living   4 2 2 0 2 2   SAB IAB Ectopic Multiple Live Births   1 1 0 0 2      # Outcome Date GA Lbr Daljit/2nd Weight Sex Type Anes PTL Lv   4 Term  40w0d  2.977 kg (6 lb 9 oz) F CS-Unspec   KALPANA   3 Term  40w0d  2.863 kg (6 lb 5 oz) M CS-Unspec   KALPANA   2 SAB            1 IAB                 Labs:  TSH   Date Value Ref Range Status   2025 1.94 0.30 - 4.20 uIU/mL Final   2022 1.02 0.40 - 4.00 mU/L Final   2018 2.83 0.40 - 4.00 mU/L Final     Lab Results   Component Value Date    CHOL 191 2025     Lab Results   Component Value Date    HDL 60 2025     Lab Results   Component Value Date     2025     Lab Results   Component Value Date    TRIG 107 2025         Past Medical History  Past Medical History:   Diagnosis Date    Asthma     mild persistent - Dr Gee    Cerebral infarction (H)     Diverticulitis of colon     Fibroids     s/p hysterectomy    Fibromyalgia     Gastroesophageal reflux disease     Hypertension      "Iron deficiency anemia     Migraine     followed by oswald Fortune,    Obesity     Obstructive sleep apnea     needs updated sleep study    Ovarian cyst     Pre-diabetes     Relapsing remitting multiple sclerosis     lesion in [place].  Facial tingling initial symptom.  F/B [facility]    Spondylosis of cervical region        Past Surgical History  Past Surgical History:   Procedure Laterality Date     SECTION      COLONOSCOPY  2012    DILATION AND CURETTAGE      EGD      GASTRIC BYPASS  2002    \"Trouble with B12 and D\"    HYSTERECTOMY, MONO  2013    cervix gone.  fibroids.  without BSO    TONSILLECTOMY      Tubal ligation NOS         Medications  Current Outpatient Medications   Medication Sig Dispense Refill    acetaminophen-caffeine (EXCEDRIN TENSION HEADACHE) 500-65 MG TABS Take 2 tablets by mouth daily as needed for mild pain (headache).      albuterol (PROVENTIL) (2.5 MG/3ML) 0.083% neb solution USE 1 VIAL VIA NEBULIZER EVERY 6 HOURS AS NEEDED FOR SHORTNESS OF BREATH OR DIFFICULT BREATHING OR WHEEZING 75 mL 1    Ascorbic Acid (VITAMIN C) 500 MG CAPS Take 500 mg by mouth daily      azelastine (ASTELIN) 0.1 % nasal spray Spray 1 spray into both nostrils 2 times daily as needed for rhinitis (nasal antihistamine) 30 mL 5    baclofen (LIORESAL) 20 MG tablet TAKE ONE TABLET BY MOUTH EVERY NIGHT AT BEDTIME MAY ALSO TAKE ONE TABLET BY MOUTH EVERY 4 HOURS AS NEEDED FOR MUSCLE SPASMS (max dose: 80 mg/day) 180 tablet 1    budesonide-formoterol (SYMBICORT) 160-4.5 MCG/ACT Inhaler INHALE 2 PUFFS INTO THE LUNGS TWICE DAILY 10.2 g 5    cetirizine (ZYRTEC) 10 MG tablet Take 1 tablet (10 mg) by mouth daily 180 tablet 1    clotrimazole (LOTRIMIN) 1 % external cream Apply topically 2 times daily 30 g 1    cyclobenzaprine (FLEXERIL) 10 MG tablet Take 1 tablet (10 mg) by mouth 3 times daily as needed for muscle spasms. 40 tablet 0    ELDERBERRY PO Take by mouth daily      EPINEPHrine (ANY BX GENERIC EQUIV) 0.3 MG/0.3ML " injection 2-pack Inject 0.3 mLs (0.3 mg) into the muscle as needed for anaphylaxis May repeat one time in 5-15 minutes if response to initial dose is inadequate. 2 each 3    estradiol (VIVELLE-DOT) 0.025 MG/24HR bi-weekly patch Place 1 patch over 96 hours onto the skin twice a week. 24 patch 0    fluticasone (FLONASE) 50 MCG/ACT nasal spray USE 1 PUFF IN EACH NOSTRIL AS DIRECTED. 16 g 5    FT ANTACID REGULAR STRENGTH 500 MG chewable tablet       gabapentin (NEURONTIN) 300 MG capsule Take 1-2 capsules (300-600 mg) by mouth 4 times daily as needed for neuropathic pain. 360 capsule 1    hydrOXYzine davis (VISTARIL) 25 MG capsule Take 1 capsule (25 mg) by mouth nightly as needed for anxiety or other (sleep). 30 capsule 0    ipratropium - albuterol 0.5 mg/2.5 mg/3 mL (DUONEB) 0.5-2.5 (3) MG/3ML neb solution Take 1 vial (3 mLs) by nebulization every 6 hours as needed for shortness of breath, wheezing or cough. 90 mL 0    ketoconazole (NIZORAL) 2 % external cream Apply topically 2 times daily. 45 g 1    ketotifen fumarate 0.035% 0.035 % SOLN ophthalmic solution Place 1 drop into both eyes 2 times daily as needed for itching or dry eyes. 10 mL 2    MAGNESIUM PO Take 400 mg by mouth at bedtime      modafinil (PROVIGIL) 100 MG tablet Take 1 tablet (100 mg) by mouth daily. 90 tablet 1    montelukast (SINGULAIR) 10 MG tablet TAKE 1 TABLET(10 MG) BY MOUTH AT BEDTIME 90 tablet 3    Multiple Vitamins-Calcium (ONE-A-DAY WOMENS FORMULA PO)       natalizumab (TYSABRI) 300 MG/15ML injection Inject 15 mLs (300 mg) into the vein every 4 weeks. Infusion      pantoprazole (PROTONIX) 40 MG EC tablet Take 1 tablet (40 mg) by mouth daily. 90 tablet 3    polyethylene glycol (MIRALAX) 17 GM/Dose powder Take 17 g (1 Capful) by mouth daily. 578 g 1    PREBIOTIC PRODUCT PO Take by mouth daily      Semaglutide-Weight Management (WEGOVY) 1 MG/0.5ML pen Inject 1 mg subcutaneously once a week. 2 mL 1    simethicone (MYLICON) 80 MG chewable tablet Take  1 tablet (80 mg) by mouth every 6 hours as needed for flatulence or cramping. 60 tablet 1    SUMAtriptan (IMITREX) 100 MG tablet Take 50 mg up to twice daily as needed for headache; separate doses by at least one hour and do not use more than 3 days/week 18 tablet 3    topiramate (TOPAMAX) 100 MG tablet Take 1 tablet (100 mg) by mouth at bedtime. (take with 50 mg dose at bedtime) 90 tablet 1    topiramate (TOPAMAX) 50 MG tablet Take 1 tablet (50 mg) by mouth 2 times daily. 180 tablet 1    triamcinolone (ARISTOCORT HP) 0.5 % external cream Apply topically 2 times daily. To area of eczema on upper thigh 15 g 1    valACYclovir (VALTREX) 500 MG tablet Take 1 tablet (500 mg) by mouth daily. 90 tablet 3    VENTOLIN  (90 Base) MCG/ACT inhaler SHAKE WELL AND INHALE 2 PUFFS INTO THE LUNGS EVERY 6 HOURS AS NEEDED FOR SHORTNESS OF BREATH OR WHEEZING STRENGTH 18 g 4    vitamin D3 (CHOLECALCIFEROL) 250 mcg (34344 units) capsule TAKE 1 CAPSULE BY MOUTH ONCE DAILY 90 capsule 2    FLUoxetine (PROZAC) 10 MG capsule Take 1 capsule (10 mg) by mouth daily. (Patient not taking: Reported on 2/28/2025) 90 capsule 3     No current facility-administered medications for this visit.       Allergies     Allergies   Allergen Reactions    Aspirin Difficulty breathing, Palpitations and Other (See Comments)    Bee Venom Swelling    Cephalexin Swelling    Wasp Venom Angioedema and Difficulty breathing    Adhesive Tape     Amantadine Swelling    Azithromycin Angioedema    Doxycycline Itching and Swelling     3 doses - itching all over, hand swelling, tongue fullness    Naproxen     Latex Hives, Itching and Rash    Penicillins Other (See Comments), Nausea and Vomiting, Hives, Swelling, Rash, Cramps and GI Disturbance     Amoxicillin OK       Family History  Family History   Problem Relation Age of Onset    Hypertension Mother     Hyperlipidemia Mother     Obesity Mother     Obesity Father     Sleep Apnea Father     Diabetes Father      Hypertension Father     Asthma Father     Impaired Fasting Glucose Sister     No Known Problems Sister     No Known Problems Sister     No Known Problems Sister     No Known Problems Sister     No Known Problems Sister     No Known Problems Sister     No Known Problems Brother     No Known Problems Brother     Diabetes Maternal Grandmother     Hyperlipidemia Maternal Grandmother     Hypertension Maternal Grandmother     Cerebrovascular Disease Maternal Grandfather     Coronary Artery Disease Maternal Grandfather     Colon Cancer Maternal Grandfather         Mothers brother    Prostate Cancer Maternal Grandfather     Diabetes Paternal Grandmother     Cerebrovascular Disease Paternal Grandmother     Depression Paternal Grandmother     Substance Abuse Paternal Grandmother     Sleep Apnea Son     Mental Illness Son     Lupus Paternal Aunt 41    Breast Cancer Other     Prostate Cancer Paternal Uncle     Multiple Sclerosis No family hx of     Ovarian Cancer No family hx of      No family history of breast, uterine, ovarian or colon cancer.    Objective not done      ASSESSMENT:  Nataliya Aldrich is an 49 year old, , who requests an evaluation of fabián/menopause symptoms.    (N95.1) Perimenopause  Comment:   Plan: estradiol (VIVELLE-DOT) 0.025 MG/24HR bi-weekly        patch            (N95.1) Vasomotor symptoms due to menopause  Comment:   Plan: estradiol (VIVELLE-DOT) 0.025 MG/24HR bi-weekly        patch            PLAN:        Nataliya Aldrich is a candidate for systemic HT and desires to continue on estrogen pat      She has no contraindications and age less than 60 years and less than 10 years since LMP. Nataliya Aldrich is an 49 year old with No LMP recorded (lmp unknown). Patient has had a hysterectomy..    Reviewed evidence on use of wegovy and vivelle dot estrogen.  May increase the effects of wegovy.  Given  positive results and recent wegovy start, pt desires to continue on the current  dose of estrogen. Will call/schedule appointment if she wants to consider higher dose of estrogen. Discussed possible need for increased estrogen with weight loss and as she ages.    Return to clinic in 6-9 months.  Follow-up as needed.  Carla Navarro, MARTÍNEZ CNM  35 minutes spent on the date of the encounter doing chart review, history and exam, documentation and further activities per the note.    Carla Navarro, CHACE, APRN, CNM, FACNM

## 2025-02-28 NOTE — LETTER
2025       RE: Nataliya Aldrich  [id number]  [place] [address]     Dear Colleague,    Thank you for referring your patient, Nataliya Aldrich, to the [facility]. Please see a copy of my visit note below.    Virtual Visit Details    Type of service:  Telephone Visit   Phone call duration: 25 minutes   Originating Location (pt. Location): Home    Distant Location (provider location):  On-site  Telephone visit completed due to the patient did not consent to a video visit.    Subjective:  Nataliya Aldrich is an 49 year old, , who requests an evaluation of naye/menopause symptoms.  Happy w estrogen patch  It took about 2 weeks to feel improvement  Naye/menopause symptoms include:   Hot flashes and Night sweats 80% better!   Insomnia improvement  Mood improvement    She has recently started wegovy a few weeks ago. Wondering about taking the 2 meds together  Already having good results with wegovy      Gynecologic History  No LMP recorded (lmp unknown). Patient has had a hysterectomy.       Current contraception: hysterectomy    Pt is due for full annual exam and preventive screening    Obstetric History  OB History    Para Term  AB Living   4 2 2 0 2 2   SAB IAB Ectopic Multiple Live Births   1 1 0 0 2      # Outcome Date GA Lbr Daljit/2nd Weight Sex Type Anes PTL Lv   4 Term  40w0d  2.977 kg (6 lb 9 oz) F CS-Unspec   KALPANA   3 Term  40w0d  2.863 kg (6 lb 5 oz) M CS-Unspec   KALPANA   2 SAB            1 IAB                 Labs:  TSH   Date Value Ref Range Status   2025 1.94 0.30 - 4.20 uIU/mL Final   2022 1.02 0.40 - 4.00 mU/L Final   2018 2.83 0.40 - 4.00 mU/L Final     Lab Results   Component Value Date    CHOL 191 2025     Lab Results   Component Value Date    HDL 60 2025     Lab Results   Component Value Date      "2025     Lab Results   Component Value Date    TRIG 107 2025         Past Medical History  Past Medical History:   Diagnosis Date     Asthma     mild persistent - Dr Gee     Cerebral infarction (H)      Diverticulitis of colon      Fibroids     s/p hysterectomy     Fibromyalgia      Gastroesophageal reflux disease      Hypertension      Iron deficiency anemia      Migraine     followed by oswald Fortune,     Obesity      Obstructive sleep apnea     needs updated sleep study     Ovarian cyst      Pre-diabetes      Relapsing remitting multiple sclerosis     lesion in [place].  Facial tingling initial symptom.  F/B [facility]     Spondylosis of cervical region        Past Surgical History  Past Surgical History:   Procedure Laterality Date      SECTION       COLONOSCOPY       DILATION AND CURETTAGE       EGD       GASTRIC BYPASS      \"Trouble with B12 and D\"     HYSTERECTOMY, MONO  2013    cervix gone.  fibroids.  without BSO     TONSILLECTOMY       Tubal ligation NOS         Medications  Current Outpatient Medications   Medication Sig Dispense Refill     acetaminophen-caffeine (EXCEDRIN TENSION HEADACHE) 500-65 MG TABS Take 2 tablets by mouth daily as needed for mild pain (headache).       albuterol (PROVENTIL) (2.5 MG/3ML) 0.083% neb solution USE 1 VIAL VIA NEBULIZER EVERY 6 HOURS AS NEEDED FOR SHORTNESS OF BREATH OR DIFFICULT BREATHING OR WHEEZING 75 mL 1     Ascorbic Acid (VITAMIN C) 500 MG CAPS Take 500 mg by mouth daily       azelastine (ASTELIN) 0.1 % nasal spray Spray 1 spray into both nostrils 2 times daily as needed for rhinitis (nasal antihistamine) 30 mL 5     baclofen (LIORESAL) 20 MG tablet TAKE ONE TABLET BY MOUTH EVERY NIGHT AT BEDTIME MAY ALSO TAKE ONE TABLET BY MOUTH EVERY 4 HOURS AS NEEDED FOR MUSCLE SPASMS (max dose: 80 mg/day) 180 tablet 1     budesonide-formoterol (SYMBICORT) 160-4.5 MCG/ACT Inhaler INHALE 2 PUFFS INTO THE LUNGS TWICE DAILY 10.2 g 5     cetirizine " (ZYRTEC) 10 MG tablet Take 1 tablet (10 mg) by mouth daily 180 tablet 1     clotrimazole (LOTRIMIN) 1 % external cream Apply topically 2 times daily 30 g 1     cyclobenzaprine (FLEXERIL) 10 MG tablet Take 1 tablet (10 mg) by mouth 3 times daily as needed for muscle spasms. 40 tablet 0     ELDERBERRY PO Take by mouth daily       EPINEPHrine (ANY BX GENERIC EQUIV) 0.3 MG/0.3ML injection 2-pack Inject 0.3 mLs (0.3 mg) into the muscle as needed for anaphylaxis May repeat one time in 5-15 minutes if response to initial dose is inadequate. 2 each 3     estradiol (VIVELLE-DOT) 0.025 MG/24HR bi-weekly patch Place 1 patch over 96 hours onto the skin twice a week. 24 patch 0     fluticasone (FLONASE) 50 MCG/ACT nasal spray USE 1 PUFF IN EACH NOSTRIL AS DIRECTED. 16 g 5     FT ANTACID REGULAR STRENGTH 500 MG chewable tablet        gabapentin (NEURONTIN) 300 MG capsule Take 1-2 capsules (300-600 mg) by mouth 4 times daily as needed for neuropathic pain. 360 capsule 1     hydrOXYzine davis (VISTARIL) 25 MG capsule Take 1 capsule (25 mg) by mouth nightly as needed for anxiety or other (sleep). 30 capsule 0     ipratropium - albuterol 0.5 mg/2.5 mg/3 mL (DUONEB) 0.5-2.5 (3) MG/3ML neb solution Take 1 vial (3 mLs) by nebulization every 6 hours as needed for shortness of breath, wheezing or cough. 90 mL 0     ketoconazole (NIZORAL) 2 % external cream Apply topically 2 times daily. 45 g 1     ketotifen fumarate 0.035% 0.035 % SOLN ophthalmic solution Place 1 drop into both eyes 2 times daily as needed for itching or dry eyes. 10 mL 2     MAGNESIUM PO Take 400 mg by mouth at bedtime       modafinil (PROVIGIL) 100 MG tablet Take 1 tablet (100 mg) by mouth daily. 90 tablet 1     montelukast (SINGULAIR) 10 MG tablet TAKE 1 TABLET(10 MG) BY MOUTH AT BEDTIME 90 tablet 3     Multiple Vitamins-Calcium (ONE-A-DAY WOMENS FORMULA PO)        natalizumab (TYSABRI) 300 MG/15ML injection Inject 15 mLs (300 mg) into the vein every 4 weeks. Infusion        pantoprazole (PROTONIX) 40 MG EC tablet Take 1 tablet (40 mg) by mouth daily. 90 tablet 3     polyethylene glycol (MIRALAX) 17 GM/Dose powder Take 17 g (1 Capful) by mouth daily. 578 g 1     PREBIOTIC PRODUCT PO Take by mouth daily       Semaglutide-Weight Management (WEGOVY) 1 MG/0.5ML pen Inject 1 mg subcutaneously once a week. 2 mL 1     simethicone (MYLICON) 80 MG chewable tablet Take 1 tablet (80 mg) by mouth every 6 hours as needed for flatulence or cramping. 60 tablet 1     SUMAtriptan (IMITREX) 100 MG tablet Take 50 mg up to twice daily as needed for headache; separate doses by at least one hour and do not use more than 3 days/week 18 tablet 3     topiramate (TOPAMAX) 100 MG tablet Take 1 tablet (100 mg) by mouth at bedtime. (take with 50 mg dose at bedtime) 90 tablet 1     topiramate (TOPAMAX) 50 MG tablet Take 1 tablet (50 mg) by mouth 2 times daily. 180 tablet 1     triamcinolone (ARISTOCORT HP) 0.5 % external cream Apply topically 2 times daily. To area of eczema on upper thigh 15 g 1     valACYclovir (VALTREX) 500 MG tablet Take 1 tablet (500 mg) by mouth daily. 90 tablet 3     VENTOLIN  (90 Base) MCG/ACT inhaler SHAKE WELL AND INHALE 2 PUFFS INTO THE LUNGS EVERY 6 HOURS AS NEEDED FOR SHORTNESS OF BREATH OR WHEEZING STRENGTH 18 g 4     vitamin D3 (CHOLECALCIFEROL) 250 mcg (91944 units) capsule TAKE 1 CAPSULE BY MOUTH ONCE DAILY 90 capsule 2     FLUoxetine (PROZAC) 10 MG capsule Take 1 capsule (10 mg) by mouth daily. (Patient not taking: Reported on 2/28/2025) 90 capsule 3     No current facility-administered medications for this visit.       Allergies     Allergies   Allergen Reactions     Aspirin Difficulty breathing, Palpitations and Other (See Comments)     Bee Venom Swelling     Cephalexin Swelling     Wasp Venom Angioedema and Difficulty breathing     Adhesive Tape      Amantadine Swelling     Azithromycin Angioedema     Doxycycline Itching and Swelling     3 doses - itching all over,  hand swelling, tongue fullness     Naproxen      Latex Hives, Itching and Rash     Penicillins Other (See Comments), Nausea and Vomiting, Hives, Swelling, Rash, Cramps and GI Disturbance     Amoxicillin OK       Family History  Family History   Problem Relation Age of Onset     Hypertension Mother      Hyperlipidemia Mother      Obesity Mother      Obesity Father      Sleep Apnea Father      Diabetes Father      Hypertension Father      Asthma Father      Impaired Fasting Glucose Sister      No Known Problems Sister      No Known Problems Sister      No Known Problems Sister      No Known Problems Sister      No Known Problems Sister      No Known Problems Sister      No Known Problems Brother      No Known Problems Brother      Diabetes Maternal Grandmother      Hyperlipidemia Maternal Grandmother      Hypertension Maternal Grandmother      Cerebrovascular Disease Maternal Grandfather      Coronary Artery Disease Maternal Grandfather      Colon Cancer Maternal Grandfather         Mothers brother     Prostate Cancer Maternal Grandfather      Diabetes Paternal Grandmother      Cerebrovascular Disease Paternal Grandmother      Depression Paternal Grandmother      Substance Abuse Paternal Grandmother      Sleep Apnea Son      Mental Illness Son      Lupus Paternal Aunt 41     Breast Cancer Other      Prostate Cancer Paternal Uncle      Multiple Sclerosis No family hx of      Ovarian Cancer No family hx of      No family history of breast, uterine, ovarian or colon cancer.    Objective not done      ASSESSMENT:  Nataliya Aldrich is an 49 year old, , who requests an evaluation of fabián/menopause symptoms.    (N95.1) Perimenopause  Comment:   Plan: estradiol (VIVELLE-DOT) 0.025 MG/24HR bi-weekly        patch            (N95.1) Vasomotor symptoms due to menopause  Comment:   Plan: estradiol (VIVELLE-DOT) 0.025 MG/24HR bi-weekly        patch            PLAN:        Nataliya Aldrich is a candidate for  systemic HT and desires to continue on estrogen pat      She has no contraindications and age less than 60 years and less than 10 years since LMP. Nataliya Aldrich is an 49 year old with No LMP recorded (lmp unknown). Patient has had a hysterectomy..    Reviewed evidence on use of wegovy and vivelle dot estrogen.  May increase the effects of wegovy.  Given  positive results and recent wegovy start, pt desires to continue on the current dose of estrogen. Will call/schedule appointment if she wants to consider higher dose of estrogen. Discussed possible need for increased estrogen with weight loss and as she ages.    Return to clinic in 6-9 months.  Follow-up as needed.  MARTÍNEZ Cedillo CNM  35 minutes spent on the date of the encounter doing chart review, history and exam, documentation and further activities per the note.    Carla Navarro, CHACE, APRN, CNM, FACNM            Again, thank you for allowing me to participate in the care of your patient.      Sincerely,    MARTÍNEZ Cedillo CNM

## 2025-03-04 ENCOUNTER — THERAPY VISIT (OUTPATIENT)
Dept: PHYSICAL THERAPY | Facility: CLINIC | Age: 50
End: 2025-03-04
Payer: COMMERCIAL

## 2025-03-04 DIAGNOSIS — M54.16 LUMBAR RADICULOPATHY: Primary | ICD-10-CM

## 2025-03-04 PROCEDURE — 97530 THERAPEUTIC ACTIVITIES: CPT | Mod: GP | Performed by: PHYSICAL THERAPIST

## 2025-03-04 PROCEDURE — 97110 THERAPEUTIC EXERCISES: CPT | Mod: GP | Performed by: PHYSICAL THERAPIST

## 2025-03-11 ENCOUNTER — THERAPY VISIT (OUTPATIENT)
Dept: PHYSICAL THERAPY | Facility: CLINIC | Age: 50
End: 2025-03-11
Payer: COMMERCIAL

## 2025-03-11 DIAGNOSIS — M54.16 LUMBAR RADICULOPATHY: Primary | ICD-10-CM

## 2025-03-11 PROCEDURE — 97110 THERAPEUTIC EXERCISES: CPT | Mod: GP | Performed by: PHYSICAL THERAPIST

## 2025-03-11 PROCEDURE — 97530 THERAPEUTIC ACTIVITIES: CPT | Mod: GP | Performed by: PHYSICAL THERAPIST

## 2025-05-18 NOTE — RESULT ENCOUNTER NOTE
Results discussed directly with patient while patient was present. Any further details documented in the note.   Miya Crump PA-C Unilateral weakness
